# Patient Record
Sex: FEMALE | HISPANIC OR LATINO | Employment: OTHER | URBAN - METROPOLITAN AREA
[De-identification: names, ages, dates, MRNs, and addresses within clinical notes are randomized per-mention and may not be internally consistent; named-entity substitution may affect disease eponyms.]

---

## 2017-01-02 ENCOUNTER — OFFICE VISIT (OUTPATIENT)
Dept: FAMILY MEDICINE | Facility: CLINIC | Age: 32
End: 2017-01-02
Payer: COMMERCIAL

## 2017-01-02 ENCOUNTER — TELEPHONE (OUTPATIENT)
Dept: FAMILY MEDICINE | Facility: CLINIC | Age: 32
End: 2017-01-02

## 2017-01-02 VITALS
BODY MASS INDEX: 26.47 KG/M2 | HEART RATE: 103 BPM | WEIGHT: 140.2 LBS | HEIGHT: 61 IN | DIASTOLIC BLOOD PRESSURE: 54 MMHG | SYSTOLIC BLOOD PRESSURE: 98 MMHG

## 2017-01-02 DIAGNOSIS — M34.9 SCLERODERMA (H): ICD-10-CM

## 2017-01-02 DIAGNOSIS — J45.40 MODERATE PERSISTENT ASTHMA WITHOUT COMPLICATION: ICD-10-CM

## 2017-01-02 DIAGNOSIS — M34.1 CREST (CALCINOSIS, RAYNAUD'S PHENOMENON, ESOPHAGEAL DYSFUNCTION, SCLERODACTYLY, TELANGIECTASIA) (H): Primary | ICD-10-CM

## 2017-01-02 DIAGNOSIS — M54.42 ACUTE LEFT-SIDED LOW BACK PAIN WITH LEFT-SIDED SCIATICA: ICD-10-CM

## 2017-01-02 DIAGNOSIS — I26.99 OTHER PULMONARY EMBOLISM WITHOUT ACUTE COR PULMONALE, UNSPECIFIED CHRONICITY (H): ICD-10-CM

## 2017-01-02 DIAGNOSIS — G44.59 OTHER COMPLICATED HEADACHE SYNDROME: ICD-10-CM

## 2017-01-02 DIAGNOSIS — F41.0 SEVERE ANXIETY WITH PANIC: ICD-10-CM

## 2017-01-02 PROCEDURE — 99215 OFFICE O/P EST HI 40 MIN: CPT | Performed by: INTERNAL MEDICINE

## 2017-01-02 RX ORDER — CITALOPRAM HYDROBROMIDE 20 MG/1
20 TABLET ORAL DAILY
Qty: 90 TABLET | Refills: 3 | Status: SHIPPED | OUTPATIENT
Start: 2017-01-02 | End: 2017-01-25

## 2017-01-02 RX ORDER — BUTALBITAL, ASPIRIN AND CAFFEINE 50; 325; 40 MG/1; MG/1; MG/1
1 TABLET ORAL EVERY 4 HOURS PRN
Qty: 42 TABLET | Refills: 3 | Status: SHIPPED | OUTPATIENT
Start: 2017-01-02 | End: 2017-03-30

## 2017-01-02 RX ORDER — WARFARIN SODIUM 4 MG/1
4 TABLET ORAL DAILY
Qty: 60 TABLET | Refills: 1 | Status: SHIPPED | OUTPATIENT
Start: 2017-01-02 | End: 2017-01-25

## 2017-01-02 RX ORDER — ALBUTEROL SULFATE 90 UG/1
2 AEROSOL, METERED RESPIRATORY (INHALATION) EVERY 4 HOURS PRN
Qty: 1 INHALER | Refills: 11 | Status: SHIPPED | OUTPATIENT
Start: 2017-01-02 | End: 2017-06-22

## 2017-01-02 RX ORDER — GABAPENTIN 100 MG/1
200 CAPSULE ORAL 3 TIMES DAILY
Qty: 540 CAPSULE | Refills: 3 | Status: SHIPPED | OUTPATIENT
Start: 2017-01-02 | End: 2017-01-25

## 2017-01-02 RX ORDER — FERROUS GLUCONATE 324(38)MG
324 TABLET ORAL
Qty: 100 TABLET | Refills: 3 | Status: SHIPPED | OUTPATIENT
Start: 2017-01-02 | End: 2017-11-27

## 2017-01-02 RX ORDER — PROMETHAZINE HYDROCHLORIDE 25 MG/1
25 TABLET ORAL EVERY 6 HOURS PRN
Qty: 20 TABLET | Refills: 3 | Status: SHIPPED | OUTPATIENT
Start: 2017-01-02 | End: 2017-06-08

## 2017-01-02 ASSESSMENT — ANXIETY QUESTIONNAIRES
6. BECOMING EASILY ANNOYED OR IRRITABLE: NEARLY EVERY DAY
3. WORRYING TOO MUCH ABOUT DIFFERENT THINGS: MORE THAN HALF THE DAYS
1. FEELING NERVOUS, ANXIOUS, OR ON EDGE: MORE THAN HALF THE DAYS
2. NOT BEING ABLE TO STOP OR CONTROL WORRYING: MORE THAN HALF THE DAYS
IF YOU CHECKED OFF ANY PROBLEMS ON THIS QUESTIONNAIRE, HOW DIFFICULT HAVE THESE PROBLEMS MADE IT FOR YOU TO DO YOUR WORK, TAKE CARE OF THINGS AT HOME, OR GET ALONG WITH OTHER PEOPLE: SOMEWHAT DIFFICULT
GAD7 TOTAL SCORE: 16
7. FEELING AFRAID AS IF SOMETHING AWFUL MIGHT HAPPEN: MORE THAN HALF THE DAYS
5. BEING SO RESTLESS THAT IT IS HARD TO SIT STILL: MORE THAN HALF THE DAYS

## 2017-01-02 ASSESSMENT — PATIENT HEALTH QUESTIONNAIRE - PHQ9: 5. POOR APPETITE OR OVEREATING: NEARLY EVERY DAY

## 2017-01-02 NOTE — NURSING NOTE
"Chief Complaint   Patient presents with     RECHECK     refill medication/discuss side effects from methotrexate. Has been throwing up more frequently and noticed a lot of bruising.     Anxiety     stronger attacks more recently and is unable to calm down. -discuss ativan dose     Numbness     numbness along the backside of left leg started this morning.      Toe Pain     right foot toe pain, hard to walk. Has had skin ulcers in the past but this seems different        Initial BP 98/54 mmHg  Pulse 103  Ht 5' 1\" (1.549 m)  Wt 140 lb 3.2 oz (63.594 kg)  BMI 26.50 kg/m2 Estimated body mass index is 26.5 kg/(m^2) as calculated from the following:    Height as of this encounter: 5' 1\" (1.549 m).    Weight as of this encounter: 140 lb 3.2 oz (63.594 kg).  BP completed using cuff size: gita Jenkins cMA    "

## 2017-01-02 NOTE — MR AVS SNAPSHOT
"              After Visit Summary   1/2/2017    Molly Teague    MRN: [id number]           Patient Information     Date Of Birth          1985        Visit Information        Provider Department      1/2/2017 9:20 AM Grecia Barba, DO [facility]        Today's Diagnoses     Severe anxiety with panic    -  1     Other complicated headache syndrome         CREST (calcinosis, Raynaud's phenomenon, esophageal dysfunction, sclerodactyly, telangiectasia) (H)         Scleroderma (H)         Moderate persistent asthma without complication         Other pulmonary embolism without acute cor pulmonale, unspecified chronicity (H)           Care Instructions    1. For the increasing anxiety, increase citalopram to 20 mg once daily.  Keep appointment with Gabe Jacob next week  2. See DR. Barba in 1 month.  If anxiety is still severe, we can increase citalopram to 40 mg.   3. Continue to use heat for headache.  Could also try essential oils - Peppermint - can help with headache.   4. Lavender, ginger can help with nausea if mild.  5. For heartburn, continue with Omeprazole 40  Mg once daily.  Add ranitidine 150 mg once daily, then once daily as needed.  6. The cause of the numbness in the leg is likely sciatica.  Do some stretching, do not sit for long periods of time as this can make it worse.  7. The cause of the toe skin change is called pernio.  It is typically due to cold exposure.  Keep affected area warm.  8. We can try Phenergan in place of zofran for nausea, since zofran doesn't work well.        * Sciatica    Sciatica (\"Lumbar Radiculopathy\") causes a pain that spreads from the lower back down into the buttock, hip and leg. Sometimes leg pain can occur without any back pain. Sciatica is due to irritation or pressure on a spinal nerve as it comes out of the spinal canal. This is most often due to a bulge or rupture of a nearby spinal disk (the cartilage cushion between each spinal bone), " which presses on a nearby nerve. Other causes include spinal stenosis (narrowing of the spinal canal) and spasm of the piriformis muscle (a muscle in the buttocks that the sciatic nerve passes through).  Sciatica may begin after a sudden twisting/bending force (such as in a car accident), or sometimes after a simple awkward movement. In either case, muscle spasm is commonly present and contributes to the pain.  The diagnosis of sciatica is made from the symptoms and physical exam. Unless you had a physical injury (such as a car accident or fall), X-rays are usually not ordered for the initial evaluation of sciatica because the nerves and disks cannot be seen on an X-ray. Most sciatica (80-90%) gets better with time.  What can I do about my low back pain?  There are three main things you can do to ease low back pain and help it go away.    Use heat or cold packs.    Take medicine as directed.    Use positions, movements and exercises. Stay active! Too much rest can make your symptoms worse.  Using heat or cold packs  Try cold packs or gentle heat to ease your pain. Use whichever gives the most relief. Apply the cold pack or heat for 15 minutes at a time, as often as needed.  Taking medicine  If taking over-the-counter medicine:    Take ibuprofen (Advil, Motrin) 600 mg. three times a day as needed for pain.  OR    Take Aleve (naproxen sodium) 220 to 440 mg. two times a day as needed for pain  If your doctor prescribed a muscle relaxant (cyclobenzapine 10 mg.):    Take one half ( ) to 1 tablet at bedtime    Do not drive when taking this medicine. This drug may make you sleepy.  Using positions, movements and exercises  Research tells us that moving your joints and muscles can help you recover from back pain. Such activity should be simple and gentle.  Use the positions below as well as walking to help relieve your discomfort. Try taking a short walk every 3 to 4 hours during the day. Walk for a few minutes inside your  home or take longer walks outside, on a treadmill or at a mall. Slowly increase the amount of time you walk. Expect discomfort when you begin, but it should lessen as your back starts to recover.  Finding a position that is comfortable  When your back pain is new, you may find that certain positions will ease your pain. Gently try each of the following positions until you find one that eases your pain. Once you find a position of comfort, use it as often as you like while you recover. Return to your daily routine as soon as possible.     Lie on your back with your legs bent. You can do this by placing a pillow under your knees or lie on the floor and rest your lower legs on the seat of a chair.    Lie on your side with your knees bent and place a pillow between your knees.    Lie on your stomach over pillows.  When should I call my doctor?  Your back pain should improve over the first couple of weeks. As it improves, you should be able to return to your normal activities. But call your doctor if:    You have a sudden change in your ability to control? your bladder or bowels.    You begin to feel tingling in your groin or legs.    The pain spreads down your leg and into your foot.    Your toes, feet or leg muscles begin to feel weak.    You feel generally unwell or sick.    Your pain gets worse.    5123-5764 [employer]. [address] [place] [address]. All rights reserved. This information is not intended as a substitute for professional medical care. Always follow your healthcare professional's instructions.              Follow-ups after your visit        Your next 10 appointments already scheduled     Jan 04, 2017  9:40 AM   Return Visit with Neida Aguirre PA-C   [facility] ([facility])    [address] [address]   [phone]            Jan 04, 2017 11:00 AM   Treatment with Marichuy Lees OT   [facility] ([facility])    [address]  [address]  [facility] [place] [facility] [place] [place] [address]   [phone]            Jan 04, 2017 11:30 AM   Treatment with Taniya Junior PT   [facility] ([facility])    [address]  [address]  [facility] [place] [facility] [place] [place] [address]   [phone]            Jan 05, 2017 11:00 AM   New Visit with Fer Jacob [facility] ([facility])    [address]  [place] [address]   [phone]            Jan 06, 2017  9:30 AM   [facility] with [facility] ([facility])    [address] [place] [address]   [phone]            Jan 12, 2017 10:00 AM   Return Visit with Fer Jacob [facility] ([facility])    [address]  [place] [address]   [phone]            Jan 13, 2017 10:30 AM   PHYSICAL with Kale Marquez MD   [facility] ([facility])    [address] [place] [address]   [phone]            Jan 16, 2017 10:00 AM   New Visit with Stacey Nichols MD   [facility] ([facility])    [facility]  [address]  [facility] [place] [facility] [place] [place] [address]   [phone]            Jan 30, 2017  9:00 AM   New Visit with Janae Moseley MD   [facility] ([facility])    [address] [place] [id number] [phone]            Feb 01, 2017  9:30 AM   Return Visit with Antwan Davis MD   [facility] ([facility])    [address] [address]   [phone]              Who to contact     If you have questions or need follow up information about today's clinic visit or your schedule please contact [facility] directly at [phone].  Normal or  "non-critical lab and imaging results will be communicated to you by MyChart, letter or phone within 4 business days after the clinic has received the results. If you do not hear from us within 7 days, please contact the clinic through [employer] or phone. If you have a critical or abnormal lab result, we will notify you by phone as soon as possible.  Submit refill requests through [employer] or call your pharmacy and they will forward the refill request to us. Please allow 3 business days for your refill to be completed.          Additional Information About Your Visit        [employer] Information     [employer] lets you send messages to your doctor, view your test results, renew your prescriptions, schedule appointments and more. To sign up, go to [email or web address] . Click on \"Log in\" on the left side of the screen, which will take you to the Welcome page. Then click on \"Sign up Now\" on the right side of the page.     You will be asked to enter the access code listed below, as well as some personal information. Please follow the directions to create your username and password.     Your access code is: [email or web address]  Expires: 2017 10:02 AM     Your access code will  in 90 days. If you need help or a new code, please call your [place] clinic or [phone].        Your Vitals Were     Pulse Height BMI (Body Mass Index)             103 5' 1\" (1.549 m) 26.50 kg/m2          Blood Pressure from Last 3 Encounters:   17 98/54   16 120/74   16 98/63    Weight from Last 3 Encounters:   17 140 lb 3.2 oz (63.594 kg)   16 140 lb (63.504 kg)   16 141 lb (63.957 kg)              Today, you had the following     No orders found for display         Today's Medication Changes          These changes are accurate as of: 17 10:02 AM.  If you have any questions, ask your nurse or doctor.               Start taking these medicines.        Dose/Directions    promethazine 25 MG tablet "   Commonly known as:  PHENERGAN   Used for:  CREST (calcinosis, Raynaud's phenomenon, esophageal dysfunction, sclerodactyly, telangiectasia) (H)   Started by:  Grecia Barba DO        Dose:  25 mg   Take 1 tablet (25 mg) by mouth every 6 hours as needed for nausea   Quantity:  20 tablet   Refills:  3       ranitidine 150 MG tablet   Commonly known as:  ZANTAC   Used for:  CREST (calcinosis, Raynaud's phenomenon, esophageal dysfunction, sclerodactyly, telangiectasia) (H)   Started by:  Grecia Barba DO        Dose:  150 mg   Take 1 tablet (150 mg) by mouth 2 times daily as needed for heartburn   Quantity:  60 tablet   Refills:  11         These medicines have changed or have updated prescriptions.        Dose/Directions    citalopram 20 MG tablet   Commonly known as:  celeXA   This may have changed:    - medication strength  - how much to take   Used for:  Severe anxiety with panic   Changed by:  Grecia Barba DO        Dose:  20 mg   Take 1 tablet (20 mg) by mouth daily   Quantity:  90 tablet   Refills:  3       gabapentin 100 MG capsule   Commonly known as:  NEURONTIN   This may have changed:  how much to take   Used for:  Scleroderma (H)   Changed by:  Grecia Barba DO        Dose:  200 mg   Take 2 capsules (200 mg) by mouth 3 times daily   Quantity:  540 capsule   Refills:  3         Stop taking these medicines if you haven't already. Please contact your care team if you have questions.     ondansetron 4 MG tablet   Commonly known as:  ZOFRAN   Stopped by:  Grecia Barba DO                Where to get your medicines      These medications were sent to [facility] [place], [place] - [address]  [address] [place] [address]     Phone:  [phone]    - albuterol 108 (90 BASE) MCG/ACT Inhaler  - citalopram 20 MG tablet  - ferrous gluconate 324 (38 FE) MG tablet  - gabapentin 100 MG capsule  - promethazine 25 MG tablet  - ranitidine 150 MG tablet  -  warfarin 4 MG tablet      Some of these will need a paper prescription and others can be bought over the counter.  Ask your nurse if you have questions.     Bring a paper prescription for each of these medications    - butalbital-aspirin-caffeine -40 MG Tabs per tablet             Primary Care Provider Office Phone # Fax #    Grecia Barba [phone]       [facility] [address] [address]        Thank you!     Thank you for choosing [facility]  for your care. Our goal is always to provide you with excellent care. Hearing back from our patients is one way we can continue to improve our services. Please take a few minutes to complete the written survey that you may receive in the mail after your visit with us. Thank you!             Your Updated Medication List - Protect others around you: Learn how to safely use, store and throw away your medicines at www.disposemymeds.org.          This list is accurate as of: 1/2/17 10:02 AM.  Always use your most recent med list.                   Brand Name Dispense Instructions for use    albuterol 108 (90 BASE) MCG/ACT Inhaler    VENTOLIN HFA    1 Inhaler    Inhale 2 puffs into the lungs every 4 hours as needed for shortness of breath / dyspnea or wheezing       B-12 1000 MCG Tbcr     100 tablet    Take 1,000 mcg by mouth daily       BENADRYL 25 MG tablet   Generic drug:  diphenhydrAMINE     56 tablet    Take 1 tablet (25 mg) by mouth every 6 hours as needed for itching or allergies       blood glucose monitoring test strip    no brand specified    100 each    Use to test blood sugars 3 times daily or as directed Please give what is approved by insurance.       butalbital-aspirin-caffeine -40 MG Tabs per tablet    BUTALBITAL COMPOUND/ASA    42 tablet    Take 1 tablet by mouth every 4 hours as needed for headaches       citalopram 20 MG tablet    celeXA    90 tablet    Take 1 tablet (20 mg) by mouth  daily       COREG 12.5 MG tablet   Generic drug:  carvedilol     180 tablet    Take a 1/2 tablet by mouth daily       ferrous gluconate 324 (38 FE) MG tablet    FERGON    100 tablet    Take 1 tablet (324 mg) by mouth daily (with breakfast)       folic acid 1 MG tablet    FOLVITE    100 tablet    Take 1 tablet (1 mg) by mouth daily       gabapentin 100 MG capsule    NEURONTIN    540 capsule    Take 2 capsules (200 mg) by mouth 3 times daily       lisinopril 10 MG tablet    PRINIVIL/ZESTRIL    90 tablet    Take 1 tablet (10 mg) by mouth daily       LORazepam 0.5 MG tablet    ATIVAN    60 tablet    Take 1 tablet (0.5 mg) by mouth 2 times daily as needed for anxiety Order correction called to pharm       methotrexate 2.5 MG tablet CHEMO     30 tablet    Take 6 tablets (15 mg) by mouth once a week       omeprazole 20 MG CR capsule    priLOSEC    90 capsule    Take 40 mg by mouth daily       oxyCODONE 15 MG IR tablet    ROXICODONE    120 tablet    Take 1 tablet (15 mg) by mouth every 4 hours as needed for moderate to severe pain       polyethylene glycol powder    MIRALAX    510 g    Take 17 g (1 capful) by mouth daily       promethazine 25 MG tablet    PHENERGAN    20 tablet    Take 1 tablet (25 mg) by mouth every 6 hours as needed for nausea       ranitidine 150 MG tablet    ZANTAC    60 tablet    Take 1 tablet (150 mg) by mouth 2 times daily as needed for heartburn       sucralfate 1 GM tablet    CARAFATE    120 tablet    Take 1 tablet (1 g) by mouth 4 times daily       SYMBICORT 160-4.5 MCG/ACT Inhaler   Generic drug:  budesonide-formoterol     3 Inhaler    Inhale 2 puffs into the lungs 2 times daily       warfarin 4 MG tablet    COUMADIN    60 tablet    Take 1 tablet (4 mg) by mouth daily Or as directed by anticoagulation clinic       zolpidem 10 MG tablet    AMBIEN    30 tablet    Take 1 tablet (10 mg) by mouth nightly as needed for sleep

## 2017-01-02 NOTE — TELEPHONE ENCOUNTER
PRIOR AUTHORIZATION REQUIRED PER INSURANCE    INSURANCE COMPANY [email or web address] PRIOR AUTH PHONE # [phone]  PATIENT ID # [id number]    IF YOU HAVE ANY QUESTIONS OR NEED ANY ASSISTANCE IN ANY WAY PLEASE LET US KNOW    THANK YOU            Aline Sommers [facility]  Certified Pharmacy Technician  Phone:[phone]  Fax:[phone]

## 2017-01-02 NOTE — PATIENT INSTRUCTIONS
"1. For the increasing anxiety, increase citalopram to 20 mg once daily.  Keep appointment with Gabe Jacob next week  2. See DR. Barba in 1 month.  If anxiety is still severe, we can increase citalopram to 40 mg.   3. Continue to use heat for headache.  Could also try essential oils - Peppermint - can help with headache.   4. Lavender, ginger can help with nausea if mild.  5. For heartburn, continue with Omeprazole 40  Mg once daily.  Add ranitidine 150 mg once daily, then once daily as needed.  6. The cause of the numbness in the leg is likely sciatica.  Do some stretching, do not sit for long periods of time as this can make it worse.  7. The cause of the toe skin change is called pernio.  It is typically due to cold exposure.  Keep affected area warm.  8. We can try Phenergan in place of zofran for nausea, since zofran doesn't work well.        * Sciatica    Sciatica (\"Lumbar Radiculopathy\") causes a pain that spreads from the lower back down into the buttock, hip and leg. Sometimes leg pain can occur without any back pain. Sciatica is due to irritation or pressure on a spinal nerve as it comes out of the spinal canal. This is most often due to a bulge or rupture of a nearby spinal disk (the cartilage cushion between each spinal bone), which presses on a nearby nerve. Other causes include spinal stenosis (narrowing of the spinal canal) and spasm of the piriformis muscle (a muscle in the buttocks that the sciatic nerve passes through).  Sciatica may begin after a sudden twisting/bending force (such as in a car accident), or sometimes after a simple awkward movement. In either case, muscle spasm is commonly present and contributes to the pain.  The diagnosis of sciatica is made from the symptoms and physical exam. Unless you had a physical injury (such as a car accident or fall), X-rays are usually not ordered for the initial evaluation of sciatica because the nerves and disks cannot be seen on an X-ray. Most " sciatica (80-90%) gets better with time.  What can I do about my low back pain?  There are three main things you can do to ease low back pain and help it go away.    Use heat or cold packs.    Take medicine as directed.    Use positions, movements and exercises. Stay active! Too much rest can make your symptoms worse.  Using heat or cold packs  Try cold packs or gentle heat to ease your pain. Use whichever gives the most relief. Apply the cold pack or heat for 15 minutes at a time, as often as needed.  Taking medicine  If taking over-the-counter medicine:    Take ibuprofen (Advil, Motrin) 600 mg. three times a day as needed for pain.  OR    Take Aleve (naproxen sodium) 220 to 440 mg. two times a day as needed for pain  If your doctor prescribed a muscle relaxant (cyclobenzapine 10 mg.):    Take one half ( ) to 1 tablet at bedtime    Do not drive when taking this medicine. This drug may make you sleepy.  Using positions, movements and exercises  Research tells us that moving your joints and muscles can help you recover from back pain. Such activity should be simple and gentle.  Use the positions below as well as walking to help relieve your discomfort. Try taking a short walk every 3 to 4 hours during the day. Walk for a few minutes inside your home or take longer walks outside, on a treadmill or at a mall. Slowly increase the amount of time you walk. Expect discomfort when you begin, but it should lessen as your back starts to recover.  Finding a position that is comfortable  When your back pain is new, you may find that certain positions will ease your pain. Gently try each of the following positions until you find one that eases your pain. Once you find a position of comfort, use it as often as you like while you recover. Return to your daily routine as soon as possible.     Lie on your back with your legs bent. You can do this by placing a pillow under your knees or lie on the floor and rest your lower legs on  the seat of a chair.    Lie on your side with your knees bent and place a pillow between your knees.    Lie on your stomach over pillows.  When should I call my doctor?  Your back pain should improve over the first couple of weeks. As it improves, you should be able to return to your normal activities. But call your doctor if:    You have a sudden change in your ability to control? your bladder or bowels.    You begin to feel tingling in your groin or legs.    The pain spreads down your leg and into your foot.    Your toes, feet or leg muscles begin to feel weak.    You feel generally unwell or sick.    Your pain gets worse.    6523-6195 [employer]. [address] [place] [address], [place] [address]. All rights reserved. This information is not intended as a substitute for professional medical care. Always follow your healthcare professional's instructions.

## 2017-01-02 NOTE — PROGRESS NOTES
SUBJECTIVE:                                                    Molly Teague is a 31 year old female who presents to clinic today for the following health issues:    Chief Complaint   Patient presents with     RECHECK     refill medication and discuss side effects from methotrexate per reumatoloy      Anxiety     stronger attacks more recently and is unable to calm down.      Numbness     numbness along the backside of left leg started this morning.      Toe Pain     right foot toe pain, hard to walk. Has had skin ulcers in the past but this seems different    nerve pain:  Is increasing gabapentin, 200 mg TID.  It does help, but if she misses a dose, she has increased pain.    Anxiety:  Feels it is worse.  She often wakes up at night with panic attacks if her son is sleeping too close to her.  She is using ativan BID consistently.  She doesn't think it is helping.  She feels her mood is worse as well - irritability.      Migraine:  She reports she has severe headache/migraine every day.  She is using butalbital frequently, several times/week.  She is not using APAP or NSAIDs.  She rarely uses Excedrin migraine.  She often uses heating pad, which helps some with the headache.     Nausea/Vomiting:  She reports she vomits daily, has nausea multiple times/day.  She had 1 week or so where she couldn't keep any food down, but this has since resolved.  This was 2 weeks ago.  She feels her GERD is a factor in her nausea.  She will use Tums, and lay down which helps with the nausea.  The zofran doesn't work for nausea.  She has hard time using carafate but it interferes with her meds, so she is only using BID.  She is taking Omeprazole 40 mg once daily.  She did take ranitidine in the past.      MSK:  She reports she has numbness in left buttock area, radiates down the leg.  It started upon awaking this AM.  Has never had this before.    Right toe pain:  Plantar surface of 2nd toe.  Is getting better.        Current  Outpatient Prescriptions   Medication Sig Dispense Refill     oxyCODONE (ROXICODONE) 15 MG IR tablet Take 1 tablet (15 mg) by mouth every 4 hours as needed for moderate to severe pain 120 tablet 0     LORazepam (ATIVAN) 0.5 MG tablet Take 1 tablet (0.5 mg) by mouth 2 times daily as needed for anxiety Order correction called to pharm 60 tablet 1     zolpidem (AMBIEN) 10 MG tablet Take 1 tablet (10 mg) by mouth nightly as needed for sleep 30 tablet 1     polyethylene glycol (MIRALAX) powder Take 17 g (1 capful) by mouth daily 510 g 1     methotrexate 2.5 MG tablet CHEMO Take 6 tablets (15 mg) by mouth once a week 30 tablet 1     folic acid (FOLVITE) 1 MG tablet Take 1 tablet (1 mg) by mouth daily 100 tablet 3     ondansetron (ZOFRAN) 4 MG tablet Take 1 tablet (4 mg) by mouth every 8 hours as needed for nausea 18 tablet 1     sucralfate (CARAFATE) 1 GM tablet Take 1 tablet (1 g) by mouth 4 times daily 120 tablet 1     butalbital-aspirin-caffeine (BUTALBITAL COMPOUND/ASA) -40 MG TABS Take 1 tablet by mouth every 4 hours as needed for headaches 42 tablet 0     gabapentin (NEURONTIN) 100 MG capsule Take 1 capsule (100 mg) by mouth 3 times daily (Patient taking differently: Take 100 mg by mouth Takes one capsule in the am and two capsules at night) 90 capsule 0     warfarin (COUMADIN) 4 MG tablet Take 1 tablet (4 mg) by mouth daily Or as directed by anticoagulation clinic 60 tablet 0     ferrous gluconate (FERGON) 324 (38 FE) MG tablet Take 1 tablet (324 mg) by mouth daily (with breakfast) 100 tablet      lisinopril (PRINIVIL,ZESTRIL) 10 MG tablet Take 1 tablet (10 mg) by mouth daily 90 tablet 3     omeprazole (PRILOSEC) 20 MG capsule Take 1 capsule (20 mg) by mouth daily 90 capsule 1     carvedilol (COREG) 12.5 MG tablet Take a 1/2 tablet by mouth daily 180 tablet 3     citalopram (CELEXA) 10 MG tablet Take 1 tablet (10 mg) by mouth daily 90 tablet 1     Cyanocobalamin (B-12) 1000 MCG TBCR Take 1,000 mcg by mouth  "daily 100 tablet 1     albuterol (VENTOLIN HFA) 108 (90 BASE) MCG/ACT inhaler Inhale 2 puffs into the lungs every 4 hours as needed for shortness of breath / dyspnea or wheezing 1 Inhaler 1     budesonide-formoterol (SYMBICORT) 160-4.5 MCG/ACT inhaler Inhale 2 puffs into the lungs 2 times daily 3 Inhaler 3     blood glucose monitoring (NO BRAND SPECIFIED) test strip Use to test blood sugars 3 times daily or as directed  Please give what is approved by insurance. 100 each 0     diphenhydrAMINE (BENADRYL) 25 MG tablet Take 1 tablet (25 mg) by mouth every 6 hours as needed for itching or allergies 56 tablet      Problem list, Medication list, Allergies, and Medical/Social/Surgical histories reviewed in [facility] and updated as appropriate.    ROS:  Constitutional, HEENT, cardiovascular, pulmonary, gi and gu systems are negative, except as otherwise noted.    OBJECTIVE:                                                    BP 98/54 mmHg  Pulse 103  Ht 5' 1\" (1.549 m)  Wt 140 lb 3.2 oz (63.594 kg)  BMI 26.50 kg/m2  Body mass index is 26.5 kg/(m^2).  GENERAL APPEARANCE: alert and no distress  RESP: lungs clear to auscultation - no rales, rhonchi or wheezes  CV: regular rates and rhythm, normal S1 S2, no S3 or S4 and no murmur, click or rub  SKIN: sclerodactly, skin keloids  NEURO: Normal strength and tone, mentation intact, speech normal, gait normal including heel/toe/tandem walking and cranial nerves 2-12 intact  Comprehensive back pain exam:  Tenderness of left buttock area, Range of motion not limited by pain, Lower extremity strength functional and equal on both sides, Lower extremity reflexes within normal limits bilaterally, Lower extremity sensation normal and equal on both sides and Straight leg positive on  left, indicating possible ipsilateral radiculopathy       ASSESSMENT/PLAN:                                                      1. CREST (calcinosis, Raynaud's phenomenon, esophageal dysfunction, sclerodactyly, " telangiectasia) (H) - increase in GERD symptoms.  Instead of increases PPI to BID, add H2 blocker once daily, and once daily PRN.  Add phenergan in place of zofran for N/V.  Some of her N/V is stress related (had severe episode on day of [facility] event patient was anxious on attending)  - ranitidine (ZANTAC) 150 MG tablet; Take 1 tablet (150 mg) by mouth 2 times daily as needed for heartburn  Dispense: 60 tablet; Refill: 11  - ferrous gluconate (FERGON) 324 (38 FE) MG tablet; Take 1 tablet (324 mg) by mouth daily (with breakfast)  Dispense: 100 tablet; Refill: 3  - promethazine (PHENERGAN) 25 MG tablet; Take 1 tablet (25 mg) by mouth every 6 hours as needed for nausea  Dispense: 20 tablet; Refill: 3    2. Scleroderma (H) -refill given.  Has appointment at [facility] next week, requested patient to have records sent here  - gabapentin (NEURONTIN) 100 MG capsule; Take 2 capsules (200 mg) by mouth 3 times daily  Dispense: 540 capsule; Refill: 3    3. Other pulmonary embolism without acute cor pulmonale, unspecified chronicity (H) - refill given.  Has appointment with Heme  - warfarin (COUMADIN) 4 MG tablet; Take 1 tablet (4 mg) by mouth daily Or as directed by anticoagulation clinic  Dispense: 60 tablet; Refill: 1    4. Severe anxiety with panic - increase citalopram.  Recheck in 1 month, increase to 40 if needed.  She will need long term counseling, likely EMDR or other PTSD therapy.  She would benefit from couples counseling as well, as her spouse has a hard time coping with her chronic disease and daily symptoms.  - citalopram (CELEXA) 20 MG tablet; Take 1 tablet (20 mg) by mouth daily  Dispense: 90 tablet; Refill: 3    5. Other complicated headache syndrome - advised use of essential oils for mild headache, continue to avoid OTC meds to prevent rebound, sparingly use of Butalbital.  - butalbital-aspirin-caffeine (BUTALBITAL COMPOUND/ASA) -40 MG TABS per tablet; Take 1 tablet by mouth every 4 hours as  needed for headaches  Dispense: 42 tablet; Refill: 3    6. Moderate persistent asthma without complication - stable, refill given  - albuterol (VENTOLIN HFA) 108 (90 BASE) MCG/ACT Inhaler; Inhale 2 puffs into the lungs every 4 hours as needed for shortness of breath / dyspnea or wheezing  Dispense: 1 Inhaler; Refill: 11    7. Acute left-sided low back pain with left-sided sciatica  - muscular, advised stretching, hand-out given.    1. For the increasing anxiety, increase citalopram to 20 mg once daily.  Keep appointment with Gabe Jacob next week  2. See DR. Barba in 1 month.  If anxiety is still severe, we can increase citalopram to 40 mg.   3. Continue to use heat for headache.  Could also try essential oils - Peppermint - can help with headache.   4. Lavender, ginger can help with nausea if mild.  5. For heartburn, continue with Omeprazole 40  Mg once daily.  Add ranitidine 150 mg once daily, then once daily as needed.  6. The cause of the numbness in the leg is likely sciatica.  Do some stretching, do not sit for long periods of time as this can make it worse.  7. The cause of the toe skin change is called pernio.  It is typically due to cold exposure.  Keep affected area warm.  8. We can try Phenergan in place of zofran for nausea, since zofran doesn't work well.      Greater than 40 minutes was spent face-to-face with the patient by the provider.  Greater than 50% was spent in counseling or coordinating care for this patient.      Grecia Barba,   [facility]

## 2017-01-03 ASSESSMENT — ANXIETY QUESTIONNAIRES: GAD7 TOTAL SCORE: 16

## 2017-01-03 ASSESSMENT — PATIENT HEALTH QUESTIONNAIRE - PHQ9: SUM OF ALL RESPONSES TO PHQ QUESTIONS 1-9: 19

## 2017-01-04 ENCOUNTER — HOSPITAL ENCOUNTER (OUTPATIENT)
Dept: PHYSICAL THERAPY | Facility: CLINIC | Age: 32
Setting detail: THERAPIES SERIES
End: 2017-01-04
Attending: INTERNAL MEDICINE
Payer: COMMERCIAL

## 2017-01-04 ENCOUNTER — OFFICE VISIT (OUTPATIENT)
Dept: DERMATOLOGY | Facility: CLINIC | Age: 32
End: 2017-01-04
Payer: COMMERCIAL

## 2017-01-04 ENCOUNTER — HOSPITAL ENCOUNTER (OUTPATIENT)
Dept: OCCUPATIONAL THERAPY | Facility: CLINIC | Age: 32
Setting detail: THERAPIES SERIES
End: 2017-01-04
Attending: INTERNAL MEDICINE
Payer: COMMERCIAL

## 2017-01-04 VITALS — SYSTOLIC BLOOD PRESSURE: 101 MMHG | HEART RATE: 94 BPM | DIASTOLIC BLOOD PRESSURE: 62 MMHG | OXYGEN SATURATION: 99 %

## 2017-01-04 DIAGNOSIS — L91.0 KELOID: Primary | ICD-10-CM

## 2017-01-04 PROCEDURE — 97110 THERAPEUTIC EXERCISES: CPT | Mod: GO | Performed by: OCCUPATIONAL THERAPIST

## 2017-01-04 PROCEDURE — 40000839 ZZH STATISTIC HAND THERAPY VISIT: Performed by: OCCUPATIONAL THERAPIST

## 2017-01-04 PROCEDURE — 11900 INJECT SKIN LESIONS </W 7: CPT | Performed by: PHYSICIAN ASSISTANT

## 2017-01-04 PROCEDURE — 40000718 ZZHC STATISTIC PT DEPARTMENT ORTHO VISIT: Performed by: PHYSICAL THERAPIST

## 2017-01-04 PROCEDURE — 97110 THERAPEUTIC EXERCISES: CPT | Mod: GP | Performed by: PHYSICAL THERAPIST

## 2017-01-04 NOTE — NURSING NOTE
"Initial /62 mmHg  Pulse 94  SpO2 99% Estimated body mass index is 26.50 kg/(m^2) as calculated from the following:    Height as of 1/2/17: 1.549 m (5' 1\").    Weight as of 1/2/17: 63.594 kg (140 lb 3.2 oz). .      "

## 2017-01-04 NOTE — PROGRESS NOTES
Molly Teague is a 31 year old year old female patient here today for recheck keloids on left shoulder and left clavicle .  Patient has noticed some softening on her shoulder. Are not as painful as before.  Patient has no other skin complaints today.  Remainder of the HPI, Meds, PMH, Allergies, FH, and SH was reviewed in chart.    Pertinent Hx:  History of Scleroderma   Past Medical History   Diagnosis Date     PE (pulmonary embolism) 9/7/2016     Long-term (current) use of anticoagulants [Z79.01] 9/9/2016     Scleroderma (H) 9/22/2016     Uncomplicated asthma      Arthritis      Blood clotting disorder (H)      P E     History of blood transfusion      Hypertension      Rheumatism        History reviewed. No pertinent past surgical history.     Family History   Problem Relation Age of Onset     Hyperlipidemia Father      DIABETES Maternal Aunt      Melanoma No family hx of      Skin Cancer No family hx of      CEREBROVASCULAR DISEASE Maternal Grandmother      Hyperlipidemia Paternal Grandfather        Social History     Social History     Marital Status: Single     Spouse Name: N/A     Number of Children: N/A     Years of Education: N/A     Occupational History     Not on file.     Social History Main Topics     Smoking status: Never Smoker      Smokeless tobacco: Not on file     Alcohol Use: No     Drug Use: No     Sexual Activity:     Partners: Male     Other Topics Concern     Parent/Sibling W/ Cabg, Mi Or Angioplasty Before 65f 55m? No     Social History Narrative       Outpatient Encounter Prescriptions as of 1/4/2017   Medication Sig Dispense Refill     citalopram (CELEXA) 20 MG tablet Take 1 tablet (20 mg) by mouth daily 90 tablet 3     butalbital-aspirin-caffeine (BUTALBITAL COMPOUND/ASA) -40 MG TABS per tablet Take 1 tablet by mouth every 4 hours as needed for headaches 42 tablet 3     ranitidine (ZANTAC) 150 MG tablet Take 1 tablet (150 mg) by mouth 2 times daily as needed for heartburn 60 tablet  11     ferrous gluconate (FERGON) 324 (38 FE) MG tablet Take 1 tablet (324 mg) by mouth daily (with breakfast) 100 tablet 3     gabapentin (NEURONTIN) 100 MG capsule Take 2 capsules (200 mg) by mouth 3 times daily 540 capsule 3     albuterol (VENTOLIN HFA) 108 (90 BASE) MCG/ACT Inhaler Inhale 2 puffs into the lungs every 4 hours as needed for shortness of breath / dyspnea or wheezing 1 Inhaler 11     warfarin (COUMADIN) 4 MG tablet Take 1 tablet (4 mg) by mouth daily Or as directed by anticoagulation clinic 60 tablet 1     promethazine (PHENERGAN) 25 MG tablet Take 1 tablet (25 mg) by mouth every 6 hours as needed for nausea 20 tablet 3     oxyCODONE (ROXICODONE) 15 MG IR tablet Take 1 tablet (15 mg) by mouth every 4 hours as needed for moderate to severe pain 120 tablet 0     LORazepam (ATIVAN) 0.5 MG tablet Take 1 tablet (0.5 mg) by mouth 2 times daily as needed for anxiety Order correction called to pharm 60 tablet 1     zolpidem (AMBIEN) 10 MG tablet Take 1 tablet (10 mg) by mouth nightly as needed for sleep 30 tablet 1     polyethylene glycol (MIRALAX) powder Take 17 g (1 capful) by mouth daily 510 g 1     methotrexate 2.5 MG tablet CHEMO Take 6 tablets (15 mg) by mouth once a week 30 tablet 1     folic acid (FOLVITE) 1 MG tablet Take 1 tablet (1 mg) by mouth daily 100 tablet 3     sucralfate (CARAFATE) 1 GM tablet Take 1 tablet (1 g) by mouth 4 times daily 120 tablet 1     blood glucose monitoring (NO BRAND SPECIFIED) test strip Use to test blood sugars 3 times daily or as directed  Please give what is approved by insurance. 100 each 0     lisinopril (PRINIVIL,ZESTRIL) 10 MG tablet Take 1 tablet (10 mg) by mouth daily 90 tablet 3     omeprazole (PRILOSEC) 20 MG capsule Take 40 mg by mouth daily  90 capsule 1     carvedilol (COREG) 12.5 MG tablet Take a 1/2 tablet by mouth daily 180 tablet 3     Cyanocobalamin (B-12) 1000 MCG TBCR Take 1,000 mcg by mouth daily 100 tablet 1     diphenhydrAMINE (BENADRYL) 25 MG  tablet Take 1 tablet (25 mg) by mouth every 6 hours as needed for itching or allergies 56 tablet      budesonide-formoterol (SYMBICORT) 160-4.5 MCG/ACT inhaler Inhale 2 puffs into the lungs 2 times daily 3 Inhaler 3     No facility-administered encounter medications on file as of 1/4/2017.             Review Of Systems  Skin: As above  Eyes: negative  Ears/Nose/Throat: negative  Respiratory: No shortness of breath, dyspnea on exertion, cough, or hemoptysis  Cardiovascular: negative  Gastrointestinal: negative  Genitourinary: negative  Musculoskeletal: negative  Neurologic: negative  Psychiatric: negative  Hematologic/Lymphatic/Immunologic: negative  Endocrine: negative      O:   NAD, WDWN, Alert & Oriented, Mood & Affect wnl, Vitals stable   Here today alone   /62 mmHg  Pulse 94  SpO2 99%   General appearance normal   Vitals stable   Alert, oriented and in no acute distress      Pink firm raised papules and plaques on left shoulder and left clavicle       Eyes: Conjunctivae/lids:Normal     MSK:Normal    Pulm: Breathing Normal    Neuro/Psych: Orientation:Normal; Mood/Affect:Normal  A/P:  1. Keloid on left shoulder x 5- softening of keloid noted   IL TAC: PGACAC discussed.  Risks including but not limited to injection site reaction, bruising, no resolution.  All questions answered and entertained to patient s satisfaction.  Informed consent obtained.  IL TAC in concentration of 40mg/ml was injected ID to keloids.  Total injected was  0.5  ml.  Patient tolerated without complications and given wound care instructions, including not to move product around.  Return in 4 weeks for follow-up and possible additional IL TAC.
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
## 2017-01-05 ENCOUNTER — OFFICE VISIT (OUTPATIENT)
Dept: PSYCHOLOGY | Facility: CLINIC | Age: 32
End: 2017-01-05
Attending: INTERNAL MEDICINE
Payer: COMMERCIAL

## 2017-01-05 DIAGNOSIS — F33.2 SEVERE RECURRENT MAJOR DEPRESSION WITHOUT PSYCHOTIC FEATURES (H): Primary | ICD-10-CM

## 2017-01-05 DIAGNOSIS — F41.1 GENERALIZED ANXIETY DISORDER: ICD-10-CM

## 2017-01-05 PROCEDURE — 90834 PSYTX W PT 45 MINUTES: CPT | Performed by: SOCIAL WORKER

## 2017-01-05 ASSESSMENT — ANXIETY QUESTIONNAIRES
2. NOT BEING ABLE TO STOP OR CONTROL WORRYING: MORE THAN HALF THE DAYS
1. FEELING NERVOUS, ANXIOUS, OR ON EDGE: NEARLY EVERY DAY
7. FEELING AFRAID AS IF SOMETHING AWFUL MIGHT HAPPEN: MORE THAN HALF THE DAYS
5. BEING SO RESTLESS THAT IT IS HARD TO SIT STILL: SEVERAL DAYS
GAD7 TOTAL SCORE: 15
6. BECOMING EASILY ANNOYED OR IRRITABLE: NEARLY EVERY DAY
3. WORRYING TOO MUCH ABOUT DIFFERENT THINGS: MORE THAN HALF THE DAYS

## 2017-01-05 ASSESSMENT — PATIENT HEALTH QUESTIONNAIRE - PHQ9: 5. POOR APPETITE OR OVEREATING: MORE THAN HALF THE DAYS

## 2017-01-05 NOTE — PROGRESS NOTES
Progress Note - Initial Session    Client Name:  Molly Teague Date: 1/5/17         Service Type: Individual      Session Start Time: 11  Session End Time: 12 pm      Session Length: 53 - 60      Session #: 1     Attendees: Client attended alone         Diagnostic Assessment in progress.  Unable to complete documentation at the conclusion of the first session due to time constraints.      Mental Status Assessment:  Appearance:   Appropriate   Eye Contact:   Good   Psychomotor Behavior: Normal   Attitude:   Cooperative   Orientation:   All  Speech   Rate / Production: Normal    Volume:  Normal   Mood:    Depressed  Normal  Affect:    Appropriate   Thought Content:  Clear   Thought Form:  Coherent  Logical   Insight:    Good       Safety Issues and Plan for Safety and Risk Management:  Client reports the following current fears or concerns for personal safety: ideation without plan or intent.  Client reports the following current or recent suicidal ideation or behaviors: see above.  Client denies current or recent homicidal ideation or behaviors.  Client denies current or recent self injurious behavior or ideation.  Client denies other safety concerns.  A safety and risk management plan has not been developed at this time, however client was given the after-hours number / 911 should there be a change in any of these risk factors.  Client reports there are firearms in the house. The firearms are secured in a locked space.      Diagnostic Criteria:  A. Excessive anxiety and worry about a number of events or activities (such as work or school performance).   B. The person finds it difficult to control the worry.  C. Select 3 or more symptoms (required for diagnosis). Only one item is required in children.   - Restlessness or feeling keyed up or on edge.    - Being easily fatigued.    - Difficulty concentrating or mind going blank.    - Irritability.    - Sleep disturbance (difficulty falling or staying  asleep, or restless unsatisfying sleep).   D. The focus of the anxiety and worry is not confined to features of an Axis I disorder.  E. The anxiety, worry, or physical symptoms cause clinically significant distress or impairment in social, occupational, or other important areas of functioning.   F. The disturbance is not due to the direct physiological effects of a substance (e.g., a drug of abuse, a medication) or a general medical condition (e.g., hyperthyroidism) and does not occur exclusively during a Mood Disorder, a Psychotic Disorder, or a Pervasive Developmental Disorder.    - The aformentioned symptoms began several month(s) ago and occurs 6 days per week and is experienced as moderate.   Major Depressive Disorder: CRITERIA (A-C) REPRESENT A MAJOR DEPRESSIVE EPISODE - SELECT THESE CRITERIA  A) Recurrent episode(s) - symptoms have been present during the same 2-week period and represent a change from previous functioning 5 or more symptoms (required for diagnosis)   - Depressed mood. Note: In children and adolescents, can be irritable mood.     - Diminished interest or pleasure in all, or almost all, activities.    - Significant weight gainincrease in appetite.    - Decreased sleep.    - Psychomotor activity agitation.    - Fatigue or loss of energy.    - Feelings of worthlessness or excessive guilt.    - Diminished ability to think or concentrate, or indecisiveness.    - Recurrent thoughts of death (not just fear of dying), recurrent suicidal ideation without a specific plan, or a suicide attempt or a specific plan for committing suicide.   B) The symptoms cause clinically significant distress or impairment in social, occupational, or other important areas of functioning  C) The episode is not attributable to the physiological effects of a substance or to another medical condition  D) The occurence of major depressive episode is not better explained by other thought / psychotic disorders  E) There has never  been a manic episode or hypomanic episode       DSM5 Diagnoses: (Sustained by DSM5 Criteria Listed Above)  Diagnoses: 296.33 Major Depressive Disorder, Recurrent Episode, Severe _ and With anxious distress  300.02 (F41.1) Generalized Anxiety Disorder  309.81 (F43.10) Posttraumatic Stress Disorder (includes Posttraumatic Stress Disorder for Children 6 Years and Younger)  Without dissociative symptoms RULE/OUT  Psychosocial & Contextual Factors: medical condition.  WHODAS 2.0 (12 item)            This questionnaire asks about difficulties due to health conditions. Health conditions  include  disease or illnesses, other health problems that may be short or long lasting,  injuries, mental health or emotional problems, and problems with alcohol or drugs.                     Think back over the past 30 days and answer these questions, thinking about how much  difficulty you had doing the following activities. For each question, please Cachil DeHe only  one response.    S1 Standing for long periods such as 30 minutes? Extreme / or cannot do = 5   S2 Taking care of household responsibilities? Moderate =   3   S3 Learning a new task, for example, learning how to get to a new place? None =         1   S4 How much of a problem do you have joining community activities (for example, festivals, [employer] or other activities) in the same way as anyone else can? Moderate =   3   S5 How much have you been emotionally affected by your health problems? Severe =       4     In the past 30 days, how much difficulty did you have in:   S6 Concentrating on doing something for ten minutes? Mild =           2   S7 Walking a long distance such as a kilometer (or equivalent)? Extreme / or cannot do = 5   S8 Washing your whole body? Severe =       4   S9 Getting dressed? Mild =           2   S10 Dealing with people you do not know? Mild =           2   S11 Maintaining a friendship? Moderate =   3   S12 Your day to day work? Moderate =   3     H1  "Overall, in the past 30 days, how many days were these difficulties present? Record number of days \"several\", \"most days\"   H2 In the past 30 days, for how many days were you totally unable to carry out your usual activities or work because of any health condition? Record number of days  \"  \"   H3 In the past 30 days, not counting the days that you were totally unable, for how many days did you cut back or reduce your usual activities or work because of any health condition? Record number of days \"my mother in law helps\"       Collateral Reports Completed:  Routed note to PCP      PLAN: (Homework, other):  Client stated that she may follow up for ongoing services with [facility].  Scheduled to return next week.      JOSETTE Raphael      "

## 2017-01-05 NOTE — Clinical Note
Gave her a lot of credit for her resilience! Spent the whole time letting her tell me about her experience. Will do safety plan next week. [place] she was not at risk but will do one just to have one done in case she is not feeling safe down the road. Would she maybe benefit from pain clinic?

## 2017-01-06 ASSESSMENT — PATIENT HEALTH QUESTIONNAIRE - PHQ9: SUM OF ALL RESPONSES TO PHQ QUESTIONS 1-9: 20

## 2017-01-06 ASSESSMENT — ANXIETY QUESTIONNAIRES: GAD7 TOTAL SCORE: 15

## 2017-01-10 ENCOUNTER — TRANSFERRED RECORDS (OUTPATIENT)
Dept: HEALTH INFORMATION MANAGEMENT | Facility: CLINIC | Age: 32
End: 2017-01-10

## 2017-01-10 NOTE — TELEPHONE ENCOUNTER
Tried to call patient to advise her to be seen as per Dr Ruiz note below, and got voicemail.   Left message on answering machine for patient to call back.  Alejandra Harper RNC

## 2017-01-11 ENCOUNTER — TRANSFERRED RECORDS (OUTPATIENT)
Dept: HEALTH INFORMATION MANAGEMENT | Facility: CLINIC | Age: 32
End: 2017-01-11

## 2017-01-11 NOTE — TELEPHONE ENCOUNTER
Patient returns our call and she has an appt on 1/18/17 with Dr. Barba to discuss this. Reyna BAEZA RN

## 2017-01-13 ENCOUNTER — TELEPHONE (OUTPATIENT)
Dept: ANTICOAGULATION | Facility: CLINIC | Age: 32
End: 2017-01-13

## 2017-01-13 NOTE — TELEPHONE ENCOUNTER
Patient reports she was at the [facility] and saw Rheumatology - the rheumatologist recommended she get off the warfarin as that is causing her stomach to bleed - he recommended hematology.  She states she has an appt with hematology within [facility] next week, however, looking at her future appts, do not see hematology.  Patient reports she is currently in [place] - grandmother passed away yesterday.    She will be returning to [place] on Tuesday.   She will try to find time in the next couple days to contact  who has her current calendar to clarify when her hematology appt is.  Patient is scheduled or an OV with Dr. Barba next week 1-18-17.    Routing to provider.  Dmeetria JHA RN

## 2017-01-13 NOTE — TELEPHONE ENCOUNTER
Jameson Care Team RN, can we call patient and find out what the barrier is for her getting INR check?  I just saw her a few weeks ago and wasn't aware this was an issue, or I would have discussed.  Thanks

## 2017-01-13 NOTE — TELEPHONE ENCOUNTER
Juliet Barba-    This patient has been noncompliant with having her INRs checked. She is relatively new to us (since September of 2016) and has not been consistent with following up on appointments or taking the medication.  We have sent several reminder letters with no response.     It looks like a 4 month refill was recently sent for her warfarin. Would you like to reach out to her or for us to change her amount of refill at pharmacy?    ROSS DoyleN, RN  Pool [id number]

## 2017-01-17 ENCOUNTER — PRE VISIT (OUTPATIENT)
Dept: NEPHROLOGY | Facility: CLINIC | Age: 32
End: 2017-01-17

## 2017-01-17 NOTE — TELEPHONE ENCOUNTER
PRE VISIT PATIENT INFORMATION    1.  Reason for the visit:  Scleroderma    2.  Referring provider and clinic name:  Dr. Barba,     3.  Provider managing care now?  PCP    4. Imaging? Yes    Renal Imaging? No, MRI of Abdomen  Vascular Imaging? no    5.  Lab work? Yes    6. Records from outside facilities? No    Requested from outside? No  In Chart? yes    Hard Copy in Folder? no    Has patient ever seen a Urologist? Yes    If yes, where?        REVIEW                                                      Medication reconciliation complete: Yes      Current Outpatient Prescriptions   Medication Sig Dispense Refill     citalopram (CELEXA) 20 MG tablet Take 1 tablet (20 mg) by mouth daily 90 tablet 3     butalbital-aspirin-caffeine (BUTALBITAL COMPOUND/ASA) -40 MG TABS per tablet Take 1 tablet by mouth every 4 hours as needed for headaches 42 tablet 3     ranitidine (ZANTAC) 150 MG tablet Take 1 tablet (150 mg) by mouth 2 times daily as needed for heartburn 60 tablet 11     ferrous gluconate (FERGON) 324 (38 FE) MG tablet Take 1 tablet (324 mg) by mouth daily (with breakfast) 100 tablet 3     gabapentin (NEURONTIN) 100 MG capsule Take 2 capsules (200 mg) by mouth 3 times daily 540 capsule 3     albuterol (VENTOLIN HFA) 108 (90 BASE) MCG/ACT Inhaler Inhale 2 puffs into the lungs every 4 hours as needed for shortness of breath / dyspnea or wheezing 1 Inhaler 11     warfarin (COUMADIN) 4 MG tablet Take 1 tablet (4 mg) by mouth daily Or as directed by anticoagulation clinic 60 tablet 1     promethazine (PHENERGAN) 25 MG tablet Take 1 tablet (25 mg) by mouth every 6 hours as needed for nausea 20 tablet 3     oxyCODONE (ROXICODONE) 15 MG IR tablet Take 1 tablet (15 mg) by mouth every 4 hours as needed for moderate to severe pain 120 tablet 0     LORazepam (ATIVAN) 0.5 MG tablet Take 1 tablet (0.5 mg) by mouth 2 times daily as needed for anxiety Order correction called to pharm 60 tablet 1     zolpidem (AMBIEN) 10 MG  tablet Take 1 tablet (10 mg) by mouth nightly as needed for sleep 30 tablet 1     polyethylene glycol (MIRALAX) powder Take 17 g (1 capful) by mouth daily 510 g 1     methotrexate 2.5 MG tablet CHEMO Take 6 tablets (15 mg) by mouth once a week 30 tablet 1     folic acid (FOLVITE) 1 MG tablet Take 1 tablet (1 mg) by mouth daily 100 tablet 3     sucralfate (CARAFATE) 1 GM tablet Take 1 tablet (1 g) by mouth 4 times daily 120 tablet 1     blood glucose monitoring (NO BRAND SPECIFIED) test strip Use to test blood sugars 3 times daily or as directed  Please give what is approved by insurance. 100 each 0     lisinopril (PRINIVIL,ZESTRIL) 10 MG tablet Take 1 tablet (10 mg) by mouth daily 90 tablet 3     omeprazole (PRILOSEC) 20 MG capsule Take 40 mg by mouth daily  90 capsule 1     carvedilol (COREG) 12.5 MG tablet Take a 1/2 tablet by mouth daily 180 tablet 3     Cyanocobalamin (B-12) 1000 MCG TBCR Take 1,000 mcg by mouth daily 100 tablet 1     diphenhydrAMINE (BENADRYL) 25 MG tablet Take 1 tablet (25 mg) by mouth every 6 hours as needed for itching or allergies 56 tablet      budesonide-formoterol (SYMBICORT) 160-4.5 MCG/ACT inhaler Inhale 2 puffs into the lungs 2 times daily 3 Inhaler 3       Allergies: Shellfish-derived products      PLAN/FOLLOW-UP NEEDED                                                      Previsit review complete.  Patient will see provider at future scheduled appointment.     Patient Reminders Given:  Please, make sure you bring an updated list of your medications.   If you are having a procedure, please, present 15 minutes early.  If you need to cancel or reschedule,please call [phone].      Nohemy Scott CMA ([facility])

## 2017-01-23 ENCOUNTER — TRANSFERRED RECORDS (OUTPATIENT)
Dept: HEALTH INFORMATION MANAGEMENT | Facility: CLINIC | Age: 32
End: 2017-01-23

## 2017-01-24 ENCOUNTER — TRANSFERRED RECORDS (OUTPATIENT)
Dept: HEALTH INFORMATION MANAGEMENT | Facility: CLINIC | Age: 32
End: 2017-01-24

## 2017-01-25 ENCOUNTER — OFFICE VISIT (OUTPATIENT)
Dept: FAMILY MEDICINE | Facility: CLINIC | Age: 32
End: 2017-01-25
Payer: COMMERCIAL

## 2017-01-25 ENCOUNTER — ANTICOAGULATION THERAPY VISIT (OUTPATIENT)
Dept: ANTICOAGULATION | Facility: CLINIC | Age: 32
End: 2017-01-25

## 2017-01-25 VITALS
HEIGHT: 61 IN | DIASTOLIC BLOOD PRESSURE: 78 MMHG | SYSTOLIC BLOOD PRESSURE: 118 MMHG | HEART RATE: 97 BPM | WEIGHT: 143 LBS | BODY MASS INDEX: 27 KG/M2 | RESPIRATION RATE: 20 BRPM | TEMPERATURE: 98.6 F

## 2017-01-25 DIAGNOSIS — G47.09 OTHER INSOMNIA: ICD-10-CM

## 2017-01-25 DIAGNOSIS — M34.9 LIMITED SYSTEMIC SCLEROSIS (H): ICD-10-CM

## 2017-01-25 DIAGNOSIS — F41.0 SEVERE ANXIETY WITH PANIC: ICD-10-CM

## 2017-01-25 DIAGNOSIS — T39.95XA ANALGESIC REBOUND HEADACHE: ICD-10-CM

## 2017-01-25 DIAGNOSIS — J45.40 MODERATE PERSISTENT ASTHMA WITHOUT COMPLICATION: ICD-10-CM

## 2017-01-25 DIAGNOSIS — R51.9 CHRONIC DAILY HEADACHE: ICD-10-CM

## 2017-01-25 DIAGNOSIS — N08 SCLERODERMA WITH RENAL INVOLVEMENT (H): Primary | ICD-10-CM

## 2017-01-25 DIAGNOSIS — G44.40 ANALGESIC REBOUND HEADACHE: ICD-10-CM

## 2017-01-25 DIAGNOSIS — M34.89 SCLERODERMA WITH RENAL INVOLVEMENT (H): Primary | ICD-10-CM

## 2017-01-25 DIAGNOSIS — F41.1 GAD (GENERALIZED ANXIETY DISORDER): ICD-10-CM

## 2017-01-25 DIAGNOSIS — G63 POLYNEUROPATHY ASSOCIATED WITH UNDERLYING DISEASE (H): ICD-10-CM

## 2017-01-25 PROCEDURE — 99215 OFFICE O/P EST HI 40 MIN: CPT | Performed by: INTERNAL MEDICINE

## 2017-01-25 RX ORDER — OXYCODONE HYDROCHLORIDE 15 MG/1
15 TABLET ORAL EVERY 4 HOURS PRN
Qty: 120 TABLET | Refills: 0 | Status: CANCELLED | OUTPATIENT
Start: 2017-01-25

## 2017-01-25 RX ORDER — SUCRALFATE 1 G/1
1 TABLET ORAL 4 TIMES DAILY
Qty: 120 TABLET | Refills: 11 | Status: SHIPPED | OUTPATIENT
Start: 2017-01-25 | End: 2019-11-29

## 2017-01-25 RX ORDER — LISINOPRIL 10 MG/1
10 TABLET ORAL DAILY
Qty: 90 TABLET | Refills: 3 | Status: CANCELLED | OUTPATIENT
Start: 2017-01-25

## 2017-01-25 RX ORDER — BUDESONIDE AND FORMOTEROL FUMARATE DIHYDRATE 160; 4.5 UG/1; UG/1
2 AEROSOL RESPIRATORY (INHALATION) 2 TIMES DAILY
Qty: 3 INHALER | Refills: 3 | Status: SHIPPED | OUTPATIENT
Start: 2017-01-25 | End: 2018-02-22

## 2017-01-25 RX ORDER — ZOLPIDEM TARTRATE 10 MG/1
10 TABLET ORAL
Qty: 30 TABLET | Refills: 1 | Status: SHIPPED | OUTPATIENT
Start: 2017-01-25 | End: 2017-02-21

## 2017-01-25 RX ORDER — OXYCODONE HYDROCHLORIDE 15 MG/1
15 TABLET ORAL EVERY 4 HOURS PRN
Qty: 120 TABLET | Refills: 0 | Status: SHIPPED | OUTPATIENT
Start: 2017-01-25 | End: 2017-03-30

## 2017-01-25 RX ORDER — FOLIC ACID 1 MG/1
1 TABLET ORAL DAILY
Qty: 100 TABLET | Refills: 3 | Status: SHIPPED | OUTPATIENT
Start: 2017-01-25 | End: 2017-06-08

## 2017-01-25 RX ORDER — GABAPENTIN 300 MG/1
300 CAPSULE ORAL 3 TIMES DAILY
Qty: 270 CAPSULE | Refills: 3 | Status: SHIPPED | OUTPATIENT
Start: 2017-01-25 | End: 2017-06-22

## 2017-01-25 RX ORDER — LORAZEPAM 0.5 MG/1
0.5 TABLET ORAL 2 TIMES DAILY PRN
Qty: 60 TABLET | Refills: 1 | Status: SHIPPED | OUTPATIENT
Start: 2017-01-25 | End: 2017-03-30

## 2017-01-25 RX ORDER — CITALOPRAM HYDROBROMIDE 40 MG/1
40 TABLET ORAL DAILY
Qty: 90 TABLET | Refills: 3 | Status: SHIPPED | OUTPATIENT
Start: 2017-01-25 | End: 2017-06-22

## 2017-01-25 ASSESSMENT — ANXIETY QUESTIONNAIRES
GAD7 TOTAL SCORE: 17
2. NOT BEING ABLE TO STOP OR CONTROL WORRYING: NEARLY EVERY DAY
6. BECOMING EASILY ANNOYED OR IRRITABLE: MORE THAN HALF THE DAYS
1. FEELING NERVOUS, ANXIOUS, OR ON EDGE: NEARLY EVERY DAY
IF YOU CHECKED OFF ANY PROBLEMS ON THIS QUESTIONNAIRE, HOW DIFFICULT HAVE THESE PROBLEMS MADE IT FOR YOU TO DO YOUR WORK, TAKE CARE OF THINGS AT HOME, OR GET ALONG WITH OTHER PEOPLE: SOMEWHAT DIFFICULT
3. WORRYING TOO MUCH ABOUT DIFFERENT THINGS: NEARLY EVERY DAY
5. BEING SO RESTLESS THAT IT IS HARD TO SIT STILL: SEVERAL DAYS
7. FEELING AFRAID AS IF SOMETHING AWFUL MIGHT HAPPEN: MORE THAN HALF THE DAYS

## 2017-01-25 ASSESSMENT — PATIENT HEALTH QUESTIONNAIRE - PHQ9: 5. POOR APPETITE OR OVEREATING: NEARLY EVERY DAY

## 2017-01-25 NOTE — PROGRESS NOTES
SUBJECTIVE:                                                    Molly Teague is a 31 year old female who presents to clinic today for the following health issues:      Chronic Pain Follow-Up       Type / Location of Pain: all over chronic pain  Analgesia/pain control:       Recent changes:  Worse due to back worse      Overall control: Inadequate pain control  Activity level/function:      Daily activities:  Unable to perform most daily activities - chores, hobbies, social activities, driving    Work:  not applicable  Adverse effects:  No  Adherance    Taking medication as directed?  Yes    Participating in other treatments: yes  Risk Factors:    Sleep:  Poor    Mood/anxiety:  worsened    Recent family or social stressors:  death in family:  grandmother    Other aggravating factors: prolonged standing  PHQ-9 SCORE 1/2/2017 1/5/2017   Total Score 19 20     REX-7 SCORE 1/2/2017 1/5/2017   Total Score 16 15     Encounter-Level CSA:     There are no encounter-level csa.             Amount of exercise or physical activity: 6-7 days/week for an average of 30-45 minutes    Problems taking medications regularly: No    Medication side effects: none    Diet: regular (no restrictions)    Chief Complaint   Patient presents with     Pain     chronic pain     Refill Request   history of pulmonary embolism:  [facility] recommend to stop the warfarin.    Chronic pain;  [facility] wants to increase her gabapentin.  She is using oxycodone, 3-4 pills/day - for back pain, feet, knees, hands.  Pain in hands/feet is tingling, cramping, stiffness type pain  Back pain - with position changes  Knees - numb    Headache:  Rebound.  fioricet isn't covered.  ASA/APAP/butalbitol is covered.  Neuro doesn't want to use triptans.    Scleroderma:  She reports raynauds is getting worse.  She says CXR at [facility] showed bibasilar fibrosis.  She reports [facility] does not want her to ever be on steroids due to her renal failure.      GI:  She has severe GERD, globus  sensation.  She reports she had testing that showed she has been aspirating.    Mood/anxiety:  Is on citalopram.  Hasn't noted any side effects, but no benefit yet.  Did see psych, note not available.  Discussion about CBT.  She is using lorazepam BID.  Is using ambien but not every night. She reports 5 mg is not helpful, so is on 10 mg.      Current Outpatient Prescriptions   Medication Sig Dispense Refill     citalopram (CELEXA) 20 MG tablet Take 1 tablet (20 mg) by mouth daily 90 tablet 3     butalbital-aspirin-caffeine (BUTALBITAL COMPOUND/ASA) -40 MG TABS per tablet Take 1 tablet by mouth every 4 hours as needed for headaches 42 tablet 3     ranitidine (ZANTAC) 150 MG tablet Take 1 tablet (150 mg) by mouth 2 times daily as needed for heartburn 60 tablet 11     ferrous gluconate (FERGON) 324 (38 FE) MG tablet Take 1 tablet (324 mg) by mouth daily (with breakfast) 100 tablet 3     gabapentin (NEURONTIN) 100 MG capsule Take 2 capsules (200 mg) by mouth 3 times daily 540 capsule 3     albuterol (VENTOLIN HFA) 108 (90 BASE) MCG/ACT Inhaler Inhale 2 puffs into the lungs every 4 hours as needed for shortness of breath / dyspnea or wheezing 1 Inhaler 11     promethazine (PHENERGAN) 25 MG tablet Take 1 tablet (25 mg) by mouth every 6 hours as needed for nausea 20 tablet 3     oxyCODONE (ROXICODONE) 15 MG IR tablet Take 1 tablet (15 mg) by mouth every 4 hours as needed for moderate to severe pain 120 tablet 0     LORazepam (ATIVAN) 0.5 MG tablet Take 1 tablet (0.5 mg) by mouth 2 times daily as needed for anxiety Order correction called to pharm 60 tablet 1     zolpidem (AMBIEN) 10 MG tablet Take 1 tablet (10 mg) by mouth nightly as needed for sleep 30 tablet 1     polyethylene glycol (MIRALAX) powder Take 17 g (1 capful) by mouth daily 510 g 1     methotrexate 2.5 MG tablet CHEMO Take 6 tablets (15 mg) by mouth once a week 30 tablet 1     folic acid (FOLVITE) 1 MG tablet Take 1 tablet (1 mg) by mouth daily 100  "tablet 3     sucralfate (CARAFATE) 1 GM tablet Take 1 tablet (1 g) by mouth 4 times daily 120 tablet 1     lisinopril (PRINIVIL,ZESTRIL) 10 MG tablet Take 1 tablet (10 mg) by mouth daily 90 tablet 3     omeprazole (PRILOSEC) 20 MG capsule Take 40 mg by mouth daily  90 capsule 1     carvedilol (COREG) 12.5 MG tablet Take a 1/2 tablet by mouth daily 180 tablet 3     Cyanocobalamin (B-12) 1000 MCG TBCR Take 1,000 mcg by mouth daily 100 tablet 1     diphenhydrAMINE (BENADRYL) 25 MG tablet Take 1 tablet (25 mg) by mouth every 6 hours as needed for itching or allergies 56 tablet      budesonide-formoterol (SYMBICORT) 160-4.5 MCG/ACT inhaler Inhale 2 puffs into the lungs 2 times daily 3 Inhaler 3     warfarin (COUMADIN) 4 MG tablet Take 1 tablet (4 mg) by mouth daily Or as directed by anticoagulation clinic 60 tablet 1     blood glucose monitoring (NO BRAND SPECIFIED) test strip Use to test blood sugars 3 times daily or as directed  Please give what is approved by insurance. 100 each 0     Problem list, Medication list, Allergies, and Medical/Social/Surgical histories reviewed in [facility] and updated as appropriate.    ROS:  Constitutional, HEENT, cardiovascular, pulmonary, gi and gu systems are negative, except as otherwise noted.    OBJECTIVE:                                                    /78 mmHg  Pulse 97  Temp(Src) 98.6  F (37  C) (Tympanic)  Resp 20  Ht 5' 1\" (1.549 m)  Wt 143 lb (64.864 kg)  BMI 27.03 kg/m2  Body mass index is 27.03 kg/(m^2).  GENERAL APPEARANCE: alert and no distress  RESP: lungs clear to auscultation - no rales, rhonchi or wheezes  CV: regular rates and rhythm, normal S1 S2, no S3 or S4 and no murmur, click or rub  SKIN: significant skin tightening, reduced keloids on neck  PSYCH: mentation appears normal, affect normal/bright and worried       ASSESSMENT/PLAN:                                                      1. Scleroderma with renal involvement (H) stop ACEi per [facility] " recommendation.  Has appointment with Renal next week.  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      2. Limited systemic sclerosis (H) - normal echo.  Has very mild subpleural fibrosis on CT, but no pulmonary hypertension.  There was some concern for aspiration due to reflux.  She has appointment with GI in a few weeks at [facility].  They have recommend plaquenil in place of MTX, but patient wants to give MTX one more month.  Advised that eventual goal is to minimize use of opiates both due to lack of efficacy in her type of pain (neuropathy, skin tightening, ?inflammatory arthritis) and serious long-term side effects with mood, sleep, etc.  Will eventually need to have patient sign controlled substance agreement.  Next fill, patient should be seen.  We will slowly cut back on # pills/month, next month 110, then decrease by 10 per month. Hopefully if she is on plaqunil and higher dose of gabapentin, this will help her pain   - sucralfate (CARAFATE) 1 GM tablet; Take 1 tablet (1 g) by mouth 4 times daily  Dispense: 120 tablet; Refill: 11  - folic acid (FOLVITE) 1 MG tablet; Take 1 tablet (1 mg) by mouth daily  Dispense: 100 tablet; Refill: 3  - gabapentin (NEURONTIN) 300 MG capsule; Take 1 capsule (300 mg) by mouth 3 times daily  Dispense: 270 capsule; Refill: 3  - oxyCODONE (ROXICODONE) 15 MG IR tablet; Take 1 tablet (15 mg) by mouth every 4 hours as needed for moderate to severe pain  Dispense: 120 tablet; Refill: 0  - methotrexate 2.5 MG tablet CHEMO; Take 6 tablets (15 mg) by mouth once a week  Dispense: 30 tablet; Refill: 1    3. Polyneuropathy associated with underlying disease (H) - Due to scleroderma and neurology thought possible due to ICU (critical illness neuropathy).  Recommend to max out dose of gabapentin to 1890-7739 mg/day, split BID or TID.  --increase dose of gabapentin as above.    4. Other insomnia - discussed that other meds are more effective long term, and eventual  goal is to be off ambien.  Patient in agreement.  Will await psych note, that was no available today.  - zolpidem (AMBIEN) 10 MG tablet; Take 1 tablet (10 mg) by mouth nightly as needed for sleep  Dispense: 30 tablet; Refill: 1    5. REX (generalized anxiety disorder) - refill given.    - LORazepam (ATIVAN) 0.5 MG tablet; Take 1 tablet (0.5 mg) by mouth 2 times daily as needed for anxiety  Dispense: 60 tablet; Refill: 1    6. Moderate persistent asthma without complication - stable, reifll given  - budesonide-formoterol (SYMBICORT) 160-4.5 MCG/ACT Inhaler; Inhale 2 puffs into the lungs 2 times daily  Dispense: 3 Inhaler; Refill: 3    7. Severe anxiety with panic - uncontrolled, increase to 40 mg.  May switch to Cymbalta?  - citalopram (CELEXA) 40 MG tablet; Take 1 tablet (40 mg) by mouth daily  Dispense: 90 tablet; Refill: 3    8. Chronic daily headache - discussed weaning off Fioricet, using Excedrin migrain no more than 1-2 x week for severe headache.  - aspirin-acetaminophen-caffeine (EXCEDRIN MIGRAINE) 250-250-65 MG per tablet; Take 1 tablet by mouth every 6 hours as needed for headaches  Dispense: 30 tablet; Refill: 0    9. Analgesic rebound headache      1. Increase gabapentin to 300 mg 3x day.  At next visit with [facility], it can be increased if needed.    2. Refill on oxycodone.  However, the overall goal should be to wean down on this.  At the next fill, we should work to cut down # pills/day.  3. Ok to stop the lisinopril, carvedilol and warfarin  4. Increase citalopram to 40 mg once daily.  Agree with CBT therapy  5. Wean off the Fioricet - every other day for 1 week, then every few days x 1 week.  6. For very severe headache, would use Excedrin migraine or Tylenol, but no more than 1-2 x week.  7. I agree with recommend to eventually get off the ambien and using something else.  8. See Dr. Barba prior to next fill of oxycodone.      Greater than 40 minutes was spent face-to-face with the patient by the  provider.  Greater than 50% was spent in counseling or coordinating care for this patient.        Route to INR clinic.      Grecia Barba DO  [facility]

## 2017-01-25 NOTE — PATIENT INSTRUCTIONS
Thank you for choosing [facility].  You may be receiving a survey in the mail from Priyank Glover regarding your visit today.  Please take a few minutes to complete and return the survey to let us know how we are doing.      If you have questions or concerns, please contact us via [facility] or you can contact your care team at [phone].    Our Clinic hours are:  Monday 6:40 am  to 7:00 pm  Tuesday -Friday 6:40 am to 5:00 pm    The [place] outpatient lab hours are:  Monday - Friday 6:10 am to 4:45 pm  Saturdays 7:00 am to 11:00 am  Appointments are required, call [phone]    If you have clinical questions after hours or would like to schedule an appointment,  call the clinic at [phone].      [facility] [place], [place] - [address]      1. Increase gabapentin to 300 mg 3x day.  At next visit with [facility], it can be increased if needed.    2. Refill on oxycodone.  However, the overall goal should be to wean down on this.  At the next fill, we should work to cut down # pills/day.  3. Ok to stop the lisinopril, carvedilol and warfarin  4. Increase citalopram to 40 mg once daily.  Agree with CBT therapy  5. Wean off the Fioricet - every other day for 1 week, then every few days x 1 week.  6. For very severe headache, would use Excedrin migraine or Tylenol, but no more than 1-2 x week.  7. I agree with recommend to eventually get off the ambien and using something else.  8. See Dr. Barba prior to next fill of oxycodone.

## 2017-01-25 NOTE — MR AVS SNAPSHOT
After Visit Summary   1/25/2017    Molly Teague    MRN: [id number]           Patient Information     Date Of Birth          1985        Visit Information        Provider Department      1/25/2017 3:00 PM Grecia Barba, DO [facility]        Today's Diagnoses     Scleroderma (H)    -  1     Scleroderma with renal involvement (H)         Limited systemic sclerosis (H)         Other insomnia         REX (generalized anxiety disorder)         Moderate persistent asthma without complication         Severe anxiety with panic         Analgesic rebound headache         Chronic daily headache           Care Instructions          Thank you for choosing [facility].  You may be receiving a survey in the mail from [employer] regarding your visit today.  Please take a few minutes to complete and return the survey to let us know how we are doing.      If you have questions or concerns, please contact us via [employer] or you can contact your care team at [phone].    Our Clinic hours are:  Monday 6:40 am  to 7:00 pm  Tuesday -Friday 6:40 am to 5:00 pm    The [place] outpatient lab hours are:  Monday - Friday 6:10 am to 4:45 pm  Saturdays 7:00 am to 11:00 am  Appointments are required, call [phone]    If you have clinical questions after hours or would like to schedule an appointment,  call the clinic at [phone].      [facility] [place], [place] - [facility]      1. Increase gabapentin to 300 mg 3x day.  At next visit with [place], it can be increased if needed.    2. Refill on oxycodone.  However, the overall goal should be to wean down on this.  At the next fill, we should work to cut down # pills/day.  3. Ok to stop the lisinopril, carvedilol and warfarin  4. Increase citalopram to 40 mg once daily.  Agree with CBT therapy  5. Wean off the Fioricet - every other day for 1 week, then every few days x 1 week.  6. For very severe headache, would use  Excedrin migraine or Tylenol, but no more than 1-2 x week.  7. I agree with recommend to eventually get off the ambien and using something else.  8. See Dr. Barba prior to next fill of oxycodone.        Follow-ups after your visit        Your next 10 appointments already scheduled     Jan 26, 2017 10:00 AM   Return Visit with Neida Aguirre PA-C   [facility]    [address] [address]   [phone]            Jan 26, 2017 11:30 AM   Anticoagulation Visit with [place] [facility]   [facility]    [address] [address]   [phone]            Jan 30, 2017  9:00 AM   [facility]    [address] [place] [id number] [phone]            Feb 01, 2017  9:30 AM   Return Visit with Antwan Davis MD   [facility]    [address] [address]   [phone]            Feb 03, 2017 11:00 AM   Return Visit with Fer Jacob [facility]    [address]  [facility] [id number] [phone]              Who to contact     If you have questions or need follow up information about today's clinic visit or your schedule please contact [facility] directly at [phone].  Normal or non-critical lab and imaging results will be communicated to you by MyChart, letter or phone within 4 business days after the clinic has received the results. If you do not hear from us within 7 days, please contact the clinic through MyChart or phone. If you have a critical or abnormal lab result, we will notify you by phone as soon as possible.  Submit refill requests through [employer] or call your pharmacy and they will forward the refill request to us.  "Please allow 3 business days for your refill to be completed.          Additional Information About Your Visit        MyChart Information     [employer] lets you send messages to your doctor, view your test results, renew your prescriptions, schedule appointments and more. To sign up, go to [email or web address] . Click on \"Log in\" on the left side of the screen, which will take you to the Welcome page. Then click on \"Sign up Now\" on the right side of the page.     You will be asked to enter the access code listed below, as well as some personal information. Please follow the directions to create your username and password.     Your access code is: [email or web address]  Expires: 2017 10:02 AM     Your access code will  in 90 days. If you need help or a new code, please call your [place] clinic or [phone].        Care EveryWhere ID     This is your [facility] EveryWhere ID. This could be used by other organizations to access your [place] medical records  [id number]        Your Vitals Were     Pulse Temperature Respirations Height BMI (Body Mass Index)       97 98.6  F (37  C) (Tympanic) 20 5' 1\" (1.549 m) 27.03 kg/m2        Blood Pressure from Last 3 Encounters:   17 118/78   17 101/62   17 98/54    Weight from Last 3 Encounters:   17 143 lb (64.864 kg)   17 140 lb 3.2 oz (63.594 kg)   16 140 lb (63.504 kg)              Today, you had the following     No orders found for display         Today's Medication Changes          These changes are accurate as of: 17  3:51 PM.  If you have any questions, ask your nurse or doctor.               Start taking these medicines.        Dose/Directions    aspirin-acetaminophen-caffeine 250-250-65 MG per tablet   Commonly known as:  EXCEDRIN MIGRAINE   Used for:  Chronic daily headache   Started by:  Grecia Barba,         Dose:  1 tablet   Take 1 tablet by mouth every 6 hours as needed for headaches   Quantity:  30 tablet "   Refills:  0         These medicines have changed or have updated prescriptions.        Dose/Directions    citalopram 40 MG tablet   Commonly known as:  celeXA   This may have changed:    - medication strength  - how much to take   Used for:  Severe anxiety with panic   Changed by:  Grecia Barba DO        Dose:  40 mg   Take 1 tablet (40 mg) by mouth daily   Quantity:  90 tablet   Refills:  3       gabapentin 300 MG capsule   Commonly known as:  NEURONTIN   This may have changed:    - medication strength  - how much to take   Used for:  Scleroderma (H)   Changed by:  Grecia Barba DO        Dose:  300 mg   Take 1 capsule (300 mg) by mouth 3 times daily   Quantity:  270 capsule   Refills:  3       LORazepam 0.5 MG tablet   Commonly known as:  ATIVAN   This may have changed:  additional instructions   Used for:  REX (generalized anxiety disorder)   Changed by:  Grecia Barba DO        Dose:  0.5 mg   Take 1 tablet (0.5 mg) by mouth 2 times daily as needed for anxiety   Quantity:  60 tablet   Refills:  1            Where to get your medicines      These medications were sent to [place] [facility] [id number]     Phone:  [phone]    - aspirin-acetaminophen-caffeine 250-250-65 MG per tablet  - budesonide-formoterol 160-4.5 MCG/ACT Inhaler  - citalopram 40 MG tablet  - folic acid 1 MG tablet  - gabapentin 300 MG capsule  - methotrexate 2.5 MG tablet CHEMO  - sucralfate 1 GM tablet      Some of these will need a paper prescription and others can be bought over the counter.  Ask your nurse if you have questions.     Bring a paper prescription for each of these medications    - LORazepam 0.5 MG tablet  - oxyCODONE 15 MG IR tablet  - zolpidem 10 MG tablet             Primary Care Provider Office Phone # Fax #    Grecia Barba [phone]       [address] [address]         Thank you!     Thank you for choosing [facility]  for your care. Our goal is always to provide you with excellent care. Hearing back from our patients is one way we can continue to improve our services. Please take a few minutes to complete the written survey that you may receive in the mail after your visit with us. Thank you!             Your Updated Medication List - Protect others around you: Learn how to safely use, store and throw away your medicines at www.disposemymeds.org.          This list is accurate as of: 1/25/17  3:51 PM.  Always use your most recent med list.                   Brand Name Dispense Instructions for use    albuterol 108 (90 BASE) MCG/ACT Inhaler    VENTOLIN HFA    1 Inhaler    Inhale 2 puffs into the lungs every 4 hours as needed for shortness of breath / dyspnea or wheezing       aspirin-acetaminophen-caffeine 250-250-65 MG per tablet    EXCEDRIN MIGRAINE    30 tablet    Take 1 tablet by mouth every 6 hours as needed for headaches       B-12 1000 MCG Tbcr     100 tablet    Take 1,000 mcg by mouth daily       BENADRYL 25 MG tablet   Generic drug:  diphenhydrAMINE     56 tablet    Take 1 tablet (25 mg) by mouth every 6 hours as needed for itching or allergies       blood glucose monitoring test strip    no brand specified    100 each    Use to test blood sugars 3 times daily or as directed Please give what is approved by insurance.       budesonide-formoterol 160-4.5 MCG/ACT Inhaler    SYMBICORT    3 Inhaler    Inhale 2 puffs into the lungs 2 times daily       butalbital-aspirin-caffeine -40 MG Tabs per tablet    BUTALBITAL COMPOUND/ASA    42 tablet    Take 1 tablet by mouth every 4 hours as needed for headaches       citalopram 40 MG tablet    celeXA    90 tablet    Take 1 tablet (40 mg) by mouth daily       ferrous gluconate 324 (38 FE) MG tablet    FERGON    100 tablet    Take 1 tablet (324 mg) by mouth daily (with breakfast)       folic acid 1 MG tablet     FOLVITE    100 tablet    Take 1 tablet (1 mg) by mouth daily       gabapentin 300 MG capsule    NEURONTIN    270 capsule    Take 1 capsule (300 mg) by mouth 3 times daily       LORazepam 0.5 MG tablet    ATIVAN    60 tablet    Take 1 tablet (0.5 mg) by mouth 2 times daily as needed for anxiety       methotrexate 2.5 MG tablet CHEMO     30 tablet    Take 6 tablets (15 mg) by mouth once a week       omeprazole 20 MG CR capsule    priLOSEC    90 capsule    Take 40 mg by mouth daily       oxyCODONE 15 MG IR tablet    ROXICODONE    120 tablet    Take 1 tablet (15 mg) by mouth every 4 hours as needed for moderate to severe pain       polyethylene glycol powder    MIRALAX    510 g    Take 17 g (1 capful) by mouth daily       promethazine 25 MG tablet    PHENERGAN    20 tablet    Take 1 tablet (25 mg) by mouth every 6 hours as needed for nausea       ranitidine 150 MG tablet    ZANTAC    60 tablet    Take 1 tablet (150 mg) by mouth 2 times daily as needed for heartburn       sucralfate 1 GM tablet    CARAFATE    120 tablet    Take 1 tablet (1 g) by mouth 4 times daily       zolpidem 10 MG tablet    AMBIEN    30 tablet    Take 1 tablet (10 mg) by mouth nightly as needed for sleep

## 2017-01-25 NOTE — NURSING NOTE
"Chief Complaint   Patient presents with     Pain     chronic pain     Refill Request       Initial /78 mmHg  Pulse 97  Temp(Src) 98.6  F (37  C) (Tympanic)  Resp 20  Ht 5' 1\" (1.549 m)  Wt 143 lb (64.864 kg)  BMI 27.03 kg/m2 Estimated body mass index is 27.03 kg/(m^2) as calculated from the following:    Height as of this encounter: 5' 1\" (1.549 m).    Weight as of this encounter: 143 lb (64.864 kg).  BP completed using cuff size: regular  "

## 2017-01-26 ASSESSMENT — ANXIETY QUESTIONNAIRES: GAD7 TOTAL SCORE: 17

## 2017-01-26 ASSESSMENT — PATIENT HEALTH QUESTIONNAIRE - PHQ9: SUM OF ALL RESPONSES TO PHQ QUESTIONS 1-9: 20

## 2017-02-06 ENCOUNTER — TELEPHONE (OUTPATIENT)
Dept: PSYCHOLOGY | Facility: CLINIC | Age: 32
End: 2017-02-06

## 2017-02-06 NOTE — PROGRESS NOTES
"                                                                                                                                                                        Adult Intake Structured Interview  Standard Diagnostic Assessment      CLIENT'S NAME: Molly Teague  MRN:   [id number]  :   1985  ACCT. NUMBER: [id number]  DATE OF SERVICE: 17      Identifying Information:  Client is a 31 year old, , partnered / significant other female. Client was referred for counseling by Dr. Grecia Barba at [facility]. Client is currently disabled. Client attended the session alone.        Client's Statement of Presenting Concern:  Client reports the reason for seeking therapy at this time as (she did not complete this question and we spent most of first session completing safety plan..  Client stated that her symptoms have resulted in the following functional impairments: childcare / parenting, health maintenance and management of the household and or completion of tasks.      History of Presenting Concern:  Client reports that these problem(s) began after she developed skin condition after moving to [place]. Client has attempted to resolve these concerns in the past through \"I'm n medication for depression, high anxiety\". Client reports that other professional(s) are involved in providing support / services. Her PCP is prescribing medication and she is working with doctor for her skin condition.      Social History:  Client reported she grew up in [place] [facility] [place]. They were the second born of 3 children. This is an intact family and parents remain . Client reported that her childhood was \"It was good till I started middle school. I gained a lot of weight during that time and felt terrible about myself\". Client described her current relationships with family of origin as \"Good. Get along great w/ my boyfriend's parents. Joshua my boyfriend works a lot and he worries a significant " "amount of my condition\".    Client reported a history of 1 committed relationship. Client has been partnered / significant other for 5 plus years. Client reported having 1 child. Client identified few stable and meaningful social connections. Client reported that she has not been involved with the legal system. Client's highest education level was high school graduate. Client did not identify any learning problems. There are no ethnic, cultural or Hinduism factors that may be relevant for therapy. Client identified her preferred language to be English. Client reported she does not need the assistance of an  or other support involved in therapy. Modifications will not be used to assist communication in therapy. Client did not serve in the .      Client reports family history includes CEREBROVASCULAR DISEASE in her maternal grandmother; DIABETES in her maternal aunt; Hyperlipidemia in her father and paternal grandfather. There is no history of Melanoma or Skin Cancer.    Mental Health History:  Client reported no family history of mental health issues.  Client previously received the following mental health diagnosis: Anxiety and Depression.  Client has received the following mental health services in the past: inpatient mental health services and medication(s) from physician / PCP.  Hospitalizations: [facility] .  Client is currently receiving the following services: medication(s) from physician / PCP.       Chemical Health History:  Client reported no family history of chemical health issues. Client has not received chemical dependency treatment in the past. Client is not currently receiving any chemical dependency treatment. Client reports no problems as a result of their drinking / drug use.       Client Reports:  Client denies using alcohol.  Client denies using tobacco.  Client denies using marijuana.  Client reports using caffeine 1 times per day and drinks 1 at a time. Client " "started using caffeine at age \"teens\".  Client denies using street drugs.  Client denies the non-medical use of prescription or over the counter drugs.    CAGE: None of the patient's responses to the CAGE screening were positive / Negative CAGE score   Based on the negative Cage-Aid score and clinical interview there  are not indications of drug or alcohol abuse.    Discussed the general effects of drugs and alcohol on health and well-being. Therapist gave client printed information about the effects of chemical use on her health and well being.      Significant Losses / Trauma / Abuse / Neglect Issues:  There are indications or report of significant loss, trauma, abuse or neglect issues related to: \"I was diagnosed with scleroderma and found out I was pregnant the next month. Had a stressful high risk pregnancy and then when my son turned 1 hr old I had multiple organ failure and was in a coma\".    Issues of possible neglect are not present.      Medical Issues:  Client has had a physical exam to rule out medical causes for current symptoms. Date of last physical exam was within the past year. Client was encouraged to follow up with PCP if symptoms were to develop. The client has a [facility] Provider, who is named Grecia Barba.. The client reports not having a psychiatrist. Client reports the following current medical concerns: \"emergency c section, angiogram, kidney failure, blood clot in lungs, lung failure, heart failure, completely lost all mobility and had to learn how to start all over again with rheumatoid arthritis, dermatitis and many many scars\".. The client reports the presence of chronic or episodic pain in the form of \"whole body, feet and hands\". The pain level is severe and has a frequency of daily.. There are significant nutritional concerns. She wrote \"I either eat a lot in a short period of time or don't eat in days\".    Client reports current meds as:   Current Outpatient " Prescriptions   Medication Sig     budesonide-formoterol (SYMBICORT) 160-4.5 MCG/ACT Inhaler Inhale 2 puffs into the lungs 2 times daily     sucralfate (CARAFATE) 1 GM tablet Take 1 tablet (1 g) by mouth 4 times daily     folic acid (FOLVITE) 1 MG tablet Take 1 tablet (1 mg) by mouth daily     zolpidem (AMBIEN) 10 MG tablet Take 1 tablet (10 mg) by mouth nightly as needed for sleep     LORazepam (ATIVAN) 0.5 MG tablet Take 1 tablet (0.5 mg) by mouth 2 times daily as needed for anxiety     gabapentin (NEURONTIN) 300 MG capsule Take 1 capsule (300 mg) by mouth 3 times daily     oxyCODONE (ROXICODONE) 15 MG IR tablet Take 1 tablet (15 mg) by mouth every 4 hours as needed for moderate to severe pain     citalopram (CELEXA) 40 MG tablet Take 1 tablet (40 mg) by mouth daily     aspirin-acetaminophen-caffeine (EXCEDRIN MIGRAINE) 250-250-65 MG per tablet Take 1 tablet by mouth every 6 hours as needed for headaches     methotrexate 2.5 MG tablet CHEMO Take 6 tablets (15 mg) by mouth once a week     butalbital-aspirin-caffeine (BUTALBITAL COMPOUND/ASA) -40 MG TABS per tablet Take 1 tablet by mouth every 4 hours as needed for headaches     ranitidine (ZANTAC) 150 MG tablet Take 1 tablet (150 mg) by mouth 2 times daily as needed for heartburn     ferrous gluconate (FERGON) 324 (38 FE) MG tablet Take 1 tablet (324 mg) by mouth daily (with breakfast)     albuterol (VENTOLIN HFA) 108 (90 BASE) MCG/ACT Inhaler Inhale 2 puffs into the lungs every 4 hours as needed for shortness of breath / dyspnea or wheezing     promethazine (PHENERGAN) 25 MG tablet Take 1 tablet (25 mg) by mouth every 6 hours as needed for nausea     polyethylene glycol (MIRALAX) powder Take 17 g (1 capful) by mouth daily     blood glucose monitoring (NO BRAND SPECIFIED) test strip Use to test blood sugars 3 times daily or as directed  Please give what is approved by insurance.     omeprazole (PRILOSEC) 20 MG capsule Take 40 mg by mouth daily       Cyanocobalamin (B-12) 1000 MCG TBCR Take 1,000 mcg by mouth daily     diphenhydrAMINE (BENADRYL) 25 MG tablet Take 1 tablet (25 mg) by mouth every 6 hours as needed for itching or allergies     No current facility-administered medications for this visit.       Client Allergies:  Allergies   Allergen Reactions     Shellfish-Derived Products      no known allergies to medications    Medical History:  Past Medical History   Diagnosis Date     PE (pulmonary embolism) 9/7/2016     Long-term (current) use of anticoagulants [Z79.01] 9/9/2016     Scleroderma (H) 9/22/2016     Uncomplicated asthma      Arthritis      Blood clotting disorder (H)      P E     History of blood transfusion      Hypertension      Rheumatism          Medication Adherence:  Client reports taking prescribed medications as prescribed.    Client was provided recommendation to follow-up with prescribing physician.    Mental Status Assessment:  Appearance:   Appropriate   Eye Contact:   Good   Psychomotor Behavior: Normal   Attitude:   Cooperative   Orientation:   All  Speech   Rate / Production: Normal    Volume:  Normal   Mood:    Depressed  Normal  Affect:    Appropriate   Thought Content:  Clear   Thought Form:  Coherent  Logical   Insight:    Good       Review of Symptoms:  Depression: Sleep Interest Guilt Energy Concentration Appetite Psychomotor slowing or agitation Suicide Ruminations Irritability  Kaylen:  not sure about racing thoughts or impulsiveness as she had not completed that portion of the intake packet and we did not meet again as she cancelle twice  Psychosis: No symptoms  Anxiety: Worries Nervousness  Panic:  not sure  Post Traumatic Stress Disorder: Trauma  Obsessive Compulsive Disorder: No symptoms  Eating Disorder: No symptoms  Oppositional Defiant Disorder: No symptoms  ADD / ADHD: No symptoms  Conduct Disorder: No symptoms        Safety Issues and Plan for Safety and Risk Management:  Client has had a history of suicidal  "ideation: she wrote \"I lost hope at one point and found myself hopeless\"(prior to son being born was my understanding). when asked about question 9 on the phq she stated \"\"I f not for my son I wouldn't be here. God must have a reason for keeping me here. I almost  twice. I am blessed to still be here\".  Client denies current fears or concerns for personal safety.  Client reports the following current or recent suicidal ideation or behaviors: thoughts pop up briefly at times. no plan or intent..  Client denies current or recent homicidal ideation or behaviors.  Client denies current or recent self injurious behavior or ideation.  Client denies other safety concerns.  Client reports there are firearms in the house. The firearms are secured in a locked space.  A safety and risk management plan has been developed including: Client consented to co-developed safety plan, which includes what she agrees to do to remain safe.    Client's Strengths and Limitations:  Client identified the following strengths or resources that will help her succeed in counseling: commitment to health and well being and family support. Client identified the following supports: family and \"family mainly my mother who was there with me thru the entire hospitalization\". Things that may interfere with the clients success in counseling include:\"I am under a lot of stress with my condition and my son's newly diagnosed condition- he has a speech delay\".        Diagnostic Criteria:  A. Excessive anxiety and worry about a number of events or activities (such as work or school performance).   B. The person finds it difficult to control the worry.  C. Select 3 or more symptoms (required for diagnosis). Only one item is required in children.   - Restlessness or feeling keyed up or on edge.    - Being easily fatigued.    - Difficulty concentrating or mind going blank.    - Irritability.    - Sleep disturbance (difficulty falling or staying asleep, or " restless unsatisfying sleep).   D. The focus of the anxiety and worry is not confined to features of an Axis I disorder.  E. The anxiety, worry, or physical symptoms cause clinically significant distress or impairment in social, occupational, or other important areas of functioning.   F. The disturbance is not due to the direct physiological effects of a substance (e.g., a drug of abuse, a medication) or a general medical condition (e.g., hyperthyroidism) and does not occur exclusively during a Mood Disorder, a Psychotic Disorder, or a Pervasive Developmental Disorder.    - The aformentioned symptoms began several month(s) ago and occurs 6 days per week and is experienced as moderate.  CRITERIA (A-C) REPRESENT A MAJOR DEPRESSIVE EPISODE - SELECT THESE CRITERIA  A) Recurrent episode(s) - symptoms have been present during the same 2-week period and represent a change from previous functioning 5 or more symptoms (required for diagnosis)   - Depressed mood. Note: In children and adolescents, can be irritable mood.     - Diminished interest or pleasure in all, or almost all, activities.    - Significant weight fluctuates.    - Decreased sleep.    - Psychomotor activity agitation.    - Fatigue or loss of energy.    - Feelings of worthlessness or excessive guilt.    - Diminished ability to think or concentrate, or indecisiveness.    - Recurrent thoughts of death (not just fear of dying), recurrent suicidal ideation without a specific plan, or a suicide attempt or a specific plan for committing suicide.   B) The symptoms cause clinically significant distress or impairment in social, occupational, or other important areas of functioning  C) The episode is not attributable to the physiological effects of a substance or to another medical condition  D) The occurence of major depressive episode is not better explained by other thought / psychotic disorders  E) There has never been a manic episode or hypomanic episode  A. The  person has been exposed to a traumatic event in which both of the following were present:  B. The traumatic event is persistently reexperienced in one (or more) of the following ways:  C. Persistent avoidance of stimuli associated with the trauma and numbing of general responsiveness (not present before the trauma), as indicated by three (or more) of the following:  D. Persistent symptoms of increased arousal (not present before the trauma), as indicated by two (or more) of the following:  E. Duration of the disturbance is more than 1 month.  F. The disturbance causes clinically significant distress or impairment in social, occupational, or other important areas of functioning.      Functional Status:  Client's symptoms are causing reduced functional status in the following areas: Activities of Daily Living - chores and self care      DSM5 Diagnoses: (Sustained by DSM5 Criteria Listed Above)  Diagnoses: 296.33 Major Depressive Disorder, Recurrent Episode, Severe _ and With anxious distress  300.02 (F41.1) Generalized Anxiety Disorder  309.81 (F43.10) Posttraumatic Stress Disorder (includes Posttraumatic Stress Disorder for Children 6 Years and Younger)  Without dissociative symptoms Rule Out.  Psychosocial & Contextual Factors: disabled due to medical condition.  WHODAS 2.0 (12 item)- already completed.       Initial Treatment will focus on: Depressed Mood - major depression  Anxiety - generalized anxiety. rule out PTSD  Risk Management / Safety Concerns related to: Suicidal ideation  Grief / Loss - disability due to medical condition.    As a preliminary treatment goal, client will develop more effective coping skills to manage depressive symptoms, will develop more effective coping skills to manage anxiety symptoms, will follow safety plan (in EMR) for more effective management of risk issues and will engage in effective approach to address and resolve grief/loss issues.    The focus of initial interventions will  be to alleviate anxiety, alleviate depressed mood, increase coping skills, increase self esteem, process losses, process traumas and teach distress tolerance skills.    The client is receiving treatment / structured support from the following professional(s) / service and treatment. Collaboration will be initiated with: primary care physician.    Referral to another professional/service is not indicated at this time..    A Release of Information is not needed at this time.    Report to child / adult protection services was NA.    Client will have access to their [facility]' medical record.    Fer Jacob, JOSETTE  February 6, 2017

## 2017-02-10 ENCOUNTER — TRANSFERRED RECORDS (OUTPATIENT)
Dept: HEALTH INFORMATION MANAGEMENT | Facility: CLINIC | Age: 32
End: 2017-02-10

## 2017-02-21 DIAGNOSIS — R51.9 CHRONIC DAILY HEADACHE: ICD-10-CM

## 2017-02-21 DIAGNOSIS — G47.09 OTHER INSOMNIA: ICD-10-CM

## 2017-02-22 RX ORDER — ZOLPIDEM TARTRATE 10 MG/1
10 TABLET ORAL
Qty: 30 TABLET | Refills: 1 | Status: SHIPPED | OUTPATIENT
Start: 2017-02-22 | End: 2017-03-30

## 2017-02-22 NOTE — TELEPHONE ENCOUNTER
Last Filled 01/25/17  Last Qty 24  Last Office Visit 01/25/17    Thanks,  Nohemy Cox  Certified Pharmacy Technician  [facility]  [phone]

## 2017-02-23 ENCOUNTER — TRANSFERRED RECORDS (OUTPATIENT)
Dept: HEALTH INFORMATION MANAGEMENT | Facility: CLINIC | Age: 32
End: 2017-02-23

## 2017-02-27 ENCOUNTER — TRANSFERRED RECORDS (OUTPATIENT)
Dept: HEALTH INFORMATION MANAGEMENT | Facility: CLINIC | Age: 32
End: 2017-02-27

## 2017-03-16 ENCOUNTER — TRANSFERRED RECORDS (OUTPATIENT)
Dept: HEALTH INFORMATION MANAGEMENT | Facility: CLINIC | Age: 32
End: 2017-03-16

## 2017-03-20 ENCOUNTER — TRANSFERRED RECORDS (OUTPATIENT)
Dept: HEALTH INFORMATION MANAGEMENT | Facility: CLINIC | Age: 32
End: 2017-03-20

## 2017-03-20 ENCOUNTER — TELEPHONE (OUTPATIENT)
Dept: FAMILY MEDICINE | Facility: CLINIC | Age: 32
End: 2017-03-20

## 2017-03-20 NOTE — TELEPHONE ENCOUNTER
Patient is due for ACT.  ACT has not been completed.  Moderate persistent asthma.  Letter with ACT mailed to patient.

## 2017-03-20 NOTE — LETTER
[facility]  [address] [address]  Phone: [phone]    March 20, 2017    Molly Teague  [address] [place] [address]              Dear Ms. Teague,    Your [facility] Team works hard to make sure that you and your family receive exceptional care. Enclosed you will find a copy of the Asthma Control Test (ACT) that our clinic uses to monitor and manage your asthma. This test is an assessment tool that we use to determine how well your asthma is controlled.  Please keep a copy of the ACT for your reference when we call you to complete this assessment.     We will call within the next 2 weeks to review the questionnaire with you.      Sincerely,      Your [facility] Team

## 2017-03-20 NOTE — PROGRESS NOTES
01/04/17   Note: This is a copy of patient's last daily visit and will also serve as their Discharge Summary as they have not been in for further treatment 30 days past this date. Final assessment of goals and physical and functional status , therefore unavailable.   Providers   Providers JESSI Daniels/L, CHT   Referring Physician Grecia Barba DO   Reporting Period   Reporting period from 11/18/16   Reporting period to 01/04/17   General Information   Rxs Authorized 40   Rxs Used 2   Medical Diagnosis Scleroderma   Orders Evaluate And Treat As Indicated   Start Of Care Date 11/18/16   Onset date of current episode/exacerbation 01/18/13   Subjective Measures   Subjective Patient was not feeling well to make it into any appointment. Was started on Methotrexate and did not respond well to it.   Initial Pain level 5/10  (best- 8 worst)   Current Pain level 8/10   Objective Measures   Objective Measures Objective Measure 1  (see eval)   Objective Measure 1   Objective Measure Moderate to severe flexion contractures in fingers today.   Therapeutic Exercise   Therapeutic Exercise Special Techniques;Other Exercises/Activities   Skilled Interventions To Increase Tissue Extensibility;To Increase Tissue Excursion;To Enhance Joint Rom   Minutes of Treatment 25   PROM   PROM PROM Fingers   PROM Fingers Extension;Flexion   Sets/Reps 1 set;5 reps;reviewed;HEP   Other Exer/Activities/Educ   Other Exer/Activities/Educ - All exercises are part of HEP unless otherwise noted Exercise 1   Exercise 1 See Hand HEP   Special Techniques   Tendon Gliding Hook fist;Table top;Full fist;Straight fist   Sets/Reps 2 sets;10 reps;reviewed;HEP   Blocking PIP;DIP   Sets/Reps 1 set;10 reps;reviewed;HEP   Hand Goals   Hand Goals Sports/Recreation;Dressing;Household Chores   Dressing   Current Functional Task Tying;Dressing UB   Previous Performance Level Independent   Current Performance Level Severe difficulty   Goal Target Task Tie  shoes  (and put on gloves)   Goal Target Performance Level Mild difficulty   Due Date 02/02/17   Household Chores   Current Functional Task Gripping   Previous Performance Level Independent   Current Performance Level Unable   Goal Target Task Open a tight or new jar   Goal Target Performance Level Mild difficulty   Due Date 02/02/17   Sports/Recreation   Current Functional Task Weight bearing   Previous Performance Level Independent   Curent Performance Level Severe difficulty   Goal Target Task Weight bear through hand   Goal Target Performance Level Moderate difficulty  (for yoga)   Due Date 01/02/17   Assessment   Clinical Impression(s) Comments Goals extended a month because patient has not been in for 6 weeks since eval do to sickness   Equipment   Equipment red sponge   Education   Learner Patient   Readiness Eager   Method Booklet/handout;Explanation;Demonstration   Response Verbalizes understanding;Demonstrates understanding   Education Notes see home program   Plan   Updates to plan of care tendon gliding, heat, use as tolerated   Plan to see patient back utilizing modalities and ex as appropriate to reach functional goals.   Total Session Time   Total Session Time (In Minutes) 25   Marichuy Lees OTR/L CHT  Occupational Therapist, Certified Hand Therapist

## 2017-03-23 ENCOUNTER — TRANSFERRED RECORDS (OUTPATIENT)
Dept: HEALTH INFORMATION MANAGEMENT | Facility: CLINIC | Age: 32
End: 2017-03-23

## 2017-03-30 ENCOUNTER — INFUSION THERAPY VISIT (OUTPATIENT)
Dept: INFUSION THERAPY | Facility: CLINIC | Age: 32
End: 2017-03-30
Attending: INTERNAL MEDICINE
Payer: COMMERCIAL

## 2017-03-30 ENCOUNTER — OFFICE VISIT (OUTPATIENT)
Dept: FAMILY MEDICINE | Facility: CLINIC | Age: 32
End: 2017-03-30
Payer: COMMERCIAL

## 2017-03-30 VITALS
DIASTOLIC BLOOD PRESSURE: 83 MMHG | HEART RATE: 86 BPM | HEIGHT: 61 IN | BODY MASS INDEX: 26.24 KG/M2 | WEIGHT: 139 LBS | TEMPERATURE: 98.9 F | SYSTOLIC BLOOD PRESSURE: 114 MMHG

## 2017-03-30 DIAGNOSIS — M34.9 LIMITED SYSTEMIC SCLEROSIS (H): ICD-10-CM

## 2017-03-30 DIAGNOSIS — M34.89 SCLERODERMA WITH RENAL INVOLVEMENT (H): ICD-10-CM

## 2017-03-30 DIAGNOSIS — F41.1 GAD (GENERALIZED ANXIETY DISORDER): ICD-10-CM

## 2017-03-30 DIAGNOSIS — M34.89 SCLERODERMA WITH RENAL INVOLVEMENT (H): Primary | ICD-10-CM

## 2017-03-30 DIAGNOSIS — N08 SCLERODERMA WITH RENAL INVOLVEMENT (H): Primary | ICD-10-CM

## 2017-03-30 DIAGNOSIS — N08 SCLERODERMA WITH RENAL INVOLVEMENT (H): ICD-10-CM

## 2017-03-30 DIAGNOSIS — R11.2 NAUSEA AND VOMITING, INTRACTABILITY OF VOMITING NOT SPECIFIED, UNSPECIFIED VOMITING TYPE: Primary | ICD-10-CM

## 2017-03-30 DIAGNOSIS — G47.09 OTHER INSOMNIA: ICD-10-CM

## 2017-03-30 DIAGNOSIS — R11.2 NAUSEA AND VOMITING, INTRACTABILITY OF VOMITING NOT SPECIFIED, UNSPECIFIED VOMITING TYPE: ICD-10-CM

## 2017-03-30 DIAGNOSIS — K62.82 AIN GRADE I: ICD-10-CM

## 2017-03-30 DIAGNOSIS — K21.00 GASTROESOPHAGEAL REFLUX DISEASE WITH ESOPHAGITIS: ICD-10-CM

## 2017-03-30 LAB
ALBUMIN SERPL-MCNC: 4.1 G/DL (ref 3.4–5)
ALP SERPL-CCNC: 84 U/L (ref 40–150)
ALT SERPL W P-5'-P-CCNC: 16 U/L (ref 0–50)
ANION GAP SERPL CALCULATED.3IONS-SCNC: 7 MMOL/L (ref 3–14)
AST SERPL W P-5'-P-CCNC: 16 U/L (ref 0–45)
BASOPHILS # BLD AUTO: 0.1 10E9/L (ref 0–0.2)
BASOPHILS NFR BLD AUTO: 1.5 %
BILIRUB SERPL-MCNC: 0.5 MG/DL (ref 0.2–1.3)
BUN SERPL-MCNC: 21 MG/DL (ref 7–30)
CALCIUM SERPL-MCNC: 9.1 MG/DL (ref 8.5–10.1)
CHLORIDE SERPL-SCNC: 99 MMOL/L (ref 94–109)
CO2 SERPL-SCNC: 29 MMOL/L (ref 20–32)
CREAT SERPL-MCNC: 1.19 MG/DL (ref 0.52–1.04)
DIFFERENTIAL METHOD BLD: ABNORMAL
EOSINOPHIL # BLD AUTO: 0.5 10E9/L (ref 0–0.7)
EOSINOPHIL NFR BLD AUTO: 5.4 %
ERYTHROCYTE [DISTWIDTH] IN BLOOD BY AUTOMATED COUNT: 17.3 % (ref 10–15)
GFR SERPL CREATININE-BSD FRML MDRD: 53 ML/MIN/1.7M2
GLUCOSE SERPL-MCNC: 72 MG/DL (ref 70–99)
HCT VFR BLD AUTO: 35.6 % (ref 35–47)
HGB BLD-MCNC: 11.6 G/DL (ref 11.7–15.7)
LACTATE SERPL-SCNC: 0.5 MMOL/L (ref 0.4–2)
LYMPHOCYTES # BLD AUTO: 1.8 10E9/L (ref 0.8–5.3)
LYMPHOCYTES NFR BLD AUTO: 21.6 %
MCH RBC QN AUTO: 28.5 PG (ref 26.5–33)
MCHC RBC AUTO-ENTMCNC: 32.6 G/DL (ref 31.5–36.5)
MCV RBC AUTO: 88 FL (ref 78–100)
MONOCYTES # BLD AUTO: 0.8 10E9/L (ref 0–1.3)
MONOCYTES NFR BLD AUTO: 9.1 %
NEUTROPHILS # BLD AUTO: 5.3 10E9/L (ref 1.6–8.3)
NEUTROPHILS NFR BLD AUTO: 62.4 %
PLATELET # BLD AUTO: 239 10E9/L (ref 150–450)
POTASSIUM SERPL-SCNC: 3.9 MMOL/L (ref 3.4–5.3)
PROT SERPL-MCNC: 7.7 G/DL (ref 6.8–8.8)
RBC # BLD AUTO: 4.07 10E12/L (ref 3.8–5.2)
SODIUM SERPL-SCNC: 135 MMOL/L (ref 133–144)
WBC # BLD AUTO: 8.5 10E9/L (ref 4–11)

## 2017-03-30 PROCEDURE — 96361 HYDRATE IV INFUSION ADD-ON: CPT

## 2017-03-30 PROCEDURE — 83605 ASSAY OF LACTIC ACID: CPT | Performed by: INTERNAL MEDICINE

## 2017-03-30 PROCEDURE — 99215 OFFICE O/P EST HI 40 MIN: CPT | Performed by: INTERNAL MEDICINE

## 2017-03-30 PROCEDURE — 80053 COMPREHEN METABOLIC PANEL: CPT | Performed by: INTERNAL MEDICINE

## 2017-03-30 PROCEDURE — 85025 COMPLETE CBC W/AUTO DIFF WBC: CPT | Performed by: INTERNAL MEDICINE

## 2017-03-30 PROCEDURE — 25000128 H RX IP 250 OP 636: Performed by: INTERNAL MEDICINE

## 2017-03-30 PROCEDURE — 96365 THER/PROPH/DIAG IV INF INIT: CPT

## 2017-03-30 PROCEDURE — 36415 COLL VENOUS BLD VENIPUNCTURE: CPT | Performed by: INTERNAL MEDICINE

## 2017-03-30 RX ORDER — ZOLPIDEM TARTRATE 10 MG/1
10 TABLET ORAL
Qty: 30 TABLET | Refills: 1 | Status: CANCELLED | OUTPATIENT
Start: 2017-03-30

## 2017-03-30 RX ORDER — OXYCODONE HYDROCHLORIDE 15 MG/1
15 TABLET ORAL EVERY 4 HOURS PRN
Qty: 120 TABLET | Refills: 0 | Status: CANCELLED | OUTPATIENT
Start: 2017-03-30

## 2017-03-30 RX ORDER — ONDANSETRON 2 MG/ML
8 INJECTION INTRAMUSCULAR; INTRAVENOUS ONCE
Status: CANCELLED
Start: 2017-03-30 | End: 2017-03-30

## 2017-03-30 RX ORDER — LORAZEPAM 0.5 MG/1
0.5 TABLET ORAL 2 TIMES DAILY PRN
Qty: 60 TABLET | Refills: 3 | Status: SHIPPED | OUTPATIENT
Start: 2017-03-30 | End: 2017-07-25

## 2017-03-30 RX ORDER — OXYCODONE HYDROCHLORIDE 15 MG/1
15 TABLET ORAL EVERY 4 HOURS PRN
Qty: 120 TABLET | Refills: 0 | Status: SHIPPED | OUTPATIENT
Start: 2017-03-30 | End: 2017-04-26

## 2017-03-30 RX ORDER — LORAZEPAM 0.5 MG/1
0.5 TABLET ORAL 2 TIMES DAILY PRN
Qty: 60 TABLET | Refills: 1 | Status: CANCELLED | OUTPATIENT
Start: 2017-03-30

## 2017-03-30 RX ORDER — PROMETHAZINE HYDROCHLORIDE 50 MG/ML
25 INJECTION, SOLUTION INTRAMUSCULAR; INTRAVENOUS EVERY 6 HOURS PRN
Qty: 90 ML | Refills: 11 | Status: SHIPPED | OUTPATIENT
Start: 2017-03-30 | End: 2017-06-08

## 2017-03-30 RX ORDER — OMEPRAZOLE 40 MG/1
40 CAPSULE, DELAYED RELEASE ORAL 2 TIMES DAILY
Status: CANCELLED | COMMUNITY
Start: 2017-03-30

## 2017-03-30 RX ORDER — LISINOPRIL 5 MG/1
5 TABLET ORAL DAILY
Qty: 90 TABLET | Refills: 3 | Status: CANCELLED | COMMUNITY
Start: 2017-03-30

## 2017-03-30 RX ORDER — ZOLPIDEM TARTRATE 10 MG/1
10 TABLET ORAL
Qty: 30 TABLET | Refills: 3 | Status: SHIPPED | OUTPATIENT
Start: 2017-03-30 | End: 2017-05-25

## 2017-03-30 RX ADMIN — PROCHLORPERAZINE EDISYLATE: 5 INJECTION INTRAMUSCULAR; INTRAVENOUS at 12:46

## 2017-03-30 RX ADMIN — SODIUM CHLORIDE 2000 ML: 9 INJECTION, SOLUTION INTRAVENOUS at 12:30

## 2017-03-30 ASSESSMENT — ANXIETY QUESTIONNAIRES
GAD7 TOTAL SCORE: 5
3. WORRYING TOO MUCH ABOUT DIFFERENT THINGS: SEVERAL DAYS
2. NOT BEING ABLE TO STOP OR CONTROL WORRYING: SEVERAL DAYS
5. BEING SO RESTLESS THAT IT IS HARD TO SIT STILL: NOT AT ALL
1. FEELING NERVOUS, ANXIOUS, OR ON EDGE: NOT AT ALL
7. FEELING AFRAID AS IF SOMETHING AWFUL MIGHT HAPPEN: NOT AT ALL
6. BECOMING EASILY ANNOYED OR IRRITABLE: MORE THAN HALF THE DAYS

## 2017-03-30 ASSESSMENT — PATIENT HEALTH QUESTIONNAIRE - PHQ9: 5. POOR APPETITE OR OVEREATING: SEVERAL DAYS

## 2017-03-30 NOTE — TELEPHONE ENCOUNTER
Panel Management Review  Summary:    Patient is due/failing the following:   ACT    Action needed:   Patient needs to do ACT.    Type of outreach:    ACT test done at clinic visit on 3/30/17.  Score of 16.    Questions for provider review:    None                                                                                                                                    Elmira Chou CMA

## 2017-03-30 NOTE — PROGRESS NOTES
Infusion Nursing Note:  Molly Teague presents today for IV fluids and antiemetics.    Patient seen by provider today: Yes, Grecia Barba   present during visit today: Not Applicable.    Note: Pt states that Zofran doesn't work for her nausea, IV Compazine given today.    Intravenous Access:  Peripheral IV placed.    Treatment Conditions:  Not Applicable.      Post Infusion Assessment:  Patient tolerated infusion without incident.    Discharge Plan:   Patient discharged in stable condition accompanied by: self.  Departure Mode: Ambulatory.      Nohemy Simon RN

## 2017-03-30 NOTE — MR AVS SNAPSHOT
"              After Visit Summary   3/30/2017    Molly Teague    MRN: [id number]           Patient Information     Date Of Birth          1985        Visit Information        Provider Department      3/30/2017 11:30 AM ROOM 2 [facility]        Today's Diagnoses     Scleroderma with renal involvement (H)    -  1    Nausea and vomiting, intractability of vomiting not specified, unspecified vomiting type           Follow-ups after your visit        Who to contact     If you have questions or need follow up information about today's clinic visit or your schedule please contact [facility] directly at [phone].  Normal or non-critical lab and imaging results will be communicated to you by [employer], letter or phone within 4 business days after the clinic has received the results. If you do not hear from us within 7 days, please contact the clinic through [employer] or phone. If you have a critical or abnormal lab result, we will notify you by phone as soon as possible.  Submit refill requests through [email or web address] or call your pharmacy and they will forward the refill request to us. Please allow 3 business days for your refill to be completed.          Additional Information About Your Visit        MyChart Information     [email or web address] lets you send messages to your doctor, view your test results, renew your prescriptions, schedule appointments and more. To sign up, go to [email or web address] . Click on \"Log in\" on the left side of the screen, which will take you to the Welcome page. Then click on \"Sign up Now\" on the right side of the page.     You will be asked to enter the access code listed below, as well as some personal information. Please follow the directions to create your username and password.     Your access code is: [email or web address]  Expires: 2017 11:02 AM     Your access code will  in 90 days. If you need help or a new code, please call your [place] clinic or [phone].      "   Care EveryWhere ID     This is your Care EveryWhere ID. This could be used by other organizations to access your [facility] medical records  [id number]         Blood Pressure from Last 3 Encounters:   03/30/17 114/83   01/25/17 118/78   01/04/17 101/62    Weight from Last 3 Encounters:   03/30/17 63 kg (139 lb)   01/25/17 64.9 kg (143 lb)   01/02/17 63.6 kg (140 lb 3.2 oz)              Today, you had the following     No orders found for display         Where to get your medicines      Some of these will need a paper prescription and others can be bought over the counter.  Ask your nurse if you have questions.     Bring a paper prescription for each of these medications     LORazepam 0.5 MG tablet    oxyCODONE 15 MG IR tablet    zolpidem 10 MG tablet          Primary Care Provider Office Phone # Fax #    Grecia BarbaDO [phone]       [address] [address]        Thank you!     Thank you for choosing [facility]  for your care. Our goal is always to provide you with excellent care. Hearing back from our patients is one way we can continue to improve our services. Please take a few minutes to complete the written survey that you may receive in the mail after your visit with us. Thank you!             Your Updated Medication List - Protect others around you: Learn how to safely use, store and throw away your medicines at www.disposemymeds.org.          This list is accurate as of: 3/30/17  2:29 PM.  Always use your most recent med list.                   Brand Name Dispense Instructions for use    albuterol 108 (90 BASE) MCG/ACT Inhaler    VENTOLIN HFA    1 Inhaler    Inhale 2 puffs into the lungs every 4 hours as needed for shortness of breath / dyspnea or wheezing       aspirin-acetaminophen-caffeine 250-250-65 MG per tablet    EXCEDRIN MIGRAINE    24 tablet    Take 1 tablet by mouth every 6 hours as needed for headaches       B-12 1000 MCG  Tbcr     100 tablet    Take 1,000 mcg by mouth daily       BENADRYL 25 MG tablet   Generic drug:  diphenhydrAMINE     56 tablet    Take 1 tablet (25 mg) by mouth every 6 hours as needed for itching or allergies       blood glucose monitoring test strip    no brand specified    100 each    Use to test blood sugars 3 times daily or as directed Please give what is approved by insurance.       budesonide-formoterol 160-4.5 MCG/ACT Inhaler    SYMBICORT    3 Inhaler    Inhale 2 puffs into the lungs 2 times daily       citalopram 40 MG tablet    celeXA    90 tablet    Take 1 tablet (40 mg) by mouth daily       ferrous gluconate 324 (38 FE) MG tablet    FERGON    100 tablet    Take 1 tablet (324 mg) by mouth daily (with breakfast)       folic acid 1 MG tablet    FOLVITE    100 tablet    Take 1 tablet (1 mg) by mouth daily       gabapentin 300 MG capsule    NEURONTIN    270 capsule    Take 1 capsule (300 mg) by mouth 3 times daily       LORazepam 0.5 MG tablet    ATIVAN    60 tablet    Take 1 tablet (0.5 mg) by mouth 2 times daily as needed for anxiety       methotrexate 2.5 MG tablet CHEMO     30 tablet    Take 6 tablets (15 mg) by mouth once a week       omeprazole 20 MG CR capsule    priLOSEC    90 capsule    Take 40 mg by mouth daily       oxyCODONE 15 MG IR tablet    ROXICODONE    120 tablet    Take 1 tablet (15 mg) by mouth every 4 hours as needed for moderate to severe pain       polyethylene glycol powder    MIRALAX    510 g    Take 17 g (1 capful) by mouth daily       promethazine 25 MG tablet    PHENERGAN    20 tablet    Take 1 tablet (25 mg) by mouth every 6 hours as needed for nausea       ranitidine 150 MG tablet    ZANTAC    60 tablet    Take 1 tablet (150 mg) by mouth 2 times daily as needed for heartburn       sucralfate 1 GM tablet    CARAFATE    120 tablet    Take 1 tablet (1 g) by mouth 4 times daily       zolpidem 10 MG tablet    AMBIEN    30 tablet    Take 1 tablet (10 mg) by mouth nightly as needed for  sleep

## 2017-03-30 NOTE — MR AVS SNAPSHOT
After Visit Summary   3/30/2017    Molly Teague    MRN: [id number]           Patient Information     Date Of Birth          1985        Visit Information        Provider Department      3/30/2017 10:40 AM Grecia Barba, DO [facility]        Today's Diagnoses     Nausea and vomiting, intractability of vomiting not specified, unspecified vomiting type    -  1    Other insomnia        REX (generalized anxiety disorder)        Limited systemic sclerosis (H)        Scleroderma with renal involvement (H)        AIN grade I        Gastroesophageal reflux disease with esophagitis          Care Instructions          Thank you for choosing [facility]  You may be receiving a survey in the mail from [employer] regarding your visit today.  Please take a few minutes to complete and return the survey to let us know how we are doing.      If you have questions or concerns, please contact us via [employer] or you can contact your care team at [phone].    Our Clinic hours are:  Monday 6:40 am  to 7:00 pm  Tuesday -Friday 6:40 am to 5:00 pm    The [place] outpatient lab hours are:  Monday - Friday 6:10 am to 4:45 pm  Saturdays 7:00 am to 11:00 am  Appointments are required, call [phone]    If you have clinical questions after hours or would like to schedule an appointment,  call the clinic at [phone].      [facility] [place], [place] - [address] [place] [address] recommend 4 tablets of Carafate dissolved in water and drink throughout the day.  Let Dr. Barba know if the symptoms don't get better  Will order IM injectable Phenergan        Follow-ups after your visit        Who to contact     If you have questions or need follow up information about today's clinic visit or your schedule please contact [facility] directly at [phone].  Normal or non-critical lab and imaging results will be communicated to you by MyChart, letter or phone within  "4 business days after the clinic has received the results. If you do not hear from us within 7 days, please contact the clinic through [employer] or phone. If you have a critical or abnormal lab result, we will notify you by phone as soon as possible.  Submit refill requests through [employer] or call your pharmacy and they will forward the refill request to us. Please allow 3 business days for your refill to be completed.          Additional Information About Your Visit        [employer] Information     [employer] lets you send messages to your doctor, view your test results, renew your prescriptions, schedule appointments and more. To sign up, go to [email or web address] . Click on \"Log in\" on the left side of the screen, which will take you to the Welcome page. Then click on \"Sign up Now\" on the right side of the page.     You will be asked to enter the access code listed below, as well as some personal information. Please follow the directions to create your username and password.     Your access code is: [email or web address]  Expires: 2017 11:02 AM     Your access code will  in 90 days. If you need help or a new code, please call your [place] clinic or [phone].        Care EveryWhere ID     This is your Care EveryWhere ID. This could be used by other organizations to access your [place] medical records  [id number]        Your Vitals Were     Pulse Temperature Height BMI (Body Mass Index)          86 98.9  F (37.2  C) (Tympanic) 5' 1\" (1.549 m) 26.26 kg/m2         Blood Pressure from Last 3 Encounters:   17 114/83   17 118/78   17 101/62    Weight from Last 3 Encounters:   17 139 lb (63 kg)   17 143 lb (64.9 kg)   17 140 lb 3.2 oz (63.6 kg)              We Performed the Following     CBC with platelets and differential     Comprehensive metabolic panel     Lactic acid     UA reflex to Microscopic and Culture          Where to get your medicines      Some of these will need a " paper prescription and others can be bought over the counter.  Ask your nurse if you have questions.     Bring a paper prescription for each of these medications     LORazepam 0.5 MG tablet    oxyCODONE 15 MG IR tablet    zolpidem 10 MG tablet          Primary Care Provider Office Phone # Fax #    Grecia Barba [phone]       [facility] [address] [address]        Thank you!     Thank you for choosing [facility]  for your care. Our goal is always to provide you with excellent care. Hearing back from our patients is one way we can continue to improve our services. Please take a few minutes to complete the written survey that you may receive in the mail after your visit with us. Thank you!             Your Updated Medication List - Protect others around you: Learn how to safely use, store and throw away your medicines at www.disposemymeds.org.          This list is accurate as of: 3/30/17  2:49 PM.  Always use your most recent med list.                   Brand Name Dispense Instructions for use    albuterol 108 (90 BASE) MCG/ACT Inhaler    VENTOLIN HFA    1 Inhaler    Inhale 2 puffs into the lungs every 4 hours as needed for shortness of breath / dyspnea or wheezing       aspirin-acetaminophen-caffeine 250-250-65 MG per tablet    EXCEDRIN MIGRAINE    24 tablet    Take 1 tablet by mouth every 6 hours as needed for headaches       B-12 1000 MCG Tbcr     100 tablet    Take 1,000 mcg by mouth daily       BENADRYL 25 MG tablet   Generic drug:  diphenhydrAMINE     56 tablet    Take 1 tablet (25 mg) by mouth every 6 hours as needed for itching or allergies       blood glucose monitoring test strip    no brand specified    100 each    Use to test blood sugars 3 times daily or as directed Please give what is approved by insurance.       budesonide-formoterol 160-4.5 MCG/ACT Inhaler    SYMBICORT    3 Inhaler    Inhale 2 puffs into the lungs 2 times daily        citalopram 40 MG tablet    celeXA    90 tablet    Take 1 tablet (40 mg) by mouth daily       ferrous gluconate 324 (38 FE) MG tablet    FERGON    100 tablet    Take 1 tablet (324 mg) by mouth daily (with breakfast)       folic acid 1 MG tablet    FOLVITE    100 tablet    Take 1 tablet (1 mg) by mouth daily       gabapentin 300 MG capsule    NEURONTIN    270 capsule    Take 1 capsule (300 mg) by mouth 3 times daily       LORazepam 0.5 MG tablet    ATIVAN    60 tablet    Take 1 tablet (0.5 mg) by mouth 2 times daily as needed for anxiety       methotrexate 2.5 MG tablet CHEMO     30 tablet    Take 6 tablets (15 mg) by mouth once a week       omeprazole 20 MG CR capsule    priLOSEC    90 capsule    Take 40 mg by mouth daily       oxyCODONE 15 MG IR tablet    ROXICODONE    120 tablet    Take 1 tablet (15 mg) by mouth every 4 hours as needed for moderate to severe pain       polyethylene glycol powder    MIRALAX    510 g    Take 17 g (1 capful) by mouth daily       promethazine 25 MG tablet    PHENERGAN    20 tablet    Take 1 tablet (25 mg) by mouth every 6 hours as needed for nausea       ranitidine 150 MG tablet    ZANTAC    60 tablet    Take 1 tablet (150 mg) by mouth 2 times daily as needed for heartburn       sucralfate 1 GM tablet    CARAFATE    120 tablet    Take 1 tablet (1 g) by mouth 4 times daily       zolpidem 10 MG tablet    AMBIEN    30 tablet    Take 1 tablet (10 mg) by mouth nightly as needed for sleep

## 2017-03-30 NOTE — NURSING NOTE
"Chief Complaint   Patient presents with     Refill Request     pending     Eating Disorder     Has been having problems keeping food down for the last 3 years.  Feels worse lately due to gerd.  Something in last several days has tringered something can't keep anything down.  Feeling like getting dyhradated can't even hold water or anything.        Initial /83 (BP Location: Right arm, Patient Position: Chair, Cuff Size: Adult Regular)  Pulse 86  Temp 98.9  F (37.2  C) (Tympanic)  Ht 5' 1\" (1.549 m)  Wt 139 lb (63 kg)  BMI 26.26 kg/m2 Estimated body mass index is 26.26 kg/(m^2) as calculated from the following:    Height as of this encounter: 5' 1\" (1.549 m).    Weight as of this encounter: 139 lb (63 kg).  Medication Reconciliation: complete  "

## 2017-03-30 NOTE — PROGRESS NOTES
SUBJECTIVE:                                                    Molly Teague is a 31 year old female who presents to clinic today for the following health issues:      Chief Complaint   Patient presents with     Refill Request     pending     Eating Disorder     Has been having problems keeping food down for the last 3 years.  Feels worse lately due to gerd.  Something in last several days has tringered something can't keep anything down.  Feeling like getting dyhradated can't even hold water or anything.    Can't even hold medications down so gerd is worse.     She does get flare ups with nausea, then can't take GERD meds, which worsens the cycle.  It has been several months since her last episode, but not as severe. zofran ODT doesn't work well fo rher nausea.    Symptoms started 3 days ago.  Having vomiting after any time she eats or drinks anything.  No blood in the emesis. She is also having bright red rectal bleeding for the last few days.  No fevers or chills, abdominal pain.  Last bowel movement was 2 days ago which is 'normal' for her.   Is only able to take carafate once daily, due to need to take so many pills,  them from this med.  Is on H2 blocker BID, and PPI BID (our list says QD).    She did see GI at [place] March 1st, but records are not available (Dr. SPRINGER.  She had upper and lower scope, which showed 'rectal nodules' that have seen been removed.  She was told they were precancerous.  Has return visit end of May.    She reports having tearing of esophagus causing hematemesis.  She was also having rectal bleeding.  She is not sure what GI determined was the cause of this.      She was placed back on lisinopril 5 mg due to significant hypertension.    Med refills.  Checked [place]  and no aberrant Rx found.    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    Current Outpatient Prescriptions   Medication Sig Dispense Refill     zolpidem (AMBIEN) 10 MG tablet  Take 1 tablet (10 mg) by mouth nightly as needed for sleep 30 tablet 3     LORazepam (ATIVAN) 0.5 MG tablet Take 1 tablet (0.5 mg) by mouth 2 times daily as needed for anxiety 60 tablet 3     oxyCODONE (ROXICODONE) 15 MG IR tablet Take 1 tablet (15 mg) by mouth every 4 hours as needed for moderate to severe pain 120 tablet 0     Promethazine HCl 50 MG/ML SOLN Inject 0.5 mLs (25 mg) into the muscle every 6 hours as needed 90 mL 11     aspirin-acetaminophen-caffeine (EXCEDRIN MIGRAINE) 250-250-65 MG per tablet Take 1 tablet by mouth every 6 hours as needed for headaches 24 tablet 1     budesonide-formoterol (SYMBICORT) 160-4.5 MCG/ACT Inhaler Inhale 2 puffs into the lungs 2 times daily 3 Inhaler 3     sucralfate (CARAFATE) 1 GM tablet Take 1 tablet (1 g) by mouth 4 times daily 120 tablet 11     folic acid (FOLVITE) 1 MG tablet Take 1 tablet (1 mg) by mouth daily 100 tablet 3     gabapentin (NEURONTIN) 300 MG capsule Take 1 capsule (300 mg) by mouth 3 times daily 270 capsule 3     citalopram (CELEXA) 40 MG tablet Take 1 tablet (40 mg) by mouth daily 90 tablet 3     methotrexate 2.5 MG tablet CHEMO Take 6 tablets (15 mg) by mouth once a week 30 tablet 1     ranitidine (ZANTAC) 150 MG tablet Take 1 tablet (150 mg) by mouth 2 times daily as needed for heartburn 60 tablet 11     ferrous gluconate (FERGON) 324 (38 FE) MG tablet Take 1 tablet (324 mg) by mouth daily (with breakfast) 100 tablet 3     albuterol (VENTOLIN HFA) 108 (90 BASE) MCG/ACT Inhaler Inhale 2 puffs into the lungs every 4 hours as needed for shortness of breath / dyspnea or wheezing 1 Inhaler 11     omeprazole (PRILOSEC) 20 MG capsule Take 40 mg by mouth daily  90 capsule 1     Cyanocobalamin (B-12) 1000 MCG TBCR Take 1,000 mcg by mouth daily 100 tablet 1     promethazine (PHENERGAN) 25 MG tablet Take 1 tablet (25 mg) by mouth every 6 hours as needed for nausea 20 tablet 3     polyethylene glycol (MIRALAX) powder Take 17 g (1 capful) by mouth daily 510 g 1  "    blood glucose monitoring (NO BRAND SPECIFIED) test strip Use to test blood sugars 3 times daily or as directed  Please give what is approved by insurance. 100 each 0     diphenhydrAMINE (BENADRYL) 25 MG tablet Take 1 tablet (25 mg) by mouth every 6 hours as needed for itching or allergies 56 tablet        Reviewed and updated as needed this visit by clinical staff  Tobacco  Allergies  Meds  Problems       Reviewed and updated as needed this visit by Provider  Allergies  Meds  Problems         ROS:  Constitutional, HEENT, cardiovascular, pulmonary, gi and gu systems are negative, except as otherwise noted.    OBJECTIVE:                                                    /83 (BP Location: Right arm, Patient Position: Chair, Cuff Size: Adult Regular)  Pulse 86  Temp 98.9  F (37.2  C) (Tympanic)  Ht 5' 1\" (1.549 m)  Wt 139 lb (63 kg)  BMI 26.26 kg/m2  Body mass index is 26.26 kg/(m^2).     Orthostatic Vitals     BP Pulse Position Site Cuff Size Time Date    122/84 76 Supine Left Arm Regular 11:07 AM 3/30/2017    120/78 77 Sitting Left Arm Regular 11:09 AM 3/30/2017    103/80 84 Standing Left Arm Regular 11:10 AM 3/30/2017      115/77 79 Supine Left Arm Regular 02:39 PM 3/30/2017    103/77 80 Sitting Left Arm Regular 02:40 PM 3/30/2017    109/85 82 Standing Left Arm Regular 02:42 PM 3/30/2017        GENERAL APPEARANCE: alert, no distress and fatigued  HENT: MMM  RESP: lungs clear to auscultation - no rales, rhonchi or wheezes  CV: regular rates and rhythm, normal S1 S2, no S3 or S4 and no murmur, click or rub  ABDOMEN: soft, nontender, without hepatosplenomegaly or masses and bowel sounds normal    Diagnostic Test Results:  Results for orders placed or performed in visit on 03/30/17 (from the past 24 hour(s))   Comprehensive metabolic panel   Result Value Ref Range    Sodium 135 133 - 144 mmol/L    Potassium 3.9 3.4 - 5.3 mmol/L    Chloride 99 94 - 109 mmol/L    Carbon Dioxide 29 20 - 32 mmol/L    " Anion Gap 7 3 - 14 mmol/L    Glucose 72 70 - 99 mg/dL    Urea Nitrogen 21 7 - 30 mg/dL    Creatinine 1.19 (H) 0.52 - 1.04 mg/dL    GFR Estimate 53 (L) >60 mL/min/1.7m2    GFR Estimate If Black 64 >60 mL/min/1.7m2    Calcium 9.1 8.5 - 10.1 mg/dL    Bilirubin Total 0.5 0.2 - 1.3 mg/dL    Albumin 4.1 3.4 - 5.0 g/dL    Protein Total 7.7 6.8 - 8.8 g/dL    Alkaline Phosphatase 84 40 - 150 U/L    ALT 16 0 - 50 U/L    AST 16 0 - 45 U/L   CBC with platelets and differential   Result Value Ref Range    WBC 8.5 4.0 - 11.0 10e9/L    RBC Count 4.07 3.8 - 5.2 10e12/L    Hemoglobin 11.6 (L) 11.7 - 15.7 g/dL    Hematocrit 35.6 35.0 - 47.0 %    MCV 88 78 - 100 fl    MCH 28.5 26.5 - 33.0 pg    MCHC 32.6 31.5 - 36.5 g/dL    RDW 17.3 (H) 10.0 - 15.0 %    Platelet Count 239 150 - 450 10e9/L    Diff Method Automated Method     % Neutrophils 62.4 %    % Lymphocytes 21.6 %    % Monocytes 9.1 %    % Eosinophils 5.4 %    % Basophils 1.5 %    Absolute Neutrophil 5.3 1.6 - 8.3 10e9/L    Absolute Lymphocytes 1.8 0.8 - 5.3 10e9/L    Absolute Monocytes 0.8 0.0 - 1.3 10e9/L    Absolute Eosinophils 0.5 0.0 - 0.7 10e9/L    Absolute Basophils 0.1 0.0 - 0.2 10e9/L   Lactic acid   Result Value Ref Range    Lactic Acid 0.5 0.4 - 2.0 mmol/L        ASSESSMENT/PLAN:                                                      1. Nausea and vomiting, intractability of vomiting not specified, unspecified vomiting type -labs at baseline, no significant end-organ damage from dehydration.  Patient was seen in infusion center, given 2 L NS over 2 hours, and IV compazine.  She was seen after infusion with orthostatic improvement.  She subjectively feels much better.  May consider standing orders for similar treatment (no zofran as it doesn't work for patient), but I would want her to be evaluated before next episode, with labs, to ensure outpatient infusion is safe for her.  Try IM phenergan as rescue med at home when severe nausea flares up again.  May consider Kytril  as next anti-emetic to have on hand.  - Comprehensive metabolic panel  - CBC with platelets and differential  - Lactic acid  - UA reflex to Microscopic and Culture  - Promethazine HCl 50 MG/ML SOLN; Inject 0.5 mLs (25 mg) into the muscle every 6 hours as needed  Dispense: 90 mL; Refill: 11    2. Other insomnia - checked [place] .  Refill given  - zolpidem (AMBIEN) 10 MG tablet; Take 1 tablet (10 mg) by mouth nightly as needed for sleep  Dispense: 30 tablet; Refill: 3    3. REX (generalized anxiety disorder) - refill given  - LORazepam (ATIVAN) 0.5 MG tablet; Take 1 tablet (0.5 mg) by mouth 2 times daily as needed for anxiety  Dispense: 60 tablet; Refill: 3    4. Limited systemic sclerosis (H) - refill given  - oxyCODONE (ROXICODONE) 15 MG IR tablet; Take 1 tablet (15 mg) by mouth every 4 hours as needed for moderate to severe pain  Dispense: 120 tablet; Refill: 0    5. Scleroderma with renal involvement (H) - reviewed records from May    6. AIN grade I - reviewd records from [place]. She did message the patient portal about her ongoing bleeding    7. Gastroesophageal reflux disease with esophagitis - discussed GI recommend about carafate - 4 tablets of Carafate dissolved in water and drink throughout the day.      Greater than 40 minutes was spent face-to-face with the patient by the provider.  Greater than 50% was spent in counseling or coordinating care for this patient.      Grecia Barba,   [facility]

## 2017-03-30 NOTE — PATIENT INSTRUCTIONS
Thank you for choosing [facility].  You may be receiving a survey in the mail from Priyank Glover regarding your visit today.  Please take a few minutes to complete and return the survey to let us know how we are doing.      If you have questions or concerns, please contact us via [facility]argre[facility] or you can contact your care team at [phone].    Our Clinic hours are:  Monday 6:40 am  to 7:00 pm  Tuesday -Friday 6:40 am to 5:00 pm    The [place] outpatient lab hours are:  Monday - Friday 6:10 am to 4:45 pm  Saturdays 7:00 am to 11:00 am  Appointments are required, call [phone]    If you have clinical questions after hours or would like to schedule an appointment,  call the clinic at [phone].      [facility] [place], [place] - [address] [place] [address] recommend 4 tablets of Carafate dissolved in water and drink throughout the day.  Let Dr. Barba know if the symptoms don't get better  Will order IM injectable Phenergan

## 2017-03-31 ASSESSMENT — ANXIETY QUESTIONNAIRES: GAD7 TOTAL SCORE: 5

## 2017-03-31 ASSESSMENT — PATIENT HEALTH QUESTIONNAIRE - PHQ9: SUM OF ALL RESPONSES TO PHQ QUESTIONS 1-9: 12

## 2017-03-31 ASSESSMENT — ASTHMA QUESTIONNAIRES: ACT_TOTALSCORE: 16

## 2017-04-03 ENCOUNTER — TELEPHONE (OUTPATIENT)
Dept: FAMILY MEDICINE | Facility: CLINIC | Age: 32
End: 2017-04-03

## 2017-04-03 NOTE — TELEPHONE ENCOUNTER
Incoming records from [facility]. Given to provider for review and scan.    [facility]

## 2017-04-07 NOTE — ADDENDUM NOTE
Encounter addended by: Taniya Junior, PT on: 4/7/2017  8:41 AM<BR>     Actions taken: Flowsheet accepted, Sign clinical note, Episode resolved

## 2017-04-12 PROBLEM — G43.709 CHRONIC MIGRAINE WITHOUT AURA WITHOUT STATUS MIGRAINOSUS, NOT INTRACTABLE: Status: ACTIVE | Noted: 2017-04-12

## 2017-04-19 ENCOUNTER — HOSPITAL ENCOUNTER (EMERGENCY)
Facility: CLINIC | Age: 32
Discharge: HOME | End: 2017-04-19
Attending: PHYSICIAN ASSISTANT | Admitting: PHYSICIAN ASSISTANT
Payer: COMMERCIAL

## 2017-04-19 VITALS
OXYGEN SATURATION: 98 % | RESPIRATION RATE: 16 BRPM | DIASTOLIC BLOOD PRESSURE: 77 MMHG | TEMPERATURE: 99.6 F | SYSTOLIC BLOOD PRESSURE: 109 MMHG

## 2017-04-19 DIAGNOSIS — J02.9 ACUTE PHARYNGITIS, UNSPECIFIED ETIOLOGY: ICD-10-CM

## 2017-04-19 DIAGNOSIS — R21 FACIAL RASH: ICD-10-CM

## 2017-04-19 DIAGNOSIS — Z20.818 STREP THROAT EXPOSURE: ICD-10-CM

## 2017-04-19 LAB
DEPRECATED S PYO AG THROAT QL EIA: NORMAL
MICRO REPORT STATUS: NORMAL
SPECIMEN SOURCE: NORMAL

## 2017-04-19 PROCEDURE — 99283 EMERGENCY DEPT VISIT LOW MDM: CPT

## 2017-04-19 PROCEDURE — 87880 STREP A ASSAY W/OPTIC: CPT | Performed by: EMERGENCY MEDICINE

## 2017-04-19 PROCEDURE — 87081 CULTURE SCREEN ONLY: CPT | Performed by: PHYSICIAN ASSISTANT

## 2017-04-19 PROCEDURE — 99283 EMERGENCY DEPT VISIT LOW MDM: CPT | Performed by: PHYSICIAN ASSISTANT

## 2017-04-19 RX ORDER — CEPHALEXIN 500 MG/1
1000 CAPSULE ORAL 2 TIMES DAILY
Qty: 40 CAPSULE | Refills: 0 | Status: SHIPPED | OUTPATIENT
Start: 2017-04-19 | End: 2017-04-29

## 2017-04-19 NOTE — ED NOTES
Patient c/o painful throat with difficulty swallowing since this morning.  Headache, bilateral ear pain.  Stated son diagnosed with strep last week

## 2017-04-19 NOTE — DISCHARGE INSTRUCTIONS
* PHARYNGITIS (Sore Throat),REPORT PENDING    Pharyngitis (sore throat) is often due to a virus, but can also be caused by the  strep  bacteria. This is called  strep throat . Both viral and strep infection can cause throat pain that is worse when swallowing, aching all over with headache and fever. Both types of infections are contagious. They may be spread by coughing, kissing or touching others after touching your mouth or nose, so wash your hands often.  A test has been done to determine whether or not you have strep throat. If it is positive for strep infection you will usually need to take antibiotics. If the test is negative, you probably have a viral pharyngitis, and antibiotic treatment will not help you recover.  HOME CARE:    If your symptoms are severe, rest at home for the first 2-3 days. If you are told that your test is positive for strep, you should be off work and school for the first two days of antibiotic treatment. After that, you will no longer be as contagious.    Children: Use acetaminophen (Tylenol) for fever, fussiness or discomfort. In infants over six months of age, you may use ibuprofen (Children's Motrin) instead of Tylenol. [NOTE: If your child has chronic liver or kidney disease or ever had a stomach ulcer or GI bleeding, talk with your doctor before using these medicines.]   (Aspirin should never be used in anyone under 18 years of age who is ill with a fever. It may cause severe liver damage.)  Adults: You may use acetaminophen (Tylenol) 650-1000 mg every 6 hours or ibuprofen (Motrin, Advil) 600 mg every 6-8 hours with food to control pain, if you are able to take these medicines. [NOTE: If you have chronic liver or kidney disease or ever had a stomach ulcer or GI bleeding, talk with your doctor before using these medicines.]    Throat lozenges or sprays (Chloraseptic and others), or gargling with warm salt water will reduce throat pain. Dissolve 1/2 teaspoon of salt in 1 glass of  warm water. This is especially useful just before meals.     FOLLOW UP with your doctor as advised by our staff if you are not improving over the next week.  GET PROMPT MEDICAL ATTENTION  if any of the following occur:    Fever over 101 F (38.3 C) for more than three days    New or worsening ear pain, sinus pain or headache    Unable to swallow liquids or open your mouth wide due to throat pain    Trouble breathing    Muffled voice    New rash       [id number] [employer] [address], [place] [address]. All rights reserved. This information is not intended as a substitute for professional medical care. Always follow your healthcare professional's instructions.

## 2017-04-19 NOTE — ED PROVIDER NOTES
History     Chief Complaint   Patient presents with     Pruritis     Pt has schleroderma, feels like face is swollen, itchy and rashy.  Started yesterday.  Aches all over,  Denies any breathing difficulty.     Pharyngitis     Pt has hoars voice, sore throat. Son has strep.     HPI  Molyl Teague is a 31 year old female with past medical history significant for crest syndrome, scleroderma with renal involvement, bilateral retinitis, generalized anxiety disorder, moderate persistent asthma, polyneuropathy, GERD, chronic migraine, prior history of PE with long-term use of anticoagulants who presents to the emergency department with concerns over sore throat for the last 3 days.  She states symptoms felt worse in the last 24 hours accompanied by hoarse voice, chills, myalgias, subjective fevers.  She also notes a rash on her face and states that her skin feels tight.  She states that she does have a history of very sensitive skin and this is not uncommon for her.  She denies any current nasal congestion, cough, dyspnea, wheezing, vomiting, diarrhea or abdominal pain.  She did have a household contact his recently diagnosed with strep throat.       Past Medical History:   Diagnosis Date     Arthritis      Blood clotting disorder (H)     P E     History of blood transfusion      Hypertension      Long-term (current) use of anticoagulants [Z79.01] 9/9/2016     PE (pulmonary embolism) 9/7/2016     Rheumatism      Scleroderma (H) 9/22/2016     Uncomplicated asthma         No current facility-administered medications on file prior to encounter.   Current Outpatient Prescriptions on File Prior to Encounter:  zolpidem (AMBIEN) 10 MG tablet Take 1 tablet (10 mg) by mouth nightly as needed for sleep   LORazepam (ATIVAN) 0.5 MG tablet Take 1 tablet (0.5 mg) by mouth 2 times daily as needed for anxiety   oxyCODONE (ROXICODONE) 15 MG IR tablet Take 1 tablet (15 mg) by mouth every 4 hours as needed for moderate to severe pain    Promethazine HCl 50 MG/ML SOLN Inject 0.5 mLs (25 mg) into the muscle every 6 hours as needed   aspirin-acetaminophen-caffeine (EXCEDRIN MIGRAINE) 250-250-65 MG per tablet Take 1 tablet by mouth every 6 hours as needed for headaches   budesonide-formoterol (SYMBICORT) 160-4.5 MCG/ACT Inhaler Inhale 2 puffs into the lungs 2 times daily   sucralfate (CARAFATE) 1 GM tablet Take 1 tablet (1 g) by mouth 4 times daily   folic acid (FOLVITE) 1 MG tablet Take 1 tablet (1 mg) by mouth daily   gabapentin (NEURONTIN) 300 MG capsule Take 1 capsule (300 mg) by mouth 3 times daily   citalopram (CELEXA) 40 MG tablet Take 1 tablet (40 mg) by mouth daily   methotrexate 2.5 MG tablet CHEMO Take 6 tablets (15 mg) by mouth once a week   ranitidine (ZANTAC) 150 MG tablet Take 1 tablet (150 mg) by mouth 2 times daily as needed for heartburn   ferrous gluconate (FERGON) 324 (38 FE) MG tablet Take 1 tablet (324 mg) by mouth daily (with breakfast)   albuterol (VENTOLIN HFA) 108 (90 BASE) MCG/ACT Inhaler Inhale 2 puffs into the lungs every 4 hours as needed for shortness of breath / dyspnea or wheezing   promethazine (PHENERGAN) 25 MG tablet Take 1 tablet (25 mg) by mouth every 6 hours as needed for nausea   polyethylene glycol (MIRALAX) powder Take 17 g (1 capful) by mouth daily   blood glucose monitoring (NO BRAND SPECIFIED) test strip Use to test blood sugars 3 times daily or as directedPlease give what is approved by insurance.   omeprazole (PRILOSEC) 20 MG capsule Take 40 mg by mouth daily    Cyanocobalamin (B-12) 1000 MCG TBCR Take 1,000 mcg by mouth daily   diphenhydrAMINE (BENADRYL) 25 MG tablet Take 1 tablet (25 mg) by mouth every 6 hours as needed for itching or allergies      I have reviewed the Medications, Allergies, Past Medical and Surgical History, and Social History in the Epic system.    Review of Systems  CONSTITUTIONAL:POSITIVE  for subjective fever, chills, myalgias   INTEGUMENTARY/SKIN: POSITIVE for pruritic rash on  face   EYES: NEGATIVE for new vision changes or irritation  ENT/MOUTH: POSITIVE for sore throat, hoarse voice and NEGATIVE for nasal congestion, ear pain   RESP:NEGATIVE for significant cough or SOB  GI: NEGATIVE for abdominal pain, diarrhea, nausea and vomiting  Physical Exam   /77  Temp 99.6  F (37.6  C) (Oral)  Resp 16  SpO2 98%  Physical Exam  GENERAL APPEARANCE: healthy, alert and no distress  EYES: EOMI,  PERRL, conjunctiva clear  HENT: ear canals and TM's normal.  Nasal mucosa and moist.  Posterior pharynx is mildly erythematous without exudate, no edema or trismus, uvula is midline.    NECK: supple, nontender, no lymphadenopathy  RESP: lungs clear to auscultation - no rales, rhonchi or wheezes  CV: regular rates and rhythm, normal S1 S2, no murmur noted  ABDOMEN:  soft, nontender, no HSM or masses and bowel sounds normal  SKIN: pink papular rash on face  ED Course     ED Course     Procedures        Critical Care time:  none            Labs Ordered and Resulted from Time of ED Arrival Up to the Time of Departure from the ED   RAPID STREP SCREEN     Assessments & Plan (with Medical Decision Making)     I have reviewed the nursing notes.    I have reviewed the findings, diagnosis, plan and need for follow up with the patient.    Discharge Medication List as of 4/19/2017 11:32 AM      START taking these medications    Details   cephALEXin (KEFLEX) 500 MG capsule Take 2 capsules (1,000 mg) by mouth 2 times daily for 10 days, Disp-40 capsule, R-0, E-Prescribe           Final diagnoses:   Acute pharyngitis, unspecified etiology   Strep throat exposure   Facial rash     31-year-old female with significant past medical history presents to emergency room with concerns over sore throat for the last 3 days with development of facial rash last 24 hours.  Patient was noted to be tachycardic upon arrival, remainder vital signs within normal limits.  Physical exam findings as described above were negative for  evidence of peritonsillar cellulitis, abscess.  As part of evaluation patient did have negative rapid strep test culture pending.  I do not suspect mono, angioedema at this time.  I did discuss risks/benefits of empiric treatment with antibiotics based on history, physical exam findings and close exposure history.  Given patient's past medical problems and close ill contacts she did elect to proceed with initiation of antibiotics.  She was instructed that if sore throat is not caused by a bacterial infection, antibiotics will not help and there can be associated side effects and patient states understanding. Facial rash appears consistent with allergic/ureteric response.  Does not appear concerning for cellulitis, impetigo, erysipelas or other infectious process.  I did discuss risks/benefits demented treatment with prednisone and patient declined at this time due to past medical history and side effects.  She was discharged home stable with prescription for keflex given potential interaction with penicillin given her methotrexate use.  She was instructed to follow up with PCP if no improvement in 3-5 days. Worrisome reasons to return to ER/UC sooner discussed.      Disclaimer: This note consists of symbols derived from keyboarding, dictation, and/or voice recognition software. As a result, there may be errors in the script that have gone undetected.  Please consider this when interpreting information found in the chart.     4/19/2017   [facility] EMERGENCY DEPARTMENT     Tricia Turner PA-C  04/25/17 2625

## 2017-04-19 NOTE — ED AVS SNAPSHOT
[facility]    [address] [address]    Phone:  [phone]    Fax:  [phone]                                       Molly Teague   MRN: [id number]    Department:  [facility]   Date of Visit:  4/19/2017           After Visit Summary Signature Page     I have received my discharge instructions, and my questions have been answered. I have discussed any challenges I see with this plan with the nurse or doctor.    ..........................................................................................................................................  Patient/Patient Representative Signature      ..........................................................................................................................................  Patient Representative Print Name and Relationship to Patient    ..................................................               ................................................  Date                                            Time    ..........................................................................................................................................  Reviewed by Signature/Title    ...................................................              ..............................................  Date                                                            Time

## 2017-04-21 LAB
BACTERIA SPEC CULT: NORMAL
MICRO REPORT STATUS: NORMAL
SPECIMEN SOURCE: NORMAL

## 2017-04-26 ENCOUNTER — OFFICE VISIT (OUTPATIENT)
Dept: FAMILY MEDICINE | Facility: CLINIC | Age: 32
End: 2017-04-26
Payer: COMMERCIAL

## 2017-04-26 VITALS
OXYGEN SATURATION: 100 % | DIASTOLIC BLOOD PRESSURE: 79 MMHG | WEIGHT: 136 LBS | TEMPERATURE: 98.9 F | SYSTOLIC BLOOD PRESSURE: 110 MMHG | HEIGHT: 61 IN | HEART RATE: 89 BPM | BODY MASS INDEX: 25.68 KG/M2

## 2017-04-26 DIAGNOSIS — M34.9 LIMITED SYSTEMIC SCLEROSIS (H): ICD-10-CM

## 2017-04-26 DIAGNOSIS — E53.8 VITAMIN B12 DEFICIENCY (NON ANEMIC): Primary | ICD-10-CM

## 2017-04-26 DIAGNOSIS — G89.4 CHRONIC PAIN SYNDROME: Primary | Chronic | ICD-10-CM

## 2017-04-26 PROCEDURE — 99214 OFFICE O/P EST MOD 30 MIN: CPT | Performed by: INTERNAL MEDICINE

## 2017-04-26 PROCEDURE — 80307 DRUG TEST PRSMV CHEM ANLYZR: CPT | Mod: 90 | Performed by: INTERNAL MEDICINE

## 2017-04-26 PROCEDURE — 99000 SPECIMEN HANDLING OFFICE-LAB: CPT | Performed by: INTERNAL MEDICINE

## 2017-04-26 RX ORDER — OXYCODONE HYDROCHLORIDE 15 MG/1
15 TABLET ORAL EVERY 4 HOURS PRN
Qty: 120 TABLET | Refills: 0 | Status: SHIPPED | OUTPATIENT
Start: 2017-05-26 | End: 2017-06-22

## 2017-04-26 RX ORDER — OXYCODONE HYDROCHLORIDE 15 MG/1
15 TABLET ORAL EVERY 4 HOURS PRN
Qty: 120 TABLET | Refills: 0 | Status: SHIPPED | OUTPATIENT
Start: 2017-04-26 | End: 2017-06-08

## 2017-04-26 RX ORDER — OXYCODONE HYDROCHLORIDE 15 MG/1
15 TABLET ORAL EVERY 4 HOURS PRN
Qty: 120 TABLET | Refills: 0 | Status: SHIPPED | OUTPATIENT
Start: 2017-06-25 | End: 2018-01-05

## 2017-04-26 NOTE — TELEPHONE ENCOUNTER
B-12 1000g      Last Written Prescription Date: historical-need new rx  Last Fill Quantity: 100,  # refills: 0   Last Office Visit with G, P or [facility] prescribing provider: 04/26/17    Nohemy Canseco [facility].  [facility] [place]

## 2017-04-26 NOTE — NURSING NOTE
"Chief Complaint   Patient presents with     Hospital F/U     ER follow up for negative strep.  Says feeling better.     Refill Request     pain medication only       Initial /79 (BP Location: Right arm, Patient Position: Chair, Cuff Size: Adult Regular)  Pulse 89  Temp 98.9  F (37.2  C) (Tympanic)  Ht 5' 1\" (1.549 m)  Wt 136 lb (61.7 kg)  SpO2 100%  BMI 25.7 kg/m2 Estimated body mass index is 25.7 kg/(m^2) as calculated from the following:    Height as of this encounter: 5' 1\" (1.549 m).    Weight as of this encounter: 136 lb (61.7 kg).  Medication Reconciliation: complete  "

## 2017-04-26 NOTE — LETTER
[facility]  [address] [address]  Phone: [phone]    May 2, 2017    Molly Teague  [address] [place] [address]          Dear Ms. Tegaue,    The results of your recent lab tests were :Urine drug screen as expected   If you have any further questions or problems, please contact our office.    Sincerely,      Grecia Barba MD / [place]ier

## 2017-04-26 NOTE — PROGRESS NOTES
SUBJECTIVE:                                                    Molly Teague is a 31 year old female who presents to clinic today for the following health issues:      ED/UC Followup:    Facility:  [facility]  Date of visit: 04/19/17  Reason for visit: sore throat  Current Status: better  Headache, fevers, sore throat, myalgias are better.  Feels antibiotic is helping.     Chief Complaint   Patient presents with     Hospital F/U     ER follow up for negative strep.  Says feeling better.     Refill Request       Chronic Pain:  Has been on oxycodone 15 mg #120 pills/month for about 1 year.  It does help.  She is not functional without oxycodone.  It alleviates the pain to help her function.   Good days, she will use 2-3/day, bad days, she will use 4xday.  Will not take at same time with ativan or ambien because it makes her too drowsy.  It does not make her drowsy, unless she takes with other medications that make her drowsy (benadryl, ativan, etc).  It does cause constipation, but she is managing with miralax, has bowel movement every 2-3 days.  Has poor PO intake, poor appetite due to chronic illness.    Has PTSD, saw psychologist in Feb, but hasn't had time to make another appointment due to many appointment with Gary.  Hasn't had time for physical therapy either, although is doing home exercises.  intereferes with sleep due to nightmares.        Current Outpatient Prescriptions   Medication Sig Dispense Refill     cephALEXin (KEFLEX) 500 MG capsule Take 2 capsules (1,000 mg) by mouth 2 times daily for 10 days 40 capsule 0     zolpidem (AMBIEN) 10 MG tablet Take 1 tablet (10 mg) by mouth nightly as needed for sleep 30 tablet 3     LORazepam (ATIVAN) 0.5 MG tablet Take 1 tablet (0.5 mg) by mouth 2 times daily as needed for anxiety 60 tablet 3     oxyCODONE (ROXICODONE) 15 MG IR tablet Take 1 tablet (15 mg) by mouth every 4 hours as needed for moderate to severe pain 120 tablet 0     Promethazine HCl 50  MG/ML SOLN Inject 0.5 mLs (25 mg) into the muscle every 6 hours as needed 90 mL 11     budesonide-formoterol (SYMBICORT) 160-4.5 MCG/ACT Inhaler Inhale 2 puffs into the lungs 2 times daily 3 Inhaler 3     sucralfate (CARAFATE) 1 GM tablet Take 1 tablet (1 g) by mouth 4 times daily 120 tablet 11     folic acid (FOLVITE) 1 MG tablet Take 1 tablet (1 mg) by mouth daily 100 tablet 3     gabapentin (NEURONTIN) 300 MG capsule Take 1 capsule (300 mg) by mouth 3 times daily 270 capsule 3     citalopram (CELEXA) 40 MG tablet Take 1 tablet (40 mg) by mouth daily 90 tablet 3     methotrexate 2.5 MG tablet CHEMO Take 6 tablets (15 mg) by mouth once a week 30 tablet 1     ranitidine (ZANTAC) 150 MG tablet Take 1 tablet (150 mg) by mouth 2 times daily as needed for heartburn 60 tablet 11     ferrous gluconate (FERGON) 324 (38 FE) MG tablet Take 1 tablet (324 mg) by mouth daily (with breakfast) 100 tablet 3     albuterol (VENTOLIN HFA) 108 (90 BASE) MCG/ACT Inhaler Inhale 2 puffs into the lungs every 4 hours as needed for shortness of breath / dyspnea or wheezing 1 Inhaler 11     promethazine (PHENERGAN) 25 MG tablet Take 1 tablet (25 mg) by mouth every 6 hours as needed for nausea 20 tablet 3     polyethylene glycol (MIRALAX) powder Take 17 g (1 capful) by mouth daily 510 g 1     omeprazole (PRILOSEC) 20 MG capsule Take 40 mg by mouth daily  90 capsule 1     Cyanocobalamin (B-12) 1000 MCG TBCR Take 1,000 mcg by mouth daily 100 tablet 1     diphenhydrAMINE (BENADRYL) 25 MG tablet Take 1 tablet (25 mg) by mouth every 6 hours as needed for itching or allergies 56 tablet      aspirin-acetaminophen-caffeine (EXCEDRIN MIGRAINE) 250-250-65 MG per tablet Take 1 tablet by mouth every 6 hours as needed for headaches 24 tablet 1     blood glucose monitoring (NO BRAND SPECIFIED) test strip Use to test blood sugars 3 times daily or as directed  Please give what is approved by insurance. 100 each 0       Reviewed and updated as needed this  "visit by clinical staff  Allergies       Reviewed and updated as needed this visit by Provider         ROS:  Constitutional, HEENT, cardiovascular, pulmonary, GI, , musculoskeletal, neuro, skin, endocrine and psych systems are negative, except as otherwise noted.    OBJECTIVE:                                                    /79 (BP Location: Right arm, Patient Position: Chair, Cuff Size: Adult Regular)  Pulse 89  Temp 98.9  F (37.2  C) (Tympanic)  Ht 5' 1\" (1.549 m)  Wt 136 lb (61.7 kg)  SpO2 100%  BMI 25.7 kg/m2  Body mass index is 25.7 kg/(m^2).  GENERAL APPEARANCE: alert, no distress and   Skin: scarring and skin tightness of face, neck hands.       ASSESSMENT/PLAN:                                                        ICD-10-CM    1. Chronic pain syndrome G89.4 oxyCODONE (ROXICODONE) 15 MG IR tablet     oxyCODONE (ROXICODONE) 15 MG IR tablet     Drug  Screen Comprehensive , Urine with Reported Meds ([employer]) (Pain Care Package)     oxyCODONE (ROXICODONE) 15 MG IR tablet   2. Limited systemic sclerosis (H) M34.9      Discussed pain contract, had patient sign and gave her copy.  Fills given for April, May, June, Return to clinic 3 months for refill.      Grecia Barba,   [facility]  "

## 2017-04-26 NOTE — MR AVS SNAPSHOT
After Visit Summary   4/26/2017    Molly Teague    MRN: [id number]           Patient Information     Date Of Birth          1985        Visit Information        Provider Department      4/26/2017 10:40 AM Grecia Barba, DO [facility]        Today's Diagnoses     Chronic pain syndrome    -  1    Limited systemic sclerosis (H)          Care Instructions    1. Take the miralax every day to prevent and treat constipation  2. If you have gone more than 3 days without bowel movement, try Senna-S 1-2 pills twice daily.  3. Follow-up with Gabe Jacob for mood/anxiety/sleep.  4. Refill given on pain medications April, May Maribeth    Sleep Hygiene    1. Avoid doing anything stressful in the hour before bedtime. Avoid paying bills, watching politics on the news, etc.  2. Avoid screens just before bedtime. This includes cell phones, iPad, computers, TV. The bright lights on the screen is very stimulating to the brain, and makes it difficult to follow asleep and stay asleep  3. Avoid alcohol. Alcohol can sometimes make it easier to fall asleep, but significantly reduces the chance that you will stay asleep. It also impairs REM sleep, which is the good restful sleep  4. Use the bed for sleep and sex only. Avoid eating, reading, watching TV in bed  5. If you feel very restless at bedtime, try doing mundane physical activities such as vacuuming, folding laundry, etc.  6. If you find yourself making lists in your head at night, keep pen and paper at the bedside. Write down these lists. Also visualize yourself checking that item off your list and removing it from your brain.  7. Keep the room cool and dark. Consider investing in late darkening curtains  8. Avoid drinking excessive fluids just before bedtime. Empty your bladder just before bedtime  9. Cognitive behavioral therapy has been proven to be highly effective to treat insomnia. There are several online modules that you can complete for  "a fee.  Http://shuti.me  Or [employer]           Follow-ups after your visit        Who to contact     If you have questions or need follow up information about today's clinic visit or your schedule please contact [facility] directly at [phone].  Normal or non-critical lab and imaging results will be communicated to you by MyChart, letter or phone within 4 business days after the clinic has received the results. If you do not hear from us within 7 days, please contact the clinic through MyChart or phone. If you have a critical or abnormal lab result, we will notify you by phone as soon as possible.  Submit refill requests through [employer] or call your pharmacy and they will forward the refill request to us. Please allow 3 business days for your refill to be completed.          Additional Information About Your Visit        MyChart Information     [employer] lets you send messages to your doctor, view your test results, renew your prescriptions, schedule appointments and more. To sign up, go to [email or web address] . Click on \"Log in\" on the left side of the screen, which will take you to the Welcome page. Then click on \"Sign up Now\" on the right side of the page.     You will be asked to enter the access code listed below, as well as some personal information. Please follow the directions to create your username and password.     Your access code is: [email or web address]  Expires: 2017 11:32 AM     Your access code will  in 90 days. If you need help or a new code, please call your [facility] clinic or [phone].        Care EveryWhere ID     This is your Care EveryWhere ID. This could be used by other organizations to access your [facility] medical records  [id number]        Your Vitals Were     Pulse Temperature Height Pulse Oximetry BMI (Body Mass Index)       89 98.9  F (37.2  C) (Tympanic) 5' 1\" (1.549 m) 100% 25.7 kg/m2        Blood Pressure from Last 3 Encounters:   17 " 110/79   04/19/17 109/77   03/30/17 114/83    Weight from Last 3 Encounters:   04/26/17 136 lb (61.7 kg)   03/30/17 139 lb (63 kg)   01/25/17 143 lb (64.9 kg)              We Performed the Following     Drug  Screen Comprehensive , Urine with Reported Meds (MedTox) (Pain Care Package)          Today's Medication Changes          These changes are accurate as of: 4/26/17 11:11 AM.  If you have any questions, ask your nurse or doctor.               These medicines have changed or have updated prescriptions.        Dose/Directions    * oxyCODONE 15 MG IR tablet   Commonly known as:  ROXICODONE   This may have changed:  Another medication with the same name was added. Make sure you understand how and when to take each.   Used for:  Chronic pain syndrome   Changed by:  Grecia Barba DO        Dose:  15 mg   Take 1 tablet (15 mg) by mouth every 4 hours as needed for moderate to severe pain   Quantity:  120 tablet   Refills:  0       * oxyCODONE 15 MG IR tablet   Commonly known as:  ROXICODONE   This may have changed:  You were already taking a medication with the same name, and this prescription was added. Make sure you understand how and when to take each.   Used for:  Chronic pain syndrome   Changed by:  Grecia Barba DO        Dose:  15 mg   Start taking on:  5/26/2017   Take 1 tablet (15 mg) by mouth every 4 hours as needed for pain   Quantity:  120 tablet   Refills:  0       * oxyCODONE 15 MG IR tablet   Commonly known as:  ROXICODONE   This may have changed:  You were already taking a medication with the same name, and this prescription was added. Make sure you understand how and when to take each.   Used for:  Chronic pain syndrome   Changed by:  Grecia Barba DO        Dose:  15 mg   Start taking on:  6/25/2017   Take 1 tablet (15 mg) by mouth every 4 hours as needed for pain   Quantity:  120 tablet   Refills:  0       * Notice:  This list has 3 medication(s) that are the same as other  medications prescribed for you. Read the directions carefully, and ask your doctor or other care provider to review them with you.         Where to get your medicines      Some of these will need a paper prescription and others can be bought over the counter.  Ask your nurse if you have questions.     Bring a paper prescription for each of these medications     oxyCODONE 15 MG IR tablet    oxyCODONE 15 MG IR tablet    oxyCODONE 15 MG IR tablet                Primary Care Provider Office Phone # Fax #    Grecia Barba [phone]       [facility] [address] [address]        Thank you!     Thank you for choosing [facility]  for your care. Our goal is always to provide you with excellent care. Hearing back from our patients is one way we can continue to improve our services. Please take a few minutes to complete the written survey that you may receive in the mail after your visit with us. Thank you!             Your Updated Medication List - Protect others around you: Learn how to safely use, store and throw away your medicines at www.disposemymeds.org.          This list is accurate as of: 4/26/17 11:11 AM.  Always use your most recent med list.                   Brand Name Dispense Instructions for use    albuterol 108 (90 BASE) MCG/ACT Inhaler    VENTOLIN HFA    1 Inhaler    Inhale 2 puffs into the lungs every 4 hours as needed for shortness of breath / dyspnea or wheezing       aspirin-acetaminophen-caffeine 250-250-65 MG per tablet    EXCEDRIN MIGRAINE    24 tablet    Take 1 tablet by mouth every 6 hours as needed for headaches       B-12 1000 MCG Tbcr     100 tablet    Take 1,000 mcg by mouth daily       BENADRYL 25 MG tablet   Generic drug:  diphenhydrAMINE     56 tablet    Take 1 tablet (25 mg) by mouth every 6 hours as needed for itching or allergies       blood glucose monitoring test strip    no brand specified    100 each    Use to test  blood sugars 3 times daily or as directed Please give what is approved by insurance.       budesonide-formoterol 160-4.5 MCG/ACT Inhaler    SYMBICORT    3 Inhaler    Inhale 2 puffs into the lungs 2 times daily       cephALEXin 500 MG capsule    KEFLEX    40 capsule    Take 2 capsules (1,000 mg) by mouth 2 times daily for 10 days       citalopram 40 MG tablet    celeXA    90 tablet    Take 1 tablet (40 mg) by mouth daily       ferrous gluconate 324 (38 FE) MG tablet    FERGON    100 tablet    Take 1 tablet (324 mg) by mouth daily (with breakfast)       folic acid 1 MG tablet    FOLVITE    100 tablet    Take 1 tablet (1 mg) by mouth daily       gabapentin 300 MG capsule    NEURONTIN    270 capsule    Take 1 capsule (300 mg) by mouth 3 times daily       LORazepam 0.5 MG tablet    ATIVAN    60 tablet    Take 1 tablet (0.5 mg) by mouth 2 times daily as needed for anxiety       methotrexate 2.5 MG tablet CHEMO     30 tablet    Take 6 tablets (15 mg) by mouth once a week       omeprazole 20 MG CR capsule    priLOSEC    90 capsule    Take 40 mg by mouth daily       * oxyCODONE 15 MG IR tablet    ROXICODONE    120 tablet    Take 1 tablet (15 mg) by mouth every 4 hours as needed for moderate to severe pain       * oxyCODONE 15 MG IR tablet   Start taking on:  5/26/2017    ROXICODONE    120 tablet    Take 1 tablet (15 mg) by mouth every 4 hours as needed for pain       * oxyCODONE 15 MG IR tablet   Start taking on:  6/25/2017    ROXICODONE    120 tablet    Take 1 tablet (15 mg) by mouth every 4 hours as needed for pain       polyethylene glycol powder    MIRALAX    510 g    Take 17 g (1 capful) by mouth daily       * promethazine 25 MG tablet    PHENERGAN    20 tablet    Take 1 tablet (25 mg) by mouth every 6 hours as needed for nausea       * promethazine 50 MG/ML Soln IV injection    PHENERGAN    90 mL    Inject 0.5 mLs (25 mg) into the muscle every 6 hours as needed       ranitidine 150 MG tablet    ZANTAC    60 tablet     Take 1 tablet (150 mg) by mouth 2 times daily as needed for heartburn       sucralfate 1 GM tablet    CARAFATE    120 tablet    Take 1 tablet (1 g) by mouth 4 times daily       zolpidem 10 MG tablet    AMBIEN    30 tablet    Take 1 tablet (10 mg) by mouth nightly as needed for sleep       * Notice:  This list has 5 medication(s) that are the same as other medications prescribed for you. Read the directions carefully, and ask your doctor or other care provider to review them with you.

## 2017-04-26 NOTE — PATIENT INSTRUCTIONS
1. Take the miralax every day to prevent and treat constipation  2. If you have gone more than 3 days without bowel movement, try Senna-S 1-2 pills twice daily.  3. Follow-up with Gabe Jacob for mood/anxiety/sleep.  4. Refill given on pain medications April, May Maribeth    Sleep Hygiene    1. Avoid doing anything stressful in the hour before bedtime. Avoid paying bills, watching politics on the news, etc.  2. Avoid screens just before bedtime. This includes cell phones, iPad, computers, TV. The bright lights on the screen is very stimulating to the brain, and makes it difficult to follow asleep and stay asleep  3. Avoid alcohol. Alcohol can sometimes make it easier to fall asleep, but significantly reduces the chance that you will stay asleep. It also impairs REM sleep, which is the good restful sleep  4. Use the bed for sleep and sex only. Avoid eating, reading, watching TV in bed  5. If you feel very restless at bedtime, try doing mundane physical activities such as vacuuming, folding laundry, etc.  6. If you find yourself making lists in your head at night, keep pen and paper at the bedside. Write down these lists. Also visualize yourself checking that item off your list and removing it from your brain.  7. Keep the room cool and dark. Consider investing in late darkening curtains  8. Avoid drinking excessive fluids just before bedtime. Empty your bladder just before bedtime  9. Cognitive behavioral therapy has been proven to be highly effective to treat insomnia. There are several online modules that you can complete for a fee.  Http://shuti.me  Or CBTforInsomnia.com

## 2017-04-26 NOTE — TELEPHONE ENCOUNTER
Methotrexate      Last Written Prescription Date: 01/25/17  Last Fill Quantity: 30,  # refills: 1   Last Office Visit with G, [facility] or [facility] prescribing provider: 04/26/17

## 2017-04-26 NOTE — LETTER
[facility]    04/26/17    Patient: Molly Teague  YOB: 1985  Medical Record Number: [id number]                                                                  Controlled Substance Agreement  I understand that my care provider has prescribed controlled substances (narcotics, tranquilizers, and/or stimulants) to help manage my condition(s).  I am taking this medicine to help me function or work.  I know that this is strong medicine.  It could have serious side effects and even cause a dependency on the drug.  If I stop these medicines suddenly, I could have severe withdrawal symptoms.    The risks, benefits, and side effects of these medication(s) were explained to me.  I agree that:  1. I will take part in other treatments as advised by my provider.  This may be psychiatry or counseling, physical therapy, behavioral therapy, group treatment, or a referral to a pain clinic.  I will reduce or stop my medicine when my provider tells me to do so.   2. I will take my medicines as prescribed.  I will not change the dose or schedule unless my provider tells me to.  There will be no refills if I  run out early.   I may be contacted at any time without warning and asked to complete a drug test or pill count.   3. I will keep all my appointments at the clinic.  If I miss appointments or fail to follow instructions, my provider may stop my medicine.  4. I will not ask other providers to prescribe controlled substances. And I will not accept controlled substances from other people. If I need another prescribed controlled substance for a new reason, I will notify my provider within one business day.  5. If I enroll in the [place] Medical Marijuana program, I will tell my provider.  I will also sign an agreement to share my medical records with my provider.  6. I will use one pharmacy to fill all of my controlled substance prescriptions.  If my prescription is mailed to my pharmacy, it  may take 5 to 7 days for my medicine to be ready.  7. I understand that my provider, clinic care team, and pharmacy can track controlled substance prescriptions from other providers through a central database (prescription monitoring program).  8. I will bring in my list of medications (or my medicine bottles) each time I come to the clinic.  REV- 04/2016                                                                                                                                            Page 1 of 2      [facility]    04/26/17    Patient: Molly Teague  YOB: 1985  Medical Record Number: [id number]    9. Refills of controlled substances will be made only during office hours.  It is up to me to make sure that I do not run out of my medicines on weekends or holidays.    10. I am responsible for my prescriptions.  If the medicine is lost or stolen, it will not be replaced.   I also agree not to share these medicines with anyone.  11. I agree to not use ANY illegal or recreational drugs.  This includes marijuana, cocaine, bath salts or other drugs.  I agree not to use alcohol unless my provider says I may.  I agree to give urine samples whenever asked.  If I fail to give a urine sample, the provider may stop my medicine.     12. I will tell my nurse or provider right away if I become pregnant or have a new medical problem treated outside of [facility].  13. I understand that this medicine can affect my thinking and judgment.  It may be unsafe for me to drive, use machinery and do dangerous tasks.  I will not do any of these things until I know how the medicine affects me.  If my dose changes, I will wait to see how it affects me.  I will contact my provider if I have concerns about medicine side effects.  I understand that if I do not follow any of the conditions above, my prescriptions or treatment may be stopped.    I agree that my provider, clinic care team, and  pharmacy may work with any city, state or federal law enforcement agency that investigates the misuse, sale, or other diversion of my controlled medicine. I will allow my provider to discuss my care with or share a copy of this agreement with any other treating provider, pharmacy or emergency room where I receive care.  I agree to give up (waive) any right of privacy or confidentiality with respect to these authorizations.   I have read this agreement and have asked questions about anything I did not understand.   ___________________________________    ___________________________  Patient Signature                                                           Date and Time  ___________________________________     ____________________________  Witness                                                                            Date and Time  ___________________________________  Grecia Barba, DO  REV-  04/2016                                                                                                                                                                 Page 2 of 2  Opioid Pain Medicines (also known as Narcotics)  What You Need to Know      What are opioids?   Opioids are pain medicines that must be prescribed by a doctor. Examples are:     morphine (MS Contin, Catrachita)    oxycodone (Oxycontin)    oxycodone and acetaminophen (Percocet)    hydrocodone and acetaminophen (Vicodin, Norco)     fentanyl patch (Duragesic)     hydromorphone (Dilaudid)     methadone     What do opioids do well?   Opioids are best for short-term pain after a surgery or injury. They also work well for cancer pain. Unlike other pain medicines, they do not cause liver or kidney failure or ulcers. They may help some people with long-lasting (chronic) pain.     What do opioids NOT do well?   Opioids never get rid of pain entirely, and they do not work well for most patients with chronic pain. Opioids do not reduce swelling, one of the  causes of pain. They also don t work well for nerve pain.     Side effects  Talk to your doctor before you start or decide to keep taking one of these medicines. Side effects include:    Lowers your breathing rate enough that it could cause death    Death due to taking more than the prescribed dose    Serious lifelong opioid use      Dependence is not the same as addiction. Addiction is when people keep using a substance that harms their body, their mind or their relations with others. If you have a history of drug or alcohol abuse, taking opioids can cause a relapse.  Over time, opioids don t work as well. Most people will need higher and higher doses. The higher the dose, the more serious the side effects. We don t know the long-term effects of opioids.   People who have used opioids for a long time have a lower quality of life, worse depression, higher levels of pain and more visits to doctors.  Overdose from prescription drugs is the second leading cause of death in the [place] The risk of overdose rises when opioids are taken with other drugs such as:    Medicines used for anxiety and panic attacks (such as lorazepam, alprazolam, clonazepam    Other sedatives    Alcohol    Illegal drugs such as heroin  Never share your opioids with others. Be sure to store opioids in a secure place, locked if possible.Young children can easily swallow them and overdose.     Are there other ways to manage pain?  Ways to help reduce pain:    Exercise every day.    Treat health problems that may be causing pain.    Treat mental health problems like depression and anxiety.     Worse depression symptoms; Less pleasure in things you usually enjoy    Feeling tired or sluggish    Slower thoughts or cloudy thinking    Being more sensitive to pain over time; Pain is harder to control.    Trouble sleeping or restless sleep    Changes in hormone levels (for example, less testosterone).     Changes in sex drive or ability to have sex    Long  lasting nausea and constipation    Trouble breathing while asleep; This is worse with lung problems like COPD or sleep apnea.    Unsafe driving    Getting sick more often    Itching    Feeling dizzy    Dry mouth    Sweating    Trouble emptying the bladder (peeing). This is worse if you have an enlarged prostate or get urinary tract infections (UTIs).    What else should I know about opioids?  When someone takes opioids for too long or too often, they become dependent. This means that if you stop or reduce the medicine too quickly, you will have withdrawal symptoms.          Practice good sleep habits.  Try to go to bed and get up at the same time every day.    Stop smoking.  Tobacco use can make pain worse.    Do things that you enjoy.    Find a way to work through pain without drugs.  Try deep breathing, meditation, visual imagery and aromatherapy.    Ask your doctor to help you create a plan to manage your pain.

## 2017-05-01 LAB — PAIN DRUG SCR UR W RPTD MEDS: NORMAL

## 2017-05-02 NOTE — TELEPHONE ENCOUNTER
Routing refill request to provider for review/approval because:  Drug not on the [facility] refill protocol     Thank you  Marisa CARVER RN

## 2017-05-02 NOTE — TELEPHONE ENCOUNTER
Routing refill request to provider for review/approval because:  Drug not active on patient's medication list    Thank you  Marisa CARVER RN

## 2017-05-09 ENCOUNTER — APPOINTMENT (OUTPATIENT)
Dept: GENERAL RADIOLOGY | Facility: CLINIC | Age: 32
End: 2017-05-09
Attending: FAMILY MEDICINE
Payer: COMMERCIAL

## 2017-05-09 ENCOUNTER — HOSPITAL ENCOUNTER (EMERGENCY)
Facility: CLINIC | Age: 32
Discharge: HOME | End: 2017-05-09
Attending: FAMILY MEDICINE | Admitting: FAMILY MEDICINE
Payer: COMMERCIAL

## 2017-05-09 VITALS
BODY MASS INDEX: 26.26 KG/M2 | WEIGHT: 139 LBS | OXYGEN SATURATION: 99 % | RESPIRATION RATE: 18 BRPM | SYSTOLIC BLOOD PRESSURE: 108 MMHG | DIASTOLIC BLOOD PRESSURE: 67 MMHG | TEMPERATURE: 101.9 F | HEART RATE: 131 BPM

## 2017-05-09 DIAGNOSIS — B34.9 VIRAL SYNDROME: ICD-10-CM

## 2017-05-09 LAB
ALBUMIN SERPL-MCNC: 3.6 G/DL (ref 3.4–5)
ALBUMIN UR-MCNC: NEGATIVE MG/DL
ALP SERPL-CCNC: 82 U/L (ref 40–150)
ALT SERPL W P-5'-P-CCNC: 18 U/L (ref 0–50)
ANION GAP SERPL CALCULATED.3IONS-SCNC: 10 MMOL/L (ref 3–14)
APPEARANCE UR: CLEAR
AST SERPL W P-5'-P-CCNC: 22 U/L (ref 0–45)
BACTERIA #/AREA URNS HPF: ABNORMAL /HPF
BASOPHILS # BLD AUTO: 0 10E9/L (ref 0–0.2)
BASOPHILS NFR BLD AUTO: 0.3 %
BILIRUB SERPL-MCNC: 0.2 MG/DL (ref 0.2–1.3)
BILIRUB UR QL STRIP: NEGATIVE
BUN SERPL-MCNC: 23 MG/DL (ref 7–30)
CALCIUM SERPL-MCNC: 8.9 MG/DL (ref 8.5–10.1)
CHLORIDE SERPL-SCNC: 104 MMOL/L (ref 94–109)
CO2 SERPL-SCNC: 22 MMOL/L (ref 20–32)
COLOR UR AUTO: ABNORMAL
CREAT SERPL-MCNC: 1.3 MG/DL (ref 0.52–1.04)
DEPRECATED S PYO AG THROAT QL EIA: NORMAL
DIFFERENTIAL METHOD BLD: ABNORMAL
EOSINOPHIL # BLD AUTO: 0.1 10E9/L (ref 0–0.7)
EOSINOPHIL NFR BLD AUTO: 0.9 %
ERYTHROCYTE [DISTWIDTH] IN BLOOD BY AUTOMATED COUNT: 15.5 % (ref 10–15)
GFR SERPL CREATININE-BSD FRML MDRD: 48 ML/MIN/1.7M2
GLUCOSE SERPL-MCNC: 84 MG/DL (ref 70–99)
GLUCOSE UR STRIP-MCNC: NEGATIVE MG/DL
HCT VFR BLD AUTO: 25.3 % (ref 35–47)
HGB BLD-MCNC: 7.7 G/DL (ref 11.7–15.7)
HGB UR QL STRIP: NEGATIVE
IMM GRANULOCYTES # BLD: 0 10E9/L (ref 0–0.4)
IMM GRANULOCYTES NFR BLD: 0.1 %
KETONES UR STRIP-MCNC: NEGATIVE MG/DL
LACTATE BLD-SCNC: 1.6 MMOL/L (ref 0.7–2.1)
LEUKOCYTE ESTERASE UR QL STRIP: NEGATIVE
LYMPHOCYTES # BLD AUTO: 0.9 10E9/L (ref 0.8–5.3)
LYMPHOCYTES NFR BLD AUTO: 6.4 %
MCH RBC QN AUTO: 25.5 PG (ref 26.5–33)
MCHC RBC AUTO-ENTMCNC: 30.4 G/DL (ref 31.5–36.5)
MCV RBC AUTO: 84 FL (ref 78–100)
MICRO REPORT STATUS: NORMAL
MONOCYTES # BLD AUTO: 1 10E9/L (ref 0–1.3)
MONOCYTES NFR BLD AUTO: 7.2 %
NEUTROPHILS # BLD AUTO: 11.8 10E9/L (ref 1.6–8.3)
NEUTROPHILS NFR BLD AUTO: 85.1 %
NITRATE UR QL: NEGATIVE
PH UR STRIP: 5 PH (ref 5–7)
PLATELET # BLD AUTO: 276 10E9/L (ref 150–450)
POTASSIUM SERPL-SCNC: 4.4 MMOL/L (ref 3.4–5.3)
PROT SERPL-MCNC: 7.8 G/DL (ref 6.8–8.8)
RBC # BLD AUTO: 3.02 10E12/L (ref 3.8–5.2)
RBC #/AREA URNS AUTO: 1 /HPF (ref 0–2)
SODIUM SERPL-SCNC: 136 MMOL/L (ref 133–144)
SP GR UR STRIP: 1 (ref 1–1.03)
SPECIMEN SOURCE: NORMAL
SQUAMOUS #/AREA URNS AUTO: <1 /HPF (ref 0–1)
URN SPEC COLLECT METH UR: ABNORMAL
UROBILINOGEN UR STRIP-MCNC: NORMAL MG/DL (ref 0–2)
WBC # BLD AUTO: 13.8 10E9/L (ref 4–11)
WBC #/AREA URNS AUTO: 1 /HPF (ref 0–2)

## 2017-05-09 PROCEDURE — 25000132 ZZH RX MED GY IP 250 OP 250 PS 637: Performed by: FAMILY MEDICINE

## 2017-05-09 PROCEDURE — 87086 URINE CULTURE/COLONY COUNT: CPT | Performed by: FAMILY MEDICINE

## 2017-05-09 PROCEDURE — 71020 XR CHEST 2 VW: CPT

## 2017-05-09 PROCEDURE — 99285 EMERGENCY DEPT VISIT HI MDM: CPT | Performed by: FAMILY MEDICINE

## 2017-05-09 PROCEDURE — 80053 COMPREHEN METABOLIC PANEL: CPT | Performed by: FAMILY MEDICINE

## 2017-05-09 PROCEDURE — 87040 BLOOD CULTURE FOR BACTERIA: CPT | Mod: 91 | Performed by: FAMILY MEDICINE

## 2017-05-09 PROCEDURE — 99285 EMERGENCY DEPT VISIT HI MDM: CPT | Mod: 25

## 2017-05-09 PROCEDURE — 87081 CULTURE SCREEN ONLY: CPT | Performed by: FAMILY MEDICINE

## 2017-05-09 PROCEDURE — 96361 HYDRATE IV INFUSION ADD-ON: CPT

## 2017-05-09 PROCEDURE — 96374 THER/PROPH/DIAG INJ IV PUSH: CPT

## 2017-05-09 PROCEDURE — 85025 COMPLETE CBC W/AUTO DIFF WBC: CPT | Performed by: FAMILY MEDICINE

## 2017-05-09 PROCEDURE — 87880 STREP A ASSAY W/OPTIC: CPT | Performed by: FAMILY MEDICINE

## 2017-05-09 PROCEDURE — 83605 ASSAY OF LACTIC ACID: CPT | Performed by: FAMILY MEDICINE

## 2017-05-09 PROCEDURE — 25000128 H RX IP 250 OP 636: Performed by: FAMILY MEDICINE

## 2017-05-09 PROCEDURE — 96375 TX/PRO/DX INJ NEW DRUG ADDON: CPT

## 2017-05-09 PROCEDURE — 25800025 ZZH RX 258: Performed by: FAMILY MEDICINE

## 2017-05-09 PROCEDURE — 81001 URINALYSIS AUTO W/SCOPE: CPT | Performed by: FAMILY MEDICINE

## 2017-05-09 PROCEDURE — 96376 TX/PRO/DX INJ SAME DRUG ADON: CPT

## 2017-05-09 RX ORDER — PROMETHAZINE HYDROCHLORIDE 25 MG/ML
12.5 INJECTION, SOLUTION INTRAMUSCULAR; INTRAVENOUS ONCE
Status: COMPLETED | OUTPATIENT
Start: 2017-05-09 | End: 2017-05-09

## 2017-05-09 RX ORDER — HYDROMORPHONE HYDROCHLORIDE 1 MG/ML
0.5 INJECTION, SOLUTION INTRAMUSCULAR; INTRAVENOUS; SUBCUTANEOUS
Status: DISCONTINUED | OUTPATIENT
Start: 2017-05-09 | End: 2017-05-09 | Stop reason: HOSPADM

## 2017-05-09 RX ORDER — ONDANSETRON 2 MG/ML
4 INJECTION INTRAMUSCULAR; INTRAVENOUS
Status: DISCONTINUED | OUTPATIENT
Start: 2017-05-09 | End: 2017-05-09 | Stop reason: ALTCHOICE

## 2017-05-09 RX ORDER — SODIUM CHLORIDE, SODIUM LACTATE, POTASSIUM CHLORIDE, CALCIUM CHLORIDE 600; 310; 30; 20 MG/100ML; MG/100ML; MG/100ML; MG/100ML
INJECTION, SOLUTION INTRAVENOUS CONTINUOUS
Status: DISCONTINUED | OUTPATIENT
Start: 2017-05-09 | End: 2017-05-09 | Stop reason: HOSPADM

## 2017-05-09 RX ORDER — ACETAMINOPHEN 500 MG
1000 TABLET ORAL ONCE
Status: COMPLETED | OUTPATIENT
Start: 2017-05-09 | End: 2017-05-09

## 2017-05-09 RX ADMIN — HYDROMORPHONE HYDROCHLORIDE 0.5 MG: 1 INJECTION, SOLUTION INTRAMUSCULAR; INTRAVENOUS; SUBCUTANEOUS at 18:35

## 2017-05-09 RX ADMIN — SODIUM CHLORIDE, POTASSIUM CHLORIDE, SODIUM LACTATE AND CALCIUM CHLORIDE 150 ML: 600; 310; 30; 20 INJECTION, SOLUTION INTRAVENOUS at 20:12

## 2017-05-09 RX ADMIN — PROMETHAZINE HYDROCHLORIDE 12.5 MG: 25 INJECTION INTRAMUSCULAR; INTRAVENOUS at 19:42

## 2017-05-09 RX ADMIN — PROMETHAZINE HYDROCHLORIDE 12.5 MG: 25 INJECTION INTRAMUSCULAR; INTRAVENOUS at 18:33

## 2017-05-09 RX ADMIN — SODIUM CHLORIDE, POTASSIUM CHLORIDE, SODIUM LACTATE AND CALCIUM CHLORIDE 1893 ML: 600; 310; 30; 20 INJECTION, SOLUTION INTRAVENOUS at 18:19

## 2017-05-09 RX ADMIN — ACETAMINOPHEN 1000 MG: 500 TABLET ORAL at 18:35

## 2017-05-09 NOTE — ED PROVIDER NOTES
HPI  Patient is a 31-year-old female presenting with fever, myalgia, shortness of breath.  She has a known hx of CREST syndrome.  She has had multiple complications related to this process.  She has experienced severe respiratory failure requiring intubation and subsequent tracheostomy with complicating ARDS during her hospitalization in November, 2060.  This was secondary to influenza.  She has required hemodialysis because of her scleroderma and renal crisis.  She has had a pulmonary embolism.  She has been on methotrexate but no longer takes this.  She denies other immunosuppressive medicines.  She does use oxycodone daily for chronic pain syndrome.     The patient reports feeling ill starting about three days ago.  She has had a headache, myalgia, chills, cough, nasal congestion, shortness of breath during this time.  Her myalgia are diffuse but especially involve her back and neck.  Her headache comes and goes.  She has had some chest tightness felt in the midline.  It hurts to cough.  Her cough is occasionally productive but she does not look at its color.  Occasional nausea described no vomiting.  She has a history of constipation and denies any changes.  No abdominal pain.  No dysuria, urgency, or frequency.  No vaginal discharge or irritation described.  No skin rash described.    She was around her son about two or three weeks ago and was treated for likely strep pharyngitis.  She denies other sick contacts.  No recent travel.    ROS: All other review of systems are negative other than that noted above.   PMH: Reviewed.  SH: Reviewed.  FH: Reviewed.      PHYSICAL  /68  Pulse 131  Temp 101.9  F (38.8  C) (Oral)  Resp 18  Wt 63 kg (139 lb)  SpO2 99%  BMI 26.26 kg/m2  General: Patient is in moderate distress.  Neurological: Moving upper and lower extremities, equally.  She does have slow movement because of discomfort and limited range of motion related to her scleroderma.  Head / Neck:  Atraumatic.  Ears: Tympanic membranes are unremarkable.  Eyes: Pupils are equal, round, and reactive.  Normal conjunctiva.  Nose: Midline.  No epistaxis.  No tenderness with percussion around the mid face.  Mouth / Throat: No ulcerations or lesions.  Upper pharynx is not erythematous.  Otherwise normal palate.  No evidence of intraoral infection.  No evidence for dental decay.  Respiratory: No respiratory distress.  No coughing in the room.  Mild crackles or rhonchi present with distant breath sounds.   Cardiovascular: No murmur appreciated.  Tachycardia with fever.  Peripheral extremities are warm.  No edema.  Abdomen / Pelvis: Soft.  Nontender.  Genitalia: Not done.  Musculoskeletal: Diffuse tenderness to palpation of muscles.  Skin: No evidence of rash or trauma.        PHYSICIAN  1715 Concern for infection:  Yes.   QSOFA Score: 0  (Respiratory rate >/= 22 per min, Altered Mental Status, SBP </= 100)    1729.  Patient presents with cough, myalgia, fever.  Multiple medical problems with severe systemic side effects.  Patient will be treated like sepsis until proven otherwise.    Labs Ordered and Resulted from Time of ED Arrival Up to the Time of Departure from the ED   CBC WITH PLATELETS DIFFERENTIAL - Abnormal; Notable for the following:        Result Value    WBC 13.8 (*)     RBC Count 3.02 (*)     Hemoglobin 7.7 (*)     Hematocrit 25.3 (*)     MCH 25.5 (*)     MCHC 30.4 (*)     RDW 15.5 (*)     Absolute Neutrophil 11.8 (*)     All other components within normal limits   COMPREHENSIVE METABOLIC PANEL - Abnormal; Notable for the following:     Creatinine 1.30 (*)     GFR Estimate 48 (*)     GFR Estimate If Black 58 (*)     All other components within normal limits   ROUTINE UA WITH MICROSCOPIC - Abnormal; Notable for the following:     Bacteria Urine Few (*)     All other components within normal limits   LACTIC ACID WHOLE BLOOD   PULSE OXIMETRY NURSING   URINE CULTURE AEROBIC BACTERIAL   BLOOD CULTURE   BLOOD  CULTURE   RAPID STREP SCREEN   BETA STREP GROUP A CULTURE       IMAGING  CXR.  Images reviewed by me.  Radiology report was also reviewed.      1903.  Labs are unremarkable other than the leukocytosis.  Pulse is 98.  Blood pressure within normal limits.    2000.  Patient has tachycardia again but she also has a fever documented again as well.  Ibuprofen will be given.  Chest x-ray unremarkable.  Strep negative.  Blood work notable for the microcytic anemia and her moderately elevated white blood cell count.    2056.  Low concern for true sepsis.  She does have a fast heart rate with her elevated fever but her blood pressure has been stable and there is no systemic markers that are concerning.  She would like to go home and rest.  She is able to tolerate oral hydration.  There is no respiratory distress and her chest x-ray was unremarkable.  Urinalysis was unremarkable.  No abdominal tenderness or pain.  No obvious need for hospitalization or surgery.  She has a follow up appointment on Thursday, two days from now.      IMPRESSION    ICD-10-CM    1. Viral syndrome B34.9            Critical Care time:  none                 Richard Robles MD  05/09/17 2057

## 2017-05-09 NOTE — ED AVS SNAPSHOT
[facility]    [address] [address]    Phone:  [phone]    Fax:  [phone]                                       Molly Teague   MRN: [id number]    Department:  [facility]   Date of Visit:  5/9/2017           After Visit Summary Signature Page     I have received my discharge instructions, and my questions have been answered. I have discussed any challenges I see with this plan with the nurse or doctor.    ..........................................................................................................................................  Patient/Patient Representative Signature      ..........................................................................................................................................  Patient Representative Print Name and Relationship to Patient    ..................................................               ................................................  Date                                            Time    ..........................................................................................................................................  Reviewed by Signature/Title    ...................................................              ..............................................  Date                                                            Time

## 2017-05-09 NOTE — ED AVS SNAPSHOT
[facility]    [address] [address]    Phone:  [phone]    Fax:  [phone]                                       Molly Teague   MRN: [id number]    Department:  [facility]   Date of Visit:  5/9/2017           Patient Information     Date Of Birth          1985        Your diagnoses for this visit were:     Viral syndrome        You were seen by Richard Robles MD.        Discharge Instructions       Return to the Emergency Room if the following occurs:     Worsened breathing, dehydration, or for any concern at anytime.    Or, follow-up with the following provider as we discussed:     Return to your primary doctor this week for reevaluation, as scheduled.    Medications discussed:    Ibuprofen / tylenol alternating every three hours for comfort, as needed.    If you received pain-relieving or sedating medication during your time in the ER, avoid alcohol, driving automobiles, or working with machinery.  Also, a responsible adult must stay with you.      If you had X-rays or labs done we will attempt to contact you if there is a change needed in your care.      Call the Nurse Advice Line at [phone] or [phone] for any concern at anytime.      Future Appointments        Provider Department Dept Phone Center    5/11/2017 7:20 AM Grecia Barba,  [facility] [phone] [facility]      24 Hour Appointment Hotline       To make an appointment at any [facility]3], call [phone] ([phone]). If you don't have a family doctor or clinic, we will help you find one. [facility] are conveniently located to serve the needs of you and your family.             Review of your medicines      Our records show that you are taking the medicines listed below. If these are incorrect, please call your family doctor or clinic.        Dose / Directions Last dose taken    albuterol 108 (90 BASE) MCG/ACT Inhaler    Commonly known as:  VENTOLIN HFA   Dose:  2 puff   Quantity:  1 Inhaler        Inhale 2 puffs into the lungs every 4 hours as needed for shortness of breath / dyspnea or wheezing   Refills:  11        aspirin-acetaminophen-caffeine 250-250-65 MG per tablet   Commonly known as:  EXCEDRIN MIGRAINE   Dose:  1 tablet   Quantity:  24 tablet        Take 1 tablet by mouth every 6 hours as needed for headaches   Refills:  1        B-12 1000 MCG Tbcr   Dose:  1000 mcg   Quantity:  100 tablet        Take 1,000 mcg by mouth daily   Refills:  1        BENADRYL 25 MG tablet   Dose:  25 mg   Quantity:  56 tablet   Generic drug:  diphenhydrAMINE        Take 1 tablet (25 mg) by mouth every 6 hours as needed for itching or allergies   Refills:  0        blood glucose monitoring test strip   Commonly known as:  no brand specified   Quantity:  100 each        Use to test blood sugars 3 times daily or as directed Please give what is approved by insurance.   Refills:  0        budesonide-formoterol 160-4.5 MCG/ACT Inhaler   Commonly known as:  SYMBICORT   Dose:  2 puff   Quantity:  3 Inhaler        Inhale 2 puffs into the lungs 2 times daily   Refills:  3        citalopram 40 MG tablet   Commonly known as:  celeXA   Dose:  40 mg   Quantity:  90 tablet        Take 1 tablet (40 mg) by mouth daily   Refills:  3        ferrous gluconate 324 (38 FE) MG tablet   Commonly known as:  FERGON   Dose:  324 mg   Quantity:  100 tablet        Take 1 tablet (324 mg) by mouth daily (with breakfast)   Refills:  3        folic acid 1 MG tablet   Commonly known as:  FOLVITE   Dose:  1 mg   Quantity:  100 tablet        Take 1 tablet (1 mg) by mouth daily   Refills:  3        gabapentin 300 MG capsule   Commonly known as:  NEURONTIN   Dose:  300 mg   Quantity:  270 capsule        Take 1 capsule (300 mg) by mouth 3 times daily   Refills:  3        LORazepam 0.5 MG tablet   Commonly known as:  ATIVAN   Dose:  0.5 mg   Quantity:  60 tablet        Take 1  tablet (0.5 mg) by mouth 2 times daily as needed for anxiety   Refills:  3        methotrexate 2.5 MG tablet CHEMO   Dose:  15 mg   Quantity:  30 tablet        Take 6 tablets (15 mg) by mouth once a week   Refills:  1        omeprazole 20 MG CR capsule   Commonly known as:  priLOSEC   Dose:  40 mg   Quantity:  90 capsule        Take 40 mg by mouth daily   Refills:  1        * oxyCODONE 15 MG IR tablet   Commonly known as:  ROXICODONE   Dose:  15 mg   Quantity:  120 tablet        Take 1 tablet (15 mg) by mouth every 4 hours as needed for moderate to severe pain   Refills:  0        * oxyCODONE 15 MG IR tablet   Commonly known as:  ROXICODONE   Dose:  15 mg   Quantity:  120 tablet   Start taking on:  5/26/2017        Take 1 tablet (15 mg) by mouth every 4 hours as needed for pain   Refills:  0        * oxyCODONE 15 MG IR tablet   Commonly known as:  ROXICODONE   Dose:  15 mg   Quantity:  120 tablet   Start taking on:  6/25/2017        Take 1 tablet (15 mg) by mouth every 4 hours as needed for pain   Refills:  0        polyethylene glycol powder   Commonly known as:  MIRALAX   Dose:  1 capful   Quantity:  510 g        Take 17 g (1 capful) by mouth daily   Refills:  1        * promethazine 25 MG tablet   Commonly known as:  PHENERGAN   Dose:  25 mg   Quantity:  20 tablet        Take 1 tablet (25 mg) by mouth every 6 hours as needed for nausea   Refills:  3        * promethazine 50 MG/ML Soln IV injection   Commonly known as:  PHENERGAN   Dose:  25 mg   Quantity:  90 mL        Inject 0.5 mLs (25 mg) into the muscle every 6 hours as needed   Refills:  11        ranitidine 150 MG tablet   Commonly known as:  ZANTAC   Dose:  150 mg   Quantity:  60 tablet        Take 1 tablet (150 mg) by mouth 2 times daily as needed for heartburn   Refills:  11        sucralfate 1 GM tablet   Commonly known as:  CARAFATE   Dose:  1 g   Quantity:  120 tablet        Take 1 tablet (1 g) by mouth 4 times daily   Refills:  11        zolpidem 10  MG tablet   Commonly known as:  AMBIEN   Dose:  10 mg   Quantity:  30 tablet        Take 1 tablet (10 mg) by mouth nightly as needed for sleep   Refills:  3        * Notice:  This list has 5 medication(s) that are the same as other medications prescribed for you. Read the directions carefully, and ask your doctor or other care provider to review them with you.            Procedures and tests performed during your visit     Procedure/Test Number of Times Performed    Beta strep group A culture 1    Blood culture 2    CBC with platelets differential 1    Comprehensive metabolic panel 1    Lactic acid whole blood 1    Pulse oximetry nursing 1    Rapid strep screen 1    UA with Microscopic 1    Urine Culture Aerobic Bacterial 1    XR Chest 2 Views 1      Orders Needing Specimen Collection     None      Pending Results     Date and Time Order Name Status Description    5/9/2017 1815 Beta strep group A culture In process     5/9/2017 1728 Blood culture In process     5/9/2017 1728 Blood culture In process     5/9/2017 1728 Urine Culture Aerobic Bacterial In process             Pending Culture Results     Date and Time Order Name Status Description    5/9/2017 1815 Beta strep group A culture In process     5/9/2017 1728 Blood culture In process     5/9/2017 1728 Blood culture In process     5/9/2017 1728 Urine Culture Aerobic Bacterial In process             Pending Results Instructions     If you had any lab results that were not finalized at the time of your Discharge, you can call the ED Lab Result RN at [phone]. You will be contacted by this team for any positive Lab results or changes in treatment. The nurses are available 7 days a week from 10A to 6:30P.  You can leave a message 24 hours per day and they will return your call.        Test Results From Your Hospital Stay        5/9/2017  6:40 PM      Component Results     Component Value Ref Range & Units Status    WBC 13.8 (H) 4.0 - 11.0 10e9/L Final    RBC  Count 3.02 (L) 3.8 - 5.2 10e12/L Final    Hemoglobin 7.7 (L) 11.7 - 15.7 g/dL Final    Hematocrit 25.3 (L) 35.0 - 47.0 % Final    MCV 84 78 - 100 fl Final    MCH 25.5 (L) 26.5 - 33.0 pg Final    MCHC 30.4 (L) 31.5 - 36.5 g/dL Final    RDW 15.5 (H) 10.0 - 15.0 % Final    Platelet Count 276 150 - 450 10e9/L Final    Diff Method Automated Method  Final    % Neutrophils 85.1 % Final    % Lymphocytes 6.4 % Final    % Monocytes 7.2 % Final    % Eosinophils 0.9 % Final    % Basophils 0.3 % Final    % Immature Granulocytes 0.1 % Final    Absolute Neutrophil 11.8 (H) 1.6 - 8.3 10e9/L Final    Absolute Lymphocytes 0.9 0.8 - 5.3 10e9/L Final    Absolute Monocytes 1.0 0.0 - 1.3 10e9/L Final    Absolute Eosinophils 0.1 0.0 - 0.7 10e9/L Final    Absolute Basophils 0.0 0.0 - 0.2 10e9/L Final    Abs Immature Granulocytes 0.0 0 - 0.4 10e9/L Final         5/9/2017  6:52 PM      Component Results     Component Value Ref Range & Units Status    Sodium 136 133 - 144 mmol/L Final    Potassium 4.4 3.4 - 5.3 mmol/L Final    Chloride 104 94 - 109 mmol/L Final    Carbon Dioxide 22 20 - 32 mmol/L Final    Anion Gap 10 3 - 14 mmol/L Final    Glucose 84 70 - 99 mg/dL Final    Urea Nitrogen 23 7 - 30 mg/dL Final    Creatinine 1.30 (H) 0.52 - 1.04 mg/dL Final    GFR Estimate 48 (L) >60 mL/min/1.7m2 Final    Non  GFR Calc    GFR Estimate If Black 58 (L) >60 mL/min/1.7m2 Final    African American GFR Calc    Calcium 8.9 8.5 - 10.1 mg/dL Final    Bilirubin Total 0.2 0.2 - 1.3 mg/dL Final    Albumin 3.6 3.4 - 5.0 g/dL Final    Protein Total 7.8 6.8 - 8.8 g/dL Final    Alkaline Phosphatase 82 40 - 150 U/L Final    ALT 18 0 - 50 U/L Final    AST 22 0 - 45 U/L Final         5/9/2017  6:39 PM      Component Results     Component Value Ref Range & Units Status    Lactic Acid 1.6 0.7 - 2.1 mmol/L Final         5/9/2017  8:10 PM      Component Results     Component Value Ref Range & Units Status    Color Urine Straw  Final    Appearance  "Urine Clear  Final    Glucose Urine Negative NEG mg/dL Final    Bilirubin Urine Negative NEG Final    Ketones Urine Negative NEG mg/dL Final    Specific Gravity Urine 1.005 1.003 - 1.035 Final    Blood Urine Negative NEG Final    pH Urine 5.0 5.0 - 7.0 pH Final    Protein Albumin Urine Negative NEG mg/dL Final    Urobilinogen mg/dL Normal 0.0 - 2.0 mg/dL Final    Nitrite Urine Negative NEG Final    Leukocyte Esterase Urine Negative NEG Final    Source Midstream Urine  Final    WBC Urine 1 0 - 2 /HPF Final    RBC Urine 1 0 - 2 /HPF Final    Bacteria Urine Few (A) NEG /HPF Final    Squamous Epithelial /HPF Urine <1 0 - 1 /HPF Final         5/9/2017  8:33 PM         5/9/2017  6:32 PM         5/9/2017  8:26 PM         5/9/2017  6:47 PM      Component Results     Component    Specimen Description    Throat    Rapid Strep A Screen    NEGATIVE: No Group A streptococcal antigen detected by immunoassay, await   culture report.      Micro Report Status    FINAL 05/09/2017 5/9/2017  7:02 PM      Narrative     XR CHEST 2 VW   5/9/2017 7:00 PM     HISTORY: Fever    COMPARISON: None.    FINDINGS: Heart size is normal. Lungs are clear.        Impression     IMPRESSION: Negative.    PER FERREIRA MD         5/9/2017  8:32 PM                Thank you for choosing [facility]       Thank you for choosing [facility] for your care. Our goal is always to provide you with excellent care. Hearing back from our patients is one way we can continue to improve our services. Please take a few minutes to complete the written survey that you may receive in the mail after you visit with us. Thank you!        [employer] Information     [employer] lets you send messages to your doctor, view your test results, renew your prescriptions, schedule appointments and more. To sign up, go to [email or web address] . Click on \"Log in\" on the left side of the screen, which will take you to the Welcome page. Then click on \"Sign up Now\" on the right side of " the page.     You will be asked to enter the access code listed below, as well as some personal information. Please follow the directions to create your username and password.     Your access code is: [email or web address]  Expires: 2017 11:32 AM     Your access code will  in 90 days. If you need help or a new code, please call your [place] [facility] clinic or [phone].        Care EveryWhere ID     This is your Care EveryWhere ID. This could be used by other organizations to access your [facility] medical records  [id number]        After Visit Summary       This is your record. Keep this with you and show to your community pharmacist(s) and doctor(s) at your next visit.

## 2017-05-10 NOTE — DISCHARGE INSTRUCTIONS
Return to the Emergency Room if the following occurs:     Worsened breathing, dehydration, or for any concern at anytime.    Or, follow-up with the following provider as we discussed:     Return to your primary doctor this week for reevaluation, as scheduled.    Medications discussed:    Ibuprofen / tylenol alternating every three hours for comfort, as needed.    If you received pain-relieving or sedating medication during your time in the ER, avoid alcohol, driving automobiles, or working with machinery.  Also, a responsible adult must stay with you.      If you had X-rays or labs done we will attempt to contact you if there is a change needed in your care.      Call the Nurse Advice Line at [phone] or [phone] for any concern at anytime.

## 2017-05-11 ENCOUNTER — OFFICE VISIT (OUTPATIENT)
Dept: FAMILY MEDICINE | Facility: CLINIC | Age: 32
End: 2017-05-11
Payer: COMMERCIAL

## 2017-05-11 VITALS
BODY MASS INDEX: 26.85 KG/M2 | WEIGHT: 142.2 LBS | SYSTOLIC BLOOD PRESSURE: 114 MMHG | HEART RATE: 96 BPM | DIASTOLIC BLOOD PRESSURE: 75 MMHG | TEMPERATURE: 99.2 F | HEIGHT: 61 IN

## 2017-05-11 DIAGNOSIS — M34.1 CREST (CALCINOSIS, RAYNAUD'S PHENOMENON, ESOPHAGEAL DYSFUNCTION, SCLERODACTYLY, TELANGIECTASIA) (H): ICD-10-CM

## 2017-05-11 DIAGNOSIS — D62 ANEMIA DUE TO BLOOD LOSS, ACUTE: ICD-10-CM

## 2017-05-11 DIAGNOSIS — B34.9 VIRAL ILLNESS: Primary | ICD-10-CM

## 2017-05-11 DIAGNOSIS — K31.819 GAVE (GASTRIC ANTRAL VASCULAR ECTASIA): ICD-10-CM

## 2017-05-11 DIAGNOSIS — F43.10 PTSD (POST-TRAUMATIC STRESS DISORDER): ICD-10-CM

## 2017-05-11 DIAGNOSIS — K21.00 GASTROESOPHAGEAL REFLUX DISEASE WITH ESOPHAGITIS: ICD-10-CM

## 2017-05-11 DIAGNOSIS — M34.9 SCLERODERMA (H): Chronic | ICD-10-CM

## 2017-05-11 DIAGNOSIS — Z23 NEED FOR PROPHYLACTIC VACCINATION WITH COMBINED DIPHTHERIA-TETANUS-PERTUSSIS (DTP) VACCINE: ICD-10-CM

## 2017-05-11 DIAGNOSIS — F41.9 ANXIETY: Primary | ICD-10-CM

## 2017-05-11 DIAGNOSIS — F41.1 GAD (GENERALIZED ANXIETY DISORDER): ICD-10-CM

## 2017-05-11 LAB
ALBUMIN SERPL-MCNC: 3.1 G/DL (ref 3.4–5)
ALP SERPL-CCNC: 71 U/L (ref 40–150)
ALT SERPL W P-5'-P-CCNC: 21 U/L (ref 0–50)
ANION GAP SERPL CALCULATED.3IONS-SCNC: 11 MMOL/L (ref 3–14)
APTT PPP: 45 SEC (ref 22–37)
AST SERPL W P-5'-P-CCNC: 22 U/L (ref 0–45)
BACTERIA SPEC CULT: ABNORMAL
BACTERIA SPEC CULT: NORMAL
BILIRUB SERPL-MCNC: 0.1 MG/DL (ref 0.2–1.3)
BUN SERPL-MCNC: 14 MG/DL (ref 7–30)
CALCIUM SERPL-MCNC: 8.7 MG/DL (ref 8.5–10.1)
CHLORIDE SERPL-SCNC: 108 MMOL/L (ref 94–109)
CK SERPL-CCNC: 290 U/L (ref 30–225)
CO2 SERPL-SCNC: 23 MMOL/L (ref 20–32)
CREAT SERPL-MCNC: 1.31 MG/DL (ref 0.52–1.04)
CRP SERPL-MCNC: 102 MG/L (ref 0–8)
ERYTHROCYTE [DISTWIDTH] IN BLOOD BY AUTOMATED COUNT: 15.6 % (ref 10–15)
ERYTHROCYTE [SEDIMENTATION RATE] IN BLOOD BY WESTERGREN METHOD: 107 MM/H (ref 0–20)
FERRITIN SERPL-MCNC: 26 NG/ML (ref 12–150)
GFR SERPL CREATININE-BSD FRML MDRD: 47 ML/MIN/1.7M2
GLUCOSE SERPL-MCNC: 80 MG/DL (ref 70–99)
HCT VFR BLD AUTO: 24.8 % (ref 35–47)
HGB BLD-MCNC: 7.9 G/DL (ref 11.7–15.7)
INR PPP: 0.94 (ref 0.86–1.14)
IRON SATN MFR SERPL: ABNORMAL % (ref 15–46)
IRON SERPL-MCNC: <5 UG/DL (ref 35–180)
MCH RBC QN AUTO: 27 PG (ref 26.5–33)
MCHC RBC AUTO-ENTMCNC: 31.9 G/DL (ref 31.5–36.5)
MCV RBC AUTO: 85 FL (ref 78–100)
MICRO REPORT STATUS: ABNORMAL
MICRO REPORT STATUS: NORMAL
PLATELET # BLD AUTO: 305 10E9/L (ref 150–450)
POTASSIUM SERPL-SCNC: 4.2 MMOL/L (ref 3.4–5.3)
PROT SERPL-MCNC: 7.3 G/DL (ref 6.8–8.8)
RBC # BLD AUTO: 2.93 10E12/L (ref 3.8–5.2)
SODIUM SERPL-SCNC: 142 MMOL/L (ref 133–144)
SPECIMEN SOURCE: ABNORMAL
SPECIMEN SOURCE: NORMAL
TIBC SERPL-MCNC: 303 UG/DL (ref 240–430)
WBC # BLD AUTO: 7.1 10E9/L (ref 4–11)

## 2017-05-11 PROCEDURE — 90471 IMMUNIZATION ADMIN: CPT | Performed by: INTERNAL MEDICINE

## 2017-05-11 PROCEDURE — 83550 IRON BINDING TEST: CPT | Performed by: INTERNAL MEDICINE

## 2017-05-11 PROCEDURE — 99207 ZZC NO CHARGE BEHAVIORAL WARM HANDOFF: CPT | Performed by: SOCIAL WORKER

## 2017-05-11 PROCEDURE — 85652 RBC SED RATE AUTOMATED: CPT | Performed by: INTERNAL MEDICINE

## 2017-05-11 PROCEDURE — 82728 ASSAY OF FERRITIN: CPT | Performed by: INTERNAL MEDICINE

## 2017-05-11 PROCEDURE — 85027 COMPLETE CBC AUTOMATED: CPT | Performed by: INTERNAL MEDICINE

## 2017-05-11 PROCEDURE — 99215 OFFICE O/P EST HI 40 MIN: CPT | Mod: 25 | Performed by: INTERNAL MEDICINE

## 2017-05-11 PROCEDURE — 85730 THROMBOPLASTIN TIME PARTIAL: CPT | Performed by: INTERNAL MEDICINE

## 2017-05-11 PROCEDURE — 86140 C-REACTIVE PROTEIN: CPT | Performed by: INTERNAL MEDICINE

## 2017-05-11 PROCEDURE — 82550 ASSAY OF CK (CPK): CPT | Performed by: INTERNAL MEDICINE

## 2017-05-11 PROCEDURE — 83540 ASSAY OF IRON: CPT | Performed by: INTERNAL MEDICINE

## 2017-05-11 PROCEDURE — 36415 COLL VENOUS BLD VENIPUNCTURE: CPT | Performed by: INTERNAL MEDICINE

## 2017-05-11 PROCEDURE — 90715 TDAP VACCINE 7 YRS/> IM: CPT | Performed by: INTERNAL MEDICINE

## 2017-05-11 PROCEDURE — 80053 COMPREHEN METABOLIC PANEL: CPT | Performed by: INTERNAL MEDICINE

## 2017-05-11 PROCEDURE — 85610 PROTHROMBIN TIME: CPT | Performed by: INTERNAL MEDICINE

## 2017-05-11 ASSESSMENT — PATIENT HEALTH QUESTIONNAIRE - PHQ9: 5. POOR APPETITE OR OVEREATING: NEARLY EVERY DAY

## 2017-05-11 ASSESSMENT — ANXIETY QUESTIONNAIRES
7. FEELING AFRAID AS IF SOMETHING AWFUL MIGHT HAPPEN: SEVERAL DAYS
5. BEING SO RESTLESS THAT IT IS HARD TO SIT STILL: NEARLY EVERY DAY
6. BECOMING EASILY ANNOYED OR IRRITABLE: MORE THAN HALF THE DAYS
IF YOU CHECKED OFF ANY PROBLEMS ON THIS QUESTIONNAIRE, HOW DIFFICULT HAVE THESE PROBLEMS MADE IT FOR YOU TO DO YOUR WORK, TAKE CARE OF THINGS AT HOME, OR GET ALONG WITH OTHER PEOPLE: SOMEWHAT DIFFICULT
1. FEELING NERVOUS, ANXIOUS, OR ON EDGE: MORE THAN HALF THE DAYS
2. NOT BEING ABLE TO STOP OR CONTROL WORRYING: NOT AT ALL
3. WORRYING TOO MUCH ABOUT DIFFERENT THINGS: SEVERAL DAYS
GAD7 TOTAL SCORE: 12

## 2017-05-11 NOTE — MR AVS SNAPSHOT
"              After Visit Summary   5/11/2017    Molly Teague    MRN: [id number]           Patient Information     Date Of Birth          1985        Visit Information        Provider Department      5/11/2017 3:13 PM Brigida SheylaJOSETTE [facility]        Today's Diagnoses     Anxiety    -  1       Follow-ups after your visit        Your next 10 appointments already scheduled     May 12, 2017  8:30 AM CDT   New Visit with Sheyla NathalyelkinJOSETTE   [facility] ([facility]    [address] [place] [address] [phone]              Who to contact     If you have questions or need follow up information about today's clinic visit or your schedule please contact [facility] directly at [phone].  Normal or non-critical lab and imaging results will be communicated to you by MyChart, letter or phone within 4 business days after the clinic has received the results. If you do not hear from us within 7 days, please contact the clinic through MyChart or phone. If you have a critical or abnormal lab result, we will notify you by phone as soon as possible.  Submit refill requests through Tekmi or call your pharmacy and they will forward the refill request to us. Please allow 3 business days for your refill to be completed.          Additional Information About Your Visit        MyChart Information     Tekmi lets you send messages to your doctor, view your test results, renew your prescriptions, schedule appointments and more. To sign up, go to [email or web address] . Click on \"Log in\" on the left side of the screen, which will take you to the Welcome page. Then click on \"Sign up Now\" on the right side of the page.     You will be asked to enter the access code listed below, as well as some personal information. Please follow the directions to create your username and password.     Your access code is: [email or web address]  Expires: " 2017 11:32 AM     Your access code will  in 90 days. If you need help or a new code, please call your [place] clinic or [phone].        Care EveryWhere ID     This is your Care EveryWhere ID. This could be used by other organizations to access your [place] medical records  [id number]         Blood Pressure from Last 3 Encounters:   17 114/75   17 108/67   17 110/79    Weight from Last 3 Encounters:   17 142 lb 3.2 oz (64.5 kg)   17 139 lb (63 kg)   17 136 lb (61.7 kg)              Today, you had the following     No orders found for display       Primary Care Provider Office Phone # Fax #    Grecia ShaikhDO dena [phone]       [facility] [address] [address]        Thank you!     Thank you for choosing [facility]  for your care. Our goal is always to provide you with excellent care. Hearing back from our patients is one way we can continue to improve our services. Please take a few minutes to complete the written survey that you may receive in the mail after your visit with us. Thank you!             Your Updated Medication List - Protect others around you: Learn how to safely use, store and throw away your medicines at www.disposemymeds.org.          This list is accurate as of: 17  3:15 PM.  Always use your most recent med list.                   Brand Name Dispense Instructions for use    albuterol 108 (90 BASE) MCG/ACT Inhaler    VENTOLIN HFA    1 Inhaler    Inhale 2 puffs into the lungs every 4 hours as needed for shortness of breath / dyspnea or wheezing       aspirin-acetaminophen-caffeine 250-250-65 MG per tablet    EXCEDRIN MIGRAINE    24 tablet    Take 1 tablet by mouth every 6 hours as needed for headaches       B-12 1000 MCG Tbcr     100 tablet    Take 1,000 mcg by mouth daily       BENADRYL 25 MG tablet   Generic drug:  diphenhydrAMINE     56 tablet    Take 1 tablet (25 mg) by  mouth every 6 hours as needed for itching or allergies       blood glucose monitoring test strip    no brand specified    100 each    Use to test blood sugars 3 times daily or as directed Please give what is approved by insurance.       budesonide-formoterol 160-4.5 MCG/ACT Inhaler    SYMBICORT    3 Inhaler    Inhale 2 puffs into the lungs 2 times daily       citalopram 40 MG tablet    celeXA    90 tablet    Take 1 tablet (40 mg) by mouth daily       ferrous gluconate 324 (38 FE) MG tablet    FERGON    100 tablet    Take 1 tablet (324 mg) by mouth daily (with breakfast)       folic acid 1 MG tablet    FOLVITE    100 tablet    Take 1 tablet (1 mg) by mouth daily       gabapentin 300 MG capsule    NEURONTIN    270 capsule    Take 1 capsule (300 mg) by mouth 3 times daily       LORazepam 0.5 MG tablet    ATIVAN    60 tablet    Take 1 tablet (0.5 mg) by mouth 2 times daily as needed for anxiety       methotrexate 2.5 MG tablet CHEMO     30 tablet    Take 6 tablets (15 mg) by mouth once a week       omeprazole 20 MG CR capsule    priLOSEC    90 capsule    Take 40 mg by mouth daily       * oxyCODONE 15 MG IR tablet    ROXICODONE    120 tablet    Take 1 tablet (15 mg) by mouth every 4 hours as needed for moderate to severe pain       * oxyCODONE 15 MG IR tablet   Start taking on:  5/26/2017    ROXICODONE    120 tablet    Take 1 tablet (15 mg) by mouth every 4 hours as needed for pain       * oxyCODONE 15 MG IR tablet   Start taking on:  6/25/2017    ROXICODONE    120 tablet    Take 1 tablet (15 mg) by mouth every 4 hours as needed for pain       polyethylene glycol powder    MIRALAX    510 g    Take 17 g (1 capful) by mouth daily       * promethazine 25 MG tablet    PHENERGAN    20 tablet    Take 1 tablet (25 mg) by mouth every 6 hours as needed for nausea       * promethazine 50 MG/ML Soln IV injection    PHENERGAN    90 mL    Inject 0.5 mLs (25 mg) into the muscle every 6 hours as needed       ranitidine 150 MG tablet     ZANTAC    60 tablet    Take 1 tablet (150 mg) by mouth 2 times daily as needed for heartburn       sucralfate 1 GM tablet    CARAFATE    120 tablet    Take 1 tablet (1 g) by mouth 4 times daily       zolpidem 10 MG tablet    AMBIEN    30 tablet    Take 1 tablet (10 mg) by mouth nightly as needed for sleep       * Notice:  This list has 5 medication(s) that are the same as other medications prescribed for you. Read the directions carefully, and ask your doctor or other care provider to review them with you.

## 2017-05-11 NOTE — PROGRESS NOTES
BEHAVIORAL HEALTH CLINICIAN introduced and explained integrated health model, brief therapy interventions and referral and support services for ongoing therapy interventions.

## 2017-05-11 NOTE — PATIENT INSTRUCTIONS
1. Unknown cause of all your symptoms - could be viral illness or flare up of auto-immune condition.  2. Blood work today  3. See DR. Barba next week if you are not getting better.  4. If the anemia continues to be low, we may consider Iron infusions  5. Dr. Barba will communicate with your [place] Rheumatologist.

## 2017-05-11 NOTE — MR AVS SNAPSHOT
After Visit Summary   5/11/2017    Molly Teague    MRN: [id number]           Patient Information     Date Of Birth          1985        Visit Information        Provider Department      5/11/2017 7:20 AM Grecia Barba, DO [facility]        Today's Diagnoses     Anemia due to blood loss, acute    -  1    Scleroderma (H)        CREST (calcinosis, Raynaud's phenomenon, esophageal dysfunction, sclerodactyly, telangiectasia) (H)        Gastroesophageal reflux disease with esophagitis        GAVE (gastric antral vascular ectasia)        Need for prophylactic vaccination with combined diphtheria-tetanus-pertussis (DTP) vaccine          Care Instructions    1. Unknown cause of all your symptoms - could be viral illness or flare up of auto-immune condition.  2. Blood work today  3. See DR. Barba next week if you are not getting better.  4. If the anemia continues to be low, we may consider Iron infusions  5. Dr. Barba will communicate with your [place] Rheumatologist.        Follow-ups after your visit        Who to contact     If you have questions or need follow up information about today's clinic visit or your schedule please contact [facility] directly at [phone].  Normal or non-critical lab and imaging results will be communicated to you by Echo ithart, letter or phone within 4 business days after the clinic has received the results. If you do not hear from us within 7 days, please contact the clinic through Echo ithart or phone. If you have a critical or abnormal lab result, we will notify you by phone as soon as possible.  Submit refill requests through In2Games or call your pharmacy and they will forward the refill request to us. Please allow 3 business days for your refill to be completed.          Additional Information About Your Visit        Echo ithart Information     In2Games lets you send messages to your doctor, view your test results, renew your prescriptions,  "schedule appointments and more. To sign up, go to [email or web address] . Click on \"Log in\" on the left side of the screen, which will take you to the Welcome page. Then click on \"Sign up Now\" on the right side of the page.     You will be asked to enter the access code listed below, as well as some personal information. Please follow the directions to create your username and password.     Your access code is: [email or web address]  Expires: 2017 11:32 AM     Your access code will  in 90 days. If you need help or a new code, please call your [place] clinic or [phone].        Care EveryWhere ID     This is your Care EveryWhere ID. This could be used by other organizations to access your [place] medical records  [id number]        Your Vitals Were     Pulse Temperature Height BMI (Body Mass Index)          96 99.2  F (37.3  C) (Tympanic) 5' 1\" (1.549 m) 26.87 kg/m2         Blood Pressure from Last 3 Encounters:   17 114/75   17 108/67   17 110/79    Weight from Last 3 Encounters:   17 142 lb 3.2 oz (64.5 kg)   17 139 lb (63 kg)   17 136 lb (61.7 kg)              We Performed the Following     CBC with platelets     CK total     Comprehensive metabolic panel     CRP, inflammation     ESR: Erythrocyte sedimentation rate     Ferritin     INR     Iron and iron binding capacity     Partial thromboplastin time     TDAP (ADACEL)     VACCINE ADMINISTRATION, INITIAL        Primary Care Provider Office Phone # Fax #    Grecia Kraus DO [place] [phone]       [facility] [address] [address]        Thank you!     Thank you for choosing [facility]  for your care. Our goal is always to provide you with excellent care. Hearing back from our patients is one way we can continue to improve our services. Please take a few minutes to complete the written survey that you may receive in the mail after your visit with us. Thank " you!             Your Updated Medication List - Protect others around you: Learn how to safely use, store and throw away your medicines at www.disposemymeds.org.          This list is accurate as of: 5/11/17  8:18 AM.  Always use your most recent med list.                   Brand Name Dispense Instructions for use    albuterol 108 (90 BASE) MCG/ACT Inhaler    VENTOLIN HFA    1 Inhaler    Inhale 2 puffs into the lungs every 4 hours as needed for shortness of breath / dyspnea or wheezing       aspirin-acetaminophen-caffeine 250-250-65 MG per tablet    EXCEDRIN MIGRAINE    24 tablet    Take 1 tablet by mouth every 6 hours as needed for headaches       B-12 1000 MCG Tbcr     100 tablet    Take 1,000 mcg by mouth daily       BENADRYL 25 MG tablet   Generic drug:  diphenhydrAMINE     56 tablet    Take 1 tablet (25 mg) by mouth every 6 hours as needed for itching or allergies       blood glucose monitoring test strip    no brand specified    100 each    Use to test blood sugars 3 times daily or as directed Please give what is approved by insurance.       budesonide-formoterol 160-4.5 MCG/ACT Inhaler    SYMBICORT    3 Inhaler    Inhale 2 puffs into the lungs 2 times daily       citalopram 40 MG tablet    celeXA    90 tablet    Take 1 tablet (40 mg) by mouth daily       ferrous gluconate 324 (38 FE) MG tablet    FERGON    100 tablet    Take 1 tablet (324 mg) by mouth daily (with breakfast)       folic acid 1 MG tablet    FOLVITE    100 tablet    Take 1 tablet (1 mg) by mouth daily       gabapentin 300 MG capsule    NEURONTIN    270 capsule    Take 1 capsule (300 mg) by mouth 3 times daily       LORazepam 0.5 MG tablet    ATIVAN    60 tablet    Take 1 tablet (0.5 mg) by mouth 2 times daily as needed for anxiety       methotrexate 2.5 MG tablet CHEMO     30 tablet    Take 6 tablets (15 mg) by mouth once a week       omeprazole 20 MG CR capsule    priLOSEC    90 capsule    Take 40 mg by mouth daily       * oxyCODONE 15 MG IR  tablet    ROXICODONE    120 tablet    Take 1 tablet (15 mg) by mouth every 4 hours as needed for moderate to severe pain       * oxyCODONE 15 MG IR tablet   Start taking on:  5/26/2017    ROXICODONE    120 tablet    Take 1 tablet (15 mg) by mouth every 4 hours as needed for pain       * oxyCODONE 15 MG IR tablet   Start taking on:  6/25/2017    ROXICODONE    120 tablet    Take 1 tablet (15 mg) by mouth every 4 hours as needed for pain       polyethylene glycol powder    MIRALAX    510 g    Take 17 g (1 capful) by mouth daily       * promethazine 25 MG tablet    PHENERGAN    20 tablet    Take 1 tablet (25 mg) by mouth every 6 hours as needed for nausea       * promethazine 50 MG/ML Soln IV injection    PHENERGAN    90 mL    Inject 0.5 mLs (25 mg) into the muscle every 6 hours as needed       ranitidine 150 MG tablet    ZANTAC    60 tablet    Take 1 tablet (150 mg) by mouth 2 times daily as needed for heartburn       sucralfate 1 GM tablet    CARAFATE    120 tablet    Take 1 tablet (1 g) by mouth 4 times daily       zolpidem 10 MG tablet    AMBIEN    30 tablet    Take 1 tablet (10 mg) by mouth nightly as needed for sleep       * Notice:  This list has 5 medication(s) that are the same as other medications prescribed for you. Read the directions carefully, and ask your doctor or other care provider to review them with you.

## 2017-05-11 NOTE — PROGRESS NOTES
SUBJECTIVE:                                                    Molly Teague is a 31 year old female who presents to clinic today for the following health issues:      ED/UC Followup:    Facility:  [facility]  Date of visit: 5/9/17  Reason for visit: Dehydration, difficulty breathing, losing balance, hallucinations and feeling weak   Current Status: Still feeling weak, has had to use her walker.      In ER 5/9 for multiple symptoms of difficulty moving, breathing along with chest pain, back pain, difficulty swallowing, severe diffuse myalgias, worsening of Raynaud's, fever 101, dehydrated despite drinking lots of water, poor memory, two falls, hallucinations, increase in bruising (not visible).  Pain and weakness was so severe that she used walker and wheelchair.    ER diagnosed her with viral syndrome.    Hallucinations - reports seeing people or objects that others were not.  They were not frightening to her.  In the ER, she also reports 'talking gibberish' - RN and BF could not understand her speech.  She reports she had not been taking many of her oxycodone or other psychoactive meds    Since ER visit -   She still feels weak, needing walker.  (this causes significant distress/embarrassment). Anxiety is significantly worse  The chest tightness resolved.  Swallowing is slightly improved  Still feels 'lightheaded'  Myalgias have improved, but feels all over numbness/tingling.  No fevers at home  Raynaud's is still severe  Hallucinations resolved.  Not using ambien x 4 days  Having daily migraines - excedrin helps  Having black stools x 4 days.  She doesn't always pay attention.  Constipation is chronic due to opiates, is stable, not worse.   Has daily nausea - using promethazine IM 1-2 x week which helps.  Had 1 episode of vomiting, on 5/8.  She is unsure what the emesis looked like.    Has appointment at [facility] 5/25.        Current Outpatient Prescriptions   Medication Sig Dispense Refill     Cyanocobalamin  (B-12) 1000 MCG TBCR Take 1,000 mcg by mouth daily 100 tablet 1     oxyCODONE (ROXICODONE) 15 MG IR tablet Take 1 tablet (15 mg) by mouth every 4 hours as needed for moderate to severe pain 120 tablet 0     [START ON 5/26/2017] oxyCODONE (ROXICODONE) 15 MG IR tablet Take 1 tablet (15 mg) by mouth every 4 hours as needed for pain 120 tablet 0     [START ON 6/25/2017] oxyCODONE (ROXICODONE) 15 MG IR tablet Take 1 tablet (15 mg) by mouth every 4 hours as needed for pain 120 tablet 0     zolpidem (AMBIEN) 10 MG tablet Take 1 tablet (10 mg) by mouth nightly as needed for sleep 30 tablet 3     LORazepam (ATIVAN) 0.5 MG tablet Take 1 tablet (0.5 mg) by mouth 2 times daily as needed for anxiety 60 tablet 3     Promethazine HCl 50 MG/ML SOLN Inject 0.5 mLs (25 mg) into the muscle every 6 hours as needed 90 mL 11     aspirin-acetaminophen-caffeine (EXCEDRIN MIGRAINE) 250-250-65 MG per tablet Take 1 tablet by mouth every 6 hours as needed for headaches 24 tablet 1     budesonide-formoterol (SYMBICORT) 160-4.5 MCG/ACT Inhaler Inhale 2 puffs into the lungs 2 times daily 3 Inhaler 3     sucralfate (CARAFATE) 1 GM tablet Take 1 tablet (1 g) by mouth 4 times daily 120 tablet 11     folic acid (FOLVITE) 1 MG tablet Take 1 tablet (1 mg) by mouth daily 100 tablet 3     gabapentin (NEURONTIN) 300 MG capsule Take 1 capsule (300 mg) by mouth 3 times daily 270 capsule 3     citalopram (CELEXA) 40 MG tablet Take 1 tablet (40 mg) by mouth daily 90 tablet 3     ranitidine (ZANTAC) 150 MG tablet Take 1 tablet (150 mg) by mouth 2 times daily as needed for heartburn 60 tablet 11     ferrous gluconate (FERGON) 324 (38 FE) MG tablet Take 1 tablet (324 mg) by mouth daily (with breakfast) 100 tablet 3     albuterol (VENTOLIN HFA) 108 (90 BASE) MCG/ACT Inhaler Inhale 2 puffs into the lungs every 4 hours as needed for shortness of breath / dyspnea or wheezing 1 Inhaler 11     promethazine (PHENERGAN) 25 MG tablet Take 1 tablet (25 mg) by mouth every  "6 hours as needed for nausea 20 tablet 3     polyethylene glycol (MIRALAX) powder Take 17 g (1 capful) by mouth daily 510 g 1     blood glucose monitoring (NO BRAND SPECIFIED) test strip Use to test blood sugars 3 times daily or as directed  Please give what is approved by insurance. 100 each 0     omeprazole (PRILOSEC) 20 MG capsule Take 40 mg by mouth daily  90 capsule 1     diphenhydrAMINE (BENADRYL) 25 MG tablet Take 1 tablet (25 mg) by mouth every 6 hours as needed for itching or allergies 56 tablet      methotrexate 2.5 MG tablet CHEMO Take 6 tablets (15 mg) by mouth once a week 30 tablet 1       Reviewed and updated as needed this visit by clinical staff  Tobacco  Allergies  Med Hx  Surg Hx  Fam Hx  Soc Hx      Reviewed and updated as needed this visit by Provider  Allergies  Meds  Problems         ROS:  Constitutional, HEENT, cardiovascular, pulmonary, GI, , musculoskeletal, neuro, skin, endocrine and psych systems are negative, except as otherwise noted.    OBJECTIVE:                                                    /75  Pulse 96  Temp 99.2  F (37.3  C) (Tympanic)  Ht 5' 1\" (1.549 m)  Wt 142 lb 3.2 oz (64.5 kg)  BMI 26.87 kg/m2  Body mass index is 26.87 kg/(m^2).  GENERAL APPEARANCE: alert, no distress and fatigued  HENT: MMM, small mouth opening  NECK: no adenopathy and scarring from trach and scleroderma  RESP: lungs clear to auscultation - no rales, rhonchi or wheezes  CV: regular rates and rhythm, normal S1 S2, no S3 or S4 and no murmur, click or rub.  Borderline tachycardia  LYMPHATICS: no leg edema  ABDOMEN: soft, moderate epigastric pain, no organomegaly  MS: contractures of hands consistent with scleroderma.  4/5 strength bilateral UE/LE.  Mild pain with palpation in bilateral upper arms and bilateral upper/lower legs  SKIN: mild livedo.  Tight shiny skin of face and extremities.  Dry skin.  No ecchymosis seen.  PSYCH: anxious, tearful, poor eye contact    Diagnostic Test " Results:  No results found for this or any previous visit (from the past 24 hour(s)).     ASSESSMENT/PLAN:                                                      1. Viral Ilness: multiple symptoms above without clear source.  Exam and lab work are reassuring. However she is medically complicated, and there could be many other autoimmune or infectious issues causing her symptoms. We will contact her rheumatologist with update. She has an appointment with them in 2 weeks.  It is reassuring that most of her symptoms are improving    2. Anemia due to blood loss, acute - worsening swallowing difficulties along with melena and acute on chronic anemia - worried about worsening esophagitis or occult bleeding from GAVE.  Will contact her GI team.  May need iron infusions?  - CBC with platelets  - Iron and iron binding capacity  - Ferritin    3. Scleroderma (H)  - Comprehensive metabolic panel  - INR  - Partial thromboplastin time    4. CREST (calcinosis, Raynaud's phenomenon, esophageal dysfunction, sclerodactyly, telangiectasia) (H)  - CK total  - ESR: Erythrocyte sedimentation rate  - CRP, inflammation    5. Gastroesophageal reflux disease with esophagitis - esophagitis may be worsening causing her anemia and difficulty swallowing. See above    6. GAVE (gastric antral vascular ectasia) -= see above    7. REX (generalized anxiety disorder) - worsening, see below    8. PTSD (post-traumatic stress disorder) - worsening of her PTSD due to worsening medical condition. This is her main issue that she wanted to discuss today. She is very tearful. We'll have her see Patricia Allred, behavioral health clinician tomorrow for help in coping with her acute illness    9.  Need for prophylactic vaccination with combined diphtheria-tetanus-pertussis (DTP) vaccine  - TDAP (ADACEL)  - VACCINE ADMINISTRATION, INITIAL    Greater than 40 minutes was spent face-to-face with the patient by the provider.  Greater than 50% was spent in counseling  or coordinating care for this patient.      Grecia Barba,   [facility]

## 2017-05-11 NOTE — PROGRESS NOTES
Her kidney function is stable. Her liver tests are stable.  Her inflammatory markers and muscle enzymes are elevated. I am awaiting a call back from her rheumatologist at [place]. One coagulation markers slightly elevated. Her anemia is stable from several days ago, but much lower than one month ago. Likely due to blood loss

## 2017-05-11 NOTE — NURSING NOTE
"Chief Complaint   Patient presents with     Hospital F/U     E/R follow up 5/9       Initial /75  Pulse 96  Temp 99.2  F (37.3  C) (Tympanic)  Ht 5' 1\" (1.549 m)  Wt 142 lb 3.2 oz (64.5 kg)  BMI 26.87 kg/m2 Estimated body mass index is 26.87 kg/(m^2) as calculated from the following:    Height as of this encounter: 5' 1\" (1.549 m).    Weight as of this encounter: 142 lb 3.2 oz (64.5 kg).  Medication Reconciliation: complete  "

## 2017-05-12 ENCOUNTER — DOCUMENTATION ONLY (OUTPATIENT)
Dept: FAMILY MEDICINE | Facility: CLINIC | Age: 32
End: 2017-05-12

## 2017-05-12 ASSESSMENT — ASTHMA QUESTIONNAIRES: ACT_TOTALSCORE: 8

## 2017-05-12 ASSESSMENT — ANXIETY QUESTIONNAIRES: GAD7 TOTAL SCORE: 12

## 2017-05-12 ASSESSMENT — PATIENT HEALTH QUESTIONNAIRE - PHQ9: SUM OF ALL RESPONSES TO PHQ QUESTIONS 1-9: 6

## 2017-05-12 NOTE — PROGRESS NOTES
I have discussed with her [place] rheumatologist.  He will make arrangements for the patient to be seen for an endoscopy in the next several weeks. She wants her to keep her upcoming appointment in 2 weeks. She should come back and see me if she is not feeling better, certainly if she is feeling worse.  There are still no clear cause for her elevated inflammatory markers, muscle enzymes, other symptoms.

## 2017-05-12 NOTE — PROGRESS NOTES
Pt called and left message for this writer - she missed today's appt due to having an allergic reaction to her medication

## 2017-05-15 LAB
BACTERIA SPEC CULT: NORMAL
BACTERIA SPEC CULT: NORMAL
MICRO REPORT STATUS: NORMAL
MICRO REPORT STATUS: NORMAL
SPECIMEN SOURCE: NORMAL
SPECIMEN SOURCE: NORMAL

## 2017-05-17 ENCOUNTER — FCC EXTENDED DOCUMENTATION (OUTPATIENT)
Dept: PSYCHOLOGY | Facility: CLINIC | Age: 32
End: 2017-05-17

## 2017-05-17 NOTE — PROGRESS NOTES
"                      Discharge Summary  Single Session    Client Name: Molly Teague MRN#: [id number] YOB: 1985      Intake / Discharge Date: 5/17/17      DSM5 Diagnoses: (Sustained by DSM5 Criteria Listed Above)  Diagnoses: 296.33 Major Depressive Disorder, Recurrent Episode, Severe _ and With anxious distress  300.02 (F41.1) Generalized Anxiety Disorder  Psychosocial & Contextual Factors: trauma. Medical condition.  WHODAS 2.0 (12 item) Score: na          Presenting Concern:  Stress of dealing with her skin condition.      Reason for Discharge:  Client did not return      Disposition at Time of Last Encounter:   Comments:   Left message by phone at least twice asking her to call me to see how she is doing and whether she wishes to return. No response.     Risk Management:   Client has had a history of suicidal ideation: \"I lost hope at one point and found myself hopeless\"  A safety and risk management plan has been developed including: Client consented to co-developed safety plan, which includes what she agreed to do to remain safe.      Referred To:  May return for new episode of care.        Fer Jacob, [facility]   5/17/2017  "

## 2017-05-25 DIAGNOSIS — G47.09 OTHER INSOMNIA: ICD-10-CM

## 2017-05-25 RX ORDER — ZOLPIDEM TARTRATE 10 MG/1
10 TABLET ORAL
Qty: 30 TABLET | Refills: 3 | Status: SHIPPED | OUTPATIENT
Start: 2017-05-25 | End: 2017-06-22

## 2017-05-25 NOTE — TELEPHONE ENCOUNTER
Zolpidem      Last Written Prescription Date:  03/30/2017  Last Fill Quantity: 30,   # refills: 3  Last Office Visit with [facility] prescribing provider: 05/11/2017  Future Office visit:       Routing refill request to provider for review/approval because:  Drug not on the [facility] refill protocol or controlled substance    Marcela Krause [facility]  Float Technician  [place]

## 2017-05-25 NOTE — TELEPHONE ENCOUNTER
Fax for Ambien was faxed to [facility].  [phone]      Jovita DEL RIO LPN  [facility]  Rheumatology-[address]  [address]   [address] [place] [address]

## 2017-05-28 ENCOUNTER — HOSPITAL ENCOUNTER (EMERGENCY)
Facility: CLINIC | Age: 32
Discharge: HOME | End: 2017-05-28
Attending: FAMILY MEDICINE | Admitting: FAMILY MEDICINE
Payer: COMMERCIAL

## 2017-05-28 VITALS
SYSTOLIC BLOOD PRESSURE: 111 MMHG | TEMPERATURE: 98 F | OXYGEN SATURATION: 97 % | HEART RATE: 133 BPM | DIASTOLIC BLOOD PRESSURE: 79 MMHG | BODY MASS INDEX: 25.76 KG/M2 | RESPIRATION RATE: 16 BRPM | WEIGHT: 140 LBS | HEIGHT: 62 IN

## 2017-05-28 DIAGNOSIS — D50.0 IRON DEFICIENCY ANEMIA DUE TO CHRONIC BLOOD LOSS: ICD-10-CM

## 2017-05-28 DIAGNOSIS — M34.9 SCLERODERMA (H): Chronic | ICD-10-CM

## 2017-05-28 LAB
ANION GAP SERPL CALCULATED.3IONS-SCNC: 9 MMOL/L (ref 3–14)
BASOPHILS # BLD AUTO: 0.1 10E9/L (ref 0–0.2)
BASOPHILS NFR BLD AUTO: 1.3 %
BUN SERPL-MCNC: 18 MG/DL (ref 7–30)
CALCIUM SERPL-MCNC: 8.7 MG/DL (ref 8.5–10.1)
CHLORIDE SERPL-SCNC: 105 MMOL/L (ref 94–109)
CO2 SERPL-SCNC: 23 MMOL/L (ref 20–32)
CREAT SERPL-MCNC: 1.39 MG/DL (ref 0.52–1.04)
DIFFERENTIAL METHOD BLD: ABNORMAL
EOSINOPHIL # BLD AUTO: 0.5 10E9/L (ref 0–0.7)
EOSINOPHIL NFR BLD AUTO: 5.6 %
ERYTHROCYTE [DISTWIDTH] IN BLOOD BY AUTOMATED COUNT: 15.5 % (ref 10–15)
GFR SERPL CREATININE-BSD FRML MDRD: 44 ML/MIN/1.7M2
GLUCOSE BLDC GLUCOMTR-MCNC: 73 MG/DL (ref 70–99)
GLUCOSE SERPL-MCNC: 73 MG/DL (ref 70–99)
GLUCOSE SERPL-MCNC: 82 MG/DL (ref 70–99)
HCT VFR BLD AUTO: 26.9 % (ref 35–47)
HGB BLD-MCNC: 8.1 G/DL (ref 11.7–15.7)
IMM GRANULOCYTES # BLD: 0 10E9/L (ref 0–0.4)
IMM GRANULOCYTES NFR BLD: 0.1 %
LYMPHOCYTES # BLD AUTO: 2.6 10E9/L (ref 0.8–5.3)
LYMPHOCYTES NFR BLD AUTO: 28.1 %
MCH RBC QN AUTO: 24 PG (ref 26.5–33)
MCHC RBC AUTO-ENTMCNC: 30.1 G/DL (ref 31.5–36.5)
MCV RBC AUTO: 80 FL (ref 78–100)
MONOCYTES # BLD AUTO: 0.8 10E9/L (ref 0–1.3)
MONOCYTES NFR BLD AUTO: 9 %
NEUTROPHILS # BLD AUTO: 5.1 10E9/L (ref 1.6–8.3)
NEUTROPHILS NFR BLD AUTO: 55.9 %
PLATELET # BLD AUTO: 319 10E9/L (ref 150–450)
POTASSIUM SERPL-SCNC: 3.8 MMOL/L (ref 3.4–5.3)
RBC # BLD AUTO: 3.37 10E12/L (ref 3.8–5.2)
SODIUM SERPL-SCNC: 137 MMOL/L (ref 133–144)
WBC # BLD AUTO: 9.1 10E9/L (ref 4–11)

## 2017-05-28 PROCEDURE — 00000146 ZZHCL STATISTIC GLUCOSE BY METER IP

## 2017-05-28 PROCEDURE — 99284 EMERGENCY DEPT VISIT MOD MDM: CPT | Performed by: FAMILY MEDICINE

## 2017-05-28 PROCEDURE — 96361 HYDRATE IV INFUSION ADD-ON: CPT

## 2017-05-28 PROCEDURE — 85025 COMPLETE CBC W/AUTO DIFF WBC: CPT | Performed by: FAMILY MEDICINE

## 2017-05-28 PROCEDURE — 82947 ASSAY GLUCOSE BLOOD QUANT: CPT | Performed by: FAMILY MEDICINE

## 2017-05-28 PROCEDURE — 25000128 H RX IP 250 OP 636: Performed by: FAMILY MEDICINE

## 2017-05-28 PROCEDURE — 96360 HYDRATION IV INFUSION INIT: CPT

## 2017-05-28 PROCEDURE — 99284 EMERGENCY DEPT VISIT MOD MDM: CPT | Mod: 25

## 2017-05-28 PROCEDURE — 80048 BASIC METABOLIC PNL TOTAL CA: CPT | Performed by: FAMILY MEDICINE

## 2017-05-28 RX ORDER — SODIUM CHLORIDE 9 MG/ML
1000 INJECTION, SOLUTION INTRAVENOUS CONTINUOUS
Status: DISCONTINUED | OUTPATIENT
Start: 2017-05-28 | End: 2017-05-28 | Stop reason: HOSPADM

## 2017-05-28 RX ADMIN — SODIUM CHLORIDE 1000 ML: 9 INJECTION, SOLUTION INTRAVENOUS at 17:55

## 2017-05-28 ASSESSMENT — ENCOUNTER SYMPTOMS
SHORTNESS OF BREATH: 0
VOMITING: 0
SPEECH DIFFICULTY: 0
ABDOMINAL PAIN: 0
EYES NEGATIVE: 1
MYALGIAS: 1
FEVER: 0
WEAKNESS: 1
HEADACHES: 0
DIFFICULTY URINATING: 0

## 2017-05-28 NOTE — ED AVS SNAPSHOT
[facility] Emergency Department    [address] [address]    Phone:  [phone]    Fax:  [phone]                                       Molly Teague   MRN: [id number]    Department:  [facility] Emergency Department   Date of Visit:  5/28/2017           Patient Information     Date Of Birth          1985        Your diagnoses for this visit were:     Iron deficiency anemia due to chronic blood loss     Scleroderma (H)        You were seen by Get Espinoza MD.        Discharge Instructions         OK to continue your present medications.    Best to take iron pills with food.    I suggest contact Dr Barba's Nurse and arrange a repeat Hemoglobin in 3-4 weeks.    Return to E R for worsening weakness or other concerning symptoms.        24 Hour Appointment Hotline       To make an appointment at any [facility] clinic, call [phone] ([phone]). If you don't have a family doctor or clinic, we will help you find one. [facility] clinics are conveniently located to serve the needs of you and your family.             Review of your medicines      Our records show that you are taking the medicines listed below. If these are incorrect, please call your family doctor or clinic.        Dose / Directions Last dose taken    albuterol 108 (90 BASE) MCG/ACT Inhaler   Commonly known as:  VENTOLIN HFA   Dose:  2 puff   Quantity:  1 Inhaler        Inhale 2 puffs into the lungs every 4 hours as needed for shortness of breath / dyspnea or wheezing   Refills:  11        aspirin-acetaminophen-caffeine 250-250-65 MG per tablet   Commonly known as:  EXCEDRIN MIGRAINE   Dose:  1 tablet   Quantity:  24 tablet        Take 1 tablet by mouth every 6 hours as needed for headaches   Refills:  1        B-12 1000 MCG Tbcr   Dose:  1000 mcg   Quantity:  100 tablet        Take 1,000 mcg by mouth daily   Refills:  1        BENADRYL 25 MG tablet   Dose:  25 mg   Quantity:  56 tablet   Generic drug:   diphenhydrAMINE        Take 1 tablet (25 mg) by mouth every 6 hours as needed for itching or allergies   Refills:  0        blood glucose monitoring test strip   Commonly known as:  no brand specified   Quantity:  100 each        Use to test blood sugars 3 times daily or as directed Please give what is approved by insurance.   Refills:  0        budesonide-formoterol 160-4.5 MCG/ACT Inhaler   Commonly known as:  SYMBICORT   Dose:  2 puff   Quantity:  3 Inhaler        Inhale 2 puffs into the lungs 2 times daily   Refills:  3        citalopram 40 MG tablet   Commonly known as:  celeXA   Dose:  40 mg   Quantity:  90 tablet        Take 1 tablet (40 mg) by mouth daily   Refills:  3        ferrous gluconate 324 (38 FE) MG tablet   Commonly known as:  FERGON   Dose:  324 mg   Quantity:  100 tablet        Take 1 tablet (324 mg) by mouth daily (with breakfast)   Refills:  3        folic acid 1 MG tablet   Commonly known as:  FOLVITE   Dose:  1 mg   Quantity:  100 tablet        Take 1 tablet (1 mg) by mouth daily   Refills:  3        gabapentin 300 MG capsule   Commonly known as:  NEURONTIN   Dose:  300 mg   Quantity:  270 capsule        Take 1 capsule (300 mg) by mouth 3 times daily   Refills:  3        LORazepam 0.5 MG tablet   Commonly known as:  ATIVAN   Dose:  0.5 mg   Quantity:  60 tablet        Take 1 tablet (0.5 mg) by mouth 2 times daily as needed for anxiety   Refills:  3        methotrexate 2.5 MG tablet CHEMO   Dose:  15 mg   Quantity:  30 tablet        Take 6 tablets (15 mg) by mouth once a week   Refills:  1        omeprazole 20 MG CR capsule   Commonly known as:  priLOSEC   Dose:  40 mg   Quantity:  90 capsule        Take 40 mg by mouth daily   Refills:  1        * oxyCODONE 15 MG IR tablet   Commonly known as:  ROXICODONE   Dose:  15 mg   Quantity:  120 tablet        Take 1 tablet (15 mg) by mouth every 4 hours as needed for moderate to severe pain   Refills:  0        * oxyCODONE 15 MG IR tablet   Commonly  known as:  ROXICODONE   Dose:  15 mg   Quantity:  120 tablet        Take 1 tablet (15 mg) by mouth every 4 hours as needed for pain   Refills:  0        * oxyCODONE 15 MG IR tablet   Commonly known as:  ROXICODONE   Dose:  15 mg   Quantity:  120 tablet   Start taking on:  6/25/2017        Take 1 tablet (15 mg) by mouth every 4 hours as needed for pain   Refills:  0        polyethylene glycol powder   Commonly known as:  MIRALAX   Dose:  1 capful   Quantity:  510 g        Take 17 g (1 capful) by mouth daily   Refills:  1        * promethazine 25 MG tablet   Commonly known as:  PHENERGAN   Dose:  25 mg   Quantity:  20 tablet        Take 1 tablet (25 mg) by mouth every 6 hours as needed for nausea   Refills:  3        * promethazine 50 MG/ML Soln IV injection   Commonly known as:  PHENERGAN   Dose:  25 mg   Quantity:  90 mL        Inject 0.5 mLs (25 mg) into the muscle every 6 hours as needed   Refills:  11        ranitidine 150 MG tablet   Commonly known as:  ZANTAC   Dose:  150 mg   Quantity:  60 tablet        Take 1 tablet (150 mg) by mouth 2 times daily as needed for heartburn   Refills:  11        sucralfate 1 GM tablet   Commonly known as:  CARAFATE   Dose:  1 g   Quantity:  120 tablet        Take 1 tablet (1 g) by mouth 4 times daily   Refills:  11        zolpidem 10 MG tablet   Commonly known as:  AMBIEN   Dose:  10 mg   Quantity:  30 tablet        Take 1 tablet (10 mg) by mouth nightly as needed for sleep   Refills:  3        * Notice:  This list has 5 medication(s) that are the same as other medications prescribed for you. Read the directions carefully, and ask your doctor or other care provider to review them with you.            Procedures and tests performed during your visit     Basic metabolic panel    CBC with platelets differential    Glucose Whole Blood POCT    Glucose by meter      Orders Needing Specimen Collection     None      Pending Results     No orders found from 5/26/2017 to 5/29/2017.             Pending Culture Results     No orders found from 5/26/2017 to 5/29/2017.            Pending Results Instructions     If you had any lab results that were not finalized at the time of your Discharge, you can call the ED Lab Result RN at [phone]. You will be contacted by this team for any positive Lab results or changes in treatment. The nurses are available 7 days a week from 10A to 6:30P.  You can leave a message 24 hours per day and they will return your call.        Test Results From Your Hospital Stay        5/28/2017  5:36 PM      Component Results     Component Value Ref Range & Units Status    Glucose 73 70 - 99 mg/dL Final         5/28/2017  6:01 PM      Component Results     Component Value Ref Range & Units Status    WBC 9.1 4.0 - 11.0 10e9/L Final    RBC Count 3.37 (L) 3.8 - 5.2 10e12/L Final    Hemoglobin 8.1 (L) 11.7 - 15.7 g/dL Final    Hematocrit 26.9 (L) 35.0 - 47.0 % Final    MCV 80 78 - 100 fl Final    MCH 24.0 (L) 26.5 - 33.0 pg Final    MCHC 30.1 (L) 31.5 - 36.5 g/dL Final    RDW 15.5 (H) 10.0 - 15.0 % Final    Platelet Count 319 150 - 450 10e9/L Final    Diff Method Automated Method  Final    % Neutrophils 55.9 % Final    % Lymphocytes 28.1 % Final    % Monocytes 9.0 % Final    % Eosinophils 5.6 % Final    % Basophils 1.3 % Final    % Immature Granulocytes 0.1 % Final    Absolute Neutrophil 5.1 1.6 - 8.3 10e9/L Final    Absolute Lymphocytes 2.6 0.8 - 5.3 10e9/L Final    Absolute Monocytes 0.8 0.0 - 1.3 10e9/L Final    Absolute Eosinophils 0.5 0.0 - 0.7 10e9/L Final    Absolute Basophils 0.1 0.0 - 0.2 10e9/L Final    Abs Immature Granulocytes 0.0 0 - 0.4 10e9/L Final         5/28/2017  6:12 PM      Component Results     Component Value Ref Range & Units Status    Sodium 137 133 - 144 mmol/L Final    Potassium 3.8 3.4 - 5.3 mmol/L Final    Chloride 105 94 - 109 mmol/L Final    Carbon Dioxide 23 20 - 32 mmol/L Final    Anion Gap 9 3 - 14 mmol/L Final    Glucose 82 70 - 99 mg/dL Final     "Urea Nitrogen 18 7 - 30 mg/dL Final    Creatinine 1.39 (H) 0.52 - 1.04 mg/dL Final    GFR Estimate 44 (L) >60 mL/min/1.7m2 Final    Non  GFR Calc    GFR Estimate If Black 53 (L) >60 mL/min/1.7m2 Final    African American GFR Calc    Calcium 8.7 8.5 - 10.1 mg/dL Final         2017  5:31 PM      Component Results     Component Value Ref Range & Units Status    Glucose 73 70 - 99 mg/dL Final                Thank you for choosing [facility]       Thank you for choosing [facility] for your care. Our goal is always to provide you with excellent care. Hearing back from our patients is one way we can continue to improve our services. Please take a few minutes to complete the written survey that you may receive in the mail after you visit with us. Thank you!        [facility] Information     [facility] lets you send messages to your doctor, view your test results, renew your prescriptions, schedule appointments and more. To sign up, go to [email or web address] . Click on \"Log in\" on the left side of the screen, which will take you to the Welcome page. Then click on \"Sign up Now\" on the right side of the page.     You will be asked to enter the access code listed below, as well as some personal information. Please follow the directions to create your username and password.     Your access code is: [email or web address]  Expires: 2017 11:32 AM     Your access code will  in 90 days. If you need help or a new code, please call your [facility] clinic or [phone].        Care EveryWhere ID     This is your Care EveryWhere ID. This could be used by other organizations to access your [facility] medical records  [id number]        After Visit Summary       This is your record. Keep this with you and show to your community pharmacist(s) and doctor(s) at your next visit.                  "

## 2017-05-28 NOTE — ED NOTES
"HGB 7.9 on 5-11-17 brought in by , weakness, nausea no emesis, HA, some blurred vision,  Pt states \" I was blacking out while I was eating \"  HX of hypoglycemia, will check blood sugar, blood sugar now 73  "

## 2017-05-28 NOTE — ED AVS SNAPSHOT
[facility] Emergency Department    [address] [address]    Phone:  [phone]    Fax:  [phone]                                       Molly Teague   MRN: [id number]    Department:  [facility] Emergency Department   Date of Visit:  5/28/2017           After Visit Summary Signature Page     I have received my discharge instructions, and my questions have been answered. I have discussed any challenges I see with this plan with the nurse or doctor.    ..........................................................................................................................................  Patient/Patient Representative Signature      ..........................................................................................................................................  Patient Representative Print Name and Relationship to Patient    ..................................................               ................................................  Date                                            Time    ..........................................................................................................................................  Reviewed by Signature/Title    ...................................................              ..............................................  Date                                                            Time

## 2017-05-28 NOTE — ED PROVIDER NOTES
History     Chief Complaint   Patient presents with     Generalized Weakness     Was seen at [facility] on Wed and cauterized bleed - today with continued black stools and increased weakness.     HPI  Molly Teague is a 31 year old female who presents with weakness and dark stools.      Molly has a h/o Schleroderma, chronic pain and has recent problems with gastric ulcerations. She had UGI endoscopy at [facility] 4 days ago with some cautery of ulcerations. She says she has still noticed dark BM's like coffee grounds. I should note that she also recently was asked to take ferrous sulfate 325 mg 3 times daily. She seemed to be weaker by her  tonight when he came home and there was concern about GI bleeding so she presented to the E R.    She was once on anticoagulation but hasn't been on Warfarin for several months.    Patient Active Problem List   Diagnosis     Long-term (current) use of anticoagulants [Z79.01]     Nausea with vomiting     Scleroderma (H)     Anxiety     Scleroderma with renal involvement (H)     History of ARDS     Raynaud's disease without gangrene     History of hemodialysis     Bilateral retinitis     IUD (intrauterine device) in place     Other pulmonary embolism without acute cor pulmonale, unspecified chronicity (H)     REX (generalized anxiety disorder)     CREST (calcinosis, Raynaud's phenomenon, esophageal dysfunction, sclerodactyly, telangiectasia) (H)     History of Clostridium difficile     History of Helicobacter pylori infection     Moderate persistent asthma without complication     Limited systemic sclerosis (H)     Polyneuropathy associated with underlying disease (H)     AIN grade I     Gastroesophageal reflux disease with esophagitis     Chronic migraine without aura without status migrainosus, not intractable     Chronic pain syndrome     Current Outpatient Prescriptions   Medication Sig Dispense Refill     zolpidem (AMBIEN) 10 MG tablet Take 1 tablet (10 mg) by mouth nightly as  needed for sleep 30 tablet 3     Cyanocobalamin (B-12) 1000 MCG TBCR Take 1,000 mcg by mouth daily 100 tablet 1     oxyCODONE (ROXICODONE) 15 MG IR tablet Take 1 tablet (15 mg) by mouth every 4 hours as needed for moderate to severe pain 120 tablet 0     oxyCODONE (ROXICODONE) 15 MG IR tablet Take 1 tablet (15 mg) by mouth every 4 hours as needed for pain 120 tablet 0     [START ON 6/25/2017] oxyCODONE (ROXICODONE) 15 MG IR tablet Take 1 tablet (15 mg) by mouth every 4 hours as needed for pain 120 tablet 0     LORazepam (ATIVAN) 0.5 MG tablet Take 1 tablet (0.5 mg) by mouth 2 times daily as needed for anxiety 60 tablet 3     Promethazine HCl 50 MG/ML SOLN Inject 0.5 mLs (25 mg) into the muscle every 6 hours as needed 90 mL 11     aspirin-acetaminophen-caffeine (EXCEDRIN MIGRAINE) 250-250-65 MG per tablet Take 1 tablet by mouth every 6 hours as needed for headaches 24 tablet 1     budesonide-formoterol (SYMBICORT) 160-4.5 MCG/ACT Inhaler Inhale 2 puffs into the lungs 2 times daily 3 Inhaler 3     sucralfate (CARAFATE) 1 GM tablet Take 1 tablet (1 g) by mouth 4 times daily 120 tablet 11     folic acid (FOLVITE) 1 MG tablet Take 1 tablet (1 mg) by mouth daily 100 tablet 3     gabapentin (NEURONTIN) 300 MG capsule Take 1 capsule (300 mg) by mouth 3 times daily 270 capsule 3     citalopram (CELEXA) 40 MG tablet Take 1 tablet (40 mg) by mouth daily 90 tablet 3     methotrexate 2.5 MG tablet CHEMO Take 6 tablets (15 mg) by mouth once a week 30 tablet 1     ranitidine (ZANTAC) 150 MG tablet Take 1 tablet (150 mg) by mouth 2 times daily as needed for heartburn 60 tablet 11     ferrous gluconate (FERGON) 324 (38 FE) MG tablet Take 1 tablet (324 mg) by mouth daily (with breakfast) 100 tablet 3     albuterol (VENTOLIN HFA) 108 (90 BASE) MCG/ACT Inhaler Inhale 2 puffs into the lungs every 4 hours as needed for shortness of breath / dyspnea or wheezing 1 Inhaler 11     promethazine (PHENERGAN) 25 MG tablet Take 1 tablet (25 mg)  "by mouth every 6 hours as needed for nausea 20 tablet 3     polyethylene glycol (MIRALAX) powder Take 17 g (1 capful) by mouth daily 510 g 1     blood glucose monitoring (NO BRAND SPECIFIED) test strip Use to test blood sugars 3 times daily or as directed  Please give what is approved by insurance. 100 each 0     omeprazole (PRILOSEC) 20 MG capsule Take 40 mg by mouth daily  90 capsule 1     diphenhydrAMINE (BENADRYL) 25 MG tablet Take 1 tablet (25 mg) by mouth every 6 hours as needed for itching or allergies 56 tablet          I have reviewed the Medications, Allergies, Past Medical and Surgical History, and Social History in the Epic system.    Review of Systems   Constitutional: Negative for fever.   HENT: Negative for congestion.    Eyes: Negative.    Respiratory: Negative for shortness of breath.    Cardiovascular: Negative for chest pain.   Gastrointestinal: Negative for abdominal pain and vomiting.   Genitourinary: Negative for difficulty urinating.   Musculoskeletal: Positive for myalgias.   Skin: Negative for rash.   Neurological: Positive for weakness. Negative for speech difficulty and headaches.       Physical Exam   BP: 137/83  Pulse: 133  Temp: 98  F (36.7  C)  Resp: 16  Height: 157.5 cm (5' 2\")  Weight: 63.5 kg (140 lb)  SpO2: 97 %  Physical Exam   Constitutional:   Chronically ill appearing woman.   HENT:   Head: Normocephalic.   Mouth/Throat: Oropharynx is clear and moist.   Eyes: Pupils are equal, round, and reactive to light. No scleral icterus.   Neck: Neck supple. No thyromegaly present.   Cardiovascular: Regular rhythm.    No murmur heard.  Pulmonary/Chest: Breath sounds normal. No respiratory distress.   Abdominal: She exhibits no distension. There is no tenderness.   Musculoskeletal: She exhibits deformity. She exhibits no edema.   Finger deformities, thin and contracted.   Lymphadenopathy:     She has no cervical adenopathy.   Neurological: No cranial nerve deficit. Coordination normal. "   Skin: No rash noted.   Psychiatric:   Seems a bit withdrawn.       ED Course     ED Course     Procedures           Results for orders placed or performed during the hospital encounter of 05/28/17   CBC with platelets differential   Result Value Ref Range    WBC 9.1 4.0 - 11.0 10e9/L    RBC Count 3.37 (L) 3.8 - 5.2 10e12/L    Hemoglobin 8.1 (L) 11.7 - 15.7 g/dL    Hematocrit 26.9 (L) 35.0 - 47.0 %    MCV 80 78 - 100 fl    MCH 24.0 (L) 26.5 - 33.0 pg    MCHC 30.1 (L) 31.5 - 36.5 g/dL    RDW 15.5 (H) 10.0 - 15.0 %    Platelet Count 319 150 - 450 10e9/L    Diff Method Automated Method     % Neutrophils 55.9 %    % Lymphocytes 28.1 %    % Monocytes 9.0 %    % Eosinophils 5.6 %    % Basophils 1.3 %    % Immature Granulocytes 0.1 %    Absolute Neutrophil 5.1 1.6 - 8.3 10e9/L    Absolute Lymphocytes 2.6 0.8 - 5.3 10e9/L    Absolute Monocytes 0.8 0.0 - 1.3 10e9/L    Absolute Eosinophils 0.5 0.0 - 0.7 10e9/L    Absolute Basophils 0.1 0.0 - 0.2 10e9/L    Abs Immature Granulocytes 0.0 0 - 0.4 10e9/L   Basic metabolic panel   Result Value Ref Range    Sodium 137 133 - 144 mmol/L    Potassium 3.8 3.4 - 5.3 mmol/L    Chloride 105 94 - 109 mmol/L    Carbon Dioxide 23 20 - 32 mmol/L    Anion Gap 9 3 - 14 mmol/L    Glucose 82 70 - 99 mg/dL    Urea Nitrogen 18 7 - 30 mg/dL    Creatinine 1.39 (H) 0.52 - 1.04 mg/dL    GFR Estimate 44 (L) >60 mL/min/1.7m2    GFR Estimate If Black 53 (L) >60 mL/min/1.7m2    Calcium 8.7 8.5 - 10.1 mg/dL   Glucose by meter   Result Value Ref Range    Glucose 73 70 - 99 mg/dL   Glucose Whole Blood POCT   Result Value Ref Range    Glucose 73 70 - 99 mg/dL         Critical Care time:  none               Labs Ordered and Resulted from Time of ED Arrival Up to the Time of Departure from the ED   CBC WITH PLATELETS DIFFERENTIAL - Abnormal; Notable for the following:        Result Value    RBC Count 3.37 (*)     Hemoglobin 8.1 (*)     Hematocrit 26.9 (*)     MCH 24.0 (*)     MCHC 30.1 (*)     RDW 15.5 (*)      All other components within normal limits   BASIC METABOLIC PANEL - Abnormal; Notable for the following:     Creatinine 1.39 (*)     GFR Estimate 44 (*)     GFR Estimate If Black 53 (*)     All other components within normal limits   GLUCOSE WHOLE BLOOD POCT - Normal   GLUCOSE BY METER       Assessments & Plan (with Medical Decision Making)     Molly presented with dark stools and some weakness. Context is Scleroderma, chronic pain, h/o GI bleeding, recent UGI procedure at [facility]. He vitals were stable. She was alert. Hgb is stable as is renal function. I believe she may have noticed dark stools secondary to the iron intake. The patient was reassured as to her relative stability. I did suggest a F/U Hgb in 3-4 weeks. Findings were discussed with patient and her . They voice understanding.        I have reviewed the nursing notes.    I have reviewed the findings, diagnosis, plan and need for follow up with the patient.    Discharge Medication List as of 5/28/2017  7:35 PM          Final diagnoses:   Iron deficiency anemia due to chronic blood loss   Scleroderma (H)       5/28/2017   [facility] EMERGENCY DEPARTMENT     Get Espinoza MD  05/28/17 1943

## 2017-05-29 NOTE — DISCHARGE INSTRUCTIONS
OK to continue your present medications.    Best to take iron pills with food.    I suggest contact Dr Barba's Nurse and arrange a repeat Hemoglobin in 3-4 weeks.    Return to E R for worsening weakness or other concerning symptoms.

## 2017-06-01 ENCOUNTER — TELEPHONE (OUTPATIENT)
Dept: FAMILY MEDICINE | Facility: CLINIC | Age: 32
End: 2017-06-01

## 2017-06-08 ENCOUNTER — HOSPITAL ENCOUNTER (INPATIENT)
Facility: CLINIC | Age: 32
LOS: 3 days | Discharge: HOME | DRG: 872 | End: 2017-06-11
Attending: EMERGENCY MEDICINE | Admitting: FAMILY MEDICINE
Payer: COMMERCIAL

## 2017-06-08 DIAGNOSIS — N10 ACUTE PYELONEPHRITIS: ICD-10-CM

## 2017-06-08 DIAGNOSIS — K12.0 APHTHOUS ULCER: Primary | ICD-10-CM

## 2017-06-08 PROBLEM — A41.9 SEPSIS (H): Status: ACTIVE | Noted: 2017-06-08

## 2017-06-08 LAB
ALBUMIN SERPL-MCNC: 3.6 G/DL (ref 3.4–5)
ALBUMIN UR-MCNC: 30 MG/DL
ALP SERPL-CCNC: 89 U/L (ref 40–150)
ALT SERPL W P-5'-P-CCNC: 25 U/L (ref 0–50)
ANION GAP SERPL CALCULATED.3IONS-SCNC: 9 MMOL/L (ref 3–14)
APPEARANCE UR: ABNORMAL
AST SERPL W P-5'-P-CCNC: 39 U/L (ref 0–45)
BASOPHILS # BLD AUTO: 0 10E9/L (ref 0–0.2)
BASOPHILS NFR BLD AUTO: 0.2 %
BILIRUB SERPL-MCNC: 0.7 MG/DL (ref 0.2–1.3)
BILIRUB UR QL STRIP: NEGATIVE
BUN SERPL-MCNC: 20 MG/DL (ref 7–30)
CALCIUM SERPL-MCNC: 8.5 MG/DL (ref 8.5–10.1)
CHLORIDE SERPL-SCNC: 106 MMOL/L (ref 94–109)
CO2 SERPL-SCNC: 23 MMOL/L (ref 20–32)
COLOR UR AUTO: ABNORMAL
CREAT SERPL-MCNC: 1.41 MG/DL (ref 0.52–1.04)
CRP SERPL-MCNC: 128 MG/L (ref 0–8)
DIFFERENTIAL METHOD BLD: ABNORMAL
EOSINOPHIL # BLD AUTO: 0.4 10E9/L (ref 0–0.7)
EOSINOPHIL NFR BLD AUTO: 2.1 %
ERYTHROCYTE [DISTWIDTH] IN BLOOD BY AUTOMATED COUNT: 18.9 % (ref 10–15)
GFR SERPL CREATININE-BSD FRML MDRD: 43 ML/MIN/1.7M2
GLUCOSE SERPL-MCNC: 84 MG/DL (ref 70–99)
GLUCOSE UR STRIP-MCNC: NEGATIVE MG/DL
HCT VFR BLD AUTO: 29.2 % (ref 35–47)
HGB BLD-MCNC: 8.7 G/DL (ref 11.7–15.7)
HGB UR QL STRIP: ABNORMAL
IMM GRANULOCYTES # BLD: 0.1 10E9/L (ref 0–0.4)
IMM GRANULOCYTES NFR BLD: 0.5 %
INR PPP: 1.07 (ref 0.86–1.14)
KETONES UR STRIP-MCNC: NEGATIVE MG/DL
LACTATE BLD-SCNC: 1.5 MMOL/L (ref 0.7–2.1)
LEUKOCYTE ESTERASE UR QL STRIP: ABNORMAL
LIPASE SERPL-CCNC: 75 U/L (ref 73–393)
LYMPHOCYTES # BLD AUTO: 1.3 10E9/L (ref 0.8–5.3)
LYMPHOCYTES NFR BLD AUTO: 7.6 %
MCH RBC QN AUTO: 24.2 PG (ref 26.5–33)
MCHC RBC AUTO-ENTMCNC: 29.8 G/DL (ref 31.5–36.5)
MCV RBC AUTO: 81 FL (ref 78–100)
MONOCYTES # BLD AUTO: 0.3 10E9/L (ref 0–1.3)
MONOCYTES NFR BLD AUTO: 2.1 %
NEUTROPHILS # BLD AUTO: 14.5 10E9/L (ref 1.6–8.3)
NEUTROPHILS NFR BLD AUTO: 87.5 %
NITRATE UR QL: NEGATIVE
PH UR STRIP: 7.5 PH (ref 5–7)
PLATELET # BLD AUTO: 169 10E9/L (ref 150–450)
POTASSIUM SERPL-SCNC: 4.4 MMOL/L (ref 3.4–5.3)
PROT SERPL-MCNC: 7.6 G/DL (ref 6.8–8.8)
RBC # BLD AUTO: 3.59 10E12/L (ref 3.8–5.2)
RBC #/AREA URNS AUTO: 18 /HPF (ref 0–2)
SODIUM SERPL-SCNC: 138 MMOL/L (ref 133–144)
SP GR UR STRIP: 1 (ref 1–1.03)
SQUAMOUS #/AREA URNS AUTO: 1 /HPF (ref 0–1)
URN SPEC COLLECT METH UR: ABNORMAL
UROBILINOGEN UR STRIP-MCNC: NORMAL MG/DL (ref 0–2)
WBC # BLD AUTO: 16.5 10E9/L (ref 4–11)
WBC #/AREA URNS AUTO: 42 /HPF (ref 0–2)

## 2017-06-08 PROCEDURE — 87086 URINE CULTURE/COLONY COUNT: CPT | Performed by: EMERGENCY MEDICINE

## 2017-06-08 PROCEDURE — 85025 COMPLETE CBC W/AUTO DIFF WBC: CPT | Performed by: EMERGENCY MEDICINE

## 2017-06-08 PROCEDURE — 12000000 ZZH R&B MED SURG/OB

## 2017-06-08 PROCEDURE — 87088 URINE BACTERIA CULTURE: CPT | Performed by: EMERGENCY MEDICINE

## 2017-06-08 PROCEDURE — 85610 PROTHROMBIN TIME: CPT | Performed by: EMERGENCY MEDICINE

## 2017-06-08 PROCEDURE — 99207 ZZC CDG-CHARGE REQUIRED MANUAL ENTRY: CPT | Performed by: FAMILY MEDICINE

## 2017-06-08 PROCEDURE — 25000128 H RX IP 250 OP 636: Performed by: EMERGENCY MEDICINE

## 2017-06-08 PROCEDURE — 81001 URINALYSIS AUTO W/SCOPE: CPT | Performed by: EMERGENCY MEDICINE

## 2017-06-08 PROCEDURE — 80053 COMPREHEN METABOLIC PANEL: CPT | Performed by: EMERGENCY MEDICINE

## 2017-06-08 PROCEDURE — 36415 COLL VENOUS BLD VENIPUNCTURE: CPT | Performed by: EMERGENCY MEDICINE

## 2017-06-08 PROCEDURE — 87800 DETECT AGNT MULT DNA DIREC: CPT | Performed by: EMERGENCY MEDICINE

## 2017-06-08 PROCEDURE — 87077 CULTURE AEROBIC IDENTIFY: CPT | Performed by: EMERGENCY MEDICINE

## 2017-06-08 PROCEDURE — 25000132 ZZH RX MED GY IP 250 OP 250 PS 637: Performed by: EMERGENCY MEDICINE

## 2017-06-08 PROCEDURE — 87040 BLOOD CULTURE FOR BACTERIA: CPT | Performed by: EMERGENCY MEDICINE

## 2017-06-08 PROCEDURE — 87186 SC STD MICRODIL/AGAR DIL: CPT | Performed by: EMERGENCY MEDICINE

## 2017-06-08 PROCEDURE — 83690 ASSAY OF LIPASE: CPT | Performed by: EMERGENCY MEDICINE

## 2017-06-08 PROCEDURE — 99223 1ST HOSP IP/OBS HIGH 75: CPT | Mod: AI | Performed by: FAMILY MEDICINE

## 2017-06-08 PROCEDURE — 86140 C-REACTIVE PROTEIN: CPT | Performed by: EMERGENCY MEDICINE

## 2017-06-08 PROCEDURE — 83605 ASSAY OF LACTIC ACID: CPT | Performed by: EMERGENCY MEDICINE

## 2017-06-08 RX ORDER — DIPHENHYDRAMINE HCL 25 MG
25 CAPSULE ORAL EVERY 6 HOURS PRN
Status: DISCONTINUED | OUTPATIENT
Start: 2017-06-08 | End: 2017-06-11 | Stop reason: HOSPADM

## 2017-06-08 RX ORDER — ONDANSETRON 4 MG/1
4 TABLET, ORALLY DISINTEGRATING ORAL EVERY 6 HOURS PRN
Status: DISCONTINUED | OUTPATIENT
Start: 2017-06-08 | End: 2017-06-11 | Stop reason: HOSPADM

## 2017-06-08 RX ORDER — PROMETHAZINE HYDROCHLORIDE 25 MG/ML
25 INJECTION, SOLUTION INTRAMUSCULAR; INTRAVENOUS ONCE
Status: COMPLETED | OUTPATIENT
Start: 2017-06-08 | End: 2017-06-08

## 2017-06-08 RX ORDER — OXYCODONE HYDROCHLORIDE 5 MG/1
15 TABLET ORAL EVERY 4 HOURS PRN
Status: DISCONTINUED | OUTPATIENT
Start: 2017-06-08 | End: 2017-06-11 | Stop reason: HOSPADM

## 2017-06-08 RX ORDER — ACETAMINOPHEN 500 MG
1000 TABLET ORAL ONCE
Status: COMPLETED | OUTPATIENT
Start: 2017-06-08 | End: 2017-06-08

## 2017-06-08 RX ORDER — ONDANSETRON 2 MG/ML
4 INJECTION INTRAMUSCULAR; INTRAVENOUS EVERY 30 MIN PRN
Status: DISCONTINUED | OUTPATIENT
Start: 2017-06-08 | End: 2017-06-08 | Stop reason: CLARIF

## 2017-06-08 RX ORDER — LISINOPRIL 5 MG/1
5 TABLET ORAL DAILY
Status: DISCONTINUED | OUTPATIENT
Start: 2017-06-09 | End: 2017-06-11 | Stop reason: HOSPADM

## 2017-06-08 RX ORDER — POLYETHYLENE GLYCOL 3350 17 G/17G
17 POWDER, FOR SOLUTION ORAL DAILY PRN
Status: DISCONTINUED | OUTPATIENT
Start: 2017-06-08 | End: 2017-06-08

## 2017-06-08 RX ORDER — LORAZEPAM 0.5 MG/1
0.5 TABLET ORAL 2 TIMES DAILY PRN
Status: DISCONTINUED | OUTPATIENT
Start: 2017-06-08 | End: 2017-06-11 | Stop reason: HOSPADM

## 2017-06-08 RX ORDER — ALBUTEROL SULFATE 90 UG/1
2 AEROSOL, METERED RESPIRATORY (INHALATION) EVERY 4 HOURS PRN
Status: DISCONTINUED | OUTPATIENT
Start: 2017-06-08 | End: 2017-06-11 | Stop reason: HOSPADM

## 2017-06-08 RX ORDER — PROMETHAZINE HYDROCHLORIDE 25 MG/ML
25 INJECTION, SOLUTION INTRAMUSCULAR; INTRAVENOUS EVERY 6 HOURS PRN
COMMUNITY
End: 2018-04-11

## 2017-06-08 RX ORDER — SODIUM CHLORIDE 9 MG/ML
1000 INJECTION, SOLUTION INTRAVENOUS CONTINUOUS
Status: DISCONTINUED | OUTPATIENT
Start: 2017-06-08 | End: 2017-06-10

## 2017-06-08 RX ORDER — CEFTRIAXONE 1 G/1
1 INJECTION, POWDER, FOR SOLUTION INTRAMUSCULAR; INTRAVENOUS ONCE
Status: COMPLETED | OUTPATIENT
Start: 2017-06-08 | End: 2017-06-08

## 2017-06-08 RX ORDER — CITALOPRAM HYDROBROMIDE 40 MG/1
40 TABLET ORAL DAILY
Status: DISCONTINUED | OUTPATIENT
Start: 2017-06-09 | End: 2017-06-11 | Stop reason: HOSPADM

## 2017-06-08 RX ORDER — GABAPENTIN 300 MG/1
300 CAPSULE ORAL 3 TIMES DAILY
Status: DISCONTINUED | OUTPATIENT
Start: 2017-06-09 | End: 2017-06-11 | Stop reason: HOSPADM

## 2017-06-08 RX ORDER — FERROUS GLUCONATE 324(38)MG
324 TABLET ORAL
Status: DISCONTINUED | OUTPATIENT
Start: 2017-06-09 | End: 2017-06-11 | Stop reason: HOSPADM

## 2017-06-08 RX ORDER — ONDANSETRON 2 MG/ML
4 INJECTION INTRAMUSCULAR; INTRAVENOUS EVERY 6 HOURS PRN
Status: DISCONTINUED | OUTPATIENT
Start: 2017-06-08 | End: 2017-06-11 | Stop reason: HOSPADM

## 2017-06-08 RX ORDER — POLYETHYLENE GLYCOL 3350 17 G/17G
17 POWDER, FOR SOLUTION ORAL DAILY
Status: DISCONTINUED | OUTPATIENT
Start: 2017-06-09 | End: 2017-06-11 | Stop reason: HOSPADM

## 2017-06-08 RX ORDER — CEFTRIAXONE 1 G/1
1 INJECTION, POWDER, FOR SOLUTION INTRAMUSCULAR; INTRAVENOUS EVERY 24 HOURS
Status: DISCONTINUED | OUTPATIENT
Start: 2017-06-09 | End: 2017-06-11 | Stop reason: HOSPADM

## 2017-06-08 RX ORDER — POLYETHYLENE GLYCOL 3350 17 G/17G
17 POWDER, FOR SOLUTION ORAL DAILY PRN
Status: DISCONTINUED | OUTPATIENT
Start: 2017-06-08 | End: 2017-06-11 | Stop reason: HOSPADM

## 2017-06-08 RX ORDER — ACETAMINOPHEN 325 MG/1
650 TABLET ORAL EVERY 4 HOURS PRN
Status: DISCONTINUED | OUTPATIENT
Start: 2017-06-08 | End: 2017-06-11 | Stop reason: HOSPADM

## 2017-06-08 RX ORDER — ZOLPIDEM TARTRATE 5 MG/1
10 TABLET ORAL
Status: DISCONTINUED | OUTPATIENT
Start: 2017-06-08 | End: 2017-06-11 | Stop reason: HOSPADM

## 2017-06-08 RX ORDER — HYDROMORPHONE HCL/0.9% NACL/PF 0.2MG/0.2
0.2 SYRINGE (ML) INTRAVENOUS
Status: DISCONTINUED | OUTPATIENT
Start: 2017-06-08 | End: 2017-06-11 | Stop reason: HOSPADM

## 2017-06-08 RX ORDER — PROCHLORPERAZINE 25 MG
25 SUPPOSITORY, RECTAL RECTAL EVERY 12 HOURS PRN
Status: DISCONTINUED | OUTPATIENT
Start: 2017-06-08 | End: 2017-06-11 | Stop reason: HOSPADM

## 2017-06-08 RX ORDER — HYDROMORPHONE HYDROCHLORIDE 1 MG/ML
0.5 INJECTION, SOLUTION INTRAMUSCULAR; INTRAVENOUS; SUBCUTANEOUS
Status: DISCONTINUED | OUTPATIENT
Start: 2017-06-08 | End: 2017-06-11 | Stop reason: HOSPADM

## 2017-06-08 RX ORDER — METOCLOPRAMIDE 10 MG/1
10 TABLET ORAL EVERY 6 HOURS PRN
Status: DISCONTINUED | OUTPATIENT
Start: 2017-06-08 | End: 2017-06-11 | Stop reason: HOSPADM

## 2017-06-08 RX ORDER — SUCRALFATE 1 G/1
1 TABLET ORAL 4 TIMES DAILY
Status: DISCONTINUED | OUTPATIENT
Start: 2017-06-09 | End: 2017-06-11 | Stop reason: HOSPADM

## 2017-06-08 RX ORDER — METOCLOPRAMIDE HYDROCHLORIDE 5 MG/ML
10 INJECTION INTRAMUSCULAR; INTRAVENOUS EVERY 6 HOURS PRN
Status: DISCONTINUED | OUTPATIENT
Start: 2017-06-08 | End: 2017-06-11 | Stop reason: HOSPADM

## 2017-06-08 RX ORDER — NALOXONE HYDROCHLORIDE 0.4 MG/ML
.1-.4 INJECTION, SOLUTION INTRAMUSCULAR; INTRAVENOUS; SUBCUTANEOUS
Status: DISCONTINUED | OUTPATIENT
Start: 2017-06-08 | End: 2017-06-11 | Stop reason: HOSPADM

## 2017-06-08 RX ORDER — PROCHLORPERAZINE MALEATE 5 MG
5-10 TABLET ORAL EVERY 6 HOURS PRN
Status: DISCONTINUED | OUTPATIENT
Start: 2017-06-08 | End: 2017-06-11 | Stop reason: HOSPADM

## 2017-06-08 RX ADMIN — PROMETHAZINE HYDROCHLORIDE 25 MG: 25 INJECTION INTRAMUSCULAR; INTRAVENOUS at 18:56

## 2017-06-08 RX ADMIN — HYDROMORPHONE HYDROCHLORIDE 0.5 MG: 1 INJECTION, SOLUTION INTRAMUSCULAR; INTRAVENOUS; SUBCUTANEOUS at 23:13

## 2017-06-08 RX ADMIN — CEFTRIAXONE SODIUM 1 G: 1 INJECTION, POWDER, FOR SOLUTION INTRAMUSCULAR; INTRAVENOUS at 22:16

## 2017-06-08 RX ADMIN — ACETAMINOPHEN 1000 MG: 500 TABLET ORAL at 22:33

## 2017-06-08 RX ADMIN — SODIUM CHLORIDE 1000 ML: 9 INJECTION, SOLUTION INTRAVENOUS at 19:58

## 2017-06-08 RX ADMIN — SODIUM CHLORIDE 1000 ML: 9 INJECTION, SOLUTION INTRAVENOUS at 18:31

## 2017-06-08 ASSESSMENT — ACTIVITIES OF DAILY LIVING (ADL)
AMBULATION: 0-->INDEPENDENT
EATING: 0-->INDEPENDENT
SWALLOWING: 0-->SWALLOWS FOODS/LIQUIDS WITHOUT DIFFICULTY
RETIRED_EATING: 0-->INDEPENDENT
TRANSFERRING: 0-->INDEPENDENT
RETIRED_COMMUNICATION: 0-->UNDERSTANDS/COMMUNICATES WITHOUT DIFFICULTY
COMMUNICATION: 0-->UNDERSTANDS/COMMUNICATES WITHOUT DIFFICULTY
SWALLOWING: 0-->SWALLOWS FOODS/LIQUIDS WITHOUT DIFFICULTY
BATHING: 0-->INDEPENDENT
DRESS: 0-->INDEPENDENT
AMBULATION: 0-->INDEPENDENT
DRESS: 0-->INDEPENDENT
BATHING: 0-->INDEPENDENT
TRANSFERRING: 0-->INDEPENDENT
COGNITION: 0 - NO COGNITION ISSUES REPORTED
FALL_HISTORY_WITHIN_LAST_SIX_MONTHS: NO
TOILETING: 0-->INDEPENDENT
TOILETING: 0-->INDEPENDENT

## 2017-06-08 ASSESSMENT — ENCOUNTER SYMPTOMS
VOMITING: 1
FEVER: 1
CHILLS: 1
FLANK PAIN: 1
FREQUENCY: 0
DIARRHEA: 0
ABDOMINAL PAIN: 1

## 2017-06-08 NOTE — IP AVS SNAPSHOT
MRN:[id number]                      After Visit Summary   6/8/2017    Molly Teague    MRN: [id number]           Thank you!     Thank you for choosing [facility] for your care. Our goal is always to provide you with excellent care. Hearing back from our patients is one way we can continue to improve our services. Please take a few minutes to complete the written survey that you may receive in the mail after you visit with us. Thank you!        Patient Information     Date Of Birth          1985        Designated Caregiver       Most Recent Value    Caregiver    Will someone help with your care after discharge? no      About your hospital stay     You were admitted on:  June 8, 2017 You last received care in the:  [facility]    You were discharged on:  June 11, 2017        Reason for your hospital stay       Pyelonephritis with sepsis (kidney infection causing bloodstream infection)                  Who to Call     For medical emergencies, please call 911.  For non-urgent questions about your medical care, please call your primary care provider or clinic, [phone]          Attending Provider     Provider Specialty    Denys Garcia MD Emergency Medicine    [facility], Gene REED MD Family Practice    Grecia Barba DO Internal Medicine       Primary Care Provider Office Phone # Fax #    Grecia Barba [phone]      After Care Instructions     Activity       Your activity upon discharge: activity as tolerated            Diet       Follow this diet upon discharge: Orders Placed This Encounter      Regular Diet Adult Other - please comment                  Follow-up Appointments     Follow-up and recommended labs and tests        Follow up with primary care provider, Grecia Barba, within 7 days for hospital follow- up.  The following labs/tests are recommended: CBC.                  Your next 10 appointments already scheduled     Jun 14,  "2017 11:00 AM CDT   [facility] G[facility]ba DO   [facility]    [address] [address]   [phone]            Aug 08, 2017  9:00 AM CDT   PHYSICAL with Kale Marquez MD   [facility]    [address] [address]   [phone]              Further instructions from your care team       1. No changes to regular prescriptions  2. Start antibiotic Cipro twice daily for 4 days.  First dose Sunday PM  3. The Cipro interacts with Iron.  Only take iron at lunch while on the antibiotic, then can resume taking it 3 x daily.  4. See Dr. Barba in 1-2 weeks to see how you are doing.    Pending Results     No orders found from 6/6/2017 to 6/9/2017.            Statement of Approval     Ordered          06/11/17 0735  I have reviewed and agree with all the recommendations and orders detailed in this document.  EFFECTIVE NOW     Approved and electronically signed by:  Grecia Barba DO             Admission Information     Date & Time Provider Department Dept. Phone    6/8/2017 Grecia Barba DO [facility] [phone]      Your Vitals Were     Blood Pressure Pulse Temperature Respirations Height Weight    117/73 (BP Location: Right arm) 89 98  F (36.7  C) (Oral) 16 1.575 m (5' 2\") 68.5 kg (151 lb 0.2 oz)    Pulse Oximetry BMI (Body Mass Index)                99% 27.62 kg/m2          [employer] Information     [employer] lets you send messages to your doctor, view your test results, renew your prescriptions, schedule appointments and more. To sign up, go to [email or web address] . Click on \"Log in\" on the left side of the screen, which will take you to the Welcome page. Then click on \"Sign up Now\" on the right side of the page.     You will be asked to enter the access code listed below, as well as some personal information. Please follow the directions " to create your username and password.     Your access code is: [email or web address]  Expires: 2017 11:32 AM     Your access code will  in 90 days. If you need help or a new code, please call your [facility] clinic or [phone].        Care EveryWhere ID     This is your Care EveryWhere ID. This could be used by other organizations to access your [facility] medical records  [id number]           Review of your medicines      START taking        Dose / Directions    Benzocaine 20 % Aero spray   Commonly known as:  HURRICAINE/TOPEX   Used for:  Aphthous ulcer        Use in mouth as needed   Quantity:  1 each   Refills:  1       ciprofloxacin 500 MG tablet   Commonly known as:  CIPRO        Dose:  500 mg   Take 1 tablet (500 mg) by mouth 2 times daily for 4 days   Quantity:  8 tablet   Refills:  0         CONTINUE these medicines which may have CHANGED, or have new prescriptions. If we are uncertain of the size of tablets/capsules you have at home, strength may be listed as something that might have changed.        Dose / Directions    ferrous gluconate 324 (38 FE) MG tablet   Commonly known as:  FERGON   This may have changed:  when to take this   Used for:  CREST (calcinosis, Raynaud's phenomenon, esophageal dysfunction, sclerodactyly, telangiectasia) (H)        Dose:  324 mg   Take 1 tablet (324 mg) by mouth daily (with breakfast)   Quantity:  100 tablet   Refills:  3         CONTINUE these medicines which have NOT CHANGED        Dose / Directions    albuterol 108 (90 BASE) MCG/ACT Inhaler   Commonly known as:  VENTOLIN HFA   Used for:  Moderate persistent asthma without complication        Dose:  2 puff   Inhale 2 puffs into the lungs every 4 hours as needed for shortness of breath / dyspnea or wheezing   Quantity:  1 Inhaler   Refills:  11       aspirin-acetaminophen-caffeine 250-250-65 MG per tablet   Commonly known as:  EXCEDRIN MIGRAINE   Used for:  Chronic daily headache        Dose:  1 tablet   Take 1 tablet  by mouth every 6 hours as needed for headaches   Quantity:  24 tablet   Refills:  1       B-12 1000 MCG Tbcr   Used for:  Vitamin B12 deficiency (non anemic)        Dose:  1000 mcg   Take 1,000 mcg by mouth daily   Quantity:  100 tablet   Refills:  1       BENADRYL 25 MG tablet   Generic drug:  diphenhydrAMINE        Dose:  25 mg   Take 1 tablet (25 mg) by mouth every 6 hours as needed for itching or allergies   Quantity:  56 tablet   Refills:  0       blood glucose monitoring test strip   Commonly known as:  no brand specified   Used for:  Hypoglycemia        Use to test blood sugars 3 times daily or as directed Please give what is approved by insurance.   Quantity:  100 each   Refills:  0       budesonide-formoterol 160-4.5 MCG/ACT Inhaler   Commonly known as:  SYMBICORT   Used for:  Moderate persistent asthma without complication        Dose:  2 puff   Inhale 2 puffs into the lungs 2 times daily   Quantity:  3 Inhaler   Refills:  3       citalopram 40 MG tablet   Commonly known as:  celeXA   Used for:  Severe anxiety with panic        Dose:  40 mg   Take 1 tablet (40 mg) by mouth daily   Quantity:  90 tablet   Refills:  3       gabapentin 300 MG capsule   Commonly known as:  NEURONTIN   Used for:  Limited systemic sclerosis (H)        Dose:  300 mg   Take 1 capsule (300 mg) by mouth 3 times daily   Quantity:  270 capsule   Refills:  3       LISINOPRIL PO        Dose:  5 mg   Take 5 mg by mouth daily   Refills:  0       LORazepam 0.5 MG tablet   Commonly known as:  ATIVAN   Used for:  REX (generalized anxiety disorder)        Dose:  0.5 mg   Take 1 tablet (0.5 mg) by mouth 2 times daily as needed for anxiety   Quantity:  60 tablet   Refills:  3       omeprazole 20 MG CR capsule   Commonly known as:  priLOSEC        Dose:  40 mg   Take 40 mg by mouth 2 times daily   Quantity:  90 capsule   Refills:  1       * oxyCODONE 15 MG IR tablet   Commonly known as:  ROXICODONE   Used for:  Chronic pain syndrome        Dose:   15 mg   Take 1 tablet (15 mg) by mouth every 4 hours as needed for pain   Quantity:  120 tablet   Refills:  0       * oxyCODONE 15 MG IR tablet   Commonly known as:  ROXICODONE   Used for:  Chronic pain syndrome        Dose:  15 mg   Start taking on:  6/25/2017   Take 1 tablet (15 mg) by mouth every 4 hours as needed for pain   Quantity:  120 tablet   Refills:  0       polyethylene glycol powder   Commonly known as:  MIRALAX   Used for:  Drug-induced constipation        Dose:  1 capful   Take 17 g (1 capful) by mouth daily   Quantity:  510 g   Refills:  1       promethazine 25 MG/ML IV injection   Commonly known as:  PHENERGAN        Dose:  25 mg   Inject 25 mg into the vein every 6 hours as needed for nausea or vomiting   Refills:  0       ranitidine 150 MG tablet   Commonly known as:  ZANTAC   Used for:  CREST (calcinosis, Raynaud's phenomenon, esophageal dysfunction, sclerodactyly, telangiectasia) (H)        Dose:  150 mg   Take 1 tablet (150 mg) by mouth 2 times daily as needed for heartburn   Quantity:  60 tablet   Refills:  11       sucralfate 1 GM tablet   Commonly known as:  CARAFATE   Used for:  Limited systemic sclerosis (H)        Dose:  1 g   Take 1 tablet (1 g) by mouth 4 times daily   Quantity:  120 tablet   Refills:  11       zolpidem 10 MG tablet   Commonly known as:  AMBIEN   Used for:  Other insomnia        Dose:  10 mg   Take 1 tablet (10 mg) by mouth nightly as needed for sleep   Quantity:  30 tablet   Refills:  3       * Notice:  This list has 2 medication(s) that are the same as other medications prescribed for you. Read the directions carefully, and ask your doctor or other care provider to review them with you.         Where to get your medicines      These medications were sent to [facility] [place], [place] - [address]  [address] [address]     Phone:  [phone]     Benzocaine 20 % Aero spray    ciprofloxacin 500 MG tablet                Protect  others around you: Learn how to safely use, store and throw away your medicines at www.disposemymeds.org.             Medication List: This is a list of all your medications and when to take them. Check marks below indicate your daily home schedule. Keep this list as a reference.      Medications           Morning Afternoon Evening Bedtime As Needed    albuterol 108 (90 BASE) MCG/ACT Inhaler   Commonly known as:  VENTOLIN HFA   Inhale 2 puffs into the lungs every 4 hours as needed for shortness of breath / dyspnea or wheezing                                aspirin-acetaminophen-caffeine 250-250-65 MG per tablet   Commonly known as:  EXCEDRIN MIGRAINE   Take 1 tablet by mouth every 6 hours as needed for headaches   Last time this was given:  1 tablet on 6/9/2017  9:47 PM                                B-12 1000 MCG Tbcr   Take 1,000 mcg by mouth daily                                   BENADRYL 25 MG tablet   Take 1 tablet (25 mg) by mouth every 6 hours as needed for itching or allergies   Generic drug:  diphenhydrAMINE                                Benzocaine 20 % Aero spray   Commonly known as:  HURRICAINE/TOPEX   Use in mouth as needed   Last time this was given:  6/11/2017  7:48 AM                                blood glucose monitoring test strip   Commonly known as:  no brand specified   Use to test blood sugars 3 times daily or as directed Please give what is approved by insurance.                                budesonide-formoterol 160-4.5 MCG/ACT Inhaler   Commonly known as:  SYMBICORT   Inhale 2 puffs into the lungs 2 times daily                                      ciprofloxacin 500 MG tablet   Commonly known as:  CIPRO   Take 1 tablet (500 mg) by mouth 2 times daily for 4 days                       Start this evening               citalopram 40 MG tablet   Commonly known as:  celeXA   Take 1 tablet (40 mg) by mouth daily   Last time this was given:  40 mg on 6/11/2017  7:45 AM                                    ferrous gluconate 324 (38 FE) MG tablet   Commonly known as:  FERGON   Take 1 tablet (324 mg) by mouth daily (with breakfast)   Last time this was given:  324 mg on 6/11/2017  7:45 AM                                   gabapentin 300 MG capsule   Commonly known as:  NEURONTIN   Take 1 capsule (300 mg) by mouth 3 times daily   Last time this was given:  300 mg on 6/11/2017  7:45 AM                                         LISINOPRIL PO   Take 5 mg by mouth daily   Last time this was given:  5 mg on 6/11/2017  7:45 AM                                   LORazepam 0.5 MG tablet   Commonly known as:  ATIVAN   Take 1 tablet (0.5 mg) by mouth 2 times daily as needed for anxiety   Last time this was given:  0.5 mg on 6/10/2017 10:17 PM                                omeprazole 20 MG CR capsule   Commonly known as:  priLOSEC   Take 40 mg by mouth 2 times daily   Last time this was given:  40 mg on 6/11/2017  7:45 AM                                      * oxyCODONE 15 MG IR tablet   Commonly known as:  ROXICODONE   Take 1 tablet (15 mg) by mouth every 4 hours as needed for pain   Last time this was given:  15 mg on 6/11/2017  7:45 AM                                * oxyCODONE 15 MG IR tablet   Commonly known as:  ROXICODONE   Take 1 tablet (15 mg) by mouth every 4 hours as needed for pain   Start taking on:  6/25/2017   Last time this was given:  15 mg on 6/11/2017  7:45 AM                                polyethylene glycol powder   Commonly known as:  MIRALAX   Take 17 g (1 capful) by mouth daily   Last time this was given:  17 g on 6/9/2017  8:01 AM                                   promethazine 25 MG/ML IV injection   Commonly known as:  PHENERGAN   Inject 25 mg into the vein every 6 hours as needed for nausea or vomiting   Last time this was given:  25 mg on 6/8/2017  6:56 PM                                ranitidine 150 MG tablet   Commonly known as:  ZANTAC   Take 1 tablet (150 mg) by mouth 2 times daily as needed  for heartburn                                sucralfate 1 GM tablet   Commonly known as:  CARAFATE   Take 1 tablet (1 g) by mouth 4 times daily   Last time this was given:  1 g on 6/11/2017  7:45 AM                                            zolpidem 10 MG tablet   Commonly known as:  AMBIEN   Take 1 tablet (10 mg) by mouth nightly as needed for sleep   Last time this was given:  10 mg on 6/11/2017 12:42 AM                                * Notice:  This list has 2 medication(s) that are the same as other medications prescribed for you. Read the directions carefully, and ask your doctor or other care provider to review them with you.

## 2017-06-08 NOTE — ED PROVIDER NOTES
History     Chief Complaint   Patient presents with     Flank Pain     Fever     101-102 for 3 days     Vomiting     HPI     Molly Teague is a 31 year old female with a history of scleroderma with renal involvement, hemodialysis, bilateral retinitis, CREST, polyneuropathy, Helicobacter pylori infection, and Clostridium difficile who presents to the ED today for evaluation of a fever, vomiting and flank pain. The patient states that for the past 3 days she has been experiencing a fever (101-102) , flank pain and vomiting. She denies diarrhea but reports that her oxycodone interferes with her bowel movements. Her last normal bowel movement was at 0200 this am. She admits it is normal to have black and bloody stools due to her significant past medical history. Upon evaluation the patient reports severe LLQ abdominal pain that will radiate into her lower back. She states she has also been experiencing some wheezing. Denies urinary frequency and burning with urination. In May the patient had a UGI endoscopy at the [facility] with some cautery of ulcerations. The patient states a year and a half ago she experienced renal failure and was in a coma for several days.        Past Medical History:   Diagnosis Date     Arthritis      Blood clotting disorder (H)     P E     History of blood transfusion      Hypertension      Long-term (current) use of anticoagulants [Z79.01] 9/9/2016     PE (pulmonary embolism) 9/7/2016     Rheumatism      Scleroderma (H) 9/22/2016     Uncomplicated asthma      Patient Active Problem List   Diagnosis     Long-term (current) use of anticoagulants [Z79.01]     Nausea with vomiting     Scleroderma (H)     Anxiety     Scleroderma with renal involvement (H)     History of ARDS     Raynaud's disease without gangrene     History of hemodialysis     Bilateral retinitis     IUD (intrauterine device) in place     Other pulmonary embolism without acute cor pulmonale, unspecified chronicity (H)     REX  (generalized anxiety disorder)     CREST (calcinosis, Raynaud's phenomenon, esophageal dysfunction, sclerodactyly, telangiectasia) (H)     History of Clostridium difficile     History of Helicobacter pylori infection     Moderate persistent asthma without complication     Limited systemic sclerosis (H)     Polyneuropathy associated with underlying disease (H)     AIN grade I     Gastroesophageal reflux disease with esophagitis     Chronic migraine without aura without status migrainosus, not intractable     Chronic pain syndrome     Sepsis (H)     Current Facility-Administered Medications   Medication     0.9% sodium chloride infusion     albuterol (PROAIR HFA/PROVENTIL HFA/VENTOLIN HFA) Inhaler 2 puff     aspirin-acetaminophen-caffeine (EXCEDRIN MIGRAINE) per tablet 1 tablet     citalopram (celeXA) tablet 40 mg     diphenhydrAMINE (BENADRYL) capsule 25 mg     ferrous gluconate (FERGON) tablet 324 mg     gabapentin (NEURONTIN) capsule 300 mg     lisinopril (PRINIVIL/ZESTRIL) tablet 5 mg     LORazepam (ATIVAN) tablet 0.5 mg     omeprazole (priLOSEC) CR capsule 40 mg     oxyCODONE (ROXICODONE) IR tablet 15 mg     polyethylene glycol (MIRALAX/GLYCOLAX) powder 17 g     ranitidine (ZANTAC) tablet 150 mg     sucralfate (CARAFATE) tablet 1 g     zolpidem (AMBIEN) tablet 10 mg     fluticasone-vilanterol (BREO ELLIPTA) 200-25 MCG/INH oral inhaler 1 puff     cefTRIAXone (ROCEPHIN) 1 g vial to attach to  mL bag for ADULTS or NS 50 mL bag for PEDS     naloxone (NARCAN) injection 0.1-0.4 mg     enoxaparin (LOVENOX) injection 40 mg     acetaminophen (TYLENOL) tablet 650 mg     HYDROmorphone (DILAUDID) injection 0.2 mg     metoclopramide (REGLAN) tablet 10 mg    Or     metoclopramide (REGLAN) injection 10 mg     prochlorperazine (COMPAZINE) injection 5-10 mg    Or     prochlorperazine (COMPAZINE) tablet 5-10 mg    Or     prochlorperazine (COMPAZINE) Suppository 25 mg     ondansetron (ZOFRAN-ODT) ODT tab 4 mg    Or      ondansetron (ZOFRAN) injection 4 mg     HYDROmorphone (PF) (DILAUDID) injection 0.5 mg     polyethylene glycol (MIRALAX/GLYCOLAX) Packet 17 g        Allergies   Allergen Reactions     No Known Allergies      Shellfish-Derived Products Nausea     Rash on face     Social History     Social History     Marital status: Single     Spouse name: N/A     Number of children: N/A     Years of education: N/A     Occupational History     Not on file.     Social History Main Topics     Smoking status: Never Smoker     Smokeless tobacco: Not on file     Alcohol use No     Drug use: No     Sexual activity: Yes     Partners: Male     Other Topics Concern     Parent/Sibling W/ Cabg, Mi Or Angioplasty Before 65f 55m? No     Social History Narrative     Family History   Problem Relation Age of Onset     Hyperlipidemia Father      CEREBROVASCULAR DISEASE Maternal Grandmother      Hyperlipidemia Paternal Grandfather      DIABETES Maternal Aunt      Melanoma No family hx of      Skin Cancer No family hx of        I have reviewed the Medications, Allergies, Past Medical and Surgical History, and Social History in the Epic system.    Review of Systems   Constitutional: Positive for chills and fever.   Gastrointestinal: Positive for abdominal pain and vomiting. Negative for diarrhea.   Genitourinary: Positive for flank pain. Negative for frequency and urgency.       Physical Exam   BP: 110/74  Pulse: 85  Temp: 99.2  F (37.3  C)  Resp: 20  Physical Exam general alert ill-appearing female in moderate distress.  HEENT does not show scleral icterus.  Oral mucosa is moist.  She able speak in complete sentences.  Neck is supple.  Lungs were clear without adventitious sounds.  Cardiac regular rate without murmur.  Back she has left CVA tenderness to percussion.  Abdominal exam does not have localizing tenderness, masses, or organomegaly.  No skin rash over the flank or abdomen.  Extremities she has muscle wasting and deformity of her digits due to  her scleroderma.    ED Course     ED Course     Procedures             Critical Care time:  none               Labs Ordered and Resulted from Time of ED Arrival Up to the Time of Departure from the ED   CBC WITH PLATELETS DIFFERENTIAL - Abnormal; Notable for the following:        Result Value    WBC 16.5 (*)     RBC Count 3.59 (*)     Hemoglobin 8.7 (*)     Hematocrit 29.2 (*)     MCH 24.2 (*)     MCHC 29.8 (*)     RDW 18.9 (*)     Absolute Neutrophil 14.5 (*)     All other components within normal limits   COMPREHENSIVE METABOLIC PANEL - Abnormal; Notable for the following:     Creatinine 1.41 (*)     GFR Estimate 43 (*)     GFR Estimate If Black 52 (*)     All other components within normal limits   CRP INFLAMMATION - Abnormal; Notable for the following:     CRP Inflammation 128.0 (*)     All other components within normal limits   URINE MACROSCOPIC WITH REFLEX TO MICRO - Abnormal; Notable for the following:     Blood Urine Trace (*)     pH Urine 7.5 (*)     Protein Albumin Urine 30 (*)     Leukocyte Esterase Urine Large (*)     RBC Urine 18 (*)     WBC Urine 42 (*)     All other components within normal limits   INR   LACTIC ACID WHOLE BLOOD   LIPASE   BLOOD CULTURE   BLOOD CULTURE   URINE CULTURE AEROBIC BACTERIAL     Results for orders placed or performed during the hospital encounter of 06/08/17 (from the past 24 hour(s))   CBC with platelets differential   Result Value Ref Range    WBC 16.5 (H) 4.0 - 11.0 10e9/L    RBC Count 3.59 (L) 3.8 - 5.2 10e12/L    Hemoglobin 8.7 (L) 11.7 - 15.7 g/dL    Hematocrit 29.2 (L) 35.0 - 47.0 %    MCV 81 78 - 100 fl    MCH 24.2 (L) 26.5 - 33.0 pg    MCHC 29.8 (L) 31.5 - 36.5 g/dL    RDW 18.9 (H) 10.0 - 15.0 %    Platelet Count 169 150 - 450 10e9/L    Diff Method Automated Method     % Neutrophils 87.5 %    % Lymphocytes 7.6 %    % Monocytes 2.1 %    % Eosinophils 2.1 %    % Basophils 0.2 %    % Immature Granulocytes 0.5 %    Absolute Neutrophil 14.5 (H) 1.6 - 8.3 10e9/L     Absolute Lymphocytes 1.3 0.8 - 5.3 10e9/L    Absolute Monocytes 0.3 0.0 - 1.3 10e9/L    Absolute Eosinophils 0.4 0.0 - 0.7 10e9/L    Absolute Basophils 0.0 0.0 - 0.2 10e9/L    Abs Immature Granulocytes 0.1 0 - 0.4 10e9/L   INR   Result Value Ref Range    INR 1.07 0.86 - 1.14   Comprehensive metabolic panel   Result Value Ref Range    Sodium 138 133 - 144 mmol/L    Potassium 4.4 3.4 - 5.3 mmol/L    Chloride 106 94 - 109 mmol/L    Carbon Dioxide 23 20 - 32 mmol/L    Anion Gap 9 3 - 14 mmol/L    Glucose 84 70 - 99 mg/dL    Urea Nitrogen 20 7 - 30 mg/dL    Creatinine 1.41 (H) 0.52 - 1.04 mg/dL    GFR Estimate 43 (L) >60 mL/min/1.7m2    GFR Estimate If Black 52 (L) >60 mL/min/1.7m2    Calcium 8.5 8.5 - 10.1 mg/dL    Bilirubin Total 0.7 0.2 - 1.3 mg/dL    Albumin 3.6 3.4 - 5.0 g/dL    Protein Total 7.6 6.8 - 8.8 g/dL    Alkaline Phosphatase 89 40 - 150 U/L    ALT 25 0 - 50 U/L    AST 39 0 - 45 U/L   Lactic acid whole blood   Result Value Ref Range    Lactic Acid 1.5 0.7 - 2.1 mmol/L   Lipase   Result Value Ref Range    Lipase 75 73 - 393 U/L   CRP inflammation   Result Value Ref Range    CRP Inflammation 128.0 (H) 0.0 - 8.0 mg/L   UA reflex to Microscopic   Result Value Ref Range    Color Urine Light Yellow     Appearance Urine Slightly Cloudy     Glucose Urine Negative NEG mg/dL    Bilirubin Urine Negative NEG    Ketones Urine Negative NEG mg/dL    Specific Gravity Urine 1.005 1.003 - 1.035    Blood Urine Trace (A) NEG    pH Urine 7.5 (H) 5.0 - 7.0 pH    Protein Albumin Urine 30 (A) NEG mg/dL    Urobilinogen mg/dL Normal 0.0 - 2.0 mg/dL    Nitrite Urine Negative NEG    Leukocyte Esterase Urine Large (A) NEG    Source Midstream Urine     RBC Urine 18 (H) 0 - 2 /HPF    WBC Urine 42 (H) 0 - 2 /HPF    Squamous Epithelial /HPF Urine 1 0 - 1 /HPF       Medications   0.9% sodium chloride BOLUS (0 mLs Intravenous Stopped 6/8/17 1958)     Followed by   0.9% sodium chloride infusion (1,000 mLs Intravenous Rate/Dose Verify 6/8/17  8629)   albuterol (PROAIR HFA/PROVENTIL HFA/VENTOLIN HFA) Inhaler 2 puff (not administered)   aspirin-acetaminophen-caffeine (EXCEDRIN MIGRAINE) per tablet 1 tablet (1 tablet Oral Given 6/9/17 0018)   citalopram (celeXA) tablet 40 mg (not administered)   diphenhydrAMINE (BENADRYL) capsule 25 mg (not administered)   ferrous gluconate (FERGON) tablet 324 mg (not administered)   gabapentin (NEURONTIN) capsule 300 mg (not administered)   lisinopril (PRINIVIL/ZESTRIL) tablet 5 mg (not administered)   LORazepam (ATIVAN) tablet 0.5 mg (not administered)   omeprazole (priLOSEC) CR capsule 40 mg (40 mg Oral Given 6/9/17 0014)   oxyCODONE (ROXICODONE) IR tablet 15 mg (not administered)   polyethylene glycol (MIRALAX/GLYCOLAX) powder 17 g (not administered)   ranitidine (ZANTAC) tablet 150 mg (not administered)   sucralfate (CARAFATE) tablet 1 g (1 g Oral Given 6/9/17 0014)   zolpidem (AMBIEN) tablet 10 mg (10 mg Oral Given 6/9/17 0014)   fluticasone-vilanterol (BREO ELLIPTA) 200-25 MCG/INH oral inhaler 1 puff (not administered)   cefTRIAXone (ROCEPHIN) 1 g vial to attach to  mL bag for ADULTS or NS 50 mL bag for PEDS (not administered)   naloxone (NARCAN) injection 0.1-0.4 mg (not administered)   enoxaparin (LOVENOX) injection 40 mg (40 mg Subcutaneous Given 6/9/17 0014)   acetaminophen (TYLENOL) tablet 650 mg (not administered)   HYDROmorphone (DILAUDID) injection 0.2 mg (not administered)   metoclopramide (REGLAN) tablet 10 mg (not administered)     Or   metoclopramide (REGLAN) injection 10 mg (not administered)   prochlorperazine (COMPAZINE) injection 5-10 mg (not administered)     Or   prochlorperazine (COMPAZINE) tablet 5-10 mg (not administered)     Or   prochlorperazine (COMPAZINE) Suppository 25 mg (not administered)   ondansetron (ZOFRAN-ODT) ODT tab 4 mg (not administered)     Or   ondansetron (ZOFRAN) injection 4 mg (not administered)   HYDROmorphone (PF) (DILAUDID) injection 0.5 mg (0.5 mg Intravenous Given  6/8/17 2313)   polyethylene glycol (MIRALAX/GLYCOLAX) Packet 17 g (not administered)   promethazine (PHENERGAN) IV injection 25 mg (25 mg Intravenous Given 6/8/17 1856)   cefTRIAXone (ROCEPHIN) 1 g vial to attach to  mL bag for ADULTS or NS 50 mL bag for PEDS (0 g Intravenous Stopped 6/8/17 2309)   acetaminophen (TYLENOL) tablet 1,000 mg (1,000 mg Oral Given 6/8/17 2233)       6:32 PM Patient assessed.   IV was established and blood work was obtained.  Blood cultures ordered.  Urinalysis ordered.  Patient is given fluids and Phenergan.  At 7:35 PM on recheck patient was resting and denies significant complaints.  Her nausea had improved.  1st liter of fluids was instilling.  Discussed results were elevated white count and CRP.  She is unable to provide a urine at this point.  At 9:30 PM discussed results with urinalysis being positive.  Urine culture is pending.  IV Rocephin is ordered.  At 10:20 PM nursing staff informs me that she did spike a temperature to 1027.  Additional IV fluids ordered and she is given Tylenol.  Assessments & Plan (with Medical Decision Making)   Patient is a 31-year-old female with a complex medical history including crest syndrome.  She's also had recent treatment for ulcerations of her stomach with cautery done at the [facility].  She has had previous acute renal failure.  Her scleroderma has caused renal insufficiency.  She presents today with 2 days of flank pain, vomiting, and fever to 102.  No diarrhea.  She does have dark stools but this is not unusual for her she takes iron routinely.  She has anemia with hemoglobin 8.7 but review of records show her last hemoglobin on 528 was 8.1.  Exam today the patient had a low-grade temperature.  She was tachycardic but not hypotensive.  She had left CVA tenderness to percussion.  Blood work showed elevation of her white count at 16.5 with a CRP of 128.  Lactic acid was normal.  Creatinine was slightly elevated at 1.41 with a normal  BUN.  Patient's urine was positive and a culture is pending.  She was given IV Rocephin.  Blood cultures are pending at this time.  Discussed the patient with Dr. Veloz from the hospitalist service and he will assume care and admission.  I have reviewed the nursing notes.    I have reviewed the findings, diagnosis, plan and need for follow up with the patient.    Current Discharge Medication List          Final diagnoses:   Acute pyelonephritis     This document serves as a record of the services and decisions personally performed and made by Denys Garcia MD. It was created on HIS/HER behalf by Desirae Howell, a trained medical scribe. The creation of this document is based the provider's statements to the medical scribe.  Desirae Howell 6:32 PM 6/8/2017    Provider:   The information in this document, created by the medical scribe for me, accurately reflects the services I personally performed and the decisions made by me. I have reviewed and approved this document for accuracy prior to leaving the patient care area.  Denys Garcia MD 6:32 PM 6/8/2017 6/8/2017   [facility] EMERGENCY DEPARTMENT     Denys Garcia MD  06/09/17 0206

## 2017-06-08 NOTE — IP AVS SNAPSHOT
[facility]    [address] [address]    Phone:  [phone]    Fax:  [phone]                                       After Visit Summary   6/8/2017    Molly Teague    MRN: [id number]           After Visit Summary Signature Page     I have received my discharge instructions, and my questions have been answered. I have discussed any challenges I see with this plan with the nurse or doctor.    ..........................................................................................................................................  Patient/Patient Representative Signature      ..........................................................................................................................................  Patient Representative Print Name and Relationship to Patient    ..................................................               ................................................  Date                                            Time    ..........................................................................................................................................  Reviewed by Signature/Title    ...................................................              ..............................................  Date                                                            Time

## 2017-06-09 PROBLEM — N10 ACUTE PYELONEPHRITIS: Status: ACTIVE | Noted: 2017-06-09

## 2017-06-09 LAB
ANION GAP SERPL CALCULATED.3IONS-SCNC: 8 MMOL/L (ref 3–14)
BUN SERPL-MCNC: 16 MG/DL (ref 7–30)
CALCIUM SERPL-MCNC: 7.6 MG/DL (ref 8.5–10.1)
CHLORIDE SERPL-SCNC: 111 MMOL/L (ref 94–109)
CO2 SERPL-SCNC: 21 MMOL/L (ref 20–32)
CREAT SERPL-MCNC: 1.35 MG/DL (ref 0.52–1.04)
ERYTHROCYTE [DISTWIDTH] IN BLOOD BY AUTOMATED COUNT: 18.7 % (ref 10–15)
GFR SERPL CREATININE-BSD FRML MDRD: 45 ML/MIN/1.7M2
GLUCOSE SERPL-MCNC: 93 MG/DL (ref 70–99)
HCT VFR BLD AUTO: 24.4 % (ref 35–47)
HGB BLD-MCNC: 7.1 G/DL (ref 11.7–15.7)
LACTATE BLD-SCNC: 0.9 MMOL/L (ref 0.7–2.1)
MCH RBC QN AUTO: 23.9 PG (ref 26.5–33)
MCHC RBC AUTO-ENTMCNC: 29.1 G/DL (ref 31.5–36.5)
MCV RBC AUTO: 82 FL (ref 78–100)
PLATELET # BLD AUTO: 187 10E9/L (ref 150–450)
POTASSIUM SERPL-SCNC: 4.1 MMOL/L (ref 3.4–5.3)
RBC # BLD AUTO: 2.97 10E12/L (ref 3.8–5.2)
SODIUM SERPL-SCNC: 140 MMOL/L (ref 133–144)
WBC # BLD AUTO: 19 10E9/L (ref 4–11)

## 2017-06-09 PROCEDURE — 25000128 H RX IP 250 OP 636: Performed by: FAMILY MEDICINE

## 2017-06-09 PROCEDURE — 99207 ZZC CDG-UP CODE -MED NECESSITY: CPT | Performed by: INTERNAL MEDICINE

## 2017-06-09 PROCEDURE — 25000128 H RX IP 250 OP 636: Performed by: EMERGENCY MEDICINE

## 2017-06-09 PROCEDURE — 83605 ASSAY OF LACTIC ACID: CPT | Performed by: FAMILY MEDICINE

## 2017-06-09 PROCEDURE — 25000132 ZZH RX MED GY IP 250 OP 250 PS 637: Performed by: FAMILY MEDICINE

## 2017-06-09 PROCEDURE — 36415 COLL VENOUS BLD VENIPUNCTURE: CPT | Performed by: FAMILY MEDICINE

## 2017-06-09 PROCEDURE — 85027 COMPLETE CBC AUTOMATED: CPT | Performed by: FAMILY MEDICINE

## 2017-06-09 PROCEDURE — 99233 SBSQ HOSP IP/OBS HIGH 50: CPT | Performed by: INTERNAL MEDICINE

## 2017-06-09 PROCEDURE — 80048 BASIC METABOLIC PNL TOTAL CA: CPT | Performed by: FAMILY MEDICINE

## 2017-06-09 PROCEDURE — 12000000 ZZH R&B MED SURG/OB

## 2017-06-09 RX ADMIN — OXYCODONE HYDROCHLORIDE 15 MG: 5 TABLET ORAL at 08:01

## 2017-06-09 RX ADMIN — OMEPRAZOLE 40 MG: 20 CAPSULE, DELAYED RELEASE ORAL at 00:14

## 2017-06-09 RX ADMIN — ACETAMINOPHEN, ASPIRIN AND CAFFEINE 1 TABLET: 250; 250; 65 TABLET, FILM COATED ORAL at 21:47

## 2017-06-09 RX ADMIN — SUCRALFATE 1 G: 1 TABLET ORAL at 21:22

## 2017-06-09 RX ADMIN — LORAZEPAM 0.5 MG: 0.5 TABLET ORAL at 20:07

## 2017-06-09 RX ADMIN — GABAPENTIN 300 MG: 300 CAPSULE ORAL at 19:40

## 2017-06-09 RX ADMIN — SUCRALFATE 1 G: 1 TABLET ORAL at 07:54

## 2017-06-09 RX ADMIN — SUCRALFATE 1 G: 1 TABLET ORAL at 00:14

## 2017-06-09 RX ADMIN — ZOLPIDEM TARTRATE 10 MG: 5 TABLET, FILM COATED ORAL at 00:14

## 2017-06-09 RX ADMIN — GABAPENTIN 300 MG: 300 CAPSULE ORAL at 07:54

## 2017-06-09 RX ADMIN — OXYCODONE HYDROCHLORIDE 15 MG: 5 TABLET ORAL at 13:32

## 2017-06-09 RX ADMIN — SODIUM CHLORIDE 1000 ML: 9 INJECTION, SOLUTION INTRAVENOUS at 23:23

## 2017-06-09 RX ADMIN — GABAPENTIN 300 MG: 300 CAPSULE ORAL at 13:32

## 2017-06-09 RX ADMIN — FERROUS GLUCONATE 324 MG: 324 TABLET ORAL at 18:02

## 2017-06-09 RX ADMIN — OMEPRAZOLE 40 MG: 20 CAPSULE, DELAYED RELEASE ORAL at 07:54

## 2017-06-09 RX ADMIN — SUCRALFATE 1 G: 1 TABLET ORAL at 18:02

## 2017-06-09 RX ADMIN — ZOLPIDEM TARTRATE 10 MG: 5 TABLET, FILM COATED ORAL at 21:22

## 2017-06-09 RX ADMIN — FERROUS GLUCONATE 324 MG: 324 TABLET ORAL at 07:54

## 2017-06-09 RX ADMIN — FLUTICASONE FUROATE AND VILANTEROL TRIFENATATE 1 PUFF: 200; 25 POWDER RESPIRATORY (INHALATION) at 07:55

## 2017-06-09 RX ADMIN — CEFTRIAXONE 1 G: 1 INJECTION, POWDER, FOR SOLUTION INTRAMUSCULAR; INTRAVENOUS at 21:47

## 2017-06-09 RX ADMIN — OXYCODONE HYDROCHLORIDE 15 MG: 5 TABLET ORAL at 18:06

## 2017-06-09 RX ADMIN — ACETAMINOPHEN, ASPIRIN AND CAFFEINE 1 TABLET: 250; 250; 65 TABLET, FILM COATED ORAL at 00:18

## 2017-06-09 RX ADMIN — LISINOPRIL 5 MG: 5 TABLET ORAL at 07:54

## 2017-06-09 RX ADMIN — SODIUM CHLORIDE 1000 ML: 9 INJECTION, SOLUTION INTRAVENOUS at 14:41

## 2017-06-09 RX ADMIN — ENOXAPARIN SODIUM 40 MG: 40 INJECTION SUBCUTANEOUS at 00:14

## 2017-06-09 RX ADMIN — POLYETHYLENE GLYCOL 3350 17 G: 17 POWDER, FOR SOLUTION ORAL at 08:01

## 2017-06-09 RX ADMIN — CITALOPRAM HYDROBROMIDE 40 MG: 40 TABLET ORAL at 07:54

## 2017-06-09 RX ADMIN — ACETAMINOPHEN, ASPIRIN AND CAFFEINE 1 TABLET: 250; 250; 65 TABLET, FILM COATED ORAL at 14:46

## 2017-06-09 RX ADMIN — SUCRALFATE 1 G: 1 TABLET ORAL at 12:17

## 2017-06-09 RX ADMIN — SODIUM CHLORIDE 1000 ML: 9 INJECTION, SOLUTION INTRAVENOUS at 06:34

## 2017-06-09 RX ADMIN — FERROUS GLUCONATE 324 MG: 324 TABLET ORAL at 12:17

## 2017-06-09 RX ADMIN — OMEPRAZOLE 40 MG: 20 CAPSULE, DELAYED RELEASE ORAL at 19:40

## 2017-06-09 NOTE — PLAN OF CARE
Problem: Goal Outcome Summary  Goal: Goal Outcome Summary  Outcome: No Change  Critical value called by lab. Patient with positive blood cultures times 2. Gram negative rods. Dr. Grecia Barba notified. Will notify patient. Currently ordered for Rocephin. Dr. Barba will change or add antibiotics if needed.

## 2017-06-09 NOTE — H&P
CHIEF COMPLAINT:  Fever, vomiting, left flank pain.      HISTORY OF PRESENT ILLNESS:  Molly eTague had some symptoms of cloudy urine starting three days ago.  She has some urinary frequency, but notes she was drinking a lot to try to flush it through, as she was concerned about a possible UTI.  She subsequently then developed fever, nausea and vomiting, and then today finally some left flank pain.  The pain was severe.  She has had some rigors with these fevers.      PAST MEDICAL HISTORY:   1.  Chronic pain syndrome.  She takes 15 mg of oxycodone 4-6 times per day.   2.  CREST syndrome (scleroderma with renal involvement, Raynaud's, sclerodactyly).   3.  History of renal failure requiring hemodialysis.   4.  History of ARDS, 04/2015, during her scleroderma renal crisis.   5.  Generalized anxiety disorder.   6.  History of Clostridium difficile.  This was at least two or three years ago.   7.  Moderate persistent asthma.   8.  History of H. pylori infection.   9.  Polyneuropathy due to scleroderma.   10.  Chronic migraines.  She is using Excedrin Migraine at this time.  She was on Fioricet in the past.   11.  Pulmonary emboli.  She was on warfarin up until three months ago.      MEDICATIONS:   1.  Albuterol inhaler 2 puffs q. 4 hours p.r.n.   2.  Excedrin Migraine q. 6 hours p.r.n.   3.  Symbicort 2 puffs b.i.d.   4.  Celexa 40 mg daily.   5.  Vitamin B12 at 1000 mcg daily.   6.  Benadryl 25 mg q. 6 hours p.r.n.   7.  Ferrous gluconate 324 mg t.i.d.   8.  Gabapentin 300 mg t.i.d.   9.  Lisinopril 5 mg daily.   10.  Lorazepam 0.5 mg b.i.d. p.r.n. anxiety.   11.  Prilosec 40 mg b.i.d.   12.  Oxycodone 15 mg q. 4 hours p.r.n.   13.  MiraLax 17 grams daily.   14.  Ranitidine 150 b.i.d.   15.  Carafate 1 gram four times daily.   16.  Ambien 10 mg at bedtime p.r.n. sleep.      ALLERGIES:  None known.      SOCIAL HISTORY:  She lives with her son and 3-year-old daughter.  She is a nonsmoker.  No alcohol.      FAMILY  HISTORY:  Father had hyperlipidemia.  Maternal grandmother had cerebrovascular disease.  Maternal aunt with diabetes.      REVIEW OF SYSTEMS:  Ten-point review of systems reveals some lightheadedness when she stands up, some ongoing GERD symptoms, generalized weakness (sometimes has to use a walker, but most times she can get around without it), chronic pain in all joints, sclerodactyly with contracted flexed fingers and elbows, and chronic constipation.  No frequency or urgency other than noted above.  No significant respiratory symptoms or cardiovascular symptoms except for Raynaud's which has been terrible over the last three days.      OBJECTIVE:   GENERAL:  She is awake, alert, appears ill.   HEENT:  Eyes show pupils equal and reactive, EOMs intact.  TMs normal.  Nasal mucosa is normal.  Throat is normal.   NECK:  Supple, without masses, nodes or thyromegaly.   CHEST:  Clear to A&P.   CARDIOVASCULAR:  Regular rate and rhythm without murmur, click or rub.  Pulses are brisk and equal.  No JVD or HJR.  No edema.   ABDOMEN:  Soft.  Some tenderness in the left lower and mid quadrants to deep palpation only.  No rebound or guarding.  Bowel sounds are active.   GENITOURINARY AND RECTAL:  Deferred at this time.   EXTREMITIES:  Significantly contractured atrophied narrow fingers.  Poor range of motion in the fingers.   SKIN:  Vitiligo changes about the neck and upper chest.   NEUROLOGIC:  Good strength and sensation.  She is oriented x3.  Moves all extremities equally.      LABS:  White count is 16.5, hemoglobin 8.7 (which is close to her baseline), and platelets are normal.  Creatinine 1.41, close to her baseline which is 1.0.  UA shows 42 WBC/high-power field.  .  Lactic acid 1.5.      IMAGING:  None done.      ASSESSMENT:   1.  Sepsis secondary to acute left pyelonephritis:  We will treat with 30 cc/kg fluid bolus, and then keep the IV fluids going at 125 per hour.  Rocephin started.  Urine culture in  progress.  I do not believe imaging is necessary since I do not think there is a stone or complication at this point, but may be considered if she is not improving.   2.  CREST syndrome:  Stable at this point.   3.  Renal failure secondary to above:  This is stable.  We will hydrate well.   4.  Chronic pain:  She will continue on her oral 15 mg q. 4-6 hours, plus Dilaudid as needed for this new flank pain for now.   5.  Asthma:  Has been quiescent and certainly is not there now.  Continue her maintenance inhalers.   6.  History of Clostridium difficile:  We will need to watch for this.  She is high risk.   7.  History of pulmonary emboli:  She is on prophylaxis.   8.  CODE STATUS:  FULL.   9.  Prophylaxis:  Lovenox.   10.  Disposition:  She needs inpatient care, expect 2-night stay.         GARDENIA MUELLER MD             D: 2017 23:31   T: 2017 01:51   MT: #[id number]      Name:     ANA KLEIN   MRN:      [id number]        Account:      [id number]   :      1985           Admitted:     [id number]      Document: [id number]       cc: Grecia Barba DO

## 2017-06-09 NOTE — PLAN OF CARE
Problem: Goal Outcome Summary  Goal: Goal Outcome Summary  Outcome: Improving  Pt's temperature has decreased over the night since admitted to floor -- was 100.4 when admitted, then 98.3 2 hours later, now is 97.8. Pt denies chills, does have general body aches but declines any interventions at time. PRN Excedrin Migraine 1 tablet given for c/o headache -- headache now resolved. HR was tachy 120 when admitted, now in the 80's. Pt denies any dizziness or SOB. She has tolerated eat 2 cups of jello tonight, no c/o nausea. Pt has been independent in her room, able to make her needs be known. IV fluids running at 125 ml/hr, has voided without difficulty.

## 2017-06-09 NOTE — PROGRESS NOTES
[facility] [place]         Assessment and Plan:   Molly Teague is a 31 year old female with past medical history of CREST (scleroderma with renal involvement, Raynaud's, sclerodactyly), chronic pain, history of ARDS, history of dialysis, history of pulmonary embolism, asthma who presented on 6/8/2017 with sepsis due to pyelonephritis.    Assessment & Plan:    Sepsis due to Pyelonephritis: presented with fever 102.5, tachycardia 137, leukocytosis 16.5 along with symptoms of cloudy malodorous urine, nausea, vomiting, malaise, severe left flank pain.  UA grossly positive.  Blood cultures growing gram negative rods. She received fluid resuscitation in ER 30 cc/kg bolus.  --continue maintenance /hr  --Ceftriaxone 1 gm IV Q 24 hours  --await urine culture - pseudomonas not ruled out.  However, history of C diff, and patient is improving with Ceftriaxone alone, so will not add flouroquinolone at this point.  --Dilaudid IV PRN for flank pain.    CREST: manifestations include significant sclerodactyly, Raynauds, scleroderma with renal involvement, significant GERD with esophagitis and GAVE.  Follows with multiple sub-specialists at [place].  --Continue iron TID  --Continue lisinopril 5 mg daily  --continue carafate QID  --Compazine, zofran, reglan PRN for nausea    Chronic pain: from CREST as above.  Causes significant neuropathy type pain  --Continue gabapentin  --continue PTA  oxycodone 15 mg Q 4 hours PRN    Acute on chronic iron deficiency anemia: chronic melena due to GAVE, esophagitis, gastric ulcers.  On max treatment with BID PPI and H2 blocker and carafate.  Hgb trended from 8.7 to 7.1  --daily CBC.  --transfuse if hgb < 7    Asthma: continue prior to admission inhalers.    Dep/Anxiety/PTSD: continue prior to admission celexa ativan. Hold ambien as patient is getting extra narcotics.    F: /hr  E: monitoring daily  N: regular  diet    Prophylactic: on prior to admission PPI, lovenox - high risk for dvt.  Code: Full  Dispo: home 2-3 days, pending improvement in sepsis.             Interval History:     Patient reports feeling slightly improved with regards to flank pain, N/V, malaise compared to yesterday.  Reports right ear and right throat pain that started in last 24 hours.  Pain medications are helping a bit with flank pain         Review of Systems:   A comprehensive review of systems was performed and found to be negative except as described in this note          Medications:     Current Facility-Administered Medications   Medication     0.9% sodium chloride infusion     albuterol (PROAIR HFA/PROVENTIL HFA/VENTOLIN HFA) Inhaler 2 puff     aspirin-acetaminophen-caffeine (EXCEDRIN MIGRAINE) per tablet 1 tablet     citalopram (celeXA) tablet 40 mg     diphenhydrAMINE (BENADRYL) capsule 25 mg     ferrous gluconate (FERGON) tablet 324 mg     gabapentin (NEURONTIN) capsule 300 mg     lisinopril (PRINIVIL/ZESTRIL) tablet 5 mg     LORazepam (ATIVAN) tablet 0.5 mg     omeprazole (priLOSEC) CR capsule 40 mg     oxyCODONE (ROXICODONE) IR tablet 15 mg     polyethylene glycol (MIRALAX/GLYCOLAX) powder 17 g     ranitidine (ZANTAC) tablet 150 mg     sucralfate (CARAFATE) tablet 1 g     zolpidem (AMBIEN) tablet 10 mg     fluticasone-vilanterol (BREO ELLIPTA) 200-25 MCG/INH oral inhaler 1 puff     cefTRIAXone (ROCEPHIN) 1 g vial to attach to  mL bag for ADULTS or NS 50 mL bag for PEDS     naloxone (NARCAN) injection 0.1-0.4 mg     enoxaparin (LOVENOX) injection 40 mg     acetaminophen (TYLENOL) tablet 650 mg     HYDROmorphone (DILAUDID) injection 0.2 mg     metoclopramide (REGLAN) tablet 10 mg    Or     metoclopramide (REGLAN) injection 10 mg     prochlorperazine (COMPAZINE) injection 5-10 mg    Or     prochlorperazine (COMPAZINE) tablet 5-10 mg    Or     prochlorperazine (COMPAZINE) Suppository 25 mg     ondansetron (ZOFRAN-ODT) ODT tab 4 mg  "   Or     ondansetron (ZOFRAN) injection 4 mg     HYDROmorphone (PF) (DILAUDID) injection 0.5 mg     polyethylene glycol (MIRALAX/GLYCOLAX) Packet 17 g             Physical Exam:   Vitals were reviewed  Patient Vitals for the past 24 hrs:   BP Temp Temp src Pulse Heart Rate Resp SpO2 Height Weight   06/09/17 0717 106/90 98.2  F (36.8  C) Oral 76 - 18 100 % - -   06/09/17 0700 - - - - - - - - 68.3 kg (150 lb 9.2 oz)   06/09/17 0422 116/74 97.8  F (36.6  C) Oral - 88 18 98 % - -   06/09/17 0223 - 98.2  F (36.8  C) Oral - 90 18 99 % - -   06/08/17 2350 104/53 100.4  F (38  C) Oral - 120 20 98 % 1.575 m (5' 2\") 68.1 kg (150 lb 2.1 oz)   06/08/17 2328 - - - - 123 - 97 % - -   06/08/17 2232 92/55 102.5  F (39.2  C) Oral - 137 20 95 % - -   06/08/17 2218 126/70 - - - 135 18 99 % - -   06/08/17 2100 131/74 - - - 135 20 - - -   06/08/17 2030 - - - - 135 21 - - -   06/08/17 1945 - - - - 121 20 - - -   06/08/17 1845 128/69 - - - 113 24 - - -   06/08/17 1810 110/74 99.2  F (37.3  C) Oral 85 - 20 - - -     Gen: alert, moderately ill appearing  HEENT: right canal and TM normal.  Slightly dry mucus membranes, no posterior pharyngeal erythema or exudates.  Small mouth opening.  Neck: No thyromegaly, masses or adenopathy.  Chronic scarring  CV: RRR, S1 and S2 normal, no murmurs. Radial and pedal pulses symmetric and normal  Respiratory: Lungs clear bilaterally  Abd: Soft, moderate suprapubic pain.  Normal bowel sounds.  No hepatosplenomegaly   MSK:  Skin tightening and contractures of bilateral hands/fingers.  Skin: vitiligo.  Skin tightening of face/neck/hands.  Lymph: No peripheral edema              Data:     Results for orders placed or performed during the hospital encounter of 06/08/17 (from the past 24 hour(s))   CBC with platelets differential   Result Value Ref Range    WBC 16.5 (H) 4.0 - 11.0 10e9/L    RBC Count 3.59 (L) 3.8 - 5.2 10e12/L    Hemoglobin 8.7 (L) 11.7 - 15.7 g/dL    Hematocrit 29.2 (L) 35.0 - 47.0 %    MCV 81 " 78 - 100 fl    MCH 24.2 (L) 26.5 - 33.0 pg    MCHC 29.8 (L) 31.5 - 36.5 g/dL    RDW 18.9 (H) 10.0 - 15.0 %    Platelet Count 169 150 - 450 10e9/L    Diff Method Automated Method     % Neutrophils 87.5 %    % Lymphocytes 7.6 %    % Monocytes 2.1 %    % Eosinophils 2.1 %    % Basophils 0.2 %    % Immature Granulocytes 0.5 %    Absolute Neutrophil 14.5 (H) 1.6 - 8.3 10e9/L    Absolute Lymphocytes 1.3 0.8 - 5.3 10e9/L    Absolute Monocytes 0.3 0.0 - 1.3 10e9/L    Absolute Eosinophils 0.4 0.0 - 0.7 10e9/L    Absolute Basophils 0.0 0.0 - 0.2 10e9/L    Abs Immature Granulocytes 0.1 0 - 0.4 10e9/L   INR   Result Value Ref Range    INR 1.07 0.86 - 1.14   Comprehensive metabolic panel   Result Value Ref Range    Sodium 138 133 - 144 mmol/L    Potassium 4.4 3.4 - 5.3 mmol/L    Chloride 106 94 - 109 mmol/L    Carbon Dioxide 23 20 - 32 mmol/L    Anion Gap 9 3 - 14 mmol/L    Glucose 84 70 - 99 mg/dL    Urea Nitrogen 20 7 - 30 mg/dL    Creatinine 1.41 (H) 0.52 - 1.04 mg/dL    GFR Estimate 43 (L) >60 mL/min/1.7m2    GFR Estimate If Black 52 (L) >60 mL/min/1.7m2    Calcium 8.5 8.5 - 10.1 mg/dL    Bilirubin Total 0.7 0.2 - 1.3 mg/dL    Albumin 3.6 3.4 - 5.0 g/dL    Protein Total 7.6 6.8 - 8.8 g/dL    Alkaline Phosphatase 89 40 - 150 U/L    ALT 25 0 - 50 U/L    AST 39 0 - 45 U/L   Lactic acid whole blood   Result Value Ref Range    Lactic Acid 1.5 0.7 - 2.1 mmol/L   Lipase   Result Value Ref Range    Lipase 75 73 - 393 U/L   CRP inflammation   Result Value Ref Range    CRP Inflammation 128.0 (H) 0.0 - 8.0 mg/L   UA reflex to Microscopic   Result Value Ref Range    Color Urine Light Yellow     Appearance Urine Slightly Cloudy     Glucose Urine Negative NEG mg/dL    Bilirubin Urine Negative NEG    Ketones Urine Negative NEG mg/dL    Specific Gravity Urine 1.005 1.003 - 1.035    Blood Urine Trace (A) NEG    pH Urine 7.5 (H) 5.0 - 7.0 pH    Protein Albumin Urine 30 (A) NEG mg/dL    Urobilinogen mg/dL Normal 0.0 - 2.0 mg/dL    Nitrite  Urine Negative NEG    Leukocyte Esterase Urine Large (A) NEG    Source Midstream Urine     RBC Urine 18 (H) 0 - 2 /HPF    WBC Urine 42 (H) 0 - 2 /HPF    Squamous Epithelial /HPF Urine 1 0 - 1 /HPF       I reviewed all new labs and imaging results over the last 24 hours. I personally reviewed no images or EKG's today.    Attestation:    Amount of time performed on this progress note: 25 minutes.    Dr. Grecia Barba, [facility] Internal Medicine

## 2017-06-09 NOTE — PLAN OF CARE
Problem: Goal Outcome Summary  Goal: Goal Outcome Summary  Outcome: No Change  Dr. Veloz notified that blood culture positive for E-Coli.

## 2017-06-09 NOTE — PLAN OF CARE
Problem: Goal Outcome Summary  Goal: Goal Outcome Summary  Outcome: No Change  Patient aware of positive blood cultures. WBC increased to 19.0 from 16.5. Lactate decreased to 0.9 from 1.5. Patient given written information on pyelonephritis which she is currently reading. Afebrile. Received Oxycodone for generalized pain with good results. Patient take this pain medication at home. Will continue to monitor.

## 2017-06-09 NOTE — PROGRESS NOTES
"[facility] [place]    Patient admitted to room 2301 at approximately 2350 via cart from emergency room. Patient was accompanied by transport tech.     Verbal SBAR report received from SOM Clark prior to patient arrival.     Patient ambulated to bed independently. Patient alert and oriented X 3. Pain is controlled with current analgesics.  Medication(s) being used: narcotic analgesics including hydromorphone (Dilaudid). 0-10 Pain Scale: 6. Admission vital signs: Blood pressure 104/53, pulse 85, temperature 100.4  F (38  C), temperature source Oral, resp. rate 20, height 1.575 m (5' 2\"), weight 68.1 kg (150 lb 2.1 oz), SpO2 98 %, not currently breastfeeding. Patient was oriented to plan of care, call light, bed controls, tv, telephone, bathroom and visiting hours.     The following safety risks were identified during admission: none. Yellow risk band applied: NO.     Dionna Joy    "

## 2017-06-10 LAB
ABO + RH BLD: NORMAL
ABO + RH BLD: NORMAL
ANION GAP SERPL CALCULATED.3IONS-SCNC: 7 MMOL/L (ref 3–14)
BUN SERPL-MCNC: 14 MG/DL (ref 7–30)
CALCIUM SERPL-MCNC: 8.9 MG/DL (ref 8.5–10.1)
CHLORIDE SERPL-SCNC: 109 MMOL/L (ref 94–109)
CO2 SERPL-SCNC: 22 MMOL/L (ref 20–32)
CREAT SERPL-MCNC: 1.37 MG/DL (ref 0.52–1.04)
ERYTHROCYTE [DISTWIDTH] IN BLOOD BY AUTOMATED COUNT: 18.1 % (ref 10–15)
GFR SERPL CREATININE-BSD FRML MDRD: 45 ML/MIN/1.7M2
GLUCOSE SERPL-MCNC: 79 MG/DL (ref 70–99)
HCT VFR BLD AUTO: 25.5 % (ref 35–47)
HGB BLD-MCNC: 7.4 G/DL (ref 11.7–15.7)
MCH RBC QN AUTO: 23.4 PG (ref 26.5–33)
MCHC RBC AUTO-ENTMCNC: 29 G/DL (ref 31.5–36.5)
MCV RBC AUTO: 81 FL (ref 78–100)
PLATELET # BLD AUTO: 188 10E9/L (ref 150–450)
POTASSIUM SERPL-SCNC: 4.5 MMOL/L (ref 3.4–5.3)
RBC # BLD AUTO: 3.16 10E12/L (ref 3.8–5.2)
SODIUM SERPL-SCNC: 138 MMOL/L (ref 133–144)
SPECIMEN EXP DATE BLD: NORMAL
WBC # BLD AUTO: 11.8 10E9/L (ref 4–11)

## 2017-06-10 PROCEDURE — 86900 BLOOD TYPING SEROLOGIC ABO: CPT | Performed by: INTERNAL MEDICINE

## 2017-06-10 PROCEDURE — 80048 BASIC METABOLIC PNL TOTAL CA: CPT | Performed by: INTERNAL MEDICINE

## 2017-06-10 PROCEDURE — 25000132 ZZH RX MED GY IP 250 OP 250 PS 637: Performed by: INTERNAL MEDICINE

## 2017-06-10 PROCEDURE — 25000128 H RX IP 250 OP 636: Performed by: EMERGENCY MEDICINE

## 2017-06-10 PROCEDURE — 12000000 ZZH R&B MED SURG/OB

## 2017-06-10 PROCEDURE — 25000132 ZZH RX MED GY IP 250 OP 250 PS 637: Performed by: FAMILY MEDICINE

## 2017-06-10 PROCEDURE — 85027 COMPLETE CBC AUTOMATED: CPT | Performed by: INTERNAL MEDICINE

## 2017-06-10 PROCEDURE — 86901 BLOOD TYPING SEROLOGIC RH(D): CPT | Performed by: INTERNAL MEDICINE

## 2017-06-10 PROCEDURE — 25000128 H RX IP 250 OP 636: Performed by: FAMILY MEDICINE

## 2017-06-10 PROCEDURE — 36415 COLL VENOUS BLD VENIPUNCTURE: CPT | Performed by: INTERNAL MEDICINE

## 2017-06-10 PROCEDURE — 99232 SBSQ HOSP IP/OBS MODERATE 35: CPT | Performed by: INTERNAL MEDICINE

## 2017-06-10 RX ADMIN — OXYCODONE HYDROCHLORIDE 15 MG: 5 TABLET ORAL at 13:59

## 2017-06-10 RX ADMIN — GABAPENTIN 300 MG: 300 CAPSULE ORAL at 20:08

## 2017-06-10 RX ADMIN — GABAPENTIN 300 MG: 300 CAPSULE ORAL at 13:45

## 2017-06-10 RX ADMIN — LORAZEPAM 0.5 MG: 0.5 TABLET ORAL at 10:21

## 2017-06-10 RX ADMIN — CEFTRIAXONE 1 G: 1 INJECTION, POWDER, FOR SOLUTION INTRAMUSCULAR; INTRAVENOUS at 21:42

## 2017-06-10 RX ADMIN — CITALOPRAM HYDROBROMIDE 40 MG: 40 TABLET ORAL at 08:27

## 2017-06-10 RX ADMIN — OMEPRAZOLE 40 MG: 20 CAPSULE, DELAYED RELEASE ORAL at 20:08

## 2017-06-10 RX ADMIN — OXYCODONE HYDROCHLORIDE 15 MG: 5 TABLET ORAL at 20:09

## 2017-06-10 RX ADMIN — SODIUM CHLORIDE 1000 ML: 9 INJECTION, SOLUTION INTRAVENOUS at 07:31

## 2017-06-10 RX ADMIN — SUCRALFATE 1 G: 1 TABLET ORAL at 08:27

## 2017-06-10 RX ADMIN — FLUTICASONE FUROATE AND VILANTEROL TRIFENATATE 1 PUFF: 200; 25 POWDER RESPIRATORY (INHALATION) at 08:27

## 2017-06-10 RX ADMIN — FERROUS GLUCONATE 324 MG: 324 TABLET ORAL at 17:12

## 2017-06-10 RX ADMIN — SUCRALFATE 1 G: 1 TABLET ORAL at 21:42

## 2017-06-10 RX ADMIN — SUCRALFATE 1 G: 1 TABLET ORAL at 17:12

## 2017-06-10 RX ADMIN — SUCRALFATE 1 G: 1 TABLET ORAL at 11:52

## 2017-06-10 RX ADMIN — BENZOCAINE: 220 SPRAY, METERED PERIODONTAL at 17:12

## 2017-06-10 RX ADMIN — GABAPENTIN 300 MG: 300 CAPSULE ORAL at 08:27

## 2017-06-10 RX ADMIN — LORAZEPAM 0.5 MG: 0.5 TABLET ORAL at 22:17

## 2017-06-10 RX ADMIN — BENZOCAINE: 220 SPRAY, METERED PERIODONTAL at 14:24

## 2017-06-10 RX ADMIN — FERROUS GLUCONATE 324 MG: 324 TABLET ORAL at 08:27

## 2017-06-10 RX ADMIN — OMEPRAZOLE 40 MG: 20 CAPSULE, DELAYED RELEASE ORAL at 08:26

## 2017-06-10 RX ADMIN — OXYCODONE HYDROCHLORIDE 15 MG: 5 TABLET ORAL at 08:31

## 2017-06-10 RX ADMIN — BENZOCAINE: 220 SPRAY, METERED PERIODONTAL at 22:50

## 2017-06-10 RX ADMIN — ENOXAPARIN SODIUM 40 MG: 40 INJECTION SUBCUTANEOUS at 00:23

## 2017-06-10 RX ADMIN — LISINOPRIL 5 MG: 5 TABLET ORAL at 08:27

## 2017-06-10 RX ADMIN — OXYCODONE HYDROCHLORIDE 15 MG: 5 TABLET ORAL at 00:22

## 2017-06-10 RX ADMIN — FERROUS GLUCONATE 324 MG: 324 TABLET ORAL at 11:52

## 2017-06-10 NOTE — PLAN OF CARE
Problem: Goal Outcome Summary  Goal: Goal Outcome Summary  Outcome: Improving  WBC trending down, afebrile this shift. Taking oxycodone for back and leg pain (baseline per patient) ambulating in room independently. Very pleasant patient. Hurricane spray ordered for mouth sores. Patient able to make needs known. MD aware of lower HEMOGLOBIN today, no intervention advised. Continue to monitor

## 2017-06-10 NOTE — PROGRESS NOTES
[facility] [place]         Assessment and Plan:   Molly Teague is a 31 year old female with past medical history of CREST (scleroderma with renal involvement, Raynaud's, sclerodactyly), chronic pain, history of ARDS, history of dialysis, history of pulmonary embolism, asthma who presented on 6/8/2017 with sepsis due to pyelonephritis.    Assessment & Plan:    Sepsis due to Pyelonephritis: presented with fever 102.5, tachycardia 137, leukocytosis 16.5 along with symptoms of cloudy, malodorous urine, nausea, vomiting, malaise, severe left flank pain.  UA grossly positive.  Blood and urine cultures growing E. Coli, sensitivities pending. She received fluid resuscitation in ER 30 cc/kg bolus.  She is improving daily.  --d/c maintenance IVF  - hydrate orally.  --Ceftriaxone 1 gm IV Q 24 hours - narrow to oral agent at time of discharge once sensitivities are known.  --Dilaudid IV PRN for flank pain.     CREST: manifestations include significant sclerodactyly, Raynauds, scleroderma with renal involvement, significant GERD with esophagitis and GAVE.  Follows with multiple sub-specialists at [place].  --Continue iron TID  --Continue lisinopril 5 mg daily  --continue carafate QID  --Compazine, zofran, reglan PRN for nausea     Chronic pain: from CREST as above.  Causes significant neuropathy type pain  --Continue gabapentin  --continue PTA  oxycodone 15 mg Q 4 hours PRN     Acute on chronic iron deficiency anemia: chronic melena due to GAVE, esophagitis, gastric ulcers.  On max treatment with BID PPI, H2 blocker and carafate.  Hgb trended from 8.7 to 7.1  --daily CBC.  --transfuse if hgb < 7     Asthma: continue prior to admission inhalers.     Dep/Anxiety/PTSD: continue prior to admission celexa, ativan. Hold ambien as patient is getting extra narcotics.    Aphthous Ulcers: intermittent history of per patient.  She reports she typically gets when ill or  hospitalized.  Start benzocaine PRN for comfort.     F: oral fluids  E: monitoring daily  N: regular diet     Prophylactic: on prior to admission PPI.  Start lovenox - high risk for dvt.  Code: Full  Dispo: home 6/11         Interval History:   Patient feels much better today.  Still having flank and suprapubic pain but improving.  Is eating and drinking a bit more.  No vomiting, chronic nausea.  Has mouth ulceration that she reports she typically gets when ill.         Review of Systems:   A comprehensive review of systems was performed and found to be negative except as described in this note          Medications:     Current Facility-Administered Medications   Medication     0.9% sodium chloride infusion     albuterol (PROAIR HFA/PROVENTIL HFA/VENTOLIN HFA) Inhaler 2 puff     aspirin-acetaminophen-caffeine (EXCEDRIN MIGRAINE) per tablet 1 tablet     citalopram (celeXA) tablet 40 mg     diphenhydrAMINE (BENADRYL) capsule 25 mg     ferrous gluconate (FERGON) tablet 324 mg     gabapentin (NEURONTIN) capsule 300 mg     lisinopril (PRINIVIL/ZESTRIL) tablet 5 mg     LORazepam (ATIVAN) tablet 0.5 mg     omeprazole (priLOSEC) CR capsule 40 mg     oxyCODONE (ROXICODONE) IR tablet 15 mg     polyethylene glycol (MIRALAX/GLYCOLAX) powder 17 g     ranitidine (ZANTAC) tablet 150 mg     sucralfate (CARAFATE) tablet 1 g     zolpidem (AMBIEN) tablet 10 mg     fluticasone-vilanterol (BREO ELLIPTA) 200-25 MCG/INH oral inhaler 1 puff     cefTRIAXone (ROCEPHIN) 1 g vial to attach to  mL bag for ADULTS or NS 50 mL bag for PEDS     naloxone (NARCAN) injection 0.1-0.4 mg     enoxaparin (LOVENOX) injection 40 mg     acetaminophen (TYLENOL) tablet 650 mg     HYDROmorphone (DILAUDID) injection 0.2 mg     metoclopramide (REGLAN) tablet 10 mg    Or     metoclopramide (REGLAN) injection 10 mg     prochlorperazine (COMPAZINE) injection 5-10 mg    Or     prochlorperazine (COMPAZINE) tablet 5-10 mg    Or     prochlorperazine (COMPAZINE)  Suppository 25 mg     ondansetron (ZOFRAN-ODT) ODT tab 4 mg    Or     ondansetron (ZOFRAN) injection 4 mg     HYDROmorphone (PF) (DILAUDID) injection 0.5 mg     polyethylene glycol (MIRALAX/GLYCOLAX) Packet 17 g             Physical Exam:   Vitals were reviewed  Patient Vitals for the past 24 hrs:   BP Temp Temp src Pulse Heart Rate Resp SpO2 Weight   06/10/17 0545 - - - - - - - 70.2 kg (154 lb 12.2 oz)   06/10/17 0400 121/72 99  F (37.2  C) Oral 91 - 16 98 % -   06/10/17 0043 138/80 99.1  F (37.3  C) Oral 82 - 16 100 % -   06/09/17 1900 115/72 98.6  F (37  C) Oral 83 - 16 99 % -   06/09/17 1459 115/72 98.7  F (37.1  C) Oral 61 - 18 100 % -   06/09/17 1053 99/60 97.9  F (36.6  C) Oral - 86 18 100 % -   06/09/17 0717 106/90 98.2  F (36.8  C) Oral 76 - 18 100 % -     Gen: alert, mildly ill appearing, fatigued.  Ambulating around the room.  HEENT: moist mucus membranes, no posterior pharyngeal erythema or exudates.  Small mouth opening.  Small aphthous ulcers   Neck: No thyromegaly, masses or adenopathy.  Chronic scarring from previous trach site  CV: RRR, S1 and S2 normal, no murmurs. Radial and pedal pulses symmetric and normal  Respiratory: Lungs clear bilaterally  Abd: Soft, mild suprapubic and left flank pain.  Normal bowel sounds.  No hepatosplenomegaly   MSK:  Skin tightening and contractures of bilateral hands/fingers.  Skin: vitiligo.  Skin tightening of face/neck/hands.  Lymph: No peripheral edema           Data:     Results for orders placed or performed during the hospital encounter of 06/08/17 (from the past 24 hour(s))   Basic metabolic panel   Result Value Ref Range    Sodium 140 133 - 144 mmol/L    Potassium 4.1 3.4 - 5.3 mmol/L    Chloride 111 (H) 94 - 109 mmol/L    Carbon Dioxide 21 20 - 32 mmol/L    Anion Gap 8 3 - 14 mmol/L    Glucose 93 70 - 99 mg/dL    Urea Nitrogen 16 7 - 30 mg/dL    Creatinine 1.35 (H) 0.52 - 1.04 mg/dL    GFR Estimate 45 (L) >60 mL/min/1.7m2    GFR Estimate If Black 55 (L) >60  mL/min/1.7m2    Calcium 7.6 (L) 8.5 - 10.1 mg/dL   CBC with platelets   Result Value Ref Range    WBC 19.0 (H) 4.0 - 11.0 10e9/L    RBC Count 2.97 (L) 3.8 - 5.2 10e12/L    Hemoglobin 7.1 (L) 11.7 - 15.7 g/dL    Hematocrit 24.4 (L) 35.0 - 47.0 %    MCV 82 78 - 100 fl    MCH 23.9 (L) 26.5 - 33.0 pg    MCHC 29.1 (L) 31.5 - 36.5 g/dL    RDW 18.7 (H) 10.0 - 15.0 %    Platelet Count 187 150 - 450 10e9/L   Lactic acid whole blood   Result Value Ref Range    Lactic Acid 0.9 0.7 - 2.1 mmol/L   ABO and Rh   Result Value Ref Range    ABO Pending     RH(D) Pending     Specimen Expires Pending      Attestation:    Amount of time performed on this progress note: 25 minutes.    Dr. Grecia Barba, [facility] Internal Medicine

## 2017-06-10 NOTE — PLAN OF CARE
Problem: Infection, Risk/Actual (Infant)  Goal: Infection Prevention/Resolution  Patient will demonstrate the desired outcomes by discharge/transition of care.   Outcome: No Change  IV Rocephin given as ordered.  VSS and afebrile.  Complained of left flank pain and oxycodone given.  Slept well overnight.  Independent in room.

## 2017-06-11 VITALS
BODY MASS INDEX: 27.79 KG/M2 | HEART RATE: 89 BPM | WEIGHT: 151.01 LBS | DIASTOLIC BLOOD PRESSURE: 73 MMHG | OXYGEN SATURATION: 99 % | SYSTOLIC BLOOD PRESSURE: 117 MMHG | HEIGHT: 62 IN | TEMPERATURE: 98 F | RESPIRATION RATE: 16 BRPM

## 2017-06-11 LAB
ANION GAP SERPL CALCULATED.3IONS-SCNC: 7 MMOL/L (ref 3–14)
BACTERIA SPEC CULT: ABNORMAL
BUN SERPL-MCNC: 15 MG/DL (ref 7–30)
CALCIUM SERPL-MCNC: 9 MG/DL (ref 8.5–10.1)
CHLORIDE SERPL-SCNC: 106 MMOL/L (ref 94–109)
CO2 SERPL-SCNC: 24 MMOL/L (ref 20–32)
CREAT SERPL-MCNC: 1.44 MG/DL (ref 0.52–1.04)
ERYTHROCYTE [DISTWIDTH] IN BLOOD BY AUTOMATED COUNT: 17.9 % (ref 10–15)
GFR SERPL CREATININE-BSD FRML MDRD: 42 ML/MIN/1.7M2
GLUCOSE SERPL-MCNC: 78 MG/DL (ref 70–99)
HCT VFR BLD AUTO: 24.6 % (ref 35–47)
HGB BLD-MCNC: 7.2 G/DL (ref 11.7–15.7)
Lab: ABNORMAL
Lab: ABNORMAL
MCH RBC QN AUTO: 23.2 PG (ref 26.5–33)
MCHC RBC AUTO-ENTMCNC: 29.3 G/DL (ref 31.5–36.5)
MCV RBC AUTO: 79 FL (ref 78–100)
MICRO REPORT STATUS: ABNORMAL
MICROORGANISM SPEC CULT: ABNORMAL
MICROORGANISM SPEC CULT: ABNORMAL
PLATELET # BLD AUTO: 201 10E9/L (ref 150–450)
POTASSIUM SERPL-SCNC: 4.3 MMOL/L (ref 3.4–5.3)
RBC # BLD AUTO: 3.1 10E12/L (ref 3.8–5.2)
SODIUM SERPL-SCNC: 137 MMOL/L (ref 133–144)
SPECIMEN SOURCE: ABNORMAL
WBC # BLD AUTO: 7.7 10E9/L (ref 4–11)

## 2017-06-11 PROCEDURE — 25000128 H RX IP 250 OP 636: Performed by: FAMILY MEDICINE

## 2017-06-11 PROCEDURE — 25000132 ZZH RX MED GY IP 250 OP 250 PS 637: Performed by: FAMILY MEDICINE

## 2017-06-11 PROCEDURE — 36415 COLL VENOUS BLD VENIPUNCTURE: CPT | Performed by: INTERNAL MEDICINE

## 2017-06-11 PROCEDURE — 85027 COMPLETE CBC AUTOMATED: CPT | Performed by: INTERNAL MEDICINE

## 2017-06-11 PROCEDURE — 99207 ZZC CDG-CODE INCORRECT PER BILLING BASED ON TIME: CPT | Performed by: INTERNAL MEDICINE

## 2017-06-11 PROCEDURE — 99238 HOSP IP/OBS DSCHRG MGMT 30/<: CPT | Performed by: INTERNAL MEDICINE

## 2017-06-11 PROCEDURE — 80048 BASIC METABOLIC PNL TOTAL CA: CPT | Performed by: INTERNAL MEDICINE

## 2017-06-11 RX ORDER — CIPROFLOXACIN 500 MG/1
500 TABLET, FILM COATED ORAL 2 TIMES DAILY
Qty: 8 TABLET | Refills: 0 | Status: SHIPPED | OUTPATIENT
Start: 2017-06-11 | End: 2017-06-15

## 2017-06-11 RX ADMIN — GABAPENTIN 300 MG: 300 CAPSULE ORAL at 07:45

## 2017-06-11 RX ADMIN — ZOLPIDEM TARTRATE 10 MG: 5 TABLET, FILM COATED ORAL at 00:42

## 2017-06-11 RX ADMIN — OXYCODONE HYDROCHLORIDE 15 MG: 5 TABLET ORAL at 07:45

## 2017-06-11 RX ADMIN — LISINOPRIL 5 MG: 5 TABLET ORAL at 07:45

## 2017-06-11 RX ADMIN — FLUTICASONE FUROATE AND VILANTEROL TRIFENATATE 1 PUFF: 200; 25 POWDER RESPIRATORY (INHALATION) at 07:47

## 2017-06-11 RX ADMIN — BENZOCAINE: 220 SPRAY, METERED PERIODONTAL at 07:48

## 2017-06-11 RX ADMIN — FERROUS GLUCONATE 324 MG: 324 TABLET ORAL at 07:45

## 2017-06-11 RX ADMIN — CITALOPRAM HYDROBROMIDE 40 MG: 40 TABLET ORAL at 07:45

## 2017-06-11 RX ADMIN — ENOXAPARIN SODIUM 40 MG: 40 INJECTION SUBCUTANEOUS at 00:33

## 2017-06-11 RX ADMIN — SUCRALFATE 1 G: 1 TABLET ORAL at 07:45

## 2017-06-11 RX ADMIN — OMEPRAZOLE 40 MG: 20 CAPSULE, DELAYED RELEASE ORAL at 07:45

## 2017-06-11 NOTE — DISCHARGE SUMMARY
[facility] Hospitalist Discharge Summary    Molly Teague MRN# [id number]   Age: 31 year old YOB: 1985     Date of Admission:  6/8/2017  Date of Discharge::  6/11/2017  9:55 AM  Admitting Physician:  Gene Veloz MD  Discharge Physician:  Grecia Barba DO  Primary Physician: Grecia Barba       Home clinic: [facility]          Admission Diagnoses:   Acute pyelonephritis [N10]          Discharge Diagnosis:     Active Problems:    Scleroderma (H) (9/22/2016)    History of ARDS (11/9/2016)    History of hemodialysis (11/9/2016)    Other pulmonary embolism without acute cor pulmonale, unspecified chronicity (H) (11/9/2016)    CREST (calcinosis, Raynaud's phenomenon, esophageal dysfunction, sclerodactyly, telangiectasia) (H) (11/9/2016)    Moderate persistent asthma without complication (1/2/2017)    Gastroesophageal reflux disease with esophagitis (3/30/2017)    Chronic pain syndrome (4/26/2017)    Sepsis (H) (6/8/2017)    Acute pyelonephritis (6/9/2017)              Procedures:        Results for orders placed or performed during the hospital encounter of 05/09/17   XR Chest 2 Views    Narrative    XR CHEST 2 VW   5/9/2017 7:00 PM     HISTORY: Fever    COMPARISON: None.    FINDINGS: Heart size is normal. Lungs are clear.      Impression    IMPRESSION: Negative.    PER FERREIRA MD                 Allergies:      Allergies   Allergen Reactions     No Known Allergies      Shellfish-Derived Products Nausea     Rash on face            Discharge Medications:     Discharge Medication List as of 6/11/2017  9:33 AM      START taking these medications    Details   ciprofloxacin (CIPRO) 500 MG tablet Take 1 tablet (500 mg) by mouth 2 times daily for 4 days, Disp-8 tablet, R-0, E-Prescribe      Benzocaine (HURRICAINE/TOPEX) 20 % AERO spray Use in mouth as needed, Disp-1 each, R-1, E-Prescribe         CONTINUE these medications which have NOT CHANGED    Details   promethazine  (PHENERGAN) 25 MG/ML IV injection Inject 25 mg into the vein every 6 hours as needed for nausea or vomiting, Historical      LISINOPRIL PO Take 5 mg by mouth daily, Historical      zolpidem (AMBIEN) 10 MG tablet Take 1 tablet (10 mg) by mouth nightly as needed for sleep, Disp-30 tablet, R-3, Local Print      Cyanocobalamin (B-12) 1000 MCG TBCR Take 1,000 mcg by mouth daily, Disp-100 tablet, R-1, E-Prescribe      !! oxyCODONE (ROXICODONE) 15 MG IR tablet Take 1 tablet (15 mg) by mouth every 4 hours as needed for pain, Disp-120 tablet, R-0, Local Print      !! oxyCODONE (ROXICODONE) 15 MG IR tablet Take 1 tablet (15 mg) by mouth every 4 hours as needed for pain, Disp-120 tablet, R-0, Local Print      LORazepam (ATIVAN) 0.5 MG tablet Take 1 tablet (0.5 mg) by mouth 2 times daily as needed for anxiety, Disp-60 tablet, R-3, Local Print      aspirin-acetaminophen-caffeine (EXCEDRIN MIGRAINE) 250-250-65 MG per tablet Take 1 tablet by mouth every 6 hours as needed for headaches, Disp-24 tablet, R-1, E-Prescribe      budesonide-formoterol (SYMBICORT) 160-4.5 MCG/ACT Inhaler Inhale 2 puffs into the lungs 2 times daily, Disp-3 Inhaler, R-3, E-Prescribe      sucralfate (CARAFATE) 1 GM tablet Take 1 tablet (1 g) by mouth 4 times daily, Disp-120 tablet, R-11, E-Prescribe      gabapentin (NEURONTIN) 300 MG capsule Take 1 capsule (300 mg) by mouth 3 times daily, Disp-270 capsule, R-3, E-Prescribe      citalopram (CELEXA) 40 MG tablet Take 1 tablet (40 mg) by mouth daily, Disp-90 tablet, R-3, E-Prescribe      ranitidine (ZANTAC) 150 MG tablet Take 1 tablet (150 mg) by mouth 2 times daily as needed for heartburn, Disp-60 tablet, R-11, E-Prescribe      ferrous gluconate (FERGON) 324 (38 FE) MG tablet Take 1 tablet (324 mg) by mouth daily (with breakfast), Disp-100 tablet, R-3, E-Prescribe      albuterol (VENTOLIN HFA) 108 (90 BASE) MCG/ACT Inhaler Inhale 2 puffs into the lungs every 4 hours as needed for shortness of breath / dyspnea  or wheezing, Disp-1 Inhaler, R-11, E-PrescribePatient will call to fill      polyethylene glycol (MIRALAX) powder Take 17 g (1 capful) by mouth daily, Disp-510 g, R-1, E-Prescribe      blood glucose monitoring (NO BRAND SPECIFIED) test strip Use to test blood sugars 3 times daily or as directed  Please give what is approved by insurance.Disp-100 each, R-0Fax      omeprazole (PRILOSEC) 20 MG capsule Take 40 mg by mouth 2 times daily , Disp-90 capsule, R-1, Historical      diphenhydrAMINE (BENADRYL) 25 MG tablet Take 1 tablet (25 mg) by mouth every 6 hours as needed for itching or allergies, Disp-56 tablet, Historical       !! - Potential duplicate medications found. Please discuss with provider.                Consultations:   none          Brief History of Presenting Illness:   Molly Teague had some symptoms of cloudy urine starting three days ago.  She has some urinary frequency, but notes she was drinking a lot to try to flush it through, as she was concerned about a possible UTI.  She subsequently then developed fever, nausea and vomiting, and then today finally some left flank pain.  The pain was severe.  She has had some rigors with these fevers.           Hospital Course:     She was found to have sepsis due to E coli pyelonephritis.  Positive urine and blood cultures.  Resistant to ampicillin.  She improved with ceftriaxone - decreasing fatigue, myalgias, flank pain and resolved N/V/fevers.    Sepsis due to Pyelonephritis: presented with fever 102.5, tachycardia 137, leukocytosis 16.5 along with symptoms of cloudy, malodorous urine, nausea, vomiting, malaise, severe left flank pain.  UA grossly positive.  Blood and urine cultures growing E. Coli, resistant to ampcillin.  --d/c on Cipro to complete 7 day course of antibiotic total. Discussed avoid using with iron due to interaction      CREST: manifestations include significant sclerodactyly, Raynauds, scleroderma with renal involvement, significant GERD  "with esophagitis and GAVE.  Follows with multiple sub-specialists at [place].  --Continue iron TID - take only at lunch while on cipro  --Continue lisinopril 5 mg daily  --continue carafate QID  --Compazine, zofran, reglan PRN for nausea      Chronic pain: from CREST as above.  Causes significant neuropathy type pain  --Continue gabapentin  --continue PTA  oxycodone       Acute on chronic iron deficiency anemia: chronic melena due to GAVE, esophagitis, gastric ulcers.  On max treatment with BID PPI, H2 blocker and carafate.  Hgb trended from 8.7 to 7.1  --daily CBC.  --transfuse if hgb < 7  --will need follow-up CBC at clinic visit.      Asthma: continue prior to admission inhalers.      Dep/Anxiety/PTSD: continue prior to admission celexa, ativan.      Aphthous Ulcers: intermittent history of per patient.  She reports she typically gets when ill or hospitalized.  Start benzocaine PRN for comfort.          Discharge Exam:   Blood pressure 119/81, pulse 89, temperature 98.9  F (37.2  C), temperature source Oral, resp. rate 16, height 1.575 m (5' 2\"), weight 68.5 kg (151 lb 0.2 oz), SpO2 97 %, not currently breastfeeding.    Gen: alert, less fatigued, not acutely ill appearing.  Ambulating around the room.  HEENT: moist mucus membranes, no posterior pharyngeal erythema or exudates.  Small mouth opening.  Small aphthous ulcers on lips and tongue  Neck: No thyromegaly, masses or adenopathy.  Chronic scarring from previous trach site  CV: RRR, S1 and S2 normal, no murmurs. Radial and pedal pulses symmetric and normal  Respiratory: Lungs clear bilaterally  Abd: Soft, mild suprapubic but no flank pain.  Normal bowel sounds.  No hepatosplenomegaly   MSK:  Skin tightening and contractures of bilateral hands/fingers.  Skin: vitiligo.  Skin tightening of face/neck/hands.  Lymph: No peripheral edema         Pending/Follow-Up Tests at Discharge:   CBC, BMP         Discharge Instructions and Follow-Up:   Discharge diet: Regular "   Discharge activity: Activity as tolerated   Discharge follow-up: Follow with primary care provider in 7 days           Discharge Disposition:   Discharged to home      Attestation:  Amount of time performed on this discharge : 30 minutes.    Grecia Barba DO

## 2017-06-11 NOTE — PLAN OF CARE
Problem: Infection, Risk/Actual (Infant)  Goal: Infection Prevention/Resolution  Patient will demonstrate the desired outcomes by discharge/transition of care.   Outcome: Improving  Pt denies any flank pain or dysuria. Up in room indpendently. Urine clear, yellow. Voiding plenty, emptying hat in toilet by self, not all output therefore documented. Pt tolerating PO food & fluids well. C/o of pain in mouth d/t ulcers, using mouth spray with minimal relief. Taking prn Oxycodone for chronic pain, getting adequate relief of mouth ulcers from oxycodone also. VSS, afebrile. Uses call light appropriately. Nataliia Valverde RN

## 2017-06-11 NOTE — PLAN OF CARE
Problem: Discharge Planning  Goal: Discharge Planning (Adult, OB, Behavioral, Peds)  Outcome: Completed Date Met:  06/11/17  [facility] DISCHARGE NOTE     Patient discharged to home at 9:52 AM via ambulation. Accompanied by other:mother in law will meet patient at front door and staff. Discharge instructions reviewed with patient, opportunity offered to ask questions. Prescriptions filled and sent with patient upon discharge. All belongings sent with patient.     Emi Ferrera

## 2017-06-11 NOTE — DISCHARGE INSTRUCTIONS
1. No changes to regular prescriptions  2. Start antibiotic Cipro twice daily for 4 days.  First dose Sunday PM  3. The Cipro interacts with Iron.  Only take iron at lunch while on the antibiotic, then can resume taking it 3 x daily.  4. See Dr. Barba in 1-2 weeks to see how you are doing.

## 2017-06-11 NOTE — PLAN OF CARE
Problem: Goal Outcome Summary  Goal: Goal Outcome Summary  Outcome: Completed Date Met:  06/11/17  Patient requested Oxycodone this morning for generalized discomfort. Patient states helped her to be able to get out of bed. Ready for discharge. Awaiting ride at 1000.

## 2017-06-11 NOTE — PLAN OF CARE
"Problem: Infection, Risk/Actual (Infant)  Goal: Infection Prevention/Resolution  Patient will demonstrate the desired outcomes by discharge/transition of care.   Outcome: Improving  T-99.3 and 98.9 overnight. Slept quietly overnight. Reports urinated X1 overnight without any discomfort. Pt voices, \"I haven't had any symptoms until all hell broke loose\".   Pt had removed hat from toilet so not measured overnight or seen, had replaced for assessment and measuring.       "

## 2017-06-12 ENCOUNTER — TELEPHONE (OUTPATIENT)
Dept: FAMILY MEDICINE | Facility: CLINIC | Age: 32
End: 2017-06-12

## 2017-06-12 NOTE — TELEPHONE ENCOUNTER
ED/UC/IP follow up phone call:   06/11/17   Aphthous Ulcer, Acute Pyelonephritis    RN please call to follow up    Number of ED visits in past 12 mths:   5    [facility]

## 2017-06-14 NOTE — TELEPHONE ENCOUNTER
ED / Discharge Outreach Protocol    Patient Contact    Attempt # 1    Was call answered?  No.  Left message on voicemail with information to call me back.    I see that pt was scheduled for an office visit today (6/14). It appears that she did not make that appt.     Karla Cramer RN

## 2017-06-16 NOTE — TELEPHONE ENCOUNTER
ED / Discharge Outreach Protocol    Patient Contact    Attempt # 2    Was call answered?  No.  Left message on voicemail with information to call me back. Reyna BAEZA RN

## 2017-06-16 NOTE — TELEPHONE ENCOUNTER
"Hospital/TCU/ED for chronic condition Discharge Protocol    \"Hi, my name is Jia Oneill, a registered nurse, and I am calling from [facility].  I am calling to follow up and see how things are going for you after your recent emergency visit/hospital/TCU stay.\"    Tell me how you are doing now that you are home?\" Pt updates that she is doing much better since discharge on 6/11/17 for pyelonephritis.  Pt completed her antibiotic yesterday.  No recurrence of symptoms at this time.  Pt missed her scheduled follow up on 6/14/17; did not realize she had this appt.  Rescheduled on 6/22/17.      Discharge Instructions    \"Let's review your discharge instructions.  What is/are the follow-up recommendations?  Pt. Response: see provider 7 days from discharge.    \"Has an appointment with your primary care provider been scheduled?\"   missed her appt.  Rescheduled    \"When you see the provider, I would recommend that you bring your medications with you.\"    Medications    \"Tell me what changed about your medicines when you discharged?\"    Changes to chronic meds?    No    \"What questions do you have about your medications?\"    None     New diagnoses of heart failure, COPD, diabetes, or MI?    No              Medication reconciliation completed? Yes  Was MTM referral placed (*Make sure to put transitions as reason for referral)?   No    Call Summary    \"What questions or concerns do you have about your recent visit and your follow-up care?\"     none  Pt intends to follow up as planned/arranged.  She will be seen sooner for new or recurring symptoms.    \"If you have questions or things don't continue to improve, we encourage you contact us through the main clinic number (give number).  Even if the clinic is not open, triage nurses are available 24/7 to help you.     We would like you to know that our clinic has extended hours (provide information).  We also have urgent care (provide details on closest location and " "hours/contact info)\"      \"Thank you for your time and take care!\"             "

## 2017-06-22 ENCOUNTER — OFFICE VISIT (OUTPATIENT)
Dept: FAMILY MEDICINE | Facility: CLINIC | Age: 32
End: 2017-06-22
Payer: COMMERCIAL

## 2017-06-22 VITALS
WEIGHT: 141 LBS | SYSTOLIC BLOOD PRESSURE: 119 MMHG | HEIGHT: 62 IN | TEMPERATURE: 99.9 F | BODY MASS INDEX: 25.95 KG/M2 | DIASTOLIC BLOOD PRESSURE: 78 MMHG | HEART RATE: 104 BPM

## 2017-06-22 DIAGNOSIS — M65.30 TRIGGER FINGER, ACQUIRED: ICD-10-CM

## 2017-06-22 DIAGNOSIS — M34.9 LIMITED SYSTEMIC SCLEROSIS (H): Primary | ICD-10-CM

## 2017-06-22 DIAGNOSIS — G89.4 CHRONIC PAIN SYNDROME: Chronic | ICD-10-CM

## 2017-06-22 DIAGNOSIS — G47.09 OTHER INSOMNIA: ICD-10-CM

## 2017-06-22 DIAGNOSIS — M34.1 CREST (CALCINOSIS, RAYNAUD'S PHENOMENON, ESOPHAGEAL DYSFUNCTION, SCLERODACTYLY, TELANGIECTASIA) (H): ICD-10-CM

## 2017-06-22 DIAGNOSIS — F41.0 SEVERE ANXIETY WITH PANIC: ICD-10-CM

## 2017-06-22 DIAGNOSIS — K21.00 GASTROESOPHAGEAL REFLUX DISEASE WITH ESOPHAGITIS: ICD-10-CM

## 2017-06-22 DIAGNOSIS — J45.40 MODERATE PERSISTENT ASTHMA WITHOUT COMPLICATION: ICD-10-CM

## 2017-06-22 PROCEDURE — 99214 OFFICE O/P EST MOD 30 MIN: CPT | Performed by: INTERNAL MEDICINE

## 2017-06-22 RX ORDER — LISINOPRIL 5 MG/1
5 TABLET ORAL DAILY
Qty: 90 TABLET | Refills: 3 | Status: SHIPPED | OUTPATIENT
Start: 2017-06-22 | End: 2018-04-11

## 2017-06-22 RX ORDER — OXYCODONE HYDROCHLORIDE 15 MG/1
15 TABLET ORAL EVERY 4 HOURS PRN
Qty: 120 TABLET | Refills: 0 | Status: SHIPPED | OUTPATIENT
Start: 2017-09-22 | End: 2017-09-26

## 2017-06-22 RX ORDER — GABAPENTIN 300 MG/1
300 CAPSULE ORAL 3 TIMES DAILY
Qty: 270 CAPSULE | Refills: 3 | Status: SHIPPED | OUTPATIENT
Start: 2017-06-22 | End: 2017-09-26

## 2017-06-22 RX ORDER — OXYCODONE HYDROCHLORIDE 15 MG/1
15 TABLET ORAL EVERY 4 HOURS PRN
Qty: 120 TABLET | Refills: 0 | Status: SHIPPED | OUTPATIENT
Start: 2017-08-22 | End: 2017-09-26

## 2017-06-22 RX ORDER — ALBUTEROL SULFATE 90 UG/1
2 AEROSOL, METERED RESPIRATORY (INHALATION) EVERY 4 HOURS PRN
Qty: 1 INHALER | Refills: 11 | Status: SHIPPED | OUTPATIENT
Start: 2017-06-22 | End: 2018-06-26

## 2017-06-22 RX ORDER — OMEPRAZOLE 40 MG/1
40 CAPSULE, DELAYED RELEASE ORAL 2 TIMES DAILY
Qty: 180 CAPSULE | Refills: 3 | Status: SHIPPED | OUTPATIENT
Start: 2017-06-22 | End: 2018-04-11

## 2017-06-22 RX ORDER — CITALOPRAM HYDROBROMIDE 40 MG/1
40 TABLET ORAL DAILY
Qty: 90 TABLET | Refills: 3 | Status: SHIPPED | OUTPATIENT
Start: 2017-06-22 | End: 2017-10-30 | Stop reason: ALTCHOICE

## 2017-06-22 RX ORDER — OXYCODONE HYDROCHLORIDE 15 MG/1
15 TABLET ORAL EVERY 4 HOURS PRN
Qty: 120 TABLET | Refills: 0 | Status: SHIPPED | OUTPATIENT
Start: 2017-07-22 | End: 2017-09-26

## 2017-06-22 RX ORDER — ZOLPIDEM TARTRATE 10 MG/1
10 TABLET ORAL
Qty: 30 TABLET | Refills: 3 | Status: SHIPPED | OUTPATIENT
Start: 2017-06-22 | End: 2017-12-28

## 2017-06-22 NOTE — NURSING NOTE
"Initial /78  Pulse 104  Temp 99.9  F (37.7  C) (Tympanic)  Ht 5' 2\" (1.575 m)  Wt 141 lb (64 kg)  Breastfeeding? No  BMI 25.79 kg/m2 Estimated body mass index is 25.79 kg/(m^2) as calculated from the following:    Height as of this encounter: 5' 2\" (1.575 m).    Weight as of this encounter: 141 lb (64 kg). .    Berna Maria CMA ([facility])  "

## 2017-06-22 NOTE — PATIENT INSTRUCTIONS
1. Refills given.  2. Referral to hand therapy and physical therapy  3. [facility]erral to [facility] - [facility] Sur[facility]  4. Refills given for July, Aug, Sept

## 2017-06-22 NOTE — PROGRESS NOTES
SUBJECTIVE:                                                    Molly Teauge is a 31 year old female who presents to clinic today for the following health issues:        Hospital Follow-up Visit:    Hospital/Nursing Home/IP Rehab Facility: [facility]  Date of Admission: 6/8/17  Date of Discharge: 6/11/17  Reason(s) for Admission: Aphthous Ulcer, Acute Pyelonephritis.             Problems taking medications regularly:  None       Medication changes since discharge: None       Problems adhering to non-medication therapy:  None    Summary of hospitalization:  [facility] discharge summary reviewed  Diagnostic Tests/Treatments reviewed.  Follow up needed: [place]  Other Healthcare Providers Involved in Patient s Care:         None  Update since discharge: stable.     Post Discharge Medication Reconciliation: discharge medications reconciled and changed, per note/orders (see AVS).  Plan of care communicated with patient     Coding guidelines for this visit:  Type of Medical   Decision Making Face-to-Face Visit       within 7 Days of discharge Face-to-Face Visit        within 14 days of discharge   Moderate Complexity [id number]   High Complexity [id number]        Sepsis due to pyelonephritis.  Discharge on cipro, course completed.  Overall improved, feels infection has resolved. No fevers, just low grade temp like today.     Nausea:  Becoming more severe.  GERD is stable, actually improved.  She has had epigastric pain.  The pain is worse with eating.  The anti-emetics help.  Not using Phenergan oral or injection, but only a few times/week.     Myalgias:  She is also having raynauds that is worsening, which causes numbness in feet and knees. Occurring a few times/week. She doesn't do anything for this, it will go away on its own. It lasts an hour or so.      MSK:  The middle finger of left hand is locking more.  [place] [facility].  She was doing hand therapy but stopped this.    She is still hopeful to  maybe have another child in 1-2 years.  She knows she would have to get off benzos, gabapentin, opiates, ambien.  That is her goal in 6 months.    She has appointment with [place] later today.      Current Outpatient Prescriptions   Medication Sig Dispense Refill     promethazine (PHENERGAN) 25 MG/ML IV injection Inject 25 mg into the vein every 6 hours as needed for nausea or vomiting       LISINOPRIL PO Take 5 mg by mouth daily       zolpidem (AMBIEN) 10 MG tablet Take 1 tablet (10 mg) by mouth nightly as needed for sleep 30 tablet 3     Cyanocobalamin (B-12) 1000 MCG TBCR Take 1,000 mcg by mouth daily 100 tablet 1     [START ON 6/25/2017] oxyCODONE (ROXICODONE) 15 MG IR tablet Take 1 tablet (15 mg) by mouth every 4 hours as needed for pain 120 tablet 0     LORazepam (ATIVAN) 0.5 MG tablet Take 1 tablet (0.5 mg) by mouth 2 times daily as needed for anxiety 60 tablet 3     aspirin-acetaminophen-caffeine (EXCEDRIN MIGRAINE) 250-250-65 MG per tablet Take 1 tablet by mouth every 6 hours as needed for headaches 24 tablet 1     budesonide-formoterol (SYMBICORT) 160-4.5 MCG/ACT Inhaler Inhale 2 puffs into the lungs 2 times daily 3 Inhaler 3     sucralfate (CARAFATE) 1 GM tablet Take 1 tablet (1 g) by mouth 4 times daily 120 tablet 11     gabapentin (NEURONTIN) 300 MG capsule Take 1 capsule (300 mg) by mouth 3 times daily 270 capsule 3     citalopram (CELEXA) 40 MG tablet Take 1 tablet (40 mg) by mouth daily 90 tablet 3     ranitidine (ZANTAC) 150 MG tablet Take 1 tablet (150 mg) by mouth 2 times daily as needed for heartburn 60 tablet 11     ferrous gluconate (FERGON) 324 (38 FE) MG tablet Take 1 tablet (324 mg) by mouth daily (with breakfast) (Patient taking differently: Take 324 mg by mouth 3 times daily (with meals) ) 100 tablet 3     albuterol (VENTOLIN HFA) 108 (90 BASE) MCG/ACT Inhaler Inhale 2 puffs into the lungs every 4 hours as needed for shortness of breath / dyspnea or wheezing 1 Inhaler 11     polyethylene  "glycol (MIRALAX) powder Take 17 g (1 capful) by mouth daily 510 g 1     blood glucose monitoring (NO BRAND SPECIFIED) test strip Use to test blood sugars 3 times daily or as directed  Please give what is approved by insurance. 100 each 0     omeprazole (PRILOSEC) 20 MG capsule Take 40 mg by mouth 2 times daily  90 capsule 1     diphenhydrAMINE (BENADRYL) 25 MG tablet Take 1 tablet (25 mg) by mouth every 6 hours as needed for itching or allergies 56 tablet      oxyCODONE (ROXICODONE) 15 MG IR tablet Take 1 tablet (15 mg) by mouth every 4 hours as needed for pain 120 tablet 0       Reviewed and updated as needed this visit by clinical staff  Tobacco  Allergies  Meds  Problems  Med Hx  Surg Hx  Fam Hx  Soc Hx        Reviewed and updated as needed this visit by Provider  Allergies  Meds  Problems         ROS:  Constitutional, HEENT, cardiovascular, pulmonary, GI, , musculoskeletal, neuro, skin, endocrine and psych systems are negative, except as otherwise noted.    OBJECTIVE:                                                    /78  Pulse 104  Temp 99.9  F (37.7  C) (Tympanic)  Ht 5' 2\" (1.575 m)  Wt 141 lb (64 kg)  Breastfeeding? No  BMI 25.79 kg/m2  Body mass index is 25.79 kg/(m^2).  GENERAL APPEARANCE: alert and no distress       ASSESSMENT/PLAN:                                                    1. Limited systemic sclerosis (H) - refill given.  Agree with physical therapy/hand therapy.  We have hand surgeon here, but I doubt she would see patient with complex MSK condition like scleroderma.  [facility]erral to [facility]  - gabapentin (NEURONTIN) 300 MG capsule; Take 1 capsule (300 mg) by mouth 3 times daily  Dispense: 270 capsule; Refill: 3  - OCCUPATIONAL THERAPY REFERRAL  - PHYSICAL THERAPY REFERRAL  - ORTHOPEDICS ADULT REFERRAL    2. CREST (calcinosis, Raynaud's phenomenon, esophageal dysfunction, sclerodactyly, telangiectasia) (H) -  - stable, refill provided  - lisinopril (PRINIVIL/ZESTRIL) 5 MG " tablet; Take 1 tablet (5 mg) by mouth daily  Dispense: 90 tablet; Refill: 3  - ranitidine (ZANTAC) 150 MG tablet; Take 1 tablet (150 mg) by mouth 2 times daily as needed for heartburn  Dispense: 180 tablet; Refill: 3  - omeprazole (PRILOSEC) 40 MG capsule; Take 1 capsule (40 mg) by mouth 2 times daily  Dispense: 180 capsule; Refill: 3  - OCCUPATIONAL THERAPY REFERRAL  - ORTHOPEDICS ADULT REFERRAL    3. Other insomnia -  - stable, refill provided.  Discussed non-hypotic alternatives, patient wants to continue for now.  Discussed safe dosing, patient wants to continue at higher dose  - zolpidem (AMBIEN) 10 MG tablet; Take 1 tablet (10 mg) by mouth nightly as needed for sleep  Dispense: 30 tablet; Refill: 3    4. Severe anxiety with panic -  - stable, refill provided  - citalopram (CELEXA) 40 MG tablet; Take 1 tablet (40 mg) by mouth daily  Dispense: 90 tablet; Refill: 3    5. Moderate persistent asthma without complication -  - stable, refill provided  - albuterol (VENTOLIN HFA) 108 (90 BASE) MCG/ACT Inhaler; Inhale 2 puffs into the lungs every 4 hours as needed for shortness of breath / dyspnea or wheezing  Dispense: 1 Inhaler; Refill: 11    6. Gastroesophageal reflux disease with esophagitis -  - stable, refill provided  - ranitidine (ZANTAC) 150 MG tablet; Take 1 tablet (150 mg) by mouth 2 times daily as needed for heartburn  Dispense: 180 tablet; Refill: 3  - omeprazole (PRILOSEC) 40 MG capsule; Take 1 capsule (40 mg) by mouth 2 times daily  Dispense: 180 capsule; Refill: 3    7. Trigger finger, acquired  - ORTHOPEDICS ADULT REFERRAL    8. Chronic pain syndrome - refills given for July, Aug, Sept  - oxyCODONE (ROXICODONE) 15 MG IR tablet; Take 1 tablet (15 mg) by mouth every 4 hours as needed for pain  Dispense: 120 tablet; Refill: 0  - oxyCODONE (ROXICODONE) 15 MG IR tablet; Take 1 tablet (15 mg) by mouth every 4 hours as needed for pain  Dispense: 120 tablet; Refill: 0  - oxyCODONE (ROXICODONE) 15 MG IR tablet;  Take 1 tablet (15 mg) by mouth every 4 hours as needed for pain  Dispense: 120 tablet; Refill: 0    RTC3 months or PRN.    Grecia Barba,   [facility]

## 2017-06-22 NOTE — MR AVS SNAPSHOT
After Visit Summary   6/22/2017    Molly Teague    MRN: [id number]           Patient Information     Date Of Birth          1985        Visit Information        Provider Department      6/22/2017 9:00 AM Grecia Barba, DO [facility]        Today's Diagnoses     Gastroesophageal reflux disease with esophagitis    -  1    Other insomnia        Limited systemic sclerosis (H)        Severe anxiety with panic        CREST (calcinosis, Raynaud's phenomenon, esophageal dysfunction, sclerodactyly, telangiectasia) (H)        Moderate persistent asthma without complication        Trigger finger, acquired        Chronic pain syndrome          Care Instructions    1. Refills given.  2. Referral to hand therapy and physical therapy  3. Referral to Ortho at the  - Hand Surgeon  4. Refills given for July, Aug, Sept          Follow-ups after your visit        Additional Services     OCCUPATIONAL THERAPY REFERRAL       *This therapy referral will be filtered to a centralized scheduling office at [facility] and the patient will receive a call to schedule an appointment at a [place] location most convenient for them. *     [facility] provides Occupational Therapy evaluation and treatment and many specialty services across the [place] system.  If requesting a specialty program, please choose from the list below.    If you have not heard from the scheduling office within 2 business days, please call [phone] for all locations, with the exception of [place], please call [phone].     Treatment: Evaluation & Treatment  Special Instructions/Modalities:   Special Programs: Hand Therapy ([facility] locations only)    Please be aware that coverage of these services is subject to the terms and limitations of your health insurance plan.  Call member services at your health plan with any benefit or coverage questions.      **Note to Provider:   "If you are referring outside of [place] for the therapy appointment, please list the name of the location in the \"special instructions\" above, print the referral and give to the patient to schedule the appointment.            ORTHOPEDICS ADULT REFERRAL       Your provider has referred you to: [facility]: Orthopaedic Clinic [facility] [phone]   [email or web address]    Hand Surgeon.  Patient has severe scleroderma    Please be aware that coverage of these services is subject to the terms and limitations of your health insurance plan.  Call member services at your health plan with any benefit or coverage questions.      Please bring the following to your appointment:    >>   Any x-rays, CTs or MRIs which have been performed.  Contact the facility where they were done to arrange for  prior to your scheduled appointment.    >>   List of current medications   >>   This referral request   >>   Any documents/labs given to you for this referral            PHYSICAL THERAPY REFERRAL       *This therapy referral will be filtered to a centralized scheduling office at [facility] and the patient will receive a call to schedule an appointment at a [place] location most convenient for them. *     [facility] provides Physical Therapy evaluation and treatment and many specialty services across the [place] system.  If requesting a specialty program, please choose from the list below.    If you have not heard from the scheduling office within 2 business days, please call [phone] for all locations, with the exception of [place], please call [phone].  Treatment: Evaluation & Treatment  Special Instructions/Modalities:   Special Programs:     Please be aware that coverage of these services is subject to the terms and limitations of your health insurance plan.  Call member services at your health plan with any benefit or coverage questions.  " "    **Note to Provider:  If you are referring outside of [place] for the therapy appointment, please list the name of the location in the \"special instructions\" above, print the referral and give to the patient to schedule the appointment.                  Your next 10 appointments already scheduled     Aug 08, 2017  9:00 AM CDT   PHYSICAL with Kale Marquez MD   [facility]    [address] [address] [phone]              Who to contact     If you have questions or need follow up information about today's clinic visit or your schedule please contact [facility] directly at [phone].  Normal or non-critical lab and imaging results will be communicated to you by MyChart, letter or phone within 4 business days after the clinic has received the results. If you do not hear from us within 7 days, please contact the clinic through MyChart or phone. If you have a critical or abnormal lab result, we will notify you by phone as soon as possible.  Submit refill requests through [email or web address] or call your pharmacy and they will forward the refill request to us. Please allow 3 business days for your refill to be completed.          Additional Information About Your Visit        V3 SystemsharBookTour Information     [email or web address] lets you send messages to your doctor, view your test results, renew your prescriptions, schedule appointments and more. To sign up, go to [email or web address] . Click on \"Log in\" on the left side of the screen, which will take you to the Welcome page. Then click on \"Sign up Now\" on the right side of the page.     You will be asked to enter the access code listed below, as well as some personal information. Please follow the directions to create your username and password.     Your access code is: [email or web address]  Expires: 2017 11:32 AM     Your access code will  in 90 days. If you need help or a new code, please call " "your [place] clinic or [phone].        Care EveryWhere ID     This is your Care EveryWhere ID. This could be used by other organizations to access your [place] medical records  [id number]        Your Vitals Were     Pulse Temperature Height Breastfeeding? BMI (Body Mass Index)       104 99.9  F (37.7  C) (Tympanic) 5' 2\" (1.575 m) No 25.79 kg/m2        Blood Pressure from Last 3 Encounters:   06/22/17 119/78   06/11/17 117/73   05/28/17 111/79    Weight from Last 3 Encounters:   06/22/17 141 lb (64 kg)   06/11/17 151 lb 0.2 oz (68.5 kg)   05/28/17 140 lb (63.5 kg)              We Performed the Following     OCCUPATIONAL THERAPY REFERRAL     ORTHOPEDICS ADULT REFERRAL     PHYSICAL THERAPY REFERRAL          Today's Medication Changes          These changes are accurate as of: 6/22/17  9:32 AM.  If you have any questions, ask your nurse or doctor.               These medicines have changed or have updated prescriptions.        Dose/Directions    ferrous gluconate 324 (38 FE) MG tablet   Commonly known as:  FERGON   This may have changed:  when to take this   Used for:  CREST (calcinosis, Raynaud's phenomenon, esophageal dysfunction, sclerodactyly, telangiectasia) (H)        Dose:  324 mg   Take 1 tablet (324 mg) by mouth daily (with breakfast)   Quantity:  100 tablet   Refills:  3       lisinopril 5 MG tablet   Commonly known as:  PRINIVIL/ZESTRIL   This may have changed:  medication strength   Used for:  CREST (calcinosis, Raynaud's phenomenon, esophageal dysfunction, sclerodactyly, telangiectasia) (H)   Changed by:  Grecia Barba DO        Dose:  5 mg   Take 1 tablet (5 mg) by mouth daily   Quantity:  90 tablet   Refills:  3       omeprazole 40 MG capsule   Commonly known as:  priLOSEC   This may have changed:  medication strength   Used for:  CREST (calcinosis, Raynaud's phenomenon, esophageal dysfunction, sclerodactyly, telangiectasia) (H), Gastroesophageal reflux disease with esophagitis   Changed " by:  Grecia Barba DO        Dose:  40 mg   Take 1 capsule (40 mg) by mouth 2 times daily   Quantity:  180 capsule   Refills:  3       * oxyCODONE 15 MG IR tablet   Commonly known as:  ROXICODONE   This may have changed:  Another medication with the same name was added. Make sure you understand how and when to take each.   Used for:  Chronic pain syndrome   Changed by:  Grecia Barba DO        Dose:  15 mg   Start taking on:  6/25/2017   Take 1 tablet (15 mg) by mouth every 4 hours as needed for pain   Quantity:  120 tablet   Refills:  0       * oxyCODONE 15 MG IR tablet   Commonly known as:  ROXICODONE   This may have changed:  Another medication with the same name was added. Make sure you understand how and when to take each.   Used for:  Chronic pain syndrome   Changed by:  Grecia Barba DO        Dose:  15 mg   Start taking on:  7/22/2017   Take 1 tablet (15 mg) by mouth every 4 hours as needed for pain   Quantity:  120 tablet   Refills:  0       * oxyCODONE 15 MG IR tablet   Commonly known as:  ROXICODONE   This may have changed:  You were already taking a medication with the same name, and this prescription was added. Make sure you understand how and when to take each.   Used for:  Chronic pain syndrome   Changed by:  Grecia Barba DO        Dose:  15 mg   Start taking on:  8/22/2017   Take 1 tablet (15 mg) by mouth every 4 hours as needed for pain   Quantity:  120 tablet   Refills:  0       * oxyCODONE 15 MG IR tablet   Commonly known as:  ROXICODONE   This may have changed:  You were already taking a medication with the same name, and this prescription was added. Make sure you understand how and when to take each.   Used for:  Chronic pain syndrome   Changed by:  Grecia Barba DO        Dose:  15 mg   Start taking on:  9/22/2017   Take 1 tablet (15 mg) by mouth every 4 hours as needed for pain   Quantity:  120 tablet   Refills:  0       * Notice:  This list has 4  medication(s) that are the same as other medications prescribed for you. Read the directions carefully, and ask your doctor or other care provider to review them with you.         Where to get your medicines      These medications were sent to [facility] [address] [place] [address]  [address] [facility] [address]     Phone:  [phone]     albuterol 108 (90 BASE) MCG/ACT Inhaler    citalopram 40 MG tablet    gabapentin 300 MG capsule    lisinopril 5 MG tablet    omeprazole 40 MG capsule    ranitidine 150 MG tablet         Some of these will need a paper prescription and others can be bought over the counter.  Ask your nurse if you have questions.     Bring a paper prescription for each of these medications     oxyCODONE 15 MG IR tablet    oxyCODONE 15 MG IR tablet    oxyCODONE 15 MG IR tablet    zolpidem 10 MG tablet                Primary Care Provider Office Phone # Fax #    Grecia Barba, [phone]       [facility] [address] [address]        Equal Access to Services     PARISH COOK : Hadii aad ku hadasho Soomaali, waaxda luqadaha, qaybta kaalmada adeegyada, waxay idiin hayvivian [place] . So [facility] [phone].    ATENCIÓN: Si habla español, tiene a bradley disposición servicios gratuitos de asistencia lingüística. Llame al [phone].    We comply with applicable federal civil rights laws and [place] laws. We do not discriminate on the basis of race, color, national origin, age, disability sex, sexual orientation or gender identity.            Thank you!     Thank you for choosing [facility]  for your care. Our goal is always to provide you with excellent care. Hearing back from our patients is one way we can continue to improve our services. Please take a few minutes to complete the written survey that you may receive in the mail after your visit with us. Thank you!             Your Updated Medication  List - Protect others around you: Learn how to safely use, store and throw away your medicines at www.disposemymeds.org.          This list is accurate as of: 6/22/17  9:32 AM.  Always use your most recent med list.                   Brand Name Dispense Instructions for use Diagnosis    albuterol 108 (90 BASE) MCG/ACT Inhaler    VENTOLIN HFA    1 Inhaler    Inhale 2 puffs into the lungs every 4 hours as needed for shortness of breath / dyspnea or wheezing    Moderate persistent asthma without complication       aspirin-acetaminophen-caffeine 250-250-65 MG per tablet    EXCEDRIN MIGRAINE    24 tablet    Take 1 tablet by mouth every 6 hours as needed for headaches    Chronic daily headache       B-12 1000 MCG Tbcr     100 tablet    Take 1,000 mcg by mouth daily    Vitamin B12 deficiency (non anemic)       BENADRYL 25 MG tablet   Generic drug:  diphenhydrAMINE     56 tablet    Take 1 tablet (25 mg) by mouth every 6 hours as needed for itching or allergies        blood glucose monitoring test strip    no brand specified    100 each    Use to test blood sugars 3 times daily or as directed Please give what is approved by insurance.    Hypoglycemia       budesonide-formoterol 160-4.5 MCG/ACT Inhaler    SYMBICORT    3 Inhaler    Inhale 2 puffs into the lungs 2 times daily    Moderate persistent asthma without complication       citalopram 40 MG tablet    celeXA    90 tablet    Take 1 tablet (40 mg) by mouth daily    Severe anxiety with panic       ferrous gluconate 324 (38 FE) MG tablet    FERGON    100 tablet    Take 1 tablet (324 mg) by mouth daily (with breakfast)    CREST (calcinosis, Raynaud's phenomenon, esophageal dysfunction, sclerodactyly, telangiectasia) (H)       gabapentin 300 MG capsule    NEURONTIN    270 capsule    Take 1 capsule (300 mg) by mouth 3 times daily    Limited systemic sclerosis (H)       lisinopril 5 MG tablet    PRINIVIL/ZESTRIL    90 tablet    Take 1 tablet (5 mg) by mouth daily    CREST  (calcinosis, Raynaud's phenomenon, esophageal dysfunction, sclerodactyly, telangiectasia) (H)       LORazepam 0.5 MG tablet    ATIVAN    60 tablet    Take 1 tablet (0.5 mg) by mouth 2 times daily as needed for anxiety    REX (generalized anxiety disorder)       omeprazole 40 MG capsule    priLOSEC    180 capsule    Take 1 capsule (40 mg) by mouth 2 times daily    CREST (calcinosis, Raynaud's phenomenon, esophageal dysfunction, sclerodactyly, telangiectasia) (H), Gastroesophageal reflux disease with esophagitis       * oxyCODONE 15 MG IR tablet   Start taking on:  6/25/2017    ROXICODONE    120 tablet    Take 1 tablet (15 mg) by mouth every 4 hours as needed for pain    Chronic pain syndrome       * oxyCODONE 15 MG IR tablet   Start taking on:  7/22/2017    ROXICODONE    120 tablet    Take 1 tablet (15 mg) by mouth every 4 hours as needed for pain    Chronic pain syndrome       * oxyCODONE 15 MG IR tablet   Start taking on:  8/22/2017    ROXICODONE    120 tablet    Take 1 tablet (15 mg) by mouth every 4 hours as needed for pain    Chronic pain syndrome       * oxyCODONE 15 MG IR tablet   Start taking on:  9/22/2017    ROXICODONE    120 tablet    Take 1 tablet (15 mg) by mouth every 4 hours as needed for pain    Chronic pain syndrome       polyethylene glycol powder    MIRALAX    510 g    Take 17 g (1 capful) by mouth daily    Drug-induced constipation       promethazine 25 MG/ML IV injection    PHENERGAN     Inject 25 mg into the vein every 6 hours as needed for nausea or vomiting        ranitidine 150 MG tablet    ZANTAC    180 tablet    Take 1 tablet (150 mg) by mouth 2 times daily as needed for heartburn    CREST (calcinosis, Raynaud's phenomenon, esophageal dysfunction, sclerodactyly, telangiectasia) (H), Gastroesophageal reflux disease with esophagitis       sucralfate 1 GM tablet    CARAFATE    120 tablet    Take 1 tablet (1 g) by mouth 4 times daily    Limited systemic sclerosis (H)       zolpidem 10 MG tablet     AMBIEN    30 tablet    Take 1 tablet (10 mg) by mouth nightly as needed for sleep    Other insomnia       * Notice:  This list has 4 medication(s) that are the same as other medications prescribed for you. Read the directions carefully, and ask your doctor or other care provider to review them with you.

## 2017-06-23 ENCOUNTER — TELEPHONE (OUTPATIENT)
Dept: FAMILY MEDICINE | Facility: CLINIC | Age: 32
End: 2017-06-23

## 2017-06-23 NOTE — TELEPHONE ENCOUNTER
Covering for PCP- could we please initiate prior auth for BID dosing of omeprazole?  Per my chart review, it looks like she has been taking this dose since last year to control symptoms.  Thanks.      Reza Bowman MD

## 2017-06-23 NOTE — TELEPHONE ENCOUNTER
Patients insurance will not cover BID dosing, need prior auth for more than 1 per day.  Please let pharmacy know if approved / denied  [facility] [phone]    [facility]  Pharm[facility]  On Behalf of [facility]

## 2017-06-26 ENCOUNTER — TELEPHONE (OUTPATIENT)
Dept: FAMILY MEDICINE | Facility: CLINIC | Age: 32
End: 2017-06-26

## 2017-06-26 NOTE — LETTER
[facility]  [address]  [facility] [address]  Phone: [phone]    June 26, 2017    Molly Teague  [address] [address]              Dear Ms. Teague,    Your [place] Care Team works hard to make sure that you and your family receive exceptional care. Enclosed you will find a copy of the Asthma Control Test (ACT) that our clinic uses to monitor and manage your asthma. This test is an assessment tool that we use to determine how well your asthma is controlled.  Please keep a copy of the ACT for your reference when we call you to complete this assessment.   We will call within the next 2 weeks to review the questionnaire.    Thank you for trusting us with your health care.      Sincerely,      Your [facility] Team/cait

## 2017-06-26 NOTE — TELEPHONE ENCOUNTER
Patient is due for ACT.  Last done 5/11/17 with a score of 8.  Moderate persistent asthma.    Letter with ACT mailed to patient.

## 2017-07-06 NOTE — TELEPHONE ENCOUNTER
Panel Management Review      Patient has the following on her problem list:     Asthma review     ACT Total Scores 7/6/2017   ACT TOTAL SCORE (Goal Greater than or Equal to 20) 12   In the past 12 months, how many times did you visit the emergency room for your asthma without being admitted to the hospital? 0   In the past 12 months, how many times were you hospitalized overnight because of your asthma? 0      1. Is Asthma diagnosis on the Problem List? Yes    2. Is Asthma listed on Health Maintenance? Yes    3. Patient is due for:  ACT      Composite cancer screening  Chart review shows that this patient is due/due soon for the following Pap Smear  Summary:    Patient is due/failing the following:   ACT    Action needed:   Patient needs to do ACT.    Type of outreach:      Patient called and ACT completed with a score of 12.  She states she has been having trouble with aspirating and GI issues and this seems to be bothering her asthma symptoms.  Also issues when outside.          Questions for provider review:    Routed to provider for review.                                                                                                                                    GERMANIA Zurita CMA       Chart routed to Provider .

## 2017-07-07 ASSESSMENT — ASTHMA QUESTIONNAIRES: ACT_TOTALSCORE: 12

## 2017-07-25 DIAGNOSIS — R51.9 CHRONIC DAILY HEADACHE: ICD-10-CM

## 2017-07-25 DIAGNOSIS — F41.1 GAD (GENERALIZED ANXIETY DISORDER): ICD-10-CM

## 2017-07-25 NOTE — TELEPHONE ENCOUNTER
Pt is out of this medication and wondering about getting it refilled today please.    LORazepam (ATIVAN) 0.5 MG tablet        Last Written Prescription Date:  03/30/17  Last Fill Quantity: 60,   # refills: 3  Last Office Visit with [facility], [facility] or [facility] prescribing provider: 06/22/17  Future Office visit:    Next 5 appointments (look out 90 days)     Aug 08, 2017  9:00 AM CDT   PHYSICAL with Kale Marquez MD   [facility]    [address] [address]   [phone]                   Routing refill request to provider for review/approval because:  Drug not on the [facility], [facility] or [facility] refill protocol or controlled substance      [facility]

## 2017-07-26 RX ORDER — LORAZEPAM 0.5 MG/1
0.5 TABLET ORAL 2 TIMES DAILY PRN
Qty: 60 TABLET | Refills: 3 | Status: SHIPPED | OUTPATIENT
Start: 2017-07-26 | End: 2017-09-26

## 2017-07-26 RX ORDER — ACETAMINOPHEN, ASPIRIN, CAFFEINE 250; 250; 65 MG/1; MG/1; MG/1
TABLET, FILM COATED ORAL
Qty: 24 TABLET | Refills: 1 | Status: SHIPPED | OUTPATIENT
Start: 2017-07-26 | End: 2018-01-05

## 2017-07-26 NOTE — TELEPHONE ENCOUNTER
Goodsense Migraine      Last Written Prescription Date: 02/22/17  Last Fill Quantity: 24,  # refills: 1   Last Office Visit with [facility], [facility] or [facility] prescribing provider: 06/22/17                                         Next 5 appointments (look out 90 days)     Aug 08, 2017  9:00 AM CDT   PHYSICAL with Kale Marquez MD   [facility]    [address] [address] [phone]

## 2017-07-31 ENCOUNTER — PRE VISIT (OUTPATIENT)
Dept: ORTHOPEDICS | Facility: CLINIC | Age: 32
End: 2017-07-31

## 2017-07-31 NOTE — TELEPHONE ENCOUNTER
1.  Date/reason for appt: 8/8/17 3PM - Left Hand Trigger Finger  2.  Referring provider: Dr. Grecia Barba  3.  Call to patient (Yes / No - short description): Yes -- LVM for pt to return call. Need VIDA for [facility]  4.  Previous care at / records requested from:    - [facility]: Records and referral in [facility]: Need VIDA form for records

## 2017-08-04 NOTE — TELEPHONE ENCOUNTER
LVM for pt again -- need VIDA for [facility].    Will fax request to [facility] in attempt for records

## 2017-08-04 NOTE — TELEPHONE ENCOUNTER
Records received from [facility].   Included  Office notes: 5/25/17, 5/12/17, 2/10/17, 1/24/17, 1/10/17  Radiology reports: bilateral foot ankle 1/10/17  Bilateral hand 1/10/17  Other: labs

## 2017-08-07 DIAGNOSIS — M65.30 TRIGGER FINGER OF LEFT HAND: Primary | ICD-10-CM

## 2017-09-26 ENCOUNTER — TELEPHONE (OUTPATIENT)
Dept: PALLIATIVE MEDICINE | Facility: CLINIC | Age: 32
End: 2017-09-26

## 2017-09-26 ENCOUNTER — OFFICE VISIT (OUTPATIENT)
Dept: FAMILY MEDICINE | Facility: CLINIC | Age: 32
End: 2017-09-26
Payer: MEDICARE

## 2017-09-26 VITALS
DIASTOLIC BLOOD PRESSURE: 88 MMHG | WEIGHT: 149.6 LBS | HEIGHT: 62 IN | SYSTOLIC BLOOD PRESSURE: 112 MMHG | BODY MASS INDEX: 27.53 KG/M2 | HEART RATE: 101 BPM

## 2017-09-26 DIAGNOSIS — M34.9 LIMITED SYSTEMIC SCLEROSIS (H): ICD-10-CM

## 2017-09-26 DIAGNOSIS — G89.4 CHRONIC PAIN SYNDROME: Primary | Chronic | ICD-10-CM

## 2017-09-26 DIAGNOSIS — F41.1 GAD (GENERALIZED ANXIETY DISORDER): ICD-10-CM

## 2017-09-26 DIAGNOSIS — Z23 NEED FOR PROPHYLACTIC VACCINATION AND INOCULATION AGAINST INFLUENZA: ICD-10-CM

## 2017-09-26 PROCEDURE — 99215 OFFICE O/P EST HI 40 MIN: CPT | Mod: 25 | Performed by: INTERNAL MEDICINE

## 2017-09-26 PROCEDURE — 90686 IIV4 VACC NO PRSV 0.5 ML IM: CPT | Performed by: INTERNAL MEDICINE

## 2017-09-26 PROCEDURE — G0008 ADMIN INFLUENZA VIRUS VAC: HCPCS | Performed by: INTERNAL MEDICINE

## 2017-09-26 RX ORDER — OXYCODONE HYDROCHLORIDE 15 MG/1
15 TABLET ORAL EVERY 4 HOURS PRN
Qty: 120 TABLET | Refills: 0 | Status: SHIPPED | OUTPATIENT
Start: 2017-11-24 | End: 2017-12-24

## 2017-09-26 RX ORDER — LORAZEPAM 0.5 MG/1
0.5 TABLET ORAL 2 TIMES DAILY PRN
Qty: 60 TABLET | Refills: 3 | Status: SHIPPED | OUTPATIENT
Start: 2017-09-26 | End: 2018-01-05

## 2017-09-26 RX ORDER — OXYCODONE HYDROCHLORIDE 15 MG/1
15 TABLET ORAL EVERY 4 HOURS PRN
Qty: 120 TABLET | Refills: 0 | Status: SHIPPED | OUTPATIENT
Start: 2017-10-26 | End: 2017-11-25

## 2017-09-26 RX ORDER — OXYCODONE HYDROCHLORIDE 15 MG/1
15 TABLET ORAL EVERY 4 HOURS PRN
Qty: 120 TABLET | Refills: 0 | Status: SHIPPED | OUTPATIENT
Start: 2017-12-23 | End: 2018-01-05

## 2017-09-26 RX ORDER — GABAPENTIN 300 MG/1
300 CAPSULE ORAL 3 TIMES DAILY
Qty: 270 CAPSULE | Refills: 3 | Status: SHIPPED | OUTPATIENT
Start: 2017-09-26 | End: 2017-09-26

## 2017-09-26 RX ORDER — BUSPIRONE HYDROCHLORIDE 5 MG/1
10 TABLET ORAL 2 TIMES DAILY
Qty: 60 TABLET | Refills: 3 | Status: SHIPPED | OUTPATIENT
Start: 2017-09-26 | End: 2018-01-05

## 2017-09-26 RX ORDER — GABAPENTIN 300 MG/1
600 CAPSULE ORAL 3 TIMES DAILY
Qty: 360 CAPSULE | Refills: 3 | Status: SHIPPED | OUTPATIENT
Start: 2017-09-26 | End: 2018-04-11

## 2017-09-26 ASSESSMENT — PATIENT HEALTH QUESTIONNAIRE - PHQ9
SUM OF ALL RESPONSES TO PHQ QUESTIONS 1-9: 15
5. POOR APPETITE OR OVEREATING: MORE THAN HALF THE DAYS

## 2017-09-26 ASSESSMENT — ANXIETY QUESTIONNAIRES
5. BEING SO RESTLESS THAT IT IS HARD TO SIT STILL: MORE THAN HALF THE DAYS
GAD7 TOTAL SCORE: 11
2. NOT BEING ABLE TO STOP OR CONTROL WORRYING: SEVERAL DAYS
IF YOU CHECKED OFF ANY PROBLEMS ON THIS QUESTIONNAIRE, HOW DIFFICULT HAVE THESE PROBLEMS MADE IT FOR YOU TO DO YOUR WORK, TAKE CARE OF THINGS AT HOME, OR GET ALONG WITH OTHER PEOPLE: SOMEWHAT DIFFICULT
6. BECOMING EASILY ANNOYED OR IRRITABLE: MORE THAN HALF THE DAYS
7. FEELING AFRAID AS IF SOMETHING AWFUL MIGHT HAPPEN: NOT AT ALL
3. WORRYING TOO MUCH ABOUT DIFFERENT THINGS: SEVERAL DAYS
1. FEELING NERVOUS, ANXIOUS, OR ON EDGE: NEARLY EVERY DAY

## 2017-09-26 NOTE — LETTER
September 26, 2017    Molly Teague  [address] [address]    Dear Molly                                                                 Welcome to the [facility].  We are located in the Orthopedics Center of the [facility], located at [address] [place] [address] Your appointment at the [facility] has been scheduled on Thursday November 16, 2017 at 2:00 PM with Kel Moraes MD.    At your first visit, you will meet your team of caregivers who will help you to develop pain management strategies that will last a lifetime. You will meet with our support staff to review your insurance information, and collect your co-payment if required by your insurance company. You will also meet with a medical pain specialist and care coordinator who will assess your pain and develop a plan of care for your successful pain rehabilitation. You should expect to spend 1-2 hours at your first visit with us. Usually, patients work with us for a period of 6-12 months, and eventually return to their primary doctor once their pain management has stabilized.      To help us make your visit go as smoothly as possible, please bring the following items with you on your visit:     Completed Pain Questionnaire enclosed in this packet.  If you do not bring the completed questionnaire, we may have to reschedule your appointment.  List of any medicines that you are currently taking or have been prescribed  Important NON-[place] medical information such as medical records or tests results (X-rays, or laboratory tests)  Your health insurance card  Financial resources to cover your co-payment or balance due at the time of service (cash, personal check, Visa, and MasterCard are acceptable methods of payment)     Due to the demand for new patient evaluations, you must notify the scheduling department 48 hours in advance if you are not able to keep this appointment. Failure to  do so could affect your ability to reschedule with our clinic. Please be aware that we will not prescribe any medications at your first visit.     Please call [phone] with any questions regarding your appointment. We look forward to meeting you and working to address your health care needs.     Sincerely,      [facility]

## 2017-09-26 NOTE — TELEPHONE ENCOUNTER
[facility] Referral      1. Confirmed address with patient? Yes  2. Confirmed phone number with patient? Yes  3. Confirmed referring provider? Yes  4. Is the PCP the same as the referring provider? Yes  5. Has the patient been to any previous pain clinics? No  (If yes, send VIAD with welcome letter)  6. Which insurance are we to bill for this appointment?  Medicare/Blue Plus    7. Informed pt of cancellation (48 hour) policy? Yes    REGARDING OPIOID MEDICATIONS: We will always address appropriateness of opioid pain medications, but we generally will not automatically take on a prescribing role. When we do take on prescribing of opioids for chronic pain, it is in collaboration with the referring physician for an intermediate period of time (months), with an expectation that the primary physician or provider will assume the prescribing role if medications are effective at stable doses with demonstrated compliance. Therefore, please do not assume that your prescribing responsibilities end on the day of pain clinic consultation.  7. Informed pt of prescribing policy? Yes      8. Referring Provider: Grecia Barba     9. Criteria for Triage Eval:   -Missed/Failed 1st DUAL appointment? N/A   -Medication Focused? N/A   -Mental Health Concerns? (e.g. Recent psych hospitalization/snap shot)? N/A   -Active substance abuse? N/A   -Patient behaviors (e.g. Offensive language/raised voice)? N/A

## 2017-09-26 NOTE — PROGRESS NOTES
SUBJECTIVE:   Molly Teague is a 32 year old female who presents to clinic today for the following health issues:      Depression and Anxiety Follow-Up    Status since last visit: Worsened anxiety    Other associated symptoms:more anxiety attacks, Ativan not helping as much    Complicating factors:     Significant life event: No     Current substance abuse: None    PHQ-9 SCORE 1/25/2017 3/30/2017 5/11/2017   Total Score 20 12 6     REX-7 SCORE 1/25/2017 3/30/2017 5/11/2017   Total Score 17 5 12       PHQ-9  English  PHQ-9   Any Language  GAD7      Amount of exercise or physical activity: None    Problems taking medications regularly: No    Medication side effects: none    Diet: regular (no restrictions)    She reports anxiety is much worse - waking up at night with panic attacks.    She had psych eval at [facility], but she never follow-up with this     She did see Gabe Jacob in May, he did contact patient twice after visit, but she never returned contact.    She has never been on buspar for anxiety.    When she has missed ambien, she is up all night. She has severe nightmares even when she does sleep.  She is hopeful to be off ambien someday, but feels she needs it to sleep well w/out nightmares.    The ativan she takes PRN, but ends up taking BID regularly.    She doesn't take the ativan and ambien within a few hours of each other.    Chronic pain:  She reports worsening of neuropathy in hands/feet, Raynauds.  Having more open wounds on contracted fingers.  Opiates are helping her 'function'.  She has noted weight gain and increased appetite.    Next follow-up with [place] TBD, no appointment made, planning end of Oct.  She only has one car right now.      Current Outpatient Prescriptions   Medication Sig Dispense Refill     LORazepam (ATIVAN) 0.5 MG tablet Take 1 tablet (0.5 mg) by mouth 2 times daily as needed for anxiety 60 tablet 3     GOODSENSE MIGRAINE FORMULA 250-250-65 MG per tablet TAKE ONE TABLET BY MOUTH  EVERY 6 HOURS AS NEEDED FOR HEADACHES 24 tablet 1     zolpidem (AMBIEN) 10 MG tablet Take 1 tablet (10 mg) by mouth nightly as needed for sleep 30 tablet 3     gabapentin (NEURONTIN) 300 MG capsule Take 1 capsule (300 mg) by mouth 3 times daily 270 capsule 3     citalopram (CELEXA) 40 MG tablet Take 1 tablet (40 mg) by mouth daily 90 tablet 3     ranitidine (ZANTAC) 150 MG tablet Take 1 tablet (150 mg) by mouth 2 times daily as needed for heartburn 180 tablet 3     albuterol (VENTOLIN HFA) 108 (90 BASE) MCG/ACT Inhaler Inhale 2 puffs into the lungs every 4 hours as needed for shortness of breath / dyspnea or wheezing 1 Inhaler 11     omeprazole (PRILOSEC) 40 MG capsule Take 1 capsule (40 mg) by mouth 2 times daily 180 capsule 3     promethazine (PHENERGAN) 25 MG/ML IV injection Inject 25 mg into the vein every 6 hours as needed for nausea or vomiting       Cyanocobalamin (B-12) 1000 MCG TBCR Take 1,000 mcg by mouth daily 100 tablet 1     oxyCODONE (ROXICODONE) 15 MG IR tablet Take 1 tablet (15 mg) by mouth every 4 hours as needed for pain 120 tablet 0     budesonide-formoterol (SYMBICORT) 160-4.5 MCG/ACT Inhaler Inhale 2 puffs into the lungs 2 times daily 3 Inhaler 3     ferrous gluconate (FERGON) 324 (38 FE) MG tablet Take 1 tablet (324 mg) by mouth daily (with breakfast) (Patient taking differently: Take 324 mg by mouth 3 times daily (with meals) ) 100 tablet 3     polyethylene glycol (MIRALAX) powder Take 17 g (1 capful) by mouth daily 510 g 1     blood glucose monitoring (NO BRAND SPECIFIED) test strip Use to test blood sugars 3 times daily or as directed  Please give what is approved by insurance. 100 each 0     lisinopril (PRINIVIL/ZESTRIL) 5 MG tablet Take 1 tablet (5 mg) by mouth daily 90 tablet 3     oxyCODONE (ROXICODONE) 15 MG IR tablet Take 1 tablet (15 mg) by mouth every 4 hours as needed for pain 120 tablet 0     oxyCODONE (ROXICODONE) 15 MG IR tablet Take 1 tablet (15 mg) by mouth every 4 hours as  "needed for pain 120 tablet 0     oxyCODONE (ROXICODONE) 15 MG IR tablet Take 1 tablet (15 mg) by mouth every 4 hours as needed for pain 120 tablet 0     sucralfate (CARAFATE) 1 GM tablet Take 1 tablet (1 g) by mouth 4 times daily 120 tablet 11     diphenhydrAMINE (BENADRYL) 25 MG tablet Take 1 tablet (25 mg) by mouth every 6 hours as needed for itching or allergies 56 tablet        Reviewed and updated as needed this visit by clinical staffAllergies  Meds  Problems       Reviewed and updated as needed this visit by Provider  Allergies  Meds  Problems         ROS:  Constitutional, HEENT, cardiovascular, pulmonary, GI, , musculoskeletal, neuro, skin, endocrine and psych systems are negative, except as otherwise noted.      OBJECTIVE:   /88 (BP Location: Left arm, Patient Position: Chair, Cuff Size: Adult Regular)  Pulse 101  Ht 5' 2\" (1.575 m)  Wt 149 lb 9.6 oz (67.9 kg)  BMI 27.36 kg/m2  Body mass index is 27.36 kg/(m^2).  GENERAL APPEARANCE: alert and no distress  PSYCH: mentation appears normal, affect normal/bright, anxious and worried      ASSESSMENT/PLAN:       1. Chronic pain syndrome - was given Rx for fill 9/22, but pharmacy did not fill, [place]  confirms this.  Wrote for next 3 months, to fill 10/26, 11/24, 12/23, next fill 1/21.  We discussed her seeing multi-disciplinary pain clinic, her partner is encouraging this.  She is agreeable, is worried \"they will change all her meds\".   Transportation resources given  - MENTAL HEALTH REFERRAL  - oxyCODONE (ROXICODONE) 15 MG IR tablet; Take 1 tablet (15 mg) by mouth every 4 hours as needed for pain  Dispense: 120 tablet; Refill: 0  - oxyCODONE (ROXICODONE) 15 MG IR tablet; Take 1 tablet (15 mg) by mouth every 4 hours as needed for pain  Dispense: 120 tablet; Refill: 0  - oxyCODONE (ROXICODONE) 15 MG IR tablet; Take 1 tablet (15 mg) by mouth every 4 hours as needed for pain  Dispense: 120 tablet; Refill: 0  - PAIN MANAGEMENT REFERRAL    2. REX " (generalized anxiety disorder) - worsened.  PTSD, not being fully addressed. Discussed reasons for not increasing ativan - tolerance, interactions with opiates, etc.  Hopeful she can get off ativan, patient is as well.  Wonder about atypical anti-psychotic at night or prazosin at night for sleep, over the ambien.  Will get psych input.  Patient agreeable to long-term counseling.  - MENTAL HEALTH REFERRAL  - MENTAL HEALTH REFERRAL  - busPIRone (BUSPAR) 5 MG tablet; Take 2 tablets (10 mg) by mouth 2 times daily  Dispense: 60 tablet; Refill: 3  - LORazepam (ATIVAN) 0.5 MG tablet; Take 1 tablet (0.5 mg) by mouth 2 times daily as needed for anxiety  Dispense: 60 tablet; Refill: 3    3. Need for prophylactic vaccination and inoculation against influenza  - FLU VAC, SPLIT VIRUS IM > 3 YO (QUADRIVALENT) [12941]  - Vaccine Administration, Initial [05814]    4. Limited systemic sclerosis (H) - increase slowly over 2 weeks to goal of 600 TID.  Discussed weight gain.  - gabapentin (NEURONTIN) 300 MG capsule; Take 2 capsules (600 mg) by mouth 3 times daily  Dispense: 360 capsule; Refill: 3      1. See Milagro Moody, Psych NP  2. Return to see Gabe Jacob for counseling.  3. Refill given of ativan, not an increased dose.  A higher dose is not the solution to the worsened anxiety.  4. Start Buspar 5 mg twice daily. Hopefully this will help reduce the anxiety, so when you see Milagro, we can make a better plan for anxiety going forward.  5. Work with Pharmacy about Syncing up the medications to be filled at the same time  6. Refills of Oxycodone given for end Oct, Nov, Dec, next fill end of Jan  7. Refill lorazepam.  8. Referral to [facility]  9. Increase gabapentin with goal of 600 mg three times day  AM   PM   Bed  300-300-600 x 1 week  300-600-600 x 1 week  600-600-600            Greater than 40 minutes was spent face-to-face with the patient by the provider.  Greater than 50% was spent in counseling or coordinating  care for this patient.      Grecia Barba, DO  [facility]      Injectable Influenza Immunization Documentation    1.  Is the person to be vaccinated sick today?   No    2. Does the person to be vaccinated have an allergy to a component   of the vaccine?   No    3. Has the person to be vaccinated ever had a serious reaction   to influenza vaccine in the past?   No    4. Has the person to be vaccinated ever had Guillain-Barré syndrome?   No    Form completed by Pippa Adams

## 2017-09-26 NOTE — PATIENT INSTRUCTIONS
1. See Milagro Moody, Psych NP  2. Return to see Gabe Jacob for counseling.  3. Refill given of ativan, not an increased dose.  A higher dose is not the solution to the worsened anxiety.  4. Start Buspar 5 mg twice daily. Hopefully this will help reduce the anxiety, so when you see Milagro, we can make a better plan for anxiety going forward.  5. Work with Pharmacy about Syncing up the medications to be filled at the same time  6. Refills of Oxycodone given for end Oct, Nov, Dec, next fill end of Jan  7. Refill lorazepam.  8. Referral to [facility]  9. Increase gabapentin with goal of 600 mg three times day  AM   PM   Bed  300-300-600 x 1 week  300-600-600 x 1 week  600-600-600        [facility] [employer] [facility]     [phone]  [employer]     [phone]  [facility]     [phone]  [employer]    [phone]  [facility]    [phone]  [facility]     [phone]  [facility]     [phone]

## 2017-09-26 NOTE — MR AVS SNAPSHOT
After Visit Summary   9/26/2017    Molly Teague    MRN: [id number]           Patient Information     Date Of Birth          1985        Visit Information        Provider Department      9/26/2017 9:20 AM Grecia Barba, DO [facility]        Today's Diagnoses     Chronic pain syndrome    -  1    REX (generalized anxiety disorder)        Need for prophylactic vaccination and inoculation against influenza        Limited systemic sclerosis (H)          Care Instructions    1. See Milagro Moody, Psych NP  2. Return to see Gabe Jacob for counseling.  3. Refill given of ativan, not an increased dose.  A higher dose is not the solution to the worsened anxiety.  4. Start Buspar 5 mg twice daily. Hopefully this will help reduce the anxiety, so when you see Milagro, we can make a better plan for anxiety going forward.  5. Work with Pharmacy about Syncing up the medications to be filled at the same time  6. Refills of Oxycodone given for end Oct, Nov, Dec, next fill end of Jan  7. Refill lorazepam.  8. Referral to [facility]  9. Increase gabapentin with goal of 600 mg three times day  AM   PM   Bed  300-300-600 x 1 week  300-600-600 x 1 week  600-600-600        [place] [facility]     [phone]  [employer]     [phone]  [employer]     [phone]  [employer]    [phone]  [employer]    [phone]  [employer]     [phone]  [employer]     [phone]                Follow-ups after your visit        Additional Services     MENTAL HEALTH REFERRAL       Your provider has referred you to: [facility]: [facility] - [facility]  [phone]   [email or web address]   *Referral from [facility] Primary Care Provider required - Consultation Model - medication management &  future refills will be returned to [facility] PCP upon completion of evaluation  *Please call to schedule an appointment.    All scheduling is subject to the client's specific insurance plan & benefits, provider/location availability, and provider clinical specialities.  Please arrive 15 minutes early for your first appointment and bring your completed paperwork.    Please be aware that coverage of these services is subject to the terms and limitations of your health insurance plan.  Call member services at your health plan with any benefit or coverage questions.            MENTAL HEALTH REFERRAL       Your provider has referred you to: [facility]: [facility] - Counseling (Individual/Couples/Family) - [facility] [phone]   [email or web address]   *Patient will be contacted by [facility] scheduling partner, [facility]), to schedule an appointment.  Patients may also call [facility] to schedule. - Gabe Jacob    All scheduling is subject to the client's specific insurance plan & benefits, provider/location availability, and provider clinical specialities.  Please arrive 15 minutes early for your first appointment and bring your completed paperwork.    Please be aware that coverage of these services is subject to the terms and limitations of your health insurance plan.  Call member services at your health plan with any benefit or coverage questions.            PAIN MANAGEMENT REFERRAL       Your provider has referred you to: [facility]: [facility] -    Reason for Referral: Comprehensive Evaluation and Management    Please complete the following questions:    What is your diagnosis for the patient's pain? CREST    Do you have any specific questions for the pain specialist? Yes: medication management    Are there any red flags that may impact the assessment or management of the patient? Mental Illness / Communication  Difficulties (explain): PTSD    For any questions, contact the [facility] at [phone].     **ANY DIAGNOSTIC TESTS THAT ARE NOT IN EPIC SHOULD BE SENT TO THE PAIN CENTER**    REGARDING OPIOID MEDICATIONS:  We will always address appropriateness of opioid pain medications, but we generally will not automatically take on a prescribing role. When we do take on prescribing of opioids for chronic pain, it is in collaboration with the referring physician for an intermediate period of time (months), with an expectation that the primary physician or provider will assume the prescribing role if medications are effective at stable doses with demonstrated compliance.  Therefore, please do not assume that your prescribing responsibilities end on the day of pain clinic consultation.  Is this agreeable to you? YES    Please be aware that coverage of these services is subject to the terms and limitations of your health insurance plan.  Call member services at your health plan with any benefit or coverage questions.      Please bring the following with you to your appointment:    (1) Any X-Rays, CTs or MRIs which have been performed.  Contact the facility where they were done to arrange for  prior to your scheduled appointment.    (2) List of current medications   (3) This referral request   (4) Any documents/labs given to you for this referral                  Who to contact     If you have questions or need follow up information about today's clinic visit or your schedule please contact [facility] directly at [phone].  Normal or non-critical lab and imaging results will be communicated to you by MyChart, letter or phone within 4 business days after the clinic has received the results. If you do not hear from us within 7 days, please contact the clinic through MyChart or phone. If you have a critical or abnormal lab result, we will notify you by phone as soon as possible.  Submit  "refill requests through [facility] or call your pharmacy and they will forward the refill request to us. Please allow 3 business days for your refill to be completed.          Additional Information About Your Visit        [employer] Information     [facility] lets you send messages to your doctor, view your test results, renew your prescriptions, schedule appointments and more. To sign up, go to [email or web address] . Click on \"Log in\" on the left side of the screen, which will take you to the Welcome page. Then click on \"Sign up Now\" on the right side of the page.     You will be asked to enter the access code listed below, as well as some personal information. Please follow the directions to create your username and password.     Your access code is: [email or web address]  Expires: 10/23/2017  6:30 AM     Your access code will  in 90 days. If you need help or a new code, please call your [facility] clinic or [phone].        Care EveryWhere ID     This is your Care EveryWhere ID. This could be used by other organizations to access your [facility] medical records  [id number]        Your Vitals Were     Pulse Height BMI (Body Mass Index)             101 5' 2\" (1.575 m) 27.36 kg/m2          Blood Pressure from Last 3 Encounters:   17 112/88   17 119/78   17 117/73    Weight from Last 3 Encounters:   17 149 lb 9.6 oz (67.9 kg)   17 141 lb (64 kg)   17 151 lb 0.2 oz (68.5 kg)              We Performed the Following     FLU VAC, SPLIT VIRUS IM > 3 YO (QUADRIVALENT) [19831]     MENTAL HEALTH REFERRAL     MENTAL HEALTH REFERRAL     PAIN MANAGEMENT REFERRAL     Vaccine Administration, Initial [09771]          Today's Medication Changes          These changes are accurate as of: 17 10:21 AM.  If you have any questions, ask your nurse or doctor.               Start taking these medicines.        Dose/Directions    busPIRone 5 MG tablet   Commonly known as:  BUSPAR   Used for:  REX " (generalized anxiety disorder)        Dose:  10 mg   Take 2 tablets (10 mg) by mouth 2 times daily   Quantity:  60 tablet   Refills:  3       gabapentin 300 MG capsule   Commonly known as:  NEURONTIN   Used for:  Limited systemic sclerosis (H)        Dose:  600 mg   Take 2 capsules (600 mg) by mouth 3 times daily   Quantity:  360 capsule   Refills:  3         These medicines have changed or have updated prescriptions.        Dose/Directions    ferrous gluconate 324 (38 FE) MG tablet   Commonly known as:  FERGON   This may have changed:  when to take this   Used for:  CREST (calcinosis, Raynaud's phenomenon, esophageal dysfunction, sclerodactyly, telangiectasia) (H)        Dose:  324 mg   Take 1 tablet (324 mg) by mouth daily (with breakfast)   Quantity:  100 tablet   Refills:  3            Where to get your medicines      These medications were sent to [facility] [place], [place] - [address]  [address] [address]     Phone:  [phone]     busPIRone 5 MG tablet    gabapentin 300 MG capsule         Some of these will need a paper prescription and others can be bought over the counter.  Ask your nurse if you have questions.     Bring a paper prescription for each of these medications     LORazepam 0.5 MG tablet    oxyCODONE 15 MG IR tablet    oxyCODONE 15 MG IR tablet    oxyCODONE 15 MG IR tablet                Primary Care Provider Office Phone # Fax #    Grecia BarbaDO [phone] [address] [address]        Equal Access to Services     PARISH COOK AH: Hadii rosie light hadasho Sogaryali, waaxda luqadaha, qaybta kaalmada adeegyada, waxay radha woods. So [facility] [phone].    ATENCIÓN: Si habla español, tiene a bradley disposición servicios gratuitos de asistencia lingüística. Llame al [phone].    We comply with applicable federal civil rights laws and [place] laws. We do not discriminate on the basis of race,  color, national origin, age, disability sex, sexual orientation or gender identity.            Thank you!     Thank you for choosing [facility]  for your care. Our goal is always to provide you with excellent care. Hearing back from our patients is one way we can continue to improve our services. Please take a few minutes to complete the written survey that you may receive in the mail after your visit with us. Thank you!             Your Updated Medication List - Protect others around you: Learn how to safely use, store and throw away your medicines at www.disposemymeds.org.          This list is accurate as of: 9/26/17 10:21 AM.  Always use your most recent med list.                   Brand Name Dispense Instructions for use Diagnosis    albuterol 108 (90 BASE) MCG/ACT Inhaler    VENTOLIN HFA    1 Inhaler    Inhale 2 puffs into the lungs every 4 hours as needed for shortness of breath / dyspnea or wheezing    Moderate persistent asthma without complication       B-12 1000 MCG Tbcr     100 tablet    Take 1,000 mcg by mouth daily    Vitamin B12 deficiency (non anemic)       BENADRYL 25 MG tablet   Generic drug:  diphenhydrAMINE     56 tablet    Take 1 tablet (25 mg) by mouth every 6 hours as needed for itching or allergies        blood glucose monitoring test strip    no brand specified    100 each    Use to test blood sugars 3 times daily or as directed Please give what is approved by insurance.    Hypoglycemia       budesonide-formoterol 160-4.5 MCG/ACT Inhaler    SYMBICORT    3 Inhaler    Inhale 2 puffs into the lungs 2 times daily    Moderate persistent asthma without complication       busPIRone 5 MG tablet    BUSPAR    60 tablet    Take 2 tablets (10 mg) by mouth 2 times daily    REX (generalized anxiety disorder)       citalopram 40 MG tablet    celeXA    90 tablet    Take 1 tablet (40 mg) by mouth daily    Severe anxiety with panic       ferrous gluconate 324 (38 FE) MG tablet    FERGON    100  tablet    Take 1 tablet (324 mg) by mouth daily (with breakfast)    CREST (calcinosis, Raynaud's phenomenon, esophageal dysfunction, sclerodactyly, telangiectasia) (H)       gabapentin 300 MG capsule    NEURONTIN    360 capsule    Take 2 capsules (600 mg) by mouth 3 times daily    Limited systemic sclerosis (H)       GOODSENSE MIGRAINE FORMULA 250-250-65 MG per tablet   Generic drug:  aspirin-acetaminophen-caffeine     24 tablet    TAKE ONE TABLET BY MOUTH EVERY 6 HOURS AS NEEDED FOR HEADACHES    Chronic daily headache       lisinopril 5 MG tablet    PRINIVIL/ZESTRIL    90 tablet    Take 1 tablet (5 mg) by mouth daily    CREST (calcinosis, Raynaud's phenomenon, esophageal dysfunction, sclerodactyly, telangiectasia) (H)       LORazepam 0.5 MG tablet    ATIVAN    60 tablet    Take 1 tablet (0.5 mg) by mouth 2 times daily as needed for anxiety    REX (generalized anxiety disorder)       omeprazole 40 MG capsule    priLOSEC    180 capsule    Take 1 capsule (40 mg) by mouth 2 times daily    CREST (calcinosis, Raynaud's phenomenon, esophageal dysfunction, sclerodactyly, telangiectasia) (H), Gastroesophageal reflux disease with esophagitis       * oxyCODONE 15 MG IR tablet    ROXICODONE    120 tablet    Take 1 tablet (15 mg) by mouth every 4 hours as needed for pain    Chronic pain syndrome       * oxyCODONE 15 MG IR tablet   Start taking on:  10/26/2017    ROXICODONE    120 tablet    Take 1 tablet (15 mg) by mouth every 4 hours as needed for pain    Chronic pain syndrome       * oxyCODONE 15 MG IR tablet   Start taking on:  11/24/2017    ROXICODONE    120 tablet    Take 1 tablet (15 mg) by mouth every 4 hours as needed for pain    Chronic pain syndrome       * oxyCODONE 15 MG IR tablet   Start taking on:  12/23/2017    ROXICODONE    120 tablet    Take 1 tablet (15 mg) by mouth every 4 hours as needed for pain    Chronic pain syndrome       polyethylene glycol powder    MIRALAX    510 g    Take 17 g (1 capful) by mouth  daily    Drug-induced constipation       promethazine 25 MG/ML IV injection    PHENERGAN     Inject 25 mg into the vein every 6 hours as needed for nausea or vomiting        ranitidine 150 MG tablet    ZANTAC    180 tablet    Take 1 tablet (150 mg) by mouth 2 times daily as needed for heartburn    CREST (calcinosis, Raynaud's phenomenon, esophageal dysfunction, sclerodactyly, telangiectasia) (H), Gastroesophageal reflux disease with esophagitis       sucralfate 1 GM tablet    CARAFATE    120 tablet    Take 1 tablet (1 g) by mouth 4 times daily    Limited systemic sclerosis (H)       zolpidem 10 MG tablet    AMBIEN    30 tablet    Take 1 tablet (10 mg) by mouth nightly as needed for sleep    Other insomnia       * Notice:  This list has 4 medication(s) that are the same as other medications prescribed for you. Read the directions carefully, and ask your doctor or other care provider to review them with you.

## 2017-09-26 NOTE — NURSING NOTE
"Initial /88 (BP Location: Left arm, Patient Position: Chair, Cuff Size: Adult Regular)  Pulse 101  Ht 5' 2\" (1.575 m)  Wt 149 lb 9.6 oz (67.9 kg)  BMI 27.36 kg/m2 Estimated body mass index is 27.36 kg/(m^2) as calculated from the following:    Height as of this encounter: 5' 2\" (1.575 m).    Weight as of this encounter: 149 lb 9.6 oz (67.9 kg). .    Pippa Adams    "

## 2017-09-27 ASSESSMENT — ANXIETY QUESTIONNAIRES: GAD7 TOTAL SCORE: 11

## 2017-10-10 ENCOUNTER — OFFICE VISIT (OUTPATIENT)
Dept: OBGYN | Facility: CLINIC | Age: 32
End: 2017-10-10
Payer: MEDICARE

## 2017-10-10 ENCOUNTER — TELEPHONE (OUTPATIENT)
Dept: MATERNAL FETAL MEDICINE | Facility: CLINIC | Age: 32
End: 2017-10-10

## 2017-10-10 VITALS
SYSTOLIC BLOOD PRESSURE: 118 MMHG | WEIGHT: 152.8 LBS | DIASTOLIC BLOOD PRESSURE: 90 MMHG | HEIGHT: 62 IN | BODY MASS INDEX: 28.12 KG/M2 | HEART RATE: 119 BPM

## 2017-10-10 DIAGNOSIS — N89.8 VAGINAL DISCHARGE: ICD-10-CM

## 2017-10-10 DIAGNOSIS — N76.0 BV (BACTERIAL VAGINOSIS): ICD-10-CM

## 2017-10-10 DIAGNOSIS — Z12.4 CERVICAL CANCER SCREENING: ICD-10-CM

## 2017-10-10 DIAGNOSIS — R30.0 DYSURIA: ICD-10-CM

## 2017-10-10 DIAGNOSIS — B96.89 BV (BACTERIAL VAGINOSIS): ICD-10-CM

## 2017-10-10 DIAGNOSIS — Z31.69 ENCOUNTER FOR PRECONCEPTION CONSULTATION: Primary | ICD-10-CM

## 2017-10-10 DIAGNOSIS — Z11.3 SCREEN FOR STD (SEXUALLY TRANSMITTED DISEASE): ICD-10-CM

## 2017-10-10 LAB
ALBUMIN UR-MCNC: NEGATIVE MG/DL
APPEARANCE UR: CLEAR
BILIRUB UR QL STRIP: NEGATIVE
COLOR UR AUTO: YELLOW
GLUCOSE UR STRIP-MCNC: NEGATIVE MG/DL
HGB UR QL STRIP: ABNORMAL
KETONES UR STRIP-MCNC: NEGATIVE MG/DL
LEUKOCYTE ESTERASE UR QL STRIP: NEGATIVE
NITRATE UR QL: NEGATIVE
PH UR STRIP: 5.5 PH (ref 5–7)
RBC #/AREA URNS AUTO: NORMAL /HPF
SOURCE: ABNORMAL
SP GR UR STRIP: 1.02 (ref 1–1.03)
SPECIMEN SOURCE: ABNORMAL
UROBILINOGEN UR STRIP-ACNC: 0.2 EU/DL (ref 0.2–1)
WBC #/AREA URNS AUTO: NORMAL /HPF
WET PREP SPEC: ABNORMAL

## 2017-10-10 PROCEDURE — 87591 N.GONORRHOEAE DNA AMP PROB: CPT | Performed by: OBSTETRICS & GYNECOLOGY

## 2017-10-10 PROCEDURE — 81001 URINALYSIS AUTO W/SCOPE: CPT | Performed by: OBSTETRICS & GYNECOLOGY

## 2017-10-10 PROCEDURE — G0145 SCR C/V CYTO,THINLAYER,RESCR: HCPCS | Performed by: OBSTETRICS & GYNECOLOGY

## 2017-10-10 PROCEDURE — 87210 SMEAR WET MOUNT SALINE/INK: CPT | Performed by: OBSTETRICS & GYNECOLOGY

## 2017-10-10 PROCEDURE — 99385 PREV VISIT NEW AGE 18-39: CPT | Performed by: OBSTETRICS & GYNECOLOGY

## 2017-10-10 PROCEDURE — 87624 HPV HI-RISK TYP POOLED RSLT: CPT | Performed by: OBSTETRICS & GYNECOLOGY

## 2017-10-10 PROCEDURE — 87088 URINE BACTERIA CULTURE: CPT | Performed by: OBSTETRICS & GYNECOLOGY

## 2017-10-10 PROCEDURE — 87186 SC STD MICRODIL/AGAR DIL: CPT | Performed by: OBSTETRICS & GYNECOLOGY

## 2017-10-10 PROCEDURE — 87086 URINE CULTURE/COLONY COUNT: CPT | Performed by: OBSTETRICS & GYNECOLOGY

## 2017-10-10 PROCEDURE — G0476 HPV COMBO ASSAY CA SCREEN: HCPCS | Performed by: OBSTETRICS & GYNECOLOGY

## 2017-10-10 PROCEDURE — 87491 CHLMYD TRACH DNA AMP PROBE: CPT | Performed by: OBSTETRICS & GYNECOLOGY

## 2017-10-10 PROCEDURE — G0101 CA SCREEN;PELVIC/BREAST EXAM: HCPCS | Performed by: OBSTETRICS & GYNECOLOGY

## 2017-10-10 NOTE — LETTER
October 18, 2017    Molly Teague  [address] [address]    Dear Molly,  We are happy to inform you that your PAP smear result from 10/10/17 is normal.  We are now able to do a follow up test on PAP smears. The DNA test is for HPV (Human Papilloma Virus). Cervical cancer is closely linked with certain types of HPV. Your result showed no evidence of high risk HPV.  Therefore we recommend you return in 3 years for your next pap smear and HPV test.  You will still need to return to the clinic every year for an annual exam and other preventive tests.  Please contact the clinic at [phone] with any questions.  Sincerely,    Selvin Rojas DO/felix

## 2017-10-10 NOTE — LETTER
October 13, 2017      Molly Ho  [address] [address]        Dear ,    We are writing to inform you of your test results.    Your test results fall within the expected range(s) or remain unchanged from previous results.  Please continue with current treatment plan.    Resulted Orders   Neisseria gonorrhoeae PCR   Result Value Ref Range    Specimen Descrip Cervix     N Gonorrhea PCR Negative NEG^Negative      Comment:      Negative for N. gonorrhoeae rRNA by transcription mediated amplification.  A negative result by transcription mediated amplification does not preclude   the presence of N. gonorrhoeae infection because results are dependent on   proper and adequate collection, absence of inhibitors, and sufficient rRNA to   be detected.     Chlamydia trachomatis PCR   Result Value Ref Range    Specimen Description Cervix     Chlamydia Trachomatis PCR Negative NEG^Negative      Comment:      Negative for C. trachomatis rRNA by transcription mediated amplification.  A negative result by transcription mediated amplification does not preclude   the presence of C. trachomatis infection because results are dependent on   proper and adequate collection, absence of inhibitors, and sufficient rRNA to   be detected.     Urine Culture Aerobic Bacterial   Result Value Ref Range    Specimen Description Midstream Urine     Special Requests Specimen received in preservative     Culture Micro (A)      10,000 to 50,000 colonies/mL  Gram positive cocci  Identification and susceptibility to follow     *UA reflex to Microscopic   Result Value Ref Range    Color Urine Yellow     Appearance Urine Clear     Glucose Urine Negative NEG^Negative mg/dL    Bilirubin Urine Negative NEG^Negative    Ketones Urine Negative NEG^Negative mg/dL    Specific Gravity Urine 1.020 1.003 - 1.035    Blood Urine Small (A) NEG^Negative    pH Urine 5.5 5.0 - 7.0 pH    Protein Albumin Urine Negative NEG^Negative mg/dL     Urobilinogen Urine 0.2 0.2 - 1.0 EU/dL    Nitrite Urine Negative NEG^Negative    Leukocyte Esterase Urine Negative NEG^Negative    Source Midstream Urine    Wet prep   Result Value Ref Range    Specimen Description Vagina     Wet Prep Clue cells seen (A)     Wet Prep No Trichomonas seen     Wet Prep No yeast seen    Urine Microscopic   Result Value Ref Range    WBC Urine O - 2 OTO2^O - 2 /HPF    RBC Urine O - 2 OTO2^O - 2 /HPF       If you have any questions or concerns, please call the clinic at the number listed above.       Sincerely,        Selvin Rojas, DO

## 2017-10-10 NOTE — NURSING NOTE
"Chief Complaint   Patient presents with     Physical       Initial /90 (BP Location: Right arm, Patient Position: Sitting, Cuff Size: Adult Regular)  Pulse 119  Ht 5' 2\" (1.575 m)  Wt 152 lb 12.8 oz (69.3 kg)  Breastfeeding? No  BMI 27.95 kg/m2 Estimated body mass index is 27.95 kg/(m^2) as calculated from the following:    Height as of this encounter: 5' 2\" (1.575 m).    Weight as of this encounter: 152 lb 12.8 oz (69.3 kg).  Medication Reconciliation: complete   Nohemy Diaz CMA      "

## 2017-10-10 NOTE — PROGRESS NOTES
Molly Teague is a 32 year old, , No LMP recorded. Patient is not currently having periods (Reason: IUD). who presents for her annual examination. She utilizes a Mirena IUD and basically is amenorrheic on this. Its worked well for her. She's been pondering conception again, wanted to discuss this due to her history of scleroderma and the associated complications she had including renal failure, coma and multiorgan system failure. Her last pregnancy was managed by maternal fetal medicine in [facility]. She had a  section for the indication of fetal distress in ,  by the sounds of it, and the baby was in the NICU for tracheomalacia. She has had a little bit of increased bleeding/staining of recent which is new but denies abdominopelvic pain.    History of abnormal pap: No  History of STD: NO  History of abnormal mammogram: No  Family history of breast ca: No  Family history of uterine/ovarian ca: No    Past Medical History:   Diagnosis Date     Arthritis      Blood clotting disorder (H)     P E     History of blood transfusion      Hypertension      Long-term (current) use of anticoagulants [Z79.01] 2016     PE (pulmonary embolism) 2016     Rheumatism      Scleroderma (H) 2016     Uncomplicated asthma        History reviewed. No pertinent surgical history.    Family History   Problem Relation Age of Onset     Hyperlipidemia Father      CEREBROVASCULAR DISEASE Maternal Grandmother      Hyperlipidemia Paternal Grandfather      DIABETES Maternal Aunt      Melanoma No family hx of      Skin Cancer No family hx of        Current Outpatient Prescriptions   Medication Sig Dispense Refill     [START ON 2017] oxyCODONE (ROXICODONE) 15 MG IR tablet Take 1 tablet (15 mg) by mouth every 4 hours as needed for pain 120 tablet 0     [START ON 2017] oxyCODONE (ROXICODONE) 15 MG IR tablet Take 1 tablet (15 mg) by mouth every 4 hours as needed for pain 120 tablet 0     [START  ON 10/26/2017] oxyCODONE (ROXICODONE) 15 MG IR tablet Take 1 tablet (15 mg) by mouth every 4 hours as needed for pain 120 tablet 0     busPIRone (BUSPAR) 5 MG tablet Take 2 tablets (10 mg) by mouth 2 times daily 60 tablet 3     LORazepam (ATIVAN) 0.5 MG tablet Take 1 tablet (0.5 mg) by mouth 2 times daily as needed for anxiety 60 tablet 3     gabapentin (NEURONTIN) 300 MG capsule Take 2 capsules (600 mg) by mouth 3 times daily 360 capsule 3     GOODSENSE MIGRAINE FORMULA 250-250-65 MG per tablet TAKE ONE TABLET BY MOUTH EVERY 6 HOURS AS NEEDED FOR HEADACHES 24 tablet 1     lisinopril (PRINIVIL/ZESTRIL) 5 MG tablet Take 1 tablet (5 mg) by mouth daily 90 tablet 3     zolpidem (AMBIEN) 10 MG tablet Take 1 tablet (10 mg) by mouth nightly as needed for sleep 30 tablet 3     citalopram (CELEXA) 40 MG tablet Take 1 tablet (40 mg) by mouth daily 90 tablet 3     ranitidine (ZANTAC) 150 MG tablet Take 1 tablet (150 mg) by mouth 2 times daily as needed for heartburn 180 tablet 3     albuterol (VENTOLIN HFA) 108 (90 BASE) MCG/ACT Inhaler Inhale 2 puffs into the lungs every 4 hours as needed for shortness of breath / dyspnea or wheezing 1 Inhaler 11     omeprazole (PRILOSEC) 40 MG capsule Take 1 capsule (40 mg) by mouth 2 times daily 180 capsule 3     promethazine (PHENERGAN) 25 MG/ML IV injection Inject 25 mg into the vein every 6 hours as needed for nausea or vomiting       Cyanocobalamin (B-12) 1000 MCG TBCR Take 1,000 mcg by mouth daily 100 tablet 1     oxyCODONE (ROXICODONE) 15 MG IR tablet Take 1 tablet (15 mg) by mouth every 4 hours as needed for pain 120 tablet 0     budesonide-formoterol (SYMBICORT) 160-4.5 MCG/ACT Inhaler Inhale 2 puffs into the lungs 2 times daily 3 Inhaler 3     sucralfate (CARAFATE) 1 GM tablet Take 1 tablet (1 g) by mouth 4 times daily 120 tablet 11     ferrous gluconate (FERGON) 324 (38 FE) MG tablet Take 1 tablet (324 mg) by mouth daily (with breakfast) (Patient taking differently: Take 324 mg by  "mouth 3 times daily (with meals) ) 100 tablet 3     polyethylene glycol (MIRALAX) powder Take 17 g (1 capful) by mouth daily 510 g 1     blood glucose monitoring (NO BRAND SPECIFIED) test strip Use to test blood sugars 3 times daily or as directed  Please give what is approved by insurance. 100 each 0     diphenhydrAMINE (BENADRYL) 25 MG tablet Take 1 tablet (25 mg) by mouth every 6 hours as needed for itching or allergies 56 tablet        Allergies   Allergen Reactions     No Known Allergies      Shellfish-Derived Products Nausea     Rash on face       ROS: No TIA's or unusual headaches, no dysphagia.  No prolonged cough. No dyspnea or chest pain on exertion.  No abdominal pain, change in bowel habits, black or bloody stools.  No urinary tract symptoms.  No new or unusual musculoskeletal symptoms.  Normal menses, no abnormal vaginal bleeding, discharge or unexpected pelvic pain. No new breast lumps, breast pain or nipple discharge.    PE: /90 (BP Location: Right arm, Patient Position: Sitting, Cuff Size: Adult Regular)  Pulse 119  Ht 5' 2\" (1.575 m)  Wt 152 lb 12.8 oz (69.3 kg)  Breastfeeding? No  BMI 27.95 kg/m2    Gen: in no apparent distress  Neck: Supple, no adenopathy and thyroid normal  Heart: RRR normal S1S2 without  murmurs, rubs or gallops. PMI normal  Lungs: clear to auscultation  Breast: normal without suspicious masses, skin changes or axillary nodes, symmetric fibrous changes in both upper outer quadrants, self exam is taught and encouraged  Abdomen: Abdomen soft, non-tender.  BS normal.  No masses, organomegaly    PELVIC EXAM:  Vulva: No erythema or lesions, BUS normal. No evidence of architectural distortion, clitoral phimosis, introital stenosis or pigmentation change.  Vagina: Normal rugae, no lesions, physiologic discharge. Vagina is well estrogenized. No evidence of prolapse.  Cervix: Parous, no lesions, no CMT.  Uterus: Small, mobile, NT.  Adnexa: No masses, NT.  Rectal: External " genital area is grossly unremarkable without lesions or irritation.    Assesment/PLAN:  1) Adult female with normal gyn exam.  2) scleroderma history of multiorgan system failure. Patient would be a high risk pregnancy and requires a preconceptual consultation with our maternal fetal medicine specialist in order to discern whether pregnancy is in her best interest. She'll maintain the Mirena until that time.  3) recent, transient staining. We will check GC/chlamydia and wet prep. Pap is obtained. Obtain urinalysis.      An opportunity to ask questions was provided, the patient/legal guardian demonstrated understanding, and is in agreement with plan of care.

## 2017-10-10 NOTE — TELEPHONE ENCOUNTER
Phone call to pt re record request for consult for preconception consult at [facility]. Pt aware we will need records from her last delivery prior to scheduling her for a preconception consult. Will send pt a release of information via e-mail. Once we have the records we need, pt aware she will receive a call for an appointment. No further questions at this time. Mari Trujillo RN

## 2017-10-10 NOTE — MR AVS SNAPSHOT
After Visit Summary   10/10/2017    Molly Teague    MRN: [id number]           Patient Information     Date Of Birth          1985        Visit Information        Provider Department      10/10/2017 1:30 PM Selvin Rojas,  [facility]        Today's Diagnoses     Encounter for preconception consultation    -  1    Screen for STD (sexually transmitted disease)        Cervical cancer screening        Vaginal discharge        Dysuria           Follow-ups after your visit        Additional Services     [facility]ETAL [facility] REFERRAL-PRECONCEPTION       >> Patient may proceed with recommendations for further testing as directed by the Maternal Fetal Medicine Specialist >>    Dear Patient:   Please be aware that coverage of these services is subject to the terms and limitations of your health insurance plan.  Call member services at your health plan with any benefit or coverage questions.      Please bring the following to your appointment:    >>  Any x-rays, CTs or MRIs which have been performed.  Contact the facility where they were done to arrange for  prior to your scheduled appointment.  Any new CT, MRI or other procedures ordered by your specialist must be performed at a [facility] facility or coordinated by your clinic's referral office.  >>  List of current medications   >>  This referral request   >>  Any documents/labs given to you for this referral                    Your next 10 appointments already scheduled     Oct 30, 2017  9:45 AM CDT   New Visit with CHRIST Aiken CNS   [facility]    [address] [address]   [phone]            Nov 06, 2017  2:00 PM CST   New Visit with Selvin Kenney   [facility] ([facility])    [address] [address]   [phone]            Nov 13, 2017  1:00 PM CST   Return Visit with Selvin Kenney  "  [facility] ([facility])    [address] [address]   [phone]            2017  2:00 PM CST   New Visit with Kel Moraes MD   [facility] ([facility])    [address]  [address]  [facility] [facility] [place] [address]   [phone]              Who to contact     If you have questions or need follow up information about today's clinic visit or your schedule please contact [facility] directly at [phone].  Normal or non-critical lab and imaging results will be communicated to you by MyChart, letter or phone within 4 business days after the clinic has received the results. If you do not hear from us within 7 days, please contact the clinic through [employer] or phone. If you have a critical or abnormal lab result, we will notify you by phone as soon as possible.  Submit refill requests through [employer] or call your pharmacy and they will forward the refill request to us. Please allow 3 business days for your refill to be completed.          Additional Information About Your Visit        [employer] Information     [employer] lets you send messages to your doctor, view your test results, renew your prescriptions, schedule appointments and more. To sign up, go to [email or web address] . Click on \"Log in\" on the left side of the screen, which will take you to the Welcome page. Then click on \"Sign up Now\" on the right side of the page.     You will be asked to enter the access code listed below, as well as some personal information. Please follow the directions to create your username and password.     Your access code is: [email or web address]  Expires: 10/23/2017  6:30 AM     Your access code will  in 90 days. If you need help or a new code, please call your [facility] clinic or [phone].        Care EveryWhere ID     This is your Care EveryWhere ID. This could be used by other " "organizations to access your [facility] medical records  [id number]        Your Vitals Were     Pulse Height Breastfeeding? BMI (Body Mass Index)          119 5' 2\" (1.575 m) No 27.95 kg/m2         Blood Pressure from Last 3 Encounters:   10/10/17 118/90   09/26/17 112/88   06/22/17 119/78    Weight from Last 3 Encounters:   10/10/17 152 lb 12.8 oz (69.3 kg)   09/26/17 149 lb 9.6 oz (67.9 kg)   06/22/17 141 lb (64 kg)              We Performed the Following     *UA reflex to Microscopic     Chlamydia trachomatis PCR     HPV High Risk Types DNA Cervical     MAT FETAL MED CTR REFERRAL-PRECONCEPTION     Neisseria gonorrhoeae PCR     Pap imaged thin layer screen with HPV - recommended age 30 - 65 years (select HPV order below)     Urine Culture Aerobic Bacterial     Wet prep          Today's Medication Changes          These changes are accurate as of: 10/10/17  1:53 PM.  If you have any questions, ask your nurse or doctor.               These medicines have changed or have updated prescriptions.        Dose/Directions    ferrous gluconate 324 (38 FE) MG tablet   Commonly known as:  FERGON   This may have changed:  when to take this   Used for:  CREST (calcinosis, Raynaud's phenomenon, esophageal dysfunction, sclerodactyly, telangiectasia) (H)        Dose:  324 mg   Take 1 tablet (324 mg) by mouth daily (with breakfast)   Quantity:  100 tablet   Refills:  3                Primary Care Provider Office Phone # Fax #    Grecia Kraus DO [place] [phone] [address] [id number]        Equal Access to Services     PARISH COOK AH: Hadshama bustoso Sogaryali, waaxda luqadaha, qaybta kaalmada adeegyada, martin woods. So [facility] [phone].    ATENCIÓN: Si habla español, tiene a bradley disposición servicios gratuitos de asistencia lingüística. Llame al [phone].    We comply with applicable federal civil rights laws and [place] laws. We do not discriminate on the " basis of race, color, national origin, age, disability, sex, sexual orientation, or gender identity.            Thank you!     Thank you for choosing [facility]  for your care. Our goal is always to provide you with excellent care. Hearing back from our patients is one way we can continue to improve our services. Please take a few minutes to complete the written survey that you may receive in the mail after your visit with us. Thank you!             Your Updated Medication List - Protect others around you: Learn how to safely use, store and throw away your medicines at www.disposemymeds.org.          This list is accurate as of: 10/10/17  1:53 PM.  Always use your most recent med list.                   Brand Name Dispense Instructions for use Diagnosis    albuterol 108 (90 BASE) MCG/ACT Inhaler    VENTOLIN HFA    1 Inhaler    Inhale 2 puffs into the lungs every 4 hours as needed for shortness of breath / dyspnea or wheezing    Moderate persistent asthma without complication       B-12 1000 MCG Tbcr     100 tablet    Take 1,000 mcg by mouth daily    Vitamin B12 deficiency (non anemic)       BENADRYL 25 MG tablet   Generic drug:  diphenhydrAMINE     56 tablet    Take 1 tablet (25 mg) by mouth every 6 hours as needed for itching or allergies        blood glucose monitoring test strip    no brand specified    100 each    Use to test blood sugars 3 times daily or as directed Please give what is approved by insurance.    Hypoglycemia       budesonide-formoterol 160-4.5 MCG/ACT Inhaler    SYMBICORT    3 Inhaler    Inhale 2 puffs into the lungs 2 times daily    Moderate persistent asthma without complication       busPIRone 5 MG tablet    BUSPAR    60 tablet    Take 2 tablets (10 mg) by mouth 2 times daily    REX (generalized anxiety disorder)       citalopram 40 MG tablet    celeXA    90 tablet    Take 1 tablet (40 mg) by mouth daily    Severe anxiety with panic       ferrous gluconate 324 (38 FE) MG tablet     FERGON    100 tablet    Take 1 tablet (324 mg) by mouth daily (with breakfast)    CREST (calcinosis, Raynaud's phenomenon, esophageal dysfunction, sclerodactyly, telangiectasia) (H)       gabapentin 300 MG capsule    NEURONTIN    360 capsule    Take 2 capsules (600 mg) by mouth 3 times daily    Limited systemic sclerosis (H)       GOODSENSE MIGRAINE FORMULA 250-250-65 MG per tablet   Generic drug:  aspirin-acetaminophen-caffeine     24 tablet    TAKE ONE TABLET BY MOUTH EVERY 6 HOURS AS NEEDED FOR HEADACHES    Chronic daily headache       lisinopril 5 MG tablet    PRINIVIL/ZESTRIL    90 tablet    Take 1 tablet (5 mg) by mouth daily    CREST (calcinosis, Raynaud's phenomenon, esophageal dysfunction, sclerodactyly, telangiectasia) (H)       LORazepam 0.5 MG tablet    ATIVAN    60 tablet    Take 1 tablet (0.5 mg) by mouth 2 times daily as needed for anxiety    REX (generalized anxiety disorder)       omeprazole 40 MG capsule    priLOSEC    180 capsule    Take 1 capsule (40 mg) by mouth 2 times daily    CREST (calcinosis, Raynaud's phenomenon, esophageal dysfunction, sclerodactyly, telangiectasia) (H), Gastroesophageal reflux disease with esophagitis       * oxyCODONE 15 MG IR tablet    ROXICODONE    120 tablet    Take 1 tablet (15 mg) by mouth every 4 hours as needed for pain    Chronic pain syndrome       * oxyCODONE 15 MG IR tablet   Start taking on:  10/26/2017    ROXICODONE    120 tablet    Take 1 tablet (15 mg) by mouth every 4 hours as needed for pain    Chronic pain syndrome       * oxyCODONE 15 MG IR tablet   Start taking on:  11/24/2017    ROXICODONE    120 tablet    Take 1 tablet (15 mg) by mouth every 4 hours as needed for pain    Chronic pain syndrome       * oxyCODONE 15 MG IR tablet   Start taking on:  12/23/2017    ROXICODONE    120 tablet    Take 1 tablet (15 mg) by mouth every 4 hours as needed for pain    Chronic pain syndrome       polyethylene glycol powder    MIRALAX    510 g    Take 17 g (1  capful) by mouth daily    Drug-induced constipation       promethazine 25 MG/ML IV injection    PHENERGAN     Inject 25 mg into the vein every 6 hours as needed for nausea or vomiting        ranitidine 150 MG tablet    ZANTAC    180 tablet    Take 1 tablet (150 mg) by mouth 2 times daily as needed for heartburn    CREST (calcinosis, Raynaud's phenomenon, esophageal dysfunction, sclerodactyly, telangiectasia) (H), Gastroesophageal reflux disease with esophagitis       sucralfate 1 GM tablet    CARAFATE    120 tablet    Take 1 tablet (1 g) by mouth 4 times daily    Limited systemic sclerosis (H)       zolpidem 10 MG tablet    AMBIEN    30 tablet    Take 1 tablet (10 mg) by mouth nightly as needed for sleep    Other insomnia       * Notice:  This list has 4 medication(s) that are the same as other medications prescribed for you. Read the directions carefully, and ask your doctor or other care provider to review them with you.

## 2017-10-11 LAB
C TRACH DNA SPEC QL NAA+PROBE: NEGATIVE
N GONORRHOEA DNA SPEC QL NAA+PROBE: NEGATIVE
SPECIMEN SOURCE: NORMAL
SPECIMEN SOURCE: NORMAL

## 2017-10-11 RX ORDER — METRONIDAZOLE 500 MG/1
500 TABLET ORAL 2 TIMES DAILY
Qty: 14 TABLET | Refills: 0 | Status: SHIPPED | OUTPATIENT
Start: 2017-10-11 | End: 2017-10-30

## 2017-10-11 NOTE — PROGRESS NOTES
Pt notified of below.  Pt reports understanding.  Pt does not have further questions or concerns.    Tessa Laboy   [facility]  RN

## 2017-10-12 LAB
COPATH REPORT: NORMAL
PAP: NORMAL

## 2017-10-14 LAB
BACTERIA SPEC CULT: ABNORMAL
Lab: ABNORMAL
SPECIMEN SOURCE: ABNORMAL

## 2017-10-16 LAB
FINAL DIAGNOSIS: NORMAL
HPV HR 12 DNA CVX QL NAA+PROBE: NEGATIVE
HPV16 DNA SPEC QL NAA+PROBE: NEGATIVE
HPV18 DNA SPEC QL NAA+PROBE: NEGATIVE
SPECIMEN DESCRIPTION: NORMAL

## 2017-10-17 ENCOUNTER — TELEPHONE (OUTPATIENT)
Dept: MATERNAL FETAL MEDICINE | Facility: CLINIC | Age: 32
End: 2017-10-17

## 2017-10-17 RX ORDER — NITROFURANTOIN 25; 75 MG/1; MG/1
100 CAPSULE ORAL 2 TIMES DAILY
Qty: 14 CAPSULE | Refills: 0 | Status: SHIPPED | OUTPATIENT
Start: 2017-10-17 | End: 2017-10-30

## 2017-10-17 NOTE — TELEPHONE ENCOUNTER
Phone call to pt re VIDA that was emailed to pt. Checking to make sure pt had received it via e-mail. Pt states she has received it and will send it back when she can. Aware she will only be called for an appointment when [facility] has the records that are needed. Pt states understanding. Mari Trujillo RN

## 2017-10-19 ENCOUNTER — TELEPHONE (OUTPATIENT)
Dept: FAMILY MEDICINE | Facility: CLINIC | Age: 32
End: 2017-10-19

## 2017-10-19 NOTE — LETTER
November 9, 2017      Molly Teague  [address] [place] [id number]        Dear Molly,     Your [facility] Team works hard to make sure that you and your family receive exceptional care. Enclosed you will find a copy of the Asthma Control Test (ACT) that our clinic uses to monitor and manage your asthma. This test is an assessment tool that we use to determine how well your asthma is controlled.  Please complete the enclosed ACT and return in the provided envelope.    Thank you for trusting us with your health care.    Sincerely,        Your [facility] Team/cait

## 2017-10-19 NOTE — LETTER
October 19, 2017      Molly Teague  [address] [address]        Dear Molly,     Your [facility] Team works hard to make sure that you and your family receive exceptional care. Enclosed you will find a copy of the Asthma Control Test (ACT) that our clinic uses to monitor and manage your asthma. This test is an assessment tool that we use to determine how well your asthma is controlled.  Please keep a copy of the ACT for your reference when we call you to complete this assessment.     We will call within the next 2 weeks to review the questionnaire.    Thank you for trusting us with your health care.      Sincerely,        Your [facility] Team/cait

## 2017-10-19 NOTE — TELEPHONE ENCOUNTER
Patient is due to update ACT.  Last done 7/6/17 with a score of 12.  Moderate persistent asthma.    Letter with ACT mailed to patient.

## 2017-10-25 DIAGNOSIS — F41.1 GAD (GENERALIZED ANXIETY DISORDER): ICD-10-CM

## 2017-10-25 RX ORDER — BUSPIRONE HYDROCHLORIDE 5 MG/1
TABLET ORAL
Qty: 60 TABLET | Refills: 3 | OUTPATIENT
Start: 2017-10-25

## 2017-10-25 NOTE — TELEPHONE ENCOUNTER
Writer contacted the pharmacy she was given #120 tablets. Patient should have enough for another month.    Marisa CARVER RN

## 2017-10-25 NOTE — TELEPHONE ENCOUNTER
[email or web address]       Last Written Prescription Date: 9/26/17  Last Fill Quantity: 60; # refills: 3  Last Office Visit with [facility], [facility] or  Health prescribing provider:  9/26/17   Next 5 appointments (look out 90 days)     Nov 13, 2017  1:00 PM CST   Return Visit with Selvin Kenney   [facility] ([facility])    [address] [address]   [phone]                   Last PHQ-9 score on record=   PHQ-9 SCORE 9/26/2017   Total Score 15       Lab Results   Component Value Date    AST 39 06/08/2017     Lab Results   Component Value Date    ALT 25 06/08/2017

## 2017-10-30 ENCOUNTER — OFFICE VISIT (OUTPATIENT)
Dept: PSYCHIATRY | Facility: CLINIC | Age: 32
End: 2017-10-30
Payer: MEDICARE

## 2017-10-30 VITALS
SYSTOLIC BLOOD PRESSURE: 121 MMHG | WEIGHT: 158 LBS | HEART RATE: 100 BPM | TEMPERATURE: 98.9 F | BODY MASS INDEX: 28.9 KG/M2 | DIASTOLIC BLOOD PRESSURE: 41 MMHG

## 2017-10-30 DIAGNOSIS — F43.10 PTSD (POST-TRAUMATIC STRESS DISORDER): ICD-10-CM

## 2017-10-30 DIAGNOSIS — F33.1 MAJOR DEPRESSIVE DISORDER, RECURRENT EPISODE, MODERATE (H): Primary | ICD-10-CM

## 2017-10-30 DIAGNOSIS — F41.1 GAD (GENERALIZED ANXIETY DISORDER): ICD-10-CM

## 2017-10-30 DIAGNOSIS — F40.01 PANIC DISORDER WITH AGORAPHOBIA: ICD-10-CM

## 2017-10-30 PROCEDURE — 90792 PSYCH DIAG EVAL W/MED SRVCS: CPT | Performed by: CLINICAL NURSE SPECIALIST

## 2017-10-30 RX ORDER — SERTRALINE HYDROCHLORIDE 100 MG/1
TABLET, FILM COATED ORAL
Qty: 30 TABLET | Refills: 1 | Status: SHIPPED | OUTPATIENT
Start: 2017-10-30 | End: 2018-01-27

## 2017-10-30 ASSESSMENT — ANXIETY QUESTIONNAIRES
3. WORRYING TOO MUCH ABOUT DIFFERENT THINGS: MORE THAN HALF THE DAYS
2. NOT BEING ABLE TO STOP OR CONTROL WORRYING: MORE THAN HALF THE DAYS
4. TROUBLE RELAXING: NEARLY EVERY DAY
5. BEING SO RESTLESS THAT IT IS HARD TO SIT STILL: SEVERAL DAYS
1. FEELING NERVOUS, ANXIOUS, OR ON EDGE: MORE THAN HALF THE DAYS
7. FEELING AFRAID AS IF SOMETHING AWFUL MIGHT HAPPEN: NOT AT ALL
IF YOU CHECKED OFF ANY PROBLEMS ON THIS QUESTIONNAIRE, HOW DIFFICULT HAVE THESE PROBLEMS MADE IT FOR YOU TO DO YOUR WORK, TAKE CARE OF THINGS AT HOME, OR GET ALONG WITH OTHER PEOPLE: SOMEWHAT DIFFICULT
6. BECOMING EASILY ANNOYED OR IRRITABLE: MORE THAN HALF THE DAYS
GAD7 TOTAL SCORE: 12

## 2017-10-30 ASSESSMENT — PATIENT HEALTH QUESTIONNAIRE - PHQ9: SUM OF ALL RESPONSES TO PHQ QUESTIONS 1-9: 17

## 2017-10-30 NOTE — MR AVS SNAPSHOT
After Visit Summary   10/30/2017    Molly Teague    MRN: [id number]           Patient Information     Date Of Birth          1985        Visit Information        Provider Department      10/30/2017 9:45 AM Milagro Moody APRN [facility]        Today's Diagnoses     Major depressive disorder, recurrent episode, moderate (H)    -  1    REX (generalized anxiety disorder)        PTSD (post-traumatic stress disorder)          Care Instructions    Treatment Plan:  Continue buspirone (Buspar) 10 mg twice daily and lorazepam (Ativan) 0.5 mg twice daily as needed.     Decrease to citalopram (Celexa) 20 mg daily and add sertraline (Zoloft) 50 mg for four days, then discontinue citalopram (Celexa) and continue. If sertraline (Zoloft) 50 mg is tolerated, after 2 weeks, then increase to 100 mg daily.     Recommend reducing to zolpidem (Ambien) 5 mg at bedtime. Try using melatonin 3 to 5 mg at bedtime.     Follow up in a month or sooner if needed.     - Recommend patient discuss medications with their pharmacist. Risks and benefits of medications discussed, including side effect profile.   - Safety plan was reviewed; to the ER as needed or call after hours crisis line; [phone]  - Education and counseling was done regarding use of medications, psychotherapy options  - Call [phone] for appointment or to speak to a nurse.   -Office hours: Monday through Thursday 8:00 am to 4:30 pm; Friday 8:00 am to Noon.   - Patient was given a copy of this Treatment Plan today.           Follow-ups after your visit        Your next 10 appointments already scheduled     Nov 06, 2017  2:00 PM CST   New Visit with Selvin Kenney   [facility] ([facility])    [address] [address]   [phone]            Nov 13, 2017  1:00 PM CST   Return Visit with Selvin Kenney   [facility] ([facility])    [address]  [address]  [facility] [address]   [phone]            2017  2:00 PM CST   New Visit with Kel Moraes MD   [facility] [place] ([facility])    [address]  [address]  [facility] [address]   [phone]              Who to contact     If you have questions or need follow up information about today's clinic visit or your schedule please contact [facility] directly at [phone].  Normal or non-critical lab and imaging results will be communicated to you by SiSensehart, letter or phone within 4 business days after the clinic has received the results. If you do not hear from us within 7 days, please contact the clinic through SiSensehart or phone. If you have a critical or abnormal lab result, we will notify you by phone as soon as possible.  Submit refill requests through [employer] or call your pharmacy and they will forward the refill request to us. Please allow 3 business days for your refill to be completed.          Additional Information About Your Visit        MyChart Information     [employer] lets you send messages to your doctor, view your test results, renew your prescriptions, schedule appointments and more. To sign up, go to [email or web address] . Click on \"Log in\" on the left side of the screen, which will take you to the Welcome page. Then click on \"Sign up Now\" on the right side of the page.     You will be asked to enter the access code listed below, as well as some personal information. Please follow the directions to create your username and password.     Your access code is: [email or web address]  Expires: 2018 10:50 AM     Your access code will  in 90 days. If you need help or a new code, please call your [facility] or [phone].        Care EveryWhere ID     This is your Care EveryWhere ID. This could be used by other organizations to access your [place] medical records  [id number]        Your " Vitals Were     Pulse Temperature BMI (Body Mass Index)             100 98.9  F (37.2  C) (Tympanic) 28.9 kg/m2          Blood Pressure from Last 3 Encounters:   10/30/17 121/41   10/10/17 118/90   09/26/17 112/88    Weight from Last 3 Encounters:   10/30/17 158 lb (71.7 kg)   10/10/17 152 lb 12.8 oz (69.3 kg)   09/26/17 149 lb 9.6 oz (67.9 kg)              Today, you had the following     No orders found for display         Today's Medication Changes          These changes are accurate as of: 10/30/17 10:50 AM.  If you have any questions, ask your nurse or doctor.               Start taking these medicines.        Dose/Directions    sertraline 100 MG tablet   Commonly known as:  ZOLOFT   Used for:  Major depressive disorder, recurrent episode, moderate (H), REX (generalized anxiety disorder), PTSD (post-traumatic stress disorder)   Started by:  Milagro Moody APRN CNS        Take one half tab for 2 weeks,  If tolerated, increase to one tab daily.   Quantity:  30 tablet   Refills:  1         These medicines have changed or have updated prescriptions.        Dose/Directions    ferrous gluconate 324 (38 FE) MG tablet   Commonly known as:  FERGON   This may have changed:  when to take this   Used for:  CREST (calcinosis, Raynaud's phenomenon, esophageal dysfunction, sclerodactyly, telangiectasia) (H)        Dose:  324 mg   Take 1 tablet (324 mg) by mouth daily (with breakfast)   Quantity:  100 tablet   Refills:  3         Stop taking these medicines if you haven't already. Please contact your care team if you have questions.     citalopram 40 MG tablet   Commonly known as:  celeXA   Stopped by:  Milagro Moody APRN CNS           metroNIDAZOLE 500 MG tablet   Commonly known as:  FLAGYL   Stopped by:  Milagro Moody APRN CNS           nitroFURantoin (macrocrystal-monohydrate) 100 MG capsule   Commonly known as:  MACROBID   Stopped by:  Milagro Moody APRN CNS                 Where to get your medicines      These medications were sent to [facility] [place] - [address]  [address] [address]     Phone:  [phone]     sertraline 100 MG tablet                Primary Care Provider Office Phone # Fax #    Grecia Barba [phone]       [address] [address]        Equal Access to Services     PARISH COOK : Hadii aad ku hadasho Soomaali, waaxda luqadaha, qaybta kaalmada adeegyada, waxay idiin hayaan adeeg kharash lacinthia woods. So [place] [phone].    ATENCIÓN: Si habla español, tiene a bradley disposición servicios gratuitos de asistencia lingüística. Chidi al [phone].    We comply with applicable federal civil rights laws and [place] laws. We do not discriminate on the basis of race, color, national origin, age, disability, sex, sexual orientation, or gender identity.            Thank you!     Thank you for choosing [facility]  for your care. Our goal is always to provide you with excellent care. Hearing back from our patients is one way we can continue to improve our services. Please take a few minutes to complete the written survey that you may receive in the mail after your visit with us. Thank you!             Your Updated Medication List - Protect others around you: Learn how to safely use, store and throw away your medicines at www.disposemymeds.org.          This list is accurate as of: 10/30/17 10:50 AM.  Always use your most recent med list.                   Brand Name Dispense Instructions for use Diagnosis    albuterol 108 (90 BASE) MCG/ACT Inhaler    VENTOLIN HFA    1 Inhaler    Inhale 2 puffs into the lungs every 4 hours as needed for shortness of breath / dyspnea or wheezing    Moderate persistent asthma without complication       B-12 1000 MCG Tbcr     100 tablet    Take 1,000 mcg by mouth daily    Vitamin B12 deficiency (non anemic)       BENADRYL 25 MG tablet   Generic  drug:  diphenhydrAMINE     56 tablet    Take 1 tablet (25 mg) by mouth every 6 hours as needed for itching or allergies        blood glucose monitoring test strip    no brand specified    100 each    Use to test blood sugars 3 times daily or as directed Please give what is approved by insurance.    Hypoglycemia       budesonide-formoterol 160-4.5 MCG/ACT Inhaler    SYMBICORT    3 Inhaler    Inhale 2 puffs into the lungs 2 times daily    Moderate persistent asthma without complication       busPIRone 5 MG tablet    BUSPAR    60 tablet    Take 2 tablets (10 mg) by mouth 2 times daily    REX (generalized anxiety disorder)       ferrous gluconate 324 (38 FE) MG tablet    FERGON    100 tablet    Take 1 tablet (324 mg) by mouth daily (with breakfast)    CREST (calcinosis, Raynaud's phenomenon, esophageal dysfunction, sclerodactyly, telangiectasia) (H)       gabapentin 300 MG capsule    NEURONTIN    360 capsule    Take 2 capsules (600 mg) by mouth 3 times daily    Limited systemic sclerosis (H)       GOODSENSE MIGRAINE FORMULA 250-250-65 MG per tablet   Generic drug:  aspirin-acetaminophen-caffeine     24 tablet    TAKE ONE TABLET BY MOUTH EVERY 6 HOURS AS NEEDED FOR HEADACHES    Chronic daily headache       lisinopril 5 MG tablet    PRINIVIL/ZESTRIL    90 tablet    Take 1 tablet (5 mg) by mouth daily    CREST (calcinosis, Raynaud's phenomenon, esophageal dysfunction, sclerodactyly, telangiectasia) (H)       LORazepam 0.5 MG tablet    ATIVAN    60 tablet    Take 1 tablet (0.5 mg) by mouth 2 times daily as needed for anxiety    REX (generalized anxiety disorder)       omeprazole 40 MG capsule    priLOSEC    180 capsule    Take 1 capsule (40 mg) by mouth 2 times daily    CREST (calcinosis, Raynaud's phenomenon, esophageal dysfunction, sclerodactyly, telangiectasia) (H), Gastroesophageal reflux disease with esophagitis       * oxyCODONE 15 MG IR tablet    ROXICODONE    120 tablet    Take 1 tablet (15 mg) by mouth every 4  hours as needed for pain    Chronic pain syndrome       * oxyCODONE 15 MG IR tablet    ROXICODONE    120 tablet    Take 1 tablet (15 mg) by mouth every 4 hours as needed for pain    Chronic pain syndrome       * oxyCODONE 15 MG IR tablet   Start taking on:  11/24/2017    ROXICODONE    120 tablet    Take 1 tablet (15 mg) by mouth every 4 hours as needed for pain    Chronic pain syndrome       * oxyCODONE 15 MG IR tablet   Start taking on:  12/23/2017    ROXICODONE    120 tablet    Take 1 tablet (15 mg) by mouth every 4 hours as needed for pain    Chronic pain syndrome       polyethylene glycol powder    MIRALAX    510 g    Take 17 g (1 capful) by mouth daily    Drug-induced constipation       promethazine 25 MG/ML IV injection    PHENERGAN     Inject 25 mg into the vein every 6 hours as needed for nausea or vomiting        ranitidine 150 MG tablet    ZANTAC    180 tablet    Take 1 tablet (150 mg) by mouth 2 times daily as needed for heartburn    CREST (calcinosis, Raynaud's phenomenon, esophageal dysfunction, sclerodactyly, telangiectasia) (H), Gastroesophageal reflux disease with esophagitis       sertraline 100 MG tablet    ZOLOFT    30 tablet    Take one half tab for 2 weeks,  If tolerated, increase to one tab daily.    Major depressive disorder, recurrent episode, moderate (H), REX (generalized anxiety disorder), PTSD (post-traumatic stress disorder)       sucralfate 1 GM tablet    CARAFATE    120 tablet    Take 1 tablet (1 g) by mouth 4 times daily    Limited systemic sclerosis (H)       zolpidem 10 MG tablet    AMBIEN    30 tablet    Take 1 tablet (10 mg) by mouth nightly as needed for sleep    Other insomnia       * Notice:  This list has 4 medication(s) that are the same as other medications prescribed for you. Read the directions carefully, and ask your doctor or other care provider to review them with you.

## 2017-10-30 NOTE — PATIENT INSTRUCTIONS
Treatment Plan:  Continue buspirone (Buspar) 10 mg twice daily and lorazepam (Ativan) 0.5 mg twice daily as needed.     Decrease to citalopram (Celexa) 20 mg daily and add sertraline (Zoloft) 50 mg for four days, then discontinue citalopram (Celexa) and continue. If sertraline (Zoloft) 50 mg is tolerated, after 2 weeks, then increase to 100 mg daily.     Recommend reducing to zolpidem (Ambien) 5 mg at bedtime. Try using melatonin 3 to 5 mg at bedtime.     Follow up in a month or sooner if needed.     - Recommend patient discuss medications with their pharmacist. Risks and benefits of medications discussed, including side effect profile.   - Safety plan was reviewed; to the ER as needed or call after hours crisis line; [phone]  - Education and counseling was done regarding use of medications, psychotherapy options  - Call [phone] for appointment or to speak to a nurse.   -Office hours: Monday through Thursday 8:00 am to 4:30 pm; Friday 8:00 am to Noon.   - Patient was given a copy of this Treatment Plan today.

## 2017-10-30 NOTE — PROGRESS NOTES
"                                                         Outpatient Psychiatric Evaluation-Standard    Name: Molly Teague  : 1985  Date: 10/30/2017    Source of Referral:  Primary Care Physician: Grecia Barba  Current Psychotherapist: Selvin Kenney LP - first appointment 2017.     Identifying Data:  Patient is a 32 year old, partnered / significant other female who presents for initial visit with me.  Patient is currently disabled for autoimmune disorder - since . Patient attended the session alone.   60 minutes were spent on evaluation with 40 minutes CC time.    HPI:  Patient reports depression started at age 17 due to low self esteem. Patient was prescribed an AD which she did not take, rather talked to aunt instead. 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 AD were started while patient was in the hospital.     Patient reports difficulty determining if the citalopram (Celexa) is helpful, but then states she is able to experience \"happy moments\", less anger outbursts. Buspirone (Buspar) 10 mg twice daily was added approximately one month ago. Patient takes lorazepam (Ativan) 0.5 mg twice daily, patient requests a higher dose.  Patient is not sleeping well taking zolpidem (Ambien) 10 mg at bedtime and she also wakes to \"shop\".     Psychiatric Review of Symptoms:  Depression: Sleep: sleeps 4 hours per night, sleeps sitting up due to GERD.  Appetite: varies  Depressed Mood Interest: Decrease Energy: Decrease Concentration: Decrease Psychomotor slowing  Worthless: No change and since \"getting sick\"     Last PHQ-9 score = No Value exists for the : HP#PHQ9; 17  Kaylen:  No symptoms  Mood Disorder Questionnaire: Negative    Anxiety: Feeling nervous or on edge  Uncontrolled worrying  Trouble relaxing  Restlessness  Easily annoyed or irritable    GAD7 score: 12  Panic:  Palpitations  Tremors  Shortness of " "Breath  Agoraphobia:  Yes  OCD:  No symptoms  Psychosis: No symptoms  ADD / ADHD: No symptoms  Gambling or shoplifting: No  Eating Disorder:  No symptoms  Suicide attempts:  No  Current SI risk:  No            Patient reports no suicidal feelings today. In addition, he has notable risk factors for self-harm, including chronic illness. However, risk is mitigated by commitment to family \"My three year old\". Therefore, based on all available evidence including the factors cited above, he does not appear to be at imminent risk for self-harm, does not meet criteria for a 72-hr hold, and therefore remains appropriate for ongoing outpatient level of care. Currently is scheduled with a therapist.     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    PTSD:  Increased Arousal Impaired Function Nightmares    Issues of possible neglect are not present.    A safety and risk management plan has not been developed at this time, however client was given the after-hours number / 911 should there be a change in any of these risk factors.       Psychiatric History:   Hospitalizations: None  Past psychotherapy: medication(s) from physician / PCP    Past medication trials: (patient was presented with a list to review all currently available antidepressants, mood stabilizers, tranquilizers, hypnotics and antipsychotics)  New Antidepressants:  Celexa (citalopram) and Cymbalta (duloxetine)  Mood Stabilizers:  Neurontin (gabapentin)  Sedatives/Hypnotics:  Ambien (zolpidem)  Tranquilizers:  Ativan (lorazepam), Buspar (buspirone) and Klonopin (clonazepam)      Chemical Use History:  Patient has not received chemical dependency treatment in the past.  Patient reports no problems as a result of their drinking / drug use.  Current use of drugs or " "alcohol: N/A  CAGE: None of the patient's responses to the CAGE screening were positive / Negative CAGE score   Based on the negative Cage-Aid score and clinical interview there  are not indications of drug or alcohol abuse.  Tobacco use: No  Ready to quit?  No  NRT tried: NA    Past Medical History:  Surgery: History reviewed. No pertinent surgical history.  Allergies:    Allergies   Allergen Reactions     No Known Allergies      Shellfish-Derived Products Nausea     Rash on face     Primary MD: Grecia Barba  Seizures or head injury: Yes head injury, 2015, fell while learning to walk after coma.  Diet: \"Decent\"  Exercise: no regular exercise program  Supplements: B12    Current Medications:  Current Outpatient Prescriptions   Medication Sig     busPIRone (BUSPAR) 5 MG tablet Take 2 tablets (10 mg) by mouth 2 times daily     LORazepam (ATIVAN) 0.5 MG tablet Take 1 tablet (0.5 mg) by mouth 2 times daily as needed for anxiety     gabapentin (NEURONTIN) 300 MG capsule Take 2 capsules (600 mg) by mouth 3 times daily     GOODSENSE MIGRAINE FORMULA 250-250-65 MG per tablet TAKE ONE TABLET BY MOUTH EVERY 6 HOURS AS NEEDED FOR HEADACHES     lisinopril (PRINIVIL/ZESTRIL) 5 MG tablet Take 1 tablet (5 mg) by mouth daily     zolpidem (AMBIEN) 10 MG tablet Take 1 tablet (10 mg) by mouth nightly as needed for sleep     citalopram (CELEXA) 40 MG tablet Take 1 tablet (40 mg) by mouth daily     ranitidine (ZANTAC) 150 MG tablet Take 1 tablet (150 mg) by mouth 2 times daily as needed for heartburn     albuterol (VENTOLIN HFA) 108 (90 BASE) MCG/ACT Inhaler Inhale 2 puffs into the lungs every 4 hours as needed for shortness of breath / dyspnea or wheezing     omeprazole (PRILOSEC) 40 MG capsule Take 1 capsule (40 mg) by mouth 2 times daily     promethazine (PHENERGAN) 25 MG/ML IV injection Inject 25 mg into the vein every 6 hours as needed for nausea or vomiting     Cyanocobalamin (B-12) 1000 MCG TBCR Take 1,000 mcg by mouth " daily     oxyCODONE (ROXICODONE) 15 MG IR tablet Take 1 tablet (15 mg) by mouth every 4 hours as needed for pain     budesonide-formoterol (SYMBICORT) 160-4.5 MCG/ACT Inhaler Inhale 2 puffs into the lungs 2 times daily     sucralfate (CARAFATE) 1 GM tablet Take 1 tablet (1 g) by mouth 4 times daily     ferrous gluconate (FERGON) 324 (38 FE) MG tablet Take 1 tablet (324 mg) by mouth daily (with breakfast) (Patient taking differently: Take 324 mg by mouth 3 times daily (with meals) )     polyethylene glycol (MIRALAX) powder Take 17 g (1 capful) by mouth daily     diphenhydrAMINE (BENADRYL) 25 MG tablet Take 1 tablet (25 mg) by mouth every 6 hours as needed for itching or allergies     [START ON 12/23/2017] oxyCODONE (ROXICODONE) 15 MG IR tablet Take 1 tablet (15 mg) by mouth every 4 hours as needed for pain     [START ON 11/24/2017] oxyCODONE (ROXICODONE) 15 MG IR tablet Take 1 tablet (15 mg) by mouth every 4 hours as needed for pain     oxyCODONE (ROXICODONE) 15 MG IR tablet Take 1 tablet (15 mg) by mouth every 4 hours as needed for pain     blood glucose monitoring (NO BRAND SPECIFIED) test strip Use to test blood sugars 3 times daily or as directed  Please give what is approved by insurance.     No current facility-administered medications for this visit.        Vital Signs:  /41 (BP Location: Right arm, Patient Position: Sitting, Cuff Size: Adult Regular)  Pulse 100  Temp 98.9  F (37.2  C) (Tympanic)  Wt 158 lb (71.7 kg)  BMI 28.9 kg/m2      Review of Systems:  (constitutional, HEENT, Neuro, Cardiac, Pulmonary, GI, , Heme / Lymph, Endocrine, Skin / Breast, MSK reviewed)  10 point ROS was negative except for the following: GERD, full body pain related to scleroderma     Family History:   (with focus on psychiatric and substance abuse)  Chemical use problems None  Mental health history: cousin - depression  Patient reports family history includes CEREBROVASCULAR DISEASE in her maternal grandmother;  "DIABETES in her maternal aunt; Hyperlipidemia in her father and paternal grandfather. There is no history of Melanoma or Skin Cancer.    Social History:   Patient grew up in [place], [place]    Siblings: 1  Intact family growing up?; Yes  Highest education level was high school graduate.   Marital status and living situation: Lives with significant and 3 year old son.   one children.   she has not been involved with the legal system.      Mental Status Assessment:     Appearance:  Well groomed      Behavior/relationship to examiner/demeanor:  Cooperative, engaged and pleasant  Motor activity:  Normal  Gait:  Normal   Speech:  Normal in volume, articulation, coherence   Mood (subjective report):  \"Scared\"  Affect (objective appearance):  Mood congruent  Thought Process (Associations):  Logical, linear and goal directed  Thought content:  No evidence of suicidal or homicidal ideation,          no overt psychosis and                    patient does not appear to be responding to internal stimuli  Oriented to person, place, date/time   Attention Span and concentration: Intact   Memory:  Short-term memory intact and Long-term memory; Intact  Language:  Fluent   Fund of Knowledge/Intelligence:  Average  Use of language: Intact   Abstraction:  Normal  Insight:  Adequate  Judgment:  Adequate for safety    DSM5  Diagnosis:    296.32 (F33.1) Major Depressive Disorder, Recurrent Episode, Moderate _ and With anxious distress  300.01 (F41.0) Panic Disorder  300.02 (F41.1) Generalized Anxiety Disorder  309.81 (F43.10) Posttraumatic Stress Disorder (includes Posttraumatic Stress Disorder for Children 6 Years and Younger)  Without dissociative symptoms  Psychosocial & Contextual Factors: chronic illness    Strengths and Liabilities:   Patient identified the following strengths or resources that will help her  succeed in counseling: anju / spirituality, friends / good social support and family support.  Things that may interfere with " the patient's success include:denies.    WHODAS 2.0 TOTAL SCORES 10/30/2017   Total Score 29         Impression:  Patient has depression, anxiety and PTSD symptoms. She was evaluated at the [facility] with recommendation to try sertraline (Zoloft) which she would like to do today and is reasonable. Buspirone (Buspar) was started recently and will continue. Zolpidem (Ambien) is reduced to 5 mg with trial of melatonin. Plan is to discontinue the zolpidem (Ambien). Patient requests an increase in lorazepam (Ativan) which is not likely to be helpful in the management of anxiety and PTSD. Education was provided.     Medication side effects and alternatives reviewed.     Treatment Plan:  Continue buspirone (Buspar) 10 mg twice daily and lorazepam (Ativan) 0.5 mg twice daily as needed.     Decrease to citalopram (Celexa) 20 mg daily and add sertraline (Zoloft) 50 mg for four days, then discontinue citalopram (Celexa) and continue. If sertraline (Zoloft) 50 mg is tolerated, after 2 weeks, then increase to 100 mg daily.     Recommend reducing to zolpidem (Ambien) 5 mg at bedtime. Try using melatonin 3 to 5 mg at bedtime.     Follow up in a month or sooner if needed.     - Recommend patient discuss medications with their pharmacist. Risks and benefits of medications discussed, including side effect profile.   - Safety plan was reviewed; to the ER as needed or call after hours crisis line; [phone]  - Education and counseling was done regarding use of medications, psychotherapy options  - Call [phone] for appointment or to speak to a nurse.   -Office hours: Monday through Thursday 8:00 am to 4:30 pm; Friday 8:00 am to Noon.   - Patient was given a copy of this Treatment Plan today.     My Practice Policy was reviewed and signed: YES       Patient will continue to be seen for ongoing consultation and stabilization.      Signed: Milagro Moody, RN, MS, APRN                 Psychiatry

## 2017-10-30 NOTE — NURSING NOTE
"No chief complaint on file.      Initial /41 (BP Location: Right arm, Patient Position: Sitting, Cuff Size: Adult Regular)  Pulse 100  Temp 98.9  F (37.2  C) (Tympanic)  Wt 158 lb (71.7 kg)  BMI 28.9 kg/m2 Estimated body mass index is 28.9 kg/(m^2) as calculated from the following:    Height as of 10/10/17: 5' 2\" (1.575 m).    Weight as of this encounter: 158 lb (71.7 kg).  Medication Reconciliation: complete    "

## 2017-10-31 ASSESSMENT — ANXIETY QUESTIONNAIRES: GAD7 TOTAL SCORE: 12

## 2017-11-09 NOTE — TELEPHONE ENCOUNTER
Panel Management Review      Patient has the following on her problem list:     Asthma review     ACT Total Scores 7/6/2017   ACT TOTAL SCORE (Goal Greater than or Equal to 20) 12   In the past 12 months, how many times did you visit the emergency room for your asthma without being admitted to the hospital? 0   In the past 12 months, how many times were you hospitalized overnight because of your asthma? 0      1. Is Asthma diagnosis on the Problem List? Yes    2. Is Asthma listed on Health Maintenance? Yes    3. Patient is due for:  ACT        Composite cancer screening  Chart review shows that this patient is due/due soon for the following None  Summary:    Patient is due/failing the following:   ACT    Action needed:   Patient needs to do ACT.    Type of outreach:    No response to phone calls, letter with ACT mailed for patient to complete and return.    Questions for provider review:    None                                                                                                                                    GERMANIA Zurita, CMA

## 2017-11-27 DIAGNOSIS — M34.1 CREST (CALCINOSIS, RAYNAUD'S PHENOMENON, ESOPHAGEAL DYSFUNCTION, SCLERODACTYLY, TELANGIECTASIA) (H): ICD-10-CM

## 2017-11-28 NOTE — TELEPHONE ENCOUNTER
ferrous gluconate   Last Written Prescription Date:  01/02/2017  Last Fill Quantity: 100,   # refills: 3  Last Office Visit: 09/26/2017  Future Office visit:       Routing refill request to provider for review/approval because:  Drug not on the G, P or [facility] refill protocol or controlled substance

## 2017-11-29 RX ORDER — FERROUS GLUCONATE 324(38)MG
TABLET ORAL
Qty: 100 TABLET | Refills: 3 | Status: SHIPPED | OUTPATIENT
Start: 2017-11-29 | End: 2020-08-04

## 2017-12-11 ENCOUNTER — HOSPITAL ENCOUNTER (EMERGENCY)
Facility: CLINIC | Age: 32
Discharge: HOME | End: 2017-12-11
Attending: PHYSICIAN ASSISTANT | Admitting: PHYSICIAN ASSISTANT
Payer: MEDICARE

## 2017-12-11 VITALS
SYSTOLIC BLOOD PRESSURE: 143 MMHG | OXYGEN SATURATION: 97 % | BODY MASS INDEX: 28.91 KG/M2 | RESPIRATION RATE: 16 BRPM | HEART RATE: 104 BPM | DIASTOLIC BLOOD PRESSURE: 82 MMHG | WEIGHT: 158.07 LBS | TEMPERATURE: 98.2 F

## 2017-12-11 DIAGNOSIS — N30.00 ACUTE CYSTITIS WITHOUT HEMATURIA: ICD-10-CM

## 2017-12-11 LAB
ALBUMIN UR-MCNC: 30 MG/DL
APPEARANCE UR: CLEAR
BILIRUB UR QL STRIP: NEGATIVE
COLOR UR AUTO: YELLOW
GLUCOSE UR STRIP-MCNC: NEGATIVE MG/DL
HCG UR QL: NEGATIVE
HGB UR QL STRIP: ABNORMAL
INTERNAL QC OK POCT: YES
KETONES UR STRIP-MCNC: NEGATIVE MG/DL
LEUKOCYTE ESTERASE UR QL STRIP: NEGATIVE
MUCOUS THREADS #/AREA URNS LPF: PRESENT /LPF
NITRATE UR QL: NEGATIVE
PH UR STRIP: 6 PH (ref 5–7)
RBC #/AREA URNS AUTO: 11 /HPF (ref 0–2)
S PYO AG THROAT QL IA.RAPID: NEGATIVE
SOURCE: ABNORMAL
SP GR UR STRIP: 1.02 (ref 1–1.03)
SQUAMOUS #/AREA URNS AUTO: 1 /HPF (ref 0–1)
UROBILINOGEN UR STRIP-MCNC: 2 MG/DL (ref 0–2)
WBC #/AREA URNS AUTO: 1 /HPF (ref 0–2)

## 2017-12-11 PROCEDURE — 81001 URINALYSIS AUTO W/SCOPE: CPT | Performed by: PHYSICIAN ASSISTANT

## 2017-12-11 PROCEDURE — 99213 OFFICE O/P EST LOW 20 MIN: CPT | Performed by: PHYSICIAN ASSISTANT

## 2017-12-11 PROCEDURE — 87086 URINE CULTURE/COLONY COUNT: CPT | Performed by: PHYSICIAN ASSISTANT

## 2017-12-11 PROCEDURE — 81025 URINE PREGNANCY TEST: CPT | Performed by: PHYSICIAN ASSISTANT

## 2017-12-11 PROCEDURE — 87880 STREP A ASSAY W/OPTIC: CPT | Performed by: PHYSICIAN ASSISTANT

## 2017-12-11 PROCEDURE — 87081 CULTURE SCREEN ONLY: CPT | Mod: XS | Performed by: PHYSICIAN ASSISTANT

## 2017-12-11 PROCEDURE — 99213 OFFICE O/P EST LOW 20 MIN: CPT

## 2017-12-11 RX ORDER — CIPROFLOXACIN 500 MG/1
500 TABLET, FILM COATED ORAL 2 TIMES DAILY
Qty: 6 TABLET | Refills: 0 | Status: SHIPPED | OUTPATIENT
Start: 2017-12-11 | End: 2017-12-14

## 2017-12-11 NOTE — DISCHARGE INSTRUCTIONS
Understanding Urinary Tract Infections (UTIs)  Most UTIs are caused by bacteria, although they may also be caused by viruses or fungi. Bacteria from the bowel are the most common source of infection. The infection may start because of any of the following:    Sexual activity. During sex, bacteria can travel from the penis, vagina, or rectum into the urethra.     Bacteria on the skin outside the rectum may travel into the urethra. This is more common in women since the rectum and urethra are closer to each other than in men. Wiping from front to back after using the toilet and keeping the area clean can help prevent germs from getting to the urethra.    Blockage of urine flow through the urinary tract. If urine sits too long, germs may start to grow out of control.      Parts of the urinary tract  The infection can occur in any part of the urinary tract.    The kidneys collect and store urine.    The ureters carry urine from the kidneys to the bladder.    The bladder holds urine until you are ready to let it out.    The urethra carries urine from the bladder out of the body. It is shorter in women, so bacteria can move through it more easily. The urethra is longer in men, so a UTI is less likely to reach the bladder or kidneys in men.  Date Last Reviewed: 1/1/2017 2000-2017 [employer]. [address] [place] [address], [place] [address]. All rights reserved. This information is not intended as a substitute for professional medical care. Always follow your healthcare professional's instructions.

## 2017-12-11 NOTE — ED PROVIDER NOTES
Chief Complaint:    Chief Complaint   Patient presents with     UTI     poss uti,  had N&V want to be checked for strep       HPI: Molly Teague is an 32 year old female who presents for evaluation and treatment of disrupted urine flow.  Patient states that this started 2 days ago.  She has had this happen in the past and she has had a UTI, so she comes in right away when she has this symptoms. She is also complaining of a sore throat and congestion.  This started yesterday and has not changed.  She does have a significant Hx of asthma.  She denies any abdominal pain, frequency, urgency, hematuria, or vaginal discharge.  No diarrhea.  No shortness of breath or chest pain.      ROS:  All other systems were reviewed and are negative.        Surgical History:  No past surgical history on file.     Problem List:  Patient Active Problem List   Diagnosis     Long-term (current) use of anticoagulants [Z79.01]     Nausea with vomiting     Scleroderma (H)     Anxiety     Scleroderma with renal involvement (H)     History of ARDS     Raynaud's disease without gangrene     History of hemodialysis     Bilateral retinitis     IUD (intrauterine device) in place     Other pulmonary embolism without acute cor pulmonale, unspecified chronicity (H)     REX (generalized anxiety disorder)     CREST (calcinosis, Raynaud's phenomenon, esophageal dysfunction, sclerodactyly, telangiectasia) (H)     History of Clostridium difficile     History of Helicobacter pylori infection     Moderate persistent asthma without complication     Limited systemic sclerosis (H)     Polyneuropathy associated with underlying disease (H)     AIN grade I     Gastroesophageal reflux disease with esophagitis     Chronic migraine without aura without status migrainosus, not intractable     Chronic pain syndrome     Sepsis (H)     Acute pyelonephritis        Allergies:  Allergies   Allergen Reactions     No Known Allergies      Shellfish-Derived Products Nausea      Rash on face        Current Meds:  No current facility-administered medications for this encounter.     Current Outpatient Prescriptions:      ciprofloxacin (CIPRO) 500 MG tablet, Take 1 tablet (500 mg) by mouth 2 times daily for 3 days, Disp: 6 tablet, Rfl: 0     ferrous gluconate (FERGON) 324 (38 FE) MG tablet, TAKE ONE TABLET BY MOUTH EVERY DAY WITH BREAKFAST, Disp: 100 tablet, Rfl: 3     sertraline (ZOLOFT) 100 MG tablet, Take one half tab for 2 weeks,  If tolerated, increase to one tab daily., Disp: 30 tablet, Rfl: 1     [START ON 12/23/2017] oxyCODONE (ROXICODONE) 15 MG IR tablet, Take 1 tablet (15 mg) by mouth every 4 hours as needed for pain, Disp: 120 tablet, Rfl: 0     oxyCODONE (ROXICODONE) 15 MG IR tablet, Take 1 tablet (15 mg) by mouth every 4 hours as needed for pain, Disp: 120 tablet, Rfl: 0     busPIRone (BUSPAR) 5 MG tablet, Take 2 tablets (10 mg) by mouth 2 times daily, Disp: 60 tablet, Rfl: 3     LORazepam (ATIVAN) 0.5 MG tablet, Take 1 tablet (0.5 mg) by mouth 2 times daily as needed for anxiety, Disp: 60 tablet, Rfl: 3     gabapentin (NEURONTIN) 300 MG capsule, Take 2 capsules (600 mg) by mouth 3 times daily, Disp: 360 capsule, Rfl: 3     GOODSENSE MIGRAINE FORMULA 250-250-65 MG per tablet, TAKE ONE TABLET BY MOUTH EVERY 6 HOURS AS NEEDED FOR HEADACHES, Disp: 24 tablet, Rfl: 1     lisinopril (PRINIVIL/ZESTRIL) 5 MG tablet, Take 1 tablet (5 mg) by mouth daily, Disp: 90 tablet, Rfl: 3     zolpidem (AMBIEN) 10 MG tablet, Take 1 tablet (10 mg) by mouth nightly as needed for sleep, Disp: 30 tablet, Rfl: 3     ranitidine (ZANTAC) 150 MG tablet, Take 1 tablet (150 mg) by mouth 2 times daily as needed for heartburn, Disp: 180 tablet, Rfl: 3     albuterol (VENTOLIN HFA) 108 (90 BASE) MCG/ACT Inhaler, Inhale 2 puffs into the lungs every 4 hours as needed for shortness of breath / dyspnea or wheezing, Disp: 1 Inhaler, Rfl: 11     omeprazole (PRILOSEC) 40 MG capsule, Take 1 capsule (40 mg) by mouth 2  times daily, Disp: 180 capsule, Rfl: 3     promethazine (PHENERGAN) 25 MG/ML IV injection, Inject 25 mg into the vein every 6 hours as needed for nausea or vomiting, Disp: , Rfl:      Cyanocobalamin (B-12) 1000 MCG TBCR, Take 1,000 mcg by mouth daily, Disp: 100 tablet, Rfl: 1     oxyCODONE (ROXICODONE) 15 MG IR tablet, Take 1 tablet (15 mg) by mouth every 4 hours as needed for pain, Disp: 120 tablet, Rfl: 0     budesonide-formoterol (SYMBICORT) 160-4.5 MCG/ACT Inhaler, Inhale 2 puffs into the lungs 2 times daily, Disp: 3 Inhaler, Rfl: 3     sucralfate (CARAFATE) 1 GM tablet, Take 1 tablet (1 g) by mouth 4 times daily, Disp: 120 tablet, Rfl: 11     polyethylene glycol (MIRALAX) powder, Take 17 g (1 capful) by mouth daily, Disp: 510 g, Rfl: 1     blood glucose monitoring (NO BRAND SPECIFIED) test strip, Use to test blood sugars 3 times daily or as directed Please give what is approved by insurance., Disp: 100 each, Rfl: 0     diphenhydrAMINE (BENADRYL) 25 MG tablet, Take 1 tablet (25 mg) by mouth every 6 hours as needed for itching or allergies, Disp: 56 tablet, Rfl:      PHYSICAL EXAM:     Vital signs noted and reviewed by Wilberto King  /82  Pulse 104  Temp 98.2  F (36.8  C)  Resp 16  Wt 71.7 kg (158 lb 1.1 oz)  SpO2 97%  BMI 28.91 kg/m2     PEFR:  General appearance: healthy, alert and no distress  Ears: R TM - normal: no effusions, no erythema, and normal landmarks, L TM - normal: no effusions, no erythema, and normal landmarks  Eyes: R normal, L normal  Nose: normal  Oropharynx: mild erythema  Neck: supple and no adenopathy  Lungs: normal and clear to auscultation  Heart: S1, S2 normal, no murmur, click, rub or gallop, regular rate and rhythm  Abdomen: Abdomen soft, non-tender without masses or organomegaly       Labs:     Results for orders placed or performed during the hospital encounter of 12/11/17   UA reflex to Microscopic   Result Value Ref Range    Color Urine Yellow     Appearance Urine  Clear     Glucose Urine Negative NEG^Negative mg/dL    Bilirubin Urine Negative NEG^Negative    Ketones Urine Negative NEG^Negative mg/dL    Specific Gravity Urine 1.018 1.003 - 1.035    Blood Urine Trace (A) NEG^Negative    pH Urine 6.0 5.0 - 7.0 pH    Protein Albumin Urine 30 (A) NEG^Negative mg/dL    Urobilinogen mg/dL 2.0 0.0 - 2.0 mg/dL    Nitrite Urine Negative NEG^Negative    Leukocyte Esterase Urine Negative NEG^Negative    Source Midstream Urine     RBC Urine 11 (H) 0 - 2 /HPF    WBC Urine 1 0 - 2 /HPF    Squamous Epithelial /HPF Urine 1 0 - 1 /HPF    Mucous Urine Present (A) NEG^Negative /LPF   HCG qualitative urine   Result Value Ref Range    HCG Qual Urine Negative NEG^Negative   Rapid strep group A screen POCT   Result Value Ref Range    Rapid Strep A Screen NEGATIVE neg    Internal QC OK Yes           ASSESSMENT:     1. Acute cystitis without hematuria           PLAN:     Patient requesting antibiotics and with her symptoms, I think that this is reasonable.  Patient encouraged to drink plenty of water.  We will call with culture results if resistant.  RST was negative and communicated to patient.  She was instructed to follow up with her PCP if symptoms are not improving in the next 2-3 days.  Return to UC or ED with any worsening symptoms.  Patient verbalized understanding and agreed with this plan.  Patient had to leave before test results were back.  We are attempting to get a hold of her.  I verified with the patient that we have her current phone number.  Patient stated that the number listed was correct.  When we called the number is not in service.     Wilberto King  12/11/2017, 12:20 PM         Wilberto King PA-C  12/11/17 2104

## 2017-12-11 NOTE — ED NOTES
Patient was contacted by phone, given results of UA, told RX was sent to [facility], also told that urine culture was added, and patient would be notified if antibiotics needed to be changed

## 2017-12-12 LAB
BACTERIA SPEC CULT: NORMAL
Lab: NORMAL
SPECIMEN SOURCE: NORMAL

## 2017-12-13 LAB
BACTERIA SPEC CULT: NORMAL
SPECIMEN SOURCE: NORMAL

## 2017-12-26 DIAGNOSIS — G47.09 OTHER INSOMNIA: ICD-10-CM

## 2017-12-26 NOTE — TELEPHONE ENCOUNTER
Ambien      Last Written Prescription Date:  06/22/2017  Last Fill Quantity: 30,   # refills: 3  Last Office Visit: 9/26/20171  Future Office visit:       Routing refill request to provider for review/approval because:  Drug not on the [facility], [facility] or [facility] refill protocol or controlled substance

## 2017-12-28 RX ORDER — ZOLPIDEM TARTRATE 10 MG/1
10 TABLET ORAL
Qty: 30 TABLET | Refills: 3 | Status: CANCELLED | OUTPATIENT
Start: 2017-12-28

## 2017-12-28 RX ORDER — ZOLPIDEM TARTRATE 5 MG/1
5 TABLET ORAL
Qty: 30 TABLET | Refills: 0 | Status: SHIPPED | OUTPATIENT
Start: 2017-12-28 | End: 2018-01-05

## 2017-12-28 NOTE — TELEPHONE ENCOUNTER
Patient saw Psych who recommend to decrease ambien to 5 mg with eventual taper off, which I agree with.  She was supposed to follow-up with Milagro Moody and Gabe Jacob, but has not.  She was supposed to meet with Pain clinic, but I don't see appointment scheduled.  1 month of ambien given, but at 5 mg.  Patient should see me in early Jan, before I go on leave end of Billy

## 2018-01-05 ENCOUNTER — OFFICE VISIT (OUTPATIENT)
Dept: FAMILY MEDICINE | Facility: CLINIC | Age: 33
End: 2018-01-05
Payer: MEDICARE

## 2018-01-05 VITALS
DIASTOLIC BLOOD PRESSURE: 94 MMHG | HEIGHT: 62 IN | WEIGHT: 167.2 LBS | HEART RATE: 108 BPM | SYSTOLIC BLOOD PRESSURE: 129 MMHG | TEMPERATURE: 99.7 F | BODY MASS INDEX: 30.77 KG/M2

## 2018-01-05 DIAGNOSIS — G89.4 CHRONIC PAIN SYNDROME: Chronic | ICD-10-CM

## 2018-01-05 DIAGNOSIS — F41.1 GAD (GENERALIZED ANXIETY DISORDER): Primary | ICD-10-CM

## 2018-01-05 DIAGNOSIS — R51.9 CHRONIC DAILY HEADACHE: ICD-10-CM

## 2018-01-05 DIAGNOSIS — G47.09 OTHER INSOMNIA: ICD-10-CM

## 2018-01-05 PROCEDURE — 99214 OFFICE O/P EST MOD 30 MIN: CPT | Performed by: INTERNAL MEDICINE

## 2018-01-05 RX ORDER — BUSPIRONE HYDROCHLORIDE 15 MG/1
15 TABLET ORAL 2 TIMES DAILY
Qty: 60 TABLET | Refills: 3 | Status: SHIPPED | OUTPATIENT
Start: 2018-01-05 | End: 2018-04-11

## 2018-01-05 RX ORDER — LORAZEPAM 0.5 MG/1
0.5 TABLET ORAL 2 TIMES DAILY PRN
Qty: 60 TABLET | Refills: 2 | Status: SHIPPED | OUTPATIENT
Start: 2018-01-25 | End: 2018-04-11

## 2018-01-05 RX ORDER — ZOLPIDEM TARTRATE 5 MG/1
5 TABLET ORAL
Qty: 30 TABLET | Refills: 0 | Status: SHIPPED | OUTPATIENT
Start: 2018-01-05 | End: 2018-03-29

## 2018-01-05 RX ORDER — OXYCODONE HYDROCHLORIDE 15 MG/1
15 TABLET ORAL EVERY 4 HOURS PRN
Qty: 120 TABLET | Refills: 0 | Status: SHIPPED | OUTPATIENT
Start: 2018-03-20 | End: 2018-04-11

## 2018-01-05 RX ORDER — OXYCODONE HYDROCHLORIDE 15 MG/1
15 TABLET ORAL EVERY 4 HOURS PRN
Qty: 120 TABLET | Refills: 0 | Status: SHIPPED | OUTPATIENT
Start: 2018-01-21 | End: 2018-01-05

## 2018-01-05 RX ORDER — CITALOPRAM HYDROBROMIDE 40 MG/1
40 TABLET ORAL DAILY
Qty: 90 TABLET | Refills: 3 | Status: SHIPPED | OUTPATIENT
Start: 2018-01-05 | End: 2018-07-06

## 2018-01-05 RX ORDER — OXYCODONE HYDROCHLORIDE 15 MG/1
15 TABLET ORAL EVERY 4 HOURS PRN
Qty: 120 TABLET | Refills: 0 | Status: SHIPPED | OUTPATIENT
Start: 2018-02-20 | End: 2018-01-05

## 2018-01-05 ASSESSMENT — ANXIETY QUESTIONNAIRES
3. WORRYING TOO MUCH ABOUT DIFFERENT THINGS: MORE THAN HALF THE DAYS
GAD7 TOTAL SCORE: 10
5. BEING SO RESTLESS THAT IT IS HARD TO SIT STILL: NOT AT ALL
7. FEELING AFRAID AS IF SOMETHING AWFUL MIGHT HAPPEN: MORE THAN HALF THE DAYS
1. FEELING NERVOUS, ANXIOUS, OR ON EDGE: SEVERAL DAYS
6. BECOMING EASILY ANNOYED OR IRRITABLE: MORE THAN HALF THE DAYS
2. NOT BEING ABLE TO STOP OR CONTROL WORRYING: MORE THAN HALF THE DAYS

## 2018-01-05 ASSESSMENT — PATIENT HEALTH QUESTIONNAIRE - PHQ9: 5. POOR APPETITE OR OVEREATING: SEVERAL DAYS

## 2018-01-05 NOTE — PATIENT INSTRUCTIONS
Try adding in 3mg of melatonin to help with the sleep.  The goal is to taper off of the Ambien, so try to decrease your use of this and not use this every day.      Follow-up with Milagro Modoy in about a month

## 2018-01-05 NOTE — NURSING NOTE
"Chief Complaint   Patient presents with     Recheck Medication     Patient reports she is here for medication check/refills. She notes she has had some side effects she would like to discuss today.        Initial BP (!) 129/94 (BP Location: Right arm, Patient Position: Chair, Cuff Size: Adult Regular)  Pulse 108  Temp 99.7  F (37.6  C) (Tympanic)  Ht 5' 2\" (1.575 m)  Wt 167 lb 3.2 oz (75.8 kg)  BMI 30.58 kg/m2 Estimated body mass index is 30.58 kg/(m^2) as calculated from the following:    Height as of this encounter: 5' 2\" (1.575 m).    Weight as of this encounter: 167 lb 3.2 oz (75.8 kg).  Medication Reconciliation: complete     Jazmin Ervin, AMADOU      "

## 2018-01-05 NOTE — MR AVS SNAPSHOT
After Visit Summary   1/5/2018    Molly Teague    MRN: [id number]           Patient Information     Date Of Birth          1985        Visit Information        Provider Department      1/5/2018 9:00 AM Reza Bowman MD [facility]        Today's Diagnoses     REX (generalized anxiety disorder)        Other insomnia        Chronic pain syndrome        Chronic daily headache          Care Instructions    Try adding in 3mg of melatonin to help with the sleep.  The goal is to taper off of the Ambien, so try to decrease your use of this and not use this every day.      Follow-up with Milagro Moody in about a month          Follow-ups after your visit        Your next 10 appointments already scheduled     Jan 31, 2018  1:30 PM CST   New Visit with Kel Moraes MD   [facility] ([place] [facility])    [address]  [address]  [facility] [place] [address] [phone]              Who to contact     If you have questions or need follow up information about today's clinic visit or your schedule please contact [facility] directly at [phone].  Normal or non-critical lab and imaging results will be communicated to you by Teamistohart, letter or phone within 4 business days after the clinic has received the results. If you do not hear from us within 7 days, please contact the clinic through Teamistohart or phone. If you have a critical or abnormal lab result, we will notify you by phone as soon as possible.  Submit refill requests through MobiMagic or call your pharmacy and they will forward the refill request to us. Please allow 3 business days for your refill to be completed.          Additional Information About Your Visit        MyChart Information     MobiMagic lets you send messages to your doctor, view your test results, renew your prescriptions, schedule appointments and more. To sign up, go to  [email or web address] . Click on \"Log in\" on the left side of the screen, which will take you to the Welcome page. Then click on \"Sign up Now\" on the right side of the page.     You will be asked to enter the access code listed below, as well as some personal information. Please follow the directions to create your username and password.     Your access code is: [email or web address]  Expires: 2018  9:50 AM     Your access code will  in 90 days. If you need help or a new code, please call your [facility] clinic or [phone].        Care EveryWhere ID     This is your Care EveryWhere ID. This could be used by other organizations to access your [facility] medical records  [id number]        Your Vitals Were     Pulse Temperature Height BMI (Body Mass Index)          108 99.7  F (37.6  C) (Tympanic) 5' 2\" (1.575 m) 30.58 kg/m2         Blood Pressure from Last 3 Encounters:   18 (!) 129/94   17 143/82   10/30/17 121/41    Weight from Last 3 Encounters:   18 167 lb 3.2 oz (75.8 kg)   17 158 lb 1.1 oz (71.7 kg)   10/30/17 158 lb (71.7 kg)              Today, you had the following     No orders found for display         Today's Medication Changes          These changes are accurate as of: 18  9:53 AM.  If you have any questions, ask your nurse or doctor.               Start taking these medicines.        Dose/Directions    citalopram 40 MG tablet   Commonly known as:  celeXA   Used for:  REX (generalized anxiety disorder)   Started by:  Reza Bowman MD        Dose:  40 mg   Take 1 tablet (40 mg) by mouth daily   Quantity:  90 tablet   Refills:  3         These medicines have changed or have updated prescriptions.        Dose/Directions    aspirin-acetaminophen-caffeine 250-250-65 MG per tablet   Commonly known as:  GOODSENSE MIGRAINE FORMULA   This may have changed:  See the new instructions.   Used for:  Chronic daily headache   Changed by:  Reza Bowman MD        TAKE ONE TABLET BY " MOUTH EVERY 6 HOURS AS NEEDED FOR HEADACHES   Quantity:  24 tablet   Refills:  1       busPIRone 15 MG tablet   Commonly known as:  BUSPAR   This may have changed:    - medication strength  - how much to take   Used for:  REX (generalized anxiety disorder)   Changed by:  Reza Bowman MD        Dose:  15 mg   Take 1 tablet (15 mg) by mouth 2 times daily   Quantity:  60 tablet   Refills:  3            Where to get your medicines      These medications were sent to [facility] [place], [place] - [address]  [address] [address]     Phone:  [phone]     aspirin-acetaminophen-caffeine 250-250-65 MG per tablet    busPIRone 15 MG tablet    citalopram 40 MG tablet         Some of these will need a paper prescription and others can be bought over the counter.  Ask your nurse if you have questions.     Bring a paper prescription for each of these medications     LORazepam 0.5 MG tablet    oxyCODONE IR 15 MG tablet    zolpidem 5 MG tablet                Primary Care Provider Office Phone # Fax #    Grecia Nayana Barba [phone]       [address] [address]        Equal Access to Services     MARIA COOK : Hadii rosie ku hadasho Soomaali, waaxda luqadaha, qaybta kaalmada adeegyada, martin garcia hayvivian [place] . So [facility] [phone].    ATENCIÓN: Si habla español, tiene a bradley disposición servicios gratuitos de asistencia lingüística. Llame al [phone].    We comply with applicable federal civil rights laws and [place] laws. We do not discriminate on the basis of race, color, national origin, age, disability, sex, sexual orientation, or gender identity.            Thank you!     Thank you for choosing [facility]  for your care. Our goal is always to provide you with excellent care. Hearing back from our patients is one way we can continue to improve our services. Please take a few minutes to complete the written survey that  you may receive in the mail after your visit with us. Thank you!             Your Updated Medication List - Protect others around you: Learn how to safely use, store and throw away your medicines at www.disposemymeds.org.          This list is accurate as of: 1/5/18  9:53 AM.  Always use your most recent med list.                   Brand Name Dispense Instructions for use Diagnosis    albuterol 108 (90 BASE) MCG/ACT Inhaler    VENTOLIN HFA    1 Inhaler    Inhale 2 puffs into the lungs every 4 hours as needed for shortness of breath / dyspnea or wheezing    Moderate persistent asthma without complication       aspirin-acetaminophen-caffeine 250-250-65 MG per tablet    GOODSENSE MIGRAINE FORMULA    24 tablet    TAKE ONE TABLET BY MOUTH EVERY 6 HOURS AS NEEDED FOR HEADACHES    Chronic daily headache       B-12 1000 MCG Tbcr     100 tablet    Take 1,000 mcg by mouth daily    Vitamin B12 deficiency (non anemic)       BENADRYL 25 MG tablet   Generic drug:  diphenhydrAMINE     56 tablet    Take 1 tablet (25 mg) by mouth every 6 hours as needed for itching or allergies        blood glucose monitoring test strip    no brand specified    100 each    Use to test blood sugars 3 times daily or as directed Please give what is approved by insurance.    Hypoglycemia       budesonide-formoterol 160-4.5 MCG/ACT Inhaler    SYMBICORT    3 Inhaler    Inhale 2 puffs into the lungs 2 times daily    Moderate persistent asthma without complication       busPIRone 15 MG tablet    BUSPAR    60 tablet    Take 1 tablet (15 mg) by mouth 2 times daily    REX (generalized anxiety disorder)       citalopram 40 MG tablet    celeXA    90 tablet    Take 1 tablet (40 mg) by mouth daily    REX (generalized anxiety disorder)       ferrous gluconate 324 (38 FE) MG tablet    FERGON    100 tablet    TAKE ONE TABLET BY MOUTH EVERY DAY WITH BREAKFAST    CREST (calcinosis, Raynaud's phenomenon, esophageal dysfunction, sclerodactyly, telangiectasia) (H)        gabapentin 300 MG capsule    NEURONTIN    360 capsule    Take 2 capsules (600 mg) by mouth 3 times daily    Limited systemic sclerosis (H)       lisinopril 5 MG tablet    PRINIVIL/ZESTRIL    90 tablet    Take 1 tablet (5 mg) by mouth daily    CREST (calcinosis, Raynaud's phenomenon, esophageal dysfunction, sclerodactyly, telangiectasia) (H)       LORazepam 0.5 MG tablet   Start taking on:  1/25/2018    ATIVAN    60 tablet    Take 1 tablet (0.5 mg) by mouth 2 times daily as needed for anxiety    REX (generalized anxiety disorder)       omeprazole 40 MG capsule    priLOSEC    180 capsule    Take 1 capsule (40 mg) by mouth 2 times daily    CREST (calcinosis, Raynaud's phenomenon, esophageal dysfunction, sclerodactyly, telangiectasia) (H), Gastroesophageal reflux disease with esophagitis       oxyCODONE IR 15 MG tablet   Start taking on:  3/20/2018    ROXICODONE    120 tablet    Take 1 tablet (15 mg) by mouth every 4 hours as needed for pain    Chronic pain syndrome       polyethylene glycol powder    MIRALAX    510 g    Take 17 g (1 capful) by mouth daily    Drug-induced constipation       promethazine 25 MG/ML IV injection    PHENERGAN     Inject 25 mg into the vein every 6 hours as needed for nausea or vomiting        ranitidine 150 MG tablet    ZANTAC    180 tablet    Take 1 tablet (150 mg) by mouth 2 times daily as needed for heartburn    CREST (calcinosis, Raynaud's phenomenon, esophageal dysfunction, sclerodactyly, telangiectasia) (H), Gastroesophageal reflux disease with esophagitis       sertraline 100 MG tablet    ZOLOFT    30 tablet    Take one half tab for 2 weeks,  If tolerated, increase to one tab daily.    Major depressive disorder, recurrent episode, moderate (H), REX (generalized anxiety disorder), PTSD (post-traumatic stress disorder)       sucralfate 1 GM tablet    CARAFATE    120 tablet    Take 1 tablet (1 g) by mouth 4 times daily    Limited systemic sclerosis (H)       zolpidem 5 MG tablet     AMBIEN    30 tablet    Take 1 tablet (5 mg) by mouth nightly as needed for sleep    Other insomnia

## 2018-01-05 NOTE — PROGRESS NOTES
SUBJECTIVE:   Molly Teague is a 32 year old female who presents to clinic today for the following health issues:      Depression and Anxiety Follow-Up    Status since last visit: Anxiety has been worse, depression is stable.    Other associated symptoms: Insomnia- Pt is states she should have been given 10 mg of ambien vs the 5 mg she was last prescribed. Last note from  12/26/2017 states Psych recommended a decrease right now to eventually taper off. Patient states she was unaware of this.     Complicating factors:     Significant life event: No     Current substance abuse: None    PHQ-9 Score and MyChart F/U Questions 5/11/2017 9/26/2017 10/30/2017   Total Score 6 15 17   Q9: Suicide Ideation Not at all Not at all Several days     REX-7 SCORE 9/26/2017 10/30/2017 1/5/2018   Total Score 11 12 10     In the past two weeks have you had thoughts of suicide or self-harm?  No.        Amount of exercise or physical activity: Yoga and meditation    Problems taking medications regularly: No    Medication side effects: lightheadedness, hallucinations, grogginess. She states she has also been waking at night feeling nauseas and will vomit. Patient had these side effects when she was switched to Zoloft from Citalopram. Patient states she stopped the Zoloft on her own and went back to the Citalopram.     Diet: regular (no restrictions)      Molly tried switching from citalopram to sertraline to see if that would be more effective for her symptoms, but she had a lot of side effects with the sertraline, which she took for about 3 weeks.  She therefore switched back to citalopram a few weeks ago.  She is on Buspar, feels this helps with depression but not anxiety, and Ativan which she takes BID regularly.  She has been instructed to wean off Ambien, and this was decreased from 10mg to 5mg.  She was advised to try melatonin, but she has not yet done so.  She has had more trouble sleeping and more nightmares since  decreasing the Ambien.  She states that she has started to drink a glass of wine before bed to help with falling asleep.  I advised her against consuming alcohol with all of the other medications that she is on as this could be a dangerous combo.  She has not followed up with Milagro Moody in psychiatry; she states she thought she was only allowed to see Milagro once.  I explained that although Milagro is only a short term provider, she is able to see her a few times.      She has chronic pain and neuropathy problems.  Her gabapentin was increased from 300mg TID to 600mg TID back in September.  She says this hasn't help with pain but does help with paraesthesias.  She takes oxycodone 15mg four times daily for pain and is due to have this refilled later this month.  She is scheduled to see the pain clinic Jan 31st.      Problem list and histories reviewed & adjusted, as indicated.  Additional history: as documented    Patient Active Problem List   Diagnosis     Long-term (current) use of anticoagulants [Z79.01]     Nausea with vomiting     Scleroderma (H)     Anxiety     Scleroderma with renal involvement (H)     History of ARDS     Raynaud's disease without gangrene     History of hemodialysis     Bilateral retinitis     IUD (intrauterine device) in place     Other pulmonary embolism without acute cor pulmonale, unspecified chronicity (H)     REX (generalized anxiety disorder)     CREST (calcinosis, Raynaud's phenomenon, esophageal dysfunction, sclerodactyly, telangiectasia) (H)     History of Clostridium difficile     History of Helicobacter pylori infection     Moderate persistent asthma without complication     Limited systemic sclerosis (H)     Polyneuropathy associated with underlying disease (H)     AIN grade I     Gastroesophageal reflux disease with esophagitis     Chronic migraine without aura without status migrainosus, not intractable     Chronic pain syndrome     Sepsis (H)     Acute pyelonephritis      History reviewed. No pertinent surgical history.    Social History   Substance Use Topics     Smoking status: Never Smoker     Smokeless tobacco: Never Used     Alcohol use No     Family History   Problem Relation Age of Onset     Hyperlipidemia Father      CEREBROVASCULAR DISEASE Maternal Grandmother      Hyperlipidemia Paternal Grandfather      DIABETES Maternal Aunt      Melanoma No family hx of      Skin Cancer No family hx of          Current Outpatient Prescriptions   Medication Sig Dispense Refill     citalopram (CELEXA) 40 MG tablet Take 1 tablet (40 mg) by mouth daily 90 tablet 3     busPIRone (BUSPAR) 15 MG tablet Take 1 tablet (15 mg) by mouth 2 times daily 60 tablet 3     [START ON 1/25/2018] LORazepam (ATIVAN) 0.5 MG tablet Take 1 tablet (0.5 mg) by mouth 2 times daily as needed for anxiety 60 tablet 2     zolpidem (AMBIEN) 5 MG tablet Take 1 tablet (5 mg) by mouth nightly as needed for sleep 30 tablet 0     aspirin-acetaminophen-caffeine (GOODSENSE MIGRAINE FORMULA) 250-250-65 MG per tablet TAKE ONE TABLET BY MOUTH EVERY 6 HOURS AS NEEDED FOR HEADACHES 24 tablet 1     [START ON 3/20/2018] oxyCODONE IR (ROXICODONE) 15 MG tablet Take 1 tablet (15 mg) by mouth every 4 hours as needed for pain 120 tablet 0     ferrous gluconate (FERGON) 324 (38 FE) MG tablet TAKE ONE TABLET BY MOUTH EVERY DAY WITH BREAKFAST 100 tablet 3     gabapentin (NEURONTIN) 300 MG capsule Take 2 capsules (600 mg) by mouth 3 times daily 360 capsule 3     lisinopril (PRINIVIL/ZESTRIL) 5 MG tablet Take 1 tablet (5 mg) by mouth daily 90 tablet 3     ranitidine (ZANTAC) 150 MG tablet Take 1 tablet (150 mg) by mouth 2 times daily as needed for heartburn 180 tablet 3     albuterol (VENTOLIN HFA) 108 (90 BASE) MCG/ACT Inhaler Inhale 2 puffs into the lungs every 4 hours as needed for shortness of breath / dyspnea or wheezing 1 Inhaler 11     omeprazole (PRILOSEC) 40 MG capsule Take 1 capsule (40 mg) by mouth 2 times daily 180 capsule 3      "promethazine (PHENERGAN) 25 MG/ML IV injection Inject 25 mg into the vein every 6 hours as needed for nausea or vomiting       Cyanocobalamin (B-12) 1000 MCG TBCR Take 1,000 mcg by mouth daily 100 tablet 1     budesonide-formoterol (SYMBICORT) 160-4.5 MCG/ACT Inhaler Inhale 2 puffs into the lungs 2 times daily 3 Inhaler 3     sucralfate (CARAFATE) 1 GM tablet Take 1 tablet (1 g) by mouth 4 times daily 120 tablet 11     sertraline (ZOLOFT) 100 MG tablet Take one half tab for 2 weeks,  If tolerated, increase to one tab daily. (Patient not taking: Reported on 1/5/2018) 30 tablet 1     polyethylene glycol (MIRALAX) powder Take 17 g (1 capful) by mouth daily (Patient not taking: Reported on 1/5/2018) 510 g 1     blood glucose monitoring (NO BRAND SPECIFIED) test strip Use to test blood sugars 3 times daily or as directed  Please give what is approved by insurance. (Patient not taking: Reported on 1/5/2018) 100 each 0     diphenhydrAMINE (BENADRYL) 25 MG tablet Take 1 tablet (25 mg) by mouth every 6 hours as needed for itching or allergies 56 tablet      Allergies   Allergen Reactions     No Known Allergies      Shellfish-Derived Products Nausea     Rash on face         Reviewed and updated as needed this visit by clinical staff     Reviewed and updated as needed this visit by Provider           OBJECTIVE:   BP (!) 129/94 (BP Location: Right arm, Patient Position: Chair, Cuff Size: Adult Regular)  Pulse 108  Temp 99.7  F (37.6  C) (Tympanic)  Ht 5' 2\" (1.575 m)  Wt 167 lb 3.2 oz (75.8 kg)  BMI 30.58 kg/m2  Body mass index is 30.58 kg/(m^2).  GENERAL: healthy, alert and no distress  PSYCH: mentation appears normal and affect flat      ASSESSMENT/PLAN:         1. REX (generalized anxiety disorder)    Not controlled.  She has changed herself back to the citalopram due to side effects from sertraline, will plan to continue this for now.  Will increase Buspar from 10mg BID to 15mg BID.  She continues to use lorazepam " twice daily- she has previously requested to go up on this and had discussions with other providers on how lorazepam should ideally only be used sparingly.  We discussed the dangers of using this together with alcohol today.  She states she did not realize this.  She is supposed to follow-up with psychiatry but has missed some appointments.  Recommended she follow-up with Milagro Moody in 1 months to see how the increase in Buspar is working.      - citalopram (CELEXA) 40 MG tablet; Take 1 tablet (40 mg) by mouth daily  Dispense: 90 tablet; Refill: 3  - busPIRone (BUSPAR) 15 MG tablet; Take 1 tablet (15 mg) by mouth 2 times daily  Dispense: 60 tablet; Refill: 3  - LORazepam (ATIVAN) 0.5 MG tablet; Take 1 tablet (0.5 mg) by mouth 2 times daily as needed for anxiety  Dispense: 60 tablet; Refill: 2    2. Other insomnia    Discussed tapering off this medication.  Advised she try melatonin.  This was previously recommended by psychiatry as well, but Molly reports she was not told this.  However, I do see that it was written in her patient instruction in her October visit.      - zolpidem (AMBIEN) 5 MG tablet; Take 1 tablet (5 mg) by mouth nightly as needed for sleep  Dispense: 30 tablet; Refill: 0    3. Chronic pain syndrome    3 month refills provided, she has an appointment with the pain clinic later this month.  She states that increased gabapentin did not help with pain or anxiety, but did help with paraesthesias.      - oxyCODONE IR (ROXICODONE) 15 MG tablet; Take 1 tablet (15 mg) by mouth every 4 hours as needed for pain  Dispense: 120 tablet; Refill: 0    4. Chronic daily headache    Refill provided.    - aspirin-acetaminophen-caffeine (GOODSENSE MIGRAINE FORMULA) 250-250-65 MG per tablet; TAKE ONE TABLET BY MOUTH EVERY 6 HOURS AS NEEDED FOR HEADACHES  Dispense: 24 tablet; Refill: 1    Follow-up with PCP in 3 months and with psychiatry in the interim.      Reza Bowman MD  [facility]

## 2018-01-06 ASSESSMENT — ANXIETY QUESTIONNAIRES: GAD7 TOTAL SCORE: 10

## 2018-01-27 ENCOUNTER — HOSPITAL ENCOUNTER (EMERGENCY)
Facility: CLINIC | Age: 33
Discharge: HOME | End: 2018-01-27
Attending: FAMILY MEDICINE | Admitting: FAMILY MEDICINE
Payer: MEDICARE

## 2018-01-27 ENCOUNTER — APPOINTMENT (OUTPATIENT)
Dept: GENERAL RADIOLOGY | Facility: CLINIC | Age: 33
End: 2018-01-27
Attending: FAMILY MEDICINE
Payer: MEDICARE

## 2018-01-27 VITALS
HEIGHT: 62 IN | SYSTOLIC BLOOD PRESSURE: 117 MMHG | BODY MASS INDEX: 30.36 KG/M2 | TEMPERATURE: 99.3 F | RESPIRATION RATE: 16 BRPM | DIASTOLIC BLOOD PRESSURE: 75 MMHG | OXYGEN SATURATION: 97 % | WEIGHT: 165 LBS

## 2018-01-27 DIAGNOSIS — J11.1 INFLUENZA-LIKE ILLNESS: ICD-10-CM

## 2018-01-27 LAB
ALBUMIN SERPL-MCNC: 3 G/DL (ref 3.4–5)
ALP SERPL-CCNC: 85 U/L (ref 40–150)
ALT SERPL W P-5'-P-CCNC: 18 U/L (ref 0–50)
ANION GAP SERPL CALCULATED.3IONS-SCNC: 7 MMOL/L (ref 3–14)
AST SERPL W P-5'-P-CCNC: 24 U/L (ref 0–45)
BASOPHILS # BLD AUTO: 0.1 10E9/L (ref 0–0.2)
BASOPHILS NFR BLD AUTO: 0.6 %
BILIRUB SERPL-MCNC: 0.4 MG/DL (ref 0.2–1.3)
BUN SERPL-MCNC: 11 MG/DL (ref 7–30)
CALCIUM SERPL-MCNC: 7.9 MG/DL (ref 8.5–10.1)
CHLORIDE SERPL-SCNC: 99 MMOL/L (ref 94–109)
CO2 SERPL-SCNC: 24 MMOL/L (ref 20–32)
CREAT SERPL-MCNC: 1.12 MG/DL (ref 0.52–1.04)
DIFFERENTIAL METHOD BLD: ABNORMAL
EOSINOPHIL # BLD AUTO: 0.1 10E9/L (ref 0–0.7)
EOSINOPHIL NFR BLD AUTO: 0.9 %
ERYTHROCYTE [DISTWIDTH] IN BLOOD BY AUTOMATED COUNT: 14.5 % (ref 10–15)
GFR SERPL CREATININE-BSD FRML MDRD: 56 ML/MIN/1.7M2
GLUCOSE SERPL-MCNC: 80 MG/DL (ref 70–99)
HCT VFR BLD AUTO: 34.2 % (ref 35–47)
HGB BLD-MCNC: 10.8 G/DL (ref 11.7–15.7)
IMM GRANULOCYTES # BLD: 0.1 10E9/L (ref 0–0.4)
IMM GRANULOCYTES NFR BLD: 0.4 %
LYMPHOCYTES # BLD AUTO: 1.4 10E9/L (ref 0.8–5.3)
LYMPHOCYTES NFR BLD AUTO: 11.4 %
MCH RBC QN AUTO: 27.6 PG (ref 26.5–33)
MCHC RBC AUTO-ENTMCNC: 31.6 G/DL (ref 31.5–36.5)
MCV RBC AUTO: 87 FL (ref 78–100)
MONOCYTES # BLD AUTO: 1 10E9/L (ref 0–1.3)
MONOCYTES NFR BLD AUTO: 8.3 %
NEUTROPHILS # BLD AUTO: 9.7 10E9/L (ref 1.6–8.3)
NEUTROPHILS NFR BLD AUTO: 78.4 %
PLATELET # BLD AUTO: 251 10E9/L (ref 150–450)
PLATELET # BLD EST: ABNORMAL 10*3/UL
POTASSIUM SERPL-SCNC: 3.5 MMOL/L (ref 3.4–5.3)
PROT SERPL-MCNC: 7.5 G/DL (ref 6.8–8.8)
RBC # BLD AUTO: 3.92 10E12/L (ref 3.8–5.2)
RBC MORPH BLD: NORMAL
SODIUM SERPL-SCNC: 130 MMOL/L (ref 133–144)
WBC # BLD AUTO: 12.4 10E9/L (ref 4–11)

## 2018-01-27 PROCEDURE — 85025 COMPLETE CBC W/AUTO DIFF WBC: CPT | Performed by: FAMILY MEDICINE

## 2018-01-27 PROCEDURE — 96361 HYDRATE IV INFUSION ADD-ON: CPT | Performed by: FAMILY MEDICINE

## 2018-01-27 PROCEDURE — 99284 EMERGENCY DEPT VISIT MOD MDM: CPT | Mod: 25 | Performed by: FAMILY MEDICINE

## 2018-01-27 PROCEDURE — 80053 COMPREHEN METABOLIC PANEL: CPT | Performed by: FAMILY MEDICINE

## 2018-01-27 PROCEDURE — 99284 EMERGENCY DEPT VISIT MOD MDM: CPT | Mod: Z6 | Performed by: FAMILY MEDICINE

## 2018-01-27 PROCEDURE — 96360 HYDRATION IV INFUSION INIT: CPT | Performed by: FAMILY MEDICINE

## 2018-01-27 PROCEDURE — 71046 X-RAY EXAM CHEST 2 VIEWS: CPT

## 2018-01-27 PROCEDURE — 25000128 H RX IP 250 OP 636: Performed by: FAMILY MEDICINE

## 2018-01-27 RX ADMIN — SODIUM CHLORIDE 1000 ML: 9 INJECTION, SOLUTION INTRAVENOUS at 15:30

## 2018-01-27 RX ADMIN — SODIUM CHLORIDE 1000 ML: 9 INJECTION, SOLUTION INTRAVENOUS at 14:10

## 2018-01-27 NOTE — ED AVS SNAPSHOT
[facility] Emergency Department    [address] [address]    Phone:  [phone]    Fax:  [phone]                                       Molly Teague   MRN: [id number]    Department:  [facility] Emergency Department   Date of Visit:  1/27/2018           Patient Information     Date Of Birth          1985        Your diagnoses for this visit were:     Influenza-like illness        You were seen by Richard Robles MD.        Discharge Instructions       Return to the Emergency Room if the following occurs:     Worsened breathing, dehyration, or for any concern at anytime.    Or, follow-up with the following provider as we discussed:     Return to your primary doctor as needed.    Medications discussed:    None new.  No changes.    If you received pain-relieving or sedating medication during your time in the ER, avoid alcohol, driving automobiles, or working with machinery.  Also, a responsible adult must stay with you.        Call the Nurse Advice Line at [phone] or [phone] for any concern at anytime.      Future Appointments        Provider Department Dept Phone Center    1/31/2018 1:30 PM Kel Moraes MD [facility] [place] [phone]  PAIN MGMT      24 Hour Appointment Hotline       To make an appointment at any [facility] clinic, call [phone] ([phone]). If you don't have a family doctor or clinic, we will help you find one. [facility] clinics are conveniently located to serve the needs of you and your family.             Review of your medicines      Our records show that you are taking the medicines listed below. If these are incorrect, please call your family doctor or clinic.        Dose / Directions Last dose taken    albuterol 108 (90 BASE) MCG/ACT Inhaler   Commonly known as:  VENTOLIN HFA   Dose:  2 puff   Quantity:  1 Inhaler        Inhale 2 puffs into the lungs every 4 hours as needed for shortness of  breath / dyspnea or wheezing   Refills:  11        aspirin-acetaminophen-caffeine 250-250-65 MG per tablet   Commonly known as:  GOODSENSE MIGRAINE FORMULA   Quantity:  24 tablet        TAKE ONE TABLET BY MOUTH EVERY 6 HOURS AS NEEDED FOR HEADACHES   Refills:  1        B-12 1000 MCG Tbcr   Dose:  1000 mcg   Quantity:  100 tablet        Take 1,000 mcg by mouth daily   Refills:  1        BENADRYL 25 MG tablet   Dose:  25 mg   Quantity:  56 tablet   Generic drug:  diphenhydrAMINE        Take 1 tablet (25 mg) by mouth every 6 hours as needed for itching or allergies   Refills:  0        blood glucose monitoring test strip   Commonly known as:  no brand specified   Quantity:  100 each        Use to test blood sugars 3 times daily or as directed Please give what is approved by insurance.   Refills:  0        budesonide-formoterol 160-4.5 MCG/ACT Inhaler   Commonly known as:  SYMBICORT   Dose:  2 puff   Quantity:  3 Inhaler        Inhale 2 puffs into the lungs 2 times daily   Refills:  3        busPIRone 15 MG tablet   Commonly known as:  BUSPAR   Dose:  15 mg   Quantity:  60 tablet        Take 1 tablet (15 mg) by mouth 2 times daily   Refills:  3        citalopram 40 MG tablet   Commonly known as:  celeXA   Dose:  40 mg   Quantity:  90 tablet        Take 1 tablet (40 mg) by mouth daily   Refills:  3        ferrous gluconate 324 (38 FE) MG tablet   Commonly known as:  FERGON   Quantity:  100 tablet        TAKE ONE TABLET BY MOUTH EVERY DAY WITH BREAKFAST   Refills:  3        gabapentin 300 MG capsule   Commonly known as:  NEURONTIN   Dose:  600 mg   Quantity:  360 capsule        Take 2 capsules (600 mg) by mouth 3 times daily   Refills:  3        lisinopril 5 MG tablet   Commonly known as:  PRINIVIL/ZESTRIL   Dose:  5 mg   Quantity:  90 tablet        Take 1 tablet (5 mg) by mouth daily   Refills:  3        LORazepam 0.5 MG tablet   Commonly known as:  ATIVAN   Dose:  0.5 mg   Quantity:  60 tablet        Take 1 tablet (0.5  mg) by mouth 2 times daily as needed for anxiety   Refills:  2        omeprazole 40 MG capsule   Commonly known as:  priLOSEC   Dose:  40 mg   Quantity:  180 capsule        Take 1 capsule (40 mg) by mouth 2 times daily   Refills:  3        oxyCODONE IR 15 MG tablet   Commonly known as:  ROXICODONE   Dose:  15 mg   Quantity:  120 tablet   Start taking on:  3/20/2018        Take 1 tablet (15 mg) by mouth every 4 hours as needed for pain   Refills:  0        polyethylene glycol powder   Commonly known as:  MIRALAX   Dose:  1 capful   Quantity:  510 g        Take 17 g (1 capful) by mouth daily   Refills:  1        promethazine 25 MG/ML IV injection   Commonly known as:  PHENERGAN   Dose:  25 mg        Inject 25 mg into the vein every 6 hours as needed for nausea or vomiting   Refills:  0        ranitidine 150 MG tablet   Commonly known as:  ZANTAC   Dose:  150 mg   Quantity:  180 tablet        Take 1 tablet (150 mg) by mouth 2 times daily as needed for heartburn   Refills:  3        sucralfate 1 GM tablet   Commonly known as:  CARAFATE   Dose:  1 g   Quantity:  120 tablet        Take 1 tablet (1 g) by mouth 4 times daily   Refills:  11        zolpidem 5 MG tablet   Commonly known as:  AMBIEN   Dose:  5 mg   Quantity:  30 tablet        Take 1 tablet (5 mg) by mouth nightly as needed for sleep   Refills:  0                Procedures and tests performed during your visit     CBC with platelets differential    Comprehensive metabolic panel    XR Chest 2 Views      Orders Needing Specimen Collection     None      Pending Results     No orders found from 1/25/2018 to 1/28/2018.            Pending Culture Results     No orders found from 1/25/2018 to 1/28/2018.            Pending Results Instructions     If you had any lab results that were not finalized at the time of your Discharge, you can call the ED Lab Result RN at [phone]. You will be contacted by this team for any positive Lab results or changes in treatment. The  nurses are available 7 days a week from 10A to 6:30P.  You can leave a message 24 hours per day and they will return your call.        Test Results From Your Hospital Stay        1/27/2018  3:55 PM      Component Results     Component Value Ref Range & Units Status    WBC 12.4 (H) 4.0 - 11.0 10e9/L Final    RBC Count 3.92 3.8 - 5.2 10e12/L Final    Hemoglobin 10.8 (L) 11.7 - 15.7 g/dL Final    Hematocrit 34.2 (L) 35.0 - 47.0 % Final    MCV 87 78 - 100 fl Final    MCH 27.6 26.5 - 33.0 pg Final    MCHC 31.6 31.5 - 36.5 g/dL Final    RDW 14.5 10.0 - 15.0 % Final    Platelet Count 251 150 - 450 10e9/L Final    Diff Method Automated Method  Final    % Neutrophils 78.4 % Final    % Lymphocytes 11.4 % Final    % Monocytes 8.3 % Final    % Eosinophils 0.9 % Final    % Basophils 0.6 % Final    % Immature Granulocytes 0.4 % Final    Absolute Neutrophil 9.7 (H) 1.6 - 8.3 10e9/L Final    Absolute Lymphocytes 1.4 0.8 - 5.3 10e9/L Final    Absolute Monocytes 1.0 0.0 - 1.3 10e9/L Final    Absolute Eosinophils 0.1 0.0 - 0.7 10e9/L Final    Absolute Basophils 0.1 0.0 - 0.2 10e9/L Final    Abs Immature Granulocytes 0.1 0 - 0.4 10e9/L Final    RBC Morphology Normal  Final    Platelet Estimate   Final    Automated count confirmed.  Platelet morphology is normal.         1/27/2018  3:03 PM      Component Results     Component Value Ref Range & Units Status    Sodium 130 (L) 133 - 144 mmol/L Final    Potassium 3.5 3.4 - 5.3 mmol/L Final    Chloride 99 94 - 109 mmol/L Final    Carbon Dioxide 24 20 - 32 mmol/L Final    Anion Gap 7 3 - 14 mmol/L Final    Glucose 80 70 - 99 mg/dL Final    Urea Nitrogen 11 7 - 30 mg/dL Final    Creatinine 1.12 (H) 0.52 - 1.04 mg/dL Final    GFR Estimate 56 (L) >60 mL/min/1.7m2 Final    Non  GFR Calc    GFR Estimate If Black 68 >60 mL/min/1.7m2 Final    African American GFR Calc    Calcium 7.9 (L) 8.5 - 10.1 mg/dL Final    Bilirubin Total 0.4 0.2 - 1.3 mg/dL Final    Albumin 3.0 (L) 3.4 - 5.0  "g/dL Final    Protein Total 7.5 6.8 - 8.8 g/dL Final    Alkaline Phosphatase 85 40 - 150 U/L Final    ALT 18 0 - 50 U/L Final    AST 24 0 - 45 U/L Final         2018  2:58 PM      Narrative     CHEST TWO VIEWS  2018 2:43 PM     HISTORY:  Cough, fever.     COMPARISON: 2017.     FINDINGS: Cardiomediastinal silhouette within normal limits. No  pleural effusion. No pneumothorax identified. No focal airspace  opacities. The bones are unremarkable.         Impression     IMPRESSION: No acute cardiopulmonary abnormalities.    MARQUEZ CLINE MD                Thank you for choosing [facility]       Thank you for choosing [facility] for your care. Our goal is always to provide you with excellent care. Hearing back from our patients is one way we can continue to improve our services. Please take a few minutes to complete the written survey that you may receive in the mail after you visit with us. Thank you!        [employer] Information     [employer] lets you send messages to your doctor, view your test results, renew your prescriptions, schedule appointments and more. To sign up, go to [email or web address] . Click on \"Log in\" on the left side of the screen, which will take you to the Welcome page. Then click on \"Sign up Now\" on the right side of the page.     You will be asked to enter the access code listed below, as well as some personal information. Please follow the directions to create your username and password.     Your access code is: [email or web address]  Expires: 2018  9:50 AM     Your access code will  in 90 days. If you need help or a new code, please call your [facility] clinic or [phone].        Care EveryWhere ID     This is your Care EveryWhere ID. This could be used by other organizations to access your [facility] medical records  [id number]        Equal Access to Services     PARISH COOK AH: Case Aguiar, jeffy watson, anna bazzi, martin harper " ava overton ah. So [facility] [place] [phone].    ATENCIÓN: Si habla español, tiene a bradley disposición servicios gratuitos de asistencia lingüística. Llame al [phone].    We comply with applicable federal civil rights laws and [place] laws. We do not discriminate on the basis of race, color, national origin, age, disability, sex, sexual orientation, or gender identity.            After Visit Summary       This is your record. Keep this with you and show to your community pharmacist(s) and doctor(s) at your next visit.

## 2018-01-27 NOTE — ED PROVIDER NOTES
"HPI  Molly Teague is a 32 year old female who has a known medical history of scleroderma with renal involvement, acute pyelonephritis, pulmonary embolism, and polyneuropathy who presents to the ED for evaluation of influenza-like symptoms and vomiting.     Patient reports 5 days of congestion, chest pressure, fevers, chills, sweats, and weakness. She has additional nausea and vomiting. Patient notes headache and myalgias as well. She has tried promethazine for nausea without relief. She has tried cough medication and excedrin for cough and headahce without alleviation. She has also used an inhaler at home for her cough and chest pressure. She notes back pain.     ROS: All other review of systems are negative other than that noted above.     Past Medical History:   Diagnosis Date     Arthritis      Blood clotting disorder (H)     P E     History of blood transfusion      Hypertension      Long-term (current) use of anticoagulants [Z79.01] 9/9/2016     PE (pulmonary embolism) 9/7/2016     Rheumatism      Scleroderma (H) 9/22/2016     Uncomplicated asthma      No past surgical history on file.  Family History   Problem Relation Age of Onset     Hyperlipidemia Father      CEREBROVASCULAR DISEASE Maternal Grandmother      Hyperlipidemia Paternal Grandfather      DIABETES Maternal Aunt      Melanoma No family hx of      Skin Cancer No family hx of      Social History   Substance Use Topics     Smoking status: Never Smoker     Smokeless tobacco: Never Used     Alcohol use No         PHYSICAL  BP (!) 140/91  Temp 99.3  F (37.4  C)  Resp 18  Ht 1.575 m (5' 2\")  Wt 74.8 kg (165 lb)  SpO2 93%  BMI 30.18 kg/m2  General: Patient is alert and in moderate distress.  Cooperative.  Neurological: Alert.  Moving upper and lower extremities equally, bilaterally.  Head / Neck: Atraumatic.  Ears: Not done.  Eyes: Pupils are equal, round, and reactive.  Normal conjunctiva.  Nose: Midline.  No epistaxis.  Mouth / Throat: No " ulcerations or lesions.  Upper pharynx is not erythematous.  Moist.  Respiratory: No respiratory distress. Coughing, rhonchi.  Cardiovascular: Regular rhythm.  Peripheral extremities are warm.  No edema.  No calf tenderness.  Abdomen / Pelvis: Not tender.  No distention.  Soft throughout.  Genitalia: Not done.  Musculoskeletal: No tenderness over major muscles and joints.  Skin: scarring on her neck and face.      ED COURSE  1421.  She presents with influenza-like illness.  Today is day 5.  Chest x-ray ordered.  She feels dehydrated.  Fluid bolus given.  Lab values pending.  Known past medical history is difficult.  She has chronic pain and takes oxycodone at home.  She has nausea occasionally and takes promethazine IM at home.  She has been using this with some success.  She refuses any other antiemetic here.  She refuses any further analgesia.    Labs Ordered and Resulted from Time of ED Arrival Up to the Time of Departure from the ED   CBC WITH PLATELETS DIFFERENTIAL - Abnormal; Notable for the following:        Result Value    WBC 12.4 (*)     Hemoglobin 10.8 (*)     Hematocrit 34.2 (*)     All other components within normal limits   COMPREHENSIVE METABOLIC PANEL - Abnormal; Notable for the following:     Sodium 130 (*)     Creatinine 1.12 (*)     GFR Estimate 56 (*)     Calcium 7.9 (*)     Albumin 3.0 (*)     All other components within normal limits       IMAGING  Images reviewed by me.  Radiology report also reviewed.  XR Chest 2 Views   Final Result   IMPRESSION: No acute cardiopulmonary abnormalities.      MARQUEZ CLINE MD        4370.  Chest x-ray is unremarkable.  Lab values are not unexpected.  No acute change that requires emergent hospitalization.  Replacement with normal saline ordered.  Follow up discussed.  Return here for worsening as discussed.      IMPRESSION    ICD-10-CM    1. Influenza-like illness R69            Critical Care time:  none               This document serves as a record of the  services and decisions personally performed and made by Richard Robles MD. It was created on HIS/HER behalf by   Ana Villarreal, a trained medical scribe. The creation of this document is based the provider's statements to the medical scribe.  Ana Villarreal 2:06 PM 1/27/2018    Provider:   The information in this document, created by the medical scribe for me, accurately reflects the services I personally performed and the decisions made by me. I have reviewed and approved this document for accuracy prior to leaving the patient care area.  Richard Robles MD 2:06 PM 1/27/2018       Richard Robles MD  01/27/18 1532

## 2018-01-27 NOTE — DISCHARGE INSTRUCTIONS
Return to the Emergency Room if the following occurs:     Worsened breathing, dehyration, or for any concern at anytime.    Or, follow-up with the following provider as we discussed:     Return to your primary doctor as needed.    Medications discussed:    None new.  No changes.    If you received pain-relieving or sedating medication during your time in the ER, avoid alcohol, driving automobiles, or working with machinery.  Also, a responsible adult must stay with you.        Call the Nurse Advice Line at [phone] or [phone] for any concern at anytime.

## 2018-01-27 NOTE — ED NOTES
Resting comfortably. No needs currently. Call light within reach. Pt encouraged to call if anything changes or other needs arise.

## 2018-01-27 NOTE — ED NOTES
Pt presents to ED with complaints of influenza like illness for about 4 days now. Pt is complaining of nasal drainage (with bleeding when she blows), productive cough (yellow, dark, greenish, brownish), weakness, body aches, muscle cramps, nausea, vomiting, fevers, headaches and chills. Pt reports she has been able to get fluids in. Pt notes no BM for several days.

## 2018-01-27 NOTE — ED AVS SNAPSHOT
[facility] Emergency Department    [address] [address]    Phone:  [phone]    Fax:  [phone]                                       Molly Teague   MRN: [id number]    Department:  [facility] Emergency Department   Date of Visit:  1/27/2018           After Visit Summary Signature Page     I have received my discharge instructions, and my questions have been answered. I have discussed any challenges I see with this plan with the nurse or doctor.    ..........................................................................................................................................  Patient/Patient Representative Signature      ..........................................................................................................................................  Patient Representative Print Name and Relationship to Patient    ..................................................               ................................................  Date                                            Time    ..........................................................................................................................................  Reviewed by Signature/Title    ...................................................              ..............................................  Date                                                            Time

## 2018-01-31 ENCOUNTER — OFFICE VISIT (OUTPATIENT)
Dept: PALLIATIVE MEDICINE | Facility: CLINIC | Age: 33
End: 2018-01-31
Payer: MEDICARE

## 2018-01-31 VITALS
HEART RATE: 84 BPM | BODY MASS INDEX: 30.18 KG/M2 | SYSTOLIC BLOOD PRESSURE: 119 MMHG | DIASTOLIC BLOOD PRESSURE: 84 MMHG | WEIGHT: 165 LBS

## 2018-01-31 DIAGNOSIS — M34.9 LIMITED SYSTEMIC SCLEROSIS (H): ICD-10-CM

## 2018-01-31 DIAGNOSIS — G63 POLYNEUROPATHY ASSOCIATED WITH UNDERLYING DISEASE (H): ICD-10-CM

## 2018-01-31 DIAGNOSIS — F41.1 GAD (GENERALIZED ANXIETY DISORDER): ICD-10-CM

## 2018-01-31 DIAGNOSIS — M34.9 SCLERODERMA (H): Chronic | ICD-10-CM

## 2018-01-31 DIAGNOSIS — G89.4 CHRONIC PAIN SYNDROME: Primary | ICD-10-CM

## 2018-01-31 DIAGNOSIS — M34.1 CREST (CALCINOSIS, RAYNAUD'S PHENOMENON, ESOPHAGEAL DYSFUNCTION, SCLERODACTYLY, TELANGIECTASIA) (H): ICD-10-CM

## 2018-01-31 DIAGNOSIS — G43.709 CHRONIC MIGRAINE WITHOUT AURA WITHOUT STATUS MIGRAINOSUS, NOT INTRACTABLE: ICD-10-CM

## 2018-01-31 PROCEDURE — 99205 OFFICE O/P NEW HI 60 MIN: CPT | Performed by: ANESTHESIOLOGY

## 2018-01-31 ASSESSMENT — PAIN SCALES - GENERAL: PAINLEVEL: SEVERE PAIN (7)

## 2018-01-31 NOTE — NURSING NOTE
"Chief Complaint   Patient presents with     Pain       Initial Wt 74.8 kg (165 lb)  BMI 30.18 kg/m2 Estimated body mass index is 30.18 kg/(m^2) as calculated from the following:    Height as of 1/27/18: 1.575 m (5' 2\").    Weight as of this encounter: 74.8 kg (165 lb).  Medication Reconciliation: complete     Annie Shea CMA (AAMA)      "

## 2018-01-31 NOTE — MR AVS SNAPSHOT
After Visit Summary   1/31/2018    Molly Teague    MRN: [id number]           Patient Information     Date Of Birth          1985        Visit Information        Provider Department      1/31/2018 1:30 PM Kel Daley MD [facility] [place]        Today's Diagnoses     Chronic pain syndrome    -  1      Care Instructions    Assessment:  1. Chronic pain syndrome  2. Scleroderma  3. Systemic sclerosis  4. Raynauds  5. Sclerodactyly  6. Peripheral neuropathy      Plan:  Diagnosis reviewed, treatment option addressed, and risk/benefits discussed.  Self-care instructions given.  I am recommending a multidisciplinary treatment plan to help this patient better manage her pain.      1. Physical Therapy: continue self directed exercise. Trial of aquatic therapy may be helpful - will place order today  2. Clinical Health Psychologist to address issues of relaxation, behavioral change, coping style, and other factors important to improvement: scheduled to see a psychologist soon - would be beneficial for coping skills, relaxation techniques, biofeedback, etc  3. Diagnostic Studies: deferred at this time  4. Medication Management:   1. Continue Oxycodone 15 mg - max 4 per day  2. Continue Ativan 0.5 mg BID prn anxiety  3. Continue GoodSense Migraine prn for headaches  4. Avoid anti-inflammatories due to reflux  5. Continue Tylenol 500 mg 1-2 tabs three times a day, (maximum daily dose 3000 mg per day)  6. Continue Celexa as prescribed  7. Continue Buspar as prescribed  8. Continue to wean Ambien as tolerated  9. Continue Gabapentin 600 mg TID  10. Consider trial of Lyrica if Gabapentin loses its effect  11. Consider trial of Medical Cannabis for neuropathy pain  5. Further procedures recommended: none at this time  6. Acupuncture: deferred - may consider in the future  7. Urine toxicology screen today: deferred - not prescribing opioids today   8. Recommendations/follow-up for  PCP:  Continue current management - will take over opioids if you desire but patient is very responsible with her medications   9. Release of information: n/a  10. Follow up: 10-12 weeks      ----------------------------------------------------------------  Nurse Triage line:  [phone]   Call this number with any questions or concerns. You may leave a detailed message anytime. Calls are typically returned Monday through Friday between 8 AM and 4:30 PM. We usually get back to you within 2 business days depending on the issue/request.       Medication refills:    For non-narcotic medications, call your pharmacy directly to request a refill. The pharmacy will contact the [facility] for authorization. Please allow 3-4 days for these refills to be processed.     For narcotic refills, call the nurse triage line or send a [employer] message. Please contact us 7-10 days before your refill is due. The message MUST include the name of the specific medication(s) requested and how you would like to receive the prescription(s). The options are as follows:    Pain Clinic staff can mail the prescription to your pharmacy. Please tell us the name of the pharmacy.    You may pick the prescription up at the Pain Clinic (tell us the location) or during a clinic visit with your pain provider    Pain Clinic staff can deliver the prescription to [facility] in the clinic building. Please tell us the location.      Scheduling number: [phone].  Call this number to schedule or change appointments.    We believe regular attendance is key to your success in our program.    Any time you are unable to keep your appointment we ask that you call us at least 24 hours in advance to let us know. This will allow us to offer the appointment time to another patient.               Follow-ups after your visit        Additional Services     PHYSICAL THERAPY REFERRAL                 Who to contact     If you have questions or  "need follow up information about today's clinic visit or your schedule please contact [facility] directly at [phone].  Normal or non-critical lab and imaging results will be communicated to you by MyChart, letter or phone within 4 business days after the clinic has received the results. If you do not hear from us within 7 days, please contact the clinic through [employer] or phone. If you have a critical or abnormal lab result, we will notify you by phone as soon as possible.  Submit refill requests through [employer] or call your pharmacy and they will forward the refill request to us. Please allow 3 business days for your refill to be completed.          Additional Information About Your Visit        [id number] Learning Information     [employer] lets you send messages to your doctor, view your test results, renew your prescriptions, schedule appointments and more. To sign up, go to [email or web address] . Click on \"Log in\" on the left side of the screen, which will take you to the Welcome page. Then click on \"Sign up Now\" on the right side of the page.     You will be asked to enter the access code listed below, as well as some personal information. Please follow the directions to create your username and password.     Your access code is: [email or web address]  Expires: 2018  2:37 PM     Your access code will  in 90 days. If you need help or a new code, please call your [place] clinic or [phone].        Care EveryWhere ID     This is your Care EveryWhere ID. This could be used by other organizations to access your [place] medical records  [id number]        Your Vitals Were     Pulse BMI (Body Mass Index)                84 30.18 kg/m2           Blood Pressure from Last 3 Encounters:   18 119/84   18 117/75   18 (!) 129/94    Weight from Last 3 Encounters:   18 74.8 kg (165 lb)   18 74.8 kg (165 lb)   18 75.8 kg (167 lb 3.2 oz)              We Performed " the Following     PHYSICAL THERAPY REFERRAL          Today's Medication Changes          These changes are accurate as of 1/31/18  2:38 PM.  If you have any questions, ask your nurse or doctor.               These medicines have changed or have updated prescriptions.        Dose/Directions    sucralfate 1 GM tablet   Commonly known as:  CARAFATE   This may have changed:    - when to take this  - reasons to take this   Used for:  Limited systemic sclerosis (H)        Dose:  1 g   Take 1 tablet (1 g) by mouth 4 times daily   Quantity:  120 tablet   Refills:  11                Primary Care Provider Office Phone # Fax #    Grecia Barba [phone] [address] [facility] [address]        Equal Access to Services     PARISH COOK : Case Aguiar, jeffy watson, anna kaalmaestela bazzi, martin woods. So [facility] [phone].    ATENCIÓN: Si habla español, tiene a bradley disposición servicios gratuitos de asistencia lingüística. Llame al [phone].    We comply with applicable federal civil rights laws and [place] laws. We do not discriminate on the basis of race, color, national origin, age, disability, sex, sexual orientation, or gender identity.            Thank you!     Thank you for choosing [facility]  for your care. Our goal is always to provide you with excellent care. Hearing back from our patients is one way we can continue to improve our services. Please take a few minutes to complete the written survey that you may receive in the mail after your visit with us. Thank you!             Your Updated Medication List - Protect others around you: Learn how to safely use, store and throw away your medicines at www.disposemymeds.org.          This list is accurate as of 1/31/18  2:38 PM.  Always use your most recent med list.                   Brand Name Dispense Instructions for use Diagnosis    albuterol 108  (90 BASE) MCG/ACT Inhaler    VENTOLIN HFA    1 Inhaler    Inhale 2 puffs into the lungs every 4 hours as needed for shortness of breath / dyspnea or wheezing    Moderate persistent asthma without complication       aspirin-acetaminophen-caffeine 250-250-65 MG per tablet    GOODSENSE MIGRAINE FORMULA    24 tablet    TAKE ONE TABLET BY MOUTH EVERY 6 HOURS AS NEEDED FOR HEADACHES    Chronic daily headache       B-12 1000 MCG Tbcr     100 tablet    Take 1,000 mcg by mouth daily    Vitamin B12 deficiency (non anemic)       BENADRYL 25 MG tablet   Generic drug:  diphenhydrAMINE     56 tablet    Take 1 tablet (25 mg) by mouth every 6 hours as needed for itching or allergies        blood glucose monitoring test strip    no brand specified    100 each    Use to test blood sugars 3 times daily or as directed Please give what is approved by insurance.    Hypoglycemia       budesonide-formoterol 160-4.5 MCG/ACT Inhaler    SYMBICORT    3 Inhaler    Inhale 2 puffs into the lungs 2 times daily    Moderate persistent asthma without complication       busPIRone 15 MG tablet    BUSPAR    60 tablet    Take 1 tablet (15 mg) by mouth 2 times daily    REX (generalized anxiety disorder)       citalopram 40 MG tablet    celeXA    90 tablet    Take 1 tablet (40 mg) by mouth daily    REX (generalized anxiety disorder)       ferrous gluconate 324 (38 FE) MG tablet    FERGON    100 tablet    TAKE ONE TABLET BY MOUTH EVERY DAY WITH BREAKFAST    CREST (calcinosis, Raynaud's phenomenon, esophageal dysfunction, sclerodactyly, telangiectasia) (H)       gabapentin 300 MG capsule    NEURONTIN    360 capsule    Take 2 capsules (600 mg) by mouth 3 times daily    Limited systemic sclerosis (H)       lisinopril 5 MG tablet    PRINIVIL/ZESTRIL    90 tablet    Take 1 tablet (5 mg) by mouth daily    CREST (calcinosis, Raynaud's phenomenon, esophageal dysfunction, sclerodactyly, telangiectasia) (H)       LORazepam 0.5 MG tablet    ATIVAN    60 tablet    Take  1 tablet (0.5 mg) by mouth 2 times daily as needed for anxiety    REX (generalized anxiety disorder)       omeprazole 40 MG capsule    priLOSEC    180 capsule    Take 1 capsule (40 mg) by mouth 2 times daily    CREST (calcinosis, Raynaud's phenomenon, esophageal dysfunction, sclerodactyly, telangiectasia) (H), Gastroesophageal reflux disease with esophagitis       oxyCODONE IR 15 MG tablet   Start taking on:  3/20/2018    ROXICODONE    120 tablet    Take 1 tablet (15 mg) by mouth every 4 hours as needed for pain    Chronic pain syndrome       polyethylene glycol powder    MIRALAX    510 g    Take 17 g (1 capful) by mouth daily    Drug-induced constipation       promethazine 25 MG/ML IV injection    PHENERGAN     Inject 25 mg into the vein every 6 hours as needed for nausea or vomiting        ranitidine 150 MG tablet    ZANTAC    180 tablet    Take 1 tablet (150 mg) by mouth 2 times daily as needed for heartburn    CREST (calcinosis, Raynaud's phenomenon, esophageal dysfunction, sclerodactyly, telangiectasia) (H), Gastroesophageal reflux disease with esophagitis       sucralfate 1 GM tablet    CARAFATE    120 tablet    Take 1 tablet (1 g) by mouth 4 times daily    Limited systemic sclerosis (H)       zolpidem 5 MG tablet    AMBIEN    30 tablet    Take 1 tablet (5 mg) by mouth nightly as needed for sleep    Other insomnia

## 2018-01-31 NOTE — PROGRESS NOTES
[facility] Consultation    Date of visit: 1/31/2018    Reason for consultation:    Molly Teague is a 32 year old female who is seen in consultation today at the request of her provider, Grecia Barba DO for evaluation of chronic pain due to systemic sclerosis and peripheral neuropathy.    Primary Care Provider is Grecia Barba.  Pain medications are being prescribed by Reza Bowman MD.    Please see the [facility] health questionnaire which the patient completed and reviewed with me in detail.    Chief Complaint:    Pain    Pain history:  Molly Teague is a 32 year old female who first started having problems with pain in the whole body in about 2013 at the time of hospitalization of CREST syndrome.  She was hospitalized with multisystem failure - her last hospital stay was in 2015.    Since that time she states that she had to attend physical therapy to learn how to walk and eat and everything because she was in a coma.  Her PT [facility] in the [facility].  She moved to this area about 1 1/2 years ago.    She feels that her legs from the knees down are where she hurts the worst.  She has peripheral neuropathy likely related to her severe illness.  She feels that the right leg is worse than the left.  The pain is burning, and stinging in nature and is constant.  She states that the right foot has numbness on the bottom and her knees go numb.    She also has similar pain in the arms from the elbows down into the hands that is equal on both sides.  She describes this pain as burning and tingling as well.  She has Reynauds phenomenon and sclerodactyly on both hands/fingers.    She complains of lower back pain as well that is more on the left without radiation.  This pain comes and goes and is worse the longer she is on her feet.  This pain is achy pain with intermittent sharp pains.     She also complains of pain across the back of her shoulders  that is the same on both sides.   The pain comes and goes.  It feels like a pressure or tightness that does not seem to worsen with activity but does worsen as her pain medications wear off.    She did have hip pain in the past but do not bother her as much any more with physical therapy.    Her pain is described as constant aching, stinging, tingling, tightness, numbness, and fatigue.    Pain rating: intensity ranges from 4/10 to 8/10, and Averages 5/10 on a 0-10 scale.  Aggravating factors include: prolonged standing, walking, walking stairs,   Relieving factors include: pain medications, rest, ice, stretching, meditating  Any bowel or bladder incontinence: constipated with pain medications, abdominal cramping, prone to UTI    Current treatments include:  Oxicodone IR 15 mg Q4H prn (takes 2-4 per day)  Ativan 0.5 mg BID  Gabapentin 600 mg TID  Goodsense Migraine (aspirin-acetaminophen-caffeine)  Ambien 5 mg QHS  Buspar 15 mg BID  Celexa 40 mg daily  Melatonin 5 mg two at bedtime    [place] Board of Pharmacy Data Base Reviewed:    YES; no evidence of opioid abuse/misuse    Previous medication treatments included:  Norco  Dilaudid  Morphine  Oxycodone  Naproxen  Fioricet/fiorinal  Prednisone - states she had a reaction in the past  Clonazepam  Gabapentin  Celexa  Zoloft  Lexapro  Ativan  Ambien  tylenol    Other treatments have included:  Molly Teague has not been seen at a pain clinic in the past.    PT: last PT about 4 months ago - helpful  Chiropractic: denies  Acupuncture: years ago - not helpful  TENs Unit: denies  Injections: right knee injections x 2 in [place] - helped, no reaction to the steroid  - states she had a reaction to oral prednisone in the past - does not remember what happened    Past Medical History:  Past Medical History:   Diagnosis Date     Arthritis      Blood clotting disorder (H)     P E     History of blood transfusion      Hypertension      Long-term (current) use of anticoagulants  [Z79.01] 9/9/2016     PE (pulmonary embolism) 9/7/2016     Rheumatism      Scleroderma (H) 9/22/2016     Uncomplicated asthma      Past Surgical History:  No past surgical history on file.  Medications:  Current Outpatient Prescriptions   Medication Sig Dispense Refill     citalopram (CELEXA) 40 MG tablet Take 1 tablet (40 mg) by mouth daily 90 tablet 3     busPIRone (BUSPAR) 15 MG tablet Take 1 tablet (15 mg) by mouth 2 times daily 60 tablet 3     LORazepam (ATIVAN) 0.5 MG tablet Take 1 tablet (0.5 mg) by mouth 2 times daily as needed for anxiety 60 tablet 2     zolpidem (AMBIEN) 5 MG tablet Take 1 tablet (5 mg) by mouth nightly as needed for sleep 30 tablet 0     aspirin-acetaminophen-caffeine (GOODSENSE MIGRAINE FORMULA) 250-250-65 MG per tablet TAKE ONE TABLET BY MOUTH EVERY 6 HOURS AS NEEDED FOR HEADACHES 24 tablet 1     [START ON 3/20/2018] oxyCODONE IR (ROXICODONE) 15 MG tablet Take 1 tablet (15 mg) by mouth every 4 hours as needed for pain 120 tablet 0     ferrous gluconate (FERGON) 324 (38 FE) MG tablet TAKE ONE TABLET BY MOUTH EVERY DAY WITH BREAKFAST 100 tablet 3     gabapentin (NEURONTIN) 300 MG capsule Take 2 capsules (600 mg) by mouth 3 times daily 360 capsule 3     lisinopril (PRINIVIL/ZESTRIL) 5 MG tablet Take 1 tablet (5 mg) by mouth daily 90 tablet 3     ranitidine (ZANTAC) 150 MG tablet Take 1 tablet (150 mg) by mouth 2 times daily as needed for heartburn 180 tablet 3     albuterol (VENTOLIN HFA) 108 (90 BASE) MCG/ACT Inhaler Inhale 2 puffs into the lungs every 4 hours as needed for shortness of breath / dyspnea or wheezing 1 Inhaler 11     omeprazole (PRILOSEC) 40 MG capsule Take 1 capsule (40 mg) by mouth 2 times daily 180 capsule 3     promethazine (PHENERGAN) 25 MG/ML IV injection Inject 25 mg into the vein every 6 hours as needed for nausea or vomiting       Cyanocobalamin (B-12) 1000 MCG TBCR Take 1,000 mcg by mouth daily 100 tablet 1     budesonide-formoterol (SYMBICORT) 160-4.5 MCG/ACT  Inhaler Inhale 2 puffs into the lungs 2 times daily 3 Inhaler 3     sucralfate (CARAFATE) 1 GM tablet Take 1 tablet (1 g) by mouth 4 times daily (Patient taking differently: Take 1 g by mouth 4 times daily as needed ) 120 tablet 11     polyethylene glycol (MIRALAX) powder Take 17 g (1 capful) by mouth daily (Patient not taking: Reported on 1/5/2018) 510 g 1     blood glucose monitoring (NO BRAND SPECIFIED) test strip Use to test blood sugars 3 times daily or as directed  Please give what is approved by insurance. (Patient not taking: Reported on 1/5/2018) 100 each 0     diphenhydrAMINE (BENADRYL) 25 MG tablet Take 1 tablet (25 mg) by mouth every 6 hours as needed for itching or allergies 56 tablet      Allergies:     Allergies   Allergen Reactions     No Known Allergies      Shellfish-Derived Products Nausea     Rash on face     Social History:  Home situation: single, lives with partner and son  Occupation/Schooling: high school graduate, not currently working  Tobacco use: denies  Alcohol use: occasional wine  Drug use: denies recent use, tried THC as a teenager  History of chemical dependency treatment: denies    Family history:  Family History   Problem Relation Age of Onset     Hyperlipidemia Father      CEREBROVASCULAR DISEASE Maternal Grandmother      Hyperlipidemia Paternal Grandfather      DIABETES Maternal Aunt      Melanoma No family hx of      Skin Cancer No family hx of      Family history of headaches: n/a    Review of Systems:  General:  Fever, chills, feeling unwell, feeling very tired, weight gain  Skin: hives, skin hardening on fingers  Eyes: negative  Ears/Nose/Throat: epistaxis  Respiratory: recent cough, wheezing, shortness of breath, denies dyspnea on exertion, or hemoptysis  Cardiovascular: chest pain, lower extremity edema and high blood pressure  Gastrointestinal: nausea, vomiting, abdominal pain and constipation  Genitourinary: negative  Musculoskeletal: back pain, neck pain, joint pain and  joint stiffness  Neurologic: headaches, local weakness, numbness or tingling of hands and numbness or tingling of feet  Psychiatric: excessive stress, anxiety, depression and mood swings  Hematologic/Lymphatic/Immunologic: allergies  Endocrine: negative    Physical Exam:  /84  Pulse 84  Wt 74.8 kg (165 lb)  BMI 30.18 kg/m2  Exam:  Constitutional: healthy, alert, active, no distress, cooperative and over weight  Head: normocephalic. Atraumatic.   Eyes: no redness or jaundice noted   ENT: oropharnx normal.  MMM.  Neck supple.  Well healed tracheostomy scar  Cardiovascular: no edema or JVD appreciated, palpable pulses, slowed capillary refill on fingers and toes  Respiratory: non-labored, equal expansion, no audible wheezing, speaking in full sentences  Gastrointestinal: soft, non-tender  : deferred  Skin: cyanosis - fingers and toes and thickened, fibrotic skin on the fingers and hands bilaterally  Psychiatric: mentation appears normal and affect normal/bright    Musculoskeletal exam:  Gait/Station/Posture: grossly intact, favors the right lower extremity due to right foot pain, able to walk on toes and heels but decreased dorsiflexion on the right foot  Cervical spine: full range of motion, well healed tracheostomy scar and skin discoloration around the neck and upper anterior chest due to scarring from tape per patient, tender to palpation along the left greater than right cervical paraspinal muscles, facet loading is equivocal to negative bilaterally    Thoracic spine:  Upper paraspinal muscle and trapezius muscle tenderness to palpation    Lumbar spine: tender to palpation left greater than right paraspinal muscles, SI joints non-tender, flexes well without pain, lateral bending negative, extension and lateral rotation causes a pressure sensation in the lower back    Fingers with thickened, fibrotic skin and sclerodactyly and contractures bilaterally, cold, blue, limited range of motion at all joints of  the digits, prolonged capillary refill on all fingers    Toes blue and cold, right greater than left with prolonged capillary refill, palpable TA and DP pulses bilaterally    Myofascial tenderness:  Cervical, thoracic, lumbar paraspinal muscles  Straight leg exam: negative bilaterally  Naga's maneuver: negative bilaterally    Neurologic exam:  CN:  Cranial nerves 2-12 are grossly intact  Motor:  5/5 UE and LE strength, except for mildly decreased right foot dorsiflexion  Reflexes:  Not assessed  Other reflexes:  Plantar response downgoing bilaterally, no clonus   Sensory:  (upper and lower extremities):   Light touch: decreased right greater than left hand/fingers on radial side and right greater than left feet and toes, medially greater than laterally   Vibration: not assessed   Allodynia: absent    Dysethesia: absent    Hyperalgesia: bilateral feet, worse on the medial aspect of the feet    Diagnostic tests:  MR ABDOMEN WITHOUT AND WITH CONTRAST 11/21/2016 9:45 AM   IMPRESSION:  1. No pancreatic abnormalities are appreciated. Pancreatic duct is  normal in caliber and course. No pancreatic ductal anomalies are  appreciated. No pancreatic mass or cystic lesion.  2. Remaining abdominal organs are within normal limits. No gallstones  or biliary dilatation    Other testing (labs, diagnostics):  Component Value Flag Ref Range Units Status Collected Lab   Sodium 130 (L) 133 - 144 mmol/L Final 01/27/2018  2:02 PM 59   Potassium 3.5  3.4 - 5.3 mmol/L Final 01/27/2018  2:02 PM 59   Chloride 99  94 - 109 mmol/L Final 01/27/2018  2:02 PM 59   Carbon Dioxide 24  20 - 32 mmol/L Final 01/27/2018  2:02 PM 59   Anion Gap 7  3 - 14 mmol/L Final 01/27/2018  2:02 PM 59   Glucose 80  70 - 99 mg/dL Final 01/27/2018  2:02 PM 59   Urea Nitrogen 11  7 - 30 mg/dL Final 01/27/2018  2:02 PM 59   Creatinine 1.12 (H) 0.52 - 1.04 mg/dL Final 01/27/2018  2:02 PM 59   GFR Estimate 56 (L) >60 mL/min/1.7m2 Final 01/27/2018  2:02 PM 59   Comment:    Non  GFR Calc   GFR Estimate If Black 68  >60 mL/min/1.7m2 Final 01/27/2018  2:02 PM 59   Comment:   African American GFR Calc   Calcium 7.9 (L) 8.5 - 10.1 mg/dL Final 01/27/2018  2:02 PM 59   Bilirubin Total 0.4  0.2 - 1.3 mg/dL Final 01/27/2018  2:02 PM 59   Albumin 3.0 (L) 3.4 - 5.0 g/dL Final 01/27/2018  2:02 PM 59   Protein Total 7.5  6.8 - 8.8 g/dL Final 01/27/2018  2:02 PM 59   Alkaline Phosphatase 85  40 - 150 U/L Final 01/27/2018  2:02 PM 59   ALT 18  0 - 50 U/L Final 01/27/2018  2:02 PM 59   AST 24  0 - 45 U/L Final 01/27/2018  2:02 PM 59     Component Value Flag Ref Range Units Status Collected Lab   WBC 12.4 (H) 4.0 - 11.0 10e9/L Final 01/27/2018  2:02 PM 59   RBC Count 3.92  3.8 - 5.2 10e12/L Final 01/27/2018  2:02 PM 59   Hemoglobin 10.8 (L) 11.7 - 15.7 g/dL Final 01/27/2018  2:02 PM 59   Hematocrit 34.2 (L) 35.0 - 47.0 % Final 01/27/2018  2:02 PM 59   MCV 87  78 - 100 fl Final 01/27/2018  2:02 PM 59   MCH 27.6  26.5 - 33.0 pg Final 01/27/2018  2:02 PM 59   MCHC 31.6  31.5 - 36.5 g/dL Final 01/27/2018  2:02 PM 59   RDW 14.5  10.0 - 15.0 % Final 01/27/2018  2:02 PM 59   Platelet Count 251  150 - 450 10e9/L Final 01/27/2018  2:02 PM 59     Component Value Flag Ref Range Units Status Collected Lab   CRP Inflammation 128.0 (H) 0.0 - 8.0 mg/L Final 06/08/2017  6:25 PM 59     Screening tools:  DIRE Score for ongoing opioid management is calculated as follows:    Diagnosis = 2    Intractability = 2    Risk: Psych = 1  Chem Hlth = 2  Reliability = 3  Social = 3    Efficacy = 2    Total DIRE Score = 15 (14 or higher predicts good candidate for ongoing opioid management; 13 or lower predicts poor candidate for opioid management)     Assessment:  1. Chronic pain syndrome  2. Scleroderma  3. Systemic sclerosis  4. Raynaud's  5. Sclerodactyly with contractures  6. Peripheral neuropathy  7. Myofascial pain  8. Renal insufficiency      Molly Teague is a 32 year old female who presents with the  complaints of chronic body pain that is worse in the arms, hands, legs, and feet. She has symptoms of peripheral neuropathy likely related to multisystem failure during her hospital stay for Systemic Sclerosis, CREST with septic shock, PE, and renal failure.  She has improved significantly since her discharge from the hospital with plenty of rehab.  She has aches and pains in her joints and muscles as well but feels that her pain is well controlled on her current medication regimen.  She uses her pain medications appropriately and there does not appear to be any aberrant behavior on review of her  report.  Our treatment plan is as outlined below.    Plan:  Diagnosis reviewed, treatment option addressed, and risk/benefits discussed.  Self-care instructions given.  I am recommending a multidisciplinary treatment plan to help this patient better manage her pain.      1. Physical Therapy: continue self directed exercise. Trial of aquatic therapy may be helpful - will place order today  2. Clinical Health Psychologist to address issues of relaxation, behavioral change, coping style, and other factors important to improvement: scheduled to see a psychologist soon - would be beneficial for coping skills, relaxation techniques, biofeedback, etc  3. Diagnostic Studies: deferred at this time  4. Medication Management:   1. Continue Oxycodone 15 mg - max 4 per day  2. Continue Ativan 0.5 mg BID prn anxiety  3. Continue GoodSense Migraine prn for headaches  4. Avoid anti-inflammatories due to reflux  5. Continue Tylenol 500 mg 1-2 tabs three times a day, (maximum daily dose 3000 mg per day)  6. Continue Celexa as prescribed  7. Continue Buspar as prescribed  8. Continue to wean Ambien as tolerated  9. Continue Gabapentin 600 mg TID  10. Consider trial of Lyrica if Gabapentin loses its effect  11. Consider trial of Medical Cannabis for neuropathy pain  5. Further procedures recommended: none at this time  6. Acupuncture:  deferred - may consider in the future  7. Urine toxicology screen today: deferred - not prescribing opioids today   8. Recommendations/follow-up for PCP:  Continue current management - will take over opioids if you desire but patient is very responsible with her medications   9. Release of information: n/a  10. Follow up: 10-12 weeks    Total time spent was 60 minutes, and more than 50% of face to face time was spent in counseling and/or coordination of care regarding diagnosis, principles of multidisciplinary care, medication management, and interventional procedures.    Kel Moraes MD  [facility]

## 2018-01-31 NOTE — PATIENT INSTRUCTIONS
Assessment:  1. Chronic pain syndrome  2. Scleroderma  3. Systemic sclerosis  4. Raynauds  5. Sclerodactyly  6. Peripheral neuropathy      Plan:  Diagnosis reviewed, treatment option addressed, and risk/benefits discussed.  Self-care instructions given.  I am recommending a multidisciplinary treatment plan to help this patient better manage her pain.      1. Physical Therapy: continue self directed exercise. Trial of aquatic therapy may be helpful - will place order today  2. Clinical Health Psychologist to address issues of relaxation, behavioral change, coping style, and other factors important to improvement: scheduled to see a psychologist soon - would be beneficial for coping skills, relaxation techniques, biofeedback, etc  3. Diagnostic Studies: deferred at this time  4. Medication Management:   1. Continue Oxycodone 15 mg - max 4 per day  2. Continue Ativan 0.5 mg BID prn anxiety  3. Continue GoodSense Migraine prn for headaches  4. Avoid anti-inflammatories due to reflux  5. Continue Tylenol 500 mg 1-2 tabs three times a day, (maximum daily dose 3000 mg per day)  6. Continue Celexa as prescribed  7. Continue Buspar as prescribed  8. Continue to wean Ambien as tolerated  9. Continue Gabapentin 600 mg TID  10. Consider trial of Lyrica if Gabapentin loses its effect  11. Consider trial of Medical Cannabis for neuropathy pain  5. Further procedures recommended: none at this time  6. Acupuncture: deferred - may consider in the future  7. Urine toxicology screen today: deferred - not prescribing opioids today   8. Recommendations/follow-up for PCP:  Continue current management - will take over opioids if you desire but patient is very responsible with her medications   9. Release of information: n/a  10. Follow up: 10-12 weeks      ----------------------------------------------------------------  Nurse Triage line:  [phone]   Call this number with any questions or concerns. You may leave a detailed message  anytime. Calls are typically returned Monday through Friday between 8 AM and 4:30 PM. We usually get back to you within 2 business days depending on the issue/request.       Medication refills:    For non-narcotic medications, call your pharmacy directly to request a refill. The pharmacy will contact the [facility] for authorization. Please allow 3-4 days for these refills to be processed.     For narcotic refills, call the nurse triage line or send a [employer] message. Please contact us 7-10 days before your refill is due. The message MUST include the name of the specific medication(s) requested and how you would like to receive the prescription(s). The options are as follows:    Pain Clinic staff can mail the prescription to your pharmacy. Please tell us the name of the pharmacy.    You may pick the prescription up at the Pain Clinic (tell us the location) or during a clinic visit with your pain provider    Pain Clinic staff can deliver the prescription to the [place] pharmacy in the clinic building. Please tell us the location.      Scheduling number: [phone].  Call this number to schedule or change appointments.    We believe regular attendance is key to your success in our program.    Any time you are unable to keep your appointment we ask that you call us at least 24 hours in advance to let us know. This will allow us to offer the appointment time to another patient.

## 2018-02-01 ASSESSMENT — PATIENT HEALTH QUESTIONNAIRE - PHQ9: SUM OF ALL RESPONSES TO PHQ QUESTIONS 1-9: 13

## 2018-02-09 ENCOUNTER — TELEPHONE (OUTPATIENT)
Dept: FAMILY MEDICINE | Facility: CLINIC | Age: 33
End: 2018-02-09

## 2018-02-10 ASSESSMENT — ASTHMA QUESTIONNAIRES: ACT_TOTALSCORE: 14

## 2018-02-22 DIAGNOSIS — R51.9 CHRONIC DAILY HEADACHE: ICD-10-CM

## 2018-02-22 DIAGNOSIS — J45.40 MODERATE PERSISTENT ASTHMA WITHOUT COMPLICATION: ICD-10-CM

## 2018-02-22 DIAGNOSIS — G47.09 OTHER INSOMNIA: ICD-10-CM

## 2018-02-22 NOTE — TELEPHONE ENCOUNTER
"Requested Prescriptions   Pending Prescriptions Disp Refills     SYMBICORT 160-4.5 MCG/ACT Inhaler [Pharmacy Med Name: SYMBICORT 160-4.5MCG/ACT AERO]  Last Written Prescription Date:  01/25/17  Last Fill Quantity: 3,  # refills: 3   Last office visit: 1/5/2018 with prescribing provider:  09/26/17   Future Office Visit:   Next 5 appointments (look out 90 days)     Mar 28, 2018  9:30 AM CDT   Return Visit with Selvin Kenney   [facility] ([facility])    [address] [address]   [phone]               30.6 g 3     Sig: INHALE TWO PUFFS BY MOUTH TWICE A DAY    Inhaled Steroids Protocol Failed    2/22/2018 11:14 AM       Failed - Asthma control test 20 or greater in last 6 months    Please review ACT score.   ACT Total Scores 5/11/2017 7/6/2017 2/9/2018   ACT TOTAL SCORE (Goal Greater than or Equal to 20) 8 12 14   In the past 12 months, how many times did you visit the emergency room for your asthma without being admitted to the hospital? 0 0 0   In the past 12 months, how many times were you hospitalized overnight because of your asthma? 0 0 0            Passed - Patient is age 12 or older       Passed - Recent (6 mo) or future visit with authorizing provider's specialty    Patient had office visit in the last 6 months or has a visit in the next 30 days with authorizing provider.  See \"Patient Info\" tab in inbasket, or \"Choose Columns\" in Meds & Orders section of the refill encounter.              "

## 2018-02-22 NOTE — TELEPHONE ENCOUNTER
"Requested Prescriptions   Pending Prescriptions Disp Refills     GOODSENSE MIGRAINE FORMULA 250-250-65 MG per tablet [Pharmacy Med Name: GOODSENSE MIGRAINE  250-250-65 TABS]  Last Written Prescription Date:  01/05/18  Last Fill Quantity: 24,  # refills: 1   Last office visit: 1/5/2018 with prescribing provider:  01/05/18   Future Office Visit:   Next 5 appointments (look out 90 days)     Mar 28, 2018  9:30 AM CDT   Return Visit with Selvin Kenney   [facility] ([facility])    [address] [address]   [phone]               24 tablet 1     Sig: TAKE ONE TABLET BY MOUTH EVERY 6 HOURS AS NEEDED FOR HEADACHES    Analgesics (Non-Narcotic Tylenol and ASA Only) Passed    2/22/2018 11:14 AM       Passed - Recent or future visit with authorizing provider's specialty    Patient had office visit in the last year or has a visit in the next 30 days with authorizing provider.  See \"Patient Info\" tab in inbasket, or \"Choose Columns\" in Meds & Orders section of the refill encounter.        Passed - Patient is age 20 years or older    If ASA is flagged for ages under 20 years old. Forward to provider for confirmation Ryes Syndrome is not a concern.          "

## 2018-02-23 DIAGNOSIS — G47.09 OTHER INSOMNIA: ICD-10-CM

## 2018-02-23 RX ORDER — ZOLPIDEM TARTRATE 5 MG/1
5 TABLET ORAL
Qty: 30 TABLET | Refills: 0 | OUTPATIENT
Start: 2018-02-23

## 2018-02-23 NOTE — TELEPHONE ENCOUNTER
Refill not appropriate, during last clinic visit Dr. Bowman recommended to taper off medication. Her psychiatrist also recommended to taper off Ambien.     CHRIST Renner CNP

## 2018-02-23 NOTE — TELEPHONE ENCOUNTER
Requested Prescriptions   Pending Prescriptions Disp Refills     zolpidem (AMBIEN) 5 MG tablet  Last Written Prescription Date:  01/05/18  Last Fill Quantity: 30,  # refills: 0   Last office visit: 1/5/2018 with prescribing provider:  01/05/18   Future Office Visit:   Next 5 appointments (look out 90 days)     Mar 28, 2018  9:30 AM CDT   Return Visit with Selvin Kenney   [facility] ([facility])    [address] [address]   [phone]               30 tablet 0     Sig: Take 1 tablet (5 mg) by mouth nightly as needed for sleep    There is no refill protocol information for this order

## 2018-02-26 NOTE — TELEPHONE ENCOUNTER
Oh good- is she going to be seeing the psychiatrist soon?  Perhaps they can work with her on the sleep issues.    Reza Bowman MD

## 2018-02-26 NOTE — TELEPHONE ENCOUNTER
Selvin Kenney is a psychologist, not a psychiatrist- he doesn't prescribed medications.  I would recommend she also follow with a psychiatrist for help with medication management- if she doesn't have one of these yet, she can return to see Milagro Moody.      Reza Bowman MD

## 2018-02-26 NOTE — TELEPHONE ENCOUNTER
Thanks for the update.  It appears from Dr. Barba's note in December that she agreed with the plan to wean off the Ambien as well.  She is welcome to follow-up with [place] about this of course if she wishes to get another opinion.  Thank you for connecting her with the crisis line.    Reza Bowman MD

## 2018-02-26 NOTE — TELEPHONE ENCOUNTER
Paz I wasn't clear.  Selvin Kenney is the long-term psychiatrist and first available appointment is 3/21/18.

## 2018-02-26 NOTE — TELEPHONE ENCOUNTER
Pt is scheduled with a long-term psychiatrist now and has first two appointments cheduled with Selvin Kenney onb 3/21 and 3/28.    Nora PATEL RN

## 2018-02-26 NOTE — TELEPHONE ENCOUNTER
Dr. Bowman:    Spoke with pt and she states that she has been trying the Melatonin and wakes at 3 AM, unable to fall back to sleep.  Decreased dose of Ambian 5 mg has been effective. Would you like to see pt to discuss?    Nora PATEL RN

## 2018-02-26 NOTE — TELEPHONE ENCOUNTER
Spoke with pt and and she states understanding of the need for medication management through a psychiatrist.    Nora PATEL RN

## 2018-02-26 NOTE — TELEPHONE ENCOUNTER
I would first recommend that she follow-up with Milagro Moody- she was supposed to do that a few weeks ago.  Thanks.    Reza Bowman MD

## 2018-02-26 NOTE — TELEPHONE ENCOUNTER
"Pt called back.  1.  She is very angry that she is not getting Ambien.  She explains that Ambien was reduced from 10 mg to 5 mg last month.  She says that she has not slept a full night in weeks on the lower dose and now she does not know how she will sleep without any at all.   2.  Pt says that she is \"in a very dark place.\"  She asks for the crisis line?    1.  I offered pt a clinic visit to discuss further but pt refuses.  She says that only [facility] in [place] and Dr Barba understand her history.  Pt says that she has already called [facility] to follow up with them about the Ambien.    2.  I asked pt more about her statement that she is in a dark place.  Pt quietly said \"I'd rather talk to the crisis line about that.\"  She refused to discuss further and asked again for the crisis line.  I gave pt the crisis number, [phone].    Routed to Dr Colby HERNDON.    Jia Oneill RN        "

## 2018-02-28 RX ORDER — BUDESONIDE AND FORMOTEROL FUMARATE DIHYDRATE 160; 4.5 UG/1; UG/1
AEROSOL RESPIRATORY (INHALATION)
Qty: 30.6 G | Refills: 0 | Status: SHIPPED | OUTPATIENT
Start: 2018-02-28 | End: 2018-06-26

## 2018-02-28 RX ORDER — ACETAMINOPHEN, ASPIRIN, CAFFEINE 250; 250; 65 MG/1; MG/1; MG/1
TABLET, FILM COATED ORAL
Qty: 24 TABLET | Refills: 1 | Status: SHIPPED | OUTPATIENT
Start: 2018-02-28 | End: 2018-04-20

## 2018-03-14 ENCOUNTER — TELEPHONE (OUTPATIENT)
Dept: FAMILY MEDICINE | Facility: CLINIC | Age: 33
End: 2018-03-14

## 2018-03-14 NOTE — TELEPHONE ENCOUNTER
PHQ9 Due by:05/30/2018    Please contact patient to complete follow up PHQ9 before their DUE DATE.     This is important feedback for your care team to monitor how you are doing while taking citalopram (CELEXA) 40 MG tablet.     You completed this same questionnaire   PHQ-9 SCORE 1/31/2018   Total Score 13       MA STAFF: If upon calling patient and PHQ9 score is higher that 10 route to the provider. You may also seek an RN for review.

## 2018-03-21 ENCOUNTER — OFFICE VISIT (OUTPATIENT)
Dept: PSYCHOLOGY | Facility: CLINIC | Age: 33
End: 2018-03-21
Attending: INTERNAL MEDICINE
Payer: MEDICARE

## 2018-03-21 DIAGNOSIS — F43.10 PTSD (POST-TRAUMATIC STRESS DISORDER): Primary | ICD-10-CM

## 2018-03-21 PROCEDURE — 90834 PSYTX W PT 45 MINUTES: CPT | Performed by: PSYCHOLOGIST

## 2018-03-21 NOTE — MR AVS SNAPSHOT
"                  MRN:[id number]                      After Visit Summary   3/21/2018    Molly Teague    MRN: [id number]           Visit Information        Provider Department      3/21/2018 10:30 AM Selvin Kenney [facility]      Your next 10 appointments already scheduled     Mar 28, 2018  9:30 AM CDT   Return Visit with Selvin Kenney   [facility] ([facility])    [address] [address]   [phone]            2018  9:30 AM CDT   Return Visit with Selvin Kenney   [facility] ([facility])    [address] [address]   [phone]              MyChart Information     [employer] lets you send messages to your doctor, view your test results, renew your prescriptions, schedule appointments and more. To sign up, go to [email or web address] . Click on \"Log in\" on the left side of the screen, which will take you to the Welcome page. Then click on \"Sign up Now\" on the right side of the page.     You will be asked to enter the access code listed below, as well as some personal information. Please follow the directions to create your username and password.     Your access code is: [email or web address]  Expires: 2018  3:37 PM     Your access code will  in 90 days. If you need help or a new code, please call your [facility] clinic or [phone].        Care EveryWhere ID     This is your Care EveryWhere ID. This could be used by other organizations to access your [facility] medical records  [id number]        Equal Access to Services     PARISH COOK : Hadii rosie Aguiar, waaxda ludaniloadaha, qaybta kaalmartin mireles. So [facility] [phone].    ATENCIÓN: Si habla español, tiene a bradley disposición servicios gratuitos de asistencia lingüística. Llame al [phone].    We comply with applicable federal civil rights laws and " [place] laws. We do not discriminate on the basis of race, color, national origin, age, disability, sex, sexual orientation, or gender identity.

## 2018-03-23 ASSESSMENT — ANXIETY QUESTIONNAIRES
7. FEELING AFRAID AS IF SOMETHING AWFUL MIGHT HAPPEN: SEVERAL DAYS
5. BEING SO RESTLESS THAT IT IS HARD TO SIT STILL: NOT AT ALL
6. BECOMING EASILY ANNOYED OR IRRITABLE: MORE THAN HALF THE DAYS
4. TROUBLE RELAXING: MORE THAN HALF THE DAYS
2. NOT BEING ABLE TO STOP OR CONTROL WORRYING: SEVERAL DAYS
1. FEELING NERVOUS, ANXIOUS, OR ON EDGE: NEARLY EVERY DAY
3. WORRYING TOO MUCH ABOUT DIFFERENT THINGS: SEVERAL DAYS
GAD7 TOTAL SCORE: 10

## 2018-03-23 NOTE — PROGRESS NOTES
Progress Note - Initial Session  Disclaimer: This note consists of symbols derived from keyboarding, dictation and/or voice recognition software. As a result, there may be errors in the script that have gone undetected. Please consider this when interpreting information found in this chart.    Client Name:  Molly Teague Date: 3/21/2018         Service Type: Individual      Session Start Time: 10:30 AM  Session End Time: 11:15 AM      Session Length: 38 - 52      Session #: 1     Attendees: Client attended alone         Diagnostic Assessment in progress.  Unable to complete documentation at the conclusion of the first session due to needing more information.  Client presents with a 6 year history of PTSD subsequent to multiple medical problems.  Developed scleroderma diagnosed in 2012 followed very shortly by a high risk pregnancy after which her health deteriorated significantly.  Multiple organ failure in 2015 resulted in a 7 week, and 6 month hospital stay.  During this time she lost all motor function.  She has a very supportive family and extended family.  She is in a long-term relationship with her significant other.  Notes difficulty sleeping due to frequent nightmares and awakening which makes it difficult on her relationship.  Occasional panic attacks including chest pressure agitation, feeling jittery shortness of breath, tachycardia, diaphoresis and lightheadedness.  Typically last for minutes.  Also reports son has selective mutism.      Mental Status Assessment:  Appearance:   Appropriate   Eye Contact:   Good   Psychomotor Behavior: Normal   Attitude:   Cooperative   Orientation:   All  Speech   Rate / Production: Normal    Volume:  Normal   Mood:    Normal  Affect:    Appropriate   Thought Content:  Clear   Thought Form:  Coherent  Logical   Insight:    Good       Safety Issues and Plan for Safety and Risk Management:  Client denies current fears or concerns for personal  safety.  Client denies current or recent suicidal ideation or behaviors.  Client denies current or recent homicidal ideation or behaviors.  Client denies current or recent self injurious behavior or ideation.  Client denies other safety concerns.  A safety and risk management plan has not been developed at this time, however client was given the after-hours number / 911 should there be a change in any of these risk factors.  Client reports there are no firearms in the house.      Diagnostic Criteria:  A. The person has been exposed to a traumatic event in which both of the following were present:     (1) the person experienced, witnessed, or was confronted with an event or events that involved actual or threatened death or serious injury, or a threat to the physical integrity of self or others     (2) the person's response involved intense fear, helplessness, or horror. Note: In children, this may be expressed instead by disorganized or agitated behavior  B. The traumatic event is persistently reexperienced in one (or more) of the following ways:     - Recurrent and intrusive distressing recollections of the event, including images, thoughts, or perceptions. Note: In young children, repetitive play may occur in which themes or aspects of the trauma are expressed.      - Recurrent distressing dreams of the event. Note: In children, there may be frightening dreams without recognizable content.      - Acting or feeling as if the traumatic event were recurring (includes a sense of reliving the experience, illusions, hallucinations, and dissociative flashback episodes, including those that occur on awakening or when intoxicated). Note: In young children, trauma-specific reenactment may occur.      - Intense psychological distress at exposure to internal or external cues that symbolize or resemble an aspect of the traumatic event.      - Physiological reactivity on exposure to internal or external cues that symbolize or  resemble an aspect of the traumatic event.   C. Persistent avoidance of stimuli associated with the trauma and numbing of general responsiveness (not present before the trauma), as indicated by three (or more) of the following:     - Efforts to avoid activities, places, or people that arouse recollections of the trauma.      - Markedly diminished interest or participation in significant activities.      - Feeling of detachment or estrangement from others.   D. Persistent symptoms of increased arousal (not present before the trauma), as indicated by two (or more) of the following:     - Difficulty falling or staying asleep.      - Irritability or outbursts of anger.   E. Duration of the disturbance is more than 1 month.  F. The disturbance causes clinically significant distress or impairment in social, occupational, or other important areas of functioning.    - The aformentioned symptoms began 3 year(s) ago and occurs 7 days per week and is experienced as moderate.        DSM5 Diagnoses: (Sustained by DSM5 Criteria Listed Above)  Diagnoses: 309.81 (F43.10) Posttraumatic Stress Disorder (includes Posttraumatic Stress Disorder for Children 6 Years and Younger)  Without dissociative symptoms  Psychosocial & Contextual Factors: Very for child with selective mutism  Collateral Reports Completed:  Not Applicable      PLAN: (Homework, other):  Client stated that she may follow up for ongoing services with [facility].  Follow-up appointments set      Selvin Kenney

## 2018-03-24 ENCOUNTER — TELEPHONE (OUTPATIENT)
Dept: FAMILY MEDICINE | Facility: CLINIC | Age: 33
End: 2018-03-24

## 2018-03-24 DIAGNOSIS — F41.0 SEVERE ANXIETY WITH PANIC: ICD-10-CM

## 2018-03-24 DIAGNOSIS — G47.09 OTHER INSOMNIA: ICD-10-CM

## 2018-03-24 ASSESSMENT — ANXIETY QUESTIONNAIRES: GAD7 TOTAL SCORE: 10

## 2018-03-24 ASSESSMENT — PATIENT HEALTH QUESTIONNAIRE - PHQ9: SUM OF ALL RESPONSES TO PHQ QUESTIONS 1-9: 13

## 2018-03-26 NOTE — TELEPHONE ENCOUNTER
"Requested Prescriptions   Pending Prescriptions Disp Refills     citalopram (CELEXA) 20 MG tablet [Pharmacy Med Name: CITALOPRAM 20MG TAB]  Last Written Prescription Date:  01/05/2018  Last Fill Quantity: 90,  # refills: 3   Last office visit: 1/5/2018 with prescribing provider:  Colby   Future Office Visit:   Next 5 appointments (look out 90 days)     Mar 28, 2018  9:30 AM CDT   Return Visit with [facility]    [address] [place] [address]   [phone]            Apr 11, 2018  9:30 AM CDT   Return Visit with [facility] ([address] [address]   [phone]                  90 tablet 3     Sig: TAKE ONE TABLET BY MOUTH EVERY DAY    SSRIs Protocol Passed    3/24/2018 11:37 AM       Passed - Recent (12 mo) or future (30 days) visit within the authorizing provider's specialty    Patient had office visit in the last 12 months or has a visit in the next 30 days with authorizing provider or within the authorizing provider's specialty.  See \"Patient Info\" tab in inbasket, or \"Choose Columns\" in Meds & Orders section of the refill encounter.           Passed - Patient is age 18 or older       Passed - No active pregnancy on record       Passed - No positive pregnancy test in last 12 months        Paulie BERGER (R)    "

## 2018-03-26 NOTE — TELEPHONE ENCOUNTER
Requested Prescriptions   Pending Prescriptions Disp Refills     zolpidem (AMBIEN) 5 MG tablet  Last Written Prescription Date:  01/05/2018  Last Fill Quantity: 30,  # refills: 0   Last office visit: 1/5/2018 with prescribing provider:  Colby Espinoza Office Visit:   Next 5 appointments (look out 90 days)     Mar 28, 2018  9:30 AM CDT   Return Visit with [address] [address]   [phone]            Apr 11, 2018  9:30 AM CDT   [facility] with [facility]    [facility] [facility] [address]   [phone]                  30 tablet 0     Sig: Take 1 tablet (5 mg) by mouth nightly as needed for sleep    There is no refill protocol information for this order        Paulie LAND)

## 2018-03-27 RX ORDER — ZOLPIDEM TARTRATE 5 MG/1
5 TABLET ORAL
Qty: 30 TABLET | Refills: 0 | OUTPATIENT
Start: 2018-03-27

## 2018-03-27 NOTE — TELEPHONE ENCOUNTER
Routing refill request for zolpidem to provider for review/approval because:  Drug not on the [facility] refill protocol     Emily LEI RN

## 2018-03-27 NOTE — TELEPHONE ENCOUNTER
Plan ws to taper off ambien.  Refill denied, needs appointment if wants to discuss further.  She has been advised to follow-up with Milagro Moody as well, no appointment scheduled dthat I can see

## 2018-03-28 NOTE — TELEPHONE ENCOUNTER
Left message on answering machine for patient to call back to speak with an RN.  (please see note on 2/23/18 refill request also)     Alejandra LARA

## 2018-03-29 ENCOUNTER — OFFICE VISIT (OUTPATIENT)
Dept: FAMILY MEDICINE | Facility: CLINIC | Age: 33
End: 2018-03-29
Payer: MEDICARE

## 2018-03-29 ENCOUNTER — APPOINTMENT (OUTPATIENT)
Dept: MRI IMAGING | Facility: CLINIC | Age: 33
DRG: 871 | End: 2018-03-29
Attending: FAMILY MEDICINE
Payer: MEDICARE

## 2018-03-29 ENCOUNTER — APPOINTMENT (OUTPATIENT)
Dept: GENERAL RADIOLOGY | Facility: CLINIC | Age: 33
DRG: 871 | End: 2018-03-29
Attending: FAMILY MEDICINE
Payer: MEDICARE

## 2018-03-29 ENCOUNTER — HOSPITAL ENCOUNTER (INPATIENT)
Facility: CLINIC | Age: 33
LOS: 5 days | Discharge: HOME | DRG: 871 | End: 2018-04-03
Attending: FAMILY MEDICINE | Admitting: FAMILY MEDICINE
Payer: MEDICARE

## 2018-03-29 ENCOUNTER — APPOINTMENT (OUTPATIENT)
Dept: CT IMAGING | Facility: CLINIC | Age: 33
DRG: 871 | End: 2018-03-29
Attending: FAMILY MEDICINE
Payer: MEDICARE

## 2018-03-29 VITALS
SYSTOLIC BLOOD PRESSURE: 145 MMHG | BODY MASS INDEX: 32.08 KG/M2 | DIASTOLIC BLOOD PRESSURE: 98 MMHG | OXYGEN SATURATION: 96 % | WEIGHT: 175.38 LBS | HEART RATE: 139 BPM | TEMPERATURE: 100.5 F
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
## 2018-03-29 DIAGNOSIS — R55 SYNCOPE AND COLLAPSE: Primary | ICD-10-CM

## 2018-03-29 DIAGNOSIS — J69.0 ASPIRATION PNEUMONIA, UNSPECIFIED ASPIRATION PNEUMONIA TYPE, UNSPECIFIED LATERALITY, UNSPECIFIED PART OF LUNG (H): ICD-10-CM

## 2018-03-29 DIAGNOSIS — R50.9 FEVER, UNSPECIFIED FEVER CAUSE: ICD-10-CM

## 2018-03-29 DIAGNOSIS — J11.1 FLU: ICD-10-CM

## 2018-03-29 DIAGNOSIS — R00.0 SINUS TACHYCARDIA: ICD-10-CM

## 2018-03-29 DIAGNOSIS — J45.40 MODERATE PERSISTENT ASTHMA WITHOUT COMPLICATION: ICD-10-CM

## 2018-03-29 DIAGNOSIS — M34.89 SCLERODERMA WITH RENAL INVOLVEMENT (H): ICD-10-CM

## 2018-03-29 DIAGNOSIS — F51.04 PSYCHOPHYSIOLOGICAL INSOMNIA: ICD-10-CM

## 2018-03-29 DIAGNOSIS — J45.41 MODERATE PERSISTENT ASTHMA WITH EXACERBATION: ICD-10-CM

## 2018-03-29 DIAGNOSIS — M34.89 DIFFUSE CUTANEOUS SYSTEMIC SCLEROSIS (H): ICD-10-CM

## 2018-03-29 DIAGNOSIS — J11.1 INFLUENZA-LIKE ILLNESS: ICD-10-CM

## 2018-03-29 DIAGNOSIS — N08 SCLERODERMA WITH RENAL INVOLVEMENT (H): ICD-10-CM

## 2018-03-29 DIAGNOSIS — R07.0 THROAT PAIN: Primary | ICD-10-CM
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
## 2018-03-29 PROBLEM — R41.82 ALTERED MENTAL STATE: Status: ACTIVE | Noted: 2018-03-29

## 2018-03-29 PROBLEM — T17.928A ASPIRATION OF FOOD: Status: ACTIVE | Noted: 2018-03-29

## 2018-03-29 LAB
ALBUMIN SERPL-MCNC: 3.8 G/DL (ref 3.4–5)
ALBUMIN UR-MCNC: 30 MG/DL
ALP SERPL-CCNC: 97 U/L (ref 40–150)
ALT SERPL W P-5'-P-CCNC: 24 U/L (ref 0–50)
ANION GAP SERPL CALCULATED.3IONS-SCNC: 9 MMOL/L (ref 3–14)
APPEARANCE UR: CLEAR
AST SERPL W P-5'-P-CCNC: 36 U/L (ref 0–45)
BASOPHILS # BLD AUTO: 0 10E9/L (ref 0–0.2)
BASOPHILS NFR BLD AUTO: 0.2 %
BILIRUB SERPL-MCNC: 0.2 MG/DL (ref 0.2–1.3)
BILIRUB UR QL STRIP: NEGATIVE
BUN SERPL-MCNC: 20 MG/DL (ref 7–30)
CALCIUM SERPL-MCNC: 8.2 MG/DL (ref 8.5–10.1)
CHLORIDE SERPL-SCNC: 106 MMOL/L (ref 94–109)
CO2 SERPL-SCNC: 22 MMOL/L (ref 20–32)
COLOR UR AUTO: YELLOW
CREAT SERPL-MCNC: 1.11 MG/DL (ref 0.52–1.04)
D DIMER PPP FEU-MCNC: 0.5 UG/ML FEU (ref 0–0.5)
DEPRECATED S PYO AG THROAT QL EIA: NORMAL
DIFFERENTIAL METHOD BLD: ABNORMAL
EOSINOPHIL # BLD AUTO: 0.2 10E9/L (ref 0–0.7)
EOSINOPHIL NFR BLD AUTO: 1.6 %
ERYTHROCYTE [DISTWIDTH] IN BLOOD BY AUTOMATED COUNT: 16 % (ref 10–15)
FLUAV+FLUBV AG SPEC QL: NEGATIVE
FLUAV+FLUBV AG SPEC QL: NEGATIVE
GFR SERPL CREATININE-BSD FRML MDRD: 57 ML/MIN/1.7M2
GLUCOSE BLDC GLUCOMTR-MCNC: 118 MG/DL (ref 70–99)
GLUCOSE SERPL-MCNC: 113 MG/DL (ref 70–99)
GLUCOSE UR STRIP-MCNC: NEGATIVE MG/DL
HCT VFR BLD AUTO: 38 % (ref 35–47)
HGB BLD-MCNC: 12.1 G/DL (ref 11.7–15.7)
HGB UR QL STRIP: ABNORMAL
IMM GRANULOCYTES # BLD: 0 10E9/L (ref 0–0.4)
IMM GRANULOCYTES NFR BLD: 0.3 %
KETONES UR STRIP-MCNC: NEGATIVE MG/DL
LACTATE BLD-SCNC: 1 MMOL/L (ref 0.7–2)
LACTATE BLD-SCNC: 1.6 MMOL/L (ref 0.7–2)
LACTATE BLD-SCNC: 2.9 MMOL/L (ref 0.7–2)
LEUKOCYTE ESTERASE UR QL STRIP: NEGATIVE
LYMPHOCYTES # BLD AUTO: 1.2 10E9/L (ref 0.8–5.3)
LYMPHOCYTES NFR BLD AUTO: 8.2 %
MCH RBC QN AUTO: 27.6 PG (ref 26.5–33)
MCHC RBC AUTO-ENTMCNC: 31.8 G/DL (ref 31.5–36.5)
MCV RBC AUTO: 87 FL (ref 78–100)
MONOCYTES # BLD AUTO: 0.9 10E9/L (ref 0–1.3)
MONOCYTES NFR BLD AUTO: 6.5 %
MRSA DNA SPEC QL NAA+PROBE: NEGATIVE
MUCOUS THREADS #/AREA URNS LPF: PRESENT /LPF
NEUTROPHILS # BLD AUTO: 11.7 10E9/L (ref 1.6–8.3)
NEUTROPHILS NFR BLD AUTO: 83.2 %
NITRATE UR QL: NEGATIVE
PH UR STRIP: 5 PH (ref 5–7)
PLATELET # BLD AUTO: 205 10E9/L (ref 150–450)
POTASSIUM SERPL-SCNC: 3.9 MMOL/L (ref 3.4–5.3)
PROCALCITONIN SERPL-MCNC: <0.05 NG/ML
PROT SERPL-MCNC: 8 G/DL (ref 6.8–8.8)
RBC # BLD AUTO: 4.38 10E12/L (ref 3.8–5.2)
RBC #/AREA URNS AUTO: 5 /HPF (ref 0–2)
SODIUM SERPL-SCNC: 137 MMOL/L (ref 133–144)
SOURCE: ABNORMAL
SP GR UR STRIP: 1.02 (ref 1–1.03)
SPECIMEN SOURCE: NORMAL
SQUAMOUS #/AREA URNS AUTO: 1 /HPF (ref 0–1)
TSH SERPL DL<=0.005 MIU/L-ACNC: 2.29 MU/L (ref 0.4–4)
UROBILINOGEN UR STRIP-MCNC: 0 MG/DL (ref 0–2)
WBC # BLD AUTO: 14.1 10E9/L (ref 4–11)
WBC #/AREA URNS AUTO: 3 /HPF (ref 0–5)

## 2018-03-29 PROCEDURE — 71046 X-RAY EXAM CHEST 2 VIEWS: CPT

## 2018-03-29 PROCEDURE — 87040 BLOOD CULTURE FOR BACTERIA: CPT | Performed by: FAMILY MEDICINE

## 2018-03-29 PROCEDURE — 40000986 XR CHEST PORT 1 VW

## 2018-03-29 PROCEDURE — 25000132 ZZH RX MED GY IP 250 OP 250 PS 637: Mod: GY | Performed by: PHYSICIAN ASSISTANT

## 2018-03-29 PROCEDURE — 36569 INSJ PICC 5 YR+ W/O IMAGING: CPT

## 2018-03-29 PROCEDURE — 36415 COLL VENOUS BLD VENIPUNCTURE: CPT | Performed by: FAMILY MEDICINE

## 2018-03-29 PROCEDURE — 96374 THER/PROPH/DIAG INJ IV PUSH: CPT

## 2018-03-29 PROCEDURE — 70544 MR ANGIOGRAPHY HEAD W/O DYE: CPT

## 2018-03-29 PROCEDURE — 96361 HYDRATE IV INFUSION ADD-ON: CPT

## 2018-03-29 PROCEDURE — 99207 ZZC OFFICE-HOSPITAL ADMIT: CPT | Performed by: INTERNAL MEDICINE

## 2018-03-29 PROCEDURE — 85025 COMPLETE CBC W/AUTO DIFF WBC: CPT | Performed by: FAMILY MEDICINE

## 2018-03-29 PROCEDURE — 93010 ELECTROCARDIOGRAM REPORT: CPT | Mod: Z6 | Performed by: FAMILY MEDICINE

## 2018-03-29 PROCEDURE — 84145 PROCALCITONIN (PCT): CPT | Performed by: FAMILY MEDICINE

## 2018-03-29 PROCEDURE — 83605 ASSAY OF LACTIC ACID: CPT | Performed by: FAMILY MEDICINE

## 2018-03-29 PROCEDURE — 80053 COMPREHEN METABOLIC PANEL: CPT | Performed by: FAMILY MEDICINE

## 2018-03-29 PROCEDURE — 20000003 ZZH R&B ICU

## 2018-03-29 PROCEDURE — 93005 ELECTROCARDIOGRAM TRACING: CPT

## 2018-03-29 PROCEDURE — A9270 NON-COVERED ITEM OR SERVICE: HCPCS | Mod: GY | Performed by: FAMILY MEDICINE

## 2018-03-29 PROCEDURE — 99285 EMERGENCY DEPT VISIT HI MDM: CPT | Mod: 25 | Performed by: FAMILY MEDICINE

## 2018-03-29 PROCEDURE — 00000146 ZZHCL STATISTIC GLUCOSE BY METER IP

## 2018-03-29 PROCEDURE — 99221 1ST HOSP IP/OBS SF/LOW 40: CPT | Mod: AI | Performed by: FAMILY MEDICINE

## 2018-03-29 PROCEDURE — 87081 CULTURE SCREEN ONLY: CPT | Performed by: INTERNAL MEDICINE

## 2018-03-29 PROCEDURE — 99285 EMERGENCY DEPT VISIT HI MDM: CPT | Mod: 25

## 2018-03-29 PROCEDURE — 25000128 H RX IP 250 OP 636: Performed by: FAMILY MEDICINE

## 2018-03-29 PROCEDURE — A9270 NON-COVERED ITEM OR SERVICE: HCPCS | Mod: GY | Performed by: PHYSICIAN ASSISTANT

## 2018-03-29 PROCEDURE — 70450 CT HEAD/BRAIN W/O DYE: CPT

## 2018-03-29 PROCEDURE — 87640 STAPH A DNA AMP PROBE: CPT | Performed by: FAMILY MEDICINE

## 2018-03-29 PROCEDURE — 87641 MR-STAPH DNA AMP PROBE: CPT | Performed by: FAMILY MEDICINE

## 2018-03-29 PROCEDURE — 87804 INFLUENZA ASSAY W/OPTIC: CPT | Performed by: INTERNAL MEDICINE

## 2018-03-29 PROCEDURE — 96375 TX/PRO/DX INJ NEW DRUG ADDON: CPT

## 2018-03-29 PROCEDURE — 87880 STREP A ASSAY W/OPTIC: CPT | Performed by: INTERNAL MEDICINE

## 2018-03-29 PROCEDURE — 81001 URINALYSIS AUTO W/SCOPE: CPT | Performed by: FAMILY MEDICINE

## 2018-03-29 PROCEDURE — 82565 ASSAY OF CREATININE: CPT | Performed by: FAMILY MEDICINE

## 2018-03-29 PROCEDURE — 70549 MR ANGIOGRAPH NECK W/O&W/DYE: CPT

## 2018-03-29 PROCEDURE — A9585 GADOBUTROL INJECTION: HCPCS | Performed by: FAMILY MEDICINE

## 2018-03-29 PROCEDURE — 27210995 ZZH RX 272: Performed by: FAMILY MEDICINE

## 2018-03-29 PROCEDURE — 87070 CULTURE OTHR SPECIMN AEROBIC: CPT | Performed by: FAMILY MEDICINE

## 2018-03-29 PROCEDURE — 27211068 ZZH TRAY POWER PICC SOLO 5FR DUAL LUMEN

## 2018-03-29 PROCEDURE — 99207 ZZC CDG-HISTORY COMP: MEETS EXP. PROBLEM FOCUSED-DOWN CODED LACK OF ROS: CPT | Performed by: FAMILY MEDICINE

## 2018-03-29 PROCEDURE — 84443 ASSAY THYROID STIM HORMONE: CPT | Performed by: FAMILY MEDICINE

## 2018-03-29 PROCEDURE — 87633 RESP VIRUS 12-25 TARGETS: CPT | Performed by: FAMILY MEDICINE

## 2018-03-29 PROCEDURE — 25000132 ZZH RX MED GY IP 250 OP 250 PS 637: Mod: GY | Performed by: FAMILY MEDICINE

## 2018-03-29 PROCEDURE — 85379 FIBRIN DEGRADATION QUANT: CPT | Performed by: FAMILY MEDICINE

## 2018-03-29 PROCEDURE — 87205 SMEAR GRAM STAIN: CPT | Performed by: FAMILY MEDICINE

## 2018-03-29 PROCEDURE — 71260 CT THORAX DX C+: CPT

## 2018-03-29 PROCEDURE — 70553 MRI BRAIN STEM W/O & W/DYE: CPT

## 2018-03-29 PROCEDURE — 25000125 ZZHC RX 250: Performed by: FAMILY MEDICINE

## 2018-03-29 PROCEDURE — G0378 HOSPITAL OBSERVATION PER HR: HCPCS

## 2018-03-29 PROCEDURE — 96376 TX/PRO/DX INJ SAME DRUG ADON: CPT

## 2018-03-29 RX ORDER — UREA 10 %
1000 LOTION (ML) TOPICAL DAILY
Status: DISCONTINUED | OUTPATIENT
Start: 2018-03-29 | End: 2018-04-03 | Stop reason: HOSPADM

## 2018-03-29 RX ORDER — GABAPENTIN 300 MG/1
600 CAPSULE ORAL 3 TIMES DAILY
Status: DISCONTINUED | OUTPATIENT
Start: 2018-03-29 | End: 2018-04-03 | Stop reason: HOSPADM

## 2018-03-29 RX ORDER — NALOXONE HYDROCHLORIDE 0.4 MG/ML
.1-.4 INJECTION, SOLUTION INTRAMUSCULAR; INTRAVENOUS; SUBCUTANEOUS
Status: DISCONTINUED | OUTPATIENT
Start: 2018-03-29 | End: 2018-03-29

## 2018-03-29 RX ORDER — IOPAMIDOL 755 MG/ML
80 INJECTION, SOLUTION INTRAVASCULAR ONCE
Status: COMPLETED | OUTPATIENT
Start: 2018-03-29 | End: 2018-03-29

## 2018-03-29 RX ORDER — LIDOCAINE 40 MG/G
CREAM TOPICAL
Status: DISCONTINUED | OUTPATIENT
Start: 2018-03-29 | End: 2018-04-03 | Stop reason: HOSPADM

## 2018-03-29 RX ORDER — IBUPROFEN 600 MG/1
600 TABLET, FILM COATED ORAL EVERY 6 HOURS PRN
Status: DISCONTINUED | OUTPATIENT
Start: 2018-03-29 | End: 2018-03-29

## 2018-03-29 RX ORDER — SUCRALFATE 1 G/1
1 TABLET ORAL 4 TIMES DAILY PRN
Status: DISCONTINUED | OUTPATIENT
Start: 2018-03-29 | End: 2018-04-03 | Stop reason: HOSPADM

## 2018-03-29 RX ORDER — HEPARIN SODIUM,PORCINE 10 UNIT/ML
2-5 VIAL (ML) INTRAVENOUS
Status: DISCONTINUED | OUTPATIENT
Start: 2018-03-29 | End: 2018-04-02

## 2018-03-29 RX ORDER — LISINOPRIL 5 MG/1
5 TABLET ORAL DAILY
Status: DISCONTINUED | OUTPATIENT
Start: 2018-03-30 | End: 2018-04-03 | Stop reason: HOSPADM

## 2018-03-29 RX ORDER — HYDROMORPHONE HYDROCHLORIDE 1 MG/ML
0.5 INJECTION, SOLUTION INTRAMUSCULAR; INTRAVENOUS; SUBCUTANEOUS
Status: DISCONTINUED | OUTPATIENT
Start: 2018-03-29 | End: 2018-03-29

## 2018-03-29 RX ORDER — POLYETHYLENE GLYCOL 3350 17 G/17G
17 POWDER, FOR SOLUTION ORAL DAILY
Status: DISCONTINUED | OUTPATIENT
Start: 2018-03-29 | End: 2018-03-31

## 2018-03-29 RX ORDER — GADOBUTROL 604.72 MG/ML
10 INJECTION INTRAVENOUS ONCE
Status: COMPLETED | OUTPATIENT
Start: 2018-03-29 | End: 2018-03-29

## 2018-03-29 RX ORDER — BUDESONIDE AND FORMOTEROL FUMARATE DIHYDRATE 160; 4.5 UG/1; UG/1
2 AEROSOL RESPIRATORY (INHALATION)
Status: DISCONTINUED | OUTPATIENT
Start: 2018-03-29 | End: 2018-04-03 | Stop reason: HOSPADM

## 2018-03-29 RX ORDER — HYDROMORPHONE HYDROCHLORIDE 1 MG/ML
0.3 INJECTION, SOLUTION INTRAMUSCULAR; INTRAVENOUS; SUBCUTANEOUS ONCE
Status: COMPLETED | OUTPATIENT
Start: 2018-03-29 | End: 2018-03-29

## 2018-03-29 RX ORDER — LORAZEPAM 0.5 MG/1
0.5 TABLET ORAL 2 TIMES DAILY PRN
Status: DISCONTINUED | OUTPATIENT
Start: 2018-03-29 | End: 2018-04-03 | Stop reason: HOSPADM

## 2018-03-29 RX ORDER — ACETAMINOPHEN 325 MG/1
650 TABLET ORAL EVERY 4 HOURS PRN
Status: DISCONTINUED | OUTPATIENT
Start: 2018-03-29 | End: 2018-04-03 | Stop reason: HOSPADM

## 2018-03-29 RX ORDER — LIDOCAINE 40 MG/G
CREAM TOPICAL
Status: DISCONTINUED | OUTPATIENT
Start: 2018-03-29 | End: 2018-03-30

## 2018-03-29 RX ORDER — ALBUTEROL SULFATE 90 UG/1
2 AEROSOL, METERED RESPIRATORY (INHALATION) EVERY 4 HOURS PRN
Status: DISCONTINUED | OUTPATIENT
Start: 2018-03-29 | End: 2018-04-03 | Stop reason: HOSPADM

## 2018-03-29 RX ORDER — CITALOPRAM HYDROBROMIDE 40 MG/1
40 TABLET ORAL DAILY
Status: DISCONTINUED | OUTPATIENT
Start: 2018-03-30 | End: 2018-04-03 | Stop reason: HOSPADM

## 2018-03-29 RX ORDER — FERROUS GLUCONATE 324(38)MG
324 TABLET ORAL
Status: DISCONTINUED | OUTPATIENT
Start: 2018-03-30 | End: 2018-04-03 | Stop reason: HOSPADM

## 2018-03-29 RX ORDER — NALOXONE HYDROCHLORIDE 0.4 MG/ML
.1-.4 INJECTION, SOLUTION INTRAMUSCULAR; INTRAVENOUS; SUBCUTANEOUS
Status: DISCONTINUED | OUTPATIENT
Start: 2018-03-29 | End: 2018-04-03 | Stop reason: HOSPADM

## 2018-03-29 RX ORDER — MIRTAZAPINE 7.5 MG/1
7.5 TABLET, FILM COATED ORAL AT BEDTIME
Qty: 30 TABLET | Refills: 1 | Status: CANCELLED | OUTPATIENT
Start: 2018-03-29

## 2018-03-29 RX ORDER — OXYCODONE HYDROCHLORIDE 15 MG/1
15 TABLET ORAL EVERY 4 HOURS PRN
Status: DISCONTINUED | OUTPATIENT
Start: 2018-03-29 | End: 2018-04-03 | Stop reason: HOSPADM

## 2018-03-29 RX ORDER — ACYCLOVIR 200 MG/1
60 CAPSULE ORAL ONCE
Status: COMPLETED | OUTPATIENT
Start: 2018-03-29 | End: 2018-03-29

## 2018-03-29 RX ORDER — PROMETHAZINE HYDROCHLORIDE 25 MG/ML
25 INJECTION, SOLUTION INTRAMUSCULAR; INTRAVENOUS EVERY 6 HOURS PRN
Status: DISCONTINUED | OUTPATIENT
Start: 2018-03-29 | End: 2018-04-03 | Stop reason: HOSPADM

## 2018-03-29 RX ORDER — DIPHENHYDRAMINE HCL 25 MG
25 TABLET ORAL EVERY 6 HOURS PRN
Status: DISCONTINUED | OUTPATIENT
Start: 2018-03-29 | End: 2018-04-03 | Stop reason: HOSPADM

## 2018-03-29 RX ORDER — CEFTRIAXONE SODIUM 2 G/50ML
2 INJECTION, SOLUTION INTRAVENOUS EVERY 12 HOURS
Status: DISCONTINUED | OUTPATIENT
Start: 2018-03-29 | End: 2018-03-31

## 2018-03-29 RX ORDER — BUSPIRONE HYDROCHLORIDE 15 MG/1
15 TABLET ORAL 2 TIMES DAILY
Status: DISCONTINUED | OUTPATIENT
Start: 2018-03-29 | End: 2018-04-03 | Stop reason: HOSPADM

## 2018-03-29 RX ORDER — OXYCODONE HYDROCHLORIDE 5 MG/1
5-10 TABLET ORAL
Status: DISCONTINUED | OUTPATIENT
Start: 2018-03-29 | End: 2018-03-29

## 2018-03-29 RX ORDER — SODIUM CHLORIDE 9 MG/ML
INJECTION, SOLUTION INTRAVENOUS CONTINUOUS
Status: DISCONTINUED | OUTPATIENT
Start: 2018-03-29 | End: 2018-03-31

## 2018-03-29 RX ORDER — PROMETHAZINE HYDROCHLORIDE 25 MG/ML
12.5 INJECTION, SOLUTION INTRAMUSCULAR; INTRAVENOUS ONCE
Status: COMPLETED | OUTPATIENT
Start: 2018-03-29 | End: 2018-03-29

## 2018-03-29 RX ORDER — KETOROLAC TROMETHAMINE 30 MG/ML
15 INJECTION, SOLUTION INTRAMUSCULAR; INTRAVENOUS ONCE
Status: COMPLETED | OUTPATIENT
Start: 2018-03-29 | End: 2018-03-29

## 2018-03-29 RX ADMIN — SODIUM CHLORIDE, POTASSIUM CHLORIDE, SODIUM LACTATE AND CALCIUM CHLORIDE 1000 ML: 600; 310; 30; 20 INJECTION, SOLUTION INTRAVENOUS at 16:22

## 2018-03-29 RX ADMIN — PROMETHAZINE HYDROCHLORIDE 25 MG: 25 INJECTION INTRAMUSCULAR; INTRAVENOUS at 15:32

## 2018-03-29 RX ADMIN — SODIUM CHLORIDE 1000 ML: 9 INJECTION, SOLUTION INTRAVENOUS at 08:57

## 2018-03-29 RX ADMIN — TAZOBACTAM SODIUM AND PIPERACILLIN SODIUM 4.5 G: 500; 4 INJECTION, SOLUTION INTRAVENOUS at 15:02

## 2018-03-29 RX ADMIN — GABAPENTIN 600 MG: 300 CAPSULE ORAL at 13:37

## 2018-03-29 RX ADMIN — GADOBUTROL 10 ML: 604.72 INJECTION INTRAVENOUS at 22:01

## 2018-03-29 RX ADMIN — IOPAMIDOL 80 ML: 755 INJECTION, SOLUTION INTRAVENOUS at 22:11

## 2018-03-29 RX ADMIN — ENOXAPARIN SODIUM 40 MG: 40 INJECTION SUBCUTANEOUS at 15:31

## 2018-03-29 RX ADMIN — SODIUM CHLORIDE 1000 ML: 9 INJECTION, SOLUTION INTRAVENOUS at 11:04

## 2018-03-29 RX ADMIN — BUSPIRONE HYDROCHLORIDE 15 MG: 15 TABLET ORAL at 23:48

## 2018-03-29 RX ADMIN — SODIUM CHLORIDE: 9 INJECTION, SOLUTION INTRAVENOUS at 12:05

## 2018-03-29 RX ADMIN — GABAPENTIN 600 MG: 300 CAPSULE ORAL at 23:48

## 2018-03-29 RX ADMIN — PROMETHAZINE HYDROCHLORIDE 12.5 MG: 25 INJECTION INTRAMUSCULAR; INTRAVENOUS at 09:33

## 2018-03-29 RX ADMIN — SODIUM CHLORIDE 60 ML: 9 INJECTION, SOLUTION INTRAMUSCULAR; INTRAVENOUS; SUBCUTANEOUS at 22:01

## 2018-03-29 RX ADMIN — VANCOMYCIN HYDROCHLORIDE 1750 MG: 100 INJECTION, POWDER, LYOPHILIZED, FOR SOLUTION INTRAVENOUS at 22:30

## 2018-03-29 RX ADMIN — SODIUM CHLORIDE 1000 ML: 9 INJECTION, SOLUTION INTRAVENOUS at 18:32

## 2018-03-29 RX ADMIN — RANITIDINE 150 MG: 150 TABLET ORAL at 15:31

## 2018-03-29 RX ADMIN — OMEPRAZOLE 40 MG: 20 CAPSULE, DELAYED RELEASE ORAL at 13:37

## 2018-03-29 RX ADMIN — KETOROLAC TROMETHAMINE 15 MG: 30 INJECTION, SOLUTION INTRAMUSCULAR at 09:31

## 2018-03-29 RX ADMIN — SODIUM CHLORIDE 100 ML: 9 INJECTION, SOLUTION INTRAVENOUS at 22:11

## 2018-03-29 RX ADMIN — HYDROMORPHONE HYDROCHLORIDE 0.3 MG: 1 INJECTION, SOLUTION INTRAMUSCULAR; INTRAVENOUS; SUBCUTANEOUS at 22:44

## 2018-03-29 RX ADMIN — OMEPRAZOLE 40 MG: 20 CAPSULE, DELAYED RELEASE ORAL at 23:48

## 2018-03-29 RX ADMIN — SODIUM CHLORIDE: 9 INJECTION, SOLUTION INTRAVENOUS at 19:47

## 2018-03-29 RX ADMIN — CEFTRIAXONE SODIUM 2 G: 2 INJECTION, SOLUTION INTRAVENOUS at 18:36

## 2018-03-29 RX ADMIN — ACYCLOVIR SODIUM 800 MG: 50 INJECTION, SOLUTION INTRAVENOUS at 19:48

## 2018-03-29 RX ADMIN — HYDROMORPHONE HYDROCHLORIDE 0.5 MG: 1 INJECTION, SOLUTION INTRAMUSCULAR; INTRAVENOUS; SUBCUTANEOUS at 11:26

## 2018-03-29 ASSESSMENT — ACTIVITIES OF DAILY LIVING (ADL)
TOILETING: 0-->INDEPENDENT
TRANSFERRING: 0-->INDEPENDENT
DRESS: 0-->INDEPENDENT
NUMBER_OF_TIMES_PATIENT_HAS_FALLEN_WITHIN_LAST_SIX_MONTHS: 3
SWALLOWING: 0-->SWALLOWS FOODS/LIQUIDS WITHOUT DIFFICULTY
RETIRED_EATING: 0-->INDEPENDENT
COGNITION: 0 - NO COGNITION ISSUES REPORTED
RETIRED_COMMUNICATION: 0-->UNDERSTANDS/COMMUNICATES WITHOUT DIFFICULTY
BATHING: 0-->INDEPENDENT
FALL_HISTORY_WITHIN_LAST_SIX_MONTHS: YES
AMBULATION: 0-->INDEPENDENT

## 2018-03-29 ASSESSMENT — ENCOUNTER SYMPTOMS
SINUS PRESSURE: 1
BLOOD IN STOOL: 0
FEVER: 1
LIGHT-HEADEDNESS: 1
DYSURIA: 0
MYALGIAS: 1
VOMITING: 1
CHILLS: 1
NAUSEA: 1
HEADACHES: 1
CONSTIPATION: 1
SHORTNESS OF BREATH: 1
COUGH: 1
ABDOMINAL PAIN: 0
PALPITATIONS: 1
DIAPHORESIS: 1
WHEEZING: 1
FREQUENCY: 0
SORE THROAT: 0
DIARRHEA: 0

## 2018-03-29 NOTE — H&P
Admitted:     03/29/2018      CHIEF COMPLAINT:  Cough, fever and shortness of breath for 4 days.      HISTORY OF PRESENT ILLNESS:  Molly Teague is a complex patient with a history of scleroderma, previous ARDS with prolonged ventilation and tracheostomy, previous renal crisis requiring hemodialysis, Raynaud's disease, bilateral retinitis, generalized anxiety, CREST syndrome, GERD, chronic pain and chronic narcotic use.  The patient noted that she had what she called an aspiration episode 4 nights ago where she woke up and apparently had had emesis and thought that she aspirated some.  She tells me that she has had problems like this in the past apparently related to her esophagus, although she says she has not had much regurgitation recently.  Since that time she has had reported fever, chills, cough, shortness of breath.  Yesterday she got up and stood up and apparently passed out.  This morning she came to the clinic.  She had a sustained sinus tachycardia.        She was sent to the ER and evaluated where she had a normal chest x-ray, however, white count was 14,000.  The D-dimer was normal.  Her lactic acid was normal.  Blood culture was taken.  Influenza test was negative.  A rapid strep was negative.  She also had some vague ocular symptoms.  She noted a little bit of horizontal double vision.  She says it is better now.  She had a CT which was normal in the ER.      PAST MEDICAL HISTORY:   1.  Previous PE.   2.  Hypoglycemia.   3.  Anxiety.   4.  Raynaud's disease.   5.  Bilateral retinitis.   6.  IUD in place.   7.  Anxiety disorder.   8.  History of C. diff.   9.  History of Helicobacter pylori infection.   10.  Scleroderma with renal involvement.   11.  Polyneuropathy associated with underlying disease.   12.  Chronic migraine.   13.  Scleroderma.   14.  History of ARDS with prolonged ICU stay, ventilation and tracheostomy due to influenza.  She had a renal crisis requiring hemodialysis.   15.  CREST  syndrome.   16.  Moderate persistent asthma.   17.  GERD.   18.  History of pyelonephritis.   19.  Chronic pain syndrome.      PAST SURGICAL HISTORY:  Includes a previous tracheostomy.      FAMILY MEDICAL HISTORY:  Mother is alive.  Father with hyperlipidemia.  Paternal grandmother cerebrovascular disease.  Paternal grandfather, hyperlipidemia and maternal aunt with diabetes.      HABITS:  She never smoked.  No alcohol use.      SOCIAL HISTORY:  She lives with her significant other and 4-year-old son locally.      ALLERGIES:  NKA.      REVIEW OF SYSTEMS:  She has had subjective fever, chills, cough, shortness of breath.  She had what she thought was an aspiration episode.  No chest pain, no abdominal pain, no change in bowel habits.  She has urinated now that she has gotten some IV fluids, but otherwise she noted decreased urination.  No dysuria, no new skin symptoms.  She has skin changes of scleroderma.  She had some double vision that was horizontal and seems to be better now.      MEDICATIONS:  Symbicort 160/4.5 two puffs twice daily, Goodsense Migraine Formula p.r.n., Celexa 40 mg daily,  BuSpar 15 mg b.i.d., Ativan 0.5 b.i.d. p.r.n., oxycodone 15 mg q. 4 hours p.r.n., ferrous gluconate 324 q.a.m., Neurontin 600 mg t.i.d., lisinopril 5 mg daily, Zantac 150 b.i.d., albuterol 2 puffs q. 4 hours p.r.n., Prilosec 40 mg b.i.d., Phenergan IV q. 6 hours p.r.n. nausea or vomiting, B12 1000 mcg p.o. daily, Carafate 1 gram 4 times daily, she has been taking it p.r.n. MiraLax 17 grams daily, Benadryl 25 q. 6 hours p.r.n.      PHYSICAL EXAMINATION   GENERAL:  She is alert.     SKIN:  She has obvious skin changes, especially in her hands of Raynaud's.    VITAL SIGNS:  Temperature is 99.8, heart rate 119, blood pressure 120/79, respiratory rate 20, saturation 97% on room air.   HEENT:  Ears negative.  Oral:  Mucous membranes somewhat dry, otherwise negative.  Eyes:  Pupils are round, react to light.   NECK:  She has a  tracheostomy scar.   LUNGS:  She has rhonchi in her right base.  No wheeze.  No increased work of breathing.   COR:  S1, S2, without click, rub or murmur.   ABDOMEN:  Soft, benign, nontender without guarding, rebound, rigidity or mass.   GENITALIA:  Grossly normal.   EXTREMITIES:  She has changes of Raynaud's syndrome.   NEUROLOGIC:  Cranial nerves normal.  Extraocular muscles normal.  No double vision currently.  Motor, sensory, reflexes all within normal limits.      LABORATORY DATA:  Influenza negative.  Rapid strep negative.  Blood culture pending.  CBC:  White count 14,100, hemoglobin 12.1, platelet count 205,000.  Comprehensive metabolic panel normal except for glucose of 113, creatinine 1.11, calcium 8.2.  Lactic acid 1.6.  D-dimer 0.5.  TSH 2.29.  UA:  3 white cells per high-power field.        Chest x-ray negative.        EKG:  Sinus tachycardia, rate 126.      ASSESSMENT:   1.  Subjective fever, cough, shortness of breath in a patient with a history of possible aspiration and scleroderma, rule out occult aspiration pneumonia.   2.  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   3.  Chronic kidney disease with previous dialysis.   4.  Previous critical illness with adult respiratory distress syndrome.     5.  Double vision, transient, etiology unclear.   6.  Previous pulmonary embolus, not currently on anticoagulation.   7.  Tachycardia.      PLAN:  As I review her chart, she generally has a heart rate over 100.  Her tachycardia seems a little bit out of proportion to her illness.  Tachycardia is less now and we will continue with IV fluids.  We will culture sputum and blood.        I have concern for an occult aspiration pneumonia given her history and we will start Zosyn.  She has a history of critical illness with ARDS.  I am going to admit her to inpatient since she will be in the hospital at least 2 nights given her comorbidities.  If she has more double vision or neurological symptoms, she  would need a full MRI/MRA stroke workup.         JÚNIOR CALVIN MD             D: 2018   T: 2018   MT: GEORGINA      Name:     ANA KLEIN   MRN:      [id number]        Account:      [id number]   :      1985        Admitted:     2018                   Document: [id number]

## 2018-03-29 NOTE — IP AVS SNAPSHOT
[facility]    [address] [address]    Phone:  [phone]    Fax:  [phone]                                       After Visit Summary   3/29/2018    Molly Teague    MRN: [id number]           After Visit Summary Signature Page     I have received my discharge instructions, and my questions have been answered. I have discussed any challenges I see with this plan with the nurse or doctor.    ..........................................................................................................................................  Patient/Patient Representative Signature      ..........................................................................................................................................  Patient Representative Print Name and Relationship to Patient    ..................................................               ................................................  Date                                            Time    ..........................................................................................................................................  Reviewed by Signature/Title    ...................................................              ..............................................  Date                                                            Time

## 2018-03-29 NOTE — LETTER
March 30, 2018      Molly Teague  [address] [address]            The results of your recent throat culture were negative.  If you have any further questions or concerns please contact the clinic            Sincerely,        Grecia Barba DO/ls

## 2018-03-29 NOTE — ED NOTES
Pt returned from CT, Resting. Pt reports pain is about the same, but nausea is improved, no needs currently. Call light within reach. Pt encouraged to call if anything changes or other needs arise.

## 2018-03-29 NOTE — PHARMACY-VANCOMYCIN DOSING SERVICE
Pharmacy Vancomycin Initial Note  Date of Service 2018  Patient's  1985  32 year old, female    Indication: Meningitis    Current estimated CrCl = Estimated Creatinine Clearance: 71.8 mL/min (based on Cr of 1.11).    Creatinine for last 3 days  3/29/2018:  8:19 AM Creatinine 1.11 mg/dL    Recent Vancomycin Level(s) for last 3 days  No results found for requested labs within last 72 hours.      Vancomycin IV Administrations (past 72 hours)      No vancomycin orders with administrations in past 72 hours.                Nephrotoxins and other renal medications (Future)    Start     Dose/Rate Route Frequency Ordered Stop    18 0800  lisinopril (PRINIVIL/ZESTRIL) tablet 5 mg      5 mg Oral DAILY 18 1432      18 2000  vancomycin (VANCOCIN) 1,750 mg in sodium chloride 0.9 % 500 mL intermittent infusion      1,750 mg  over 2 Hours Intravenous EVERY 12 HOURS 18 1838      18 1930  acyclovir (ZOVIRAX) 800 mg in D5W 250 mL intermittent infusion      10 mg/kg × 81 kg Intravenous EVERY 8 HOURS 18 1755            Contrast Orders - past 72 hours (72h ago through future)    Start     Dose/Rate Route Frequency Ordered Stop    18 1530  iopamidol (ISOVUE-370) solution 80 mL      80 mL Intravenous ONCE 18 1528                  Plan:  1.  Start vancomycin  1750 mg IV q12h.   2.  Goal Trough Level: 15-20 mg/L   3.  Pharmacy will check trough levels as appropriate in As needed based on Scr levels .    4. Serum creatinine levels will be ordered Daily.  Checking Scr 3/30/18 in the morning prior to second Vancomycin dose to ensure it is stable.  Patient is getting concomitant nephrotoxic medication Acyclovir  5. [place] method utilized to dose vancomycin therapy: Method 1 - but adjusted to every 12 hours due to slightly elevated Scr, contrast, IV acyclovir, and elevated Scr in past labs.     Canelo Crandall

## 2018-03-29 NOTE — PROGRESS NOTES
SUBJECTIVE:   Molly Teague is a 32 year old female who presents to clinic today for the following health issues:      Acute Illness   Acute illness concerns: Cold  Onset: 4 days    Fever: no    Chills/Sweats: YES    Headache (location?): YES    Sinus Pressure:no    Conjunctivitis:  YES: both    Ear Pain: no    Rhinorrhea: YES    Congestion: YES    Sore Throat: YES     Cough: YES    Wheeze: YES    Decreased Appetite: YES    Nausea: YES    Vomiting: YES    Diarrhea:  no    Dysuria/Freq.: no    Fatigue/Achiness: YES    Sick/Strep Exposure: no     Therapies Tried and outcome: Tylenol OTC  --thinks asthmas is worse due to illness -wheezing more.  --no urinary symptoms.  --son had fever a few days ago, but lasted just 1 day  --feeling very weak, dizzy.  Did pass out twice yesterday due to dizziness.  Did fall and her left leg hurts, -landed against bed.  Did not hit her head.  --Thinks she is aspirating at night.  --since illness onset, is getting worse  --is feeling 'out of it', 'not all there'      Insomnia  Onset: Follow up    Description:   Time to fall asleep (sleep latency): 5 hrs  Middle of night awakening:  YES  Early morning awakening:  YES    Progression of Symptoms:  worsening and constant    Accompanying Signs & Symptoms:  Daytime sleepiness/napping: no  Excessive snoring/apnea: no  Restless legs: YES  Frequent urination: YES  Chronic pain:  YES    History:  Prior Insomnia: YES    Precipitating factors:   New stressful situation: no  Caffeine intake: YES  OTC decongestants: no  Any new medications: no    Alleviating factors:Self medicating (alcohol, etc.):  No    Therapies Tried and outcome: na    We have discussed weaning down ambien for a  Year or more.  She saw Psych Milagro Moody and Pain Dr.belle Moraes who both agree with this plan.  Patient has been resistant, despite in appointments stating she wants to get off ambien.  She reports being surprised when Psych lowered her dose of ambien, that it  wasn't discussed w/her.          Current Outpatient Prescriptions   Medication Sig Dispense Refill     SYMBICORT 160-4.5 MCG/ACT Inhaler INHALE TWO PUFFS BY MOUTH TWICE A DAY 30.6 g 0     GOODSENSE MIGRAINE FORMULA 250-250-65 MG per tablet TAKE ONE TABLET BY MOUTH EVERY 6 HOURS AS NEEDED FOR HEADACHES 24 tablet 1     citalopram (CELEXA) 40 MG tablet Take 1 tablet (40 mg) by mouth daily 90 tablet 3     LORazepam (ATIVAN) 0.5 MG tablet Take 1 tablet (0.5 mg) by mouth 2 times daily as needed for anxiety 60 tablet 2     oxyCODONE IR (ROXICODONE) 15 MG tablet Take 1 tablet (15 mg) by mouth every 4 hours as needed for pain 120 tablet 0     ferrous gluconate (FERGON) 324 (38 FE) MG tablet TAKE ONE TABLET BY MOUTH EVERY DAY WITH BREAKFAST 100 tablet 3     lisinopril (PRINIVIL/ZESTRIL) 5 MG tablet Take 1 tablet (5 mg) by mouth daily 90 tablet 3     ranitidine (ZANTAC) 150 MG tablet Take 1 tablet (150 mg) by mouth 2 times daily as needed for heartburn 180 tablet 3     albuterol (VENTOLIN HFA) 108 (90 BASE) MCG/ACT Inhaler Inhale 2 puffs into the lungs every 4 hours as needed for shortness of breath / dyspnea or wheezing 1 Inhaler 11     omeprazole (PRILOSEC) 40 MG capsule Take 1 capsule (40 mg) by mouth 2 times daily 180 capsule 3     promethazine (PHENERGAN) 25 MG/ML IV injection Inject 25 mg into the vein every 6 hours as needed for nausea or vomiting       Cyanocobalamin (B-12) 1000 MCG TBCR Take 1,000 mcg by mouth daily 100 tablet 1     sucralfate (CARAFATE) 1 GM tablet Take 1 tablet (1 g) by mouth 4 times daily (Patient taking differently: Take 1 g by mouth 4 times daily as needed ) 120 tablet 11     polyethylene glycol (MIRALAX) powder Take 17 g (1 capful) by mouth daily 510 g 1     blood glucose monitoring (NO BRAND SPECIFIED) test strip Use to test blood sugars 3 times daily or as directed  Please give what is approved by insurance. 100 each 0     busPIRone (BUSPAR) 15 MG tablet Take 1 tablet (15 mg) by mouth 2 times  daily 60 tablet 3     zolpidem (AMBIEN) 5 MG tablet Take 1 tablet (5 mg) by mouth nightly as needed for sleep (Patient not taking: Reported on 3/29/2018) 30 tablet 0     gabapentin (NEURONTIN) 300 MG capsule Take 2 capsules (600 mg) by mouth 3 times daily 360 capsule 3     diphenhydrAMINE (BENADRYL) 25 MG tablet Take 1 tablet (25 mg) by mouth every 6 hours as needed for itching or allergies 56 tablet        Reviewed and updated as needed this visit by clinical staff  Tobacco  Allergies  Meds  Problems  Med Hx  Surg Hx  Fam Hx  Soc Hx        Reviewed and updated as needed this visit by Provider  Allergies  Meds  Problems         ROS:  Constitutional, HEENT, cardiovascular, pulmonary, gi and gu systems are negative, except as otherwise noted.    OBJECTIVE:     BP (!) 145/98 (BP Location: Right arm, Patient Position: Chair, Cuff Size: Adult Regular)  Pulse 139  Temp 100.5  F (38.1  C) (Tympanic)  Wt 175 lb 6 oz (79.5 kg)  SpO2 96%  Breastfeeding? No  BMI 32.08 kg/m2  Body mass index is 32.08 kg/(m^2).  GENERAL APPEARANCE: alert, no distress, fatigued and moderately ill.  Slurred speech, drowsy, eyes rolling in back of head frequently  EYES: Eyes grossly normal to inspection, PERRL and conjunctivae and sclerae normal  HENT: ear canals and TM's normal, nose and mouth without ulcers or lesions and skin tightening of face and mouth  NECK: no adenopathy, no asymmetry, masses, or scars, thyroid normal to palpation and scarring from trach and scleroderma  RESP: lungs clear to auscultation - no rales, rhonchi or wheezes and possible rhonchi in RLL  CV: normal S1 S2, no S3 or S4 and tachycardia  ABDOMEN: soft, obese, diffuse tenderness    Diagnostic Test Results:  Results for orders placed or performed in visit on 03/29/18 (from the past 24 hour(s))   Rapid strep screen   Result Value Ref Range    Specimen Description Throat     Rapid Strep A Screen       NEGATIVE: No Group A streptococcal antigen detected by  immunoassay, await culture report.       ASSESSMENT/PLAN:     1. Throat pain - influenza vs strep vs viral vs due to vomiting.  - Rapid strep screen  - Beta strep group A culture    2. Fever - pyelonephritis vs viral vs influenza vs pneumonia.  Due to her scleroderma, she is high risk for serious illness.  She reports episodes of syncope, inability to keep fluids down, worsening of symptoms.  Sent to ER for IVF, anti-emetics and full work-up  - Influenza A/B antigen    3. Psychophysiological insomnia - return to clinic when well for further discussion.  ambien not filled today          Grecia Barba, DO  [facility]

## 2018-03-29 NOTE — ED PROVIDER NOTES
History     Chief Complaint   Patient presents with     Fever     Shortness of Breath     Pt has history of schleroderma.  Has had 4 day history of cough, fever, shortness of breath, wheeze, syncope at home, tachycardia of 140.  Coming from clinic.     HPI  Molly Teague is a 32 year old female who presents from clinic with 4 days of cough productive,  fever 102, tachycardia to heart rate of 140 in clinic. wheezing associated.  Also with associated upper respiratory symptoms including congestion.      fell down stairs yesterday when going to get water.  No obvious head injury.  Diplopia.    difficult to control her asthma over the last week - despite symbicort, albuterol. -  wheezing, shortness of breath    chronic constipation.  Chronic oxycodone - scleroderma related pain.    Problem List:    Patient Active Problem List    Diagnosis Date Noted     Acute pyelonephritis 06/09/2017     Priority: Medium     Sepsis (H) 06/08/2017     Priority: Medium     Chronic pain syndrome 04/26/2017     Priority: Medium     Patient is followed by Grecia Barba DO for ongoing prescription of pain medication.  All refills should be approved by this provider only at face-to-face appointments - not by phone request.    Medication(s): Oxycodone 15 mg.   Maximum quantity per month: 120  Clinic visit frequency required: Q 3 months     Controlled substance agreement:  Encounter-Level CSA - 04/26/2017:          Controlled Substance Agreement - Scan on 5/4/2017  8:58 AM : CONTROLLED SUBSTANCE AGREEMENT (below)              Pain Clinic evaluation in the past: No    DIRE Total Score(s):  No flowsheet data found.    Last [facility] website verification:  done on 4/26/17 9/26/2017     [email or web address]         Chronic migraine without aura without status migrainosus, not intractable 04/12/2017     Priority: Medium     With medication overuse.  Fioricet and not Imitrex is recommend to treat severe headache.  2/2017 [facility]  recommend wean down from daily Fioricet and use Excedrin Migrain PRN.       AIN grade I 03/30/2017     Priority: Medium     Found at [facility] on colonoscopy 2/2017.  Recommend repeat anoscopy and anal pap in 6/2017.       Gastroesophageal reflux disease with esophagitis 03/30/2017     Priority: Medium     EGD done at [facility] 2/2017 showed esophagitis without GAVE.  Recommend max dose H2 and PPI, along with 4 tablets of Carafate dissolved in water and drink throughout the day.    Had LA Grade D esophagitis        Limited systemic sclerosis (H) 01/25/2017     Priority: Medium     Raynaud's, calcinosis, telangiectasias, peripheral neuropathy, GERD symptoms, history of scleroderma renal crisis.  Saw [facility] 1/2017 and follows with Rheum Dr. Davis locally.       Polyneuropathy associated with underlying disease (H) 01/25/2017     Priority: Medium     Due to scleroderma and neurology thought possible due to ICU (critical illness neuropathy).  Recommend to max out dose of gabapentin to 3946-6734 mg/day, split BID or TID.  EMG 1/2017 positive for neuropathy       Moderate persistent asthma without complication 01/02/2017     Priority: Medium     History of Clostridium difficile 11/29/2016     Priority: Medium     History of Helicobacter pylori infection 11/29/2016     Priority: Medium     Scleroderma with renal involvement (H) 11/09/2016     Priority: Medium     Stopped lisinopril per [facility] 1/2017.  Restarted lisinopril per [facility] 3/2017       History of ARDS 11/09/2016     Priority: Medium     4/2015, prolonged ICU/vent/trach due to influenza, scleroderma renal crisis requiring hemodialysis.       Raynaud's disease without gangrene 11/09/2016     Priority: Medium     History of hemodialysis 11/09/2016     Priority: Medium     4/2015 due to scleroderma renal crisis       Bilateral retinitis 11/09/2016     Priority: Medium     IUD (intrauterine device) in place 11/09/2016     Priority: Medium     Other pulmonary embolism without  acute cor pulmonale, unspecified chronicity (H) 11/09/2016     Priority: Medium     Completed anti-coagulation.       REX (generalized anxiety disorder) 11/09/2016     Priority: Medium     CREST (calcinosis, Raynaud's phenomenon, esophageal dysfunction, sclerodactyly, telangiectasia) (H) 11/09/2016     Priority: Medium     Anxiety 10/03/2016     Priority: Medium     Scleroderma (H) 09/22/2016     Priority: Medium     Nausea with vomiting 09/21/2016     Priority: Medium     Long-term (current) use of anticoagulants [Z79.01] 09/09/2016     Priority: Medium        Past Medical History:    Past Medical History:   Diagnosis Date     Arthritis      Blood clotting disorder (H)      History of blood transfusion      Hypertension      Long-term (current) use of anticoagulants [Z79.01] 9/9/2016     PE (pulmonary embolism) 9/7/2016     Rheumatism      Scleroderma (H) 9/22/2016     Uncomplicated asthma        Past Surgical History:    No past surgical history on file.    Family History:    Family History   Problem Relation Age of Onset     Hyperlipidemia Father      CEREBROVASCULAR DISEASE Maternal Grandmother      Hyperlipidemia Paternal Grandfather      DIABETES Maternal Aunt      Melanoma No family hx of      Skin Cancer No family hx of        Social History:  Marital Status:  Single [1]  Social History   Substance Use Topics     Smoking status: Never Smoker     Smokeless tobacco: Never Used     Alcohol use No        Medications:      SYMBICORT 160-4.5 MCG/ACT Inhaler   GOODSENSE MIGRAINE FORMULA 250-250-65 MG per tablet   citalopram (CELEXA) 40 MG tablet   busPIRone (BUSPAR) 15 MG tablet   LORazepam (ATIVAN) 0.5 MG tablet   zolpidem (AMBIEN) 5 MG tablet   oxyCODONE IR (ROXICODONE) 15 MG tablet   ferrous gluconate (FERGON) 324 (38 FE) MG tablet   gabapentin (NEURONTIN) 300 MG capsule   lisinopril (PRINIVIL/ZESTRIL) 5 MG tablet   ranitidine (ZANTAC) 150 MG tablet   albuterol (VENTOLIN HFA) 108 (90 BASE) MCG/ACT Inhaler    omeprazole (PRILOSEC) 40 MG capsule   promethazine (PHENERGAN) 25 MG/ML IV injection   Cyanocobalamin (B-12) 1000 MCG TBCR   sucralfate (CARAFATE) 1 GM tablet   polyethylene glycol (MIRALAX) powder   blood glucose monitoring (NO BRAND SPECIFIED) test strip   diphenhydrAMINE (BENADRYL) 25 MG tablet         Review of Systems   Constitutional: Positive for chills, diaphoresis and fever.   HENT: Positive for congestion and sinus pressure. Negative for ear pain and sore throat.    Eyes: Negative for visual disturbance.   Respiratory: Positive for cough, shortness of breath and wheezing.    Cardiovascular: Positive for palpitations. Negative for chest pain.   Gastrointestinal: Positive for constipation, nausea and vomiting. Negative for abdominal pain, blood in stool and diarrhea.   Genitourinary: Negative for dysuria, frequency and urgency.   Musculoskeletal: Positive for myalgias.   Skin: Negative for rash.   Neurological: Positive for syncope, light-headedness and headaches.   All other systems reviewed and are negative.        Physical Exam   BP: (!) 128/93  Heart Rate: 130  Temp: 99.2  F (37.3  C)  Resp: 20  SpO2: 97 %      Physical Exam   Constitutional: She is oriented to person, place, and time. She appears listless. She appears distressed.   HENT:   Mouth/Throat: Oropharynx is clear and moist.   Eyes: Conjunctivae are normal.   Neck: Neck supple.   Cardiovascular: Normal rate.  Exam reveals no gallop and no friction rub.    No murmur heard.  Pulmonary/Chest: Effort normal. No respiratory distress. She has no wheezes. She has no rales.   Abdominal: She exhibits no distension. There is no tenderness. There is no rebound.   Musculoskeletal: She exhibits no edema.   Neurological: She is oriented to person, place, and time. She appears listless. No cranial nerve deficit or sensory deficit. She exhibits normal muscle tone. She displays no seizure activity. Coordination normal. GCS eye subscore is 4. GCS verbal  subscore is 5. GCS motor subscore is 6.   Skin: No rash noted. She is not diaphoretic.   Psychiatric: Her speech is delayed. Her speech is not slurred. She is slowed.       ED Course     ED Course     Procedures                 EKG Interpretation:      Interpreted by Joshua Ross MD  EKG done at 750 hours demonstrates a sinus rhythm at 126 bpm with a normal axis and no ST change.  No T-wave changes.  Normal R progression and no Q waves.  Normal intervals.  Normal conduction.  No ectopy.  Impression sinus rhythm 126 bpm and no change from EKG done 9/21/2016.    Critical Care time:  none               Results for orders placed or performed during the hospital encounter of 03/29/18 (from the past 24 hour(s))   CBC with platelets differential   Result Value Ref Range    WBC 14.1 (H) 4.0 - 11.0 10e9/L    RBC Count 4.38 3.8 - 5.2 10e12/L    Hemoglobin 12.1 11.7 - 15.7 g/dL    Hematocrit 38.0 35.0 - 47.0 %    MCV 87 78 - 100 fl    MCH 27.6 26.5 - 33.0 pg    MCHC 31.8 31.5 - 36.5 g/dL    RDW 16.0 (H) 10.0 - 15.0 %    Platelet Count 205 150 - 450 10e9/L    Diff Method Automated Method     % Neutrophils 83.2 %    % Lymphocytes 8.2 %    % Monocytes 6.5 %    % Eosinophils 1.6 %    % Basophils 0.2 %    % Immature Granulocytes 0.3 %    Absolute Neutrophil 11.7 (H) 1.6 - 8.3 10e9/L    Absolute Lymphocytes 1.2 0.8 - 5.3 10e9/L    Absolute Monocytes 0.9 0.0 - 1.3 10e9/L    Absolute Eosinophils 0.2 0.0 - 0.7 10e9/L    Absolute Basophils 0.0 0.0 - 0.2 10e9/L    Abs Immature Granulocytes 0.0 0 - 0.4 10e9/L   Comprehensive metabolic panel   Result Value Ref Range    Sodium 137 133 - 144 mmol/L    Potassium 3.9 3.4 - 5.3 mmol/L    Chloride 106 94 - 109 mmol/L    Carbon Dioxide 22 20 - 32 mmol/L    Anion Gap 9 3 - 14 mmol/L    Glucose 113 (H) 70 - 99 mg/dL    Urea Nitrogen 20 7 - 30 mg/dL    Creatinine 1.11 (H) 0.52 - 1.04 mg/dL    GFR Estimate 57 (L) >60 mL/min/1.7m2    GFR Estimate If Black 69 >60 mL/min/1.7m2    Calcium 8.2 (L) 8.5  - 10.1 mg/dL    Bilirubin Total 0.2 0.2 - 1.3 mg/dL    Albumin 3.8 3.4 - 5.0 g/dL    Protein Total 8.0 6.8 - 8.8 g/dL    Alkaline Phosphatase 97 40 - 150 U/L    ALT 24 0 - 50 U/L    AST 36 0 - 45 U/L   Lactic acid whole blood   Result Value Ref Range    Lactic Acid 1.6 0.7 - 2.0 mmol/L   D dimer quantitative   Result Value Ref Range    D Dimer 0.5 0.0 - 0.50 ug/ml FEU   Head CT w/o contrast    Narrative    CT HEAD W/O CONTRAST  3/29/2018 10:07 AM    HISTORY: Fell. Double vision.    TECHNIQUE: Scans were obtained through the head without IV contrast.   Radiation dose for this scan was reduced using automated exposure  control, adjustment of the mA and/or kV according to patient size, or  iterative reconstruction technique.    COMPARISON: None.    FINDINGS: No hemorrhage, mass lesion, or focal area of acute  infarction identified. Paranasal sinuses are normal. No bony  abnormality.      Impression    IMPRESSION: Negative CT scan of the head.    PER FERREIRA MD   Chest XR,  PA & LAT    Narrative    XR CHEST 2 VW 3/29/2018 10:16 AM    HISTORY: Short of breath.    COMPARISON: 1/27/2018.      Impression    IMPRESSION: 2 views of the chest show low lung volumes. No acute or  active cardiopulmonary disease is demonstrated.     PER WHEATLEY MD   UA with Microscopic   Result Value Ref Range    Color Urine Yellow     Appearance Urine Clear     Glucose Urine Negative NEG^Negative mg/dL    Bilirubin Urine Negative NEG^Negative    Ketones Urine Negative NEG^Negative mg/dL    Specific Gravity Urine 1.019 1.003 - 1.035    Blood Urine Small (A) NEG^Negative    pH Urine 5.0 5.0 - 7.0 pH    Protein Albumin Urine 30 (A) NEG^Negative mg/dL    Urobilinogen mg/dL 0.0 0.0 - 2.0 mg/dL    Nitrite Urine Negative NEG^Negative    Leukocyte Esterase Urine Negative NEG^Negative    Source Midstream Urine     WBC Urine 3 0 - 5 /HPF    RBC Urine 5 (H) 0 - 2 /HPF    Squamous Epithelial /HPF Urine 1 0 - 1 /HPF    Mucous Urine Present (A)  NEG^Negative /LPF       Medications   HYDROmorphone (PF) (DILAUDID) injection 0.5 mg (0.5 mg Intravenous Given 3/29/18 1126)   naloxone (NARCAN) injection 0.1-0.4 mg (not administered)   oxyCODONE IR (ROXICODONE) tablet 5-10 mg (not administered)   acetaminophen (TYLENOL) tablet 650 mg (not administered)   ibuprofen (ADVIL/MOTRIN) tablet 600 mg (not administered)   sodium chloride 0.9% infusion ( Intravenous ED Infusing on Admission/transfer 3/29/18 1217)   busPIRone (BUSPAR) tablet 15 mg (not administered)   citalopram (celeXA) tablet 40 mg (not administered)   gabapentin (NEURONTIN) capsule 600 mg (600 mg Oral Given 3/29/18 1337)   ranitidine (ZANTAC) tablet 150 mg (not administered)   budesonide-formoterol (SYMBICORT) 160-4.5 MCG/ACT Inhaler 2 puff (not administered)   omeprazole (priLOSEC) CR capsule 40 mg (40 mg Oral Given 3/29/18 1337)   0.9% sodium chloride BOLUS (0 mLs Intravenous Stopped 3/29/18 1101)   promethazine (PHENERGAN) IV injection 12.5 mg (12.5 mg Intravenous Given 3/29/18 0933)   ketorolac (TORADOL) injection 15 mg (15 mg Intravenous Given 3/29/18 0931)   0.9% sodium chloride BOLUS (0 mLs Intravenous Stopped 3/29/18 1203)       Assessments & Plan (with Medical Decision Making)     MDM: Molly Teague is a 32 year old female presented from clinic with influenza-like illness for the last 4-5 days with symptoms related to her asthma and history fall downstairs yesterday when she had a near syncopal versus syncopal episode.  She denies significant associated injury but noted some diplopia and headache.. Also with history of chronic pain.  Her responses were slightly delayed here, but a nonfocal neurologic exam and a persistent sinus tachycardia despite 2 L of IV fluid administered.   Thyroid testing has been added to her labs.  D-dimer is normal.  EKG normal other than sinus tachycardia.    She continues to feel weak and would benefit from IV hydration and with her recent fall would be safer to be  observed here.  I discussed with Dr. Macedo's who accepts for observation.         ED to Inpatient Handoff:    Discussed with Aime   Patient accepted for Observation Stay  Pending studies include none  Code Status: Full Code             I have reviewed the nursing notes.    I have reviewed the findings, diagnosis, plan and need for follow up with the patient.       New Prescriptions    No medications on file       Final diagnoses:   Syncope and collapse   Influenza-like illness   Moderate persistent asthma without complication   Scleroderma with renal involvement (H)   Fall down stairs, initial encounter   Sinus tachycardia       3/29/2018   [facility] EMERGENCY DEPARTMENT     Joshua Ross MD  03/29/18 [id number]

## 2018-03-29 NOTE — IP AVS SNAPSHOT
MRN:[id number]                      After Visit Summary   3/29/2018    Molly Teague    MRN: [id number]           Thank you!     Thank you for choosing [facility] for your care. Our goal is always to provide you with excellent care. Hearing back from our patients is one way we can continue to improve our services. Please take a few minutes to complete the written survey that you may receive in the mail after you visit with us. Thank you!        Patient Information     Date Of Birth          1985        Designated Caregiver       Most Recent Value    Caregiver    Will someone help with your care after discharge? no      About your hospital stay     You were admitted on:  March 29, 2018 You last received care in the:  [facility]    You were discharged on:  April 3, 2018       Who to Call     For medical emergencies, please call 911.  For non-urgent questions about your medical care, please call your primary care provider or clinic, [phone]          Attending Provider     Provider Specialty    Joshua Ross MD [facility]    [facility], Selvin Nielson MD Family [facility]    [facility], Efra PATEL MD [facility]       Primary Care Provider Office Phone # Fax #    Grecia Barba [phone]      Your next 10 appointments already scheduled     Apr 06, 2018  2:00 PM CDT   SHORT with Grecia Barba DO   [facility]    [address] [address]   [phone]            Apr 11, 2018  9:30 AM CDT   Return Visit with Selvin Kenney   [facility] ([facility])    [address] [address]   [phone]              Pending Results     Date and Time Order Name Status Description    3/29/2018 2020 Blood culture Preliminary     3/29/2018 2020 Blood culture Preliminary     3/29/2018 1753 CSF Culture Aerobic Bacterial Preliminary     3/29/2018 1037  "Blood culture Preliminary     3/29/2018 1037 Blood culture Preliminary             Statement of Approval     Ordered          18 1140  I have reviewed and agree with all the recommendations and orders detailed in this document.  EFFECTIVE NOW     Approved and electronically signed by:  Efra Farrell MD             Admission Information     Date & Time Provider Department Dept. Phone    3/29/2018 Efra Farerll MD [facility] [phone]      Your Vitals Were     Blood Pressure Pulse Temperature Respirations Height Weight    111/72 (BP Location: Left arm) 87 98.3  F (36.8  C) (Oral) 16 1.6 m (5' 3\") 80.2 kg (176 lb 12.9 oz)    Pulse Oximetry BMI (Body Mass Index)                95% 31.32 kg/m2          MyChart Information     [email or web address] lets you send messages to your doctor, view your test results, renew your prescriptions, schedule appointments and more. To sign up, go to [email or web address] . Click on \"Log in\" on the left side of the screen, which will take you to the Welcome page. Then click on \"Sign up Now\" on the right side of the page.     You will be asked to enter the access code listed below, as well as some personal information. Please follow the directions to create your username and password.     Your access code is: [email or web address]  Expires: 2018  3:37 PM     Your access code will  in 90 days. If you need help or a new code, please call your [facility] clinic or [phone].        Care EveryWhere ID     This is your Care EveryWhere ID. This could be used by other organizations to access your [facility] medical records  [id number]        Equal Access to Services     PARISH COOK : Hadii rosie Aguiar, jeffy watson, martin self. So [facility] [phone].    ATENCIÓN: Si habla español, tiene a bradley disposición servicios gratuitos de asistencia lingüística. Llame al [phone].    We comply with " applicable federal civil rights laws and [place] laws. We do not discriminate on the basis of race, color, national origin, age, disability, sex, sexual orientation, or gender identity.               Review of your medicines      START taking        Dose / Directions    Acidophilus Lactobacillus Caps   Used for:  Aspiration pneumonia, unspecified aspiration pneumonia type, unspecified laterality, unspecified part of lung (H)        Dose:  1 tablet   Take 1 tablet by mouth 3 times daily (before meals) for 6 days   Quantity:  18 capsule   Refills:  0       amoxicillin-clavulanate 875-125 MG per tablet   Commonly known as:  AUGMENTIN   Used for:  Aspiration pneumonia, unspecified aspiration pneumonia type, unspecified laterality, unspecified part of lung (H)   Notes to Patient:  Take dose this evening, you had IV antibiotics this morning        Dose:  1 tablet   Take 1 tablet by mouth 2 times daily for 6 days   Quantity:  12 tablet   Refills:  0         CONTINUE these medicines which may have CHANGED, or have new prescriptions. If we are uncertain of the size of tablets/capsules you have at home, strength may be listed as something that might have changed.        Dose / Directions    sucralfate 1 GM tablet   Commonly known as:  CARAFATE   This may have changed:    - when to take this  - reasons to take this   Used for:  Limited systemic sclerosis (H)        Dose:  1 g   Take 1 tablet (1 g) by mouth 4 times daily   Quantity:  120 tablet   Refills:  11         CONTINUE these medicines which have NOT CHANGED        Dose / Directions    albuterol 108 (90 BASE) MCG/ACT Inhaler   Commonly known as:  VENTOLIN HFA   Used for:  Moderate persistent asthma without complication        Dose:  2 puff   Inhale 2 puffs into the lungs every 4 hours as needed for shortness of breath / dyspnea or wheezing   Quantity:  1 Inhaler   Refills:  11       B-12 1000 MCG Tbcr   Used for:  Vitamin B12 deficiency (non anemic)        Dose:  1000  mcg   Take 1,000 mcg by mouth daily   Quantity:  100 tablet   Refills:  1       BENADRYL 25 MG tablet   Generic drug:  diphenhydrAMINE        Dose:  25 mg   Take 1 tablet (25 mg) by mouth every 6 hours as needed for itching or allergies   Quantity:  56 tablet   Refills:  0       blood glucose monitoring test strip   Commonly known as:  no brand specified   Used for:  Hypoglycemia        Use to test blood sugars 3 times daily or as directed Please give what is approved by insurance.   Quantity:  100 each   Refills:  0       busPIRone 15 MG tablet   Commonly known as:  BUSPAR   Used for:  REX (generalized anxiety disorder)        Dose:  15 mg   Take 1 tablet (15 mg) by mouth 2 times daily   Quantity:  60 tablet   Refills:  3       citalopram 40 MG tablet   Commonly known as:  celeXA   Used for:  REX (generalized anxiety disorder)        Dose:  40 mg   Take 1 tablet (40 mg) by mouth daily   Quantity:  90 tablet   Refills:  3       ferrous gluconate 324 (38 FE) MG tablet   Commonly known as:  FERGON   Used for:  CREST (calcinosis, Raynaud's phenomenon, esophageal dysfunction, sclerodactyly, telangiectasia) (H)        TAKE ONE TABLET BY MOUTH EVERY DAY WITH BREAKFAST   Quantity:  100 tablet   Refills:  3       gabapentin 300 MG capsule   Commonly known as:  NEURONTIN   Used for:  Limited systemic sclerosis (H)        Dose:  600 mg   Take 2 capsules (600 mg) by mouth 3 times daily   Quantity:  360 capsule   Refills:  3       GOODSENSE MIGRAINE FORMULA 250-250-65 MG per tablet   Used for:  Chronic daily headache   Generic drug:  aspirin-acetaminophen-caffeine        TAKE ONE TABLET BY MOUTH EVERY 6 HOURS AS NEEDED FOR HEADACHES   Quantity:  24 tablet   Refills:  1       lisinopril 5 MG tablet   Commonly known as:  PRINIVIL/ZESTRIL   Used for:  CREST (calcinosis, Raynaud's phenomenon, esophageal dysfunction, sclerodactyly, telangiectasia) (H)        Dose:  5 mg   Take 1 tablet (5 mg) by mouth daily   Quantity:  90 tablet    Refills:  3       LORazepam 0.5 MG tablet   Commonly known as:  ATIVAN   Used for:  REX (generalized anxiety disorder)        Dose:  0.5 mg   Take 1 tablet (0.5 mg) by mouth 2 times daily as needed for anxiety   Quantity:  60 tablet   Refills:  2       omeprazole 40 MG capsule   Commonly known as:  priLOSEC   Used for:  CREST (calcinosis, Raynaud's phenomenon, esophageal dysfunction, sclerodactyly, telangiectasia) (H), Gastroesophageal reflux disease with esophagitis        Dose:  40 mg   Take 1 capsule (40 mg) by mouth 2 times daily   Quantity:  180 capsule   Refills:  3       oxyCODONE IR 15 MG tablet   Commonly known as:  ROXICODONE   Used for:  Chronic pain syndrome        Dose:  15 mg   Take 1 tablet (15 mg) by mouth every 4 hours as needed for pain   Quantity:  120 tablet   Refills:  0       polyethylene glycol powder   Commonly known as:  MIRALAX   Used for:  Drug-induced constipation        Dose:  1 capful   Take 17 g (1 capful) by mouth daily   Quantity:  510 g   Refills:  1       promethazine 25 MG/ML IV injection   Commonly known as:  PHENERGAN        Dose:  25 mg   Inject 25 mg into the vein every 6 hours as needed for nausea or vomiting   Refills:  0       ranitidine 150 MG tablet   Commonly known as:  ZANTAC   Used for:  CREST (calcinosis, Raynaud's phenomenon, esophageal dysfunction, sclerodactyly, telangiectasia) (H), Gastroesophageal reflux disease with esophagitis        Dose:  150 mg   Take 1 tablet (150 mg) by mouth 2 times daily as needed for heartburn   Quantity:  180 tablet   Refills:  3       SYMBICORT 160-4.5 MCG/ACT Inhaler   Used for:  Moderate persistent asthma without complication   Generic drug:  budesonide-formoterol        INHALE TWO PUFFS BY MOUTH TWICE A DAY   Quantity:  30.6 g   Refills:  0            Where to get your medicines      These medications were sent to [facility] [place], [place] - [address]  [address] [address]     Phone:   [phone]     Acidophilus Lactobacillus Caps    amoxicillin-clavulanate 875-125 MG per tablet                Protect others around you: Learn how to safely use, store and throw away your medicines at www.disposemymeds.org.        ANTIBIOTIC INSTRUCTION     You've Been Prescribed an Antibiotic - Now What?  Your healthcare team thinks that you or your loved one might have an infection. Some infections can be treated with antibiotics, which are powerful, life-saving drugs. Like all medications, antibiotics have side effects and should only be used when necessary. There are some important things you should know about your antibiotic treatment.      Your healthcare team may run tests before you start taking an antibiotic.    Your team may take samples (e.g., from your blood, urine or other areas) to run tests to look for bacteria. These test can be important to determine if you need an antibiotic at all and, if you do, which antibiotic will work best.      Within a few days, your healthcare team might change or even stop your antibiotic.    Your team may start you on an antibiotic while they are working to find out what is making you sick.    Your team might change your antibiotic because test results show that a different antibiotic would be better to treat your infection.    In some cases, once your team has more information, they learn that you do not need an antibiotic at all. They may find out that you don't have an infection, or that the antibiotic you're taking won't work against your infection. For example, an infection caused by a virus can't be treated with antibiotics. Staying on an antibiotic when you don't need it is more likely to be harmful than helpful.      You may experience side effects from your antibiotic.    Like all medications, antibiotics have side effects. Some of these can be serious.    Let you healthcare team know if you have any known allergies when you are admitted to the  hospital.    One significant side effect of nearly all antibiotics is the risk of severe and sometimes deadly diarrhea caused by Clostridium difficile (C. Difficile). This occurs when a person takes antibiotics because some good germs are destroyed. Antibiotic use allows C. diificile to take over, putting patients at high risk for this serious infection.    As a patient or caregiver, it is important to understand your or your loved one's antibiotic treatment. It is especially important for caregivers to speak up when patients can't speak for themselves. Here are some important questions to ask your healthcare team.    What infection is this antibiotic treating and how do you know I have that infection?    What side effects might occur from this antibiotic?    How long will I need to take this antibiotic?    Is it safe to take this antibiotic with other medications or supplements (e.g., vitamins) that I am taking?     Are there any special directions I need to know about taking this antibiotic? For example, should I take it with food?    How will I be monitored to know whether my infection is responding to the antibiotic?    What tests may help to make sure the right antibiotic is prescribed for me?      Information provided by:  www.cdc.gov/getsmart  U.S. Department of Health and Human Services  Centers for disease Control and Prevention  National Center for Emerging and Zoonotic Infectious Diseases  Division of Healthcare Quality Promotion             Medication List: This is a list of all your medications and when to take them. Check marks below indicate your daily home schedule. Keep this list as a reference.      Medications           Morning Afternoon Evening Bedtime As Needed    Acidophilus Lactobacillus Caps   Take 1 tablet by mouth 3 times daily (before meals) for 6 days                                         albuterol 108 (90 BASE) MCG/ACT Inhaler   Commonly known as:  VENTOLIN HFA   Inhale 2 puffs into  the lungs every 4 hours as needed for shortness of breath / dyspnea or wheezing   Last time this was given:  2 puffs on 4/3/2018  8:55 AM                                   amoxicillin-clavulanate 875-125 MG per tablet   Commonly known as:  AUGMENTIN   Take 1 tablet by mouth 2 times daily for 6 days   Notes to Patient:  Take dose this evening, you had IV antibiotics this morning                                      B-12 1000 MCG Tbcr   Take 1,000 mcg by mouth daily                                   BENADRYL 25 MG tablet   Take 1 tablet (25 mg) by mouth every 6 hours as needed for itching or allergies   Generic drug:  diphenhydrAMINE                                   blood glucose monitoring test strip   Commonly known as:  no brand specified   Use to test blood sugars 3 times daily or as directed Please give what is approved by insurance.                                busPIRone 15 MG tablet   Commonly known as:  BUSPAR   Take 1 tablet (15 mg) by mouth 2 times daily   Last time this was given:  15 mg on 4/3/2018  8:04 AM                                      citalopram 40 MG tablet   Commonly known as:  celeXA   Take 1 tablet (40 mg) by mouth daily   Last time this was given:  40 mg on 4/3/2018  8:06 AM                                   ferrous gluconate 324 (38 FE) MG tablet   Commonly known as:  FERGON   TAKE ONE TABLET BY MOUTH EVERY DAY WITH BREAKFAST   Last time this was given:  324 mg on 4/3/2018  8:04 AM                                   gabapentin 300 MG capsule   Commonly known as:  NEURONTIN   Take 2 capsules (600 mg) by mouth 3 times daily   Last time this was given:  600 mg on 4/3/2018  8:05 AM                                         GOODSENSE MIGRAINE FORMULA 250-250-65 MG per tablet   TAKE ONE TABLET BY MOUTH EVERY 6 HOURS AS NEEDED FOR HEADACHES   Generic drug:  aspirin-acetaminophen-caffeine                                   lisinopril 5 MG tablet   Commonly known as:  PRINIVIL/ZESTRIL   Take 1 tablet  (5 mg) by mouth daily   Last time this was given:  5 mg on 4/3/2018  8:06 AM                                   LORazepam 0.5 MG tablet   Commonly known as:  ATIVAN   Take 1 tablet (0.5 mg) by mouth 2 times daily as needed for anxiety   Last time this was given:  0.5 mg on 4/3/2018 12:41 AM                                   omeprazole 40 MG capsule   Commonly known as:  priLOSEC   Take 1 capsule (40 mg) by mouth 2 times daily   Last time this was given:  40 mg on 4/3/2018  8:05 AM                                      oxyCODONE IR 15 MG tablet   Commonly known as:  ROXICODONE   Take 1 tablet (15 mg) by mouth every 4 hours as needed for pain   Last time this was given:  15 mg on 4/3/2018  6:38 AM                                   polyethylene glycol powder   Commonly known as:  MIRALAX   Take 17 g (1 capful) by mouth daily                                   promethazine 25 MG/ML IV injection   Commonly known as:  PHENERGAN   Inject 25 mg into the vein every 6 hours as needed for nausea or vomiting   Last time this was given:  25 mg on 4/2/2018 10:53 PM                                ranitidine 150 MG tablet   Commonly known as:  ZANTAC   Take 1 tablet (150 mg) by mouth 2 times daily as needed for heartburn   Last time this was given:  150 mg on 3/30/2018  2:30 AM                                   sucralfate 1 GM tablet   Commonly known as:  CARAFATE   Take 1 tablet (1 g) by mouth 4 times daily                                            SYMBICORT 160-4.5 MCG/ACT Inhaler   INHALE TWO PUFFS BY MOUTH TWICE A DAY   Last time this was given:  2 puffs on 4/3/2018  8:08 AM   Generic drug:  budesonide-formoterol

## 2018-03-29 NOTE — PLAN OF CARE
Problem: Patient Care Overview  Goal: Plan of Care/Patient Progress Review  Outcome: Improving  [place] [facility] ADMISSION NOTE    Patient admitted to room 2309 at approximately 1225 via cart from emergency room. Patient was accompanied by transport tech.     Verbal SBAR report received from Angella prior to patient arrival.     Patient ambulated to bed with stand-by assist. Patient alert and oriented X 3. Pain is controlled with IV dilaudid.  . Admission vital signs: Blood pressure 123/78, temperature 99.2  F (37.3  C), temperature source Oral, resp. rate 19, SpO2 96 %, not currently breastfeeding. Patient was oriented to plan of care, call light, bed controls, tv, telephone, bathroom and visiting hours.     Risk Assessment    The following safety risks were identified during admission: fall. Yellow risk band applied: YES.     Skin Initial Assessment    This writer admitted this patient and completed a full skin assessment and David score in the Adult PCS flowsheet. Appropriate interventions initiated as needed.              Thania Lr

## 2018-03-29 NOTE — PROGRESS NOTES
3:29 PM  Pt more tachypneic.  Not hypoxic.  Small emesis  Hr is high  -will check CT PE study to ro PE and occult PN.  -will give LR one liter  -phenergan iv    3:34 PM   Signed out to Dr Veloz  He will check chest CT  If pt looks worse or have advancing oxygen needs--would consider transfer to ICU.    4:26 PM  More confused  Will proceed with mri mra stroke protocol  Discussed with Dr Veloz  If above unrevealing -may need to procede with LP  Pt is oriented to hospital currently and sleepy.  Creat 1.1--I think pt needs above studies urgently-will procede.

## 2018-03-29 NOTE — PLAN OF CARE
Problem: Patient Care Overview  Goal: Plan of Care/Patient Progress Review  Outcome: No Change  Patient nauseated and had 50 ml brown emesis. Temperature 100.6, , RR 36-40. Patient reports phenergan is only med that works for nausea. Patient reports chest pain but feels it is acid reflux, worse than usual. Patient put on 2L via nasal cannula. Updated Dr. Salomon via deysi.

## 2018-03-29 NOTE — MR AVS SNAPSHOT
After Visit Summary   3/29/2018    Molly Teague    MRN: [id number]           Patient Information     Date Of Birth          1985        Visit Information        Provider Department      3/29/2018 6:40 AM Grecia Barba, DO [facility]        Today's Diagnoses     Throat pain    -  1    Fever        Psychophysiological insomnia          Care Instructions    1. Come back next week for refills on Oxycodone, sleep discussion  2. Keep appointment with Dr. Kenney    Long term use of ambien can worsen sleep, where your sleep becomes 'dependent' on it.  It can also cause abnormal behaviors, such as shopping, eating or driving in the middle of the night. It also increases risk of accidental overdose when on multiple other sedating medications such as ativan and oxycodone.      Sleep Hygiene    1. Avoid doing anything stressful in the hour before bedtime. Avoid paying bills, watching politics on the news, etc.  2. Avoid screens just before bedtime. This includes cell phones, iPad, computers, TV. The bright lights on the screen is very stimulating to the brain, and makes it difficult to follow asleep and stay asleep  3. Avoid alcohol. Alcohol can sometimes make it easier to fall asleep, but significantly reduces the chance that you will stay asleep. It also impairs REM sleep, which is the good restful sleep  4. Use the bed for sleep and sex only. Avoid eating, reading, watching TV in bed  5. If you feel very restless at bedtime, try doing mundane physical activities such as vacuuming, folding laundry, etc.  6. If you find yourself making lists in your head at night, keep pen and paper at the bedside. Write down these lists. Also visualize yourself checking that item off your list and removing it from your brain.  7. Keep the room cool and dark. Consider investing in late darkening curtains  8. Avoid drinking excessive fluids just before bedtime. Empty your bladder just before  "bedtime  9. Cognitive behavioral therapy has been proven to be highly effective to treat insomnia. There are several online modules that you can complete for a fee.  Http://shuti.me  Or [email or web address].com             Follow-ups after your visit        Your next 10 appointments already scheduled     2018  9:30 AM CDT   Return Visit with Selvin AMANDA Pablito   [facility] ([facility])    [address] [address] [phone]              Who to contact     If you have questions or need follow up information about today's clinic visit or your schedule please contact [facility] directly at [phone].  Normal or non-critical lab and imaging results will be communicated to you by [employer], letter or phone within 4 business days after the clinic has received the results. If you do not hear from us within 7 days, please contact the clinic through [employer] or phone. If you have a critical or abnormal lab result, we will notify you by phone as soon as possible.  Submit refill requests through [employer] or call your pharmacy and they will forward the refill request to us. Please allow 3 business days for your refill to be completed.          Additional Information About Your Visit        MyChart Information     [employer] lets you send messages to your doctor, view your test results, renew your prescriptions, schedule appointments and more. To sign up, go to [email or web address] . Click on \"Log in\" on the left side of the screen, which will take you to the Welcome page. Then click on \"Sign up Now\" on the right side of the page.     You will be asked to enter the access code listed below, as well as some personal information. Please follow the directions to create your username and password.     Your access code is: [email or web address]  Expires: 2018  3:37 PM     Your access code will  in 90 days. If you need help or a new code, please call your [facility] " or [phone].        Care EveryWhere ID     This is your Care EveryWhere ID. This could be used by other organizations to access your [facility] medical records  [id number]        Your Vitals Were     Pulse Temperature Pulse Oximetry Breastfeeding? BMI (Body Mass Index)       139 100.5  F (38.1  C) (Tympanic) 96% No 32.08 kg/m2        Blood Pressure from Last 3 Encounters:   03/29/18 (!) 145/98   01/31/18 119/84   01/27/18 117/75    Weight from Last 3 Encounters:   03/29/18 175 lb 6 oz (79.5 kg)   01/31/18 165 lb (74.8 kg)   01/27/18 165 lb (74.8 kg)              We Performed the Following     Beta strep group A culture     Influenza A/B antigen     Rapid strep screen          Today's Medication Changes          These changes are accurate as of 3/29/18  7:24 AM.  If you have any questions, ask your nurse or doctor.               These medicines have changed or have updated prescriptions.        Dose/Directions    sucralfate 1 GM tablet   Commonly known as:  CARAFATE   This may have changed:    - when to take this  - reasons to take this   Used for:  Limited systemic sclerosis (H)        Dose:  1 g   Take 1 tablet (1 g) by mouth 4 times daily   Quantity:  120 tablet   Refills:  11                Primary Care Provider Office Phone # Fax #    Grecia BarbaDO [phone] [address] [address]        Equal Access to Services     PARISH COOK AH: Hadii rosie stephens Sobrayden, waaxda luqadaha, qaybta kaalmada rose mary, martin woods. So [facility] [phone].    ATENCIÓN: Si habla español, tiene a bradley disposición servicios gratuitos de asistencia lingüística. Llame al [phone].    We comply with applicable federal civil rights laws and [place] laws. We do not discriminate on the basis of race, color, national origin, age, disability, sex, sexual orientation, or gender identity.            Thank you!     Thank you for choosing [facility]  [facility]  for your [facility]. Our goal is always to provide you with excellent care. Hearing back from our patients is one way we can continue to improve our services. Please take a few minutes to complete the written survey that you may receive in the mail after your visit with us. Thank you!             Your Updated Medication List - Protect others around you: Learn how to safely use, store and throw away your medicines at www.disposemymeds.org.          This list is accurate as of 3/29/18  7:24 AM.  Always use your most recent med list.                   Brand Name Dispense Instructions for use Diagnosis    albuterol 108 (90 BASE) MCG/ACT Inhaler    VENTOLIN HFA    1 Inhaler    Inhale 2 puffs into the lungs every 4 hours as needed for shortness of breath / dyspnea or wheezing    Moderate persistent asthma without complication       B-12 1000 MCG Tbcr     100 tablet    Take 1,000 mcg by mouth daily    Vitamin B12 deficiency (non anemic)       BENADRYL 25 MG tablet   Generic drug:  diphenhydrAMINE     56 tablet    Take 1 tablet (25 mg) by mouth every 6 hours as needed for itching or allergies        blood glucose monitoring test strip    no brand specified    100 each    Use to test blood sugars 3 times daily or as directed Please give what is approved by insurance.    Hypoglycemia       busPIRone 15 MG tablet    BUSPAR    60 tablet    Take 1 tablet (15 mg) by mouth 2 times daily    REX (generalized anxiety disorder)       citalopram 40 MG tablet    celeXA    90 tablet    Take 1 tablet (40 mg) by mouth daily    REX (generalized anxiety disorder)       ferrous gluconate 324 (38 FE) MG tablet    FERGON    100 tablet    TAKE ONE TABLET BY MOUTH EVERY DAY WITH BREAKFAST    CREST (calcinosis, Raynaud's phenomenon, esophageal dysfunction, sclerodactyly, telangiectasia) (H)       gabapentin 300 MG capsule    NEURONTIN    360 capsule    Take 2 capsules (600 mg) by mouth 3 times daily    Limited systemic sclerosis (H)        GOODSENSE MIGRAINE FORMULA 250-250-65 MG per tablet   Generic drug:  aspirin-acetaminophen-caffeine     24 tablet    TAKE ONE TABLET BY MOUTH EVERY 6 HOURS AS NEEDED FOR HEADACHES    Chronic daily headache       lisinopril 5 MG tablet    PRINIVIL/ZESTRIL    90 tablet    Take 1 tablet (5 mg) by mouth daily    CREST (calcinosis, Raynaud's phenomenon, esophageal dysfunction, sclerodactyly, telangiectasia) (H)       LORazepam 0.5 MG tablet    ATIVAN    60 tablet    Take 1 tablet (0.5 mg) by mouth 2 times daily as needed for anxiety    REX (generalized anxiety disorder)       omeprazole 40 MG capsule    priLOSEC    180 capsule    Take 1 capsule (40 mg) by mouth 2 times daily    CREST (calcinosis, Raynaud's phenomenon, esophageal dysfunction, sclerodactyly, telangiectasia) (H), Gastroesophageal reflux disease with esophagitis       oxyCODONE IR 15 MG tablet    ROXICODONE    120 tablet    Take 1 tablet (15 mg) by mouth every 4 hours as needed for pain    Chronic pain syndrome       polyethylene glycol powder    MIRALAX    510 g    Take 17 g (1 capful) by mouth daily    Drug-induced constipation       promethazine 25 MG/ML IV injection    PHENERGAN     Inject 25 mg into the vein every 6 hours as needed for nausea or vomiting        ranitidine 150 MG tablet    ZANTAC    180 tablet    Take 1 tablet (150 mg) by mouth 2 times daily as needed for heartburn    CREST (calcinosis, Raynaud's phenomenon, esophageal dysfunction, sclerodactyly, telangiectasia) (H), Gastroesophageal reflux disease with esophagitis       sucralfate 1 GM tablet    CARAFATE    120 tablet    Take 1 tablet (1 g) by mouth 4 times daily    Limited systemic sclerosis (H)       SYMBICORT 160-4.5 MCG/ACT Inhaler   Generic drug:  budesonide-formoterol     30.6 g    INHALE TWO PUFFS BY MOUTH TWICE A DAY    Moderate persistent asthma without complication       zolpidem 5 MG tablet    AMBIEN    30 tablet    Take 1 tablet (5 mg) by mouth nightly as needed for sleep     Other insomnia

## 2018-03-29 NOTE — PROGRESS NOTES
Increased confusion, lethargy. Dr Salomon notified. Orders to transfer to ICU. LR bolus infusing. Attempting to start 18guage IV for CT use.

## 2018-03-29 NOTE — NURSING NOTE
"Chief Complaint   Patient presents with     Insomnia     follow up     URI     4 dasys.       Initial BP (!) 145/98 (BP Location: Right arm, Patient Position: Chair, Cuff Size: Adult Regular)  Pulse 139  Temp 100.5  F (38.1  C) (Tympanic)  Wt 175 lb 6 oz (79.5 kg)  SpO2 96%  Breastfeeding? No  BMI 32.08 kg/m2 Estimated body mass index is 32.08 kg/(m^2) as calculated from the following:    Height as of 1/27/18: 5' 2\" (1.575 m).    Weight as of this encounter: 175 lb 6 oz (79.5 kg).  Medication Reconciliation: complete   Sunni Guzman CMA (AAMA)   (aka: Jenifer Guzman)      "

## 2018-03-29 NOTE — PROGRESS NOTES
Report given to Alejandra KEARNS and patient transferred to ICU.  accompanied patient to ICU with belongings.

## 2018-03-29 NOTE — PATIENT INSTRUCTIONS
1. Come back next week for refills on Oxycodone, sleep discussion  2. Keep appointment with Dr. Kenney    Long term use of ambien can worsen sleep, where your sleep becomes 'dependent' on it.  It can also cause abnormal behaviors, such as shopping, eating or driving in the middle of the night. It also increases risk of accidental overdose when on multiple other sedating medications such as ativan and oxycodone.      Sleep Hygiene    1. Avoid doing anything stressful in the hour before bedtime. Avoid paying bills, watching politics on the news, etc.  2. Avoid screens just before bedtime. This includes cell phones, iPad, computers, TV. The bright lights on the screen is very stimulating to the brain, and makes it difficult to follow asleep and stay asleep  3. Avoid alcohol. Alcohol can sometimes make it easier to fall asleep, but significantly reduces the chance that you will stay asleep. It also impairs REM sleep, which is the good restful sleep  4. Use the bed for sleep and sex only. Avoid eating, reading, watching TV in bed  5. If you feel very restless at bedtime, try doing mundane physical activities such as vacuuming, folding laundry, etc.  6. If you find yourself making lists in your head at night, keep pen and paper at the bedside. Write down these lists. Also visualize yourself checking that item off your list and removing it from your brain.  7. Keep the room cool and dark. Consider investing in late darkening curtains  8. Avoid drinking excessive fluids just before bedtime. Empty your bladder just before bedtime  9. Cognitive behavioral therapy has been proven to be highly effective to treat insomnia. There are several online modules that you can complete for a fee.  Http://shuti.me  Or CBTforInsomnia.com

## 2018-03-30 ENCOUNTER — ANESTHESIA (OUTPATIENT)
Dept: INTENSIVE CARE | Facility: CLINIC | Age: 33
DRG: 871 | End: 2018-03-30
Payer: MEDICARE

## 2018-03-30 ENCOUNTER — ANESTHESIA EVENT (OUTPATIENT)
Dept: INTENSIVE CARE | Facility: CLINIC | Age: 33
DRG: 871 | End: 2018-03-30
Payer: MEDICARE

## 2018-03-30 ENCOUNTER — APPOINTMENT (OUTPATIENT)
Dept: CARDIOLOGY | Facility: CLINIC | Age: 33
DRG: 871 | End: 2018-03-30
Attending: FAMILY MEDICINE
Payer: MEDICARE

## 2018-03-30 LAB
ANION GAP SERPL CALCULATED.3IONS-SCNC: 8 MMOL/L (ref 3–14)
APPEARANCE CSF: ABNORMAL
APTT PPP: 39 SEC (ref 22–37)
BACTERIA SPEC CULT: NORMAL
BASE DEFICIT BLDV-SCNC: 3.5 MMOL/L
BUN SERPL-MCNC: 23 MG/DL (ref 7–30)
CALCIUM SERPL-MCNC: 7.4 MG/DL (ref 8.5–10.1)
CHLORIDE SERPL-SCNC: 112 MMOL/L (ref 94–109)
CO2 SERPL-SCNC: 22 MMOL/L (ref 20–32)
COLOR CSF: ABNORMAL
CREAT SERPL-MCNC: 1.15 MG/DL (ref 0.52–1.04)
EOSINOPHIL NFR CSF MANUAL: 2 %
ERYTHROCYTE [DISTWIDTH] IN BLOOD BY AUTOMATED COUNT: 16.3 % (ref 10–15)
FLUAV H1 2009 PAND RNA SPEC QL NAA+PROBE: NEGATIVE
FLUAV H1 RNA SPEC QL NAA+PROBE: NEGATIVE
FLUAV H3 RNA SPEC QL NAA+PROBE: NEGATIVE
FLUAV RNA SPEC QL NAA+PROBE: NEGATIVE
FLUBV RNA SPEC QL NAA+PROBE: NEGATIVE
GFR SERPL CREATININE-BSD FRML MDRD: 54 ML/MIN/1.7M2
GLUCOSE BLDC GLUCOMTR-MCNC: 61 MG/DL (ref 70–99)
GLUCOSE BLDC GLUCOMTR-MCNC: 64 MG/DL (ref 70–99)
GLUCOSE BLDC GLUCOMTR-MCNC: 82 MG/DL (ref 70–99)
GLUCOSE BLDC GLUCOMTR-MCNC: 89 MG/DL (ref 70–99)
GLUCOSE CSF-MCNC: 54 MG/DL (ref 40–70)
GLUCOSE SERPL-MCNC: 85 MG/DL (ref 70–99)
GRAM STN SPEC: NORMAL
HADV DNA SPEC QL NAA+PROBE: NEGATIVE
HADV DNA SPEC QL NAA+PROBE: NEGATIVE
HAEM INFLU A AG SPEC QL: NORMAL
HCO3 BLDV-SCNC: 22 MMOL/L (ref 21–28)
HCT VFR BLD AUTO: 30.5 % (ref 35–47)
HGB BLD-MCNC: 9.5 G/DL (ref 11.7–15.7)
HMPV RNA SPEC QL NAA+PROBE: NEGATIVE
HPIV1 RNA SPEC QL NAA+PROBE: NEGATIVE
HPIV2 RNA SPEC QL NAA+PROBE: NEGATIVE
HPIV3 RNA SPEC QL NAA+PROBE: NEGATIVE
HSV1 DNA CSF QL NAA+PROBE: NOT DETECTED
HSV2 DNA CSF QL NAA+PROBE: NOT DETECTED
INR PPP: 1.15 (ref 0.86–1.14)
LACTATE BLD-SCNC: 0.7 MMOL/L (ref 0.7–2)
LYMPH ABN NFR CSF MANUAL: 18 %
Lab: NORMAL
MCH RBC QN AUTO: 27.5 PG (ref 26.5–33)
MCHC RBC AUTO-ENTMCNC: 31.1 G/DL (ref 31.5–36.5)
MCV RBC AUTO: 88 FL (ref 78–100)
MICROBIOLOGIST REVIEW: NORMAL
MICROBIOLOGIST REVIEW: NORMAL
MONOS+MACROS NFR CSF MANUAL: 2 %
NEUTROPHILS NFR CSF MANUAL: 78 %
PCO2 BLDV: 39 MM HG (ref 40–50)
PH BLDV: 7.36 PH (ref 7.32–7.43)
PLATELET # BLD AUTO: 187 10E9/L (ref 150–450)
PO2 BLDV: 41 MM HG (ref 25–47)
POTASSIUM SERPL-SCNC: 4.2 MMOL/L (ref 3.4–5.3)
PROT CSF-MCNC: 306 MG/DL (ref 15–60)
RBC # BLD AUTO: 3.45 10E12/L (ref 3.8–5.2)
RBC # CSF MANUAL: ABNORMAL /UL (ref 0–2)
RHINOVIRUS RNA SPEC QL NAA+PROBE: NEGATIVE
RSV RNA SPEC QL NAA+PROBE: NEGATIVE
RSV RNA SPEC QL NAA+PROBE: NEGATIVE
S PNEUM AG SPEC QL: NORMAL
SODIUM SERPL-SCNC: 142 MMOL/L (ref 133–144)
SPECIMEN SOURCE: NORMAL
SPECIMEN VOL CSF: 1 ML
TUBE # CSF: 4 #
WBC # BLD AUTO: 13.4 10E9/L (ref 4–11)
WBC # CSF MANUAL: 173 /UL (ref 0–5)

## 2018-03-30 PROCEDURE — 87899 AGENT NOS ASSAY W/OPTIC: CPT | Performed by: FAMILY MEDICINE

## 2018-03-30 PROCEDURE — 37000011 ZZH ANESTHESIA WARD SERVICE: Performed by: NURSE ANESTHETIST, CERTIFIED REGISTERED

## 2018-03-30 PROCEDURE — 25000132 ZZH RX MED GY IP 250 OP 250 PS 637: Mod: GY

## 2018-03-30 PROCEDURE — A9270 NON-COVERED ITEM OR SERVICE: HCPCS | Mod: GY | Performed by: FAMILY MEDICINE

## 2018-03-30 PROCEDURE — 86403 PARTICLE AGGLUT ANTBDY SCRN: CPT | Performed by: FAMILY MEDICINE

## 2018-03-30 PROCEDURE — 93306 TTE W/DOPPLER COMPLETE: CPT

## 2018-03-30 PROCEDURE — 84157 ASSAY OF PROTEIN OTHER: CPT | Performed by: FAMILY MEDICINE

## 2018-03-30 PROCEDURE — 20000003 ZZH R&B ICU

## 2018-03-30 PROCEDURE — 25000128 H RX IP 250 OP 636: Performed by: FAMILY MEDICINE

## 2018-03-30 PROCEDURE — 87205 SMEAR GRAM STAIN: CPT | Performed by: FAMILY MEDICINE

## 2018-03-30 PROCEDURE — 85027 COMPLETE CBC AUTOMATED: CPT | Performed by: FAMILY MEDICINE

## 2018-03-30 PROCEDURE — 83605 ASSAY OF LACTIC ACID: CPT | Performed by: FAMILY MEDICINE

## 2018-03-30 PROCEDURE — 25000132 ZZH RX MED GY IP 250 OP 250 PS 637: Mod: GY | Performed by: PHYSICIAN ASSISTANT

## 2018-03-30 PROCEDURE — 25000132 ZZH RX MED GY IP 250 OP 250 PS 637: Mod: GY | Performed by: FAMILY MEDICINE

## 2018-03-30 PROCEDURE — 87529 HSV DNA AMP PROBE: CPT | Performed by: FAMILY MEDICINE

## 2018-03-30 PROCEDURE — 009U3ZX DRAINAGE OF SPINAL CANAL, PERCUTANEOUS APPROACH, DIAGNOSTIC: ICD-10-PCS | Performed by: FAMILY MEDICINE

## 2018-03-30 PROCEDURE — 82803 BLOOD GASES ANY COMBINATION: CPT | Performed by: FAMILY MEDICINE

## 2018-03-30 PROCEDURE — 99291 CRITICAL CARE FIRST HOUR: CPT | Performed by: FAMILY MEDICINE

## 2018-03-30 PROCEDURE — 82945 GLUCOSE OTHER FLUID: CPT | Performed by: FAMILY MEDICINE

## 2018-03-30 PROCEDURE — 87070 CULTURE OTHR SPECIMN AEROBIC: CPT | Performed by: FAMILY MEDICINE

## 2018-03-30 PROCEDURE — 80048 BASIC METABOLIC PNL TOTAL CA: CPT | Performed by: FAMILY MEDICINE

## 2018-03-30 PROCEDURE — 40000671 ZZH STATISTIC ANESTHESIA CASE

## 2018-03-30 PROCEDURE — 87015 SPECIMEN INFECT AGNT CONCNTJ: CPT | Performed by: FAMILY MEDICINE

## 2018-03-30 PROCEDURE — 85610 PROTHROMBIN TIME: CPT | Performed by: FAMILY MEDICINE

## 2018-03-30 PROCEDURE — 25000125 ZZHC RX 250: Performed by: FAMILY MEDICINE

## 2018-03-30 PROCEDURE — A9270 NON-COVERED ITEM OR SERVICE: HCPCS | Mod: GY

## 2018-03-30 PROCEDURE — A9270 NON-COVERED ITEM OR SERVICE: HCPCS | Mod: GY | Performed by: PHYSICIAN ASSISTANT

## 2018-03-30 PROCEDURE — 89051 BODY FLUID CELL COUNT: CPT | Performed by: FAMILY MEDICINE

## 2018-03-30 PROCEDURE — 93306 TTE W/DOPPLER COMPLETE: CPT | Mod: 26 | Performed by: INTERNAL MEDICINE

## 2018-03-30 PROCEDURE — 85730 THROMBOPLASTIN TIME PARTIAL: CPT | Performed by: NURSE ANESTHETIST, CERTIFIED REGISTERED

## 2018-03-30 PROCEDURE — 00000146 ZZHCL STATISTIC GLUCOSE BY METER IP

## 2018-03-30 RX ORDER — NICOTINE POLACRILEX 4 MG
15 LOZENGE BUCCAL
Status: DISCONTINUED | OUTPATIENT
Start: 2018-03-30 | End: 2018-04-03 | Stop reason: HOSPADM

## 2018-03-30 RX ORDER — HYDROMORPHONE HYDROCHLORIDE 1 MG/ML
.3-.5 INJECTION, SOLUTION INTRAMUSCULAR; INTRAVENOUS; SUBCUTANEOUS
Status: DISCONTINUED | OUTPATIENT
Start: 2018-03-30 | End: 2018-03-31

## 2018-03-30 RX ORDER — NICOTINE POLACRILEX 4 MG
LOZENGE BUCCAL
Status: COMPLETED
Start: 2018-03-30 | End: 2018-03-30

## 2018-03-30 RX ORDER — DEXTROSE MONOHYDRATE 25 G/50ML
INJECTION, SOLUTION INTRAVENOUS
Status: DISCONTINUED
Start: 2018-03-30 | End: 2018-03-30 | Stop reason: WASHOUT

## 2018-03-30 RX ORDER — HYDROMORPHONE HYDROCHLORIDE 1 MG/ML
0.3 INJECTION, SOLUTION INTRAMUSCULAR; INTRAVENOUS; SUBCUTANEOUS
Status: DISCONTINUED | OUTPATIENT
Start: 2018-03-30 | End: 2018-03-30

## 2018-03-30 RX ORDER — CITALOPRAM HYDROBROMIDE 20 MG/1
TABLET ORAL
Qty: 90 TABLET | Refills: 1 | Status: SHIPPED | OUTPATIENT
Start: 2018-03-30 | End: 2018-04-03

## 2018-03-30 RX ADMIN — ACYCLOVIR SODIUM 800 MG: 50 INJECTION, SOLUTION INTRAVENOUS at 22:28

## 2018-03-30 RX ADMIN — OXYCODONE HYDROCHLORIDE 15 MG: 15 TABLET ORAL at 02:33

## 2018-03-30 RX ADMIN — HYDROMORPHONE HYDROCHLORIDE 0.5 MG: 1 INJECTION, SOLUTION INTRAMUSCULAR; INTRAVENOUS; SUBCUTANEOUS at 15:58

## 2018-03-30 RX ADMIN — ACETAMINOPHEN 650 MG: 325 TABLET, FILM COATED ORAL at 20:52

## 2018-03-30 RX ADMIN — ACETAMINOPHEN 650 MG: 325 TABLET, FILM COATED ORAL at 16:57

## 2018-03-30 RX ADMIN — HYDROMORPHONE HYDROCHLORIDE 0.3 MG: 1 INJECTION, SOLUTION INTRAMUSCULAR; INTRAVENOUS; SUBCUTANEOUS at 14:05

## 2018-03-30 RX ADMIN — RANITIDINE 150 MG: 150 TABLET ORAL at 02:30

## 2018-03-30 RX ADMIN — BUDESONIDE AND FORMOTEROL FUMARATE DIHYDRATE 2 PUFF: 160; 4.5 AEROSOL RESPIRATORY (INHALATION) at 21:04

## 2018-03-30 RX ADMIN — OXYCODONE HYDROCHLORIDE 15 MG: 15 TABLET ORAL at 16:57

## 2018-03-30 RX ADMIN — PANTOPRAZOLE SODIUM 40 MG: 40 INJECTION, POWDER, FOR SOLUTION INTRAVENOUS at 20:50

## 2018-03-30 RX ADMIN — OXYCODONE HYDROCHLORIDE 15 MG: 15 TABLET ORAL at 06:59

## 2018-03-30 RX ADMIN — OXYCODONE HYDROCHLORIDE 15 MG: 15 TABLET ORAL at 20:51

## 2018-03-30 RX ADMIN — ACYCLOVIR SODIUM 800 MG: 50 INJECTION, SOLUTION INTRAVENOUS at 15:58

## 2018-03-30 RX ADMIN — HYDROMORPHONE HYDROCHLORIDE 0.3 MG: 1 INJECTION, SOLUTION INTRAMUSCULAR; INTRAVENOUS; SUBCUTANEOUS at 10:47

## 2018-03-30 RX ADMIN — ACYCLOVIR SODIUM 800 MG: 50 INJECTION, SOLUTION INTRAVENOUS at 05:49

## 2018-03-30 RX ADMIN — ACETAMINOPHEN 650 MG: 325 TABLET, FILM COATED ORAL at 08:23

## 2018-03-30 RX ADMIN — OXYCODONE HYDROCHLORIDE 15 MG: 15 TABLET ORAL at 11:12

## 2018-03-30 RX ADMIN — DEXTROSE: 15 GEL ORAL at 17:02

## 2018-03-30 RX ADMIN — CEFTRIAXONE SODIUM 2 G: 2 INJECTION, SOLUTION INTRAVENOUS at 05:49

## 2018-03-30 RX ADMIN — BUSPIRONE HYDROCHLORIDE 15 MG: 15 TABLET ORAL at 08:14

## 2018-03-30 RX ADMIN — FERROUS GLUCONATE 324 MG: 324 TABLET ORAL at 08:14

## 2018-03-30 RX ADMIN — LISINOPRIL 5 MG: 5 TABLET ORAL at 08:14

## 2018-03-30 RX ADMIN — HYDROMORPHONE HYDROCHLORIDE 0.5 MG: 1 INJECTION, SOLUTION INTRAMUSCULAR; INTRAVENOUS; SUBCUTANEOUS at 21:36

## 2018-03-30 RX ADMIN — GABAPENTIN 600 MG: 300 CAPSULE ORAL at 14:02

## 2018-03-30 RX ADMIN — HYDROMORPHONE HYDROCHLORIDE 0.5 MG: 1 INJECTION, SOLUTION INTRAMUSCULAR; INTRAVENOUS; SUBCUTANEOUS at 18:15

## 2018-03-30 RX ADMIN — PANTOPRAZOLE SODIUM 40 MG: 40 INJECTION, POWDER, FOR SOLUTION INTRAVENOUS at 08:14

## 2018-03-30 RX ADMIN — LORAZEPAM 0.5 MG: 0.5 TABLET ORAL at 08:23

## 2018-03-30 RX ADMIN — Medication 1000 MCG: at 08:14

## 2018-03-30 RX ADMIN — GABAPENTIN 600 MG: 300 CAPSULE ORAL at 20:51

## 2018-03-30 RX ADMIN — CEFTRIAXONE SODIUM 2 G: 2 INJECTION, SOLUTION INTRAVENOUS at 18:18

## 2018-03-30 RX ADMIN — VANCOMYCIN HYDROCHLORIDE 1750 MG: 100 INJECTION, POWDER, LYOPHILIZED, FOR SOLUTION INTRAVENOUS at 10:17

## 2018-03-30 RX ADMIN — BUSPIRONE HYDROCHLORIDE 15 MG: 15 TABLET ORAL at 20:51

## 2018-03-30 RX ADMIN — ACETAMINOPHEN 650 MG: 325 TABLET, FILM COATED ORAL at 02:33

## 2018-03-30 RX ADMIN — LORAZEPAM 0.5 MG: 0.5 TABLET ORAL at 21:36

## 2018-03-30 RX ADMIN — GABAPENTIN 600 MG: 300 CAPSULE ORAL at 06:59

## 2018-03-30 RX ADMIN — CITALOPRAM HYDROBROMIDE 40 MG: 40 TABLET ORAL at 08:14

## 2018-03-30 NOTE — PLAN OF CARE
Problem: Patient Care Overview  Goal: Plan of Care/Patient Progress Review  Outcome: Improving  A&Ox4/forgetful surrounding admission details. Mentation much improved, still groggy but rouses to voice & readily falls asleep between cares. VSS on RA. Etco2 monitored due to patient's snoring overnight, gases appropriate. Tele-SR. C/o generalized pain to joints-given oxycodone w/some relief. LS dim & coarse to RLL, frequent productive cough-sputum sent. PICC to [facility], receiving IV vanco, rocephin & zovirax. Plan is for Echo & LP today.

## 2018-03-30 NOTE — PLAN OF CARE
Problem: ARDS (Acute Resp Distress Syndrome) (Adult)  Goal: Signs and Symptoms of Listed Potential Problems Will be Absent, Minimized or Managed (ARDS)  Signs and symptoms of listed potential problems will be absent, minimized or managed by discharge/transition of care (reference ARDS (Acute Resp Distress Syndrome) (Adult) CPG).   Patient is currently on NC/2L. This is a wean from 6L oximizer mask. Lung sounds are clear but diminished. She is not breathing as shallow as she was a couple hours ago as temp is down from 103.2 to 100.3. She is more awake and alert. Will continue to provide cares and assess needs.

## 2018-03-30 NOTE — PROGRESS NOTES
MD on call (Dr. Veloz) paged to ask if we can give patient something IV for pain control vs PO since patient is off to MRI & CT & results are pending.  -Orders received for one time dose of 0.3mg IV dilaudid

## 2018-03-30 NOTE — PROGRESS NOTES
[facility]  Procedure Note          Peripherally Inserted Central Line Catheter (PICC):       Molly Teague  MRN# [id number]   March 29, 2018, 7:35 PM Indication: Medication administration           Pause for the cause: Consent for catheter placement procedure signed  Time out completed  Patient ID's verified using two distinct indicators   Type of line to be used: PICC   Full barrier precautions used: Yes   Skin preparation: Chloraprep   Date of insertion: March 29, 2018, 7:35 PM   Device type: Double lumen, valved, 5.0   Catheter brand: [employer]   Lot number: [id number]   Insertion location: Right basilic vein   Method of placement: Venipuncture  MST  Ultrasound   Number of attempts: With ultrasound: 1   Without ultrasound: 0   Difficulty threading: No   Midline IV device: Dressing dry and intact  Transparent semmipermeable dressing applied  Chlorhexidine patch  Catheter securement device   Arm circumference: Adults 15 cm   Midline extremity circumference: 31 cm   Internal length: 37 cm   Midline visible catheter length: 3 cm   Total catheter length: 40 cm   Tip termination: CAJ   Method of verification: Chest x-ray   Midline patency post placement: Positive blood return  Flushes without difficulty  Saline locked   Line flush: Line flush documented on the eMAR yes   Placement verified by: Physician   Catheter placed by: Sandra Enrique   Discontinuation form initiated: No   Patient tolerance: Tolerated well   PICC Insertion Education Complete: Yes      Summary:  This procedure was performed without difficulty and she tolerated the procedure well with no  immediate complications. PICC pulled back 3cm per radiology recommendation.       Recorded by Sandra Enrique    Attestation:  This patient has been seen and evaluated by me, Sandra Enrique RN.  I, Sandra Enrique RN, personally performed the above procedure on this the patient.  I have reviewed today's vital signs, medications, labs and  imaging.  Sandra Enrique RN

## 2018-03-30 NOTE — PROGRESS NOTES
Decreased LOC continues, mostly sleepy but wakens and follows commands then falls back asleep  CT shows atelectasis vs minimal infiltrate. MRI negative   Need to consider menintitis/encephalitis.   Started rocephin/vanco, acyclovir earlier.  No ampicillin due to not on immune suppressants at this time.   Can't get LP until AM due to a dose of enoxaparin given before she started showing the AMS, or the high fever.

## 2018-03-30 NOTE — PROGRESS NOTES
Skin assessment completed on tx to ICU, small scab noted to right ankle & multiple scattered scars to upper torso. Also has scattered red spots to skin from hx of schleroderma. Skin assessment completed w/ Aline KEARNS

## 2018-03-30 NOTE — ANESTHESIA PREPROCEDURE EVALUATION
Anesthesia Evaluation     .             ROS/MED HX    ENT/Pulmonary:     (+)Moderate Persistent asthma , . Other pulmonary disease Hx PE; Hx of ARDS.    Neurologic:     (+)migraines, other neuro Raynaud's    Cardiovascular: Comment: MRA ANGIOGRAM NECK W/O & W CONTRAST 3/29/2018 10:02 PM      HISTORY:  AMS;      TECHNIQUE:  Sequential axial images of the neck were obtained using  2-dimensional time-of-flight before contrast and 3-dimensional  time-of-flight after the uneventful administration of 10ml gadavist iv  contrast.     COMPARISON:  None.     FINDINGS: Stenosis is relative to the distal internal carotid  diameter.     Right Carotid:  No significant stenosis is seen at the bifurcation  relative to the distal internal carotid diameter.     Left Carotid:  No significant stenosis is seen at the bifurcation  relative to the distal internal carotid diameter.     Vertebrals: Antegrade flow is seen in both vertebral arteries.  Feathering artifact is seen over the right vertebral artery on the  post gadolinium images.     No arterial dissection is identified.         IMPRESSION: Negative MR angiogram of the neck vessels.    (+) Dyslipidemia, hypertension----. Taking blood thinners : . . fainting (syncope). :. . Previous cardiac testing Echodate:results:date: results:ECG reviewed date:3/29/18 results:Sinus Tachycardia   WITHIN NORMAL LIMITS   date: results:          METS/Exercise Tolerance:     Hematologic:     (+) History of Transfusion -      Musculoskeletal:   (+) arthritis, , , -       GI/Hepatic:         Renal/Genitourinary: Comment: Hx of hemodialysis  Pyelonephritis    (+) chronic renal disease,       Endo:     (+) Obesity, .      Psychiatric:     (+) psychiatric history anxiety      Infectious Disease: Comment: Hx of C. diff  Hx of H. pylori        Malignancy:         Other: Comment: Bilat retinitis   (+) H/O Chronic Pain,                                  Anesthesia Plan      History & Physical Review  History  and physical reviewed and following examination; no interval change.    ASA Status:  3 .        Plan for Other (LP)          Postoperative Care  Postoperative pain management:  IV analgesics and Multi-modal analgesia.      Consents  Anesthetic plan, risks, benefits and alternatives discussed with:  Patient..                          .

## 2018-03-30 NOTE — PROGRESS NOTES
[facility] TRANSPORT NOTE  Data:   Reason for Transport:  Change in level of care    Molly Teague was transported to [facility] via cart at 1800.  Patient was accompanied by Registered Nurse. Equipment used for transport: Oxygen  Oximizer. Family was aware of reason for transport: yes    Action:  Report: received from Karlene KEARNS    Response:  Patient's condition when transferred off unit was CRITICALLY STABLE.    Lisa Wu

## 2018-03-30 NOTE — PROGRESS NOTES
Pt has been awake, alert, oriented. Reports severe pain in back, arms, legs and all joints. Medicated with Oxycodone and Dilaudid for discomfort with temporary relief. Lungs are coarse with a productive cough, less productive as the day progresses. No resp distress. Eating and drinking very well.    Had one episode of Hypoglycemia which pt reports is not new. Lowest glucose is 61. Oral glucose effective to raise blood sugar to 107.Echocardiogram and LP both performed today.    VSS. Continue with current plan of care..

## 2018-03-30 NOTE — PROGRESS NOTES
Anesthesia here, they will preform LP in the morning after her INR result. She had Enoxaparin this afternoon and they will not do an LP tonight. Please call them in AM for LP.

## 2018-03-30 NOTE — PROGRESS NOTES
Patient continues to be groogy, A&Ox4, will rouse to voice but then drifts back to sleep readily. Patient also snoring & per  this is fairly new for her. O2 sats mid to high 90s on RA, ETCo2 monitor placed & so far gases appear normal.   -Patient c/o headache, per her has hx of migraines, MD contacted & patient given IV dilaudid since swallowing is unsafe at this time due to her grogginess.   -Attempted to do full neuro exam, however patient falling asleep w/commands & unable to stay awake. Pupils are PERRLA, moves all extremities purposefully, at this time denies blurry vision that was present prior.   - Joshua updated on POC & reviewed negative results of MRI & possible pneumonia on CT.  aware patient will likely proceed w/LP tomorrow since lovenox had been given this afternoon. Phone number updated on chart,  went home for night.

## 2018-03-30 NOTE — PROGRESS NOTES
Writer has reviewed transfer Skin assessment and David assessment and agrees with documentation and assessment findings.

## 2018-03-30 NOTE — PROGRESS NOTES
[facility]      Subjective:  Patient feels better  No real difficulty breathing  Greg sputum  Memory improved per nursing  Sleepy  On room air    Review of Systems:  CONSTITUTIONAL:fever  INTEGUMENTARY/SKIN: NEGATIVE for worrisome rashes, moles or lesions  EYES: NEGATIVE for vision changes or irritation  ENT/MOUTH: NEGATIVE for ear, mouth and throat problems  RESP:cough  BREAST: NEGATIVE for masses, tenderness or discharge  CV: NEGATIVE for chest pain, palpitations or peripheral edema  GI: NEGATIVE for nausea, abdominal pain, heartburn, or change in bowel habits  : NEGATIVE for frequency, dysuria, or hematuria  MUSCULOSKELETAL: NEGATIVE for significant arthralgias or myalgia  NEURO: sleepy, no eye symptoms  ENDOCRINE: NEGATIVE for temperature intolerance, skin/hair changes  HEME: NEGATIVE for bleeding problems  PSYCHIATRIC: NEGATIVE for changes in mood or affect    Physical Exam:  Vitals Were Reviewed    Patient Vitals for the past 16 hrs:   BP Temp Temp src Pulse Heart Rate Resp SpO2 Height Weight   03/30/18 0500 - - - - - - - - 83.1 kg (183 lb 3.2 oz)   03/30/18 0300 107/63 98.9  F (37.2  C) Oral - 112 25 95 % - -   03/30/18 0233 - - - - - 22 - - -   03/30/18 0200 112/73 - - - 116 24 96 % - -   03/30/18 0100 123/66 - - - 122 26 97 % - -   03/29/18 2353 - - - - 123 15 97 % - -   03/29/18 2300 125/81 - - - 125 - 94 % - -   03/29/18 2259 - - - - - 18 - - -   03/29/18 2244 - - - - - - - 1.6 m (5' 3\") 82.6 kg (182 lb 1.6 oz)   03/29/18 2221 131/80 98.9  F (37.2  C) Oral - 123 18 95 % - -   03/29/18 2152 (!) 140/95 - - - 116 - - - -   03/29/18 2131 (!) 134/99 - - - 124 18 - - -   03/29/18 2000 132/88 - - - 126 - - - -   03/29/18 1943 - 99.5  F (37.5  C) - - - - - - -   03/29/18 1849 - 100.6  F (38.1  C) Oral - 128 20 - - -   03/29/18 1845 144/83 - - - - - - - -   03/29/18 1800 - - - - 129 18 - - -   03/29/18 1700 (!) 151/97 103.2  F (39.6  C) Oral - 130 (!) 36 - - -   03/29/18 1635 - - - 129 - " (!) 32 92 % - -   03/29/18 1449 139/90 100.6  F (38.1  C) Oral 124 - (!) 36 95 % - -         Intake/Output Summary (Last 24 hours) at 03/30/18 0620  Last data filed at 03/30/18 0553   Gross per 24 hour   Intake          4426.25 ml   Output             4200 ml   Net           226.25 ml       GENERAL APPEARANCE: alert, but sleeps easily , oriented  EYES: conjunctiva clear, eyes grossly normal  HENT: external ears and nose normal   NECK: supple, no masses or adenopathy  RESP: crackles right base  CV: regular rate and rhythm, normal S1 S2, no S3 or S4 and no murmur, click or rub   ABDOMEN: soft, nontender, no HSM or masses and bowel sounds normal  MS: no clubbing, cyanosis; no edema  SKIN: stigmata of scleroderma  NEURO: Normal strength and tone, sensory exam grossly normal, mentation intact and speech normal    Lab:  Recent Labs   Lab Test  03/30/18   0540  03/29/18   0819   NA  142  137   POTASSIUM  4.2  3.9   CHLORIDE  112*  106   CO2  22  22   ANIONGAP  8  9   GLC  85  113*   BUN  23  20   CR  1.15*  1.11*   UMER  7.4*  8.2*     CBC RESULTS:   Recent Labs   Lab Test  03/30/18   0540  03/29/18   0819   WBC  13.4*  14.1*   RBC  3.45*  4.38   HGB  9.5*  12.1   HCT  30.5*  38.0   PLT  187  205       Results for orders placed or performed during the hospital encounter of 03/29/18 (from the past 24 hour(s))   CBC with platelets differential   Result Value Ref Range    WBC 14.1 (H) 4.0 - 11.0 10e9/L    RBC Count 4.38 3.8 - 5.2 10e12/L    Hemoglobin 12.1 11.7 - 15.7 g/dL    Hematocrit 38.0 35.0 - 47.0 %    MCV 87 78 - 100 fl    MCH 27.6 26.5 - 33.0 pg    MCHC 31.8 31.5 - 36.5 g/dL    RDW 16.0 (H) 10.0 - 15.0 %    Platelet Count 205 150 - 450 10e9/L    Diff Method Automated Method     % Neutrophils 83.2 %    % Lymphocytes 8.2 %    % Monocytes 6.5 %    % Eosinophils 1.6 %    % Basophils 0.2 %    % Immature Granulocytes 0.3 %    Absolute Neutrophil 11.7 (H) 1.6 - 8.3 10e9/L    Absolute Lymphocytes 1.2 0.8 - 5.3 10e9/L     Absolute Monocytes 0.9 0.0 - 1.3 10e9/L    Absolute Eosinophils 0.2 0.0 - 0.7 10e9/L    Absolute Basophils 0.0 0.0 - 0.2 10e9/L    Abs Immature Granulocytes 0.0 0 - 0.4 10e9/L   Comprehensive metabolic panel   Result Value Ref Range    Sodium 137 133 - 144 mmol/L    Potassium 3.9 3.4 - 5.3 mmol/L    Chloride 106 94 - 109 mmol/L    Carbon Dioxide 22 20 - 32 mmol/L    Anion Gap 9 3 - 14 mmol/L    Glucose 113 (H) 70 - 99 mg/dL    Urea Nitrogen 20 7 - 30 mg/dL    Creatinine 1.11 (H) 0.52 - 1.04 mg/dL    GFR Estimate 57 (L) >60 mL/min/1.7m2    GFR Estimate If Black 69 >60 mL/min/1.7m2    Calcium 8.2 (L) 8.5 - 10.1 mg/dL    Bilirubin Total 0.2 0.2 - 1.3 mg/dL    Albumin 3.8 3.4 - 5.0 g/dL    Protein Total 8.0 6.8 - 8.8 g/dL    Alkaline Phosphatase 97 40 - 150 U/L    ALT 24 0 - 50 U/L    AST 36 0 - 45 U/L   Lactic acid whole blood   Result Value Ref Range    Lactic Acid 1.6 0.7 - 2.0 mmol/L   Blood culture   Result Value Ref Range    Specimen Description Blood Right Arm     Special Requests Aerobic and anaerobic bottles received     Culture Micro No growth after 5 hours    D dimer quantitative   Result Value Ref Range    D Dimer 0.5 0.0 - 0.50 ug/ml FEU   TSH with free T4 reflex   Result Value Ref Range    TSH 2.29 0.40 - 4.00 mU/L   Blood culture   Result Value Ref Range    Specimen Description Blood Left Hand     Special Requests Aerobic and anaerobic bottles received     Culture Micro No growth after 5 hours    Head CT w/o contrast    Narrative    CT HEAD W/O CONTRAST  3/29/2018 10:07 AM    HISTORY: Fell. Double vision.    TECHNIQUE: Scans were obtained through the head without IV contrast.   Radiation dose for this scan was reduced using automated exposure  control, adjustment of the mA and/or kV according to patient size, or  iterative reconstruction technique.    COMPARISON: None.    FINDINGS: No hemorrhage, mass lesion, or focal area of acute  infarction identified. Paranasal sinuses are normal. No  bony  abnormality.      Impression    IMPRESSION: Negative CT scan of the head.    PER FERREIRA MD   Chest XR,  PA & LAT    Narrative    XR CHEST 2 VW 3/29/2018 10:16 AM    HISTORY: Short of breath.    COMPARISON: 1/27/2018.      Impression    IMPRESSION: 2 views of the chest show low lung volumes. No acute or  active cardiopulmonary disease is demonstrated.     PER WHEATLEY MD   UA with Microscopic   Result Value Ref Range    Color Urine Yellow     Appearance Urine Clear     Glucose Urine Negative NEG^Negative mg/dL    Bilirubin Urine Negative NEG^Negative    Ketones Urine Negative NEG^Negative mg/dL    Specific Gravity Urine 1.019 1.003 - 1.035    Blood Urine Small (A) NEG^Negative    pH Urine 5.0 5.0 - 7.0 pH    Protein Albumin Urine 30 (A) NEG^Negative mg/dL    Urobilinogen mg/dL 0.0 0.0 - 2.0 mg/dL    Nitrite Urine Negative NEG^Negative    Leukocyte Esterase Urine Negative NEG^Negative    Source Midstream Urine     WBC Urine 3 0 - 5 /HPF    RBC Urine 5 (H) 0 - 2 /HPF    Squamous Epithelial /HPF Urine 1 0 - 1 /HPF    Mucous Urine Present (A) NEG^Negative /LPF   Procalcitonin   Result Value Ref Range    Procalcitonin <0.05 ng/ml   Lactic acid whole blood   Result Value Ref Range    Lactic Acid 2.9 (H) 0.7 - 2.0 mmol/L   Methicillin Resist/Sens S. aureus PCR   Result Value Ref Range    Specimen Description Nares     Methicillin Resist/Sens S. aureus PCR Negative NEG^Negative   XR Chest Port 1 View    Narrative    XR CHEST PORT 1 VW  3/29/2018 7:26 PM     HISTORY:  PICC placement;     COMPARISON: None.    FINDINGS:  A right PICC line has been inserted. The tip appears to  extend into the right atrium. It could be pulled back about 3 cm to  the junction of the superior vena cava and right atrium.     KWESI RAMIREZ MD   Lactic acid whole blood   Result Value Ref Range    Lactic Acid 1.0 0.7 - 2.0 mmol/L   Blood culture   Result Value Ref Range    Specimen Description Blood Left Arm     Special Requests Aerobic and  anaerobic bottles received     Culture Micro PENDING    Blood culture   Result Value Ref Range    Specimen Description Blood PICC     Special Requests Aerobic and anaerobic bottles received     Culture Micro PENDING    MRA Head W/O Contrast    Narrative    MR ANGIOGRAM OF THE HEAD WITHOUT CONTRAST   3/29/2018 9:36 PM     HISTORY: Altered mental status    TECHNIQUE:  3D time-of-flight MR angiogram of the head without  contrast.    COMPARISON: None.    FINDINGS:  The visualized portions of the distal internal carotid and  vertebral arteries, the basilar artery, [place] of [place], and the  proximal anterior, middle and posterior cerebral arteries all appear  normal.  There is no evidence of aneurysm or vascular stenosis or  occlusion.      Impression    IMPRESSION:  Normal MR angiogram of the head.      KWESI RAMIREZ MD   MR Brain w/o & w Contrast    Narrative    MR brain without and with contrast.  3/29/2018 10:02 PM     HISTORY:  Altered mental status.    TECHNIQUE: Multiplanar, multisequence MRI of the brain without and  with 10ml gadavist IV contrast material.    COMPARISON: None.    FINDINGS:  The brain parenchyma, ventricles and subarachnoid spaces  appear normal for age.  There is no evidence of hemorrhage, mass,  acute infarct, or anomaly. There are no gadolinium enhancing lesions.   The facial structures appear normal.  The arteries at the base of the  brain and the dural venous sinuses appear patent.      Impression    IMPRESSION: Negative, no structural abnormalities are identified. No  bleed, mass, or infarcts are seen.     KWESI RAMIREZ MD   MR Neck w/o & w Contrast Angiogram    Narrative    MRA ANGIOGRAM NECK W/O & W CONTRAST 3/29/2018 10:02 PM     HISTORY:  AMS;     TECHNIQUE:  Sequential axial images of the neck were obtained using  2-dimensional time-of-flight before contrast and 3-dimensional  time-of-flight after the uneventful administration of 10ml gadavist iv  contrast.    COMPARISON:  None.    FINDINGS:  Stenosis is relative to the distal internal carotid  diameter.    Right Carotid:  No significant stenosis is seen at the bifurcation  relative to the distal internal carotid diameter.    Left Carotid:  No significant stenosis is seen at the bifurcation  relative to the distal internal carotid diameter.    Vertebrals: Antegrade flow is seen in both vertebral arteries.  Feathering artifact is seen over the right vertebral artery on the  post gadolinium images.    No arterial dissection is identified.      Impression    IMPRESSION: Negative MR angiogram of the neck vessels.    KWESI RAMIREZ MD   CT Chest pulmonary embolism w contrast    Narrative    Exam: CT CHEST PULMONARY EMBOLISM W CONTRAST 3/29/2018 10:11 PM    Comparison: Chest x-ray earlier on the same day.     Clinical History: Shortness of breath, fever, cough, possible  aspiration.    Technique: Volumetric helical acquisition of the chest were obtained  following pulmonary embolism protocol. Three-dimensional (3D)  post-processed angiographic images were reconstructed, archived to  PACS and used in interpretation of this study. Radiation dose for this  scan was reduced using automated exposure control, adjustment of the  mA and/or kV according to patient size, or iterative reconstruction  technique.    Contrast: 80 mL Isovue-370    Findings:  Contrast bolus timing is adequate for evaluation for pulmonary emboli.  There is no evidence of pulmonary emboli.  There is no evidence of  right heart strain.     Bibasilar atelectasis, right slightly worse than left. There is very  mild consolidation at the right base. No evidence of pleural effusion  is noted.    The heart size and great vessels are normal in caliber. No evidence of  axillary, mediastinal or hilar lymphadenopathy is seen. Fluid within a  mildly dilated esophagus. Likely small hiatal hernia. Right upper  extremity PICC tip in the mid SVC.     Upper abdomen: Evaluation of the upper abdomen is limited by  contrast  bolus timing and coverage.    Bones: No concerning lytic or sclerotic lesions.      Impression    Impression:   1. No evidence of pulmonary embolus.  2. Right worse than left bibasilar atelectasis and mild consolidation  in the right base. The consolidation may represent aspiration, but  there is no appreciable debris in the bronchi.  3. Fluid within a mildly dilated esophagus and likely small hiatal  hernia.    LORI LUTZ MD   Glucose by meter   Result Value Ref Range    Glucose 118 (H) 70 - 99 mg/dL   Gram stain   Result Value Ref Range    Specimen Description Sputum     Special Requests Screen     Gram Stain <10 Squamous epithelial cells/low power field     Gram Stain >25 PMNs/low power field     Gram Stain       Moderate  Mixed gram positive and gram negative bacteria present.     Basic metabolic panel   Result Value Ref Range    Sodium 142 133 - 144 mmol/L    Potassium 4.2 3.4 - 5.3 mmol/L    Chloride 112 (H) 94 - 109 mmol/L    Carbon Dioxide 22 20 - 32 mmol/L    Anion Gap 8 3 - 14 mmol/L    Glucose 85 70 - 99 mg/dL    Urea Nitrogen 23 7 - 30 mg/dL    Creatinine 1.15 (H) 0.52 - 1.04 mg/dL    GFR Estimate 54 (L) >60 mL/min/1.7m2    GFR Estimate If Black 66 >60 mL/min/1.7m2    Calcium 7.4 (L) 8.5 - 10.1 mg/dL   CBC with platelets   Result Value Ref Range    WBC 13.4 (H) 4.0 - 11.0 10e9/L    RBC Count 3.45 (L) 3.8 - 5.2 10e12/L    Hemoglobin 9.5 (L) 11.7 - 15.7 g/dL    Hematocrit 30.5 (L) 35.0 - 47.0 %    MCV 88 78 - 100 fl    MCH 27.5 26.5 - 33.0 pg    MCHC 31.1 (L) 31.5 - 36.5 g/dL    RDW 16.3 (H) 10.0 - 15.0 %    Platelet Count 187 150 - 450 10e9/L   Lactic acid whole blood   Result Value Ref Range    Lactic Acid 0.7 0.7 - 2.0 mmol/L   INR   Result Value Ref Range    INR 1.15 (H) 0.86 - 1.14   Blood gas venous   Result Value Ref Range    Ph Venous 7.36 7.32 - 7.43 pH    PCO2 Venous 39 (L) 40 - 50 mm Hg    PO2 Venous 41 25 - 47 mm Hg    Bicarbonate Venous 22 21 - 28 mmol/L    Base Deficit Venous 3.5  mmol/L       Assessment and Plan:    Fever, cough, shortness of breath in a patient with a history of possible aspiration and scleroderma, rule out aspiration pneumonia with sepsis   CT suggests bibasilar infiltrates, right greater than left, lactic is down    Acute metabolic and toxic encephalopathy--probably related to infection, doubt meningitis, but on atb's for this  proceed with LP today    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     Chronic kidney disease with previous dialysis.  Stable creat at  1.15    Previous critical illness with adult respiratory distress syndrome, influenza and renal failure in 2015.       Double vision, transient, etiology unclear.   Neg stroke work up by MR    Previous pulmonary embolus, not currently on anticoagulation.     Tachycardia.   Appears that she chronically runs hr often over 100    Prophylaxis-received enox, but holding due to LP    FEN-saline 125, adat    Lines--PICC      PLAN: on acyclovir, rocephin and vanco-if neg LP -might stop these and go to coverage for aspiration again  Change protonix to IV.  40 min of critical care time    1:11 PM  Looks like a grossly bloody tap  Will discuss with ID  Probably need to wait for cultures and pcrs and continue current atb's.    2:49 PM   No response from ID  Will plan on above

## 2018-03-30 NOTE — ANESTHESIA POSTPROCEDURE EVALUATION
Patient: Molly Teague    * No procedures listed *    Diagnosis:* No pre-op diagnosis entered *  Diagnosis Additional Information: No value filed.    Anesthesia Type:  Other    Note:  Anesthesia Post Evaluation    Patient location during evaluation: ICU and Bedside  Patient participation: Able to fully participate in evaluation  Level of consciousness: awake and alert  Pain management: adequate  Airway patency: patent  Cardiovascular status: acceptable  Respiratory status: acceptable  Hydration status: acceptable  PONV: none     Anesthetic complications: None          Last vitals:  Vitals:    03/30/18 0937 03/30/18 0945 03/30/18 1000   BP:   (!) 131/93   Pulse:      Resp:   17   Temp:      SpO2: 96% 95% 95%         Electronically Signed By: CHRIST Vaughan CRNA  March 30, 2018  10:12 AM

## 2018-03-30 NOTE — PROGRESS NOTES
[facility], [place]    Sepsis Evaluation Progress Note    Date of Service: 03/29/2018    I was called to see Molly Teague due to abnormal vital signs triggering the Sepsis SIRS screening alert. She is known to have an infection.     Physical Exam    Vital Signs:  Temp: 99.5  F (37.5  C) Temp src: Oral BP: 132/88 Pulse: 129 Heart Rate: 128 Resp: 20 SpO2: 92 % O2 Device: Nasal cannula Oxygen Delivery: 2 LPM    Lab:  Lactic Acid   Date Value Ref Range Status   03/29/2018 2.9 (H) 0.7 - 2.0 mmol/L Final     Comment:     Significant value called to and read back by  LORETTA RILEY/RN 3/29/18 1828 KW         The patient has signs of altered level of consciousness suspicious for sepsis vs meningitis .w encephalopathy .    The rest of their physical exam is significant for tachypnea, some somnolence but better than 1 hr ago , lungs few rhonchi bases, warm, making urine .    Assessment and Plan    The SIRS and exam findings are likely due to   sepsis.     ID: The patient is currently on the following antibiotics:  Anti-infectives (Future)    Start     Dose/Rate Route Frequency Ordered Stop    03/29/18 2000  acyclovir (ZOVIRAX) 800 mg in D5W 250 mL intermittent infusion      10 mg/kg × 81 kg Intravenous EVERY 8 HOURS 03/29/18 1755      03/29/18 2000  vancomycin (VANCOCIN) 1,750 mg in sodium chloride 0.9 % 500 mL intermittent infusion      1,750 mg  over 2 Hours Intravenous EVERY 12 HOURS 03/29/18 1838      03/29/18 1830  cefTRIAXone IN D5W (ROCEPHIN) intermittent infusion 2 g      2 g Intravenous EVERY 12 HOURS 03/29/18 1754          Current antibiotic coverage requires additional antibiotics for possible meningitis, though lung is still most likely  source.    Fluid: Fluid bolus ordered.    Lab: Repeat lactic acid ordered for 2 hours from now.    Disposition: The patient will be transferred to the ICU. Attending Physician, me, was notified.    Gene Veloz MD

## 2018-03-31 ENCOUNTER — APPOINTMENT (OUTPATIENT)
Dept: GENERAL RADIOLOGY | Facility: CLINIC | Age: 33
DRG: 871 | End: 2018-03-31
Attending: FAMILY MEDICINE
Payer: MEDICARE

## 2018-03-31 LAB
ANION GAP SERPL CALCULATED.3IONS-SCNC: 8 MMOL/L (ref 3–14)
BASE DEFICIT BLDV-SCNC: 2.6 MMOL/L
BUN SERPL-MCNC: 9 MG/DL (ref 7–30)
C COLI+JEJUNI+LARI FUSA STL QL NAA+PROBE: NOT DETECTED
C DIFF TOX B STL QL: NEGATIVE
CALCIUM SERPL-MCNC: 8.1 MG/DL (ref 8.5–10.1)
CHLORIDE SERPL-SCNC: 112 MMOL/L (ref 94–109)
CO2 SERPL-SCNC: 24 MMOL/L (ref 20–32)
CREAT SERPL-MCNC: 1.09 MG/DL (ref 0.52–1.04)
EC STX1 GENE STL QL NAA+PROBE: NOT DETECTED
EC STX2 GENE STL QL NAA+PROBE: NOT DETECTED
ENTERIC PATHOGEN COMMENT: NORMAL
ERYTHROCYTE [DISTWIDTH] IN BLOOD BY AUTOMATED COUNT: 16.4 % (ref 10–15)
GFR SERPL CREATININE-BSD FRML MDRD: 58 ML/MIN/1.7M2
GLUCOSE BLDC GLUCOMTR-MCNC: 124 MG/DL (ref 70–99)
GLUCOSE BLDC GLUCOMTR-MCNC: 126 MG/DL (ref 70–99)
GLUCOSE BLDC GLUCOMTR-MCNC: 82 MG/DL (ref 70–99)
GLUCOSE BLDC GLUCOMTR-MCNC: 86 MG/DL (ref 70–99)
GLUCOSE SERPL-MCNC: 76 MG/DL (ref 70–99)
HCO3 BLDV-SCNC: 23 MMOL/L (ref 21–28)
HCT VFR BLD AUTO: 29 % (ref 35–47)
HEMOCCULT STL QL: POSITIVE
HGB BLD-MCNC: 9 G/DL (ref 11.7–15.7)
LACTATE BLD-SCNC: 1.2 MMOL/L (ref 0.7–2)
MCH RBC QN AUTO: 27.4 PG (ref 26.5–33)
MCHC RBC AUTO-ENTMCNC: 31 G/DL (ref 31.5–36.5)
MCV RBC AUTO: 88 FL (ref 78–100)
NOROV GI+II ORF1-ORF2 JNC STL QL NAA+PR: NOT DETECTED
PCO2 BLDV: 41 MM HG (ref 40–50)
PH BLDV: 7.36 PH (ref 7.32–7.43)
PLATELET # BLD AUTO: 183 10E9/L (ref 150–450)
PO2 BLDV: 39 MM HG (ref 25–47)
POTASSIUM SERPL-SCNC: 3.8 MMOL/L (ref 3.4–5.3)
RBC # BLD AUTO: 3.29 10E12/L (ref 3.8–5.2)
RVA NSP5 STL QL NAA+PROBE: NOT DETECTED
SALMONELLA SP RPOD STL QL NAA+PROBE: NOT DETECTED
SHIGELLA SP+EIEC IPAH STL QL NAA+PROBE: NOT DETECTED
SODIUM SERPL-SCNC: 144 MMOL/L (ref 133–144)
SPECIMEN SOURCE: NORMAL
V CHOL+PARA RFBL+TRKH+TNAA STL QL NAA+PR: NOT DETECTED
VANCOMYCIN SERPL-MCNC: 32.5 MG/L
WBC # BLD AUTO: 7.9 10E9/L (ref 4–11)
Y ENTERO RECN STL QL NAA+PROBE: NOT DETECTED

## 2018-03-31 PROCEDURE — 25000132 ZZH RX MED GY IP 250 OP 250 PS 637: Mod: GY | Performed by: PHYSICIAN ASSISTANT

## 2018-03-31 PROCEDURE — 82272 OCCULT BLD FECES 1-3 TESTS: CPT | Performed by: FAMILY MEDICINE

## 2018-03-31 PROCEDURE — 82803 BLOOD GASES ANY COMBINATION: CPT | Performed by: FAMILY MEDICINE

## 2018-03-31 PROCEDURE — 87506 IADNA-DNA/RNA PROBE TQ 6-11: CPT | Performed by: FAMILY MEDICINE

## 2018-03-31 PROCEDURE — 25000132 ZZH RX MED GY IP 250 OP 250 PS 637: Mod: GY | Performed by: FAMILY MEDICINE

## 2018-03-31 PROCEDURE — 87493 C DIFF AMPLIFIED PROBE: CPT | Performed by: FAMILY MEDICINE

## 2018-03-31 PROCEDURE — 25000128 H RX IP 250 OP 636: Performed by: FAMILY MEDICINE

## 2018-03-31 PROCEDURE — 71045 X-RAY EXAM CHEST 1 VIEW: CPT

## 2018-03-31 PROCEDURE — 99233 SBSQ HOSP IP/OBS HIGH 50: CPT | Performed by: FAMILY MEDICINE

## 2018-03-31 PROCEDURE — A9270 NON-COVERED ITEM OR SERVICE: HCPCS | Mod: GY | Performed by: PHYSICIAN ASSISTANT

## 2018-03-31 PROCEDURE — 83605 ASSAY OF LACTIC ACID: CPT | Performed by: FAMILY MEDICINE

## 2018-03-31 PROCEDURE — 12000000 ZZH R&B MED SURG/OB

## 2018-03-31 PROCEDURE — 85027 COMPLETE CBC AUTOMATED: CPT | Performed by: FAMILY MEDICINE

## 2018-03-31 PROCEDURE — 00000146 ZZHCL STATISTIC GLUCOSE BY METER IP

## 2018-03-31 PROCEDURE — A9270 NON-COVERED ITEM OR SERVICE: HCPCS | Mod: GY | Performed by: FAMILY MEDICINE

## 2018-03-31 PROCEDURE — 80202 ASSAY OF VANCOMYCIN: CPT | Performed by: FAMILY MEDICINE

## 2018-03-31 PROCEDURE — 25000125 ZZHC RX 250: Performed by: FAMILY MEDICINE

## 2018-03-31 PROCEDURE — 80048 BASIC METABOLIC PNL TOTAL CA: CPT | Performed by: FAMILY MEDICINE

## 2018-03-31 PROCEDURE — 25800025 ZZH RX 258: Performed by: FAMILY MEDICINE

## 2018-03-31 RX ORDER — BUTALBITAL, ACETAMINOPHEN AND CAFFEINE 50; 325; 40 MG/1; MG/1; MG/1
1 TABLET ORAL EVERY 4 HOURS PRN
Status: DISCONTINUED | OUTPATIENT
Start: 2018-03-31 | End: 2018-04-03 | Stop reason: HOSPADM

## 2018-03-31 RX ORDER — MENTHOL AND ZINC OXIDE .44; 20.625 G/100G; G/100G
OINTMENT TOPICAL 2 TIMES DAILY PRN
Status: DISCONTINUED | OUTPATIENT
Start: 2018-03-31 | End: 2018-04-03 | Stop reason: HOSPADM

## 2018-03-31 RX ORDER — NICOTINE POLACRILEX 4 MG
15-30 LOZENGE BUCCAL
Status: DISCONTINUED | OUTPATIENT
Start: 2018-03-31 | End: 2018-04-03 | Stop reason: HOSPADM

## 2018-03-31 RX ORDER — DEXTROSE MONOHYDRATE, SODIUM CHLORIDE, AND POTASSIUM CHLORIDE 50; .745; 4.5 G/1000ML; G/1000ML; G/1000ML
INJECTION, SOLUTION INTRAVENOUS CONTINUOUS
Status: DISCONTINUED | OUTPATIENT
Start: 2018-03-31 | End: 2018-03-31

## 2018-03-31 RX ORDER — DEXTROSE MONOHYDRATE 25 G/50ML
25-50 INJECTION, SOLUTION INTRAVENOUS
Status: DISCONTINUED | OUTPATIENT
Start: 2018-03-31 | End: 2018-04-03 | Stop reason: HOSPADM

## 2018-03-31 RX ORDER — AMPICILLIN, SULBACTAM 250; 125 MG/ML; MG/ML
3 INJECTION, POWDER, FOR SOLUTION INTRAMUSCULAR; INTRAVENOUS EVERY 6 HOURS
Status: DISCONTINUED | OUTPATIENT
Start: 2018-03-31 | End: 2018-03-31

## 2018-03-31 RX ORDER — AMPICILLIN AND SULBACTAM 2; 1 G/1; G/1
3 INJECTION, POWDER, FOR SOLUTION INTRAMUSCULAR; INTRAVENOUS EVERY 6 HOURS
Status: DISCONTINUED | OUTPATIENT
Start: 2018-03-31 | End: 2018-04-03 | Stop reason: HOSPADM

## 2018-03-31 RX ORDER — CITALOPRAM HYDROBROMIDE 20 MG/1
20 TABLET ORAL DAILY
Status: DISCONTINUED | OUTPATIENT
Start: 2018-03-31 | End: 2018-03-31

## 2018-03-31 RX ADMIN — PROMETHAZINE HYDROCHLORIDE 25 MG: 25 INJECTION INTRAMUSCULAR; INTRAVENOUS at 17:08

## 2018-03-31 RX ADMIN — LORAZEPAM 0.5 MG: 0.5 TABLET ORAL at 21:27

## 2018-03-31 RX ADMIN — AMPICILLIN SODIUM AND SULBACTAM SODIUM 3 G: 2; 1 INJECTION, POWDER, FOR SOLUTION INTRAMUSCULAR; INTRAVENOUS at 19:34

## 2018-03-31 RX ADMIN — Medication 1000 MCG: at 08:18

## 2018-03-31 RX ADMIN — FERROUS GLUCONATE 324 MG: 324 TABLET ORAL at 08:17

## 2018-03-31 RX ADMIN — BUSPIRONE HYDROCHLORIDE 15 MG: 15 TABLET ORAL at 08:18

## 2018-03-31 RX ADMIN — CITALOPRAM HYDROBROMIDE 40 MG: 40 TABLET ORAL at 08:17

## 2018-03-31 RX ADMIN — GABAPENTIN 600 MG: 300 CAPSULE ORAL at 13:16

## 2018-03-31 RX ADMIN — BUDESONIDE AND FORMOTEROL FUMARATE DIHYDRATE 2 PUFF: 160; 4.5 AEROSOL RESPIRATORY (INHALATION) at 19:35

## 2018-03-31 RX ADMIN — OMEPRAZOLE 40 MG: 20 CAPSULE, DELAYED RELEASE ORAL at 19:34

## 2018-03-31 RX ADMIN — HYDROMORPHONE HYDROCHLORIDE 1 MG: 1 INJECTION, SOLUTION INTRAMUSCULAR; INTRAVENOUS; SUBCUTANEOUS at 13:16

## 2018-03-31 RX ADMIN — BUDESONIDE AND FORMOTEROL FUMARATE DIHYDRATE 2 PUFF: 160; 4.5 AEROSOL RESPIRATORY (INHALATION) at 08:19

## 2018-03-31 RX ADMIN — CITALOPRAM HYDROBROMIDE 20 MG: 20 TABLET ORAL at 10:10

## 2018-03-31 RX ADMIN — OXYCODONE HYDROCHLORIDE 15 MG: 15 TABLET ORAL at 00:46

## 2018-03-31 RX ADMIN — HYDROMORPHONE HYDROCHLORIDE 1 MG: 1 INJECTION, SOLUTION INTRAMUSCULAR; INTRAVENOUS; SUBCUTANEOUS at 19:34

## 2018-03-31 RX ADMIN — BUSPIRONE HYDROCHLORIDE 15 MG: 15 TABLET ORAL at 19:37

## 2018-03-31 RX ADMIN — VANCOMYCIN HYDROCHLORIDE 1750 MG: 100 INJECTION, POWDER, LYOPHILIZED, FOR SOLUTION INTRAVENOUS at 00:22

## 2018-03-31 RX ADMIN — HYDROMORPHONE HYDROCHLORIDE 1 MG: 1 INJECTION, SOLUTION INTRAMUSCULAR; INTRAVENOUS; SUBCUTANEOUS at 17:08

## 2018-03-31 RX ADMIN — CEFTRIAXONE SODIUM 2 G: 2 INJECTION, SOLUTION INTRAVENOUS at 05:53

## 2018-03-31 RX ADMIN — HYDROMORPHONE HYDROCHLORIDE 0.5 MG: 1 INJECTION, SOLUTION INTRAMUSCULAR; INTRAVENOUS; SUBCUTANEOUS at 07:10

## 2018-03-31 RX ADMIN — LISINOPRIL 5 MG: 5 TABLET ORAL at 08:17

## 2018-03-31 RX ADMIN — ACYCLOVIR SODIUM 800 MG: 50 INJECTION, SOLUTION INTRAVENOUS at 05:53

## 2018-03-31 RX ADMIN — AMPICILLIN SODIUM AND SULBACTAM SODIUM 3 G: 2; 1 INJECTION, POWDER, FOR SOLUTION INTRAMUSCULAR; INTRAVENOUS at 13:55

## 2018-03-31 RX ADMIN — HYDROMORPHONE HYDROCHLORIDE 0.5 MG: 1 INJECTION, SOLUTION INTRAMUSCULAR; INTRAVENOUS; SUBCUTANEOUS at 10:09

## 2018-03-31 RX ADMIN — POTASSIUM CHLORIDE: 149 INJECTION, SOLUTION, CONCENTRATE INTRAVENOUS at 19:52

## 2018-03-31 RX ADMIN — LORAZEPAM 0.5 MG: 0.5 TABLET ORAL at 11:02

## 2018-03-31 RX ADMIN — POTASSIUM CHLORIDE: 149 INJECTION, SOLUTION, CONCENTRATE INTRAVENOUS at 08:17

## 2018-03-31 RX ADMIN — GABAPENTIN 600 MG: 300 CAPSULE ORAL at 08:17

## 2018-03-31 RX ADMIN — HYDROMORPHONE HYDROCHLORIDE 1 MG: 1 INJECTION, SOLUTION INTRAMUSCULAR; INTRAVENOUS; SUBCUTANEOUS at 21:27

## 2018-03-31 RX ADMIN — SODIUM CHLORIDE: 9 INJECTION, SOLUTION INTRAVENOUS at 04:37

## 2018-03-31 RX ADMIN — ACETAMINOPHEN 650 MG: 325 TABLET, FILM COATED ORAL at 00:46

## 2018-03-31 RX ADMIN — HYDROMORPHONE HYDROCHLORIDE 1 MG: 1 INJECTION, SOLUTION INTRAMUSCULAR; INTRAVENOUS; SUBCUTANEOUS at 14:42

## 2018-03-31 RX ADMIN — HYDROMORPHONE HYDROCHLORIDE 0.5 MG: 1 INJECTION, SOLUTION INTRAMUSCULAR; INTRAVENOUS; SUBCUTANEOUS at 02:43

## 2018-03-31 RX ADMIN — ACETAMINOPHEN 650 MG: 325 TABLET, FILM COATED ORAL at 11:02

## 2018-03-31 RX ADMIN — PANTOPRAZOLE SODIUM 40 MG: 40 INJECTION, POWDER, FOR SOLUTION INTRAVENOUS at 08:18

## 2018-03-31 RX ADMIN — GABAPENTIN 600 MG: 300 CAPSULE ORAL at 19:34

## 2018-03-31 RX ADMIN — OXYCODONE HYDROCHLORIDE 15 MG: 15 TABLET ORAL at 07:10

## 2018-03-31 RX ADMIN — OXYCODONE HYDROCHLORIDE 15 MG: 15 TABLET ORAL at 11:24

## 2018-03-31 RX ADMIN — BUTALBITAL, ACETAMINOPHEN, AND CAFFEINE 1 TABLET: 50; 325; 40 TABLET ORAL at 10:09

## 2018-03-31 ASSESSMENT — PAIN DESCRIPTION - DESCRIPTORS
DESCRIPTORS: CONSTANT
DESCRIPTORS: ACHING;DULL

## 2018-03-31 NOTE — PLAN OF CARE
Problem: ARDS (Acute Resp Distress Syndrome) (Adult)  Goal: Signs and Symptoms of Listed Potential Problems Will be Absent, Minimized or Managed (ARDS)  Signs and symptoms of listed potential problems will be absent, minimized or managed by discharge/transition of care (reference ARDS (Acute Resp Distress Syndrome) (Adult) CPG).   Outcome: Improving  Uneventful night up to bathroom about 4 times to have voiding's and stooling each time with dark brown stool formed start of shift and now loose liquid dark brown like someone on iron.  Patient is embarrassed about incontinence of stool to depends pads.  Is maintaining energy level but increasing difficulty compliants of pain especially to right hip and radiating down to toes.  This is her chronic pain and she states is worse since LP.  Will continue to medicate for such and monitor if worsening or improving.  MD in to see patient and new orders recieved

## 2018-03-31 NOTE — PLAN OF CARE
Problem: Patient Care Overview  Goal: Plan of Care/Patient Progress Review  Outcome: Improving  Patient remained stable throughout shift. No s/sx of distress. Continues to have chronic and acute pain. MD aware of right leg through groin pain and right labia numbness following LP yesterday, advised to cont watching at this point. 1mg of diluadid seems to help with pain but still requesting pretty frequently. Receiving some form of pain medications every couple of hours. She has long history of pain medication use and is followed by pain management. Moods have been pleasant. Stool occult +, cont to monitor stool. Dark brown in color this shift. C.diff and enteric studies neg, enteric precautions discontinued. Breath sounds dim, clear. Breathing even and unlabored. Up independently in room. Will cont to monitor.

## 2018-03-31 NOTE — PHARMACY-VANCOMYCIN DOSING SERVICE
Pharmacy Vancomycin Note  Date of Service 2018  Patient's  1985   32 year old, female    Indication: Meningitis  Goal Trough Level: 15-20 mg/L  Day of Therapy: 2  Current Vancomycin regimen:  1750 mg IV q12h    Current estimated CrCl = Estimated Creatinine Clearance: 75.1 mL/min (based on Cr of 1.09).    Creatinine for last 3 days  3/29/2018:  8:19 AM Creatinine 1.11 mg/dL  3/30/2018:  5:40 AM Creatinine 1.15 mg/dL  3/31/2018:  6:05 AM Creatinine 1.09 mg/dL    Recent Vancomycin Levels (past 3 days)  3/31/2018: 10:01 AM Vancomycin Level 32.5 mg/L    Vancomycin IV Administrations (past 72 hours)                   vancomycin (VANCOCIN) 1,750 mg in sodium chloride 0.9 % 500 mL intermittent infusion (mg) 1,750 mg New Bag 18 0022     1,750 mg Restarted 18 1326     1,750 mg New Bag  1017     1,750 mg New Bag 18 2230                Nephrotoxins and other renal medications (Future)    Start     Dose/Rate Route Frequency Ordered Stop    18 1230  vancomycin (VANCOCIN) 1,750 mg in sodium chloride 0.9 % 500 mL intermittent infusion      1,750 mg  over 2 Hours Intravenous HOLD 18 1223      18 0800  lisinopril (PRINIVIL/ZESTRIL) tablet 5 mg      5 mg Oral DAILY 18 1432               Contrast Orders - past 72 hours (72h ago through future)    Start     Dose/Rate Route Frequency Ordered Stop    18 2145  gadobutrol (GADAVIST) injection 10 mL      10 mL Intravenous ONCE 18 2136 18 2201    18 1530  iopamidol (ISOVUE-370) solution 80 mL      80 mL Intravenous ONCE 18 1528 18 2211          Interpretation of levels and current regimen:  Trough level is  Supratherapeutic    Has serum creatinine changed > 50% in last 72 hours: No      Plan:  1.  Hold noon dose  2.  Pharmacy will check trough levels as appropriate in 23:30 today, this would be prior to the next dose if appropriate.    3. Serum creatinine levels will be ordered daily for the first  week of therapy and at least twice weekly for subsequent weeks.      Jovita Spain        .

## 2018-03-31 NOTE — PROGRESS NOTES
"Patient's code status currently listed as \"DNR\". Asked patient about this to clarify and she stated that she wasn't sure where that came from and she wanted to be \"saved\" if needed. Asked her if she wanted compressions, life saving medications, and a breathing tube if necessary and her response was yes. Dr. Salomon paged.  "

## 2018-03-31 NOTE — PROGRESS NOTES
[facility]      Subjective:  Breathing better  Cough with clearing sputum  Now with diarrhea-some dark  Has chronic hypoglycemia  Wants to advance diet    Review of Systems:  CONSTITUTIONAL: NEGATIVE for fever, chills, change in weight  INTEGUMENTARY/SKIN: NEGATIVE for worrisome rashes, moles or lesions  EYES: NEGATIVE for vision changes or irritation  ENT/MOUTH: NEGATIVE for ear, mouth and throat problems  RESP:above  BREAST: NEGATIVE for masses, tenderness or discharge  CV: NEGATIVE for chest pain, palpitations or peripheral edema  GI: above  : NEGATIVE for frequency, dysuria, or hematuria  MUSCULOSKELETAL: NEGATIVE for significant arthralgias or myalgia  NEURO: NEGATIVE for weakness, dizziness or paresthesias  ENDOCRINE: NEGATIVE for temperature intolerance, skin/hair changes  HEME: NEGATIVE for bleeding problems  PSYCHIATRIC: NEGATIVE for changes in mood or affect    Physical Exam:  Vitals Were Reviewed    Patient Vitals for the past 16 hrs:   BP Temp Temp src Heart Rate Resp SpO2   03/31/18 0500 - - - 97 12 97 %   03/31/18 0400 114/70 - - 105 17 96 %   03/31/18 0300 121/77 - - 105 17 95 %   03/31/18 0245 - - - 102 9 96 %   03/31/18 0200 107/67 - - 96 12 -   03/31/18 0100 125/81 - - 101 16 -   03/31/18 0000 113/68 98.5  F (36.9  C) Oral 102 16 97 %   03/30/18 2300 103/68 - - 104 10 96 %   03/30/18 2200 127/73 - - 114 18 99 %   03/30/18 2100 129/76 - - 112 22 96 %   03/30/18 2000 105/65 98.6  F (37  C) Oral 108 20 95 %   03/30/18 1830 - - - 118 21 97 %   03/30/18 1801 134/74 - - 121 18 99 %   03/30/18 1800 134/74 - - 124 23 -   03/30/18 1730 - - - 112 20 -   03/30/18 1700 95/85 - - 107 18 -   03/30/18 1613 - - - - - 95 %   03/30/18 1600 (!) 137/91 - - 98 20 -   03/30/18 1540 137/81 98.3  F (36.8  C) Oral 96 15 100 %   03/30/18 1539 - - - 109 - -         Intake/Output Summary (Last 24 hours) at 03/31/18 0629  Last data filed at 03/31/18 0437   Gross per 24 hour   Intake          6257.92  ml   Output             6255 ml   Net             2.92 ml       GENERAL APPEARANCE: alert, no meningeal signs  EYES: conjunctiva clear, eyes grossly normal  RESP: crackles both bases  CV: regular rate and rhythm, normal S1 S2, no S3 or S4 and no murmur, click or rub   ABDOMEN: soft, nontender, no HSM or masses and bowel sounds normal  MS: no clubbing, cyanosis; no edema  SKIN: clear without significant rashes or lesions  NEURO: Normal strength and tone, sensory exam grossly normal, mentation intact and speech normal    Lab:  Recent Labs   Lab Test  03/30/18   0540  03/29/18   0819   NA  142  137   POTASSIUM  4.2  3.9   CHLORIDE  112*  106   CO2  22  22   ANIONGAP  8  9   GLC  85  113*   BUN  23  20   CR  1.15*  1.11*   UMER  7.4*  8.2*     CBC RESULTS:   Recent Labs   Lab Test  03/30/18   0540  03/29/18   0819   WBC  13.4*  14.1*   RBC  3.45*  4.38   HGB  9.5*  12.1   HCT  30.5*  38.0   PLT  187  205       Results for orders placed or performed during the hospital encounter of 03/29/18 (from the past 24 hour(s))   Glucose CSF: Tube 1   Result Value Ref Range    Glucose CSF 54 40 - 70 mg/dL   Protein total CSF: Tube 1   Result Value Ref Range    Protein Total  (H) 15 - 60 mg/dL   Gram stain   Result Value Ref Range    Specimen Description Cerebrospinal fluid     Gram Stain       No organisms seen  Result confirmed by cytospin preparation.      Gram Stain Many  WBC'S seen  Many  PMNs seen       Gram Stain Many  Red blood cells seen      CSF Culture Aerobic Bacterial   Result Value Ref Range    Specimen Description Cerebrospinal fluid     Special Requests tube 1     Culture Micro PENDING    Cell count with differential CSF: Tube 4   Result Value Ref Range    WBC  (H) 0 - 5 /uL    RBC CSF >20807 (H) 0 - 2 /uL    Tube Number 4 #    Volume 1 mL    Color CSF Bloody (A) CLRL^Colorless    Appearance CSF Slightly Cloudy (A) CLER^Clear    % Neutrophils CSF 78 %    % Lymphocytes CSF 18 %    % Mono/Macros CSF 2 %     % Eosinophils CSF 2 %   H Influenzae antigen CSF Tube 2   Result Value Ref Range    Specimen Description Cerebrospinal fluid     BAD H influenzae       No Haemophilus influenzae type B antigen detected by latex agglutination.   HSV Types 1 and 2 Qualitative PCR CSF: Tube 2   Result Value Ref Range    Herpes Simplex Type 1 CSF Not Detected NDET^Not Detected    Herpes Simplex Type 2 CSF Not Detected NDET^Not Detected    HSV CSF Comment       The [facility] Simplexa HSV 1 & 2 Direct assay is a FDA approved, real-time PCR   test for the qualitative detection and differentiation of Herpes Simplex virus Type 1 & 2   DNA in cerebrospinal fluid (CSF).  Testing is performed by the Infectious Diseases   Diagnostic Laboratory at the [facility].     Strep pneumo Agn All Ages CSF Tube 2   Result Value Ref Range    Specimen Description Cerebrospinal fluid     BAD S pneumoniae Canceled, Test credited     BAD S pneumoniae Incorrectly ordered by PCU/Clinic     BAD S pneumoniae Test reordered as correct code    Strep pneumo Agn Ur greater or equal to 13yrs or CSF any age   Result Value Ref Range    Specimen Description Cerebrospinal fluid     S Pneumoniae Antigen       No Streptococcus pneumoniae antigen detected by latex agglutination.   Echocardiogram Complete    Narrative    [id number]  [facility]  [id number]  [id number]^ERVIN^GARDENIA^BRIANNA           [facility]  Echocardiography Laboratory  [address] [place] [address]        Name: ANA KLEIN  MRN: [id number]  : 1985  Study Date: 2018 10:35 AM  Age: 32 yrs  Gender: Female  Patient Location: [facility]  Reason For Study: Dyspnea  Ordering Physician: GARDENIA MUELLER  Referring Physician: Grecia Barba  Performed By: Gudelia Reynoso RDCS     BSA: 1.8 m2  Height: 62 in  Weight: 178 lb  HR: 103  BP: 132/88 mmHg  _____________________________________________________________________________  __         Procedure  Complete Portable Echo Adult.  _____________________________________________________________________________  __        Interpretation Summary     1. The left ventricle is normal in structure, function and size. The visual  ejection fraction is estimated at 60%.  2. The right ventricle is normal in structure, function and size.  3. No valve disease.     No previous echo for comparison.  _____________________________________________________________________________  __        Left Ventricle  The left ventricle is normal in structure, function and size. There is normal  left ventricular wall thickness. The visual ejection fraction is estimated at  60%. Left ventricular diastolic function is normal. Normal left ventricular  wall motion.     Right Ventricle  The right ventricle is normal in structure, function and size.     Atria  Normal left atrial size. Right atrial size is normal. There is no atrial shunt  seen.     Mitral Valve  The mitral valve is normal in structure and function. There is trace mitral  regurgitation.        Tricuspid Valve  There is trace to mild tricuspid regurgitation. The right ventricular systolic  pressure is approximated at 30mmHg plus the right atrial pressure.     Aortic Valve  The aortic valve is normal in structure and function.     Pulmonic Valve  The pulmonic valve is normal in structure and function.     Vessels  Normal ascending, transverse (arch), and descending aorta. The IVC is normal  in size and reactivity with respiration, suggesting normal central venous  pressure.     Pericardium  There is no pericardial effusion.        Rhythm  Sinus rhythm was noted.  _____________________________________________________________________________  __  MMode/2D Measurements & Calculations  IVSd: 1.1 cm     LVIDd: 4.2 cm  LVIDs: 2.3 cm  LVPWd: 1.0 cm  FS: 43.6 %  LV mass(C)d: 142.0 grams  LV mass(C)dI: 78.1 grams/m2  LA dimension: 3.6 cm  asc Aorta Diam: 2.9 cm  LA Volume (BP): 52.0  ml  LA Volume Index (BP): 28.6 ml/m2  RWT: 0.49           Doppler Measurements & Calculations  MV E max yvonne: 93.8 cm/sec  MV A max yvonne: 77.1 cm/sec  MV E/A: 1.2  MV dec time: 0.19 sec  TR max yvonne: 275.1 cm/sec  TR max P.3 mmHg  E/E' av.3  Lateral E/e': 8.1  Medial E/e': 8.5           _____________________________________________________________________________  __           Report approved by: Arielle Lucero 2018 01:02 PM      Glucose by meter   Result Value Ref Range    Glucose 89 70 - 99 mg/dL   Glucose by meter   Result Value Ref Range    Glucose 64 (L) 70 - 99 mg/dL   Glucose by meter   Result Value Ref Range    Glucose 61 (L) 70 - 99 mg/dL   Glucose by meter   Result Value Ref Range    Glucose 82 70 - 99 mg/dL   Glucose by meter   Result Value Ref Range    Glucose 86 70 - 99 mg/dL       Assessment and Plan:    Fever, cough, shortness of breath in a patient with a history of possible aspiration and scleroderma, rule out aspiration pneumonia with sepsis   CT suggests bibasilar infiltrates, right greater than left, pt likely has esophageal dysmotility from scleroderma putting her at risk for aspiration, continue iv protonix for now     Acute metabolic and toxic encephalopathy--probably related to infection, doubt meningitis, but on atb's for this  LP appeared to be a bloody tap, pcr neg for strep pneumo, h flu, herpes  Back to baseline 18, no meningeal signs     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     Acute anemia  Probably dilutional-but will watch for bleeding     Chronic kidney disease with previous dialysis.  Stable creat at  1.09    Mild hypoglycemia-acute on chronic  Add dextrose to IV     Previous critical illness with adult respiratory distress syndrome, influenza and renal failure in 2015.       Double vision, transient, etiology unclear.   Neg stroke work up by MR     Previous pulmonary embolus, not currently on anticoagulation.      Tachycardia.   Appears  that she chronically runs hr often over 100  Normal echo     Prophylaxis-received enox, but holding due to LP     FEN-saline 125, adat     Lines--PICC      PLAN:  Stop acyclovir  If initial csf culture neg today-will likely stop vanco/rocephin and unasyn for aspiration.  Add dextrose to iv  Transfer to med surg status  Stool studies and guiac  adat  Hypoglycemia protocol      9:50 AM   Now reporting shooting pain right leg since LP  Did not report earlier  Will observe  Neuro exam normal    12:37 PM  Continued radicular type pain-if she develops neuro changes I would proceed with mri of lumbar area-but likely a nerve root irritation from the LP  Csf culture NGTD  Stop current atb's-start Unasyn  hemocult pos--on bid ppi--will observe    3:00 PM   Neuro exam legs -wnl

## 2018-04-01 LAB
ANION GAP SERPL CALCULATED.3IONS-SCNC: 7 MMOL/L (ref 3–14)
BACTERIA SPEC CULT: NORMAL
BUN SERPL-MCNC: 10 MG/DL (ref 7–30)
CALCIUM SERPL-MCNC: 8.2 MG/DL (ref 8.5–10.1)
CHLORIDE SERPL-SCNC: 107 MMOL/L (ref 94–109)
CO2 SERPL-SCNC: 26 MMOL/L (ref 20–32)
CREAT SERPL-MCNC: 1.3 MG/DL (ref 0.52–1.04)
ERYTHROCYTE [DISTWIDTH] IN BLOOD BY AUTOMATED COUNT: 15.4 % (ref 10–15)
GFR SERPL CREATININE-BSD FRML MDRD: 47 ML/MIN/1.7M2
GLUCOSE BLDC GLUCOMTR-MCNC: 91 MG/DL (ref 70–99)
GLUCOSE SERPL-MCNC: 82 MG/DL (ref 70–99)
HCT VFR BLD AUTO: 30.4 % (ref 35–47)
HGB BLD-MCNC: 9.6 G/DL (ref 11.7–15.7)
MCH RBC QN AUTO: 27.3 PG (ref 26.5–33)
MCHC RBC AUTO-ENTMCNC: 31.6 G/DL (ref 31.5–36.5)
MCV RBC AUTO: 86 FL (ref 78–100)
PLATELET # BLD AUTO: 205 10E9/L (ref 150–450)
POTASSIUM SERPL-SCNC: 3.7 MMOL/L (ref 3.4–5.3)
RBC # BLD AUTO: 3.52 10E12/L (ref 3.8–5.2)
SODIUM SERPL-SCNC: 140 MMOL/L (ref 133–144)
SPECIMEN SOURCE: NORMAL
WBC # BLD AUTO: 7.9 10E9/L (ref 4–11)

## 2018-04-01 PROCEDURE — A9270 NON-COVERED ITEM OR SERVICE: HCPCS | Mod: GY | Performed by: PHYSICIAN ASSISTANT

## 2018-04-01 PROCEDURE — 25000128 H RX IP 250 OP 636: Performed by: FAMILY MEDICINE

## 2018-04-01 PROCEDURE — 80048 BASIC METABOLIC PNL TOTAL CA: CPT | Performed by: FAMILY MEDICINE

## 2018-04-01 PROCEDURE — 25000132 ZZH RX MED GY IP 250 OP 250 PS 637: Mod: GY | Performed by: PHYSICIAN ASSISTANT

## 2018-04-01 PROCEDURE — A9270 NON-COVERED ITEM OR SERVICE: HCPCS | Mod: GY | Performed by: FAMILY MEDICINE

## 2018-04-01 PROCEDURE — 25000132 ZZH RX MED GY IP 250 OP 250 PS 637: Mod: GY | Performed by: FAMILY MEDICINE

## 2018-04-01 PROCEDURE — 25800025 ZZH RX 258: Performed by: FAMILY MEDICINE

## 2018-04-01 PROCEDURE — 85027 COMPLETE CBC AUTOMATED: CPT | Performed by: FAMILY MEDICINE

## 2018-04-01 PROCEDURE — 12000000 ZZH R&B MED SURG/OB

## 2018-04-01 PROCEDURE — 00000146 ZZHCL STATISTIC GLUCOSE BY METER IP

## 2018-04-01 PROCEDURE — 99233 SBSQ HOSP IP/OBS HIGH 50: CPT | Performed by: FAMILY MEDICINE

## 2018-04-01 RX ADMIN — HYDROMORPHONE HYDROCHLORIDE 1 MG: 1 INJECTION, SOLUTION INTRAMUSCULAR; INTRAVENOUS; SUBCUTANEOUS at 00:46

## 2018-04-01 RX ADMIN — ACETAMINOPHEN 650 MG: 325 TABLET, FILM COATED ORAL at 15:35

## 2018-04-01 RX ADMIN — GABAPENTIN 600 MG: 300 CAPSULE ORAL at 14:19

## 2018-04-01 RX ADMIN — CITALOPRAM HYDROBROMIDE 40 MG: 40 TABLET ORAL at 08:48

## 2018-04-01 RX ADMIN — BUTALBITAL, ACETAMINOPHEN, AND CAFFEINE 1 TABLET: 50; 325; 40 TABLET ORAL at 18:47

## 2018-04-01 RX ADMIN — OMEPRAZOLE 40 MG: 20 CAPSULE, DELAYED RELEASE ORAL at 20:18

## 2018-04-01 RX ADMIN — BUSPIRONE HYDROCHLORIDE 15 MG: 15 TABLET ORAL at 20:18

## 2018-04-01 RX ADMIN — BUDESONIDE AND FORMOTEROL FUMARATE DIHYDRATE 2 PUFF: 160; 4.5 AEROSOL RESPIRATORY (INHALATION) at 07:10

## 2018-04-01 RX ADMIN — HYDROMORPHONE HYDROCHLORIDE 1 MG: 1 INJECTION, SOLUTION INTRAMUSCULAR; INTRAVENOUS; SUBCUTANEOUS at 12:07

## 2018-04-01 RX ADMIN — FERROUS GLUCONATE 324 MG: 324 TABLET ORAL at 08:48

## 2018-04-01 RX ADMIN — GABAPENTIN 600 MG: 300 CAPSULE ORAL at 20:18

## 2018-04-01 RX ADMIN — AMPICILLIN SODIUM AND SULBACTAM SODIUM 3 G: 2; 1 INJECTION, POWDER, FOR SOLUTION INTRAMUSCULAR; INTRAVENOUS at 14:22

## 2018-04-01 RX ADMIN — HYDROMORPHONE HYDROCHLORIDE 1 MG: 1 INJECTION, SOLUTION INTRAMUSCULAR; INTRAVENOUS; SUBCUTANEOUS at 15:35

## 2018-04-01 RX ADMIN — ACETAMINOPHEN 650 MG: 325 TABLET, FILM COATED ORAL at 22:01

## 2018-04-01 RX ADMIN — PROMETHAZINE HYDROCHLORIDE 25 MG: 25 INJECTION INTRAMUSCULAR; INTRAVENOUS at 13:04

## 2018-04-01 RX ADMIN — AMPICILLIN SODIUM AND SULBACTAM SODIUM 3 G: 2; 1 INJECTION, POWDER, FOR SOLUTION INTRAMUSCULAR; INTRAVENOUS at 20:17

## 2018-04-01 RX ADMIN — Medication 1000 MCG: at 08:48

## 2018-04-01 RX ADMIN — BUTALBITAL, ACETAMINOPHEN, AND CAFFEINE 1 TABLET: 50; 325; 40 TABLET ORAL at 10:04

## 2018-04-01 RX ADMIN — OMEPRAZOLE 40 MG: 20 CAPSULE, DELAYED RELEASE ORAL at 08:48

## 2018-04-01 RX ADMIN — HYDROMORPHONE HYDROCHLORIDE 1 MG: 1 INJECTION, SOLUTION INTRAMUSCULAR; INTRAVENOUS; SUBCUTANEOUS at 17:51

## 2018-04-01 RX ADMIN — BUSPIRONE HYDROCHLORIDE 15 MG: 15 TABLET ORAL at 08:48

## 2018-04-01 RX ADMIN — AMPICILLIN SODIUM AND SULBACTAM SODIUM 3 G: 2; 1 INJECTION, POWDER, FOR SOLUTION INTRAMUSCULAR; INTRAVENOUS at 09:06

## 2018-04-01 RX ADMIN — HYDROMORPHONE HYDROCHLORIDE 1 MG: 1 INJECTION, SOLUTION INTRAMUSCULAR; INTRAVENOUS; SUBCUTANEOUS at 02:39

## 2018-04-01 RX ADMIN — LISINOPRIL 5 MG: 5 TABLET ORAL at 08:48

## 2018-04-01 RX ADMIN — HYDROMORPHONE HYDROCHLORIDE 1 MG: 1 INJECTION, SOLUTION INTRAMUSCULAR; INTRAVENOUS; SUBCUTANEOUS at 08:48

## 2018-04-01 RX ADMIN — OXYCODONE HYDROCHLORIDE 15 MG: 15 TABLET ORAL at 22:12

## 2018-04-01 RX ADMIN — PROMETHAZINE HYDROCHLORIDE 25 MG: 25 INJECTION INTRAMUSCULAR; INTRAVENOUS at 18:42

## 2018-04-01 RX ADMIN — GABAPENTIN 600 MG: 300 CAPSULE ORAL at 09:06

## 2018-04-01 RX ADMIN — LORAZEPAM 0.5 MG: 0.5 TABLET ORAL at 13:04

## 2018-04-01 RX ADMIN — HYDROMORPHONE HYDROCHLORIDE 1 MG: 1 INJECTION, SOLUTION INTRAMUSCULAR; INTRAVENOUS; SUBCUTANEOUS at 07:10

## 2018-04-01 RX ADMIN — AMPICILLIN SODIUM AND SULBACTAM SODIUM 3 G: 2; 1 INJECTION, POWDER, FOR SOLUTION INTRAMUSCULAR; INTRAVENOUS at 02:36

## 2018-04-01 RX ADMIN — POTASSIUM CHLORIDE: 149 INJECTION, SOLUTION, CONCENTRATE INTRAVENOUS at 08:15

## 2018-04-01 RX ADMIN — HYDROMORPHONE HYDROCHLORIDE 1 MG: 1 INJECTION, SOLUTION INTRAMUSCULAR; INTRAVENOUS; SUBCUTANEOUS at 20:49

## 2018-04-01 RX ADMIN — BUDESONIDE AND FORMOTEROL FUMARATE DIHYDRATE 2 PUFF: 160; 4.5 AEROSOL RESPIRATORY (INHALATION) at 20:18

## 2018-04-01 NOTE — PROGRESS NOTES
Skin assessment completed with primary RN. Pt has scattered bruises on arms, scattered areas of healed scars on L shoulder/chest area. Fingers on both hands contracted due to pt hx of scleroderma. Skin appears dry and tight over hands.

## 2018-04-01 NOTE — PROGRESS NOTES
[facility] TRANSPORT NOTE  Data:   Reason for Transport:  Stable condition    Molly Teague was transported from ICU5 via wheel chair at 2030.  Patient was accompanied by Nursing Assistant. Equipment used for transport: IV pump. Family was aware of reason for transport: yes, states she will update them    Action:  Report: given to Taylor [facility] RN     Response:  Patient's condition when transferred off unit was stable.    Aline Talavera

## 2018-04-01 NOTE — PLAN OF CARE
Problem: Patient Care Overview  Goal: Plan of Care/Patient Progress Review  Outcome: No Change  Pt continues to complain of pain in L buttock that travels down the leg.   Denies abnormal numbness or tingling. Pt report hx of neuropathy to LEs.   Pt reports ROM and strength per normal status. Is able to get up and walk in the room to/from bathroom.   Pt requesting Dilaudid for pain.   Spoke to pt re: inflammation and antiinflammatory medication and pt voices, can not take as the interfere with one of her medications.  Reluctant to try, but ice applied to the buttock-hip area to see if helps with discomfort.   Pt indep with repositioning self and props her self with pillow until comfortable.

## 2018-04-01 NOTE — PLAN OF CARE
Problem: Patient Care Overview  Goal: Plan of Care/Patient Progress Review  Patient up independently in room. Pain being controlled by IV dilaudid. Patient requesting nausea and anxiety medication after emesis this afternoon. Using call light to notify staff with questions or concerns.

## 2018-04-01 NOTE — ANESTHESIA POSTPROCEDURE EVALUATION
Patient: Molly Teague    * No procedures listed *    Diagnosis:* No pre-op diagnosis entered *  Diagnosis Additional Information: No value filed.    Anesthesia Type:  Other    Note:  Anesthesia Post Evaluation    Patient location during evaluation: Bedside  Patient participation: Able to fully participate in evaluation  Level of consciousness: awake  Pain management: satisfactory to patient  Airway patency: patent  Cardiovascular status: acceptable  Respiratory status: acceptable  Hydration status: acceptable  PONV: none     Anesthetic complications: None    Comments: Called by Dr. Salomon regarding right leg radiculopathy. Patient seen, chart reviewed. Explained to patient radiculopathy r/t LP is usually short lived. She wqill have MRI.  She is satisfied with plan, anesthesia will follow.        Last vitals:  Vitals:    04/01/18 0747 04/01/18 1117 04/01/18 1506   BP: 138/80 118/71 146/79   Pulse: 93 87    Resp: 18 18 18   Temp: 36.9  C (98.5  F) 36.7  C (98  F) 36.4  C (97.6  F)   SpO2: 96% 95% 99%         Electronically Signed By: CHRIST Jackson CRNA  April 1, 2018  4:24 PM

## 2018-04-01 NOTE — PROGRESS NOTES
"[facility]      Subjective:  She has pain and numbness in a right radicular fashion from low back to inner leg, some numbness  Breathing good  Some nausea and emesis today--has chronic recurrent emesis    Review of Systems:  CONSTITUTIONAL: NEGATIVE for fever, chills, change in weight  INTEGUMENTARY/SKIN: NEGATIVE for worrisome rashes, moles or lesions  EYES: NEGATIVE for vision changes or irritation  ENT/MOUTH: NEGATIVE for ear, mouth and throat problems  RESP: NEGATIVE for significant cough or SOB  BREAST: NEGATIVE for masses, tenderness or discharge  CV: NEGATIVE for chest pain, palpitations or peripheral edema  GI: nausea  : NEGATIVE for frequency, dysuria, or hematuria  MUSCULOSKELETAL: NEGATIVE for significant arthralgias or myalgia  NEURO: above  ENDOCRINE: NEGATIVE for temperature intolerance, skin/hair changes  HEME: NEGATIVE for bleeding problems  PSYCHIATRIC: NEGATIVE for changes in mood or affect    Physical Exam:  Vitals Were Reviewed    Patient Vitals for the past 16 hrs:   BP Temp Temp src Pulse Heart Rate Resp SpO2 Height Weight   04/01/18 1117 118/71 98  F (36.7  C) Oral 87 - 18 95 % - -   04/01/18 0747 138/80 98.5  F (36.9  C) Oral 93 - 18 96 % - -   04/01/18 0626 - - - - - - - - 80 kg (176 lb 5.9 oz)   04/01/18 0419 123/70 98.5  F (36.9  C) Oral - 88 18 96 % - -   04/01/18 0036 (!) 156/92 98.7  F (37.1  C) Oral - 88 18 98 % - -   03/31/18 2042 151/83 99.5  F (37.5  C) Oral - 93 18 100 % 1.6 m (5' 3\") -         Intake/Output Summary (Last 24 hours) at 04/01/18 1220  Last data filed at 04/01/18 0857   Gross per 24 hour   Intake          3334.33 ml   Output             2600 ml   Net           734.33 ml       GENERAL APPEARANCE: healthy, alert and no distress  EYES: conjunctiva clear, eyes grossly normal  RESP: lungs clear to auscultation - no rales, rhonchi or wheezes  CV: regular rate and rhythm, normal S1 S2, no S3 or S4 and no murmur, click or rub   ABDOMEN: soft, " nontender, no HSM or masses and bowel sounds normal  SKIN: clear without significant rashes or lesions  NEURO: strength and reflexes of legs normal, ambulates normally    Lab:  Recent Labs   Lab Test  04/01/18   0700  03/31/18   0605   NA  140  144   POTASSIUM  3.7  3.8   CHLORIDE  107  112*   CO2  26  24   ANIONGAP  7  8   GLC  82  76   BUN  10  9   CR  1.30*  1.09*   UMER  8.2*  8.1*     CBC RESULTS:   Recent Labs   Lab Test  04/01/18   0700  03/31/18   0605   WBC  7.9  7.9   RBC  3.52*  3.29*   HGB  9.6*  9.0*   HCT  30.4*  29.0*   PLT  205  183       Results for orders placed or performed during the hospital encounter of 03/29/18 (from the past 24 hour(s))   Glucose by meter   Result Value Ref Range    Glucose 124 (H) 70 - 99 mg/dL   Glucose by meter   Result Value Ref Range    Glucose 91 70 - 99 mg/dL   Basic metabolic panel   Result Value Ref Range    Sodium 140 133 - 144 mmol/L    Potassium 3.7 3.4 - 5.3 mmol/L    Chloride 107 94 - 109 mmol/L    Carbon Dioxide 26 20 - 32 mmol/L    Anion Gap 7 3 - 14 mmol/L    Glucose 82 70 - 99 mg/dL    Urea Nitrogen 10 7 - 30 mg/dL    Creatinine 1.30 (H) 0.52 - 1.04 mg/dL    GFR Estimate 47 (L) >60 mL/min/1.7m2    GFR Estimate If Black 57 (L) >60 mL/min/1.7m2    Calcium 8.2 (L) 8.5 - 10.1 mg/dL   CBC with platelets   Result Value Ref Range    WBC 7.9 4.0 - 11.0 10e9/L    RBC Count 3.52 (L) 3.8 - 5.2 10e12/L    Hemoglobin 9.6 (L) 11.7 - 15.7 g/dL    Hematocrit 30.4 (L) 35.0 - 47.0 %    MCV 86 78 - 100 fl    MCH 27.3 26.5 - 33.0 pg    MCHC 31.6 31.5 - 36.5 g/dL    RDW 15.4 (H) 10.0 - 15.0 %    Platelet Count 205 150 - 450 10e9/L       Assessment and Plan:    Fever, cough, shortness of breath in a patient with a history of possible aspiration and scleroderma, rule out aspiration pneumonia with sepsis   CT suggests bibasilar infiltrates, right greater than left, pt likely has esophageal dysmotility from scleroderma putting her at risk for aspiration, continue iv protonix for  now  Pt became obtunded and hypoxic shortly after admission, now better  Afebrile, normal wbc, off oxygen on 04/01/18.      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      Acute anemia/ guiac positive stool  Acute anemia probably largely dilutional, but stool was guiac pos.  Pt on bid ppi.  Hgg up a bit 04/01/18.      Chronic kidney disease with previous dialysis.  Creat  1.09-1.3. Now off vanco and acyclovir. Follow this. Continue some fluids.     Mild hypoglycemia-acute on chronic  Added dextrose to IV 3/31/18.      Previous critical illness with adult respiratory distress syndrome, influenza and renal failure in 2015.        Double vision, transient, etiology unclear.   Neg stroke work up by MR      Previous pulmonary embolus, not currently on anticoagulation.       Tachycardia.   Appears that she chronically runs hr often over 100  Normal echo  Resolving 04/01/18       Prophylaxis-scd ( no enox due to guiac pos stool)      FEN-fluids with dextrose 75 cc per hour, adat      Lines--PICC      PLAN:  Follow hgb and creat.   Continue Unasyn.  Probably can dc relatively soon  Main problem is right lumbar radicular pain since LP, will get mri and talk to anesthesia.    1:57 PM  Discussed with anesthesia , they will see her. They think generally this type of pain after LP self resolves.

## 2018-04-02 ENCOUNTER — APPOINTMENT (OUTPATIENT)
Dept: MRI IMAGING | Facility: CLINIC | Age: 33
DRG: 871 | End: 2018-04-02
Attending: FAMILY MEDICINE
Payer: MEDICARE

## 2018-04-02 LAB
ANION GAP SERPL CALCULATED.3IONS-SCNC: 6 MMOL/L (ref 3–14)
BUN SERPL-MCNC: 9 MG/DL (ref 7–30)
CALCIUM SERPL-MCNC: 8.5 MG/DL (ref 8.5–10.1)
CHLORIDE SERPL-SCNC: 106 MMOL/L (ref 94–109)
CO2 SERPL-SCNC: 26 MMOL/L (ref 20–32)
CREAT SERPL-MCNC: 1.14 MG/DL (ref 0.52–1.04)
ERYTHROCYTE [DISTWIDTH] IN BLOOD BY AUTOMATED COUNT: 15.1 % (ref 10–15)
GFR SERPL CREATININE-BSD FRML MDRD: 55 ML/MIN/1.7M2
GLUCOSE BLDC GLUCOMTR-MCNC: 90 MG/DL (ref 70–99)
GLUCOSE BLDC GLUCOMTR-MCNC: 94 MG/DL (ref 70–99)
GLUCOSE BLDC GLUCOMTR-MCNC: 95 MG/DL (ref 70–99)
GLUCOSE BLDC GLUCOMTR-MCNC: 96 MG/DL (ref 70–99)
GLUCOSE BLDC GLUCOMTR-MCNC: 99 MG/DL (ref 70–99)
GLUCOSE SERPL-MCNC: 93 MG/DL (ref 70–99)
HCT VFR BLD AUTO: 32.3 % (ref 35–47)
HGB BLD-MCNC: 10.4 G/DL (ref 11.7–15.7)
MCH RBC QN AUTO: 27.4 PG (ref 26.5–33)
MCHC RBC AUTO-ENTMCNC: 32.2 G/DL (ref 31.5–36.5)
MCV RBC AUTO: 85 FL (ref 78–100)
PLATELET # BLD AUTO: 245 10E9/L (ref 150–450)
POTASSIUM SERPL-SCNC: 4.2 MMOL/L (ref 3.4–5.3)
RBC # BLD AUTO: 3.79 10E12/L (ref 3.8–5.2)
SODIUM SERPL-SCNC: 138 MMOL/L (ref 133–144)
WBC # BLD AUTO: 7.1 10E9/L (ref 4–11)

## 2018-04-02 PROCEDURE — 00000146 ZZHCL STATISTIC GLUCOSE BY METER IP

## 2018-04-02 PROCEDURE — 72148 MRI LUMBAR SPINE W/O DYE: CPT

## 2018-04-02 PROCEDURE — 25000132 ZZH RX MED GY IP 250 OP 250 PS 637: Mod: GY | Performed by: FAMILY MEDICINE

## 2018-04-02 PROCEDURE — A9270 NON-COVERED ITEM OR SERVICE: HCPCS | Mod: GY | Performed by: PHYSICIAN ASSISTANT

## 2018-04-02 PROCEDURE — 80048 BASIC METABOLIC PNL TOTAL CA: CPT | Performed by: FAMILY MEDICINE

## 2018-04-02 PROCEDURE — 25000128 H RX IP 250 OP 636: Performed by: FAMILY MEDICINE

## 2018-04-02 PROCEDURE — 85027 COMPLETE CBC AUTOMATED: CPT | Performed by: FAMILY MEDICINE

## 2018-04-02 PROCEDURE — 99232 SBSQ HOSP IP/OBS MODERATE 35: CPT | Performed by: FAMILY MEDICINE

## 2018-04-02 PROCEDURE — A9270 NON-COVERED ITEM OR SERVICE: HCPCS | Mod: GY | Performed by: FAMILY MEDICINE

## 2018-04-02 PROCEDURE — 12000000 ZZH R&B MED SURG/OB

## 2018-04-02 PROCEDURE — 25000132 ZZH RX MED GY IP 250 OP 250 PS 637: Mod: GY | Performed by: PHYSICIAN ASSISTANT

## 2018-04-02 PROCEDURE — 25800025 ZZH RX 258: Performed by: FAMILY MEDICINE

## 2018-04-02 RX ADMIN — OMEPRAZOLE 40 MG: 20 CAPSULE, DELAYED RELEASE ORAL at 12:06

## 2018-04-02 RX ADMIN — OXYCODONE HYDROCHLORIDE 15 MG: 15 TABLET ORAL at 10:33

## 2018-04-02 RX ADMIN — AMPICILLIN SODIUM AND SULBACTAM SODIUM 3 G: 2; 1 INJECTION, POWDER, FOR SOLUTION INTRAMUSCULAR; INTRAVENOUS at 07:45

## 2018-04-02 RX ADMIN — OXYCODONE HYDROCHLORIDE 15 MG: 15 TABLET ORAL at 03:53

## 2018-04-02 RX ADMIN — ACETAMINOPHEN 650 MG: 325 TABLET, FILM COATED ORAL at 07:45

## 2018-04-02 RX ADMIN — BUDESONIDE AND FORMOTEROL FUMARATE DIHYDRATE 2 PUFF: 160; 4.5 AEROSOL RESPIRATORY (INHALATION) at 20:38

## 2018-04-02 RX ADMIN — ACETAMINOPHEN 650 MG: 325 TABLET, FILM COATED ORAL at 12:14

## 2018-04-02 RX ADMIN — PROMETHAZINE HYDROCHLORIDE 25 MG: 25 INJECTION INTRAMUSCULAR; INTRAVENOUS at 09:11

## 2018-04-02 RX ADMIN — PROMETHAZINE HYDROCHLORIDE 25 MG: 25 INJECTION INTRAMUSCULAR; INTRAVENOUS at 22:53

## 2018-04-02 RX ADMIN — OXYCODONE HYDROCHLORIDE 15 MG: 15 TABLET ORAL at 22:58

## 2018-04-02 RX ADMIN — HYDROMORPHONE HYDROCHLORIDE 1 MG: 1 INJECTION, SOLUTION INTRAMUSCULAR; INTRAVENOUS; SUBCUTANEOUS at 06:58

## 2018-04-02 RX ADMIN — LISINOPRIL 5 MG: 5 TABLET ORAL at 12:06

## 2018-04-02 RX ADMIN — HYDROMORPHONE HYDROCHLORIDE 1 MG: 1 INJECTION, SOLUTION INTRAMUSCULAR; INTRAVENOUS; SUBCUTANEOUS at 21:01

## 2018-04-02 RX ADMIN — LORAZEPAM 0.5 MG: 0.5 TABLET ORAL at 11:42

## 2018-04-02 RX ADMIN — GABAPENTIN 600 MG: 300 CAPSULE ORAL at 12:06

## 2018-04-02 RX ADMIN — AMPICILLIN SODIUM AND SULBACTAM SODIUM 3 G: 2; 1 INJECTION, POWDER, FOR SOLUTION INTRAMUSCULAR; INTRAVENOUS at 02:41

## 2018-04-02 RX ADMIN — OXYCODONE HYDROCHLORIDE 15 MG: 15 TABLET ORAL at 14:30

## 2018-04-02 RX ADMIN — POTASSIUM CHLORIDE: 149 INJECTION, SOLUTION, CONCENTRATE INTRAVENOUS at 15:43

## 2018-04-02 RX ADMIN — GABAPENTIN 600 MG: 300 CAPSULE ORAL at 21:01

## 2018-04-02 RX ADMIN — PROMETHAZINE HYDROCHLORIDE 25 MG: 25 INJECTION INTRAMUSCULAR; INTRAVENOUS at 02:36

## 2018-04-02 RX ADMIN — BUSPIRONE HYDROCHLORIDE 15 MG: 15 TABLET ORAL at 20:36

## 2018-04-02 RX ADMIN — AMPICILLIN SODIUM AND SULBACTAM SODIUM 3 G: 2; 1 INJECTION, POWDER, FOR SOLUTION INTRAMUSCULAR; INTRAVENOUS at 21:01

## 2018-04-02 RX ADMIN — LORAZEPAM 0.5 MG: 0.5 TABLET ORAL at 02:45

## 2018-04-02 RX ADMIN — POTASSIUM CHLORIDE: 149 INJECTION, SOLUTION, CONCENTRATE INTRAVENOUS at 00:27

## 2018-04-02 RX ADMIN — ACETAMINOPHEN 650 MG: 325 TABLET, FILM COATED ORAL at 20:36

## 2018-04-02 RX ADMIN — BUTALBITAL, ACETAMINOPHEN, AND CAFFEINE 1 TABLET: 50; 325; 40 TABLET ORAL at 14:30

## 2018-04-02 RX ADMIN — GABAPENTIN 600 MG: 300 CAPSULE ORAL at 17:02

## 2018-04-02 RX ADMIN — OMEPRAZOLE 40 MG: 20 CAPSULE, DELAYED RELEASE ORAL at 20:37

## 2018-04-02 RX ADMIN — OXYCODONE HYDROCHLORIDE 15 MG: 15 TABLET ORAL at 18:37

## 2018-04-02 RX ADMIN — ACETAMINOPHEN 650 MG: 325 TABLET, FILM COATED ORAL at 04:35

## 2018-04-02 RX ADMIN — AMPICILLIN SODIUM AND SULBACTAM SODIUM 3 G: 2; 1 INJECTION, POWDER, FOR SOLUTION INTRAMUSCULAR; INTRAVENOUS at 14:30

## 2018-04-02 NOTE — PLAN OF CARE
"Problem: Gastrointestinal Condition Comorbidity  Goal: Gastrointestinal Condition  Patient comorbidity will be monitored for signs and symptoms of Gastrointestinal condition.  Problems will be absent, minimized or managed by discharge/transition of care.   Outcome: No Change  Pt was able to sleep on and off overnight post phenergan and Ativan. Continues to report, \"queezy\".  Continues to request pain meds for varies reason: Migraine, neuropathy bilat, general muscle and joint pain from scleroderma and recent R buttock-leg pain post LP. Medicated with Tylenol, oxycodone and did take dilaudid this am for on-going headache.       "

## 2018-04-02 NOTE — PLAN OF CARE
Problem: Patient Care Overview  Goal: Plan of Care/Patient Progress Review    Patient complains of continued headache. States it started at 1000 yesterday morning and has been there ever since. Does suffer from migraine headaches, but usually resolves with taking Fioricet. Nausea medication administered. Patient had tea this evening and stated it helped with headache, possible the caffeine in it.

## 2018-04-02 NOTE — PROGRESS NOTES
[facility] Hospitalist Progress Note           Assessment and Plan:     Fever, cough, shortness of breath in a patient with a history of possible aspiration and scleroderma - suspect due to aspiration pneumonia with sepsis   Previous critical illness with adult respiratory distress syndrome, influenza and renal failure in 2015.    3/29/18-4/1/18 -- CT suggests bibasilar infiltrates, right greater than left, pt likely has esophageal dysmotility from scleroderma putting her at risk for aspiration, continue iv protonix for now  Pt became obtunded and hypoxic shortly after admission, now better  Afebrile, normal wbc, off oxygen on 04/01/18.  4/2/2018 -- improving, on room air.  Continue unasyn for now.         Acute metabolic and toxic encephalopathy suspect due to pneumonia as above - Meningitis ruled out  3/29/18-4/1/18 -- LP appeared to be a bloody tap, pcr neg for strep pneumo, h flu, herpes  Back to baseline 03/31/18, no meningeal signs- antibiotics for meningitis stopped and unasyn started for aspiration pneumonia as above.    4/2/2018 -- resolved, mental status back to baseline.       Radicular pain/numbness since LP  3/29/18-4/1/18 -- Discussed with anesthesia , they will see her. They think generally this type of pain after LP self resolves.  Will get mri of lumbar spine, anesthesia following  4/2/2018 -- pain improving.   MRI showed just a small likely hematoma.  Discussed with anesthesia - they will see patient today but plan remains conservative - anticipate will continue to slowly resolve without intervention.        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       Acute anemia/ guiac positive stool  3/29/18-4/1/18 --  Acute anemia probably largely dilutional, but stool was guiac pos.  Pt on bid ppi. Hgb up a bit 04/01/18.  4/2/2018 -- hemoglobin improving, besides guiac positive no other signs of bloody stools - recommend outpatient follow-up with primary care provider.        Chronic  kidney disease with previous dialysis.  3/29/18-18 -- Creat  1.09-1.3. Now off vanco and acyclovir. Follow this. Continue some fluids.  2018 -- improving.        Mild hypoglycemia-acute on chronic  3/29/18-18 -- Added dextrose to IV 3/31/18.  2018 -- no change, oral intake improving very slowly.        Double vision, transient, etiology unclear.   3/29/18-18 -- Neg stroke work up by MR  2018 -- no further issues.        Previous pulmonary embolus, not currently on anticoagulation.       Tachycardia.   Appears that she chronically runs hr often over 100  Normal echo  Resolving 18       Prophylaxis-scd (no enox due to guiac pos stool)      FEN-fluids with dextrose 75 cc per hour, adat      Lines--PICC    Disposition  Improving but slowly, likely still a couple more days inpatient.              Interval History:   Slowly improving.  Pain in right leg better - down to an 8.5 from a 10 yesterday.  headache still there but improving.  No visual changes.  Numbness still there in right upper leg/groin but she thinks better, no new numbness or weakness.     No other pain            Review of Systems:    ROS: 10 point ROS neg other than the symptoms noted above in the HPI.           Medications:   Current active medications and PTA medications reviewed, see medication list for details.            Physical Exam:   Vitals were reviewed  Patient Vitals for the past 24 hrs:   BP Temp Temp src Heart Rate Resp SpO2   18 1159 (!) 146/92 97.9  F (36.6  C) Oral 82 18 98 %   18 0756 129/85 98.2  F (36.8  C) Oral 78 18 96 %   18 0012 111/70 97.9  F (36.6  C) Oral 79 18 96 %       Temperatures:  Current - Temp: 97.9  F (36.6  C); Max - Temp  Av  F (36.7  C)  Min: 97.9  F (36.6  C)  Max: 98.2  F (36.8  C)  Respiration range: Resp  Av  Min: 18  Max: 18  Pulse range: No Data Recorded  Blood pressure range: Systolic (24hrs), Av , Min:111 , Max:146   ; Diastolic (24hrs), Av,  Min:70, Max:92    Pulse oximetry range: SpO2  Av.7 %  Min: 96 %  Max: 98 %  I/O last 3 completed shifts:  In: 3214.58 [P.O.:720; I.V.:2494.58]  Out: 1200 [Urine:800; Emesis/NG output:400]    Intake/Output Summary (Last 24 hours) at 18 1517  Last data filed at 18 1400   Gross per 24 hour   Intake          3214.58 ml   Output             1200 ml   Net          2014.58 ml     EXAM:   GENERAL APPEARANCE ADULT: Alert, no acute distress, oriented x 3  EYES: PERRL, EOM normal, conjunctiva and lids normal, EOM normal  HENT: oral mucous membranes moist, head atraumatic and normocephalic  NECK: No adenopathy,masses or thyromegaly, supple  CV: regular rate and rhythm no murmur  Pulm: clear to auscultation bilaterally  Abdomen: soft, non-tender, no masses, no masses, normal bowel sounds.  MS: extremities normal, no peripheral edema  SKIN: no suspicious lesions or rashes  NEURO: Alert, oriented, speech and mentation normal, Cranial nerves 2-12 are normal., Strength normal and symmetrical in upper and lower extremities.  Sensation to light touch intact throughout upper and lower extremities bilaterally.   Reflexes 2+ and symmetrical at biceps, triceps, knees and ankles. Finger to nose and heel to shin testing is normal.  PSYCH: mentation appears normal, affect and mood normal             Data:     Results for orders placed or performed during the hospital encounter of 18 (from the past 24 hour(s))   Glucose by meter   Result Value Ref Range    Glucose 96 70 - 99 mg/dL   Glucose by meter   Result Value Ref Range    Glucose 95 70 - 99 mg/dL   Basic metabolic panel   Result Value Ref Range    Sodium 138 133 - 144 mmol/L    Potassium 4.2 3.4 - 5.3 mmol/L    Chloride 106 94 - 109 mmol/L    Carbon Dioxide 26 20 - 32 mmol/L    Anion Gap 6 3 - 14 mmol/L    Glucose 93 70 - 99 mg/dL    Urea Nitrogen 9 7 - 30 mg/dL    Creatinine 1.14 (H) 0.52 - 1.04 mg/dL    GFR Estimate 55 (L) >60 mL/min/1.7m2    GFR Estimate If  Black 67 >60 mL/min/1.7m2    Calcium 8.5 8.5 - 10.1 mg/dL   CBC with platelets   Result Value Ref Range    WBC 7.1 4.0 - 11.0 10e9/L    RBC Count 3.79 (L) 3.8 - 5.2 10e12/L    Hemoglobin 10.4 (L) 11.7 - 15.7 g/dL    Hematocrit 32.3 (L) 35.0 - 47.0 %    MCV 85 78 - 100 fl    MCH 27.4 26.5 - 33.0 pg    MCHC 32.2 31.5 - 36.5 g/dL    RDW 15.1 (H) 10.0 - 15.0 %    Platelet Count 245 150 - 450 10e9/L   Glucose by meter   Result Value Ref Range    Glucose 90 70 - 99 mg/dL   MRI Lumbar Spine w/o Contrast    Narrative    MRI LUMBAR SPINE WITHOUT CONTRAST   4/2/2018 1:53 PM     HISTORY: Right low back and leg pain since a lumbar puncture spinal  tap two days ago.    COMPARISON: None.    TECHNIQUE: Multiplanar MR imaging was performed without contrast.    FINDINGS: Numbering of the levels is based on what appear to be five  lumbar type vertebral bodies. Vertebral body alignment is normal. No  fracture is seen. No pars interarticularis defect is demonstrated. No  osseous lesion is seen. There is a moderately prominent Schmorl's node  in the superior endplate of the L4 vertebral body. No other abnormal  marrow signal intensity is seen. The conus medullaris terminates at  the level of the L1 vertebral body. Along the posterior aspect of the  thecal sac just superior to the level of the L1-L2 disc, there is a  small focus of abnormal signal. This is best seen on the transverse  series 7 image 8 and the sagittal series 3 image 8. This area measures  approximately 0.4 cm transverse x 0.3 cm AP x up to 1.3 cm  craniocaudal. This demonstrates signal intensity similar to the  adjacent nerve roots on all pulse sequences. However, this appears to  be separate from the nerve roots, situated just posteriorly along the  inner lining of the thecal sac. No other intrathecal abnormality is  demonstrated. The adjacent soft tissues are unremarkable.    Findings by specific level:    T12-L1: The disc and facet joints are normal. No stenosis is  seen.    L1-L2: The disc and facet joints are normal. No stenosis is seen.    L2-L3: The disc and facet joints are normal. No stenosis is seen.    L3-L4: There is disc dehydration with mild to moderate disc height  loss. There is a mild diffuse disc bulge with no focal herniation  seen. The facet joints are unremarkable. No stenosis is demonstrated.    L4-L5: The disc and facet joints are normal. No stenosis is seen.    L5-S1: There is mild disc dehydration. The disc height is well  preserved. No disc bulge or herniation is seen. There is a small  fissure of the annular fibers in the left neural foramen. No stenosis  is demonstrated. There are mild degenerative changes in the facet  joints.      Impression    IMPRESSION:   1. There is a small focus of abnormal signal along the posterior  aspect of the thecal sac at the level of the L1-L2 disc. This could  represent a very small hematoma. This does not result in any stenosis.  Correlation should be made with the location of the lumbar puncture as  this could be secondary to the procedure.  2. No epidural hematoma is seen with no stenosis demonstrated.  3. Mild degenerative changes as described above with no disc  herniation seen.    JOCY GARCIA MD           Attestation:  I have reviewed today's vital signs, notes, medications, labs and imaging.  Amount of time performed on this daily note: 30 minutes.     Efra Farrell MD, MD

## 2018-04-02 NOTE — PROGRESS NOTES
"Assessment done on this pt for f/u on Right leg radiculopathy following LP on 3/30/18. Pt states her pain level in R leg is down from a 10/10 to 8.5/10.  She describes this radiculopathy as a \"burning\" and \"tightening\" type sensation that starts in her R groin and radiates to the inner aspect of upper thigh and around to the back of the thigh. Also, numbness of her right groin and thigh. She states that her R leg is her \"bad leg\" prior to this procedure. MRI today shows a very small area at L1-2 that could resemble a hematoma.  Upon assessment of pt's back, it appears that attempts that were made on LP could very well have been at this level, however it is hard to feel bony prominences to be certain.  I discussed with pt the plan to assess her daily for improvement and that this type of pain/numbness after LP will likely resolve on its own. Pt is agreeable to this plan.  "

## 2018-04-03 VITALS
SYSTOLIC BLOOD PRESSURE: 111 MMHG | WEIGHT: 176.81 LBS | DIASTOLIC BLOOD PRESSURE: 72 MMHG | HEIGHT: 63 IN | OXYGEN SATURATION: 95 % | TEMPERATURE: 98.3 F | RESPIRATION RATE: 16 BRPM | BODY MASS INDEX: 31.33 KG/M2 | HEART RATE: 87 BPM

## 2018-04-03 LAB
ANION GAP SERPL CALCULATED.3IONS-SCNC: 7 MMOL/L (ref 3–14)
BUN SERPL-MCNC: 13 MG/DL (ref 7–30)
CALCIUM SERPL-MCNC: 8.3 MG/DL (ref 8.5–10.1)
CHLORIDE SERPL-SCNC: 107 MMOL/L (ref 94–109)
CO2 SERPL-SCNC: 25 MMOL/L (ref 20–32)
CREAT SERPL-MCNC: 1.2 MG/DL (ref 0.52–1.04)
ERYTHROCYTE [DISTWIDTH] IN BLOOD BY AUTOMATED COUNT: 15.4 % (ref 10–15)
GFR SERPL CREATININE-BSD FRML MDRD: 52 ML/MIN/1.7M2
GLUCOSE BLDC GLUCOMTR-MCNC: 113 MG/DL (ref 70–99)
GLUCOSE BLDC GLUCOMTR-MCNC: 87 MG/DL (ref 70–99)
GLUCOSE SERPL-MCNC: 89 MG/DL (ref 70–99)
HCT VFR BLD AUTO: 31.7 % (ref 35–47)
HGB BLD-MCNC: 10 G/DL (ref 11.7–15.7)
MCH RBC QN AUTO: 27.2 PG (ref 26.5–33)
MCHC RBC AUTO-ENTMCNC: 31.5 G/DL (ref 31.5–36.5)
MCV RBC AUTO: 86 FL (ref 78–100)
PLATELET # BLD AUTO: 237 10E9/L (ref 150–450)
POTASSIUM SERPL-SCNC: 3.9 MMOL/L (ref 3.4–5.3)
RBC # BLD AUTO: 3.68 10E12/L (ref 3.8–5.2)
SODIUM SERPL-SCNC: 139 MMOL/L (ref 133–144)
WBC # BLD AUTO: 7.1 10E9/L (ref 4–11)

## 2018-04-03 PROCEDURE — 25000132 ZZH RX MED GY IP 250 OP 250 PS 637: Mod: GY | Performed by: FAMILY MEDICINE

## 2018-04-03 PROCEDURE — 25000132 ZZH RX MED GY IP 250 OP 250 PS 637: Mod: GY | Performed by: PHYSICIAN ASSISTANT

## 2018-04-03 PROCEDURE — 25800025 ZZH RX 258: Performed by: FAMILY MEDICINE

## 2018-04-03 PROCEDURE — 00000146 ZZHCL STATISTIC GLUCOSE BY METER IP

## 2018-04-03 PROCEDURE — 25000128 H RX IP 250 OP 636: Performed by: FAMILY MEDICINE

## 2018-04-03 PROCEDURE — A9270 NON-COVERED ITEM OR SERVICE: HCPCS | Mod: GY | Performed by: PHYSICIAN ASSISTANT

## 2018-04-03 PROCEDURE — A9270 NON-COVERED ITEM OR SERVICE: HCPCS | Mod: GY | Performed by: FAMILY MEDICINE

## 2018-04-03 PROCEDURE — 85027 COMPLETE CBC AUTOMATED: CPT | Performed by: FAMILY MEDICINE

## 2018-04-03 PROCEDURE — 99239 HOSP IP/OBS DSCHRG MGMT >30: CPT | Performed by: FAMILY MEDICINE

## 2018-04-03 PROCEDURE — 80048 BASIC METABOLIC PNL TOTAL CA: CPT | Performed by: FAMILY MEDICINE

## 2018-04-03 RX ORDER — LACTOBACILLUS ACIDOPHILUS 500MM CELL
1 CAPSULE ORAL
Qty: 18 CAPSULE | Refills: 0 | Status: SHIPPED | OUTPATIENT
Start: 2018-04-03 | End: 2018-04-09

## 2018-04-03 RX ADMIN — LISINOPRIL 5 MG: 5 TABLET ORAL at 08:06

## 2018-04-03 RX ADMIN — POTASSIUM CHLORIDE: 149 INJECTION, SOLUTION, CONCENTRATE INTRAVENOUS at 08:01

## 2018-04-03 RX ADMIN — Medication 1000 MCG: at 08:03

## 2018-04-03 RX ADMIN — BUDESONIDE AND FORMOTEROL FUMARATE DIHYDRATE 2 PUFF: 160; 4.5 AEROSOL RESPIRATORY (INHALATION) at 08:08

## 2018-04-03 RX ADMIN — OXYCODONE HYDROCHLORIDE 15 MG: 15 TABLET ORAL at 06:38

## 2018-04-03 RX ADMIN — OMEPRAZOLE 40 MG: 20 CAPSULE, DELAYED RELEASE ORAL at 08:05

## 2018-04-03 RX ADMIN — GABAPENTIN 600 MG: 300 CAPSULE ORAL at 08:05

## 2018-04-03 RX ADMIN — ALBUTEROL SULFATE 2 PUFF: 90 INHALANT RESPIRATORY (INHALATION) at 08:55

## 2018-04-03 RX ADMIN — AMPICILLIN SODIUM AND SULBACTAM SODIUM 3 G: 2; 1 INJECTION, POWDER, FOR SOLUTION INTRAMUSCULAR; INTRAVENOUS at 02:34

## 2018-04-03 RX ADMIN — ACETAMINOPHEN 650 MG: 325 TABLET, FILM COATED ORAL at 06:38

## 2018-04-03 RX ADMIN — BUSPIRONE HYDROCHLORIDE 15 MG: 15 TABLET ORAL at 08:04

## 2018-04-03 RX ADMIN — LORAZEPAM 0.5 MG: 0.5 TABLET ORAL at 00:41

## 2018-04-03 RX ADMIN — CITALOPRAM HYDROBROMIDE 40 MG: 40 TABLET ORAL at 08:06

## 2018-04-03 RX ADMIN — FERROUS GLUCONATE 324 MG: 324 TABLET ORAL at 08:04

## 2018-04-03 RX ADMIN — AMPICILLIN SODIUM AND SULBACTAM SODIUM 3 G: 2; 1 INJECTION, POWDER, FOR SOLUTION INTRAMUSCULAR; INTRAVENOUS at 08:01

## 2018-04-03 NOTE — PROGRESS NOTES
[facility] DISCHARGE NOTE    Patient discharged to home at 12:11 PM via wheel chair. Accompanied by spouse and staff. Discharge instructions reviewed with patient, opportunity offered to ask questions. Prescriptions filled and sent with patient upon discharge. All belongings sent with patient.    Molly Barrios

## 2018-04-03 NOTE — PLAN OF CARE
Problem: Patient Care Overview  Goal: Plan of Care/Patient Progress Review  Patient up ambulating in hallway this afternoon. Able to eat 75% of supper. States nausea much improved. Oxycodone given for continued leg pain.

## 2018-04-03 NOTE — PLAN OF CARE
Problem: Patient Care Overview  Goal: Plan of Care/Patient Progress Review  Outcome: Improving  Patient complain of pain/tingling down right thigh and leg.  Overall improved but bothersome.  Medicated with oxycodone and dilaudid.  Nausea slight but persistent, phenergan used to treat.  No emesis noted.  Taking good oral intake.  Up independently in room, voiding in good amounts.

## 2018-04-03 NOTE — TELEPHONE ENCOUNTER
Pt has score 13 on Phq9 - 03/23/2018 -  Staff will postponed for 2 weeks.  Sunni Guzman CMA (ARCELIA)   (aka: Jenifer Guzman)

## 2018-04-03 NOTE — DISCHARGE SUMMARY
[facility] Discharge Summary    Molly Teague MRN# [id number]   Age: 32 year old YOB: 1985     Date of Admission:  3/29/2018  Date of Discharge::  4/3/2018  Admitting Physician:  Selvin Salomon MD  Discharge Physician:  Efra Farrell MD, MD             Admission Diagnoses:   Syncope and collapse [R55]          Principle Discharge Diagnosis:     Aspiration pneumonia with sepsis compounded by underlying scleroderma  Previous critical illness with adult respiratory distress syndrome, influenza and renal failure in 2015.      See hospital course for further active diagnoses addressed during this admission.            Procedures:   No procedures performed during this admission          Medications Prior to Admission:     Prescriptions Prior to Admission   Medication Sig Dispense Refill Last Dose     SYMBICORT 160-4.5 MCG/ACT Inhaler INHALE TWO PUFFS BY MOUTH TWICE A DAY 30.6 g 0 3/28/2018 at Unknown time     GOODSENSE MIGRAINE FORMULA 250-250-65 MG per tablet TAKE ONE TABLET BY MOUTH EVERY 6 HOURS AS NEEDED FOR HEADACHES 24 tablet 1 3/29/2018 at Unknown time     citalopram (CELEXA) 40 MG tablet Take 1 tablet (40 mg) by mouth daily 90 tablet 3 3/29/2018 at Unknown time     busPIRone (BUSPAR) 15 MG tablet Take 1 tablet (15 mg) by mouth 2 times daily 60 tablet 3 3/29/2018 at Unknown time     LORazepam (ATIVAN) 0.5 MG tablet Take 1 tablet (0.5 mg) by mouth 2 times daily as needed for anxiety 60 tablet 2 3/28/2018 at hs     oxyCODONE IR (ROXICODONE) 15 MG tablet Take 1 tablet (15 mg) by mouth every 4 hours as needed for pain 120 tablet 0 3/28/2018 at Unknown time     ferrous gluconate (FERGON) 324 (38 FE) MG tablet TAKE ONE TABLET BY MOUTH EVERY DAY WITH BREAKFAST 100 tablet 3 3/28/2018 at Unknown time     gabapentin (NEURONTIN) 300 MG capsule Take 2 capsules (600 mg) by mouth 3 times daily 360 capsule 3 3/29/2018 at Unknown time     lisinopril (PRINIVIL/ZESTRIL) 5 MG tablet Take 1 tablet (5 mg)  by mouth daily 90 tablet 3 3/29/2018 at Unknown time     ranitidine (ZANTAC) 150 MG tablet Take 1 tablet (150 mg) by mouth 2 times daily as needed for heartburn 180 tablet 3 3/28/2018 at Unknown time     albuterol (VENTOLIN HFA) 108 (90 BASE) MCG/ACT Inhaler Inhale 2 puffs into the lungs every 4 hours as needed for shortness of breath / dyspnea or wheezing 1 Inhaler 11 3/29/2018 at am     omeprazole (PRILOSEC) 40 MG capsule Take 1 capsule (40 mg) by mouth 2 times daily 180 capsule 3 3/28/2018 at Unknown time     Cyanocobalamin (B-12) 1000 MCG TBCR Take 1,000 mcg by mouth daily 100 tablet 1 3/28/2018 at Unknown time     sucralfate (CARAFATE) 1 GM tablet Take 1 tablet (1 g) by mouth 4 times daily (Patient taking differently: Take 1 g by mouth 4 times daily as needed ) 120 tablet 11 3/29/2018 at Unknown time     polyethylene glycol (MIRALAX) powder Take 17 g (1 capful) by mouth daily 510 g 1 Past Month at Unknown time     diphenhydrAMINE (BENADRYL) 25 MG tablet Take 1 tablet (25 mg) by mouth every 6 hours as needed for itching or allergies 56 tablet  Past Week at Unknown time     promethazine (PHENERGAN) 25 MG/ML IV injection Inject 25 mg into the vein every 6 hours as needed for nausea or vomiting   Taking     blood glucose monitoring (NO BRAND SPECIFIED) test strip Use to test blood sugars 3 times daily or as directed  Please give what is approved by insurance. 100 each 0 Taking             Discharge Medications:     Current Discharge Medication List      START taking these medications    Details   amoxicillin-clavulanate (AUGMENTIN) 875-125 MG per tablet Take 1 tablet by mouth 2 times daily for 6 days  Qty: 12 tablet, Refills: 0    Associated Diagnoses: Aspiration pneumonia, unspecified aspiration pneumonia type, unspecified laterality, unspecified part of lung (H)      Acidophilus Lactobacillus CAPS Take 1 tablet by mouth 3 times daily (before meals) for 6 days  Qty: 18 capsule, Refills: 0    Comments: Ok to  substitute alternate probiotic per pharmacist recommendation/preference to take while on augmentin.  Associated Diagnoses: Aspiration pneumonia, unspecified aspiration pneumonia type, unspecified laterality, unspecified part of lung (H)         CONTINUE these medications which have NOT CHANGED    Details   SYMBICORT 160-4.5 MCG/ACT Inhaler INHALE TWO PUFFS BY MOUTH TWICE A DAY  Qty: 30.6 g, Refills: 0    Associated Diagnoses: Moderate persistent asthma without complication      GOODSENSE MIGRAINE FORMULA 250-250-65 MG per tablet TAKE ONE TABLET BY MOUTH EVERY 6 HOURS AS NEEDED FOR HEADACHES  Qty: 24 tablet, Refills: 1    Associated Diagnoses: Chronic daily headache      citalopram (CELEXA) 40 MG tablet Take 1 tablet (40 mg) by mouth daily  Qty: 90 tablet, Refills: 3    Comments: The patient requests that this prescription be held on file for filling in the near future.  Associated Diagnoses: REX (generalized anxiety disorder)      busPIRone (BUSPAR) 15 MG tablet Take 1 tablet (15 mg) by mouth 2 times daily  Qty: 60 tablet, Refills: 3    Associated Diagnoses: REX (generalized anxiety disorder)      LORazepam (ATIVAN) 0.5 MG tablet Take 1 tablet (0.5 mg) by mouth 2 times daily as needed for anxiety  Qty: 60 tablet, Refills: 2    Associated Diagnoses: REX (generalized anxiety disorder)      oxyCODONE IR (ROXICODONE) 15 MG tablet Take 1 tablet (15 mg) by mouth every 4 hours as needed for pain  Qty: 120 tablet, Refills: 0    Associated Diagnoses: Chronic pain syndrome      ferrous gluconate (FERGON) 324 (38 FE) MG tablet TAKE ONE TABLET BY MOUTH EVERY DAY WITH BREAKFAST  Qty: 100 tablet, Refills: 3    Associated Diagnoses: CREST (calcinosis, Raynaud's phenomenon, esophageal dysfunction, sclerodactyly, telangiectasia) (H)      gabapentin (NEURONTIN) 300 MG capsule Take 2 capsules (600 mg) by mouth 3 times daily  Qty: 360 capsule, Refills: 3    Associated Diagnoses: Limited systemic sclerosis (H)      lisinopril  (PRINIVIL/ZESTRIL) 5 MG tablet Take 1 tablet (5 mg) by mouth daily  Qty: 90 tablet, Refills: 3    Associated Diagnoses: CREST (calcinosis, Raynaud's phenomenon, esophageal dysfunction, sclerodactyly, telangiectasia) (H)      ranitidine (ZANTAC) 150 MG tablet Take 1 tablet (150 mg) by mouth 2 times daily as needed for heartburn  Qty: 180 tablet, Refills: 3    Associated Diagnoses: CREST (calcinosis, Raynaud's phenomenon, esophageal dysfunction, sclerodactyly, telangiectasia) (H); Gastroesophageal reflux disease with esophagitis      albuterol (VENTOLIN HFA) 108 (90 BASE) MCG/ACT Inhaler Inhale 2 puffs into the lungs every 4 hours as needed for shortness of breath / dyspnea or wheezing  Qty: 1 Inhaler, Refills: 11    Associated Diagnoses: Moderate persistent asthma without complication      omeprazole (PRILOSEC) 40 MG capsule Take 1 capsule (40 mg) by mouth 2 times daily  Qty: 180 capsule, Refills: 3    Associated Diagnoses: CREST (calcinosis, Raynaud's phenomenon, esophageal dysfunction, sclerodactyly, telangiectasia) (H); Gastroesophageal reflux disease with esophagitis      Cyanocobalamin (B-12) 1000 MCG TBCR Take 1,000 mcg by mouth daily  Qty: 100 tablet, Refills: 1    Associated Diagnoses: Vitamin B12 deficiency (non anemic)      sucralfate (CARAFATE) 1 GM tablet Take 1 tablet (1 g) by mouth 4 times daily  Qty: 120 tablet, Refills: 11    Associated Diagnoses: Limited systemic sclerosis (H)      polyethylene glycol (MIRALAX) powder Take 17 g (1 capful) by mouth daily  Qty: 510 g, Refills: 1    Associated Diagnoses: Drug-induced constipation      diphenhydrAMINE (BENADRYL) 25 MG tablet Take 1 tablet (25 mg) by mouth every 6 hours as needed for itching or allergies  Qty: 56 tablet      promethazine (PHENERGAN) 25 MG/ML IV injection Inject 25 mg into the vein every 6 hours as needed for nausea or vomiting      blood glucose monitoring (NO BRAND SPECIFIED) test strip Use to test blood sugars 3 times daily or as  "directed  Please give what is approved by insurance.  Qty: 100 each, Refills: 0    Associated Diagnoses: Hypoglycemia                   Consultations:   No consultations were requested during this admission          Brief History of Illness:     From Admission H+P:   Molly Teague is a complex patient with a history of scleroderma, previous ARDS with prolonged ventilation and tracheostomy, previous renal crisis requiring hemodialysis, Raynaud's disease, bilateral retinitis, generalized anxiety, CREST syndrome, GERD, chronic pain and chronic narcotic use.  The patient noted that she had what she called an aspiration episode 4 nights ago where she woke up and apparently had had emesis and thought that she aspirated some.  She tells me that she has had problems like this in the past apparently related to her esophagus, although she says she has not had much regurgitation recently.  Since that time she has had reported fever, chills, cough, shortness of breath.  Yesterday she got up and stood up and apparently passed out.  This morning she came to the clinic.  She had a sustained sinus tachycardia.         She was sent to the ER and evaluated where she had a normal chest x-ray, however, white count was 14,000.  The D-dimer was normal.  Her lactic acid was normal.  Blood culture was taken.  Influenza test was negative.  A rapid strep was negative.  She also had some vague ocular symptoms.  She noted a little bit of horizontal double vision.  She says it is better now.  She had a CT which was normal in the ER.                 TODAY:     Subjective:  Doing well, pain much improved - says it's regressed to just down to her posterior thigh now and is \"manageable\" on her home oxycodone which she says is much much better.  Numbness perhaps a bit better but not markedly different.  No worsening symptoms  Has been up and about and walking around with out difficulty.  No trouble with bowel or bladder control.  No fever or " "chills.  Breathing feels essentially back to baseline.    No other pain.     ROS:   ROS: 10 point ROS neg other than the symptoms noted above in the HPI.     /72 (BP Location: Left arm)  Pulse 87  Temp 98.3  F (36.8  C) (Oral)  Resp 16  Ht 1.6 m (5' 3\")  Wt 80.2 kg (176 lb 12.9 oz)  SpO2 95%  BMI 31.32 kg/m2   EXAM:  General: awake and alert, NAD, oriented x 3  Head: normocephalic  Neck: unremarkable, no lymphadenopathy   HEENT: oropharynx pink and moist    Heart: Regular rate and rhythm, no murmurs, rubs, or gallops  Lungs: clear to auscultation bilaterally with good air movement throughout  Abdomen: soft, non-tender, no masses or organomegaly  Extremities: no edema in lower extremities   Sensation and strength in lower extremities at baseline per patient.    Skin unremarkable.            Hospital Course:     aspiration pneumonia with sepsis compounded by underlying scleroderma  Previous critical illness with adult respiratory distress syndrome, influenza and renal failure in 2015.    3/29/18-4/1/18 -- CT suggests bibasilar infiltrates, right greater than left, pt likely has esophageal dysmotility from scleroderma putting her at risk for aspiration, continue iv protonix for now  Pt became obtunded and hypoxic shortly after admission, now better  Afebrile, normal wbc, off oxygen on 04/01/18.  4/2/2018 -- improving, on room air.  Continue unasyn for now.     4/3/2018 -- doing well, ok to discharge to complete 6 more days of augmentin with probiotic while on this.         Acute metabolic and toxic encephalopathy suspect due to pneumonia as above - Meningitis ruled out  3/29/18-4/1/18 -- LP appeared to be a bloody tap, pcr neg for strep pneumo, h flu, herpes  Back to baseline 03/31/18, no meningeal signs- antibiotics for meningitis stopped and unasyn started for aspiration pneumonia as above.    4/2/2018 -- resolved, mental status back to baseline.       Radicular pain/numbness which started after " LP  3/29/18-4/1/18 -- Discussed with anesthesia , they will see her. They think generally this type of pain after LP self resolves.  Will get mri of lumbar spine, anesthesia following  4/2/2018 -- pain improving.   MRI showed just a small likely hematoma.  Discussed with anesthesia - they will see patient today but plan remains conservative - anticipate will continue to slowly resolve without intervention.    4/3/2018 -- clearly improving.  ambulating well, anticipate continued gradual resolution - ok for discharge, follow-up with primary care provider in 1 week.        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       Acute anemia/ guiac positive stool  3/29/18-4/1/18 --  Acute anemia probably largely dilutional, but stool was guiac pos.  Pt on bid ppi. Hgb up a bit 04/01/18.  4/3/2018 -- hemoglobin improving, besides guiac positive no other signs of bloody stools - recommend outpatient follow-up with primary care provider.        Chronic kidney disease with previous dialysis.  4/3/2018 -- remained essentially baseline.        Mild hypoglycemia-acute on chronic  4/3/2018 -- did well after glucose was added to fluids and then with improved intake - intake back to baseline per patient at time of discharge.        Double vision, transient, etiology unclear.   3/29/18-4/1/18 -- Neg stroke work up by MR  4/2/2018 -- no further issues after admission.        Previous pulmonary embolus, not currently on anticoagulation.       Tachycardia.   Appears that she chronically runs hr often over 100  Normal echo  Resolved 04/01/18             Discharge Instructions and Follow-Up:   Discharge diet: Regular   Discharge activity: Activity as tolerated   Discharge follow-up: Follow up with primary care provider in 7 days, follow-up on aspiration pneumonia and on anemia with guiac positive stool as above at that time.              Discharge Disposition:   Discharged to home      Attestation:  I have reviewed today's vital  signs, notes, medications, labs and imaging.  Amount of time performed on this discharge summary: 50 minutes.    Efra Farrell MD, MD

## 2018-04-03 NOTE — PHARMACY - DISCHARGE MEDICATION RECONCILIATION
Discharge medication review for this patient is complete. Pharmacist assisted with medication reconciliation of discharge medications with prior to admission medications.     The following changes were made to the discharge medication list based on pharmacist review:  Added:  none  Discontinued: none  Changed: none      Patient's Discharge Medication List  - medications as listed on After Visit Summary (AVS)     Review of your medicines      START taking       Dose / Directions    Acidophilus Lactobacillus Caps   Used for:  Aspiration pneumonia, unspecified aspiration pneumonia type, unspecified laterality, unspecified part of lung (H)        Dose:  1 tablet   Take 1 tablet by mouth 3 times daily (before meals) for 6 days   Quantity:  18 capsule   Refills:  0       amoxicillin-clavulanate 875-125 MG per tablet   Commonly known as:  AUGMENTIN   Used for:  Aspiration pneumonia, unspecified aspiration pneumonia type, unspecified laterality, unspecified part of lung (H)        Dose:  1 tablet   Take 1 tablet by mouth 2 times daily for 6 days   Quantity:  12 tablet   Refills:  0         CONTINUE these medicines which may have CHANGED, or have new prescriptions. If we are uncertain of the size of tablets/capsules you have at home, strength may be listed as something that might have changed.       Dose / Directions    sucralfate 1 GM tablet   Commonly known as:  CARAFATE   This may have changed:    - when to take this  - reasons to take this   Used for:  Limited systemic sclerosis (H)        Dose:  1 g   Take 1 tablet (1 g) by mouth 4 times daily   Quantity:  120 tablet   Refills:  11         CONTINUE these medicines which have NOT CHANGED       Dose / Directions    albuterol 108 (90 BASE) MCG/ACT Inhaler   Commonly known as:  VENTOLIN HFA   Used for:  Moderate persistent asthma without complication        Dose:  2 puff   Inhale 2 puffs into the lungs every 4 hours as needed for shortness of breath / dyspnea or wheezing    Quantity:  1 Inhaler   Refills:  11       B-12 1000 MCG Tbcr   Used for:  Vitamin B12 deficiency (non anemic)        Dose:  1000 mcg   Take 1,000 mcg by mouth daily   Quantity:  100 tablet   Refills:  1       BENADRYL 25 MG tablet   Generic drug:  diphenhydrAMINE        Dose:  25 mg   Take 1 tablet (25 mg) by mouth every 6 hours as needed for itching or allergies   Quantity:  56 tablet   Refills:  0       blood glucose monitoring test strip   Commonly known as:  no brand specified   Used for:  Hypoglycemia        Use to test blood sugars 3 times daily or as directed Please give what is approved by insurance.   Quantity:  100 each   Refills:  0       busPIRone 15 MG tablet   Commonly known as:  BUSPAR   Used for:  REX (generalized anxiety disorder)        Dose:  15 mg   Take 1 tablet (15 mg) by mouth 2 times daily   Quantity:  60 tablet   Refills:  3       citalopram 40 MG tablet   Commonly known as:  celeXA   Used for:  REX (generalized anxiety disorder)        Dose:  40 mg   Take 1 tablet (40 mg) by mouth daily   Quantity:  90 tablet   Refills:  3       ferrous gluconate 324 (38 FE) MG tablet   Commonly known as:  FERGON   Used for:  CREST (calcinosis, Raynaud's phenomenon, esophageal dysfunction, sclerodactyly, telangiectasia) (H)        TAKE ONE TABLET BY MOUTH EVERY DAY WITH BREAKFAST   Quantity:  100 tablet   Refills:  3       gabapentin 300 MG capsule   Commonly known as:  NEURONTIN   Used for:  Limited systemic sclerosis (H)        Dose:  600 mg   Take 2 capsules (600 mg) by mouth 3 times daily   Quantity:  360 capsule   Refills:  3       GOODSENSE MIGRAINE FORMULA 250-250-65 MG per tablet   Used for:  Chronic daily headache   Generic drug:  aspirin-acetaminophen-caffeine        TAKE ONE TABLET BY MOUTH EVERY 6 HOURS AS NEEDED FOR HEADACHES   Quantity:  24 tablet   Refills:  1       lisinopril 5 MG tablet   Commonly known as:  PRINIVIL/ZESTRIL   Used for:  CREST (calcinosis, Raynaud's phenomenon, esophageal  dysfunction, sclerodactyly, telangiectasia) (H)        Dose:  5 mg   Take 1 tablet (5 mg) by mouth daily   Quantity:  90 tablet   Refills:  3       LORazepam 0.5 MG tablet   Commonly known as:  ATIVAN   Used for:  REX (generalized anxiety disorder)        Dose:  0.5 mg   Take 1 tablet (0.5 mg) by mouth 2 times daily as needed for anxiety   Quantity:  60 tablet   Refills:  2       omeprazole 40 MG capsule   Commonly known as:  priLOSEC   Used for:  CREST (calcinosis, Raynaud's phenomenon, esophageal dysfunction, sclerodactyly, telangiectasia) (H), Gastroesophageal reflux disease with esophagitis        Dose:  40 mg   Take 1 capsule (40 mg) by mouth 2 times daily   Quantity:  180 capsule   Refills:  3       oxyCODONE IR 15 MG tablet   Commonly known as:  ROXICODONE   Used for:  Chronic pain syndrome        Dose:  15 mg   Take 1 tablet (15 mg) by mouth every 4 hours as needed for pain   Quantity:  120 tablet   Refills:  0       polyethylene glycol powder   Commonly known as:  MIRALAX   Used for:  Drug-induced constipation        Dose:  1 capful   Take 17 g (1 capful) by mouth daily   Quantity:  510 g   Refills:  1       promethazine 25 MG/ML IV injection   Commonly known as:  PHENERGAN        Dose:  25 mg   Inject 25 mg into the vein every 6 hours as needed for nausea or vomiting   Refills:  0       ranitidine 150 MG tablet   Commonly known as:  ZANTAC   Used for:  CREST (calcinosis, Raynaud's phenomenon, esophageal dysfunction, sclerodactyly, telangiectasia) (H), Gastroesophageal reflux disease with esophagitis        Dose:  150 mg   Take 1 tablet (150 mg) by mouth 2 times daily as needed for heartburn   Quantity:  180 tablet   Refills:  3       SYMBICORT 160-4.5 MCG/ACT Inhaler   Used for:  Moderate persistent asthma without complication   Generic drug:  budesonide-formoterol        INHALE TWO PUFFS BY MOUTH TWICE A DAY   Quantity:  30.6 g   Refills:  0            Where to get your medicines      These medications  were sent to [facility] [place], [place] - [address]  [address] [address]     Phone:  [phone]      Acidophilus Lactobacillus Caps     amoxicillin-clavulanate 875-125 MG per tablet

## 2018-04-04 ENCOUNTER — TELEPHONE (OUTPATIENT)
Dept: FAMILY MEDICINE | Facility: CLINIC | Age: 33
End: 2018-04-04

## 2018-04-04 LAB
BACTERIA SPEC CULT: NO GROWTH
BACTERIA SPEC CULT: NO GROWTH
Lab: NORMAL
Lab: NORMAL
SPECIMEN SOURCE: NORMAL
SPECIMEN SOURCE: NORMAL

## 2018-04-04 NOTE — TELEPHONE ENCOUNTER
"  ED/Discharge Protocol    \"Hi, my name is Reyna Oviedo, a registered nurse, and I am calling on behalf of Dr. Barba's office at [place].  I am calling to follow up and see how things are going for you after your recent visit.\"    \"I see that you were in the (ER/UC/IP) on 3/29/18.    How are you doing now that you are home?\" well but noticing things since she has been home    Is patient experiencing symptoms that may require a hospital visit?  no    Discharge Instructions    \"Let's review your discharge instructions.  What is/are the follow-up recommendations?  Pt. Response: has an appt on 4/6/18    \"Were you instructed to make a follow-up appointment?\"  Pt. Response: Yes.  Has appointment been made?   Yes      \"When you see the provider, I would recommend that you bring your discharge instructions with you.    Medications    \"How many new medications are you on since your hospitalization/ED visit?\"    0-1  \"How many of your current medicines changed (dose, timing, name, etc.) while you were in the hospital/ED visit?\"   0-1  \"Do you have questions about your medications?\"   No  \"Were you newly diagnosed with heart failure, COPD, diabetes or did you have a heart attack?\"   No  For patients on insulin: \"Did you start on insulin in the hospital or did you have your insulin dose changed?\"   No    Medication reconciliation completed? No, due to     Was MTM referral placed (*Make sure to put transitions as reason for referral)?   No    Call Summary    \"Do you have any questions or concerns about your condition or care plan at the moment?\"    No  Triage nurse advice given:     Patient was in ER 1 in the past year (assess appropriateness of ER visits.)      \"If you have questions or things don't continue to improve, we encourage you contact us through the main clinic number,  [phone].  Even if the clinic is not open, triage nurses are available 24/7 to help you.     We would like you to know that our clinic has " "extended hours (provide information).  We also have urgent care (provide details on closest location and hours/contact info)\"      \"Thank you for your time and take care!\"                     Discharge Instructions and Follow-Up:   Discharge diet: Regular   Discharge activity: Activity as tolerated   Discharge follow-up: Follow up with primary care provider in 7 days, follow-up on aspiration pneumonia and on anemia with guiac positive stool as above at that time.        "

## 2018-04-04 NOTE — TELEPHONE ENCOUNTER
ED/UC/IP follow up phone call:   04/03/18 Syncope and Collapse    RN please call to follow up    Number of ED visits in past 12 mths:   2    [facility]  [facility] [place]

## 2018-04-05 LAB
BACTERIA SPEC CULT: NO GROWTH
Lab: NORMAL
SPECIMEN SOURCE: NORMAL

## 2018-04-06 LAB
GLUCOSE BLDC GLUCOMTR-MCNC: 102 MG/DL (ref 70–99)
GLUCOSE BLDC GLUCOMTR-MCNC: 107 MG/DL (ref 70–99)

## 2018-04-11 ENCOUNTER — TELEPHONE (OUTPATIENT)
Dept: FAMILY MEDICINE | Facility: CLINIC | Age: 33
End: 2018-04-11

## 2018-04-11 ENCOUNTER — OFFICE VISIT (OUTPATIENT)
Dept: FAMILY MEDICINE | Facility: CLINIC | Age: 33
End: 2018-04-11
Payer: MEDICARE

## 2018-04-11 ENCOUNTER — OFFICE VISIT (OUTPATIENT)
Dept: PSYCHOLOGY | Facility: CLINIC | Age: 33
End: 2018-04-11
Payer: MEDICARE

## 2018-04-11 VITALS
OXYGEN SATURATION: 100 % | HEART RATE: 115 BPM | DIASTOLIC BLOOD PRESSURE: 86 MMHG | SYSTOLIC BLOOD PRESSURE: 134 MMHG | WEIGHT: 176 LBS | TEMPERATURE: 99.1 F | BODY MASS INDEX: 31.18 KG/M2

## 2018-04-11 DIAGNOSIS — F41.1 GAD (GENERALIZED ANXIETY DISORDER): ICD-10-CM

## 2018-04-11 DIAGNOSIS — M34.9 LIMITED SYSTEMIC SCLEROSIS (H): ICD-10-CM

## 2018-04-11 DIAGNOSIS — M34.1 CREST (CALCINOSIS, RAYNAUD'S PHENOMENON, ESOPHAGEAL DYSFUNCTION, SCLERODACTYLY, TELANGIECTASIA) (H): ICD-10-CM

## 2018-04-11 DIAGNOSIS — G89.4 CHRONIC PAIN SYNDROME: Chronic | ICD-10-CM

## 2018-04-11 DIAGNOSIS — K21.00 GASTROESOPHAGEAL REFLUX DISEASE WITH ESOPHAGITIS: ICD-10-CM

## 2018-04-11 DIAGNOSIS — M54.16 LUMBAR RADICULOPATHY: Primary | ICD-10-CM

## 2018-04-11 DIAGNOSIS — F43.10 PTSD (POST-TRAUMATIC STRESS DISORDER): Primary | ICD-10-CM

## 2018-04-11 PROCEDURE — 90791 PSYCH DIAGNOSTIC EVALUATION: CPT | Performed by: PSYCHOLOGIST

## 2018-04-11 PROCEDURE — 99495 TRANSJ CARE MGMT MOD F2F 14D: CPT | Performed by: INTERNAL MEDICINE

## 2018-04-11 RX ORDER — GABAPENTIN 300 MG/1
600 CAPSULE ORAL 3 TIMES DAILY
Qty: 540 CAPSULE | Refills: 3 | Status: SHIPPED | OUTPATIENT
Start: 2018-04-11 | End: 2018-06-26

## 2018-04-11 RX ORDER — OMEPRAZOLE 40 MG/1
40 CAPSULE, DELAYED RELEASE ORAL 2 TIMES DAILY
Qty: 180 CAPSULE | Refills: 3 | Status: SHIPPED | OUTPATIENT
Start: 2018-04-11 | End: 2019-02-19

## 2018-04-11 RX ORDER — OXYCODONE HYDROCHLORIDE 15 MG/1
15 TABLET ORAL EVERY 4 HOURS PRN
Qty: 120 TABLET | Refills: 0 | Status: SHIPPED | OUTPATIENT
Start: 2018-06-10 | End: 2018-10-10

## 2018-04-11 RX ORDER — LISINOPRIL 5 MG/1
5 TABLET ORAL DAILY
Qty: 90 TABLET | Refills: 3 | Status: SHIPPED | OUTPATIENT
Start: 2018-04-11 | End: 2018-07-06

## 2018-04-11 RX ORDER — OXYCODONE HYDROCHLORIDE 15 MG/1
15 TABLET ORAL EVERY 4 HOURS PRN
Qty: 120 TABLET | Refills: 0 | Status: SHIPPED | OUTPATIENT
Start: 2018-05-11 | End: 2018-06-10

## 2018-04-11 RX ORDER — BUSPIRONE HYDROCHLORIDE 30 MG/1
30 TABLET ORAL 2 TIMES DAILY
Qty: 60 TABLET | Refills: 3 | Status: SHIPPED | OUTPATIENT
Start: 2018-04-11 | End: 2018-07-06

## 2018-04-11 RX ORDER — LORAZEPAM 1 MG/1
1 TABLET ORAL 2 TIMES DAILY PRN
Qty: 60 TABLET | Refills: 2 | Status: SHIPPED | OUTPATIENT
Start: 2018-04-11 | End: 2018-07-06

## 2018-04-11 RX ORDER — OXYCODONE HYDROCHLORIDE 15 MG/1
15 TABLET ORAL EVERY 4 HOURS PRN
Qty: 120 TABLET | Refills: 0 | Status: SHIPPED | OUTPATIENT
Start: 2018-04-11 | End: 2018-07-06

## 2018-04-11 RX ORDER — PROMETHAZINE HYDROCHLORIDE 25 MG/ML
25 INJECTION, SOLUTION INTRAMUSCULAR; INTRAVENOUS EVERY 6 HOURS PRN
Qty: 180 ML | Refills: 11 | Status: SHIPPED | OUTPATIENT
Start: 2018-04-11 | End: 2019-01-22

## 2018-04-11 NOTE — PATIENT INSTRUCTIONS
1. Make follow-up with Milagro Moody, Psychiatry.  Alternatively, consider seeing psychiatry outside [place], due to uncontrolled anxiety.  2. Increased ativan to 1 mg twice daily.  3. Increased buspar to 30 mg twice daily        [facility] in [place] - [phone].    [facility]  [address] [place] [address]  [phone] [address] [place] [address] [address] [facility] - psychiatrist [place] [address]    [facility] - has psychiatrist  [address]  [address]  [place] [address]  [phone]     [facility] - has psychiatrist   [phone]  [facility] - has psychiatrist   Main: [phone] [address] [place] [address]    [facility] - has psychiatrist  [address]  [address]  [place], [place] [id number] [phone]     [facility] - has psychiatrist   [address] [place] [address]   [phone]    [facility] - has psychiatrist  [address] [place] [address]  Phone [phone]  [facility] - has psychiatrist  [address] [place], [place] [address] [phone]    [facility] - has psychiatrist  [address] [facility] [address], [place] [address]  [address] [place] [address]  [address] [place] [address]  Phone: [phone]    [facility]  [place] Office  [address]  [place], [place] [address]  Phone: [phone]  Fax: [phone]     [place] [facility]  [address] [address]  Phone: [phone]  Fax: [phone]     [place] [facility]  [address] [address] [place], [place] [address]  Phone: [phone]  Fax: [phone]       Or - contact your insurance for a list of available covered  providers in the area.

## 2018-04-11 NOTE — TELEPHONE ENCOUNTER
Pt still need to be outreach.  Routing to the Team.  Sunni Guzman CMA (ARCELIA)   (aka: Jenifer Guzman)

## 2018-04-11 NOTE — TELEPHONE ENCOUNTER
See my office visit note from today about lumbar radiculopathy s/p traumatic spinal tap 3/30.  Spoke with Neurosurgery.  Agree with repeating MRI, which we have already discussed.  NSG feels the small hematoma on MRI does not correlate to the severity of her described symptoms.  Nevertheless, if related to the spinal tap, symptoms should improve over the course of days-weeks.

## 2018-04-11 NOTE — MR AVS SNAPSHOT
After Visit Summary   4/11/2018    Molly Teague    MRN: [id number]           Patient Information     Date Of Birth          1985        Visit Information        Provider Department      4/11/2018 11:20 AM Grecia Barba, DO [facility]        Today's Diagnoses     Lumbar radiculopathy    -  1    REX (generalized anxiety disorder)        Limited systemic sclerosis (H)        CREST (calcinosis, Raynaud's phenomenon, esophageal dysfunction, sclerodactyly, telangiectasia) (H)        Gastroesophageal reflux disease with esophagitis        Chronic pain syndrome          Care Instructions    1. Make follow-up with Milagro Moody, Psychiatry.  Alternatively, consider seeing psychiatry outside [place], due to uncontrolled anxiety.  2. Increased ativan to 1 mg twice daily.  3. Increased buspar to 30 mg twice daily        [facility] - [phone].    [facility]  [address] [place] [address] [phone] [address] [place] [address] [address] [facility] - psychiatrist [place] [address]    [facility] - has psychiatrist  [address]  [address]  [place] [address]  [phone]     [facility] - has psychiatrist   [phone]  [facility] - has psychiatrist   Main: [phone] [address] [place] [address]    [facility] - has psychiatrist  [address]  [address]  [place], [place] [id number] [phone]     [facility] - has psychiatrist   [address] [place] [address]   [phone]    [facility] - has psychiatrist  [address] [place] [address]  Phone [phone]  [facility] - has psychiatrist  [address] [place], [place] [id number] [phone]    [facility] - has  psychiatrist  [address], [place] [address]  [address] [facility], [place] [address]  [address] [place] [address]  Phone: [phone]    [facility]  [place] Office  [address]  [place], [place] [address]  Phone: [phone]  Fax: [phone]     [place] [facility]  [address] [address]  Phone: [phone]  Fax: [phone]     [place] [facility]  [address]  [address] [place], [place] [address]  Phone: [phone]  Fax: [phone]       Or - contact your insurance for a list of available covered providers in the area.              Follow-ups after your visit        Your next 10 appointments already scheduled     Apr 16, 2018 12:00 PM CDT   Return Visit with Selvin PATEL [facility] ([facility]    [address] [address]   [phone]            Apr 23, 2018 12:00 PM CDT   [facility]    [address] [facility] [address]   [phone]              Future tests that were ordered for you today     Open Future Orders        Priority Expected Expires Ordered    MR Lumbar Spine w/o Contrast Routine  4/11/2019 4/11/2018            Who to contact     If you have questions or need follow up information about today's clinic visit or your schedule please contact [facility] directly at [phone].  Normal or non-critical lab and imaging results will be communicated to you by MyChart, letter or phone within 4 business days after the clinic has received the results. If you do not hear from us within 7 days, please contact the clinic through MyChart or phone. If you have a critical or abnormal lab result, we will notify you by phone as soon as possible.  Submit refill requests through [employer] or call your pharmacy and they will forward the  "refill request to us. Please allow 3 business days for your refill to be completed.          Additional Information About Your Visit        [employer] Information     [employer] lets you send messages to your doctor, view your test results, renew your prescriptions, schedule appointments and more. To sign up, go to [email or web address] . Click on \"Log in\" on the left side of the screen, which will take you to the Welcome page. Then click on \"Sign up Now\" on the right side of the page.     You will be asked to enter the access code listed below, as well as some personal information. Please follow the directions to create your username and password.     Your access code is: [email or web address]  Expires: 2018  3:37 PM     Your access code will  in 90 days. If you need help or a new code, please call your [place] clinic or [phone].        Care EveryWhere ID     This is your Care EveryWhere ID. This could be used by other organizations to access your [place] medical records  [id number]        Your Vitals Were     Pulse Temperature Pulse Oximetry Breastfeeding? BMI (Body Mass Index)       115 99.1  F (37.3  C) (Tympanic) 100% No 31.18 kg/m2        Blood Pressure from Last 3 Encounters:   18 134/86   18 111/72   18 (!) 145/98    Weight from Last 3 Encounters:   18 176 lb (79.8 kg)   18 176 lb 12.9 oz (80.2 kg)   18 175 lb 6 oz (79.5 kg)                 Today's Medication Changes          These changes are accurate as of 18 12:11 PM.  If you have any questions, ask your nurse or doctor.               These medicines have changed or have updated prescriptions.        Dose/Directions    BusPIRone HCl 30 MG Tabs   This may have changed:    - medication strength  - how much to take   Used for:  REX (generalized anxiety disorder)   Changed by:  Grecia Barba,         Dose:  30 mg   Take 30 mg by mouth 2 times daily   Quantity:  60 tablet   Refills:  3       LORazepam 1 " MG tablet   Commonly known as:  ATIVAN   This may have changed:    - medication strength  - how much to take   Used for:  REX (generalized anxiety disorder)   Changed by:  Grecia Barba DO        Dose:  1 mg   Take 1 tablet (1 mg) by mouth 2 times daily as needed for anxiety   Quantity:  60 tablet   Refills:  2       * oxyCODONE IR 15 MG tablet   Commonly known as:  ROXICODONE   This may have changed:  Another medication with the same name was added. Make sure you understand how and when to take each.   Used for:  Chronic pain syndrome   Changed by:  Grecia Barba DO        Dose:  15 mg   Take 1 tablet (15 mg) by mouth every 4 hours as needed for pain   Quantity:  120 tablet   Refills:  0       * oxyCODONE IR 15 MG tablet   Commonly known as:  ROXICODONE   This may have changed:  You were already taking a medication with the same name, and this prescription was added. Make sure you understand how and when to take each.   Used for:  Chronic pain syndrome   Changed by:  Grecia Barba DO        Dose:  15 mg   Start taking on:  5/11/2018   Take 1 tablet (15 mg) by mouth every 4 hours as needed for severe pain   Quantity:  120 tablet   Refills:  0       * oxyCODONE IR 15 MG tablet   Commonly known as:  ROXICODONE   This may have changed:  You were already taking a medication with the same name, and this prescription was added. Make sure you understand how and when to take each.   Used for:  Chronic pain syndrome   Changed by:  Grecia Barba DO        Dose:  15 mg   Start taking on:  6/10/2018   Take 1 tablet (15 mg) by mouth every 4 hours as needed for severe pain   Quantity:  120 tablet   Refills:  0       promethazine 25 MG/ML IV injection   Commonly known as:  PHENERGAN   This may have changed:  additional instructions   Used for:  Gastroesophageal reflux disease with esophagitis   Changed by:  Grecia Barba DO        Dose:  25 mg   Inject 1 mL (25 mg) into the vein every 6  hours as needed for nausea or vomiting Inject IM   Quantity:  180 mL   Refills:  11       sucralfate 1 GM tablet   Commonly known as:  CARAFATE   This may have changed:    - when to take this  - reasons to take this   Used for:  Limited systemic sclerosis (H)        Dose:  1 g   Take 1 tablet (1 g) by mouth 4 times daily   Quantity:  120 tablet   Refills:  11       * Notice:  This list has 3 medication(s) that are the same as other medications prescribed for you. Read the directions carefully, and ask your doctor or other care provider to review them with you.         Where to get your medicines      These medications were sent to [facility] [place], [place] - [address] [facility]  [address] [facility] [facility] [address]     Phone:  [phone]     BusPIRone HCl 30 MG Tabs    gabapentin 300 MG capsule    lisinopril 5 MG tablet    omeprazole 40 MG capsule    promethazine 25 MG/ML IV injection         Some of these will need a paper prescription and others can be bought over the counter.  Ask your nurse if you have questions.     Bring a paper prescription for each of these medications     LORazepam 1 MG tablet    oxyCODONE IR 15 MG tablet    oxyCODONE IR 15 MG tablet    oxyCODONE IR 15 MG tablet                Primary Care Provider Office Phone # Fax #    Grecia Kraus DO [place] [phone] [address] [facility] [address]        Equal Access to Services     PARISH COOK : Hadii rosie light hadasho Soomaali, waaxda luqadaha, qaybta kaalmada adeegyada, martin woods. So [facility] [phone].    ATENCIÓN: Si habla español, tiene a bradley disposición servicios gratuitos de asistencia lingüística. Chidi al [phone].    We comply with applicable federal civil rights laws and [place] laws. We do not discriminate on the basis of race, color, national origin, age, disability, sex, sexual orientation, or gender identity.            Thank you!     Thank you for choosing  [facility]  for your care. Our goal is always to provide you with excellent care. Hearing back from our patients is one way we can continue to improve our services. Please take a few minutes to complete the written survey that you may receive in the mail after your visit with us. Thank you!             Your Updated Medication List - Protect others around you: Learn how to safely use, store and throw away your medicines at www.disposemymeds.org.          This list is accurate as of 4/11/18 12:11 PM.  Always use your most recent med list.                   Brand Name Dispense Instructions for use Diagnosis    albuterol 108 (90 Base) MCG/ACT Inhaler    VENTOLIN HFA    1 Inhaler    Inhale 2 puffs into the lungs every 4 hours as needed for shortness of breath / dyspnea or wheezing    Moderate persistent asthma without complication       B-12 1000 MCG Tbcr     100 tablet    Take 1,000 mcg by mouth daily    Vitamin B12 deficiency (non anemic)       BENADRYL 25 MG tablet   Generic drug:  diphenhydrAMINE     56 tablet    Take 1 tablet (25 mg) by mouth every 6 hours as needed for itching or allergies        blood glucose monitoring test strip    no brand specified    100 each    Use to test blood sugars 3 times daily or as directed Please give what is approved by insurance.    Hypoglycemia       BusPIRone HCl 30 MG Tabs     60 tablet    Take 30 mg by mouth 2 times daily    REX (generalized anxiety disorder)       citalopram 40 MG tablet    celeXA    90 tablet    Take 1 tablet (40 mg) by mouth daily    REX (generalized anxiety disorder)       ferrous gluconate 324 (38 Fe) MG tablet    FERGON    100 tablet    TAKE ONE TABLET BY MOUTH EVERY DAY WITH BREAKFAST    CREST (calcinosis, Raynaud's phenomenon, esophageal dysfunction, sclerodactyly, telangiectasia) (H)       gabapentin 300 MG capsule    NEURONTIN    540 capsule    Take 2 capsules (600 mg) by mouth 3 times daily    Limited systemic sclerosis (H)        GOODSENSE MIGRAINE FORMULA 250-250-65 MG per tablet   Generic drug:  aspirin-acetaminophen-caffeine     24 tablet    TAKE ONE TABLET BY MOUTH EVERY 6 HOURS AS NEEDED FOR HEADACHES    Chronic daily headache       lisinopril 5 MG tablet    PRINIVIL/ZESTRIL    90 tablet    Take 1 tablet (5 mg) by mouth daily    CREST (calcinosis, Raynaud's phenomenon, esophageal dysfunction, sclerodactyly, telangiectasia) (H)       LORazepam 1 MG tablet    ATIVAN    60 tablet    Take 1 tablet (1 mg) by mouth 2 times daily as needed for anxiety    REX (generalized anxiety disorder)       omeprazole 40 MG capsule    priLOSEC    180 capsule    Take 1 capsule (40 mg) by mouth 2 times daily    CREST (calcinosis, Raynaud's phenomenon, esophageal dysfunction, sclerodactyly, telangiectasia) (H), Gastroesophageal reflux disease with esophagitis       * oxyCODONE IR 15 MG tablet    ROXICODONE    120 tablet    Take 1 tablet (15 mg) by mouth every 4 hours as needed for pain    Chronic pain syndrome       * oxyCODONE IR 15 MG tablet   Start taking on:  5/11/2018    ROXICODONE    120 tablet    Take 1 tablet (15 mg) by mouth every 4 hours as needed for severe pain    Chronic pain syndrome       * oxyCODONE IR 15 MG tablet   Start taking on:  6/10/2018    ROXICODONE    120 tablet    Take 1 tablet (15 mg) by mouth every 4 hours as needed for severe pain    Chronic pain syndrome       polyethylene glycol powder    MIRALAX    510 g    Take 17 g (1 capful) by mouth daily    Drug-induced constipation       promethazine 25 MG/ML IV injection    PHENERGAN    180 mL    Inject 1 mL (25 mg) into the vein every 6 hours as needed for nausea or vomiting Inject IM    Gastroesophageal reflux disease with esophagitis       ranitidine 150 MG tablet    ZANTAC    180 tablet    Take 1 tablet (150 mg) by mouth 2 times daily as needed for heartburn    CREST (calcinosis, Raynaud's phenomenon, esophageal dysfunction, sclerodactyly, telangiectasia) (H), Gastroesophageal  reflux disease with esophagitis       sucralfate 1 GM tablet    CARAFATE    120 tablet    Take 1 tablet (1 g) by mouth 4 times daily    Limited systemic sclerosis (H)       SYMBICORT 160-4.5 MCG/ACT Inhaler   Generic drug:  budesonide-formoterol     30.6 g    INHALE TWO PUFFS BY MOUTH TWICE A DAY    Moderate persistent asthma without complication       * Notice:  This list has 3 medication(s) that are the same as other medications prescribed for you. Read the directions carefully, and ask your doctor or other care provider to review them with you.

## 2018-04-11 NOTE — TELEPHONE ENCOUNTER
Pt was seen today score 7 on ACT form.  Please mail ACT form with a letter and outreach pt in 30 days.  Sunni Guzman CMA (ARCELIA)   (aka: Jenifer Guzman)

## 2018-04-11 NOTE — PROGRESS NOTES
SUBJECTIVE:   Molly Teague is a 32 year old female who presents to clinic today for the following health issues:      No depression on pt problem list.  Pt score 17 today and positive for suicidal questions. Please review.        Hospital Follow-up Visit:    Hospital/Nursing Home/IP Rehab Facility: [facility]  Date of Admission: 4/29/2018  Date of Discharge: 4/3/2018  Reason(s) for Admission: Syncope, Aspiration of food, Altered mental State            Problems taking medications regularly:  None       Medication changes since discharge: None       Problems adhering to non-medication therapy:  None    Summary of hospitalization:  [facility] discharge summary reviewed  Diagnostic Tests/Treatments reviewed.  Follow up needed: none  Other Healthcare Providers Involved in Patient s Care:         None  Update since discharge: stable.     Post Discharge Medication Reconciliation: discharge medications reconciled and changed, per note/orders (see AVS).  Plan of care communicated with patient     Coding guidelines for this visit:  Type of Medical   Decision Making Face-to-Face Visit       within 7 Days of discharge Face-to-Face Visit        within 14 days of discharge   Moderate Complexity [id number]   High Complexity [id number]          She reports she doesn't recall anything about her hospital stay.    She had traumatic LP, MRI showed hematoma, had radicular symptoms after LP.  She reports she continues to have numbness in right leg/buttock area, along with saddle anesthesia and urinary retention.    She reports the 'shooting pain' down her leg has improved since hospital d/c, but continues with severe loss of sensation in groin and leg.  This is causing severe anxiety - feels like all her 'gains' after critical illness in 2015 have significantly caused set-back.  Hard to sit or sleep due to symptoms.  She is tearful describing these symptoms.    She reports she is having 'panic attacks' while  sleeping.    She has follow-up with Psychology Dr. Kenney  She has run out of ativan, using 1 mg to help control her anxiety.    She does endorse passive suicidal ideation.  She has good support from SO, family.  Her son keeps her from acting on these thoughts. She has crisis contact information.  She feels safe going home today    Current Outpatient Prescriptions   Medication Sig Dispense Refill     SYMBICORT 160-4.5 MCG/ACT Inhaler INHALE TWO PUFFS BY MOUTH TWICE A DAY 30.6 g 0     citalopram (CELEXA) 40 MG tablet Take 1 tablet (40 mg) by mouth daily 90 tablet 3     busPIRone (BUSPAR) 15 MG tablet Take 1 tablet (15 mg) by mouth 2 times daily 60 tablet 3     LORazepam (ATIVAN) 0.5 MG tablet Take 1 tablet (0.5 mg) by mouth 2 times daily as needed for anxiety 60 tablet 2     oxyCODONE IR (ROXICODONE) 15 MG tablet Take 1 tablet (15 mg) by mouth every 4 hours as needed for pain 120 tablet 0     ferrous gluconate (FERGON) 324 (38 FE) MG tablet TAKE ONE TABLET BY MOUTH EVERY DAY WITH BREAKFAST 100 tablet 3     gabapentin (NEURONTIN) 300 MG capsule Take 2 capsules (600 mg) by mouth 3 times daily 360 capsule 3     lisinopril (PRINIVIL/ZESTRIL) 5 MG tablet Take 1 tablet (5 mg) by mouth daily 90 tablet 3     ranitidine (ZANTAC) 150 MG tablet Take 1 tablet (150 mg) by mouth 2 times daily as needed for heartburn 180 tablet 3     albuterol (VENTOLIN HFA) 108 (90 BASE) MCG/ACT Inhaler Inhale 2 puffs into the lungs every 4 hours as needed for shortness of breath / dyspnea or wheezing 1 Inhaler 11     omeprazole (PRILOSEC) 40 MG capsule Take 1 capsule (40 mg) by mouth 2 times daily 180 capsule 3     Cyanocobalamin (B-12) 1000 MCG TBCR Take 1,000 mcg by mouth daily 100 tablet 1     sucralfate (CARAFATE) 1 GM tablet Take 1 tablet (1 g) by mouth 4 times daily (Patient taking differently: Take 1 g by mouth 4 times daily as needed ) 120 tablet 11     blood glucose monitoring (NO BRAND SPECIFIED) test strip Use to test blood sugars 3  times daily or as directed  Please give what is approved by insurance. 100 each 0     GOODSENSE MIGRAINE FORMULA 250-250-65 MG per tablet TAKE ONE TABLET BY MOUTH EVERY 6 HOURS AS NEEDED FOR HEADACHES (Patient not taking: Reported on 4/11/2018) 24 tablet 1     promethazine (PHENERGAN) 25 MG/ML IV injection Inject 25 mg into the vein every 6 hours as needed for nausea or vomiting       polyethylene glycol (MIRALAX) powder Take 17 g (1 capful) by mouth daily (Patient not taking: Reported on 4/11/2018) 510 g 1     diphenhydrAMINE (BENADRYL) 25 MG tablet Take 1 tablet (25 mg) by mouth every 6 hours as needed for itching or allergies 56 tablet        Reviewed and updated as needed this visit by clinical staff  Tobacco  Allergies  Meds  Problems  Med Hx  Surg Hx  Fam Hx  Soc Hx        Reviewed and updated as needed this visit by Provider  Allergies  Meds  Problems         ROS:  Constitutional, HEENT, cardiovascular, pulmonary, GI, , musculoskeletal, neuro, skin, endocrine and psych systems are negative, except as otherwise noted.    OBJECTIVE:     /86  Pulse 115  Temp 99.1  F (37.3  C) (Tympanic)  Wt 176 lb (79.8 kg)  SpO2 100%  Breastfeeding? No  BMI 31.18 kg/m2  Body mass index is 31.18 kg/(m^2).  GENERAL APPEARANCE: alert, no distress, over weight and fatigued  SKIN: changes due to scleroderma  NEURO: mentation intact, speech normal, DTR symmetrically normal in lower extremities, gait abnormal - hesitant, unsteady, sensory deficit - numbness of right upper leg anterior/lateral/posterior in no specific dermatomal distriubution and weakness of bilateral LE, 4/5 left and 4-/5 right  PSYCH: crying, anxious    Diagnostic Test Results:  Results for orders placed or performed during the hospital encounter of 03/29/18   Chest XR,  PA & LAT    Narrative    XR CHEST 2 VW 3/29/2018 10:16 AM    HISTORY: Short of breath.    COMPARISON: 1/27/2018.      Impression    IMPRESSION: 2 views of the chest show low  lung volumes. No acute or  active cardiopulmonary disease is demonstrated.     PER WHEATLEY MD   Head CT w/o contrast    Narrative    CT HEAD W/O CONTRAST  3/29/2018 10:07 AM    HISTORY: Fell. Double vision.    TECHNIQUE: Scans were obtained through the head without IV contrast.   Radiation dose for this scan was reduced using automated exposure  control, adjustment of the mA and/or kV according to patient size, or  iterative reconstruction technique.    COMPARISON: None.    FINDINGS: No hemorrhage, mass lesion, or focal area of acute  infarction identified. Paranasal sinuses are normal. No bony  abnormality.      Impression    IMPRESSION: Negative CT scan of the head.    PER FERREIRA MD   CT Chest pulmonary embolism w contrast    Narrative    Exam: CT CHEST PULMONARY EMBOLISM W CONTRAST 3/29/2018 10:11 PM    Comparison: Chest x-ray earlier on the same day.     Clinical History: Shortness of breath, fever, cough, possible  aspiration.    Technique: Volumetric helical acquisition of the chest were obtained  following pulmonary embolism protocol. Three-dimensional (3D)  post-processed angiographic images were reconstructed, archived to  PACS and used in interpretation of this study. Radiation dose for this  scan was reduced using automated exposure control, adjustment of the  mA and/or kV according to patient size, or iterative reconstruction  technique.    Contrast: 80 mL Isovue-370    Findings:  Contrast bolus timing is adequate for evaluation for pulmonary emboli.  There is no evidence of pulmonary emboli.  There is no evidence of  right heart strain.     Bibasilar atelectasis, right slightly worse than left. There is very  mild consolidation at the right base. No evidence of pleural effusion  is noted.    The heart size and great vessels are normal in caliber. No evidence of  axillary, mediastinal or hilar lymphadenopathy is seen. Fluid within a  mildly dilated esophagus. Likely small hiatal hernia. Right  upper  extremity PICC tip in the mid SVC.     Upper abdomen: Evaluation of the upper abdomen is limited by contrast  bolus timing and coverage.    Bones: No concerning lytic or sclerotic lesions.      Impression    Impression:   1. No evidence of pulmonary embolus.  2. Right worse than left bibasilar atelectasis and mild consolidation  in the right base. The consolidation may represent aspiration, but  there is no appreciable debris in the bronchi.  3. Fluid within a mildly dilated esophagus and likely small hiatal  hernia.    LORI LUTZ MD   MRA Head W/O Contrast    Narrative    MR ANGIOGRAM OF THE HEAD WITHOUT CONTRAST   3/29/2018 9:36 PM     HISTORY: Altered mental status    TECHNIQUE:  3D time-of-flight MR angiogram of the head without  contrast.    COMPARISON: None.    FINDINGS:  The visualized portions of the distal internal carotid and  vertebral arteries, the basilar artery, [place], and the  proximal anterior, middle and posterior cerebral arteries all appear  normal.  There is no evidence of aneurysm or vascular stenosis or  occlusion.      Impression    IMPRESSION:  Normal MR angiogram of the head.      KWESI RAMIREZ MD   MR Neck w/o & w Contrast Angiogram    Narrative    MRA ANGIOGRAM NECK W/O & W CONTRAST 3/29/2018 10:02 PM     HISTORY:  AMS;     TECHNIQUE:  Sequential axial images of the neck were obtained using  2-dimensional time-of-flight before contrast and 3-dimensional  time-of-flight after the uneventful administration of 10ml gadavist iv  contrast.    COMPARISON:  None.    FINDINGS: Stenosis is relative to the distal internal carotid  diameter.    Right Carotid:  No significant stenosis is seen at the bifurcation  relative to the distal internal carotid diameter.    Left Carotid:  No significant stenosis is seen at the bifurcation  relative to the distal internal carotid diameter.    Vertebrals: Antegrade flow is seen in both vertebral arteries.  Feathering artifact is seen over the  right vertebral artery on the  post gadolinium images.    No arterial dissection is identified.      Impression    IMPRESSION: Negative MR angiogram of the neck vessels.    KWESI RAMIREZ MD   MR Brain w/o & w Contrast    Narrative    MR brain without and with contrast.  3/29/2018 10:02 PM     HISTORY:  Altered mental status.    TECHNIQUE: Multiplanar, multisequence MRI of the brain without and  with 10ml gadavist IV contrast material.    COMPARISON: None.    FINDINGS:  The brain parenchyma, ventricles and subarachnoid spaces  appear normal for age.  There is no evidence of hemorrhage, mass,  acute infarct, or anomaly. There are no gadolinium enhancing lesions.   The facial structures appear normal.  The arteries at the base of the  brain and the dural venous sinuses appear patent.      Impression    IMPRESSION: Negative, no structural abnormalities are identified. No  bleed, mass, or infarcts are seen.     KWESI RAMIREZ MD   XR Chest Port 1 View    Narrative    XR CHEST PORT 1 VW  3/29/2018 7:26 PM     HISTORY:  PICC placement;     COMPARISON: None.    FINDINGS:  A right PICC line has been inserted. The tip appears to  extend into the right atrium. It could be pulled back about 3 cm to  the junction of the superior vena cava and right atrium.     KWESI RAMIREZ MD   XR Chest Port 1 View    Narrative    CHEST ONE VIEW PORTABLE   3/31/2018 6:05 AM     HISTORY: Rule out aspiration pneumonia    COMPARISON: 3/29/2018 chest CT.       Impression    IMPRESSION : Minimal infiltrate or atelectasis left lung base. Lungs  otherwise clear on AP projection. Right PICC line projects over mid  SVC. Heart and pulmonary vessels within normal limits.    SANDRO BELCHER MD   MRI Lumbar Spine w/o Contrast    Narrative    MRI LUMBAR SPINE WITHOUT CONTRAST   4/2/2018 1:53 PM     HISTORY: Right low back and leg pain since a lumbar puncture spinal  tap two days ago.    COMPARISON: None.    TECHNIQUE: Multiplanar MR imaging was performed without  contrast.    FINDINGS: Numbering of the levels is based on what appear to be five  lumbar type vertebral bodies. Vertebral body alignment is normal. No  fracture is seen. No pars interarticularis defect is demonstrated. No  osseous lesion is seen. There is a moderately prominent Schmorl's node  in the superior endplate of the L4 vertebral body. No other abnormal  marrow signal intensity is seen. The conus medullaris terminates at  the level of the L1 vertebral body. Along the posterior aspect of the  thecal sac just superior to the level of the L1-L2 disc, there is a  small focus of abnormal signal. This is best seen on the transverse  series 7 image 8 and the sagittal series 3 image 8. This area measures  approximately 0.4 cm transverse x 0.3 cm AP x up to 1.3 cm  craniocaudal. This demonstrates signal intensity similar to the  adjacent nerve roots on all pulse sequences. However, this appears to  be separate from the nerve roots, situated just posteriorly along the  inner lining of the thecal sac. No other intrathecal abnormality is  demonstrated. The adjacent soft tissues are unremarkable.    Findings by specific level:    T12-L1: The disc and facet joints are normal. No stenosis is seen.    L1-L2: The disc and facet joints are normal. No stenosis is seen.    L2-L3: The disc and facet joints are normal. No stenosis is seen.    L3-L4: There is disc dehydration with mild to moderate disc height  loss. There is a mild diffuse disc bulge with no focal herniation  seen. The facet joints are unremarkable. No stenosis is demonstrated.    L4-L5: The disc and facet joints are normal. No stenosis is seen.    L5-S1: There is mild disc dehydration. The disc height is well  preserved. No disc bulge or herniation is seen. There is a small  fissure of the annular fibers in the left neural foramen. No stenosis  is demonstrated. There are mild degenerative changes in the facet  joints.      Impression    IMPRESSION:   1. There is  a small focus of abnormal signal along the posterior  aspect of the thecal sac at the level of the L1-L2 disc. This could  represent a very small hematoma. This does not result in any stenosis.  Correlation should be made with the location of the lumbar puncture as  this could be secondary to the procedure.  2. No epidural hematoma is seen with no stenosis demonstrated.  3. Mild degenerative changes as described above with no disc  herniation seen.    JOCY GARCIA MD   CBC with platelets differential   Result Value Ref Range    WBC 14.1 (H) 4.0 - 11.0 10e9/L    RBC Count 4.38 3.8 - 5.2 10e12/L    Hemoglobin 12.1 11.7 - 15.7 g/dL    Hematocrit 38.0 35.0 - 47.0 %    MCV 87 78 - 100 fl    MCH 27.6 26.5 - 33.0 pg    MCHC 31.8 31.5 - 36.5 g/dL    RDW 16.0 (H) 10.0 - 15.0 %    Platelet Count 205 150 - 450 10e9/L    Diff Method Automated Method     % Neutrophils 83.2 %    % Lymphocytes 8.2 %    % Monocytes 6.5 %    % Eosinophils 1.6 %    % Basophils 0.2 %    % Immature Granulocytes 0.3 %    Absolute Neutrophil 11.7 (H) 1.6 - 8.3 10e9/L    Absolute Lymphocytes 1.2 0.8 - 5.3 10e9/L    Absolute Monocytes 0.9 0.0 - 1.3 10e9/L    Absolute Eosinophils 0.2 0.0 - 0.7 10e9/L    Absolute Basophils 0.0 0.0 - 0.2 10e9/L    Abs Immature Granulocytes 0.0 0 - 0.4 10e9/L   Comprehensive metabolic panel   Result Value Ref Range    Sodium 137 133 - 144 mmol/L    Potassium 3.9 3.4 - 5.3 mmol/L    Chloride 106 94 - 109 mmol/L    Carbon Dioxide 22 20 - 32 mmol/L    Anion Gap 9 3 - 14 mmol/L    Glucose 113 (H) 70 - 99 mg/dL    Urea Nitrogen 20 7 - 30 mg/dL    Creatinine 1.11 (H) 0.52 - 1.04 mg/dL    GFR Estimate 57 (L) >60 mL/min/1.7m2    GFR Estimate If Black 69 >60 mL/min/1.7m2    Calcium 8.2 (L) 8.5 - 10.1 mg/dL    Bilirubin Total 0.2 0.2 - 1.3 mg/dL    Albumin 3.8 3.4 - 5.0 g/dL    Protein Total 8.0 6.8 - 8.8 g/dL    Alkaline Phosphatase 97 40 - 150 U/L    ALT 24 0 - 50 U/L    AST 36 0 - 45 U/L   Lactic acid whole blood   Result  Value Ref Range    Lactic Acid 1.6 0.7 - 2.0 mmol/L   UA with Microscopic   Result Value Ref Range    Color Urine Yellow     Appearance Urine Clear     Glucose Urine Negative NEG^Negative mg/dL    Bilirubin Urine Negative NEG^Negative    Ketones Urine Negative NEG^Negative mg/dL    Specific Gravity Urine 1.019 1.003 - 1.035    Blood Urine Small (A) NEG^Negative    pH Urine 5.0 5.0 - 7.0 pH    Protein Albumin Urine 30 (A) NEG^Negative mg/dL    Urobilinogen mg/dL 0.0 0.0 - 2.0 mg/dL    Nitrite Urine Negative NEG^Negative    Leukocyte Esterase Urine Negative NEG^Negative    Source Midstream Urine     WBC Urine 3 0 - 5 /HPF    RBC Urine 5 (H) 0 - 2 /HPF    Squamous Epithelial /HPF Urine 1 0 - 1 /HPF    Mucous Urine Present (A) NEG^Negative /LPF   D dimer quantitative   Result Value Ref Range    D Dimer 0.5 0.0 - 0.50 ug/ml FEU   TSH with free T4 reflex   Result Value Ref Range    TSH 2.29 0.40 - 4.00 mU/L   Procalcitonin   Result Value Ref Range    Procalcitonin <0.05 ng/ml   Lactic acid whole blood   Result Value Ref Range    Lactic Acid 2.9 (H) 0.7 - 2.0 mmol/L   Lactic acid whole blood   Result Value Ref Range    Lactic Acid 1.0 0.7 - 2.0 mmol/L   Glucose by meter   Result Value Ref Range    Glucose 118 (H) 70 - 99 mg/dL   Basic metabolic panel   Result Value Ref Range    Sodium 142 133 - 144 mmol/L    Potassium 4.2 3.4 - 5.3 mmol/L    Chloride 112 (H) 94 - 109 mmol/L    Carbon Dioxide 22 20 - 32 mmol/L    Anion Gap 8 3 - 14 mmol/L    Glucose 85 70 - 99 mg/dL    Urea Nitrogen 23 7 - 30 mg/dL    Creatinine 1.15 (H) 0.52 - 1.04 mg/dL    GFR Estimate 54 (L) >60 mL/min/1.7m2    GFR Estimate If Black 66 >60 mL/min/1.7m2    Calcium 7.4 (L) 8.5 - 10.1 mg/dL   CBC with platelets   Result Value Ref Range    WBC 13.4 (H) 4.0 - 11.0 10e9/L    RBC Count 3.45 (L) 3.8 - 5.2 10e12/L    Hemoglobin 9.5 (L) 11.7 - 15.7 g/dL    Hematocrit 30.5 (L) 35.0 - 47.0 %    MCV 88 78 - 100 fl    MCH 27.5 26.5 - 33.0 pg    MCHC 31.1 (L) 31.5 -  36.5 g/dL    RDW 16.3 (H) 10.0 - 15.0 %    Platelet Count 187 150 - 450 10e9/L   Lactic acid whole blood   Result Value Ref Range    Lactic Acid 0.7 0.7 - 2.0 mmol/L   INR   Result Value Ref Range    INR 1.15 (H) 0.86 - 1.14   Blood gas venous   Result Value Ref Range    Ph Venous 7.36 7.32 - 7.43 pH    PCO2 Venous 39 (L) 40 - 50 mm Hg    PO2 Venous 41 25 - 47 mm Hg    Bicarbonate Venous 22 21 - 28 mmol/L    Base Deficit Venous 3.5 mmol/L   Partial thromboplastin time   Result Value Ref Range    PTT 39 (H) 22 - 37 sec   Glucose by meter   Result Value Ref Range    Glucose 89 70 - 99 mg/dL   Glucose by meter   Result Value Ref Range    Glucose 64 (L) 70 - 99 mg/dL   Glucose by meter   Result Value Ref Range    Glucose 61 (L) 70 - 99 mg/dL   Glucose by meter   Result Value Ref Range    Glucose 82 70 - 99 mg/dL   Basic metabolic panel   Result Value Ref Range    Sodium 144 133 - 144 mmol/L    Potassium 3.8 3.4 - 5.3 mmol/L    Chloride 112 (H) 94 - 109 mmol/L    Carbon Dioxide 24 20 - 32 mmol/L    Anion Gap 8 3 - 14 mmol/L    Glucose 76 70 - 99 mg/dL    Urea Nitrogen 9 7 - 30 mg/dL    Creatinine 1.09 (H) 0.52 - 1.04 mg/dL    GFR Estimate 58 (L) >60 mL/min/1.7m2    GFR Estimate If Black 70 >60 mL/min/1.7m2    Calcium 8.1 (L) 8.5 - 10.1 mg/dL   CBC with platelets   Result Value Ref Range    WBC 7.9 4.0 - 11.0 10e9/L    RBC Count 3.29 (L) 3.8 - 5.2 10e12/L    Hemoglobin 9.0 (L) 11.7 - 15.7 g/dL    Hematocrit 29.0 (L) 35.0 - 47.0 %    MCV 88 78 - 100 fl    MCH 27.4 26.5 - 33.0 pg    MCHC 31.0 (L) 31.5 - 36.5 g/dL    RDW 16.4 (H) 10.0 - 15.0 %    Platelet Count 183 150 - 450 10e9/L   Blood gas venous   Result Value Ref Range    Ph Venous 7.36 7.32 - 7.43 pH    PCO2 Venous 41 40 - 50 mm Hg    PO2 Venous 39 25 - 47 mm Hg    Bicarbonate Venous 23 21 - 28 mmol/L    Base Deficit Venous 2.6 mmol/L   Lactic acid whole blood   Result Value Ref Range    Lactic Acid 1.2 0.7 - 2.0 mmol/L   Vancomycin level   Result Value Ref Range     Vancomycin Level 32.5 (HH) mg/L   Glucose by meter   Result Value Ref Range    Glucose 86 70 - 99 mg/dL   Glucose by meter   Result Value Ref Range    Glucose 82 70 - 99 mg/dL   Occult blood stool   Result Value Ref Range    Occult Blood Positive (A) NEG^Negative   Glucose by meter   Result Value Ref Range    Glucose 126 (H) 70 - 99 mg/dL   Glucose by meter   Result Value Ref Range    Glucose 124 (H) 70 - 99 mg/dL   Basic metabolic panel   Result Value Ref Range    Sodium 140 133 - 144 mmol/L    Potassium 3.7 3.4 - 5.3 mmol/L    Chloride 107 94 - 109 mmol/L    Carbon Dioxide 26 20 - 32 mmol/L    Anion Gap 7 3 - 14 mmol/L    Glucose 82 70 - 99 mg/dL    Urea Nitrogen 10 7 - 30 mg/dL    Creatinine 1.30 (H) 0.52 - 1.04 mg/dL    GFR Estimate 47 (L) >60 mL/min/1.7m2    GFR Estimate If Black 57 (L) >60 mL/min/1.7m2    Calcium 8.2 (L) 8.5 - 10.1 mg/dL   CBC with platelets   Result Value Ref Range    WBC 7.9 4.0 - 11.0 10e9/L    RBC Count 3.52 (L) 3.8 - 5.2 10e12/L    Hemoglobin 9.6 (L) 11.7 - 15.7 g/dL    Hematocrit 30.4 (L) 35.0 - 47.0 %    MCV 86 78 - 100 fl    MCH 27.3 26.5 - 33.0 pg    MCHC 31.6 31.5 - 36.5 g/dL    RDW 15.4 (H) 10.0 - 15.0 %    Platelet Count 205 150 - 450 10e9/L   Glucose by meter   Result Value Ref Range    Glucose 91 70 - 99 mg/dL   Basic metabolic panel   Result Value Ref Range    Sodium 138 133 - 144 mmol/L    Potassium 4.2 3.4 - 5.3 mmol/L    Chloride 106 94 - 109 mmol/L    Carbon Dioxide 26 20 - 32 mmol/L    Anion Gap 6 3 - 14 mmol/L    Glucose 93 70 - 99 mg/dL    Urea Nitrogen 9 7 - 30 mg/dL    Creatinine 1.14 (H) 0.52 - 1.04 mg/dL    GFR Estimate 55 (L) >60 mL/min/1.7m2    GFR Estimate If Black 67 >60 mL/min/1.7m2    Calcium 8.5 8.5 - 10.1 mg/dL   CBC with platelets   Result Value Ref Range    WBC 7.1 4.0 - 11.0 10e9/L    RBC Count 3.79 (L) 3.8 - 5.2 10e12/L    Hemoglobin 10.4 (L) 11.7 - 15.7 g/dL    Hematocrit 32.3 (L) 35.0 - 47.0 %    MCV 85 78 - 100 fl    MCH 27.4 26.5 - 33.0 pg    MCHC 32.2  31.5 - 36.5 g/dL    RDW 15.1 (H) 10.0 - 15.0 %    Platelet Count 245 150 - 450 10e9/L     *Note: Due to a large number of results and/or encounters for the requested time period, some results have not been displayed. A complete set of results can be found in Results Review.       ASSESSMENT/PLAN:     1. REX (generalized anxiety disorder) - with PTSD component.  Discussed risk of increasing dose of ativan.  Patient should see Psychiatrist regularly due to uncontrolled anxiety despite 3 high doses of meds  Patient agreeable. Continue to see counselor, patient agreeable.  Patient contracted for safety  - busPIRone 30 MG TABS; Take 30 mg by mouth 2 times daily  Dispense: 60 tablet; Refill: 3  - LORazepam (ATIVAN) 1 MG tablet; Take 1 tablet (1 mg) by mouth 2 times daily as needed for anxiety  Dispense: 60 tablet; Refill: 2    2. Lumbar radiculopathy - spoke with Neurosurgery who reviewed MRI.  The hematoma is very very small and her symptoms don't match with the MRI findings.  I don't have a clear explanation of her symptoms, but will recheck MRI to ensure the hematoma is not worsening.    - MR Lumbar Spine w/o Contrast; Future    3. Chronic pain syndrome - refill given. Long discussion about risk/benefits of ongoing use - part of her encephalopathy from aspiration pneumonia was likely unintentional OD from opiates, benzos, other polypharmacy.  Patient may be candidate for narcan, will discuss at future visit.  - oxyCODONE IR (ROXICODONE) 15 MG tablet; Take 1 tablet (15 mg) by mouth every 4 hours as needed for pain  Dispense: 120 tablet; Refill: 0  - oxyCODONE IR (ROXICODONE) 15 MG tablet; Take 1 tablet (15 mg) by mouth every 4 hours as needed for severe pain  Dispense: 120 tablet; Refill: 0  - oxyCODONE IR (ROXICODONE) 15 MG tablet; Take 1 tablet (15 mg) by mouth every 4 hours as needed for severe pain  Dispense: 120 tablet; Refill: 0    4. Limited systemic sclerosis (H)  - gabapentin (NEURONTIN) 300 MG capsule; Take 2  capsules (600 mg) by mouth 3 times daily  Dispense: 540 capsule; Refill: 3    5. CREST (calcinosis, Raynaud's phenomenon, esophageal dysfunction, sclerodactyly, telangiectasia) (H)  - lisinopril (PRINIVIL/ZESTRIL) 5 MG tablet; Take 1 tablet (5 mg) by mouth daily  Dispense: 90 tablet; Refill: 3  - omeprazole (PRILOSEC) 40 MG capsule; Take 1 capsule (40 mg) by mouth 2 times daily  Dispense: 180 capsule; Refill: 3    6. Gastroesophageal reflux disease with esophagitis  - omeprazole (PRILOSEC) 40 MG capsule; Take 1 capsule (40 mg) by mouth 2 times daily  Dispense: 180 capsule; Refill: 3  - promethazine (PHENERGAN) 25 MG/ML IV injection; Inject 1 mL (25 mg) into the vein every 6 hours as needed for nausea or vomiting Inject IM  Dispense: 180 mL; Refill: 11    Greater than 40 minutes was spent face-to-face with the patient by the provider.  Greater than 50% was spent in counseling or coordinating care for this patient.      Grecia Barba, [facility]

## 2018-04-11 NOTE — NURSING NOTE
"Chief Complaint   Patient presents with     [facility] F/U     [facility] - 3/29/2018 to 4/3/2018- Reasons: Syncope, Aspiration of food, Altered mental State     Depression     Follow up.       Initial BP (!) 147/92 (BP Location: Left arm, Patient Position: Chair, Cuff Size: Adult Regular)  Pulse 115  Temp 99.1  F (37.3  C) (Tympanic)  Wt 176 lb (79.8 kg)  SpO2 100%  Breastfeeding? No  BMI 31.18 kg/m2 Estimated body mass index is 31.18 kg/(m^2) as calculated from the following:    Height as of 3/31/18: 5' 3\" (1.6 m).    Weight as of this encounter: 176 lb (79.8 kg).  Medication Reconciliation: complete   Sunni Guzman CMA (AAMA)   (aka: Jenifer Guzman)      "

## 2018-04-11 NOTE — NURSING NOTE
"Chief Complaint   Patient presents with     [facility] F/U     [facility] - 3/29/2018 to 4/3/2018- Reasons: Syncope, Aspiration of food, Altered mental State     Depression     Follow up.       Initial /86  Pulse 115  Temp 99.1  F (37.3  C) (Tympanic)  Wt 176 lb (79.8 kg)  SpO2 100%  Breastfeeding? No  BMI 31.18 kg/m2 Estimated body mass index is 31.18 kg/(m^2) as calculated from the following:    Height as of 3/31/18: 5' 3\" (1.6 m).    Weight as of this encounter: 176 lb (79.8 kg).  Medication Reconciliation: complete   Sunni Guzman CMA (AAMA)   (aka: Jenifer Guzman)      "

## 2018-04-11 NOTE — LETTER
May 14, 2018      Molly Teague  [address] [facility] [address]        Dear Molly,     Your [place] Care Team works hard to make sure that you and your family receive exceptional care. Enclosed you will find a copy of the Asthma Control Test (ACT) that our clinic uses to monitor and manage your asthma. This test is an assessment tool that we use to determine how well your asthma is controlled.  Please keep a copy of the ACT for your reference when we call you to complete this assessment.         Sincerely,        Grecia Barba, DO/cb

## 2018-04-11 NOTE — NURSING NOTE
Patient is contacted and she states she is not suicidal now.  She has had a bad few months with hospitalization.  She is told to go to the ED for eval if unable to wait until her 11:20 appt today.  Patient understands this. Reyna BAEZA RN

## 2018-04-11 NOTE — TELEPHONE ENCOUNTER
Pt score 17 - question 9 was answer - with 2 follow up suicidal questions also positive.  RN PLEASE SEE FOLLOW UP PROTOCOL.  ROUTE TO THE PCP TOO.  Pt schedule appointment with Dr. Barba today.  Sunni Guzman CMA ([facility])   (aka: Jenifer Guzman)

## 2018-04-11 NOTE — MR AVS SNAPSHOT
"                  MRN:[id number]                      After Visit Summary   2018    Molly Teague    MRN: [id number]           Visit Information        Provider Department      2018 9:30 AM Selvin Kenney [facility] Generic      Your next 10 appointments already scheduled     2018 12:00 PM CDT   Return Visit with Selvin Kenney   [facility] ([facility])    [address] [address]   [phone]            2018 12:00 PM CDT   Return Visit with Selvin Kenney   [facility] ([facility])    [address] [address]   [phone]              MyChart Information     [facility] lets you send messages to your doctor, view your test results, renew your prescriptions, schedule appointments and more. To sign up, go to [email or web address] . Click on \"Log in\" on the left side of the screen, which will take you to the Welcome page. Then click on \"Sign up Now\" on the right side of the page.     You will be asked to enter the access code listed below, as well as some personal information. Please follow the directions to create your username and password.     Your access code is: [email or web address]  Expires: 2018  3:37 PM     Your access code will  in 90 days. If you need help or a new code, please call your [place] clinic or [phone].        Care EveryWhere ID     This is your Care EveryWhere ID. This could be used by other organizations to access your [place] medical records  [id number]        Equal Access to Services     PARISH COOK : Hadii rosie Aguiar, waaxda ludaniloadaha, qaybta kaalmartin mireles. So [facility] [phone].    ATENCIÓN: Si habla español, tiene a bradley disposición servicios gratuitos de asistencia lingüística. Llame al [phone].    We comply with applicable federal civil rights laws and " [place] laws. We do not discriminate on the basis of race, color, national origin, age, disability, sex, sexual orientation, or gender identity.

## 2018-04-12 ASSESSMENT — ASTHMA QUESTIONNAIRES: ACT_TOTALSCORE: 7

## 2018-04-12 ASSESSMENT — PATIENT HEALTH QUESTIONNAIRE - PHQ9: SUM OF ALL RESPONSES TO PHQ QUESTIONS 1-9: 17

## 2018-04-13 NOTE — TELEPHONE ENCOUNTER
"Patient notified. Patient verbalized understanding and stated: \"The numbness is getting better, the sensation is returning, but I already scheduled the repeat MRI. I am having it done tomorrow afternoon..\"  Estrellita Lucas RN    "

## 2018-04-15 ASSESSMENT — ANXIETY QUESTIONNAIRES
1. FEELING NERVOUS, ANXIOUS, OR ON EDGE: NEARLY EVERY DAY
2. NOT BEING ABLE TO STOP OR CONTROL WORRYING: NEARLY EVERY DAY
5. BEING SO RESTLESS THAT IT IS HARD TO SIT STILL: MORE THAN HALF THE DAYS
6. BECOMING EASILY ANNOYED OR IRRITABLE: MORE THAN HALF THE DAYS
7. FEELING AFRAID AS IF SOMETHING AWFUL MIGHT HAPPEN: NEARLY EVERY DAY
GAD7 TOTAL SCORE: 19
3. WORRYING TOO MUCH ABOUT DIFFERENT THINGS: NEARLY EVERY DAY
4. TROUBLE RELAXING: NEARLY EVERY DAY

## 2018-04-16 ASSESSMENT — PATIENT HEALTH QUESTIONNAIRE - PHQ9: SUM OF ALL RESPONSES TO PHQ QUESTIONS 1-9: 22

## 2018-04-16 ASSESSMENT — ANXIETY QUESTIONNAIRES: GAD7 TOTAL SCORE: 19

## 2018-04-16 NOTE — PROGRESS NOTES
Adult Intake Structured Interview  Standard Diagnostic Assessment  Disclaimer: This note consists of symbols derived from keyboarding, dictation and/or voice recognition software. As a result, there may be errors in the script that have gone undetected. Please consider this when interpreting information found in this chart.      CLIENT'S NAME: Molly Teague  MRN:   [id number]  :   1985  ACCT. NUMBER: [id number]  DATE OF SERVICE: 18      Identifying Information:  Client is a 32 year old, , partnered / significant other female. Client was referred for counseling by PCP Grecia Taylor. Client is currently disabled. Client attended the session alone.       Client's Statement of Presenting Concern:  Client presents with a 6 year history of PTSD subsequent to multiple medical problems.  Developed scleroderma diagnosed in  followed very shortly by a high risk pregnancy after which her health deteriorated significantly.  Multiple organ failure in  resulted in a 7 week, and 6 month hospital stay.  During this time she lost all motor function.  She has a very supportive family and extended family.  She is in a long-term relationship with her significant other.  Notes difficulty sleeping due to frequent nightmares and awakening which makes it difficult on her relationship.  Occasional panic attacks including chest pressure agitation, feeling jittery shortness of breath, tachycardia, diaphoresis and lightheadedness.  Typically last for minutes.  Also reports son has selective mutism.  Client stated that her symptoms have resulted in the following functional impairments: chronic disease management, management of the household and or completion of tasks and relationship(s)      History of Presenting Concern:  Client reports that these  problem(s) began 6 years ago when she was diagnosed with scleroderma, she also experiences chronic pain and has had numerous other medical problems see chart for details.  She has spent long periods of time in the hospital and has been near death number of times.. Client has attempted to resolve these concerns in the past through Medications. Client reports that other professional(s) are not involved in providing support / services.       Social History:  Client reported she grew up in [place], [place]. They were the second born of 2 children. This is an intact family and parents remain . Client reported that her childhood was close-knit in the  family both parents work full-time grandmother watch them and took them to school feels very jeffrey. Client described her current relationships with family of origin as excellent..    Client reported a history of 1 committed relationships or marriages. Client has been partnered / significant other for  8 years. Client reported having one children. Client identified some stable and meaningful social connections. Client reported that she has not been involved with the legal system.  Client's highest education level was high school graduate. Client did not identify any learning problems. There are no ethnic, cultural or Zoroastrianism factors that may be relevant for therapy. Client identified her preferred language to be English. Client reported she does not need the assistance of an  or other support involved in therapy. Modifications will not be used to assist communication in therapy. Client did not serve in the .     Client reports family history includes CEREBROVASCULAR DISEASE in her maternal grandmother; DIABETES in her maternal aunt; Hyperlipidemia in her father and paternal grandfather. There is no history of Melanoma or Skin Cancer.    Mental Health History:  Client  Reported depression in paternal uncle.  Client has received the  following mental health services in the past: medication(s) from physician / PCP.  Hospitalizations: None for mental health, multiple for life-threatening health conditions, multiple.  In ICU.  Client is currently receiving the following services: medication(s) from physician / PCP.      Chemical Health History:  Client reported no family history of chemical health issues. Client has not received chemical dependency treatment in the past. Client is not currently receiving any chemical dependency treatment. Client reports no problems as a result of their drinking / drug use.      Client Reports:  Client drinks 2-3 glasses of wine twice per week   Client denies using tobacco.  Client denies using marijuana.  Client  drinks a cup of coffee approximately 5 times per week  Client denies using street drugs.  Client denies the non-medical use of prescription or over the counter drugs.    CAGE: None of the patient's responses to the CAGE screening were positive / Negative CAGE score   Based on the negative Cage-Aid score and clinical interview there  are not indications of drug or alcohol abuse.    Discussed the general effects of drugs and alcohol on health and well-being. Therapist gave client printed information about the effects of chemical use on her health and well being.      Significant Losses / Trauma / Abuse / Neglect Issues:  Multiple medical traumas see chart.  Recently blacked out during a medical appointment with her PCP, also had difficulties during a recent lumbar puncture. history of multiple organ failure,,  for 6 weeks no motor function for 6 months    Issues of possible neglect are not present.      Medical Issues:  Client has had a physical exam to rule out medical causes for current symptoms. Date of last physical exam was within the past year. Client was encouraged to follow up with PCP if symptoms were to develop. The client has a [place] Primary Care Provider, who is named Grecia Barba..  The client reports not having a psychiatrist. Client reports the following current medical concerns: Multiple medical concerns see chart. The client reports the presence of chronic or episodic pain in the form of Back pain, skin pain arthritis. The pain level is severe and has a frequency of Daily.. There are not significant nutritional concerns.     Client reports current meds as:   Current Outpatient Prescriptions   Medication Sig     busPIRone 30 MG TABS Take 30 mg by mouth 2 times daily     LORazepam (ATIVAN) 1 MG tablet Take 1 tablet (1 mg) by mouth 2 times daily as needed for anxiety     gabapentin (NEURONTIN) 300 MG capsule Take 2 capsules (600 mg) by mouth 3 times daily     lisinopril (PRINIVIL/ZESTRIL) 5 MG tablet Take 1 tablet (5 mg) by mouth daily     omeprazole (PRILOSEC) 40 MG capsule Take 1 capsule (40 mg) by mouth 2 times daily     promethazine (PHENERGAN) 25 MG/ML IV injection Inject 1 mL (25 mg) into the vein every 6 hours as needed for nausea or vomiting Inject IM     oxyCODONE IR (ROXICODONE) 15 MG tablet Take 1 tablet (15 mg) by mouth every 4 hours as needed for pain     [START ON 5/11/2018] oxyCODONE IR (ROXICODONE) 15 MG tablet Take 1 tablet (15 mg) by mouth every 4 hours as needed for severe pain     [START ON 6/10/2018] oxyCODONE IR (ROXICODONE) 15 MG tablet Take 1 tablet (15 mg) by mouth every 4 hours as needed for severe pain     SYMBICORT 160-4.5 MCG/ACT Inhaler INHALE TWO PUFFS BY MOUTH TWICE A DAY     [facility] 250-250-65 MG per tablet TAKE ONE TABLET BY MOUTH EVERY 6 HOURS AS NEEDED FOR HEADACHES (Patient not taking: Reported on 4/11/2018)     citalopram (CELEXA) 40 MG tablet Take 1 tablet (40 mg) by mouth daily     ferrous gluconate (FERGON) 324 (38 FE) MG tablet TAKE ONE TABLET BY MOUTH EVERY DAY WITH BREAKFAST     ranitidine (ZANTAC) 150 MG tablet Take 1 tablet (150 mg) by mouth 2 times daily as needed for heartburn     albuterol (VENTOLIN HFA) 108 (90 BASE) MCG/ACT  Inhaler Inhale 2 puffs into the lungs every 4 hours as needed for shortness of breath / dyspnea or wheezing     Cyanocobalamin (B-12) 1000 MCG TBCR Take 1,000 mcg by mouth daily     sucralfate (CARAFATE) 1 GM tablet Take 1 tablet (1 g) by mouth 4 times daily (Patient taking differently: Take 1 g by mouth 4 times daily as needed )     polyethylene glycol (MIRALAX) powder Take 17 g (1 capful) by mouth daily (Patient not taking: Reported on 4/11/2018)     blood glucose monitoring (NO BRAND SPECIFIED) test strip Use to test blood sugars 3 times daily or as directed  Please give what is approved by insurance.     diphenhydrAMINE (BENADRYL) 25 MG tablet Take 1 tablet (25 mg) by mouth every 6 hours as needed for itching or allergies     No current facility-administered medications for this visit.        Client Allergies:  Allergies   Allergen Reactions     No Known Allergies      Shellfish-Derived Products Nausea     Rash on face     no known allergies to medications    Medical History:  Past Medical History:   Diagnosis Date     Arthritis      Blood clotting disorder (H)     P E     History of blood transfusion      Hypertension      Long-term (current) use of anticoagulants [Z79.01] 9/9/2016     PE (pulmonary embolism) 9/7/2016     Rheumatism      Scleroderma (H) 9/22/2016     Uncomplicated asthma          Medication Adherence:  Client reports taking prescribed medications as prescribed.    Client was provided recommendation to follow-up with prescribing physician.    Mental Status Assessment:  Appearance:   Appropriate   Eye Contact:   Good   Psychomotor Behavior: Normal  Restless   Attitude:   Cooperative   Orientation:   All  Speech   Rate / Production: Normal    Volume:  Normal   Mood:    Anxious   Affect:    Appropriate   Thought Content:  Clear   Thought Form:  Coherent  Logical   Insight:    Good       Review of Symptoms:  Depression: Sleep Interest Guilt Energy Concentration Appetite Psychomotor slowing or  agitation Hopeless Helpless Worthless  Kaylen:  No symptoms  Psychosis: No symptoms  Anxiety: Worries Nervousness Usual Unusual Describe: Health concerns, pain, concern for son   Panic:  Palpitations Tremors Shortness of Breath Tingling Numbness Sense of Impending Doom  Post Traumatic Stress Disorder: Increased Arousal Impaired Function Trauma  Obsessive Compulsive Disorder: No symptoms  Eating Disorder: No symptoms  Oppositional Defiant Disorder: No symptoms  ADD / ADHD: No symptoms  Conduct Disorder: No symptoms      Safety Assessment:    History of Safety Concerns:   Client denied a history of suicidal ideation.    Client denied a history of suicide attempts.    Client denied a history of homicidal ideation.    Client denied a history of self-injurious ideation and behaviors.    Client denied a history of personal safety concerns.    Client denied a history of assaultive behaviors.        Current Safety Concerns:  Client denies current suicidal ideation.    Client denies current homicidal ideation and behaviors.  Client denies current self-injurious ideation and behaviors.    Client denies current concerns for personal safety.      Client reports there are no firearms in the house.     Plan for Safety and Risk Management:  A safety and risk management plan has not been developed at this time, however client was given the after-hours number / 911 should there be a change in any of these risk factors.    Client's Strengths and Limitations:  Client identified the following strengths or resources that will help her succeed in counseling: anju / spirituality and family support. Client identified the following supports: family and Jew / spirituality. Things that may interfere with the client's success in counseling include: Extreme medical problems.      Diagnostic Criteria:  A. The person has been exposed to a traumatic event in which both of the following were present:     (1) the person experienced, witnessed,  or was confronted with an event or events that involved actual or threatened death or serious injury, or a threat to the physical integrity of self or others     (2) the person's response involved intense fear, helplessness, or horror. Note: In children, this may be expressed instead by disorganized or agitated behavior  B. The traumatic event is persistently reexperienced in one (or more) of the following ways:     - Recurrent and intrusive distressing recollections of the event, including images, thoughts, or perceptions. Note: In young children, repetitive play may occur in which themes or aspects of the trauma are expressed.      - Intense psychological distress at exposure to internal or external cues that symbolize or resemble an aspect of the traumatic event.      - Physiological reactivity on exposure to internal or external cues that symbolize or resemble an aspect of the traumatic event.   C. Persistent avoidance of stimuli associated with the trauma and numbing of general responsiveness (not present before the trauma), as indicated by three (or more) of the following:     - Efforts to avoid thoughts, feelings, or conversations associated with the trauma.      - Markedly diminished interest or participation in significant activities.      - Feeling of detachment or estrangement from others.      - Sense of a foreshortened future (e.g., does not expect to have a career, marriage, children, or a normal life span).   D. Persistent symptoms of increased arousal (not present before the trauma), as indicated by two (or more) of the following:     - Difficulty falling or staying asleep.   E. Duration of the disturbance is more than 1 month.  F. The disturbance causes clinically significant distress or impairment in social, occupational, or other important areas of functioning.      Functional Status:  Client's symptoms are causing reduced functional status in the following areas: Virtually all areas of her life,  particularly unable to work and difficulty caring for her son.  Relationship stress.      DSM5 Diagnoses: (Sustained by DSM5 Criteria Listed Above)  Diagnoses: 309.81 (F43.10) Posttraumatic Stress Disorder (includes Posttraumatic Stress Disorder for Children 6 Years and Younger)  Without dissociative symptoms  Psychosocial & Contextual Factors: Relationship stress, multiple medical problems, caring for her young son with selective mutism  Attendance Agreement:  Client has signed Attendance Agreement:Yes      Collaboration:  Collaboration with other professionals is not indicated at this time.      Preliminary Treatment Plan:  The client reports no currently identified Yarsanism, ethnic or cultural issues relevant to therapy.     services are not indicated.    Modifications to assist communication are not indicated.    The concerns identified by the client will be addressed in therapy.    Initial Treatment will focus on: Adjustment Difficulties related to: unemployment and Multiple medical concerns.    As a preliminary treatment goal, client will develop coping/problem-solving skills to facilitate more adaptive adjustment and be  given a safe place to process her traumas.    The focus of initial interventions will be to alleviate anxiety, alleviate depressed mood and process traumas.    Referral to another professional/service is not indicated at this time..    A Release of Information is not needed at this time.    Report to child / adult protection services was NA.    Client will have access to their [facility]' medical record.    Selvin Kenney  April 15, 2018

## 2018-04-18 ENCOUNTER — OFFICE VISIT (OUTPATIENT)
Dept: PSYCHOLOGY | Facility: CLINIC | Age: 33
End: 2018-04-18
Payer: MEDICARE

## 2018-04-18 DIAGNOSIS — F43.10 PTSD (POST-TRAUMATIC STRESS DISORDER): Primary | ICD-10-CM

## 2018-04-18 DIAGNOSIS — F33.2 SEVERE RECURRENT MAJOR DEPRESSION WITHOUT PSYCHOTIC FEATURES (H): ICD-10-CM

## 2018-04-18 PROCEDURE — 90834 PSYTX W PT 45 MINUTES: CPT | Performed by: PSYCHOLOGIST

## 2018-04-18 NOTE — MR AVS SNAPSHOT
"                  MRN:[id number]                      After Visit Summary   2018    Molly Teague    MRN: [id number]           Visit Information        Provider Department      2018 8:30 AM Selvin Kenney [facility] Generic      Your next 10 appointments already scheduled     2018 12:00 PM CDT   Return Visit with Selvin Kenney   [facility] ([facility])    [address] [address]   [phone]            May 16, 2018  2:00 PM CDT   Return Visit with Selvin Kenney   [facility] ([facility])    [address]  [facility] [address]   [phone]              MyChart Information     [facility] lets you send messages to your doctor, view your test results, renew your prescriptions, schedule appointments and more. To sign up, go to [email or web address] . Click on \"Log in\" on the left side of the screen, which will take you to the Welcome page. Then click on \"Sign up Now\" on the right side of the page.     You will be asked to enter the access code listed below, as well as some personal information. Please follow the directions to create your username and password.     Your access code is: [email or web address]  Expires: 2018  3:37 PM     Your access code will  in 90 days. If you need help or a new code, please call your [place] clinic or [phone].        Care EveryWhere ID     This is your Care EveryWhere ID. This could be used by other organizations to access your [place] medical records  [id number]        Equal Access to Services     PARISH COOK : Hadii rosie Aguiar, waaxda ludaniloadaha, qaybta kaalmartin mireles. So [facility] [phone].    ATENCIÓN: Si habla español, tiene a bradley disposición servicios gratuitos de asistencia lingüística. Llame al [phone].    We comply with applicable federal civil rights laws and " [place] laws. We do not discriminate on the basis of race, color, national origin, age, disability, sex, sexual orientation, or gender identity.

## 2018-04-20 DIAGNOSIS — R51.9 CHRONIC DAILY HEADACHE: ICD-10-CM

## 2018-04-20 RX ORDER — ACETAMINOPHEN, ASPIRIN, CAFFEINE 250; 250; 65 MG/1; MG/1; MG/1
TABLET, FILM COATED ORAL
Qty: 24 TABLET | Refills: 1 | Status: SHIPPED | OUTPATIENT
Start: 2018-04-20

## 2018-04-20 ASSESSMENT — ANXIETY QUESTIONNAIRES
2. NOT BEING ABLE TO STOP OR CONTROL WORRYING: SEVERAL DAYS
7. FEELING AFRAID AS IF SOMETHING AWFUL MIGHT HAPPEN: SEVERAL DAYS
5. BEING SO RESTLESS THAT IT IS HARD TO SIT STILL: NOT AT ALL
3. WORRYING TOO MUCH ABOUT DIFFERENT THINGS: SEVERAL DAYS
4. TROUBLE RELAXING: SEVERAL DAYS
6. BECOMING EASILY ANNOYED OR IRRITABLE: SEVERAL DAYS
GAD7 TOTAL SCORE: 6
1. FEELING NERVOUS, ANXIOUS, OR ON EDGE: SEVERAL DAYS

## 2018-04-20 NOTE — PROGRESS NOTES
Progress Note  Disclaimer: This note consists of symbols derived from keyboarding, dictation and/or voice recognition software. As a result, there may be errors in the script that have gone undetected. Please consider this when interpreting information found in this chart.    Client Name: Molly Teague  Date: 4/18/2018         Service Type: Individual      Session Start Time: 8:30 AM   Session End Time: 9:15 AM      Session Length: 45     Session #: 3     Attendees: Client attended alone    Treatment Plan Last Reviewed: 4/18/2018  PHQ-9 / REX-7 : See flowsheet     DATA   Client reports she has not been getting a lot of rest recently, her son has been down with the flu.  Her  will be off for the next 3 days and she is looking forward to having some assistance.  Spent some time talking about her changes in self-image, particularly her parents.  One bad panic attack on the edge of waking up this week feelings of derealization, depersonalization, losing time.  Woke up from a bad dream.  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   Progress Since Last Session (Related to Symptoms / Goals / Homework):   Symptoms: Stable    Homework: Achieved / completed to satisfaction      Episode of Care Goals: Satisfactory progress - ACTION (Actively working towards change); Intervened by reinforcing change plan / affirming steps taken     Current / Ongoing Stressors and Concerns:   Multiple medical problems, chronic pain raising a young child with special needs     Treatment Objective(s) Addressed in This Session:   identify 2 strategies for managing pain       Intervention:   CBT: Behavioral activation, processing losses        ASSESSMENT: Current Emotional / Mental Status (status of significant symptoms):   Risk status (Self / Other harm or suicidal ideation)   Client denies current fears or concerns for personal  safety.   Client denies current or recent suicidal ideation or behaviors.   Client denies current or recent homicidal ideation or behaviors.   Client denies current or recent self injurious behavior or ideation.   Client denies other safety concerns.   A safety and risk management plan has not been developed at this time, however client was given the after-hours number / 911 should there be a change in any of these risk factors.     Appearance:   Appropriate    Eye Contact:   Good    Psychomotor Behavior: Normal    Attitude:   Cooperative    Orientation:   All   Speech    Rate / Production: Normal     Volume:  Normal    Mood:    Normal   Affect:    Appropriate    Thought Content:  Clear    Thought Form:  Coherent  Logical    Insight:    Good      Medication Review:   No changes to current psychiatric medication(s)     Medication Compliance:   Yes     Changes in Health Issues:   None reported     Chemical Use Review:   Substance Use: Chemical use reviewed, no active concerns identified      Tobacco Use: No current tobacco use.       Collateral Reports Completed:   Not Applicable    PLAN: (Client Tasks / Therapist Tasks / Other)  Clinic to continue with her creative pursuits at least 3 times per week, work with  to find time for rest.        Selvin Kenney                                                         ________________________________________________________________________    Treatment Plan    Client's Name: Molly Teague  YOB: 1985    Date: 4/18/2018    DSM5 Diagnoses: (Sustained by DSM5 Criteria Listed Above)  Diagnoses:            309.81 (F43.10) Posttraumatic Stress Disorder (includes Posttraumatic Stress Disorder for Children 6 Years and Younger)  Without dissociative symptoms  Psychosocial & Contextual Factors: Relationship stress, multiple medical problems, caring for her young son with selective mutism    Referral / Collaboration:  Referral to another professional/service is  not indicated at this time..    Anticipated number of session or this episode of care: 25    Goals  1. Education- the Biopsychosocial model of depression  a. Client will be able to describe how depression is effecting them physically, emotionally and socially  2. Behavioral Activation  a. Client will learn to assess their depression on a day to day basis  b. Client will Identify two forms of exercise/activity and engage in them 3 times per week  c. Client will Identify 3 things that make them laugh, and engage in these 5 times per week.  d. Client will Identify 1-2Creative activities or hobbies  and engage in them 2 times per week  e. Client will identify music, movies, books that make them feel good and use them 3-4 times per week  3. Self-care  a. Client will identify 5 things they can do just for themselves  b. Client will take time for quiet, reflection, meditation 5 times per week  c. Client will Learn to set boundaries when appropriate  d. Client will Identify 2 individuals they can call on for support, distraction  4. Assessment of progress  a. Client will engage in assessment of their progress on a regular basis  Goals  5. Education- the stages of grief  a. Client will be able to describe the stages of grief; denial, bargaining, anger, depression, and acceptance and give examples of how each have felt for her.  6. Behavioral Activation  a. Client will learn to assess their emotional stateon a day to day basis  b. Client will Identify two forms of exercise/activity and engage in them 3 times per week  c. Client will Identify 3 things that make them laugh, and engage in these 5 times per week.  d. Client will Identify 1-2Creative activities or hobbies  and engage in them 2 times per week  e. Client will identify music, movies, books that make them feel good and use them 3-4 times per week  7. Self-care  a. Client will identify 5 things they can do just for themselves  b. Client will take time for quiet,  reflection, meditation 5 times per week  c. Client will Learn to set boundaries when appropriate  d. Client will Identify 2 individuals they can call on for support, distraction  8. Assessment of progress  a. Client will engage in assessment of their progress on a regular basis      Client has reviewed and agreed to the above plan.      Selvin Kenney  April 20, 2018

## 2018-04-20 NOTE — TELEPHONE ENCOUNTER
"Requested Prescriptions   Pending Prescriptions Disp Refills     GOODSENSE MIGRAINE FORMULA 250-250-65 MG per tablet [Pharmacy Med Name: GOODSENSE MIGRAINE  250-250-65 TABS]  Last Written Prescription Date:  02/28/18  Last Fill Quantity: 24,  # refills: 1   Last office visit: 4/11/2018 with prescribing provider:  04/11/18   Future Office Visit:   Next 5 appointments (look out 90 days)     Apr 23, 2018 12:00 PM CDT   Return Visit with [facility]    [address] [address]   [phone]            May 16, 2018  2:00 PM CDT   Return Visit with [facility]    [address] [address]   [phone]               24 tablet 1     Sig: TAKE ONE TABLET BY MOUTH EVERY 6 HOURS AS NEEDED FOR HEADACHES    Analgesics (Non-Narcotic Tylenol and ASA Only) Passed    4/20/2018  1:52 PM       Passed - Recent (12 mo) or future (30 days) visit within the authorizing provider's specialty    Patient had office visit in the last 12 months or has a visit in the next 30 days with authorizing provider or within the authorizing provider's specialty.  See \"Patient Info\" tab in inbasket, or \"Choose Columns\" in Meds & Orders section of the refill encounter.           Passed - Patient is age 20 years or older    If ASA is flagged for ages under 20 years old. Forward to provider for confirmation Ryes Syndrome is not a concern.                "

## 2018-04-21 ASSESSMENT — ANXIETY QUESTIONNAIRES: GAD7 TOTAL SCORE: 6

## 2018-04-21 ASSESSMENT — PATIENT HEALTH QUESTIONNAIRE - PHQ9: SUM OF ALL RESPONSES TO PHQ QUESTIONS 1-9: 11

## 2018-04-27 ENCOUNTER — TELEPHONE (OUTPATIENT)
Dept: FAMILY MEDICINE | Facility: CLINIC | Age: 33
End: 2018-04-27

## 2018-04-27 DIAGNOSIS — E16.2 HYPOGLYCEMIA: ICD-10-CM

## 2018-04-27 NOTE — TELEPHONE ENCOUNTER
Patient has Medicare part B, which has very stript rules for testing supplies. We will need new orders for the test strips and lancet with accurate info on the order. How much patient is testing per day, not PRN. And can not be more than once a day unless patient is on insulin. Diagnosis code on script, refills must be a number and not PRN, and please call us with any questions.   Thanks,   Nohemy Cox  Certified Pharmacy Technician  [facility]  [phone]

## 2018-04-27 NOTE — TELEPHONE ENCOUNTER
Spoke with pt and she states that she uses the glucometer to test BS when she feels symptomatic of hypoglycemic.  Please see refill request.    Nora PATEL RN

## 2018-04-27 NOTE — TELEPHONE ENCOUNTER
Reason for Call:  Medication or medication refill:    Do you use a [facility]?  Name of the pharmacy and phone number for the current request:  [facility] [phone]    Name of the medication requested: one touch lantus and test strips    Other request: pt calling stating her blood testing supplies are  and she needs a new prescription sent in.    Can we leave a detailed message on this number? YES    Phone number patient can be reached at: Home number on file [phone] (home)    Best Time: any    Call taken on 2018 at 11:49 AM by Marti Acevedo

## 2018-04-30 ENCOUNTER — TELEPHONE (OUTPATIENT)
Dept: FAMILY MEDICINE | Facility: CLINIC | Age: 33
End: 2018-04-30

## 2018-04-30 NOTE — TELEPHONE ENCOUNTER
PA submitted to [facility] PA POOL 5/1/18 by Emi Garcia    Prior Authorization Retail Medication Request    Medication/Dose: One Touch Ultra test strips and Delica lancets  ICD code (if different than what is on RX):  E16.2  Previously Tried and Failed: not specified in chart  Rationale:  Hypoglycemia is not covered by patient's Part B plan. [employer] part D requires a prior auth and patient has [employer] as a secondary insurance.    Primary: [employer] P[employer] [phone]  ID # [id number]    Secondary: [facility] [phone]  ID # [id number]    Pharmacy Information (if different than what is on RX)  Name:    Phone:      Thanks,   Nohemy Cox  Certified Pharmacy Technician  [facility]  [phone]

## 2018-05-02 ENCOUNTER — TELEPHONE (OUTPATIENT)
Dept: FAMILY MEDICINE | Facility: CLINIC | Age: 33
End: 2018-05-02

## 2018-05-02 NOTE — TELEPHONE ENCOUNTER
submitted to [facility] BRANDY PAK 5/1/18 by Emi Garcia     Prior Authorization Retail Medication Request     Medication/Dose: One Touch Ultra test strips and Delica lancets  ICD code (if different than what is on RX):  E16.2  Previously Tried and Failed: not specified in chart  Rationale:  Hypoglycemia is not covered by patient's Part B plan. [employer] part D requires a prior auth and patient has BCBS as a secondary insurance.     Primary: [employer] P[employer] [phone]  ID # [id number]     Secondary: [facility] [phone]  ID # [id number]     Pharmacy Information (if different than what is on RX)  Name:                                             Phone:       Thanks,   Nohemy Cox  Certified Pharmacy Technician  [facility]  [phone]

## 2018-05-02 NOTE — TELEPHONE ENCOUNTER
Central Prior Authorization Team   Phone: [phone]    Sent request to both insurance companies to see if one would cover this    PA Initiation    [facility]: [facility]   Insurance Company: [employer] - Phone [phone] Fax [phone]  Pharmacy Filling the Rx: [facility] [place], [place] - [address]  Filling Pharmacy Phone: [phone]  Filling Pharmacy Fax:    Start Date: 5/2/2018

## 2018-05-02 NOTE — TELEPHONE ENCOUNTER
Central Prior Authorization Team   Phone: [phone]  Sent request to both insurance carriers to see if one will approve this.   PA Initiation    Medication: One Touch Ultra test strips and Delica lancets  Insurance Company: [employer] - Phone [phone] Fax [phone]  Pharmacy Filling the Rx: [facility] [place], [place] - [address]  Filling Pharmacy Phone: [phone]  Filling Pharmacy Fax:    Start Date: 5/2/2018

## 2018-05-02 NOTE — TELEPHONE ENCOUNTER
PRIOR AUTHORIZATION DENIED    Medication: One Touch Ultra test strips and Delica lancets    Denial Date: 5/2/2018    Denial Rational: Testing supplies are not covered under part D        Appeal Information:  If provider would like to appeal please provide a letter of medical necessity and route back to PA team.

## 2018-05-02 NOTE — TELEPHONE ENCOUNTER
PRIOR AUTHORIZATION DENIED    Medication: One Touch Ultra test strips and Delica lancets    Denial Date:  05/02/2018    Denial Rational: PA denied thru [employer]        Appeal Information: N/A

## 2018-05-02 NOTE — TELEPHONE ENCOUNTER
PRIOR AUTHORIZATION DENIED    Medication: one touch delica lancets     Denial Date: 5/2/2018    Denial Rational:  Part D does not cover testing supplies      Appeal Information:  If provider would like to appeal please provide a letter of medical necessity and route back to PA team.

## 2018-05-14 NOTE — TELEPHONE ENCOUNTER
Panel Management Review      Patient has the following on her problem list:     Asthma review     ACT Total Scores 4/11/2018   ACT TOTAL SCORE (Goal Greater than or Equal to 20) 7   In the past 12 months, how many times did you visit the emergency room for your asthma without being admitted to the hospital? 0   In the past 12 months, how many times were you hospitalized overnight because of your asthma? 0      1. Is Asthma diagnosis on the Problem List? Yes    2. Is Asthma listed on Health Maintenance? Yes    3. Patient is due for:  ACT      Composite cancer screening  Chart review shows that this patient is due/due soon for the following None  Summary:    Patient is due/failing the following:   ACT    Action needed:   Patient needs to do ACT.    Type of outreach:    Copy of ACT mailed to patient in a letter. Will postpone this for 10 days then call to review.     Questions for provider review:    None                                                                                                                                    John JHA CMA

## 2018-05-15 NOTE — TELEPHONE ENCOUNTER
(1st attempt) Left a voice msg for pt to call back.  When pt calls back transfer to the care team to perform ACT.  Sunni Guzman CMA (ARCELIA)   (aka: Jenifer Guzman)

## 2018-05-19 DIAGNOSIS — J45.40 MODERATE PERSISTENT ASTHMA WITHOUT COMPLICATION: ICD-10-CM

## 2018-05-19 DIAGNOSIS — F41.1 GAD (GENERALIZED ANXIETY DISORDER): ICD-10-CM

## 2018-05-21 RX ORDER — BUSPIRONE HYDROCHLORIDE 15 MG/1
TABLET ORAL
Qty: 60 TABLET | Refills: 3 | OUTPATIENT
Start: 2018-05-21

## 2018-05-22 NOTE — TELEPHONE ENCOUNTER
Spoke with the pt over the phone she score 15 on ACT, declined schedule appointment with us, because she has schedule an appointment with [facility] for a pulmonary function test.  The test will be done in 2 weeks. Staff verbalize that if things get worst for pt follow up with Dr. Barba otherwise the staff will call the pt back in 30 days.  Postponed for 30 days.  Sunni Guzman CMA (ARCELIA)   (aka: Jenifer Guzman)

## 2018-05-23 ASSESSMENT — ASTHMA QUESTIONNAIRES: ACT_TOTALSCORE: 15

## 2018-06-26 DIAGNOSIS — J45.40 MODERATE PERSISTENT ASTHMA WITHOUT COMPLICATION: ICD-10-CM

## 2018-06-26 DIAGNOSIS — M34.9 LIMITED SYSTEMIC SCLEROSIS (H): ICD-10-CM

## 2018-06-26 RX ORDER — BUDESONIDE AND FORMOTEROL FUMARATE DIHYDRATE 160; 4.5 UG/1; UG/1
AEROSOL RESPIRATORY (INHALATION)
Qty: 30.6 G | Refills: 0 | Status: SHIPPED | OUTPATIENT
Start: 2018-06-26 | End: 2018-10-10

## 2018-06-26 NOTE — TELEPHONE ENCOUNTER
"Requested Prescriptions   Pending Prescriptions Disp Refills     budesonide-formoterol (SYMBICORT) 160-4.5 MCG/ACT Inhaler 30.6 g 0    There is no refill protocol information for this order      Refused Prescriptions Disp Refills     busPIRone (BUSPAR) 15 MG tablet [Pharmacy Med Name: BUSPIRONE HCL 15MG TABS] 60 tablet 3     Sig: TAKE ONE TABLET BY MOUTH TWICE A DAY    Atypical Antidepressants Protocol Passed    5/19/2018  4:25 PM       Passed - Recent (12 mo) or future (30 days) visit within the authorizing provider's specialty    Patient had office visit in the last 12 months or has a visit in the next 30 days with authorizing provider or within the authorizing provider's specialty.  See \"Patient Info\" tab in inbasket, or \"Choose Columns\" in Meds & Orders section of the refill encounter.           Passed - Patient is age 18 or older       Passed - No active pregnancy on record       Passed - No positive pregnancy test in past 12 mos        Last Written Prescription Date:  2/28/18  Last Fill Quantity: 30.6g,  # refills: 0   Last office visit: 4/11/2018 with prescribing provider:  Jameson   Future Office Visit:   Next 5 appointments (look out 90 days)     Jun 29, 2018  7:20 AM CDT   SHORT with Grecia [facility]ba, DO   [facility]    [address] [id number] [phone]                   "

## 2018-06-26 NOTE — TELEPHONE ENCOUNTER
Requested Prescriptions   Pending Prescriptions Disp Refills     gabapentin (NEURONTIN) 300 MG capsule 540 capsule 3     Sig: Take 2 capsules (600 mg) by mouth 3 times daily    There is no refill protocol information for this order        albuterol (VENTOLIN HFA) 108 (90 Base) MCG/ACT Inhaler 1 Inhaler 11     Sig: Inhale 2 puffs into the lungs every 4 hours as needed for shortness of breath / dyspnea or wheezing    There is no refill protocol information for this order        Last Written Prescription Date:  4/11/18  Last Fill Quantity: 540,  # refills: 3   Last office visit: 4/11/2018 with prescribing provider:  Jameson Espinoza Office Visit:   Next 5 appointments (look out 90 days)     Jun 29, 2018  7:20 AM CDT   SHORT [facility] Grecia [facility]ba DO   [facility]    [address] [place] [address]   [phone]                 Requested Prescriptions   Pending Prescriptions Disp Refills     gabapentin (NEURONTIN) 300 MG capsule 540 capsule 3     Sig: Take 2 capsules (600 mg) by mouth 3 times daily    There is no refill protocol information for this order        albuterol (VENTOLIN HFA) 108 (90 Base) MCG/ACT Inhaler 1 Inhaler 11     Sig: Inhale 2 puffs into the lungs every 4 hours as needed for shortness of breath / dyspnea or wheezing    There is no refill protocol information for this order        Last Written Prescription Date:  6/22/17  Last Fill Quantity: 1,  # refills: 11   Last office visit: 4/11/2018 with prescribing provider:  Jameson Espinoza Office Visit:   Next 5 appointments (look out 90 days)     Jun 29, 2018  7:20 AM CDT   SHORT [facility]rec[facility] DO   [facility]    [address] [address]   [phone]

## 2018-06-28 RX ORDER — GABAPENTIN 300 MG/1
600 CAPSULE ORAL 3 TIMES DAILY
Qty: 540 CAPSULE | Refills: 3 | Status: SHIPPED | OUTPATIENT
Start: 2018-06-28 | End: 2018-07-06

## 2018-06-28 RX ORDER — ALBUTEROL SULFATE 90 UG/1
2 AEROSOL, METERED RESPIRATORY (INHALATION) EVERY 4 HOURS PRN
Qty: 1 INHALER | Refills: 11 | Status: SHIPPED | OUTPATIENT
Start: 2018-06-28 | End: 2019-07-24

## 2018-06-28 NOTE — TELEPHONE ENCOUNTER
"Requested Prescriptions   Pending Prescriptions Disp Refills     gabapentin (NEURONTIN) 300 MG capsule 540 capsule 3     Sig: Take 2 capsules (600 mg) by mouth 3 times daily    There is no refill protocol information for this order        albuterol (VENTOLIN HFA) 108 (90 Base) MCG/ACT Inhaler 1 Inhaler 11     Sig: Inhale 2 puffs into the lungs every 4 hours as needed for shortness of breath / dyspnea or wheezing    Asthma Maintenance Inhalers - Anticholinergics Failed    6/26/2018  9:14 AM       Failed - Asthma control assessment score within normal limits in last 6 months    Please review ACT score.          Passed - Patient is age 12 years or older       Passed - Recent (6 mo) or future (30 days) visit within the authorizing provider's specialty    Patient had office visit in the last 6 months or has a visit in the next 30 days with authorizing provider or within the authorizing provider's specialty.  See \"Patient Info\" tab in inbasket, or \"Choose Columns\" in Meds & Orders section of the refill encounter.            ACT Total Scores 4/11/2018 5/22/2018 6/25/2018   ACT TOTAL SCORE (Goal Greater than or Equal to 20) 7 15 13   In the past 12 months, how many times did you visit the emergency room for your asthma without being admitted to the hospital? 0 0 0   In the past 12 months, how many times were you hospitalized overnight because of your asthma? 0 0 0       Routing refill request to provider for review/approval because:  ACT not at goal. Medication not on [facility] protocol     Nataly SANTILLAN RN          "

## 2018-07-06 ENCOUNTER — OFFICE VISIT (OUTPATIENT)
Dept: FAMILY MEDICINE | Facility: CLINIC | Age: 33
End: 2018-07-06
Payer: MEDICARE

## 2018-07-06 VITALS
SYSTOLIC BLOOD PRESSURE: 122 MMHG | BODY MASS INDEX: 32.24 KG/M2 | HEART RATE: 97 BPM | OXYGEN SATURATION: 98 % | WEIGHT: 182 LBS | DIASTOLIC BLOOD PRESSURE: 84 MMHG | TEMPERATURE: 98.7 F

## 2018-07-06 DIAGNOSIS — K59.03 DRUG-INDUCED CONSTIPATION: ICD-10-CM

## 2018-07-06 DIAGNOSIS — M34.1 CREST (CALCINOSIS, RAYNAUD'S PHENOMENON, ESOPHAGEAL DYSFUNCTION, SCLERODACTYLY, TELANGIECTASIA) (H): ICD-10-CM

## 2018-07-06 DIAGNOSIS — K21.00 GASTROESOPHAGEAL REFLUX DISEASE WITH ESOPHAGITIS: ICD-10-CM

## 2018-07-06 DIAGNOSIS — F41.1 GAD (GENERALIZED ANXIETY DISORDER): ICD-10-CM

## 2018-07-06 DIAGNOSIS — J45.50 SEVERE PERSISTENT ASTHMA WITHOUT COMPLICATION (H): ICD-10-CM

## 2018-07-06 DIAGNOSIS — F32.1 MODERATE MAJOR DEPRESSION (H): ICD-10-CM

## 2018-07-06 DIAGNOSIS — M34.9 LIMITED SYSTEMIC SCLEROSIS (H): ICD-10-CM

## 2018-07-06 DIAGNOSIS — G89.4 CHRONIC PAIN SYNDROME: Primary | Chronic | ICD-10-CM

## 2018-07-06 PROBLEM — N10 ACUTE PYELONEPHRITIS: Status: RESOLVED | Noted: 2017-06-09 | Resolved: 2018-07-06

## 2018-07-06 PROBLEM — A41.9 SEPSIS (H): Status: RESOLVED | Noted: 2017-06-08 | Resolved: 2018-07-06

## 2018-07-06 PROBLEM — R41.82 ALTERED MENTAL STATE: Status: RESOLVED | Noted: 2018-03-29 | Resolved: 2018-07-06

## 2018-07-06 PROBLEM — R55 SYNCOPE: Status: RESOLVED | Noted: 2018-03-29 | Resolved: 2018-07-06

## 2018-07-06 PROBLEM — J45.40 MODERATE PERSISTENT ASTHMA WITHOUT COMPLICATION: Status: RESOLVED | Noted: 2017-01-02 | Resolved: 2018-07-06

## 2018-07-06 LAB
AMPHETAMINES UR QL: NOT DETECTED NG/ML
BARBITURATES UR QL SCN: NOT DETECTED NG/ML
BENZODIAZ UR QL SCN: ABNORMAL NG/ML
BUPRENORPHINE UR QL: NOT DETECTED NG/ML
CANNABINOIDS UR QL: NOT DETECTED NG/ML
COCAINE UR QL SCN: NOT DETECTED NG/ML
D-METHAMPHET UR QL: NOT DETECTED NG/ML
METHADONE UR QL SCN: NOT DETECTED NG/ML
OPIATES UR QL SCN: NOT DETECTED NG/ML
OXYCODONE UR QL SCN: ABNORMAL NG/ML
PCP UR QL SCN: NOT DETECTED NG/ML
PROPOXYPH UR QL: NOT DETECTED NG/ML
TRICYCLICS UR QL SCN: NOT DETECTED NG/ML

## 2018-07-06 PROCEDURE — 80306 DRUG TEST PRSMV INSTRMNT: CPT | Performed by: INTERNAL MEDICINE

## 2018-07-06 PROCEDURE — 99215 OFFICE O/P EST HI 40 MIN: CPT | Performed by: INTERNAL MEDICINE

## 2018-07-06 RX ORDER — LISINOPRIL 5 MG/1
5 TABLET ORAL DAILY
Qty: 90 TABLET | Refills: 3 | Status: SHIPPED | OUTPATIENT
Start: 2018-07-06 | End: 2019-01-22

## 2018-07-06 RX ORDER — OXYCODONE HYDROCHLORIDE 15 MG/1
15 TABLET ORAL EVERY 4 HOURS PRN
Qty: 120 TABLET | Refills: 0 | Status: SHIPPED | OUTPATIENT
Start: 2018-07-11 | End: 2018-08-10

## 2018-07-06 RX ORDER — LORAZEPAM 1 MG/1
1 TABLET ORAL DAILY PRN
Qty: 15 TABLET | Refills: 5 | Status: SHIPPED | OUTPATIENT
Start: 2018-08-10 | End: 2019-01-17

## 2018-07-06 RX ORDER — OXYCODONE HYDROCHLORIDE 15 MG/1
15 TABLET ORAL EVERY 4 HOURS PRN
Qty: 120 TABLET | Refills: 0 | Status: SHIPPED | OUTPATIENT
Start: 2018-08-10 | End: 2018-09-09

## 2018-07-06 RX ORDER — LORAZEPAM 0.5 MG/1
TABLET ORAL
Qty: 35 TABLET | Refills: 0 | Status: SHIPPED | OUTPATIENT
Start: 2018-07-06 | End: 2018-07-06

## 2018-07-06 RX ORDER — CITALOPRAM HYDROBROMIDE 40 MG/1
40 TABLET ORAL DAILY
Qty: 90 TABLET | Refills: 3 | Status: SHIPPED | OUTPATIENT
Start: 2018-07-06 | End: 2019-09-06

## 2018-07-06 RX ORDER — BUSPIRONE HYDROCHLORIDE 30 MG/1
30 TABLET ORAL 2 TIMES DAILY
Qty: 180 TABLET | Refills: 3 | Status: SHIPPED | OUTPATIENT
Start: 2018-07-06 | End: 2019-05-02

## 2018-07-06 RX ORDER — MONTELUKAST SODIUM 10 MG/1
10 TABLET ORAL AT BEDTIME
Qty: 90 TABLET | Refills: 3 | Status: SHIPPED | OUTPATIENT
Start: 2018-07-06 | End: 2019-07-24

## 2018-07-06 RX ORDER — LORAZEPAM 1 MG/1
TABLET ORAL
Qty: 35 TABLET | Refills: 0 | Status: SHIPPED | OUTPATIENT
Start: 2018-07-06 | End: 2018-10-16

## 2018-07-06 RX ORDER — GABAPENTIN 300 MG/1
600 CAPSULE ORAL 3 TIMES DAILY
Qty: 540 CAPSULE | Refills: 3 | Status: SHIPPED | OUTPATIENT
Start: 2018-07-06 | End: 2019-07-24

## 2018-07-06 RX ORDER — POLYETHYLENE GLYCOL 3350 17 G/17G
1 POWDER, FOR SOLUTION ORAL DAILY
Qty: 510 G | Refills: 11 | Status: SHIPPED | OUTPATIENT
Start: 2018-07-06 | End: 2020-03-25

## 2018-07-06 RX ORDER — OXYCODONE HYDROCHLORIDE 15 MG/1
15 TABLET ORAL EVERY 4 HOURS PRN
Qty: 120 TABLET | Refills: 0 | Status: SHIPPED | OUTPATIENT
Start: 2018-09-09 | End: 2018-10-09

## 2018-07-06 NOTE — LETTER
My Depression Action Plan  Name: Molly Teague   Date of Birth 1985  Date: 7/6/2018    My doctor: Grecia Barba   My clinic: [facility]  [address] [facility] [address] [phone]          GREEN    ZONE   Good Control    What it looks like:     Things are going generally well. You have normal up s and down s. You may even feel depressed from time to time, but bad moods usually last less than a day.   What you need to do:  1. Continue to care for yourself (see self care plan)  2. Check your depression survival kit and update it as needed  3. Follow your physician s recommendations including any medication.  4. Do not stop taking medication unless you consult with your physician first.           YELLOW         ZONE Getting Worse    What it looks like:     Depression is starting to interfere with your life.     It may be hard to get out of bed; you may be starting to isolate yourself from others.    Symptoms of depression are starting to last most all day and this has happened for several days.     You may have suicidal thoughts but they are not constant.   What you need to do:     1. Call your care team, your response to treatment will improve if you keep your care team informed of your progress. Yellow periods are signs an adjustment may need to be made.     2. Continue your self-care, even if you have to fake it!    3. Talk to someone in your support network    4. Open up your depression survival kit           RED    ZONE Medical Alert - Get Help    What it looks like:     Depression is seriously interfering with your life.     You may experience these or other symptoms: You can t get out of bed most days, can t work or engage in other necessary activities, you have trouble taking care of basic hygiene, or basic responsibilities, thoughts of suicide or death that will not go away, self-injurious behavior.     What you need to do:  1. Call your care team and  request a same-day appointment. If they are not available (weekends or after hours) call your local crisis line, emergency room or 911.            Depression Self Care Plan / Survival Kit    Self-Care for Depression  Here s the deal. Your body and mind are really not as separate as most people think.  What you do and think affects how you feel and how you feel influences what you do and think. This means if you do things that people who feel good do, it will help you feel better.  Sometimes this is all it takes.  There is also a place for medication and therapy depending on how severe your depression is, so be sure to consult with your medical provider and/ or Behavioral Health Consultant if your symptoms are worsening or not improving.     In order to better manage my stress, I will:    Exercise  Get some form of exercise, every day. This will help reduce pain and release endorphins, the  feel good  chemicals in your brain. This is almost as good as taking antidepressants!  This is not the same as joining a gym and then never going! (they count on that by the way ) It can be as simple as just going for a walk or doing some gardening, anything that will get you moving.      Hygiene   Maintain good hygiene (Get out of bed in the morning, Make your bed, Brush your teeth, Take a shower, and Get dressed like you were going to work, even if you are unemployed).  If your clothes don't fit try to get ones that do.    Diet  I will strive to eat foods that are good for me, drink plenty of water, and avoid excessive sugar, caffeine, alcohol, and other mood-altering substances.  Some foods that are helpful in depression are: complex carbohydrates, B vitamins, flaxseed, fish or fish oil, fresh fruits and vegetables.    Psychotherapy  I agree to participate in Individual Therapy (if recommended).    Medication  If prescribed medications, I agree to take them.  Missing doses can result in serious side effects.  I understand that  drinking alcohol, or other illicit drug use, may cause potential side effects.  I will not stop my medication abruptly without first discussing it with my provider.    Staying Connected With Others  I will stay in touch with my friends, family members, and my primary care provider/team.    Use your imagination  Be creative.  We all have a creative side; it doesn t matter if it s oil painting, sand castles, or mud pies! This will also kick up the endorphins.    Witness Beauty  (AKA stop and smell the roses) Take a look outside, even in mid-winter. Notice colors, textures. Watch the squirrels and birds.     Service to others  Be of service to others.  There is always someone else in need.  By helping others we can  get out of ourselves  and remember the really important things.  This also provides opportunities for practicing all the other parts of the program.    Humor  Laugh and be silly!  Adjust your TV habits for less news and crime-drama and more comedy.    Control your stress  Try breathing deep, massage therapy, biofeedback, and meditation. Find time to relax each day.     My support system    Clinic Contact:  Phone number:    Contact 1:  Phone number:    Contact 2:  Phone number:    Orthodoxy/:  Phone number:    Therapist:  Phone number:    Local crisis center:    Phone number:    Other community support:  Phone number:

## 2018-07-06 NOTE — MR AVS SNAPSHOT
After Visit Summary   7/6/2018    Molly Teague    MRN: [id number]           Patient Information     Date Of Birth          1985        Visit Information        Provider Department      7/6/2018 7:00 AM Grecia Barba,  [facility]        Today's Diagnoses     REX (generalized anxiety disorder)    -  1    Limited systemic sclerosis (H)        CREST (calcinosis, Raynaud's phenomenon, esophageal dysfunction, sclerodactyly, telangiectasia) (H)        Chronic pain syndrome        Drug-induced constipation        Gastroesophageal reflux disease with esophagitis        Moderate major depression (H)        Severe persistent asthma without complication          Care Instructions    1. Recommend to see Psychiatry and Psychology since your anxiety is not well controlled on 3 meds.  2. Order placed for Narcan in case of accidental OD - for safety.  Your significant other should be taught how to use by pharmacist  3. Start Singulair  4. Taper down on the Ativan.  5. Refill of oxycodone given  6. Urine test today        [facility] in [place] - [phone].    [facility]  [address] [place], [place] [address] [phone] [address] [address] [address] [address] [facility] - psychiatrist [place] [address]    [facility] - has psychiatrist  [address] [facility]  [address]  [place], [place] [address] [phone]     [facility] - has psychiatrist   [phone]  [facility] - has psychiatrist   Main: [phone] [address] [place] [address]    [facility] - has psychiatrist  [address]  [address]  [place], [place] [id number] [phone]     [facility] - has psychiatrist   [address] [place], [place] [address] [phone]    [facility] - has psychiatrist  [address] [address]  Phone [phone]  [facility] - has psychiatrist  [address] [facility] [place] [address]   [phone]    [facility] - has psychiatrist  [address] [facility] [address], [place] [address]  [address] [facility], [place] [address]  [address] [place] [address]  Phone: [phone]    [facility]  [place] Office  [address] [facility] [address], [address]  [place], [place] [address]  Phone: [phone]  Fax: [phone]     [place] Office  [address] [address]  Phone: [phone]  Fax: [phone]     [place] Office  [address]  [address] [place], [place] [address]  Phone: [phone]  Fax: [phone]       Or - contact your insurance for a list of available covered providers in the area.              Follow-ups after your visit        Who to contact     If you have questions or need follow up information about today's clinic visit or your schedule please contact [facility] directly at [phone].  Normal or non-critical lab and imaging results will be communicated to you by MyChart, letter or phone within 4 business days after the clinic has received the results. If you do not hear from us within 7 days, please contact the clinic through MyChart or phone. If you have a critical or abnormal lab result, we will notify you by phone as soon as possible.  Submit refill requests through [employer] or call your pharmacy and they will forward the refill request to us. Please allow 3 business days for your refill to be completed.          Additional Information About Your Visit        [employer] Information     [employer] lets you send messages to your doctor, view your test results, renew your prescriptions, schedule appointments and more. To sign up, go to [email or web address] . Click on \"Log in\" on the left side of the screen, which will take you to the " "Welcome page. Then click on \"Sign up Now\" on the right side of the page.     You will be asked to enter the access code listed below, as well as some personal information. Please follow the directions to create your username and password.     Your access code is: [email or web address]  Expires: 10/4/2018  6:59 AM     Your access code will  in 90 days. If you need help or a new code, please call your [facility] clinic or [phone].        Care EveryWhere ID     This is your Care EveryWhere ID. This could be used by other organizations to access your [facility] medical records  [id number]        Your Vitals Were     Pulse Temperature Pulse Oximetry Breastfeeding? BMI (Body Mass Index)       97 98.7  F (37.1  C) (Tympanic) 98% No 32.24 kg/m2        Blood Pressure from Last 3 Encounters:   18 122/84   18 134/86   18 111/72    Weight from Last 3 Encounters:   18 182 lb (82.6 kg)   18 176 lb (79.8 kg)   18 176 lb 12.9 oz (80.2 kg)              We Performed the Following     Asthma Action Plan (AAP)     DEPRESSION ACTION PLAN (DAP)     Urine Drugs of Abuse Screen Panel 13          Today's Medication Changes          These changes are accurate as of 18  7:52 AM.  If you have any questions, ask your nurse or doctor.               Start taking these medicines.        Dose/Directions    montelukast 10 MG tablet   Commonly known as:  SINGULAIR   Used for:  Severe persistent asthma without complication   Started by:  Grecia Barba DO        Dose:  10 mg   Take 1 tablet (10 mg) by mouth At Bedtime   Quantity:  90 tablet   Refills:  3       naloxone nasal spray   Commonly known as:  NARCAN   Used for:  Chronic pain syndrome   Started by:  Grecia Barba DO        Dose:  4 mg   Spray 1 spray (4 mg) into one nostril alternating nostrils as needed for opioid reversal every 2-3 minutes until assistance arrives   Quantity:  0.2 mL   Refills:  3         These medicines have changed or " have updated prescriptions.        Dose/Directions    * LORazepam 0.5 MG tablet   Commonly known as:  ATIVAN   This may have changed:    - medication strength  - how much to take  - how to take this  - when to take this  - reasons to take this  - additional instructions   Used for:  REX (generalized anxiety disorder)   Changed by:  Grecia Barba DO        0.5 mg alternate with 1 mg twice daily x 2 week, then 1 mg daily x 1 week, then 1 mg daily as needed.   Quantity:  35 tablet   Refills:  0       * LORazepam 1 MG tablet   Commonly known as:  ATIVAN   This may have changed:  You were already taking a medication with the same name, and this prescription was added. Make sure you understand how and when to take each.   Used for:  REX (generalized anxiety disorder)   Changed by:  Grecia Barba DO        Dose:  1 mg   Start taking on:  8/10/2018   Take 1 tablet (1 mg) by mouth daily as needed for anxiety #15 to last 30 days   Quantity:  15 tablet   Refills:  5       * oxyCODONE IR 15 MG tablet   Commonly known as:  ROXICODONE   This may have changed:    - Another medication with the same name was added. Make sure you understand how and when to take each.  - Another medication with the same name was changed. Make sure you understand how and when to take each.   Used for:  Chronic pain syndrome   Changed by:  Grecia Barba DO        Dose:  15 mg   Take 1 tablet (15 mg) by mouth every 4 hours as needed for severe pain   Quantity:  120 tablet   Refills:  0       * oxyCODONE IR 15 MG tablet   Commonly known as:  ROXICODONE   This may have changed:  These instructions start on 7/11/2018. If you are unsure what to do until then, ask your doctor or other care provider.   Used for:  Chronic pain syndrome   Changed by:  Grecia Barba DO        Dose:  15 mg   Start taking on:  7/11/2018   Take 1 tablet (15 mg) by mouth every 4 hours as needed for pain   Quantity:  120 tablet   Refills:  0       *  oxyCODONE IR 15 MG tablet   Commonly known as:  ROXICODONE   This may have changed:  You were already taking a medication with the same name, and this prescription was added. Make sure you understand how and when to take each.   Used for:  Chronic pain syndrome   Changed by:  Grecia Barba DO        Dose:  15 mg   Start taking on:  8/10/2018   Take 1 tablet (15 mg) by mouth every 4 hours as needed for severe pain   Quantity:  120 tablet   Refills:  0       * oxyCODONE IR 15 MG tablet   Commonly known as:  ROXICODONE   This may have changed:  You were already taking a medication with the same name, and this prescription was added. Make sure you understand how and when to take each.   Used for:  Chronic pain syndrome   Changed by:  Grecia Barba DO        Dose:  15 mg   Start taking on:  9/9/2018   Take 1 tablet (15 mg) by mouth every 4 hours as needed for pain   Quantity:  120 tablet   Refills:  0       sucralfate 1 GM tablet   Commonly known as:  CARAFATE   This may have changed:    - when to take this  - reasons to take this   Used for:  Limited systemic sclerosis (H)        Dose:  1 g   Take 1 tablet (1 g) by mouth 4 times daily   Quantity:  120 tablet   Refills:  11       * Notice:  This list has 6 medication(s) that are the same as other medications prescribed for you. Read the directions carefully, and ask your doctor or other care provider to review them with you.         Where to get your medicines      These medications were sent to [facility] [place], [place] - [address] [facility]  [address] [facility] [address]     Phone:  [phone]     BusPIRone HCl 30 MG Tabs    citalopram 40 MG tablet    gabapentin 300 MG capsule    lisinopril 5 MG tablet    montelukast 10 MG tablet    naloxone nasal spray    polyethylene glycol powder    ranitidine 150 MG tablet         Some of these will need a paper prescription and others can be bought over the counter.  Ask your nurse if you have  questions.     Bring a paper prescription for each of these medications     LORazepam 0.5 MG tablet    LORazepam 1 MG tablet    oxyCODONE IR 15 MG tablet    oxyCODONE IR 15 MG tablet    oxyCODONE IR 15 MG tablet               Information about OPIOIDS     PRESCRIPTION OPIOIDS: WHAT YOU NEED TO KNOW   We gave you an opioid (narcotic) pain medicine. It is important to manage your pain, but opioids are not always the best choice. You should first try all the other options your care team gave you. Take this medicine for as short a time (and as few doses) as possible.     These medicines have risks:    DO NOT drive when on new or higher doses of pain medicine. These medicines can affect your alertness and reaction times, and you could be arrested for driving under the influence (DUI). If you need to use opioids long-term, talk to your care team about driving.    DO NOT operate heave machinery    DO NOT do any other dangerous activities while taking these medicines.     DO NOT drink any alcohol while taking these medicines.      If the opioid prescribed includes acetaminophen, DO NOT take with any other medicines that contain acetaminophen. Read all labels carefully. Look for the word  acetaminophen  or  Tylenol.  Ask your pharmacist if you have questions or are unsure.    You can get addicted to pain medicines, especially if you have a history of addiction (chemical, alcohol or substance dependence). Talk to your care team about ways to reduce this risk.    Store your pills in a secure place, locked if possible. We will not replace any lost or stolen medicine. If you don t finish your medicine, please throw away (dispose) as directed by your pharmacist. The [place] Pollution Control Agency has more information about safe disposal: https://www.pca.[facility].[place].us/living-green/managing-unwanted-medications.     All opioids tend to cause constipation. Drink plenty of water and eat foods that have a lot of fiber, such as  fruits, vegetables, prune juice, apple juice and high-fiber cereal. Take a laxative (Miralax, milk of magnesia, Colace, Senna) if you don t move your bowels at least every other day.          Primary Care Provider Office Phone # Fax #    Grecia Barba [phone] [address] [facility] [address]        Equal Access to Services     PARISH COOK : Hadii aad ku hadasho Soomaali, waaxda luqadaha, qaybta kaalmada adeegyada, waxay idiin hayaan adeeg sarangeorgimarcy lacinthia . So [facility] [phone].    ATENCIÓN: Si habla español, tiene a bradley disposición servicios gratuitos de asistencia lingüística. Llame al [phone].    We comply with applicable federal civil rights laws and [place] laws. We do not discriminate on the basis of race, color, national origin, age, disability, sex, sexual orientation, or gender identity.            Thank you!     Thank you for choosing [facility]  for your care. Our goal is always to provide you with excellent care. Hearing back from our patients is one way we can continue to improve our services. Please take a few minutes to complete the written survey that you may receive in the mail after your visit with us. Thank you!             Your Updated Medication List - Protect others around you: Learn how to safely use, store and throw away your medicines at www.disposemymeds.org.          This list is accurate as of 7/6/18  7:52 AM.  Always use your most recent med list.                   Brand Name Dispense Instructions for use Diagnosis    albuterol 108 (90 Base) MCG/ACT Inhaler    VENTOLIN HFA    1 Inhaler    Inhale 2 puffs into the lungs every 4 hours as needed for shortness of breath / dyspnea or wheezing    Moderate persistent asthma without complication       B-12 1000 MCG Tbcr     100 tablet    Take 1,000 mcg by mouth daily    Vitamin B12 deficiency (non anemic)       BENADRYL 25 MG tablet   Generic drug:  diphenhydrAMINE     56 tablet    Take 1  tablet (25 mg) by mouth every 6 hours as needed for itching or allergies        blood glucose lancets standard    no brand specified    100 each    Use to test blood sugar 1 time daily    Hypoglycemia       blood glucose monitoring test strip    no brand specified    100 each    Use to test blood sugar 1 time daily    Hypoglycemia       budesonide-formoterol 160-4.5 MCG/ACT Inhaler    SYMBICORT    30.6 g    INHALE TWO PUFFS BY MOUTH TWICE A DAY    Moderate persistent asthma without complication       BusPIRone HCl 30 MG Tabs     180 tablet    Take 30 mg by mouth 2 times daily    REX (generalized anxiety disorder)       citalopram 40 MG tablet    celeXA    90 tablet    Take 1 tablet (40 mg) by mouth daily    REX (generalized anxiety disorder)       ferrous gluconate 324 (38 Fe) MG tablet    FERGON    100 tablet    TAKE ONE TABLET BY MOUTH EVERY DAY WITH BREAKFAST    CREST (calcinosis, Raynaud's phenomenon, esophageal dysfunction, sclerodactyly, telangiectasia) (H)       gabapentin 300 MG capsule    NEURONTIN    540 capsule    Take 2 capsules (600 mg) by mouth 3 times daily    Limited systemic sclerosis (H)       GOODSENSE MIGRAINE FORMULA 250-250-65 MG per tablet   Generic drug:  aspirin-acetaminophen-caffeine     24 tablet    TAKE ONE TABLET BY MOUTH EVERY 6 HOURS AS NEEDED FOR HEADACHES    Chronic daily headache       lisinopril 5 MG tablet    PRINIVIL/ZESTRIL    90 tablet    Take 1 tablet (5 mg) by mouth daily    CREST (calcinosis, Raynaud's phenomenon, esophageal dysfunction, sclerodactyly, telangiectasia) (H)       * LORazepam 0.5 MG tablet    ATIVAN    35 tablet    0.5 mg alternate with 1 mg twice daily x 2 week, then 1 mg daily x 1 week, then 1 mg daily as needed.    REX (generalized anxiety disorder)       * LORazepam 1 MG tablet   Start taking on:  8/10/2018    ATIVAN    15 tablet    Take 1 tablet (1 mg) by mouth daily as needed for anxiety #15 to last 30 days    REX (generalized anxiety disorder)        montelukast 10 MG tablet    SINGULAIR    90 tablet    Take 1 tablet (10 mg) by mouth At Bedtime    Severe persistent asthma without complication       naloxone nasal spray    NARCAN    0.2 mL    Spray 1 spray (4 mg) into one nostril alternating nostrils as needed for opioid reversal every 2-3 minutes until assistance arrives    Chronic pain syndrome       omeprazole 40 MG capsule    priLOSEC    180 capsule    Take 1 capsule (40 mg) by mouth 2 times daily    CREST (calcinosis, Raynaud's phenomenon, esophageal dysfunction, sclerodactyly, telangiectasia) (H), Gastroesophageal reflux disease with esophagitis       * oxyCODONE IR 15 MG tablet    ROXICODONE    120 tablet    Take 1 tablet (15 mg) by mouth every 4 hours as needed for severe pain    Chronic pain syndrome       * oxyCODONE IR 15 MG tablet   Start taking on:  7/11/2018    ROXICODONE    120 tablet    Take 1 tablet (15 mg) by mouth every 4 hours as needed for pain    Chronic pain syndrome       * oxyCODONE IR 15 MG tablet   Start taking on:  8/10/2018    ROXICODONE    120 tablet    Take 1 tablet (15 mg) by mouth every 4 hours as needed for severe pain    Chronic pain syndrome       * oxyCODONE IR 15 MG tablet   Start taking on:  9/9/2018    ROXICODONE    120 tablet    Take 1 tablet (15 mg) by mouth every 4 hours as needed for pain    Chronic pain syndrome       polyethylene glycol powder    MIRALAX    510 g    Take 17 g (1 capful) by mouth daily    Drug-induced constipation       promethazine 25 MG/ML IV injection    PHENERGAN    180 mL    Inject 1 mL (25 mg) into the vein every 6 hours as needed for nausea or vomiting Inject IM    Gastroesophageal reflux disease with esophagitis       ranitidine 150 MG tablet    ZANTAC    180 tablet    Take 1 tablet (150 mg) by mouth 2 times daily as needed for heartburn    CREST (calcinosis, Raynaud's phenomenon, esophageal dysfunction, sclerodactyly, telangiectasia) (H), Gastroesophageal reflux disease with esophagitis        sucralfate 1 GM tablet    CARAFATE    120 tablet    Take 1 tablet (1 g) by mouth 4 times daily    Limited systemic sclerosis (H)       * Notice:  This list has 6 medication(s) that are the same as other medications prescribed for you. Read the directions carefully, and ask your doctor or other care provider to review them with you.

## 2018-07-06 NOTE — PATIENT INSTRUCTIONS
1. Recommend to see Psychiatry and Psychology since your anxiety is not well controlled on 3 meds.  2. Order placed for Narcan in case of accidental OD - for safety.  Your significant other should be taught how to use by pharmacist  3. Start Singulair  4. Taper down on the Ativan.  5. Refill of oxycodone given  6. Urine test today        [facility] in [place] - [phone].    [facility]  [address] [place], [place] [address] [phone] [address] [place] [address] [address] [facility] - psychiatrist [place] [address]    [facility] - has psychiatrist  [address]  [address]  [place], [place] [id number] [phone]     [facility] - has psychiatrist   [phone]  [facility] - has psychiatrist   Main: [phone] [address] [place] [address]    [facility] - has psychiatrist  [address]  [address]  [place], [place] [id number] [phone]     [facility] - has psychiatrist   [address] [place], [place] [address] [phone]    [facility] - has psychiatrist  [address] [place] [address]  Phone [phone]  [facility] - has psychiatrist  [address] [place], [place] [id number] [phone]    [facility] - has psychiatrist  [address], [place] [address]  [address] [place] [address]  [address] [place] [address]  Phone: [phone]    [facility]  [place] Office  [address]  [place], [place] [address]  Phone: [phone]  Fax: [phone]     [place] Office  [address] [address]  Phone: [phone]  Fax: [phone]     [place] [facility]  [address]  [address] [place], [place]  [address]  Phone: [phone]  Fax: [phone]       Or - contact your insurance for a list of available covered providers in the area.

## 2018-07-06 NOTE — PROGRESS NOTES
SUBJECTIVE:   Molly Teague is a 32 year old female who presents to clinic today for the following health issues:      PT signed a VIDA for the [facility] in [place] to sent the records to us.    Chief Complaint   Patient presents with     Anxiety     Follow up.     Other     Crest follow up     Limited Systemic sclerosis     Follow up.       Anxiety Follow-Up    Status since last visit: Improved with the new dose of medication    Other associated symptoms:None    Complicating factors:   Significant life event: No   Current substance abuse: None  Depression symptoms: Yes-  sometimes  REX-7 SCORE 3/23/2018 4/15/2018 4/20/2018   Total Score 10 19 6       REX-7    Amount of exercise or physical activity: 4-5 days/week for an average of 30-45 minutes    Problems taking medications regularly: No    Medication side effects: Yes - Diarrhea, and nauseas not sure from which med.    Diet: regular (no restrictions)    She reports increase in stress/business of life - son appointment, grandmother illness, spouse working more, etc.    Asthma: feels not as well controlled as it could be - wheezing occasionally, even at rest.  --symbicort 2 puffs BID  --albuterol inhaler 2-3 x day.  --can't see she has been on Singulair.  She does have seasonal allergies.      GERD:  Not using sucralfate regularly.  Has severe symptoms at bedtime.  Has regurgitation of food overnight, stops eating 5 hours prior to bedtime, small meal.  She is working on losing weight.          Current Outpatient Prescriptions   Medication Sig Dispense Refill     albuterol (VENTOLIN HFA) 108 (90 Base) MCG/ACT Inhaler Inhale 2 puffs into the lungs every 4 hours as needed for shortness of breath / dyspnea or wheezing 1 Inhaler 11     blood glucose (NO BRAND SPECIFIED) lancets standard Use to test blood sugar 1 time daily 100 each 3     blood glucose monitoring (NO BRAND SPECIFIED) test strip Use to test blood sugar 1 time daily 100 each 3      budesonide-formoterol (SYMBICORT) 160-4.5 MCG/ACT Inhaler INHALE TWO PUFFS BY MOUTH TWICE A DAY 30.6 g 0     busPIRone 30 MG TABS Take 30 mg by mouth 2 times daily 60 tablet 3     citalopram (CELEXA) 40 MG tablet Take 1 tablet (40 mg) by mouth daily 90 tablet 3     Cyanocobalamin (B-12) 1000 MCG TBCR Take 1,000 mcg by mouth daily 100 tablet 1     diphenhydrAMINE (BENADRYL) 25 MG tablet Take 1 tablet (25 mg) by mouth every 6 hours as needed for itching or allergies 56 tablet      ferrous gluconate (FERGON) 324 (38 FE) MG tablet TAKE ONE TABLET BY MOUTH EVERY DAY WITH BREAKFAST 100 tablet 3     gabapentin (NEURONTIN) 300 MG capsule Take 2 capsules (600 mg) by mouth 3 times daily 540 capsule 3     GOODSENSE MIGRAINE FORMULA 250-250-65 MG per tablet TAKE ONE TABLET BY MOUTH EVERY 6 HOURS AS NEEDED FOR HEADACHES 24 tablet 1     lisinopril (PRINIVIL/ZESTRIL) 5 MG tablet Take 1 tablet (5 mg) by mouth daily 90 tablet 3     LORazepam (ATIVAN) 1 MG tablet Take 1 tablet (1 mg) by mouth 2 times daily as needed for anxiety 60 tablet 2     omeprazole (PRILOSEC) 40 MG capsule Take 1 capsule (40 mg) by mouth 2 times daily 180 capsule 3     oxyCODONE IR (ROXICODONE) 15 MG tablet Take 1 tablet (15 mg) by mouth every 4 hours as needed for pain 120 tablet 0     polyethylene glycol (MIRALAX) powder Take 17 g (1 capful) by mouth daily 510 g 1     ranitidine (ZANTAC) 150 MG tablet Take 1 tablet (150 mg) by mouth 2 times daily as needed for heartburn 180 tablet 3     sucralfate (CARAFATE) 1 GM tablet Take 1 tablet (1 g) by mouth 4 times daily (Patient taking differently: Take 1 g by mouth 4 times daily as needed ) 120 tablet 11     oxyCODONE IR (ROXICODONE) 15 MG tablet Take 1 tablet (15 mg) by mouth every 4 hours as needed for severe pain (Patient not taking: Reported on 7/6/2018) 120 tablet 0     promethazine (PHENERGAN) 25 MG/ML IV injection Inject 1 mL (25 mg) into the vein every 6 hours as needed for nausea or vomiting Inject IM  (Patient not taking: Reported on 7/6/2018) 180 mL 11       Reviewed and updated as needed this visit by clinical staff  Tobacco  Allergies  Meds  Med Hx  Surg Hx  Fam Hx  Soc Hx      Reviewed and updated as needed this visit by Provider         ROS:  Constitutional, HEENT, cardiovascular, pulmonary, GI, , musculoskeletal, neuro, skin, endocrine and psych systems are negative, except as otherwise noted.    OBJECTIVE:     /84 (BP Location: Right arm, Patient Position: Sitting, Cuff Size: Adult Large)  Pulse 97  Temp 98.7  F (37.1  C) (Tympanic)  Wt 182 lb (82.6 kg)  SpO2 98%  Breastfeeding? No  BMI 32.24 kg/m2  Body mass index is 32.24 kg/(m^2).  GENERAL APPEARANCE: alert, no distress and over weight  RESP: lungs clear to auscultation - no rales, rhonchi or wheezes  CV: regular rates and rhythm, normal S1 S2, no S3 or S4 and no murmur, click or rub    Diagnostic Test Results:  Results for orders placed or performed in visit on 07/06/18 (from the past 24 hour(s))   Urine Drugs of Abuse Screen Panel 13   Result Value Ref Range    Cannabinoids (55-mes-6-carboxy-9-THC) Not Detected NDET^Not Detected ng/mL    Phencyclidine (Phencyclidine) Not Detected NDET^Not Detected ng/mL    Cocaine (Benzoylecgonine) Not Detected NDET^Not Detected ng/mL    Methamphetamine (d-Methamphetamine) Not Detected NDET^Not Detected ng/mL    Opiates (Morphine) Not Detected NDET^Not Detected ng/mL    Amphetamine (d-Amphetamine) Not Detected NDET^Not Detected ng/mL    Benzodiazepines (Nordiazepam) Detected, Abnormal Result (A) NDET^Not Detected ng/mL    Tricyclic Antidepressants (Desipramine) Not Detected NDET^Not Detected ng/mL    Methadone (Methadone) Not Detected NDET^Not Detected ng/mL    Barbiturates (Butalbital) Not Detected NDET^Not Detected ng/mL    Oxycodone (Oxycodone) Detected, Abnormal Result (A) NDET^Not Detected ng/mL    Propoxyphene (Norpropoxyphene) Not Detected NDET^Not Detected ng/mL    Buprenorphine  (Buprenorphine) Not Detected NDET^Not Detected ng/mL       ASSESSMENT/PLAN:       1. Chronic pain syndrome stable use.  No concern for misuse or abuse. -Her morphine daily equivalents is on the upper end at 135.  Discussed use of Narcan with patient, and order this.  Advised her to have her  taught how to use this.  Return to clinic for next refill on or before October 9  - oxyCODONE IR (ROXICODONE) 15 MG tablet; Take 1 tablet (15 mg) by mouth every 4 hours as needed for pain  Dispense: 120 tablet; Refill: 0  - naloxone (NARCAN) nasal spray; Spray 1 spray (4 mg) into one nostril alternating nostrils as needed for opioid reversal every 2-3 minutes until assistance arrives  Dispense: 0.2 mL; Refill: 3  - oxyCODONE IR (ROXICODONE) 15 MG tablet; Take 1 tablet (15 mg) by mouth every 4 hours as needed for severe pain  Dispense: 120 tablet; Refill: 0  - oxyCODONE IR (ROXICODONE) 15 MG tablet; Take 1 tablet (15 mg) by mouth every 4 hours as needed for pain  Dispense: 120 tablet; Refill: 0  - Urine Drugs of Abuse Screen Panel 13    2. REX (generalized anxiety disorder) -somewhat better controlled.  Her regular use of moderate doses of Lorazepam, along with her higher dosing of oxycodone is concerning.  Discussed decreasing the use of lorazepam, which she is agreeable to.  Long discussion about need for ongoing psychology and psychiatry.  Her anxiety is borderline to poorly controlled on high doses of 3 medications.  She is agreeable to taper as below is currently using Ativan 1 mg twice daily.  Will taper as below, and have the next prescription of 1 mg to fill in 30 days from now.  - BusPIRone HCl 30 MG TABS; Take 30 mg by mouth 2 times daily  Dispense: 180 tablet; Refill: 3  - citalopram (CELEXA) 40 MG tablet; Take 1 tablet (40 mg) by mouth daily  Dispense: 90 tablet; Refill: 3  - LORazepam (ATIVAN) 1 MG tablet; Take 1 tablet (1 mg) by mouth daily as needed for anxiety #15 to last 30 days  Dispense: 15 tablet;  Refill: 5  - LORazepam (ATIVAN) 1 MG tablet; 0.5 mg alternate with 1 mg twice daily x 2 week, then 1 mg daily x 1 week, then 1 mg daily as needed.  Dispense: 35 tablet; Refill: 0    3. Moderate major depression (H)  - DEPRESSION ACTION PLAN (DAP)    4. Severe persistent asthma without complication -On high dose ICS/LABA + frequent albuterol use.  Next allergy/pulmonary referral if remains uncontrolled  - Asthma Action Plan (AAP)  - montelukast (SINGULAIR) 10 MG tablet; Take 1 tablet (10 mg) by mouth At Bedtime  Dispense: 90 tablet; Refill: 3    5. Drug-induced constipation -stable, refill provided  - polyethylene glycol (MIRALAX) powder; Take 17 g (1 capful) by mouth daily  Dispense: 510 g; Refill: 11    6. Limited systemic sclerosis (H) stable, refill provided-   - gabapentin (NEURONTIN) 300 MG capsule; Take 2 capsules (600 mg) by mouth 3 times daily  Dispense: 540 capsule; Refill: 3    7. CREST (calcinosis, Raynaud's phenomenon, esophageal dysfunction, sclerodactyly, telangiectasia) (H) stable, refill provided -.  Discussed that she is due to [facility] her providers [facility]  - lisinopril (PRINIVIL/ZESTRIL) 5 MG tablet; Take 1 tablet (5 mg) by mouth daily  Dispense: 90 tablet; Refill: 3  - ranitidine (ZANTAC) 150 MG tablet; Take 1 tablet (150 mg) by mouth 2 times daily as needed for heartburn  Dispense: 180 tablet; Refill: 3    8. Gastroesophageal reflux disease with esophagitis -refill provided  - ranitidine (ZANTAC) 150 MG tablet; Take 1 tablet (150 mg) by mouth 2 times daily as needed for heartburn  Dispense: 180 tablet; Refill: 3      Greater than 40 minutes was spent face-to-face with the patient by the provider.  Greater than 50% was spent in counseling or coordinating care for this patient.      1. Recommend to see Psychiatry and Psychology since your anxiety is not well controlled on 3 meds.  2. Order placed for Narcan in case of accidental OD - for safety.  Your significant other should be taught how to use by  pharmacist  3. Start Singulair  4. Taper down on the Ativan.  5. Refill of oxycodone given  6. Urine test today    Grecia Barba, DO  [facility]

## 2018-07-06 NOTE — LETTER
My Asthma Action Plan  Name: Molly Teague   YOB: 1985  Date: 7/6/2018   My doctor: Grecia Barba, DO   My clinic: [facility]        My Control Medicine: Budesonide + formoterol (Symbicort) -  160/4.5 mcg 2 puffs twice daily  My Rescue Medicine: Albuterol (Proair/Ventolin/Proventil) inhaler as needed   My Asthma Severity: severe persistent  Avoid your asthma triggers:   upper respiratory infections  animal dander  aspirin  emotions  cold air  Gastric Reflux            GREEN ZONE   Good Control    I feel good    No cough or wheeze    Can work, sleep and play without asthma symptoms       Take your asthma control medicine every day.     1. If exercise triggers your asthma, take your rescue medication    15 minutes before exercise or sports, and    During exercise if you have asthma symptoms  2. Spacer to use with inhaler: If you have a spacer, make sure to use it with your inhaler             YELLOW ZONE Getting Worse  I have ANY of these:    I do not feel good    Cough or wheeze    Chest feels tight    Wake up at night   1. Keep taking your Green Zone medications  2. Start taking your rescue medicine:    every 20 minutes for up to 1 hour. Then every 4 hours for 24-48 hours.  3. If you stay in the Yellow Zone for more than 12-24 hours, contact your doctor.  4. If you do not return to the Green Zone in 12-24 hours or you get worse, start taking your oral steroid medicine if prescribed by your provider.           RED ZONE Medical Alert - Get Help  I have ANY of these:    I feel awful    Medicine is not helping    Breathing getting harder    Trouble walking or talking    Nose opens wide to breathe       1. Take your rescue medicine NOW  2. If your provider has prescribed an oral steroid medicine, start taking it NOW  3. Call your doctor NOW  4. If you are still in the Red Zone after 20 minutes and you have not reached your doctor:    Take your rescue medicine again and    Call 911 or  go to the emergency room right away    See your regular doctor within 2 weeks of an Emergency Room or Urgent Care visit for follow-up treatment.          Annual Reminders:  Meet with Asthma Educator,  Flu Shot in the Fall, consider Pneumonia Vaccination for patients with asthma (aged 19 and older).    Pharm[place] [facility] [place] [address]                      Asthma Triggers  How To Control Things That Make Your Asthma Worse    Triggers are things that make your asthma worse.  Look at the list below to help you find your triggers and what you can do about them.  You can help prevent asthma flare-ups by staying away from your triggers.      Trigger                                                          What you can do   Cigarette Smoke  Tobacco smoke can make asthma worse. Do not allow smoking in your home, car or around you.  Be sure no one smokes at a child s day care or school.  If you smoke, ask your health care provider for ways to help you quit.  Ask family members to quit too.  Ask your health care provider for a referral to Quit Plan to help you quit smoking, or call 0-013-959-PLAN.     Colds, Flu, Bronchitis  These are common triggers of asthma. Wash your hands often.  Don t touch your eyes, nose or mouth.  Get a flu shot every year.     Dust Mites  These are tiny bugs that live in cloth or carpet. They are too small to see. Wash sheets and blankets in hot water every week.   Encase pillows and mattress in dust mite proof covers.  Avoid having carpet if you can. If you have carpet, vacuum weekly.   Use a dust mask and HEPA vacuum.   Pollen and Outdoor Mold  Some people are allergic to trees, grass, or weed pollen, or molds. Try to keep your windows closed.  Limit time out doors when pollen count is high.   Ask you health care provider about taking medicine during allergy season.     Animal Dander  Some people are allergic to skin flakes, urine or saliva from pets with fur  or feathers. Keep pets with fur or feathers out of your home.    If you can t keep the pet outdoors, then keep the pet out of your bedroom.  Keep the bedroom door closed.  Keep pets off cloth furniture and away from stuffed toys.     Mice, Rats, and Cockroaches  Some people are allergic to the waste from these pests.   Cover food and garbage.  Clean up spills and food crumbs.  Store grease in the refrigerator.   Keep food out of the bedroom.   Indoor Mold  This can be a trigger if your home has high moisture. Fix leaking faucets, pipes, or other sources of water.   Clean moldy surfaces.  Dehumidify basement if it is damp and smelly.   Smoke, Strong Odors, and Sprays  These can reduce air quality. Stay away from strong odors and sprays, such as perfume, powder, hair spray, paints, smoke incense, paint, cleaning products, candles and new carpet.   Exercise or Sports  Some people with asthma have this trigger. Be active!  Ask your doctor about taking medicine before sports or exercise to prevent symptoms.    Warm up for 5-10 minutes before and after sports or exercise.     Other Triggers of Asthma  Cold air:  Cover your nose and mouth with a scarf.  Sometimes laughing or crying can be a trigger.  Some medicines and food can trigger asthma.

## 2018-07-07 ASSESSMENT — ASTHMA QUESTIONNAIRES: ACT_TOTALSCORE: 13

## 2018-07-07 ASSESSMENT — PATIENT HEALTH QUESTIONNAIRE - PHQ9: SUM OF ALL RESPONSES TO PHQ QUESTIONS 1-9: 10

## 2018-07-26 ENCOUNTER — TELEPHONE (OUTPATIENT)
Dept: FAMILY MEDICINE | Facility: CLINIC | Age: 33
End: 2018-07-26

## 2018-07-26 NOTE — TELEPHONE ENCOUNTER
Incoming records from [facility]-given to provider for review and scan.    [facility]

## 2018-08-06 ENCOUNTER — TELEPHONE (OUTPATIENT)
Dept: FAMILY MEDICINE | Facility: CLINIC | Age: 33
End: 2018-08-06

## 2018-08-06 NOTE — TELEPHONE ENCOUNTER
Reason for Call: Early Medication Refill    Detailed comments: patient is calling stating that she is leaving town this Thursday 8/9 and her Oxycodone is due to be filled on 8/10, she is hoping the approval will be given to fill, 1 day early. Please call.    Phone Number Patient can be reached at: Home number on file [phone] (home)    Best Time: any    Can we leave a detailed message on this number? YES   Laya Khanna  Clinic Station  Flex      Call taken on 8/6/2018 at 2:58 PM by Laya Khanna

## 2018-08-07 NOTE — TELEPHONE ENCOUNTER
Patient calling back, advised she is using [facility].   Contacted [place] pharmacy, spoke with tyson, gave verbal ok as written below from provider, Ok to fill Oxycodoneearly on 8/9/18.

## 2018-08-07 NOTE — TELEPHONE ENCOUNTER
Attempted to contact patient, no answer, left voice message to call back.  Need to know what pharmacy patient has taken scripts to.

## 2018-09-21 ENCOUNTER — OFFICE VISIT (OUTPATIENT)
Dept: OBGYN | Facility: CLINIC | Age: 33
End: 2018-09-21
Payer: MEDICARE

## 2018-09-21 VITALS
DIASTOLIC BLOOD PRESSURE: 87 MMHG | HEART RATE: 107 BPM | TEMPERATURE: 98.1 F | BODY MASS INDEX: 32.64 KG/M2 | SYSTOLIC BLOOD PRESSURE: 140 MMHG | HEIGHT: 63 IN | WEIGHT: 184.2 LBS | RESPIRATION RATE: 16 BRPM

## 2018-09-21 DIAGNOSIS — Z97.5 IUD (INTRAUTERINE DEVICE) IN PLACE: ICD-10-CM

## 2018-09-21 DIAGNOSIS — R10.2 PELVIC PAIN IN FEMALE: Primary | ICD-10-CM

## 2018-09-21 PROCEDURE — 99213 OFFICE O/P EST LOW 20 MIN: CPT | Performed by: OBSTETRICS & GYNECOLOGY

## 2018-09-21 NOTE — NURSING NOTE
"Chief Complaint   Patient presents with     IUD     check       Initial /87 (BP Location: Right arm, Patient Position: Chair, Cuff Size: Adult Regular)  Pulse 107  Temp 98.1  F (36.7  C) (Tympanic)  Resp 16  Ht 5' 3\" (1.6 m)  Wt 184 lb 3.2 oz (83.6 kg)  BMI 32.63 kg/m2 Estimated body mass index is 32.63 kg/(m^2) as calculated from the following:    Height as of this encounter: 5' 3\" (1.6 m).    Weight as of this encounter: 184 lb 3.2 oz (83.6 kg).  Medications and allergies reviewed.    Joselo DEL RIO CMA    "

## 2018-09-21 NOTE — MR AVS SNAPSHOT
After Visit Summary   9/21/2018    Molly Teague    MRN: [id number]           Patient Information     Date Of Birth          1985        Visit Information        Provider Department      9/21/2018 11:30 AM Beth Sesay MD [facility]        Today's Diagnoses     Pelvic pain in female    -  1       Follow-ups after your visit        Additional Services     PHYSICAL THERAPY REFERRAL       If you have not heard from the scheduling office within 2 business days, please call [phone] for all locations, with the exception of [place], please call [phone] and [facility], please call [phone].    Please be aware that coverage of these services is subject to the terms and limitations of your health insurance plan.  Call member services at your health plan with any benefit or coverage questions.                  Future tests that were ordered for you today     Open Future Orders        Priority Expected Expires Ordered    PHYSICAL THERAPY REFERRAL Routine  9/21/2019 9/21/2018     Pelvic Complete with Transvaginal Routine  9/21/2019 9/21/2018            Who to contact     If you have questions or need follow up information about today's clinic visit or your schedule please contact [facility] directly at [phone].  Normal or non-critical lab and imaging results will be communicated to you by MyChart, letter or phone within 4 business days after the clinic has received the results. If you do not hear from us within 7 days, please contact the clinic through MyChart or phone. If you have a critical or abnormal lab result, we will notify you by phone as soon as possible.  Submit refill requests through [employer] or call your pharmacy and they will forward the refill request to us. Please allow 3 business days for your refill to be completed.          Additional Information About Your Visit        MyChart Information     [employer] lets you send messages to your  "doctor, view your test results, renew your prescriptions, schedule appointments and more. To sign up, go to [email or web address] . Click on \"Log in\" on the left side of the screen, which will take you to the Welcome page. Then click on \"Sign up Now\" on the right side of the page.     You will be asked to enter the access code listed below, as well as some personal information. Please follow the directions to create your username and password.     Your access code is: [email or web address]  Expires: 2018 11:24 AM     Your access code will  in 90 days. If you need help or a new code, please call your [facility] clinic or [phone].        Care EveryWhere ID     This is your Care EveryWhere ID. This could be used by other organizations to access your [facility] medical records  [id number]        Your Vitals Were     Pulse Temperature Respirations Height BMI (Body Mass Index)       107 98.1  F (36.7  C) (Tympanic) 16 5' 3\" (1.6 m) 32.63 kg/m2        Blood Pressure from Last 3 Encounters:   18 140/87   18 122/84   18 134/86    Weight from Last 3 Encounters:   18 184 lb 3.2 oz (83.6 kg)   18 182 lb (82.6 kg)   18 176 lb (79.8 kg)                 Today's Medication Changes          These changes are accurate as of 18 12:00 PM.  If you have any questions, ask your nurse or doctor.               These medicines have changed or have updated prescriptions.        Dose/Directions    sucralfate 1 GM tablet   Commonly known as:  CARAFATE   This may have changed:    - when to take this  - reasons to take this   Used for:  Limited systemic sclerosis (H)        Dose:  1 g   Take 1 tablet (1 g) by mouth 4 times daily   Quantity:  120 tablet   Refills:  11                Primary Care Provider Office Phone # Fax #    Grecia Nayana Barba [phone] [address] [facility] [address]        Equal Access to Services     PARISH COOK AH: Case Aguiar, " walesda amitaadaha, qaybta kasapphire bazzi, martin jamisonneela ah. So [facility] [phone].    ATENCIÓN: Si marisol jarvis, tiene a bradley disposición servicios gratuitos de asistencia lingüística. Chidi al [phone].    We comply with applicable federal civil rights laws and [place] laws. We do not discriminate on the basis of race, color, national origin, age, disability, sex, sexual orientation, or gender identity.            Thank you!     Thank you for choosing [facility]  for your care. Our goal is always to provide you with excellent care. Hearing back from our patients is one way we can continue to improve our services. Please take a few minutes to complete the written survey that you may receive in the mail after your visit with us. Thank you!             Your Updated Medication List - Protect others around you: Learn how to safely use, store and throw away your medicines at www.disposemymeds.org.          This list is accurate as of 9/21/18 12:00 PM.  Always use your most recent med list.                   Brand Name Dispense Instructions for use Diagnosis    albuterol 108 (90 Base) MCG/ACT inhaler    VENTOLIN HFA    1 Inhaler    Inhale 2 puffs into the lungs every 4 hours as needed for shortness of breath / dyspnea or wheezing    Moderate persistent asthma without complication       B-12 1000 MCG Tbcr     100 tablet    Take 1,000 mcg by mouth daily    Vitamin B12 deficiency (non anemic)       BENADRYL 25 MG tablet   Generic drug:  diphenhydrAMINE     56 tablet    Take 1 tablet (25 mg) by mouth every 6 hours as needed for itching or allergies        blood glucose lancets standard    no brand specified    100 each    Use to test blood sugar 1 time daily    Hypoglycemia       blood glucose monitoring test strip    no brand specified    100 each    Use to test blood sugar 1 time daily    Hypoglycemia       budesonide-formoterol 160-4.5 MCG/ACT Inhaler    SYMBICORT    30.6 g     INHALE TWO PUFFS BY MOUTH TWICE A DAY    Moderate persistent asthma without complication       BusPIRone HCl 30 MG Tabs     180 tablet    Take 30 mg by mouth 2 times daily    REX (generalized anxiety disorder)       citalopram 40 MG tablet    celeXA    90 tablet    Take 1 tablet (40 mg) by mouth daily    REX (generalized anxiety disorder)       ferrous gluconate 324 (38 Fe) MG tablet    FERGON    100 tablet    TAKE ONE TABLET BY MOUTH EVERY DAY WITH BREAKFAST    CREST (calcinosis, Raynaud's phenomenon, esophageal dysfunction, sclerodactyly, telangiectasia) (H)       gabapentin 300 MG capsule    NEURONTIN    540 capsule    Take 2 capsules (600 mg) by mouth 3 times daily    Limited systemic sclerosis (H)       GOODSENSE MIGRAINE FORMULA 250-250-65 MG per tablet   Generic drug:  aspirin-acetaminophen-caffeine     24 tablet    TAKE ONE TABLET BY MOUTH EVERY 6 HOURS AS NEEDED FOR HEADACHES    Chronic daily headache       lisinopril 5 MG tablet    PRINIVIL/ZESTRIL    90 tablet    Take 1 tablet (5 mg) by mouth daily    CREST (calcinosis, Raynaud's phenomenon, esophageal dysfunction, sclerodactyly, telangiectasia) (H)       * LORazepam 1 MG tablet    ATIVAN    35 tablet    0.5 mg alternate with 1 mg twice daily x 2 week, then 1 mg daily x 1 week, then 1 mg daily as needed.    REX (generalized anxiety disorder)       * LORazepam 1 MG tablet    ATIVAN    15 tablet    Take 1 tablet (1 mg) by mouth daily as needed for anxiety #15 to last 30 days    REX (generalized anxiety disorder)       montelukast 10 MG tablet    SINGULAIR    90 tablet    Take 1 tablet (10 mg) by mouth At Bedtime    Severe persistent asthma without complication       naloxone nasal spray    NARCAN    0.2 mL    Spray 1 spray (4 mg) into one nostril alternating nostrils as needed for opioid reversal every 2-3 minutes until assistance arrives    Chronic pain syndrome       omeprazole 40 MG capsule    priLOSEC    180 capsule    Take 1 capsule (40 mg) by mouth 2  times daily    CREST (calcinosis, Raynaud's phenomenon, esophageal dysfunction, sclerodactyly, telangiectasia) (H), Gastroesophageal reflux disease with esophagitis       * oxyCODONE IR 15 MG tablet    ROXICODONE    120 tablet    Take 1 tablet (15 mg) by mouth every 4 hours as needed for severe pain    Chronic pain syndrome       * oxyCODONE IR 15 MG tablet    ROXICODONE    120 tablet    Take 1 tablet (15 mg) by mouth every 4 hours as needed for pain    Chronic pain syndrome       polyethylene glycol powder    MIRALAX    510 g    Take 17 g (1 capful) by mouth daily    Drug-induced constipation       promethazine 25 MG/ML IV injection    PHENERGAN    180 mL    Inject 1 mL (25 mg) into the vein every 6 hours as needed for nausea or vomiting Inject IM    Gastroesophageal reflux disease with esophagitis       ranitidine 150 MG tablet    ZANTAC    180 tablet    Take 1 tablet (150 mg) by mouth 2 times daily as needed for heartburn    CREST (calcinosis, Raynaud's phenomenon, esophageal dysfunction, sclerodactyly, telangiectasia) (H), Gastroesophageal reflux disease with esophagitis       sucralfate 1 GM tablet    CARAFATE    120 tablet    Take 1 tablet (1 g) by mouth 4 times daily    Limited systemic sclerosis (H)       * Notice:  This list has 4 medication(s) that are the same as other medications prescribed for you. Read the directions carefully, and ask your doctor or other care provider to review them with you.

## 2018-09-21 NOTE — PROGRESS NOTES
"  SUBJECTIVE:                                                   CC:  Patient presents with:  IUD: check      HPI:  Molly Teague is a 33 year old  who presents for abdominal pain, PMH c/b CREST syndrome, asthma, h/o PE, HTN, chronic pain syndrome.  History of Mirena IUD placed 4.5 years ago.  She has a 4.5 year old son.  H/o CS x1.    4 days ago she began having lower abdominal pain, deep inside.  Also noted spotting, when she hasn't had spotting before this on the IUD.  Would like to see if it's in the right spot.  Doesn't want to get pregnant yet, since her CREST is not under good control, so would like to keep the IUD in if possible.  Hasn't had intercourse in awhile.     ROS: 10 point ROS negative other than as listed above in HPI.    Gyn History:  No LMP recorded. Patient is not currently having periods (Reason: IUD).     Patient is sexually active.  Using IUD for contraception.   Recent pap smears:     Last 3 Pap and HPV Results:   PAP / HPV Latest Ref Rng & Units 10/10/2017   PAP - NIL   HPV 16 DNA NEG:Negative Negative   HPV 18 DNA NEG:Negative Negative   OTHER HR HPV NEG:Negative Negative         PMH, PSH, Soc Hx, Fam Hx, Meds, and allergies reviewed in Epic.    OBJECTIVE:     /87 (BP Location: Right arm, Patient Position: Chair, Cuff Size: Adult Regular)  Pulse 107  Temp 98.1  F (36.7  C) (Tympanic)  Resp 16  Ht 5' 3\" (1.6 m)  Wt 184 lb 3.2 oz (83.6 kg)  BMI 32.63 kg/m2    Gen: Healthy appearing obese female, no acute distress, comfortable - stigmata of scleroderma with facial scarring and hands with characteristic appearance  HENT: No scleral injection or icterus  CV: Regular rate  Resp: Normal work of breathing, no cough  GI: Abdomen soft, non-tender. No masses, organomegaly  Skin: No suspicious lesions or rashes  Psychiatric: mentation appears normal and affect bright  : Normal external female genitalia.  No external lesions, normal hair distribution.   SSE: Speculum exam " reveals vaginal epithelium well rugated with normal physiologic discharge. Cervix appears smooth, pink, with no visible lesions.  IUD strings visualized.  Bimanual exam reveals normal size uterus, non-tender, and mobile. No adnexal masses or tenderness. No cervical motion tenderness.  + tenderness to palpation of the pelvic floor muscles bilaterally.  Mildly narrowed introitus.    ASSESSMENT/PLAN:                                                      1. Pelvic pain in female, high tone pelvic floor dysfunction  Discussed etiology of pelvic pain and pelvic floor dysfunction as a  for pain.  On exam there is no evidence of ovarian cysts or malpositioned IUD however she would like to ascertain everything is normal with the IUD and that there are no other Gyn causes to her pain.  Recommend no intercourse for 3 months, to break the cycle of the pain.  Discussed the NSAIDS are the best at treating this type of pain (she has chronic pain syndrome which she takes multiple other agents for).  - US Pelvic Complete with Transvaginal; Future  - PHYSICAL THERAPY REFERRAL; Future    2. IUD (intrauterine device) in place  Strings visualized.  Nothing on exam or history to suggest it has moved but will obtained US to confirm.  She desires to return in 6 months when it has  to check in about pain and had IUD removed/replaced.      Beth Sesay MD, MPH  Obstetrics and Gynecology

## 2018-10-01 ENCOUNTER — TELEPHONE (OUTPATIENT)
Dept: FAMILY MEDICINE | Facility: CLINIC | Age: 33
End: 2018-10-01

## 2018-10-01 NOTE — LETTER
October 1, 2018      Molly Teague  [address] [facility] [address]        Dear Molly,     Your [facility] Team works hard to make sure that you and your family receive exceptional care. Enclosed you will find a copy of the Asthma Control Test (ACT) that our clinic uses to monitor and manage your asthma. This test is an assessment tool that we use to determine how well your asthma is controlled.  Please keep a copy of the ACT for your reference when we call you to complete this assessment.     We will call within the next 2 weeks to review the questionnaire.    Thank you for trusting us with your health care.        Sincerely,        Your [facility] Team/cait

## 2018-10-01 NOTE — TELEPHONE ENCOUNTER
Patient is due to update ACT, last done 7/6/18 with a score of 13.  Severe persistent asthma.  Rx for singulair given at 7/6/18 office visit.    Letter with ACT mailed.  Will call to review.

## 2018-10-08 NOTE — TELEPHONE ENCOUNTER
Panel Management Review      Patient has the following on her problem list:     Asthma review     ACT Total Scores 7/6/2018   ACT TOTAL SCORE (Goal Greater than or Equal to 20) 13   In the past 12 months, how many times did you visit the emergency room for your asthma without being admitted to the hospital? 0   In the past 12 months, how many times were you hospitalized overnight because of your asthma? 0      1. Is Asthma diagnosis on the Problem List? Yes    2. Is Asthma listed on Health Maintenance? Yes    3. Patient is due for:  ACT      Composite cancer screening  Chart review shows that this patient is due/due soon for the following None  Summary:    Patient is due/failing the following:   ACT    Action needed:   Patient needs to do ACT.    Type of outreach:    Phone, left message for patient to call back.     Questions for provider review:    None                                                                                                                                    John JHA CMA

## 2018-10-09 NOTE — TELEPHONE ENCOUNTER
Patient returned call and completed the ACT with a score of 14.  Patient states she is using her nebs 1-2 times daily for symptoms and also to prevent symptoms.  She is using the rescue inhaler once per day, more as preventive, before activities etc.  She states she does feel like she is better than she was at the last visit.  She will be making an appointment in the next couple of weeks to follow up.    Routed to provider for review.

## 2018-10-10 ENCOUNTER — TELEPHONE (OUTPATIENT)
Dept: FAMILY MEDICINE | Facility: CLINIC | Age: 33
End: 2018-10-10

## 2018-10-10 DIAGNOSIS — J45.40 MODERATE PERSISTENT ASTHMA WITHOUT COMPLICATION: ICD-10-CM

## 2018-10-10 DIAGNOSIS — G89.4 CHRONIC PAIN SYNDROME: Chronic | ICD-10-CM

## 2018-10-10 RX ORDER — OXYCODONE HYDROCHLORIDE 15 MG/1
15 TABLET ORAL EVERY 4 HOURS PRN
Qty: 21 TABLET | Refills: 0 | Status: SHIPPED | OUTPATIENT
Start: 2018-10-10 | End: 2018-10-16

## 2018-10-10 RX ORDER — BUDESONIDE AND FORMOTEROL FUMARATE DIHYDRATE 160; 4.5 UG/1; UG/1
AEROSOL RESPIRATORY (INHALATION)
Qty: 6 G | Refills: 0 | Status: SHIPPED | OUTPATIENT
Start: 2018-10-10 | End: 2018-10-16

## 2018-10-10 ASSESSMENT — ASTHMA QUESTIONNAIRES: ACT_TOTALSCORE: 14

## 2018-10-10 NOTE — TELEPHONE ENCOUNTER
Dr. Barba,    Patient has an appointment 10/16/2018 for a refill of her oxycodone.    She is currently out and wondering if you can fill it for her until the appointment.    Thanks,  Dinora REYNOSO RN

## 2018-10-10 NOTE — TELEPHONE ENCOUNTER
Reason for Call:  Other prescription    Detailed comments: oxycondone    Phone Number Patient can be reached at: Home number on file [phone] (home)    Best Time: any    Can we leave a detailed message on this number? YES    Call taken on 10/10/2018 at 9:58 AM by Emi Pederson

## 2018-10-10 NOTE — TELEPHONE ENCOUNTER
"Requested Prescriptions   Pending Prescriptions Disp Refills     SYMBICORT 160-4.5 MCG/ACT Inhaler [Pharmacy Med Name: SYMBICORT 160-4.5MCG/ACT AERO]  Last Written Prescription Date:  06/28/18  Last Fill Quantity: 30.6g,  # refills: 0   Last office visit: 7/6/2018 with prescribing provider:  07/06/18   Future Office Visit:   Next 5 appointments (look out 90 days)     Oct 16, 2018  6:40 AM CDT   Office Visit with Grecia Barba DO   [facility]    [address] [facility] [address]   [phone]                  30.6 g 0     Sig: INHALE TWO PUFFS BY MOUTH TWICE A DAY    Inhaled Steroids Protocol Failed    10/10/2018  9:46 AM       Failed - Asthma control assessment score within normal limits in last 6 months    Please review ACT score.   ACT Total Scores 6/25/2018 7/6/2018 10/9/2018   ACT TOTAL SCORE (Goal Greater than or Equal to 20) 13 13 14   In the past 12 months, how many times did you visit the emergency room for your asthma without being admitted to the hospital? 0 0 0   In the past 12 months, how many times were you hospitalized overnight because of your asthma? 0 0 0          Passed - Patient is age 12 or older       Passed - Recent (6 mo) or future (30 days) visit within the authorizing provider's specialty    Patient had office visit in the last 6 months or has a visit in the next 30 days with authorizing provider or within the authorizing provider's specialty.  See \"Patient Info\" tab in inbasket, or \"Choose Columns\" in Meds & Orders section of the refill encounter.            "

## 2018-10-11 NOTE — TELEPHONE ENCOUNTER
Pt calling back, envelope put at  for .    [facility] [place]

## 2018-10-11 NOTE — TELEPHONE ENCOUNTER
This medication was local print.     CSS can you call her when it makes its way into our office?    Thanks,  Dinora REYNOSO RN

## 2018-10-16 ENCOUNTER — OFFICE VISIT (OUTPATIENT)
Dept: FAMILY MEDICINE | Facility: CLINIC | Age: 33
End: 2018-10-16
Payer: MEDICARE

## 2018-10-16 VITALS
DIASTOLIC BLOOD PRESSURE: 80 MMHG | TEMPERATURE: 98.3 F | HEART RATE: 120 BPM | RESPIRATION RATE: 12 BRPM | WEIGHT: 184 LBS | SYSTOLIC BLOOD PRESSURE: 130 MMHG | BODY MASS INDEX: 32.59 KG/M2 | OXYGEN SATURATION: 95 %

## 2018-10-16 DIAGNOSIS — Z23 NEED FOR PROPHYLACTIC VACCINATION AND INOCULATION AGAINST INFLUENZA: ICD-10-CM

## 2018-10-16 DIAGNOSIS — M34.1 CREST (CALCINOSIS, RAYNAUD'S PHENOMENON, ESOPHAGEAL DYSFUNCTION, SCLERODACTYLY, TELANGIECTASIA) (H): ICD-10-CM

## 2018-10-16 DIAGNOSIS — J45.40 MODERATE PERSISTENT ASTHMA WITHOUT COMPLICATION: ICD-10-CM

## 2018-10-16 DIAGNOSIS — F41.1 GAD (GENERALIZED ANXIETY DISORDER): ICD-10-CM

## 2018-10-16 DIAGNOSIS — G89.4 CHRONIC PAIN SYNDROME: Primary | Chronic | ICD-10-CM

## 2018-10-16 PROCEDURE — G0008 ADMIN INFLUENZA VIRUS VAC: HCPCS | Performed by: INTERNAL MEDICINE

## 2018-10-16 PROCEDURE — 90686 IIV4 VACC NO PRSV 0.5 ML IM: CPT | Performed by: INTERNAL MEDICINE

## 2018-10-16 PROCEDURE — 99215 OFFICE O/P EST HI 40 MIN: CPT | Mod: 25 | Performed by: INTERNAL MEDICINE

## 2018-10-16 RX ORDER — OXYCODONE HYDROCHLORIDE 15 MG/1
15 TABLET ORAL EVERY 4 HOURS PRN
Qty: 120 TABLET | Refills: 0 | Status: SHIPPED | OUTPATIENT
Start: 2018-12-15 | End: 2019-06-19

## 2018-10-16 RX ORDER — OXYCODONE HYDROCHLORIDE 15 MG/1
15 TABLET ORAL EVERY 4 HOURS PRN
Qty: 120 TABLET | Refills: 0 | Status: SHIPPED | OUTPATIENT
Start: 2018-11-15 | End: 2018-12-15

## 2018-10-16 RX ORDER — BUDESONIDE AND FORMOTEROL FUMARATE DIHYDRATE 160; 4.5 UG/1; UG/1
2 AEROSOL RESPIRATORY (INHALATION) 2 TIMES DAILY
Qty: 6 G | Refills: 11 | Status: SHIPPED | OUTPATIENT
Start: 2018-10-16 | End: 2019-11-01

## 2018-10-16 RX ORDER — OXYCODONE HYDROCHLORIDE 15 MG/1
15 TABLET ORAL EVERY 4 HOURS PRN
Qty: 120 TABLET | Refills: 0 | Status: SHIPPED | OUTPATIENT
Start: 2018-10-16 | End: 2019-01-17

## 2018-10-16 NOTE — MR AVS SNAPSHOT
"              After Visit Summary   10/16/2018    Molly Teague    MRN: [id number]           Patient Information     Date Of Birth          1985        Visit Information        Provider Department      10/16/2018 6:40 AM Grecia Barba, DO [facility]        Today's Diagnoses     Chronic pain syndrome    -  1    REX (generalized anxiety disorder)        CREST (calcinosis, Raynaud's phenomenon, esophageal dysfunction, sclerodactyly, telangiectasia) (H)        Need for prophylactic vaccination and inoculation against influenza        Moderate persistent asthma without complication          Care Instructions    1. Refills given.  2. Continue to avoid alcohol  3. You are nearly due to follow-up with your scleroderma doctors at the [facility].  4. Return to clinic 3 months, sooner if needed.  5. Recommend to establish with Psychologist - saw Dr. Pablito ortega.  [phone]        Relaxation Techniques    Breathing Exercises:    Inhale through your nose counting to \"4\" as you are breathing in  Exhale through your mouth - counting to \"6:\" or more - pursing your lips to slow down the exhalation.  Try to do this exercise 3 times if you are able to -remember even once is going to make a difference.     Beach Ball Exercise:    Imagine you are holding a deflated beach ball with both hands in front of you.  As you inhale -imagine to be trouble filling with air  -until you have experienced a complete inhalation.  As you exhale through your mouth, slightly pursed lips, managing herself squeezing the air out of the beach well.    The following two exercises are from: DBT Skills Training Handouts and Worksheets, 2nd edition.  2015.  Karla Greco      Half Smile Exercise:    1st: Relax your face from top of her head down to her chin and jaw.  Let go of each facial muscle: Forehead, eyes, brows, cheeks, mouth, tongue - teeth slightly apart  2nd: Left both corners of her lips go slightly up, just so you can feel " "them.  It is not necessary for others to see it.  A half smile is slightly turned up lips with a relaxed face.  3rd: Adopt a serene facial expression.  Think of the Janee Jia and here serene face    Willing Hands Exercise:    Notice the positions of your hands - especially if you are feeling some stress  While sitting place your hands on your lap - with hands unclenched - turn your palms up with fingers relaxed                          Follow-ups after your visit        Who to contact     If you have questions or need follow up information about today's clinic visit or your schedule please contact [facility] directly at [phone].  Normal or non-critical lab and imaging results will be communicated to you by LiftMetrixhart, letter or phone within 4 business days after the clinic has received the results. If you do not hear from us within 7 days, please contact the clinic through LiftMetrixhart or phone. If you have a critical or abnormal lab result, we will notify you by phone as soon as possible.  Submit refill requests through Six3 or call your pharmacy and they will forward the refill request to us. Please allow 3 business days for your refill to be completed.          Additional Information About Your Visit        LiftMetrixharFantasyBook Information     Six3 lets you send messages to your doctor, view your test results, renew your prescriptions, schedule appointments and more. To sign up, go to [email or web address] . Click on \"Log in\" on the left side of the screen, which will take you to the Welcome page. Then click on \"Sign up Now\" on the right side of the page.     You will be asked to enter the access code listed below, as well as some personal information. Please follow the directions to create your username and password.     Your access code is: [email or web address]  Expires: 2018 11:24 AM     Your access code will  in 90 days. If you need help or a new code, please call your [facility] or " [phone].        Care EveryWhere ID     This is your Care EveryWhere ID. This could be used by other organizations to access your [place] medical records  [id number]        Your Vitals Were     Pulse Temperature Respirations Pulse Oximetry Breastfeeding? BMI (Body Mass Index)    120 98.3  F (36.8  C) (Tympanic) 12 95% No 32.59 kg/m2       Blood Pressure from Last 3 Encounters:   10/16/18 130/80   09/21/18 140/87   07/06/18 122/84    Weight from Last 3 Encounters:   10/16/18 184 lb (83.5 kg)   09/21/18 184 lb 3.2 oz (83.6 kg)   07/06/18 182 lb (82.6 kg)              We Performed the Following     FLU VACCINE, SPLIT VIRUS, IM (QUADRIVALENT) [41051]- >3 YRS     Vaccine Administration, Initial [41378]          Today's Medication Changes          These changes are accurate as of 10/16/18  7:24 AM.  If you have any questions, ask your nurse or doctor.               These medicines have changed or have updated prescriptions.        Dose/Directions    budesonide-formoterol 160-4.5 MCG/ACT Inhaler   Commonly known as:  SYMBICORT   This may have changed:  See the new instructions.   Used for:  Moderate persistent asthma without complication   Changed by:  Grecia Barba DO        Dose:  2 puff   Inhale 2 puffs into the lungs 2 times daily   Quantity:  6 g   Refills:  11       * oxyCODONE IR 15 MG tablet   Commonly known as:  ROXICODONE   This may have changed:  Another medication with the same name was added. Make sure you understand how and when to take each.   Used for:  Chronic pain syndrome   Changed by:  Grecia Barba DO        Dose:  15 mg   Take 1 tablet (15 mg) by mouth every 4 hours as needed for severe pain   Quantity:  120 tablet   Refills:  0       * oxyCODONE IR 15 MG tablet   Commonly known as:  ROXICODONE   This may have changed:  You were already taking a medication with the same name, and this prescription was added. Make sure you understand how and when to take each.   Used for:   Chronic pain syndrome   Changed by:  Grecia Barba DO        Dose:  15 mg   Start taking on:  11/15/2018   Take 1 tablet (15 mg) by mouth every 4 hours as needed for severe pain   Quantity:  120 tablet   Refills:  0       * oxyCODONE IR 15 MG tablet   Commonly known as:  ROXICODONE   This may have changed:  You were already taking a medication with the same name, and this prescription was added. Make sure you understand how and when to take each.   Used for:  Chronic pain syndrome   Changed by:  Grecia Barba DO        Dose:  15 mg   Start taking on:  12/15/2018   Take 1 tablet (15 mg) by mouth every 4 hours as needed for severe pain   Quantity:  120 tablet   Refills:  0       sucralfate 1 GM tablet   Commonly known as:  CARAFATE   This may have changed:    - when to take this  - reasons to take this   Used for:  Limited systemic sclerosis (H)        Dose:  1 g   Take 1 tablet (1 g) by mouth 4 times daily   Quantity:  120 tablet   Refills:  11       * Notice:  This list has 3 medication(s) that are the same as other medications prescribed for you. Read the directions carefully, and ask your doctor or other care provider to review them with you.         Where to get your medicines      These medications were sent to [facility] [address]  [address] [address]     Phone:  [phone]     budesonide-formoterol 160-4.5 MCG/ACT Inhaler         Some of these will need a paper prescription and others can be bought over the counter.  Ask your nurse if you have questions.     Bring a paper prescription for each of these medications     oxyCODONE IR 15 MG tablet    oxyCODONE IR 15 MG tablet    oxyCODONE IR 15 MG tablet               Information about OPIOIDS     PRESCRIPTION OPIOIDS: WHAT YOU NEED TO KNOW   We gave you an opioid (narcotic) pain medicine. It is important to manage your pain, but opioids are not always the best choice. You should first try  all the other options your care team gave you. Take this medicine for as short a time (and as few doses) as possible.    Some activities can increase your pain, such as bandage changes or therapy sessions. It may help to take your pain medicine 30 to 60 minutes before these activities. Reduce your stress by getting enough sleep, working on hobbies you enjoy and practicing relaxation or meditation. Talk to your care team about ways to manage your pain beyond prescription opioids.    These medicines have risks:    DO NOT drive when on new or higher doses of pain medicine. These medicines can affect your alertness and reaction times, and you could be arrested for driving under the influence (DUI). If you need to use opioids long-term, talk to your care team about driving.    DO NOT operate heavy machinery    DO NOT do any other dangerous activities while taking these medicines.    DO NOT drink any alcohol while taking these medicines.     If the opioid prescribed includes acetaminophen, DO NOT take with any other medicines that contain acetaminophen. Read all labels carefully. Look for the word  acetaminophen  or  Tylenol.  Ask your pharmacist if you have questions or are unsure.    You can get addicted to pain medicines, especially if you have a history of addiction (chemical, alcohol or substance dependence). Talk to your care team about ways to reduce this risk.    All opioids tend to cause constipation. Drink plenty of water and eat foods that have a lot of fiber, such as fruits, vegetables, prune juice, apple juice and high-fiber cereal. Take a laxative (Miralax, milk of magnesia, Colace, Senna) if you don t move your bowels at least every other day. Other side effects include upset stomach, sleepiness, dizziness, throwing up, tolerance (needing more of the medicine to have the same effect), physical dependence and slowed breathing.    Store your pills in a secure place, locked if possible. We will not replace any  lost or stolen medicine. If you don t finish your medicine, please throw away (dispose) as directed by your pharmacist. The [place] [employer]ution [employer] has more information about safe disposal: [email or web address]         Primary Care Provider Office Phone # Fax #    Grecia Barba [phone] [address] [address]        Equal Access to Services     PARISH COOK : Hadii aad ku hadasho Soomaali, waaxda luqadaha, qaybta kaalmada adeegyada, waxay idiin hayaan adeeg kharamarcy lacinthia woods. So [facility] [phone].    ATENCIÓN: Si habla español, tiene a bradley disposición servicios gratuitos de asistencia lingüística. [facility] [phone].    We comply with applicable federal civil rights laws and [place] laws. We do not discriminate on the basis of race, color, national origin, age, disability, sex, sexual orientation, or gender identity.            Thank you!     Thank you for choosing [facility]  for your care. Our goal is always to provide you with excellent care. Hearing back from our patients is one way we can continue to improve our services. Please take a few minutes to complete the written survey that you may receive in the mail after your visit with us. Thank you!             Your Updated Medication List - Protect others around you: Learn how to safely use, store and throw away your medicines at www.disposemymeds.org.          This list is accurate as of 10/16/18  7:24 AM.  Always use your most recent med list.                   Brand Name Dispense Instructions for use Diagnosis    albuterol 108 (90 Base) MCG/ACT inhaler    VENTOLIN HFA    1 Inhaler    Inhale 2 puffs into the lungs every 4 hours as needed for shortness of breath / dyspnea or wheezing    Moderate persistent asthma without complication       B-12 1000 MCG Tbcr     100 tablet    Take 1,000 mcg by mouth daily    Vitamin B12 deficiency (non  anemic)       BENADRYL 25 MG tablet   Generic drug:  diphenhydrAMINE     56 tablet    Take 1 tablet (25 mg) by mouth every 6 hours as needed for itching or allergies        blood glucose lancets standard    no brand specified    100 each    Use to test blood sugar 1 time daily    Hypoglycemia       blood glucose monitoring test strip    no brand specified    100 each    Use to test blood sugar 1 time daily    Hypoglycemia       budesonide-formoterol 160-4.5 MCG/ACT Inhaler    SYMBICORT    6 g    Inhale 2 puffs into the lungs 2 times daily    Moderate persistent asthma without complication       BusPIRone HCl 30 MG Tabs     180 tablet    Take 30 mg by mouth 2 times daily    REX (generalized anxiety disorder)       citalopram 40 MG tablet    celeXA    90 tablet    Take 1 tablet (40 mg) by mouth daily    REX (generalized anxiety disorder)       ferrous gluconate 324 (38 Fe) MG tablet    FERGON    100 tablet    TAKE ONE TABLET BY MOUTH EVERY DAY WITH BREAKFAST    CREST (calcinosis, Raynaud's phenomenon, esophageal dysfunction, sclerodactyly, telangiectasia) (H)       gabapentin 300 MG capsule    NEURONTIN    540 capsule    Take 2 capsules (600 mg) by mouth 3 times daily    Limited systemic sclerosis (H)       GOODSENSE MIGRAINE FORMULA 250-250-65 MG per tablet   Generic drug:  aspirin-acetaminophen-caffeine     24 tablet    TAKE ONE TABLET BY MOUTH EVERY 6 HOURS AS NEEDED FOR HEADACHES    Chronic daily headache       lisinopril 5 MG tablet    PRINIVIL/ZESTRIL    90 tablet    Take 1 tablet (5 mg) by mouth daily    CREST (calcinosis, Raynaud's phenomenon, esophageal dysfunction, sclerodactyly, telangiectasia) (H)       LORazepam 1 MG tablet    ATIVAN    15 tablet    Take 1 tablet (1 mg) by mouth daily as needed for anxiety #15 to last 30 days    REX (generalized anxiety disorder)       montelukast 10 MG tablet    SINGULAIR    90 tablet    Take 1 tablet (10 mg) by mouth At Bedtime    Severe persistent asthma without  complication       naloxone nasal spray    NARCAN    0.2 mL    Spray 1 spray (4 mg) into one nostril alternating nostrils as needed for opioid reversal every 2-3 minutes until assistance arrives    Chronic pain syndrome       omeprazole 40 MG capsule    priLOSEC    180 capsule    Take 1 capsule (40 mg) by mouth 2 times daily    CREST (calcinosis, Raynaud's phenomenon, esophageal dysfunction, sclerodactyly, telangiectasia) (H), Gastroesophageal reflux disease with esophagitis       * oxyCODONE IR 15 MG tablet    ROXICODONE    120 tablet    Take 1 tablet (15 mg) by mouth every 4 hours as needed for severe pain    Chronic pain syndrome       * oxyCODONE IR 15 MG tablet   Start taking on:  11/15/2018    ROXICODONE    120 tablet    Take 1 tablet (15 mg) by mouth every 4 hours as needed for severe pain    Chronic pain syndrome       * oxyCODONE IR 15 MG tablet   Start taking on:  12/15/2018    ROXICODONE    120 tablet    Take 1 tablet (15 mg) by mouth every 4 hours as needed for severe pain    Chronic pain syndrome       polyethylene glycol powder    MIRALAX    510 g    Take 17 g (1 capful) by mouth daily    Drug-induced constipation       promethazine 25 MG/ML IV injection    PHENERGAN    180 mL    Inject 1 mL (25 mg) into the vein every 6 hours as needed for nausea or vomiting Inject IM    Gastroesophageal reflux disease with esophagitis       ranitidine 150 MG tablet    ZANTAC    180 tablet    Take 1 tablet (150 mg) by mouth 2 times daily as needed for heartburn    CREST (calcinosis, Raynaud's phenomenon, esophageal dysfunction, sclerodactyly, telangiectasia) (H), Gastroesophageal reflux disease with esophagitis       sucralfate 1 GM tablet    CARAFATE    120 tablet    Take 1 tablet (1 g) by mouth 4 times daily    Limited systemic sclerosis (H)       UNISOM PO           * Notice:  This list has 3 medication(s) that are the same as other medications prescribed for you. Read the directions carefully, and ask your doctor  or other care provider to review them with you.

## 2018-10-16 NOTE — PROGRESS NOTES
Injectable Influenza Immunization Documentation    1.  Is the person to be vaccinated sick today?   No    2. Does the person to be vaccinated have an allergy to a component   of the vaccine?   No  Egg Allergy Algorithm Link    3. Has the person to be vaccinated ever had a serious reaction   to influenza vaccine in the past?   No    4. Has the person to be vaccinated ever had Guillain-Barré syndrome?   No    Form completed by AMADOU Moeller         SUBJECTIVE:   Molly Teague is a 33 year old female who presents to clinic today for the following health issues:      Depression and Anxiety Follow-Up    Status since last visit: Worsened     Other associated symptoms:None    Complicating factors:     Significant life event: No     Current substance abuse: yes, alcohol. She was briefly overusing alcohol.  Her  found out and supported her to sobriety.   stopped drinking to support her.    PHQ 4/15/2018 4/20/2018 7/6/2018   PHQ-9 Total Score 22 11 10   Q9: Suicide Ideation Several days Not at all Not at all   F/U: Thoughts of suicide or self-harm - - -   F/U: Safety concerns - - -     REX-7 SCORE 3/23/2018 4/15/2018 4/20/2018   Total Score 10 19 6     In the past two weeks have you had thoughts of suicide or self-harm?  No.    Do you have concerns about your personal safety or the safety of others?   No  PHQ-9  English  PHQ-9   Any Language  REX-7  Suicide Assessment Five-step Evaluation and Treatment (SAFE-T)    Amount of exercise or physical activity: 1 day/week for an average of 30-45 minutes    Problems taking medications regularly: No    Medication side effects: none    Diet: regular (no restrictions)    I have recommend psychiatry and psychology but she has not followed through.  We tapered her benzo to more moderate dosing at last visit in July.  Last fill of ativan 1 mg #15 was 10/10, and 9/14.    She feels her depression and anxiety are worse.    Staying in bed more, feeling more emotional, more  tearful.    Life has been very busy and she hasn't been able to make time to see counselor.    She has her son much of the day and he has special needs    Using Unisom to help with sleep.  Started 1 week ago.  Thinks it is helping.      She has intrusive dreams, and on the Unisom she has not had as much fearful dreams.    She is not paying as much attention to her hygiene - not worn make up in a while, not fixing her hair.  She is bathing regularly.    She had a panic attck this AM.        Chronic Pain Follow-Up       Type / Location of Pain: multiple sites  Analgesia/pain control:       Recent changes:  same      Overall control: Tolerable with discomfort  Activity level/function:      Daily activities:  Able to do light housework, cooking    Work:  Unable to work  Adverse effects:  No  Adherance    Taking medication as directed?  Yes    Participating in other treatments: yes  - physical therapy  Risk Factors:    Sleep:  Poor    Mood/anxiety:  worsened    Recent family or social stressors:  none noted    Other aggravating factors: none  PHQ-9 SCORE 4/15/2018 4/20/2018 7/6/2018   Total Score 22 11 10     REX-7 SCORE 3/23/2018 4/15/2018 4/20/2018   Total Score 10 19 6     Encounter-Level CSA - 04/26/2017:          Controlled Substance Agreement - Scan on 5/4/2017  8:58 AM : CONTROLLED SUBSTANCE AGREEMENT (below)              --having increase in bilateral knee pain.  Has appointment at [place] in 3 weeks.  Hard to sleep due to pain.  The opiates are not helping w/this pain.  --she reports her right toes are having more contractures.    Asthma:  Thinks improved, although her ACT is still not at goal.      Current Outpatient Prescriptions   Medication Sig Dispense Refill     albuterol (VENTOLIN HFA) 108 (90 Base) MCG/ACT Inhaler Inhale 2 puffs into the lungs every 4 hours as needed for shortness of breath / dyspnea or wheezing 1 Inhaler 11     blood glucose (NO BRAND SPECIFIED) lancets standard Use to test blood sugar 1  time daily 100 each 3     blood glucose monitoring (NO BRAND SPECIFIED) test strip Use to test blood sugar 1 time daily 100 each 3     BusPIRone HCl 30 MG TABS Take 30 mg by mouth 2 times daily 180 tablet 3     citalopram (CELEXA) 40 MG tablet Take 1 tablet (40 mg) by mouth daily 90 tablet 3     Cyanocobalamin (B-12) 1000 MCG TBCR Take 1,000 mcg by mouth daily 100 tablet 1     diphenhydrAMINE (BENADRYL) 25 MG tablet Take 1 tablet (25 mg) by mouth every 6 hours as needed for itching or allergies 56 tablet      Doxylamine Succinate, Sleep, (UNISOM PO)        ferrous gluconate (FERGON) 324 (38 FE) MG tablet TAKE ONE TABLET BY MOUTH EVERY DAY WITH BREAKFAST 100 tablet 3     gabapentin (NEURONTIN) 300 MG capsule Take 2 capsules (600 mg) by mouth 3 times daily 540 capsule 3     GOODSENSE MIGRAINE FORMULA 250-250-65 MG per tablet TAKE ONE TABLET BY MOUTH EVERY 6 HOURS AS NEEDED FOR HEADACHES 24 tablet 1     lisinopril (PRINIVIL/ZESTRIL) 5 MG tablet Take 1 tablet (5 mg) by mouth daily 90 tablet 3     LORazepam (ATIVAN) 1 MG tablet Take 1 tablet (1 mg) by mouth daily as needed for anxiety #15 to last 30 days 15 tablet 5     montelukast (SINGULAIR) 10 MG tablet Take 1 tablet (10 mg) by mouth At Bedtime 90 tablet 3     naloxone (NARCAN) nasal spray Spray 1 spray (4 mg) into one nostril alternating nostrils as needed for opioid reversal every 2-3 minutes until assistance arrives 0.2 mL 3     omeprazole (PRILOSEC) 40 MG capsule Take 1 capsule (40 mg) by mouth 2 times daily 180 capsule 3     oxyCODONE IR (ROXICODONE) 15 MG tablet Take 1 tablet (15 mg) by mouth every 4 hours as needed for severe pain 21 tablet 0     polyethylene glycol (MIRALAX) powder Take 17 g (1 capful) by mouth daily 510 g 11     promethazine (PHENERGAN) 25 MG/ML IV injection Inject 1 mL (25 mg) into the vein every 6 hours as needed for nausea or vomiting Inject  mL 11     ranitidine (ZANTAC) 150 MG tablet Take 1 tablet (150 mg) by mouth 2 times daily as  needed for heartburn 180 tablet 3     sucralfate (CARAFATE) 1 GM tablet Take 1 tablet (1 g) by mouth 4 times daily (Patient taking differently: Take 1 g by mouth 4 times daily as needed ) 120 tablet 11     SYMBICORT 160-4.5 MCG/ACT Inhaler INHALE TWO PUFFS BY MOUTH TWICE A DAY 6 g 0       Reviewed and updated as needed this visit by clinical staff  Tobacco  Allergies  Meds  Problems  Med Hx  Surg Hx  Fam Hx  Soc Hx        Reviewed and updated as needed this visit by Provider  Allergies  Meds  Problems         ROS:  Constitutional, HEENT, cardiovascular, pulmonary, GI, , musculoskeletal, neuro, skin, endocrine and psych systems are negative, except as otherwise noted.    OBJECTIVE:     /80  Pulse 120  Temp 98.3  F (36.8  C) (Tympanic)  Resp 12  Wt 184 lb (83.5 kg)  SpO2 95%  Breastfeeding? No  BMI 32.59 kg/m2  Body mass index is 32.59 kg/(m^2).  GENERAL APPEARANCE: alert and no distress  RESP: lungs clear to auscultation - no rales, rhonchi or wheezes  CV: normal S1 S2, no S3 or S4 and tachycardia  MSK:  Contractures of toes of left foot      ASSESSMENT/PLAN:       1. Chronic pain syndrome - stable, refill given, Fill today, 11/15, 12/15.  Next fill due on or before 1/14.  - oxyCODONE IR (ROXICODONE) 15 MG tablet; Take 1 tablet (15 mg) by mouth every 4 hours as needed for severe pain  Dispense: 120 tablet; Refill: 0  - oxyCODONE IR (ROXICODONE) 15 MG tablet; Take 1 tablet (15 mg) by mouth every 4 hours as needed for severe pain  Dispense: 120 tablet; Refill: 0  - oxyCODONE IR (ROXICODONE) 15 MG tablet; Take 1 tablet (15 mg) by mouth every 4 hours as needed for severe pain  Dispense: 120 tablet; Refill: 0    2. REX (generalized anxiety disorder) - uncontrolled. Patient asked for increase in # of lorazepam and I declined, patient understood.  Again stressed counselor, patient agreeable.  Fearful of 'reliving' her trauma.    3. CREST (calcinosis, Raynaud's phenomenon, esophageal dysfunction,  "sclerodactyly, telangiectasia) (H) - contractures in toes.  [facility]    4. Moderate persistent asthma without complication  -  Improved.  Refill given  - budesonide-formoterol (SYMBICORT) 160-4.5 MCG/ACT Inhaler; Inhale 2 puffs into the lungs 2 times daily  Dispense: 6 g; Refill: 11    5. Need for prophylactic vaccination and inoculation against influenza  - FLU VACCINE, SPLIT VIRUS, IM (QUADRIVALENT) [09276]- >3 YRS  - Vaccine Administration, Initial [87579]    Greater than 40 minutes was spent face-to-face with the patient by the provider.  Greater than 50% was spent in counseling or coordinating care for this patient.      Patient Instructions   1. Refills given.  2. Continue to avoid alcohol  3. You are nearly due [facility]  4. Return to clinic 3 months, sooner if needed.  5. Recommend to establish with Psychologist - saw Dr. Pablito ortega.  [phone]        Relaxation Techniques    Breathing Exercises:    Inhale through your nose counting to \"4\" as you are breathing in  Exhale through your mouth - counting to \"6:\" or more - pursing your lips to slow down the exhalation.  Try to do this exercise 3 times if you are able to -remember even once is going to make a difference.     Beach Ball Exercise:    Imagine you are holding a deflated beach ball with both hands in front of you.  As you inhale -imagine to be trouble filling with air  -until you have experienced a complete inhalation.  As you exhale through your mouth, slightly pursed lips, managing herself squeezing the air out of the beach well.    The following two exercises are from: DBT Skills Training Handouts and Worksheets, 2nd edition.  2015.  Karla Greco      Half Smile Exercise:    1st: Relax your face from top of her head down to her chin and jaw.  Let go of each facial muscle: Forehead, eyes, brows, cheeks, mouth, tongue - teeth slightly apart  2nd: Left both corners of her " lips go slightly up, just so you can feel them.  It is not necessary for others to see it.  A half smile is slightly turned up lips with a relaxed face.  3rd: Adopt a serene facial expression.  Think of the Janee Jia and here serene face    Willing Hands Exercise:    Notice the positions of your hands - especially if you are feeling some stress  While sitting place your hands on your lap - with hands unclenched - turn your palms up with fingers relaxed                      Grecia Barba, DO  [facility]

## 2018-10-16 NOTE — TELEPHONE ENCOUNTER
FUTURE VISIT INFORMATION      FUTURE VISIT INFORMATION:    Date: 10/17/18    Time:     [place] [facility] [place] INFORMATION:    Referring provider:  self    Referring providers clinic:      Reason for visit/diagnosis  Left hand trigger finger. No hx of surgery. Records hugh Lemus/ Roger- full body scan completed 2017. Per pt    RECORDS REQUESTED FROM:       Clinic name Comments Records Status Imaging Status   [facility] Everywhere      internal                              RECORDS STATUS

## 2018-10-16 NOTE — PATIENT INSTRUCTIONS
"1. Refills given.  2. Continue to avoid alcohol  3. You are nearly due to follow-up with your scleroderma doctors at the [place].  4. Return to clinic 3 months, sooner if needed.  5. Recommend to establish with Psychologist - saw Dr. Pablito ortega.  [phone]        Relaxation Techniques    Breathing Exercises:    Inhale through your nose counting to \"4\" as you are breathing in  Exhale through your mouth - counting to \"6:\" or more - pursing your lips to slow down the exhalation.  Try to do this exercise 3 times if you are able to -remember even once is going to make a difference.     Beach Ball Exercise:    Imagine you are holding a deflated beach ball with both hands in front of you.  As you inhale -imagine to be trouble filling with air  -until you have experienced a complete inhalation.  As you exhale through your mouth, slightly pursed lips, managing herself squeezing the air out of the beach well.    The following two exercises are from: DBT Skills Training Handouts and Worksheets, 2nd edition.  2015.  Karla Greco      Half Smile Exercise:    1st: Relax your face from top of her head down to her chin and jaw.  Let go of each facial muscle: Forehead, eyes, brows, cheeks, mouth, tongue - teeth slightly apart  2nd: Left both corners of her lips go slightly up, just so you can feel them.  It is not necessary for others to see it.  A half smile is slightly turned up lips with a relaxed face.  3rd: Adopt a serene facial expression.  Think of the Janee Jia and here serene face    Willing Hands Exercise:    Notice the positions of your hands - especially if you are feeling some stress  While sitting place your hands on your lap - with hands unclenched - turn your palms up with fingers relaxed                  "

## 2018-10-17 ENCOUNTER — PRE VISIT (OUTPATIENT)
Dept: ORTHOPEDICS | Facility: CLINIC | Age: 33
End: 2018-10-17

## 2018-10-17 ENCOUNTER — RADIANT APPOINTMENT (OUTPATIENT)
Dept: GENERAL RADIOLOGY | Facility: CLINIC | Age: 33
End: 2018-10-17
Attending: PODIATRIST
Payer: MEDICARE

## 2018-10-17 ENCOUNTER — RADIANT APPOINTMENT (OUTPATIENT)
Dept: GENERAL RADIOLOGY | Facility: CLINIC | Age: 33
End: 2018-10-17
Attending: FAMILY MEDICINE
Payer: MEDICARE

## 2018-10-17 ENCOUNTER — OFFICE VISIT (OUTPATIENT)
Dept: PODIATRY | Facility: CLINIC | Age: 33
End: 2018-10-17
Payer: MEDICARE

## 2018-10-17 ENCOUNTER — OFFICE VISIT (OUTPATIENT)
Dept: ORTHOPEDICS | Facility: CLINIC | Age: 33
End: 2018-10-17
Payer: MEDICARE

## 2018-10-17 VITALS
SYSTOLIC BLOOD PRESSURE: 145 MMHG | TEMPERATURE: 98.4 F | RESPIRATION RATE: 18 BRPM | HEIGHT: 62 IN | OXYGEN SATURATION: 99 % | HEART RATE: 119 BPM | BODY MASS INDEX: 34.52 KG/M2 | DIASTOLIC BLOOD PRESSURE: 102 MMHG | WEIGHT: 187.6 LBS

## 2018-10-17 VITALS — RESPIRATION RATE: 16 BRPM | BODY MASS INDEX: 34.41 KG/M2 | WEIGHT: 187 LBS | HEIGHT: 62 IN

## 2018-10-17 DIAGNOSIS — M79.642 BILATERAL HAND PAIN: ICD-10-CM

## 2018-10-17 DIAGNOSIS — M76.829 PTTD (POSTERIOR TIBIAL TENDON DYSFUNCTION): ICD-10-CM

## 2018-10-17 DIAGNOSIS — M79.641 BILATERAL HAND PAIN: ICD-10-CM

## 2018-10-17 DIAGNOSIS — M34.9 SCLERODERMA (H): Chronic | ICD-10-CM

## 2018-10-17 DIAGNOSIS — M34.1 CREST (CALCINOSIS, RAYNAUD'S PHENOMENON, ESOPHAGEAL DYSFUNCTION, SCLERODACTYLY, TELANGIECTASIA) (H): ICD-10-CM

## 2018-10-17 DIAGNOSIS — M20.11 HALLUX VALGUS OF RIGHT FOOT: ICD-10-CM

## 2018-10-17 DIAGNOSIS — M76.829 PTTD (POSTERIOR TIBIAL TENDON DYSFUNCTION): Primary | ICD-10-CM

## 2018-10-17 PROCEDURE — 73610 X-RAY EXAM OF ANKLE: CPT | Mod: RT | Performed by: RADIOLOGY

## 2018-10-17 PROCEDURE — 73630 X-RAY EXAM OF FOOT: CPT | Mod: RT | Performed by: RADIOLOGY

## 2018-10-17 PROCEDURE — 99203 OFFICE O/P NEW LOW 30 MIN: CPT | Performed by: PODIATRIST

## 2018-10-17 ASSESSMENT — PATIENT HEALTH QUESTIONNAIRE - PHQ9: SUM OF ALL RESPONSES TO PHQ QUESTIONS 1-9: 18

## 2018-10-17 ASSESSMENT — ASTHMA QUESTIONNAIRES: ACT_TOTALSCORE: 20

## 2018-10-17 ASSESSMENT — PAIN SCALES - GENERAL: PAINLEVEL: NO PAIN (0)

## 2018-10-17 NOTE — LETTER
10/17/2018         RE: Molly Teague  [address] [address]        Dear Colleague,    Thank you for referring your patient, Molly Teague, to the [facility]. Please see a copy of my visit note below.    Date of Service: 10/17/2018    Chief Complaint:   Chief Complaint   Patient presents with     Consult     bilateral scleroderma and Neuropathy in both feet. All records internal, per pt        HPI: Molly is a 33 year old female who presents today for further evaluation of pain in the right foot. She has a very complex PMH of scleroderma, CREST syndrome, PE, organ failure. C.diff, and Raynauds among other things. She relates that in 2015 she was admitted to [facility] in [place] with renal and lung failure 2/2 scleroderma. Did have a PE at that time. She relates that after she woke from a coma, she was having quite a lot of pain in the right foot. She relates that she had difficulty walking and underwent quite extensive PT to help her regain her mobility. She was prescribed a brace at that time, however with all of her other medial problems, she did not use it.     Review of Systems: No n/v/d/f/c/ns/sob/cp    PMH:   Past Medical History:   Diagnosis Date     Acute pyelonephritis 6/9/2017     Altered mental state 3/29/2018     Arthritis      Blood clotting disorder (H)     P E     History of blood transfusion      Hypertension      Long-term (current) use of anticoagulants [Z79.01] 9/9/2016     PE (pulmonary embolism) 9/7/2016     Rheumatism      Scleroderma (H) 9/22/2016     Uncomplicated asthma        PSxH: No past surgical history on file.    Allergies: No known allergies; Seasonal allergies; and Shellfish-derived products    SH:   Social History     Social History     Marital status: Single     Spouse name: N/A     Number of children: N/A     Years of education: N/A     Occupational History     Not on file.     Social History Main Topics     Smoking status:  "Never Smoker     Smokeless tobacco: Never Used     Alcohol use No     Drug use: No     Sexual activity: Yes     Partners: Male     Other Topics Concern     Parent/Sibling W/ Cabg, Mi Or Angioplasty Before 65f 55m? No     Social History Narrative       FH:   Family History   Problem Relation Age of Onset     Hyperlipidemia Father      Cerebrovascular Disease Maternal Grandmother      Hyperlipidemia Paternal Grandfather      Diabetes Maternal Aunt      Melanoma No family hx of      Skin Cancer No family hx of        Objective:  98.4 119 18 145/102[Pt states BP  due to not taken Medication[ 5' 2\"[Per patient[ 187 lbs 9.6 oz    PT and DP pulses are 1/4 bilaterally. CRT is 4 seconds. Absent pedal hair.   Gross sensation is diminished bilaterally. Protective sensation is greatly diminished on the right foot as tested with a 5.07G monofilament and normal on the left foot.   Equinus is moderate bilaterally. With ambulation, the TN joint of the right is collapsing at the end of the stance phase, likely causing 1st ray overload proximally with pushoff. Decreased 1st ray ROM and 1st MTPJ. Pain noted along the course of the right PT tendon as it courses around the medial malleolus. Severely hammered digits to the lesser digits BL with R>L. These are not reducible.   Nails normal bilaterally. No open lesions are noted.     right foot and ankke xrays indicated in 6 weightbearing views.    No fractures or other acute processes noted. There is some periarticular osteoporosis around the hallux IPJ and TN joints. Hammered digits. IM angle is 9-10. Fibular sesamoid is cystic and no longer smooth. Tibial sesamoid is either the small christo of bone at the distal medial aspect of the joint of is not present.       Assessment: 32 YO patient with extensive medical history presenting for foot and ankle pain in the right LE.     Plan:  - Pt seen and evaluated.  - Xrays taken and discussed with her.  - I would recommend that we treat this very " conservatively at first. I think that a pair of custom orthotics with a right P-wing might be enough to keep her medial TN joint better supported. This will help to decrease the forefoot pressures that are causing pain in the foot with ambulation. She agrees to this. Molds were sent to the lab. If not covered, can try to get an AFO.  - Will see again in 6 weeks to see if the orthotics are working.               Again, thank you for allowing me to participate in the care of your patient.        Sincerely,        Kenroy Cruz DPM

## 2018-10-17 NOTE — LETTER
10/17/2018      RE: Molly Teague  [address] [id number]        Subjective:   Molly Teague is a 33 year old female with CREST Syndrome and Systemic Sclerosis  Presenting with increasing Left middle finger flexion contracture that is catching on items with ADL's    She was originally diagnosed and treated in [place] at the [facility].  She relates she had a systemic reaction to oral steroids at that time that hospitalizized her.  Previous trial of hand braces made at that time. She feels these are painful and not as helpful.  She is using heat. With her Raynaud's she gets increased pain in the hands with getting her hands wet or cold.   Previous systemic reaction that hospialized her due to oral steroids.    She has become more active as a [age] year old.  Also struggling with PTSD and depression for which she is seeking treatment.    Background:   Date of injury: NA   Duration of symptoms: 1 years  Mechanism of Injury: Insidious Onset; Unknown   Aggravating factors: AROM, ADL's  Relieving Factors: gabapentin, heat  Prior Evaluation: None    PAST MEDICAL, SOCIAL, SURGICAL AND FAMILY HISTORY: She  has a past medical history of Acute pyelonephritis (6/9/2017); Altered mental state (3/29/2018); Arthritis; Blood clotting disorder (H); History of blood transfusion; Hypertension; Long-term (current) use of anticoagulants [Z79.01] (9/9/2016); PE (pulmonary embolism) (9/7/2016); Rheumatism; Scleroderma (H) (9/22/2016); and Uncomplicated asthma.  She  has no past surgical history on file.  Her family history includes Cerebrovascular Disease in her maternal grandmother; Diabetes in her maternal aunt; Hyperlipidemia in her father and paternal grandfather. There is no history of Melanoma or Skin Cancer.  She reports that she has never smoked. She has never used smokeless tobacco. She reports that she does not drink alcohol or use illicit drugs.    ALLERGIES: She is allergic to no known  "allergies; seasonal allergies; and shellfish-derived products.    CURRENT MEDICATIONS: She has a current medication list which includes the following prescription(s): albuterol, blood glucose, blood glucose monitoring, budesonide-formoterol, buspirone hcl, citalopram, b-12, diphenhydramine, doxylamine succinate (sleep), ferrous gluconate, gabapentin, goodsense migraine formula, lisinopril, lorazepam, montelukast, naloxone, omeprazole, oxycodone ir, oxycodone ir, oxycodone ir, polyethylene glycol, promethazine, ranitidine, and sucralfate.     REVIEW OF SYSTEMS: 3 point review of systems is negative except as noted above.     Exam:   Resp 16  Ht 5' 2\" (1.575 m)  Wt 187 lb (84.8 kg)  BMI 34.2 kg/m2       CONSTITUTIONAL: healthy, alert and no distress  HEAD: Normocephalic. No masses, lesions, tenderness or abnormalities  SKIN: noted skin thinning throughout the fingers B  GAIT: normal  PSYCHIATRIC: affect normalpleasant  MUSCULOSKELETAL:   MCP middle finger remains stuck in flexion, some calcification noted at the dorsal knuckle, skin is intact. No paniful MCP nodule noted  Cap refill is sluggish throughout both hands    XR bilateral hands 3 view     Assessment/Plan:   Bilateral hand pain and deformities due to CREST  -- increased fixed flexion in the L middle finger catching on items  -- referral to hand surgery to discuss options for surgical vs. Therapy options to improve function and pain.      Gene Terry MD CAQ        "

## 2018-10-17 NOTE — PROGRESS NOTES
Date of Service: 10/17/2018    Chief Complaint:   Chief Complaint   Patient presents with     Consult     bilateral scleroderma and Neuropathy in both feet. All records internal, per pt        HPI: Molly is a 33 year old female who presents today for further evaluation of pain in the right foot. She has a very complex PMH of scleroderma, CREST syndrome, PE, organ failure. C.diff, and Raynauds among other things. She relates that in 2015 she was admitted to [facility] in [place] with renal and lung failure 2/2 scleroderma. Did have a PE at that time. She relates that after she woke from a coma, she was having quite a lot of pain in the right foot. She relates that she had difficulty walking and underwent quite extensive PT to help her regain her mobility. She was prescribed a brace at that time, however with all of her other medial problems, she did not use it.     Review of Systems: No n/v/d/f/c/ns/sob/cp    PMH:   Past Medical History:   Diagnosis Date     Acute pyelonephritis 6/9/2017     Altered mental state 3/29/2018     Arthritis      Blood clotting disorder (H)     P E     History of blood transfusion      Hypertension      Long-term (current) use of anticoagulants [Z79.01] 9/9/2016     PE (pulmonary embolism) 9/7/2016     Rheumatism      Scleroderma (H) 9/22/2016     Uncomplicated asthma        PSxH: No past surgical history on file.    Allergies: No known allergies; Seasonal allergies; and Shellfish-derived products    SH:   Social History     Social History     Marital status: Single     Spouse name: N/A     Number of children: N/A     Years of education: N/A     Occupational History     Not on file.     Social History Main Topics     Smoking status: Never Smoker     Smokeless tobacco: Never Used     Alcohol use No     Drug use: No     Sexual activity: Yes     Partners: Male     Other Topics Concern     Parent/Sibling W/ Cabg, Mi Or Angioplasty Before 65f 55m? No     Social History Narrative       FH:  "  Family History   Problem Relation Age of Onset     Hyperlipidemia Father      Cerebrovascular Disease Maternal Grandmother      Hyperlipidemia Paternal Grandfather      Diabetes Maternal Aunt      Melanoma No family hx of      Skin Cancer No family hx of        Objective:  98.4 119 18 145/102[Pt states BP  due to not taken Medication[ 5' 2\"[Per patient[ 187 lbs 9.6 oz    PT and DP pulses are 1/4 bilaterally. CRT is 4 seconds. Absent pedal hair.   Gross sensation is diminished bilaterally. Protective sensation is greatly diminished on the right foot as tested with a 5.07G monofilament and normal on the left foot.   Equinus is moderate bilaterally. With ambulation, the TN joint of the right is collapsing at the end of the stance phase, likely causing 1st ray overload proximally with pushoff. Decreased 1st ray ROM and 1st MTPJ. Pain noted along the course of the right PT tendon as it courses around the medial malleolus. Severely hammered digits to the lesser digits BL with R>L. These are not reducible.   Nails normal bilaterally. No open lesions are noted.     right foot and ankke xrays indicated in 6 weightbearing views.    No fractures or other acute processes noted. There is some periarticular osteoporosis around the hallux IPJ and TN joints. Hammered digits. IM angle is 9-10. Fibular sesamoid is cystic and no longer smooth. Tibial sesamoid is either the small christo of bone at the distal medial aspect of the joint of is not present.       Assessment: 34 YO patient with extensive medical history presenting for foot and ankle pain in the right LE.     Plan:  - Pt seen and evaluated.  - Xrays taken and discussed with her.  - I would recommend that we treat this very conservatively at first. I think that a pair of custom orthotics with a right P-wing might be enough to keep her medial TN joint better supported. This will help to decrease the forefoot pressures that are causing pain in the foot with ambulation. She " agrees to this. Molds were sent to the lab. If not covered, can try to get an AFO.  - Will see again in 6 weeks to see if the orthotics are working.

## 2018-10-17 NOTE — MR AVS SNAPSHOT
After Visit Summary   10/17/2018    Molly Teague    MRN: [id number]           Patient Information     Date Of Birth          1985        Visit Information        Provider Department      10/17/2018 8:20 AM Kenroy Cruz DPM [facility]        Today's Diagnoses     PTTD (posterior tibial tendon dysfunction)    -  1    Hallux valgus of right foot          Care Instructions    Thanks for coming today.  [facility]  [address] [place] [id number]    To schedule future appointments in Ortho Clinic, you may call [phone].    To schedule ordered imaging by your provider:   Call Central Imaging [phone]    To schedule an injection ordered by your provider:  Call Central Imaging Injection scheduling line: [phone]  MyChart available online at:  [email or web address]    Please call if any further questions or concerns ([phone]).  Clinic hours 8 am to 5 pm.    Return to clinic (call) if symptoms worsen or fail to improve.            Follow-ups after your visit        Your next 10 appointments already scheduled     Oct 17, 2018 11:30 AM CDT   (Arrive by 11:15 AM)   New Patient Visit with Gene Terry MD   [facility] ([facility])    [address] [address] [phone]            2018 10:00 AM CST   [facility] Visit with Kenroy Cruz DPM   [facility]    [address] [place] [id number] [phone]              Who to contact     If you have questions or need follow up information about today's clinic visit or your schedule please contact [facility] directly at [phone].  Normal or non-critical lab and imaging results will be communicated to you by MyChart, letter or phone within 4 business days after the clinic has received the results.  "If you do not hear from us within 7 days, please contact the clinic through [facility] or phone. If you have a critical or abnormal lab result, we will notify you by phone as soon as possible.  Submit refill requests through LineHop or call your pharmacy and they will forward the refill request to us. Please allow 3 business days for your refill to be completed.          Additional Information About Your Visit        LineHop Information     LineHop is an electronic gateway that provides easy, online access to your medical records. With LineHop, you can request a clinic appointment, read your test results, renew a prescription or communicate with your care team.     To sign up for LineHop visit the website at [email or web address]   You will be asked to enter the access code listed below, as well as some personal information. Please follow the directions to create your username and password.     Your access code is: [email or web address]  Expires: 2018 11:24 AM     Your access code will  in 90 days. If you need help or a new code, please contact your [facility] or call [phone] for assistance.        Care EveryWhere ID     This is your Care EveryWhere ID. This could be used by other organizations to access your [place] medical records  [id number]        Your Vitals Were     Pulse Temperature Respirations Height Pulse Oximetry BMI (Body Mass Index)    119 98.4  F (36.9  C) (Oral) 18 1.575 m (5' 2\") 99% 34.31 kg/m2       Blood Pressure from Last 3 Encounters:   10/17/18 (!) 145/102   10/16/18 130/80   18 140/87    Weight from Last 3 Encounters:   10/17/18 85.1 kg (187 lb 9.6 oz)   10/16/18 83.5 kg (184 lb)   18 83.6 kg (184 lb 3.2 oz)                 Today's Medication Changes          These changes are accurate as of 10/17/18 10:02 AM.  If you have any questions, ask your nurse or doctor.               These medicines have changed or have updated prescriptions.  "       Dose/Directions    sucralfate 1 GM tablet   Commonly known as:  CARAFATE   This may have changed:    - when to take this  - reasons to take this   Used for:  Limited systemic sclerosis (H)        Dose:  1 g   Take 1 tablet (1 g) by mouth 4 times daily   Quantity:  120 tablet   Refills:  11                Primary Care Provider Office Phone # Fax #    Grecia Barba [phone] [address] [facility] [address]        Equal Access to Services     PARISH COOK : Hadii aad ku hadasho Soomaali, waaxda luqadaha, qaybta kaalmada adeegyada, waxay idiin hayaan adeeg ava overton . So [facility] [phone].    ATENCIÓN: Si habla español, tiene a bradley disposición servicios gratuitos de asistencia lingüística. [facility] [phone].    We comply with applicable federal civil rights laws and [place] laws. We do not discriminate on the basis of race, color, national origin, age, disability, sex, sexual orientation, or gender identity.            Thank you!     Thank you for choosing [facility]  for your care. Our goal is always to provide you with excellent care. Hearing back from our patients is one way we can continue to improve our services. Please take a few minutes to complete the written survey that you may receive in the mail after your visit with us. Thank you!             Your Updated Medication List - Protect others around you: Learn how to safely use, store and throw away your medicines at www.disposemymeds.org.          This list is accurate as of 10/17/18 10:02 AM.  Always use your most recent med list.                   Brand Name Dispense Instructions for use Diagnosis    albuterol 108 (90 Base) MCG/ACT inhaler    VENTOLIN HFA    1 Inhaler    Inhale 2 puffs into the lungs every 4 hours as needed for shortness of breath / dyspnea or wheezing    Moderate persistent asthma without complication       B-12 1000 MCG Tbcr     100 tablet    Take 1,000 mcg by mouth  daily    Vitamin B12 deficiency (non anemic)       BENADRYL 25 MG tablet   Generic drug:  diphenhydrAMINE     56 tablet    Take 1 tablet (25 mg) by mouth every 6 hours as needed for itching or allergies        blood glucose lancets standard    no brand specified    100 each    Use to test blood sugar 1 time daily    Hypoglycemia       blood glucose monitoring test strip    no brand specified    100 each    Use to test blood sugar 1 time daily    Hypoglycemia       budesonide-formoterol 160-4.5 MCG/ACT Inhaler    SYMBICORT    6 g    Inhale 2 puffs into the lungs 2 times daily    Moderate persistent asthma without complication       BusPIRone HCl 30 MG Tabs     180 tablet    Take 30 mg by mouth 2 times daily    REX (generalized anxiety disorder)       citalopram 40 MG tablet    celeXA    90 tablet    Take 1 tablet (40 mg) by mouth daily    REX (generalized anxiety disorder)       ferrous gluconate 324 (38 Fe) MG tablet    FERGON    100 tablet    TAKE ONE TABLET BY MOUTH EVERY DAY WITH BREAKFAST    CREST (calcinosis, Raynaud's phenomenon, esophageal dysfunction, sclerodactyly, telangiectasia) (H)       gabapentin 300 MG capsule    NEURONTIN    540 capsule    Take 2 capsules (600 mg) by mouth 3 times daily    Limited systemic sclerosis (H)       GOODSENSE MIGRAINE FORMULA 250-250-65 MG per tablet   Generic drug:  aspirin-acetaminophen-caffeine     24 tablet    TAKE ONE TABLET BY MOUTH EVERY 6 HOURS AS NEEDED FOR HEADACHES    Chronic daily headache       lisinopril 5 MG tablet    PRINIVIL/ZESTRIL    90 tablet    Take 1 tablet (5 mg) by mouth daily    CREST (calcinosis, Raynaud's phenomenon, esophageal dysfunction, sclerodactyly, telangiectasia) (H)       LORazepam 1 MG tablet    ATIVAN    15 tablet    Take 1 tablet (1 mg) by mouth daily as needed for anxiety #15 to last 30 days    REX (generalized anxiety disorder)       montelukast 10 MG tablet    SINGULAIR    90 tablet    Take 1 tablet (10 mg) by mouth At Bedtime     Severe persistent asthma without complication       naloxone nasal spray    NARCAN    0.2 mL    Spray 1 spray (4 mg) into one nostril alternating nostrils as needed for opioid reversal every 2-3 minutes until assistance arrives    Chronic pain syndrome       omeprazole 40 MG capsule    priLOSEC    180 capsule    Take 1 capsule (40 mg) by mouth 2 times daily    CREST (calcinosis, Raynaud's phenomenon, esophageal dysfunction, sclerodactyly, telangiectasia) (H), Gastroesophageal reflux disease with esophagitis       * oxyCODONE IR 15 MG tablet    ROXICODONE    120 tablet    Take 1 tablet (15 mg) by mouth every 4 hours as needed for severe pain    Chronic pain syndrome       * oxyCODONE IR 15 MG tablet   Start taking on:  11/15/2018    ROXICODONE    120 tablet    Take 1 tablet (15 mg) by mouth every 4 hours as needed for severe pain    Chronic pain syndrome       * oxyCODONE IR 15 MG tablet   Start taking on:  12/15/2018    ROXICODONE    120 tablet    Take 1 tablet (15 mg) by mouth every 4 hours as needed for severe pain    Chronic pain syndrome       polyethylene glycol powder    MIRALAX    510 g    Take 17 g (1 capful) by mouth daily    Drug-induced constipation       promethazine 25 MG/ML IV injection    PHENERGAN    180 mL    Inject 1 mL (25 mg) into the vein every 6 hours as needed for nausea or vomiting Inject IM    Gastroesophageal reflux disease with esophagitis       ranitidine 150 MG tablet    ZANTAC    180 tablet    Take 1 tablet (150 mg) by mouth 2 times daily as needed for heartburn    CREST (calcinosis, Raynaud's phenomenon, esophageal dysfunction, sclerodactyly, telangiectasia) (H), Gastroesophageal reflux disease with esophagitis       sucralfate 1 GM tablet    CARAFATE    120 tablet    Take 1 tablet (1 g) by mouth 4 times daily    Limited systemic sclerosis (H)       UNISOM PO           * Notice:  This list has 3 medication(s) that are the same as other medications prescribed for you. Read the  directions carefully, and ask your doctor or other care provider to review them with you.

## 2018-10-17 NOTE — PROGRESS NOTES
Subjective:   Molly Teague is a 33 year old female with CREST Syndrome and Systemic Sclerosis  Presenting with increasing Left middle finger flexion contracture that is catching on items with ADL's    She was originally diagnosed and treated in [place] at the [facility].  She relates she had a systemic reaction to oral steroids at that time that hospitalizized her.  Previous trial of hand braces made at that time. She feels these are painful and not as helpful.  She is using heat. With her Raynaud's she gets increased pain in the hands with getting her hands wet or cold.   Previous systemic reaction that hospialized her due to oral steroids.    She has become more active as a [age] year old.  Also struggling with PTSD and depression for which she is seeking treatment.    Background:   Date of injury: NA   Duration of symptoms: 1 years  Mechanism of Injury: Insidious Onset; Unknown   Aggravating factors: AROM, ADL's  Relieving Factors: gabapentin, heat  Prior Evaluation: None    PAST MEDICAL, SOCIAL, SURGICAL AND FAMILY HISTORY: She  has a past medical history of Acute pyelonephritis (6/9/2017); Altered mental state (3/29/2018); Arthritis; Blood clotting disorder (H); History of blood transfusion; Hypertension; Long-term (current) use of anticoagulants [Z79.01] (9/9/2016); PE (pulmonary embolism) (9/7/2016); Rheumatism; Scleroderma (H) (9/22/2016); and Uncomplicated asthma.  She  has no past surgical history on file.  Her family history includes Cerebrovascular Disease in her maternal grandmother; Diabetes in her maternal aunt; Hyperlipidemia in her father and paternal grandfather. There is no history of Melanoma or Skin Cancer.  She reports that she has never smoked. She has never used smokeless tobacco. She reports that she does not drink alcohol or use illicit drugs.    ALLERGIES: She is allergic to no known allergies; seasonal allergies; and shellfish-derived products.    CURRENT MEDICATIONS: She has a  "current medication list which includes the following prescription(s): albuterol, blood glucose, blood glucose monitoring, budesonide-formoterol, buspirone hcl, citalopram, b-12, diphenhydramine, doxylamine succinate (sleep), ferrous gluconate, gabapentin, goodsense migraine formula, lisinopril, lorazepam, montelukast, naloxone, omeprazole, oxycodone ir, oxycodone ir, oxycodone ir, polyethylene glycol, promethazine, ranitidine, and sucralfate.     REVIEW OF SYSTEMS: 3 point review of systems is negative except as noted above.     Exam:   Resp 16  Ht 5' 2\" (1.575 m)  Wt 187 lb (84.8 kg)  BMI 34.2 kg/m2       CONSTITUTIONAL: healthy, alert and no distress  HEAD: Normocephalic. No masses, lesions, tenderness or abnormalities  SKIN: noted skin thinning throughout the fingers B  GAIT: normal  PSYCHIATRIC: affect normalpleasant  MUSCULOSKELETAL:   MCP middle finger remains stuck in flexion, some calcification noted at the dorsal knuckle, skin is intact. No paniful MCP nodule noted  Cap refill is sluggish throughout both hands    XR bilateral hands 3 view     Assessment/Plan:   Bilateral hand pain and deformities due to CREST  -- increased fixed flexion in the L middle finger catching on items  -- referral to hand surgery to discuss options for surgical vs. Therapy options to improve function and pain.      Gene Terry MD CAQ    "

## 2018-10-17 NOTE — MR AVS SNAPSHOT
After Visit Summary   10/17/2018    Molly Teague    MRN: [id number]           Patient Information     Date Of Birth          1985        Visit Information        Provider Department      10/17/2018 11:30 AM Gene Terry MD [facility] Sports Medicine        Today's Diagnoses     Bilateral hand pain        CREST (calcinosis, Raynaud's phenomenon, esophageal dysfunction, sclerodactyly, telangiectasia) (H)        Scleroderma (H)           Follow-ups after your visit        Additional Services     ORTHOPEDICS ADULT REFERRAL       Your provider has referred you to: [facility]: Orthopaedic Clinic [facility] [phone]   [email or web address]    Please be aware that coverage of these services is subject to the terms and limitations of your health insurance plan.  Call member services at your health plan with any benefit or coverage questions.      Please bring the following to your appointment:    >>   Any x-rays, CTs or MRIs which have been performed.  Contact the facility where they were done to arrange for  prior to your scheduled appointment.    >>   List of current medications   >>   This referral request   >>   Any documents/labs given to you for this referral                  Your next 10 appointments already scheduled     Nov 28, 2018 10:00 AM CST   [facility] Visit [facility] Kenroy Cruz DPM   [facility]    [address] [place] [id number] [phone]            Dec 13, 2018  8:20 AM CST   (Arrive by 8:05 AM)   NEW HAND with Elena Sellers MD   [facility] ([facility])    [address] [place] [id number] [phone]              Future tests that were ordered for you today     Open Future Orders        Priority Expected Expires Ordered    X-ray bl hand 3+ vw Routine  "10/17/2018 10/17/2019 10/17/2018            Who to contact     Please call your clinic at [phone] to:    Ask questions about your health    Make or cancel appointments    Discuss your medicines    Learn about your test results    Speak to your doctor            Additional Information About Your Visit        MyChart Information     [employer] is an electronic gateway that provides easy, online access to your medical records. With [employer], you can request a clinic appointment, read your test results, renew a prescription or communicate with your care team.     To sign up for [employer] visit the website at [email or web address]   You will be asked to enter the access code listed below, as well as some personal information. Please follow the directions to create your username and password.     Your access code is: [email or web address]  Expires: 2018 11:24 AM     Your access code will  in 90 days. If you need help or a new code, please contact your [facility] or call [phone] for assistance.        Care EveryWhere ID     This is your Care EveryWhere ID. This could be used by other organizations to access your [place] medical records  [id number]        Your Vitals Were     Respirations Height BMI (Body Mass Index)             16 5' 2\" (1.575 m) 34.2 kg/m2          Blood Pressure from Last 3 Encounters:   10/17/18 (!) 145/102   10/16/18 130/80   18 140/87    Weight from Last 3 Encounters:   10/17/18 187 lb (84.8 kg)   10/17/18 187 lb 9.6 oz (85.1 kg)   10/16/18 184 lb (83.5 kg)              We Performed the Following     ORTHOPEDICS ADULT REFERRAL          Today's Medication Changes          These changes are accurate as of 10/17/18 11:44 AM.  If you have any questions, ask your nurse or doctor.               These medicines have changed or have updated prescriptions.        Dose/Directions    sucralfate 1 GM tablet   Commonly known as:  CARAFATE   This may have " changed:    - when to take this  - reasons to take this   Used for:  Limited systemic sclerosis (H)        Dose:  1 g   Take 1 tablet (1 g) by mouth 4 times daily   Quantity:  120 tablet   Refills:  11                Primary Care Provider Office Phone # Fax #    Grecia Barba [phone] [address] [facility] [address]        Equal Access to Services     PARISH COOK : Hadii aad ku hadasho Soomaali, waaxda luqadaha, qaybta kaalmada adeegyada, waxay idiin hayaan adeeg khgeorgish ladarcin ah. So [facility] [phone].    ATENCIÓN: Si marisol jarvis, tiene a bradley disposición servicios gratuitos de asistencia lingüística. Llame al [phone].    We comply with applicable federal civil rights laws and [place] laws. We do not discriminate on the basis of race, color, national origin, age, disability, sex, sexual orientation, or gender identity.            Thank you!     Thank you for choosing [facility]  for your care. Our goal is always to provide you with excellent care. Hearing back from our patients is one way we can continue to improve our services. Please take a few minutes to complete the written survey that you may receive in the mail after your visit with us. Thank you!             Your Updated Medication List - Protect others around you: Learn how to safely use, store and throw away your medicines at www.disposemymeds.org.          This list is accurate as of 10/17/18 11:44 AM.  Always use your most recent med list.                   Brand Name Dispense Instructions for use Diagnosis    albuterol 108 (90 Base) MCG/ACT inhaler    VENTOLIN HFA    1 Inhaler    Inhale 2 puffs into the lungs every 4 hours as needed for shortness of breath / dyspnea or wheezing    Moderate persistent asthma without complication       B-12 1000 MCG Tbcr     100 tablet    Take 1,000 mcg by mouth daily    Vitamin B12 deficiency (non anemic)       BENADRYL 25 MG tablet   Generic drug:   diphenhydrAMINE     56 tablet    Take 1 tablet (25 mg) by mouth every 6 hours as needed for itching or allergies        blood glucose lancets standard    no brand specified    100 each    Use to test blood sugar 1 time daily    Hypoglycemia       blood glucose monitoring test strip    no brand specified    100 each    Use to test blood sugar 1 time daily    Hypoglycemia       budesonide-formoterol 160-4.5 MCG/ACT Inhaler    SYMBICORT    6 g    Inhale 2 puffs into the lungs 2 times daily    Moderate persistent asthma without complication       BusPIRone HCl 30 MG Tabs     180 tablet    Take 30 mg by mouth 2 times daily    REX (generalized anxiety disorder)       citalopram 40 MG tablet    celeXA    90 tablet    Take 1 tablet (40 mg) by mouth daily    REX (generalized anxiety disorder)       ferrous gluconate 324 (38 Fe) MG tablet    FERGON    100 tablet    TAKE ONE TABLET BY MOUTH EVERY DAY WITH BREAKFAST    CREST (calcinosis, Raynaud's phenomenon, esophageal dysfunction, sclerodactyly, telangiectasia) (H)       gabapentin 300 MG capsule    NEURONTIN    540 capsule    Take 2 capsules (600 mg) by mouth 3 times daily    Limited systemic sclerosis (H)       GOODSENSE MIGRAINE FORMULA 250-250-65 MG per tablet   Generic drug:  aspirin-acetaminophen-caffeine     24 tablet    TAKE ONE TABLET BY MOUTH EVERY 6 HOURS AS NEEDED FOR HEADACHES    Chronic daily headache       lisinopril 5 MG tablet    PRINIVIL/ZESTRIL    90 tablet    Take 1 tablet (5 mg) by mouth daily    CREST (calcinosis, Raynaud's phenomenon, esophageal dysfunction, sclerodactyly, telangiectasia) (H)       LORazepam 1 MG tablet    ATIVAN    15 tablet    Take 1 tablet (1 mg) by mouth daily as needed for anxiety #15 to last 30 days    REX (generalized anxiety disorder)       montelukast 10 MG tablet    SINGULAIR    90 tablet    Take 1 tablet (10 mg) by mouth At Bedtime    Severe persistent asthma without complication       naloxone nasal spray    NARCAN    0.2 mL     Spray 1 spray (4 mg) into one nostril alternating nostrils as needed for opioid reversal every 2-3 minutes until assistance arrives    Chronic pain syndrome       omeprazole 40 MG capsule    priLOSEC    180 capsule    Take 1 capsule (40 mg) by mouth 2 times daily    CREST (calcinosis, Raynaud's phenomenon, esophageal dysfunction, sclerodactyly, telangiectasia) (H), Gastroesophageal reflux disease with esophagitis       * oxyCODONE IR 15 MG tablet    ROXICODONE    120 tablet    Take 1 tablet (15 mg) by mouth every 4 hours as needed for severe pain    Chronic pain syndrome       * oxyCODONE IR 15 MG tablet   Start taking on:  11/15/2018    ROXICODONE    120 tablet    Take 1 tablet (15 mg) by mouth every 4 hours as needed for severe pain    Chronic pain syndrome       * oxyCODONE IR 15 MG tablet   Start taking on:  12/15/2018    ROXICODONE    120 tablet    Take 1 tablet (15 mg) by mouth every 4 hours as needed for severe pain    Chronic pain syndrome       polyethylene glycol powder    MIRALAX    510 g    Take 17 g (1 capful) by mouth daily    Drug-induced constipation       promethazine 25 MG/ML IV injection    PHENERGAN    180 mL    Inject 1 mL (25 mg) into the vein every 6 hours as needed for nausea or vomiting Inject IM    Gastroesophageal reflux disease with esophagitis       ranitidine 150 MG tablet    ZANTAC    180 tablet    Take 1 tablet (150 mg) by mouth 2 times daily as needed for heartburn    CREST (calcinosis, Raynaud's phenomenon, esophageal dysfunction, sclerodactyly, telangiectasia) (H), Gastroesophageal reflux disease with esophagitis       sucralfate 1 GM tablet    CARAFATE    120 tablet    Take 1 tablet (1 g) by mouth 4 times daily    Limited systemic sclerosis (H)       UNISOM PO           * Notice:  This list has 3 medication(s) that are the same as other medications prescribed for you. Read the directions carefully, and ask your doctor or other care provider to review them with you.

## 2018-10-17 NOTE — NURSING NOTE
"Molly Teague's chief complaint for this visit includes:  Chief Complaint   Patient presents with     Consult     bilateral scleroderma and Neuropathy in both feet. All records internal, per pt     PCP: Grecia Barba    Referring Provider:  Grecia Barba DO  [address] [place] [address]    BP (!) 145/102 (BP Location: Right arm, Patient Position: Sitting, Cuff Size: Adult Large)  Pulse 119  Temp 98.4  F (36.9  C) (Oral)  Resp 18  Ht 1.575 m (5' 2\")  Wt 85.1 kg (187 lb 9.6 oz)  SpO2 99%  BMI 34.31 kg/m2  No Pain (0)     Do you need any medication refills at today's visit? No      "

## 2018-10-17 NOTE — PATIENT INSTRUCTIONS
Thanks for coming today.  Ortho/Sports Medicine Clinic  [address] [place] [address]    To schedule future appointments in Ortho Clinic, you may call [phone].    To schedule ordered imaging by your provider:   Call Central Imaging [phone]    To schedule an injection ordered by your provider:  Call [facility] Injection scheduling line: [phone]  [email or web address] available online at:  [email or web address]    Please call if any further questions or concerns ([phone]).  Clinic hours 8 am to 5 pm.    Return to clinic (call) if symptoms worsen or fail to improve.

## 2018-12-05 ENCOUNTER — OFFICE VISIT (OUTPATIENT)
Dept: PODIATRY | Facility: CLINIC | Age: 33
End: 2018-12-05
Payer: MEDICARE

## 2018-12-05 VITALS — SYSTOLIC BLOOD PRESSURE: 150 MMHG | HEART RATE: 121 BPM | DIASTOLIC BLOOD PRESSURE: 95 MMHG | OXYGEN SATURATION: 98 %

## 2018-12-05 DIAGNOSIS — M76.61 ACHILLES TENDINITIS OF RIGHT LOWER EXTREMITY: Primary | ICD-10-CM

## 2018-12-05 DIAGNOSIS — M76.829 PTTD (POSTERIOR TIBIAL TENDON DYSFUNCTION): ICD-10-CM

## 2018-12-05 PROCEDURE — 99212 OFFICE O/P EST SF 10 MIN: CPT | Performed by: PODIATRIST

## 2018-12-05 ASSESSMENT — PAIN SCALES - GENERAL: PAINLEVEL: SEVERE PAIN (6)

## 2018-12-05 NOTE — NURSING NOTE
Molly Teague's goals for this visit include:   Chief Complaint   Patient presents with     Left Foot - Pain     Right Foot - Pain     RECHECK       She requests these members of her care team be copied on today's visit information: PCP    PCP: Grecia Barba    Referring Provider:  No referring provider defined for this encounter.    BP (!) 150/95 (BP Location: Right arm, Patient Position: Chair, Cuff Size: Adult Large)  Pulse 121  SpO2 98%    Do you need any medication refills at today's visit? None        Shima Edgar CMA

## 2018-12-05 NOTE — LETTER
12/5/2018         RE: Molly Teague  [address] [place] [facility] [address]        Dear Colleague,    Thank you for referring your patient, Molly Teague, to the [facility]. Please see a copy of my visit note below.    Chief Complaint:   Chief Complaint   Patient presents with     Left Foot - Pain     Right Foot - Pain     RECHECK          Allergies   Allergen Reactions     No Known Allergies      Seasonal Allergies      Shellfish-Derived Products Nausea     Rash on face         Subjective: Molly is a 33 year old female who presents to the clinic today for a follow up of right PTTD and Achilles tendonitis. She relates that she has had the orthotics for about 7 days and relates that she is no longer having pain in the right tendons. She does have some pain in her knees, but the more she wears the orthotics, the less this becomes.     Objective  Data Unavailable 121 Data Unavailable 150/95 Data Unavailable 0 lbs 0 oz  Some discomfort noted with palpation of the insertion of the right Achilles tendon and the PT tendon as it courses around the medial malleolus. All other aspects of the PE are unchanged since the last visit.  Orthotics are well balanced.     Assessment: PTTD and Achilles tendonitis on the right - improving symptomatology.     Plan:   - Pt seen and evaluated  - Cont with orthotics.   - Recommend PT for the tendonitis. She agrees to this.   - Pt to return to clinic PRN.       Again, thank you for allowing me to participate in the care of your patient.        Sincerely,        Kenroy Cruz DPM

## 2018-12-05 NOTE — PROGRESS NOTES
Chief Complaint:   Chief Complaint   Patient presents with     Left Foot - Pain     Right Foot - Pain     RECHECK          Allergies   Allergen Reactions     No Known Allergies      Seasonal Allergies      Shellfish-Derived Products Nausea     Rash on face         Subjective: Molly is a 33 year old female who presents to the clinic today for a follow up of right PTTD and Achilles tendonitis. She relates that she has had the orthotics for about 7 days and relates that she is no longer having pain in the right tendons. She does have some pain in her knees, but the more she wears the orthotics, the less this becomes.     Objective  Data Unavailable 121 Data Unavailable 150/95 Data Unavailable 0 lbs 0 oz  Some discomfort noted with palpation of the insertion of the right Achilles tendon and the PT tendon as it courses around the medial malleolus. All other aspects of the PE are unchanged since the last visit.  Orthotics are well balanced.     Assessment: PTTD and Achilles tendonitis on the right - improving symptomatology.     Plan:   - Pt seen and evaluated  - Cont with orthotics.   - Recommend PT for the tendonitis. She agrees to this.   - Pt to return to clinic PRN.

## 2018-12-05 NOTE — PATIENT INSTRUCTIONS
Thanks for coming today.  Ortho/Sports Medicine Clinic  [address] [place] [address]    To schedule future appointments in Ortho Clinic, you may call [phone].    To schedule ordered imaging by your provider:   Call Central [facility] [phone]    To schedule an injection ordered by your provider:  Call [facility] Injection scheduling line: [phone]  [email or web address] available online at:  [email or web address]    Please call if any further questions or concerns ([phone]).  Clinic hours 8 am to 5 pm.    Return to clinic (call) if symptoms worsen or fail to improve.

## 2018-12-05 NOTE — MR AVS SNAPSHOT
After Visit Summary   2018    Molly Teague    MRN: [id number]           Patient Information     Date Of Birth          1985        Visit Information        Provider Department      2018 1:40 PM Kenroy Cruz DPM [facility]        Today's Diagnoses     Achilles tendinitis of right lower extremity    -  1    PTTD (posterior tibial tendon dysfunction)          Care Instructions    Thanks for coming today.  Ortho[facility] Medicine Clinic  [address] [place] [address]    To schedule future appointments in Ortho Clinic, you may call [phone].    To schedule ordered imaging by your provider:   Call Central Imaging [phone]    To schedule an injection ordered by your provider:  Call Central Imaging Injection scheduling line: [phone]  [employer] available online at:  [email or web address]    Please call if any further questions or concerns ([phone]).  Clinic hours 8 am to 5 pm.    Return to clinic (call) if symptoms worsen or fail to improve.            Follow-ups after your visit        Additional Services     PHYSICAL THERAPY REFERRAL (Internal)       Physical Therapy Referral                  Your next 10 appointments already scheduled     Dec 13, 2018  8:20 AM CST   (Arrive by 8:05 AM)   NEW HAND with Elena Sellers MD   [facility] ([facility])    [address] [place] [id number] [phone]              Future tests that were ordered for you today     Open Future Orders        Priority Expected Expires Ordered    PHYSICAL THERAPY REFERRAL (Internal) Routine  2019            Who to contact     If you have questions or need follow up information about today's clinic visit or your schedule please contact [facility] directly at [phone].  Normal or non-critical lab and imaging results will be communicated to  you by MyChart, letter or phone within 4 business days after the clinic has received the results. If you do not hear from us within 7 days, please contact the clinic through [employer] or phone. If you have a critical or abnormal lab result, we will notify you by phone as soon as possible.  Submit refill requests through "[employer]" or call your pharmacy and they will forward the refill request to us. Please allow 3 business days for your refill to be completed.          Additional Information About Your Visit        "[employer]" Information     "[employer]" is an electronic gateway that provides easy, online access to your medical records. With "[employer]", you can request a clinic appointment, read your test results, renew a prescription or communicate with your care team.     To sign up for "[employer]" visit the website at [email or web address]   You will be asked to enter the access code listed below, as well as some personal information. Please follow the directions to create your username and password.     Your access code is: [email or web address]  Expires: 3/5/2019  1:41 PM     Your access code will  in 90 days. If you need help or a new code, please contact your [facility] or call [phone] for assistance.        Care EveryWhere ID     This is your Care EveryWhere ID. This could be used by other organizations to access your [facility] medical records  [id number]        Your Vitals Were     Pulse Pulse Oximetry                121 98%           Blood Pressure from Last 3 Encounters:   18 (!) 150/95   10/17/18 (!) 145/102   10/16/18 130/80    Weight from Last 3 Encounters:   10/17/18 84.8 kg (187 lb)   10/17/18 85.1 kg (187 lb 9.6 oz)   10/16/18 83.5 kg (184 lb)                 Today's Medication Changes          These changes are accurate as of 18  1:58 PM.  If you have any questions, ask your nurse or doctor.               These medicines have changed or have updated prescriptions.         Dose/Directions    sucralfate 1 GM tablet   Commonly known as:  CARAFATE   This may have changed:    - when to take this  - reasons to take this   Used for:  Limited systemic sclerosis (H)        Dose:  1 g   Take 1 tablet (1 g) by mouth 4 times daily   Quantity:  120 tablet   Refills:  11                Primary Care Provider Office Phone # Fax #    Grecia Barba [phone] [facility] [id number]        Equal Access to Services     PARISH COOK : Hadii aad ku hadasho Soomaali, waaxda luqadaha, qaybta kaalmada adeegyada, waxay idiin hayaan adeeg ava overton . So [facility] [phone].    ATENCIÓN: Si habla español, tiene a bradley disposición servicios gratuitos de asistencia lingüística. [facility] [phone].    We comply with applicable federal civil rights laws and [place] laws. We do not discriminate on the basis of race, color, national origin, age, disability, sex, sexual orientation, or gender identity.            Thank you!     Thank you for choosing [facility]  for your care. Our goal is always to provide you with excellent care. Hearing back from our patients is one way we can continue to improve our services. Please take a few minutes to complete the written survey that you may receive in the mail after your visit with us. Thank you!             Your Updated Medication List - Protect others around you: Learn how to safely use, store and throw away your medicines at www.disposemymeds.org.          This list is accurate as of 12/5/18  1:58 PM.  Always use your most recent med list.                   Brand Name Dispense Instructions for use Diagnosis    albuterol 108 (90 Base) MCG/ACT inhaler    VENTOLIN HFA    1 Inhaler    Inhale 2 puffs into the lungs every 4 hours as needed for shortness of breath / dyspnea or wheezing    Moderate persistent asthma without complication       B-12 1000 MCG Tbcr     100 tablet    Take 1,000 mcg by mouth daily     Vitamin B12 deficiency (non anemic)       BENADRYL 25 MG tablet   Generic drug:  diphenhydrAMINE     56 tablet    Take 1 tablet (25 mg) by mouth every 6 hours as needed for itching or allergies        blood glucose lancets standard    NO BRAND SPECIFIED    100 each    Use to test blood sugar 1 time daily    Hypoglycemia       blood glucose monitoring test strip    NO BRAND SPECIFIED    100 each    Use to test blood sugar 1 time daily    Hypoglycemia       budesonide-formoterol 160-4.5 MCG/ACT Inhaler    SYMBICORT    6 g    Inhale 2 puffs into the lungs 2 times daily    Moderate persistent asthma without complication       busPIRone HCl 30 MG tablet    BUSPAR    180 tablet    Take 30 mg by mouth 2 times daily    REX (generalized anxiety disorder)       citalopram 40 MG tablet    celeXA    90 tablet    Take 1 tablet (40 mg) by mouth daily    REX (generalized anxiety disorder)       ferrous gluconate 324 (38 Fe) MG tablet    FERGON    100 tablet    TAKE ONE TABLET BY MOUTH EVERY DAY WITH BREAKFAST    CREST (calcinosis, Raynaud's phenomenon, esophageal dysfunction, sclerodactyly, telangiectasia) (H)       gabapentin 300 MG capsule    NEURONTIN    540 capsule    Take 2 capsules (600 mg) by mouth 3 times daily    Limited systemic sclerosis (H)       GOODSENSE MIGRAINE FORMULA 250-250-65 MG tablet   Generic drug:  aspirin-acetaminophen-caffeine     24 tablet    TAKE ONE TABLET BY MOUTH EVERY 6 HOURS AS NEEDED FOR HEADACHES    Chronic daily headache       lisinopril 5 MG tablet    PRINIVIL/ZESTRIL    90 tablet    Take 1 tablet (5 mg) by mouth daily    CREST (calcinosis, Raynaud's phenomenon, esophageal dysfunction, sclerodactyly, telangiectasia) (H)       LORazepam 1 MG tablet    ATIVAN    15 tablet    Take 1 tablet (1 mg) by mouth daily as needed for anxiety #15 to last 30 days    REX (generalized anxiety disorder)       montelukast 10 MG tablet    SINGULAIR    90 tablet    Take 1 tablet (10 mg) by mouth At Bedtime     Severe persistent asthma without complication       naloxone 4 MG/0.1ML nasal spray    NARCAN    0.2 mL    Spray 1 spray (4 mg) into one nostril alternating nostrils as needed for opioid reversal every 2-3 minutes until assistance arrives    Chronic pain syndrome       omeprazole 40 MG DR capsule    priLOSEC    180 capsule    Take 1 capsule (40 mg) by mouth 2 times daily    CREST (calcinosis, Raynaud's phenomenon, esophageal dysfunction, sclerodactyly, telangiectasia) (H), Gastroesophageal reflux disease with esophagitis       * oxyCODONE IR 15 MG tablet    ROXICODONE    120 tablet    Take 1 tablet (15 mg) by mouth every 4 hours as needed for severe pain    Chronic pain syndrome       * oxyCODONE IR 15 MG tablet    ROXICODONE    120 tablet    Take 1 tablet (15 mg) by mouth every 4 hours as needed for severe pain    Chronic pain syndrome       * oxyCODONE IR 15 MG tablet   Start taking on:  12/15/2018    ROXICODONE    120 tablet    Take 1 tablet (15 mg) by mouth every 4 hours as needed for severe pain    Chronic pain syndrome       polyethylene glycol powder    MIRALAX    510 g    Take 17 g (1 capful) by mouth daily    Drug-induced constipation       promethazine 25 MG/ML IV injection    PHENERGAN    180 mL    Inject 1 mL (25 mg) into the vein every 6 hours as needed for nausea or vomiting Inject IM    Gastroesophageal reflux disease with esophagitis       ranitidine 150 MG tablet    ZANTAC    180 tablet    Take 1 tablet (150 mg) by mouth 2 times daily as needed for heartburn    CREST (calcinosis, Raynaud's phenomenon, esophageal dysfunction, sclerodactyly, telangiectasia) (H), Gastroesophageal reflux disease with esophagitis       sucralfate 1 GM tablet    CARAFATE    120 tablet    Take 1 tablet (1 g) by mouth 4 times daily    Limited systemic sclerosis (H)       UNISOM PO           * Notice:  This list has 3 medication(s) that are the same as other medications prescribed for you. Read the directions carefully, and  ask your doctor or other care provider to review them with you.

## 2018-12-10 ENCOUNTER — HOSPITAL ENCOUNTER (OUTPATIENT)
Dept: PHYSICAL THERAPY | Facility: CLINIC | Age: 33
Setting detail: THERAPIES SERIES
End: 2018-12-10
Attending: PODIATRIST
Payer: MEDICARE

## 2018-12-10 DIAGNOSIS — M76.61 ACHILLES TENDINITIS OF RIGHT LOWER EXTREMITY: ICD-10-CM

## 2018-12-10 DIAGNOSIS — M76.829 PTTD (POSTERIOR TIBIAL TENDON DYSFUNCTION): ICD-10-CM

## 2018-12-10 PROCEDURE — 97110 THERAPEUTIC EXERCISES: CPT | Mod: GP | Performed by: PHYSICAL THERAPIST

## 2018-12-10 PROCEDURE — G8978 MOBILITY CURRENT STATUS: HCPCS | Mod: GP,CJ | Performed by: PHYSICAL THERAPIST

## 2018-12-10 PROCEDURE — 97162 PT EVAL MOD COMPLEX 30 MIN: CPT | Mod: GP | Performed by: PHYSICAL THERAPIST

## 2018-12-10 PROCEDURE — G8979 MOBILITY GOAL STATUS: HCPCS | Mod: GP,CI | Performed by: PHYSICAL THERAPIST

## 2018-12-10 NOTE — PROGRESS NOTES
[facility]          OUTPATIENT PHYSICAL THERAPY ORTHOPEDIC EVALUATION  PLAN OF TREATMENT FOR OUTPATIENT REHABILITATION  (COMPLETE FOR INITIAL CLAIMS ONLY)  Patient's Last Name, First Name, M.I.  YOB: 1985  Molly Teague       Provider s Name:  [facility]   Medical Record No.  [id number]   Start of Care Date:  12/10/18   Onset Date:      Type:     _X__PT   ___OT   ___SLP Medical Diagnosis:        PT Diagnosis:  evette ankle pain   Visits from SOC:  1      _________________________________________________________________________________  Plan of Treatment/Functional Goals:  balance training, joint mobilization, manual therapy, gait training, neuromuscular re-education, ROM, strengthening, stretching  to address impairments above, improve overall function        Goals  Goal Identifier: 1  Goal Description: Pt will be able to stand > 1 min on one foot evette w/o pain  Target Date: 02/18/19    Goal Identifier: 2  Goal Description: Pt will be able to walk > 2 miles with normalized gait pattern without pain  Target Date: 02/18/19    Goal Identifier: 3  Goal Description: Pt will be able to stand > 1 hour without foot or ankle pain  Target Date: 02/18/19    Goal Identifier: 4  Goal Description: Pt will demonstrate independence with updated HEP  Target Date: 02/18/19    Therapy Frequency:  1 time/week  Predicted Duration of Therapy Intervention:  10 weeks    Andrey Gaxiola PT                 I CERTIFY THE NEED FOR THESE SERVICES FURNISHED UNDER        THIS PLAN OF TREATMENT AND WHILE UNDER MY CARE     (Physician co-signature of this document indicates review and certification of the therapy plan).                       Certification Date From:    12/10/2018  Certification Date To:   2/18/2019    Referring Provider:  Kenroy Cruz DPM    Initial Assessment        See Epic Evaluation Start of Care Date: 12/10/18              12/10/18 1400   General Information    Type of Visit Initial OP Ortho PT Evaluation   Start of Care Date 12/10/18   Referring Physician Kenroy Cruz DPM   Patient/Family Goals Statement Improve balance, get rid of pain, get stronger   Orders Evaluate and Treat   Insurance Type Medicare;Other   Insurance Comments/Visits Authorized BCBS secondary AUTH 2 weeks prior to 41st if needed   Medical Diagnosis Achilles Tendonitis R LE, Posterior tibial tendon dysfunction   Surgical/Medical history reviewed Yes   Precautions/Limitations no known precautions/limitations   General Information Comments Scleroderma/neuropathy in both hands and feet, hammer toes, buyon on R foot, HBP, Anemia, Asthma, Rheumatoid arthritis, 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 She reports that her pain increased after spending a long time in the hospital. She has numbness and tingling in the evette LE with a history of neuropathy. She denies any unexpected weight loss, bowel or bladder issues, or dizziness.   Impairments A. Pain;C. Swelling;D. Decreased ROM;E. Decreased flexibility;F. Decreased strength and endurance;G. Impaired balance;H. Impaired gait;K. Numbness;L. Tingling;N. Headaches   Functional Limitations perform activities of daily living;perform desired leisure / sports activities   Symptom Location R foot ankle, L foot ankle   How/Where did it occur With repetition/overuse;From insidious onset   Chronicity Chronic   Pain rating (0-10 point scale) Best (/10);Worst (/10)   Best (/10) 4   Worst (/10) 9   Pain quality C. Aching;E. Shooting;F. Stabbing;G. Cramping   Frequency of pain/symptoms A. Constant   Pain/symptoms are: The same all the time    Pain/symptoms exacerbated by B. Walking;K. Home tasks;L. Work tasks   Pain/symptoms eased by C. Rest;K. Other   Progression of symptoms since onset: Unchanged   Prior Level of Function   Prior Level of Function-Mobility Independent with ADLs/IADLs, takes care of 4 year old child   Current Level of Function   Patient role/employment history G. Disabled;C. Homemaker   Fall Risk Screen   Fall screen completed by PT   Have you fallen 2 or more times in the past year? Yes   Have you fallen and had an injury in the past year? No   Timed Up and Go score (seconds) 10 sec   Is patient a fall risk? Yes   Fall screen comments Pt has history of falls due to neuropathy and is at risk for falls.    System Outcome Measures   Outcome Measures (LEFS 48%)   Ankle/Foot Objective Findings   Side (if bilateral, select both right and left) Right;Left   Observation orthotics in each shoe, more of a medial arch built up on the R    Integumentary bunion on 1st metatarsal   Posture Normal   Gait/Locomotion antalgic on R   Balance/ Proprioception (Single Leg Stance) good on both, slightly off balance on R   Ankle/Foot Special Tests Comments decreased sensation on L>R for L4-L5   Palpation tenderness along achilles tendon and lateral ankle below lateral malleous on R   Accessory Motion/Joint Mobility No tenderness with AP to talus   Right DF (Knee Ext) AROM 4 pain   Right PF AROM 40   Right Calcanceal Inversion AROM 20 pain   Right Calcaneal Eversion AROM 15   Right Great Toe Flexion AROM slight tightness   Additional Right Ankle/Foot ROM Right DF (Knee Ext) PROM;Right PF PROM;Right Calcaneal Inversion PROM;Right Calcaneal Eversion PROM   Right DF/Inversion Strength 4/5 pain   Right DF/Eversion Strength 4+/5 pain   Right PF Strength 4+/5   Right Gastroc (in WB) Flexibility tightness   Right Soleus (in WB) Flexibility tightness   Left DF (Knee Ext) AROM 7   Left PF AROM 45   Left Calcanceal Inversion AROM 30   Left Calcaneal Eversion AROM 20    Left Great Toe Flexion AROM WFL   Additional Left Ankle/Foot ROM Left DF (Knee Ext) PROM;Left PF PROM;Left Calcaneal Inversion PROM;Left Calcaneal Eversion PROM   Left DF/Inversion Strength 5/5   Left DF/Eversion Strength 5/5   Left PF Strength 5/5   Left Gastroc (in WB) Flexibility tightness   Left Soleus (in WB) Flexibility tightness   Right DF (Knee Ext) PROM 8 pain   Right PF PROM 45 pain   Right Calcaneal Inversion PROM 25 pain   Right Calcaneal Eversion PROM 20 pain   Left DF (Knee Ext) PROM 10   Left PF PROM 50   Left Calcaneal Inversion PROM 35   Left Calcaneal Eversion PROM 25   Planned Therapy Interventions   Planned Therapy Interventions balance training;joint mobilization;manual therapy;gait training;neuromuscular re-education;ROM;strengthening;stretching   Planned Therapy Interventions Comment to address impairments above, improve overall function   Clinical Impression   Criteria for Skilled Therapeutic Interventions Met yes, treatment indicated   PT Diagnosis evette ankle pain   Influenced by the following impairments pain, weakness, decreased ROM   Functional limitations due to impairments functional weakness, instability, pain   Clinical Presentation Evolving/Changing   Clinical Presentation Rationale Pt judgement, subjective report, objective data, comorbidities   Clinical Decision Making (Complexity) Moderate complexity   Therapy Frequency 1 time/week   Predicted Duration of Therapy Intervention (days/wks) 10 weeks   Risk & Benefits of therapy have been explained Yes   Patient, Family & other staff in agreement with plan of care Yes   Clinical Impression Comments Pt is a pleasant 34 y/o female presenting to PT with evette ankle pain. She will benefit from skilled PT to address problems list above. Pt is a fair PT candidate.   Education Assessment   Preferred Learning Style Listening;Demonstration;Pictures/video   Barriers to Learning No barriers   ORTHO GOALS   PT Ortho Eval Goals 1;3;2;4   Ortho Goal  1   Goal Identifier 1   Goal Description Pt will be able to stand > 1 min on one foot evette w/o pain   Target Date 02/18/19   Ortho Goal 2   Goal Identifier 2   Goal Description Pt will be able to walk > 2 miles with normalized gait pattern without pain   Target Date 02/18/19   Ortho Goal 3   Goal Identifier 3   Goal Description Pt will be able to stand > 1 hour without foot or ankle pain   Target Date 02/18/19   Ortho Goal 4   Goal Identifier 4   Goal Description Pt will demonstrate independence with updated HEP   Target Date 02/18/19   Total Evaluation Time   PT Eval, Moderate Complexity Minutes (34588) 30       Please Contact me with any questions or concerns. Thank you for for patience and cooperation.     Andrey Gaxiola PT, DPT  Flexible Workforce Physical Therapist   [facility]  [email or web address]

## 2018-12-12 NOTE — PROGRESS NOTES
[facility]  Orthopedics  Elena Sellers MD  2018     Name: Molly Teague  MRN: [id number]  Age: 33 year old  : 1985  Referring provider: Gnee Terry     Chief Complaint: Consult (Left middle finger )     Date of Injury: None    History of Present Illness:   Molly Teague is a 33 year old, ambidextrous handed female who presents today for evaluation of left middle finger flexion contracture referred by Dr. Terry.  The patient is on medical disability for her scleroderma.  The patient's past medical history is significant for CREST syndrome and systemic scleroderma.  The patient's major complaint today is the inability to extend her fingers, most notably her left middle finger.  The patient reports that she is never had surgery on her hands before.  She has had splints made for her hand, most recently back in .  The splints have not been changed since then and she wears some intermittent patient notes that the flexion contracture of her left middle finger has been getting worse over the last year.  She has seen hand therapy near her home in [place], [place] reports that it was not very aggressive with stretching exercises.  She would like to try any treatment, whether is operative or nonoperative to help with getting better extension of the fingers, most notably the left middle finger.  She denies any numbness and tingling the fingers.  Denies any injuries to the fingers.  She does report getting ulcerations on the dorsum of the PIP joint, although at this time she does not have any ulcerations.    Review of Systems:   A 10-point review of systems was obtained and is negative except for as noted in the HPI.     Medications:   Current Outpatient Medications:      albuterol (VENTOLIN HFA) 108 (90 Base) MCG/ACT Inhaler, Inhale 2 puffs into the lungs every 4 hours as needed for shortness of breath / dyspnea or wheezing, Disp: 1 Inhaler, Rfl: 11     blood glucose (NO BRAND  SPECIFIED) lancets standard, Use to test blood sugar 1 time daily, Disp: 100 each, Rfl: 3     blood glucose monitoring (NO BRAND SPECIFIED) test strip, Use to test blood sugar 1 time daily, Disp: 100 each, Rfl: 3     budesonide-formoterol (SYMBICORT) 160-4.5 MCG/ACT Inhaler, Inhale 2 puffs into the lungs 2 times daily, Disp: 6 g, Rfl: 11     BusPIRone HCl 30 MG TABS, Take 30 mg by mouth 2 times daily, Disp: 180 tablet, Rfl: 3     citalopram (CELEXA) 40 MG tablet, Take 1 tablet (40 mg) by mouth daily, Disp: 90 tablet, Rfl: 3     Cyanocobalamin (B-12) 1000 MCG TBCR, Take 1,000 mcg by mouth daily, Disp: 100 tablet, Rfl: 1     diphenhydrAMINE (BENADRYL) 25 MG tablet, Take 1 tablet (25 mg) by mouth every 6 hours as needed for itching or allergies, Disp: 56 tablet, Rfl:      Doxylamine Succinate, Sleep, (UNISOM PO), , Disp: , Rfl:      ferrous gluconate (FERGON) 324 (38 FE) MG tablet, TAKE ONE TABLET BY MOUTH EVERY DAY WITH BREAKFAST, Disp: 100 tablet, Rfl: 3     gabapentin (NEURONTIN) 300 MG capsule, Take 2 capsules (600 mg) by mouth 3 times daily, Disp: 540 capsule, Rfl: 3     GOODSENSE MIGRAINE FORMULA 250-250-65 MG per tablet, TAKE ONE TABLET BY MOUTH EVERY 6 HOURS AS NEEDED FOR HEADACHES, Disp: 24 tablet, Rfl: 1     lisinopril (PRINIVIL/ZESTRIL) 5 MG tablet, Take 1 tablet (5 mg) by mouth daily, Disp: 90 tablet, Rfl: 3     LORazepam (ATIVAN) 1 MG tablet, Take 1 tablet (1 mg) by mouth daily as needed for anxiety #15 to last 30 days, Disp: 15 tablet, Rfl: 5     montelukast (SINGULAIR) 10 MG tablet, Take 1 tablet (10 mg) by mouth At Bedtime, Disp: 90 tablet, Rfl: 3     naloxone (NARCAN) nasal spray, Spray 1 spray (4 mg) into one nostril alternating nostrils as needed for opioid reversal every 2-3 minutes until assistance arrives, Disp: 0.2 mL, Rfl: 3     omeprazole (PRILOSEC) 40 MG capsule, Take 1 capsule (40 mg) by mouth 2 times daily, Disp: 180 capsule, Rfl: 3     oxyCODONE IR (ROXICODONE) 15 MG tablet, Take 1 tablet  "(15 mg) by mouth every 4 hours as needed for severe pain, Disp: 120 tablet, Rfl: 0     oxyCODONE IR (ROXICODONE) 15 MG tablet, Take 1 tablet (15 mg) by mouth every 4 hours as needed for severe pain, Disp: 120 tablet, Rfl: 0     [START ON 12/15/2018] oxyCODONE IR (ROXICODONE) 15 MG tablet, Take 1 tablet (15 mg) by mouth every 4 hours as needed for severe pain, Disp: 120 tablet, Rfl: 0     polyethylene glycol (MIRALAX) powder, Take 17 g (1 capful) by mouth daily, Disp: 510 g, Rfl: 11     promethazine (PHENERGAN) 25 MG/ML IV injection, Inject 1 mL (25 mg) into the vein every 6 hours as needed for nausea or vomiting Inject IM, Disp: 180 mL, Rfl: 11     ranitidine (ZANTAC) 150 MG tablet, Take 1 tablet (150 mg) by mouth 2 times daily as needed for heartburn, Disp: 180 tablet, Rfl: 3     sucralfate (CARAFATE) 1 GM tablet, Take 1 tablet (1 g) by mouth 4 times daily (Patient taking differently: Take 1 g by mouth 4 times daily as needed ), Disp: 120 tablet, Rfl: 11    Allergies:  The patient reports no known allergies.    Past Medical History:  Acute pyelonephritis - 6/9/2017  Pulmonary embolism - 9/7/2016  Scleroderma - 9/22/2018    Past Surgical History:  No past surgical history on file.     Social History:  Patient lives with her  and 4-year-old.  On medical disability for her scleroderma. She denies tobacco use and denies any alcohol use.     Family History:  Patient has a family history of hyperlipidemia (father, grandfather) and cerebrovascular disease (grandmother)    Physical Examination:  Height 1.6 m (5' 2.99\"), weight 86.2 kg (190 lb), not currently breastfeeding.   strength: 55 pounds right hand, 45 pounds left hand  Pinch strength: 13 pounds right hand, 10 pounds left hand  General: Healthy appearing female. Affect appropriate. Normal gait. Alert and oriented to surroundings.   Left Upper Extremity: Patchy contracted tissue in the dorsum of the hand.  Dry skin noted dorsally and palmarly.  Fingers " held in a flexed position, middle finger most notably held in a flexed position greater than 90 degrees at the PIP joint.  Sensation intact in the ulnar and median nerve distribution to 4 mm two-point.  Good cap refill in the fingers.  Normal radial pulse.  Able to independently fire FDP and FDS in all fingers.  Able fire FPL and EPL.  With the finger held in flexion unable to extend the PIP joint of the middle finger.  Flexor tendons glide appropriately.  No significant adherence of the skin, skin mildly mobile.    Imaging: X-ray of the bilateral hands reviewed from 10/17/2018.  No arthrosis noted in the PIP joint of the left middle finger.  No significant arthrosis is noted in any of the other joints.  No dislocations noted.    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    Plan:   We discussed with the patient that this is a complex situation for her fingers.  We would like to try and avoid surgery as best we can, as wound healing issues are common in patients with scleroderma.  We would like to try nonoperative management with hand splints, to be used during the night and daytime.  Will make hand splints for the left and right hand that can be alternated at night.  Will contact her with hand therapy here at the [facility] to work on aggressive range of motion and stretching exercises.  Will trial this nonoperative management for 3 months, and if she does not show improvements in her range of motion, will discuss operative interventions.  All questions were answered.  Return to clinic in 3 months for reevaluation.    Rodger Xavier MD  Orthopaedic Resident    I have personally examined this patient and have reviewed the clinical presentation and progress note with the resident. I agree with the treatment plan as  outlined. The plan was formulated with the resident on the day of the resident's dictation.   Elena Sellers MD   Hand and Upper Extremity Specialist  [facility]      Answers for HPI/ROS submitted by the patient on 12/13/2018   General Symptoms: Yes  Skin Symptoms: Yes  HENT Symptoms: Yes  EYE SYMPTOMS: No  HEART SYMPTOMS: Yes  LUNG SYMPTOMS: Yes  INTESTINAL SYMPTOMS: Yes  URINARY SYMPTOMS: No  GYNECOLOGIC SYMPTOMS: No  BREAST SYMPTOMS: No  SKELETAL SYMPTOMS: Yes  BLOOD SYMPTOMS: Yes  NERVOUS SYSTEM SYMPTOMS: Yes  MENTAL HEALTH SYMPTOMS: Yes  Fever: No  Loss of appetite: Yes  Weight loss: No  Weight gain: Yes  Fatigue: Yes  Night sweats: Yes  Chills: No  Increased stress: No  Excessive hunger: Yes  Excessive thirst: Yes  Feeling hot or cold when others believe the temperature is normal: Yes  Loss of height: No  Post-operative complications: No  Surgical site pain: No  Hallucinations: No  Change in or Loss of Energy: Yes  Hyperactivity: No  Confusion: No  Changes in hair: No  Changes in moles/birth marks: No  Itching: No  Rashes: No  Changes in nails: No  Acne: No  Hair in places you don't want it: No  Change in facial hair: No  Warts: No  Non-healing sores: No  Scarring: No  Flaking of skin: No  Color changes of hands/feet in cold : Yes  Sun sensitivity: No  Skin thickening: No  Ear pain: No  Ear discharge: No  Hearing loss: No  Tinnitus: No  Nosebleeds: Yes  Congestion: No  Sinus pain: Yes  Trouble swallowing: Yes   Voice hoarseness: No  Mouth sores: No  Sore throat: No  Tooth pain: No  Gum tenderness: No  Bleeding gums: No  Change in taste: Yes  Change in sense of smell: No  Dry mouth: Yes  Hearing aid used: No  Neck lump: No  Cough: No  Sputum or phlegm: Yes  Coughing up blood: No  Difficulty breating or shortness of breath: Yes  Snoring: Yes  Wheezing: Yes  Difficulty breathing on exertion: Yes  Nighttime Cough: Yes  Difficulty breathing when lying flat: No  Chest pain or pressure: No  Fast or  irregular heartbeat: Yes  Pain in legs with walking: Yes  Trouble breathing while lying down: No  Fingers or toes appear blue: Yes  High blood pressure: Yes  Low blood pressure: No  Fainting: No  Murmurs: No  Pacemaker: No  Varicose veins: No  Edema or swelling: Yes  Wake up at night with shortness of breath: Yes  Light-headedness: Yes  Exercise intolerance: No  Heart burn or indigestion: Yes  Nausea: Yes  Vomiting: Yes  Abdominal pain: No  Bloating: No  Constipation: Yes  Diarrhea: No  Blood in stool: No  Black stools: No  Rectal or Anal pain: No  Fecal incontinence: No  Yellowing of skin or eyes: No  Vomit with blood: No  Change in stools: Yes  Back pain: Yes  Muscle aches: Yes  Neck pain: No  Swollen joints: Yes  Joint pain: Yes  Bone pain: No  Muscle cramps: No  Muscle weakness: No  Joint stiffness: Yes  Bone fracture: No  Anemia: Yes  Swollen glands: No  Easy bleeding or bruising: No  Trouble with coordination: No  Dizziness or trouble with balance: Yes  Fainting or black-out spells: No  Memory loss: Yes  Headache: Yes  Seizures: No  Speech problems: No  Tingling: Yes  Tremor: No  Weakness: Yes  Difficulty walking: Yes  Paralysis: No  Numbness: Yes  Nervous or Anxious: Yes  Depression: Yes  Trouble sleeping: Yes  Trouble thinking or concentrating: Yes  Mood changes: Yes  Panic attacks: Yes

## 2018-12-13 ENCOUNTER — OFFICE VISIT (OUTPATIENT)
Dept: ORTHOPEDICS | Facility: CLINIC | Age: 33
End: 2018-12-13
Payer: MEDICARE

## 2018-12-13 ENCOUNTER — THERAPY VISIT (OUTPATIENT)
Dept: OCCUPATIONAL THERAPY | Facility: CLINIC | Age: 33
End: 2018-12-13
Payer: MEDICARE

## 2018-12-13 VITALS — WEIGHT: 190 LBS | BODY MASS INDEX: 33.66 KG/M2 | HEIGHT: 63 IN

## 2018-12-13 DIAGNOSIS — M24.541 CONTRACTURE OF HAND JOINT, RIGHT: Primary | ICD-10-CM

## 2018-12-13 DIAGNOSIS — M24.549 CONTRACTURE OF JOINT OF HAND, UNSPECIFIED LATERALITY: Primary | ICD-10-CM

## 2018-12-13 DIAGNOSIS — M24.542 CONTRACTURE OF HAND JOINT, LEFT: ICD-10-CM

## 2018-12-13 PROCEDURE — G8984 CARRY CURRENT STATUS: HCPCS | Mod: GO | Performed by: OCCUPATIONAL THERAPIST

## 2018-12-13 PROCEDURE — 97760 ORTHOTIC MGMT&TRAING 1ST ENC: CPT | Mod: GO | Performed by: OCCUPATIONAL THERAPIST

## 2018-12-13 PROCEDURE — 97166 OT EVAL MOD COMPLEX 45 MIN: CPT | Mod: GO | Performed by: OCCUPATIONAL THERAPIST

## 2018-12-13 PROCEDURE — G8985 CARRY GOAL STATUS: HCPCS | Mod: GO | Performed by: OCCUPATIONAL THERAPIST

## 2018-12-13 ASSESSMENT — ENCOUNTER SYMPTOMS
DEPRESSION: 1
TREMORS: 0
HALLUCINATIONS: 0
POLYPHAGIA: 1
FATIGUE: 1
ALTERED TEMPERATURE REGULATION: 1
WEAKNESS: 1
PANIC: 1
BACK PAIN: 1
DIZZINESS: 1
SNORES LOUDLY: 1
MUSCLE WEAKNESS: 0
NERVOUS/ANXIOUS: 1
TASTE DISTURBANCE: 1
MYALGIAS: 1
POOR WOUND HEALING: 0
HEADACHES: 1
ARTHRALGIAS: 1
NAIL CHANGES: 0
COUGH DISTURBING SLEEP: 1
BOWEL INCONTINENCE: 0
PARALYSIS: 0
POSTURAL DYSPNEA: 0
SKIN CHANGES: 0
NAUSEA: 1
NECK PAIN: 0
HEMOPTYSIS: 0
SORE THROAT: 0
DECREASED CONCENTRATION: 1
ABDOMINAL PAIN: 0
VOMITING: 1
SPUTUM PRODUCTION: 1
NECK MASS: 0
LOSS OF CONSCIOUSNESS: 0
EXERCISE INTOLERANCE: 0
DIARRHEA: 0
FEVER: 0
JOINT SWELLING: 1
SHORTNESS OF BREATH: 1
WHEEZING: 1
ORTHOPNEA: 0
POLYDIPSIA: 1
DECREASED APPETITE: 1
HEARTBURN: 1
RECTAL PAIN: 0
HYPOTENSION: 0
LIGHT-HEADEDNESS: 1
WEIGHT GAIN: 1
BRUISES/BLEEDS EASILY: 0
SMELL DISTURBANCE: 0
MUSCLE CRAMPS: 0
MEMORY LOSS: 1
NIGHT SWEATS: 1
BLOATING: 0
SLEEP DISTURBANCES DUE TO BREATHING: 1
COUGH: 0
BLOOD IN STOOL: 0
STIFFNESS: 1
DISTURBANCES IN COORDINATION: 0
HOARSE VOICE: 0
SINUS PAIN: 1
SWOLLEN GLANDS: 0
SINUS CONGESTION: 0
INSOMNIA: 1
CHILLS: 0
PALPITATIONS: 1
TROUBLE SWALLOWING: 1
JAUNDICE: 0
NUMBNESS: 1
INCREASED ENERGY: 1
HYPERTENSION: 1
CONSTIPATION: 1
LEG PAIN: 1
SEIZURES: 0
SPEECH CHANGE: 0
SYNCOPE: 0
WEIGHT LOSS: 0
TINGLING: 1
DYSPNEA ON EXERTION: 1

## 2018-12-13 ASSESSMENT — MIFFLIN-ST. JEOR: SCORE: 1535.83

## 2018-12-13 NOTE — NURSING NOTE
"Reason For Visit:   Chief Complaint   Patient presents with     Consult     Left middle finger        Primary MD: Grecia Barba  Ref. MD: Dr. Terry    Age: 33 year old    ?  No      Ht 1.6 m (5' 2.99\")   Wt 86.2 kg (190 lb)   BMI 33.67 kg/m        Pain Assessment  Patient Currently in Pain: Denies(has chronic pain and cannot use her hands for very long)    Hand Dominance Evaluation  Hand Dominance: Right  Pinch force  R hand key force: 5.897 kg (13 lb)  L hand key force: 4.536 kg (10 lb)   force  R hand  level 2 force: 24.9 kg (55 lb)  L hand  level  2 force: 18.1 kg (40 lb)    QuickDASH Assessment  QuickDASH Main 12/13/2018   1.Open a tight or new jar. Severe difficulty   2. Do heavy household chores (e.g., wash walls, floors) Moderate difficulty   3. Carry a shopping bag or briefcase. No difficulty   4. Wash your back. Moderate difficulty   5. Use a knife to cut food. Mild difficulty   6. Recreational activities in which you take some force or impact through your arm, shoulder or hand (e.g., golf, hammering, tennis, etc.). Mild difficulty   7. During the past week, to what extent has your arm, shoulder or hand problem interfered with your normal social activities with family, friends, neighbours or groups? Moderately   8. During the past week, were you limited in your work or other regular daily activities as a result of your arm, shoulder or hand problem? Moderately limited   9. Arm, shoulder or hand pain. Moderate   10.Tingling (pins and needles) in your arm,shoulder or hand. Moderate   11. During the past week, how much difficulty have you had sleeping because of the pain in your arm, shoulder or hand? ([place] number) Moderate difficulty   Quickdash Ability Score 43.18          Current Outpatient Medications   Medication Sig Dispense Refill     albuterol (VENTOLIN HFA) 108 (90 Base) MCG/ACT Inhaler Inhale 2 puffs into the lungs every 4 hours as needed for shortness of breath / " dyspnea or wheezing 1 Inhaler 11     blood glucose (NO BRAND SPECIFIED) lancets standard Use to test blood sugar 1 time daily 100 each 3     blood glucose monitoring (NO BRAND SPECIFIED) test strip Use to test blood sugar 1 time daily 100 each 3     budesonide-formoterol (SYMBICORT) 160-4.5 MCG/ACT Inhaler Inhale 2 puffs into the lungs 2 times daily 6 g 11     BusPIRone HCl 30 MG TABS Take 30 mg by mouth 2 times daily 180 tablet 3     citalopram (CELEXA) 40 MG tablet Take 1 tablet (40 mg) by mouth daily 90 tablet 3     Cyanocobalamin (B-12) 1000 MCG TBCR Take 1,000 mcg by mouth daily 100 tablet 1     diphenhydrAMINE (BENADRYL) 25 MG tablet Take 1 tablet (25 mg) by mouth every 6 hours as needed for itching or allergies 56 tablet      Doxylamine Succinate, Sleep, (UNISOM PO)        ferrous gluconate (FERGON) 324 (38 FE) MG tablet TAKE ONE TABLET BY MOUTH EVERY DAY WITH BREAKFAST 100 tablet 3     gabapentin (NEURONTIN) 300 MG capsule Take 2 capsules (600 mg) by mouth 3 times daily 540 capsule 3     GOODSENSE MIGRAINE FORMULA 250-250-65 MG per tablet TAKE ONE TABLET BY MOUTH EVERY 6 HOURS AS NEEDED FOR HEADACHES 24 tablet 1     lisinopril (PRINIVIL/ZESTRIL) 5 MG tablet Take 1 tablet (5 mg) by mouth daily 90 tablet 3     LORazepam (ATIVAN) 1 MG tablet Take 1 tablet (1 mg) by mouth daily as needed for anxiety #15 to last 30 days 15 tablet 5     montelukast (SINGULAIR) 10 MG tablet Take 1 tablet (10 mg) by mouth At Bedtime 90 tablet 3     naloxone (NARCAN) nasal spray Spray 1 spray (4 mg) into one nostril alternating nostrils as needed for opioid reversal every 2-3 minutes until assistance arrives 0.2 mL 3     omeprazole (PRILOSEC) 40 MG capsule Take 1 capsule (40 mg) by mouth 2 times daily 180 capsule 3     oxyCODONE IR (ROXICODONE) 15 MG tablet Take 1 tablet (15 mg) by mouth every 4 hours as needed for severe pain 120 tablet 0     oxyCODONE IR (ROXICODONE) 15 MG tablet Take 1 tablet (15 mg) by mouth every 4 hours as  needed for severe pain 120 tablet 0     [START ON 12/15/2018] oxyCODONE IR (ROXICODONE) 15 MG tablet Take 1 tablet (15 mg) by mouth every 4 hours as needed for severe pain 120 tablet 0     polyethylene glycol (MIRALAX) powder Take 17 g (1 capful) by mouth daily 510 g 11     promethazine (PHENERGAN) 25 MG/ML IV injection Inject 1 mL (25 mg) into the vein every 6 hours as needed for nausea or vomiting Inject  mL 11     ranitidine (ZANTAC) 150 MG tablet Take 1 tablet (150 mg) by mouth 2 times daily as needed for heartburn 180 tablet 3     sucralfate (CARAFATE) 1 GM tablet Take 1 tablet (1 g) by mouth 4 times daily (Patient taking differently: Take 1 g by mouth 4 times daily as needed ) 120 tablet 11       Allergies   Allergen Reactions     No Known Allergies      Seasonal Allergies      Shellfish-Derived Products Nausea     Rash on face       Jayleen Muñoz, ATC

## 2018-12-13 NOTE — LETTER
2018       RE: Molly Teague  [address] [facility] [address]     Dear Colleague,    Thank you for referring your patient, Molly Teague, to [facility]. Please see a copy of my visit note below.    [facility]  Orthopedics  Elena Sellers MD  2018     Name: Molly Teague  MRN: [id number]  Age: 33 year old  : 1985  Referring provider: Gene Terry     Chief Complaint: Consult (Left middle finger )     Date of Injury: None    History of Present Illness:   Molly Teague is a 33 year old, ambidextrous handed female who presents today for evaluation of left middle finger flexion contracture referred by Dr. Terry.  The patient is on medical disability for her scleroderma.  The patient's past medical history is significant for CREST syndrome and systemic scleroderma.  The patient's major complaint today is the inability to extend her fingers, most notably her left middle finger.  The patient reports that she is never had surgery on her hands before.  She has had splints made for her hand, most recently back in 2016.  The splints have not been changed since then and she wears some intermittent patient notes that the flexion contracture of her left middle finger has been getting worse over the last year.  She has seen hand therapy near her home in [place], [place] reports that it was not very aggressive with stretching exercises.  She would like to try any treatment, whether is operative or nonoperative to help with getting better extension of the fingers, most notably the left middle finger.  She denies any numbness and tingling the fingers.  Denies any injuries to the fingers.  She does report getting ulcerations on the dorsum of the PIP joint, although at this time she does not have any ulcerations.    Medications:   Current Outpatient Medications:      albuterol (VENTOLIN HFA) 108 (90 Base)  MCG/ACT Inhaler, Inhale 2 puffs into the lungs every 4 hours as needed for shortness of breath / dyspnea or wheezing, Disp: 1 Inhaler, Rfl: 11     blood glucose (NO BRAND SPECIFIED) lancets standard, Use to test blood sugar 1 time daily, Disp: 100 each, Rfl: 3     blood glucose monitoring (NO BRAND SPECIFIED) test strip, Use to test blood sugar 1 time daily, Disp: 100 each, Rfl: 3     budesonide-formoterol (SYMBICORT) 160-4.5 MCG/ACT Inhaler, Inhale 2 puffs into the lungs 2 times daily, Disp: 6 g, Rfl: 11     BusPIRone HCl 30 MG TABS, Take 30 mg by mouth 2 times daily, Disp: 180 tablet, Rfl: 3     citalopram (CELEXA) 40 MG tablet, Take 1 tablet (40 mg) by mouth daily, Disp: 90 tablet, Rfl: 3     Cyanocobalamin (B-12) 1000 MCG TBCR, Take 1,000 mcg by mouth daily, Disp: 100 tablet, Rfl: 1     diphenhydrAMINE (BENADRYL) 25 MG tablet, Take 1 tablet (25 mg) by mouth every 6 hours as needed for itching or allergies, Disp: 56 tablet, Rfl:      Doxylamine Succinate, Sleep, (UNISOM PO), , Disp: , Rfl:      ferrous gluconate (FERGON) 324 (38 FE) MG tablet, TAKE ONE TABLET BY MOUTH EVERY DAY WITH BREAKFAST, Disp: 100 tablet, Rfl: 3     gabapentin (NEURONTIN) 300 MG capsule, Take 2 capsules (600 mg) by mouth 3 times daily, Disp: 540 capsule, Rfl: 3     GOODSENSE MIGRAINE FORMULA 250-250-65 MG per tablet, TAKE ONE TABLET BY MOUTH EVERY 6 HOURS AS NEEDED FOR HEADACHES, Disp: 24 tablet, Rfl: 1     lisinopril (PRINIVIL/ZESTRIL) 5 MG tablet, Take 1 tablet (5 mg) by mouth daily, Disp: 90 tablet, Rfl: 3     LORazepam (ATIVAN) 1 MG tablet, Take 1 tablet (1 mg) by mouth daily as needed for anxiety #15 to last 30 days, Disp: 15 tablet, Rfl: 5     montelukast (SINGULAIR) 10 MG tablet, Take 1 tablet (10 mg) by mouth At Bedtime, Disp: 90 tablet, Rfl: 3     naloxone (NARCAN) nasal spray, Spray 1 spray (4 mg) into one nostril alternating nostrils as needed for opioid reversal every 2-3 minutes until assistance arrives, Disp: 0.2 mL, Rfl: 3     " omeprazole (PRILOSEC) 40 MG capsule, Take 1 capsule (40 mg) by mouth 2 times daily, Disp: 180 capsule, Rfl: 3     oxyCODONE IR (ROXICODONE) 15 MG tablet, Take 1 tablet (15 mg) by mouth every 4 hours as needed for severe pain, Disp: 120 tablet, Rfl: 0     oxyCODONE IR (ROXICODONE) 15 MG tablet, Take 1 tablet (15 mg) by mouth every 4 hours as needed for severe pain, Disp: 120 tablet, Rfl: 0     [START ON 12/15/2018] oxyCODONE IR (ROXICODONE) 15 MG tablet, Take 1 tablet (15 mg) by mouth every 4 hours as needed for severe pain, Disp: 120 tablet, Rfl: 0     polyethylene glycol (MIRALAX) powder, Take 17 g (1 capful) by mouth daily, Disp: 510 g, Rfl: 11     promethazine (PHENERGAN) 25 MG/ML IV injection, Inject 1 mL (25 mg) into the vein every 6 hours as needed for nausea or vomiting Inject IM, Disp: 180 mL, Rfl: 11     ranitidine (ZANTAC) 150 MG tablet, Take 1 tablet (150 mg) by mouth 2 times daily as needed for heartburn, Disp: 180 tablet, Rfl: 3     sucralfate (CARAFATE) 1 GM tablet, Take 1 tablet (1 g) by mouth 4 times daily (Patient taking differently: Take 1 g by mouth 4 times daily as needed ), Disp: 120 tablet, Rfl: 11    Allergies:  The patient reports no known allergies.    Past Medical History:  Acute pyelonephritis - 6/9/2017  Pulmonary embolism - 9/7/2016  Scleroderma - 9/22/2018    Past Surgical History:  No past surgical history on file.     Social History:  Patient lives with her  and 4-year-old.  On medical disability for her scleroderma. She denies tobacco use and denies any alcohol use.     Family History:  Patient has a family history of hyperlipidemia (father, grandfather) and cerebrovascular disease (grandmother)    Physical Examination:  Height 1.6 m (5' 2.99\"), weight 86.2 kg (190 lb), not currently breastfeeding.   strength: 55 pounds right hand, 45 pounds left hand  Pinch strength: 13 pounds right hand, 10 pounds left hand  General: Healthy appearing female. Affect appropriate. Normal " gait. Alert and oriented to surroundings.   Left Upper Extremity: Patchy contracted tissue in the dorsum of the hand.  Dry skin noted dorsally and palmarly.  Fingers held in a flexed position, middle finger most notably held in a flexed position greater than 90 degrees at the PIP joint.  Sensation intact in the ulnar and median nerve distribution to 4 mm two-point.  Good cap refill in the fingers.  Normal radial pulse.  Able to independently fire FDP and FDS in all fingers.  Able fire FPL and EPL.  With the finger held in flexion unable to extend the PIP joint of the middle finger.  Flexor tendons glide appropriately.  No significant adherence of the skin, skin mildly mobile.    Imaging: X-ray of the bilateral hands reviewed from 10/17/2018.  No arthrosis noted in the PIP joint of the left middle finger.  No significant arthrosis is noted in any of the other joints.  No dislocations noted.    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    Plan:   We discussed with the patient that this is a complex situation for her fingers.  We would like to try and avoid surgery as best we can, as wound healing issues are common in patients with scleroderma.  We would like to try nonoperative management with hand splints, to be used during the night and daytime.  Will make hand splints for the left and right hand that can be alternated at night.  Will contact her with hand therapy here at the [facility] to work on aggressive range of motion and stretching exercises.  Will trial this nonoperative management for 3 months, and if she does not show improvements in her range of motion, will discuss operative interventions.  All questions were answered.  Return to clinic in 3 months for reevaluation.    Rodger Xavier MD  Orthopaedic  Resident    I have personally examined this patient and have reviewed the clinical presentation and progress note with the resident. I agree with the treatment plan as outlined. The plan was formulated with the resident on the day of the resident's dictation.     Elena Sellers MD   Hand and Upper Extremity Specialist  [facility]

## 2018-12-13 NOTE — PROGRESS NOTES
Hand Therapy Initial Evaluation    Current Date:  12/13/2018    Diagnosis:  Bilateral  hand contractures, scleroderma, CREST syndrome  DOI:  MD visit: 12/13/2018  Dx 2/2015    Referring MD: Elena Sellers MD     Next MD visit: 3 months       Initial Subjective:  Molly Teague is a 33 year old  right  hand dominant female.  No skin sores for 6 months    Patient reports symptoms of pain, stiffness/loss of motion, weakness/loss of strength, numbness and tingling  of the bilateral hand which occurred due to onset of Scleroderma 2 years ago. Since onset symptoms are Gradually getting worse.  Special tests:  none today.  Previous treatment: hand therapy about 2 years ago, and Therapy at [facility].    General health as reported by patient is fair.  Pertinent medical history includes:Anemia, Asthma, Concussions/Dizziness, Diabetes, High Blood Pressure, Kidney Disease, Migraines/Headaches, Numbness/Tingling, Overweight, Rheumatoid Arthritis, Sleep Disorder/Apnea, Cold/Hot Extremity, GERD, anxiety, neuropathy PE  Medical allergies:none.  Surgical history: other: G tube, J tube, C section, Rectal.  Medication history: See EMR.  Of Note: pt was in coma 3 years ago, in [facility], required total rehab to regain functional use of all body parts. She was in the hospital x6 months, and [facility] for 6 months.  She had some hand splints at that time, but does not have any currently.      Occupational Profile Information:  Current occupation is disabled  Prior functional level:  independent-shared household chores  Barriers include:none  Mobility: No difficulty  Transportation: drives  Leisure activities/hobbies: caring for 4 year old son, lives with .  Additional Occupational Profile Information (patterns of daily living, interests, values and needs):Pt reports difficulty with bathing, dressing, eating/feeding self, household chores, sleeping, pushing, pulling, lifting and carrying    Functional Outcome  Measure:  See flowsheet      Objective:  Observation:  noting scarring over the PIP dorsal aspects, no calcium deposits or open wounds    PAIN 12/13/2018     Location Bilateral   wrist and hand     Description  Aching, Burning, Numb, Sharp and Tingling     Radiates yes     Worse d/n daytime and nighttime     Frequency constant     Exacerbated by Overuse, and sensitive     Relieves rest     At rest 0-10/10 5/10     On use 0-10/10 5/10     At worst.0-10/10 9/10     Sleep disruption?   yes     Progression Unchanged       Functional Screen:  All BUE  motions are WNL    ROM:    Wrist 12/13/2018 12/13/2018   AROM(PROM) Right Left   Extension WNL WNL b equal   Flexion 55 57   RD     UD     Supination WNL WNL   Pronation         ROM: All Fingers     AROM(PROM) 12/13/2018 12/13/2018   E/F Right Left   Index MP 0/65 0/60   PIP 55/88 50/95   DIP 25/45 23/45   DOCKERY     Long MP 0/77 0/70   PIP 55/100 110/11  (90/x)   DIP 25/58 10/35   DOCKERY     Ring MP 0/74 0/60   PIP 65/100 50/95   DIP 18/45 25/55   DOCKERY     Small MP 0/45 0/50   PIP 55/99 56/50   DIP 25/60 21/48   DOCKERY         Strength:  [x]   Contraindicated  Edema:  none of the  hand    Palpation:  []     Normal        [x]   Tender       [x]  Mild         [x]   Moderate []   Severe   Location: all  Fingers are tingling, burning, as are her feet  Sensation:  Not assessed      Assessment:   Patient presents with symptoms consistent with above diagnosis,  with non-surgical intervention.     Patient's limitations or Problem List includes:  Pain, Decreased ROM/motion, Weakness, Sensory disturbance and Adherence in connective tissue of the bilateral wrist and hand which interferes with the patient's ability to perform Self Care Tasks (dressing, eating, bathing, hygiene/toileting), Sleep Patterns, Recreational Activities, Household Chores and Driving  as compared to previous level of function.    Rehab Potential:  Good - Return to full activity, some limitations    Patient will benefit  from skilled Occupational Therapy to increase ROM, flexibility,  strength, pinch strength, coordination and dexterity and decrease pain, edema and adherence of scarring to return to previous activity level and resume normal daily tasks and to reach their rehab potential.    Barriers to Learning:  No barrier    Communication Issues:  Patient appears to be able to clearly communicate and understand verbal and written communication and follow directions correctly.  Chart Review: Chart Review, Brief history including review of medical and/or therapy records relating to the presenting problem and intensive history review with patient    Identified Performance Deficits: bathing/showering, dressing, hygiene and grooming, care of others, child rearing, driving and community mobility, home establishment and management, meal preparation and cleanup, shopping, sleep, play and leisure activities    Assessment of Occupational Performance:  5 or more Performance Deficits    Clinical Decision Making (Complexity): Moderate complexity    Treatment Explanation:  The following has been discussed with the patient:  RX ordered/plan of care  Anticipated outcomes  Possible risks and side effects    Treatment Plan:   Frequency:  3 X a month, once daily  Duration:  for 3 months     Modalities:  Paraffin  Therapeutic Exercise:  AROM, PROM, Tendon Gliding, Isotonics and Isometrics  Neuromuscular re-education:  Nerve Gliding  Self Care:  Ergonomic Considerations  Orthotic Fabrication: Static progressive  hand based orthosis for left hand / Middle finger, And Static progressive Forearm based resting hand orthosis with finger extension, with elastomer inserts in the palm  Discharge Plan:  Achieve all LTG.  Independent in home treatment program.  Reach maximal therapeutic benefit.    Home Exercise Program:  Wear orthoses at night and report  On fit and skin tolerance  Add elastomer inserts to night resting orthoses    Next Visit:  Latrell L  hand orthosis for MF extension for day wear

## 2018-12-13 NOTE — LETTER
DEPARTMENT OF HEALTH AND HUMAN SERVICES  CENTERS FOR MEDICARE & MEDICAID SERVICES    PLAN/UPDATED PLAN OF PROGRESS FOR OUTPATIENT REHABILITATION    PATIENTS NAME:  Molly Teague   : 1985  PROVIDER NUMBER:    [id number]  [id number]  PROVIDER NAME: [facility]  MEDICAL RECORD NUMBER: [id number]   START OF CARE DATE:  SOC Date: 18   TYPE:  PT  PRIMARY/TREATMENT DIAGNOSIS: (Pertinent Medical Diagnosis)  Contracture of hand joint, right  Contracture of hand joint, left  VISITS FROM START OF CARE:  Rxs Used: 1     Hand Therapy Initial Evaluation  Current Date:  2018  Diagnosis:  Bilateral  hand contractures, scleroderma, CREST syndrome  DOI:  MD visit: 2018  Dx 2015  Referring MD: Elena Sellers MD   Next MD visit: 3 months     Initial Subjective:  Molly Teague is a 33 year old  right  hand dominant female.  No skin sores for 6 months  Patient reports symptoms of pain, stiffness/loss of motion, weakness/loss of strength, numbness and tingling  of the bilateral hand which occurred due to onset of Scleroderma 2 years ago. Since onset symptoms are Gradually getting worse.  Special tests:  none today.  Previous treatment: hand therapy about 2 years ago, and Therapy at [facility].    General health as reported by patient is fair.  Pertinent medical history includes:Anemia, Asthma, Concussions/Dizziness, Diabetes, High Blood Pressure, Kidney Disease, Migraines/Headaches, Numbness/Tingling, Overweight, Rheumatoid Arthritis, Sleep Disorder/Apnea, Cold/Hot Extremity, GERD, anxiety, neuropathy PE  Medical allergies:none.  Surgical history: other: G tube, J tube, C section, Rectal.  Medication history: See EMR.  Of Note: pt was in coma 3 years ago, in [facility], required total rehab to regain functional use of all body parts. She was in the hospital x6 months, and [facility] for 6 months.  She had some hand splints at that time, but does not have any currently.       Occupational Profile Information:  Current occupation is disabled  Prior functional level:  independent-shared household chores  Barriers include:none  Mobility: No difficulty  Transportation: drives  Leisure activities/hobbies: caring for 4 year old son, lives with .  Additional Occupational Profile Information (patterns of daily living, interests, values and needs):Pt reports difficulty with bathing, dressing, eating/feeding self, household chores, sleeping, pushing, pulling, lifting and carrying    Molly Teague     Functional Outcome Measure:  See flowsheet    Objective:  Observation:  noting scarring over the PIP dorsal aspects, no calcium deposits or open wounds    PAIN 12/13/2018     Location Bilateral   wrist and hand     Description  Aching, Burning, Numb, Sharp and Tingling     Radiates yes     Worse d/n daytime and nighttime     Frequency constant     Exacerbated by Overuse, and sensitive     Relieves rest     At rest 0-10/10 5/10     On use 0-10/10 5/10     At worst.0-10/10 9/10     Sleep disruption?   yes     Progression Unchanged       Functional Screen:  All BUE  motions are WNL    ROM:    Wrist 12/13/2018 12/13/2018   AROM(PROM) Right Left   Extension WNL WNL b equal   Flexion 55 57   RD     UD     Supination WNL WNL   Pronation         ROM: All Fingers     AROM(PROM) 12/13/2018 12/13/2018   E/F Right Left   Index MP 0/65 0/60   PIP 55/88 50/95   DIP 25/45 23/45   DOCKERY     Long MP 0/77 0/70   PIP 55/100 110/11  (90/x)   DIP 25/58 10/35   DOCKERY     Ring MP 0/74 0/60   PIP 65/100 50/95   DIP 18/45 25/55   DOCKERY     Small MP 0/45 0/50   PIP 55/99 56/50   DIP 25/60 21/48   DOCKERY         Strength:  [x]   Contraindicated  Edema:  none of the  hand    Palpation:  []     Normal        [x]   Tender       [x]  Mild         [x]   Moderate []   Severe   Location: all  Fingers are tingling, burning, as are her feet  Sensation:  Not assessed      Assessment:   Patient presents with symptoms consistent with  above diagnosis,  with non-surgical intervention.     Patient's limitations or Problem List includes:  Pain, Decreased ROM/motion, Weakness, Sensory disturbance and Adherence in connective tissue of the bilateral wrist and hand which interferes with the patient's ability to perform Self Care Tasks (dressing, eating, bathing, hygiene/toileting), Sleep Patterns, Recreational Activities, Household Chores and Driving  as compared to previous level of function.    Rehab Potential:  Good - Return to full activity, some limitations    Patient will benefit from skilled Occupational Therapy to increase ROM, flexibility,  strength, pinch strength, coordination and dexterity and decrease pain, edema and adherence of scarring to return to previous activity level and resume normal daily tasks and to reach their rehab potential.    Barriers to Learning:  No barrier    Communication Issues:  Patient appears to be able to clearly communicate and understand verbal and written communication and follow directions correctly.  Chart Review: Chart Review, Brief history including review of medical and/or therapy records relating to the presenting problem and intensive history review with patient    Identified Performance Deficits: bathing/showering, dressing, hygiene and grooming, care of others, child rearing, driving and community mobility, home establishment and management, meal preparation and cleanup, shopping, sleep, play and leisure activities    Assessment of Occupational Performance:  5 or more Performance Deficits    Clinical Decision Making (Complexity): Moderate complexity    Molly Teague     Treatment Explanation:  The following has been discussed with the patient:  RX ordered/plan of care  Anticipated outcomes  Possible risks and side effects    Treatment Plan:   Frequency:  3 X a month, once daily  Duration:  for 3 months     Modalities:  Paraffin  Therapeutic Exercise:  AROM, PROM, Tendon Gliding, Isotonics and  "Isometrics  Neuromuscular re-education:  Nerve Gliding  Self Care:  Ergonomic Considerations  Orthotic Fabrication: Static progressive  hand based orthosis for left hand / Middle finger, And Static progressive Forearm based resting hand orthosis with finger extension, with elastomer inserts in the palm  Discharge Plan:  Achieve all LTG.  Independent in home treatment program.  Reach maximal therapeutic benefit.    Home Exercise Program:  Wear orthoses at night and report  On fit and skin tolerance  Add elastomer inserts to night resting orthoses    Next Visit:  Fabricate L hand orthosis for MF extension for day wear      Caregiver Signature/Credentials _____________________________ Date ________        Kezia Max, OTD, OTR/L, CHT     I have reviewed and certified the need for these services and plan of treatment while under my care.        PHYSICIAN'S SIGNATURE:   ______________________________________ Date___________      Elena Sellers MD     Certification period:  Beginning of Cert date period: 12/13/18 to  End of Cert period date: 03/12/19     Functional Level Progress Report: Please see attached \"Goal Flow sheet for Functional level.\"    ____X____ Continue Services or       ________ DC Services                Service dates: From  SOC Date: 12/13/18 date to present                         "

## 2019-01-04 ENCOUNTER — THERAPY VISIT (OUTPATIENT)
Dept: OCCUPATIONAL THERAPY | Facility: CLINIC | Age: 34
End: 2019-01-04
Payer: MEDICARE

## 2019-01-04 DIAGNOSIS — M24.542 CONTRACTURE OF HAND JOINT, LEFT: Primary | ICD-10-CM

## 2019-01-04 DIAGNOSIS — M24.541 CONTRACTURE OF HAND JOINT, RIGHT: ICD-10-CM

## 2019-01-04 PROCEDURE — 97763 ORTHC/PROSTC MGMT SBSQ ENC: CPT | Mod: GO | Performed by: OCCUPATIONAL THERAPIST

## 2019-01-04 PROCEDURE — 97535 SELF CARE MNGMENT TRAINING: CPT | Mod: GO | Performed by: OCCUPATIONAL THERAPIST

## 2019-01-04 NOTE — PROGRESS NOTES
SOAP note objective information for 1/4/2019.  Please refer to the daily flowsheet for treatment today, total treatment time and time spent performing 1:1 timed codes.        Diagnosis:  Bilateral  hand contractures, scleroderma, CREST syndrome  DOI:  MD visit: 12/13/2018  Dx 2/2015    Referring MD: Elena Sellers MD     Next MD visit: 3 months       Initial Subjective:  Molly Teague is a 33 year old  right  hand dominant female.  No skin sores for 6 months    Patient reports symptoms of pain, stiffness/loss of motion, weakness/loss of strength, numbness and tingling  of the bilateral hand which occurred due to onset of Scleroderma 2 years ago. Since onset symptoms are Gradually getting worse.  Special tests:  none today.  Previous treatment: hand therapy about 2 years ago, and Therapy at [facility].    General health as reported by patient is fair.  Pertinent medical history includes:Anemia, Asthma, Concussions/Dizziness, Diabetes, High Blood Pressure, Kidney Disease, Migraines/Headaches, Numbness/Tingling, Overweight, Rheumatoid Arthritis, Sleep Disorder/Apnea, Cold/Hot Extremity, GERD, anxiety, neuropathy PE  Medical allergies:none.  Surgical history: other: G tube, J tube, C section, Rectal.  Medication history: See EMR.  Of Note: pt was in coma 3 years ago, in [facility], required total rehab to regain functional use of all body parts. She was in the hospital x6 months, and [facility] for 6 months.  She had some hand splints at that time, but does not have any currently.      Occupational Profile Information:  Current occupation is disabled  Prior functional level:  independent-shared household chores  Barriers include:none  Mobility: No difficulty  Transportation: drives  Leisure activities/hobbies: caring for 4 year old son, lives with .  Additional Occupational Profile Information (patterns of daily living, interests, values and needs):Pt reports difficulty with bathing, dressing,  eating/feeding self, household chores, sleeping, pushing, pulling, lifting and carrying    Functional Outcome Measure:  See flowsheet      Objective:  Observation:  noting scarring over the PIP dorsal aspects, no calcium deposits or open wounds    PAIN 12/13/2018     Location Bilateral   wrist and hand     Description  Aching, Burning, Numb, Sharp and Tingling     Radiates yes     Worse d/n daytime and nighttime     Frequency constant     Exacerbated by Overuse, and sensitive     Relieves rest     At rest 0-10/10 5/10     On use 0-10/10 5/10     At worst.0-10/10 9/10     Sleep disruption?   yes     Progression Unchanged       Functional Screen:  All BUE  motions are WNL    ROM:    Wrist 12/13/2018 12/13/2018   AROM(PROM) Right Left   Extension WNL WNL b equal   Flexion 55 57   RD     UD     Supination WNL WNL   Pronation         ROM: All Fingers      AROM(PROM) 12/13/2018 12/13/2018 1/4/19   E/F Right Left    Index MP 0/65 0/60    PIP 55/88 50/95    DIP 25/45 23/45    DOCKERY      Long MP 0/77 0/70    PIP 55/100 110/11  (90/x) (-90/x)   DIP 25/58 10/35    DOCKERY      Ring MP 0/74 0/60    PIP 65/100 50/95    DIP 18/45 25/55    DOCKERY      Small MP 0/45 0/50    PIP 55/99 56/50    DIP 25/60 21/48    DOCKERY          Strength:  [x]   Contraindicated  Edema:  none of the  hand    Palpation:  []     Normal        [x]   Tender       [x]  Mild         [x]   Moderate []   Severe   Location: all  Fingers are tingling, burning, as are her feet  Sensation:  Not assessed      Assessment:   Patient presents with symptoms consistent with above diagnosis,  with non-surgical intervention.     Patient's limitations or Problem List includes:  Pain, Decreased ROM/motion, Weakness, Sensory disturbance and Adherence in connective tissue of the bilateral wrist and hand which interferes with the patient's ability to perform Self Care Tasks (dressing, eating, bathing, hygiene/toileting), Sleep Patterns, Recreational Activities, Household Chores and  Driving  as compared to previous level of function.    Rehab Potential:  Good - Return to full activity, some limitations    Patient will benefit from skilled Occupational Therapy to increase ROM, flexibility,  strength, pinch strength, coordination and dexterity and decrease pain, edema and adherence of scarring to return to previous activity level and resume normal daily tasks and to reach their rehab potential.    Barriers to Learning:  No barrier    Communication Issues:  Patient appears to be able to clearly communicate and understand verbal and written communication and follow directions correctly.  Chart Review: Chart Review, Brief history including review of medical and/or therapy records relating to the presenting problem and intensive history review with patient    Identified Performance Deficits: bathing/showering, dressing, hygiene and grooming, care of others, child rearing, driving and community mobility, home establishment and management, meal preparation and cleanup, shopping, sleep, play and leisure activities    Assessment of Occupational Performance:  5 or more Performance Deficits    Clinical Decision Making (Complexity): Moderate complexity    Treatment Explanation:  The following has been discussed with the patient:  RX ordered/plan of care  Anticipated outcomes  Possible risks and side effects    Treatment Plan:   Frequency:  3 X a month, once daily  Duration:  for 3 months     Modalities:  Paraffin  Therapeutic Exercise:  AROM, PROM, Tendon Gliding, Isotonics and Isometrics  Neuromuscular re-education:  Nerve Gliding  Self Care:  Ergonomic Considerations  Orthotic Fabrication: Static progressive  hand based orthosis for left hand / Middle finger, And Static progressive Forearm based resting hand orthosis with finger extension, with elastomer inserts in the palm  Discharge Plan:  Achieve all LTG.  Independent in home treatment program.  Reach maximal therapeutic benefit.    Home Exercise  Program:  Wear orthoses at night and report  On fit and skin tolerance  Add elastomer inserts to night resting orthoses    Next Visit:  Fabricate L hand orthosis for MF extension for day wear

## 2019-01-15 ENCOUNTER — TELEPHONE (OUTPATIENT)
Dept: FAMILY MEDICINE | Facility: CLINIC | Age: 34
End: 2019-01-15

## 2019-01-15 NOTE — TELEPHONE ENCOUNTER
Panel Management Review      Patient has the following on her problem list:     Depression / Dysthymia review    Measure:  Needs PHQ-9 score of 4 or less during index window.  Administer PHQ-9 and if score is 5 or more, send encounter to provider for next steps.        PHQ-9 SCORE 4/20/2018 7/6/2018 10/16/2018   PHQ-9 Total Score 11 10 18       If PHQ-9 recheck is 5 or more, route to provider for next steps.    Patient is due for:  PHQ9      Composite cancer screening  Chart review shows that this patient is due/due soon for the following None  Summary:    Patient is due/failing the following:   PHQ9    Type of outreach:    Phone, left message for patient to call back.     John JHA CMA

## 2019-01-15 NOTE — TELEPHONE ENCOUNTER
PHQ9 Due between: 11/6/18-3/6/19    Please contact patient to complete follow up PHQ9 before their DUE DATE.     Index date 7/6/18, phq9 score 10.    Per 10/16/18 office visit:  Return to clinic in 3 months.    This is important feedback for your care team to assess your symptoms and treatment plan.    You completed this same questionnaire   PHQ-9 SCORE 10/16/2018   PHQ-9 Total Score 18       MA STAFF: If upon calling patient and PHQ9 score is higher that 5 route to the provider. You may also seek an RN for review.

## 2019-01-16 ASSESSMENT — PATIENT HEALTH QUESTIONNAIRE - PHQ9: SUM OF ALL RESPONSES TO PHQ QUESTIONS 1-9: 20

## 2019-01-16 NOTE — TELEPHONE ENCOUNTER
Panel Management Review      Patient has the following on her problem list:     Depression / Dysthymia review    Measure:  Needs PHQ-9 score of 4 or less during index window.  Administer PHQ-9 and if score is 5 or more, send encounter to provider for next steps.    5 - 7 month window range: 11/6/18-3/6/19    PHQ-9 SCORE 7/6/2018 10/16/2018 1/16/2019   PHQ-9 Total Score 10 18 20       If PHQ-9 recheck is 5 or more, route to provider for next steps.    Patient is due for:  PHQ9      Composite cancer screening  Chart review shows that this patient is due/due soon for the following None  Summary:    Patient is due/failing the following:   PHQ9    Action needed:   Routed to provider for review.    Type of outreach:    None, routed to provider for review. and routed to RN due to positive answer to question #9.    Questions for provider review:    Patient had positive answer for question #9 on the PHQ9. Patient is taking medication a prescribed. She usually has a hard time with pain due to the cold weather. They are just thoughts and no plans to harm self.  PHQ-9 ([employer]) 1/16/2019   1.  Little interest or pleasure in doing things 2   2.  Feeling down, depressed, or hopeless 2   3.  Trouble falling or staying asleep, or sleeping too much 3   4.  Feeling tired or having little energy 3   5.  Poor appetite or overeating 3   6.  Feeling bad about yourself 2   7.  Trouble concentrating 2   8.  Moving slowly or restless 2   9.  Suicidal or self-harm thoughts 1   PHQ-9 Total Score 20   Difficulty at work, home, or with people Somewhat difficult   In the past two weeks have you had thoughts of suicide or self harm? Yes   Do you have concerns about your personal safety or the safety of others? No   In the past 2 weeks have you thought about a plan or had intention to harm yourself? No   In the past 2 weeks have you acted on these thoughts in any way? No                                                                                                                                      Jada Schneider CMA..........1/16/2019 9:13 AM         Chart routed to provider and RN .

## 2019-01-16 NOTE — TELEPHONE ENCOUNTER
Left message for patient to call back at [phone].    According to LOV 10/16/18:   Patient Instructions   1. Refills given.  2. Continue to avoid alcohol  3. You are nearly due to follow-up with your scleroderma doctors at the [facility].  4. Return to clinic 3 months, sooner if needed.  5. Recommend to establish with Psychologist - saw Dr. Pablito ortega.  [phone]    Dinora REYNOSO RN

## 2019-01-17 ENCOUNTER — TELEPHONE (OUTPATIENT)
Dept: FAMILY MEDICINE | Facility: CLINIC | Age: 34
End: 2019-01-17

## 2019-01-17 DIAGNOSIS — G89.4 CHRONIC PAIN SYNDROME: Chronic | ICD-10-CM

## 2019-01-17 DIAGNOSIS — F41.1 GAD (GENERALIZED ANXIETY DISORDER): ICD-10-CM

## 2019-01-17 RX ORDER — LORAZEPAM 1 MG/1
1 TABLET ORAL DAILY PRN
Qty: 4 TABLET | Refills: 0 | Status: SHIPPED | OUTPATIENT
Start: 2019-01-17 | End: 2019-01-22

## 2019-01-17 RX ORDER — OXYCODONE HYDROCHLORIDE 15 MG/1
15 TABLET ORAL EVERY 4 HOURS PRN
Qty: 20 TABLET | Refills: 0 | Status: SHIPPED | OUTPATIENT
Start: 2019-01-17 | End: 2019-01-22

## 2019-01-17 NOTE — TELEPHONE ENCOUNTER
Routing refill request to provider for review/approval because:  Drug not on the [facility] refill protocol     Patient has appt 1-22-19.  She states she is out of ativan and has enough oxycodone until 1-18-19.    Demetria JHA RN

## 2019-01-17 NOTE — TELEPHONE ENCOUNTER
Requested Prescriptions   Pending Prescriptions Disp Refills     LORazepam (ATIVAN) 1 MG tablet 15 tablet 5     Sig: Take 1 tablet (1 mg) by mouth daily as needed for anxiety #15 to last 30 days    There is no refill protocol information for this order   Last Written Prescription Date:  8/10/18  Last Fill Quantity: 15,  # refills: 5   Last office visit: 10/16/2018 with prescribing provider:  Jameson Espinoza Office Visit:   Next 5 appointments (look out 90 days)    Jan 22, 2019  6:40 AM CST  SHORT with Grecia Barba,   [facility] [address] [address]  [phone]              oxyCODONE IR (ROXICODONE) 15 MG tablet 120 tablet 0     Sig: Take 1 tablet (15 mg) by mouth every 4 hours as needed for severe pain    There is no refill protocol information for this order      Last Written Prescription Date:  10/16/18  Last Fill Quantity: 120,  # refills: 0   Last office visit: 10/16/2018 with prescribing provider:  Jameson Espinoza Office Visit:   Next 5 appointments (look out 90 days)    Jan 22, 2019  6:40 AM CST  SHORT with Grecia Barba DO  [facility] ([facility] [address] [address]  [phone]

## 2019-01-18 NOTE — TELEPHONE ENCOUNTER
There is also a panel management telephone encounter open on this patient, left message for the patient to call back. Will need to let the patient know a short refill is in place for the Ativan till appointment on 1-22-19.    SOM Lew

## 2019-01-21 NOTE — TELEPHONE ENCOUNTER
Left message for the patient to call the clinic. Looks like patient has an appt tomorrow 1/22/19 with Dr. Barba. Keep this open to make sure she comes in for appt. Reyna BAEZA RN

## 2019-01-21 NOTE — TELEPHONE ENCOUNTER
Pt calling back and plans on coming to her OV for tomorrow 1/22  Emi Pederson on 1/21/2019 at 11:24 AM

## 2019-01-22 ENCOUNTER — OFFICE VISIT (OUTPATIENT)
Dept: FAMILY MEDICINE | Facility: CLINIC | Age: 34
End: 2019-01-22
Payer: MEDICARE

## 2019-01-22 VITALS
WEIGHT: 188.3 LBS | HEIGHT: 61 IN | HEART RATE: 101 BPM | TEMPERATURE: 99 F | DIASTOLIC BLOOD PRESSURE: 90 MMHG | SYSTOLIC BLOOD PRESSURE: 134 MMHG | BODY MASS INDEX: 35.55 KG/M2 | OXYGEN SATURATION: 99 %

## 2019-01-22 DIAGNOSIS — G89.4 CHRONIC PAIN SYNDROME: Primary | Chronic | ICD-10-CM

## 2019-01-22 DIAGNOSIS — R11.2 NAUSEA AND VOMITING, INTRACTABILITY OF VOMITING NOT SPECIFIED, UNSPECIFIED VOMITING TYPE: ICD-10-CM

## 2019-01-22 DIAGNOSIS — E16.2 HYPOGLYCEMIA: ICD-10-CM

## 2019-01-22 DIAGNOSIS — E66.01 MORBID OBESITY (H): ICD-10-CM

## 2019-01-22 DIAGNOSIS — F41.1 GAD (GENERALIZED ANXIETY DISORDER): ICD-10-CM

## 2019-01-22 DIAGNOSIS — M34.1 CREST (CALCINOSIS, RAYNAUD'S PHENOMENON, ESOPHAGEAL DYSFUNCTION, SCLERODACTYLY, TELANGIECTASIA) (H): ICD-10-CM

## 2019-01-22 PROCEDURE — 99215 OFFICE O/P EST HI 40 MIN: CPT | Performed by: INTERNAL MEDICINE

## 2019-01-22 RX ORDER — OXYCODONE HYDROCHLORIDE 15 MG/1
15 TABLET ORAL EVERY 4 HOURS PRN
Qty: 108 TABLET | Refills: 0 | Status: SHIPPED | OUTPATIENT
Start: 2019-03-23 | End: 2019-04-26

## 2019-01-22 RX ORDER — LORAZEPAM 1 MG/1
1 TABLET ORAL DAILY PRN
Qty: 15 TABLET | Refills: 2 | Status: SHIPPED | OUTPATIENT
Start: 2019-01-22 | End: 2019-04-26

## 2019-01-22 RX ORDER — LISINOPRIL 10 MG/1
10 TABLET ORAL DAILY
Qty: 90 TABLET | Refills: 3 | Status: ON HOLD | OUTPATIENT
Start: 2019-01-22 | End: 2019-11-25

## 2019-01-22 RX ORDER — PROMETHAZINE HYDROCHLORIDE 50 MG/ML
INJECTION, SOLUTION INTRAMUSCULAR; INTRAVENOUS
Qty: 90 ML | Refills: 11 | Status: SHIPPED | OUTPATIENT
Start: 2019-01-22 | End: 2019-05-14

## 2019-01-22 RX ORDER — LISINOPRIL 10 MG/1
5 TABLET ORAL DAILY
Qty: 90 TABLET | Refills: 3 | Status: SHIPPED | OUTPATIENT
Start: 2019-01-22 | End: 2019-01-22

## 2019-01-22 RX ORDER — OXYCODONE HYDROCHLORIDE 15 MG/1
15 TABLET ORAL EVERY 4 HOURS PRN
Qty: 120 TABLET | Refills: 0 | Status: SHIPPED | OUTPATIENT
Start: 2019-01-22 | End: 2019-04-26

## 2019-01-22 RX ORDER — OXYCODONE HYDROCHLORIDE 15 MG/1
15 TABLET ORAL EVERY 4 HOURS PRN
Qty: 108 TABLET | Refills: 0 | Status: SHIPPED | OUTPATIENT
Start: 2019-02-21 | End: 2019-04-26

## 2019-01-22 ASSESSMENT — ANXIETY QUESTIONNAIRES
6. BECOMING EASILY ANNOYED OR IRRITABLE: MORE THAN HALF THE DAYS
7. FEELING AFRAID AS IF SOMETHING AWFUL MIGHT HAPPEN: SEVERAL DAYS
IF YOU CHECKED OFF ANY PROBLEMS ON THIS QUESTIONNAIRE, HOW DIFFICULT HAVE THESE PROBLEMS MADE IT FOR YOU TO DO YOUR WORK, TAKE CARE OF THINGS AT HOME, OR GET ALONG WITH OTHER PEOPLE: SOMEWHAT DIFFICULT
5. BEING SO RESTLESS THAT IT IS HARD TO SIT STILL: NOT AT ALL
3. WORRYING TOO MUCH ABOUT DIFFERENT THINGS: SEVERAL DAYS
1. FEELING NERVOUS, ANXIOUS, OR ON EDGE: NOT AT ALL
GAD7 TOTAL SCORE: 5
2. NOT BEING ABLE TO STOP OR CONTROL WORRYING: NOT AT ALL

## 2019-01-22 ASSESSMENT — MIFFLIN-ST. JEOR: SCORE: 1500.46

## 2019-01-22 ASSESSMENT — PATIENT HEALTH QUESTIONNAIRE - PHQ9: 5. POOR APPETITE OR OVEREATING: SEVERAL DAYS

## 2019-01-22 NOTE — PATIENT INSTRUCTIONS
1. Increase lisinopril to 10 mg once daily.  2. Return to clinic 3 months.  Reduce from 120 to 108 per month.  10% reduction.  Let me know if this decrease is not working well  3. Agree with plan to see counselor and psychiatry.

## 2019-01-22 NOTE — LETTER
[facility]  01/22/19    Patient: Molly Teague  YOB: 1985  Medical Record Number: [id number]                                                                  Opioid / Opioid Plus Controlled Substance Agreement    I understand that my care provider has prescribed an opioid (narcotic) controlled substance to help manage my condition(s). I am taking this medicine to help me function or work. I know this is strong medicine, and that it can cause serious side effects. Opioid medicine can be sedating, addicting and may cause a dependency on the drug. They can affect my ability to drive or think, and cause depression. They need to be taken exactly as prescribed. Combining opioids with certain medicines or chemicals (such as cocaine, sedatives and tranquilizers, sleeping pills, meth) can be dangerous or even fatal. Also, if I stop opioids suddenly, I may have severe withdrawal symptoms. Last, I understand that opioids do not work for all types of pain nor for all patients. If not helpful, I may be asked to stop them.        The risks, benefits, and side effects of these medicine(s) were explained to me. I agree that:    1. I will take part in other treatments as advised by my care team. This may be psychiatry or counseling, physical therapy, behavioral therapy, group treatment or a referral to a pain clinic. I will reduce or stop my medicine when my care team tells me to do so.  2. I will take my medicines as prescribed. I will not change the dose or schedule unless my care team tells me to. There will be no refills if I  run out early.   I may be contactedwithout warning and asked to complete a urine drug test or pill count at any time.   3. I will keep all my appointments, and understand this is part of the monitoring of opioids. My care team may require an office visit for EVERY opioid/controlled substance refill. If I miss appointments or don t follow instructions, my care team  may stop my medicine.  4. I will not ask other providers to prescribe controlled substances, and I will not accept controlled substances from other people. If I need another prescribed controlled substance for a new reason, I will tell my care team within 1 business day.  5. I will use one pharmacy to fill all of my controlled substance prescriptions, and it is up to me to make sure that I do not run out of my medicines on weekends or holidays. If my care team is willing to refill my opioid prescription without a visit, I must request refills only during office hours, refills may take up to 3 days to process, and it may take up to 5 to 7 days for my medicine to be mailed and ready at my pharmacy. Prescriptions will not be mailed anywhere except my pharmacy.        [id number]  Rev 12/18         Registration to scan to EHR                             Page 1 of 2               Controlled Substance Agreement Opioid        [facility]  01/22/19  Patient: Molly Teague  YOB: 1985  Medical Record Number: [id number]                                                                  6. I am responsible for my prescriptions. If the medicine/prescription is lost or stolen, it will not be replaced. I also agree not to share controlled substance medicines with anyone.  7. I agree to not use ANY illegal or recreational drugs. This includes marijuana, cocaine, bath salts or other drugs. I agree not to use alcohol unless my care team says I may.          I agree to give urine samples whenever asked. If I don t give a urine sample, the care team may stop my medicine.    8. If I enroll in the [place] Medical Marijuana program, I will tell my care team. I will also sign an agreement to share my medical records with my care team.   9. I will bring in my list of medicines (or my medicine bottles) each time I come to the clinic.   10. I will tell my care team right away if I become pregnant or have a  new medical problem treated outside of my regular clinic.  11. I understand that this medicine can affect my thinking and judgment. It may be unsafe for me to drive, use machinery and do dangerous tasks. I will not do any of these things until I know how the medicine affects me. If my dose changes, I will wait to see how it affects me. I will contact my care team if I have concerns about medicine side effects.    I understand that if I do not follow any of the conditions above, my prescriptions or treatment may be stopped.      I agree that my provider, clinic care team, and pharmacy may work with any city, state or federal law enforcement agency that investigates the misuse, sale, or other diversion of my controlled medicine. I will allow my provider to discuss my care with or share a copy of this agreement with any other treating provider, pharmacy or emergency room where I receive care. I agree to give up (waive) any right of privacy or confidentiality with respect to these consents.     I have read this agreement and have asked questions about anything I did not understand.      ________________________________________________________________________  Patient signature - Date/Time -  Molly Teague                                      ________________________________________________________________________  Witness signature                                                            ________________________________________________________________________  Provider signature - Grecia Barba DO      [id number]  Rev 12/18         Registration to scan to EHR                         Page 2 of 2                   Controlled Substance Agreement Opioid           Page 1 of 2  Opioid Pain Medicines (also known as Narcotics)  What You Need to Know    What are opioids?   Opioids are pain medicines that must be prescribed by a doctor.  They are also known as narcotics.    Examples are:     morphine (MS Contin,  Catrachita)    oxycodone (Oxycontin)    oxycodone and acetaminophen (Percocet)    hydrocodone and acetaminophen (Vicodin, Norco)     fentanyl patch (Duragesic)     hydromorphone (Dilaudid)     methadone     What do opioids do well?   Opioids are best for short-term pain after a surgery or injury. They also work well for cancer pain. Unlike other pain medicines, they do not cause liver or kidney failure or ulcers. They may help some people with long-lasting (chronic) pain.     What do opioids NOT do well?   Opioids never get rid of pain entirely, and they do not work well for most patients with chronic pain. Opioids do not reduce swelling, one of the causes of pain. They also don t work well for nerve pain.                           For informational purposes only.  Not to replace the advice of your care provider.  Copyright [facility]. All right reserved. [facility] [id number] 02/18.      Page 2 of 2    Risks and side effects   Talk to your doctor before you start or decide to keep taking one of these medicines. Side effects include:    Lowering your breathing rate enough to cause death    Overdose, including death, especially if taking higher than prescribed doses    Long-term opioid use    Worse depression symptoms; less pleasure in things you usually enjoy    Feeling tired or sluggish    Slower thoughts or cloudy thinking    Being more sensitive to pain over time; pain is harder to control    Trouble sleeping or restless sleep    Changes in hormone levels (for example, less testosterone)    Changes in sex drive or ability to have sex    Constipation    Unsafe driving    Itching and sweating    Feeling dizzy    Nausea, vomiting and dry mouth    What else should I know about opioids?  When someone takes opioids for too long or too often, they become dependent. This means that if you stop or reduce the medicine too quickly, you will have withdrawal symptoms.    Dependence is not the same as addiction.  Addiction is when people keep using a substance that harms their body, their mind or their relations with others. If you have a history of drug or alcohol abuse, taking opioids can cause a relapse.    Over time, opioids don t work as well. Most people will need higher and higher doses. The higher the dose, the more serious the side effects. We don t know the long-term effects of opioids.      Prescribed opioids aren't the best way to manage chronic pain    Other ways to manage pain include:      Ibuprofen or acetaminophen.  You should always try this first.      Treat health problems that may be causing pain.      acupuncture or massage, deep breathing, meditation, visual imagery, aromatherapy.      Use heat or ice at the pain site      Physical therapy and exercise      Stop smoking      See a counselor or therapist                                                  People who have used opioids for a long time may have a lower quality of life, worse depression, higher levels of pain and more visits to doctors.    Never share your opioids with others. Be sure to store opioids in a secure place, locked if possible.Young children can easily swallow them and overdose.     You can overdose on opioids.  Signs of overdose include decrease or loss of consciousness, slowed breathing, trouble waking and blue lips.  If someone is worried about overdose, they should call 911.    If you are at risk for overdose, you may get naloxone (Narcan, a medicine that reverses the effects of opioids.  If you overdose, a friend or family member can give you Narcan while waiting for the ambulance.  They need to know the signs of overdose and how to give Narcan.    While you're taking opioids:    Don't use alcohol or street drugs. Taking them together can cause death.    Don't take any of these medicines unless your doctor says its okay.  Taking these with opioids can cause death.    Benzodiazepines (such as lorazepam         or  diazepam)    Muscle relaxers (such as cyclobenzaprine)    sleeping pills    other opioids    Safe disposal of opioids  Find your area drug take-back program, your pharmacy mail-back program, buy a special disposal bag (such as Deterra) from your pharmacy or flush them down the toilet.  Use the guidelines at:  www.fda.gov/drugs/resourcesforyou

## 2019-01-22 NOTE — PROGRESS NOTES
SUBJECTIVE:   Molly Teague is a 33 year old female who presents to clinic today for the following health issues:  Chief Complaint   Patient presents with     Refill Request     Oxycodone, lorezapam, Celexa, omeprazole,  test strips, lancets        Chronic Pain Follow-Up       Type / Location of Pain: multiple sites, feet, hands, legs, hips   Analgesia/pain control:       Recent changes:  worse      Overall control: Tolerable with discomfort  Activity level/function:      Daily activities:  Able to do light housework, cooking    Work:  Unable to work  Adverse effects:  No  Adherance    Taking medication as directed?  Yes    Participating in other treatments: yes- physical therapy   Risk Factors:    Sleep:  Poor     Mood/anxiety:  improved    Recent family or social stressors:  none noted    Other aggravating factors: none  PHQ-9 SCORE 7/6/2018 10/16/2018 1/16/2019   PHQ-9 Total Score 10 18 20     REX-7 SCORE 3/23/2018 4/15/2018 4/20/2018   Total Score 10 19 6     Encounter-Level CSA - 04/26/2017:    Controlled Substance Agreement - Scan on 5/4/2017  8:58 AM: CONTROLLED SUBSTANCE AGREEMENT (below)       Patient-Level CSA:    There are no patient-level csa.         Amount of exercise or physical activity: 2-3 days/week for an average of 45-60 minutes    Problems taking medications regularly: No    Medication side effects: none    Diet: regular (no restrictions)    She joined a gym and is starting to exercise.  Had a really good day yesterday - very active around her house and felt energy was good    Got orthotics for feet and hand splints.        Anxiety Follow-Up    Status since last visit: Improved     Other associated symptoms:None    Complicating factors:   Significant life event: No   Current substance abuse: None  Depression symptoms: Yes-  Due to pain has more pain right now due to the cold weather   REX-7 SCORE 3/23/2018 4/15/2018 4/20/2018   Total Score 10 19 6     --she has not yet made appointment for  counselor.  She wants to but is hesitant      Hypertension:  On lisinopril 5 mg    BP Readings from Last 6 Encounters:   01/22/19 142/90   12/05/18 (!) 150/95   10/17/18 (!) 145/102   10/16/18 130/80   09/21/18 140/87   07/06/18 122/84         Current Outpatient Medications   Medication Sig Dispense Refill     albuterol (VENTOLIN HFA) 108 (90 Base) MCG/ACT Inhaler Inhale 2 puffs into the lungs every 4 hours as needed for shortness of breath / dyspnea or wheezing 1 Inhaler 11     blood glucose (NO BRAND SPECIFIED) lancets standard Use to test blood sugar 1 time daily 100 each 3     blood glucose monitoring (NO BRAND SPECIFIED) test strip Use to test blood sugar 1 time daily 100 each 3     budesonide-formoterol (SYMBICORT) 160-4.5 MCG/ACT Inhaler Inhale 2 puffs into the lungs 2 times daily 6 g 11     BusPIRone HCl 30 MG TABS Take 30 mg by mouth 2 times daily 180 tablet 3     citalopram (CELEXA) 40 MG tablet Take 1 tablet (40 mg) by mouth daily 90 tablet 3     Cyanocobalamin (B-12) 1000 MCG TBCR Take 1,000 mcg by mouth daily 100 tablet 1     Doxylamine Succinate, Sleep, (UNISOM PO)        ferrous gluconate (FERGON) 324 (38 FE) MG tablet TAKE ONE TABLET BY MOUTH EVERY DAY WITH BREAKFAST 100 tablet 3     gabapentin (NEURONTIN) 300 MG capsule Take 2 capsules (600 mg) by mouth 3 times daily 540 capsule 3     lisinopril (PRINIVIL/ZESTRIL) 5 MG tablet Take 1 tablet (5 mg) by mouth daily 90 tablet 3     LORazepam (ATIVAN) 1 MG tablet Take 1 tablet (1 mg) by mouth daily as needed for anxiety #15 to last 30 days 4 tablet 0     montelukast (SINGULAIR) 10 MG tablet Take 1 tablet (10 mg) by mouth At Bedtime 90 tablet 3     omeprazole (PRILOSEC) 40 MG capsule Take 1 capsule (40 mg) by mouth 2 times daily 180 capsule 3     oxyCODONE IR (ROXICODONE) 15 MG tablet Take 1 tablet (15 mg) by mouth every 4 hours as needed for severe pain 20 tablet 0     polyethylene glycol (MIRALAX) powder Take 17 g (1 capful) by mouth daily 510 g 11      "promethazine (PHENERGAN) 25 MG/ML IV injection Inject 1 mL (25 mg) into the vein every 6 hours as needed for nausea or vomiting Inject  mL 11     ranitidine (ZANTAC) 150 MG tablet Take 1 tablet (150 mg) by mouth 2 times daily as needed for heartburn 180 tablet 3     diphenhydrAMINE (BENADRYL) 25 MG tablet Take 1 tablet (25 mg) by mouth every 6 hours as needed for itching or allergies 56 tablet      GOODSENSE MIGRAINE FORMULA 250-250-65 MG per tablet TAKE ONE TABLET BY MOUTH EVERY 6 HOURS AS NEEDED FOR HEADACHES 24 tablet 1     naloxone (NARCAN) nasal spray Spray 1 spray (4 mg) into one nostril alternating nostrils as needed for opioid reversal every 2-3 minutes until assistance arrives (Patient not taking: Reported on 1/22/2019) 0.2 mL 3     sucralfate (CARAFATE) 1 GM tablet Take 1 tablet (1 g) by mouth 4 times daily (Patient taking differently: Take 1 g by mouth 4 times daily as needed ) 120 tablet 11       Reviewed and updated as needed this visit by clinical staff  Allergies  Meds       Reviewed and updated as needed this visit by Provider         ROS:  Constitutional, HEENT, cardiovascular, pulmonary, GI, , musculoskeletal, neuro, skin, endocrine and psych systems are negative, except as otherwise noted.    OBJECTIVE:     /90 (BP Location: Right arm, Patient Position: Sitting, Cuff Size: Adult Large)   Pulse 101   Temp 99  F (37.2  C) (Tympanic)   Ht 1.556 m (5' 1.25\")   Wt 85.4 kg (188 lb 4.8 oz)   SpO2 99%   BMI 35.29 kg/m    Body mass index is 35.29 kg/m .  GENERAL APPEARANCE: alert, no distress and over weight  RESP: lungs clear to auscultation - no rales, rhonchi or wheezes  CV: regular rates and rhythm, normal S1 S2, no S3 or S4 and no murmur, click or rub  SKIN: contractures on bilateral hands.  No ulcerations  PSYCH: mentation appears normal and affect normal/bright      ASSESSMENT/PLAN:     1. Chronic pain syndrome - she is doing well.  I do not anticipate that she will be able to " get off opiates entirely in the very near future, but on multiple occasions we have discussed trying to cut back.  Today, she is agreeable but not until the February fill.  She reports the severe cold weather has worsened her chronic pain.  She was agreeable to a 10% reduction for feels of February and March.  Return to clinic before April 22 for next fill.  - oxyCODONE IR (ROXICODONE) 15 MG tablet; Take 1 tablet (15 mg) by mouth every 4 hours as needed for severe pain  Dispense: 120 tablet; Refill: 0  - oxyCODONE IR (ROXICODONE) 15 MG tablet; Take 1 tablet (15 mg) by mouth every 4 hours as needed for pain  Dispense: 108 tablet; Refill: 0  - oxyCODONE IR (ROXICODONE) 15 MG tablet; Take 1 tablet (15 mg) by mouth every 4 hours as needed for pain  Dispense: 108 tablet; Refill: 0    2. REX (generalized anxiety disorder) - Over the last year, we have cut back her use of lorazepam.  Today she actually reports improved anxiety.  We have had long discussions about the risks of combining benzos and opiates.  I have written for Narcan which she has on hand.  I am still hopeful to wean her off benzos entirely.  She is slowly moving towards seeing a psychiatrist and a therapist for her PTSD and depression  - LORazepam (ATIVAN) 1 MG tablet; Take 1 tablet (1 mg) by mouth daily as needed for anxiety #15 to last 30 days  Dispense: 15 tablet; Refill: 2    3. Hypoglycemia   - blood glucose (NO BRAND SPECIFIED) test strip; Use to test blood sugar 1 time daily  Dispense: 100 each; Refill: 3  - blood glucose (NO BRAND SPECIFIED) lancets standard; Use to test blood sugar 1 time daily  Dispense: 100 each; Refill: 3    4. CREST (calcinosis, Raynaud's phenomenon, esophageal dysfunction, sclerodactyly, telangiectasia) (H) -hypertension not well controlled.  Increase to 10 mg once a day  - lisinopril (PRINIVIL/ZESTRIL) 10 MG tablet; Take 10 mg by mouth daily  Dispense: 90 tablet; Refill: 3    5. Morbid obesity (H) -patient has started  exercising and hopes to lose weight    6. Nausea and vomiting, intractability of vomiting not specified, unspecified vomiting type  -she reports the last fill of the Phenergan was very expensive.  Will change the formulation to hopefully be more affordable  - promethazine (PHENERGAN) 50 MG/ML SOLN IV injection; Inject 0.5 mL (25 mg) into the muscle every 6 hours as needed  Dispense: 90 mL; Refill: 11    Patient Instructions   1. Increase lisinopril to 10 mg once daily.  2. Return to clinic 3 months.  Reduce from 120 to 108 per month.  10% reduction.  Let me know if this decrease is not working well  3. Agree with plan to see counselor and psychiatry.        Greater than 40 minutes was spent face-to-face with the patient by the provider.  Greater than 50% was spent in counseling or coordinating care for this patient.      Grecia Barba,   [facility]

## 2019-01-23 ASSESSMENT — ANXIETY QUESTIONNAIRES: GAD7 TOTAL SCORE: 5

## 2019-01-25 ENCOUNTER — THERAPY VISIT (OUTPATIENT)
Dept: OCCUPATIONAL THERAPY | Facility: CLINIC | Age: 34
End: 2019-01-25
Payer: MEDICARE

## 2019-01-25 DIAGNOSIS — M24.542 CONTRACTURE OF HAND JOINT, LEFT: ICD-10-CM

## 2019-01-25 DIAGNOSIS — M24.541 CONTRACTURE OF HAND JOINT, RIGHT: Primary | ICD-10-CM

## 2019-01-25 PROCEDURE — 97535 SELF CARE MNGMENT TRAINING: CPT | Mod: GO | Performed by: OCCUPATIONAL THERAPIST

## 2019-01-25 PROCEDURE — 97763 ORTHC/PROSTC MGMT SBSQ ENC: CPT | Mod: GO | Performed by: OCCUPATIONAL THERAPIST

## 2019-01-25 NOTE — PROGRESS NOTES
SOAP note objective information for 1/25/2019.  Please refer to the daily flowsheet for treatment today, total treatment time and time spent performing 1:1 timed codes.        Diagnosis:  Bilateral  hand contractures, scleroderma, CREST syndrome  DOI:  MD visit: 12/13/2018  Dx 2/2015    Referring MD: Elena Sellers MD     Next MD visit: 3 months       S: see flowsheet    Functional Outcome Measure:  See flowsheet      Objective:  Observation:  noting scarring over the PIP dorsal aspects, no calcium deposits or open wounds    PAIN 12/13/2018     Location Bilateral   wrist and hand     Description  Aching, Burning, Numb, Sharp and Tingling     Radiates yes     Worse d/n daytime and nighttime     Frequency constant     Exacerbated by Overuse, and sensitive     Relieves rest     At rest 0-10/10 5/10     On use 0-10/10 5/10     At worst.0-10/10 9/10     Sleep disruption?   yes     Progression Unchanged       Functional Screen:  All BUE  motions are WNL    ROM:    Wrist 12/13/2018 12/13/2018   AROM(PROM) Right Left   Extension WNL WNL b equal   Flexion 55 57   RD     UD     Supination WNL WNL   Pronation         ROM: All Fingers       AROM(PROM) 12/13/2018 12/13/2018 1/4/19 1/25   E/F Right Left     Index MP 0/65 0/60     PIP 55/88 50/95     DIP 25/45 23/45     DOCKERY       Long MP 0/77 0/70     PIP 55/100 110/11  (90/x) (-90/x) (-80/x)   DIP 25/58 10/35     DOCKERY       Ring MP 0/74 0/60     PIP 65/100 50/95     DIP 18/45 25/55     DOCKERY       Small MP 0/45 0/50     PIP 55/99 56/50     DIP 25/60 21/48     DOCKERY           Strength:  [x]   Contraindicated  Edema:  none of the  hand    Palpation:  []     Normal        [x]   Tender       [x]  Mild         [x]   Moderate []   Severe   Location: all  Fingers are tingling, burning, as are her feet  Sensation:  Not assessed    A: see flowsheets        Treatment Plan:   Frequency:  3 X a month, once daily  Duration:  for 3 months     Modalities:  Paraffin  Therapeutic Exercise:  AROM,  PROM, Tendon Gliding, Isotonics and Isometrics  Neuromuscular re-education:  Nerve Gliding  Self Care:  Ergonomic Considerations  Orthotic Fabrication: Static progressive  hand based orthosis for left hand / Middle finger, And Static progressive Forearm based resting hand orthosis with finger extension, with elastomer inserts in the palm  Discharge Plan:  Achieve all LTG.  Independent in home treatment program.  Reach maximal therapeutic benefit.    Home Exercise Program:  Wear orthoses at night and report  On fit and skin tolerance  Add elastomer inserts to night resting orthoses  1/25/2019  Find adapt equip via resources    Next Visit:  Check on L hand orthosis for MF extension for day wear and increase  Refit night orthoses if needed with elastomer inserts

## 2019-02-05 ENCOUNTER — TELEPHONE (OUTPATIENT)
Dept: FAMILY MEDICINE | Facility: CLINIC | Age: 34
End: 2019-02-05

## 2019-02-06 NOTE — TELEPHONE ENCOUNTER
Prior Authorization Retail Medication Request    Medication/Dose: promethazine  ICD code (if different than what is on RX):    Previously Tried and Failed:  Benadryl;   Rationale:  temporary fill letter received; patient has used since 2017    Insurance Name:  [employer] [phone]  Insurance ID:  Not provided      Pharmacy Information (if different than what is on RX)  Name:    Phone:

## 2019-02-11 NOTE — TELEPHONE ENCOUNTER
Prior Authorization Not Needed per Insurance    Medication: promethazine  Insurance Company: [employer] - Phone [phone] Fax [phone]  Expected CoPay:      Pharmacy Filling the Rx: [facility] [place], [place] - [address] [place] [address]  Pharmacy Notified: Yes  Patient Notified: Yes

## 2019-02-13 ENCOUNTER — TELEPHONE (OUTPATIENT)
Dept: ORTHOPEDICS | Facility: CLINIC | Age: 34
End: 2019-02-13

## 2019-02-13 DIAGNOSIS — M76.829 PTTD (POSTERIOR TIBIAL TENDON DYSFUNCTION): ICD-10-CM

## 2019-02-13 DIAGNOSIS — M76.61 ACHILLES TENDINITIS OF RIGHT LOWER EXTREMITY: Primary | ICD-10-CM

## 2019-02-13 NOTE — TELEPHONE ENCOUNTER
Called and left   JOHN Guadalupe PT back to clarify that they cannot use current order that was ordered on 12/5/18. If they are able to use it, they can see it in Epic. If new order is to be placed, need to OK through Dr. Cruz.    They are not able to use old order, new order needs to be placed. Will discuss with Dr. Cruz.     Per huddle with Dr. Cruz, ok to place new order with same instructions. Order placed under verbal with readback, provider will cosign.    Geovanny Reid RN

## 2019-02-13 NOTE — TELEPHONE ENCOUNTER
[facility] Call Center    Phone Message    May a detailed message be left on voicemail: yes    Reason for Call: Order(s): Other: PT   Reason for requested: to much time in between and PT needs a new order sent to [facility]  Date needed: ASAP by Monday 2/18/2019   Provider name: Kenroy Cruz MD      Action Taken: Message routed to:  [facility] ([facility] Orthopedics [facility]

## 2019-02-15 ENCOUNTER — THERAPY VISIT (OUTPATIENT)
Dept: OCCUPATIONAL THERAPY | Facility: CLINIC | Age: 34
End: 2019-02-15
Payer: MEDICARE

## 2019-02-15 DIAGNOSIS — M24.542 CONTRACTURE OF HAND JOINT, LEFT: ICD-10-CM

## 2019-02-15 DIAGNOSIS — M24.541 CONTRACTURE OF HAND JOINT, RIGHT: ICD-10-CM

## 2019-02-15 PROCEDURE — 97763 ORTHC/PROSTC MGMT SBSQ ENC: CPT | Mod: GO | Performed by: OCCUPATIONAL THERAPIST

## 2019-02-15 NOTE — PROGRESS NOTES
SOAP note objective information for 2/15/2019.  Please refer to the daily flowsheet for treatment today, total treatment time and time spent performing 1:1 timed codes.        Diagnosis:  Bilateral  hand contractures, scleroderma, CREST syndrome  DOI:  MD visit: 12/13/2018  Dx 2/2015    Referring MD: Elena Sellers MD     Next MD visit: 3 months       S: see flowsheet    Functional Outcome Measure:  See flowsheet      Objective:  Observation:  noting scarring over the PIP dorsal aspects, no calcium deposits or open wounds    PAIN 12/13/2018     Location Bilateral   wrist and hand     Description  Aching, Burning, Numb, Sharp and Tingling     Radiates yes     Worse d/n daytime and nighttime     Frequency constant     Exacerbated by Overuse, and sensitive     Relieves rest     At rest 0-10/10 5/10     On use 0-10/10 5/10     At worst.0-10/10 9/10     Sleep disruption?   yes     Progression Unchanged       Functional Screen:  All BUE  motions are WNL    ROM:    Wrist 12/13/2018 12/13/2018   AROM(PROM) Right Left   Extension WNL WNL b equal   Flexion 55 57   RD     UD     Supination WNL WNL   Pronation         ROM: All Fingers        AROM(PROM) 12/13/2018 12/13/2018 1/4/19 1/25 2/15   E/F Right Left      Index MP 0/65 0/60      PIP 55/88 50/95      DIP 25/45 23/45      DOCKERY        Long MP 0/77 0/70      PIP 55/100 110/11  (90/x) (-90/x) (-80/x) (-80/x)   DIP 25/58 10/35      DOCKERY        Ring MP 0/74 0/60      PIP 65/100 50/95      DIP 18/45 25/55      DOCKERY        Small MP 0/45 0/50      PIP 55/99 56/50      DIP 25/60 21/48      DOCKERY            Strength:  [x]   Contraindicated  Edema:  none of the  hand    Palpation:  []     Normal        [x]   Tender       [x]  Mild         [x]   Moderate []   Severe   Location: all  Fingers are tingling, burning, as are her feet  Sensation:  Not assessed    A: see flowsheets        Treatment Plan:   Frequency:  3 X a month, once daily  Duration:  for 3 months     Modalities:   Paraffin  Therapeutic Exercise:  AROM, PROM, Tendon Gliding, Isotonics and Isometrics  Neuromuscular re-education:  Nerve Gliding  Self Care:  Ergonomic Considerations  Orthotic Fabrication: Static progressive  hand based orthosis for left hand / Middle finger, And Static progressive Forearm based resting hand orthosis with finger extension, with elastomer inserts in the palm  Discharge Plan:  Achieve all LTG.  Independent in home treatment program.  Reach maximal therapeutic benefit.    Home Exercise Program:  Wear orthoses at night and report  On fit and skin tolerance  Add elastomer inserts to night resting orthoses  1/25/2019  Ffound adapt equip via resources        Next Visit:  Check on all splints and review [employer] equip needs

## 2019-02-19 DIAGNOSIS — K21.00 GASTROESOPHAGEAL REFLUX DISEASE WITH ESOPHAGITIS: ICD-10-CM

## 2019-02-19 DIAGNOSIS — M34.1 CREST (CALCINOSIS, RAYNAUD'S PHENOMENON, ESOPHAGEAL DYSFUNCTION, SCLERODACTYLY, TELANGIECTASIA) (H): ICD-10-CM

## 2019-02-19 NOTE — TELEPHONE ENCOUNTER
"Requested Prescriptions   Pending Prescriptions Disp Refills     omeprazole (PRILOSEC) 40 MG DR capsule [Pharmacy Med Name: OMEPRAZOLE 40MG CPDR] 180 capsule 3    Last Written Prescription Date:  4/11/18  Last Fill Quantity: 180 cap,  # refills: 3   Last office visit: 1/22/2019 with prescribing provider:  Grecia Barba     Future Office Visit:   Next 5 appointments (look out 90 days)    Feb 20, 2019  1:40 PM CST  Return Visit with Kenroy Cruz DPM  [facility] ([facility]) [address] [place] [id number] [phone]          Sig: TAKE ONE CAPSULE BY MOUTH TWICE A DAY    PPI Protocol Passed - 2/19/2019  4:09 PM       Passed - Not on Clopidogrel (unless Pantoprazole ordered)       Passed - No diagnosis of osteoporosis on record       Passed - Recent (12 mo) or future (30 days) visit within the authorizing provider's specialty    Patient had office visit in the last 12 months or has a visit in the next 30 days with authorizing provider or within the authorizing provider's specialty.  See \"Patient Info\" tab in inbasket, or \"Choose Columns\" in Meds & Orders section of the refill encounter.             Passed - Medication is active on med list       Passed - Patient is age 18 or older       Passed - No active pregnacy on record       Passed - No positive pregnancy test in past 12 months          "

## 2019-02-20 RX ORDER — OMEPRAZOLE 40 MG/1
CAPSULE, DELAYED RELEASE ORAL
Qty: 180 CAPSULE | Refills: 1 | Status: SHIPPED | OUTPATIENT
Start: 2019-02-20 | End: 2019-08-31

## 2019-02-20 NOTE — TELEPHONE ENCOUNTER
Prescription approved per [facility] refill protocol. Dinora REYNOSO RN

## 2019-04-23 ENCOUNTER — HOSPITAL ENCOUNTER (EMERGENCY)
Facility: CLINIC | Age: 34
Discharge: HOME | End: 2019-04-23
Attending: NURSE PRACTITIONER | Admitting: NURSE PRACTITIONER
Payer: MEDICARE

## 2019-04-23 VITALS
BODY MASS INDEX: 34.96 KG/M2 | DIASTOLIC BLOOD PRESSURE: 89 MMHG | HEIGHT: 62 IN | SYSTOLIC BLOOD PRESSURE: 127 MMHG | RESPIRATION RATE: 16 BRPM | TEMPERATURE: 97.9 F | WEIGHT: 190 LBS

## 2019-04-23 DIAGNOSIS — J04.0 LARYNGITIS: ICD-10-CM

## 2019-04-23 DIAGNOSIS — R05.9 COUGH: ICD-10-CM

## 2019-04-23 DIAGNOSIS — N30.01 ACUTE CYSTITIS WITH HEMATURIA: ICD-10-CM

## 2019-04-23 DIAGNOSIS — J45.50 SEVERE PERSISTENT ASTHMA IN ADULT WITHOUT COMPLICATION (H): ICD-10-CM

## 2019-04-23 LAB
ALBUMIN UR-MCNC: 30 MG/DL
AMORPH CRY #/AREA URNS HPF: ABNORMAL /HPF
APPEARANCE UR: ABNORMAL
BILIRUB UR QL STRIP: NEGATIVE
COLOR UR AUTO: YELLOW
GLUCOSE UR STRIP-MCNC: NEGATIVE MG/DL
HGB UR QL STRIP: ABNORMAL
INTERNAL QC OK POCT: YES
KETONES UR STRIP-MCNC: NEGATIVE MG/DL
LEUKOCYTE ESTERASE UR QL STRIP: ABNORMAL
NITRATE UR QL: NEGATIVE
PH UR STRIP: 9 PH (ref 5–7)
RBC #/AREA URNS AUTO: 176 /HPF (ref 0–2)
S PYO AG THROAT QL IA.RAPID: NEGATIVE
SOURCE: ABNORMAL
SP GR UR STRIP: 1.02 (ref 1–1.03)
SQUAMOUS #/AREA URNS AUTO: 3 /HPF (ref 0–1)
UROBILINOGEN UR STRIP-MCNC: 0 MG/DL (ref 0–2)
WBC #/AREA URNS AUTO: 203 /HPF (ref 0–5)

## 2019-04-23 PROCEDURE — 87880 STREP A ASSAY W/OPTIC: CPT | Performed by: NURSE PRACTITIONER

## 2019-04-23 PROCEDURE — 87088 URINE BACTERIA CULTURE: CPT | Performed by: NURSE PRACTITIONER

## 2019-04-23 PROCEDURE — 87186 SC STD MICRODIL/AGAR DIL: CPT | Performed by: NURSE PRACTITIONER

## 2019-04-23 PROCEDURE — 81001 URINALYSIS AUTO W/SCOPE: CPT | Performed by: NURSE PRACTITIONER

## 2019-04-23 PROCEDURE — 99214 OFFICE O/P EST MOD 30 MIN: CPT | Mod: Z6 | Performed by: PHYSICIAN ASSISTANT

## 2019-04-23 PROCEDURE — G0463 HOSPITAL OUTPT CLINIC VISIT: HCPCS | Performed by: PHYSICIAN ASSISTANT

## 2019-04-23 PROCEDURE — 87086 URINE CULTURE/COLONY COUNT: CPT | Performed by: NURSE PRACTITIONER

## 2019-04-23 RX ORDER — CEFDINIR 300 MG/1
300 CAPSULE ORAL 2 TIMES DAILY
Qty: 14 CAPSULE | Refills: 0 | Status: SHIPPED | OUTPATIENT
Start: 2019-04-23 | End: 2019-05-07

## 2019-04-23 ASSESSMENT — ENCOUNTER SYMPTOMS
HEADACHES: 1
RHINORRHEA: 1
DIARRHEA: 0
SINUS PRESSURE: 1
WHEEZING: 1
PALPITATIONS: 0
DYSURIA: 0
SHORTNESS OF BREATH: 1
FREQUENCY: 0
VOMITING: 0
CONSTIPATION: 0
ABDOMINAL PAIN: 0
BACK PAIN: 0
FEVER: 0
SORE THROAT: 1
COUGH: 1
FLANK PAIN: 0
VOICE CHANGE: 1
NAUSEA: 0

## 2019-04-23 ASSESSMENT — MIFFLIN-ST. JEOR: SCORE: 1520.08

## 2019-04-23 NOTE — ED PROVIDER NOTES
Rapid strep obtained in office today and was negative.  Throat culture currently pending.  History     Chief Complaint   Patient presents with     Pharyngitis     and uti sx     HPI  Molly Teague is a 33 year old female who presents the urgent care with sore throat, hoarse voice, runny nose congestion, headache, sinus pressure, and cough with slightly worsening asthma over the past week.  Patient states she also has had UTI symptoms with urgency and voiding small amounts over the past few days.  Patient here rule out strep throat and UTI.  Patient denies any drooling, dysphasia, trismus, fevers, back pain, vaginal discharge or irritation, hematuria, abdominal pain, nausea or vomiting, chest pain, shortness of breath or wheezing at this time.  Patient states she has been using her rescue inhaler more frequently with minimal improvement.  Patient also takes Symbicort inhaler twice daily.  Patient states she feels like she is doing okay with her asthma symptoms and does not think she needs steroids at this time.     Red flag for suicide screen on triage screen.  After asking patient today that she had any thoughts of suicide or homicidal ideation patient stated no.  Patient states that 3 months ago she had an episode of severe depression with thoughts of suicide at that time, but since then has been doing very well and denies any thoughts of suicide or depressive symptoms at this time.     Allergies:  Allergies   Allergen Reactions     No Known Allergies      Seasonal Allergies      Shellfish-Derived Products Nausea     Rash on face       Problem List:    Patient Active Problem List    Diagnosis Date Noted     Obesity (BMI 35.0-39.9) with comorbidity (H) 01/22/2019     Priority: Medium     Contracture of hand joint, right 12/13/2018     Priority: Medium     Contracture of hand joint, left 12/13/2018     Priority: Medium     Moderate major depression (H) 07/06/2018     Priority: Medium     Severe persistent asthma  without complication 07/06/2018     Priority: Medium     On high dose ICS/LABA + frequent albuterol use.  Next allergy/pulmonary referral       Aspiration of food 03/29/2018     Priority: Medium     Chronic pain syndrome 04/26/2017     Priority: Medium     Patient is followed by Grecia Barba DO for ongoing prescription of pain medication.  All refills should be approved by this provider only at face-to-face appointments - not by phone request.    Medication(s): Oxycodone 15 mg.   Maximum quantity per month: 120  Clinic visit frequency required: Q 3 months     Controlled substance agreement:  Encounter-Level CSA - 04/26/2017:          Controlled Substance Agreement - Scan on 5/4/2017  8:58 AM : CONTROLLED SUBSTANCE AGREEMENT (below)              Pain Clinic evaluation in the past: No    DIRE Total Score(s):  No flowsheet data found.    Last [facility] website verification:  done on 4/26/17   9/26/2017  7/6/2018  10/16/2018  1/22/2019         [email or web address]         Chronic migraine without aura without status migrainosus, not intractable 04/12/2017     Priority: Medium     With medication overuse.  Fioricet and not Imitrex is recommend to treat severe headache.  2/2017 [facility] recommend wean down from daily Fioricet and use Excedrin Migrain PRN.       AIN grade I 03/30/2017     Priority: Medium     Found at [facility] on colonoscopy 2/2017.  Recommend repeat anoscopy and anal pap in 6/2017.       Gastroesophageal reflux disease with esophagitis 03/30/2017     Priority: Medium     EGD done at [facility] 2/2017 showed esophagitis without GAVE.  Recommend max dose H2 and PPI, along with 4 tablets of Carafate dissolved in water and drink throughout the day.    Had LA Grade D esophagitis        Limited systemic sclerosis (H) 01/25/2017     Priority: Medium     Raynaud's, calcinosis, telangiectasias, peripheral neuropathy, GERD symptoms, history of scleroderma renal crisis.  Saw [facility] 1/2017 and follows with Rheum   [facility] locally.       Polyneuropathy associated with underlying disease (H) 01/25/2017     Priority: Medium     Due to scleroderma and neurology thought possible due to ICU (critical illness neuropathy).  Recommend to max out dose of gabapentin to 1864-4129 mg/day, split BID or TID.  EMG 1/2017 positive for neuropathy       History of Clostridium difficile 11/29/2016     Priority: Medium     History of Helicobacter pylori infection 11/29/2016     Priority: Medium     Scleroderma with renal involvement (H) 11/09/2016     Priority: Medium     Stopped lisinopril per [place] [facility] 1/2017.  Restarted lisinopril per [place] 3/2017       History of ARDS 11/09/2016     Priority: Medium     4/2015, prolonged ICU/vent/trach due to influenza, scleroderma renal crisis requiring hemodialysis.       Raynaud's disease without gangrene 11/09/2016     Priority: Medium     History of hemodialysis 11/09/2016     Priority: Medium     4/2015 due to scleroderma renal crisis       Bilateral retinitis 11/09/2016     Priority: Medium     IUD (intrauterine device) in place 11/09/2016     Priority: Medium     Other pulmonary embolism without acute cor pulmonale, unspecified chronicity (H) 11/09/2016     Priority: Medium     Completed anti-coagulation.       REX (generalized anxiety disorder) 11/09/2016     Priority: Medium     CREST (calcinosis, Raynaud's phenomenon, esophageal dysfunction, sclerodactyly, telangiectasia) (H) 11/09/2016     Priority: Medium     Scleroderma (H) 09/22/2016     Priority: Medium     Nausea with vomiting 09/21/2016     Priority: Medium        Past Medical History:    Past Medical History:   Diagnosis Date     Acute pyelonephritis 6/9/2017     Altered mental state 3/29/2018     Arthritis      Blood clotting disorder (H)      History of blood transfusion      Hypertension      Long-term (current) use of anticoagulants [Z79.01] 9/9/2016     PE (pulmonary embolism) 9/7/2016     Rheumatism      Scleroderma (H) 9/22/2016      Uncomplicated asthma        Past Surgical History:    No past surgical history on file.    Family History:    Family History   Problem Relation Age of Onset     Hyperlipidemia Father      Cerebrovascular Disease Maternal Grandmother      Hyperlipidemia Paternal Grandfather      Diabetes Maternal Aunt      Melanoma No family hx of      Skin Cancer No family hx of        Social History:  Marital Status:  Single [1]  Social History     Tobacco Use     Smoking status: Never Smoker     Smokeless tobacco: Never Used   Substance Use Topics     Alcohol use: No     Drug use: No        Medications:      cefdinir (OMNICEF) 300 MG capsule   albuterol (VENTOLIN HFA) 108 (90 Base) MCG/ACT Inhaler   blood glucose (NO BRAND SPECIFIED) lancets standard   blood glucose (NO BRAND SPECIFIED) test strip   budesonide-formoterol (SYMBICORT) 160-4.5 MCG/ACT Inhaler   BusPIRone HCl 30 MG TABS   citalopram (CELEXA) 40 MG tablet   Cyanocobalamin (B-12) 1000 MCG TBCR   diphenhydrAMINE (BENADRYL) 25 MG tablet   Doxylamine Succinate, Sleep, (UNISOM PO)   ferrous gluconate (FERGON) 324 (38 FE) MG tablet   gabapentin (NEURONTIN) 300 MG capsule   GOODSENSE MIGRAINE FORMULA 250-250-65 MG per tablet   lisinopril (PRINIVIL/ZESTRIL) 10 MG tablet   LORazepam (ATIVAN) 1 MG tablet   montelukast (SINGULAIR) 10 MG tablet   naloxone (NARCAN) nasal spray   omeprazole (PRILOSEC) 40 MG DR capsule   polyethylene glycol (MIRALAX) powder   promethazine (PHENERGAN) 50 MG/ML SOLN IV injection   ranitidine (ZANTAC) 150 MG tablet   sucralfate (CARAFATE) 1 GM tablet         Review of Systems   Constitutional: Negative for fever.   HENT: Positive for congestion, rhinorrhea, sinus pressure, sore throat and voice change (hoarse voice. ).    Respiratory: Positive for cough, shortness of breath and wheezing.    Cardiovascular: Negative for chest pain and palpitations.   Gastrointestinal: Negative for abdominal pain, constipation, diarrhea, nausea and vomiting.  "  Genitourinary: Positive for decreased urine volume and urgency. Negative for dysuria, flank pain, frequency, pelvic pain, vaginal bleeding, vaginal discharge and vaginal pain.   Musculoskeletal: Negative for back pain.   Skin: Negative for rash.   Neurological: Positive for headaches.   All other systems reviewed and are negative.      Physical Exam   BP: 127/89  Heart Rate: 75  Temp: 97.9  F (36.6  C)  Resp: 16  Height: 157.5 cm (5' 2\")  Weight: 86.2 kg (190 lb)      Physical Exam   Constitutional: She is oriented to person, place, and time. Vital signs are normal. She appears well-developed and well-nourished. She is active and cooperative. No distress.   HENT:   Head: Normocephalic and atraumatic.   Right Ear: Tympanic membrane and ear canal normal.   Left Ear: Tympanic membrane and ear canal normal.   Nose: Mucosal edema and rhinorrhea present. Right sinus exhibits no maxillary sinus tenderness and no frontal sinus tenderness. Left sinus exhibits no maxillary sinus tenderness and no frontal sinus tenderness.   Mouth/Throat: Uvula is midline and mucous membranes are normal. No trismus in the jaw. No uvula swelling. Posterior oropharyngeal erythema present. No oropharyngeal exudate or posterior oropharyngeal edema. No tonsillar exudate.   Positive hoarse voice.    Eyes: Pupils are equal, round, and reactive to light. Conjunctivae and EOM are normal. Right eye exhibits no discharge. Left eye exhibits no discharge.   Neck: Normal range of motion. Neck supple.   Cardiovascular: Normal rate, regular rhythm and normal heart sounds.   No murmur heard.  Pulmonary/Chest: Effort normal and breath sounds normal. No stridor. No respiratory distress. She has no wheezes. She has no rales. She exhibits no tenderness.   Abdominal: Soft. Bowel sounds are normal. She exhibits no distension. There is no tenderness. There is no guarding.   No CVA tenderness bilaterally.    Lymphadenopathy:     She has cervical adenopathy. "   Neurological: She is alert and oriented to person, place, and time. GCS eye subscore is 4. GCS verbal subscore is 5. GCS motor subscore is 6.   Skin: Skin is warm. No rash noted. She is not diaphoretic. No erythema.   Psychiatric: She has a normal mood and affect. Her behavior is normal. Thought content normal.   Nursing note and vitals reviewed.      ED Course        Procedures              Critical Care time:  none               Results for orders placed or performed during the hospital encounter of 04/23/19 (from the past 24 hour(s))   UA with Microscopic   Result Value Ref Range    Color Urine Yellow     Appearance Urine Slightly Cloudy     Glucose Urine Negative NEG^Negative mg/dL    Bilirubin Urine Negative NEG^Negative    Ketones Urine Negative NEG^Negative mg/dL    Specific Gravity Urine 1.016 1.003 - 1.035    Blood Urine Moderate (A) NEG^Negative    pH Urine 9.0 (H) 5.0 - 7.0 pH    Protein Albumin Urine 30 (A) NEG^Negative mg/dL    Urobilinogen mg/dL 0.0 0.0 - 2.0 mg/dL    Nitrite Urine Negative NEG^Negative    Leukocyte Esterase Urine Moderate (A) NEG^Negative    Source Midstream Urine     WBC Urine 203 (H) 0 - 5 /HPF    RBC Urine 176 (H) 0 - 2 /HPF    Squamous Epithelial /HPF Urine 3 (H) 0 - 1 /HPF    Amorphous Crystals Moderate (A) NEG^Negative /HPF   Urine Culture   Result Value Ref Range    Specimen Description Midstream Urine     Special Requests Specimen received in preservative     Culture Micro PENDING    Rapid strep group A screen POCT   Result Value Ref Range    Rapid Strep A Screen Negative neg    Internal QC OK Yes        Medications - No data to display    Assessments & Plan (with Medical Decision Making)     I have reviewed the nursing notes.    I have reviewed the findings, diagnosis, plan and need for follow up with the patient.   33-year-old female presents the urgent care with URI type symptoms, sore throat, hoarse voice, and urinary symptoms.  Patient states that the upper respiratory  symptoms started about a week ago and the urinary symptoms started over the past few days.  Patient states she does have a history of asthma and has been using her rescue inhaler and daily Symbicort as directed.  Patient states she cannot take prednisone because she has a reaction to it.  Rapid strep obtained in office today and was negative.  Throat culture was not sent because patient was placed on Ceftin ear twice daily for 7 days for treatment of acute cystitis with hematuria.  Urine culture currently pending.  Discussed with patient that chest x-ray not indicated at this time. Patient declined starting oral steroids and states she has appointment with her primary care provider in 3 days that she can follow up with if symptoms worsen.  Patient to return to the emergency department if shortness of breath, painful breathing, chest pain, heart racing or skipping beats, fevers occur, abdominal pain, nausea or vomiting occur, dysphonia, dysphasia, trismus, or drooling occur.  Patient discharged in stable condition and given discharge paperwork.  No concerns for peritonsillar abscess or Denis's angina.  No acute respiratory distress.  No concerns for pyelonephritis at this time.       Medication List      Started    cefdinir 300 MG capsule  Commonly known as:  OMNICEF  300 mg, Oral, 2 TIMES DAILY        ASK your doctor about these medications    oxyCODONE IR 15 MG tablet  Commonly known as:  ROXICODONE  15 mg, Oral, EVERY 4 HOURS PRN  Ask about: Should I take this medication?            Final diagnoses:   Acute cystitis with hematuria   Laryngitis   Cough   Severe persistent asthma in adult without complication       4/23/2019   [facility] EMERGENCY DEPARTMENT     Patt Manning PA-C  04/23/19 1643

## 2019-04-23 NOTE — DISCHARGE INSTRUCTIONS
Use Medication as directed    Patient advised to call for any lab results (if obtained during visit) within 2-3 days. Urine culture and throat culture pending.   Drink plenty of fluids.     Prevention and treatment of UTI's discussed.  Signs and symptoms of pyelonephritis mentioned.  Follow up with primary care provider for recheck in 3 days of cough and asthma.   Patient to return to clinic sooner if not improving as expected.   Go to ER if any worsening symptoms or signs/symptoms of phylonephritis occur.

## 2019-04-23 NOTE — ED AVS SNAPSHOT
[facility] Emergency Department  [address] [address]  Phone:  [phone]  Fax:  [phone]                                    Molly Teague   MRN: [id number]    Department:  [facility] Emergency Department   Date of Visit:  4/23/2019           After Visit Summary Signature Page    I have received my discharge instructions, and my questions have been answered. I have discussed any challenges I see with this plan with the nurse or doctor.    ..........................................................................................................................................  Patient/Patient Representative Signature      ..........................................................................................................................................  Patient Representative Print Name and Relationship to Patient    ..................................................               ................................................  Date                                   Time    ..........................................................................................................................................  Reviewed by Signature/Title    ...................................................              ..............................................  Date                                               Time          22EPIC Rev 08/18

## 2019-04-24 DIAGNOSIS — F41.1 GAD (GENERALIZED ANXIETY DISORDER): ICD-10-CM

## 2019-04-24 LAB
BACTERIA SPEC CULT: ABNORMAL
Lab: ABNORMAL
SPECIMEN SOURCE: ABNORMAL

## 2019-04-24 RX ORDER — LORAZEPAM 1 MG/1
1 TABLET ORAL DAILY PRN
Qty: 15 TABLET | Refills: 2 | Status: CANCELLED | OUTPATIENT
Start: 2019-04-24

## 2019-04-24 NOTE — TELEPHONE ENCOUNTER
Requested Prescriptions   Pending Prescriptions Disp Refills     LORazepam (ATIVAN) 1 MG tablet 15 tablet 2     Sig: Take 1 tablet (1 mg) by mouth daily as needed for anxiety #15 to last 30 days       There is no refill protocol information for this order        Last Written Prescription Date:  1/22/19  Last Fill Quantity: 15,  # refills: 2   Last office visit: No previous visit found with prescribing provider:  Jameson   Future Office Visit:   Next 5 appointments (look out 90 days)    Apr 26, 2019  8:00 AM CDT  SHORT with Grecia Barba,   [facility] ([facility]) [address] [address]  [phone]

## 2019-04-24 NOTE — TELEPHONE ENCOUNTER
"I called her to advise she needs an appt for this refill to be considered, per [facility] protocol.     She stated, \"I have an appt Friday, I don't have any left but ok, that is fine. \"    Dr Barba, FY only, she was asking for lorazepam refill and has an upcoming appt on Friday at  which she says she will ask you to address this then,   Alejandra Harper RNC    "

## 2019-04-24 NOTE — RESULT ENCOUNTER NOTE
Emergency Dept/Urgent Care discharge antibiotic (if prescribed): Cefdinir (Omnicef) 300 mg capsule, 1 capsule (300 mg) by mouth 2 times daily for 7 days  Date of Rx (if applicable):  4/23/19  No changes in treatment per Urine culture protocol.

## 2019-04-25 NOTE — RESULT ENCOUNTER NOTE
Final Urine Culture Report on 4/24/19  Emergency Dept/Urgent Care discharge antibiotic prescribed: Cefdinir (Omnicef) 300 mg capsule, 1 capsule (300 mg) by mouth 2 times daily for 7 days  #1. Bacteria, 10,000 to 50,000 colonies/ml Escherichia coli, is SUSCEPTIBLE to Antibiotic.    As per [facility] ED Lab Result protocol, no change in antibiotic therapy.

## 2019-04-26 ENCOUNTER — OFFICE VISIT (OUTPATIENT)
Dept: FAMILY MEDICINE | Facility: CLINIC | Age: 34
End: 2019-04-26
Payer: MEDICARE

## 2019-04-26 VITALS
BODY MASS INDEX: 35.15 KG/M2 | HEIGHT: 62 IN | RESPIRATION RATE: 24 BRPM | SYSTOLIC BLOOD PRESSURE: 120 MMHG | DIASTOLIC BLOOD PRESSURE: 80 MMHG | HEART RATE: 91 BPM | OXYGEN SATURATION: 98 % | WEIGHT: 191 LBS | TEMPERATURE: 99.2 F

## 2019-04-26 DIAGNOSIS — F43.10 PTSD (POST-TRAUMATIC STRESS DISORDER): ICD-10-CM

## 2019-04-26 DIAGNOSIS — F41.1 GAD (GENERALIZED ANXIETY DISORDER): ICD-10-CM

## 2019-04-26 DIAGNOSIS — J06.9 VIRAL URI: ICD-10-CM

## 2019-04-26 DIAGNOSIS — G89.4 CHRONIC PAIN SYNDROME: Primary | Chronic | ICD-10-CM

## 2019-04-26 DIAGNOSIS — N30.01 ACUTE CYSTITIS WITH HEMATURIA: ICD-10-CM

## 2019-04-26 PROCEDURE — 99215 OFFICE O/P EST HI 40 MIN: CPT | Performed by: INTERNAL MEDICINE

## 2019-04-26 RX ORDER — LORAZEPAM 1 MG/1
1 TABLET ORAL DAILY PRN
Qty: 30 TABLET | Refills: 2 | Status: SHIPPED | OUTPATIENT
Start: 2019-04-26 | End: 2019-07-25

## 2019-04-26 RX ORDER — OXYCODONE HYDROCHLORIDE 15 MG/1
15 TABLET ORAL EVERY 4 HOURS PRN
Qty: 108 TABLET | Refills: 0 | Status: SHIPPED | OUTPATIENT
Start: 2019-04-26 | End: 2019-06-19

## 2019-04-26 RX ORDER — OXYCODONE HYDROCHLORIDE 15 MG/1
15 TABLET ORAL EVERY 4 HOURS PRN
Qty: 108 TABLET | Refills: 0 | Status: SHIPPED | OUTPATIENT
Start: 2019-06-25 | End: 2019-07-25

## 2019-04-26 RX ORDER — OXYCODONE HYDROCHLORIDE 15 MG/1
15 TABLET ORAL EVERY 4 HOURS PRN
Qty: 108 TABLET | Refills: 0 | Status: SHIPPED | OUTPATIENT
Start: 2019-05-26 | End: 2019-06-19

## 2019-04-26 ASSESSMENT — MIFFLIN-ST. JEOR: SCORE: 1524.62

## 2019-04-26 NOTE — PROGRESS NOTES
SUBJECTIVE:   Molly Teague is a 33 year old female who presents to clinic today for the following   health issues:        ED/UC Followup:    Facility:  [facility]  Date of visit: 4/23/19  Reason for visit: ER NOTE IS COPIED BELOW:  Rapid strep obtained in office today and was negative.  Throat culture currently pending.  History           Chief Complaint   Patient presents with     Pharyngitis       and uti sx      HPI  Molly Teague is a 33 year old female who presents the urgent care with sore throat, hoarse voice, runny nose congestion, headache, sinus pressure, and cough with slightly worsening asthma over the past week.  Patient states she also has had UTI symptoms with urgency and voiding small amounts over the past few days.  Patient here rule out strep throat and UTI.  Patient denies any drooling, dysphasia, trismus, fevers, back pain, vaginal discharge or irritation, hematuria, abdominal pain, nausea or vomiting, chest pain, shortness of breath or wheezing at this time.  Patient states she has been using her rescue inhaler more frequently with minimal improvement.  Patient also takes Symbicort inhaler twice daily.  Patient states she feels like she is doing okay with her asthma symptoms and does not think she needs steroids at this time.      Red flag for suicide screen on triage screen.  After asking patient today that she had any thoughts of suicide or homicidal ideation patient stated no.  Patient states that 3 months ago she had an episode of severe depression with thoughts of suicide at that time, but since then has been doing very well and denies any thoughts of suicide or depressive symptoms at this time.        Current Status: She states she is feeling better.  Her voice is coming back.  Her glands are not as swollen.  UTI symptoms are getting better as well.       Medication Followup of Anxiety and Depression    Taking Medication as prescribed: yes    Side Effects:  None    Medication Helping  Symptoms:  States her depression and anxiety has been worse the past few months.  Anxiety levels are higher recently.  More panic attacks.  States her pain is controlled.    Feeling nauseated after eating.     Mother visited 2 weeks ago and 'had an intervention'.  She was concerned about patient weight gain, low mood, lack of self care. Since then, patient is working on improving her self care - exercising, eating better.    Knows she needs to see therapy.  Is very scared to start.    Wants increase in lorazepam    She reports her son keeps her form acting on suicidal thoughts.  Has good support with , mother, in-laws.    PHQ-9 ([employer]) 1/22/2019   1.  Little interest or pleasure in doing things 2   2.  Feeling down, depressed, or hopeless 1   3.  Trouble falling or staying asleep, or sleeping too much 2   4.  Feeling tired or having little energy 2   5.  Poor appetite or overeating 1   6.  Feeling bad about yourself 1   7.  Trouble concentrating 1   8.  Moving slowly or restless 1   9.  Suicidal or self-harm thoughts    PHQ-9 Total Score    Difficulty at work, home, or with people Somewhat difficult     REX-7   [employer], 2002; Used with Permission) 1/22/2019   1. Feeling nervous, anxious, or on edge 0   2. Not being able to stop or control worrying 0   3. Worrying too much about different things 1   4. Trouble relaxing 1   5. Being so restless that it is hard to sit still 0   6. Becoming easily annoyed or irritable 2   7. Feeling afraid, as if something awful might happen 1   REX-7 Total Score 5   If you checked any problems, how difficult have they made it for you to do your work, take care of things at home, or get along with other people? Somewhat difficult        Medication Followup of Pain    Taking Medication as prescribed: yes    Side Effects:  None    Medication Helping Symptoms:  Yes, she states her pain is managed with the decrease of her medication.    Wants to mainatin at current # of opiates,  then next fill consider decreasing.     Scleroderma: working with hand therapy.  Has orthotics for feet.   Has hand splints.  Needs to follow-up with podiatry for toe problem.  Having more problems with the 2nd toe - decreased ROM.  Is stepping on toe when she walks.  Worried about wounds.    Bderm: is noting green/blue/black discoloration to skin, no ijury, bruising, trauma.  On bilateral neck, upper arms.      Reviewed  and updated as needed this visit by clinical staff         Reviewed and updated as needed this visit by Provider         Current Outpatient Medications   Medication Sig Dispense Refill     albuterol (VENTOLIN HFA) 108 (90 Base) MCG/ACT Inhaler Inhale 2 puffs into the lungs every 4 hours as needed for shortness of breath / dyspnea or wheezing 1 Inhaler 11     blood glucose (NO BRAND SPECIFIED) lancets standard Use to test blood sugar 1 time daily 100 each 3     blood glucose (NO BRAND SPECIFIED) test strip Use to test blood sugar 1 time daily 100 each 3     budesonide-formoterol (SYMBICORT) 160-4.5 MCG/ACT Inhaler Inhale 2 puffs into the lungs 2 times daily 6 g 11     BusPIRone HCl 30 MG TABS Take 30 mg by mouth 2 times daily 180 tablet 3     cefdinir (OMNICEF) 300 MG capsule Take 1 capsule (300 mg) by mouth 2 times daily for 7 days 14 capsule 0     citalopram (CELEXA) 40 MG tablet Take 1 tablet (40 mg) by mouth daily 90 tablet 3     Cyanocobalamin (B-12) 1000 MCG TBCR Take 1,000 mcg by mouth daily 100 tablet 1     diphenhydrAMINE (BENADRYL) 25 MG tablet Take 1 tablet (25 mg) by mouth every 6 hours as needed for itching or allergies 56 tablet      Doxylamine Succinate, Sleep, (UNISOM PO)        ferrous gluconate (FERGON) 324 (38 FE) MG tablet TAKE ONE TABLET BY MOUTH EVERY DAY WITH BREAKFAST 100 tablet 3     gabapentin (NEURONTIN) 300 MG capsule Take 2 capsules (600 mg) by mouth 3 times daily 540 capsule 3     GOODSENSE MIGRAINE FORMULA 250-250-65 MG per tablet TAKE ONE TABLET BY MOUTH EVERY 6 HOURS  "AS NEEDED FOR HEADACHES 24 tablet 1     lisinopril (PRINIVIL/ZESTRIL) 10 MG tablet Take 1 tablet (10 mg) by mouth daily 90 tablet 3     LORazepam (ATIVAN) 1 MG tablet Take 1 tablet (1 mg) by mouth daily as needed for anxiety #15 to last 30 days 15 tablet 2     montelukast (SINGULAIR) 10 MG tablet Take 1 tablet (10 mg) by mouth At Bedtime 90 tablet 3     naloxone (NARCAN) nasal spray Spray 1 spray (4 mg) into one nostril alternating nostrils as needed for opioid reversal every 2-3 minutes until assistance arrives 0.2 mL 3     omeprazole (PRILOSEC) 40 MG DR capsule TAKE ONE CAPSULE BY MOUTH TWICE A  capsule 1     polyethylene glycol (MIRALAX) powder Take 17 g (1 capful) by mouth daily 510 g 11     promethazine (PHENERGAN) 50 MG/ML SOLN IV injection Inject 0.5 mL (25 mg) into the muscle every 6 hours as needed 90 mL 11     ranitidine (ZANTAC) 150 MG tablet Take 1 tablet (150 mg) by mouth 2 times daily as needed for heartburn 180 tablet 3     sucralfate (CARAFATE) 1 GM tablet Take 1 tablet (1 g) by mouth 4 times daily (Patient taking differently: Take 1 g by mouth 4 times daily as needed ) 120 tablet 11       ROS:  Constitutional, HEENT, cardiovascular, pulmonary, GI, , musculoskeletal, neuro, skin, endocrine and psych systems are negative, except as otherwise noted.    OBJECTIVE:     /80   Pulse 91   Temp 99.2  F (37.3  C) (Tympanic)   Resp 24   Ht 1.575 m (5' 2\")   Wt 86.6 kg (191 lb)   SpO2 98%   BMI 34.93 kg/m    Body mass index is 34.93 kg/m .  GENERAL APPEARANCE: alert, no distress and over weight  HENT: MMM, no posterior pharyngeal erythema or exudates  NECK: no adenopathy  RESP: lungs clear to auscultation - no rales, rhonchi or wheezes  CV: regular rates and rhythm, normal S1 S2, no S3 or S4, no murmur, click or rub  SKIN: dark blue/black subcutaneous discoloration across bilateral trap area, bilateral bicep/tricep area      ASSESSMENT/PLAN:       1. Chronic pain syndrome  -stable, refill " provided.  Next fill 7/25.  Next fill will consider decreasing by 10% again, patient agreeable  - oxyCODONE IR (ROXICODONE) 15 MG tablet; Take 1 tablet (15 mg) by mouth every 4 hours as needed for severe pain  Dispense: 108 tablet; Refill: 0  - oxyCODONE IR (ROXICODONE) 15 MG tablet; Take 1 tablet (15 mg) by mouth every 4 hours as needed for pain  Dispense: 108 tablet; Refill: 0  - oxyCODONE IR (ROXICODONE) 15 MG tablet; Take 1 tablet (15 mg) by mouth every 4 hours as needed for pain  Dispense: 108 tablet; Refill: 0    2. REX (generalized anxiety disorder) -increased from 15 to 30 pills/month.  Again had a long discussion on utility of psychology and psychiatry.  She is maximized medical therapy for her anxiety.  - LORazepam (ATIVAN) 1 MG tablet; Take 1 tablet (1 mg) by mouth daily as needed for anxiety #30 to last 30 days  Dispense: 30 tablet; Refill: 2  - MENTAL HEALTH REFERRAL  - Adult; Outpatient Treatment, Psychiatry and Medication Management; Individual/Couples/Family/Group Therapy/Health Psychology; [facility] [phone]; We will contact you to schedule the appointmen...    3. PTSD (post-traumatic stress disorder)  - MENTAL HEALTH REFERRAL  - Adult; Outpatient Treatment, Psychiatry and Medication Management; Individual/Couples/Family/Group Therapy/Health Psychology; [facility] [phone]; We will contact you to schedule the appointmen...    4. Viral URI-resolving no further treatment needed    5. Acute cystitis with hematuria -resolving with antibiotics    Greater than 40 minutes was spent face-to-face with the patient by the provider.  Greater than 50% was spent in counseling or coordinating care for this patient.      Dr. Grecia Barba,   [facility] Internal Medicine

## 2019-04-26 NOTE — PATIENT INSTRUCTIONS
1. Refills of pain medications and lorazepam  2. See Gabe Jacob, psychiatry  3. Return to clinic 3 months, refill pain meds
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
## 2019-04-29 ENCOUNTER — TELEPHONE (OUTPATIENT)
Dept: INTERNAL MEDICINE | Facility: CLINIC | Age: 34
End: 2019-04-29

## 2019-04-29 NOTE — TELEPHONE ENCOUNTER
Insurance requires a prior auth for promethazine 25 mg/ml injectable solution    [employer] part D  ID# [id number]  [phone]    Thanks!    Jia Munoz, [facility] [place]  [phone]

## 2019-04-30 NOTE — TELEPHONE ENCOUNTER
Prior Authorization Retail Medication Request    Medication/Dose: promethazine 25 mg/ml  ICD code (if different than what is on RX):    Previously Tried and Failed:  Benadryl;   Rationale:  Patient has used since 2017 with success    Insurance Name:  [employer]vis [phone]  Insurance ID:  [id number]      Pharmacy Information (if different than what is on RX)  Name:    Phone:

## 2019-05-01 ENCOUNTER — OFFICE VISIT (OUTPATIENT)
Dept: PODIATRY | Facility: CLINIC | Age: 34
End: 2019-05-01
Payer: MEDICARE

## 2019-05-01 VITALS — HEART RATE: 100 BPM | DIASTOLIC BLOOD PRESSURE: 82 MMHG | SYSTOLIC BLOOD PRESSURE: 116 MMHG

## 2019-05-01 DIAGNOSIS — M34.1 CREST (CALCINOSIS, RAYNAUD'S PHENOMENON, ESOPHAGEAL DYSFUNCTION, SCLERODACTYLY, TELANGIECTASIA) (H): ICD-10-CM

## 2019-05-01 DIAGNOSIS — G63 POLYNEUROPATHY ASSOCIATED WITH UNDERLYING DISEASE (H): Primary | ICD-10-CM

## 2019-05-01 DIAGNOSIS — M20.41 HAMMER TOE OF RIGHT FOOT: ICD-10-CM

## 2019-05-01 PROCEDURE — 99213 OFFICE O/P EST LOW 20 MIN: CPT | Performed by: PODIATRIST

## 2019-05-01 NOTE — NURSING NOTE
Molly Teague's chief complaint for this visit includes:  Chief Complaint   Patient presents with     Right Foot - Pain, Follow Up     PCP: Grecia Barba    Referring Provider:  Referred Self, MD  No address on file    /82 (BP Location: Right arm, Patient Position: Sitting, Cuff Size: Adult Large)   Pulse 100   Data Unavailable     Do you need any medication refills at today's visit? No    Jayleen Toure LPN

## 2019-05-01 NOTE — LETTER
5/1/2019         RE: Molly Teague  [address] [facility] [place] [address]        Dear Colleague,    Thank you for referring your patient, Molly Teague, to the [facility]. Please see a copy of my visit note below.    Chief Complaint:   Chief Complaint   Patient presents with     Right Foot - Pain, Follow Up          Allergies   Allergen Reactions     No Known Allergies      Seasonal Allergies      Shellfish-Derived Products Nausea     Rash on face         Subjective: Molly is a 33 year old female who presents to the clinic today for a follow up of right 2nd toe pain. Relates that the 2nd toe is hammered and she is having pain when she walks on it. Relates that the toe has been hammered for a few years. The nail gets thick on the toe and a callus forms on the end of the toe that can be painful. She does have a file that she uses on the area.     Objective  Data Unavailable 100 Data Unavailable 116/82 Data Unavailable 0 lbs 0 oz  Right 2nd digit is hammered and plantar flexed at the DIPJ. Nail is discolored from repetitive trauma. No Payne's sign to the digit. No hyperkeratotic lesion noted to the distal digit today.     Assessment: Right 2nd digit mallet toe causing pain.     Plan:   - Pt seen and evaluated  - The digit is somewhat rigid. I would recommend that she try some conservative options for the digit. Recommend a silicone toe sleeve without fabric. She should watch the digit for blood flow, as she does have Raynaud's. If no relief, I would recommend that she try a Budin splint. She will get these OTC. I did discuss surgical correction of the toe, however this would be a last resort 2/2 CREST.   - Pt to return to clinic in 1 month.       Again, thank you for allowing me to participate in the care of your patient.        Sincerely,        Kenroy Cruz DPM

## 2019-05-01 NOTE — PATIENT INSTRUCTIONS
Thanks for coming today.  Or[facility] Medicine Clinic  [address] [place] [address]    To schedule future appointments in Ortho Clinic, you may call [phone].    To schedule ordered imaging by your provider:   Call Central Imaging [phone]    To schedule an injection ordered by your provider:  Call Central Imaging Injection scheduling line: [phone]  [email or web address] available online at:  [email or web address]    Please call if any further questions or concerns ([phone]).  Clinic hours 8 am to 5 pm.    Return to clinic (call) if symptoms worsen or fail to improve.

## 2019-05-02 ENCOUNTER — APPOINTMENT (OUTPATIENT)
Dept: CT IMAGING | Facility: CLINIC | Age: 34
End: 2019-05-02
Attending: EMERGENCY MEDICINE
Payer: MEDICARE

## 2019-05-02 ENCOUNTER — HOSPITAL ENCOUNTER (EMERGENCY)
Facility: CLINIC | Age: 34
Discharge: HOME | End: 2019-05-02
Attending: EMERGENCY MEDICINE | Admitting: EMERGENCY MEDICINE
Payer: MEDICARE

## 2019-05-02 VITALS
SYSTOLIC BLOOD PRESSURE: 110 MMHG | BODY MASS INDEX: 34.93 KG/M2 | TEMPERATURE: 97.9 F | HEART RATE: 76 BPM | RESPIRATION RATE: 16 BRPM | HEIGHT: 62 IN | DIASTOLIC BLOOD PRESSURE: 80 MMHG | OXYGEN SATURATION: 100 %

## 2019-05-02 DIAGNOSIS — N28.9 RENAL INSUFFICIENCY: ICD-10-CM

## 2019-05-02 DIAGNOSIS — R11.2 NAUSEA AND VOMITING, INTRACTABILITY OF VOMITING NOT SPECIFIED, UNSPECIFIED VOMITING TYPE: ICD-10-CM

## 2019-05-02 DIAGNOSIS — M34.9 SCLERODERMA (H): ICD-10-CM

## 2019-05-02 LAB
ALBUMIN SERPL-MCNC: 4 G/DL (ref 3.4–5)
ALBUMIN UR-MCNC: NEGATIVE MG/DL
ALP SERPL-CCNC: 94 U/L (ref 40–150)
ALT SERPL W P-5'-P-CCNC: 33 U/L (ref 0–50)
ANION GAP SERPL CALCULATED.3IONS-SCNC: 5 MMOL/L (ref 3–14)
APPEARANCE UR: CLEAR
AST SERPL W P-5'-P-CCNC: 47 U/L (ref 0–45)
BASOPHILS # BLD AUTO: 0.1 10E9/L (ref 0–0.2)
BASOPHILS NFR BLD AUTO: 1 %
BILIRUB DIRECT SERPL-MCNC: <0.1 MG/DL (ref 0–0.2)
BILIRUB SERPL-MCNC: 0.5 MG/DL (ref 0.2–1.3)
BILIRUB UR QL STRIP: NEGATIVE
BUN SERPL-MCNC: 20 MG/DL (ref 7–30)
CALCIUM SERPL-MCNC: 8.4 MG/DL (ref 8.5–10.1)
CHLORIDE SERPL-SCNC: 104 MMOL/L (ref 94–109)
CO2 SERPL-SCNC: 26 MMOL/L (ref 20–32)
COLOR UR AUTO: NORMAL
CREAT SERPL-MCNC: 1.46 MG/DL (ref 0.52–1.04)
DIFFERENTIAL METHOD BLD: ABNORMAL
EOSINOPHIL # BLD AUTO: 0.3 10E9/L (ref 0–0.7)
EOSINOPHIL NFR BLD AUTO: 3.5 %
ERYTHROCYTE [DISTWIDTH] IN BLOOD BY AUTOMATED COUNT: 19 % (ref 10–15)
GFR SERPL CREATININE-BSD FRML MDRD: 47 ML/MIN/{1.73_M2}
GLUCOSE SERPL-MCNC: 78 MG/DL (ref 70–99)
GLUCOSE UR STRIP-MCNC: NEGATIVE MG/DL
HCG SERPL QL: NEGATIVE
HCT VFR BLD AUTO: 36 % (ref 35–47)
HGB BLD-MCNC: 10.1 G/DL (ref 11.7–15.7)
HGB UR QL STRIP: NEGATIVE
IMM GRANULOCYTES # BLD: 0.1 10E9/L (ref 0–0.4)
IMM GRANULOCYTES NFR BLD: 0.5 %
KETONES UR STRIP-MCNC: NEGATIVE MG/DL
LACTATE BLD-SCNC: 1 MMOL/L (ref 0.7–2)
LEUKOCYTE ESTERASE UR QL STRIP: NEGATIVE
LIPASE SERPL-CCNC: 109 U/L (ref 73–393)
LYMPHOCYTES # BLD AUTO: 2.3 10E9/L (ref 0.8–5.3)
LYMPHOCYTES NFR BLD AUTO: 23.9 %
MCH RBC QN AUTO: 21.7 PG (ref 26.5–33)
MCHC RBC AUTO-ENTMCNC: 28.1 G/DL (ref 31.5–36.5)
MCV RBC AUTO: 77 FL (ref 78–100)
MONOCYTES # BLD AUTO: 0.8 10E9/L (ref 0–1.3)
MONOCYTES NFR BLD AUTO: 8.2 %
NEUTROPHILS # BLD AUTO: 6.1 10E9/L (ref 1.6–8.3)
NEUTROPHILS NFR BLD AUTO: 62.9 %
NITRATE UR QL: NEGATIVE
NRBC # BLD AUTO: 0 10*3/UL
NRBC BLD AUTO-RTO: 0 /100
PH UR STRIP: 7 PH (ref 5–7)
PLATELET # BLD AUTO: 289 10E9/L (ref 150–450)
POTASSIUM SERPL-SCNC: 4.2 MMOL/L (ref 3.4–5.3)
PROT SERPL-MCNC: 7.7 G/DL (ref 6.8–8.8)
RBC # BLD AUTO: 4.66 10E12/L (ref 3.8–5.2)
SODIUM SERPL-SCNC: 135 MMOL/L (ref 133–144)
SOURCE: NORMAL
SP GR UR STRIP: 1 (ref 1–1.03)
UROBILINOGEN UR STRIP-MCNC: 0 MG/DL (ref 0–2)
WBC # BLD AUTO: 9.7 10E9/L (ref 4–11)

## 2019-05-02 PROCEDURE — 25000128 H RX IP 250 OP 636: Performed by: EMERGENCY MEDICINE

## 2019-05-02 PROCEDURE — 83690 ASSAY OF LIPASE: CPT | Performed by: EMERGENCY MEDICINE

## 2019-05-02 PROCEDURE — 85025 COMPLETE CBC W/AUTO DIFF WBC: CPT | Performed by: EMERGENCY MEDICINE

## 2019-05-02 PROCEDURE — 96361 HYDRATE IV INFUSION ADD-ON: CPT | Performed by: EMERGENCY MEDICINE

## 2019-05-02 PROCEDURE — 74176 CT ABD & PELVIS W/O CONTRAST: CPT

## 2019-05-02 PROCEDURE — 80076 HEPATIC FUNCTION PANEL: CPT | Performed by: EMERGENCY MEDICINE

## 2019-05-02 PROCEDURE — 80048 BASIC METABOLIC PNL TOTAL CA: CPT | Performed by: EMERGENCY MEDICINE

## 2019-05-02 PROCEDURE — 99285 EMERGENCY DEPT VISIT HI MDM: CPT | Mod: 25 | Performed by: EMERGENCY MEDICINE

## 2019-05-02 PROCEDURE — 96375 TX/PRO/DX INJ NEW DRUG ADDON: CPT | Performed by: EMERGENCY MEDICINE

## 2019-05-02 PROCEDURE — 84703 CHORIONIC GONADOTROPIN ASSAY: CPT | Performed by: EMERGENCY MEDICINE

## 2019-05-02 PROCEDURE — 96374 THER/PROPH/DIAG INJ IV PUSH: CPT | Performed by: EMERGENCY MEDICINE

## 2019-05-02 PROCEDURE — 99285 EMERGENCY DEPT VISIT HI MDM: CPT | Mod: Z6 | Performed by: EMERGENCY MEDICINE

## 2019-05-02 PROCEDURE — 81003 URINALYSIS AUTO W/O SCOPE: CPT | Performed by: EMERGENCY MEDICINE

## 2019-05-02 PROCEDURE — 83605 ASSAY OF LACTIC ACID: CPT | Performed by: EMERGENCY MEDICINE

## 2019-05-02 RX ORDER — AMLODIPINE BESYLATE 5 MG/1
5 TABLET ORAL DAILY
COMMUNITY
End: 2020-08-04

## 2019-05-02 RX ORDER — HYDROMORPHONE HYDROCHLORIDE 1 MG/ML
0.5 INJECTION, SOLUTION INTRAMUSCULAR; INTRAVENOUS; SUBCUTANEOUS ONCE
Status: COMPLETED | OUTPATIENT
Start: 2019-05-02 | End: 2019-05-02

## 2019-05-02 RX ORDER — SODIUM CHLORIDE 9 MG/ML
1000 INJECTION, SOLUTION INTRAVENOUS CONTINUOUS
Status: DISCONTINUED | OUTPATIENT
Start: 2019-05-02 | End: 2019-05-02 | Stop reason: HOSPADM

## 2019-05-02 RX ORDER — ONDANSETRON 2 MG/ML
4 INJECTION INTRAMUSCULAR; INTRAVENOUS ONCE
Status: COMPLETED | OUTPATIENT
Start: 2019-05-02 | End: 2019-05-02

## 2019-05-02 RX ORDER — BUSPIRONE HYDROCHLORIDE 7.5 MG/1
30 TABLET ORAL 2 TIMES DAILY
COMMUNITY
End: 2019-07-24

## 2019-05-02 RX ORDER — IOPAMIDOL 755 MG/ML
93 INJECTION, SOLUTION INTRAVASCULAR ONCE
Status: DISCONTINUED | OUTPATIENT
Start: 2019-05-02 | End: 2019-05-02 | Stop reason: CLARIF

## 2019-05-02 RX ADMIN — SODIUM CHLORIDE 1000 ML: 9 INJECTION, SOLUTION INTRAVENOUS at 15:47

## 2019-05-02 RX ADMIN — HYDROMORPHONE HYDROCHLORIDE 0.5 MG: 1 INJECTION, SOLUTION INTRAMUSCULAR; INTRAVENOUS; SUBCUTANEOUS at 15:47

## 2019-05-02 RX ADMIN — ONDANSETRON 4 MG: 2 INJECTION INTRAMUSCULAR; INTRAVENOUS at 15:48

## 2019-05-02 NOTE — ED AVS SNAPSHOT
[facility]  [address] [facility] [address]  Phone:  [phone]  Fax:  [phone]                                    Molly Teague   MRN: [id number]    Department:  [facility]   Date of Visit:  5/2/2019           After Visit Summary Signature Page    I have received my discharge instructions, and my questions have been answered. I have discussed any challenges I see with this plan with the nurse or doctor.    ..........................................................................................................................................  Patient/Patient Representative Signature      ..........................................................................................................................................  Patient Representative Print Name and Relationship to Patient    ..................................................               ................................................  Date                                   Time    ..........................................................................................................................................  Reviewed by Signature/Title    ...................................................              ..............................................  Date                                               Time          22EPIC Rev 08/18

## 2019-05-02 NOTE — PROGRESS NOTES
Chief Complaint:   Chief Complaint   Patient presents with     Right Foot - Pain, Follow Up          Allergies   Allergen Reactions     No Known Allergies      Seasonal Allergies      Shellfish-Derived Products Nausea     Rash on face         Subjective: Molly is a 33 year old female who presents to the clinic today for a follow up of right 2nd toe pain. Relates that the 2nd toe is hammered and she is having pain when she walks on it. Relates that the toe has been hammered for a few years. The nail gets thick on the toe and a callus forms on the end of the toe that can be painful. She does have a file that she uses on the area.     Objective  Data Unavailable 100 Data Unavailable 116/82 Data Unavailable 0 lbs 0 oz  Right 2nd digit is hammered and plantar flexed at the DIPJ. Nail is discolored from repetitive trauma. No Payne's sign to the digit. No hyperkeratotic lesion noted to the distal digit today.     Assessment: Right 2nd digit mallet toe causing pain.     Plan:   - Pt seen and evaluated  - The digit is somewhat rigid. I would recommend that she try some conservative options for the digit. Recommend a silicone toe sleeve without fabric. She should watch the digit for blood flow, as she does have Raynaud's. If no relief, I would recommend that she try a Budin splint. She will get these OTC. I did discuss surgical correction of the toe, however this would be a last resort 2/2 CREST.   - Pt to return to clinic in 1 month.

## 2019-05-02 NOTE — DISCHARGE INSTRUCTIONS
Return if symptoms worsen or new symptoms develop.  Follow-up with primary care early next week.  Push fluids.  Have your creatinine rechecked early next week.  If any return of vomiting chest pain abdominal pain shortness of breath blood in stool or other symptoms present please to return to the emergency department this weekend for recheck.

## 2019-05-02 NOTE — TELEPHONE ENCOUNTER
Central Prior Authorization Team   Phone: [phone]    PA Initiation    Medication: promethazine (PHENERGAN) 50 MG/ML SOLN IV injection  Insurance Company: [employer] - Phone [phone] Fax [phone]  Pharmacy Filling the Rx: [facility] [place], [place] - [address]  Filling Pharmacy Phone: [phone]  Filling Pharmacy Fax:    Start Date: 5/2/2019

## 2019-05-03 DIAGNOSIS — R11.2 NAUSEA AND VOMITING, INTRACTABILITY OF VOMITING NOT SPECIFIED, UNSPECIFIED VOMITING TYPE: ICD-10-CM

## 2019-05-03 RX ORDER — PROMETHAZINE HYDROCHLORIDE 50 MG/ML
INJECTION, SOLUTION INTRAMUSCULAR; INTRAVENOUS
Qty: 90 ML | Refills: 11 | OUTPATIENT
Start: 2019-05-03

## 2019-05-03 NOTE — TELEPHONE ENCOUNTER
Refused by: Shavonne Ruiz MD     Refusal reason: Appt required, please call patient     CSS: please notify patient she needs appointment for further refills.     Left message for patient to return call to clinic.     ROSS GaytanN, RN

## 2019-05-03 NOTE — TELEPHONE ENCOUNTER
"Requested Prescriptions   Pending Prescriptions Disp Refills     promethazine (PHENERGAN) 50 MG/ML SOLN IV injection 90 mL 11     Sig: Inject 0.5 mL (25 mg) into the muscle every 6 hours as needed        Antivertigo/Antiemetic Agents Passed - 5/3/2019 10:29 AM        Passed - Recent (12 mo) or future (30 days) visit within the authorizing provider's specialty     Patient had office visit in the last 12 months or has a visit in the next 30 days with authorizing provider or within the authorizing provider's specialty.  See \"Patient Info\" tab in inbasket, or \"Choose Columns\" in Meds & Orders section of the refill encounter.              Passed - Medication is active on med list        Passed - Patient is 18 years of age or older        Last Written Prescription Date:  1/22/19  Last Fill Quantity: 90,  # refills: 11   Last office visit: No previous visit found with prescribing provider:  Jameson   Future Office Visit:   Next 5 appointments (look out 90 days)    May 07, 2019  1:00 PM CDT  Office Visit with Kale Marquez MD  [facility] [address] [place] [id number]  [phone]   Jun 05, 2019  9:40 AM CDT  Return Visit with Kenroy Cruz DPM  [facility] ([facility]) [address] [place] [id number]  [phone]   Jul 23, 2019  4:00 PM CDT  Return Visit with Erna Palacios LP  [facility] ([address] [id number] [phone]           "

## 2019-05-03 NOTE — TELEPHONE ENCOUNTER
Routing refill request to provider for review/approval because:  Patient needs to be seen because:  For f/u after ER.    Thank you    Marisa CARVER RN

## 2019-05-04 NOTE — ED PROVIDER NOTES
History     Chief Complaint   Patient presents with     Nausea & Vomiting     diabetic     HPI  Molly Teague is a 33 year old female with a history of scleroderma who presents the emergency department complaining of nausea and vomiting.  Patient states symptoms have been present since yesterday and she has not been able to keep much of anything down.  She has had some hot flashes and chills and is complaining of mild headache body aches and just not feeling well.  She states she feels kind of out of it.  She states she had some blurry vision when she stood up earlier today.  Had a mild headache which is improved.  And is feeling very nauseous at this point.  She has had crampy abdominal pain which she rates 4 out of 10.  It is intermittent in nature.  She has not had a fever denies any sore throat.  She has not had any chest pain or shortness of breath.  She denies any diarrhea.  She denies any focal numbness weakness any extremity.  She has not had a rash.    Allergies:  Allergies   Allergen Reactions     No Known Allergies      Seasonal Allergies      Shellfish-Derived Products Nausea     Rash on face       Problem List:    Patient Active Problem List    Diagnosis Date Noted     Obesity (BMI 35.0-39.9) with comorbidity (H) 01/22/2019     Priority: Medium     Contracture of hand joint, right 12/13/2018     Priority: Medium     Contracture of hand joint, left 12/13/2018     Priority: Medium     Moderate major depression (H) 07/06/2018     Priority: Medium     Severe persistent asthma without complication 07/06/2018     Priority: Medium     On high dose ICS/LABA + frequent albuterol use.  Next allergy/pulmonary referral       Aspiration of food 03/29/2018     Priority: Medium     Chronic pain syndrome 04/26/2017     Priority: Medium     Patient is followed by Grecia Barba DO for ongoing prescription of pain medication.  All refills should be approved by this provider only at face-to-face appointments -  not by phone request.    Medication(s): Oxycodone 15 mg.   Maximum quantity per month: 120  Clinic visit frequency required: Q 3 months     Controlled substance agreement:  Encounter-Level CSA - 04/26/2017:          Controlled Substance Agreement - Scan on 5/4/2017  8:58 AM : CONTROLLED SUBSTANCE AGREEMENT (below)              Pain Clinic evaluation in the past: No    DIRE Total Score(s):  No flowsheet data found.    Last [facility] website verification:  done on 4/26/17   9/26/2017  7/6/2018  10/16/2018  1/22/2019         [email or web address]         Chronic migraine without aura without status migrainosus, not intractable 04/12/2017     Priority: Medium     With medication overuse.  Fioricet and not Imitrex is recommend to treat severe headache.  2/2017 [facility] recommend wean down from daily Fioricet and use Excedrin Migrain PRN.       AIN grade I 03/30/2017     Priority: Medium     Found at [facility] on colonoscopy 2/2017.  Recommend repeat anoscopy and anal pap in 6/2017.       Gastroesophageal reflux disease with esophagitis 03/30/2017     Priority: Medium     EGD done at [facility] 2/2017 showed esophagitis without GAVE.  Recommend max dose H2 and PPI, along with 4 tablets of Carafate dissolved in water and drink throughout the day.    Had LA Grade D esophagitis        Limited systemic sclerosis (H) 01/25/2017     Priority: Medium     Raynaud's, calcinosis, telangiectasias, peripheral neuropathy, GERD symptoms, history of scleroderma renal crisis.  Saw [facility] 1/2017 and follows with Rheum Dr. Davis locally.       Polyneuropathy associated with underlying disease (H) 01/25/2017     Priority: Medium     Due to scleroderma and neurology thought possible due to ICU (critical illness neuropathy).  Recommend to max out dose of gabapentin to 2991-8082 mg/day, split BID or TID.  EMG 1/2017 positive for neuropathy       History of Clostridium difficile 11/29/2016     Priority: Medium     History of Helicobacter pylori infection  11/29/2016     Priority: Medium     Scleroderma with renal involvement (H) 11/09/2016     Priority: Medium     Stopped lisinopril per [facility] 1/2017.  Restarted lisinopril per [facility] 3/2017       History of ARDS 11/09/2016     Priority: Medium     4/2015, prolonged ICU/vent/trach due to influenza, scleroderma renal crisis requiring hemodialysis.       Raynaud's disease without gangrene 11/09/2016     Priority: Medium     History of hemodialysis 11/09/2016     Priority: Medium     4/2015 due to scleroderma renal crisis       Bilateral retinitis 11/09/2016     Priority: Medium     IUD (intrauterine device) in place 11/09/2016     Priority: Medium     Other pulmonary embolism without acute cor pulmonale, unspecified chronicity (H) 11/09/2016     Priority: Medium     Completed anti-coagulation.       REX (generalized anxiety disorder) 11/09/2016     Priority: Medium     CREST (calcinosis, Raynaud's phenomenon, esophageal dysfunction, sclerodactyly, telangiectasia) (H) 11/09/2016     Priority: Medium     Scleroderma (H) 09/22/2016     Priority: Medium     Nausea with vomiting 09/21/2016     Priority: Medium        Past Medical History:    Past Medical History:   Diagnosis Date     Acute pyelonephritis 6/9/2017     Altered mental state 3/29/2018     Arthritis      Blood clotting disorder (H)      History of blood transfusion      Hypertension      Long-term (current) use of anticoagulants [Z79.01] 9/9/2016     PE (pulmonary embolism) 9/7/2016     Rheumatism      Scleroderma (H) 9/22/2016     Uncomplicated asthma        Past Surgical History:    History reviewed. No pertinent surgical history.    Family History:    Family History   Problem Relation Age of Onset     Hyperlipidemia Father      Cerebrovascular Disease Maternal Grandmother      Hyperlipidemia Paternal Grandfather      Diabetes Maternal Aunt      Melanoma No family hx of      Skin Cancer No family hx of        Social History:  Marital Status:  Single  "[1]  Social History     Tobacco Use     Smoking status: Never Smoker     Smokeless tobacco: Never Used   Substance Use Topics     Alcohol use: No     Drug use: No        Medications:      amLODIPine (NORVASC) 5 MG tablet   busPIRone (BUSPAR) 7.5 MG tablet   albuterol (VENTOLIN HFA) 108 (90 Base) MCG/ACT Inhaler   blood glucose (NO BRAND SPECIFIED) lancets standard   blood glucose (NO BRAND SPECIFIED) test strip   budesonide-formoterol (SYMBICORT) 160-4.5 MCG/ACT Inhaler   citalopram (CELEXA) 40 MG tablet   Cyanocobalamin (B-12) 1000 MCG TBCR   diphenhydrAMINE (BENADRYL) 25 MG tablet   Doxylamine Succinate, Sleep, (UNISOM PO)   ferrous gluconate (FERGON) 324 (38 FE) MG tablet   gabapentin (NEURONTIN) 300 MG capsule   GOODSENSE MIGRAINE FORMULA 250-250-65 MG per tablet   lisinopril (PRINIVIL/ZESTRIL) 10 MG tablet   LORazepam (ATIVAN) 1 MG tablet   montelukast (SINGULAIR) 10 MG tablet   naloxone (NARCAN) nasal spray   omeprazole (PRILOSEC) 40 MG DR capsule   [START ON 6/25/2019] oxyCODONE IR (ROXICODONE) 15 MG tablet   [START ON 5/26/2019] oxyCODONE IR (ROXICODONE) 15 MG tablet   oxyCODONE IR (ROXICODONE) 15 MG tablet   polyethylene glycol (MIRALAX) powder   promethazine (PHENERGAN) 50 MG/ML SOLN IV injection   ranitidine (ZANTAC) 150 MG tablet   sucralfate (CARAFATE) 1 GM tablet         Review of Systems  All systems reviewed and other than pertinent positives and negatives in HPI all other systems are negative.  Physical Exam   BP: 110/71  Pulse: 88  Heart Rate: 81  Temp: 97.9  F (36.6  C)  Resp: 16  Height: 157.5 cm (5' 2\")  SpO2: 100 %  Lying Orthostatic BP: 136/93  Lying Orthostatic Pulse: 77 bpm  Standing Orthostatic BP: 130/89  Standing Orthostatic Pulse: 82 bpm      Physical Exam   Constitutional: She is oriented to person, place, and time. She appears well-developed and well-nourished. No distress.   HENT:   Head: Normocephalic.   Mouth/Throat: Oropharynx is clear and moist.   Posterior pharynx without " erythema edema.   Eyes: Conjunctivae are normal.   Neck: Normal range of motion. Neck supple.   Cardiovascular: Normal rate, regular rhythm, normal heart sounds and intact distal pulses.   No murmur heard.  Pulmonary/Chest: Effort normal and breath sounds normal. No stridor. She has no wheezes.   Abdominal: Soft. Bowel sounds are normal. She exhibits no distension.   Mild tenderness to palpation around umbilicus.  No guarding no rebound bowel sounds are positive.   Musculoskeletal:   Contractures noted in both hands.  Moving all extremities well pulses sensation symmetrical.   Neurological: She is alert and oriented to person, place, and time. She exhibits normal muscle tone.   Skin: Skin is warm and dry. No rash noted.   Psychiatric: She has a normal mood and affect.   Nursing note and vitals reviewed.      ED Course        Procedures               Critical Care time:  none             Labs Ordered and Resulted from Time of ED Arrival Up to the Time of Departure from the ED   CBC WITH PLATELETS DIFFERENTIAL - Abnormal; Notable for the following components:       Result Value    Hemoglobin 10.1 (*)     MCV 77 (*)     MCH 21.7 (*)     MCHC 28.1 (*)     RDW 19.0 (*)     All other components within normal limits   BASIC METABOLIC PANEL - Abnormal; Notable for the following components:    Creatinine 1.46 (*)     GFR Estimate 47 (*)     GFR Estimate If Black 54 (*)     Calcium 8.4 (*)     All other components within normal limits   HEPATIC PANEL - Abnormal; Notable for the following components:    AST 47 (*)     All other components within normal limits   LIPASE   LACTIC ACID WHOLE BLOOD   URINE MACROSCOPIC WITH REFLEX TO MICRO   HCG QUALITATIVE   ORTHOSTATIC BLOOD PRESSURE AND PULSE     Results for orders placed or performed during the hospital encounter of 05/02/19   Abd/pelvis CT - no contrast - [facility] Protocol    Narrative    CT ABDOMEN AND PELVIS WITHOUT CONTRAST   5/2/2019 4:47 PM     HISTORY: Abdominal pain,  unspecified    TECHNIQUE: No IV contrast material. Radiation dose for this scan was  reduced using automated exposure control, adjustment of the mA and/or  kV according to patient size, or iterative reconstruction technique.    COMPARISON: None.    FINDINGS: The esophagus is mildly distended. There is diffuse  low-density of the liver. Within the limits of an unenhanced study, no  focal lesions demonstrated in the liver, gallbladder, spleen,  pancreas, adrenal glands, or kidneys. The appendix is normal. Moderate  volume of retained colonic stool. No evidence of bowel obstruction,  free air, or ascites. No enlarged abdominal, retroperitoneal, or  pelvic lymph nodes. An IUD is noted in the uterus. Visualized bones  are unremarkable.      Impression    IMPRESSION: Mild distention of a fluid-filled esophagus, not fully  evaluated. Fatty infiltration of the liver. No acute abnormality in  the abdomen or pelvis.    JOSE J CAPUTO MD         Medications   0.9% sodium chloride BOLUS (0 mLs Intravenous Stopped 5/2/19 1807)   ondansetron (ZOFRAN) injection 4 mg (4 mg Intravenous Given 5/2/19 1548)   HYDROmorphone (PF) (DILAUDID) injection 0.5 mg (0.5 mg Intravenous Given 5/2/19 1547)       Assessments & Plan (with Medical Decision Making records were reviewed.  Patient was given IV fluids Zofran and hydromorphone.  Patient's white count was 9.7 hemoglobin 10.1 which is slightly decreased.  Creatinine was elevated at 1.46 which this was slightly above baseline for patient.  Hepatic panel without significant abnormality.  Pregnancy test negative urine analysis unremarkable.  Lactic acid was within normal limits.  Due to patient's abdominal pain it was decided to do a CT scan without contrast.  This revealed no obvious acute intra-abdominal abnormality but mild distention of fluid-filled esophagus.  Findings were discussed in detail with patient she is feeling better after fluids and medication.  I did offer admission for  further observation with the patient but she did not feel this was necessary.  She feels comfortable going home at this time.  She should follow-up with her primary care in the next few days to have her creatinine rechecked.  The fluid-filled esophagus was discussed with patient and she states she has had difficulty with persistent esophagus in the past and this is unchanged.  She will follow-up with her primary on this.  I have advised her that if symptoms do not improve over the weekend she should return she is in agreement this plan.     I have reviewed the nursing notes.    I have reviewed the findings, diagnosis, plan and need for follow up with the patient.          Medication List      There are no discharge medications for this visit.         Final diagnoses:   Nausea and vomiting, intractability of vomiting not specified, unspecified vomiting type   Scleroderma (H)   Renal insufficiency       5/2/2019   [facility] EMERGENCY DEPARTMENT     Shane Ocampo MD  05/04/19 0915

## 2019-05-06 NOTE — TELEPHONE ENCOUNTER
Rec'd additional questions via fax from insurance. Then rec'd a denial due to no answer to the additional questions - all over the weekend. I am submitting completed form of questions to insurance via fax and hopefully they will accept.

## 2019-05-06 NOTE — TELEPHONE ENCOUNTER
I can provide refills of this.  However, it looks like it was refilled back in January for a large supply with many refills.  Can we confirm that there is still refills available with the pharmacy?  If so, she can cancel upcoming appointment with me

## 2019-05-06 NOTE — TELEPHONE ENCOUNTER
Pharmacy states she has refills. Patient notified. She states she needs to keep appointment as she was just in ED and her creatinine was out of range and was recommended by ED provider to follow up with PCP. She also states she is waiting on Prior Authorization on promethazine medication. I advised the PA team had faxed in information this morning so it appears that team is still working on that approval. Patient states understanding. She wishes to keep appointment as scheduled.      ROSS JusticeN, RN

## 2019-05-07 ENCOUNTER — HOSPITAL ENCOUNTER (EMERGENCY)
Facility: CLINIC | Age: 34
Discharge: HOME | End: 2019-05-07
Attending: NURSE PRACTITIONER | Admitting: NURSE PRACTITIONER
Payer: MEDICARE

## 2019-05-07 ENCOUNTER — OFFICE VISIT (OUTPATIENT)
Dept: OBGYN | Facility: CLINIC | Age: 34
End: 2019-05-07
Payer: MEDICARE

## 2019-05-07 VITALS
TEMPERATURE: 98.4 F | DIASTOLIC BLOOD PRESSURE: 94 MMHG | BODY MASS INDEX: 34.96 KG/M2 | RESPIRATION RATE: 18 BRPM | WEIGHT: 190 LBS | HEIGHT: 62 IN | OXYGEN SATURATION: 99 % | SYSTOLIC BLOOD PRESSURE: 153 MMHG

## 2019-05-07 VITALS
SYSTOLIC BLOOD PRESSURE: 140 MMHG | TEMPERATURE: 99 F | HEART RATE: 119 BPM | DIASTOLIC BLOOD PRESSURE: 92 MMHG | BODY MASS INDEX: 35.12 KG/M2 | WEIGHT: 192 LBS

## 2019-05-07 DIAGNOSIS — H91.92 HEARING LOSS OF LEFT EAR, UNSPECIFIED HEARING LOSS TYPE: ICD-10-CM

## 2019-05-07 DIAGNOSIS — Z30.432 ENCOUNTER FOR REMOVAL OF INTRAUTERINE CONTRACEPTIVE DEVICE: Primary | ICD-10-CM

## 2019-05-07 PROCEDURE — G0463 HOSPITAL OUTPT CLINIC VISIT: HCPCS | Performed by: NURSE PRACTITIONER

## 2019-05-07 PROCEDURE — 99213 OFFICE O/P EST LOW 20 MIN: CPT | Mod: Z6 | Performed by: NURSE PRACTITIONER

## 2019-05-07 PROCEDURE — 58301 REMOVE INTRAUTERINE DEVICE: CPT | Performed by: OBSTETRICS & GYNECOLOGY

## 2019-05-07 RX ORDER — FLUTICASONE PROPIONATE 50 MCG
2 SPRAY, SUSPENSION (ML) NASAL DAILY
Qty: 15.8 ML | Refills: 0 | Status: SHIPPED | OUTPATIENT
Start: 2019-05-07 | End: 2019-06-19

## 2019-05-07 RX ORDER — CETIRIZINE HYDROCHLORIDE 10 MG/1
10 TABLET ORAL DAILY
Qty: 14 TABLET | Refills: 0 | Status: SHIPPED | OUTPATIENT
Start: 2019-05-07 | End: 2019-05-21

## 2019-05-07 ASSESSMENT — ENCOUNTER SYMPTOMS
RHINORRHEA: 0
NUMBNESS: 0
SORE THROAT: 0
SINUS PAIN: 0
COUGH: 0
SINUS PRESSURE: 0
VOMITING: 0
DIARRHEA: 0
NAUSEA: 0
FEVER: 0
SHORTNESS OF BREATH: 0
TROUBLE SWALLOWING: 0
HEADACHES: 0

## 2019-05-07 ASSESSMENT — MIFFLIN-ST. JEOR: SCORE: 1520.08

## 2019-05-07 NOTE — NURSING NOTE
"Initial BP (!) 140/92 (BP Location: Right arm, Patient Position: Chair, Cuff Size: Adult Large)   Pulse 119   Temp 99  F (37.2  C) (Tympanic)   Wt 87.1 kg (192 lb)   Breastfeeding? No   BMI 35.12 kg/m   Estimated body mass index is 35.12 kg/m  as calculated from the following:    Height as of 5/2/19: 1.575 m (5' 2\").    Weight as of this encounter: 87.1 kg (192 lb). .    Jennifer Gresham    "

## 2019-05-07 NOTE — ED PROVIDER NOTES
History     Chief Complaint   Patient presents with     Hearing Problem     no hearing in left ear for 2 days. has had some asthma but no URI Sx     HPI  Molly Teague is a 33 year old female with extensive past medical history who presents with decreased hearing in the left ear.  Patient has been having symptoms for 2 days, denies upper respiratory symptoms.  Denies fevers.  Patient denies any pain or pressure in the ear, denies muffled sound.  Patient able to pop both ears with no improvement.  Patient has not taken any medications for this issue.  Patient denies cough, chest pain, nausea, vomiting, or diarrhea.    Allergies:  Allergies   Allergen Reactions     No Known Allergies      Seasonal Allergies      Shellfish-Derived Products Nausea     Rash on face       Problem List:    Patient Active Problem List    Diagnosis Date Noted     Obesity (BMI 35.0-39.9) with comorbidity (H) 01/22/2019     Priority: Medium     Contracture of hand joint, right 12/13/2018     Priority: Medium     Contracture of hand joint, left 12/13/2018     Priority: Medium     Moderate major depression (H) 07/06/2018     Priority: Medium     Severe persistent asthma without complication 07/06/2018     Priority: Medium     On high dose ICS/LABA + frequent albuterol use.  Next allergy/pulmonary referral       Aspiration of food 03/29/2018     Priority: Medium     Chronic pain syndrome 04/26/2017     Priority: Medium     Patient is followed by Grecia Barba DO for ongoing prescription of pain medication.  All refills should be approved by this provider only at face-to-face appointments - not by phone request.    Medication(s): Oxycodone 15 mg.   Maximum quantity per month: 120  Clinic visit frequency required: Q 3 months     Controlled substance agreement:  Encounter-Level CSA - 04/26/2017:          Controlled Substance Agreement - Scan on 5/4/2017  8:58 AM : CONTROLLED SUBSTANCE AGREEMENT (below)              Pain Clinic evaluation  in the past: No    DIRE Total Score(s):  No flowsheet data found.    Last [facility] website verification:  done on 4/26/17   9/26/2017  7/6/2018  10/16/2018  1/22/2019         [email or web address]         Chronic migraine without aura without status migrainosus, not intractable 04/12/2017     Priority: Medium     With medication overuse.  Fioricet and not Imitrex is recommend to treat severe headache.  2/2017 [facility] recommend wean down from daily Fioricet and use Excedrin Migrain PRN.       AIN grade I 03/30/2017     Priority: Medium     Found at [facility] on colonoscopy 2/2017.  Recommend repeat anoscopy and anal pap in 6/2017.       Gastroesophageal reflux disease with esophagitis 03/30/2017     Priority: Medium     EGD done at [facility] 2/2017 showed esophagitis without GAVE.  Recommend max dose H2 and PPI, along with 4 tablets of Carafate dissolved in water and drink throughout the day.    Had LA Grade D esophagitis        Limited systemic sclerosis (H) 01/25/2017     Priority: Medium     Raynaud's, calcinosis, telangiectasias, peripheral neuropathy, GERD symptoms, history of scleroderma renal crisis.  Saw [facility] 1/2017 and follows with Rheum Dr. Davis locally.       Polyneuropathy associated with underlying disease (H) 01/25/2017     Priority: Medium     Due to scleroderma and neurology thought possible due to ICU (critical illness neuropathy).  Recommend to max out dose of gabapentin to 8911-3739 mg/day, split BID or TID.  EMG 1/2017 positive for neuropathy       History of Clostridium difficile 11/29/2016     Priority: Medium     History of Helicobacter pylori infection 11/29/2016     Priority: Medium     Scleroderma with renal involvement (H) 11/09/2016     Priority: Medium     Stopped lisinopril per [facility] 1/2017.  Restarted lisinopril per [facility] 3/2017       History of ARDS 11/09/2016     Priority: Medium     4/2015, prolonged ICU/vent/trach due to influenza, scleroderma renal crisis requiring hemodialysis.        Raynaud's disease without gangrene 11/09/2016     Priority: Medium     History of hemodialysis 11/09/2016     Priority: Medium     4/2015 due to scleroderma renal crisis       Bilateral retinitis 11/09/2016     Priority: Medium     IUD (intrauterine device) in place 11/09/2016     Priority: Medium     Other pulmonary embolism without acute cor pulmonale, unspecified chronicity (H) 11/09/2016     Priority: Medium     Completed anti-coagulation.       REX (generalized anxiety disorder) 11/09/2016     Priority: Medium     CREST (calcinosis, Raynaud's phenomenon, esophageal dysfunction, sclerodactyly, telangiectasia) (H) 11/09/2016     Priority: Medium     Scleroderma (H) 09/22/2016     Priority: Medium     Nausea with vomiting 09/21/2016     Priority: Medium        Past Medical History:    Past Medical History:   Diagnosis Date     Acute pyelonephritis 6/9/2017     Altered mental state 3/29/2018     Arthritis      Blood clotting disorder (H)      History of blood transfusion      Hypertension      Long-term (current) use of anticoagulants [Z79.01] 9/9/2016     PE (pulmonary embolism) 9/7/2016     Rheumatism      Scleroderma (H) 9/22/2016     Uncomplicated asthma        Past Surgical History:    History reviewed. No pertinent surgical history.    Family History:    Family History   Problem Relation Age of Onset     Hyperlipidemia Father      Cerebrovascular Disease Maternal Grandmother      Hyperlipidemia Paternal Grandfather      Diabetes Maternal Aunt      Melanoma No family hx of      Skin Cancer No family hx of        Social History:  Marital Status:  Single [1]  Social History     Tobacco Use     Smoking status: Never Smoker     Smokeless tobacco: Never Used   Substance Use Topics     Alcohol use: No     Drug use: No        Medications:      albuterol (VENTOLIN HFA) 108 (90 Base) MCG/ACT Inhaler   amLODIPine (NORVASC) 5 MG tablet   blood glucose (NO BRAND SPECIFIED) lancets standard   blood glucose (NO BRAND  "SPECIFIED) test strip   budesonide-formoterol (SYMBICORT) 160-4.5 MCG/ACT Inhaler   busPIRone (BUSPAR) 7.5 MG tablet   citalopram (CELEXA) 40 MG tablet   Cyanocobalamin (B-12) 1000 MCG TBCR   diphenhydrAMINE (BENADRYL) 25 MG tablet   Doxylamine Succinate, Sleep, (UNISOM PO)   ferrous gluconate (FERGON) 324 (38 FE) MG tablet   gabapentin (NEURONTIN) 300 MG capsule   GOODSENSE MIGRAINE FORMULA 250-250-65 MG per tablet   lisinopril (PRINIVIL/ZESTRIL) 10 MG tablet   LORazepam (ATIVAN) 1 MG tablet   montelukast (SINGULAIR) 10 MG tablet   naloxone (NARCAN) nasal spray   omeprazole (PRILOSEC) 40 MG DR capsule   [START ON 6/25/2019] oxyCODONE IR (ROXICODONE) 15 MG tablet   [START ON 5/26/2019] oxyCODONE IR (ROXICODONE) 15 MG tablet   oxyCODONE IR (ROXICODONE) 15 MG tablet   polyethylene glycol (MIRALAX) powder   promethazine (PHENERGAN) 50 MG/ML SOLN IV injection   ranitidine (ZANTAC) 150 MG tablet   sucralfate (CARAFATE) 1 GM tablet         Review of Systems   Constitutional: Negative for fever.   HENT: Positive for hearing loss. Negative for congestion, ear discharge, ear pain, rhinorrhea, sinus pressure, sinus pain, sore throat, tinnitus and trouble swallowing.    Respiratory: Negative for cough and shortness of breath.    Cardiovascular: Negative for chest pain.   Gastrointestinal: Negative for diarrhea, nausea and vomiting.   Neurological: Negative for numbness and headaches.       Physical Exam   BP: (!) 153/94  Heart Rate: 119  Temp: 98.4  F (36.9  C)  Resp: 18  Height: 157.5 cm (5' 2\")  Weight: 86.2 kg (190 lb)  SpO2: 99 %      Physical Exam   Constitutional: She appears well-developed. No distress.   HENT:   Right Ear: Hearing and tympanic membrane normal.   Left Ear: Tympanic membrane, external ear and ear canal normal. No drainage, swelling or tenderness. No foreign bodies. No mastoid tenderness.  No middle ear effusion. Decreased hearing is noted.   Nose: Nose normal.   Mouth/Throat: Uvula is midline, oropharynx " is clear and moist and mucous membranes are normal.   Neck: Normal range of motion. Neck supple.   Cardiovascular: Normal rate and regular rhythm.   Pulmonary/Chest: Effort normal and breath sounds normal.   Lymphadenopathy:     She has no cervical adenopathy.       ED Course        Procedures               No results found for this or any previous visit (from the past 24 hour(s)).    Medications - No data to display    Assessments & Plan (with Medical Decision Making)   Patient is a 33-year-old female with a complex medical history who complains with decreased hearing in the left ear.  Plan to begin Flonase and Zyrtec for 2 weeks to see if there is an improvement in the hearing.  Follow-up with ENT.  Return to the ER with new or worsening symptoms.  Return precautions discussed with patient.  All questions answered.    I have reviewed the nursing notes.    I have reviewed the findings, diagnosis, plan and need for follow up with the patient.         Medication List      There are no discharge medications for this visit.         Final diagnoses:   None       5/7/2019   [facility] EMERGENCY DEPARTMENT     Shima Combs, CHRIST CNP  05/07/19 1824

## 2019-05-07 NOTE — TELEPHONE ENCOUNTER
PRIOR AUTHORIZATION DENIED    Medication: promethazine (PHENERGAN) 50 MG/ML SOLN IV injection - P/A DENIED    Denial Date: 5/4/2019    Denial Rational:         Appeal Information:

## 2019-05-07 NOTE — ED AVS SNAPSHOT
[facility]  [address] [address]  Phone:  [phone]  Fax:  [phone]                                    Molly Teague   MRN: [id number]    Department:  [facility]   Date of Visit:  5/7/2019           After Visit Summary Signature Page    I have received my discharge instructions, and my questions have been answered. I have discussed any challenges I see with this plan with the nurse or doctor.    ..........................................................................................................................................  Patient/Patient Representative Signature      ..........................................................................................................................................  Patient Representative Print Name and Relationship to Patient    ..................................................               ................................................  Date                                   Time    ..........................................................................................................................................  Reviewed by Signature/Title    ...................................................              ..............................................  Date                                               Time          22EPIC Rev 08/18

## 2019-05-07 NOTE — PROGRESS NOTES
CC:  IUD removal  HPI:  Molly Teague is a 33 year old female P1 () is here for an IUD removal.   Desires to conceive. She has had it in for 5 years.       Reviewed with patient in office    Allergies: No known allergies; Seasonal allergies; and Shellfish-derived products    Current Outpatient Medications   Medication Sig Dispense Refill     albuterol (VENTOLIN HFA) 108 (90 Base) MCG/ACT Inhaler Inhale 2 puffs into the lungs every 4 hours as needed for shortness of breath / dyspnea or wheezing 1 Inhaler 11     amLODIPine (NORVASC) 5 MG tablet Take 5 mg by mouth daily       blood glucose (NO BRAND SPECIFIED) lancets standard Use to test blood sugar 1 time daily 100 each 3     blood glucose (NO BRAND SPECIFIED) test strip Use to test blood sugar 1 time daily 100 each 3     budesonide-formoterol (SYMBICORT) 160-4.5 MCG/ACT Inhaler Inhale 2 puffs into the lungs 2 times daily 6 g 11     busPIRone (BUSPAR) 7.5 MG tablet Take 30 mg by mouth 2 times daily       citalopram (CELEXA) 40 MG tablet Take 1 tablet (40 mg) by mouth daily 90 tablet 3     Cyanocobalamin (B-12) 1000 MCG TBCR Take 1,000 mcg by mouth daily 100 tablet 1     diphenhydrAMINE (BENADRYL) 25 MG tablet Take 1 tablet (25 mg) by mouth every 6 hours as needed for itching or allergies 56 tablet      Doxylamine Succinate, Sleep, (UNISOM PO)        ferrous gluconate (FERGON) 324 (38 FE) MG tablet TAKE ONE TABLET BY MOUTH EVERY DAY WITH BREAKFAST 100 tablet 3     gabapentin (NEURONTIN) 300 MG capsule Take 2 capsules (600 mg) by mouth 3 times daily 540 capsule 3     GOODSENSE MIGRAINE FORMULA 250-250-65 MG per tablet TAKE ONE TABLET BY MOUTH EVERY 6 HOURS AS NEEDED FOR HEADACHES 24 tablet 1     lisinopril (PRINIVIL/ZESTRIL) 10 MG tablet Take 1 tablet (10 mg) by mouth daily 90 tablet 3     LORazepam (ATIVAN) 1 MG tablet Take 1 tablet (1 mg) by mouth daily as needed for anxiety #30 to last 30 days 30 tablet 2     montelukast (SINGULAIR) 10 MG tablet Take 1  tablet (10 mg) by mouth At Bedtime 90 tablet 3     naloxone (NARCAN) nasal spray Spray 1 spray (4 mg) into one nostril alternating nostrils as needed for opioid reversal every 2-3 minutes until assistance arrives 0.2 mL 3     omeprazole (PRILOSEC) 40 MG DR capsule TAKE ONE CAPSULE BY MOUTH TWICE A  capsule 1     [START ON 6/25/2019] oxyCODONE IR (ROXICODONE) 15 MG tablet Take 1 tablet (15 mg) by mouth every 4 hours as needed for severe pain 108 tablet 0     [START ON 5/26/2019] oxyCODONE IR (ROXICODONE) 15 MG tablet Take 1 tablet (15 mg) by mouth every 4 hours as needed for pain 108 tablet 0     oxyCODONE IR (ROXICODONE) 15 MG tablet Take 1 tablet (15 mg) by mouth every 4 hours as needed for pain 108 tablet 0     polyethylene glycol (MIRALAX) powder Take 17 g (1 capful) by mouth daily 510 g 11     promethazine (PHENERGAN) 50 MG/ML SOLN IV injection Inject 0.5 mL (25 mg) into the muscle every 6 hours as needed 90 mL 11     ranitidine (ZANTAC) 150 MG tablet Take 1 tablet (150 mg) by mouth 2 times daily as needed for heartburn 180 tablet 3     sucralfate (CARAFATE) 1 GM tablet Take 1 tablet (1 g) by mouth 4 times daily (Patient taking differently: Take 1 g by mouth 4 times daily as needed ) 120 tablet 11         ROS:  C: NEGATIVE for fever, chills, change in weight  R: NEGATIVE for significant cough or SOB  CV: NEGATIVE for chest pain, palpitations or peripheral edema  GI: NEGATIVE for nausea, abdominal pain, heartburn, or change in bowel habits  : NEGATIVE for frequency, dysuria, hematuria, vaginal discharge, or irregular bleeding      EXAM:  BP (!) 140/92 (BP Location: Right arm, Patient Position: Chair, Cuff Size: Adult Large)   Pulse 119   Temp 99  F (37.2  C) (Tympanic)   Wt 87.1 kg (192 lb)   Breastfeeding? No   BMI 35.12 kg/m    General - pleasant female in no acute distress.  Pelvic - EG: normal adult female, BUS: within normal limits, Vagina: well rugated, no discharge, Cervix: no lesions or CMT,  Uterus: firm, normal sized and nontender, Adnexae: no masses or tenderness.    PROCEDURE: IUD removal  After written consent was obtained from the patient, IUD strings were grasped with ring forcep and IUD easily removed intact with minimal patient discomfort noted.  No bleeding noted.        ASSESSMENT/PLAN:  Encounter for IUD removal  -- post-IUD removal bleeding precautions reviewed      Kale Marquez MD  [facility]

## 2019-05-08 NOTE — TELEPHONE ENCOUNTER
I do not understand, promethazine is primarily used for nausea and vomiting which is the prescribed indication for this patient.  Can get further clarification?

## 2019-05-09 NOTE — TELEPHONE ENCOUNTER
Called insurance, called several numbers, transferred as many as 7 times and the best I could come up with is, it was denied for being an off-label use. Even after explaining that this is not off-label use and this was denied incorrectly, I was told that is what appeals are for. I then spoke to several people in appeals and was told appeal is in process, and can take up to 7-10 days from 05/06/19 when it was rec'd. When I asked to have it expedited, I was told to fax a statement of why I want it expedited to fax# [phone], and add ref# [id number]. I have done this.

## 2019-05-10 ENCOUNTER — OFFICE VISIT (OUTPATIENT)
Dept: OBGYN | Facility: CLINIC | Age: 34
End: 2019-05-10
Payer: MEDICARE

## 2019-05-10 VITALS
DIASTOLIC BLOOD PRESSURE: 82 MMHG | BODY MASS INDEX: 35.7 KG/M2 | SYSTOLIC BLOOD PRESSURE: 117 MMHG | HEART RATE: 103 BPM | WEIGHT: 194 LBS | HEIGHT: 62 IN

## 2019-05-10 DIAGNOSIS — I10 HYPERTENSION, UNSPECIFIED TYPE: ICD-10-CM

## 2019-05-10 DIAGNOSIS — M34.9 SCLERODERMA (H): Primary | ICD-10-CM

## 2019-05-10 DIAGNOSIS — G89.4 CHRONIC PAIN SYNDROME: ICD-10-CM

## 2019-05-10 DIAGNOSIS — Z31.69 ENCOUNTER FOR PRECONCEPTION CONSULTATION: ICD-10-CM

## 2019-05-10 DIAGNOSIS — J45.909 ASTHMA, UNSPECIFIED ASTHMA SEVERITY, UNSPECIFIED WHETHER COMPLICATED, UNSPECIFIED WHETHER PERSISTENT: ICD-10-CM

## 2019-05-10 PROCEDURE — 96372 THER/PROPH/DIAG INJ SC/IM: CPT | Mod: ZF

## 2019-05-10 PROCEDURE — G0463 HOSPITAL OUTPT CLINIC VISIT: HCPCS | Mod: ZF

## 2019-05-10 PROCEDURE — 25000128 H RX IP 250 OP 636: Mod: ZF | Performed by: OBSTETRICS & GYNECOLOGY

## 2019-05-10 RX ORDER — MEDROXYPROGESTERONE ACETATE 150 MG/ML
150 INJECTION, SUSPENSION INTRAMUSCULAR ONCE
Status: COMPLETED | OUTPATIENT
Start: 2019-05-10 | End: 2019-05-10

## 2019-05-10 RX ADMIN — MEDROXYPROGESTERONE ACETATE 150 MG: 150 INJECTION, SUSPENSION, EXTENDED RELEASE INTRAMUSCULAR at 15:28

## 2019-05-10 ASSESSMENT — ANXIETY QUESTIONNAIRES
1. FEELING NERVOUS, ANXIOUS, OR ON EDGE: NEARLY EVERY DAY
6. BECOMING EASILY ANNOYED OR IRRITABLE: SEVERAL DAYS
5. BEING SO RESTLESS THAT IT IS HARD TO SIT STILL: NOT AT ALL
2. NOT BEING ABLE TO STOP OR CONTROL WORRYING: MORE THAN HALF THE DAYS
7. FEELING AFRAID AS IF SOMETHING AWFUL MIGHT HAPPEN: NOT AT ALL
3. WORRYING TOO MUCH ABOUT DIFFERENT THINGS: SEVERAL DAYS
GAD7 TOTAL SCORE: 9

## 2019-05-10 ASSESSMENT — MIFFLIN-ST. JEOR: SCORE: 1538.23

## 2019-05-10 ASSESSMENT — PATIENT HEALTH QUESTIONNAIRE - PHQ9: 5. POOR APPETITE OR OVEREATING: MORE THAN HALF THE DAYS

## 2019-05-10 NOTE — NURSING NOTE
Prior to injection, verified patient identity using patient's name and date of birth.  Due to injection administration, patient instructed to remain in clinic for 15 minutes  afterwards, and to report any adverse reaction to me immediately.    BP: 117/82    LAST PAP/EXAM:   Lab Results   Component Value Date    PAP NIL 10/10/2017     URINE HCG:not indicated    NEXT INJECTION DUE: 7/26/19 - 8/9/19       Drug Amount Wasted:  None.  Vial/Syringe: Single dose vial  Expiration Date:  11/2020

## 2019-05-10 NOTE — PROGRESS NOTES
[facility]  Gynecology Visit    Reason for Consult: Scleroderma, CREST Syndrome  Consulting Provider: Kale Marquez MD    HPI:    Molly Tegaue is a 33 year old  female, here today for pre-conception counseling. Her past medical history is notable for: scleroderma/CREST syndrome, hypertension, asthma, hypoglycemia, obesity, anxiety, chronic pain syndrome resulting in opiate dependence, and history of pulmonary embolism.    She has had one prior pregnancy in , which was complicated by new diagnosis of scleroderma in pregnancy. Per her report, she required prednisone throughout pregnancy.  Her baby was delivered via emergent  section due to non-reassuring fetal heart tracing.  Her postpartum course was complicated by a renal crisis and an overall decline in scleroderma for the first year of her child's life.  She has continued have chronic kidney disease, experienced aspiration pneumonia, and has had a pulmonary embolism for which she is s/p treatment.    She had been using a Mirena IUD for the past five years for birth control. She had at removed on 19 as the five year bhargav had been reached. She and her  are here today to discuss the possibility of future pregnancy.    GYN History  - LMP: No LMP recorded. (Menstrual status: IUD removed 19).  - Menses: Irregular cycles prior to Mirena IUD  - Pap Smears: No history of abnormal pap smears, procedure cervix     Lab Results   Component Value Date    PAP NIL 10/10/2017     - Contraception: None currently  - Sexual Activity/Concerns: Yes,   - Hx STIs/UTIs: UTIs 3x in past 7 years     OBHx  OB History    Para Term  AB Living   1 1 1 0 0 1   SAB TAB Ectopic Multiple Live Births   0 0 0 0 0      # Outcome Date GA Lbr Oneal/2nd Weight Sex Delivery Anes PTL Lv   1 Term               Obstetric Comments   CS x1       PMHx:   Past Medical History:   Diagnosis Date     Acute pyelonephritis 2017     Altered mental  state 3/29/2018     Arthritis      Blood clotting disorder (H)     P E     History of blood transfusion      Hypertension      Long-term (current) use of anticoagulants [Z79.01] 2016     PE (pulmonary embolism) 2016     Rheumatism      Scleroderma (H) 2016     Uncomplicated asthma        PSHx:   Past Surgical History:   Procedure Laterality Date     ANGIOGRAM        SECTION       THROAT SURGERY         Meds:   Current Outpatient Medications   Medication     albuterol (VENTOLIN HFA) 108 (90 Base) MCG/ACT Inhaler     amLODIPine (NORVASC) 5 MG tablet     blood glucose (NO BRAND SPECIFIED) lancets standard     blood glucose (NO BRAND SPECIFIED) test strip     budesonide-formoterol (SYMBICORT) 160-4.5 MCG/ACT Inhaler     busPIRone (BUSPAR) 7.5 MG tablet     cetirizine (ZYRTEC) 10 MG tablet     citalopram (CELEXA) 40 MG tablet     Cyanocobalamin (B-12) 1000 MCG TBCR     ferrous gluconate (FERGON) 324 (38 FE) MG tablet     fluticasone (FLONASE) 50 MCG/ACT nasal spray     gabapentin (NEURONTIN) 300 MG capsule     lisinopril (PRINIVIL/ZESTRIL) 10 MG tablet     LORazepam (ATIVAN) 1 MG tablet     montelukast (SINGULAIR) 10 MG tablet     naloxone (NARCAN) 4 MG/0.1ML nasal spray     omeprazole (PRILOSEC) 40 MG DR capsule     ranitidine (ZANTAC) 150 MG tablet     diphenhydrAMINE (BENADRYL) 25 MG tablet     Doxylamine Succinate, Sleep, (UNISOM PO)     GOODSENSE MIGRAINE FORMULA 250-250-65 MG per tablet     naloxone (NARCAN) nasal spray     [START ON 2019] oxyCODONE IR (ROXICODONE) 15 MG tablet     [START ON 2019] oxyCODONE IR (ROXICODONE) 15 MG tablet     oxyCODONE IR (ROXICODONE) 15 MG tablet     polyethylene glycol (MIRALAX) powder     promethazine (PHENERGAN) 25 MG tablet     promethazine (PHENERGAN) 50 MG/ML SOLN IV injection     sucralfate (CARAFATE) 1 GM tablet     No current facility-administered medications for this visit.        Allergies:    Allergies   Allergen Reactions     No  Known Allergies      Seasonal Allergies      Shellfish-Derived Products Nausea     Rash on face       SocHx: No smoking, or other illicit drug use. Rare alcohol use  Social History     Socioeconomic History     Marital status: Single     Spouse name: Not on file     Number of children: Not on file     Years of education: Not on file     Highest education level: Not on file   Occupational History     Not on file   Social Needs     Financial resource strain: Not on file     Food insecurity:     Worry: Not on file     Inability: Not on file     Transportation needs:     Medical: Not on file     Non-medical: Not on file   Tobacco Use     Smoking status: Never Smoker     Smokeless tobacco: Never Used   Substance and Sexual Activity     Alcohol use: Yes     Comment: occ     Drug use: No     Sexual activity: Yes     Partners: Male     Birth control/protection: None   Lifestyle     Physical activity:     Days per week: Not on file     Minutes per session: Not on file     Stress: Not on file   Relationships     Social connections:     Talks on phone: Not on file     Gets together: Not on file     Attends [employer] service: Not on file     Active member of club or organization: Not on file     Attends meetings of clubs or organizations: Not on file     Relationship status: Not on file     Intimate partner violence:     Fear of current or ex partner: Not on file     Emotionally abused: Not on file     Physically abused: Not on file     Forced sexual activity: Not on file   Other Topics Concern     Parent/sibling w/ CABG, MI or angioplasty before 65F 55M? No   Social History Narrative     Not on file       FamHx:  No family history of scleroderma, bleeding/clotting disorders, anesthesia  Family History   Problem Relation Age of Onset     Hyperlipidemia Father      Cerebrovascular Disease Maternal Grandmother      Hyperlipidemia Paternal Grandfather      Diabetes Maternal Aunt      Melanoma No family hx of      Skin Cancer No  "family hx of        ROS: 10-Point ROS negative except as noted in HPI    Physical Exam  /82 (BP Location: Left arm, Patient Position: Chair)   Pulse 103   Ht 1.575 m (5' 2\")   Wt 88 kg (194 lb)   BMI 35.48 kg/m    Gen: Well-appearing, NAD      Assessment/Plan:  Molly Teague is a 33 year old  female, here today for pre-conception counseling. Her past medical history is notable for: scleroderma/CREST syndrome, hypertension, asthma, hypoglycemia, obesity, anxiety, chronic pain syndrome resulting in opiate dependence, and history of pulmonary embolism.    # Scleroderma/CREST Syndrome (Calcinosis, Raynaud's phenomenon, Esophageal dysfunction, Sclerodactyly, Telangiectasia)   - Limited data available for review regarding scleroderma/CREST in pregnancy   - Per report, the most indicative of poor maternal and fetal outcomes is renal function.  If there is active renal disease prior to pregnancy there is a high morbidity and mortality associated with pregnancy.  Discussed that her recent creatinine of 1.45 is high and that she is at risk for further decline in kidney function and need for dialysis during a pregnancy.  Renal crisis in pregnancy can also be difficult to distinguish from pre-eclampsia.   - Additional risks associated with disease, include pre-eclampsia,  delivery, FGR, and low birth weight.  Skin changes and dyspepsia can also be exacerbated in pregnancy.   - Reviewed recent lab work and Hgb, Plt, LFTs are within normal limits. Recent ECHO in 2018 stable. SSA ordered per discussion with Dr. Zuluaga prior to encounter.    # Hypertension   - Current medications: Amlodipine/Lisinopril   - Would be recommended to transition off of Lisinopril prior to pregnancy   - Risks to pregnancy specific to hypertension include development of hypertensive disorders of pregnancy (preeclampsia, GHTN, etc.), FGR,  delivery, fetal death, placental abruption, stroke, renal abnormalities, etc.    # " Asthma   - Medications: Albuterol, Symbicort, Singulair, Flonase   - Asthma in pregnancy is variable with 1/3 of patients improving, 1/3 remaining stable, and 1/3 worsening.    # Chronic pain syndrome   - Medications: Roxicodone, Gabapentin   - Reviewed risks for opiate use in pregnancy and risks of withdrawal. Risks include: fetal growth restriction, premature labor and delivery, premature rupture of membranes, placental insufficiency or abruption, micsarriage, postpartum hemorrhage, infection, and even fetal death.  is also at risk for  abstinence syndrome.    # Anxiety   - Medications: Celexa, Buspar, Lorazepam   - Reviewed risk of spontaneous ,  birth, low birth weight,  toxicity and withdrawal.  Also reviewed risk for postpartum depression.    # Baseline Nausea/GERD   - Medications: Omeprazole, Ranitidine, Phenergan. Medications safe in pregnancy   - Discussed worsening of both nausea and GERD in pregnancy d/t increased progesterone state.  She has had prior complications of aspiration pneumonia d/t sphincter laxity and my suspicion is that this is a risk that would also increase in pregnancy.    # History of pulmonary embolism   - Medications: none   - Further records need to be obtained for further evaluation, but she would likely require prophylactic anti-coagulation throughout pregnancy and postpartum period.      - Reviewed that she is a better candidate for counseling by our Maternal Fetal Medicine specialists due to the complex nature of scleroderma and her other co-morbidities. Referral placed for further discussion and preconception counseling.   - Also recommended that she wait to proceed with pregnancy until discussion with [facility] could be completed. She is in agreeance and wished to proceed with Depo-provera for birth control at this time. This was administered in our office today.      Staffed with Dr. Chapin  Return to [facility] clinic for pre-conception  counseling    Crystal Melagr MD  Ob/Gyn, PGY-4  5/10/2019, 1:56 PM    Patient was seen by the resident in Continuity of Care Clinic.  I reviewed the history & exam.  The patient's assessment and plan were made jointly.    Luisa Chapin MD MPH

## 2019-05-10 NOTE — TELEPHONE ENCOUNTER
MEDICATION APPEAL DENIED    Medication: promethazine (PHENERGAN) 50 MG/ML SOLN IV injection - P/A DENIED/ APPEAL DENIED    Denial Date: 5/10/2019    Denial Rational:         Second Level Appeal Information:  Second level appeals will be managed by the clinic staff and provider. Please contact the [employer] Prior Authorization Team if additional information about the denial is needed.

## 2019-05-11 ASSESSMENT — ANXIETY QUESTIONNAIRES: GAD7 TOTAL SCORE: 9

## 2019-05-14 ENCOUNTER — OFFICE VISIT (OUTPATIENT)
Dept: FAMILY MEDICINE | Facility: CLINIC | Age: 34
End: 2019-05-14
Payer: MEDICARE

## 2019-05-14 VITALS
SYSTOLIC BLOOD PRESSURE: 126 MMHG | OXYGEN SATURATION: 94 % | HEART RATE: 75 BPM | DIASTOLIC BLOOD PRESSURE: 78 MMHG | RESPIRATION RATE: 12 BRPM | TEMPERATURE: 99.1 F | WEIGHT: 191 LBS | BODY MASS INDEX: 34.93 KG/M2

## 2019-05-14 DIAGNOSIS — J30.2 SEASONAL ALLERGIC RHINITIS, UNSPECIFIED TRIGGER: ICD-10-CM

## 2019-05-14 DIAGNOSIS — F41.1 GAD (GENERALIZED ANXIETY DISORDER): ICD-10-CM

## 2019-05-14 DIAGNOSIS — M34.9 SCLERODERMA (H): Chronic | ICD-10-CM

## 2019-05-14 DIAGNOSIS — T75.3XXS SEVERE MOTION SICKNESS, SEQUELA: ICD-10-CM

## 2019-05-14 DIAGNOSIS — G89.4 CHRONIC PAIN SYNDROME: Chronic | ICD-10-CM

## 2019-05-14 DIAGNOSIS — R11.2 NAUSEA AND VOMITING, INTRACTABILITY OF VOMITING NOT SPECIFIED, UNSPECIFIED VOMITING TYPE: Primary | ICD-10-CM

## 2019-05-14 DIAGNOSIS — N18.30 CKD (CHRONIC KIDNEY DISEASE) STAGE 3, GFR 30-59 ML/MIN (H): ICD-10-CM

## 2019-05-14 DIAGNOSIS — H91.93 DECREASED HEARING OF BOTH EARS: ICD-10-CM

## 2019-05-14 LAB
ANION GAP SERPL CALCULATED.3IONS-SCNC: 6 MMOL/L (ref 3–14)
BUN SERPL-MCNC: 21 MG/DL (ref 7–30)
CALCIUM SERPL-MCNC: 9.8 MG/DL (ref 8.5–10.1)
CHLORIDE SERPL-SCNC: 107 MMOL/L (ref 94–109)
CO2 SERPL-SCNC: 25 MMOL/L (ref 20–32)
CREAT SERPL-MCNC: 1.39 MG/DL (ref 0.52–1.04)
ENA SS-A IGG SER IA-ACNC: 0.2 AI (ref 0–0.9)
GFR SERPL CREATININE-BSD FRML MDRD: 49 ML/MIN/{1.73_M2}
GLUCOSE SERPL-MCNC: 85 MG/DL (ref 70–99)
POTASSIUM SERPL-SCNC: 4.2 MMOL/L (ref 3.4–5.3)
SODIUM SERPL-SCNC: 138 MMOL/L (ref 133–144)

## 2019-05-14 PROCEDURE — 36415 COLL VENOUS BLD VENIPUNCTURE: CPT | Performed by: INTERNAL MEDICINE

## 2019-05-14 PROCEDURE — 86235 NUCLEAR ANTIGEN ANTIBODY: CPT | Performed by: OBSTETRICS & GYNECOLOGY

## 2019-05-14 PROCEDURE — 80048 BASIC METABOLIC PNL TOTAL CA: CPT | Performed by: INTERNAL MEDICINE

## 2019-05-14 PROCEDURE — 99215 OFFICE O/P EST HI 40 MIN: CPT | Performed by: INTERNAL MEDICINE

## 2019-05-14 RX ORDER — PROMETHAZINE HYDROCHLORIDE 50 MG/ML
INJECTION, SOLUTION INTRAMUSCULAR; INTRAVENOUS
Qty: 90 ML | Refills: 11 | Status: SHIPPED | OUTPATIENT
Start: 2019-05-14 | End: 2020-03-25

## 2019-05-14 RX ORDER — PROMETHAZINE HYDROCHLORIDE 25 MG/1
25 TABLET ORAL 2 TIMES DAILY PRN
Qty: 30 TABLET | Refills: 11 | Status: SHIPPED | OUTPATIENT
Start: 2019-05-14 | End: 2020-12-29

## 2019-05-14 NOTE — LETTER
May 14, 2019      Molly Ho  [address] [facility] [address]        Dear ,    We are writing to inform you of your test results.    Kidney function slightly improved from 5/2, but remains low.  Continue with plan of care discussed during office visit    Resulted Orders   Basic metabolic panel   Result Value Ref Range    Sodium 138 133 - 144 mmol/L    Potassium 4.2 3.4 - 5.3 mmol/L    Chloride 107 94 - 109 mmol/L    Carbon Dioxide 25 20 - 32 mmol/L    Anion Gap 6 3 - 14 mmol/L    Glucose 85 70 - 99 mg/dL    Urea Nitrogen 21 7 - 30 mg/dL    Creatinine 1.39 (H) 0.52 - 1.04 mg/dL    GFR Estimate 49 (L) >60 mL/min/[1.73_m2]      Comment:      Non  GFR Calc  Starting 12/18/2018, serum creatinine based estimated GFR (eGFR) will be   calculated using the Chronic Kidney Disease Epidemiology Collaboration   (CKD-EPI) equation.      GFR Estimate If Black 57 (L) >60 mL/min/[1.73_m2]      Comment:       GFR Calc  Starting 12/18/2018, serum creatinine based estimated GFR (eGFR) will be   calculated using the Chronic Kidney Disease Epidemiology Collaboration   (CKD-EPI) equation.      Calcium 9.8 8.5 - 10.1 mg/dL   If you have any questions or concerns, please call the clinic at the number listed above.     Sincerely,  Grecia Barba DO

## 2019-05-14 NOTE — PROGRESS NOTES
SUBJECTIVE:   Molly Teague is a 33 year old female who presents to clinic today for the following   health issues:    Wants to do some labs : [place] orders too    ED/UC Followup:    Facility:  [facility]   Date of visit: 5/7/19  Reason for visit: Hearing Loss - left ear  Current Status: Feeling better       Concern - Hearing Loss  Onset: Follow up E.D.     Description:   Feeling better    Intensity: mild    Progression of Symptoms:  constant    Accompanying Signs & Symptoms:  na    Previous history of similar problem:   na    Precipitating factors:   Worsened by: na    Alleviating factors:Improved by: na    Therapies Tried and outcome: na    Hearing is improving.  Has follow-up ENT and Audiology on 5/21     Scleroderma: has follow-up with [facility] (6/11)    Dep/Anx: has ap with psychiatry on 6/19 and psychology on 7/16. She is feeling motivated to make healthy lifestyle changes to improve both physical and mental health.  Is waking up at normal time, engaging her child more often, exercising regularly.  We had increased ativan from #15 to 30/month and she feels this is very helpful for her anxiety.  She is looking forward to decreasing the opiates.  She does not use EtOH.  Still has narcan on hand.    OB/GYN: had consult with [facility] with desire to conceive.  She had a 'reality check' and feels relieved to know all this information. Recognizes that she is not healthy enough to conceive.    PA for Phenergan:  She uses it for N/V due to severe GERD, esophagitis due to scleroderma, as well as motion sickness.  She has lifelong motion sickness, which has worsened w/her chronic diseases.  Zofran does not help.      Reviewed  and updated as needed this visit by clinical staff  Tobacco  Allergies  Meds  Med Hx  Surg Hx  Fam Hx  Soc Hx        Reviewed and updated as needed this visit by Provider         Current Outpatient Medications   Medication Sig Dispense Refill     albuterol (VENTOLIN  HFA) 108 (90 Base) MCG/ACT Inhaler Inhale 2 puffs into the lungs every 4 hours as needed for shortness of breath / dyspnea or wheezing 1 Inhaler 11     amLODIPine (NORVASC) 5 MG tablet Take 5 mg by mouth daily       blood glucose (NO BRAND SPECIFIED) lancets standard Use to test blood sugar 1 time daily 100 each 3     blood glucose (NO BRAND SPECIFIED) test strip Use to test blood sugar 1 time daily 100 each 3     budesonide-formoterol (SYMBICORT) 160-4.5 MCG/ACT Inhaler Inhale 2 puffs into the lungs 2 times daily 6 g 11     busPIRone (BUSPAR) 7.5 MG tablet Take 30 mg by mouth 2 times daily       cetirizine (ZYRTEC) 10 MG tablet Take 1 tablet (10 mg) by mouth daily for 14 days 14 tablet 0     citalopram (CELEXA) 40 MG tablet Take 1 tablet (40 mg) by mouth daily 90 tablet 3     Cyanocobalamin (B-12) 1000 MCG TBCR Take 1,000 mcg by mouth daily 100 tablet 1     diphenhydrAMINE (BENADRYL) 25 MG tablet Take 1 tablet (25 mg) by mouth every 6 hours as needed for itching or allergies 56 tablet      Doxylamine Succinate, Sleep, (UNISOM PO)        ferrous gluconate (FERGON) 324 (38 FE) MG tablet TAKE ONE TABLET BY MOUTH EVERY DAY WITH BREAKFAST 100 tablet 3     fluticasone (FLONASE) 50 MCG/ACT nasal spray Spray 2 sprays into both nostrils daily for 14 days 15.8 mL 0     gabapentin (NEURONTIN) 300 MG capsule Take 2 capsules (600 mg) by mouth 3 times daily 540 capsule 3     GOODSENSE MIGRAINE FORMULA 250-250-65 MG per tablet TAKE ONE TABLET BY MOUTH EVERY 6 HOURS AS NEEDED FOR HEADACHES 24 tablet 1     lisinopril (PRINIVIL/ZESTRIL) 10 MG tablet Take 1 tablet (10 mg) by mouth daily 90 tablet 3     LORazepam (ATIVAN) 1 MG tablet Take 1 tablet (1 mg) by mouth daily as needed for anxiety #30 to last 30 days 30 tablet 2     montelukast (SINGULAIR) 10 MG tablet Take 1 tablet (10 mg) by mouth At Bedtime 90 tablet 3     naloxone (NARCAN) 4 MG/0.1ML nasal spray Spray 1 spray (4 mg) into one nostril alternating nostrils once as needed  for opioid reversal every 2-3 minutes until assistance arrives 0.2 mL 3     omeprazole (PRILOSEC) 40 MG DR capsule TAKE ONE CAPSULE BY MOUTH TWICE A  capsule 1     [START ON 6/25/2019] oxyCODONE IR (ROXICODONE) 15 MG tablet Take 1 tablet (15 mg) by mouth every 4 hours as needed for severe pain 108 tablet 0     [START ON 5/26/2019] oxyCODONE IR (ROXICODONE) 15 MG tablet Take 1 tablet (15 mg) by mouth every 4 hours as needed for pain 108 tablet 0     oxyCODONE IR (ROXICODONE) 15 MG tablet Take 1 tablet (15 mg) by mouth every 4 hours as needed for pain 108 tablet 0     polyethylene glycol (MIRALAX) powder Take 17 g (1 capful) by mouth daily 510 g 11     promethazine (PHENERGAN) 25 MG tablet Take 1 tablet (25 mg) by mouth 2 times daily as needed for nausea 30 tablet 11     promethazine (PHENERGAN) 50 MG/ML SOLN IV injection Inject 0.5 mL (25 mg) into the muscle every 6 hours as needed 90 mL 11     ranitidine (ZANTAC) 150 MG tablet Take 1 tablet (150 mg) by mouth 2 times daily as needed for heartburn 180 tablet 3     sucralfate (CARAFATE) 1 GM tablet Take 1 tablet (1 g) by mouth 4 times daily 120 tablet 11     naloxone (NARCAN) nasal spray Spray 1 spray (4 mg) into one nostril alternating nostrils as needed for opioid reversal every 2-3 minutes until assistance arrives (Patient not taking: Reported on 5/14/2019) 0.2 mL 3       ROS:  Constitutional, HEENT, cardiovascular, pulmonary, GI, , musculoskeletal, neuro, skin, endocrine and psych systems are negative, except as otherwise noted.    OBJECTIVE:     /78   Pulse 75   Temp 99.1  F (37.3  C) (Tympanic)   Resp 12   Wt 86.6 kg (191 lb)   SpO2 94%   Breastfeeding? No   BMI 34.93 kg/m    Body mass index is 34.93 kg/m .  GENERAL APPEARANCE: alert, no distress and over weight  HENT: ear canals and TM's normal and nose and mouth without ulcers or lesions  NECK: no adenopathy  RESP: lungs clear to auscultation - no rales, rhonchi or wheezes  CV: regular  rates and rhythm, normal S1 S2, no S3 or S4 and no murmur, click or rub    Diagnostic Test Results:  Results for orders placed or performed during the hospital encounter of 05/02/19   Abd/pelvis CT - no contrast - [facility] Protocol    Narrative    CT ABDOMEN AND PELVIS WITHOUT CONTRAST   5/2/2019 4:47 PM     HISTORY: Abdominal pain, unspecified    TECHNIQUE: No IV contrast material. Radiation dose for this scan was  reduced using automated exposure control, adjustment of the mA and/or  kV according to patient size, or iterative reconstruction technique.    COMPARISON: None.    FINDINGS: The esophagus is mildly distended. There is diffuse  low-density of the liver. Within the limits of an unenhanced study, no  focal lesions demonstrated in the liver, gallbladder, spleen,  pancreas, adrenal glands, or kidneys. The appendix is normal. Moderate  volume of retained colonic stool. No evidence of bowel obstruction,  free air, or ascites. No enlarged abdominal, retroperitoneal, or  pelvic lymph nodes. An IUD is noted in the uterus. Visualized bones  are unremarkable.      Impression    IMPRESSION: Mild distention of a fluid-filled esophagus, not fully  evaluated. Fatty infiltration of the liver. No acute abnormality in  the abdomen or pelvis.    JOSE J CAPUTO MD   CBC with platelets differential   Result Value Ref Range    WBC 9.7 4.0 - 11.0 10e9/L    RBC Count 4.66 3.8 - 5.2 10e12/L    Hemoglobin 10.1 (L) 11.7 - 15.7 g/dL    Hematocrit 36.0 35.0 - 47.0 %    MCV 77 (L) 78 - 100 fl    MCH 21.7 (L) 26.5 - 33.0 pg    MCHC 28.1 (L) 31.5 - 36.5 g/dL    RDW 19.0 (H) 10.0 - 15.0 %    Platelet Count 289 150 - 450 10e9/L    Diff Method Automated Method     % Neutrophils 62.9 %    % Lymphocytes 23.9 %    % Monocytes 8.2 %    % Eosinophils 3.5 %    % Basophils 1.0 %    % Immature Granulocytes 0.5 %    Nucleated RBCs 0 0 /100    Absolute Neutrophil 6.1 1.6 - 8.3 10e9/L    Absolute Lymphocytes 2.3 0.8 - 5.3 10e9/L    Absolute Monocytes  0.8 0.0 - 1.3 10e9/L    Absolute Eosinophils 0.3 0.0 - 0.7 10e9/L    Absolute Basophils 0.1 0.0 - 0.2 10e9/L    Abs Immature Granulocytes 0.1 0 - 0.4 10e9/L    Absolute Nucleated RBC 0.0    Basic metabolic panel   Result Value Ref Range    Sodium 135 133 - 144 mmol/L    Potassium 4.2 3.4 - 5.3 mmol/L    Chloride 104 94 - 109 mmol/L    Carbon Dioxide 26 20 - 32 mmol/L    Anion Gap 5 3 - 14 mmol/L    Glucose 78 70 - 99 mg/dL    Urea Nitrogen 20 7 - 30 mg/dL    Creatinine 1.46 (H) 0.52 - 1.04 mg/dL    GFR Estimate 47 (L) >60 mL/min/[1.73_m2]    GFR Estimate If Black 54 (L) >60 mL/min/[1.73_m2]    Calcium 8.4 (L) 8.5 - 10.1 mg/dL   Lipase   Result Value Ref Range    Lipase 109 73 - 393 U/L   Hepatic panel   Result Value Ref Range    Bilirubin Direct <0.1 0.0 - 0.2 mg/dL    Bilirubin Total 0.5 0.2 - 1.3 mg/dL    Albumin 4.0 3.4 - 5.0 g/dL    Protein Total 7.7 6.8 - 8.8 g/dL    Alkaline Phosphatase 94 40 - 150 U/L    ALT 33 0 - 50 U/L    AST 47 (H) 0 - 45 U/L   Lactic acid whole blood   Result Value Ref Range    Lactic Acid 1.0 0.7 - 2.0 mmol/L   UA reflex to Microscopic   Result Value Ref Range    Color Urine Straw     Appearance Urine Clear     Glucose Urine Negative NEG^Negative mg/dL    Bilirubin Urine Negative NEG^Negative    Ketones Urine Negative NEG^Negative mg/dL    Specific Gravity Urine 1.004 1.003 - 1.035    Blood Urine Negative NEG^Negative    pH Urine 7.0 5.0 - 7.0 pH    Protein Albumin Urine Negative NEG^Negative mg/dL    Urobilinogen mg/dL 0.0 0.0 - 2.0 mg/dL    Nitrite Urine Negative NEG^Negative    Leukocyte Esterase Urine Negative NEG^Negative    Source Midstream Urine    HCG qualitative pregnancy (blood)   Result Value Ref Range    HCG Qualitative Serum Negative NEG^Negative       ASSESSMENT/PLAN:       1. Nausea and vomiting, intractability of vomiting not specified, unspecified vomiting type - discussed using oral primarily.  Discussed risk of tissue injury with the injectable form.  Patient aware.   She uses the Phenergan for both severe nausea and vomiting related to her scleroderma with associated esophagitis, as well as severe motion sickness and seasonal allergies.  - promethazine (PHENERGAN) 50 MG/ML SOLN IV injection; Inject 0.5 mL (25 mg) into the muscle every 6 hours as needed  Dispense: 90 mL; Refill: 11  - promethazine (PHENERGAN) 25 MG tablet; Take 1 tablet (25 mg) by mouth 2 times daily as needed for nausea  Dispense: 30 tablet; Refill: 11    2. Severe motion sickness, sequela  - promethazine (PHENERGAN) 50 MG/ML SOLN IV injection; Inject 0.5 mL (25 mg) into the muscle every 6 hours as needed  Dispense: 90 mL; Refill: 11  - promethazine (PHENERGAN) 25 MG tablet; Take 1 tablet (25 mg) by mouth 2 times daily as needed for nausea  Dispense: 30 tablet; Refill: 11    3. Seasonal allergic rhinitis, unspecified trigger  - promethazine (PHENERGAN) 25 MG tablet; Take 1 tablet (25 mg) by mouth 2 times daily as needed for nausea  Dispense: 30 tablet; Refill: 11    4. Scleroderma (H) -has follow-up with her rheumatologist in the coming weeks    5. CKD (chronic kidney disease) stage 3, GFR 30-59 ml/min (H) -recheck kidney function  - Basic metabolic panel; Future    6. Chronic pain syndrome -discussed tapering down on opiates by 10% at our next follow-up.  Patient aware and agrees.    7. REX (generalized anxiety disorder) -she reports her anxiety is significantly improved with daily use of the lorazepam.  She is well aware of the safety concerns with benzos and opiates.  She has finally made follow-up with psychiatrist and psychologist.    8. Decreased hearing of both ears -has ENT and audiology appointment in the coming days      Patient Instructions   1. We will try again for Phenergan, oral placed.  Try to minimize use of injectable due to risk of tissue injury.  2. At our next fill of oxycodone, we will work to reduce dose further.  3. Keep up the good work on diet, exercise, improving your mental and  physical health.        Greater than 40 minutes was spent face-to-face with the patient by the provider.  Greater than 50% was spent in counseling or coordinating care for this patient.    Dr. Grecia Barba,   [facility]

## 2019-05-14 NOTE — PATIENT INSTRUCTIONS
1. We will try again for Phenergan, oral placed.  Try to minimize use of injectable due to risk of tissue injury.  2. At our next fill of oxycodone, we will work to reduce dose further.  3. Keep up the good work on diet, exercise, improving your mental and physical health.

## 2019-05-14 NOTE — LETTER
5/16/2019         Molly Teague   [address] [facility] [address]        Dear Ms. Teague:    The results of your recent SSA antibody testing was normal. Your creatinine remains elevated at 1.39. Please follow up with maternal fetal medicine as discussed with Dr. Melgar during your clinic visit for further counseling regarding future pregnancy.    Results for orders placed or performed in visit on 05/14/19   Basic metabolic panel   Result Value Ref Range    Sodium 138 133 - 144 mmol/L    Potassium 4.2 3.4 - 5.3 mmol/L    Chloride 107 94 - 109 mmol/L    Carbon Dioxide 25 20 - 32 mmol/L    Anion Gap 6 3 - 14 mmol/L    Glucose 85 70 - 99 mg/dL    Urea Nitrogen 21 7 - 30 mg/dL    Creatinine 1.39 (H) 0.52 - 1.04 mg/dL    GFR Estimate 49 (L) >60 mL/min/[1.73_m2]    GFR Estimate If Black 57 (L) >60 mL/min/[1.73_m2]    Calcium 9.8 8.5 - 10.1 mg/dL   SSA Ro BOOKER Antibody IgG   Result Value Ref Range    SSA (Ro) (BOOKER) Antibody, IgG 0.2 0.0 - 0.9 AI         Please note that test explanations are brief and do not reflect all diagnostic uses.  If you have any questions or concerns, please call the clinic at [phone].      Sincerely,    Yakelin Self PGY4  5/16/2019 7:29 AM

## 2019-06-05 ENCOUNTER — OFFICE VISIT (OUTPATIENT)
Dept: PODIATRY | Facility: CLINIC | Age: 34
End: 2019-06-05
Payer: MEDICARE

## 2019-06-05 DIAGNOSIS — M20.41 HAMMER TOE OF RIGHT FOOT: ICD-10-CM

## 2019-06-05 DIAGNOSIS — M25.571 ACUTE RIGHT ANKLE PAIN: ICD-10-CM

## 2019-06-05 DIAGNOSIS — M34.89 SCLERODERMA WITH RENAL INVOLVEMENT (H): ICD-10-CM

## 2019-06-05 DIAGNOSIS — G63 POLYNEUROPATHY ASSOCIATED WITH UNDERLYING DISEASE (H): Primary | ICD-10-CM

## 2019-06-05 DIAGNOSIS — M34.1 CREST (CALCINOSIS, RAYNAUD'S PHENOMENON, ESOPHAGEAL DYSFUNCTION, SCLERODACTYLY, TELANGIECTASIA) (H): ICD-10-CM

## 2019-06-05 DIAGNOSIS — N08 SCLERODERMA WITH RENAL INVOLVEMENT (H): ICD-10-CM

## 2019-06-05 PROCEDURE — 99212 OFFICE O/P EST SF 10 MIN: CPT | Mod: 25 | Performed by: PODIATRIST

## 2019-06-05 PROCEDURE — 29540 STRAPPING ANKLE &/FOOT: CPT | Performed by: PODIATRIST

## 2019-06-05 NOTE — PATIENT INSTRUCTIONS
Thanks for coming today.  [facility]  [address] [place] [address]    To schedule future appointments in Ortho Clinic, you may call [phone].    To schedule ordered imaging by your provider:   Call [facility] [phone]    To schedule an injection ordered by your provider:  Call [facility] Injection scheduling line: [phone]  [email or web address] available online at:  [email or web address]    Please call if any further questions or concerns ([phone]).  Clinic hours 8 am to 5 pm.    Return to clinic (call) if symptoms worsen or fail to improve.

## 2019-06-05 NOTE — NURSING NOTE
Molly Teague's chief complaint for this visit includes:  Chief Complaint   Patient presents with     Right Foot - Pain     PCP: Grecia Barba    Referring Provider:  Referred Self, MD  No address on file    There were no vitals taken for this visit.  Data Unavailable     Do you need any medication refills at today's visit? No    Jayleen Toure LPN

## 2019-06-05 NOTE — LETTER
6/5/2019         RE: Molly Teague  [address] [facility] [address]        Dear Colleague,    Thank you for referring your patient, Molly Teague, to the [facility]. Please see a copy of my visit note below.    Chief Complaint:   Chief Complaint   Patient presents with     Right Foot - Pain          Allergies   Allergen Reactions     No Known Allergies      Seasonal Allergies      Shellfish-Derived Products Nausea     Rash on face         Subjective: Molly is a 33 year old female who presents to the clinic today for a follow up of right 2nd digit hammertoe. She relates that she does use the silicone sleeves and these have stopped her pain. She relates that she went on a hike last weekend and had pain in her right ankle that resolved.    Objective  Right 2nd digit is hammered and plantar flexed at the DIPJ. Nail is discolored from repetitive trauma. No Payne's sign to the digit. No hyperkeratotic lesion noted to the distal digit today.  No pain noted today in the right ankle with palpation or with active/passive ROM to the joint. No crepitus.      Assessment: Right 2nd digit mallet toe causing pain.   Resolved pain in the right ankle     Plan:   - Pt seen and evaluated  -  Cont with the silicone sleeves for the right 2nd toe.   - I taught her how to KT tape the right ankle is she has pain.  - Pt to return to clinic PRN.         Again, thank you for allowing me to participate in the care of your patient.        Sincerely,        Kenroy Cruz DPM

## 2019-06-05 NOTE — PROGRESS NOTES
Chief Complaint:   Chief Complaint   Patient presents with     Right Foot - Pain          Allergies   Allergen Reactions     No Known Allergies      Seasonal Allergies      Shellfish-Derived Products Nausea     Rash on face         Subjective: Molly is a 33 year old female who presents to the clinic today for a follow up of right 2nd digit hammertoe. She relates that she does use the silicone sleeves and these have stopped her pain. She relates that she went on a hike last weekend and had pain in her right ankle that resolved.    Objective  Right 2nd digit is hammered and plantar flexed at the DIPJ. Nail is discolored from repetitive trauma. No Payne's sign to the digit. No hyperkeratotic lesion noted to the distal digit today. No pain noted today in the right ankle with palpation or with active/passive ROM to the joint. No crepitus.      Assessment: Right 2nd digit mallet toe causing pain.   Resolved pain in the right ankle     Plan:   - Pt seen and evaluated  - Cont with the silicone sleeves for the right 2nd toe.   - I taught her how to KT tape the right ankle is she has pain.  - Pt to return to clinic PRN.

## 2019-06-19 ENCOUNTER — OFFICE VISIT (OUTPATIENT)
Dept: PSYCHIATRY | Facility: CLINIC | Age: 34
End: 2019-06-19
Attending: INTERNAL MEDICINE
Payer: MEDICARE

## 2019-06-19 VITALS
DIASTOLIC BLOOD PRESSURE: 76 MMHG | OXYGEN SATURATION: 98 % | BODY MASS INDEX: 34.2 KG/M2 | RESPIRATION RATE: 12 BRPM | WEIGHT: 187 LBS | HEART RATE: 113 BPM | SYSTOLIC BLOOD PRESSURE: 120 MMHG

## 2019-06-19 DIAGNOSIS — F32.1 MODERATE MAJOR DEPRESSION (H): Primary | ICD-10-CM

## 2019-06-19 DIAGNOSIS — F43.10 PTSD (POST-TRAUMATIC STRESS DISORDER): ICD-10-CM

## 2019-06-19 PROCEDURE — 90792 PSYCH DIAG EVAL W/MED SRVCS: CPT | Performed by: NURSE PRACTITIONER

## 2019-06-19 RX ORDER — CITALOPRAM HYDROBROMIDE 20 MG/1
TABLET ORAL
Qty: 30 TABLET | Refills: 0 | Status: SHIPPED | OUTPATIENT
Start: 2019-06-19 | End: 2019-09-06

## 2019-06-19 RX ORDER — DULOXETIN HYDROCHLORIDE 30 MG/1
30 CAPSULE, DELAYED RELEASE ORAL 2 TIMES DAILY
Qty: 60 CAPSULE | Refills: 1 | Status: SHIPPED | OUTPATIENT
Start: 2019-06-19 | End: 2019-09-18

## 2019-06-19 RX ORDER — CITALOPRAM HYDROBROMIDE 10 MG/1
TABLET ORAL
Qty: 30 TABLET | Refills: 0 | Status: SHIPPED | OUTPATIENT
Start: 2019-06-19 | End: 2019-09-06

## 2019-06-19 NOTE — PROGRESS NOTES
"                                                         Outpatient Psychiatric Evaluation - Standard Adult    Name:  Molly Teague  : 1985    Source of Referral:  Primary Care Provider: Grecia Barba DO   Last visit: 19  Current Psychotherapist: Pending with [facility]   Last visit:    * Patient was seen once by former [facility] provider, Adalid Moody CNP, in 10/2017. Her notes is available for view.    Identifying Data:  Patient is a 33 year old, partnered / significant other  White  /  female  who presents for initial visit with me.  Patient is currently disabled. Patient attended the session alone. Consent to communicate signed for Joshua Watkins patient's Spouse/Partner. Consent for treatment signed and included in electronic medical record. Discussed limits of confidentiality today. My Practice Policy was reviewed and signed.     Patient prefers to be called: \"Molly\"    Chief Complaint:    Patient reports: \"I was diagnosed with REX, depression and PTSD and have been told to get help.\"      HPI:    Patient has a history of depression dating to age 17, and what sounds to be some pre-existing trauma as well, which she declined to detail. She had some family therapy at the time, which she describes as helpful. 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 Had to have emergency  and son was born with tracheal malaise necessitating NICU hospitalization. In  her CREST rapidly progressed and she was in a 6-week coma with multi-organ failure. She eventually came out of coma, but remained hospitalized for 10 months. Lost 50 lbs, her skin blackened and peeled, and she could only minimally communicate with a trachea. States she was told by family she was given last rites on two occassions. States at times she was more fully aware of her condition and pain, but could only observe and communicate, \"like a bad " "nightmare\". Her mother kept daily watch. After discharge, she had intensive physical, occupational and speech therapy.    She moved to [place] 1.5 years ago to be closer to [facility] for specalized care, and as  (whom she  in 2014) had family in area as well as job. States she has good care at [facility]; had rheumatologist, pulmonologist and gastroenterologist. She had a consult with psychiatry once a [facility] and they recommended therapy and ongoing psychiatry. She saw Milagro Moody CNP in 10/2017. She never pursued therapy. States she has tried to manage all these years by avoiding how her condition is affecting her MH. States mother had \"an intervention\" on her last month and told her she needed to get help. She is seeing me today, and will be seeing a [facility] psychologist, Erna Palacios PhD, next month.    Endorsing ongoing PTSD, anxiety and depression symptoms: dissociation, easy irritability, guilt, nightmares, anxiety in closed-in spaces, avoidance, isolation, low mood, low self-esteem. Has routine panic, often in middle of night. Lately past couple of months she wasn't bathing or leaving home. Has recurrent SI: \"I know I'm alive and should be grateful, but sometimes I feel that others should go on without me. Maybe I shouldn't have survived.\" Cites her son a strong protective factor.     She reports taking Lexapro years ago, perhaps as teenager, can't recall. She has been taking Celexa since 2016. Briefly trialed Zoloft in 2017 with SHEKHAR Moody CNP; had side effects she cannot recall, and went back to Celexa. Has been taking Celexa 40 mg daily up to present; states adherence is good. She thinks Celexa helps to some degree; no obvious side effects she knows about, but takes many medications and her CREST syndrome has host of symptoms. She is also taking Buspar 30 mg BID; not sure what if any effect it has.    She has been taking Ativan daily for some time; originally 1 mg BID; PCP concerned about CNS depression with " "daily Oxycodone, so Ativan reduced to 1 mg daily. The patient states she logs all her medication doses, and states she responsibly spaces out any dosing of opioid and benzo. Has Narcan at home; hasn't had need to use. She finds both Ativan and Oxycodone rather necessary to managing her anxiety and pain. Ambien was prescribed nightly for a time, but PCP had her discontinue this for similar reasons.    Patient states she has not been in therapy since coming out of coma. Has been recommended a number of times by [facility]. CREST support groups also encouraged by [facility]. States she's been avoiding both, as she does not want to think about her condition. Also quite fearful to see others living with CREST, their possible deformities, and \"how bad it can get.\" Realizes this path of avoidance is no longer tenable, and has relented to her mother's insistence on seeking  treatment. Is scheduled for intake at [facility] on 7/16/19 and states intent, however wary, to attend. States travel from [facility] is not an issue for her.    States she is employing measures of self-care. She learned to meditate, and has been journaling. Her  and his family are very supportive. 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 Her son has turned 4 y/o. He is physically healthy, but during course of her coma he stopped talking. He is currently in speech therapy.    She is endorsing all-over pain, nausea and fatigue. Is eating one meal a day with persistent nausea. She was found to be quite iron deficient recently and got a iron infusion. She mentions that her rheumatologist at [facility] is moving out of state in 6 months; is quite connected to him.          Psychiatric Review of Symptoms:     PHQ-9 scores:   PHQ-9 SCORE 10/16/2018 1/16/2019 6/20/2019   PHQ-9 Total Score 18 20 22        REX-7 scores:    REX-7 SCORE 1/22/2019 5/10/2019 6/20/2019   Total Score 5 9 14         Psychiatric History:   No psychiatric " hospitalizations  Denies suicide attempts      Substance Use History:  Reports occassional ETOH; denies problematic use; denies use with Ativan or Oxycodone  Denies other substance use.      Past Medical History:  Past Medical History:   Diagnosis Date     Acute pyelonephritis 2017     Altered mental state 3/29/2018     Arthritis      Blood clotting disorder (H)     P E     History of blood transfusion      Hypertension      Long-term (current) use of anticoagulants [Z79.01] 2016     PE (pulmonary embolism) 2016     Rheumatism      Scleroderma (H) 2016     Uncomplicated asthma       Surgery:   Past Surgical History:   Procedure Laterality Date     ANGIOGRAM        SECTION  2014     THROAT SURGERY       Allergies:     Allergies   Allergen Reactions     No Known Allergies      Seasonal Allergies      Shellfish-Derived Products Nausea     Rash on face     Primary Care Provider: Grecia Barba,   Seizures or Head Injury: No      Current Medications:    Current Outpatient Medications:      albuterol (VENTOLIN HFA) 108 (90 Base) MCG/ACT Inhaler, Inhale 2 puffs into the lungs every 4 hours as needed for shortness of breath / dyspnea or wheezing, Disp: 1 Inhaler, Rfl: 11     amLODIPine (NORVASC) 5 MG tablet, Take 5 mg by mouth daily, Disp: , Rfl:      blood glucose (NO BRAND SPECIFIED) lancets standard, Use to test blood sugar 1 time daily, Disp: 100 each, Rfl: 3     blood glucose (NO BRAND SPECIFIED) test strip, Use to test blood sugar 1 time daily, Disp: 100 each, Rfl: 3     budesonide-formoterol (SYMBICORT) 160-4.5 MCG/ACT Inhaler, Inhale 2 puffs into the lungs 2 times daily, Disp: 6 g, Rfl: 11     busPIRone (BUSPAR) 7.5 MG tablet, Take 30 mg by mouth 2 times daily, Disp: , Rfl:      citalopram (CELEXA) 40 MG tablet, Take 1 tablet (40 mg) by mouth daily, Disp: 90 tablet, Rfl: 3     Cyanocobalamin (B-12) 1000 MCG TBCR, Take 1,000 mcg by mouth daily, Disp: 100 tablet, Rfl: 1      diphenhydrAMINE (BENADRYL) 25 MG tablet, Take 1 tablet (25 mg) by mouth every 6 hours as needed for itching or allergies, Disp: 56 tablet, Rfl:      Doxylamine Succinate, Sleep, (UNISOM PO), , Disp: , Rfl:      ferrous gluconate (FERGON) 324 (38 FE) MG tablet, TAKE ONE TABLET BY MOUTH EVERY DAY WITH BREAKFAST, Disp: 100 tablet, Rfl: 3     gabapentin (NEURONTIN) 300 MG capsule, Take 2 capsules (600 mg) by mouth 3 times daily, Disp: 540 capsule, Rfl: 3     GOODSENSE MIGRAINE FORMULA 250-250-65 MG per tablet, TAKE ONE TABLET BY MOUTH EVERY 6 HOURS AS NEEDED FOR HEADACHES, Disp: 24 tablet, Rfl: 1     lisinopril (PRINIVIL/ZESTRIL) 10 MG tablet, Take 1 tablet (10 mg) by mouth daily, Disp: 90 tablet, Rfl: 3     LORazepam (ATIVAN) 1 MG tablet, Take 1 tablet (1 mg) by mouth daily as needed for anxiety #30 to last 30 days, Disp: 30 tablet, Rfl: 2     montelukast (SINGULAIR) 10 MG tablet, Take 1 tablet (10 mg) by mouth At Bedtime, Disp: 90 tablet, Rfl: 3     omeprazole (PRILOSEC) 40 MG DR capsule, TAKE ONE CAPSULE BY MOUTH TWICE A DAY, Disp: 180 capsule, Rfl: 1     [START ON 6/25/2019] oxyCODONE IR (ROXICODONE) 15 MG tablet, Take 1 tablet (15 mg) by mouth every 4 hours as needed for severe pain, Disp: 108 tablet, Rfl: 0     polyethylene glycol (MIRALAX) powder, Take 17 g (1 capful) by mouth daily, Disp: 510 g, Rfl: 11     promethazine (PHENERGAN) 25 MG tablet, Take 1 tablet (25 mg) by mouth 2 times daily as needed for nausea, Disp: 30 tablet, Rfl: 11     promethazine (PHENERGAN) 50 MG/ML SOLN IV injection, Inject 0.5 mL (25 mg) into the muscle every 6 hours as needed, Disp: 90 mL, Rfl: 11     ranitidine (ZANTAC) 150 MG tablet, Take 1 tablet (150 mg) by mouth 2 times daily as needed for heartburn, Disp: 180 tablet, Rfl: 3     sucralfate (CARAFATE) 1 GM tablet, Take 1 tablet (1 g) by mouth 4 times daily, Disp: 120 tablet, Rfl: 11     naloxone (NARCAN) 4 MG/0.1ML nasal spray, Spray 1 spray (4 mg) into one nostril alternating  nostrils once as needed for opioid reversal every 2-3 minutes until assistance arrives (Patient not taking: Reported on 6/19/2019), Disp: 0.2 mL, Rfl: 3     naloxone (NARCAN) nasal spray, Spray 1 spray (4 mg) into one nostril alternating nostrils as needed for opioid reversal every 2-3 minutes until assistance arrives (Patient not taking: Reported on 5/14/2019), Disp: 0.2 mL, Rfl: 3    The [place] Prescription Monitoring Program has been reviewed and there are no concerns about diversionary activity for controlled substances at this time.      05/26/2019 1 04/26/2019 Lorazepam 1 Mg Tablet   30 30 Re Allen [id number] [place] ([id number]) 1 1.00 LME [place]  05/26/2019 1 04/26/2019 Oxycodone Hcl 15 Mg Tablet   108 18 Re Allen [id number] [place] ([id number]) 0 135.00 [place]  04/26/2019 1 04/26/2019 Lorazepam 1 Mg Tablet   30 30 Re Allen [id number] [place] ([id number]) 0 1.00 [place]  04/26/2019 1 04/26/2019 Oxycodone Hcl 15 Mg Tablet   108 18 Re Allen [id number] [place] ([id number]) 0 135.00 [place]  04/23/2019 1 07/06/2018 Gabapentin 300 Mg Capsule   540 90 Re Allen [id number] [place] ([id number]) 3  [place]  03/27/2019 1 01/22/2019 Oxycodone Hcl 15 Mg Tablet   108 18 Re Allen [id number] [place] ([id number]) 0 135.00 [place]  03/27/2019 1 01/22/2019 Lorazepam 1 Mg Tablet   15 30 Re Allen [id number] [place] ([id number]) 2 0.50 [place]  02/21/2019 1 01/22/2019 Oxycodone Hcl 15 Mg Tablet   108 18 Re Allen [id number] [place] ([id number]) 0 135.[place]  02/19/2019 1 01/22/2019 Lorazepam 1 Mg Tablet   15 30 Re Allen [id number] [place] ([id number]) 1 0.50 [place]  01/27/2019 1 07/06/2018 Gabapentin 300 Mg Capsule   540 90 Re Allen [id number] [place] ([id number]) 2  [place]  01/24/2019 1 01/22/2019 Lorazepam 1 Mg Tablet   15 30 Re Allen [id number] [place] ([id number]) 0 0.50 [place]  01/24/2019 1 01/22/2019 Oxycodone Hcl 15 Mg Tablet   120 20 Re Allen [id number] [place] ([id number]) 0 135.00 [place]  01/18/2019 1 01/17/2019 Lorazepam 1 Mg Tablet   4 4 Re Allen [id number] [place] ([id number]) 0 1.00  [place]  01/18/2019 1 01/17/2019 Oxycodone Hcl 15 Mg Tablet   20 4 Re Allen [id number] [place] ([id number]) 0 112.50 [place]      Vital Signs:  Vitals: /76 (BP Location: Right arm, Patient Position: Chair, Cuff Size: Adult Regular)   Pulse 113   Resp 12   Wt 84.8 kg (187 lb)   LMP  (LMP Unknown)   SpO2 98%   Breastfeeding? No   BMI 34.20 kg/m      Labs:  Most recent laboratory results reviewed and the pertinent results include:     Last Comprehensive Metabolic Panel:  Sodium   Date Value Ref Range Status   05/14/2019 138 133 - 144 mmol/L Final     Potassium   Date Value Ref Range Status   05/14/2019 4.2 3.4 - 5.3 mmol/L Final     Chloride   Date Value Ref Range Status   05/14/2019 107 94 - 109 mmol/L Final     Carbon Dioxide   Date Value Ref Range Status   05/14/2019 25 20 - 32 mmol/L Final     Anion Gap   Date Value Ref Range Status   05/14/2019 6 3 - 14 mmol/L Final     Glucose   Date Value Ref Range Status   05/14/2019 85 70 - 99 mg/dL Final     Urea Nitrogen   Date Value Ref Range Status   05/14/2019 21 7 - 30 mg/dL Final     Creatinine   Date Value Ref Range Status   05/14/2019 1.39 (H) 0.52 - 1.04 mg/dL Final     GFR Estimate   Date Value Ref Range Status   05/14/2019 49 (L) >60 mL/min/[1.73_m2] Final     Comment:     Non  GFR Calc  Starting 12/18/2018, serum creatinine based estimated GFR (eGFR) will be   calculated using the Chronic Kidney Disease Epidemiology Collaboration   (CKD-EPI) equation.       Calcium   Date Value Ref Range Status   05/14/2019 9.8 8.5 - 10.1 mg/dL Final     Lab Results   Component Value Date    AST 47 05/02/2019     Lab Results   Component Value Date    ALT 33 05/02/2019     No results found for: BILICONJ   Lab Results   Component Value Date    BILITOTAL 0.5 05/02/2019     Lab Results   Component Value Date    ALBUMIN 4.0 05/02/2019     Lab Results   Component Value Date    PROTTOTAL 7.7 05/02/2019      Lab Results   Component Value Date    ALKPHOS 94  05/02/2019         Review of Systems:  10 systems (general, cardiovascular, respiratory, eyes, ENT, endocrine, GI, , M/S, neurological) were reviewed. Most pertinent finding(s) is/are: scleroderma, pain, nausea, fatigue. The remaining systems are all unremarkable.    Family History:   Patient reported family history includes:   Family History   Problem Relation Age of Onset     Hyperlipidemia Father      Cerebrovascular Disease Maternal Grandmother      Hyperlipidemia Paternal Grandfather      Diabetes Maternal Aunt      Melanoma No family hx of      Skin Cancer No family hx of      Mother = depression  Sister = depression  Uncle = depression    Social History:   Raised in [place]  One sister  Graduated high school  Living with  and 6 y/o son  On disability      Mental Status Examination:     Appearance:  awake, alert, with skin bruising/discoloration and scalp hair loss and casually dressed  Attitude:  cooperative   Eye Contact:  good  Gait and Station: Normal  Psychomotor Behavior:  intact station, gait and muscle tone  Oriented to:  time, person, and place  Attention Span and Concentration:  Normal  Speech:  clear, coherent  Mood:  anxious and depressed  Affect:  tearful at times  Associations:  no loose associations  Thought Process:  logical, linear and goal oriented  Thought Content:  Appropriate to Interview  Recent and Remote Memory:  intact Not formally assessed. No amnesia.  Fund of Knowledge: appropriate  Insight:  good  Judgment:  intact  Impulse Control:  intact    Suicide Risk Assessment:  Today Molly Teague reports occasional passive suicidal ideation. In addition, there are notable risk factors for self-harm, including anxiety, comorbid medical condition of scleroderma and suicidal ideation. However, risk is mitigated by commitment to family, absence of past attempts, ability to volunteer a safety plan, future oriented, identifies reasons to live including son and denies suicidal intent  or plan. Therefore, based on all available evidence including the factors cited above, Molly Teague does not appear to be at imminent risk for self-harm, does not meet criteria for a 72-hr hold, and therefore remains appropriate for ongoing outpatient level of care.  A thorough assessment of risk factors related to suicide and self-harm have been reviewed and are noted above. The patient convincingly denies acute suicidality on several occasions. Local community safety resources reviewed and printed for patient to use if needed. There was no deceit detected, and the patient presented in a manner that was believable.     DSM5  Diagnosis:  296.32 (F33.1) Major Depressive Disorder, Recurrent Episode, Moderate _  309.81 (F43.10) Posttraumatic Stress Disorder (includes Posttraumatic Stress Disorder for Children 6 Years and Younger)  With dissociative symptoms    Medical Comorbidities Include:   Patient Active Problem List    Diagnosis Date Noted     Obesity (BMI 35.0-39.9) with comorbidity (H) 01/22/2019     Priority: Medium     Contracture of hand joint, right 12/13/2018     Priority: Medium     Contracture of hand joint, left 12/13/2018     Priority: Medium     Moderate major depression (H) 07/06/2018     Priority: Medium     Severe persistent asthma without complication 07/06/2018     Priority: Medium     On high dose ICS/LABA + frequent albuterol use.  Next allergy/pulmonary referral       Aspiration of food 03/29/2018     Priority: Medium     Chronic pain syndrome 04/26/2017     Priority: Medium     Patient is followed by Grecia Barba DO for ongoing prescription of pain medication.  All refills should be approved by this provider only at face-to-face appointments - not by phone request.    Medication(s): Oxycodone 15 mg.   Maximum quantity per month: 120  Clinic visit frequency required: Q 3 months     Controlled substance agreement:  Encounter-Level CSA - 04/26/2017:          Controlled Substance  Agreement - Scan on 5/4/2017  8:58 AM : CONTROLLED SUBSTANCE AGREEMENT (below)              Pain Clinic evaluation in the past: No    DIRE Total Score(s):  No flowsheet data found.    Last [facility] website verification:  done on 4/26/17   9/26/2017  7/6/2018  10/16/2018  1/22/2019         [email or web address]         Chronic migraine without aura without status migrainosus, not intractable 04/12/2017     Priority: Medium     With medication overuse.  Fioricet and not Imitrex is recommend to treat severe headache.  2/2017 [facility] recommend wean down from daily Fioricet and use Excedrin Migrain PRN.       AIN grade I 03/30/2017     Priority: Medium     Found at [facility] on colonoscopy 2/2017.  Recommend repeat anoscopy and anal pap in 6/2017.       Gastroesophageal reflux disease with esophagitis 03/30/2017     Priority: Medium     EGD done at [facility] 2/2017 showed esophagitis without GAVE.  Recommend max dose H2 and PPI, along with 4 tablets of Carafate dissolved in water and drink throughout the day.    Had LA Grade D esophagitis        Limited systemic sclerosis (H) 01/25/2017     Priority: Medium     Raynaud's, calcinosis, telangiectasias, peripheral neuropathy, GERD symptoms, history of scleroderma renal crisis.  Saw [facility] 1/2017 and follows with Rheum Dr. Davis locally.       Polyneuropathy associated with underlying disease (H) 01/25/2017     Priority: Medium     Due to scleroderma and neurology thought possible due to ICU (critical illness neuropathy).  Recommend to max out dose of gabapentin to 4160-4980 mg/day, split BID or TID.  EMG 1/2017 positive for neuropathy       History of Clostridium difficile 11/29/2016     Priority: Medium     History of Helicobacter pylori infection 11/29/2016     Priority: Medium     Scleroderma with renal involvement (H) 11/09/2016     Priority: Medium     Stopped lisinopril per [facility] 1/2017.  Restarted lisinopril per guevara 3/2017       History of ARDS 11/09/2016     Priority:  Medium     4/2015, prolonged ICU/vent/trach due to influenza, scleroderma renal crisis requiring hemodialysis.       Raynaud's disease without gangrene 11/09/2016     Priority: Medium     History of hemodialysis 11/09/2016     Priority: Medium     4/2015 due to scleroderma renal crisis       Bilateral retinitis 11/09/2016     Priority: Medium     IUD (intrauterine device) in place 11/09/2016     Priority: Medium     Other pulmonary embolism without acute cor pulmonale, unspecified chronicity (H) 11/09/2016     Priority: Medium     Completed anti-coagulation.       REX (generalized anxiety disorder) 11/09/2016     Priority: Medium     CREST (calcinosis, Raynaud's phenomenon, esophageal dysfunction, sclerodactyly, telangiectasia) (H) 11/09/2016     Priority: Medium     Scleroderma (H) 09/22/2016     Priority: Medium     Nausea with vomiting 09/21/2016     Priority: Medium         A 12-item WHODAS 2.0 assessment was completed by the patient today and recorded in EPIC.    WHODAS 2.0 Total Score 10/30/2017   Total Score 29       The Patient Activation Measure (ARTHUR) score was completed and recorded in [employer]Turion. This assesses patient knowledge, skill, and confidence for self-management. No flowsheet data found.               Impression:  Molly Teague is a 33 year old female with a history of depression and PTSD which is inextricably linked with her history of scleroderma CREST syndrome. It should be noted she has demonstrated remarkable resilience in managing her medical condition, and has good insight as well as social supports. She has, since the onset of her connective disease, been treated with Celexa, with a brief interject trial of Zoloft. She is also attempting to treat her persistent elevated anxiety and frequent panic with Ativan. I agree with her PCP that her co-use of Ativan and Oxycodone is a matter of clinical concern, but she has professed to use these two agents safely spaced apart. I see from her PCP's notes  they are endeavoring to gradually reduce her regime of prescribed opioids to further mitigate the risk. I don't see how she could not use Ativan at this time to manage her panic -- a prescription for #30 tabs of Ativan 1 mg per month seems reasonable at this time.    We discussed the possible merits of changing out her antidepressant, given she is taking a maximum dosage of Celexa and not having an adequate therapeutic benefit. I proposed a trial of Cymbalta, as this may stand a chance to simultaneously treat, even if in some small added portion, her neuropathic pain. I did discuss the potential pocket of vulnerability she may experience to her mood and anxiety stability during a cross-taper, to which she stated her understanding and consent. I have provided her with a cross-taper schedule. She'll continue Buspar 30 mg BID for now.    We discussed the challenges and potential benefits of engaging in psychotherapy at this time. She is encouragingly still motivated to pursue this at this time. She has this set up for begin at [facility] next month.    Medication side effects and alternatives reviewed. Health promotion activities recommended and reviewed today. All questions addressed. Education and counseling completed regarding risks and benefits of medications and psychotherapy options. Collaborative Care Psychiatry Service model reviewed today. Recommend therapy for additional support.       Treatment Plan:    Cross-taper from Celexa to Cymbalta:    Week 1: Decrease Celexa to 30 mg daily    Week 2: Decrease Celexa to 20 mg daily    Week 3: Decrease Celexa to 10 mg daily. Simultaneously start Cymbalta 30 mg daily.    Week 4: Stop Celexa. Increase Cymbalta to 60 mg daily.    May use Ativan 1 mg daily as needed for panic    Continue Buspar 30 mg BID for now    Psychotherapy intake with Dr. Palacios of [facility] on 7/16/19    Continue all other medical directions per primary care provider.     Continue all other medications as reviewed  per electronic medical record today.     Safety plan reviewed. To the Emergency Department as needed or call after hours crisis line at [phone] or [phone]. [place] Crisis Text Line: Text [place] to 726397  or  Suicide LifeLine Chat: suicideBlowtorch.org/chat/    Schedule an appointment with me in 6 weeks or sooner as needed.  Call [facility] at [phone] to schedule.    Follow up with primary care provider as planned or for acute medical concerns.    Call the psychiatric nurse line with medication questions or concerns at [phone].    [employer] may be used to communicate with your provider, but this is not intended to be used for emergencies.      Crisis Resources:    National Suicide Prevention Lifeline: 257.248.1682 (TTY: 571.259.7648). Call anytime for help.  (www.suicidepreventionlifeline.org)  National [place] on Mental Illness (www.fatmata.org): 544.733.6668 or 511-425-4302.   Mental Health Association (www.mentalhealth.org): 498.332.8167 or 290-418-1867.  [place] Crisis Text Line: Text [place] to 510552  Suicide LifeLine Chat: suicideBlowtorch.org/chat    Administrative Billing:   Time spent with patient was 60 minutes and greater than 50% of time or 40 minutes was spent in counseling and coordination of care regarding above diagnoses and treatment plan.    Patient Status:  Patient will continue to be seen for ongoing consultation and stabilization.    Signed:   Agustin Mckenzie, CNP  Psychiatry

## 2019-06-19 NOTE — PATIENT INSTRUCTIONS
-- Cross-taper from Celexa to Cymbalta:  Week 1: Decrease Celexa to 30 mg daily  Week 2: Decrease Celexa to 20 mg daily  Week 3: Decrease Celexa to 10 mg daily. Simultaneously start Cymbalta 30 mg daily.  Week 4: Stop Celexa. Increase Cymbalta to 60 mg daily.    -- May continue Ativan 1 mg daily as needed for anxiety    -- Start therapy with Dr. Palacios on July 16th.

## 2019-06-19 NOTE — Clinical Note
Hi, Dr. Barba. I wanted to let you know I met with Molly and we had a good long, and hopefully productive, talk. We are going to switch to Cymbalta, and leave Ativan alone for now, though I did reinforce the safety measures you discussed with her. She is still game, however reluctantly, for therapy. She is deeply insightful and I trust will ultimately benefit from this. [facility]...Agustin

## 2019-06-20 ASSESSMENT — ANXIETY QUESTIONNAIRES
6. BECOMING EASILY ANNOYED OR IRRITABLE: MORE THAN HALF THE DAYS
3. WORRYING TOO MUCH ABOUT DIFFERENT THINGS: NEARLY EVERY DAY
IF YOU CHECKED OFF ANY PROBLEMS ON THIS QUESTIONNAIRE, HOW DIFFICULT HAVE THESE PROBLEMS MADE IT FOR YOU TO DO YOUR WORK, TAKE CARE OF THINGS AT HOME, OR GET ALONG WITH OTHER PEOPLE: VERY DIFFICULT
7. FEELING AFRAID AS IF SOMETHING AWFUL MIGHT HAPPEN: SEVERAL DAYS
1. FEELING NERVOUS, ANXIOUS, OR ON EDGE: MORE THAN HALF THE DAYS
5. BEING SO RESTLESS THAT IT IS HARD TO SIT STILL: SEVERAL DAYS
GAD7 TOTAL SCORE: 14
2. NOT BEING ABLE TO STOP OR CONTROL WORRYING: MORE THAN HALF THE DAYS

## 2019-06-20 ASSESSMENT — PATIENT HEALTH QUESTIONNAIRE - PHQ9
5. POOR APPETITE OR OVEREATING: NEARLY EVERY DAY
SUM OF ALL RESPONSES TO PHQ QUESTIONS 1-9: 22

## 2019-06-21 ASSESSMENT — ANXIETY QUESTIONNAIRES: GAD7 TOTAL SCORE: 14

## 2019-07-16 ENCOUNTER — FCC EXTENDED DOCUMENTATION (OUTPATIENT)
Dept: PSYCHOLOGY | Facility: CLINIC | Age: 34
End: 2019-07-16

## 2019-07-16 ENCOUNTER — OFFICE VISIT (OUTPATIENT)
Dept: PSYCHOLOGY | Facility: CLINIC | Age: 34
End: 2019-07-16
Payer: MEDICARE

## 2019-07-16 DIAGNOSIS — F33.2 SEVERE RECURRENT MAJOR DEPRESSION WITHOUT PSYCHOTIC FEATURES (H): ICD-10-CM

## 2019-07-16 DIAGNOSIS — F43.10 PTSD (POST-TRAUMATIC STRESS DISORDER): Primary | ICD-10-CM

## 2019-07-16 PROCEDURE — 90785 PSYTX COMPLEX INTERACTIVE: CPT | Performed by: PSYCHOLOGIST

## 2019-07-16 PROCEDURE — 90837 PSYTX W PT 60 MINUTES: CPT | Performed by: PSYCHOLOGIST

## 2019-07-16 ASSESSMENT — COLUMBIA-SUICIDE SEVERITY RATING SCALE - C-SSRS
1. IN THE PAST MONTH, HAVE YOU WISHED YOU WERE DEAD OR WISHED YOU COULD GO TO SLEEP AND NOT WAKE UP?: YES
6. HAVE YOU EVER DONE ANYTHING, STARTED TO DO ANYTHING, OR PREPARED TO DO ANYTHING TO END YOUR LIFE?: NO
3. HAVE YOU BEEN THINKING ABOUT HOW YOU MIGHT KILL YOURSELF?: NO
ATTEMPT LIFETIME: NO
REASONS FOR IDEATION LIFETIME: COMPLETELY TO END OR STOP THE PAIN (YOU COULDN'T GO ON LIVING WITH THE PAIN OR HOW YOU WERE FEELING)
2. HAVE YOU ACTUALLY HAD ANY THOUGHTS OF KILLING YOURSELF LIFETIME?: NO
TOTAL  NUMBER OF ABORTED OR SELF INTERRUPTED ATTEMPTS PAST LIFETIME: NO
TOTAL  NUMBER OF ABORTED OR SELF INTERRUPTED ATTEMPTS PAST 3 MONTHS: NO
5. HAVE YOU STARTED TO WORK OUT OR WORKED OUT THE DETAILS OF HOW TO KILL YOURSELF? DO YOU INTEND TO CARRY OUT THIS PLAN?: NO
TOTAL  NUMBER OF INTERRUPTED ATTEMPTS PAST 3 MONTHS: NO
1. IN THE PAST MONTH, HAVE YOU WISHED YOU WERE DEAD OR WISHED YOU COULD GO TO SLEEP AND NOT WAKE UP?: NO
ATTEMPT PAST THREE MONTHS: NO
6. HAVE YOU EVER DONE ANYTHING, STARTED TO DO ANYTHING, OR PREPARED TO DO ANYTHING TO END YOUR LIFE?: NO
4. HAVE YOU HAD THESE THOUGHTS AND HAD SOME INTENTION OF ACTING ON THEM?: NO
2. HAVE YOU ACTUALLY HAD ANY THOUGHTS OF KILLING YOURSELF?: NO
TOTAL  NUMBER OF INTERRUPTED ATTEMPTS LIFETIME: NO

## 2019-07-16 ASSESSMENT — PATIENT HEALTH QUESTIONNAIRE - PHQ9
SUM OF ALL RESPONSES TO PHQ QUESTIONS 1-9: 6
5. POOR APPETITE OR OVEREATING: MORE THAN HALF THE DAYS

## 2019-07-16 ASSESSMENT — ANXIETY QUESTIONNAIRES
7. FEELING AFRAID AS IF SOMETHING AWFUL MIGHT HAPPEN: NOT AT ALL
GAD7 TOTAL SCORE: 8
1. FEELING NERVOUS, ANXIOUS, OR ON EDGE: MORE THAN HALF THE DAYS
5. BEING SO RESTLESS THAT IT IS HARD TO SIT STILL: SEVERAL DAYS
IF YOU CHECKED OFF ANY PROBLEMS ON THIS QUESTIONNAIRE, HOW DIFFICULT HAVE THESE PROBLEMS MADE IT FOR YOU TO DO YOUR WORK, TAKE CARE OF THINGS AT HOME, OR GET ALONG WITH OTHER PEOPLE: SOMEWHAT DIFFICULT
2. NOT BEING ABLE TO STOP OR CONTROL WORRYING: SEVERAL DAYS
3. WORRYING TOO MUCH ABOUT DIFFERENT THINGS: SEVERAL DAYS
6. BECOMING EASILY ANNOYED OR IRRITABLE: SEVERAL DAYS

## 2019-07-16 NOTE — PROGRESS NOTES
Progress Note - Initial Session    Client Name:  Molly Teague Date: July 16, 2019         Service Type: Individual  Video Visit: No     Session Start Time: 11:07am  Session End Time: 12:37pm     Session Length: 90min    Session #: 1    Attendees: Client attended alone     DATA:  Diagnostic Assessment in progress.  Unable to complete documentation at the conclusion of the first session due to time constraints.  Interactive Complexity: Yes, visit entailed Interactive Complexity evidenced by:  -The need to manage maladaptive communication (related to, e.g., high anxiety, high reactivity, repeated questions, or disagreement) among participants that complicates delivery of care: Attending to high distress in client, and complicated traumatic history related to medical issues developing a few months before pregnancy 6 years ago/client recounting this, her waking up from a coma lasting 2 months, and attending to relaxation and grounding with initial panic attack sxs/beginning of intake  Crisis: No    Symptoms:   Client endorsed the following depressive symptoms on the PHQ-9: feeling down/depressed, little energy, poor appetite, and difficulty falling asleep. Her score of 6 on the PHQ-9 suggests depressive symptoms in the mild range for the past 2 weeks (compared to a score of 22 on 06/20/19, per EMR meeting full criteria then for a Severe Major Depressive Episode)   Client endorsed the following anxiety symptoms on the REX-7: feeling nervous anxious or on edge, not being able to stop or control worrying, worrying too much about different things, trouble relaxing, restlessness, and becoming easily annoyed or irritable. Her score of 8 on the REX-7 suggests anxiety symptoms in the mild range for the past 2 weeks (compared to a score of 14 on 06/20/19, per EMR)       Intervention:  Assessed for presenting symptoms, stressors, trauma, and safety   Facilitated emotional processing around identified stressors  and history of trauma, as well as distress experienced in dealing with this/in context of re-initiating psychotherapy and telling her story. Provided support & validation   MBCT & Solution Focused informed: coached client through deep breathing and grounding exercise in utilizing mindfulness skills (with Observe/Describe) to manage sxs of panic attack during session and anxiety experienced. Explored recent factors that contributed to decreased depression and anxiety sxs scores, compared to those taken in June during psychiatric visit: client identified that recent volunteering experience through [employer] last week was a big mood and self-esteem booster. 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 Encouraged client to pursue this and discussed next steps.  Introduced EMDR as a possible recommendation/referral after setting grounds for this in psychotherapy.    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  Client denies current or recent suicidal ideation or behaviors.  Client denies current or recent homicidal ideation or behaviors.  Client  denies current or recent self injurious behavior or ideation.  Client denies other safety concerns.    Client reports there are no firearms in the house.      Diagnostic Criteria:  MDD (endorsed full criteria on 06/20/19 per PHQ-9 in EMR, currently experiencing decreased sxs)  CRITERIA (A-C) REPRESENT A MAJOR DEPRESSIVE EPISODE - SELECT THESE CRITERIA  A) Recurrent episode(s) - symptoms have been present during the same 2-week period and represent a change from previous functioning 5 or more symptoms (required for diagnosis)   - Depressed mood. Note: In children and adolescents, can be irritable mood.     - Significant weight loss when not dieting decrease in appetite.    - Decreased sleep.    - Fatigue or loss of energy.   B) The symptoms cause clinically significant distress or impairment in social, occupational, or other important areas of functioning  C) The episode is not attributable to the physiological effects of a substance or to another medical condition  D) The occurence of major depressive episode is not better explained by other thought / psychotic disorders  E) There has never been a manic episode or hypomanic episode    PTSD  A. The person has been exposed to a traumatic event in which both of the following were present:     (1) the person experienced, witnessed, or was confronted with an event or events that involved actual or threatened death or serious injury, or a threat to the physical integrity of self or others     (2) the person's response involved intense fear, helplessness, or horror. Note: In children, this may be expressed instead by disorganized or agitated behavior  B. The traumatic event is persistently reexperienced in one (or more) of the following ways:     - Recurrent and intrusive distressing recollections of the event, including images, thoughts, or perceptions. Note: In young children, repetitive play may occur in which themes or aspects of the trauma are expressed.      -  Recurrent distressing dreams of the event. Note: In children, there may be frightening dreams without recognizable content.      - Intense psychological distress at exposure to internal or external cues that symbolize or resemble an aspect of the traumatic event.      - Physiological reactivity on exposure to internal or external cues that symbolize or resemble an aspect of the traumatic event.   C. Persistent avoidance of stimuli associated with the trauma and numbing of general responsiveness (not present before the trauma), as indicated by three (or more) of the following:     - Efforts to avoid thoughts, feelings, or conversations associated with the trauma.      - Markedly diminished interest or participation in significant activities.      - Feeling of detachment or estrangement from others.   D. Persistent symptoms of increased arousal (not present before the trauma), as indicated by two (or more) of the following:     - Difficulty falling or staying asleep.      - Irritability or outbursts of anger.   E. Duration of the disturbance is more than 1 month.  F. The disturbance causes clinically significant distress or impairment in social, occupational, or other important areas of functioning.      DSM5 Diagnoses: (Sustained by DSM5 Criteria Listed Above)  Diagnoses: 309.81 (F43.10) Posttraumatic Stress Disorder (includes Posttraumatic Stress Disorder for Children 6 Years and Younger)  With dissociative symptoms; 296.33 (F33.2) Major Depressive Disorder, Recurrent Episode, Severe _ and With anxious distress (provisional); Rule Out/ 296.35 MDD, in partial remission  Psychosocial & Contextual Factors: Health related stress/Multiple Medical problems (including CREST syndrome & Scleroderma); Relationship with SO  WHODAS 2.0 (12 item)            This questionnaire asks about difficulties due to health conditions. Health conditions  include  disease or illnesses, other health problems that may be short or long  lasting,  injuries, mental health or emotional problems, and problems with alcohol or drugs.                     Think back over the past 30 days and answer these questions, thinking about how much  difficulty you had doing the following activities. For each question, please [place] only  one response.    S1 Standing for long periods such as 30 minutes? Severe =       4   S2 Taking care of household responsibilities? Moderate =   3   S3 Learning a new task, for example, learning how to get to a new place? Mild =           2   S4 How much of a problem do you have joining community activities (for example, festivals, [employer] or other activities) in the same way as anyone else can? Mild =           2   S5 How much have you been emotionally affected by your health problems? Severe =       4     In the past 30 days, how much difficulty did you have in:   S6 Concentrating on doing something for ten minutes? Mild =           2   S7 Walking a long distance such as a kilometer (or equivalent)? Severe =       4   S8 Washing your whole body? None =         1   S9 Getting dressed? None =         1   S10 Dealing with people you do not know? None =         1   S11 Maintaining a friendship? Mild =           2   S12 Your day to day work? Moderate =   3     H1 Overall, in the past 30 days, how many days were these difficulties present? Record number of days 15   H2 In the past 30 days, for how many days were you totally unable to carry out your usual activities or work because of any health condition? Record number of days  5   H3 In the past 30 days, not counting the days that you were totally unable, for how many days did you cut back or reduce your usual activities or work because of any health condition? Record number of days 10       Collateral Reports Completed:  Routed note to PCP      PLAN: (Homework, other):  Client stated that she may follow up for ongoing services with [facility]. Client has a scheduled  rosario on 07/23/19 to complete this DA.   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  Client to utilize deep breathing and grounding strategies coached on and reinforced today, in managing first signs of panic attack sxs or dissociation.      Erna Palacios PsyD, LP

## 2019-07-16 NOTE — Clinical Note
Greetings-this is to inform you that pt initiated outpatient MH counseling. DA in progress. Thank you. Erna Palacios/Psychologist

## 2019-07-17 ASSESSMENT — ANXIETY QUESTIONNAIRES: GAD7 TOTAL SCORE: 8

## 2019-07-19 NOTE — PROGRESS NOTES
"                                                                                                                                                                                           Adult Intake Structured Interview  Standard Diagnostic Assessment      CLIENT'S NAME: Molly Teague  MRN:   [id number]  :   1985  ACCT. NUMBER: [id number]  DATE OF SERVICE: 19 & 19  VIDEO VISIT: No    Identifying Information:  Client is a 34 year old, , partnered / significant other female. Client was referred for counseling by her PCP at [facility], following a discussion client reportedly initiated with her medical provider on the subject with encouragement from family. Client is currently disabled. Client attended the session alone.       Client's Statement of Presenting Concern:  Client reports the reason for seeking therapy at this time as \"many recommendations from doctors to seek help and finally realizing that I need to seek help.\" She identified starting to struggle with medical issues around \" or ,\" when she was diagnosed with CREST during the first trimester of her pregnancy, and this got worse over time which impacted her mental health. Reported that at \"34 weeks\" of gestation, she had to have an emergency , and her son had a NICU hospitalization for over a month. During her postpartum year, CREST sxs rapidly progressed, and she was in a \"6 week\" long coma with multi-organ failure when her son was about 1 year old, reportedly. She recalled coming out of coma and described \"trauma from waking up near death and intubated\" \"felt defenseless, it was so intense waking up.\" Described remaining hospitalized for about \"8 months, I lost 65 lbs,\" her skin \"blackened and peeled,\" and \"I didn't feel human.\" Explained that she had to be lifted and moved around by nursing and health care staff \"like a thing\" to \"re-learn how to go to the bathroom\". Identified that " "she could not communicate her needs initially until she was later able to speak with a trachea, which \"helped\". Stated that she was told by family that she was given last rites a few times. Indicated that her mother did not leave her bedside and was very supportive during this process. Stated at times she was more fully aware of her condition and pain, but could only observe \"like a bad nightmare\" \"I felt I was stripped of my identity.\" Post hospital discharge, she reported participating in a long rehabilitative process including intensive speech, physical, and occupational therapy. Identified having moved about a year and a half ago to [place] due to her 's job, his family living here, and this being close to [facility]. Client endorsed the following depressive symptoms on the PHQ-9: feeling down/depressed, little energy, poor appetite, and difficulty falling asleep. Her score of 6 on the PHQ-9 suggests depressive symptoms in the mild range for the past 2 weeks (compared to a score of 22 on 06/20/19, per EMR flowsheet review, meeting full criteria then for a Severe Major Depressive Episode). Client endorsed the following anxiety symptoms on the REX-7: feeling nervous anxious or on edge, not being able to stop or control worrying, worrying too much about different things, trouble relaxing, restlessness, and becoming easily annoyed or irritable. Her score of 8 on the REX-7 suggests anxiety symptoms in the mild range for the past 2 weeks (compared to a score of 14 on 06/20/19, per EMR flowsheet review). Client stated that her symptoms have resulted in the following functional impairments: home life with , management of the household and or completion of tasks, relationship(s), self-care and social interactions.      History of Presenting Concern:  Client reports that these problem(s) began with \"depression at age 17\" but was able to feel positive and happy by the age of 19 when working and " "starting to be successful. Depression reportedly resumed upon \"waking from coma,\" and experienced trauma upon waking up being intubated, not being able to speak, and experienced recurring panic attacks during hospitalization and recovery. Identified that sometimes when alone in a room she tends to dissociate. Client has attempted to resolve these concerns in the past through some counseling/had a few sessions in 2018- and psychotropic medication. Client reports that other professional(s) are involved in providing support / services. (PCP, recently initiated psychiatric care, Rheumatology, Ob/Gyn)      Social History:   Client reported she grew up in \"[place], with parents, siblings, and close fam.\" They were the second born of 2 children. This is an intact family and parents remain \" happily and very supportive.\" Client reported that her childhood was \"Closeup. Parents worked full time. My grandmother who lived close by help in sending/taking us to school and meals till parents arrive home from work. Weekends were fam based and close friendships.\" Client described her current relationships with family of origin as \"parents are real supportive, real good they worry a lot about me, even my sister.\"    Client reported a history of \"3\" committed relationships. Client has been partnered / significant other for \"7 and a half years.\" Client reported having a 6 yo son \"Td\" who struggled with \"speech\"/selective mutism when she was reportedly hospitalized at the time when \"He was 1 year old. He stopped talking\" then, and was impacted by her prolonged hospitalization. Described that she currently \"spend most of my time with my son. He's going into kindergarden this fall. Sig other works a lot and tries to be home.\" Client identified \"a few\" stable and meaningful social connections. Client reported that she has not been involved with the legal system.  Client's highest education level was \"high school graduate.\" " "Client did not identify any learning problems. There are no ethnic, cultural or Protestant factors that may be relevant for therapy. Client identified her preferred language to be English. Client reported she does not need the assistance of an  or other support involved in therapy. Modifications will not be used to assist communication in therapy. Client did not serve in the .     Client reports family history includes Cerebrovascular Disease in her maternal grandmother; Depression in her sister; Diabetes in her maternal aunt; Hyperlipidemia in her father and paternal grandfather; Other - See Comments in her sister.    Mental Health History:  Client reported the following biological family members or relatives with mental health issues: Mother experienced Depression, Sister experienced Depression, and Uncle experienced Depression.  Client previously received the following mental health diagnosis: \"REX, Depression and PTSD.\" Client has received the following mental health services in the past: counseling and medication(s) from physician / PCP (was started on psychotropic med during rehabilitation).  Hospitalizations: None for mental health, but several for physical health issues.  Client is currently receiving the following services: medication(s) from physician / PCP , and recently established psychiatric care.      Chemical Health History:  Client reported no family history of chemical health issues. Client has not received chemical dependency treatment in the past. Client is not currently receiving any chemical dependency treatment. Client reports no problems as a result of their drinking / drug use.      Client Reports:  Client reports using alcohol \" 2 times\" per \"month\" and has \"2 drinks max\" \"only in social setting\". Client first started drinking at age \"18.\" Client reported date of last use was \"2 weeks ago, one glass of wine\" when visiting family in [place].  Client reports heaviest use was when " "\"dating and going out\" \"socially in 2012,\" but never till intoxicated.  Client denies using tobacco.  Client denies using marijuana.  Client reports using caffeine \" 3 times\" per \"week\" and drinks \"1 to 2 cups\"at a time. Patient started using caffeine at age \"18\".  Client denies using street drugs.  Client denies the non-medical use of prescription or over the counter drugs.    CAGE: None of the patient's responses to the CAGE screening were positive / Negative CAGE score   Based on the negative Cage-Aid score and clinical interview there  are not indications of drug or alcohol abuse.    Discussed the general effects of drugs and alcohol on health and well-being. Therapist gave client printed information about the effects of chemical use on her health and well being.      Significant Losses / Trauma / Abuse / Neglect Issues:  There are indications or report of significant loss, trauma, abuse or neglect issues related to: major medical problems \"I was diagnosed with scleroderma at age 27. My body has changed to such a debilitating way. Just being or trying to be normal on daily events takes energy and strength I just can't or don't seem to have.\"    Issues of possible neglect are not present.      Medical Issues:  Client has had a physical exam to rule out medical causes for current symptoms. Date of last physical exam was within the past year. Client was encouraged to follow up with PCP if symptoms were to develop. The client has a [facility] Provider, who is named Grecia Barba.. The client has a psychiatrist whose name and location are: Agustin Mckenzie in [facility]. Client reports the following current medical concerns: \"Scleroderma/CREST, Asthma, GERD related to scleroderma, Migraines, Insomnia, Neuropathy, High Blood Pressure, Stage 3 kidney, Arthritis.\" The client reports the presence of chronic or episodic pain in the form of \"dull and stabbing pain, in arms, legs, lower back.\" " "The pain level is \"severe\" and has a frequency of \"constant, ongoing daily pain\" \"depends on how much I am on my feet.\" There are significant nutritional concerns: \"taking a toll on my body\"/ \"intense weight fluctuation\" due to severe nausea/lack of appetite at times.    Client reports current meds as:   Current Outpatient Medications   Medication Sig     amLODIPine (NORVASC) 5 MG tablet Take 5 mg by mouth daily     blood glucose (NO BRAND SPECIFIED) lancets standard Use to test blood sugar 1 time daily     blood glucose (NO BRAND SPECIFIED) test strip Use to test blood sugar 1 time daily     budesonide-formoterol (SYMBICORT) 160-4.5 MCG/ACT Inhaler Inhale 2 puffs into the lungs 2 times daily     busPIRone HCl (BUSPAR) 30 MG tablet Take 1 tablet (30 mg) by mouth 2 times daily     citalopram (CELEXA) 10 MG tablet 1 tab daily. Take with 20 mg daily.     citalopram (CELEXA) 20 MG tablet 1 tab daily. Take with 10 mg daily.     citalopram (CELEXA) 40 MG tablet Take 1 tablet (40 mg) by mouth daily     Cyanocobalamin (B-12) 1000 MCG TBCR Take 1,000 mcg by mouth daily     diphenhydrAMINE (BENADRYL) 25 MG tablet Take 1 tablet (25 mg) by mouth every 6 hours as needed for itching or allergies     Doxylamine Succinate, Sleep, (UNISOM PO)      DULoxetine (CYMBALTA) 30 MG capsule Take 1 capsule (30 mg) by mouth 2 times daily     ferrous gluconate (FERGON) 324 (38 FE) MG tablet TAKE ONE TABLET BY MOUTH EVERY DAY WITH BREAKFAST     gabapentin (NEURONTIN) 300 MG capsule TAKE TWO CAPSULES BY MOUTH THREE TIMES A DAY     GOODSENSE MIGRAINE FORMULA 250-250-65 MG per tablet TAKE ONE TABLET BY MOUTH EVERY 6 HOURS AS NEEDED FOR HEADACHES     lisinopril (PRINIVIL/ZESTRIL) 10 MG tablet Take 1 tablet (10 mg) by mouth daily     LORazepam (ATIVAN) 1 MG tablet Take 1 tablet (1 mg) by mouth daily as needed for anxiety #30 to last 30 days     montelukast (SINGULAIR) 10 MG tablet TAKE ONE TABLET BY MOUTH AT BEDTIME     naloxone (NARCAN) 4 MG/0.1ML " nasal spray Spray 1 spray (4 mg) into one nostril alternating nostrils once as needed for opioid reversal every 2-3 minutes until assistance arrives     naloxone (NARCAN) nasal spray Spray 1 spray (4 mg) into one nostril alternating nostrils as needed for opioid reversal every 2-3 minutes until assistance arrives     omeprazole (PRILOSEC) 40 MG DR capsule TAKE ONE CAPSULE BY MOUTH TWICE A DAY     ondansetron (ZOFRAN-ODT) 8 MG ODT tab Take 1 tablet (8 mg) by mouth every 8 hours as needed for nausea     oxyCODONE IR (ROXICODONE) 15 MG tablet Take 1 tablet (15 mg) by mouth every 4 hours as needed for severe pain     polyethylene glycol (MIRALAX) powder Take 17 g (1 capful) by mouth daily     promethazine (PHENERGAN) 25 MG tablet Take 1 tablet (25 mg) by mouth 2 times daily as needed for nausea     promethazine (PHENERGAN) 50 MG/ML SOLN IV injection Inject 0.5 mL (25 mg) into the muscle every 6 hours as needed     ranitidine (ZANTAC) 150 MG tablet TAKE ONE TABLET BY MOUTH TWICE A DAY AS NEEDED FOR HEARTBURN     sucralfate (CARAFATE) 1 GM tablet Take 1 tablet (1 g) by mouth 4 times daily     VENTOLIN  (90 Base) MCG/ACT inhaler INHALE 2 PUFFS INTO THE LUNGS ONCE EVERY 4 HOURS AS NEEDED FOR SHORTNESS OF BREATH / DYSPNEA / WHEEZING     No current facility-administered medications for this visit.        Client Allergies:  Allergies   Allergen Reactions     No Known Allergies      Seasonal Allergies      Shellfish-Derived Products Nausea     Rash on face     the following allergies to medications: see EMR    Medical History:  Past Medical History:   Diagnosis Date     Acute pyelonephritis 6/9/2017     Altered mental state 3/29/2018     Arthritis      Blood clotting disorder (H)     P E     History of blood transfusion      Hypertension      Long-term (current) use of anticoagulants [Z79.01] 9/9/2016     PE (pulmonary embolism) 9/7/2016     Rheumatism      Scleroderma (H) 9/22/2016     Uncomplicated asthma   "        Medication Adherence:  Client reports taking prescribed medications as prescribed  (except when really sick/nauseated \"I can't take it\")    Client was provided recommendation to follow-up with prescribing physician.    Mental Status Assessment:  Appearance:   Appropriate /Casual (skin discoloration in some areas of her face/neck)  Eye Contact:   Fair /Intermittent  Psychomotor Behavior: Restless /Fidgety moving around in chair  Attitude:   Cooperative   Orientation:   All  Speech   Rate / Production: Hyperverbal    Volume:  Normal   Mood:    Anxious    Affect:    Appropriate   Thought Content:  Clear   Thought Form:  Circumstantial  Insight:    Good       Review of Symptoms:  Depression: Sleep Energy Appetite  Kaylen:  No symptoms  Psychosis: No symptoms  Anxiety: Worries Nervousness/On edge Describe:  Irritability, Trouble relaxing, Muscle Tension, Insomnia, Fatigue Triggers: physical health, \"weight\", \"looks,\" son/parenting, people's perception of her, \"what happened, what's going to happen\", relationships, \"everything\"  Panic:  Palpitations Tremors Shortness of Breath Tingling Numbness Sense of Impending  Doom Triggers: closed places, waking up sweating from a nightmare  Post Traumatic Stress Disorder: Re-experiencing of Trauma (nightmares/flashbacks) Avoid Traumatic Stimuli Increased Arousal Dissociation Impaired Function Trauma: prolonged hospitalization due medical condition-physical health issues/waking up from coma a few years ago  Obsessive Compulsive Disorder: No symptoms (likes to organize but not clinically sig sxs)  Eating Disorder: No symptoms currently (Hx of food restriction and overeating at times, but since got physically ill the \"nausea and GERD\" in the past few years have been the main cause of appetite loss and not eating at times reportedly)  Oppositional Defiant Disorder: No symptoms  ADD / ADHD: No symptoms  Conduct Disorder: No symptoms      Safety Assessment:    History of Safety " "Concerns:   Client reported a history of suicidal ideation. Passive SI \"thought fam. would be better off\"  Onset: around age 15 \"thought didn't have a purpose\", then \"didn't have these thoughts after (age) 19 up till 2015.\" Last time: \"9 months ago\" and Frequency: \"twice when it was intense in 2015 and 2018.\"  Client identified the following triggers to suicidal ideation: hopelessness, feeling inadequate, worsening depression, and \"I get really scared of getting worse physically\"  Client denied a history of suicide attempts.    Client denied a history of homicidal ideation.    Client denied a history of self-injurious ideation and behaviors.   Client denied a history of personal safety concerns.    Client denied a history of assaultive behaviors.        Current Safety Concerns:  Client denies current suicidal ideation.    Client denies current homicidal ideation and behaviors.  Client denies current self-injurious ideation and behaviors.    Client denies current concerns for personal safety.    Client reports the following protective factors: positive relationships/positive family connections, forward/future oriented thinking, restricted access to lethal means , safe and stable environment, purpose as a mother, help seeking behaviors when distressed (\"I call Joshua\"/, go to doctor), daily obligations, effective problem-solving skills, healthy fear of risky behaviors or pain, financial stability and access to a variety of clinical interventions.    Client reports there are no firearms in the house.     Plan for Safety and Risk Management:  Recommended that patient call 911 or go to the local ED should there be a change in any of these risk factors, as well as outreach to the crisis numbers provided:Suicide Prevention National Hotline, Text [place] 290791, and her [facility] Crisis Number.    Client's Strengths and Limitations:  Client identified the following strengths or resources that will help her succeed in " "counseling: \"I am committed to talk about my problems and have family support.\" Client identified the following supports: \"family and anju.\" Things that may interfere with the client's success in counseling include: \"I've lost contact with most of my friends.\"      Diagnostic Criteria:  REX  A. Excessive anxiety and worry about a number of events or activities (such as work or school performance).   B. The person finds it difficult to control the worry.  C. Select 3 or more symptoms (required for diagnosis). Only one item is required in children.   - Restlessness or feeling keyed up or on edge.    - Being easily fatigued.    - Difficulty concentrating or mind going blank.    - Irritability.    - Muscle tension.    - Sleep disturbance (difficulty falling or staying asleep, or restless unsatisfying sleep).   D. The focus of the anxiety and worry is not confined to features of an Axis I disorder.  E. The anxiety, worry, or physical symptoms cause clinically significant distress or impairment in social, occupational, or other important areas of functioning.   F. The disturbance is not due to the direct physiological effects of a substance (e.g., a drug of abuse, a medication) or a general medical condition (e.g., hyperthyroidism) and does not occur exclusively during a Mood Disorder, a Psychotic Disorder, or a Pervasive Developmental Disorder.     MDD (endorsed full criteria on 06/20/19 per PHQ-9 in EMR, currently experiencing decreased sxs)  CRITERIA (A-C) REPRESENT A MAJOR DEPRESSIVE EPISODE - SELECT THESE CRITERIA  A) Recurrent episode(s) - symptoms have been present during the same 2-week period and represent a change from previous functioning 5 or more symptoms (required for diagnosis)   - Depressed mood. Note: In children and adolescents, can be irritable mood.     - Significant weight loss when not dieting decrease in appetite.    - Decreased sleep.    - Fatigue or loss of energy.   B) The symptoms cause " clinically significant distress or impairment in social, occupational, or other important areas of functioning  C) The episode is not attributable to the physiological effects of a substance or to another medical condition  D) The occurrence of major depressive episode is not better explained by other thought / psychotic disorders  E) There has never been a manic episode or hypomanic episode     PTSD  A. The person has been exposed to a traumatic event in which both of the following were present:     (1) the person experienced, witnessed, or was confronted with an event or events that involved actual or threatened death or serious injury, or a threat to the physical integrity of self or others     (2) the person's response involved intense fear, helplessness, or horror. Note: In children, this may be expressed instead by disorganized or agitated behavior  B. The traumatic event is persistently reexperienced in one (or more) of the following ways:     - Recurrent and intrusive distressing recollections of the event, including images, thoughts, or perceptions. Note: In young children, repetitive play may occur in which themes or aspects of the trauma are expressed.      - Recurrent distressing dreams of the event. Note: In children, there may be frightening dreams without recognizable content.      - Intense psychological distress at exposure to internal or external cues that symbolize or resemble an aspect of the traumatic event.      - Physiological reactivity on exposure to internal or external cues that symbolize or resemble an aspect of the traumatic event.   C. Persistent avoidance of stimuli associated with the trauma and numbing of general responsiveness (not present before the trauma), as indicated by three (or more) of the following:     - Efforts to avoid thoughts, feelings, or conversations associated with the trauma.      - Markedly diminished interest or participation in significant activities.      -  "Feeling of detachment or estrangement from others.   D. Persistent symptoms of increased arousal (not present before the trauma), as indicated by two (or more) of the following:     - Difficulty falling or staying asleep.      - Irritability or outbursts of anger  E. Duration of the disturbance is more than 1 month.    Functional Status:  Client's symptoms have caused and are causing reduced functional status in the following areas: Activities of Daily Living - impacted interests, house chores, and self-care (not eating or showering when sxs increase)  Follow through with Medical recommendations - sometime losing interest and not following through with PT, hand therapy, counseling  Social / Relational - \"shut off my friends...everybody even Joshua sometimes\", withdrawn, lost friendships, didn't let most friends know that she was sick, not participating in social events like she used to      DSM5 Diagnoses: (Sustained by DSM5 Criteria Listed Above)      Diagnoses: 300.02 (F41.1) Generalized Anxiety Disorder; 309.81 (F43.10) Posttraumatic Stress Disorder (includes Posttraumatic Stress Disorder for Children 6 Years and Younger)  With dissociative symptoms; 296.33 (F33.2) Major Depressive Disorder, Recurrent Episode, Severe _ and With anxious distress (provisional) ; Rule Out/ 296.35 MDD, in partial remission  Psychosocial & Contextual Factors: Health related stress/Multiple Medical problems (including CREST syndrome/Scleroderma); Relationship with SO; Son with \"speech\" issues    WHODAS 2.0 (12 item)            This questionnaire asks about difficulties due to health conditions. Health conditions  include  disease or illnesses, other health problems that may be short or long lasting,  injuries, mental health or emotional problems, and problems with alcohol or drugs.                     Think back over the past 30 days and answer these questions, thinking about how much  difficulty you had doing the following activities. " For each question, please [place] only  one response.    S1 Standing for long periods such as 30 minutes? Severe =       4   S2 Taking care of household responsibilities? Moderate =   3   S3 Learning a new task, for example, learning how to get to a new place? Mild =           2   S4 How much of a problem do you have joining community activities (for example, festivals, Buddhist or other activities) in the same way as anyone else can? Mild =           2   S5 How much have you been emotionally affected by your health problems? Severe =       4     In the past 30 days, how much difficulty did you have in:   S6 Concentrating on doing something for ten minutes? Mild =           2   S7 Walking a long distance such as a kilometer (or equivalent)? Severe =       4   S8 Washing your whole body? None =         1   S9 Getting dressed? None =         1   S10 Dealing with people you do not know? None =         1   S11 Maintaining a friendship? Mild =           2   S12 Your day to day work? Moderate =   3     H1 Overall, in the past 30 days, how many days were these difficulties present? Record number of days 15   H2 In the past 30 days, for how many days were you totally unable to carry out your usual activities or work because of any health condition? Record number of days  5   H3 In the past 30 days, not counting the days that you were totally unable, for how many days did you cut back or reduce your usual activities or work because of any health condition? Record number of days 10       Attendance Agreement:  Client has signed Attendance Agreement:Yes      Collaboration:  The client is receiving treatment / structured support from the following professional(s) / service and treatment. Collaboration will be initiated with: primary care physician and psychiatry.      Preliminary Treatment Plan:  The client reports no currently identified Buddhist, ethnic or cultural issues relevant to therapy.     services are not  indicated.    Modifications to assist communication are not indicated.    The concerns identified by the client will be addressed in therapy.    Initial Treatment will focus on: Depressed Mood - all endorsed sxs on PHQ-9  Anxiety - all endorsed sxs on REX-7  Risk Management / Safety Concerns related to: Hx of passive Suicidal ideation; monitor/develop safety plan    As a preliminary treatment goal, client will experience a reduction in depressed mood, will develop more effective coping skills to manage depressive symptoms, will develop healthy cognitive patterns and beliefs, will increase ability to function adaptively and will continue to take medications as prescribed / participate in supportive activities and services , will experience a reduction in anxiety, will develop more effective coping skills to manage anxiety symptoms and will develop healthy cognitive patterns and beliefs, will address relationship difficulties in a more adaptive manner and will develop strategies for more effective management of risk issues.    The focus of initial interventions will be to alleviate anxiety, alleviate depressed mood, facilitate appropriate expression of feelings, increase ability to function adaptively, increase coping skills, increase self esteem, increase trust, process traumas, provide homework to reinforce skill development, provide psychoeducation regarding depression, anxiety and grief / loss, reduce panic attacks, teach CBT skills, teach DBT skills, teach distress tolerance skills, teach emotional regulation, teach mindfulness skills, teach relaxation strategies, teach sleep hygiene and teach stress management techniques.    Referral to another professional/service is not indicated at this time..    A Release of Information is not needed at this time.    Report to child / adult protection services was NA.    Client will have access to their [facility]' medical record.    Erna Palacios PsyD,  BRIDGER  July 16 & August 28, 2019

## 2019-07-24 ENCOUNTER — HOSPITAL ENCOUNTER (EMERGENCY)
Facility: CLINIC | Age: 34
Discharge: HOME | End: 2019-07-25
Attending: EMERGENCY MEDICINE | Admitting: EMERGENCY MEDICINE
Payer: MEDICARE

## 2019-07-24 ENCOUNTER — TELEPHONE (OUTPATIENT)
Dept: FAMILY MEDICINE | Facility: CLINIC | Age: 34
End: 2019-07-24

## 2019-07-24 ENCOUNTER — APPOINTMENT (OUTPATIENT)
Dept: CT IMAGING | Facility: CLINIC | Age: 34
End: 2019-07-24
Attending: EMERGENCY MEDICINE
Payer: MEDICARE

## 2019-07-24 DIAGNOSIS — R11.2 NON-INTRACTABLE VOMITING WITH NAUSEA, UNSPECIFIED VOMITING TYPE: ICD-10-CM

## 2019-07-24 DIAGNOSIS — R19.7 DIARRHEA, UNSPECIFIED TYPE: ICD-10-CM

## 2019-07-24 DIAGNOSIS — G89.4 CHRONIC PAIN SYNDROME: Chronic | ICD-10-CM

## 2019-07-24 DIAGNOSIS — M34.1 CREST (CALCINOSIS, RAYNAUD'S PHENOMENON, ESOPHAGEAL DYSFUNCTION, SCLERODACTYLY, TELANGIECTASIA) (H): ICD-10-CM

## 2019-07-24 DIAGNOSIS — R10.84 GENERALIZED ABDOMINAL PAIN: ICD-10-CM

## 2019-07-24 DIAGNOSIS — M34.9 LIMITED SYSTEMIC SCLEROSIS (H): ICD-10-CM

## 2019-07-24 DIAGNOSIS — K21.00 GASTROESOPHAGEAL REFLUX DISEASE WITH ESOPHAGITIS: ICD-10-CM

## 2019-07-24 DIAGNOSIS — J45.40 MODERATE PERSISTENT ASTHMA WITHOUT COMPLICATION: ICD-10-CM

## 2019-07-24 DIAGNOSIS — F32.1 MODERATE MAJOR DEPRESSION (H): Primary | ICD-10-CM

## 2019-07-24 DIAGNOSIS — J45.50 SEVERE PERSISTENT ASTHMA WITHOUT COMPLICATION (H): ICD-10-CM

## 2019-07-24 DIAGNOSIS — K52.9 COLITIS: ICD-10-CM

## 2019-07-24 PROBLEM — N18.6 END-STAGE RENAL DISEASE (H): Status: ACTIVE | Noted: 2019-07-24

## 2019-07-24 PROBLEM — I10 MALIGNANT ESSENTIAL HYPERTENSION: Status: ACTIVE | Noted: 2019-07-24

## 2019-07-24 LAB
ALBUMIN SERPL-MCNC: 4.5 G/DL (ref 3.4–5)
ALP SERPL-CCNC: 99 U/L (ref 40–150)
ALT SERPL W P-5'-P-CCNC: 30 U/L (ref 0–50)
ANION GAP SERPL CALCULATED.3IONS-SCNC: 11 MMOL/L (ref 3–14)
AST SERPL W P-5'-P-CCNC: 45 U/L (ref 0–45)
BASOPHILS # BLD AUTO: 0.1 10E9/L (ref 0–0.2)
BASOPHILS NFR BLD AUTO: 0.7 %
BILIRUB SERPL-MCNC: 0.4 MG/DL (ref 0.2–1.3)
BUN SERPL-MCNC: 19 MG/DL (ref 7–30)
CALCIUM SERPL-MCNC: 9.4 MG/DL (ref 8.5–10.1)
CHLORIDE SERPL-SCNC: 105 MMOL/L (ref 94–109)
CO2 SERPL-SCNC: 22 MMOL/L (ref 20–32)
CREAT SERPL-MCNC: 1.35 MG/DL (ref 0.52–1.04)
DIFFERENTIAL METHOD BLD: ABNORMAL
EOSINOPHIL # BLD AUTO: 0.1 10E9/L (ref 0–0.7)
EOSINOPHIL NFR BLD AUTO: 0.6 %
ERYTHROCYTE [DISTWIDTH] IN BLOOD BY AUTOMATED COUNT: 18.4 % (ref 10–15)
GFR SERPL CREATININE-BSD FRML MDRD: 51 ML/MIN/{1.73_M2}
GLUCOSE SERPL-MCNC: 62 MG/DL (ref 70–99)
HCG SERPL QL: NEGATIVE
HCT VFR BLD AUTO: 38.3 % (ref 35–47)
HGB BLD-MCNC: 11 G/DL (ref 11.7–15.7)
IMM GRANULOCYTES # BLD: 0.1 10E9/L (ref 0–0.4)
IMM GRANULOCYTES NFR BLD: 0.4 %
LIPASE SERPL-CCNC: 123 U/L (ref 73–393)
LYMPHOCYTES # BLD AUTO: 2 10E9/L (ref 0.8–5.3)
LYMPHOCYTES NFR BLD AUTO: 15.2 %
MCH RBC QN AUTO: 22.1 PG (ref 26.5–33)
MCHC RBC AUTO-ENTMCNC: 28.7 G/DL (ref 31.5–36.5)
MCV RBC AUTO: 77 FL (ref 78–100)
MONOCYTES # BLD AUTO: 0.9 10E9/L (ref 0–1.3)
MONOCYTES NFR BLD AUTO: 7 %
NEUTROPHILS # BLD AUTO: 10.1 10E9/L (ref 1.6–8.3)
NEUTROPHILS NFR BLD AUTO: 76.1 %
NRBC # BLD AUTO: 0 10*3/UL
NRBC BLD AUTO-RTO: 0 /100
PLATELET # BLD AUTO: 332 10E9/L (ref 150–450)
POTASSIUM SERPL-SCNC: 3.7 MMOL/L (ref 3.4–5.3)
PROT SERPL-MCNC: 9.4 G/DL (ref 6.8–8.8)
RBC # BLD AUTO: 4.97 10E12/L (ref 3.8–5.2)
SODIUM SERPL-SCNC: 138 MMOL/L (ref 133–144)
WBC # BLD AUTO: 13.3 10E9/L (ref 4–11)

## 2019-07-24 PROCEDURE — 84703 CHORIONIC GONADOTROPIN ASSAY: CPT | Performed by: EMERGENCY MEDICINE

## 2019-07-24 PROCEDURE — 25000132 ZZH RX MED GY IP 250 OP 250 PS 637: Mod: GY | Performed by: EMERGENCY MEDICINE

## 2019-07-24 PROCEDURE — 80053 COMPREHEN METABOLIC PANEL: CPT | Performed by: EMERGENCY MEDICINE

## 2019-07-24 PROCEDURE — 85025 COMPLETE CBC W/AUTO DIFF WBC: CPT | Performed by: EMERGENCY MEDICINE

## 2019-07-24 PROCEDURE — 96361 HYDRATE IV INFUSION ADD-ON: CPT | Performed by: EMERGENCY MEDICINE

## 2019-07-24 PROCEDURE — 96375 TX/PRO/DX INJ NEW DRUG ADDON: CPT | Performed by: EMERGENCY MEDICINE

## 2019-07-24 PROCEDURE — 25000128 H RX IP 250 OP 636: Performed by: EMERGENCY MEDICINE

## 2019-07-24 PROCEDURE — 99285 EMERGENCY DEPT VISIT HI MDM: CPT | Mod: Z6 | Performed by: EMERGENCY MEDICINE

## 2019-07-24 PROCEDURE — 96374 THER/PROPH/DIAG INJ IV PUSH: CPT | Mod: 59 | Performed by: EMERGENCY MEDICINE

## 2019-07-24 PROCEDURE — 83690 ASSAY OF LIPASE: CPT | Performed by: EMERGENCY MEDICINE

## 2019-07-24 PROCEDURE — 99285 EMERGENCY DEPT VISIT HI MDM: CPT | Mod: 25 | Performed by: EMERGENCY MEDICINE

## 2019-07-24 PROCEDURE — 74177 CT ABD & PELVIS W/CONTRAST: CPT

## 2019-07-24 RX ORDER — GABAPENTIN 300 MG/1
600 CAPSULE ORAL ONCE
Status: COMPLETED | OUTPATIENT
Start: 2019-07-24 | End: 2019-07-24

## 2019-07-24 RX ORDER — IOPAMIDOL 755 MG/ML
93 INJECTION, SOLUTION INTRAVASCULAR ONCE
Status: COMPLETED | OUTPATIENT
Start: 2019-07-24 | End: 2019-07-24

## 2019-07-24 RX ORDER — KETOROLAC TROMETHAMINE 15 MG/ML
15 INJECTION, SOLUTION INTRAMUSCULAR; INTRAVENOUS ONCE
Status: COMPLETED | OUTPATIENT
Start: 2019-07-24 | End: 2019-07-24

## 2019-07-24 RX ORDER — HYDROMORPHONE HYDROCHLORIDE 1 MG/ML
0.5 INJECTION, SOLUTION INTRAMUSCULAR; INTRAVENOUS; SUBCUTANEOUS
Status: DISCONTINUED | OUTPATIENT
Start: 2019-07-24 | End: 2019-07-25 | Stop reason: HOSPADM

## 2019-07-24 RX ORDER — ONDANSETRON 2 MG/ML
4 INJECTION INTRAMUSCULAR; INTRAVENOUS EVERY 30 MIN PRN
Status: DISCONTINUED | OUTPATIENT
Start: 2019-07-24 | End: 2019-07-25 | Stop reason: HOSPADM

## 2019-07-24 RX ORDER — SODIUM CHLORIDE 9 MG/ML
1000 INJECTION, SOLUTION INTRAVENOUS CONTINUOUS
Status: DISCONTINUED | OUTPATIENT
Start: 2019-07-24 | End: 2019-07-25 | Stop reason: HOSPADM

## 2019-07-24 RX ADMIN — ONDANSETRON 4 MG: 2 INJECTION INTRAMUSCULAR; INTRAVENOUS at 23:06

## 2019-07-24 RX ADMIN — IOPAMIDOL 93 ML: 755 INJECTION, SOLUTION INTRAVENOUS at 23:59

## 2019-07-24 RX ADMIN — HYDROMORPHONE HYDROCHLORIDE 1 MG: 1 INJECTION, SOLUTION INTRAMUSCULAR; INTRAVENOUS; SUBCUTANEOUS at 23:05

## 2019-07-24 RX ADMIN — GABAPENTIN 600 MG: 300 CAPSULE ORAL at 22:28

## 2019-07-24 RX ADMIN — SODIUM CHLORIDE 1000 ML: 9 INJECTION, SOLUTION INTRAVENOUS at 22:55

## 2019-07-24 RX ADMIN — KETOROLAC TROMETHAMINE 15 MG: 15 INJECTION, SOLUTION INTRAMUSCULAR; INTRAVENOUS at 23:06

## 2019-07-24 ASSESSMENT — ENCOUNTER SYMPTOMS
ABDOMINAL PAIN: 1
SHORTNESS OF BREATH: 0
NAUSEA: 1
FEVER: 0
VOMITING: 1
DIARRHEA: 0

## 2019-07-24 ASSESSMENT — MIFFLIN-ST. JEOR: SCORE: 1520.08

## 2019-07-24 NOTE — ED AVS SNAPSHOT
[facility] Emergency Department  [address] [facility] [address] [address]  Phone:  [phone]  Fax:  [phone]                                    Molly Teague   MRN: [id number]    Department:  [facility] Emergency Department   Date of Visit:  7/24/2019           After Visit Summary Signature Page    I have received my discharge instructions, and my questions have been answered. I have discussed any challenges I see with this plan with the nurse or doctor.    ..........................................................................................................................................  Patient/Patient Representative Signature      ..........................................................................................................................................  Patient Representative Print Name and Relationship to Patient    ..................................................               ................................................  Date                                   Time    ..........................................................................................................................................  Reviewed by Signature/Title    ...................................................              ..............................................  Date                                               Time          22EPIC Rev 08/18

## 2019-07-25 ENCOUNTER — TELEPHONE (OUTPATIENT)
Dept: INTERNAL MEDICINE | Facility: CLINIC | Age: 34
End: 2019-07-25

## 2019-07-25 VITALS
TEMPERATURE: 97 F | RESPIRATION RATE: 18 BRPM | SYSTOLIC BLOOD PRESSURE: 144 MMHG | BODY MASS INDEX: 34.96 KG/M2 | WEIGHT: 190 LBS | DIASTOLIC BLOOD PRESSURE: 90 MMHG | OXYGEN SATURATION: 97 % | HEIGHT: 62 IN | HEART RATE: 89 BPM

## 2019-07-25 DIAGNOSIS — F41.1 GAD (GENERALIZED ANXIETY DISORDER): ICD-10-CM

## 2019-07-25 DIAGNOSIS — G89.4 CHRONIC PAIN SYNDROME: Chronic | ICD-10-CM

## 2019-07-25 PROCEDURE — 25000132 ZZH RX MED GY IP 250 OP 250 PS 637: Mod: GY | Performed by: EMERGENCY MEDICINE

## 2019-07-25 PROCEDURE — 25000125 ZZHC RX 250: Performed by: EMERGENCY MEDICINE

## 2019-07-25 RX ORDER — OXYCODONE HYDROCHLORIDE 15 MG/1
15 TABLET ORAL EVERY 4 HOURS PRN
Qty: 108 TABLET | Refills: 0 | Status: CANCELLED | OUTPATIENT
Start: 2019-07-25

## 2019-07-25 RX ORDER — ACETAMINOPHEN 500 MG
1000 TABLET ORAL ONCE
Status: COMPLETED | OUTPATIENT
Start: 2019-07-25 | End: 2019-07-25

## 2019-07-25 RX ORDER — LORAZEPAM 1 MG/1
1 TABLET ORAL DAILY PRN
Qty: 30 TABLET | Refills: 2 | Status: SHIPPED | OUTPATIENT
Start: 2019-07-25 | End: 2019-11-01

## 2019-07-25 RX ORDER — BUSPIRONE HYDROCHLORIDE 30 MG/1
30 TABLET ORAL 2 TIMES DAILY
Qty: 60 TABLET | Refills: 1 | Status: SHIPPED | OUTPATIENT
Start: 2019-07-25 | End: 2019-09-25

## 2019-07-25 RX ORDER — MONTELUKAST SODIUM 10 MG/1
TABLET ORAL
Qty: 90 TABLET | Refills: 3 | Status: SHIPPED | OUTPATIENT
Start: 2019-07-25 | End: 2020-08-04

## 2019-07-25 RX ORDER — ALBUTEROL SULFATE 90 UG/1
AEROSOL, METERED RESPIRATORY (INHALATION)
Qty: 18 G | Refills: 11 | Status: SHIPPED | OUTPATIENT
Start: 2019-07-25 | End: 2020-08-04

## 2019-07-25 RX ORDER — GABAPENTIN 300 MG/1
CAPSULE ORAL
Qty: 540 CAPSULE | Refills: 3 | Status: SHIPPED | OUTPATIENT
Start: 2019-07-25 | End: 2020-04-16

## 2019-07-25 RX ORDER — OXYCODONE HYDROCHLORIDE 15 MG/1
15 TABLET ORAL EVERY 4 HOURS PRN
Qty: 108 TABLET | Refills: 0 | Status: SHIPPED | OUTPATIENT
Start: 2019-07-25 | End: 2019-08-02

## 2019-07-25 RX ADMIN — ACETAMINOPHEN 1000 MG: 500 TABLET, FILM COATED ORAL at 00:27

## 2019-07-25 RX ADMIN — SODIUM CHLORIDE 63 ML: 9 INJECTION, SOLUTION INTRAVENOUS at 00:00

## 2019-07-25 NOTE — TELEPHONE ENCOUNTER
"Requested Prescriptions   Pending Prescriptions Disp Refills     VENTOLIN  (90 Base) MCG/ACT inhaler [Pharmacy Med Name: VENTOLIN HFA 108MCG/ACT AERS] 18 g 11     Sig: INHALE 2 PUFFS INTO THE LUNGS ONCE EVERY 4 HOURS AS NEEDED FOR SHORTNESS OF BREATH / DYSPNEA / WHEEZING       Asthma Maintenance Inhalers - Anticholinergics Failed - 7/24/2019  6:54 PM        Failed - Asthma control assessment score within normal limits in last 6 months     Please review ACT score.           Passed - Patient is age 12 years or older        Passed - Medication is active on med list        Passed - Recent (6 mo) or future (30 days) visit within the authorizing provider's specialty     Patient had office visit in the last 6 months or has a visit in the next 30 days with authorizing provider or within the authorizing provider's specialty.  See \"Patient Info\" tab in inbasket, or \"Choose Columns\" in Meds & Orders section of the refill encounter.       Last Written Prescription Date:  6/28/18  Last Fill Quantity: 1,  # refills: 11   Last office visit: 5/14/2019 with prescribing provider:  Jameson   Future Office Visit:   Next 5 appointments (look out 90 days)    Jul 30, 2019 10:15 AM CDT  (Arrive by 10:10 AM)  Return Visit with Agustin Mckenzie CNP  [facility] ([facility]) [address] [place] [address]  [phone]   Aug 23, 2019  2:00 PM CDT  Return Visit with Erna Palacios LP  [place] [address] [place], [place] [id number]  [facility] [address]  [phone]   Aug 28, 2019 10:00 AM CDT  Return Visit with Erna Palacios LP  [facility]  [address]  [facility] [address]  [phone]   Sep 04, 2019 10:00 AM CDT  Return Visit with Erna Palacios LP  [facility]" "[facility]  [address] [place] [address]  [phone]   Sep 19, 2019 10:00 AM CDT  Return Visit with Erna Palacios LP  [facility] ([facility]) [facility]  [address] [place] [address]  [phone]              ranitidine (ZANTAC) 150 MG tablet [Pharmacy Med Name: RANITIDINE HCL 150MG TABS] 180 tablet 3     Sig: TAKE ONE TABLET BY MOUTH TWICE A DAY AS NEEDED FOR HEARTBURN       H2 Blockers Protocol Passed - 7/24/2019  6:54 PM        Passed - Patient is age 12 or older        Passed - Recent (12 mo) or future (30 days) visit within the authorizing provider's specialty     Patient had office visit in the last 12 months or has a visit in the next 30 days with authorizing provider or within the authorizing provider's specialty.  See \"Patient Info\" tab in inbasket, or \"Choose Columns\" in Meds & Orders section of the refill encounter.              Passed - Medication is active on med list   Last Written Prescription Date:  7/6/18  Last Fill Quantity: 180,  # refills: 3   Last office visit: 5/14/2019 with prescribing provider:  Jameson   Future Office Visit:   Next 5 appointments (look out 90 days)    Jul 30, 2019 10:15 AM CDT  (Arrive by 10:10 AM)  Return Visit with Agustin Mckenzie CNP  [facility] ([facility]) [address] [place] [address]  [phone]   Aug 23, 2019  2:00 PM CDT  Return Visit with Erna Palacios LP  [facility] ([facility]) [facility]  [address] [place] [address]  [phone]   Aug 28, 2019 10:00 AM CDT  Return Visit with Erna Palacios LP  [facility] ([facility]) [facility]  [address] [place] [address]  [phone]   Sep 04, 2019 10:00 AM CDT  Return Visit with Erna Palacios LP  [facility] ([facility]" [facility]  [address] [facility]  [facility] [address]  [phone]   Sep 19, 2019 10:00 AM CDT  Return Visit with Erna Palacios LP  [facility] ([facility]) [facility]  [address] [facility]  [facility] [address]  [phone]              gabapentin (NEURONTIN) 300 MG capsule [Pharmacy Med Name: GABAPENTIN 300MG CAPS] 540 capsule 3     Sig: TAKE TWO CAPSULES BY MOUTH THREE TIMES A DAY       There is no refill protocol information for this order   Last Written Prescription Date:  7/24/19  Last Fill Quantity: 600,  # refills: 0   Last office visit: 5/14/2019 with prescribing provider:  Jameson Espinoza Office Visit:   Next 5 appointments (look out 90 days)    Jul 30, 2019 10:15 AM CDT  (Arrive by 10:10 AM)  Return Visit with Agustin Mckenzie [facility] ([facility]) [address] [place] [address]  [phone]   Aug 23, 2019  2:00 PM CDT  Return Visit with Erna Palacios LP  [facility] ([facility]) [facility]  [address] [place] [address]  [phone]   Aug 28, 2019 10:00 AM CDT  Return Visit with Erna Palacios LP  [facility] ([facility]) [facility]  [address] [place] [address]  [phone]   Sep 04, 2019 10:00 AM CDT  Return Visit with Erna Palacios LP  [facility] ([facility]) [facility]  [address] [place] [address]  [phone]   Sep 19, 2019 10:00 AM CDT  Return Visit with Erna Palacios LP  [facility] ([facility]) [facility]  [address] [place] [address]  [phone]              busPIRone (BUSPAR) 7.5 MG tablet [Pharmacy Med Name: BUSPIRONE HCL 7.5MG TABS] 240 tablet 9     Sig: TAKE FOUR TABLETS BY MOUTH TWICE  "A DAY       Atypical Antidepressants Protocol Passed - 7/24/2019  6:54 PM        Passed - Recent (12 mo) or future (30 days) visit within the authorizing provider's specialty     Patient had office visit in the last 12 months or has a visit in the next 30 days with authorizing provider or within the authorizing provider's specialty.  See \"Patient Info\" tab in inbasket, or \"Choose Columns\" in Meds & Orders section of the refill encounter.              Passed - Medication active on med list        Passed - Patient is age 18 or older        Passed - No active pregnancy on record        Passed - No positive pregnancy test in past 12 mos   Last Written Prescription Date:  Historical  Last Fill Quantity: 240,  # refills: 9   Last office visit: 5/14/2019 with prescribing provider:  Jameson   Future Office Visit:   Next 5 appointments (look out 90 days)    Jul 30, 2019 10:15 AM CDT  (Arrive by 10:10 AM)  Return Visit with Agustin Mckenzie CNP  [facility] ([facility]) [address] [place] [address]  [phone]   Aug 23, 2019  2:00 PM CDT  Return Visit with Erna Palacios LP  [facility] ([facility]  [address] [facility]  [facility] [address]  [phone]   Aug 28, 2019 10:00 AM CDT  Return Visit with Erna Palacios LP  [facility] ([facility]  [address]  [facility] [address]  [phone]   Sep 04, 2019 10:00 AM CDT  Return Visit with Erna Palacios LP  [facility] ([facility]) [facility]  [address]  [facility] [address]  [phone]   Sep 19, 2019 10:00 AM CDT  Return Visit with Erna Palacios LP  [facility] ([facility]) [facility]  [address]  [facility] [address]  [phone]              montelukast " "(SINGULAIR) 10 MG tablet [Pharmacy Med Name: MONTELUKAST SODIUM 10MG TABS] 90 tablet 3     Sig: TAKE ONE TABLET BY MOUTH AT BEDTIME       Leukotriene Inhibitors Protocol Failed - 7/24/2019  6:54 PM        Failed - Asthma control assessment score within normal limits in last 6 months     Please review ACT score.           Passed - Patient is age 12 or older     If patient is under 16, ok to refill using age based dosing.           Passed - Medication is active on med list        Passed - Recent (6 mo) or future (30 days) visit within the authorizing provider's specialty     Patient had office visit in the last 6 months or has a visit in the next 30 days with authorizing provider or within the authorizing provider's specialty.  See \"Patient Info\" tab in inbasket, or \"Choose Columns\" in Meds & Orders section of the refill encounter.            Last Written Prescription Date:  7/6/18  Last Fill Quantity: 90,  # refills: 3   Last office visit: 5/14/2019 with prescribing provider:  Jameson   Future Office Visit:   Next 5 appointments (look out 90 days)    Jul 30, 2019 10:15 AM CDT  (Arrive by 10:10 AM)  Return Visit with Agustin Mckenzie [facility] ([facility]) [address] [place] [address]  [phone]   Aug 23, 2019  2:00 PM CDT  Return Visit with Erna Palacios LP  [facility] ([facility]  [address]  [facility] [address]  [phone]   Aug 28, 2019 10:00 AM CDT  Return Visit with Erna Palacios LP  [facility] ([facility]  [address]  [facility] [address]  [phone]   Sep 04, 2019 10:00 AM CDT  Return Visit with Erna Palacios LP  [facility] ([facility]) [facility]  [address]  [facility] [address]  [phone]   Sep 19, 2019 10:00 AM " CDT  Return Visit with Erna Palacios LP  [facility] ([facility]) [place] [address] [address] [phone]

## 2019-07-25 NOTE — TELEPHONE ENCOUNTER
Routing VENTOLIN and SINGULAIR refill request to provider for review/approval because:  Labs out of range:  ACT = 20  She has appt with you 7-31-19 for pain med refill and asthma recheck.      Routing BUSPAR refill request to provider for review/approval because:  Medication is reported/historical - believe this is because at some point 30mg tablets were on back order so (4) 7.5mg tablets had to be substitued.  RX is set for 30mg BID.      Routing GABAPENTIN and OXYCODONE refill request to provider for review/approval because:  Drug not on the [facility] refill protocol   She states she was not aware of the new law.  Has appt 7-31-19    ZANTAC prescription approved per [facility] Refill Protocol.  Demetria JHA RN

## 2019-07-25 NOTE — TELEPHONE ENCOUNTER
I left a message for the patient to return call to [facility].  CSS please help pt with scheduling OV with Dr. Barba.  Last OV with Dr. Barba was 5/14/19: Return in about 2 months (around 7/14/2019) for Pain Medication Refill. CSA is up to date.  Pt was seen in ED yesterday for:  Final diagnoses:   Generalized abdominal pain   Non-intractable vomiting with nausea, unspecified vomiting type   Diarrhea, unspecified type   Colitis     Emily LEI RN

## 2019-07-25 NOTE — ED PROVIDER NOTES
History     Chief Complaint   Patient presents with     Abdominal Pain     abdominal pain with vomiting started yesterday     HPI  Molly Teague is a 33 year old female who has past medical history significant for multiple medical issues, including history of hypertension, chronic kidney disease, obesity, depression, anxiety, crest syndrome, history of previous , presenting to the emergency department with concerns regarding abdominal pain, with nausea, and vomiting.  Patient states that yesterday morning, immediately after taking her morning medications she began experiencing vomiting.  Patient states she had 2 episodes or perhaps 3 episodes of nonbloody, nonbilious emesis yesterday.  Patient had 2 episodes of vomiting today.  Patient also did have multiple episodes of loose stool yesterday, some of which had slight amounts of bright red blood, with small droplets of blood.  No significant diarrhea during the day today.  Patient reports chills in the morning hours.  No fever has been noted.  Has not taken her temperature.  Denies any chest pain, cough, or shortness of breath.  No sore throat.  No ill contacts.  Denies any recent surgical procedures.  Does have some decrease of her antidepressant medications as they are attempting to change that medication.  No recent antibiotic use.  No recent surgeries.  Patient is currently experiencing 7 out of 10 severity pain, in the mid abdomen, with no significant urinary symptoms.    Allergies:  Allergies   Allergen Reactions     No Known Allergies      Seasonal Allergies      Shellfish-Derived Products Nausea     Rash on face       Problem List:    Patient Active Problem List    Diagnosis Date Noted     End-stage renal disease (H) 2019     Priority: Medium     Malignant essential hypertension 2019     Priority: Medium     PTSD (post-traumatic stress disorder) 2019     Priority: Medium     Obesity (BMI 35.0-39.9) with comorbidity (H)  01/22/2019     Priority: Medium     Contracture of hand joint, right 12/13/2018     Priority: Medium     Contracture of hand joint, left 12/13/2018     Priority: Medium     Moderate major depression (H) 07/06/2018     Priority: Medium     Severe persistent asthma without complication 07/06/2018     Priority: Medium     On high dose ICS/LABA + frequent albuterol use.  Next allergy/pulmonary referral       Aspiration of food 03/29/2018     Priority: Medium     Chronic pain syndrome 04/26/2017     Priority: Medium     Patient is followed by Grecia Barba DO for ongoing prescription of pain medication.  All refills should be approved by this provider only at face-to-face appointments - not by phone request.    Medication(s): Oxycodone 15 mg.   Maximum quantity per month: 120  Clinic visit frequency required: Q 3 months     Controlled substance agreement:  Encounter-Level CSA - 04/26/2017:          Controlled Substance Agreement - Scan on 5/4/2017  8:58 AM : CONTROLLED SUBSTANCE AGREEMENT (below)              Pain Clinic evaluation in the past: No    DIRE Total Score(s):  No flowsheet data found.    Last [facility] website verification:  done on 4/26/17   9/26/2017  7/6/2018  10/16/2018  1/22/2019         [email or web address]         Chronic migraine without aura without status migrainosus, not intractable 04/12/2017     Priority: Medium     With medication overuse.  Fioricet and not Imitrex is recommend to treat severe headache.  2/2017 [place] recommend wean down from daily Fioricet and use Excedrin Migrain PRN.       AIN grade I 03/30/2017     Priority: Medium     Found at [place] on colonoscopy 2/2017.  Recommend repeat anoscopy and anal pap in 6/2017.       Gastroesophageal reflux disease with esophagitis 03/30/2017     Priority: Medium     EGD done at [place] 2/2017 showed esophagitis without GAVE.  Recommend max dose H2 and PPI, along with 4 tablets of Carafate dissolved in water and drink throughout the  day.    Had LA Grade D esophagitis        Limited systemic sclerosis (H) 01/25/2017     Priority: Medium     Raynaud's, calcinosis, telangiectasias, peripheral neuropathy, GERD symptoms, history of scleroderma renal crisis.  Saw [place] 1/2017 and follows with Rheum Dr. Davis locally.       Polyneuropathy associated with underlying disease (H) 01/25/2017     Priority: Medium     Due to scleroderma and neurology thought possible due to ICU (critical illness neuropathy).  Recommend to max out dose of gabapentin to 9138-3669 mg/day, split BID or TID.  EMG 1/2017 positive for neuropathy       History of Clostridium difficile 11/29/2016     Priority: Medium     History of Helicobacter pylori infection 11/29/2016     Priority: Medium     Scleroderma with renal involvement (H) 11/09/2016     Priority: Medium     Stopped lisinopril per [facility] 1/2017.  Restarted lisinopril per [place] 3/2017       History of ARDS 11/09/2016     Priority: Medium     4/2015, prolonged ICU/vent/trach due to influenza, scleroderma renal crisis requiring hemodialysis.       Raynaud's disease without gangrene 11/09/2016     Priority: Medium     History of hemodialysis 11/09/2016     Priority: Medium     4/2015 due to scleroderma renal crisis       Bilateral retinitis 11/09/2016     Priority: Medium     IUD (intrauterine device) in place 11/09/2016     Priority: Medium     Other pulmonary embolism without acute cor pulmonale, unspecified chronicity (H) 11/09/2016     Priority: Medium     Completed anti-coagulation.       REX (generalized anxiety disorder) 11/09/2016     Priority: Medium     CREST (calcinosis, Raynaud's phenomenon, esophageal dysfunction, sclerodactyly, telangiectasia) (H) 11/09/2016     Priority: Medium     Scleroderma (H) 09/22/2016     Priority: Medium     Nausea with vomiting 09/21/2016     Priority: Medium        Past Medical History:    Past Medical History:   Diagnosis Date     Acute pyelonephritis 6/9/2017     Altered mental  state 3/29/2018     Arthritis      Blood clotting disorder (H)      History of blood transfusion      Hypertension      Long-term (current) use of anticoagulants [Z79.01] 2016     PE (pulmonary embolism) 2016     Rheumatism      Scleroderma (H) 2016     Uncomplicated asthma        Past Surgical History:    Past Surgical History:   Procedure Laterality Date     ANGIOGRAM        SECTION       THROAT SURGERY         Family History:    Family History   Problem Relation Age of Onset     Hyperlipidemia Father      Cerebrovascular Disease Maternal Grandmother      Hyperlipidemia Paternal Grandfather      Depression Sister      Other - See Comments Sister         Nerve pain     Diabetes Maternal Aunt      Melanoma No family hx of      Skin Cancer No family hx of        Social History:  Marital Status:  Single [1]  Social History     Tobacco Use     Smoking status: Never Smoker     Smokeless tobacco: Never Used   Substance Use Topics     Alcohol use: Yes     Comment: Socially once on a weekend if any     Drug use: No     Comment: None        Medications:      albuterol (VENTOLIN HFA) 108 (90 Base) MCG/ACT Inhaler   amLODIPine (NORVASC) 5 MG tablet   blood glucose (NO BRAND SPECIFIED) lancets standard   blood glucose (NO BRAND SPECIFIED) test strip   budesonide-formoterol (SYMBICORT) 160-4.5 MCG/ACT Inhaler   busPIRone (BUSPAR) 7.5 MG tablet   citalopram (CELEXA) 10 MG tablet   citalopram (CELEXA) 20 MG tablet   citalopram (CELEXA) 40 MG tablet   Cyanocobalamin (B-12) 1000 MCG TBCR   diphenhydrAMINE (BENADRYL) 25 MG tablet   Doxylamine Succinate, Sleep, (UNISOM PO)   DULoxetine (CYMBALTA) 30 MG capsule   ferrous gluconate (FERGON) 324 (38 FE) MG tablet   gabapentin (NEURONTIN) 300 MG capsule   GOODSENSE MIGRAINE FORMULA 250-250-65 MG per tablet   lisinopril (PRINIVIL/ZESTRIL) 10 MG tablet   LORazepam (ATIVAN) 1 MG tablet   montelukast (SINGULAIR) 10 MG tablet   naloxone (NARCAN) 4 MG/0.1ML  "nasal spray   naloxone (NARCAN) nasal spray   omeprazole (PRILOSEC) 40 MG DR capsule   oxyCODONE IR (ROXICODONE) 15 MG tablet   polyethylene glycol (MIRALAX) powder   promethazine (PHENERGAN) 25 MG tablet   promethazine (PHENERGAN) 50 MG/ML SOLN IV injection   ranitidine (ZANTAC) 150 MG tablet   sucralfate (CARAFATE) 1 GM tablet         Review of Systems   Constitutional: Negative for fever.   Respiratory: Negative for shortness of breath.    Cardiovascular: Negative for chest pain.   Gastrointestinal: Positive for abdominal pain, nausea and vomiting. Negative for diarrhea.   All other systems reviewed and are negative.      Physical Exam   BP: (!) 151/104  Pulse: 89  Temp: 97  F (36.1  C)  Resp: 18  Height: 157.5 cm (5' 2\")  Weight: 86.2 kg (190 lb)  SpO2: 100 %      Physical Exam  /90   Pulse 89   Temp 97  F (36.1  C) (Temporal)   Resp 18   Ht 1.575 m (5' 2\")   Wt 86.2 kg (190 lb)   SpO2 97%   BMI 34.75 kg/m    General: alert, interactive, in no apparent distress  Head: atraumatic  Nose: no rhinorrhea or epistaxis  Ears: no external auditory canal discharge or bleeding.    Eyes: Sclera nonicteric. Conjunctiva noninjected.    Mouth: no tonsillar erythema, edema, or exudate  Neck: supple, no palp LAD  Lungs: CTAB  CV: RRR, S1/S2; peripheral pulses palpable and symmetric  Abdomen: soft, mild diffuse ttp.   nd, no guarding or rebound. Positive bowel sounds  Extremities: no cyanosis or edema  Skin: no rash or diaphoresis  Neuro:  strength 5/5 in UE and LEs bilaterally, sensation intact to light touch in UE and LEs bilaterally;       ED Course        Procedures               Critical Care time:  none               Results for orders placed or performed during the hospital encounter of 07/24/19 (from the past 24 hour(s))   CBC with platelets differential   Result Value Ref Range    WBC 13.3 (H) 4.0 - 11.0 10e9/L    RBC Count 4.97 3.8 - 5.2 10e12/L    Hemoglobin 11.0 (L) 11.7 - 15.7 g/dL    Hematocrit 38.3 " 35.0 - 47.0 %    MCV 77 (L) 78 - 100 fl    MCH 22.1 (L) 26.5 - 33.0 pg    MCHC 28.7 (L) 31.5 - 36.5 g/dL    RDW 18.4 (H) 10.0 - 15.0 %    Platelet Count 332 150 - 450 10e9/L    Diff Method Automated Method     % Neutrophils 76.1 %    % Lymphocytes 15.2 %    % Monocytes 7.0 %    % Eosinophils 0.6 %    % Basophils 0.7 %    % Immature Granulocytes 0.4 %    Nucleated RBCs 0 0 /100    Absolute Neutrophil 10.1 (H) 1.6 - 8.3 10e9/L    Absolute Lymphocytes 2.0 0.8 - 5.3 10e9/L    Absolute Monocytes 0.9 0.0 - 1.3 10e9/L    Absolute Eosinophils 0.1 0.0 - 0.7 10e9/L    Absolute Basophils 0.1 0.0 - 0.2 10e9/L    Abs Immature Granulocytes 0.1 0 - 0.4 10e9/L    Absolute Nucleated RBC 0.0    Comprehensive metabolic panel   Result Value Ref Range    Sodium 138 133 - 144 mmol/L    Potassium 3.7 3.4 - 5.3 mmol/L    Chloride 105 94 - 109 mmol/L    Carbon Dioxide 22 20 - 32 mmol/L    Anion Gap 11 3 - 14 mmol/L    Glucose 62 (L) 70 - 99 mg/dL    Urea Nitrogen 19 7 - 30 mg/dL    Creatinine 1.35 (H) 0.52 - 1.04 mg/dL    GFR Estimate 51 (L) >60 mL/min/[1.73_m2]    GFR Estimate If Black 59 (L) >60 mL/min/[1.73_m2]    Calcium 9.4 8.5 - 10.1 mg/dL    Bilirubin Total 0.4 0.2 - 1.3 mg/dL    Albumin 4.5 3.4 - 5.0 g/dL    Protein Total 9.4 (H) 6.8 - 8.8 g/dL    Alkaline Phosphatase 99 40 - 150 U/L    ALT 30 0 - 50 U/L    AST 45 0 - 45 U/L   Lipase   Result Value Ref Range    Lipase 123 73 - 393 U/L   HCG qualitative pregnancy (blood)   Result Value Ref Range    HCG Qualitative Serum Negative NEG^Negative   CT Abdomen Pelvis w Contrast    Narrative    CT ABDOMEN PELVIS W CONTRAST  7/25/2019 12:14 AM     HISTORY: Abdominal pain, gastroenteritis or colitis suspected. Diffuse  abdominal pain, midabdomen. Vomiting X2    TECHNIQUE: Volumetric acquisition through abdomen and pelvis with IV  contrast. 93 mL Isovue-370. Radiation dose for this scan was reduced  using automated exposure control, adjustment of the mA and/or kV  according to patient size, or  iterative reconstruction technique.    COMPARISON: 5/2/2019.    FINDINGS: Fatty infiltration of the liver. Gallbladder, spleen,  pancreas, adrenal glands and kidneys demonstrate no worrisome  findings. Tiny probable cysts in the right kidney, too small to  accurately characterize. No hydronephrosis.    Visualized lung bases are clear.    Uterus is present. Bilateral ovarian cysts/follicles measuring 3.7 cm  on the right and 2.8 cm on the left, likely physiologic. Wall  thickening of the left colon involving the hepatic flexure and  descending colon compatible with colitis. No bowel obstruction, free  air or ascites.      Impression    IMPRESSION:  1. Nonspecific left-sided colitis.  2. Fatty infiltration of the liver.  3. Bilateral ovarian cysts, likely physiologic.       Medications   0.9% sodium chloride BOLUS (0 mLs Intravenous Stopped 7/25/19 0043)     Followed by   sodium chloride 0.9% infusion (has no administration in time range)   ondansetron (ZOFRAN) injection 4 mg (4 mg Intravenous Given 7/24/19 2306)   HYDROmorphone (PF) (DILAUDID) injection 0.5 mg (has no administration in time range)   HYDROmorphone (DILAUDID) injection 1 mg (1 mg Intravenous Given 7/24/19 2305)   ketorolac (TORADOL) injection 15 mg (15 mg Intravenous Given 7/24/19 2306)   gabapentin (NEURONTIN) capsule 600 mg (600 mg Oral Given 7/24/19 2228)   iopamidol (ISOVUE-370) solution 93 mL (93 mLs Intravenous Given 7/24/19 2359)   sodium chloride 0.9 % bag 500mL for CT scan flush use (63 mLs As instructed Given 7/25/19 0000)   acetaminophen (TYLENOL) tablet 1,000 mg (1,000 mg Oral Given 7/25/19 0027)       Assessments & Plan (with Medical Decision Making)  33 year old female, with past medical history as reviewed above, presenting to the emergency department with abdominal pain, in the context of recent nausea, vomiting, diarrhea.  Patient with no recent surgical procedures.  Does report that she has had chills.  Differential broad, however  includes UTI, pyelonephritis, diverticulitis, cholecystitis, biliary colic, gastroenteritis.    IV established, and labs drawn.  Patient given Toradol, Dilaudid, in addition to  requested gabapentin.  Patient states she has been unable to keep medications down over the past 2 days secondary to 2 episodes of vomiting each day.    Laboratory work-up showing CBC with slightly elevated white blood cell count, nonspecific in nature.  Hemoglobin stable at 11.0.  CMP showing chronic kidney disease, with creatinine approximately at baseline.  Remainder of CMP unremarkable.  Patient unable to provide urine specimen, however serum UPT is negative.  Patient denies any significant bladder infection type symptoms.  CT scan of the abdomen/pelvis showing colitis.    Patient will be discharged home, and follow-up with primary care provider to ensure resolution of symptoms.  Return if severe worsening of pain.     I have reviewed the nursing notes.    I have reviewed the findings, diagnosis, plan and need for follow up with the patient.          Medication List      There are no discharge medications for this visit.         Final diagnoses:   Generalized abdominal pain   Non-intractable vomiting with nausea, unspecified vomiting type   Diarrhea, unspecified type   Colitis       7/24/2019   [facility] EMERGENCY DEPARTMENT     Shane Bustos MD  07/25/19 0044

## 2019-07-25 NOTE — TELEPHONE ENCOUNTER
Requested Prescriptions   Pending Prescriptions Disp Refills     LORazepam (ATIVAN) 1 MG tablet 30 tablet 2     Sig: Take 1 tablet (1 mg) by mouth daily as needed for anxiety #30 to last 30 days       There is no refill protocol information for this order   Last Written Prescription Date:  4/26/19  Last Fill Quantity: 30,  # refills: 2   Last office visit: No previous visit found with prescribing provider:  Jameson Espinoza Office Visit:   Next 5 appointments (look out 90 days)    Jul 30, 2019 10:15 AM CDT  (Arrive by 10:10 AM)  Return Visit with Agustin Mckenzie [facility] ([facility]) [address] [place] [address]  [phone]   Aug 23, 2019  2:00 PM CDT  Return Visit with Erna Palacios [facility] ([place] [address] [place] [address]  [phone]   Aug 28, 2019 10:00 AM CDT  Return Visit with Erna Palacios [facility] ([facility]) [facility]  [address] [place] [address]  [phone]   Sep 04, 2019 10:00 AM CDT  Return Visit with Erna Palacios [facility] ([facility]) [facility]  [address] [place] [address]  [phone]   Sep 19, 2019 10:00 AM CDT  Return Visit with Erna Palacios [facility] ([facility]) [place] [address] [place] [address]  [phone]              oxyCODONE IR (ROXICODONE) 15 MG tablet 108 tablet 0     Sig: Take 1 tablet (15 mg) by mouth every 4 hours as needed for severe pain       There is no refill protocol information for this order        Last Written Prescription Date:  6/25/19  Last Fill Quantity: 108,  # refills: 0  Last office visit: No previous visit found with prescribing provider:  Jameson Espinoza Office Visit:   Next 5  appointments (look out 90 days)    Jul 30, 2019 10:15 AM CDT  (Arrive by 10:10 AM)  Return Visit with Agustin Mckenzie CNP  [facility] [address] [place] [address] A  [place] [address]  [phone]   Aug 23, 2019  2:00 PM CDT  Return Visit with Erna Palacios LP  [facility] ([facility]) [facility]  [address]  [facility] [address]  [phone]   Aug 28, 2019 10:00 AM CDT  Return Visit with Erna Palacios LP  [facility] ([facility]) [facility]  [address]  [facility] [address]  [phone]   Sep 04, 2019 10:00 AM CDT  Return Visit with Erna Palacios LP  [facility] ([facility]) [facility]  [address]  [facility] [address]  [phone]   Sep 19, 2019 10:00 AM CDT  Return Visit with rEna Palacios LP  [facility] ([facility]) [facility]  [address]  [facility] [address]  [phone]

## 2019-07-25 NOTE — TELEPHONE ENCOUNTER
Routing refill request to provider for review/approval because:  Drug not on the [facility] refill protocol   Dr. Barba, please see other refill requests sent to you today as well.      CSS - there is another phone note for this.  Patient has already scheduled OV 7-31-19.    Demetria JHA RN

## 2019-08-01 NOTE — PROGRESS NOTES
Subjective     Molly Teague is a 33 year old female who presents to clinic today for the following health issues:    HPI   Asthma Follow-Up    Was ACT completed today?    Yes    ACT Total Scores 10/16/2018   ACT TOTAL SCORE (Goal Greater than or Equal to 20) 20   In the past 12 months, how many times did you visit the emergency room for your asthma without being admitted to the hospital? 0   In the past 12 months, how many times were you hospitalized overnight because of your asthma? 0       How many days per week do you miss taking your asthma controller medication?  2    Please describe any recent triggers for your asthma: smoke, dust mites, pollens, animal dander, mold, humidity, aspirin, exercise or sports, emotions and Gastric Reflux    Have you had any Emergency Room Visits, Urgent Care Visits, or Hospital Admissions since your last office visit?  No      Chronic Pain Follow-Up       Type / Location of Pain: Chronic pain syndrome  Analgesia/pain control:       Recent changes:  same      Overall control: Tolerable with discomfort  Activity level/function:      Daily activities:  Unable to perform most daily activities - chores, hobbies, social activities, driving    Work:  not applicable  Adverse effects:  No  Adherance    Taking medication as directed?  Yes    Participating in other treatments: yes Physical therapy  Risk Factors:    Sleep:  Poor    Mood/anxiety:  worsened    Recent family or social stressors:  none noted    Other aggravating factors: none  PHQ-9 SCORE 1/16/2019 6/20/2019 7/16/2019   PHQ-9 Total Score 20 22 6     REX-7 SCORE 5/10/2019 6/20/2019 7/16/2019   Total Score 9 14 8     Encounter-Level CSA - 04/26/2017:    Controlled Substance Agreement - Scan on 5/4/2017  8:58 AM: CONTROLLED SUBSTANCE AGREEMENT (below)       Patient-Level CSA:    Controlled Substance Agreement - Opioid - Scan on 2/6/2019  6:23 PM (below)           Amount of exercise or physical activity: None    Problems taking  medications regularly: No    Medication side effects: none    Diet: low salt    Last filled lorazepam 1 mg #30 on 7/25          ED/UC Followup:    Facility:  [facility]    Date of visit: 7/24/19 Generalized abdominal pain,Non-intractable vomiting with nausea, unspecified vomiting type ,Diarrhea, unspecified type ,colitis  Current Status: Feeling better       ABDOMINAL   PAIN     Onset: Follow up    Description:   Character: Cramping  Location: right lower quadrant left lower quadrant  Radiation: None    Intensity: 6/10    Progression of Symptoms:  improving and intermittent    Accompanying Signs & Symptoms:  Fever/Chills?: no   Gas/Bloating: YES- both  Nausea: YES  Vomitting: YES  Diarrhea?: no   Constipation:YES  Dysuria or Hematuria: no    History:   Trauma: no   Previous similar pain: YES   Previous tests done: CT    Precipitating factors:   Does the pain change with:     Food: no      BM: no     Urination: no     Alleviating factors:  na    Therapies Tried and outcome: na    LMP:  not applicable       Current Outpatient Medications   Medication Sig Dispense Refill     amLODIPine (NORVASC) 5 MG tablet Take 5 mg by mouth daily       blood glucose (NO BRAND SPECIFIED) lancets standard Use to test blood sugar 1 time daily 100 each 3     blood glucose (NO BRAND SPECIFIED) test strip Use to test blood sugar 1 time daily 100 each 3     budesonide-formoterol (SYMBICORT) 160-4.5 MCG/ACT Inhaler Inhale 2 puffs into the lungs 2 times daily 6 g 11     busPIRone HCl (BUSPAR) 30 MG tablet Take 1 tablet (30 mg) by mouth 2 times daily 60 tablet 1     citalopram (CELEXA) 10 MG tablet 1 tab daily. Take with 20 mg daily. 30 tablet 0     citalopram (CELEXA) 20 MG tablet 1 tab daily. Take with 10 mg daily. 30 tablet 0     citalopram (CELEXA) 40 MG tablet Take 1 tablet (40 mg) by mouth daily 90 tablet 3     Cyanocobalamin (B-12) 1000 MCG TBCR Take 1,000 mcg by mouth daily 100 tablet 1     diphenhydrAMINE (BENADRYL) 25 MG tablet  Take 1 tablet (25 mg) by mouth every 6 hours as needed for itching or allergies 56 tablet      Doxylamine Succinate, Sleep, (UNISOM PO)        DULoxetine (CYMBALTA) 30 MG capsule Take 1 capsule (30 mg) by mouth 2 times daily 60 capsule 1     ferrous gluconate (FERGON) 324 (38 FE) MG tablet TAKE ONE TABLET BY MOUTH EVERY DAY WITH BREAKFAST 100 tablet 3     gabapentin (NEURONTIN) 300 MG capsule TAKE TWO CAPSULES BY MOUTH THREE TIMES A  capsule 3     GOODSENSE MIGRAINE FORMULA 250-250-65 MG per tablet TAKE ONE TABLET BY MOUTH EVERY 6 HOURS AS NEEDED FOR HEADACHES 24 tablet 1     lisinopril (PRINIVIL/ZESTRIL) 10 MG tablet Take 1 tablet (10 mg) by mouth daily 90 tablet 3     LORazepam (ATIVAN) 1 MG tablet Take 1 tablet (1 mg) by mouth daily as needed for anxiety #30 to last 30 days 30 tablet 2     montelukast (SINGULAIR) 10 MG tablet TAKE ONE TABLET BY MOUTH AT BEDTIME 90 tablet 3     naloxone (NARCAN) 4 MG/0.1ML nasal spray Spray 1 spray (4 mg) into one nostril alternating nostrils once as needed for opioid reversal every 2-3 minutes until assistance arrives 0.2 mL 3     naloxone (NARCAN) nasal spray Spray 1 spray (4 mg) into one nostril alternating nostrils as needed for opioid reversal every 2-3 minutes until assistance arrives 0.2 mL 3     omeprazole (PRILOSEC) 40 MG DR capsule TAKE ONE CAPSULE BY MOUTH TWICE A  capsule 1     oxyCODONE IR (ROXICODONE) 15 MG tablet Take 1 tablet (15 mg) by mouth every 4 hours as needed for severe pain 108 tablet 0     polyethylene glycol (MIRALAX) powder Take 17 g (1 capful) by mouth daily 510 g 11     promethazine (PHENERGAN) 25 MG tablet Take 1 tablet (25 mg) by mouth 2 times daily as needed for nausea 30 tablet 11     promethazine (PHENERGAN) 50 MG/ML SOLN IV injection Inject 0.5 mL (25 mg) into the muscle every 6 hours as needed 90 mL 11     ranitidine (ZANTAC) 150 MG tablet TAKE ONE TABLET BY MOUTH TWICE A DAY AS NEEDED FOR HEARTBURN 180 tablet 0     sucralfate  (CARAFATE) 1 GM tablet Take 1 tablet (1 g) by mouth 4 times daily 120 tablet 11     VENTOLIN  (90 Base) MCG/ACT inhaler INHALE 2 PUFFS INTO THE LUNGS ONCE EVERY 4 HOURS AS NEEDED FOR SHORTNESS OF BREATH / DYSPNEA / WHEEZING 18 g 11         Reviewed and updated as needed this visit by Provider         Review of Systems   ROS COMP: Constitutional, HEENT, cardiovascular, pulmonary, GI, , musculoskeletal, neuro, skin, endocrine and psych systems are negative, except as otherwise noted.      Objective    /66   Pulse 101   Temp 99.2  F (37.3  C) (Tympanic)   Resp 12   Wt 82.1 kg (181 lb)   SpO2 97%   Breastfeeding? No   BMI 33.11 kg/m    Body mass index is 33.11 kg/m .  Physical Exam   GENERAL APPEARANCE: alert and no distress  PSYCH: mentation appears normal and affect normal/bright          Assessment & Plan     1. Chronic pain syndrome - Last filled oxycodone 5 mg #108 on 7/25.  Next fill 8/24.  On the next fill she can call in for this, and I would prescribe 100 pills instead of 108 pills according to our very slow plan taper.  Patient is agreeable to this.  She should then return to clinic in October for further follow-up.  Educated patient about new law in [place] regarding opiates.  Due for urine drug screen.  - oxyCODONE IR (ROXICODONE) 15 MG tablet; Take 1 tablet (15 mg) by mouth every 4 hours as needed for severe pain  Dispense: 108 tablet; Refill: 0  - ondansetron (ZOFRAN-ODT) 8 MG ODT tab; Take 1 tablet (8 mg) by mouth every 8 hours as needed for nausea  Dispense: 30 tablet; Refill: 11  - Drug  Screen Comprehensive , Urine with Reported Meds ([facility]) (Pain Care Package)    2. Chronic, continuous use of opioids  - Drug  Screen Comprehensive , Urine with Reported Meds ([facility]) (Pain Care Package)    3. Nausea and vomiting, intractability of vomiting not specified, unspecified vomiting type -acute on chronic nausea.  Acute nausea due to resolving colitis.  Patient wants to try Zofran  again.  - ondansetron (ZOFRAN-ODT) 8 MG ODT tab; Take 1 tablet (8 mg) by mouth every 8 hours as needed for nausea  Dispense: 30 tablet; Refill: 11         Patient Instructions   1. Oxycodone written for 8/24.  Call in for next refill 9/24. Will be for 100 pills.  Be seen next in Oct for refill.  2. Refill of zofran      The [place] legislature changed the law about how doctors and pharmacies prescribe and fill opiate pain medications. We were notified about this law change the end of June. Starting July 1, no prescription for an opiate or narcotic pain reliever may be initially dispensed more than 30 days after the date on which the prescription was issued.  This means if I see you on June 1, and I write for a prescription to be filled Aug 1, this will not be filled by pharmacy.    Here is an updated [facility] policy  1. At the current visit, we will write for a 28 day supply of the medicine.  2. We will write for a 2nd prescription for 30 day supply.  You HAVE to fill this 2nd prescription within 30 days of when it was written, or it is void  3. Return to clinic prior to next fill - you will need clinic visit every 2 months, instead of every 3 months.      The [place] State Legislature is the one who passed the law.  This law did not come from [facility], [facility], [facility] or any other medical organization.  We are required to follow the law.  I recommend that your write your representative if you feel this is not a good law          Return in about 2 months (around 10/2/2019) for Pain Medication Refill.     Greater than 40 minutes was spent face-to-face with the patient by the provider.  Greater than 50% was spent in counseling or coordinating care for this patient.      Dr. Grecia Barba,   [facility]

## 2019-08-02 ENCOUNTER — OFFICE VISIT (OUTPATIENT)
Dept: FAMILY MEDICINE | Facility: CLINIC | Age: 34
End: 2019-08-02
Payer: MEDICARE

## 2019-08-02 VITALS
HEART RATE: 101 BPM | SYSTOLIC BLOOD PRESSURE: 110 MMHG | OXYGEN SATURATION: 97 % | DIASTOLIC BLOOD PRESSURE: 66 MMHG | WEIGHT: 181 LBS | TEMPERATURE: 99.2 F | BODY MASS INDEX: 33.11 KG/M2 | RESPIRATION RATE: 12 BRPM

## 2019-08-02 DIAGNOSIS — R11.2 NAUSEA AND VOMITING, INTRACTABILITY OF VOMITING NOT SPECIFIED, UNSPECIFIED VOMITING TYPE: ICD-10-CM

## 2019-08-02 DIAGNOSIS — G89.4 CHRONIC PAIN SYNDROME: Primary | Chronic | ICD-10-CM

## 2019-08-02 DIAGNOSIS — F11.90 CHRONIC, CONTINUOUS USE OF OPIOIDS: ICD-10-CM

## 2019-08-02 PROCEDURE — 99215 OFFICE O/P EST HI 40 MIN: CPT | Performed by: INTERNAL MEDICINE

## 2019-08-02 RX ORDER — ONDANSETRON 8 MG/1
8 TABLET, ORALLY DISINTEGRATING ORAL EVERY 8 HOURS PRN
Qty: 30 TABLET | Refills: 11 | Status: SHIPPED | OUTPATIENT
Start: 2019-08-02 | End: 2020-03-25

## 2019-08-02 RX ORDER — OXYCODONE HYDROCHLORIDE 15 MG/1
15 TABLET ORAL EVERY 4 HOURS PRN
Qty: 108 TABLET | Refills: 0 | Status: SHIPPED | OUTPATIENT
Start: 2019-08-24 | End: 2019-09-27

## 2019-08-02 NOTE — PATIENT INSTRUCTIONS
1. Oxycodone written for 8/24.  Call in for next refill 9/24. Will be for 100 pills.  Be seen next in Oct for refill.  2. Refill of zofran      The [place] legislature changed the law about how doctors and pharmacies prescribe and fill opiate pain medications. We were notified about this law change the end of June. Starting July 1, no prescription for an opiate or narcotic pain reliever may be initially dispensed more than 30 days after the date on which the prescription was issued.  This means if I see you on June 1, and I write for a prescription to be filled Aug 1, this will not be filled by pharmacy.    Here is an updated [facility] policy  1. At the current visit, we will write for a 28 day supply of the medicine.  2. We will write for a 2nd prescription for 30 day supply.  You HAVE to fill this 2nd prescription within 30 days of when it was written, or it is void  3. Return to clinic prior to next fill - you will need clinic visit every 2 months, instead of every 3 months.      The [place] State Legislature is the one who passed the law.  This law did not come from [facility], [facility], [employer] or any other medical organization.  We are required to follow the law.  I recommend that your write your representative if you feel this is not a good law

## 2019-08-03 ASSESSMENT — ASTHMA QUESTIONNAIRES: ACT_TOTALSCORE: 20

## 2019-08-07 LAB — PAIN DRUG SCR UR W RPTD MEDS: NORMAL

## 2019-08-28 ENCOUNTER — OFFICE VISIT (OUTPATIENT)
Dept: PSYCHOLOGY | Facility: CLINIC | Age: 34
End: 2019-08-28
Payer: MEDICARE

## 2019-08-28 DIAGNOSIS — F41.1 GAD (GENERALIZED ANXIETY DISORDER): ICD-10-CM

## 2019-08-28 DIAGNOSIS — F43.10 PTSD (POST-TRAUMATIC STRESS DISORDER): Primary | ICD-10-CM

## 2019-08-28 DIAGNOSIS — F33.2 SEVERE RECURRENT MAJOR DEPRESSION WITHOUT PSYCHOTIC FEATURES (H): ICD-10-CM

## 2019-08-28 PROCEDURE — 90791 PSYCH DIAGNOSTIC EVALUATION: CPT | Performed by: PSYCHOLOGIST

## 2019-08-28 NOTE — PROGRESS NOTES
"                                                                                                                                                                                                                                                         Adult Intake Structured Interview  Standard Diagnostic Assessment      CLIENT'S NAME: Molly Teague  MRN:   [id number]  :   1985  ACCT. NUMBER: [id number]  DATE OF SERVICE: 19 & 19  VIDEO VISIT: No    Identifying Information:  Client is a 34 year old, , partnered / significant other female. Client was referred for counseling by her PCP at [facility], following a discussion client reportedly initiated with her medical provider on the subject with encouragement from family. Client is currently disabled. Client attended the session alone.       Client's Statement of Presenting Concern:  Client reports the reason for seeking therapy at this time as \"many recommendations from doctors to seek help and finally realizing that I need to seek help.\" She identified starting to struggle with medical issues around \" or ,\" when she was diagnosed with CREST during the first trimester of her pregnancy, and this got worse over time which impacted her mental health. Reported that at \"34 weeks\" of gestation, she had to have an emergency , and her son had a NICU hospitalization for over a month. During her postpartum year, CREST sxs rapidly progressed, and she was in a \"6 week\" long coma with multi-organ failure when her son was about 1 year old, reportedly. She recalled coming out of coma and described \"trauma from waking up near death and intubated\" \"felt defenseless, it was so intense waking up.\" Described remaining hospitalized for about \"8 months, I lost 65 lbs,\" her skin \"blackened and peeled,\" and \"I didn't feel human.\" Explained that she had to be lifted and moved around by nursing and health care staff \"like a " "thing\" to \"re-learn how to go to the bathroom\". Identified that she could not communicate her needs initially until she was later able to speak with a trachea, which \"helped\". Stated that she was told by family that she was given last rites a few times. Indicated that her mother did not leave her bedside and was very supportive during this process. Stated at times she was more fully aware of her condition and pain, but could only observe \"like a bad nightmare\" \"I felt I was stripped of my identity.\" Post hospital discharge, she reported participating in a long rehabilitative process including intensive speech, physical, and occupational therapy. Identified having moved about a year and a half ago to [place] due to her 's job, his family living here, and this being close to [facility]. Client endorsed the following depressive symptoms on the PHQ-9: feeling down/depressed, little energy, poor appetite, and difficulty falling asleep. Her score of 6 on the PHQ-9 suggests depressive symptoms in the mild range for the past 2 weeks (compared to a score of 22 on 06/20/19, per EMR flowsheet review, meeting full criteria then for a Severe Major Depressive Episode). Client endorsed the following anxiety symptoms on the REX-7: feeling nervous anxious or on edge, not being able to stop or control worrying, worrying too much about different things, trouble relaxing, restlessness, and becoming easily annoyed or irritable. Her score of 8 on the REX-7 suggests anxiety symptoms in the mild range for the past 2 weeks (compared to a score of 14 on 06/20/19, per EMR flowsheet review). Client stated that her symptoms have resulted in the following functional impairments: home life with , management of the household and or completion of tasks, relationship(s), self-care and social interactions.      History of Presenting Concern:  Client reports that these problem(s) began with \"depression at age 17\" but was able " "to feel positive and happy by the age of 19 when working and starting to be successful. Depression reportedly resumed upon \"waking from coma,\" and experienced trauma upon \"waking up intubated,\" \"not being able to speak,\" and started experienced recurring panic attacks during hospitalization and recovery. Identified that sometimes when alone in a room she tends to dissociate. Client has attempted to resolve these concerns in the past through some counseling/had a few sessions in 2018- and psychotropic medication. Client reports that other professional(s) are involved in providing support / services. (PCP, recently initiated psychiatric care, Rheumatology, Ob/Gyn)      Social History:   Client reported she grew up in \"[place], with parents, siblings, and close fam.\" They were the second born of 2 children. This is an intact family and parents remain \" happily and very supportive.\" Client reported that her childhood was \"Closeup. Parents worked full time. My grandmother who lived close by help in sending/taking us to school and meals till parents arrive home from work. Weekends were fam based and close friendships.\" Client described her current relationships with family of origin as \"parents are real supportive, real good they worry a lot about me, even my sister.\"    Client reported a history of \"3\" committed relationships. Client has been partnered / significant other for \"7 and a half years.\" Client reported having a 4 yo son \"Td\" who struggled with \"speech\"/selective mutism when she was reportedly hospitalized at the time when \"He was 1 year old. He stopped talking\" then, and was impacted by her prolonged hospitalization. Described that she currently \"spend most of my time with my son. He's going into kindergarden this fall. Sig other works a lot and tries to be home.\" Client identified \"a few\" stable and meaningful social connections. Client reported that she has not been involved with the legal system. " " Client's highest education level was \"high school graduate.\" Client did not identify any learning problems. There are no ethnic, cultural or Hindu factors that may be relevant for therapy. Client identified her preferred language to be English. Client reported she does not need the assistance of an  or other support involved in therapy. Modifications will not be used to assist communication in therapy. Client did not serve in the .     Client reports family history includes Cerebrovascular Disease in her maternal grandmother; Depression in her sister; Diabetes in her maternal aunt; Hyperlipidemia in her father and paternal grandfather; Other - See Comments in her sister.    Mental Health History:  Client reported the following biological family members or relatives with mental health issues: Mother experienced Depression, Sister experienced Depression, and Uncle experienced Depression.  Client previously received the following mental health diagnosis: \"REX, Depression and PTSD.\" Client has received the following mental health services in the past: counseling and medication(s) from physician / PCP (was started on psychotropic med during rehabilitation).  Hospitalizations: None for mental health, but several for physical health issues.  Client is currently receiving the following services: medication(s) from physician / PCP , and recently established psychiatric care.      Chemical Health History:  Client reported no family history of chemical health issues. Client has not received chemical dependency treatment in the past. Client is not currently receiving any chemical dependency treatment. Client reports no problems as a result of their drinking / drug use.      Client Reports:  Client reports using alcohol \" 2 times\" per \"month\" and has \"2 drinks max\" \"only in social setting\". Client first started drinking at age \"18.\" Client reported date of last use was \"2 weeks ago, one glass of wine\" " "when visiting family in [place].  Client reports heaviest use was when \"dating and going out\" \"socially in 2012,\" but never till intoxicated.  Client denies using tobacco.  Client denies using marijuana.  Client reports using caffeine \" 3 times\" per \"week\" and drinks \"1 to 2 cups\"at a time. Patient started using caffeine at age \"18\".  Client denies using street drugs.  Client denies the non-medical use of prescription or over the counter drugs.    CAGE: None of the patient's responses to the CAGE screening were positive / Negative CAGE score   Based on the negative Cage-Aid score and clinical interview there  are not indications of drug or alcohol abuse.    Discussed the general effects of drugs and alcohol on health and well-being. Therapist gave client printed information about the effects of chemical use on her health and well being.      Significant Losses / Trauma / Abuse / Neglect Issues:  There are indications or report of significant loss, trauma, abuse or neglect issues related to: major medical problems \"I was diagnosed with scleroderma at age 27. My body has changed to such a debilitating way. Just being or trying to be normal on daily events takes energy and strength I just can't or don't seem to have.\"    Issues of possible neglect are not present.      Medical Issues:  Client has had a physical exam to rule out medical causes for current symptoms. Date of last physical exam was within the past year. Client was encouraged to follow up with PCP if symptoms were to develop. The client has a [place] Primary Care Provider, who is named Grecia Barba.. The client has a psychiatrist whose name and location are: Agustin Mckenzie in [facility]. Client reports the following current medical concerns: \"Scleroderma/CREST, Asthma, GERD related to scleroderma, Migraines, Insomnia, Neuropathy, High Blood Pressure, Stage 3 kidney, Arthritis.\" The client reports the presence of chronic or episodic pain in " "the form of \"dull and stabbing pain, in arms, legs, lower back.\" The pain level is \"severe\" and has a frequency of \"constant, ongoing daily pain\" \"depends on how much I am on my feet.\" There are significant nutritional concerns: \"taking a toll on my body\"/ \"intense weight fluctuation\" due to severe nausea/lack of appetite at times.    Client reports current meds as:   Current Outpatient Medications   Medication Sig     amLODIPine (NORVASC) 5 MG tablet Take 5 mg by mouth daily     blood glucose (NO BRAND SPECIFIED) lancets standard Use to test blood sugar 1 time daily     blood glucose (NO BRAND SPECIFIED) test strip Use to test blood sugar 1 time daily     budesonide-formoterol (SYMBICORT) 160-4.5 MCG/ACT Inhaler Inhale 2 puffs into the lungs 2 times daily     busPIRone HCl (BUSPAR) 30 MG tablet Take 1 tablet (30 mg) by mouth 2 times daily     citalopram (CELEXA) 10 MG tablet 1 tab daily. Take with 20 mg daily.     citalopram (CELEXA) 20 MG tablet 1 tab daily. Take with 10 mg daily.     citalopram (CELEXA) 40 MG tablet Take 1 tablet (40 mg) by mouth daily     Cyanocobalamin (B-12) 1000 MCG TBCR Take 1,000 mcg by mouth daily     diphenhydrAMINE (BENADRYL) 25 MG tablet Take 1 tablet (25 mg) by mouth every 6 hours as needed for itching or allergies     Doxylamine Succinate, Sleep, (UNISOM PO)      DULoxetine (CYMBALTA) 30 MG capsule Take 1 capsule (30 mg) by mouth 2 times daily     ferrous gluconate (FERGON) 324 (38 FE) MG tablet TAKE ONE TABLET BY MOUTH EVERY DAY WITH BREAKFAST     gabapentin (NEURONTIN) 300 MG capsule TAKE TWO CAPSULES BY MOUTH THREE TIMES A DAY     GOODSENSE MIGRAINE FORMULA 250-250-65 MG per tablet TAKE ONE TABLET BY MOUTH EVERY 6 HOURS AS NEEDED FOR HEADACHES     lisinopril (PRINIVIL/ZESTRIL) 10 MG tablet Take 1 tablet (10 mg) by mouth daily     LORazepam (ATIVAN) 1 MG tablet Take 1 tablet (1 mg) by mouth daily as needed for anxiety #30 to last 30 days     montelukast (SINGULAIR) 10 MG tablet TAKE " ONE TABLET BY MOUTH AT BEDTIME     naloxone (NARCAN) 4 MG/0.1ML nasal spray Spray 1 spray (4 mg) into one nostril alternating nostrils once as needed for opioid reversal every 2-3 minutes until assistance arrives     naloxone (NARCAN) nasal spray Spray 1 spray (4 mg) into one nostril alternating nostrils as needed for opioid reversal every 2-3 minutes until assistance arrives     omeprazole (PRILOSEC) 40 MG DR capsule TAKE ONE CAPSULE BY MOUTH TWICE A DAY     ondansetron (ZOFRAN-ODT) 8 MG ODT tab Take 1 tablet (8 mg) by mouth every 8 hours as needed for nausea     oxyCODONE IR (ROXICODONE) 15 MG tablet Take 1 tablet (15 mg) by mouth every 4 hours as needed for severe pain     polyethylene glycol (MIRALAX) powder Take 17 g (1 capful) by mouth daily     promethazine (PHENERGAN) 25 MG tablet Take 1 tablet (25 mg) by mouth 2 times daily as needed for nausea     promethazine (PHENERGAN) 50 MG/ML SOLN IV injection Inject 0.5 mL (25 mg) into the muscle every 6 hours as needed     ranitidine (ZANTAC) 150 MG tablet TAKE ONE TABLET BY MOUTH TWICE A DAY AS NEEDED FOR HEARTBURN     sucralfate (CARAFATE) 1 GM tablet Take 1 tablet (1 g) by mouth 4 times daily     VENTOLIN  (90 Base) MCG/ACT inhaler INHALE 2 PUFFS INTO THE LUNGS ONCE EVERY 4 HOURS AS NEEDED FOR SHORTNESS OF BREATH / DYSPNEA / WHEEZING     No current facility-administered medications for this visit.        Client Allergies:  Allergies   Allergen Reactions     No Known Allergies      Seasonal Allergies      Shellfish-Derived Products Nausea     Rash on face     the following allergies to medications: see EMR    Medical History:  Past Medical History:   Diagnosis Date     Acute pyelonephritis 6/9/2017     Altered mental state 3/29/2018     Arthritis      Blood clotting disorder (H)     P E     History of blood transfusion      Hypertension      Long-term (current) use of anticoagulants [Z79.01] 9/9/2016     PE (pulmonary embolism) 9/7/2016     Rheumatism       "Scleroderma (H) 9/22/2016     Uncomplicated asthma          Medication Adherence:  Client reports taking prescribed medications as prescribed  (except when really sick/nauseated \"I can't take it\")    Client was provided recommendation to follow-up with prescribing physician.    Mental Status Assessment:  Appearance:   Appropriate /Casual (skin discoloration in some areas of her face/neck)  Eye Contact:   Fair /Intermittent  Psychomotor Behavior: Restless /Fidgety moving around in chair  Attitude:   Cooperative   Orientation:   All  Speech   Rate / Production: Hyperverbal    Volume:  Normal   Mood:    Anxious    Affect:    Appropriate   Thought Content:  Clear   Thought Form:  Circumstantial  Insight:    Good       Review of Symptoms:  Depression: Sleep Energy Appetite  Kaylen:  No symptoms  Psychosis: No symptoms  Anxiety: Worries Nervousness/On edge Describe:  Irritability, Trouble relaxing, Muscle Tension, Insomnia, Fatigue Triggers: physical health, \"weight\", \"looks,\" son/parenting, people's perception of her, \"what happened, what's going to happen\", relationships, \"everything\"  Panic:  Palpitations Tremors Shortness of Breath Tingling Numbness Sense of Impending  Doom Triggers: closed places, waking up sweating from a nightmare  Post Traumatic Stress Disorder: Re-experiencing of Trauma (nightmares/flashbacks) Avoid Traumatic Stimuli Increased Arousal Dissociation Impaired Function Trauma: prolonged hospitalization due medical condition-physical health issues/waking up from coma a few years ago  Obsessive Compulsive Disorder: No symptoms (likes to organize but not clinically sig sxs)  Eating Disorder: No symptoms currently (Hx of food restriction and overeating at times, but since got physically ill the \"nausea and GERD\" in the past few years have been the main cause of appetite loss and not eating at times reportedly)  Oppositional Defiant Disorder: No symptoms  ADD / ADHD: No symptoms  Conduct Disorder: No " "symptoms      Safety Assessment:    History of Safety Concerns:   Client reported a history of suicidal ideation. Passive SI \"thought fam. would be better off\"  Onset: around age 15 \"thought didn't have a purpose\", then \"didn't have these thoughts after (age) 19 up till 2015.\" Last time: \"9 months ago\" and Frequency: \"twice when it was intense in 2015 and 2018.\"  Client identified the following triggers to suicidal ideation: hopelessness, feeling inadequate, worsening depression, and \"I get really scared of getting worse physically\"  Client denied a history of suicide attempts.    Client denied a history of homicidal ideation.    Client denied a history of self-injurious ideation and behaviors.   Client denied a history of personal safety concerns.    Client denied a history of assaultive behaviors.        Current Safety Concerns:  Client denies current suicidal ideation.    Client denies current homicidal ideation and behaviors.  Client denies current self-injurious ideation and behaviors.    Client denies current concerns for personal safety.    Client reports the following protective factors: positive relationships/positive family connections, forward/future oriented thinking, restricted access to lethal means , safe and stable environment, purpose as a mother, help seeking behaviors when distressed (\"I call Joshua\"/, go to doctor), daily obligations, effective problem-solving skills, healthy fear of risky behaviors or pain, financial stability and access to a variety of clinical interventions.    Client reports there are no firearms in the house.     Plan for Safety and Risk Management:  Recommended that patient call 911 or go to the local ED should there be a change in any of these risk factors, as well as outreach to the crisis numbers provided:Suicide Prevention National Hotline, Text [place] 247319, and her [facility] Crisis Number.    Client's Strengths and Limitations:  Client identified the following " "strengths or resources that will help her succeed in counseling: \"I am committed to talk about my problems and have family support.\" Client identified the following supports: \"family and anju.\" Things that may interfere with the client's success in counseling include: \"I've lost contact with most of my friends.\"      Diagnostic Criteria:  REX  A. Excessive anxiety and worry about a number of events or activities (such as work or school performance).   B. The person finds it difficult to control the worry.  C. Select 3 or more symptoms (required for diagnosis). Only one item is required in children.   - Restlessness or feeling keyed up or on edge.    - Being easily fatigued.    - Difficulty concentrating or mind going blank.    - Irritability.    - Muscle tension.    - Sleep disturbance (difficulty falling or staying asleep, or restless unsatisfying sleep).   D. The focus of the anxiety and worry is not confined to features of an Axis I disorder.  E. The anxiety, worry, or physical symptoms cause clinically significant distress or impairment in social, occupational, or other important areas of functioning.   F. The disturbance is not due to the direct physiological effects of a substance (e.g., a drug of abuse, a medication) or a general medical condition (e.g., hyperthyroidism) and does not occur exclusively during a Mood Disorder, a Psychotic Disorder, or a Pervasive Developmental Disorder.     MDD (endorsed full criteria on 06/20/19 per PHQ-9 in EMR, currently experiencing decreased sxs)  CRITERIA (A-C) REPRESENT A MAJOR DEPRESSIVE EPISODE - SELECT THESE CRITERIA  A) Recurrent episode(s) - symptoms have been present during the same 2-week period and represent a change from previous functioning 5 or more symptoms (required for diagnosis)   - Depressed mood. Note: In children and adolescents, can be irritable mood.     - Significant weight loss when not dieting decrease in appetite.    - Decreased sleep.    - " Fatigue or loss of energy.   B) The symptoms cause clinically significant distress or impairment in social, occupational, or other important areas of functioning  C) The episode is not attributable to the physiological effects of a substance or to another medical condition  D) The occurrence of major depressive episode is not better explained by other thought / psychotic disorders  E) There has never been a manic episode or hypomanic episode     PTSD  A. The person has been exposed to a traumatic event in which both of the following were present:     (1) the person experienced, witnessed, or was confronted with an event or events that involved actual or threatened death or serious injury, or a threat to the physical integrity of self or others     (2) the person's response involved intense fear, helplessness, or horror. Note: In children, this may be expressed instead by disorganized or agitated behavior  B. The traumatic event is persistently reexperienced in one (or more) of the following ways:     - Recurrent and intrusive distressing recollections of the event, including images, thoughts, or perceptions. Note: In young children, repetitive play may occur in which themes or aspects of the trauma are expressed.      - Recurrent distressing dreams of the event. Note: In children, there may be frightening dreams without recognizable content.      - Intense psychological distress at exposure to internal or external cues that symbolize or resemble an aspect of the traumatic event.      - Physiological reactivity on exposure to internal or external cues that symbolize or resemble an aspect of the traumatic event.   C. Persistent avoidance of stimuli associated with the trauma and numbing of general responsiveness (not present before the trauma), as indicated by three (or more) of the following:     - Efforts to avoid thoughts, feelings, or conversations associated with the trauma.      - Markedly diminished interest or  "participation in significant activities.      - Feeling of detachment or estrangement from others.   D. Persistent symptoms of increased arousal (not present before the trauma), as indicated by two (or more) of the following:     - Difficulty falling or staying asleep.      - Irritability or outbursts of anger  E. Duration of the disturbance is more than 1 month.    Functional Status:  Client's symptoms have caused and are causing reduced functional status in the following areas: Activities of Daily Living - impacted interests, house chores, and self-care (not eating or showering when sxs increase)  Follow through with Medical recommendations - sometime losing interest and not following through with PT, hand therapy, counseling  Social / Relational - \"shut off my friends...everybody even Joshua sometimes\", withdrawn, lost friendships, didn't let most friends know that she was sick, not participating in social events like she used to      DSM5 Diagnoses: (Sustained by DSM5 Criteria Listed Above)      Diagnoses: 300.02 (F41.1) Generalized Anxiety Disorder; 309.81 (F43.10) Posttraumatic Stress Disorder (includes Posttraumatic Stress Disorder for Children 6 Years and Younger)  With dissociative symptoms; 296.33 (F33.2) Major Depressive Disorder, Recurrent Episode, Severe _ and With anxious distress (provisional) ; Rule Out/ 296.35 MDD, in partial remission  Psychosocial & Contextual Factors: Health related stress/Multiple Medical problems (including CREST syndrome/Scleroderma); Relationship with SO; Son with \"speech\" issues    WHODAS 2.0 (12 item)            This questionnaire asks about difficulties due to health conditions. Health conditions  include  disease or illnesses, other health problems that may be short or long lasting,  injuries, mental health or emotional problems, and problems with alcohol or drugs.                     Think back over the past 30 days and answer these questions, thinking about how much "  difficulty you had doing the following activities. For each question, please [place] only  one response.    S1 Standing for long periods such as 30 minutes? Severe =       4   S2 Taking care of household responsibilities? Moderate =   3   S3 Learning a new task, for example, learning how to get to a new place? Mild =           2   S4 How much of a problem do you have joining community activities (for example, festivals, Jain or other activities) in the same way as anyone else can? Mild =           2   S5 How much have you been emotionally affected by your health problems? Severe =       4     In the past 30 days, how much difficulty did you have in:   S6 Concentrating on doing something for ten minutes? Mild =           2   S7 Walking a long distance such as a kilometer (or equivalent)? Severe =       4   S8 Washing your whole body? None =         1   S9 Getting dressed? None =         1   S10 Dealing with people you do not know? None =         1   S11 Maintaining a friendship? Mild =           2   S12 Your day to day work? Moderate =   3     H1 Overall, in the past 30 days, how many days were these difficulties present? Record number of days 15   H2 In the past 30 days, for how many days were you totally unable to carry out your usual activities or work because of any health condition? Record number of days  5   H3 In the past 30 days, not counting the days that you were totally unable, for how many days did you cut back or reduce your usual activities or work because of any health condition? Record number of days 10       Attendance Agreement:  Client has signed Attendance Agreement:Yes      Collaboration:  The client is receiving treatment / structured support from the following professional(s) / service and treatment. Collaboration will be initiated with: primary care physician and psychiatry.      Preliminary Treatment Plan:  The client reports no currently identified Jain, ethnic or cultural issues  relevant to therapy.     services are not indicated.    Modifications to assist communication are not indicated.    The concerns identified by the client will be addressed in therapy.    Initial Treatment will focus on: Depressed Mood - all endorsed sxs on PHQ-9  Anxiety - all endorsed sxs on REX-7  Risk Management / Safety Concerns related to: Hx of passive Suicidal ideation; monitor/develop safety plan    As a preliminary treatment goal, client will experience a reduction in depressed mood, will develop more effective coping skills to manage depressive symptoms, will develop healthy cognitive patterns and beliefs, will increase ability to function adaptively and will continue to take medications as prescribed / participate in supportive activities and services , will experience a reduction in anxiety, will develop more effective coping skills to manage anxiety symptoms and will develop healthy cognitive patterns and beliefs, will address relationship difficulties in a more adaptive manner and will develop strategies for more effective management of risk issues.    The focus of initial interventions will be to alleviate anxiety, alleviate depressed mood, facilitate appropriate expression of feelings, increase ability to function adaptively, increase coping skills, increase self esteem, increase trust, process traumas, provide homework to reinforce skill development, provide psychoeducation regarding depression, anxiety and grief / loss, reduce panic attacks, teach CBT skills, teach DBT skills, teach distress tolerance skills, teach emotional regulation, teach mindfulness skills, teach relaxation strategies, teach sleep hygiene and teach stress management techniques.    Referral to another professional/service is not indicated at this time..    A Release of Information is not needed at this time.    Report to child / adult protection services was NA.    Client will have access to their [facility]' medical record.    Erna Palacios PsyD, LP  July 16 & August 28, 2019

## 2019-08-31 DIAGNOSIS — M34.1 CREST (CALCINOSIS, RAYNAUD'S PHENOMENON, ESOPHAGEAL DYSFUNCTION, SCLERODACTYLY, TELANGIECTASIA) (H): ICD-10-CM

## 2019-08-31 DIAGNOSIS — K21.00 GASTROESOPHAGEAL REFLUX DISEASE WITH ESOPHAGITIS: ICD-10-CM

## 2019-09-03 RX ORDER — OMEPRAZOLE 40 MG/1
CAPSULE, DELAYED RELEASE ORAL
Qty: 180 CAPSULE | Refills: 0 | Status: SHIPPED | OUTPATIENT
Start: 2019-09-03 | End: 2020-01-24

## 2019-09-03 NOTE — TELEPHONE ENCOUNTER
Prescription approved per [facility] Refill Protocol.    Marisa CARVER RN

## 2019-09-03 NOTE — TELEPHONE ENCOUNTER
"Requested Prescriptions   Pending Prescriptions Disp Refills     omeprazole (PRILOSEC) 40 MG DR capsule [Pharmacy Med Name: OMEPRAZOLE 40MG CPDR] 180 capsule 1     Sig: TAKE ONE CAPSULE BY MOUTH TWICE A DAY   Last Written Prescription Date:  2/20/19  Last Fill Quantity: 180 cap,  # refills: 1   Last office visit: 8/2/2019 with prescribing provider:  Grecia Barba     Future Office Visit:   Next 5 appointments (look out 90 days)    Sep 03, 2019  2:30 PM CDT  Office Visit with William Whitt MD  [facility] ([facility] [address] [facility] [address]  [phone]   Sep 04, 2019 10:00 AM CDT  Return Visit with Erna Palacios LP  [address] [address]  [phone]   Sep 11, 2019  4:15 PM CDT  Return Visit with Agustin Mckenzie CNP  [facility] ([facility]) [address] [place] [address]  [phone]   Sep 19, 2019 10:00 AM CDT  Return Visit with Erna Palacios LP  [facility] ([place] [address] [place] [address]  [phone]   Oct 02, 2019 11:00 AM CDT  Return Visit with Erna Palacios LP  [facility] ([place] [address] [address]  [phone]             PPI Protocol Passed - 8/31/2019 11:28 AM        Passed - Not on Clopidogrel (unless Pantoprazole ordered)        Passed - No diagnosis of osteoporosis on record        Passed - Recent (12 mo) or future (30 days) visit within the authorizing provider's specialty     Patient had office visit in the last 12 months or has a visit in the next 30 days with authorizing provider or within the authorizing provider's specialty.  See \"Patient Info\" tab in inbasket, or \"Choose Columns\" " in Meds & Orders section of the refill encounter.              Passed - Medication is active on med list        Passed - Patient is age 18 or older        Passed - No active pregnacy on record        Passed - No positive pregnancy test in past 12 months

## 2019-09-04 ENCOUNTER — OFFICE VISIT (OUTPATIENT)
Dept: PSYCHOLOGY | Facility: CLINIC | Age: 34
End: 2019-09-04
Payer: MEDICARE

## 2019-09-04 DIAGNOSIS — F43.10 PTSD (POST-TRAUMATIC STRESS DISORDER): ICD-10-CM

## 2019-09-04 DIAGNOSIS — F33.2 SEVERE RECURRENT MAJOR DEPRESSION WITHOUT PSYCHOTIC FEATURES (H): ICD-10-CM

## 2019-09-04 DIAGNOSIS — F41.1 GAD (GENERALIZED ANXIETY DISORDER): Primary | ICD-10-CM

## 2019-09-04 PROCEDURE — 90834 PSYTX W PT 45 MINUTES: CPT | Performed by: PSYCHOLOGIST

## 2019-09-04 ASSESSMENT — ANXIETY QUESTIONNAIRES
5. BEING SO RESTLESS THAT IT IS HARD TO SIT STILL: SEVERAL DAYS
IF YOU CHECKED OFF ANY PROBLEMS ON THIS QUESTIONNAIRE, HOW DIFFICULT HAVE THESE PROBLEMS MADE IT FOR YOU TO DO YOUR WORK, TAKE CARE OF THINGS AT HOME, OR GET ALONG WITH OTHER PEOPLE: VERY DIFFICULT
6. BECOMING EASILY ANNOYED OR IRRITABLE: NEARLY EVERY DAY
2. NOT BEING ABLE TO STOP OR CONTROL WORRYING: MORE THAN HALF THE DAYS
1. FEELING NERVOUS, ANXIOUS, OR ON EDGE: MORE THAN HALF THE DAYS
3. WORRYING TOO MUCH ABOUT DIFFERENT THINGS: MORE THAN HALF THE DAYS
GAD7 TOTAL SCORE: 14
7. FEELING AFRAID AS IF SOMETHING AWFUL MIGHT HAPPEN: SEVERAL DAYS

## 2019-09-04 ASSESSMENT — PATIENT HEALTH QUESTIONNAIRE - PHQ9
5. POOR APPETITE OR OVEREATING: NEARLY EVERY DAY
SUM OF ALL RESPONSES TO PHQ QUESTIONS 1-9: 8

## 2019-09-04 NOTE — PROGRESS NOTES
"                                             Progress Note    Patient Name: Molly Teague  Date: September 4, 2019           Service Type: Individual  Video Visit: No     Session Start Time: 10:10am  Session End Time: 11:02am     Session Length: 52min    Session #: 3    Attendees: Client attended alone     Treatment Plan Last Reviewed: September 4, 2019  PHQ-9 / REX-7 : September 4, 2019      DATA  Interactive Complexity: No  Crisis: No       Progress Since Last Session (Related to Symptoms / Goals / Homework):   Symptoms: Worsening , depressive sxs score remains in the mild range, and anxiety sxs score increased from mild to high moderate range (see PHQ-9 & REX-7)    Homework: Partially completed (reported on the use of deep breathing in coping)      Episode of Care Goals: No improvement - CONTEMPLATION (Considering change and yet undecided); Intervened by assessing the negative and positive thinking (ambivalence) about behavior change     Current / Ongoing Stressors and Concerns:   Health related stress/Multiple Medical problems (including CREST syndrome/Scleroderma); Relationship with SO; Caring for Son who struggles with speech issues     Client reflected on recent \"migraines\" experienced, and episodes where she felt \"lightheaded\" and \"passed out.\" Processed feeling \"anxious\" today in anticipating PTA meeting tonight, and having to show up & socialize. Shared frustrations in context of her dynamics with her mother in law who wants to help, and client needing her space, as well as with her partner being absent a lot due to work and not delegating as much as client feels he should (wants to do things himself reportedly to guarantee desired outcome).     Treatment Objective(s) Addressed in This Session:     will identify negative self-talk and behaviors: challenge core beliefs, myths, and actions   will learn and utilize at least 3 coping skills to manage anxiety and worry thoughts " effectively     Intervention:   Solution Focused: asked present & future focused questions in identifying and co-developing goals for this episode of care. Co-developed treatment plan    Facilitated emotional processing around reported recent health issues, and anxiety experienced in anticipation of tonight's PTA meeting.  Provided support & validation  CBT, MBCT, DBT informed: surfaced worry thoughts related to socializing and self-consciousness around her physical appearance-skin condition due to Scleroderma/engaged in cognitive restructuring and perspective taking. Encouraged use of self-validation and de-fusing from unhelpful thoughts.  Provided psychoeducation and review of grounding strategy (MF with Ob/Jose exercise) introduced at intake. Discussed use of O2EA in managing avoidance urges and reminding herself of what's important to her (her son). Provided brief psychoeducation on the use of TIP skill/with ice and deep breathing in managing intense emotions and coping with distress today                   ASSESSMENT: Current Emotional / Mental Status (status of significant symptoms):   Risk status (Self / Other harm or suicidal ideation)   Patient denies current fears or concerns for personal safety.   Patient denies current or recent suicidal ideation or behaviors.   Patientdenies current or recent homicidal ideation or behaviors.   Patient denies current or recent self injurious behavior or ideation.   Patient denies other safety concerns.   Patient Patient reports there has been no change in risk factors since their last session.     PatientPatient reports there has been no change in protective factors since their last session.     Recommended that patient call 911 or go to the local ED should there be a change in any of these risk factors.     Appearance:   Appropriate    Eye Contact:   Good    Psychomotor Behavior: Normal    Attitude:   Cooperative    Orientation:   All   Speech    Rate /  "Production: Normal     Volume:  Normal    Mood:    \"anxious\"     Affect:    Constricted    Thought Content:  Clear    Thought Form:  Coherent  Logical    Insight:    Good      Medication Review:  No changes to current psychiatric medication(s)      Medication Compliance:   Yes, except indicated having started taking Cymbalta \"2 weeks\" now, although was prescribed this a month or two ago as she was not able to start it earlier. Explained that she \"couldn't keep it down\" because of illness and nausea.      Changes in Health Issues:   Yes: client reported experiencing in the past two weeks migraines, and a couple episodes when felt lightheaded and passed out. Encouraged client to f/u with PCP     Chemical Use Review:   Substance Use: Chemical use reviewed, no active concerns identified      Tobacco Use: No current tobacco use.      Diagnosis:  1. REX (generalized anxiety disorder)    2. PTSD (post-traumatic stress disorder)    3. Severe recurrent major depression without psychotic features (H)        Collateral Reports Completed:   Not Applicable    PLAN: (Patient Tasks / Therapist Tasks / Other)  RTC in 2 weeks.  Client to:  - F/U with PCP and health care specialists on her team as indicated for reported physical health concerns  -Utilize introduced coping skills as needed: TIP/with ice and deep breathing, as well as grounding exercise with mindfulness in Ob/Jose 3 objects in her environment as coached & reviewed today      Erna Palacios PsyD,                                                          ______________________________________________________________________    Treatment Plan    Patient's Name: Molly Teague  YOB: 1985    Date: September 4, 2019    DSM5 Diagnoses: 300.02 (F41.1) Generalized Anxiety Disorder; 309.81 (F43.10) Posttraumatic Stress Disorder (includes Posttraumatic Stress Disorder for Children 6 Years and Younger)  With dissociative symptoms; 296.35 Major Depressive Disorder, " "Recurrent Episode, in partial remission  Psychosocial & Contextual Factors: Health related stress/Multiple Medical problems (including CREST syndrome/Scleroderma); Relationship with SO; Caring for Son who struggles with speech issues  WHODAS: Simple score is 29    Referral / Collaboration:  Referral to another professional/service is not indicated at this time..   Refer for EMDR when client is ready for this and reports decreased dissociation    Anticipated number of session or this episode of care: unknown      MeasurableTreatment Goal(s) related to diagnosis / functional impairment(s)  Goal 1: Patient will experience a decrease in depressive symptoms per PHQ-9 score decrease from 8 on 09/04/19 to a score of 4 or below     I will know I've met my goal when I feel better about  myself and about my body image. When I have outlets that make me happy. When I start functioning in the world again. Having job and independence back      Objective #A (Patient Action)    Patient will Identify negative self-talk and behaviors: challenge core beliefs, myths, and actions.  Status: New - Date: 09/04/19     Intervention(s)  Therapist will guide client in identifying cognitive distortions, engage in cognitive restructuring, and de-fuse from unhelpful thoughts (CBT & MBCT informed)      Objective #B  Patient will identify 2 strategies/activities to build positive experiences that support positive mood and improve confidence in her abilities and identity  Status: New - Date: 09/04/19     Intervention(s)  Therapist will  teach strategies and guide client in identifying positive experiences she can focus on to support positive mood (DBT informed & Solution Focused)     Objective #C  Patient will practice identifying/writing about 3 \"good things\" from her day (gratitude practice)  Status: New - Date: 09/04/19     Intervention(s)  Therapist will provide psychoeducation on the benefits and practice of writing about 3 good things from her " day, positive emotion elicited, and her role in it before bedtime; will problem solve on obstacles to goal completion (Positive psychology/Solution Focused Therapy)    Objective #D  Patient will develop and utilize safety plan when experiencing SI at times of increased crisis or change in risk factors  Status: New - Date: 09/04/19    Intervention(s)  Therapist will guide client in developing FCC safety plan, reinforce safety plan use, and monitor changes in risk factors (CBT, DBT & Solution Focused)      Goal 2: Patient will experience a decrease in anxiety sxs score as evidenced by a REX-7 score decrease from 14 on  09/04/19 to a score of 4 or below.    I will know I've met my goal when.. I am not sure about this one yet. I have to think about it. I do have a lot of panic attacks during the day and nightmares at night. Maybe taking care of this.      Objective #A (Patient Action)  Patient will learn and utilize at least 3 coping skills to manage anxiety, panic attacks, and worry thoughts effectively   Status: New - Date: 09/04/19     Intervention(s)  Therapist will teach a minimum of 4 coping strategies and skills that client can utilize in managing anxiety sxs and increased distress/prevent escalation (draw from CBT-MBCT strategies and DBT skills)    Objective #B  Patient will learn & utilize at least 2 interpersonal effectiveness skills in communicating with others about her needs and observing her limits  Status: New - Date: 09/04/19     Intervention(s)  Therapist will teach client at least 2 interpersonal effectiveness skills and role play as needed assertive communication (Interpersonal Therapy, Exposure, & DBT informed skills)    Objective #C  Patient will identify & integrate a mindfulness or relaxation practice into her daily routine  Status: New - Date: 09/04/19     Intervention(s)  Therapist will teach or help identify at least 2 mindfulness/relaxation exercises that client can utilize, provide  psychoeducation on the benefits of these practices, and help problem solve on obstacle to goal completion (MBCT informed & Solution Focused)    Patient has reviewed and agreed to the above plan.      Erna Palacios, BRIDGER  September 4, 2019

## 2019-09-05 ASSESSMENT — ANXIETY QUESTIONNAIRES: GAD7 TOTAL SCORE: 14

## 2019-09-06 ENCOUNTER — OFFICE VISIT (OUTPATIENT)
Dept: OBGYN | Facility: CLINIC | Age: 34
End: 2019-09-06
Payer: MEDICARE

## 2019-09-06 VITALS
SYSTOLIC BLOOD PRESSURE: 143 MMHG | BODY MASS INDEX: 34.34 KG/M2 | DIASTOLIC BLOOD PRESSURE: 91 MMHG | WEIGHT: 186.6 LBS | RESPIRATION RATE: 18 BRPM | HEART RATE: 127 BPM | HEIGHT: 62 IN | TEMPERATURE: 100.2 F

## 2019-09-06 DIAGNOSIS — Z30.430 ENCOUNTER FOR INSERTION OF INTRAUTERINE CONTRACEPTIVE DEVICE: ICD-10-CM

## 2019-09-06 DIAGNOSIS — Z30.430 ENCOUNTER FOR INSERTION OF MIRENA IUD: ICD-10-CM

## 2019-09-06 DIAGNOSIS — Z30.430 ENCOUNTER FOR INTRAUTERINE DEVICE PLACEMENT: Primary | ICD-10-CM

## 2019-09-06 LAB — HCG UR QL: NEGATIVE

## 2019-09-06 PROCEDURE — 81025 URINE PREGNANCY TEST: CPT | Performed by: OBSTETRICS & GYNECOLOGY

## 2019-09-06 PROCEDURE — 58300 INSERT INTRAUTERINE DEVICE: CPT | Performed by: OBSTETRICS & GYNECOLOGY

## 2019-09-06 ASSESSMENT — MIFFLIN-ST. JEOR: SCORE: 1499.66

## 2019-09-06 NOTE — NURSING NOTE
"Initial BP (!) 143/91 (BP Location: Right arm, Patient Position: Chair, Cuff Size: Adult Regular)   Pulse 127   Temp 100.2  F (37.9  C) (Tympanic)   Resp 18   Ht 1.575 m (5' 2\")   Wt 84.6 kg (186 lb 9.6 oz)   BMI 34.13 kg/m   Estimated body mass index is 34.13 kg/m  as calculated from the following:    Height as of this encounter: 1.575 m (5' 2\").    Weight as of this encounter: 84.6 kg (186 lb 9.6 oz). .      "

## 2019-09-06 NOTE — PROGRESS NOTES
IUD Placement Procedure Note    Molly Teague  1985  [id number]    The patient was counseled on the risks (including including risk of infection, uterine perforation, cramping), benefits (high efficacy, low maintenance birth control, reduced risk of uterine cancer and hyperplasia, improvement in menstrual bleeding), and alternatives of the procedure. Verbal and written consent were obtained.  A UPT was negative prior to the procedure.    Technique: The patient was placed in the dorsal lithotomy position.  A speculum was placed in the vagina and the cervix was cleaned with betadine swabsx3. The anterior cervical lip was grasped with a tenaculum. The Mirena IUD was loaded, advanced to the fundus, pulled back 1cm, deployed and advanced to the fundus. Using the insertor as a sound, the uterus sounded to 8.5 cm. IUD strings were cut 3cm from the external cervical os. The patient tolerated the procedure well and there were no complications. EBL: 0cc.     Brittny Vo MD  OB/GYN

## 2019-09-09 DIAGNOSIS — F43.10 PTSD (POST-TRAUMATIC STRESS DISORDER): ICD-10-CM

## 2019-09-09 DIAGNOSIS — F32.1 MODERATE MAJOR DEPRESSION (H): ICD-10-CM

## 2019-09-09 RX ORDER — DULOXETIN HYDROCHLORIDE 30 MG/1
30 CAPSULE, DELAYED RELEASE ORAL 2 TIMES DAILY
Qty: 60 CAPSULE | Refills: 1 | Status: CANCELLED | OUTPATIENT
Start: 2019-09-09

## 2019-09-09 NOTE — TELEPHONE ENCOUNTER
Requested Prescriptions   Pending Prescriptions Disp Refills     DULoxetine (CYMBALTA) 30 MG capsule 60 capsule 1     Sig: Take 1 capsule (30 mg) by mouth 2 times daily       There is no refill protocol information for this order        Last Written Prescription Date:  6/1/2019  Last Fill Quantity: 60,  # refills: 1   Last office visit: 6/19/2019 with prescribing provider:  Jonah   Future Office Visit:   Next 5 appointments (look out 90 days)    Sep 11, 2019  4:15 PM CDT  Return Visit with Agustin Mckenzie [facility] ([facility]) [address] [place] [address]  [phone]   Sep 19, 2019 10:00 AM CDT  Return Visit with Erna Palacios LP  [facility] ([place] [address] [place] [address]  [phone]   Oct 02, 2019 11:00 AM CDT  Return Visit with Erna Palacios LP  [facility] ([place] [address] [place] [address]  [phone]   Oct 17, 2019  9:00 AM CDT  Return Visit with Erna Palacios LP  [facility] ([place] [address] [place] [address]  [phone]         Kimber Silveira, [facility]

## 2019-09-10 NOTE — TELEPHONE ENCOUNTER
Refill for: DULoxetine (CYMBALTA) 30 MG capsule    Last Appointment: 6/19/19    Next Appointment: 9/11/19    No Shows/Cancellations since last appointment: cancelled: 7/30    Last Refill in Epic (date and amount/how many days):    Disp Refills Start End DONNA   DULoxetine (CYMBALTA) 30 MG capsule 60 capsule 1 6/19/2019  --   Sig - Route: Take 1 capsule (30 mg) by mouth 2 times daily - Oral     Last office visit note reviewed and summarized below:  Treatment Plan:    Cross-taper from Celexa to Cymbalta:    Week 1: Decrease Celexa to 30 mg daily    Week 2: Decrease Celexa to 20 mg daily    Week 3: Decrease Celexa to 10 mg daily. Simultaneously start Cymbalta 30 mg daily.    Week 4: Stop Celexa. Increase Cymbalta to 60 mg daily.    May use Ativan 1 mg daily as needed for panic    Continue Buspar 30 mg BID for now    Psychotherapy intake with Dr. Palacios of [facility] on 7/16/19    Continue all other medical directions per primary care provider.     Continue all other medications as reviewed per electronic medical record today    Schedule an appointment with me in 6 weeks or sooner as needed  Patient Status:  Patient will continue to be seen for ongoing consultation and stabilization    Patient has follow up with Agustin tomorrow and can discuss refills at that time.   It appears that patient has not been taking it as prescribed, as she should have run out mid-August.       Yana Whitfield RN  09/10/19  8:19 AM

## 2019-09-17 DIAGNOSIS — F43.10 PTSD (POST-TRAUMATIC STRESS DISORDER): ICD-10-CM

## 2019-09-17 DIAGNOSIS — F32.1 MODERATE MAJOR DEPRESSION (H): ICD-10-CM

## 2019-09-17 NOTE — TELEPHONE ENCOUNTER
Last Written Prescription Date:  6/19/19  Last Fill Quantity: 60 capsule,  # refills: 1   Last office visit: 6/19/2019 with prescribing provider:  Jonah   Future Office Visit:   Next 5 appointments (look out 90 days)    Sep 19, 2019 10:00 AM CDT  Return Visit with Erna Palacios LP  [facility]  [address] [place], [place] [address]  [facility] [address]  [phone]   Oct 02, 2019 11:00 AM CDT  Return Visit with Erna Palacios LP  [facility]  [address] [place], [place] [address]  [facility] [address]  [phone]   Oct 17, 2019  9:00 AM CDT  Return Visit with Erna Palacios LP  [facility]  [address] [place], [place] [address]  [facility] [address]  [phone]         [facility] Follow-up to PHQ 6/20/2019 7/16/2019 9/4/2019   PHQ-9 9. Suicide Ideation past 2 weeks Not at all Not at all Not at all   Thoughts of suicide or self harm in past 2 weeks - - -   PHQ-9 Self harm plan? - - -   PHQ-9 Self harm action? - - -   PHQ-9 Safety concerns? - - -     REX-7 SCORE 6/20/2019 7/16/2019 9/4/2019   Total Score 14 8 14       Requested Prescriptions   Pending Prescriptions Disp Refills     DULoxetine (CYMBALTA) 30 MG capsule 60 capsule 1     Sig: Take 1 capsule (30 mg) by mouth 2 times daily       Serotonin-Norepinephrine Reuptake Inhibitors  Failed - 9/17/2019  1:46 PM        Failed - Blood pressure under 140/90 in past 12 months     BP Readings from Last 3 Encounters:   09/06/19 (!) 143/91   08/02/19 110/66   07/25/19 144/90                 Failed - PHQ-9 score of less than 5 in past 6 months     Please review last PHQ-9 score.           Passed - Medication is active on med list        Passed - Patient is age 18 or older        Passed - No active pregnancy on record        Passed - No positive pregnancy test in past 12 months        Passed - Recent (6 mo) or  "future (30 days) visit within the authorizing provider's specialty     Patient had office visit in the last 6 months or has a visit in the next 30 days with authorizing provider or within the authorizing provider's specialty.  See \"Patient Info\" tab in inbasket, or \"Choose Columns\" in Meds & Orders section of the refill encounter.              "

## 2019-09-18 RX ORDER — DULOXETIN HYDROCHLORIDE 30 MG/1
30 CAPSULE, DELAYED RELEASE ORAL 2 TIMES DAILY
Qty: 60 CAPSULE | Refills: 1 | Status: SHIPPED | OUTPATIENT
Start: 2019-09-18 | End: 2019-11-01

## 2019-09-18 NOTE — TELEPHONE ENCOUNTER
Routing refill request to provider for review/approval because:  Labs out of range:  Blood pressures not at goal and PHQ9 greater than 4, RN unable to fill.    BP Readings from Last 3 Encounters:   09/06/19 (!) 143/91   08/02/19 110/66   07/25/19 144/90       PHQ-9 SCORE 6/20/2019 7/16/2019 9/4/2019   PHQ-9 Total Score 22 6 8     Provider, writer is not a part of your [facility] - only helping to cover  site refills. If you need to communicate to care team regarding refill, please route this encounter to your care team pool. Thank you.    Aline Camacho, RN, BSN

## 2019-09-25 DIAGNOSIS — F32.1 MODERATE MAJOR DEPRESSION (H): ICD-10-CM

## 2019-09-25 DIAGNOSIS — M34.1 CREST (CALCINOSIS, RAYNAUD'S PHENOMENON, ESOPHAGEAL DYSFUNCTION, SCLERODACTYLY, TELANGIECTASIA) (H): ICD-10-CM

## 2019-09-25 DIAGNOSIS — K21.00 GASTROESOPHAGEAL REFLUX DISEASE WITH ESOPHAGITIS: ICD-10-CM

## 2019-09-25 NOTE — TELEPHONE ENCOUNTER
"Requested Prescriptions   Pending Prescriptions Disp Refills     busPIRone HCl (BUSPAR) 30 MG tablet [Pharmacy Med Name: BUSPIRONE HCL 30MG TABS] 60 tablet 1     Sig: TAKE ONE TABLET BY MOUTH TWICE A DAY       Atypical Antidepressants Protocol Failed - 9/25/2019 10:45 AM        Failed - Patient has PHQ-9 score less than 5 in past 6 months.     Please review last PHQ-9 score.           Passed - Medication active on med list        Passed - Patient is age 18 or older        Passed - No active pregnancy on record        Passed - No positive pregnancy test in past 12 mos        Passed - Recent (6 mo) or future (30 days) visit within the authorizing provider's specialty     Patient had office visit in the last 6 months or has a visit in the next 30 days with authorizing provider or within the authorizing provider's specialty.  See \"Patient Info\" tab in inbasket, or \"Choose Columns\" in Meds & Orders section of the refill encounter.       Last Written Prescription Date:  7/25/19  Last Fill Quantity: 60,  # refills: 1   Last office visit: 8/2/2019 with prescribing provider:  Jameson   Future Office Visit:   Next 5 appointments (look out 90 days)    Oct 02, 2019 11:00 AM CDT  Return Visit with Erna Palacios [address] [address]  [phone]   Oct 17, 2019  9:00 AM CDT  Return Visit with Erna Palacios LP  [place] [address] [place] [address]  [phone]              ranitidine (ZANTAC) 150 MG tablet [Pharmacy Med Name: RANITIDINE HCL 150MG TABS] 180 tablet 0     Sig: TAKE ONE TABLET BY MOUTH TWICE A DAY AS NEEDED FOR HEARTBURN       H2 Blockers Protocol Passed - 9/25/2019 10:45 AM        Passed - Patient is age 12 or older        Passed - Recent (12 mo) or future (30 days) visit within the authorizing provider's specialty " "    Patient had office visit in the last 12 months or has a visit in the next 30 days with authorizing provider or within the authorizing provider's specialty.  See \"Patient Info\" tab in inbasket, or \"Choose Columns\" in Meds & Orders section of the refill encounter.              Passed - Medication is active on med list        Last Written Prescription Date:  7/25/19  Last Fill Quantity: 180,  # refills: 0   Last office visit: 8/2/2019 with prescribing provider:  Jameson Espinoza Office Visit:   Next 5 appointments (look out 90 days)    Oct 02, 2019 11:00 AM CDT  Return Visit with Erna Palacios LP  [facility] ([facility]  [address] [place], [place] [id number]  [facility] [address]  [phone]   Oct 17, 2019  9:00 AM CDT  Return Visit with Erna Palacios LP  [facility] ([facility]  [address]  [facility] [address]  [phone]           "

## 2019-09-26 NOTE — TELEPHONE ENCOUNTER
**This refill requires provider completion and is not appropriate for RN review per RN refill guidelines.**  PH-Q9 needs to be less than 5 to approve medication on RN Refill protocol pt's score is 8.  Shivani Hernandez RN

## 2019-09-27 ENCOUNTER — TELEPHONE (OUTPATIENT)
Dept: INTERNAL MEDICINE | Facility: CLINIC | Age: 34
End: 2019-09-27

## 2019-09-27 DIAGNOSIS — G89.4 CHRONIC PAIN SYNDROME: Chronic | ICD-10-CM

## 2019-09-27 RX ORDER — BUSPIRONE HYDROCHLORIDE 30 MG/1
TABLET ORAL
Qty: 180 TABLET | Refills: 3 | Status: SHIPPED | OUTPATIENT
Start: 2019-09-27 | End: 2020-08-04

## 2019-09-27 RX ORDER — OXYCODONE HYDROCHLORIDE 15 MG/1
15 TABLET ORAL EVERY 4 HOURS PRN
Qty: 100 TABLET | Refills: 0 | Status: SHIPPED | OUTPATIENT
Start: 2019-09-27 | End: 2019-11-01

## 2019-09-27 RX ORDER — OXYCODONE HYDROCHLORIDE 15 MG/1
15 TABLET ORAL EVERY 4 HOURS PRN
Qty: 108 TABLET | Refills: 0 | Status: CANCELLED | OUTPATIENT
Start: 2019-09-27

## 2019-09-27 NOTE — TELEPHONE ENCOUNTER
Reason for Call:  Medication or medication refill:    Do you use a [facility]?  Name of the pharmacy and phone number for the current request:  [facility] [phone]    Name of the medication requested: Oxycodone  Can we leave a detailed message on this number? YES    Phone number patient can be reached at: Home number on file [phone] (home)    Best Time: Any    Call taken on 9/27/2019 at 9:38 AM by Laya Burger    Last Written Prescription Date:  8/2/19  Last Fill Quantity: 108,  # refills: 0   Last office visit: No previous visit found with prescribing provider:  5/14/19  Future Office Visit:   Next 5 appointments (look out 90 days)    Oct 02, 2019 11:00 AM CDT  Return Visit with Erna Palacios LP  [facility] ([address] [address]  [phone]   Oct 17, 2019  9:00 AM CDT  Return Visit with Erna Palacios LP  [facility] ([address] [address]  [phone]

## 2019-09-27 NOTE — TELEPHONE ENCOUNTER
Patient notified of prescription sent to pharmacy  Patient verbalize understanding and will follow up in clinic in October.    Marianne JHA Rn

## 2019-10-17 ENCOUNTER — OFFICE VISIT (OUTPATIENT)
Dept: PSYCHOLOGY | Facility: CLINIC | Age: 34
End: 2019-10-17
Payer: MEDICARE

## 2019-10-17 DIAGNOSIS — F33.2 SEVERE RECURRENT MAJOR DEPRESSION WITHOUT PSYCHOTIC FEATURES (H): Primary | ICD-10-CM

## 2019-10-17 DIAGNOSIS — F43.10 PTSD (POST-TRAUMATIC STRESS DISORDER): ICD-10-CM

## 2019-10-17 DIAGNOSIS — F41.1 GAD (GENERALIZED ANXIETY DISORDER): ICD-10-CM

## 2019-10-17 PROCEDURE — 90834 PSYTX W PT 45 MINUTES: CPT | Performed by: PSYCHOLOGIST

## 2019-10-17 ASSESSMENT — ANXIETY QUESTIONNAIRES
3. WORRYING TOO MUCH ABOUT DIFFERENT THINGS: MORE THAN HALF THE DAYS
IF YOU CHECKED OFF ANY PROBLEMS ON THIS QUESTIONNAIRE, HOW DIFFICULT HAVE THESE PROBLEMS MADE IT FOR YOU TO DO YOUR WORK, TAKE CARE OF THINGS AT HOME, OR GET ALONG WITH OTHER PEOPLE: SOMEWHAT DIFFICULT
7. FEELING AFRAID AS IF SOMETHING AWFUL MIGHT HAPPEN: SEVERAL DAYS
6. BECOMING EASILY ANNOYED OR IRRITABLE: SEVERAL DAYS
GAD7 TOTAL SCORE: 8
1. FEELING NERVOUS, ANXIOUS, OR ON EDGE: SEVERAL DAYS
5. BEING SO RESTLESS THAT IT IS HARD TO SIT STILL: NOT AT ALL
2. NOT BEING ABLE TO STOP OR CONTROL WORRYING: SEVERAL DAYS

## 2019-10-17 ASSESSMENT — PATIENT HEALTH QUESTIONNAIRE - PHQ9
SUM OF ALL RESPONSES TO PHQ QUESTIONS 1-9: 14
5. POOR APPETITE OR OVEREATING: MORE THAN HALF THE DAYS

## 2019-10-17 NOTE — PROGRESS NOTES
"                                             Progress Note    Patient Name: Molly Teague  Date: October 17, 2019           Service Type: Individual  Video Visit: No     Session Start Time: 9:00am  Session End Time: 9:52am     Session Length: 52min    Session #: 4    Attendees: Client attended alone     Treatment Plan Last Reviewed: September 4, 2019  PHQ-9 / REX-7 : October 17, 2019      DATA  Interactive Complexity: No  Crisis: No       Progress Since Last Session (Related to Symptoms / Goals / Homework):   Symptoms: Worsening depression-improved anxiety, depressive sxs score increased from mild to moderate range, and anxiety sxs score decreased from high moderate to mild range (see PHQ-9 & REX-7). Continues to endorse experiencing \"nightmares\" and waking \"drenched in sweat\" at times    Homework: Partially completed (reported on the use of deep breathing in coping as well as TIPP/with ice, in addition to grounding strategy with Ob/Jose which has been helpful)      Episode of Care Goals: Minimal progress - CONTEMPLATION (Considering change and yet undecided); Intervened by assessing the negative and positive thinking (ambivalence) about behavior change     Current / Ongoing Stressors and Concerns:   Health related stress/Multiple Medical problems (including CREST syndrome/Scleroderma); Relationship with SO; Caring for Son who struggles with speech issues     Client reported having \"had a bad couple of months while transitioning meds,\" in addition to her son and most family members being ill. Stated having a \"Raynaud's attack\" that lasted \"3 hours,\" wherein her feet and hands were \"white and cold\" despite heating pads and blankets. Reflected on struggles with neuropathy. Indicated that \"since last week I feel much better.\" Processed her withdrawing socially during these physical health struggles/episodes, and communicating recently with in laws and family about why she was not seeing them during this period. " Reported that her  has been trying to take more days off of work to be home with her and her son, as she feels their son needs his attention and care.     Treatment Objective(s) Addressed in This Session:     will identify negative self-talk and behaviors: challenge core beliefs, myths, and actions   will learn and utilize at least 3 coping skills to manage anxiety and worry thoughts effectively   will identify 2 strategies/activities to build positive experiences that support positive mood and improve confidence in her abilities and identity   will learn & utilize at least 2 interpersonal effectiveness skills in communicating with others about her needs and observing her limits     Intervention:   Reinforced healthy bx choices and coping skills used (chente grounding strategy)   Facilitated emotional processing around stress and mood being impacted by health issues flare up, and coping with this.  Provided support & validation  CBT, MBCT, DBT informed: surfaced worry thoughts & negative self-talk/engaged in cognitive restructuring and perspective taking. Encouraged use of self-validation and de-fusing from unhelpful thoughts. Reinforced and encouraged ongoing use of MF strategy, and TIPP/skill as needed in managing distress. Discussed the use of O2EA skill in working toward bx activation with smaller-realistic goals  Interpersonal Therapy:facilitated processing of pertinent interpersonal dynamics and communication reported on in context of her reported interactions with her , in laws, and son/roles/encouraged use of IE skills in the communication and in reaching out to social network  Solution Focused Therapy: asked present and future focused questions in problem solving on strategies-steps to build positive experiences, in claiming back interests and facets of identity she identified with (creativity/small steps to 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   Patient denies current or recent suicidal ideation or behaviors.   Patientdenies current or recent homicidal ideation or behaviors.   Patient denies current or recent self injurious behavior or ideation.   Patient denies other safety concerns.   Patient Patient reports there has been no change in risk factors since their last session.     PatientPatient reports there has been no change in protective factors since their last session.     Recommended that patient call 911 or go to the local ED should there be a change in any of these risk factors.     Appearance:   Appropriate    Eye Contact:   Good    Psychomotor Behavior: Normal    Attitude:   Cooperative    Orientation:   All   Speech    Rate / Production: Normal     Volume:  Normal    Mood:    Down/stressed lately \"better today\"    Affect:    Appropriate    Thought Content:  Clear    Thought Form:  Coherent  Logical    Insight:    Good      Medication Review:  No changes to current psychiatric medication(s)      Medication Compliance:   Yes     Changes in Health Issues:   None reported/Reported experiencing \"Raynaud's attack\" last month. Encouraged client to f/u with PCP as indicated with physical health issues     Chemical Use Review:   Substance Use: Chemical use reviewed, no active concerns identified      Tobacco Use: No current tobacco use.      Diagnosis:  1. Severe recurrent major depression without psychotic features (H)    2. REX (generalized anxiety disorder)    3. PTSD (post-traumatic stress disorder)        Collateral Reports Completed:   Not Applicable    PLAN: (Patient Tasks / Therapist Tasks / Other)  RTC in 2 weeks.  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, while observing her limits as discussed and taking frequent breaks     Erna Palacios, PsRebecca, LP                                                         ______________________________________________________________________    Treatment Plan    Patient's Name: Molly Teague  YOB: 1985    Date: September 4, 2019    DSM5 Diagnoses: 300.02 (F41.1) Generalized Anxiety Disorder; 309.81 (F43.10) Posttraumatic Stress Disorder (includes Posttraumatic Stress Disorder for Children 6 Years and Younger)  With dissociative symptoms; 296.35 Major Depressive Disorder, Recurrent Episode, in partial remission  Psychosocial & Contextual Factors: Health related stress/Multiple Medical problems (including CREST syndrome/Scleroderma); Relationship with SO; Caring for Son who struggles with speech issues  WHODAS: Simple score is 29    Referral / Collaboration:  Referral to another professional/service is not indicated at this time..   Refer for EMDR when client is ready for this and reports decreased dissociation    Anticipated number of session or this episode of care: unknown      MeasurableTreatment Goal(s) related to diagnosis / functional impairment(s)  Goal 1: Patient will experience a decrease in depressive symptoms per PHQ-9 score decrease from 8 on 09/04/19 to a score of 4 or below     I will know I've met my goal when I feel better about  myself and about my body image. When I have outlets that make me happy. When I start functioning in the world again. Having job and independence back      Objective #A (Patient Action)    Patient will Identify negative self-talk and behaviors: challenge core beliefs, myths, and actions.  Status: New - Date: 09/04/19     Intervention(s)  Therapist will guide  "client in identifying cognitive distortions, engage in cognitive restructuring, and de-fuse from unhelpful thoughts (CBT & MBCT informed)      Objective #B  Patient will identify 2 strategies/activities to build positive experiences that support positive mood and improve confidence in her abilities and identity  Status: New - Date: 09/04/19     Intervention(s)  Therapist will  teach strategies and guide client in identifying positive experiences she can focus on to support positive mood (DBT informed & Solution Focused)     Objective #C  Patient will practice identifying/writing about 3 \"good things\" from her day (gratitude practice)  Status: New - Date: 09/04/19     Intervention(s)  Therapist will provide psychoeducation on the benefits and practice of writing about 3 good things from her day, positive emotion elicited, and her role in it before bedtime; will problem solve on obstacles to goal completion (Positive psychology/Solution Focused Therapy)    Objective #D  Patient will develop and utilize safety plan when experiencing SI at times of increased crisis or change in risk factors  Status: New - Date: 09/04/19    Intervention(s)  Therapist will guide client in developing FCC safety plan, reinforce safety plan use, and monitor changes in risk factors (CBT, DBT & Solution Focused)      Goal 2: Patient will experience a decrease in anxiety sxs score as evidenced by a REX-7 score decrease from 14 on  09/04/19 to a score of 4 or below.    I will know I've met my goal when.. I am not sure about this one yet. I have to think about it. I do have a lot of panic attacks during the day and nightmares at night. Maybe taking care of this.      Objective #A (Patient Action)  Patient will learn and utilize at least 3 coping skills to manage anxiety, panic attacks, and worry thoughts effectively   Status: New - Date: 09/04/19     Intervention(s)  Therapist will teach a minimum of 4 coping strategies and skills that client can " utilize in managing anxiety sxs and increased distress/prevent escalation (draw from CBT-MBCT strategies and DBT skills)    Objective #B  Patient will learn & utilize at least 2 interpersonal effectiveness skills in communicating with others about her needs and observing her limits  Status: New - Date: 09/04/19     Intervention(s)  Therapist will teach client at least 2 interpersonal effectiveness skills and role play as needed assertive communication (Interpersonal Therapy, Exposure, & DBT informed skills)    Objective #C  Patient will identify & integrate a mindfulness or relaxation practice into her daily routine  Status: New - Date: 09/04/19     Intervention(s)  Therapist will teach or help identify at least 2 mindfulness/relaxation exercises that client can utilize, provide psychoeducation on the benefits of these practices, and help problem solve on obstacle to goal completion (MBCT informed & Solution Focused)    Patient has reviewed and agreed to the above plan.      Erna Palacios, BRIDGER  September 4, 2019

## 2019-10-18 ASSESSMENT — ANXIETY QUESTIONNAIRES: GAD7 TOTAL SCORE: 8

## 2019-10-30 DIAGNOSIS — F41.1 GAD (GENERALIZED ANXIETY DISORDER): ICD-10-CM

## 2019-10-31 RX ORDER — LORAZEPAM 1 MG/1
TABLET ORAL
Qty: 30 TABLET | Refills: 2 | OUTPATIENT
Start: 2019-10-31

## 2019-10-31 NOTE — TELEPHONE ENCOUNTER
Requested Prescriptions   Pending Prescriptions Disp Refills     LORazepam (ATIVAN) 1 MG tablet [Pharmacy Med Name: LORAZEPAM 1MG TABS] 30 tablet 2     Sig: TAKE ONE TABLET BY MOUTH ONCE DAILY AS NEEDED FOR ANXIETY TO LAST 30 DAYS       There is no refill protocol information for this order        Last Written Prescription Date:  7/25/19  Last Fill Quantity: 30,  # refills: 2   Last office visit: 8/2/2019 with prescribing provider:  Jameson   Future Office Visit:   Next 5 appointments (look out 90 days)    Nov 01, 2019  7:20 AM CDT  SHORT with Grecia Barba,   [facility] ([facility])  Arrive at: [facility] [address] [facility] [address]  [phone]   Nov 14, 2019 11:00 AM CST  Return Visit with Erna Palacios LP  [facility] ([place] [address] [place] [address]  [phone]   Dec 12, 2019 10:00 AM CST  Return Visit with Erna Palacios LP  [facility] ([place] [address] [place] [address]  [phone]   Dec 27, 2019  9:00 AM CST  Return Visit with Erna Palacios LP  [facility] ([facility]  [address] [place] [address]  [phone]

## 2019-10-31 NOTE — PROGRESS NOTES
Subjective     Molly Teague is a 34 year old female who presents to clinic today for the following health issues:    HPI   Depression and Anxiety Follow-Up    How are you doing with your depression since your last visit? Improved seeing a therapist    How are you doing with your anxiety since your last visit?  Worsened - haven't sleep for 2 days    Are you having other symptoms that might be associated with depression or anxiety? Yes:  chronic pain    Have you had a significant life event? No     Do you have any concerns with your use of alcohol or other drugs? No     Is seeing a psychologist and this is helping quite a bit    Not sleeping well.  Reports it has been 2 days since she slept    Still taking ativan once daily regularly.  Some days wishes she could take BID.    Has a very dry mouth    Social History     Tobacco Use     Smoking status: Never Smoker     Smokeless tobacco: Never Used   Substance Use Topics     Alcohol use: Yes     Comment: Socially once on a weekend if any     Drug use: No     Comment: None     PHQ 7/16/2019 9/4/2019 10/17/2019   PHQ-9 Total Score 6 8 14   Q9: Thoughts of better off dead/self-harm past 2 weeks Not at all Not at all Not at all   F/U: Thoughts of suicide or self-harm - - -   F/U: Self harm-plan - - -   F/U: Self-harm action - - -   F/U: Safety concerns - - -     REX-7 SCORE 7/16/2019 9/4/2019 10/17/2019   Total Score 8 14 8     Last PHQ-9 10/17/2019   1.  Little interest or pleasure in doing things 2   2.  Feeling down, depressed, or hopeless 2   3.  Trouble falling or staying asleep, or sleeping too much 3   4.  Feeling tired or having little energy 1   5.  Poor appetite or overeating 1   6.  Feeling bad about yourself 2   7.  Trouble concentrating 2   8.  Moving slowly or restless 1   Q9: Thoughts of better off dead/self-harm past 2 weeks 0   PHQ-9 Total Score 14   Difficulty at work, home, or with people Somewhat difficult   In the past two weeks have you had thoughts  of suicide or self harm? -   Do you have concerns about your personal safety or the safety of others? -   In the past 2 weeks have you thought about a plan or had intention to harm yourself? -   In the past 2 weeks have you acted on these thoughts in any way? -     REX-7  10/17/2019   1. Feeling nervous, anxious, or on edge 1   2. Not being able to stop or control worrying 1   3. Worrying too much about different things 2   4. Trouble relaxing 2   5. Being so restless that it is hard to sit still 0   6. Becoming easily annoyed or irritable 1   7. Feeling afraid, as if something awful might happen 1   REX-7 Total Score 8   If you checked any problems, how difficult have they made it for you to do your work, take care of things at home, or get along with other people? Somewhat difficult         Suicide Assessment Five-step Evaluation and Treatment (SAFE-T)    Chronic Pain Follow-Up       Type / Location of Pain: Chronic pain syndrome  Analgesia/pain control:       Recent changes:  same      Overall control: Tolerable with discomfort  Activity level/function:      Daily activities:  Able to do light housework, cooking    Work:  not applicable  Adverse effects:  No  Adherance    Taking medication as directed?  Yes    Participating in other treatments: no -   Risk Factors:    Sleep:  Poor    Mood/anxiety:  worsened    Recent family or social stressors:  none noted    Other aggravating factors: none     --having increase in Raynauds symptoms.  --not eating much because not hungry.  Staying hydrated.  --overdue to see [place]  --taking oxycodone 3-4 day.  --taking for joint pain in bilateral arms/legs and low back.  --is doing more around the house.  --tried cannabis from [place].  One formulation worked and another one did not.  --PEG3 score is 7-6-7 = 6.6            PHQ-9 SCORE 7/16/2019 9/4/2019 10/17/2019   PHQ-9 Total Score 6 8 14     REX-7 SCORE 7/16/2019 9/4/2019 10/17/2019   Total Score 8 14 8     Encounter-Level CSA  - 04/26/2017:    Controlled Substance Agreement - Scan on 5/4/2017  8:58 AM: CONTROLLED SUBSTANCE AGREEMENT     Patient-Level CSA:    Controlled Substance Agreement - Opioid - Scan on 2/6/2019  6:23 PM             Current Outpatient Medications   Medication Sig Dispense Refill     amLODIPine (NORVASC) 5 MG tablet Take 5 mg by mouth daily       blood glucose (NO BRAND SPECIFIED) lancets standard Use to test blood sugar 1 time daily 100 each 3     blood glucose (NO BRAND SPECIFIED) test strip Use to test blood sugar 1 time daily 100 each 3     budesonide-formoterol (SYMBICORT) 160-4.5 MCG/ACT Inhaler Inhale 2 puffs into the lungs 2 times daily 6 g 11     busPIRone HCl (BUSPAR) 30 MG tablet TAKE ONE TABLET BY MOUTH TWICE A  tablet 3     Cyanocobalamin (B-12) 1000 MCG TBCR Take 1,000 mcg by mouth daily 100 tablet 1     diphenhydrAMINE (BENADRYL) 25 MG tablet Take 1 tablet (25 mg) by mouth every 6 hours as needed for itching or allergies 56 tablet      Doxylamine Succinate, Sleep, (UNISOM PO)        DULoxetine (CYMBALTA) 30 MG capsule Take 1 capsule (30 mg) by mouth 2 times daily 60 capsule 1     ferrous gluconate (FERGON) 324 (38 FE) MG tablet TAKE ONE TABLET BY MOUTH EVERY DAY WITH BREAKFAST 100 tablet 3     gabapentin (NEURONTIN) 300 MG capsule TAKE TWO CAPSULES BY MOUTH THREE TIMES A  capsule 3     GOODSENSE MIGRAINE FORMULA 250-250-65 MG per tablet TAKE ONE TABLET BY MOUTH EVERY 6 HOURS AS NEEDED FOR HEADACHES 24 tablet 1     lisinopril (PRINIVIL/ZESTRIL) 10 MG tablet Take 1 tablet (10 mg) by mouth daily 90 tablet 3     LORazepam (ATIVAN) 1 MG tablet Take 1 tablet (1 mg) by mouth daily as needed for anxiety #30 to last 30 days 30 tablet 2     montelukast (SINGULAIR) 10 MG tablet TAKE ONE TABLET BY MOUTH AT BEDTIME 90 tablet 3     naloxone (NARCAN) 4 MG/0.1ML nasal spray Spray 1 spray (4 mg) into one nostril alternating nostrils once as needed for opioid reversal every 2-3 minutes until assistance  arrives 0.2 mL 3     naloxone (NARCAN) nasal spray Spray 1 spray (4 mg) into one nostril alternating nostrils as needed for opioid reversal every 2-3 minutes until assistance arrives 0.2 mL 3     omeprazole (PRILOSEC) 40 MG DR capsule TAKE ONE CAPSULE BY MOUTH TWICE A  capsule 0     ondansetron (ZOFRAN-ODT) 8 MG ODT tab Take 1 tablet (8 mg) by mouth every 8 hours as needed for nausea 30 tablet 11     oxyCODONE IR (ROXICODONE) 15 MG tablet Take 1 tablet (15 mg) by mouth every 4 hours as needed for severe pain 100 tablet 0     polyethylene glycol (MIRALAX) powder Take 17 g (1 capful) by mouth daily 510 g 11     promethazine (PHENERGAN) 25 MG tablet Take 1 tablet (25 mg) by mouth 2 times daily as needed for nausea 30 tablet 11     promethazine (PHENERGAN) 50 MG/ML SOLN IV injection Inject 0.5 mL (25 mg) into the muscle every 6 hours as needed 90 mL 11     ranitidine (ZANTAC) 150 MG tablet TAKE ONE TABLET BY MOUTH TWICE A DAY AS NEEDED FOR HEARTBURN 180 tablet 3     sucralfate (CARAFATE) 1 GM tablet Take 1 tablet (1 g) by mouth 4 times daily 120 tablet 11     VENTOLIN  (90 Base) MCG/ACT inhaler INHALE 2 PUFFS INTO THE LUNGS ONCE EVERY 4 HOURS AS NEEDED FOR SHORTNESS OF BREATH / DYSPNEA / WHEEZING 18 g 11         Reviewed and updated as needed this visit by Provider         Review of Systems   ROS COMP: Constitutional, HEENT, cardiovascular, pulmonary, GI, , musculoskeletal, neuro, skin, endocrine and psych systems are negative, except as otherwise noted.      Objective    /78   Pulse 102   Temp 98.5  F (36.9  C) (Tympanic)   Resp 14   Wt 82.1 kg (181 lb)   Breastfeeding? No   BMI 33.11 kg/m    Body mass index is 33.11 kg/m .  Physical Exam   GENERAL APPEARANCE: alert and no distress  PSYCH: mentation appears normal, affect normal/bright and less anxious and less depressed than previous visits          Assessment & Plan     1. Chronic pain syndrome -Long discussion about further weaning.  She  feels her pain is well controlled at the current dose.  Discussed next step would be to decrease oxycodone from 15 to 10 mg.  She is not buying into that thus far.  We will continue to fill at 100 tabs per month.  Call in for next refill 12/1, 12/31, due for clinic visit around 1/30  - oxyCODONE IR (ROXICODONE) 15 MG tablet; Take 1 tablet (15 mg) by mouth every 4 hours as needed for severe pain  Dispense: 100 tablet; Refill: 0    2. REX (generalized anxiety disorder) -improved control with seeing the psychologist.  Her current psychiatrist is no longer with [place].  Refer to local psychiatrist.  She is struggling with sleep.  She is previously on Ambien.  This was stopped due to concurrent benzo and opiate use.  Try cyclic's are not ideal given her chronic severe dry mouth.  May consider a low-dose of an antipsychotic?  Also discussed seeing the sleep clinic which she will consider.  We have weaned back on the Ativan to what appears to be the lowest possible tolerable dose  - MENTAL HEALTH REFERRAL  - Adult; Psychiatry and Medication Management; [place]iat[place]; [facility] [place]: [facility] [phone].  Medication management & future refills will be returned to [facility] [place]CP upon completion of evaluation; We doyle...  - LORazepam (ATIVAN) 1 MG tablet; Take 1 tablet (1 mg) by mouth daily as needed for anxiety #30 to last 30 days  Dispense: 30 tablet; Refill: 5    3. Moderate major depression (H) -improved with switching the Celexa to Cymbalta.  - MENTAL HEALTH REFERRAL  - Adult; Psychiatry and Medication Management; Psychiatry; [facility] [place]: [facility] [phone].  Medication management & future refills will be returned to [facility] PCP upon completion of evaluation; We doyle...  - DULoxetine (CYMBALTA) 30 MG capsule; Take 1 capsule (30 mg) by mouth 2 times daily  Dispense: 60 capsule; Refill: 3    4. PTSD (post-traumatic stress disorder) -   - DULoxetine (CYMBALTA) 30 MG capsule; Take 1 capsule  "(30 mg) by mouth 2 times daily  Dispense: 60 capsule; Refill: 3    5. Moderate persistent asthma without complication -stable, refill provided  - budesonide-formoterol (SYMBICORT) 160-4.5 MCG/ACT Inhaler; Inhale 2 puffs into the lungs 2 times daily  Dispense: 6 g; Refill: 11    6. Need for prophylactic vaccination and inoculation against influenza  - INFLUENZA VACCINE IM > 6 MONTHS VALENT IIV4 [88320]  - ADMIN INFLUENZA (For MEDICARE Patients ONLY) []           Patient Instructions     Dr. Barba's policy for regular refills of Opiates:    1. Clinic visits every 3 months.  2. Call in for refills in between visits.  Please allow 2-3 business days to process refills.    3. Dr. Barba can \"E-prescribe\".  This means the prescription goes electronically to the pharmacy just like any other normal prescription.  You will no longer receive a paper prescription that you have to take to the pharmacy      1. You are due for [place] follow-up  2. Refills given - no changes  3. Referral to Psychiatry at [place]  4. Consider medical cannabis       Return in about 3 months (around 2/1/2020).     Greater than 40 minutes was spent face-to-face with the patient by the provider.  Greater than 50% was spent in counseling or coordinating care for this patient.      Grecia Barba, DO  [facility]      "

## 2019-11-01 ENCOUNTER — OFFICE VISIT (OUTPATIENT)
Dept: FAMILY MEDICINE | Facility: CLINIC | Age: 34
End: 2019-11-01
Payer: MEDICARE

## 2019-11-01 VITALS
DIASTOLIC BLOOD PRESSURE: 78 MMHG | WEIGHT: 181 LBS | BODY MASS INDEX: 33.11 KG/M2 | HEART RATE: 102 BPM | SYSTOLIC BLOOD PRESSURE: 122 MMHG | RESPIRATION RATE: 14 BRPM | TEMPERATURE: 98.5 F

## 2019-11-01 DIAGNOSIS — J45.40 MODERATE PERSISTENT ASTHMA WITHOUT COMPLICATION: ICD-10-CM

## 2019-11-01 DIAGNOSIS — F43.10 PTSD (POST-TRAUMATIC STRESS DISORDER): ICD-10-CM

## 2019-11-01 DIAGNOSIS — F32.1 MODERATE MAJOR DEPRESSION (H): ICD-10-CM

## 2019-11-01 DIAGNOSIS — Z23 NEED FOR PROPHYLACTIC VACCINATION AND INOCULATION AGAINST INFLUENZA: ICD-10-CM

## 2019-11-01 DIAGNOSIS — F41.1 GAD (GENERALIZED ANXIETY DISORDER): ICD-10-CM

## 2019-11-01 DIAGNOSIS — G89.4 CHRONIC PAIN SYNDROME: Primary | Chronic | ICD-10-CM

## 2019-11-01 PROCEDURE — G0008 ADMIN INFLUENZA VIRUS VAC: HCPCS | Performed by: INTERNAL MEDICINE

## 2019-11-01 PROCEDURE — 90686 IIV4 VACC NO PRSV 0.5 ML IM: CPT | Performed by: INTERNAL MEDICINE

## 2019-11-01 PROCEDURE — 99215 OFFICE O/P EST HI 40 MIN: CPT | Mod: 25 | Performed by: INTERNAL MEDICINE

## 2019-11-01 RX ORDER — DULOXETIN HYDROCHLORIDE 30 MG/1
30 CAPSULE, DELAYED RELEASE ORAL 2 TIMES DAILY
Qty: 60 CAPSULE | Refills: 3 | Status: SHIPPED | OUTPATIENT
Start: 2019-11-01 | End: 2020-03-15

## 2019-11-01 RX ORDER — OXYCODONE HYDROCHLORIDE 15 MG/1
15 TABLET ORAL EVERY 4 HOURS PRN
Qty: 100 TABLET | Refills: 0 | Status: SHIPPED | OUTPATIENT
Start: 2019-11-01 | End: 2019-11-26

## 2019-11-01 RX ORDER — LORAZEPAM 1 MG/1
1 TABLET ORAL DAILY PRN
Qty: 30 TABLET | Refills: 5 | Status: SHIPPED | OUTPATIENT
Start: 2019-11-01 | End: 2020-01-30

## 2019-11-01 RX ORDER — BUDESONIDE AND FORMOTEROL FUMARATE DIHYDRATE 160; 4.5 UG/1; UG/1
2 AEROSOL RESPIRATORY (INHALATION) 2 TIMES DAILY
Qty: 6 G | Refills: 11 | Status: SHIPPED | OUTPATIENT
Start: 2019-11-01 | End: 2020-11-19

## 2019-11-01 ASSESSMENT — ANXIETY QUESTIONNAIRES
6. BECOMING EASILY ANNOYED OR IRRITABLE: NEARLY EVERY DAY
1. FEELING NERVOUS, ANXIOUS, OR ON EDGE: NEARLY EVERY DAY
5. BEING SO RESTLESS THAT IT IS HARD TO SIT STILL: NOT AT ALL
3. WORRYING TOO MUCH ABOUT DIFFERENT THINGS: SEVERAL DAYS
2. NOT BEING ABLE TO STOP OR CONTROL WORRYING: MORE THAN HALF THE DAYS

## 2019-11-01 ASSESSMENT — PATIENT HEALTH QUESTIONNAIRE - PHQ9
SUM OF ALL RESPONSES TO PHQ QUESTIONS 1-9: 13
5. POOR APPETITE OR OVEREATING: NEARLY EVERY DAY

## 2019-11-01 NOTE — PATIENT INSTRUCTIONS
"  Dr. Barba's policy for regular refills of Opiates:    1. Clinic visits every 3 months.  2. Call in for refills in between visits.  Please allow 2-3 business days to process refills.    3. Dr. Barba can \"E-prescribe\".  This means the prescription goes electronically to the pharmacy just like any other normal prescription.  You will no longer receive a paper prescription that you have to take to the pharmacy      1. You are due for [place] follow-up  2. Refills given - no changes  3. Referral to Psychiatry at [place]  4. Consider medical cannabis   "

## 2019-11-18 PROBLEM — M24.542 CONTRACTURE OF HAND JOINT, LEFT: Status: RESOLVED | Noted: 2018-12-13 | Resolved: 2019-11-18

## 2019-11-18 PROBLEM — M24.541 CONTRACTURE OF HAND JOINT, RIGHT: Status: RESOLVED | Noted: 2018-12-13 | Resolved: 2019-11-18

## 2019-11-24 ENCOUNTER — HOSPITAL ENCOUNTER (OUTPATIENT)
Facility: CLINIC | Age: 34
Setting detail: OBSERVATION
Discharge: HOME | End: 2019-11-25
Attending: EMERGENCY MEDICINE | Admitting: FAMILY MEDICINE
Payer: MEDICARE

## 2019-11-24 ENCOUNTER — APPOINTMENT (OUTPATIENT)
Dept: CT IMAGING | Facility: CLINIC | Age: 34
End: 2019-11-24
Attending: EMERGENCY MEDICINE
Payer: MEDICARE

## 2019-11-24 ENCOUNTER — HOSPITAL ENCOUNTER (EMERGENCY)
Facility: CLINIC | Age: 34
Discharge: LEFT BEFORE DISCHARGE | End: 2019-11-24
Payer: MEDICARE

## 2019-11-24 VITALS
SYSTOLIC BLOOD PRESSURE: 115 MMHG | RESPIRATION RATE: 16 BRPM | BODY MASS INDEX: 32.19 KG/M2 | OXYGEN SATURATION: 95 % | DIASTOLIC BLOOD PRESSURE: 71 MMHG | WEIGHT: 176 LBS | TEMPERATURE: 98.1 F | HEART RATE: 121 BPM

## 2019-11-24 DIAGNOSIS — Z87.39 HISTORY OF SCLERODERMA: ICD-10-CM

## 2019-11-24 DIAGNOSIS — N17.9 ACUTE RENAL FAILURE, UNSPECIFIED ACUTE RENAL FAILURE TYPE (H): ICD-10-CM

## 2019-11-24 DIAGNOSIS — D64.9 ANEMIA, UNSPECIFIED TYPE: ICD-10-CM

## 2019-11-24 LAB
ABO + RH BLD: NORMAL
ABO + RH BLD: NORMAL
ALBUMIN SERPL-MCNC: 3.5 G/DL (ref 3.4–5)
ALP SERPL-CCNC: 75 U/L (ref 40–150)
ALT SERPL W P-5'-P-CCNC: 37 U/L (ref 0–50)
ANION GAP SERPL CALCULATED.3IONS-SCNC: 8 MMOL/L (ref 3–14)
AST SERPL W P-5'-P-CCNC: 47 U/L (ref 0–45)
BASOPHILS # BLD AUTO: 0.1 10E9/L (ref 0–0.2)
BASOPHILS NFR BLD AUTO: 0.5 %
BILIRUB SERPL-MCNC: 0.4 MG/DL (ref 0.2–1.3)
BLD GP AB SCN SERPL QL: NORMAL
BLD PROD TYP BPU: NORMAL
BLD PROD TYP BPU: NORMAL
BLD UNIT ID BPU: 0
BLOOD BANK CMNT PATIENT-IMP: NORMAL
BLOOD PRODUCT CODE: NORMAL
BPU ID: NORMAL
BUN SERPL-MCNC: 22 MG/DL (ref 7–30)
CALCIUM SERPL-MCNC: 8.9 MG/DL (ref 8.5–10.1)
CHLORIDE SERPL-SCNC: 109 MMOL/L (ref 94–109)
CO2 SERPL-SCNC: 21 MMOL/L (ref 20–32)
CREAT SERPL-MCNC: 2.06 MG/DL (ref 0.52–1.04)
DIFFERENTIAL METHOD BLD: ABNORMAL
EOSINOPHIL # BLD AUTO: 0.5 10E9/L (ref 0–0.7)
EOSINOPHIL NFR BLD AUTO: 3.3 %
ERYTHROCYTE [DISTWIDTH] IN BLOOD BY AUTOMATED COUNT: 17.1 % (ref 10–15)
GFR SERPL CREATININE-BSD FRML MDRD: 31 ML/MIN/{1.73_M2}
GLUCOSE SERPL-MCNC: 90 MG/DL (ref 70–99)
HCG SERPL QL: NEGATIVE
HCT VFR BLD AUTO: 24.1 % (ref 35–47)
HGB BLD-MCNC: 6.9 G/DL (ref 11.7–15.7)
IMM GRANULOCYTES # BLD: 0.1 10E9/L (ref 0–0.4)
IMM GRANULOCYTES NFR BLD: 0.6 %
LYMPHOCYTES # BLD AUTO: 2.6 10E9/L (ref 0.8–5.3)
LYMPHOCYTES NFR BLD AUTO: 18.4 %
MCH RBC QN AUTO: 21 PG (ref 26.5–33)
MCHC RBC AUTO-ENTMCNC: 28.6 G/DL (ref 31.5–36.5)
MCV RBC AUTO: 73 FL (ref 78–100)
MONOCYTES # BLD AUTO: 1 10E9/L (ref 0–1.3)
MONOCYTES NFR BLD AUTO: 7.1 %
NEUTROPHILS # BLD AUTO: 9.7 10E9/L (ref 1.6–8.3)
NEUTROPHILS NFR BLD AUTO: 70.1 %
NRBC # BLD AUTO: 0 10*3/UL
NRBC BLD AUTO-RTO: 0 /100
NUM BPU REQUESTED: 2
PLATELET # BLD AUTO: 340 10E9/L (ref 150–450)
POTASSIUM SERPL-SCNC: 4.9 MMOL/L (ref 3.4–5.3)
PROT SERPL-MCNC: 7.4 G/DL (ref 6.8–8.8)
RBC # BLD AUTO: 3.29 10E12/L (ref 3.8–5.2)
SODIUM SERPL-SCNC: 138 MMOL/L (ref 133–144)
SPECIMEN EXP DATE BLD: NORMAL
TRANSFUSION STATUS PATIENT QL: NORMAL
TRANSFUSION STATUS PATIENT QL: NORMAL
WBC # BLD AUTO: 13.9 10E9/L (ref 4–11)

## 2019-11-24 PROCEDURE — 83550 IRON BINDING TEST: CPT | Performed by: EMERGENCY MEDICINE

## 2019-11-24 PROCEDURE — 84703 CHORIONIC GONADOTROPIN ASSAY: CPT | Performed by: EMERGENCY MEDICINE

## 2019-11-24 PROCEDURE — 80053 COMPREHEN METABOLIC PANEL: CPT | Performed by: EMERGENCY MEDICINE

## 2019-11-24 PROCEDURE — P9016 RBC LEUKOCYTES REDUCED: HCPCS | Performed by: EMERGENCY MEDICINE

## 2019-11-24 PROCEDURE — 25800030 ZZH RX IP 258 OP 636: Performed by: EMERGENCY MEDICINE

## 2019-11-24 PROCEDURE — 25000132 ZZH RX MED GY IP 250 OP 250 PS 637: Mod: GY | Performed by: EMERGENCY MEDICINE

## 2019-11-24 PROCEDURE — 99285 EMERGENCY DEPT VISIT HI MDM: CPT | Mod: Z6 | Performed by: EMERGENCY MEDICINE

## 2019-11-24 PROCEDURE — 99220 ZZC INITIAL OBSERVATION CARE,LEVL III: CPT | Performed by: FAMILY MEDICINE

## 2019-11-24 PROCEDURE — 74176 CT ABD & PELVIS W/O CONTRAST: CPT

## 2019-11-24 PROCEDURE — 86923 COMPATIBILITY TEST ELECTRIC: CPT | Performed by: EMERGENCY MEDICINE

## 2019-11-24 PROCEDURE — 85025 COMPLETE CBC W/AUTO DIFF WBC: CPT | Performed by: EMERGENCY MEDICINE

## 2019-11-24 PROCEDURE — 99285 EMERGENCY DEPT VISIT HI MDM: CPT | Mod: 25 | Performed by: EMERGENCY MEDICINE

## 2019-11-24 PROCEDURE — 86850 RBC ANTIBODY SCREEN: CPT | Performed by: EMERGENCY MEDICINE

## 2019-11-24 PROCEDURE — 86901 BLOOD TYPING SEROLOGIC RH(D): CPT | Performed by: EMERGENCY MEDICINE

## 2019-11-24 PROCEDURE — 86900 BLOOD TYPING SEROLOGIC ABO: CPT | Performed by: EMERGENCY MEDICINE

## 2019-11-24 PROCEDURE — 96360 HYDRATION IV INFUSION INIT: CPT | Mod: 59 | Performed by: EMERGENCY MEDICINE

## 2019-11-24 PROCEDURE — 83540 ASSAY OF IRON: CPT | Performed by: EMERGENCY MEDICINE

## 2019-11-24 PROCEDURE — 36430 TRANSFUSION BLD/BLD COMPNT: CPT | Performed by: EMERGENCY MEDICINE

## 2019-11-24 RX ORDER — SODIUM CHLORIDE 9 MG/ML
INJECTION, SOLUTION INTRAVENOUS CONTINUOUS
Status: DISCONTINUED | OUTPATIENT
Start: 2019-11-24 | End: 2019-11-25

## 2019-11-24 RX ORDER — DIPHENHYDRAMINE HCL 25 MG
25 TABLET ORAL EVERY 6 HOURS PRN
Status: CANCELLED | OUTPATIENT
Start: 2019-11-24

## 2019-11-24 RX ORDER — SUCRALFATE 1 G/1
1 TABLET ORAL 4 TIMES DAILY
Status: CANCELLED | OUTPATIENT
Start: 2019-11-24

## 2019-11-24 RX ORDER — OXYCODONE HYDROCHLORIDE 5 MG/1
15 TABLET ORAL
Status: COMPLETED | OUTPATIENT
Start: 2019-11-24 | End: 2019-11-24

## 2019-11-24 RX ADMIN — SODIUM CHLORIDE 1000 ML: 9 INJECTION, SOLUTION INTRAVENOUS at 20:15

## 2019-11-24 RX ADMIN — OXYCODONE HYDROCHLORIDE 15 MG: 5 TABLET ORAL at 23:15

## 2019-11-24 ASSESSMENT — ENCOUNTER SYMPTOMS
LIGHT-HEADEDNESS: 1
DIZZINESS: 1
VOMITING: 1
HEMATOLOGIC/LYMPHATIC NEGATIVE: 1
ENDOCRINE NEGATIVE: 1
EYES NEGATIVE: 1
RESPIRATORY NEGATIVE: 1
PSYCHIATRIC NEGATIVE: 1
CONSTITUTIONAL NEGATIVE: 1
CARDIOVASCULAR NEGATIVE: 1
ALLERGIC/IMMUNOLOGIC NEGATIVE: 1
MUSCULOSKELETAL NEGATIVE: 1
NUMBNESS: 1

## 2019-11-25 VITALS
TEMPERATURE: 98 F | WEIGHT: 185.19 LBS | RESPIRATION RATE: 16 BRPM | BODY MASS INDEX: 34.08 KG/M2 | SYSTOLIC BLOOD PRESSURE: 106 MMHG | HEART RATE: 102 BPM | HEIGHT: 62 IN | OXYGEN SATURATION: 94 % | DIASTOLIC BLOOD PRESSURE: 54 MMHG

## 2019-11-25 PROBLEM — D64.9 ANEMIA: Chronic | Status: ACTIVE | Noted: 2019-11-25

## 2019-11-25 PROBLEM — N17.9 AKI (ACUTE KIDNEY INJURY) (H): Status: ACTIVE | Noted: 2019-11-25

## 2019-11-25 PROBLEM — G62.9 PERIPHERAL NEUROPATHY: Chronic | Status: ACTIVE | Noted: 2019-11-25

## 2019-11-25 PROBLEM — N17.9 ACUTE KIDNEY FAILURE (H): Status: ACTIVE | Noted: 2019-11-25

## 2019-11-25 LAB
ALBUMIN UR-MCNC: NEGATIVE MG/DL
ANION GAP SERPL CALCULATED.3IONS-SCNC: 6 MMOL/L (ref 3–14)
APPEARANCE UR: CLEAR
BILIRUB UR QL STRIP: NEGATIVE
BLD PROD TYP BPU: NORMAL
BLD UNIT ID BPU: 0
BLOOD PRODUCT CODE: NORMAL
BPU ID: NORMAL
BUN SERPL-MCNC: 19 MG/DL (ref 7–30)
CALCIUM SERPL-MCNC: 8.4 MG/DL (ref 8.5–10.1)
CHLORIDE SERPL-SCNC: 110 MMOL/L (ref 94–109)
CO2 SERPL-SCNC: 22 MMOL/L (ref 20–32)
COLOR UR AUTO: COLORLESS
CREAT SERPL-MCNC: 1.73 MG/DL (ref 0.52–1.04)
ERYTHROCYTE [DISTWIDTH] IN BLOOD BY AUTOMATED COUNT: 18 % (ref 10–15)
GFR SERPL CREATININE-BSD FRML MDRD: 38 ML/MIN/{1.73_M2}
GLUCOSE SERPL-MCNC: 103 MG/DL (ref 70–99)
GLUCOSE UR STRIP-MCNC: NEGATIVE MG/DL
HCT VFR BLD AUTO: 29.3 % (ref 35–47)
HGB BLD-MCNC: 8.7 G/DL (ref 11.7–15.7)
HGB UR QL STRIP: NEGATIVE
IRON SATN MFR SERPL: 6 % (ref 15–46)
IRON SERPL-MCNC: 25 UG/DL (ref 35–180)
KETONES UR STRIP-MCNC: NEGATIVE MG/DL
LACTATE BLD-SCNC: 1.5 MMOL/L (ref 0.7–2)
LEUKOCYTE ESTERASE UR QL STRIP: NEGATIVE
MCH RBC QN AUTO: 23 PG (ref 26.5–33)
MCHC RBC AUTO-ENTMCNC: 29.7 G/DL (ref 31.5–36.5)
MCV RBC AUTO: 77 FL (ref 78–100)
NITRATE UR QL: NEGATIVE
PH UR STRIP: 7 PH (ref 5–7)
PLATELET # BLD AUTO: 278 10E9/L (ref 150–450)
POTASSIUM SERPL-SCNC: 4.2 MMOL/L (ref 3.4–5.3)
RBC # BLD AUTO: 3.79 10E12/L (ref 3.8–5.2)
RBC #/AREA URNS AUTO: 0 /HPF (ref 0–2)
SODIUM SERPL-SCNC: 138 MMOL/L (ref 133–144)
SOURCE: NORMAL
SP GR UR STRIP: 1 (ref 1–1.03)
SQUAMOUS #/AREA URNS AUTO: 1 /HPF (ref 0–1)
TIBC SERPL-MCNC: 385 UG/DL (ref 240–430)
TRANSFUSION STATUS PATIENT QL: NORMAL
TRANSFUSION STATUS PATIENT QL: NORMAL
UROBILINOGEN UR STRIP-MCNC: 0 MG/DL (ref 0–2)
WBC # BLD AUTO: 9.8 10E9/L (ref 4–11)
WBC #/AREA URNS AUTO: 1 /HPF (ref 0–5)

## 2019-11-25 PROCEDURE — 25800030 ZZH RX IP 258 OP 636: Performed by: FAMILY MEDICINE

## 2019-11-25 PROCEDURE — 25000132 ZZH RX MED GY IP 250 OP 250 PS 637: Mod: GY | Performed by: FAMILY MEDICINE

## 2019-11-25 PROCEDURE — 99207 ZZC CDG-CODE CATEGORY CHANGED: CPT | Performed by: PHYSICIAN ASSISTANT

## 2019-11-25 PROCEDURE — P9016 RBC LEUKOCYTES REDUCED: HCPCS | Performed by: EMERGENCY MEDICINE

## 2019-11-25 PROCEDURE — 36430 TRANSFUSION BLD/BLD COMPNT: CPT

## 2019-11-25 PROCEDURE — 81001 URINALYSIS AUTO W/SCOPE: CPT | Performed by: PHYSICIAN ASSISTANT

## 2019-11-25 PROCEDURE — 85027 COMPLETE CBC AUTOMATED: CPT | Performed by: FAMILY MEDICINE

## 2019-11-25 PROCEDURE — G0378 HOSPITAL OBSERVATION PER HR: HCPCS

## 2019-11-25 PROCEDURE — 99217 ZZC OBSERVATION CARE DISCHARGE: CPT | Performed by: PHYSICIAN ASSISTANT

## 2019-11-25 PROCEDURE — 36415 COLL VENOUS BLD VENIPUNCTURE: CPT | Performed by: FAMILY MEDICINE

## 2019-11-25 PROCEDURE — 80048 BASIC METABOLIC PNL TOTAL CA: CPT | Performed by: FAMILY MEDICINE

## 2019-11-25 PROCEDURE — 25000132 ZZH RX MED GY IP 250 OP 250 PS 637: Mod: GY | Performed by: PHYSICIAN ASSISTANT

## 2019-11-25 PROCEDURE — 96361 HYDRATE IV INFUSION ADD-ON: CPT

## 2019-11-25 PROCEDURE — 83605 ASSAY OF LACTIC ACID: CPT | Performed by: FAMILY MEDICINE

## 2019-11-25 RX ORDER — ACETAMINOPHEN 325 MG/1
650 TABLET ORAL EVERY 4 HOURS PRN
Status: DISCONTINUED | OUTPATIENT
Start: 2019-11-25 | End: 2019-11-25 | Stop reason: HOSPADM

## 2019-11-25 RX ORDER — FERROUS GLUCONATE 324(38)MG
324 TABLET ORAL
Status: DISCONTINUED | OUTPATIENT
Start: 2019-11-25 | End: 2019-11-25 | Stop reason: HOSPADM

## 2019-11-25 RX ORDER — NALOXONE HYDROCHLORIDE 0.4 MG/ML
.1-.4 INJECTION, SOLUTION INTRAMUSCULAR; INTRAVENOUS; SUBCUTANEOUS
Status: DISCONTINUED | OUTPATIENT
Start: 2019-11-25 | End: 2019-11-25 | Stop reason: HOSPADM

## 2019-11-25 RX ORDER — MORPHINE SULFATE 2 MG/ML
1-2 INJECTION, SOLUTION INTRAMUSCULAR; INTRAVENOUS
Status: DISCONTINUED | OUTPATIENT
Start: 2019-11-25 | End: 2019-11-25 | Stop reason: HOSPADM

## 2019-11-25 RX ORDER — PROCHLORPERAZINE MALEATE 5 MG
10 TABLET ORAL EVERY 6 HOURS PRN
Status: DISCONTINUED | OUTPATIENT
Start: 2019-11-25 | End: 2019-11-25 | Stop reason: HOSPADM

## 2019-11-25 RX ORDER — SODIUM CHLORIDE 9 MG/ML
INJECTION, SOLUTION INTRAVENOUS CONTINUOUS
Status: DISCONTINUED | OUTPATIENT
Start: 2019-11-25 | End: 2019-11-25

## 2019-11-25 RX ORDER — ONDANSETRON 2 MG/ML
4 INJECTION INTRAMUSCULAR; INTRAVENOUS EVERY 6 HOURS PRN
Status: DISCONTINUED | OUTPATIENT
Start: 2019-11-25 | End: 2019-11-25 | Stop reason: HOSPADM

## 2019-11-25 RX ORDER — BISACODYL 5 MG
15 TABLET, DELAYED RELEASE (ENTERIC COATED) ORAL DAILY PRN
Status: DISCONTINUED | OUTPATIENT
Start: 2019-11-25 | End: 2019-11-25 | Stop reason: HOSPADM

## 2019-11-25 RX ORDER — CLONIDINE HYDROCHLORIDE 0.1 MG/1
0.1 TABLET ORAL 2 TIMES DAILY PRN
Status: DISCONTINUED | OUTPATIENT
Start: 2019-11-25 | End: 2019-11-25 | Stop reason: HOSPADM

## 2019-11-25 RX ORDER — BISACODYL 10 MG
10 SUPPOSITORY, RECTAL RECTAL DAILY PRN
Status: DISCONTINUED | OUTPATIENT
Start: 2019-11-25 | End: 2019-11-25 | Stop reason: HOSPADM

## 2019-11-25 RX ORDER — ACETAMINOPHEN 325 MG/1
650 TABLET ORAL EVERY 4 HOURS PRN
Status: DISCONTINUED | OUTPATIENT
Start: 2019-11-25 | End: 2019-11-25

## 2019-11-25 RX ORDER — FOLIC ACID 1 MG/1
1 TABLET ORAL DAILY
COMMUNITY
End: 2021-09-01

## 2019-11-25 RX ORDER — ALBUTEROL SULFATE 90 UG/1
2 AEROSOL, METERED RESPIRATORY (INHALATION) EVERY 4 HOURS PRN
Status: DISCONTINUED | OUTPATIENT
Start: 2019-11-25 | End: 2019-11-25 | Stop reason: HOSPADM

## 2019-11-25 RX ORDER — PROCHLORPERAZINE 25 MG
25 SUPPOSITORY, RECTAL RECTAL EVERY 12 HOURS PRN
Status: DISCONTINUED | OUTPATIENT
Start: 2019-11-25 | End: 2019-11-25 | Stop reason: HOSPADM

## 2019-11-25 RX ORDER — POLYETHYLENE GLYCOL 3350 17 G/17G
17 POWDER, FOR SOLUTION ORAL DAILY
Status: DISCONTINUED | OUTPATIENT
Start: 2019-11-25 | End: 2019-11-25 | Stop reason: HOSPADM

## 2019-11-25 RX ORDER — AMLODIPINE BESYLATE 5 MG/1
5 TABLET ORAL DAILY
Status: DISCONTINUED | OUTPATIENT
Start: 2019-11-25 | End: 2019-11-25 | Stop reason: HOSPADM

## 2019-11-25 RX ORDER — GABAPENTIN 300 MG/1
900 CAPSULE ORAL 3 TIMES DAILY
Status: DISCONTINUED | OUTPATIENT
Start: 2019-11-25 | End: 2019-11-25 | Stop reason: HOSPADM

## 2019-11-25 RX ORDER — BUSPIRONE HYDROCHLORIDE 10 MG/1
30 TABLET ORAL 2 TIMES DAILY
Status: DISCONTINUED | OUTPATIENT
Start: 2019-11-25 | End: 2019-11-25 | Stop reason: HOSPADM

## 2019-11-25 RX ORDER — ONDANSETRON 4 MG/1
4 TABLET, ORALLY DISINTEGRATING ORAL EVERY 6 HOURS PRN
Status: DISCONTINUED | OUTPATIENT
Start: 2019-11-25 | End: 2019-11-25 | Stop reason: HOSPADM

## 2019-11-25 RX ORDER — MONTELUKAST SODIUM 10 MG/1
10 TABLET ORAL AT BEDTIME
Status: DISCONTINUED | OUTPATIENT
Start: 2019-11-25 | End: 2019-11-25 | Stop reason: HOSPADM

## 2019-11-25 RX ORDER — OXYCODONE HCL 10 MG/1
10 TABLET, FILM COATED, EXTENDED RELEASE ORAL EVERY 12 HOURS
Status: DISCONTINUED | OUTPATIENT
Start: 2019-11-25 | End: 2019-11-25 | Stop reason: HOSPADM

## 2019-11-25 RX ORDER — LISINOPRIL 10 MG/1
10 TABLET ORAL DAILY
Status: CANCELLED | OUTPATIENT
Start: 2019-11-25

## 2019-11-25 RX ORDER — FAMOTIDINE 20 MG/1
20 TABLET, FILM COATED ORAL 2 TIMES DAILY
Status: DISCONTINUED | OUTPATIENT
Start: 2019-11-25 | End: 2019-11-25 | Stop reason: HOSPADM

## 2019-11-25 RX ORDER — BISACODYL 5 MG
5 TABLET, DELAYED RELEASE (ENTERIC COATED) ORAL DAILY PRN
Status: DISCONTINUED | OUTPATIENT
Start: 2019-11-25 | End: 2019-11-25 | Stop reason: HOSPADM

## 2019-11-25 RX ORDER — OXYCODONE HYDROCHLORIDE 5 MG/1
15 TABLET ORAL EVERY 4 HOURS PRN
Status: DISCONTINUED | OUTPATIENT
Start: 2019-11-25 | End: 2019-11-25 | Stop reason: HOSPADM

## 2019-11-25 RX ORDER — LORAZEPAM 1 MG/1
1 TABLET ORAL DAILY PRN
Status: DISCONTINUED | OUTPATIENT
Start: 2019-11-25 | End: 2019-11-25 | Stop reason: HOSPADM

## 2019-11-25 RX ORDER — PROMETHAZINE HYDROCHLORIDE 25 MG/1
25 TABLET ORAL 2 TIMES DAILY PRN
Status: DISCONTINUED | OUTPATIENT
Start: 2019-11-25 | End: 2019-11-25 | Stop reason: HOSPADM

## 2019-11-25 RX ORDER — UREA 10 %
1000 LOTION (ML) TOPICAL DAILY
Status: DISCONTINUED | OUTPATIENT
Start: 2019-11-25 | End: 2019-11-25 | Stop reason: HOSPADM

## 2019-11-25 RX ORDER — BUDESONIDE AND FORMOTEROL FUMARATE DIHYDRATE 160; 4.5 UG/1; UG/1
2 AEROSOL RESPIRATORY (INHALATION) 2 TIMES DAILY
Status: DISCONTINUED | OUTPATIENT
Start: 2019-11-25 | End: 2019-11-25 | Stop reason: HOSPADM

## 2019-11-25 RX ORDER — FOLIC ACID 1 MG/1
1000 TABLET ORAL DAILY
Status: DISCONTINUED | OUTPATIENT
Start: 2019-11-25 | End: 2019-11-25 | Stop reason: HOSPADM

## 2019-11-25 RX ORDER — BISACODYL 5 MG
10 TABLET, DELAYED RELEASE (ENTERIC COATED) ORAL DAILY PRN
Status: DISCONTINUED | OUTPATIENT
Start: 2019-11-25 | End: 2019-11-25 | Stop reason: HOSPADM

## 2019-11-25 RX ORDER — DULOXETIN HYDROCHLORIDE 30 MG/1
30 CAPSULE, DELAYED RELEASE ORAL 2 TIMES DAILY
Status: DISCONTINUED | OUTPATIENT
Start: 2019-11-25 | End: 2019-11-25 | Stop reason: HOSPADM

## 2019-11-25 RX ORDER — ONDANSETRON 4 MG/1
4 TABLET, ORALLY DISINTEGRATING ORAL EVERY 6 HOURS PRN
Status: DISCONTINUED | OUTPATIENT
Start: 2019-11-25 | End: 2019-11-25

## 2019-11-25 RX ORDER — ONDANSETRON 2 MG/ML
4 INJECTION INTRAMUSCULAR; INTRAVENOUS EVERY 6 HOURS PRN
Status: DISCONTINUED | OUTPATIENT
Start: 2019-11-25 | End: 2019-11-25

## 2019-11-25 RX ORDER — PROMETHAZINE HYDROCHLORIDE 50 MG/ML
25 INJECTION, SOLUTION INTRAMUSCULAR; INTRAVENOUS EVERY 6 HOURS PRN
Status: DISCONTINUED | OUTPATIENT
Start: 2019-11-25 | End: 2019-11-25

## 2019-11-25 RX ORDER — ACETAMINOPHEN 650 MG/1
650 SUPPOSITORY RECTAL EVERY 4 HOURS PRN
Status: DISCONTINUED | OUTPATIENT
Start: 2019-11-25 | End: 2019-11-25 | Stop reason: HOSPADM

## 2019-11-25 RX ADMIN — OXYCODONE HYDROCHLORIDE 10 MG: 10 TABLET, FILM COATED, EXTENDED RELEASE ORAL at 13:23

## 2019-11-25 RX ADMIN — FOLIC ACID 1000 MCG: 1 TABLET ORAL at 13:22

## 2019-11-25 RX ADMIN — BUSPIRONE HYDROCHLORIDE 30 MG: 10 TABLET ORAL at 08:31

## 2019-11-25 RX ADMIN — OXYCODONE HYDROCHLORIDE 15 MG: 5 TABLET ORAL at 08:33

## 2019-11-25 RX ADMIN — GABAPENTIN 900 MG: 300 CAPSULE ORAL at 13:22

## 2019-11-25 RX ADMIN — ACETAMINOPHEN 650 MG: 325 TABLET, FILM COATED ORAL at 08:31

## 2019-11-25 RX ADMIN — OMEPRAZOLE 40 MG: 20 CAPSULE, DELAYED RELEASE ORAL at 08:32

## 2019-11-25 RX ADMIN — ALBUTEROL SULFATE 2 PUFF: 90 INHALANT RESPIRATORY (INHALATION) at 10:41

## 2019-11-25 RX ADMIN — FERROUS GLUCONATE 324 MG: 324 TABLET ORAL at 08:32

## 2019-11-25 RX ADMIN — FAMOTIDINE 20 MG: 20 TABLET ORAL at 08:32

## 2019-11-25 RX ADMIN — DULOXETINE HYDROCHLORIDE 30 MG: 30 CAPSULE, DELAYED RELEASE ORAL at 08:33

## 2019-11-25 RX ADMIN — SODIUM CHLORIDE, POTASSIUM CHLORIDE, SODIUM LACTATE AND CALCIUM CHLORIDE 1000 ML: 600; 310; 30; 20 INJECTION, SOLUTION INTRAVENOUS at 05:22

## 2019-11-25 RX ADMIN — Medication 1000 MCG: at 08:32

## 2019-11-25 RX ADMIN — AMLODIPINE BESYLATE 5 MG: 5 TABLET ORAL at 08:32

## 2019-11-25 RX ADMIN — GABAPENTIN 900 MG: 300 CAPSULE ORAL at 08:32

## 2019-11-25 ASSESSMENT — MIFFLIN-ST. JEOR: SCORE: 1493.25

## 2019-11-25 NOTE — H&P
[facility]   History and Physical     Molly Teague MRN# [id number]   YOB: 1985 Age: 34 year old      Date of Admission:  11/24/2019         Primary Care Physician:   Grecia Barba [phone]      Identification and Chief Complaint:   Molly Teague is a 34 year old female who presents with weakness, nausea, and vomiting.           Past Medical History:     Patient Active Problem List    Diagnosis Date Noted     Mirena IUD- Remove by 09/2024 09/06/2019     Priority: Medium     Lot: [id number]  Exp: 01/2022    Dinora Israel LPN         End-stage renal disease (H) 07/24/2019     Priority: Medium     Malignant essential hypertension 07/24/2019     Priority: Medium     PTSD (post-traumatic stress disorder) 06/19/2019     Priority: Medium     Obesity (BMI 35.0-39.9) with comorbidity (H) 01/22/2019     Priority: Medium     Moderate major depression (H) 07/06/2018     Priority: Medium     Severe persistent asthma without complication 07/06/2018     Priority: Medium     On high dose ICS/LABA + frequent albuterol use.  Next allergy/pulmonary referral       Aspiration of food 03/29/2018     Priority: Medium     Chronic pain syndrome 04/26/2017     Priority: Medium     Patient is followed by Grecia Barba DO for ongoing prescription of pain medication.  All refills should only be approved by this provider, or covering partner.    Medication(s): Oxycodone 15 mg.   Maximum quantity per month: 100  Clinic visit frequency required: Q 2 months     Controlled substance agreement:  Encounter-Level CSA - 04/26/2017:    Controlled Substance Agreement - Scan on 5/4/2017  8:58 AM: CONTROLLED SUBSTANCE AGREEMENT (below)       Patient-Level CSA:    Controlled Substance Agreement - Opioid - Scan on 2/6/2019  6:23 PM (below)         Pain Clinic evaluation in the past: No    DIRE Total Score(s):  No flowsheet data found.    Last [facility] website verification:  done on 4/26/17    9/26/2017  7/6/2018  10/16/2018  1/22/2019  8/2/2019           [email or web address]         Chronic migraine without aura without status migrainosus, not intractable 04/12/2017     Priority: Medium     With medication overuse.  Fioricet and not Imitrex is recommend to treat severe headache.  2/2017 [facility] recommend wean down from daily Fioricet and use Excedrin Migrain PRN.       AIN grade I 03/30/2017     Priority: Medium     Found at [facility] on colonoscopy 2/2017.  Recommend repeat anoscopy and anal pap in 6/2017.       Gastroesophageal reflux disease with esophagitis 03/30/2017     Priority: Medium     EGD done at [facility] 2/2017 showed esophagitis without GAVE.  Recommend max dose H2 and PPI, along with 4 tablets of Carafate dissolved in water and drink throughout the day.    Had LA Grade D esophagitis        Limited systemic sclerosis (H) 01/25/2017     Priority: Medium     Raynaud's, calcinosis, telangiectasias, peripheral neuropathy, GERD symptoms, history of scleroderma renal crisis.  Saw [facility] 1/2017 and follows with Rheum Dr. Davis locally.       Polyneuropathy associated with underlying disease (H) 01/25/2017     Priority: Medium     Due to scleroderma and neurology thought possible due to ICU (critical illness neuropathy).  Recommend to max out dose of gabapentin to 4152-6213 mg/day, split BID or TID.  EMG 1/2017 positive for neuropathy       History of Clostridium difficile 11/29/2016     Priority: Medium     History of Helicobacter pylori infection 11/29/2016     Priority: Medium     Scleroderma with renal involvement (H) 11/09/2016     Priority: Medium     Stopped lisinopril per [facility] 1/2017.  Restarted lisinopril per [facility] 3/2017       History of ARDS 11/09/2016     Priority: Medium     4/2015, prolonged ICU/vent/trach due to influenza, scleroderma renal crisis requiring hemodialysis.       Raynaud's disease without gangrene 11/09/2016     Priority: Medium     History of hemodialysis 11/09/2016  "    Priority: Medium     2015 due to scleroderma renal crisis       Bilateral retinitis 2016     Priority: Medium     Other pulmonary embolism without acute cor pulmonale, unspecified chronicity (H) 2016     Priority: Medium     Completed anti-coagulation.       REX (generalized anxiety disorder) 2016     Priority: Medium     CREST (calcinosis, Raynaud's phenomenon, esophageal dysfunction, sclerodactyly, telangiectasia) (H) 2016     Priority: Medium     Scleroderma (H) 2016     Priority: Medium     Nausea with vomiting 2016     Priority: Medium             Past Surgical History:     Past Surgical History:   Procedure Laterality Date     ANGIOGRAM  2015      SECTION  2014     THROAT SURGERY  2015            History of Present Illness:   Molly Teague is a 34 year old female with PMHx significant for CREST scleroderma, GERD, depression, anxiety, asthma, and essential HTN presented to the ED with 4 days of nausea, vomiting, and weakness with one episode of hematemesis.  Molly has chronic nausea and vomiting in the setting of her CREST scleroderma and GERD.  She is usually able to manage her symptoms at home with the use of omeprazole 40 mg BID, zantac 150 mg BID, and promethazine PRN.  Molly reports she has not been able to get relief with the use of these over the last 5 days.  In addition, she has been having increased weakness and was shaking yesterday while attempting to ambulate.  Molly had an episode of numbness from her stomach to her knees bilaterally yesterday; this resolved spontaneously, and she denies any similar symptoms currently.  Of note, she had one episode of \"coffee ground\" emesis yesterday. Molly states this was not concerning to her as she has vomiting \"old blood\" previously.  She denies any episodes of vomiting bright red blood, abdominal pain, or diarrhea.    Molly has a history of HTN, for which she takes amlodipine 5 mg once daily and lisinopril " "10 mg once daily.  She states her normal systolic BP readings are 140-145.  Molly has had several episodes in the last 4 days where she \"felt her blood pressure was off\"; she checked it and reports systolics in the 175-180 range.  She has not been hypertensive since her presentation to the ED and denies any headache or vision changes.    Molly has a history of CREST scleroderma with renal involvement as well as GERD with esophagitis.  She was diagnosed recently after the birth of her son 5 years ago.  She reports that nausea and vomiting \"are just a part of life\" for her now.  She is typically able to manage her symptoms at home with use of PRN medications.      Molly has a history of anxiety and depression for which she takes Buspar and Cymbalta.  She reports no symptoms while on these medications and feels she has a relatively good outlook on life.           Allergies:     Allergies   Allergen Reactions     No Known Allergies      Pollen Extract      Other reaction(s): Other (see comments)     Seasonal Allergies      Shellfish-Derived Products Nausea     Rash on face             Home Medications:     Prior to Admission medications    Medication Sig Last Dose Taking? Auth Provider   amLODIPine (NORVASC) 5 MG tablet Take 5 mg by mouth daily   Reported, Patient   blood glucose (NO BRAND SPECIFIED) lancets standard Use to test blood sugar 1 time daily   Grecia Barba DO   blood glucose (NO BRAND SPECIFIED) test strip Use to test blood sugar 1 time daily   Grecia Barba DO   budesonide-formoterol (SYMBICORT) 160-4.5 MCG/ACT Inhaler Inhale 2 puffs into the lungs 2 times daily   Grecia Barba DO   busPIRone HCl (BUSPAR) 30 MG tablet TAKE ONE TABLET BY MOUTH TWICE A DAY   Grecia Barba DO   Cyanocobalamin (B-12) 1000 MCG TBCR Take 1,000 mcg by mouth daily   Grecia Barba DO   diphenhydrAMINE (BENADRYL) 25 MG tablet Take 1 tablet (25 mg) by mouth every 6 hours as needed for itching " or allergies   Talia Balderas MD   Doxylamine Succinate, Sleep, (UNISOM PO)    Reported, Patient   DULoxetine (CYMBALTA) 30 MG capsule Take 1 capsule (30 mg) by mouth 2 times daily   Grecia Barba DO   ferrous gluconate (FERGON) 324 (38 FE) MG tablet TAKE ONE TABLET BY MOUTH EVERY DAY WITH BREAKFAST   Grecia Barba DO   gabapentin (NEURONTIN) 300 MG capsule TAKE TWO CAPSULES BY MOUTH THREE TIMES A DAY   Grecia Barba DO   GOODSENSE MIGRAINE FORMULA 250-250-65 MG per tablet TAKE ONE TABLET BY MOUTH EVERY 6 HOURS AS NEEDED FOR HEADACHES   Reza Bowman MD   lisinopril (PRINIVIL/ZESTRIL) 10 MG tablet Take 1 tablet (10 mg) by mouth daily   Grecia Barba DO   LORazepam (ATIVAN) 1 MG tablet Take 1 tablet (1 mg) by mouth daily as needed for anxiety #30 to last 30 days   Grecia Barba DO   montelukast (SINGULAIR) 10 MG tablet TAKE ONE TABLET BY MOUTH AT BEDTIME   Grecia Barba DO   naloxone (NARCAN) 4 MG/0.1ML nasal spray Spray 1 spray (4 mg) into one nostril alternating nostrils once as needed for opioid reversal every 2-3 minutes until assistance arrives   Luisa Chapin MD   naloxone (NARCAN) nasal spray Spray 1 spray (4 mg) into one nostril alternating nostrils as needed for opioid reversal every 2-3 minutes until assistance arrives   Grecia Barba DO   omeprazole (PRILOSEC) 40 MG DR capsule TAKE ONE CAPSULE BY MOUTH TWICE A DAY   Grecia Barba DO   ondansetron (ZOFRAN-ODT) 8 MG ODT tab Take 1 tablet (8 mg) by mouth every 8 hours as needed for nausea   Grecia Barba DO   oxyCODONE IR (ROXICODONE) 15 MG tablet Take 1 tablet (15 mg) by mouth every 4 hours as needed for severe pain   Grecia Barba DO   polyethylene glycol (MIRALAX) powder Take 17 g (1 capful) by mouth PRN   Grecia Barba DO   promethazine (PHENERGAN) 25 MG tablet Take 1 tablet (25 mg) by mouth 2 times daily as needed for nausea   Grecia Barba  DO Nayana   promethazine (PHENERGAN) 50 MG/ML SOLN IV injection Inject 0.5 mL (25 mg) into the muscle every 6 hours as needed   Grecia Barba DO   ranitidine (ZANTAC) 150 MG tablet TAKE ONE TABLET BY MOUTH TWICE A DAY AS NEEDED FOR HEARTBURN   Grecia Barba DO   VENTOLIN  (90 Base) MCG/ACT inhaler INHALE 2 PUFFS INTO THE LUNGS ONCE EVERY 4 HOURS AS NEEDED FOR SHORTNESS OF BREATH / DYSPNEA / WHEEZING   Grecia Barba DO            Family History:     Family History   Problem Relation Age of Onset     Hyperlipidemia Father      Cerebrovascular Disease Maternal Grandmother      Hyperlipidemia Paternal Grandfather      Depression Sister      Neuropathy Sister      Diabetes Maternal Aunt      Melanoma No family hx of      Skin Cancer No family hx of              Social History:     Social History     Tobacco Use     Smoking status: Never Smoker     Smokeless tobacco: Never Used   Substance Use Topics     Alcohol use: Yes     Comment: Socially once on a weekend if any            Review of Systems:   Constitutional: Positive for chills and increased Raynaud's attacks.  Negative for fever, headache, or fatigue.  Skin: Negative for bruises, rashes, or new skin lesions.  Eyes: Negative for blurred vision, double vision, pain, or discharge.  Ears/Nose/Throat: Negative for changes in hearing, recent epistaxis, difficulty or pain with swallowing.  Respiratory: Negative for shortness of breath, dyspnea on exertion, cough, or hemoptysis  Cardiovascular: Positive for chest pressure from reflux.  Negative for chest pain, tightness, or palpitations.  Gastrointestinal: See HPI.  Genitourinary: Negative for dysuria, increased frequency, hesitancy, hematuria, or change in color of urine.  Musculoskeletal: Negative for muscle pain, weakness, or parasthesias.  Neurologic: Positive for headache and dizziness.  Negative for syncope or seizures.  Psychiatric: Negative for feelings of depression or  anxiety.  Hematologic/Lymphatic/Immunologic: Positive for weight loss of 6 pounds over the last 3 weeks.  Negative for cold or heat intolerance, or swollen nodes.  Endocrine: Negative for cold or heat intolerance, goiter, or recent hair loss.         Physical Exam:   Blood pressure 108/76, pulse 122, temperature 98  F (36.7  C), temperature source Oral, resp. rate 18, weight 79.8 kg (176 lb), SpO2 97 %, not currently breastfeeding.  Body mass index is 32.19 kg/m .     General: Alert and in no apparent distress.  Cooperative. Appears nontoxic.   HEENT:  Head: Normocephalic.  Atraumatic.  Eyes:  PERRL.  EOMI.  Clear conjunctiva.  No discharge or redness.  Ears:  Hearing intact.  No abnormalities or discharge.  Nose: No trauma, epistaxis, or discharge.  Throat: Oropharynx is clear with moist mucous membranes.  No sores or lesions noted.  Neck: Supple.  Trachea midline.  No goiter.  Lymphatics: No epitrochlear, axillary, anterior or posterior cervical, or supraclavicular lymphadenopathy is appreciated.   Cardiovascular: Regular rate and rhythm.  Audible S1 and S2.  No murmurs, gallops, or rubs. JVP is normal. 2+ peripheral pulses in wrists and DPs bilaterally.   Lungs: Clear to auscultation bilaterally.  No rales, rhonchi, or wheezing.  No use of accessory muscles.  Abdomen: Tenderness to palpation in LLQ.  No rebound or guarding.  No distention. Bowel sounds present.  No hepatosplenomegaly appreciated.  Musculoskeletal: Normal muscle bulk and tone.  Unable to fully extend fingers bilaterally with scleroderma; pearly white calcifications visualized over DIP and PIP joints bilaterally.   Skin: Warm and dry. No rashes. No lower extremity edema.  Vitiligo present over neck and upper chest.  Neurologic:  Alert and oriented x3.  CN II-XII grossly intact.  is symmetric. Normal finger-to-nose test.         Data:   All new lab and imaging data was reviewed.  Results for orders placed or performed during the hospital  encounter of 11/24/19 (from the past 24 hour(s))   CBC with platelets, differential   Result Value Ref Range    WBC 13.9 (H) 4.0 - 11.0 10e9/L    RBC Count 3.29 (L) 3.8 - 5.2 10e12/L    Hemoglobin 6.9 (LL) 11.7 - 15.7 g/dL    Hematocrit 24.1 (L) 35.0 - 47.0 %    MCV 73 (L) 78 - 100 fl    MCH 21.0 (L) 26.5 - 33.0 pg    MCHC 28.6 (L) 31.5 - 36.5 g/dL    RDW 17.1 (H) 10.0 - 15.0 %    Platelet Count 340 150 - 450 10e9/L    Diff Method Automated Method     % Neutrophils 70.1 %    % Lymphocytes 18.4 %    % Monocytes 7.1 %    % Eosinophils 3.3 %    % Basophils 0.5 %    % Immature Granulocytes 0.6 %    Nucleated RBCs 0 0 /100    Absolute Neutrophil 9.7 (H) 1.6 - 8.3 10e9/L    Absolute Lymphocytes 2.6 0.8 - 5.3 10e9/L    Absolute Monocytes 1.0 0.0 - 1.3 10e9/L    Absolute Eosinophils 0.5 0.0 - 0.7 10e9/L    Absolute Basophils 0.1 0.0 - 0.2 10e9/L    Abs Immature Granulocytes 0.1 0 - 0.4 10e9/L    Absolute Nucleated RBC 0.0    Comprehensive metabolic panel   Result Value Ref Range    Sodium 138 133 - 144 mmol/L    Potassium 4.9 3.4 - 5.3 mmol/L    Chloride 109 94 - 109 mmol/L    Carbon Dioxide 21 20 - 32 mmol/L    Anion Gap 8 3 - 14 mmol/L    Glucose 90 70 - 99 mg/dL    Urea Nitrogen 22 7 - 30 mg/dL    Creatinine 2.06 (H) 0.52 - 1.04 mg/dL    GFR Estimate 31 (L) >60 mL/min/[1.73_m2]    GFR Estimate If Black 35 (L) >60 mL/min/[1.73_m2]    Calcium 8.9 8.5 - 10.1 mg/dL    Bilirubin Total 0.4 0.2 - 1.3 mg/dL    Albumin 3.5 3.4 - 5.0 g/dL    Protein Total 7.4 6.8 - 8.8 g/dL    Alkaline Phosphatase 75 40 - 150 U/L    ALT 37 0 - 50 U/L    AST 47 (H) 0 - 45 U/L   ABO/Rh type and screen   Result Value Ref Range    Units Ordered 2     ABO O     RH(D) Pos     Antibody Screen Neg     Test Valid Only At [facility]        Specimen Expires 11/27/2019     Crossmatch Red Blood Cells    HCG qualitative pregnancy (blood)   Result Value Ref Range    HCG Qualitative Serum Negative NEG^Negative   Blood component   Result Value Ref  Range    Unit Number [id number]     Blood Component Type Red Blood Cells Leukocyte Reduced     Division Number 00     Status of Unit Ready for patient 11/24/2019 2223     Blood Product Code [id number]     Unit Status DENZEL    Blood component   Result Value Ref Range    Unit Number [id number]     Blood Component Type Red Blood Cells Leukocyte Reduced     Division Number 00     Status of Unit Released to care unit 11/24/2019 2233     Blood Product Code [id number]     Unit Status ISS    CT Abdomen Pelvis w/o Contrast    Narrative    CT ABDOMEN AND PELVIS WITHOUT CONTRAST   11/24/2019 10:23 PM     HISTORY: History of CREST syndrome with scleroderma. Acute anemia of  unclear cause. Lower abdominal pain.    COMPARISON: 7/24/2019.    TECHNIQUE: Without intravenous contrast, helical sections were  acquired from the top of the diaphragm through the pubic symphysis.  Coronal reconstructions were generated. Radiation dose for this scan  was reduced using automated exposure control, adjustment of the mA  and/or kV according to the patient's size, or iterative reconstruction  technique.    FINDINGS:   Abdomen: Mild diffuse fatty infiltration of the liver. The liver,  spleen, pancreas, adrenal glands and kidneys are unremarkable to the  limits of a noncontrast CT scan. The gallbladder is present. The  distal esophagus is patulous, likely related to the given history of  scleroderma. No enlarged lymph nodes or free fluid in the upper  abdomen.    Scan through the lower chest is unremarkable.    Pelvis: The small and large bowel are normal in caliber. The appendix  is unremarkable. A moderate amount of stool is present throughout the  colon. No bowel wall thickening, pneumatosis or free intraperitoneal  gas. An intrauterine device is present in the central uterus. No  enlarged lymph nodes or free fluid in the pelvis.      Impression    IMPRESSION:   1. No acute abnormality is identified in the abdomen or pelvis.  Normal  appendix.  2. A moderate amount of stool is present throughout the colon.            Assessment and Plan:   Molly Teague is a 34 year old female with PMHx significant for CREST scleroderma, GERD, depression, anxiety, asthma, and essential HTN presented to the ED with 4 days of nausea and vomiting with one episode of hematemesis. Work-up completed in the ED revealed an elevated WBC count, microcytic anemia, and EMERALD.  Remainder of work-up including CMP, B-hCG, and CT abdomen and pelvis were unremarkable.    EMERALD  Scleroderma with renal involvement  Hx end-stage renal disease  Hx hemodialysis  Creatinine 2.06 in the ED; elevated from baseline ~1.4.  Suspect EMERALD is most likely secondary to combination of hypovolemia from vomiting in combination with lisinopril.  Less likely ATN, but cannot rule out at this time given BUN:Cr < 20.  -Hold lisinopril  -LR bolus of 1 L after the transfusions in  -BMP in AM    Anemia  Hgb 6.9; down from baseline ~10.  Most likely VEE given CBC consistent with microcytic anemia and malabsorption from her CREST scleroderma.  Unlikely thalassemia with her personal and family history.  -Blood transfusion  -Stool hemoccult x3  -PTA ferrous gluconate 324 mg once daily, B12 1000 mcg once daily  - may need to set up iron infusions as OP if hemocult negative ( would indicate not absorbing iron)     Nausea with vomiting  Hx C. Difficile   Has had longstanding but worse the last 4 days.  Suspect nausea and vomiting most likely secondary to combination of GERD, anxiety, and scleroderma.  Infection is low on the differential given she has been afebrile and has not had diarrhea or abdominal pain; she does have an elevated WBC count, so cannot rule out at this time.  Will continue to monitor symptoms and get stool sample if she begins to have diarrhea.  -CBC in AM    Chronic pain syndrome  Molly has a history of chronic pain syndrome in the setting of her CREST scleroderma for which she takes  oxycodone IR 15 mg PRN.  -PTA oxycodone IR    Peripheral neuropathy  Molly has a history of peripheral neuropathy in the setting of CREST scleroderma for which she takes gabapentin 900 mg TID.  -PTA gabapentin    REX  Molly has a history of REX for which she takes Buspar 30 mg BID and Ativan 1 mg PRN.  She has not been anxious while in the ED.  -PTA Buspar and Ativan    Moderate major depression  Molly has a history of depression for which she takes cymblata 20 mg BID.  She feels her symptoms have been well controlled on this regimen.  -PTA cymbalta    Severe persistent asthma without complication  Molly has a history of asthma for which she uses Symbicort 2 puffs BID, Singulair 10 mg once daily, and ventolin PRN.  She has had good oxygen saturation while in the ED and has not had any signs of respiratory distress.  -PTA Symbicort, ventolin, and Singulair    Malignant essential HTN  Molly has a history of HTN for which she takes amlodipine 5 mg once daily and lisinopril 10 mg once daily.  She reports her normal systolic blood pressures are 140-145.  She has not been hypertensive while in the ED.  -Clonidine 0.1 mg PRN BID  -Hold PTA lisinopril in the setting EMERALD  -Continue PTA amlodipine    GERD with esophagitis  Molyl has a history of GERD for which she takes omeprazole 40 mg BID, zantac 150 mg BID, and promethazine PRN.  -PTA omeprazole, zantac, and promethazine.     Diet: General adult  IV fluids: LR @ 125 mL/hr    Prophylaxis:  VTE: Padua <4, ambulation  GI: Miralax PRN, dulcolax PRN  Bowel: prochlorperazine PRN, promethazine PRN, zofran PRN    Code status: Full code    Disposition: Anticipate one night inpatient stay for further management of EMERALD.  Possible discharge tomorrow if creatinine returns to baseline.    Nadine Herrmann, MS3  [employer] [facility] [employer]

## 2019-11-25 NOTE — DISCHARGE SUMMARY
[facility]  Hospitalist Discharge Summary       Date of Admission:  11/24/2019  Date of Discharge:  11/25/2019  Discharging Provider: Rodger Naik PA-C      Discharge Diagnoses      Principal Problem:    Anemia  Active Problems:    Nausea with vomiting    Scleroderma with renal involvement (H)    History of hemodialysis    REX (generalized anxiety disorder)    CREST (calcinosis, Raynaud's phenomenon, esophageal dysfunction, sclerodactyly, telangiectasia) (H)    Gastroesophageal reflux disease with esophagitis    Chronic pain syndrome    Moderate major depression (H)    Severe persistent asthma without complication    End-stage renal disease (H)    Malignant essential hypertension    EMERALD    Peripheral neuropathy    EMERALD (acute kidney injury) (H)       Follow-ups Needed After Discharge     1. FeNa & UA pending at time of discharge  2. BMP outpatient, to be drawn day after discharge  3. Consider outpatient EGD  4. Lisinopril was stopped due to resolving EMERALD, may be able to restart on follow-up    These results will be followed up by PCP    Discharge Disposition   Discharged to home  Condition at discharge: Stable    Hospital Course   Molly Teague is a 34 year old female with PMHx significant for CREST scleroderma, GERD, depression, anxiety, asthma, and essential HTN presented to the ED with 4 days of nausea and vomiting with one episode of hematemesis. Work-up completed in the ED revealed an elevated WBC count, microcytic anemia, and EMERALD.  Remainder of work-up including CMP, B-hCG, and CT abdomen and pelvis were unremarkable.     EMERALD  Scleroderma with renal involvement  Hx end-stage renal disease  Hx hemodialysis  Creatinine 2.06 in the ED; elevated from baseline ~1.4.  Suspect EMERALD is most likely secondary to combination of hypovolemia from vomiting in combination with lisinopril.  Less likely ATN, but cannot rule out at this time given BUN:Cr < 20.  After 2L IVF, creatinine is now  1.73, with BUN of 19. BUN:Cr remains < 20. Improvement with IVF suggests prerenal, however will order UA & FeNa to look for ATN/AIN.  - Stop lisinopril  - UA & FeNa pending  - BMP tomorrow, outpatient    Anemia  Hgb 8.7 (after receiving PRBCs x2); was 6.9 on admission,. Down from baseline ~10.  Most likely VEE given CBC consistent with microcytic anemia and malabsorption from her CREST scleroderma.  Unlikely thalassemia with her personal and family history.  May be a component of blood loss, as EGD from 2/23/2017 shows grade D esophagitis (one or more mucosal breaks involving at least 75% of esophageal circumference) in the distal 1/3 of the esophagus. Denies black/bloody stool.  - Stool hemoccult x3 - still not collected, no BM since admission  - May need to set up iron infusions as OP if hemocult negative (would indicate not absorbing iron)   - Consider outpatient EGD if hgb falls again     Nausea with vomiting  Hx C. Difficile   Has had longstanding but worse the last 4 days.  Suspect nausea and vomiting most likely secondary to combination of GERD, anxiety, and scleroderma.  Infection is low on the differential given she has been afebrile and has not had diarrhea or abdominal pain; she does have an elevated WBC count, so cannot rule out at this time.  Will continue to monitor symptoms and get stool sample if she begins to have diarrhea.   Leukocytosis has now resolved, further reducing concern for infectious etiology.     Chronic pain syndrome  Molly has a history of chronic pain syndrome in the setting of her CREST scleroderma for which she takes oxycodone IR 15 mg PRN.     Peripheral neuropathy  Molly has a history of peripheral neuropathy in the setting of CREST scleroderma for which she takes gabapentin 900 mg TID.     REX  Molly has a history of REX for which she takes Buspar 30 mg BID and Ativan 1 mg PRN.  She has not been anxious while in the ED.     Moderate major depression  Molly has a history of  depression for which she takes cymblata 20 mg BID.  She feels her symptoms have been well controlled on this regimen.     Severe persistent asthma without complication  Molly has a history of asthma for which she uses Symbicort 2 puffs BID, Singulair 10 mg once daily, and ventolin PRN.  She has had good oxygen saturation while in the ED and has not had any signs of respiratory distress.     Malignant essential HTN  Molly has a history of HTN for which she takes amlodipine 5 mg once daily and lisinopril 10 mg once daily.  She reports her normal systolic blood pressures are 140-145.  She has not been hypertensive while in the ED. PRN clonidine was ordered to compensate for stopping lisinopril, however it was not needed during this admission.  - Stop PTA lisinopril in the setting EMERALD    GERD with esophagitis  Molly has a history of GERD for which she takes omeprazole 40 mg BID, zantac 150 mg BID, and promethazine PRN.  - PTA omeprazole, zantac, and promethazine.            Consultations This Hospital Stay   None    Code Status   Full Code    Time Spent on this Encounter   I, Rodger Naik PA-C, personally saw the patient today and spent greater than 30 minutes discharging this patient.       Rodger Naik PA-C  [facility]  ______________________________________________________________________    Physical Exam   Vital Signs: Temp: 98  F (36.7  C) Temp src: Oral BP: 106/54 Pulse: 102   Resp: 16 SpO2: 94 % O2 Device: None (Room air)    Weight: 185 lbs 2.98 oz    General: Appears calm, comfortable, nontoxic. Answers questions quickly and appropriately with clear speech. No apparent distress.  Skin: Pink, warm, dry.  Vitiligo on neck and chest.  Eyes: Sclera are white.  HENT:  Normocephalic, atraumatic. Oropharynx is moist, without lesions or exudate.  CV: RRR, clear S1/S2 without murmur, rub, or gallop. No lower extremity edema.  Respiratory: CTA and equal bilaterally. Normal  respiratory effort.  GI: Soft, with mild tenderness in LLQ.  No guarding or rebound.  Normal bowel sounds.  Musculoskeletal: Moving all extremities well. Normal muscle bulk and tone.  Fingers in both hands are contracted, unable to fully extend.  Neuro: Memory and cognition appear normal. Extremity movement is symmetrical.  Psych: Normal mood and affect.        Primary Care Physician   Grecia Barba    Discharge Orders   No discharge procedures on file.    Significant Results and Procedures   Most Recent 3 CBC's:  Recent Labs   Lab Test 11/25/19 0629 11/24/19 2008 07/24/19  2258   WBC 9.8 13.9* 13.3*   HGB 8.7* 6.9* 11.0*   MCV 77* 73* 77*    340 332     Most Recent 3 BMP's:  Recent Labs   Lab Test 11/25/19 0629 11/24/19 2008 07/24/19  2258    138 138   POTASSIUM 4.2 4.9 3.7   CHLORIDE 110* 109 105   CO2 22 21 22   BUN 19 22 19   CR 1.73* 2.06* 1.35*   ANIONGAP 6 8 11   UMRE 8.4* 8.9 9.4   * 90 62*       Discharge Medications   Current Discharge Medication List      CONTINUE these medications which have NOT CHANGED    Details   amLODIPine (NORVASC) 5 MG tablet Take 5 mg by mouth daily      blood glucose (NO BRAND SPECIFIED) lancets standard Use to test blood sugar 1 time daily  Qty: 100 each, Refills: 3    Associated Diagnoses: Hypoglycemia      blood glucose (NO BRAND SPECIFIED) test strip Use to test blood sugar 1 time daily  Qty: 100 each, Refills: 3    Associated Diagnoses: Hypoglycemia      budesonide-formoterol (SYMBICORT) 160-4.5 MCG/ACT Inhaler Inhale 2 puffs into the lungs 2 times daily  Qty: 6 g, Refills: 11    Associated Diagnoses: Moderate persistent asthma without complication      busPIRone HCl (BUSPAR) 30 MG tablet TAKE ONE TABLET BY MOUTH TWICE A DAY  Qty: 180 tablet, Refills: 3    Associated Diagnoses: Moderate major depression (H)      Cyanocobalamin (B-12) 1000 MCG TBCR Take 1,000 mcg by mouth daily  Qty: 100 tablet, Refills: 1    Associated Diagnoses: Vitamin B12  deficiency (non anemic)      diphenhydrAMINE (BENADRYL) 25 MG tablet Take 1 tablet (25 mg) by mouth every 6 hours as needed for itching or allergies  Qty: 56 tablet      Doxylamine Succinate, Sleep, (UNISOM PO) Take 1 tablet by mouth nightly as needed       DULoxetine (CYMBALTA) 30 MG capsule Take 1 capsule (30 mg) by mouth 2 times daily  Qty: 60 capsule, Refills: 3    Associated Diagnoses: Moderate major depression (H); PTSD (post-traumatic stress disorder)      ferrous gluconate (FERGON) 324 (38 FE) MG tablet TAKE ONE TABLET BY MOUTH EVERY DAY WITH BREAKFAST  Qty: 100 tablet, Refills: 3    Associated Diagnoses: CREST (calcinosis, Raynaud's phenomenon, esophageal dysfunction, sclerodactyly, telangiectasia) (H)      folic acid (FOLVITE) 1 MG tablet Take 1 tablet by mouth daily      gabapentin (NEURONTIN) 300 MG capsule TAKE TWO CAPSULES BY MOUTH THREE TIMES A DAY  Qty: 540 capsule, Refills: 3    Associated Diagnoses: Limited systemic sclerosis (H)      GOODSENSE MIGRAINE FORMULA 250-250-65 MG per tablet TAKE ONE TABLET BY MOUTH EVERY 6 HOURS AS NEEDED FOR HEADACHES  Qty: 24 tablet, Refills: 1    Associated Diagnoses: Chronic daily headache      lisinopril (PRINIVIL/ZESTRIL) 10 MG tablet Take 1 tablet (10 mg) by mouth daily  Qty: 90 tablet, Refills: 3    Comments: Patient will call to fill  Associated Diagnoses: CREST (calcinosis, Raynaud's phenomenon, esophageal dysfunction, sclerodactyly, telangiectasia) (H)      LORazepam (ATIVAN) 1 MG tablet Take 1 tablet (1 mg) by mouth daily as needed for anxiety #30 to last 30 days  Qty: 30 tablet, Refills: 5    Associated Diagnoses: REX (generalized anxiety disorder)      montelukast (SINGULAIR) 10 MG tablet TAKE ONE TABLET BY MOUTH AT BEDTIME  Qty: 90 tablet, Refills: 3    Associated Diagnoses: Severe persistent asthma without complication      omeprazole (PRILOSEC) 40 MG DR capsule TAKE ONE CAPSULE BY MOUTH TWICE A DAY  Qty: 180 capsule, Refills: 0    Associated Diagnoses:  CREST (calcinosis, Raynaud's phenomenon, esophageal dysfunction, sclerodactyly, telangiectasia) (H); Gastroesophageal reflux disease with esophagitis      ondansetron (ZOFRAN-ODT) 8 MG ODT tab Take 1 tablet (8 mg) by mouth every 8 hours as needed for nausea  Qty: 30 tablet, Refills: 11    Associated Diagnoses: Chronic pain syndrome; Nausea and vomiting, intractability of vomiting not specified, unspecified vomiting type      oxyCODONE IR (ROXICODONE) 15 MG tablet Take 1 tablet (15 mg) by mouth every 4 hours as needed for severe pain  Qty: 100 tablet, Refills: 0    Associated Diagnoses: Chronic pain syndrome      polyethylene glycol (MIRALAX) powder Take 17 g (1 capful) by mouth daily  Qty: 510 g, Refills: 11    Comments: Patient will call to fill  Associated Diagnoses: Drug-induced constipation      promethazine (PHENERGAN) 25 MG tablet Take 1 tablet (25 mg) by mouth 2 times daily as needed for nausea  Qty: 30 tablet, Refills: 11    Associated Diagnoses: Nausea and vomiting, intractability of vomiting not specified, unspecified vomiting type; Severe motion sickness, sequela; Seasonal allergic rhinitis, unspecified trigger      promethazine (PHENERGAN) 50 MG/ML SOLN IV injection Inject 0.5 mL (25 mg) into the muscle every 6 hours as needed  Qty: 90 mL, Refills: 11    Associated Diagnoses: Nausea and vomiting, intractability of vomiting not specified, unspecified vomiting type; Severe motion sickness, sequela      ranitidine (ZANTAC) 150 MG tablet TAKE ONE TABLET BY MOUTH TWICE A DAY AS NEEDED FOR HEARTBURN  Qty: 180 tablet, Refills: 3    Associated Diagnoses: CREST (calcinosis, Raynaud's phenomenon, esophageal dysfunction, sclerodactyly, telangiectasia) (H); Gastroesophageal reflux disease with esophagitis      VENTOLIN  (90 Base) MCG/ACT inhaler INHALE 2 PUFFS INTO THE LUNGS ONCE EVERY 4 HOURS AS NEEDED FOR SHORTNESS OF BREATH / DYSPNEA / WHEEZING  Qty: 18 g, Refills: 11    Associated Diagnoses: Moderate  persistent asthma without complication      naloxone (NARCAN) 4 MG/0.1ML nasal spray Spray 1 spray (4 mg) into one nostril alternating nostrils once as needed for opioid reversal every 2-3 minutes until assistance arrives  Qty: 0.2 mL, Refills: 3    Associated Diagnoses: Scleroderma (H)      sucralfate (CARAFATE) 1 GM tablet Take 1 tablet (1 g) by mouth 4 times daily  Qty: 120 tablet, Refills: 11    Associated Diagnoses: Limited systemic sclerosis (H)           Allergies   Allergies   Allergen Reactions     No Known Allergies      Pollen Extract      Seasonal Allergies      Shellfish-Derived Products Nausea and Rash     Rash on face

## 2019-11-25 NOTE — ED TRIAGE NOTES
C/o generalized weakness, HA, dizziness and lightheadedness, nausea and vomiting. Ongoing for last 4 days. +chills.

## 2019-11-25 NOTE — ED NOTES
"Patient has  [place] to Observation  order. Patient has been given the Observation brochure -  What does Observation mean to me.\"  Patient has been given the opportunity to ask questions about observation status and their plan of care.      BLAISE RICE RN    "

## 2019-11-25 NOTE — ED PROVIDER NOTES
"  History     Chief Complaint   Patient presents with     Generalized Weakness     Nausea & Vomiting     HPI  Molly Teague is a 34 year old female with a history of crest syndrome, raynaud's disease, GERD, and pulmonary embolism who presents to the ED with nausea, vomiting, and numbness. The patient reports she developed nausea 4 days ago and hematemesis 2 days ago. She recalls her systolic blood pressure yesterday was 180 and then dropped to 110 a couple of hours later, noting her \"danger zone\" is 160 due to a history of renal failure. She experienced lightheadedness, chills, weakness, and diaphoresis. Today the patient stepped out of the shower and suddenly became numb from her abdomen to her knees. She notes her legs began to develop feeling again 1.5 hours later subsequently followed by the rest of the numb area with her abdomen gaining feeling last. The patient reports her Raynaud's disease usually manifests in her hands, feet, and mouth. The patient states she is currently anemic. She denies diarrhea.     Social History: The patient lives in [place], [place]. She presents to the ED alone via private car.    Allergies:  Allergies   Allergen Reactions     No Known Allergies      Pollen Extract      Other reaction(s): Other (see comments)     Seasonal Allergies      Shellfish-Derived Products Nausea     Rash on face       Problem List:    Patient Active Problem List    Diagnosis Date Noted     Mirena IUD- Remove by 09/2024 09/06/2019     Priority: Medium     Lot: [id number]  Exp: 01/2022    Dinora Israel LPN         End-stage renal disease (H) 07/24/2019     Priority: Medium     Malignant essential hypertension 07/24/2019     Priority: Medium     PTSD (post-traumatic stress disorder) 06/19/2019     Priority: Medium     Obesity (BMI 35.0-39.9) with comorbidity (H) 01/22/2019     Priority: Medium     Moderate major depression (H) 07/06/2018     Priority: Medium     Severe persistent asthma without complication " 07/06/2018     Priority: Medium     On high dose ICS/LABA + frequent albuterol use.  Next allergy/pulmonary referral       Aspiration of food 03/29/2018     Priority: Medium     Chronic pain syndrome 04/26/2017     Priority: Medium     Patient is followed by Grecia Barba DO for ongoing prescription of pain medication.  All refills should only be approved by this provider, or covering partner.    Medication(s): Oxycodone 15 mg.   Maximum quantity per month: 100  Clinic visit frequency required: Q 2 months     Controlled substance agreement:  Encounter-Level CSA - 04/26/2017:    Controlled Substance Agreement - Scan on 5/4/2017  8:58 AM: CONTROLLED SUBSTANCE AGREEMENT (below)       Patient-Level CSA:    Controlled Substance Agreement - Opioid - Scan on 2/6/2019  6:23 PM (below)         Pain Clinic evaluation in the past: No    DIRE Total Score(s):  No flowsheet data found.    Last [facility] website verification:  done on 4/26/17   9/26/2017  7/6/2018  10/16/2018  1/22/2019  8/2/2019           [email or web address]         Chronic migraine without aura without status migrainosus, not intractable 04/12/2017     Priority: Medium     With medication overuse.  Fioricet and not Imitrex is recommend to treat severe headache.  2/2017 [facility] recommend wean down from daily Fioricet and use Excedrin Migrain PRN.       AIN grade I 03/30/2017     Priority: Medium     Found at [facility] on colonoscopy 2/2017.  Recommend repeat anoscopy and anal pap in 6/2017.       Gastroesophageal reflux disease with esophagitis 03/30/2017     Priority: Medium     EGD done at [facility] 2/2017 showed esophagitis without GAVE.  Recommend max dose H2 and PPI, along with 4 tablets of Carafate dissolved in water and drink throughout the day.    Had LA Grade D esophagitis        Limited systemic sclerosis (H) 01/25/2017     Priority: Medium     Raynaud's, calcinosis, telangiectasias, peripheral neuropathy, GERD symptoms, history of scleroderma renal  crisis.  Saw [facility] 1/2017 and follows with Rheum Dr. Davis locally.       Polyneuropathy associated with underlying disease (H) 01/25/2017     Priority: Medium     Due to scleroderma and neurology thought possible due to ICU (critical illness neuropathy).  Recommend to max out dose of gabapentin to 6853-0541 mg/day, split BID or TID.  EMG 1/2017 positive for neuropathy       History of Clostridium difficile 11/29/2016     Priority: Medium     History of Helicobacter pylori infection 11/29/2016     Priority: Medium     Scleroderma with renal involvement (H) 11/09/2016     Priority: Medium     Stopped lisinopril per [facility] 1/2017.  Restarted lisinopril per [facility] 3/2017       History of ARDS 11/09/2016     Priority: Medium     4/2015, prolonged ICU/vent/trach due to influenza, scleroderma renal crisis requiring hemodialysis.       Raynaud's disease without gangrene 11/09/2016     Priority: Medium     History of hemodialysis 11/09/2016     Priority: Medium     4/2015 due to scleroderma renal crisis       Bilateral retinitis 11/09/2016     Priority: Medium     Other pulmonary embolism without acute cor pulmonale, unspecified chronicity (H) 11/09/2016     Priority: Medium     Completed anti-coagulation.       REX (generalized anxiety disorder) 11/09/2016     Priority: Medium     CREST (calcinosis, Raynaud's phenomenon, esophageal dysfunction, sclerodactyly, telangiectasia) (H) 11/09/2016     Priority: Medium     Scleroderma (H) 09/22/2016     Priority: Medium     Nausea with vomiting 09/21/2016     Priority: Medium        Past Medical History:    Past Medical History:   Diagnosis Date     Acute pyelonephritis 6/9/2017     Altered mental state 3/29/2018     Arthritis      Blood clotting disorder (H)      History of blood transfusion      Hypertension      Long-term (current) use of anticoagulants [Z79.01] 9/9/2016     PE (pulmonary embolism) 9/7/2016     Rheumatism      Scleroderma (H) 9/22/2016     Uncomplicated asthma         Past Surgical History:    Past Surgical History:   Procedure Laterality Date     ANGIOGRAM  2015      SECTION  2014     THROAT SURGERY  2015       Family History:    Family History   Problem Relation Age of Onset     Hyperlipidemia Father      Cerebrovascular Disease Maternal Grandmother      Hyperlipidemia Paternal Grandfather      Depression Sister      Neuropathy Sister      Diabetes Maternal Aunt      Melanoma No family hx of      Skin Cancer No family hx of        Social History:  Marital Status:  Single [1]  Social History     Tobacco Use     Smoking status: Never Smoker     Smokeless tobacco: Never Used   Substance Use Topics     Alcohol use: Yes     Comment: Socially once on a weekend if any     Drug use: No     Comment: None        Medications:    amLODIPine (NORVASC) 5 MG tablet  blood glucose (NO BRAND SPECIFIED) lancets standard  blood glucose (NO BRAND SPECIFIED) test strip  budesonide-formoterol (SYMBICORT) 160-4.5 MCG/ACT Inhaler  busPIRone HCl (BUSPAR) 30 MG tablet  Cyanocobalamin (B-12) 1000 MCG TBCR  diphenhydrAMINE (BENADRYL) 25 MG tablet  Doxylamine Succinate, Sleep, (UNISOM PO)  DULoxetine (CYMBALTA) 30 MG capsule  ferrous gluconate (FERGON) 324 (38 FE) MG tablet  gabapentin (NEURONTIN) 300 MG capsule  GOODSENSE MIGRAINE FORMULA 250-250-65 MG per tablet  lisinopril (PRINIVIL/ZESTRIL) 10 MG tablet  LORazepam (ATIVAN) 1 MG tablet  montelukast (SINGULAIR) 10 MG tablet  naloxone (NARCAN) 4 MG/0.1ML nasal spray  naloxone (NARCAN) nasal spray  omeprazole (PRILOSEC) 40 MG DR capsule  ondansetron (ZOFRAN-ODT) 8 MG ODT tab  oxyCODONE IR (ROXICODONE) 15 MG tablet  polyethylene glycol (MIRALAX) powder  promethazine (PHENERGAN) 25 MG tablet  promethazine (PHENERGAN) 50 MG/ML SOLN IV injection  ranitidine (ZANTAC) 150 MG tablet  sucralfate (CARAFATE) 1 GM tablet  VENTOLIN  (90 Base) MCG/ACT inhaler          Review of Systems   Constitutional: Negative.    HENT: Negative.    Eyes:  Negative.    Respiratory: Negative.    Cardiovascular: Negative.    Gastrointestinal: Positive for vomiting.   Endocrine: Negative.    Genitourinary: Negative.    Musculoskeletal: Negative.    Skin: Negative.    Allergic/Immunologic: Negative.    Neurological: Positive for dizziness, light-headedness and numbness. Seizures: resolved on arrival in the ED.   Hematological: Negative.    Psychiatric/Behavioral: Negative.    All other systems reviewed and are negative.      Physical Exam   BP: 121/85  Pulse: 126  Temp: 97.9  F (36.6  C)  Resp: 18  Weight: 79.8 kg (176 lb)  SpO2: 100 %      Physical Exam  Constitutional:       General: She is not in acute distress.     Appearance: She is not ill-appearing, toxic-appearing or diaphoretic.   HENT:      Head: Normocephalic and atraumatic.      Left Ear: Tympanic membrane normal.      Nose: Nose normal. No congestion or rhinorrhea.      Mouth/Throat:      Mouth: Mucous membranes are moist.      Pharynx: No oropharyngeal exudate or posterior oropharyngeal erythema.   Eyes:      General: No scleral icterus.        Right eye: No discharge.         Left eye: No discharge.      Extraocular Movements: Extraocular movements intact.      Conjunctiva/sclera: Conjunctivae normal.      Pupils: Pupils are equal, round, and reactive to light.   Neck:      Musculoskeletal: No neck rigidity or muscular tenderness.   Cardiovascular:      Heart sounds: No gallop.    Pulmonary:      Effort: No respiratory distress.      Breath sounds: Normal breath sounds. No stridor. No wheezing, rhonchi or rales.   Chest:      Chest wall: No tenderness.   Abdominal:      General: Abdomen is flat. Bowel sounds are normal. There is no distension.      Palpations: There is no mass.      Tenderness: There is no abdominal tenderness. There is no right CVA tenderness, left CVA tenderness, guarding or rebound.      Hernia: No hernia is present.   Genitourinary:     Rectum: Guaiac result negative.   Musculoskeletal:          General: No swelling, tenderness, deformity or signs of injury.      Right lower leg: No edema.      Left lower leg: No edema.   Lymphadenopathy:      Cervical: No cervical adenopathy.   Skin:     Capillary Refill: Capillary refill takes less than 2 seconds.      Coloration: Skin is pale. Skin is not jaundiced.   Neurological:      General: No focal deficit present.      Mental Status: She is alert.      Cranial Nerves: No cranial nerve deficit.      Sensory: No sensory deficit.      Motor: No weakness.      Coordination: Coordination normal.      Gait: Gait normal.      Deep Tendon Reflexes: Reflexes normal.   Psychiatric:         Mood and Affect: Mood normal.         Behavior: Behavior normal.         Thought Content: Thought content normal.         Judgment: Judgment normal.         ED Course        Procedures               Critical Care time:  none               ED medications:  Medications   sodium chloride 0.9% infusion (has no administration in time range)   0.9% sodium chloride BOLUS (0 mLs Intravenous Stopped 11/24/19 2115)   oxyCODONE (ROXICODONE) tablet 15 mg (15 mg Oral Given 11/24/19 2315)           ED Vitals:  Vitals:    11/24/19 2200 11/24/19 2247 11/24/19 2300 11/24/19 2315   BP: 109/62 116/71 108/76    Pulse: 124 124 122    Resp:  18     Temp:  97.9  F (36.6  C) 98  F (36.7  C)    TempSrc:  Oral Oral    SpO2: 99% 99% 99% 97%   Weight:           ED labs and imaging:  Results for orders placed or performed during the hospital encounter of 11/24/19 (from the past 24 hour(s))   CBC with platelets, differential   Result Value Ref Range    WBC 13.9 (H) 4.0 - 11.0 10e9/L    RBC Count 3.29 (L) 3.8 - 5.2 10e12/L    Hemoglobin 6.9 (LL) 11.7 - 15.7 g/dL    Hematocrit 24.1 (L) 35.0 - 47.0 %    MCV 73 (L) 78 - 100 fl    MCH 21.0 (L) 26.5 - 33.0 pg    MCHC 28.6 (L) 31.5 - 36.5 g/dL    RDW 17.1 (H) 10.0 - 15.0 %    Platelet Count 340 150 - 450 10e9/L    Diff Method Automated Method     % Neutrophils 70.1 %     % Lymphocytes 18.4 %    % Monocytes 7.1 %    % Eosinophils 3.3 %    % Basophils 0.5 %    % Immature Granulocytes 0.6 %    Nucleated RBCs 0 0 /100    Absolute Neutrophil 9.7 (H) 1.6 - 8.3 10e9/L    Absolute Lymphocytes 2.6 0.8 - 5.3 10e9/L    Absolute Monocytes 1.0 0.0 - 1.3 10e9/L    Absolute Eosinophils 0.5 0.0 - 0.7 10e9/L    Absolute Basophils 0.1 0.0 - 0.2 10e9/L    Abs Immature Granulocytes 0.1 0 - 0.4 10e9/L    Absolute Nucleated RBC 0.0    Comprehensive metabolic panel   Result Value Ref Range    Sodium 138 133 - 144 mmol/L    Potassium 4.9 3.4 - 5.3 mmol/L    Chloride 109 94 - 109 mmol/L    Carbon Dioxide 21 20 - 32 mmol/L    Anion Gap 8 3 - 14 mmol/L    Glucose 90 70 - 99 mg/dL    Urea Nitrogen 22 7 - 30 mg/dL    Creatinine 2.06 (H) 0.52 - 1.04 mg/dL    GFR Estimate 31 (L) >60 mL/min/[1.73_m2]    GFR Estimate If Black 35 (L) >60 mL/min/[1.73_m2]    Calcium 8.9 8.5 - 10.1 mg/dL    Bilirubin Total 0.4 0.2 - 1.3 mg/dL    Albumin 3.5 3.4 - 5.0 g/dL    Protein Total 7.4 6.8 - 8.8 g/dL    Alkaline Phosphatase 75 40 - 150 U/L    ALT 37 0 - 50 U/L    AST 47 (H) 0 - 45 U/L   ABO/Rh type and screen   Result Value Ref Range    Units Ordered 2     ABO O     RH(D) Pos     Antibody Screen Neg     Test Valid Only At [facility]        Specimen Expires 11/27/2019     Crossmatch Red Blood Cells    HCG qualitative pregnancy (blood)   Result Value Ref Range    HCG Qualitative Serum Negative NEG^Negative   Blood component   Result Value Ref Range    Unit Number [id number]     Blood Component Type Red Blood Cells Leukocyte Reduced     Division Number 00     Status of Unit Ready for patient 11/24/2019 2223     Blood Product Code [id number]     Unit Status DENZEL    Blood component   Result Value Ref Range    Unit Number [id number]     Blood Component Type Red Blood Cells Leukocyte Reduced     Division Number 00     Status of Unit Released to care unit 11/24/2019 2233     Blood Product Code [id number]     Unit  Status ISS    CT Abdomen Pelvis w/o Contrast    Narrative    CT ABDOMEN AND PELVIS WITHOUT CONTRAST   11/24/2019 10:23 PM     HISTORY: History of CREST syndrome with scleroderma. Acute anemia of  unclear cause. Lower abdominal pain.    COMPARISON: 7/24/2019.    TECHNIQUE: Without intravenous contrast, helical sections were  acquired from the top of the diaphragm through the pubic symphysis.  Coronal reconstructions were generated. Radiation dose for this scan  was reduced using automated exposure control, adjustment of the mA  and/or kV according to the patient's size, or iterative reconstruction  technique.    FINDINGS:   Abdomen: Mild diffuse fatty infiltration of the liver. The liver,  spleen, pancreas, adrenal glands and kidneys are unremarkable to the  limits of a noncontrast CT scan. The gallbladder is present. The  distal esophagus is patulous, likely related to the given history of  scleroderma. No enlarged lymph nodes or free fluid in the upper  abdomen.    Scan through the lower chest is unremarkable.    Pelvis: The small and large bowel are normal in caliber. The appendix  is unremarkable. A moderate amount of stool is present throughout the  colon. No bowel wall thickening, pneumatosis or free intraperitoneal  gas. An intrauterine device is present in the central uterus. No  enlarged lymph nodes or free fluid in the pelvis.      Impression    IMPRESSION:   1. No acute abnormality is identified in the abdomen or pelvis. Normal  appendix.  2. A moderate amount of stool is present throughout the colon.            Assessments & Plan (with Medical Decision Making)   Clinical impression: 34-year-old female with multiple medical diagnoses including a history of scleroderma, chronic pain syndrome, CREST, GERD depression, PTSD who presented with 4-day history of generalized abdominal discomfort with nausea, vomiting chills, headache and an episode of numbness that extended from the middle of her abdomen to just  above her knee after taking a shower earlier today typically her nausea is localized to her hands and feet.  She reports she is been vomiting although she reported she has been trying to keep up with fluids.  She is also reports she is recently struggled with insomnia.  She has been compliant with her medications.  She is currently on gabapentin and oxycodone for chronic pain, she takes lisinopril for hypertension amlodipine for her Raynauds.  Patient has been taking BuSpar and lorazepam.  She was concerned because she has been in renal failure before  with vomiting and episode of numbness feeling unwell she wanted to come in to be examined.  Patient is noted to have acute anemia which is likely due to malabsorption with underlying comorbidities of scleroderma and crest and prior episodes of anemia with iron supplementation.  Her presentation was not concerning for acute blood loss anemia or hemorrhage.  She also likely has acute kidney injury in the context of report of vomiting.  She is admitted for blood transfusion and recheck of blood work within 24 hours and for further care.   Patient had a similar presentation in the department in July 2019.     On my exam she has stigmata of scleroderma/ CREST syndrome.  Patient was concerned that she had some elevated blood pressure readings and that her blood pressure suddently dropped to the 80's.  Her blood pressure was normal on arrival in the department and remained normotensive during her ED course.She was tachycardic in triage, and   remained tachycardic during her ED course of care likely related to her anemia. Patient was afebrile.  Her abdomen was soft non-distended with normal bowel sounds throughout.  Chaperoned rectal exam with Ella Amezcua RN-revealed a normal rectal tone, no external hemorrhoids and guaiac negative. No vaginal bleeding.      ED Course and Plan:  I reviewed her medical records including visit with Dr. Barba-on November 1, 2019.  I also  reviewed her eevaluationin the department in July 2019.  She was offered IV fluids.  Her vital signs were monitored. On arrival her hemoglobin is noted to be 6.9 her last hemoglobin was 11.0 in July 2019 when she was last evaluated for similar presentation patient is known to have anemia and reports she is on iron supplementation, and is required iron infusions in the past by report .  Patient reports no diarrhea no bloody stools.  She does not know her blood type.  She reports she has required blood transfusion in the past.  After informed written consent she agreed to be transfused 2 units of packed red cells.  Patient's creatinine is 2.06 GFR was 31.  Creatinine was 1.35 in July 2019, this line creatinine is 1.2-1.4.    With acute anemia in the context of vomiting, reporting lightheadedness and underlying comorbidities-an MCV suspect her anemia is likely related to malabsorption and less likely due to blood loss.    Imaging obtained to ensure she does not have bowel ischemia with her report of having numbness and an episode of dizziness and lightheadedness with hypotension at home.  CT imaging of the abdomen and pelvis was limited without contrast due to her acute kidney injury.  CT did not show any acute intra-abdominal catastrophe specifically no evidence to suggest hemorrhage or ischemia moderate amount of stool was noted on imaging.  See detailed report by the interpreting radiologist above.  Patient and I discussed next steps for care.  I am unable to reliably schedule an outpatient follow-up appointment in 48 hours as patient reports she is waiting to [place] for [facility] to recheck her hemogram and her embolic profile.  We agreed that was prudent to admit her to complete her transfusion to check her blood work.   I spoke with Dr Veloz-admitting hospitalist at 11:25 PM.  He agreed to assume care upon admission after reviewed rationale for admission, patient's history, presentation, comorbidities in  the department, imaging studies and interventions in the department.  Patient is in agreement with plan of care.  Prior to transfer the medical floor patient remained normotensive but tachycardia.  Patient was signed  out to Dr. Shane Bustos at shift change awaiting transfer to medical floor for further care.    ADDENDUM: Iron studies pending, last iron studies- (Ferritin, Iron and Iron binding) on 5/11/2017      Disclaimer: This note consists of symbols derived from keyboarding, dictation and/or voice recognition software. As a result, there may be errors in the script that have gone undetected. Please consider this when interpreting information found in this chart.  I have reviewed the nursing notes.    I have reviewed the findings, diagnosis, plan and need for follow up with the patient.       New Prescriptions    No medications on file       Final diagnoses:   Anemia, unspecified type - History of anemia, on iron supplementation now with acute worsening   History of scleroderma - CREST syndrome   Acute renal failure, unspecified acute renal failure type (H) - acute kidney injury. Creatinine- 1.35 in July 2019, Creatinine is 2.05 today     This document serves as a record of the services and decisions personally performed and made by Trey Garcia. The HPI was created on his behalf by Rikki James, a trained medical scribe. The creation of the HPI is based on the provider's statements to the medical scribe.     Rikki James 8:28 PM November 24, 2019    Provider:   The information in the HPI created by the medical scribe for me, accurately reflects the services I personally performed and the decisions made by me. The documentation of the review of systems, physical exam, and medical decision making is written by Trey Garcia. I have reviewed and approved this document for accuracy prior to leaving the patient care area.   Trey Garcia MD 8:28 PM November 24, 2019 11/24/2019    [facility] EMERGENCY DEPARTMENT     Trey Garcia MD  11/24/19 [id number]       Trey Garcia MD  11/24/19 2490

## 2019-11-25 NOTE — PROGRESS NOTES
" [facility] [facility] [place] [facility] AD[facility]    Patient admitted to room 2403 at approximately 0100 via wheel chair from emergency room. Patient was accompanied by transport tech.     Verbal SBAR report received from Molly KEARNS prior to patient arrival.     Patient ambulated to bed independently. Patient alert and oriented X 3. The patient is not having any pain. 0-10 Pain Scale: 7. Admission vital signs: Blood pressure 97/52, pulse 127, temperature 97.8  F (36.6  C), temperature source Oral, resp. rate 18, height 1.575 m (5' 2\"), weight 84 kg (185 lb 3 oz), SpO2 95 %, not currently breastfeeding. Patient was oriented to plan of care, call light, bed controls, tv, telephone, bathroom and visiting hours.     Risk Assessment    The following safety risks were identified during admission: none. Yellow risk band applied: NO.     Skin Initial Assessment    This writer admitted this patient and completed a full skin assessment and David score in the Adult PCS flowsheet. Appropriate interventions initiated as needed.     Secondary skin check completed by Anna KEARNS. Pt declined buttock/abel area assessment denies any skin issues.         Education    Patient has a [place] to Observation order: Yes  Observation education completed and documented: Yes      Lisha Handy RN    "

## 2019-11-25 NOTE — PROGRESS NOTES
[facility]    Medicine Progress Note - Hospitalist Service       Date of Admission:  11/24/2019  Assessment & Plan     Molly Teague is a 34 year old female with PMHx significant for CREST scleroderma, GERD, depression, anxiety, asthma, and essential HTN presented to the ED with 4 days of nausea and vomiting with one episode of hematemesis. Work-up completed in the ED revealed an elevated WBC count, microcytic anemia, and EMERALD.  Remainder of work-up including CMP, B-hCG, and CT abdomen and pelvis were unremarkable.     EMERALD  Scleroderma with renal involvement  Hx end-stage renal disease  Hx hemodialysis  Creatinine 2.06 in the ED; elevated from baseline ~1.4.  Suspect EMERALD is most likely secondary to combination of hypovolemia from vomiting in combination with lisinopril.  Less likely ATN, but cannot rule out at this time given BUN:Cr < 20.  After 2L IVF, creatinine is now 1.73, with BUN of 19. BUN:Cr remains < 20.  - Hold lisinopril  - UA & FeNa pending  - BMP tomorrow, outpatient    Anemia  Hgb 8.7 (after PRBCs x2); was 6.9 on admission,. Down from baseline ~10.  Most likely VEE given CBC consistent with microcytic anemia and malabsorption from her CREST scleroderma.  Unlikely thalassemia with her personal and family history.  May be a component of blood loss, as EGD from 2/23/2017 shows grade D esophagitis (one or more mucosal breaks involving at least 75% of esophageal circumference) in the distal 1/3 of the esophagus. Denies black/bloody stool.  - Blood transfusion  - Stool hemoccult x3 - still not collected  - PTA ferrous gluconate 324 mg once daily, B12 1000 mcg once daily  - May need to set up iron infusions as OP if hemocult negative (would indicate not absorbing iron)   - Consider outpatient EGD if hgb falls again     Nausea with vomiting  Hx C. Difficile   Has had longstanding but worse the last 4 days.  Suspect nausea and vomiting most likely secondary to combination of GERD,  anxiety, and scleroderma.  Infection is low on the differential given she has been afebrile and has not had diarrhea or abdominal pain; she does have an elevated WBC count, so cannot rule out at this time.  Will continue to monitor symptoms and get stool sample if she begins to have diarrhea.   Leukocytosis has now resolved, decreasing suspicion for infectious etiology.  - CBC in AM     Chronic pain syndrome  Molly has a history of chronic pain syndrome in the setting of her CREST scleroderma for which she takes oxycodone IR 15 mg PRN.  - PTA oxycodone IR     Peripheral neuropathy  Molly has a history of peripheral neuropathy in the setting of CREST scleroderma for which she takes gabapentin 900 mg TID.  - PTA gabapentin     REX  Molly has a history of REX for which she takes Buspar 30 mg BID and Ativan 1 mg PRN.  She has not been anxious while in the ED.  - PTA Buspar and Ativan     Moderate major depression  Molly has a history of depression for which she takes cymblata 20 mg BID.  She feels her symptoms have been well controlled on this regimen.  - PTA cymbalta     Severe persistent asthma without complication  Molly has a history of asthma for which she uses Symbicort 2 puffs BID, Singulair 10 mg once daily, and ventolin PRN.  She has had good oxygen saturation while in the ED and has not had any signs of respiratory distress.  - PTA Symbicort, Ventolin, and Singulair     Malignant essential HTN  Molly has a history of HTN for which she takes amlodipine 5 mg once daily and lisinopril 10 mg once daily.  She reports her normal systolic blood pressures are 140-145.  She has not been hypertensive while in the ED.  - Clonidine 0.1 mg PRN BID  - Hold PTA lisinopril in the setting EMERALD  - Continue PTA amlodipine     GERD with esophagitis  Molly has a history of GERD for which she takes omeprazole 40 mg BID, zantac 150 mg BID, and promethazine PRN.  - PTA omeprazole, zantac, and promethazine.           Diet: Advance Diet  as Tolerated: Clear Liquid Diet    DVT Prophylaxis: Low Risk/Ambulatory with no VTE prophylaxis indicated  Melissa Catheter: not present  Code Status: Full Code      Disposition Plan   Expected discharge: Today, recommended to prior living arrangement once hemoglobin stable and renal function improved.  Entered: Rodger Naik PA-C 11/25/2019, 9:49 AM       The patient's care was discussed with the Attending Physician, Dr. Selvin Moore and Patient.    Rodger Naik PA-C  Hospitalist Service  [facility]    ______________________________________________________________________    Interval History   Feeling much stronger today.  She has been up in her room and notes she is no longer shaky.  No further nausea or vomiting.  No abdominal pain.    Data reviewed today: I reviewed all medications, new labs and imaging results over the last 24 hours. I personally reviewed no images or EKG's today.    Physical Exam   Vital Signs: Temp: 98.2  F (36.8  C) Temp src: Oral BP: 116/73 Pulse: 113   Resp: 16 SpO2: 94 % O2 Device: None (Room air)    Weight: 185 lbs 2.98 oz    General: Appears calm, comfortable, nontoxic. Answers questions quickly and appropriately with clear speech. No apparent distress.  Skin: Pink, warm, dry.  Vitiligo on neck and chest.  Eyes: Sclera are white.  HENT:  Normocephalic, atraumatic. Oropharynx is moist, without lesions or exudate.  CV: RRR, clear S1/S2 without murmur, rub, or gallop. No lower extremity edema.  Respiratory: CTA and equal bilaterally. Normal respiratory effort.  GI: Soft, with mild tenderness in LLQ.  No guarding or rebound.  Normal bowel sounds.  Musculoskeletal: Moving all extremities well. Normal muscle bulk and tone.  Fingers in both hands are contracted, unable to fully extend.  Neuro: Memory and cognition appear normal. Extremity movement is symmetrical.  Psych: Normal mood and affect.     Data   Recent Labs   Lab 11/25/19  0629 11/24/19 2008   WBC  9.8 13.9*   HGB 8.7* 6.9*   MCV 77* 73*    340    138   POTASSIUM 4.2 4.9   CHLORIDE 110* 109   CO2 22 21   BUN 19 22   CR 1.73* 2.06*   ANIONGAP 6 8   UMER 8.4* 8.9   * 90   ALBUMIN  --  3.5   PROTTOTAL  --  7.4   BILITOTAL  --  0.4   ALKPHOS  --  75   ALT  --  37   AST  --  47*     Recent Results (from the past 24 hour(s))   CT Abdomen Pelvis w/o Contrast    Narrative    CT ABDOMEN AND PELVIS WITHOUT CONTRAST   11/24/2019 10:23 PM     HISTORY: History of CREST syndrome with scleroderma. Acute anemia of  unclear cause. Lower abdominal pain.    COMPARISON: 7/24/2019.    TECHNIQUE: Without intravenous contrast, helical sections were  acquired from the top of the diaphragm through the pubic symphysis.  Coronal reconstructions were generated. Radiation dose for this scan  was reduced using automated exposure control, adjustment of the mA  and/or kV according to the patient's size, or iterative reconstruction  technique.    FINDINGS:   Abdomen: Mild diffuse fatty infiltration of the liver. The liver,  spleen, pancreas, adrenal glands and kidneys are unremarkable to the  limits of a noncontrast CT scan. The gallbladder is present. The  distal esophagus is patulous, likely related to the given history of  scleroderma. No enlarged lymph nodes or free fluid in the upper  abdomen.    Scan through the lower chest is unremarkable.    Pelvis: The small and large bowel are normal in caliber. The appendix  is unremarkable. A moderate amount of stool is present throughout the  colon. No bowel wall thickening, pneumatosis or free intraperitoneal  gas. An intrauterine device is present in the central uterus. No  enlarged lymph nodes or free fluid in the pelvis.      Impression    IMPRESSION:   1. No acute abnormality is identified in the abdomen or pelvis. Normal  appendix.  2. A moderate amount of stool is present throughout the colon.     ERICK RAE MD

## 2019-11-25 NOTE — PLAN OF CARE
Pt denies pain or nausea. Up with SBA to the bathroom. No stool this shift. Voiding without difficulty. 2nd unit PRBC transfused without problem. VSS. Currently has LR 1 liter bolus infusing at 250cc/hr.

## 2019-11-25 NOTE — ED NOTES
DATE:  11/24/2019   TIME OF RECEIPT FROM LAB:  2107  LAB TEST:  Hemoglobin  LAB VALUE:  6.9  RESULTS GIVEN WITH READ-BACK TO (PROVIDER):  Trey Garcia, *  TIME LAB VALUE REPORTED TO PROVIDER:   2107

## 2019-11-25 NOTE — PLAN OF CARE
[facility] [facility] DISCHARGE NOTE    Patient discharged to home at 4:29 PM via wheel chair. Accompanied by spouse and staff. Discharge instructions reviewed with patient, opportunity offered to ask questions. Prescriptions sent to patients preferred pharmacy. All belongings sent with patient.    Nohemy Nash RN

## 2019-11-26 ENCOUNTER — TELEPHONE (OUTPATIENT)
Dept: INTERNAL MEDICINE | Facility: CLINIC | Age: 34
End: 2019-11-26

## 2019-11-26 DIAGNOSIS — G89.4 CHRONIC PAIN SYNDROME: Chronic | ICD-10-CM

## 2019-11-26 DIAGNOSIS — N17.9 ACUTE RENAL FAILURE, UNSPECIFIED ACUTE RENAL FAILURE TYPE (H): ICD-10-CM

## 2019-11-26 LAB
ANION GAP SERPL CALCULATED.3IONS-SCNC: 6 MMOL/L (ref 3–14)
BUN SERPL-MCNC: 11 MG/DL (ref 7–30)
CALCIUM SERPL-MCNC: 9 MG/DL (ref 8.5–10.1)
CHLORIDE SERPL-SCNC: 106 MMOL/L (ref 94–109)
CO2 SERPL-SCNC: 23 MMOL/L (ref 20–32)
CREAT SERPL-MCNC: 1.31 MG/DL (ref 0.52–1.04)
GFR SERPL CREATININE-BSD FRML MDRD: 53 ML/MIN/{1.73_M2}
GLUCOSE SERPL-MCNC: 68 MG/DL (ref 70–99)
POTASSIUM SERPL-SCNC: 4 MMOL/L (ref 3.4–5.3)
SODIUM SERPL-SCNC: 135 MMOL/L (ref 133–144)

## 2019-11-26 PROCEDURE — 80048 BASIC METABOLIC PNL TOTAL CA: CPT | Performed by: PHYSICIAN ASSISTANT

## 2019-11-26 PROCEDURE — 36415 COLL VENOUS BLD VENIPUNCTURE: CPT | Performed by: PHYSICIAN ASSISTANT

## 2019-11-26 RX ORDER — OXYCODONE HYDROCHLORIDE 15 MG/1
15 TABLET ORAL EVERY 4 HOURS PRN
Qty: 100 TABLET | Refills: 0 | Status: SHIPPED | OUTPATIENT
Start: 2019-11-29 | End: 2019-12-26

## 2019-11-26 RX ORDER — OXYCODONE HYDROCHLORIDE 15 MG/1
TABLET ORAL
Qty: 100 TABLET | Refills: 0 | OUTPATIENT
Start: 2019-11-26

## 2019-11-26 NOTE — TELEPHONE ENCOUNTER
oxycodone      Last Written Prescription Date:  11/1/19  Last Fill Quantity: 100,   # refills: 0  Last Office Visit: 11/01/19  Future Office visit:    Next 5 appointments (look out 90 days)    Dec 10, 2019  8:00 AM CST  SHORT with Grecia Barba DO  [facility] ([facility])  Arrive at: [facility] [address] [facility] [address]  [phone]   Dec 12, 2019 10:00 AM CST  Return Visit with Erna Palacios LP  [facility] ([place] [id number] [place], [place] [id number]  [facility] [id number]  [phone]   Dec 27, 2019  9:00 AM CST  Return Visit with Erna Palacios LP  [facility] ([place] [id number] [place], [place] [id number]  [facility] [id number]  [phone]           Routing refill request to provider for review/approval because:

## 2019-11-26 NOTE — TELEPHONE ENCOUNTER
"  ED for acute condition Discharge Protocol    \"Hi, my name is Reyna Oviedo RN, a registered nurse, and I am calling from [facility].  I am calling to follow up and see how things are going for you after your recent emergency visit.\"    Tell me how you are doing now that you are home?\" doing ok had blood work done today.      Discharge Instructions    \"Let's review your discharge instructions.  What is/are the follow-up recommendations?  Pt. Response: to f/u with PCP    \"Has an appointment with your primary care provider been scheduled?\"  Yes. (confirm and remind to bring meds)    Medications    \"Tell me what changed about your medicines when you discharged?\"    Stopped her lisinopril    \"What questions do you have about your medications?\"   None        Call Summary    \"What questions or concerns do you have about your recent visit and your follow-up care?\"     none    \"If you have questions or things don't continue to improve, we encourage you contact us through the main clinic number (give number).  Even if the clinic is not open, triage nurses are available 24/7 to help you.     We would like you to know that our clinic has extended hours (provide information).  We also have urgent care (provide details on closest location and hours/contact info)\"    \"Thank you for your time and take care!\"                "

## 2019-11-29 ENCOUNTER — NURSE TRIAGE (OUTPATIENT)
Dept: INTERNAL MEDICINE | Facility: CLINIC | Age: 34
End: 2019-11-29

## 2019-11-29 ENCOUNTER — HOSPITAL ENCOUNTER (EMERGENCY)
Facility: CLINIC | Age: 34
Discharge: HOME | End: 2019-11-29
Attending: EMERGENCY MEDICINE | Admitting: EMERGENCY MEDICINE
Payer: MEDICARE

## 2019-11-29 VITALS
HEIGHT: 62 IN | BODY MASS INDEX: 33.49 KG/M2 | OXYGEN SATURATION: 99 % | WEIGHT: 182 LBS | TEMPERATURE: 98 F | RESPIRATION RATE: 16 BRPM | DIASTOLIC BLOOD PRESSURE: 96 MMHG | SYSTOLIC BLOOD PRESSURE: 131 MMHG | HEART RATE: 117 BPM

## 2019-11-29 DIAGNOSIS — F41.0 ANXIETY ATTACK: ICD-10-CM

## 2019-11-29 DIAGNOSIS — R07.9 ACUTE CHEST PAIN: ICD-10-CM

## 2019-11-29 LAB
ALBUMIN SERPL-MCNC: 4.1 G/DL (ref 3.4–5)
ALP SERPL-CCNC: 94 U/L (ref 40–150)
ALT SERPL W P-5'-P-CCNC: 58 U/L (ref 0–50)
ANION GAP SERPL CALCULATED.3IONS-SCNC: 11 MMOL/L (ref 3–14)
AST SERPL W P-5'-P-CCNC: 50 U/L (ref 0–45)
BASOPHILS # BLD AUTO: 0.1 10E9/L (ref 0–0.2)
BASOPHILS NFR BLD AUTO: 0.8 %
BILIRUB SERPL-MCNC: 0.4 MG/DL (ref 0.2–1.3)
BUN SERPL-MCNC: 23 MG/DL (ref 7–30)
CALCIUM SERPL-MCNC: 9.9 MG/DL (ref 8.5–10.1)
CHLORIDE SERPL-SCNC: 106 MMOL/L (ref 94–109)
CO2 SERPL-SCNC: 20 MMOL/L (ref 20–32)
CREAT SERPL-MCNC: 1.35 MG/DL (ref 0.52–1.04)
DIFFERENTIAL METHOD BLD: ABNORMAL
EOSINOPHIL # BLD AUTO: 0.4 10E9/L (ref 0–0.7)
EOSINOPHIL NFR BLD AUTO: 3.3 %
ERYTHROCYTE [DISTWIDTH] IN BLOOD BY AUTOMATED COUNT: 19.4 % (ref 10–15)
GFR SERPL CREATININE-BSD FRML MDRD: 51 ML/MIN/{1.73_M2}
GLUCOSE SERPL-MCNC: 121 MG/DL (ref 70–99)
HCT VFR BLD AUTO: 35.8 % (ref 35–47)
HGB BLD-MCNC: 10.6 G/DL (ref 11.7–15.7)
IMM GRANULOCYTES # BLD: 0.1 10E9/L (ref 0–0.4)
IMM GRANULOCYTES NFR BLD: 0.6 %
LYMPHOCYTES # BLD AUTO: 1.8 10E9/L (ref 0.8–5.3)
LYMPHOCYTES NFR BLD AUTO: 13.8 %
MCH RBC QN AUTO: 23 PG (ref 26.5–33)
MCHC RBC AUTO-ENTMCNC: 29.6 G/DL (ref 31.5–36.5)
MCV RBC AUTO: 78 FL (ref 78–100)
MONOCYTES # BLD AUTO: 1.2 10E9/L (ref 0–1.3)
MONOCYTES NFR BLD AUTO: 9 %
NEUTROPHILS # BLD AUTO: 9.5 10E9/L (ref 1.6–8.3)
NEUTROPHILS NFR BLD AUTO: 72.5 %
NRBC # BLD AUTO: 0 10*3/UL
NRBC BLD AUTO-RTO: 0 /100
PLATELET # BLD AUTO: 337 10E9/L (ref 150–450)
POTASSIUM SERPL-SCNC: 3.7 MMOL/L (ref 3.4–5.3)
PROT SERPL-MCNC: 8.8 G/DL (ref 6.8–8.8)
RBC # BLD AUTO: 4.61 10E12/L (ref 3.8–5.2)
SODIUM SERPL-SCNC: 137 MMOL/L (ref 133–144)
TROPONIN I SERPL-MCNC: <0.015 UG/L (ref 0–0.04)
WBC # BLD AUTO: 13 10E9/L (ref 4–11)

## 2019-11-29 PROCEDURE — 99285 EMERGENCY DEPT VISIT HI MDM: CPT | Mod: 25

## 2019-11-29 PROCEDURE — 25000128 H RX IP 250 OP 636: Performed by: EMERGENCY MEDICINE

## 2019-11-29 PROCEDURE — 36415 COLL VENOUS BLD VENIPUNCTURE: CPT | Performed by: EMERGENCY MEDICINE

## 2019-11-29 PROCEDURE — 84484 ASSAY OF TROPONIN QUANT: CPT | Performed by: EMERGENCY MEDICINE

## 2019-11-29 PROCEDURE — 25000132 ZZH RX MED GY IP 250 OP 250 PS 637: Mod: GY | Performed by: EMERGENCY MEDICINE

## 2019-11-29 PROCEDURE — 80053 COMPREHEN METABOLIC PANEL: CPT | Performed by: EMERGENCY MEDICINE

## 2019-11-29 PROCEDURE — 93010 ELECTROCARDIOGRAM REPORT: CPT | Mod: Z6 | Performed by: EMERGENCY MEDICINE

## 2019-11-29 PROCEDURE — 99284 EMERGENCY DEPT VISIT MOD MDM: CPT | Mod: 25 | Performed by: EMERGENCY MEDICINE

## 2019-11-29 PROCEDURE — 93005 ELECTROCARDIOGRAM TRACING: CPT

## 2019-11-29 PROCEDURE — 96372 THER/PROPH/DIAG INJ SC/IM: CPT

## 2019-11-29 PROCEDURE — 85025 COMPLETE CBC W/AUTO DIFF WBC: CPT | Performed by: EMERGENCY MEDICINE

## 2019-11-29 RX ORDER — LORAZEPAM 2 MG/ML
1 CONCENTRATE ORAL ONCE
Status: COMPLETED | OUTPATIENT
Start: 2019-11-29 | End: 2019-11-29

## 2019-11-29 RX ORDER — HYDROXYZINE HYDROCHLORIDE 50 MG/ML
50 INJECTION, SOLUTION INTRAMUSCULAR ONCE
Status: COMPLETED | OUTPATIENT
Start: 2019-11-29 | End: 2019-11-29

## 2019-11-29 RX ORDER — LORAZEPAM 2 MG/ML
1 INJECTION INTRAMUSCULAR ONCE
Status: DISCONTINUED | OUTPATIENT
Start: 2019-11-29 | End: 2019-11-29

## 2019-11-29 RX ADMIN — LORAZEPAM 1 MG: 2 CONCENTRATE ORAL at 11:56

## 2019-11-29 RX ADMIN — HYDROXYZINE HYDROCHLORIDE 50 MG: 50 INJECTION, SOLUTION INTRAMUSCULAR at 10:00

## 2019-11-29 ASSESSMENT — MIFFLIN-ST. JEOR: SCORE: 1478.8

## 2019-11-29 NOTE — TELEPHONE ENCOUNTER
Reason for call:  Patient reporting a symptom    Symptom or request: Patient got discharged from hospital on Monday and was taken off lisinopril due to high blood pressure. Has not taken this since Monday. Patient today is calling 9:10am 163/106  has an exhilarated heart rate. Patient is shaking, SOB, also has anxiety stated that she has not had a panic attack like this.    Duration (how long have symptoms been present): started about 20 min ago.    Have you been treated for this before? Yes      Phone Number patient can be reached at:  Home number on file [phone] (home)    Best Time:  any    Can we leave a detailed message on this number:  YES    Call taken on 11/29/2019 at 9:14 AM by Vashti English

## 2019-11-29 NOTE — ED PROVIDER NOTES
"  History     Chief Complaint   Patient presents with     Chest Pain     taken off of blood pressure meds Sunday. Hyperventilating     HPI  Molly Teague is a 34 year old female history of anxiety and hypertension presents emergency department for elevated blood pressure, shortness of breath, chest pain and anxiety.  Approximately 20 minutes prior to coming to the ED she developed anxiety, \"shaking\"/tremulousness, \"gasping\" for breath, chest pressure and elevated BP of 165/119.  She tried aheralbuterol inhaler, which she uses for asthma, with no relief.  She presents to the emergency department appearing very anxious and hyperventilating.  No recent URI symptoms, cough, hemoptysis or leg pain or leg swelling.  No other acute complaints or concerns.    Allergies:  Allergies   Allergen Reactions     No Known Allergies      Pollen Extract      Seasonal Allergies      Shellfish-Derived Products Nausea and Rash     Rash on face       Problem List:    Patient Active Problem List    Diagnosis Date Noted     Anemia 11/25/2019     Priority: Medium     EMERALD 11/25/2019     Priority: Medium     Peripheral neuropathy 11/25/2019     Priority: Medium     EMERALD (acute kidney injury) (H) 11/25/2019     Priority: Medium     Mirena IUD- Remove by 09/2024 09/06/2019     Priority: Medium     Lot: [id number]  Exp: 01/2022    Dinora Israel LPN         End-stage renal disease (H) 07/24/2019     Priority: Medium     Malignant essential hypertension 07/24/2019     Priority: Medium     PTSD (post-traumatic stress disorder) 06/19/2019     Priority: Medium     Obesity (BMI 35.0-39.9) with comorbidity (H) 01/22/2019     Priority: Medium     Moderate major depression (H) 07/06/2018     Priority: Medium     Severe persistent asthma without complication 07/06/2018     Priority: Medium     On high dose ICS/LABA + frequent albuterol use.  Next allergy/pulmonary referral       Aspiration of food 03/29/2018     Priority: Medium     Chronic pain " syndrome 04/26/2017     Priority: Medium     Patient is followed by Grecia Barba DO for ongoing prescription of pain medication.  All refills should only be approved by this provider, or covering partner.    Medication(s): Oxycodone 15 mg.   Maximum quantity per month: 100  Clinic visit frequency required: Q 2 months     Controlled substance agreement:  Encounter-Level CSA - 04/26/2017:    Controlled Substance Agreement - Scan on 5/4/2017  8:58 AM: CONTROLLED SUBSTANCE AGREEMENT (below)       Patient-Level CSA:    Controlled Substance Agreement - Opioid - Scan on 2/6/2019  6:23 PM (below)         Pain Clinic evaluation in the past: No    DIRE Total Score(s):  No flowsheet data found.    Last [facility] website verification:  done on 4/26/17   9/26/2017  7/6/2018  10/16/2018  1/22/2019  8/2/2019           [email or web address]         Chronic migraine without aura without status migrainosus, not intractable 04/12/2017     Priority: Medium     With medication overuse.  Fioricet and not Imitrex is recommend to treat severe headache.  2/2017 [facility] recommend wean down from daily Fioricet and use Excedrin Migrain PRN.       AIN grade I 03/30/2017     Priority: Medium     Found at [facility] on colonoscopy 2/2017.  Recommend repeat anoscopy and anal pap in 6/2017.       Gastroesophageal reflux disease with esophagitis 03/30/2017     Priority: Medium     EGD done at [facility] 2/2017 showed esophagitis without GAVE.  Recommend max dose H2 and PPI, along with 4 tablets of Carafate dissolved in water and drink throughout the day.    Had LA Grade D esophagitis        Limited systemic sclerosis (H) 01/25/2017     Priority: Medium     Raynaud's, calcinosis, telangiectasias, peripheral neuropathy, GERD symptoms, history of scleroderma renal crisis.  Saw [facility] 1/2017 and follows with Rheum Dr. Davis locally.       Polyneuropathy associated with underlying disease (H) 01/25/2017     Priority: Medium     Due to scleroderma and  neurology thought possible due to ICU (critical illness neuropathy).  Recommend to max out dose of gabapentin to 8089-8947 mg/day, split BID or TID.  EMG 2017 positive for neuropathy       History of Clostridium difficile 2016     Priority: Medium     History of Helicobacter pylori infection 2016     Priority: Medium     Scleroderma with renal involvement (H) 2016     Priority: Medium     Stopped lisinopril per [facility] 2017.  Restarted lisinopril per [facility] 3/2017       History of ARDS 2016     Priority: Medium     2015, prolonged ICU/vent/trach due to influenza, scleroderma renal crisis requiring hemodialysis.       Raynaud's disease without gangrene 2016     Priority: Medium     History of hemodialysis 2016     Priority: Medium     2015 due to scleroderma renal crisis       Bilateral retinitis 2016     Priority: Medium     Other pulmonary embolism without acute cor pulmonale, unspecified chronicity (H) 2016     Priority: Medium     Completed anti-coagulation.       REX (generalized anxiety disorder) 2016     Priority: Medium     CREST (calcinosis, Raynaud's phenomenon, esophageal dysfunction, sclerodactyly, telangiectasia) (H) 2016     Priority: Medium     Scleroderma (H) 2016     Priority: Medium     Nausea with vomiting 2016     Priority: Medium        Past Medical History:    Past Medical History:   Diagnosis Date     Acute pyelonephritis 2017     Altered mental state 3/29/2018     Arthritis      Blood clotting disorder (H)      History of blood transfusion      Hypertension      Long-term (current) use of anticoagulants [Z79.01] 2016     PE (pulmonary embolism) 2016     Rheumatism      Scleroderma (H) 2016     Uncomplicated asthma        Past Surgical History:    Past Surgical History:   Procedure Laterality Date     ANGIOGRAM  2015      SECTION  2014     THROAT SURGERY  2015       Family History:   "  Family History   Problem Relation Age of Onset     Hyperlipidemia Father      Cerebrovascular Disease Maternal Grandmother          of a stroke     Hyperlipidemia Paternal Grandfather      Depression Sister      Fibromyalgia Sister      Diabetes Maternal Aunt      Melanoma No family hx of      Skin Cancer No family hx of        Social History:  Marital Status:  Single [1]  Social History     Tobacco Use     Smoking status: Never Smoker     Smokeless tobacco: Never Used   Substance Use Topics     Alcohol use: Yes     Comment: Socially once on a weekend if any     Drug use: No     Comment: None        Medications:    amLODIPine (NORVASC) 5 MG tablet  budesonide-formoterol (SYMBICORT) 160-4.5 MCG/ACT Inhaler  busPIRone HCl (BUSPAR) 30 MG tablet  Cyanocobalamin (B-12) 1000 MCG TBCR  diphenhydrAMINE (BENADRYL) 25 MG tablet  Doxylamine Succinate, Sleep, (UNISOM PO)  DULoxetine (CYMBALTA) 30 MG capsule  ferrous gluconate (FERGON) 324 (38 FE) MG tablet  folic acid (FOLVITE) 1 MG tablet  gabapentin (NEURONTIN) 300 MG capsule  GOODSENSE MIGRAINE FORMULA 250-250-65 MG per tablet  LORazepam (ATIVAN) 1 MG tablet  montelukast (SINGULAIR) 10 MG tablet  omeprazole (PRILOSEC) 40 MG DR capsule  oxyCODONE IR (ROXICODONE) 15 MG tablet  polyethylene glycol (MIRALAX) powder  promethazine (PHENERGAN) 50 MG/ML SOLN IV injection  ranitidine (ZANTAC) 150 MG tablet  blood glucose (NO BRAND SPECIFIED) lancets standard  blood glucose (NO BRAND SPECIFIED) test strip  naloxone (NARCAN) 4 MG/0.1ML nasal spray  ondansetron (ZOFRAN-ODT) 8 MG ODT tab  promethazine (PHENERGAN) 25 MG tablet  VENTOLIN  (90 Base) MCG/ACT inhaler          Review of Systems  As mentioned above in the history present illness.  All other systems were reviewed and are negative for any acute systemic problems.    Physical Exam   BP: (!) 178/104  Pulse: 138  Heart Rate: 66  Temp: 98  F (36.7  C)  Resp: 16  Height: 157.5 cm (5' 2\")  Weight: 82.6 kg (182 lb)  SpO2: " 92 %      Physical Exam  Vitals signs and nursing note reviewed.   Constitutional:       General: She is in acute distress ( Anxious, hyperventilating).      Appearance: Normal appearance. She is well-developed. She is not ill-appearing or diaphoretic.   HENT:      Head: Normocephalic and atraumatic.      Right Ear: External ear normal.      Left Ear: External ear normal.      Nose: Nose normal.      Mouth/Throat:      Mouth: Mucous membranes are moist.   Eyes:      General: No scleral icterus.     Extraocular Movements: Extraocular movements intact.      Conjunctiva/sclera: Conjunctivae normal.   Neck:      Musculoskeletal: Normal range of motion and neck supple.      Trachea: No tracheal deviation.   Cardiovascular:      Rate and Rhythm: Regular rhythm. Tachycardia present.      Heart sounds: Normal heart sounds. No murmur. No friction rub. No gallop.    Pulmonary:      Effort: Pulmonary effort is normal. Tachypnea present. No respiratory distress.      Breath sounds: Normal breath sounds. No decreased breath sounds, wheezing, rhonchi or rales.   Abdominal:      General: There is no distension.      Palpations: Abdomen is soft.      Tenderness: There is no abdominal tenderness.   Musculoskeletal: Normal range of motion.         General: No tenderness.      Right lower leg: She exhibits no tenderness. No edema.      Left lower leg: She exhibits no tenderness. No edema.   Skin:     General: Skin is warm and dry.      Coloration: Skin is not pale.      Findings: No erythema or rash.   Neurological:      General: No focal deficit present.      Mental Status: She is alert and oriented to person, place, and time.      Coordination: Coordination normal.   Psychiatric:         Behavior: Behavior normal.      Comments: Very anxious, hyperventilating.         ED Course        Procedures               EKG Interpretation:      Interpreted by Michael Nix MD, MD  Time reviewed: Upon completion  Symptoms at time of EKG:  Chest pain and anxiety  Rhythm: Sinus tachycardia  Rate: normal  Axis: normal  Ectopy: none  Conduction: Short ME interval (94 msec) otherwise normal  ST Segments/ T Waves: No ST-T wave changes  Q Waves: none  Comparison to prior: 3/29/2018  Clinical Impression: Sinus tachycardia, short ME interval, no ischemic changes and otherwise normal EKG         Results for orders placed or performed during the hospital encounter of 11/29/19 (from the past 24 hour(s))   Comprehensive metabolic panel   Result Value Ref Range    Sodium 137 133 - 144 mmol/L    Potassium 3.7 3.4 - 5.3 mmol/L    Chloride 106 94 - 109 mmol/L    Carbon Dioxide 20 20 - 32 mmol/L    Anion Gap 11 3 - 14 mmol/L    Glucose 121 (H) 70 - 99 mg/dL    Urea Nitrogen 23 7 - 30 mg/dL    Creatinine 1.35 (H) 0.52 - 1.04 mg/dL    GFR Estimate 51 (L) >60 mL/min/[1.73_m2]    GFR Estimate If Black 59 (L) >60 mL/min/[1.73_m2]    Calcium 9.9 8.5 - 10.1 mg/dL    Bilirubin Total 0.4 0.2 - 1.3 mg/dL    Albumin 4.1 3.4 - 5.0 g/dL    Protein Total 8.8 6.8 - 8.8 g/dL    Alkaline Phosphatase 94 40 - 150 U/L    ALT 58 (H) 0 - 50 U/L    AST 50 (H) 0 - 45 U/L   Troponin I   Result Value Ref Range    Troponin I ES <0.015 0.000 - 0.045 ug/L   CBC with platelets differential   Result Value Ref Range    WBC 13.0 (H) 4.0 - 11.0 10e9/L    RBC Count 4.61 3.8 - 5.2 10e12/L    Hemoglobin 10.6 (L) 11.7 - 15.7 g/dL    Hematocrit 35.8 35.0 - 47.0 %    MCV 78 78 - 100 fl    MCH 23.0 (L) 26.5 - 33.0 pg    MCHC 29.6 (L) 31.5 - 36.5 g/dL    RDW 19.4 (H) 10.0 - 15.0 %    Platelet Count 337 150 - 450 10e9/L    Diff Method Automated Method     % Neutrophils 72.5 %    % Lymphocytes 13.8 %    % Monocytes 9.0 %    % Eosinophils 3.3 %    % Basophils 0.8 %    % Immature Granulocytes 0.6 %    Nucleated RBCs 0 0 /100    Absolute Neutrophil 9.5 (H) 1.6 - 8.3 10e9/L    Absolute Lymphocytes 1.8 0.8 - 5.3 10e9/L    Absolute Monocytes 1.2 0.0 - 1.3 10e9/L    Absolute Eosinophils 0.4 0.0 - 0.7 10e9/L     Absolute Basophils 0.1 0.0 - 0.2 10e9/L    Abs Immature Granulocytes 0.1 0 - 0.4 10e9/L    Absolute Nucleated RBC 0.0       Hemoglobin   Date Value Ref Range Status   11/29/2019 10.6 (L) 11.7 - 15.7 g/dL Final   11/25/2019 8.7 (L) 11.7 - 15.7 g/dL Final   11/24/2019 6.9 (LL) 11.7 - 15.7 g/dL Final     Comment:     This result has been called to BLAISE RICE RN ED by Selvin Cui on 11 24 2019   at 2106, and has been read back.      07/24/2019 11.0 (L) 11.7 - 15.7 g/dL Final   05/02/2019 10.1 (L) 11.7 - 15.7 g/dL Final         Medications   hydrOXYzine (VISTARIL) injection 50 mg (50 mg Intramuscular Given 11/29/19 1000)   LORazepam (ATIVAN) 2 MG/ML (HIGH CONC) solution 1 mg (1 mg Oral Given 11/29/19 1156)     Feeling much improved after Vistaril IM.  Awaiting lab results.  She reports her heart rate is always in the 120s, 130s and is currently at baseline. Heart rate in the 120s.    At time of discharge she feels at baseline, has no chest discomfort and is comfortable discharge home.    Assessments & Plan (with Medical Decision Making)   34-year-old female with history of anxiety who presents with chest pain, shortness of breath, hyperventilation and a very anxious appearance which I believe is secondary to an anxiety or panic attack.  EKG showed sinus tachycardia with no ischemic changes, troponin was normal.  Symptoms resolved after hydroxyzine IM, and then Ativan po.  She was provided instructions for supportive care and will return as needed for worsened condition or worsening symptoms, or new problems or concerns.      I have reviewed the nursing notes.    I have reviewed the findings, diagnosis, plan and need for follow up with the patient.    New Prescriptions    No medications on file       Final diagnoses:   Acute chest pain   Anxiety attack       11/29/2019   [facility] EMERGENCY DEPARTMENT     Michael Nix MD  11/29/19 0890

## 2019-11-29 NOTE — ED AVS SNAPSHOT
[facility] Emergency Department  [address] [address]  Phone:  [phone]  Fax:  [phone]                                    Molly Teague   MRN: [id number]    Department:  [facility] Emergency Department   Date of Visit:  11/29/2019           After Visit Summary Signature Page    I have received my discharge instructions, and my questions have been answered. I have discussed any challenges I see with this plan with the nurse or doctor.    ..........................................................................................................................................  Patient/Patient Representative Signature      ..........................................................................................................................................  Patient Representative Print Name and Relationship to Patient    ..................................................               ................................................  Date                                   Time    ..........................................................................................................................................  Reviewed by Signature/Title    ...................................................              ..............................................  Date                                               Time          22EPIC Rev 08/18

## 2019-11-29 NOTE — ED NOTES
"Pt comes in with chest pressure and very anxious and tearful. This started about a 1/2 hour prior to arrival. She has had a medication change two months ago and stopped taking her antianxiety meds about a week ago because she was feeling a lot better from the previous med change. She is hyperventilating and has a -160. She states \" I have a fast HR that is usually in the 130's. I was able to get her to control her breathing. She is a/o x 4. Has a long medical history which has been challenging as she moved to [place] about a year ago to improve her quality of life with seeing clinicians through the [facility]. Her  is on his way that offers support     "

## 2019-11-29 NOTE — ED NOTES
"Pt is feeling better, denies chest pressure/pain, or SOB, her HR is in the 130's which she states \" is my normal\" she is requesting to go home.  at bedside. She seems less anxious   "

## 2019-11-29 NOTE — TELEPHONE ENCOUNTER
"  Additional Information    Systolic BP >= 160 OR Diastolic >= 100, and any cardiac or neurologic symptoms (e.g., chest pain, difficulty breathing, unsteady gait, blurred vision)    Answer Assessment - Initial Assessment Questions  1. BLOOD PRESSURE: \"What is the blood pressure?\" \"Did you take at least two measurements 5 minutes apart?\"       9:10am 163/106  has an exhilarated heart rate, taken a couple of times with varied BP. Patient is shaking, chest pressure with c/o Shortness of breath.   2. ONSET: \"When did you take your blood pressure?\"      wqithin the last 20 minutes.   3. HOW: \"How did you obtain the blood pressure?\" (e.g., visiting nurse, automatic home BP monitor)      Home monitor.   4. HISTORY: \"Do you have a history of high blood pressure?\"      Yes and CKD.   5. MEDICATIONS: \"Are you taking any medications for blood pressure?\" \"Have you missed any doses recently?\"      Yes, was taken off of Lisinopril Monday when in the Emergency Department.   6. OTHER SYMPTOMS: \"Do you have any symptoms?\" (e.g., headache, chest pain, blurred vision, difficulty breathing, weakness)      Chest pressure, Shortness of breath, shaking, Increased HR.   7. PREGNANCY: \"Is there any chance you are pregnant?\" \"When was your last menstrual period?\"      Unknown.    Protocols used: HIGH BLOOD PRESSURE-A-OH    "

## 2019-12-02 ENCOUNTER — PATIENT OUTREACH (OUTPATIENT)
Dept: CARE COORDINATION | Facility: CLINIC | Age: 34
End: 2019-12-02

## 2019-12-02 ENCOUNTER — OFFICE VISIT (OUTPATIENT)
Dept: FAMILY MEDICINE | Facility: CLINIC | Age: 34
End: 2019-12-02
Payer: MEDICARE

## 2019-12-02 ENCOUNTER — TELEPHONE (OUTPATIENT)
Dept: INTERNAL MEDICINE | Facility: CLINIC | Age: 34
End: 2019-12-02

## 2019-12-02 VITALS
WEIGHT: 181.4 LBS | DIASTOLIC BLOOD PRESSURE: 82 MMHG | BODY MASS INDEX: 33.18 KG/M2 | OXYGEN SATURATION: 96 % | SYSTOLIC BLOOD PRESSURE: 118 MMHG | RESPIRATION RATE: 13 BRPM | TEMPERATURE: 98.2 F | HEART RATE: 100 BPM

## 2019-12-02 DIAGNOSIS — Z71.89 OTHER SPECIFIED COUNSELING: ICD-10-CM

## 2019-12-02 DIAGNOSIS — Z09 HOSPITAL DISCHARGE FOLLOW-UP: ICD-10-CM

## 2019-12-02 DIAGNOSIS — M34.89 SCLERODERMA WITH RENAL INVOLVEMENT (H): ICD-10-CM

## 2019-12-02 DIAGNOSIS — N18.30 CKD (CHRONIC KIDNEY DISEASE) STAGE 3, GFR 30-59 ML/MIN (H): Primary | ICD-10-CM

## 2019-12-02 DIAGNOSIS — G47.00 INSOMNIA, UNSPECIFIED TYPE: ICD-10-CM

## 2019-12-02 DIAGNOSIS — F41.9 ANXIETY: ICD-10-CM

## 2019-12-02 DIAGNOSIS — N08 SCLERODERMA WITH RENAL INVOLVEMENT (H): ICD-10-CM

## 2019-12-02 DIAGNOSIS — K21.00 GASTROESOPHAGEAL REFLUX DISEASE WITH ESOPHAGITIS: ICD-10-CM

## 2019-12-02 DIAGNOSIS — R11.2 NAUSEA AND VOMITING, INTRACTABILITY OF VOMITING NOT SPECIFIED, UNSPECIFIED VOMITING TYPE: ICD-10-CM

## 2019-12-02 DIAGNOSIS — D50.9 IRON DEFICIENCY ANEMIA, UNSPECIFIED IRON DEFICIENCY ANEMIA TYPE: ICD-10-CM

## 2019-12-02 DIAGNOSIS — M34.1 CREST (CALCINOSIS, RAYNAUD'S PHENOMENON, ESOPHAGEAL DYSFUNCTION, SCLERODACTYLY, TELANGIECTASIA) (H): Primary | ICD-10-CM

## 2019-12-02 DIAGNOSIS — M34.1 CREST (CALCINOSIS, RAYNAUD'S PHENOMENON, ESOPHAGEAL DYSFUNCTION, SCLERODACTYLY, TELANGIECTASIA) (H): ICD-10-CM

## 2019-12-02 LAB
ALBUMIN SERPL-MCNC: 4.2 G/DL (ref 3.4–5)
ALP SERPL-CCNC: 92 U/L (ref 40–150)
ALT SERPL W P-5'-P-CCNC: 50 U/L (ref 0–50)
ANION GAP SERPL CALCULATED.3IONS-SCNC: 8 MMOL/L (ref 3–14)
AST SERPL W P-5'-P-CCNC: 47 U/L (ref 0–45)
BILIRUB SERPL-MCNC: 0.4 MG/DL (ref 0.2–1.3)
BUN SERPL-MCNC: 15 MG/DL (ref 7–30)
CALCIUM SERPL-MCNC: 8.9 MG/DL (ref 8.5–10.1)
CHLORIDE SERPL-SCNC: 105 MMOL/L (ref 94–109)
CO2 SERPL-SCNC: 20 MMOL/L (ref 20–32)
CREAT SERPL-MCNC: 1.22 MG/DL (ref 0.52–1.04)
ERYTHROCYTE [DISTWIDTH] IN BLOOD BY AUTOMATED COUNT: 20.1 % (ref 10–15)
GFR SERPL CREATININE-BSD FRML MDRD: 58 ML/MIN/{1.73_M2}
GLUCOSE SERPL-MCNC: 78 MG/DL (ref 70–99)
HCT VFR BLD AUTO: 35.1 % (ref 35–47)
HGB BLD-MCNC: 10.7 G/DL (ref 11.7–15.7)
MCH RBC QN AUTO: 23.4 PG (ref 26.5–33)
MCHC RBC AUTO-ENTMCNC: 30.5 G/DL (ref 31.5–36.5)
MCV RBC AUTO: 77 FL (ref 78–100)
PLATELET # BLD AUTO: 373 10E9/L (ref 150–450)
POTASSIUM SERPL-SCNC: 4 MMOL/L (ref 3.4–5.3)
PROT SERPL-MCNC: 8.6 G/DL (ref 6.8–8.8)
RBC # BLD AUTO: 4.58 10E12/L (ref 3.8–5.2)
SODIUM SERPL-SCNC: 133 MMOL/L (ref 133–144)
WBC # BLD AUTO: 9.7 10E9/L (ref 4–11)

## 2019-12-02 PROCEDURE — 36415 COLL VENOUS BLD VENIPUNCTURE: CPT | Performed by: NURSE PRACTITIONER

## 2019-12-02 PROCEDURE — 80053 COMPREHEN METABOLIC PANEL: CPT | Performed by: NURSE PRACTITIONER

## 2019-12-02 PROCEDURE — 99495 TRANSJ CARE MGMT MOD F2F 14D: CPT | Performed by: NURSE PRACTITIONER

## 2019-12-02 PROCEDURE — 85027 COMPLETE CBC AUTOMATED: CPT | Performed by: NURSE PRACTITIONER

## 2019-12-02 RX ORDER — FAMOTIDINE 20 MG/1
20 TABLET, FILM COATED ORAL 2 TIMES DAILY
Qty: 180 TABLET | Refills: 3 | Status: SHIPPED | OUTPATIENT
Start: 2019-12-02 | End: 2020-12-17

## 2019-12-02 RX ORDER — HYDROXYZINE HYDROCHLORIDE 25 MG/1
25 TABLET, FILM COATED ORAL 3 TIMES DAILY PRN
Qty: 30 TABLET | Refills: 1 | Status: SHIPPED | OUTPATIENT
Start: 2019-12-02 | End: 2020-01-24

## 2019-12-02 ASSESSMENT — ANXIETY QUESTIONNAIRES
GAD7 TOTAL SCORE: 17
7. FEELING AFRAID AS IF SOMETHING AWFUL MIGHT HAPPEN: MORE THAN HALF THE DAYS
1. FEELING NERVOUS, ANXIOUS, OR ON EDGE: NEARLY EVERY DAY
IF YOU CHECKED OFF ANY PROBLEMS ON THIS QUESTIONNAIRE, HOW DIFFICULT HAVE THESE PROBLEMS MADE IT FOR YOU TO DO YOUR WORK, TAKE CARE OF THINGS AT HOME, OR GET ALONG WITH OTHER PEOPLE: NOT DIFFICULT AT ALL
5. BEING SO RESTLESS THAT IT IS HARD TO SIT STILL: SEVERAL DAYS
2. NOT BEING ABLE TO STOP OR CONTROL WORRYING: NEARLY EVERY DAY
6. BECOMING EASILY ANNOYED OR IRRITABLE: MORE THAN HALF THE DAYS
3. WORRYING TOO MUCH ABOUT DIFFERENT THINGS: NEARLY EVERY DAY

## 2019-12-02 ASSESSMENT — PATIENT HEALTH QUESTIONNAIRE - PHQ9
5. POOR APPETITE OR OVEREATING: NEARLY EVERY DAY
SUM OF ALL RESPONSES TO PHQ QUESTIONS 1-9: 14

## 2019-12-02 NOTE — TELEPHONE ENCOUNTER
Attempted to contact patient, no answer, left voice message to call back.      [facility] okay to deliver message.

## 2019-12-02 NOTE — PROGRESS NOTES
Subjective     Molly Teague is a 34 year old female who presents to clinic today for the following health issues:    HPI       Hospital Follow-up Visit:    Hospital/Nursing Home/IP Rehab Facility: [facility]  Date of Admission: 11/24/19  Date of Discharge: 11/25/19  Reason(s) for Admission: Anemia, N&V, scleroderma, REX, CREST             Problems taking medications regularly:  None       Medication changes since discharge: None       Problems adhering to non-medication therapy:  None    Summary of hospitalization:  [facility] discharge summary reviewed  Diagnostic Tests/Treatments reviewed.  Follow up needed: EGD/ patient is followed by [facility]   Other Healthcare Providers Involved in Patient s Care:         Specialist appointment - GI  Update since discharge: improved.     Post Discharge Medication Reconciliation: discharge medications reconciled, continue medications without change.  Plan of care communicated with patient     Coding guidelines for this visit:  Type of Medical   Decision Making Face-to-Face Visit       within 7 Days of discharge Face-to-Face Visit        within 14 days of discharge   Moderate Complexity [id number]   High Complexity [id number]        Anxiety: panic attacks at night.   Insomnia: not sleeping well the past couple of months  And worse since hospitalization. Patient was previously on Ambien - stopped due to Benzo and opiote use. Wants to get something else for insomnia.     Lisinopril was stopped- blood pressure well controlled.     -------------------------------------    Patient Active Problem List   Diagnosis     Nausea with vomiting     Scleroderma (H)     Scleroderma with renal involvement (H)     History of ARDS     Raynaud's disease without gangrene     History of hemodialysis     Bilateral retinitis     Other pulmonary embolism without acute cor pulmonale, unspecified chronicity (H)     REX (generalized anxiety disorder)     CREST (calcinosis, Raynaud's  phenomenon, esophageal dysfunction, sclerodactyly, telangiectasia) (H)     History of Clostridium difficile     History of Helicobacter pylori infection     Limited systemic sclerosis (H)     Polyneuropathy associated with underlying disease (H)     AIN grade I     Gastroesophageal reflux disease with esophagitis     Chronic migraine without aura without status migrainosus, not intractable     Chronic pain syndrome     Aspiration of food     Moderate major depression (H)     Severe persistent asthma without complication     Obesity (BMI 35.0-39.9) with comorbidity (H)     PTSD (post-traumatic stress disorder)     End-stage renal disease (H)     Malignant essential hypertension     Mirena IUD- Remove by 2024     Anemia     EMERALD     Peripheral neuropathy     EMERALD (acute kidney injury) (H)     Past Surgical History:   Procedure Laterality Date     ANGIOGRAM        SECTION  2014     THROAT SURGERY         Social History     Tobacco Use     Smoking status: Never Smoker     Smokeless tobacco: Never Used   Substance Use Topics     Alcohol use: Yes     Comment: Socially once on a weekend if any     Family History   Problem Relation Age of Onset     Hyperlipidemia Father      Cerebrovascular Disease Maternal Grandmother          of a stroke     Hyperlipidemia Paternal Grandfather      Depression Sister      Fibromyalgia Sister      Diabetes Maternal Aunt      Melanoma No family hx of      Skin Cancer No family hx of            -------------------------------------  Reviewed and updated as needed this visit by Provider         Review of Systems   ROS COMP: Constitutional, HEENT, cardiovascular, pulmonary, GI, , musculoskeletal, neuro, skin, endocrine and psych systems are negative, except as otherwise noted.      Objective    /82   Pulse 100   Temp 98.2  F (36.8  C) (Tympanic)   Resp 13   Wt 82.3 kg (181 lb 6.4 oz)   SpO2 96%   BMI 33.18 kg/m    Body mass index is 33.18 kg/m .  Physical Exam  "  GENERAL: healthy, alert and no distress  RESP: lungs clear to auscultation - no rales, rhonchi or wheezes  CV: regular rate and rhythm, normal S1 S2, no S3 or S4, no murmur, click or rub, no peripheral edema and peripheral pulses strong  ABDOMEN: soft, nontender, no hepatosplenomegaly, no masses and bowel sounds normal  MS: no gross musculoskeletal defects noted, no edema    Diagnostic Test Results:  Labs reviewed in Epic        Assessment & Plan     1. Hospital discharge follow-up      2. Iron deficiency anemia, unspecified iron deficiency anemia type  - recommend patient follow up with EGD  - CBC with platelets  - Comprehensive metabolic panel    3. CKD (chronic kidney disease) stage 3, GFR 30-59 ml/min (H)  Continue to hold Lisinopril- blood pressure controlled  - CBC with platelets  - Comprehensive metabolic panel    4. Insomnia, unspecified type  Benzo and opiates held by PCP therefore will give hydroxyzine   - hydrOXYzine (ATARAX) 25 MG tablet; Take 1 tablet (25 mg) by mouth 3 times daily as needed for anxiety or other (Insomnia)  Dispense: 30 tablet; Refill: 1    5. Anxiety    - hydrOXYzine (ATARAX) 25 MG tablet; Take 1 tablet (25 mg) by mouth 3 times daily as needed for anxiety or other (Insomnia)  Dispense: 30 tablet; Refill: 1  - MENTAL HEALTH REFERRAL  - Adult; Psychiatry and Medication Management; Psychiatry; [facility]: [facility] [phone].  Medication management & future refills will be returned to [facility] PCP upon completion of evaluation; We doyle...    6. Nausea and vomiting, intractability of vomiting not specified, unspecified vomiting type  Stable with Zofran    7. Scleroderma with renal involvement (H)  stable    8. CREST (calcinosis, Raynaud's phenomenon, esophageal dysfunction, sclerodactyly, telangiectasia) (H)  stable       BMI:   Estimated body mass index is 33.18 kg/m  as calculated from the following:    Height as of 11/29/19: 1.575 m (5' 2\").    Weight as of this " encounter: 82.3 kg (181 lb 6.4 oz).   Weight management plan: Patient was referred to their PCP to discuss a diet and exercise plan.        No follow-ups on file.    CHRIST [facility]

## 2019-12-02 NOTE — PROGRESS NOTES
Clinic Care Coordination Contact    Situation: Patient chart reviewed by care coordinator.    Background: Patient presented to ED on 11/29 to evaluation of high blood pressure and diagnosed with anxiety attack. She was discharged home with no medication changes and advised to follow up with PCP.    Assessment: Patient has already completed clinic follow up visit this morning, 12/2. She has future follow up with her PCP and psychology.     Plan/Recommendations: Care Coordinator will made outreach attempt tomorrow to assess if patient has any questions or concerns regarding follow up recommendations.    ALLEN Quarles, RN   [facility]  - Clinic Care Coordinator  Phone: [phone]

## 2019-12-02 NOTE — TELEPHONE ENCOUNTER
Pharmacy requesting:  Raniditine recall/need medication change (famotidine?)       Pharmacy pended.

## 2019-12-02 NOTE — PATIENT INSTRUCTIONS
Thank you for choosing [facility].  You may be receiving an email and/or telephone survey request from [facility] Customer Experience regarding your visit today.  Please take a few minutes to respond to the survey to let us know how we are doing.      If you have questions or concerns, please contact us via [employer] or you can contact your care team at [phone].    Our Clinic hours are:  Monday 6:40 am  to 7:00 pm  Tuesday -Friday 6:40 am to 5:00 pm    The [place] outpatient lab hours are:  Monday - Friday 6:10 am to 4:45 pm  Saturdays 7:00 am to 11:00 am  Appointments are required, call [phone]    If you have clinical questions after hours or would like to schedule an appointment,  call the clinic at [phone].

## 2019-12-02 NOTE — LETTER
[place] CARE COORDINATION  [facility]  [address] [place] [address]    December 3, 2019    Molly Teague  [address]  [facility] [place] [address]      Dear Molly,    I am a clinic care coordinator who works with Grecia Barba DO  at [facility].  I wanted to introduce myself and provide you with my contact information so that you can call me with questions or concerns about your health care. Below is a description of clinic care coordination and how I can further assist you.     The clinic care coordinator is a registered nurse and/or  who understand the health care system. The goal of clinic care coordination is to help you manage your health and improve access to the [facility] system in the most efficient manner. The registered nurse can assist you in meeting your health care goals by providing education, coordinating services, and strengthening the communication among your providers. The  can assist you with financial, behavioral, psychosocial, chemical dependency, counseling, and/or psychiatric resources.    Please feel free to contact me at [phone], with any questions or concerns. We at [facility] are focused on providing you with the highest-quality healthcare experience possible and that all starts with you.     Sincerely,     ALLEN Quarles, RN   [facility]  - Clinic Care Coordinator

## 2019-12-03 ASSESSMENT — ANXIETY QUESTIONNAIRES: GAD7 TOTAL SCORE: 17

## 2019-12-03 NOTE — PROGRESS NOTES
Clinic Care Coordination Contact  [facility]/Voicemail    Referral Source: ED Follow-Up  Clinical Data: Care Coordinator Outreach  Outreach attempted x 1.  Left message on patient's voicemail with call back information and requested return call.  Plan: Care Coordinator will send care coordination introduction letter with care coordinator contact information and explanation of care coordination services via mail. Care Coordinator will try to reach patient again in 1-2 business days.    ALLEN Quarles, RN   [facility]  - Clinic Care Coordinator  Phone: [phone]

## 2019-12-04 NOTE — PROGRESS NOTES
Clinic Care Coordination Contact  [facility]/Voicemail    Referral Source: ED Follow-Up  Clinical Data: Care Coordinator Outreach  Outreach attempted x 2.  Left message on patient's voicemail with call back information and requested return call.  Plan: Care Coordinator sent care coordination introduction letter on 12/3 via mail. Care Coordinator will do no further outreaches at this time.    ROSS QuarlesN, RN   [facility]  - Clinic Care Coordinator  Phone: [phone]

## 2019-12-12 ENCOUNTER — HOSPITAL ENCOUNTER (EMERGENCY)
Facility: CLINIC | Age: 34
Discharge: HOME | End: 2019-12-12
Attending: NURSE PRACTITIONER | Admitting: NURSE PRACTITIONER
Payer: MEDICARE

## 2019-12-12 ENCOUNTER — OFFICE VISIT (OUTPATIENT)
Dept: PSYCHOLOGY | Facility: CLINIC | Age: 34
End: 2019-12-12
Payer: MEDICARE

## 2019-12-12 VITALS
SYSTOLIC BLOOD PRESSURE: 159 MMHG | RESPIRATION RATE: 16 BRPM | BODY MASS INDEX: 33.84 KG/M2 | DIASTOLIC BLOOD PRESSURE: 65 MMHG | TEMPERATURE: 98 F | WEIGHT: 185 LBS | HEART RATE: 61 BPM

## 2019-12-12 DIAGNOSIS — J06.9 VIRAL URI: ICD-10-CM

## 2019-12-12 DIAGNOSIS — F33.2 SEVERE RECURRENT MAJOR DEPRESSION WITHOUT PSYCHOTIC FEATURES (H): ICD-10-CM

## 2019-12-12 DIAGNOSIS — F43.10 PTSD (POST-TRAUMATIC STRESS DISORDER): ICD-10-CM

## 2019-12-12 DIAGNOSIS — F41.1 GAD (GENERALIZED ANXIETY DISORDER): Primary | ICD-10-CM

## 2019-12-12 LAB
INTERNAL QC OK POCT: YES
S PYO AG THROAT QL IA.RAPID: NEGATIVE

## 2019-12-12 PROCEDURE — 87880 STREP A ASSAY W/OPTIC: CPT | Performed by: NURSE PRACTITIONER

## 2019-12-12 PROCEDURE — 87077 CULTURE AEROBIC IDENTIFY: CPT | Performed by: NURSE PRACTITIONER

## 2019-12-12 PROCEDURE — 87081 CULTURE SCREEN ONLY: CPT | Performed by: NURSE PRACTITIONER

## 2019-12-12 PROCEDURE — 99213 OFFICE O/P EST LOW 20 MIN: CPT | Mod: Z6 | Performed by: NURSE PRACTITIONER

## 2019-12-12 PROCEDURE — 90834 PSYTX W PT 45 MINUTES: CPT | Performed by: PSYCHOLOGIST

## 2019-12-12 PROCEDURE — G0463 HOSPITAL OUTPT CLINIC VISIT: HCPCS

## 2019-12-12 ASSESSMENT — ENCOUNTER SYMPTOMS
TROUBLE SWALLOWING: 0
JOINT SWELLING: 0
EYE REDNESS: 0
LIGHT-HEADEDNESS: 0
ABDOMINAL PAIN: 0
NUMBNESS: 0
SINUS PRESSURE: 0
SORE THROAT: 1
COUGH: 0
NAUSEA: 0
RHINORRHEA: 1
ARTHRALGIAS: 0
HEADACHES: 0
WEAKNESS: 0
FEVER: 1
FATIGUE: 0
SINUS PAIN: 0
DIZZINESS: 0
CHILLS: 0
SHORTNESS OF BREATH: 0
MYALGIAS: 0
EYE DISCHARGE: 0
VOMITING: 0

## 2019-12-12 ASSESSMENT — ANXIETY QUESTIONNAIRES
IF YOU CHECKED OFF ANY PROBLEMS ON THIS QUESTIONNAIRE, HOW DIFFICULT HAVE THESE PROBLEMS MADE IT FOR YOU TO DO YOUR WORK, TAKE CARE OF THINGS AT HOME, OR GET ALONG WITH OTHER PEOPLE: VERY DIFFICULT
7. FEELING AFRAID AS IF SOMETHING AWFUL MIGHT HAPPEN: SEVERAL DAYS
1. FEELING NERVOUS, ANXIOUS, OR ON EDGE: MORE THAN HALF THE DAYS
6. BECOMING EASILY ANNOYED OR IRRITABLE: NEARLY EVERY DAY
2. NOT BEING ABLE TO STOP OR CONTROL WORRYING: SEVERAL DAYS
GAD7 TOTAL SCORE: 13
4. TROUBLE RELAXING: NEARLY EVERY DAY
3. WORRYING TOO MUCH ABOUT DIFFERENT THINGS: MORE THAN HALF THE DAYS
5. BEING SO RESTLESS THAT IT IS HARD TO SIT STILL: SEVERAL DAYS

## 2019-12-12 ASSESSMENT — PATIENT HEALTH QUESTIONNAIRE - PHQ9: SUM OF ALL RESPONSES TO PHQ QUESTIONS 1-9: 13

## 2019-12-12 NOTE — ED AVS SNAPSHOT
[facility]  [address] [address]  Phone:  [phone]  Fax:  [phone]                                    Molly Teague   MRN: [id number]    Department:  [facility]   Date of Visit:  12/12/2019           After Visit Summary Signature Page    I have received my discharge instructions, and my questions have been answered. I have discussed any challenges I see with this plan with the nurse or doctor.    ..........................................................................................................................................  Patient/Patient Representative Signature      ..........................................................................................................................................  Patient Representative Print Name and Relationship to Patient    ..................................................               ................................................  Date                                   Time    ..........................................................................................................................................  Reviewed by Signature/Title    ...................................................              ..............................................  Date                                               Time          22EPIC Rev 08/18

## 2019-12-12 NOTE — PROGRESS NOTES
"                                             Progress Note    Patient Name: Molly Teague  Date: December 12, 2019           Service Type: Individual  Video Visit: No     Session Start Time: 10:00am  Session End Time: 10:52am     Session Length: 52min    Session #: 5    Attendees: Client attended alone     Treatment Plan Last Reviewed/CGI: December 12, 2019  PHQ-9 / REX-7 : December 12, 2019        DATA  Interactive Complexity: No  Crisis: No       Progress Since Last Session (Related to Symptoms / Goals / Homework):  Symptoms: Worsening , depressive sxs score remains in the moderate range, and anxiety sxs score increased from mild to moderate range (see PHQ-9 & REX-7).     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 \"jewelry pieces are being watched on [employer]\"-pleased about this)      Episode of Care Goals: Minimal progress - PREPARATION (Decided to change - considering how); Intervened by negotiating a change plan and determining options / strategies for behavior change, identifying triggers, exploring social supports, and working towards setting a date to begin behavior change     Current / Ongoing Stressors and Concerns:   Health related stress/Multiple Medical problems (including CREST syndrome/Scleroderma); Relationship with SO; Caring for Son who struggles with speech issues     Patient reported recognizing in retrospect the triggers to her last panic attack about 2 week ago that lead her to go to the ER. Indicated that her panic attack sxs were so intense that she did not recognize them as such, and believed that maybe something was wrong physically. Reported trying her asthma inhaler first, thinking it was " asthma but this didn't work, and she was experiencing chest pain, shakiness, and was unable to breath which made her think she needed to be seen at the ER. Processed stress in her relationship with her partner that escalated her anxiety and preceded this panic attack episode. Reflected on ongoing relationship issues that she has been frustrated with, chente. her partner not sharing his feelings or struggles with her - treating her as fragile, even physically which she does not appreciate. Described the toll that her physical health issues took on their relationship since the year she got pregnant, followed by coma during postpartum, and road to recovery in the past few years.      Treatment Objective(s) Addressed in This Session:     will identify negative self-talk and behaviors: challenge core beliefs, myths, and actions   will learn and utilize at least 3 coping skills to manage anxiety and worry thoughts effectively   will identify 2 strategies/activities to build positive experiences that support positive mood and improve confidence in her abilities and identity   will learn & utilize at least 2 interpersonal effectiveness skills in communicating with others about her needs and observing her limits     Intervention:   Reinforced healthy bx choices and coping skills used    Facilitated emotional processing around anxiety and panic triggers, and factors of emotional vulnerability (including physical health issues and relationship with partner).  Provided support & validation  CBT, MBCT, DBT informed: surfaced worry thoughts & negative self-talk/engaged in cognitive restructuring and perspective taking. Encouraged ongoing use of self-validation and de-fusing from unhelpful thoughts. Reviewed the use of Ob/Jose skills with mindfulness, grounding strategies, in addition to TIPP skill with ice and deep breathing at first signs of anxiety or tension in her body. Reinforced reported efforts with use of O2EA skill in  "working toward bx activation goals and in building positive experiences  Interpersonal Therapy:facilitated processing of pertinent interpersonal dynamics and communication reported on in context of her described interactions with her partner/roles/encouraged use of IE skills in her communication and in considering initiating couples counseling which she indicated that her partner would be open to (discussed using insurance carrier to identify providers in network and [facility])  Solution Focused Therapy: asked present and future focused questions in problem solving on strategies-steps to continue to work on building positive experiences      ASSESSMENT: Current Emotional / Mental Status (status of significant symptoms):   Risk status (Self / Other harm or suicidal ideation)   Patient denies current fears or concerns for personal safety.   Patient denies current or recent suicidal ideation or behaviors.   Patientdenies current or recent homicidal ideation or behaviors.   Patient denies current or recent self injurious behavior or ideation.   Patient denies other safety concerns.   Patient Patient reports there has been no change in risk factors since their last session.     PatientPatient reports there has been no change in protective factors since their last session.     Recommended that patient call 911 or go to the local ED should there be a change in any of these risk factors.     Appearance:   Appropriate    Eye Contact:   Good    Psychomotor Behavior: Normal    Attitude:   Cooperative    Orientation:   All   Speech    Rate / Production: Normal     Volume:  Normal    Mood:    Anxious \"very anxious\"   Affect:    Appropriate    Thought Content:  Clear    Thought Form:  Coherent  Logical    Insight:    Good      Medication Review:  No changes to current psychiatric medication(s)      Medication Compliance:   Yes     Changes in Health Issues:   Yes: Hospitalization on 11/24/19 due to \"blood " "loss\" \"severe anemia\" \"bleeding internally; doctors can't tell from where\", per pt. Reported experiencing also a \"really bad Raynaud's attack\" last month. Encouraged client to f/u with PCP as indicated with physical health issues     Chemical Use Review:   Substance Use: Chemical use reviewed, no active concerns identified      Tobacco Use: No current tobacco use.      Diagnosis:  1. REX (generalized anxiety disorder)    2. Severe recurrent major depression without psychotic features (H)    3. PTSD (post-traumatic stress disorder)        Collateral Reports Completed:   Not Applicable    PLAN: (Patient Tasks / Therapist Tasks / Other)  RTC in 2 weeks.  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 engaging creativity, while observing her limits as discussed and taking frequent breaks   -Consider initiating couples counseling, and utilize assertive communication with partner about her needs    Erna Palacios PsyD,                                                          ______________________________________________________________________    Treatment Plan    Patient's Name: Molly Teague  YOB: 1985    Date: September 4, 2019    DSM5 Diagnoses: 300.02 (F41.1) Generalized Anxiety Disorder; 309.81 (F43.10) Posttraumatic Stress Disorder (includes Posttraumatic Stress Disorder for Children 6 Years and Younger)  With dissociative symptoms; 296.35 Major Depressive Disorder, Recurrent Episode, in partial remission  Psychosocial & Contextual Factors: Health related stress/Multiple Medical problems (including CREST syndrome/Scleroderma); Relationship with SO; Caring for Son who struggles with speech issues  WHODAS: Simple score is " "29    Referral / Collaboration:  Referral to another professional/service is not indicated at this time..   Refer for EMDR when client is ready for this and reports decreased dissociation    Anticipated number of session or this episode of care: unknown      MeasurableTreatment Goal(s) related to diagnosis / functional impairment(s)  Goal 1: Patient will experience a decrease in depressive symptoms per PHQ-9 score decrease from 8 on 09/04/19 to a score of 4 or below     I will know I've met my goal when I feel better about  myself and about my body image. When I have outlets that make me happy. When I start functioning in the world again. Having job and independence back      Objective #A (Patient Action)    Patient will Identify negative self-talk and behaviors: challenge core beliefs, myths, and actions.  Status: Continued - Date(s): December 12, 2019     Intervention(s)  Therapist will guide client in identifying cognitive distortions, engage in cognitive restructuring, and de-fuse from unhelpful thoughts (CBT & MBCT informed)      Objective #B  Patient will identify 2 strategies/activities to build positive experiences that support positive mood and improve confidence in her abilities and identity  Status: Continued - Date(s): December 12, 2019       Intervention(s)  Therapist will  teach strategies and guide client in identifying positive experiences she can focus on to support positive mood (DBT informed & Solution Focused)     Objective #C  Patient will practice identifying/writing about 3 \"good things\" from her day (gratitude practice)  Status: Continued - Date(s): December 12, 2019     Intervention(s)  Therapist will provide psychoeducation on the benefits and practice of writing about 3 good things from her day, positive emotion elicited, and her role in it before bedtime; will problem solve on obstacles to goal completion (Positive psychology/Solution Focused Therapy)    Objective #D  Patient will develop " and utilize safety plan when experiencing SI at times of increased crisis or change in risk factors  Status: Continued - Date: December 12, 2019    Intervention(s)  Therapist will guide client in developing FCC safety plan, reinforce safety plan use, and monitor changes in risk factors (CBT, DBT & Solution Focused)      Goal 2: Patient will experience a decrease in anxiety sxs score as evidenced by a REX-7 score decrease from 14 on  09/04/19 to a score of 4 or below.    I will know I've met my goal when.. I am not sure about this one yet. I have to think about it. I do have a lot of panic attacks during the day and nightmares at night. Maybe taking care of this.      Objective #A (Patient Action)  Patient will learn and utilize at least 3 coping skills to manage anxiety, panic attacks, and worry thoughts effectively   Status: Continued - Date(s): December 12, 2019     Intervention(s)  Therapist will teach a minimum of 4 coping strategies and skills that client can utilize in managing anxiety sxs and increased distress/prevent escalation (draw from CBT-MBCT strategies and DBT skills)    Objective #B  Patient will learn & utilize at least 2 interpersonal effectiveness skills in communicating with others about her needs and observing her limits  Status: Continued - Date(s): December 12, 2019     Intervention(s)  Therapist will teach client at least 2 interpersonal effectiveness skills and role play as needed assertive communication (Interpersonal Therapy, Exposure, & DBT informed skills)    Objective #C  Patient will identify & integrate a mindfulness or relaxation practice into her daily routine  Status: Continued - Date(s): December 12, 2019     Intervention(s)  Therapist will teach or help identify at least 2 mindfulness/relaxation exercises that client can utilize, provide psychoeducation on the benefits of these practices, and help problem solve on obstacle to goal completion (MBCT informed & Solution  Focused)    Patient has reviewed and agreed to the above plan.      Erna Palacios, LP  September 4, 2019

## 2019-12-12 NOTE — ED PROVIDER NOTES
History     Chief Complaint   Patient presents with     Otalgia     ST, ear pain     Pharyngitis     HPI  Molly Teague is a 34 year old female who presents to the urgent care for evaluation of ear pain, sore throat, and congestion for two days. T-max at home 102.3 yesterday. Patient has been using Robitussin cold & cough with some relief. Denies headache, dizziness, sinus pain, cough, shortness or breath and chest pain.     diarrhea  Allergies:  Allergies   Allergen Reactions     No Known Allergies      Pollen Extract      Seasonal Allergies      Shellfish-Derived Products Nausea and Rash     Rash on face       Problem List:    Patient Active Problem List    Diagnosis Date Noted     Anemia 11/25/2019     Priority: Medium     EMERALD 11/25/2019     Priority: Medium     Peripheral neuropathy 11/25/2019     Priority: Medium     EMERALD (acute kidney injury) (H) 11/25/2019     Priority: Medium     Mirena IUD- Remove by 09/2024 09/06/2019     Priority: Medium     Lot: [id number]  Exp: 01/2022    Dinora Israel LPN         End-stage renal disease (H) 07/24/2019     Priority: Medium     Malignant essential hypertension 07/24/2019     Priority: Medium     PTSD (post-traumatic stress disorder) 06/19/2019     Priority: Medium     Obesity (BMI 35.0-39.9) with comorbidity (H) 01/22/2019     Priority: Medium     Moderate major depression (H) 07/06/2018     Priority: Medium     Severe persistent asthma without complication 07/06/2018     Priority: Medium     On high dose ICS/LABA + frequent albuterol use.  Next allergy/pulmonary referral       Aspiration of food 03/29/2018     Priority: Medium     Chronic pain syndrome 04/26/2017     Priority: Medium     Patient is followed by Grecia Barba DO for ongoing prescription of pain medication.  All refills should only be approved by this provider, or covering partner.    Medication(s): Oxycodone 15 mg.   Maximum quantity per month: 100  Clinic visit frequency required: Q 2 months      Controlled substance agreement:  Encounter-Level CSA - 04/26/2017:    Controlled Substance Agreement - Scan on 5/4/2017  8:58 AM: CONTROLLED SUBSTANCE AGREEMENT (below)       Patient-Level CSA:    Controlled Substance Agreement - Opioid - Scan on 2/6/2019  6:23 PM (below)         Pain Clinic evaluation in the past: No    DIRE Total Score(s):  No flowsheet data found.    Last [facility] website verification:  done on 4/26/17   9/26/2017  7/6/2018  10/16/2018  1/22/2019  8/2/2019           [email or web address]         Chronic migraine without aura without status migrainosus, not intractable 04/12/2017     Priority: Medium     With medication overuse.  Fioricet and not Imitrex is recommend to treat severe headache.  2/2017 [facility] recommend wean down from daily Fioricet and use Excedrin Migrain PRN.       AIN grade I 03/30/2017     Priority: Medium     Found at [facility] on colonoscopy 2/2017.  Recommend repeat anoscopy and anal pap in 6/2017.       Gastroesophageal reflux disease with esophagitis 03/30/2017     Priority: Medium     EGD done at [facility] 2/2017 showed esophagitis without GAVE.  Recommend max dose H2 and PPI, along with 4 tablets of Carafate dissolved in water and drink throughout the day.    Had LA Grade D esophagitis        Limited systemic sclerosis (H) 01/25/2017     Priority: Medium     Raynaud's, calcinosis, telangiectasias, peripheral neuropathy, GERD symptoms, history of scleroderma renal crisis.  Saw [facility] 1/2017 and follows with Rheum Dr. Davis locally.       Polyneuropathy associated with underlying disease (H) 01/25/2017     Priority: Medium     Due to scleroderma and neurology thought possible due to ICU (critical illness neuropathy).  Recommend to max out dose of gabapentin to 7805-1078 mg/day, split BID or TID.  [facility] 1/2017 positive for neuropathy       History of Clostridium difficile 11/29/2016     Priority: Medium     History of Helicobacter pylori infection 11/29/2016     Priority: Medium      Scleroderma with renal involvement (H) 2016     Priority: Medium     Stopped lisinopril per [facility] 2017.  Restarted lisinopril per [facility] 3/2017       History of ARDS 2016     Priority: Medium     2015, prolonged ICU/vent/trach due to influenza, scleroderma renal crisis requiring hemodialysis.       Raynaud's disease without gangrene 2016     Priority: Medium     History of hemodialysis 2016     Priority: Medium     2015 due to scleroderma renal crisis       Bilateral retinitis 2016     Priority: Medium     Other pulmonary embolism without acute cor pulmonale, unspecified chronicity (H) 2016     Priority: Medium     Completed anti-coagulation.       REX (generalized anxiety disorder) 2016     Priority: Medium     CREST (calcinosis, Raynaud's phenomenon, esophageal dysfunction, sclerodactyly, telangiectasia) (H) 2016     Priority: Medium     Scleroderma (H) 2016     Priority: Medium     Nausea with vomiting 2016     Priority: Medium        Past Medical History:    Past Medical History:   Diagnosis Date     Acute pyelonephritis 2017     Altered mental state 3/29/2018     Arthritis      Blood clotting disorder (H)      History of blood transfusion      Hypertension      Long-term (current) use of anticoagulants [Z79.01] 2016     PE (pulmonary embolism) 2016     Rheumatism      Scleroderma (H) 2016     Uncomplicated asthma        Past Surgical History:    Past Surgical History:   Procedure Laterality Date     ANGIOGRAM        SECTION  2014     THROAT SURGERY         Family History:    Family History   Problem Relation Age of Onset     Hyperlipidemia Father      Cerebrovascular Disease Maternal Grandmother          of a stroke     Hyperlipidemia Paternal Grandfather      Depression Sister      Fibromyalgia Sister      Diabetes Maternal Aunt      Melanoma No family hx of      Skin Cancer No family hx of         Social History:  Marital Status:  Single [1]  Social History     Tobacco Use     Smoking status: Never Smoker     Smokeless tobacco: Never Used   Substance Use Topics     Alcohol use: Yes     Comment: Socially once on a weekend if any     Drug use: No     Comment: None        Medications:    amLODIPine (NORVASC) 5 MG tablet  blood glucose (NO BRAND SPECIFIED) lancets standard  blood glucose (NO BRAND SPECIFIED) test strip  budesonide-formoterol (SYMBICORT) 160-4.5 MCG/ACT Inhaler  busPIRone HCl (BUSPAR) 30 MG tablet  Cyanocobalamin (B-12) 1000 MCG TBCR  diphenhydrAMINE (BENADRYL) 25 MG tablet  Doxylamine Succinate, Sleep, (UNISOM PO)  DULoxetine (CYMBALTA) 30 MG capsule  famotidine (PEPCID) 20 MG tablet  ferrous gluconate (FERGON) 324 (38 FE) MG tablet  folic acid (FOLVITE) 1 MG tablet  gabapentin (NEURONTIN) 300 MG capsule  GOODSENSE MIGRAINE FORMULA 250-250-65 MG per tablet  hydrOXYzine (ATARAX) 25 MG tablet  LORazepam (ATIVAN) 1 MG tablet  montelukast (SINGULAIR) 10 MG tablet  naloxone (NARCAN) 4 MG/0.1ML nasal spray  omeprazole (PRILOSEC) 40 MG DR capsule  ondansetron (ZOFRAN-ODT) 8 MG ODT tab  oxyCODONE IR (ROXICODONE) 15 MG tablet  polyethylene glycol (MIRALAX) powder  promethazine (PHENERGAN) 25 MG tablet  promethazine (PHENERGAN) 50 MG/ML SOLN IV injection  VENTOLIN  (90 Base) MCG/ACT inhaler          Review of Systems   Constitutional: Positive for fever. Negative for chills and fatigue.   HENT: Positive for congestion, ear pain, rhinorrhea and sore throat. Negative for ear discharge, sinus pressure, sinus pain and trouble swallowing.    Eyes: Negative for discharge and redness.   Respiratory: Negative for cough and shortness of breath.    Cardiovascular: Negative for chest pain.   Gastrointestinal: Negative for abdominal pain, nausea and vomiting.   Musculoskeletal: Negative for arthralgias, joint swelling and myalgias.   Skin: Negative for rash.   Neurological: Negative for dizziness,  weakness, light-headedness, numbness and headaches.       Physical Exam   BP: (!) 159/65  Pulse: 61  Temp: 98  F (36.7  C)  Resp: 16  Weight: 83.9 kg (185 lb)  SpO2: (unable)      Physical Exam  Constitutional:       General: She is not in acute distress.     Appearance: She is well-developed. She is not diaphoretic.   HENT:      Right Ear: Tympanic membrane and ear canal normal.      Left Ear: Tympanic membrane and ear canal normal.      Nose: Congestion present.      Mouth/Throat:      Mouth: Mucous membranes are moist.      Pharynx: Oropharynx is clear. No oropharyngeal exudate, posterior oropharyngeal erythema or uvula swelling.      Tonsils: No tonsillar exudate or tonsillar abscesses. Swellin+ on the right. 1+ on the left.   Eyes:      Conjunctiva/sclera: Conjunctivae normal.      Pupils: Pupils are equal, round, and reactive to light.   Neck:      Musculoskeletal: Normal range of motion and neck supple.   Cardiovascular:      Rate and Rhythm: Normal rate and regular rhythm.   Pulmonary:      Effort: Pulmonary effort is normal. No respiratory distress.      Breath sounds: Normal breath sounds. No stridor. No wheezing.   Abdominal:      General: There is no distension.      Palpations: Abdomen is soft.      Tenderness: There is no abdominal tenderness.   Musculoskeletal: Normal range of motion.   Skin:     General: Skin is warm.      Capillary Refill: Capillary refill takes less than 2 seconds.   Neurological:      Mental Status: She is alert and oriented to person, place, and time.         ED Course        Procedures       Results for orders placed or performed during the hospital encounter of 19 (from the past 24 hour(s))   Rapid strep group A screen POCT   Result Value Ref Range    Rapid Strep A Screen Negative neg    Internal QC OK Yes      Medications - No data to display    Assessments & Plan (with Medical Decision Making)   Patient is a 34 year old female who presents to the urgent care for  evaluation of ear pain, sore throat and congestion. See physical exam above. Rapid strep negative, culture pending. Discussed likely viral etiology and average course of viral illness. May continue to use over the counter medications as needed. Increase rest and hydration. Return precautions reviewed, all questions answered. Patient is agreeable to plan of care and discharged in stable condition.   I have reviewed the nursing notes.    I have reviewed the findings, diagnosis, plan and need for follow up with the patient.    Discharge Medication List as of 12/12/2019  1:42 PM          Final diagnoses:   Viral URI       12/12/2019   [facility] EMERGENCY DEPARTMENT     Shima Combs, APRN CNP  12/12/19 [id number]

## 2019-12-13 ASSESSMENT — ANXIETY QUESTIONNAIRES: GAD7 TOTAL SCORE: 13

## 2019-12-14 ENCOUNTER — TELEPHONE (OUTPATIENT)
Dept: EMERGENCY MEDICINE | Facility: CLINIC | Age: 34
End: 2019-12-14

## 2019-12-14 DIAGNOSIS — J02.0 STREPTOCOCCAL SORE THROAT: ICD-10-CM

## 2019-12-14 LAB
BACTERIA SPEC CULT: ABNORMAL
Lab: ABNORMAL
SPECIMEN SOURCE: ABNORMAL

## 2019-12-14 RX ORDER — PENICILLIN V POTASSIUM 500 MG/1
500 TABLET, FILM COATED ORAL 2 TIMES DAILY
Qty: 20 TABLET | Refills: 0 | Status: ON HOLD | OUTPATIENT
Start: 2019-12-14 | End: 2020-01-25

## 2019-12-14 NOTE — TELEPHONE ENCOUNTER
[facility] Emergency Department Lab result notification [Adult-Female]    [facility] ED lab result protocol used  Beta hemolytic strep protocol    Reason for call  Notify of lab results, assess symptoms,  review ED providers recommendations/discharge instructions (if necessary) and advise per ED lab result f/u protocol    Lab Result (including Rx patient on, if applicable)  Final Beta Hemolytic Strep culture report on 12/14/19 shows the presence of bacteria(s):  Moderate growth Streptococcus pyogenes sero group A   Antibiotic prescribed upon discharge from the [facility] ED: None  As per  ED lab result protocol, advise per Strep protocol.  Information table from ED Provider visit on 12/12/19  Symptoms reported at ED visit (Chief complaint, HPI) Otalgia        ST, ear pain     Pharyngitis      HPI  Molly Teague is a 34 year old female who presents to the urgent care for evaluation of ear pain, sore throat, and congestion for two days. T-max at home 102.3 yesterday. Patient has been using Robitussin cold & cough with some relief. Denies headache, dizziness, sinus pain, cough, shortness or breath and chest pain.      Significant Medical hx, if applicable (i.e. CKD, diabetes) ESRD   Allergies Allergies   Allergen Reactions     No Known Allergies      Pollen Extract      Seasonal Allergies      Shellfish-Derived Products Nausea and Rash     Rash on face      Weight, if applicable Wt Readings from Last 2 Encounters:   12/12/19 83.9 kg (185 lb)   12/02/19 82.3 kg (181 lb 6.4 oz)      Coumadin/Warfarin [Yes /No] no   Creatinine Level (mg/dl) Creatinine   Date Value Ref Range Status   12/02/2019 1.22 (H) 0.52 - 1.04 mg/dL Final      Creatinine clearance (ml/min), if applicable Serum creatinine: 1.22 mg/dL (H) 12/02/19 1051  Estimated creatinine clearance: 65.2 mL/min (A)   Pregnant (Yes/No/NA) No   Breastfeeding (Yes/No/NA) no   ED providers Impression and Plan (applicable information) Patient is a 34 year old  female who presents to the urgent care for evaluation of ear pain, sore throat and congestion. See physical exam above. Rapid strep negative, culture pending. Discussed likely viral etiology and average course of viral illness. May continue to use over the counter medications as needed. Increase rest and hydration. Return precautions reviewed, all questions answered. Patient is agreeable to plan of care and discharged in stable condition.    ED diagnosis Viral URI   ED provider Shima Combs, APRN CNP      RN Assessment (Patient s current Symptoms), include time called.  [Insert Left message here if message left]  2:14PM: Spoke with patient. She states her ear pain is improving. Continues to have a sore throat.     RN Recommendations/Instructions per [facility] ED lab result protocol  Patient notified of lab result and treatment recommendations.  Rx for penicillin V (VEETID) 500 MG tablet, Take 1 tablet (500 mg) by mouth 2 times daily for 10 days sent to [Pharmacy - [facility]].  RN reviewed information about strep throat.  Advised to follow up with PCP as directed by the ED provider.  Patient is comfortable with the information given and has no further questions.      Please Contact your PCP clinic or return to the Emergency department if your:    Symptoms worsen or other concerning symptom's.    PCP follow-up Questions asked: YES       [RN Name]  Elena Larsen RN  [facility] RN  Lung Nodule and ED Lab Result RN  Epic pool (ED late result f/u RN): [id number]  [facility] INCIDENTAL RADIOLOGY F/U NURSES: P [id number]  Ph# [phone]    Copy of Lab result   Beta strep group A culture [[id number]] (Order [id number])   Order Requisition     Beta strep group A culture (Order #[id number]) on 12/12/19   Exam Information     Exam Date Exam Time Accession # Results    12/12/19  1:43 PM [id number]    Specimen Information: Throat        Component Collected Lab   Specimen Description 12/12/2019  1:43    Throat     Special Requests 12/12/2019  1:43    Specimen collected in eSwab transport (white cap)    Culture Micro Abnormal  12/12/2019  1:43    Moderate growth   Streptococcus pyogenes sero group A

## 2019-12-26 DIAGNOSIS — G89.4 CHRONIC PAIN SYNDROME: Chronic | ICD-10-CM

## 2019-12-26 RX ORDER — OXYCODONE HYDROCHLORIDE 15 MG/1
TABLET ORAL
Qty: 100 TABLET | Refills: 0 | Status: SHIPPED | OUTPATIENT
Start: 2019-12-30 | End: 2020-01-21

## 2019-12-26 NOTE — TELEPHONE ENCOUNTER
Routing refill request to provider for review/approval because:  Drug not on the [facility] refill protocol   Per 11/01/19 notes with PCP:  1. Chronic pain syndrome -Long discussion about further weaning.  She feels her pain is well controlled at the current dose.  Discussed next step would be to decrease oxycodone from 15 to 10 mg.  She is not buying into that thus far.  We will continue to fill at 100 tabs per month.  Call in for next refill 12/1, 12/31, due for clinic visit around 1/30  - oxyCODONE IR (ROXICODONE) 15 MG tablet; Take 1 tablet (15 mg) by mouth every 4 hours as needed for severe pain  Dispense: 100 tablet; Refill: 0    Last fill:        Disp Start End     oxyCODONE IR (ROXICODONE) 15 MG tablet 100 tablet 11/29/2019 12/29/2019      ROSS JusticeN, RN

## 2019-12-26 NOTE — TELEPHONE ENCOUNTER
Oxycodone 15 mg  Last Written Prescription Date:  11/26/2019  Last Fill Quantity: 100,  # refills: 0   Last office visit: No previous visit found with prescribing provider:   Future Office Visit:   Next 5 appointments (look out 90 days)    Dec 27, 2019  9:00 AM CST  Return Visit with Erna Palacios LP  [facility]  [address] [place], [place] [address]  [facility] [address]  [phone]   Jan 17, 2020  2:00 PM CST  Return Visit with Erna Palacios LP  [facility] ([facility]  [address]  [facility] [address]  [phone]   Jan 31, 2020 10:00 AM CST  Return Visit with Erna Palacios LP  [facility] ([facility]  [address]  [facility] [address]  [phone]

## 2020-01-21 DIAGNOSIS — F41.9 ANXIETY: ICD-10-CM

## 2020-01-21 DIAGNOSIS — G47.00 INSOMNIA, UNSPECIFIED TYPE: ICD-10-CM

## 2020-01-21 DIAGNOSIS — M34.1 CREST (CALCINOSIS, RAYNAUD'S PHENOMENON, ESOPHAGEAL DYSFUNCTION, SCLERODACTYLY, TELANGIECTASIA) (H): ICD-10-CM

## 2020-01-21 DIAGNOSIS — G89.4 CHRONIC PAIN SYNDROME: Chronic | ICD-10-CM

## 2020-01-21 DIAGNOSIS — K21.00 GASTROESOPHAGEAL REFLUX DISEASE WITH ESOPHAGITIS: ICD-10-CM

## 2020-01-21 NOTE — TELEPHONE ENCOUNTER
"Requested Prescriptions   Pending Prescriptions Disp Refills     hydrOXYzine (ATARAX) 25 MG tablet [Pharmacy Med Name: hydrOXYzine HCL 25MG TAB] 30 tablet 1     Sig: TAKE ONE TABLET BY MOUTH THREE TIMES A DAY AS NEEDED FOR ANXIETY OR INSOMNIA       Antihistamines Protocol Passed - 1/21/2020  2:13 PM        Passed - Recent (12 mo) or future (30 days) visit within the authorizing provider's specialty     Patient has had an office visit with the authorizing provider or a provider within the authorizing providers department within the previous 12 mos or has a future within next 30 days. See \"Patient Info\" tab in inbasket, or \"Choose Columns\" in Meds & Orders section of the refill encounter.              Passed - Patient is age 3 or older     Apply age and/or weight-based dosing for peds patients age 3 and older.    Forward request to provider for patients under the age of 3.          Passed - Medication is active on med list        Last Written Prescription Date:  12/2/2019  Last Fill Quantity: 30,  # refills: 1   Last office visit: 12/2/2019 with prescribing provider:  Kwame    Future Office Visit:   Next 5 appointments (look out 90 days)    Jan 23, 2020 10:40 AM CST  SHORT with Grecia Barba DO  [facility]  Arrive at: Clinic A [address] [facility] [address]  [phone]   Jan 31, 2020 10:00 AM CST  Return Visit with Erna Palacios LP  [facility] ([address] [id number] [phone]           "

## 2020-01-21 NOTE — TELEPHONE ENCOUNTER
Routing refill request to provider for review/approval because:  Drug not on the [facility] refill protocol   Last ordered by Zahida Merchant APRN CNP  Pt has OV 1/23/2020    Requested Prescriptions   Pending Prescriptions Disp Refills     oxyCODONE IR (ROXICODONE) 15 MG tablet 100 tablet 0       There is no refill protocol information for this order        Last Written Prescription Date:  12/30/19  Last Fill Quantity: 100,  # refills: 0   Last office visit: 12/2/19 with HERNANDEZ POLO  Future Office Visit:   Next 5 appointments (look out 90 days)    Jan 23, 2020 10:40 AM CST  SHORT with Grecia Barba DO  [facility]  Arrive at: Clinic A [address] [facility] [address] [phone]   Jan 31, 2020 10:00 AM CST  Return Visit with Erna Palacios LP  [facility] ([address] [id number] [phone]           Emily LEI RN

## 2020-01-21 NOTE — TELEPHONE ENCOUNTER
"Requested Prescriptions   Pending Prescriptions Disp Refills     omeprazole (PRILOSEC) 40 MG DR capsule [Pharmacy Med Name: OMEPRAZOLE 40MG CPDR] 180 capsule 0     Sig: TAKE ONE CAPSULE BY MOUTH TWICE A DAY       PPI Protocol Passed - 1/21/2020  2:13 PM        Passed - Not on Clopidogrel (unless Pantoprazole ordered)        Passed - No diagnosis of osteoporosis on record        Passed - Recent (12 mo) or future (30 days) visit within the authorizing provider's specialty     Patient has had an office visit with the authorizing provider or a provider within the authorizing providers department within the previous 12 mos or has a future within next 30 days. See \"Patient Info\" tab in inbasket, or \"Choose Columns\" in Meds & Orders section of the refill encounter.              Passed - Medication is active on med list        Passed - Patient is age 18 or older        Passed - No active pregnacy on record        Passed - No positive pregnancy test in past 12 months        Last Written Prescription Date:  9/3/2019  Last Fill Quantity: 180,  # refills: 0   Last office visit: 12/2/2019 with provider: Kwame   Future Office Visit:   Next 5 appointments (look out 90 days)    Jan 23, 2020 10:40 AM CST  SHORT with Grecia Barba DO  [facility] ([facility])  Arrive at: Clinic A [address] [facility] [address] [phone]   Jan 31, 2020 10:00 AM CST  Return Visit with Erna Palacios LP  [facility] ([address] [phone]           "

## 2020-01-22 RX ORDER — OXYCODONE HYDROCHLORIDE 15 MG/1
TABLET ORAL
Qty: 100 TABLET | Refills: 0 | Status: SHIPPED | OUTPATIENT
Start: 2020-01-22 | End: 2020-02-06

## 2020-01-24 ENCOUNTER — APPOINTMENT (OUTPATIENT)
Dept: GENERAL RADIOLOGY | Facility: CLINIC | Age: 35
End: 2020-01-24
Attending: EMERGENCY MEDICINE
Payer: MEDICARE

## 2020-01-24 ENCOUNTER — HOSPITAL ENCOUNTER (EMERGENCY)
Facility: CLINIC | Age: 35
Discharge: HOME | End: 2020-01-24
Attending: EMERGENCY MEDICINE | Admitting: EMERGENCY MEDICINE
Payer: MEDICARE

## 2020-01-24 VITALS
RESPIRATION RATE: 20 BRPM | BODY MASS INDEX: 32.2 KG/M2 | DIASTOLIC BLOOD PRESSURE: 89 MMHG | WEIGHT: 175 LBS | SYSTOLIC BLOOD PRESSURE: 133 MMHG | HEART RATE: 119 BPM | OXYGEN SATURATION: 97 % | TEMPERATURE: 97.8 F | HEIGHT: 62 IN

## 2020-01-24 DIAGNOSIS — S82.891A ANKLE FRACTURE, RIGHT, CLOSED, INITIAL ENCOUNTER: ICD-10-CM
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
## 2020-01-24 PROCEDURE — 73610 X-RAY EXAM OF ANKLE: CPT | Mod: RT

## 2020-01-24 PROCEDURE — 25000132 ZZH RX MED GY IP 250 OP 250 PS 637: Mod: GY | Performed by: EMERGENCY MEDICINE

## 2020-01-24 PROCEDURE — 27808 TREATMENT OF ANKLE FRACTURE: CPT | Mod: 54 | Performed by: EMERGENCY MEDICINE

## 2020-01-24 PROCEDURE — 27808 TREATMENT OF ANKLE FRACTURE: CPT

## 2020-01-24 PROCEDURE — 73590 X-RAY EXAM OF LOWER LEG: CPT | Mod: RT

## 2020-01-24 PROCEDURE — 25000125 ZZHC RX 250: Performed by: EMERGENCY MEDICINE

## 2020-01-24 PROCEDURE — 99284 EMERGENCY DEPT VISIT MOD MDM: CPT | Mod: 25 | Performed by: EMERGENCY MEDICINE

## 2020-01-24 PROCEDURE — 99285 EMERGENCY DEPT VISIT HI MDM: CPT | Mod: 25

## 2020-01-24 RX ORDER — HYDROXYZINE HYDROCHLORIDE 25 MG/1
TABLET, FILM COATED ORAL
Qty: 30 TABLET | Refills: 1 | Status: SHIPPED | OUTPATIENT
Start: 2020-01-24 | End: 2020-01-31

## 2020-01-24 RX ORDER — OMEPRAZOLE 40 MG/1
CAPSULE, DELAYED RELEASE ORAL
Qty: 180 CAPSULE | Refills: 1 | Status: SHIPPED | OUTPATIENT
Start: 2020-01-24 | End: 2020-07-15

## 2020-01-24 RX ADMIN — LIDOCAINE HYDROCHLORIDE 30 ML: 20 SOLUTION ORAL; TOPICAL at 13:19

## 2020-01-24 ASSESSMENT — ENCOUNTER SYMPTOMS
RESPIRATORY NEGATIVE: 1
NEUROLOGICAL NEGATIVE: 1
PSYCHIATRIC NEGATIVE: 1
EYES NEGATIVE: 1
ENDOCRINE NEGATIVE: 1
CARDIOVASCULAR NEGATIVE: 1
CONSTITUTIONAL NEGATIVE: 1
MUSCULOSKELETAL NEGATIVE: 1
HEMATOLOGIC/LYMPHATIC NEGATIVE: 1
GASTROINTESTINAL NEGATIVE: 1
ALLERGIC/IMMUNOLOGIC NEGATIVE: 1

## 2020-01-24 ASSESSMENT — MIFFLIN-ST. JEOR: SCORE: 1447.04

## 2020-01-24 NOTE — ED PROVIDER NOTES
History     Chief Complaint   Patient presents with     Fall     R ankle fx + deformity, +CMS reported by EMS. Ketamine 20mg given IV PTA.      Ankle Pain     HPI  Molly Teague is a 34 year old female who arrives by EMS after a fall.  Patient has a notable deformed ankle and received IV ketamine by EMS prior to arrival.  Patient has multiple medical problems including history of chronic pain syndrome, scleroderma, peripheral neuropathy, generalized anxiety disorder, crest syndrome, polyneuropathy, AIN grade 1, PTSD, and acute kidney injury.  She reports she suffered a fall from standing height while moving in her home.  It is unclear if she twisted awkwardly and landed on her right side. History was difficult to obtain as patient received IV ketamine for pain control by EMS prior to arrival.      Allergies:  Allergies   Allergen Reactions     No Known Allergies      Pollen Extract      Seasonal Allergies      Shellfish-Derived Products Nausea and Rash     Rash on face       Problem List:    Patient Active Problem List    Diagnosis Date Noted     Anemia 11/25/2019     Priority: Medium     EMERALD 11/25/2019     Priority: Medium     Peripheral neuropathy 11/25/2019     Priority: Medium     EMERALD (acute kidney injury) (H) 11/25/2019     Priority: Medium     Mirena IUD- Remove by 09/2024 09/06/2019     Priority: Medium     Lot: [id number]  Exp: 01/2022    Dinora Israel LPN         End-stage renal disease (H) 07/24/2019     Priority: Medium     Malignant essential hypertension 07/24/2019     Priority: Medium     PTSD (post-traumatic stress disorder) 06/19/2019     Priority: Medium     Obesity (BMI 35.0-39.9) with comorbidity (H) 01/22/2019     Priority: Medium     Moderate major depression (H) 07/06/2018     Priority: Medium     Severe persistent asthma without complication 07/06/2018     Priority: Medium     On high dose ICS/LABA + frequent albuterol use.  Next allergy/pulmonary referral       Aspiration of food  03/29/2018     Priority: Medium     Chronic pain syndrome 04/26/2017     Priority: Medium     Patient is followed by Grecia Barba DO for ongoing prescription of pain medication.  All refills should only be approved by this provider, or covering partner.    Medication(s): Oxycodone 15 mg.   Maximum quantity per month: 100  Clinic visit frequency required: Q 2 months     Controlled substance agreement:  Encounter-Level CSA - 04/26/2017:    Controlled Substance Agreement - Scan on 5/4/2017  8:58 AM: CONTROLLED SUBSTANCE AGREEMENT (below)       Patient-Level CSA:    Controlled Substance Agreement - Opioid - Scan on 2/6/2019  6:23 PM (below)         Pain Clinic evaluation in the past: No    DIRE Total Score(s):  No flowsheet data found.    Last [facility] website verification:  done on 4/26/17   9/26/2017  7/6/2018  10/16/2018  1/22/2019  8/2/2019           [email or web address]         Chronic migraine without aura without status migrainosus, not intractable 04/12/2017     Priority: Medium     With medication overuse.  Fioricet and not Imitrex is recommend to treat severe headache.  2/2017 [facility] recommend wean down from daily Fioricet and use Excedrin Migrain PRN.       AIN grade I 03/30/2017     Priority: Medium     Found at [facility] on colonoscopy 2/2017.  Recommend repeat anoscopy and anal pap in 6/2017.       Gastroesophageal reflux disease with esophagitis 03/30/2017     Priority: Medium     EGD done at [facility] 2/2017 showed esophagitis without GAVE.  Recommend max dose H2 and PPI, along with 4 tablets of Carafate dissolved in water and drink throughout the day.    Had LA Grade D esophagitis        Limited systemic sclerosis (H) 01/25/2017     Priority: Medium     Raynaud's, calcinosis, telangiectasias, peripheral neuropathy, GERD symptoms, history of scleroderma renal crisis.  Saw [facility] 1/2017 and follows with Rheum Dr. Davis locally.       Polyneuropathy associated with underlying disease (H) 01/25/2017      Priority: Medium     Due to scleroderma and neurology thought possible due to ICU (critical illness neuropathy).  Recommend to max out dose of gabapentin to 8530-2238 mg/day, split BID or TID.  EMG 2017 positive for neuropathy       History of Clostridium difficile 2016     Priority: Medium     History of Helicobacter pylori infection 2016     Priority: Medium     Scleroderma with renal involvement (H) 2016     Priority: Medium     Stopped lisinopril per [facility] 2017.  Restarted lisinopril per [facility] 3/2017       History of ARDS 2016     Priority: Medium     2015, prolonged ICU/vent/trach due to influenza, scleroderma renal crisis requiring hemodialysis.       Raynaud's disease without gangrene 2016     Priority: Medium     History of hemodialysis 2016     Priority: Medium     2015 due to scleroderma renal crisis       Bilateral retinitis 2016     Priority: Medium     Other pulmonary embolism without acute cor pulmonale, unspecified chronicity (H) 2016     Priority: Medium     Completed anti-coagulation.       REX (generalized anxiety disorder) 2016     Priority: Medium     CREST (calcinosis, Raynaud's phenomenon, esophageal dysfunction, sclerodactyly, telangiectasia) (H) 2016     Priority: Medium     Scleroderma (H) 2016     Priority: Medium     Nausea with vomiting 2016     Priority: Medium        Past Medical History:    Past Medical History:   Diagnosis Date     Acute pyelonephritis 2017     Altered mental state 3/29/2018     Arthritis      Blood clotting disorder (H)      History of blood transfusion      Hypertension      Long-term (current) use of anticoagulants [Z79.01] 2016     PE (pulmonary embolism) 2016     Rheumatism      Scleroderma (H) 2016     Uncomplicated asthma        Past Surgical History:    Past Surgical History:   Procedure Laterality Date     ANGIOGRAM  2015      SECTION  2014     THROAT  SURGERY  2015       Family History:    Family History   Problem Relation Age of Onset     Hyperlipidemia Father      Cerebrovascular Disease Maternal Grandmother          of a stroke     Hyperlipidemia Paternal Grandfather      Depression Sister      Fibromyalgia Sister      Diabetes Maternal Aunt      Melanoma No family hx of      Skin Cancer No family hx of        Social History:  Marital Status:  Single [1]  Social History     Tobacco Use     Smoking status: Never Smoker     Smokeless tobacco: Never Used   Substance Use Topics     Alcohol use: Yes     Comment: Socially once on a weekend if any     Drug use: No     Comment: None        Medications:    amLODIPine (NORVASC) 5 MG tablet  blood glucose (NO BRAND SPECIFIED) lancets standard  blood glucose (NO BRAND SPECIFIED) test strip  budesonide-formoterol (SYMBICORT) 160-4.5 MCG/ACT Inhaler  busPIRone HCl (BUSPAR) 30 MG tablet  Cyanocobalamin (B-12) 1000 MCG TBCR  diphenhydrAMINE (BENADRYL) 25 MG tablet  Doxylamine Succinate, Sleep, (UNISOM PO)  DULoxetine (CYMBALTA) 30 MG capsule  famotidine (PEPCID) 20 MG tablet  ferrous gluconate (FERGON) 324 (38 FE) MG tablet  folic acid (FOLVITE) 1 MG tablet  gabapentin (NEURONTIN) 300 MG capsule  GOODSENSE MIGRAINE FORMULA 250-250-65 MG per tablet  hydrOXYzine (ATARAX) 25 MG tablet  LORazepam (ATIVAN) 1 MG tablet  montelukast (SINGULAIR) 10 MG tablet  naloxone (NARCAN) 4 MG/0.1ML nasal spray  omeprazole (PRILOSEC) 40 MG DR capsule  ondansetron (ZOFRAN-ODT) 8 MG ODT tab  oxyCODONE IR (ROXICODONE) 15 MG tablet  polyethylene glycol (MIRALAX) powder  promethazine (PHENERGAN) 25 MG tablet  promethazine (PHENERGAN) 50 MG/ML SOLN IV injection  VENTOLIN  (90 Base) MCG/ACT inhaler          Review of Systems   Constitutional: Negative.         Fall at home from standing height   HENT: Negative.    Eyes: Negative.    Respiratory: Negative.    Cardiovascular: Negative.    Gastrointestinal: Negative.    Endocrine: Negative.   "  Genitourinary: Negative.    Musculoskeletal: Negative.         Right ankle pain and deformity after a fall   Skin: Negative.    Allergic/Immunologic: Negative.    Neurological: Negative.    Hematological: Negative.    Psychiatric/Behavioral: Negative.    All other systems reviewed and are negative.      Physical Exam   BP: 110/68  Pulse: 80  Temp: 98.5  F (36.9  C)  Height: 157.5 cm (5' 2\")  Weight: 79.4 kg (175 lb)  SpO2: 99 %      Physical Exam  Constitutional:       General: She is not in acute distress.     Appearance: She is not ill-appearing, toxic-appearing or diaphoretic.   HENT:      Head: Normocephalic and atraumatic.      Left Ear: Tympanic membrane normal.      Nose: Nose normal. No congestion or rhinorrhea.      Mouth/Throat:      Mouth: Mucous membranes are moist.      Pharynx: No oropharyngeal exudate or posterior oropharyngeal erythema.   Eyes:      General: No scleral icterus.        Right eye: No discharge.         Left eye: No discharge.      Extraocular Movements: Extraocular movements intact.      Conjunctiva/sclera: Conjunctivae normal.      Pupils: Pupils are equal, round, and reactive to light.   Neck:      Musculoskeletal: Normal range of motion and neck supple. No neck rigidity or muscular tenderness.      Vascular: No carotid bruit.   Cardiovascular:      Rate and Rhythm: Normal rate and regular rhythm.      Heart sounds: No murmur. No friction rub. No gallop.    Pulmonary:      Effort: Pulmonary effort is normal. No respiratory distress.      Breath sounds: Normal breath sounds. No stridor. No wheezing, rhonchi or rales.   Chest:      Chest wall: No tenderness.   Musculoskeletal:         General: Swelling, tenderness, deformity and signs of injury present.      Right ankle: She exhibits decreased range of motion and deformity. Tenderness. Lateral malleolus tenderness found. No medial malleolus tenderness found.        Feet:    Lymphadenopathy:      Cervical: No cervical adenopathy. "   Skin:     Capillary Refill: Capillary refill takes less than 2 seconds.   Neurological:      General: No focal deficit present.      Mental Status: She is alert.      Cranial Nerves: No cranial nerve deficit.      Sensory: No sensory deficit.      Motor: No weakness.      Coordination: Coordination normal.      Gait: Gait normal.      Deep Tendon Reflexes: Reflexes normal.   Psychiatric:         Mood and Affect: Mood normal.         Behavior: Behavior normal.         Thought Content: Thought content normal.         Judgment: Judgment normal.         ED Course        Procedures               Critical Care time:  none               ED medications: none      ED Vitals:  Vitals:    01/24/20 1300 01/24/20 1308 01/24/20 1315 01/24/20 1320   BP: 124/88  123/88    Pulse: 118  118    Temp:       TempSrc:       SpO2:  100%  97%   Weight:       Height:         ED labs and imaging:  Results for orders placed or performed during the hospital encounter of 01/24/20 (from the past 24 hour(s))   Ankle XR, G/E 3 views, right    Narrative    RIGHT ANKLE THREE OR MORE VIEWS, TIBIA AND FIBULA RIGHT TWO VIEWS  1/24/2020 12:38 PM     HISTORY: Unwitnessed fall from standing height, left ankle pain and  discomfort. Prehospital deformity prior to splint. Evaluate for acute  bony process.      Impression    IMPRESSION:  1. Unstable ankle fracture with distal fibular fracture extending  obliquely from the corner of the ankle mortise and transverse fracture  at the medial malleolar base. The talus along with the malleolar  fragment is moderately displaced approximately 0.3 cm.  2. The proximal tibia and fibula appear intact.   XR Tibia & Fibula Right 2 Views    Narrative    RIGHT ANKLE THREE OR MORE VIEWS, TIBIA AND FIBULA RIGHT TWO VIEWS  1/24/2020 12:38 PM     HISTORY: Unwitnessed fall from standing height, left ankle pain and  discomfort. Prehospital deformity prior to splint. Evaluate for acute  bony process.      Impression     IMPRESSION:  1. Unstable ankle fracture with distal fibular fracture extending  obliquely from the corner of the ankle mortise and transverse fracture  at the medial malleolar base. The talus along with the malleolar  fragment is moderately displaced approximately 0.3 cm.  2. The proximal tibia and fibula appear intact.       Assessments & Plan (with Medical Decision Making)   Clinical impression:-34 year-old female with multiple medical diagnoses arrived by EMS for evaluation for right ankle pain and discomfort with gross deformity post fall.  Patient has a posttraumatic biamalleolar fracture involving the fibula and the medial malleolus mild displacement (3mm).  She is splinted with plan for outpatient follow-up with orthopedic surgery for definitive fracture management.   EMS splinted the patient with interval realignment and reduction of deformity prior to arrival.  She has underlying history of CREST syndrome.  She reports she was at home when she was opening a dresser and fell after she turned landing awkwardly.  She injured her right ankle.  She complains of pain about the right ankle and right leg.  No knee pain no hip pain.  She did not hit her head.  She has no back pain on arrival history was somewhat limited initially on arrival due to pain medication sedation with IV ketamine given by EMS.    ED course and Plan:  I reviewed patient's medical records.  Patient had modest control of her pain after IV ketamine upon arrival in the department.  She had preserved sensation grossly on exam and her foot was well-perfused.  She remained in the splint for x-ray imaging.  X-ray of her right ankle and right leg was obtained.  X-ray showed an unstable fracture involving the distal fibula through the ankle mortise and transverse fracture at the medial malleolus.  There was also mildly displaced fracture fragment to the talus along the malleoli fracture.  See detail in interpreting radiologist report above. X-ray  imaging was reviewed independently, and reviewed with patient and her spouse.  With instability in the fracture although modest alignment I reviewed her presentation with orthopedic surgery. I spoke with Joel Shannon-surgeon on duty for [facility] at 1:15 PM.  He agreed that it was reasonable to splint the patient with plan for outpatient follow-up I  placed an orthopedic  referral to help with outpatient follow-up and definitive fracture care.  Patient was splinted and remained intact pre-and post splinting.  She requested crutches for home. she was also given a handwritten prescription if she decides to purchase a scooter to help with mobility pending follow-up care in orthopedic surgery clinic  Patient reports she has oxycodone for chronic pain and just refilled her prescription and has enough at home to manage her pain.  A prescription was provided to help with the scooter she decides to purchase a scooter because I am concerned that she may not able to manage with crutches at home.    At shift  end patient is signed out to Dr. Fantasma Waddell awaiting trial of walking with her crutches around the department to ensure patient issafe for her to go home.    Disclaimer: This note consists of symbols derived from keyboarding, dictation and/or voice recognition software. As a result, there may be errors in the script that have gone undetected. Please consider this when interpreting information found in this chart.  I have reviewed the nursing notes.    I have reviewed the findings, diagnosis, plan and need for follow up with the patient.       New Prescriptions    No medications on file       Final diagnoses:   Fall, initial encounter - from standing height   Ankle fracture, right, closed, initial encounter - unstable, (Bimalleolar), involves right fibular and medial malleolar       1/24/2020   [facility] EMERGENCY DEPARTMENT     Trey Garcia MD  01/24/20 4546

## 2020-01-24 NOTE — ED AVS SNAPSHOT
[facility] Emergency Department  [address] [address]  Phone:  [phone]  Fax:  [phone]                                    Molly Teague   MRN: [id number]    Department:  [facility] Emergency Department   Date of Visit:  1/24/2020           After Visit Summary Signature Page    I have received my discharge instructions, and my questions have been answered. I have discussed any challenges I see with this plan with the nurse or doctor.    ..........................................................................................................................................  Patient/Patient Representative Signature      ..........................................................................................................................................  Patient Representative Print Name and Relationship to Patient    ..................................................               ................................................  Date                                   Time    ..........................................................................................................................................  Reviewed by Signature/Title    ...................................................              ..............................................  Date                                               Time          22EPIC Rev 08/18

## 2020-01-24 NOTE — ED NOTES
"Pt discharged home with , pt able to use crutches, already has knee scooter and is a able to use that. CMS intact R foot, when pt was asked about pain level she stated,\" I don't have a high pain tolerance.\" Pt says she has pain medications at home.  "

## 2020-01-24 NOTE — ED NOTES
Pt arrived via EMS after fall from standing position according to pt.  Medics reported obvious deformity to rt ankle and splinted ankle prior to arrival.  In ER CMS intact.  Pt denies pain after receiving 20 mg ketamine from EMS.  Pt denies head, neck, back, or abd/chest  Pain.  Pt slow in responses due to ketamine but answers appropriately.  No bruising or sign of trauma noted any where else on pt.

## 2020-01-24 NOTE — DISCHARGE INSTRUCTIONS
1) Your fall resulted in a fracture in your right ankle.  We discussed that the fracture is unstable and that follow-up care with orthopedics is necessary.  You have been splinted.  X-ray imaging and your history was reviewed with the orthopedic surgeon on duty.    2) We have reviewed splint care for home.  Use crutches without any weightbearing on your right ankle and to follow-up in orthopedic surgery clinic.    3) Take your pain medications for comfort. Be sure to ice your ankle, keep it elevated, and  keep splint dry and clean.  He should be called for follow-up appointments within the next 3 to 5 days.  An orthopedic  referral was placed. If you do not receive a phone call by January 28, 2020 please call the number provided.    4) An order for a scooter is also provided if you decide to purchase a scooter. Go to the [employer] store or call [phone]

## 2020-01-25 ENCOUNTER — HOSPITAL ENCOUNTER (OUTPATIENT)
Facility: CLINIC | Age: 35
Setting detail: OBSERVATION
Discharge: HOME | End: 2020-01-25
Attending: EMERGENCY MEDICINE | Admitting: INTERNAL MEDICINE
Payer: MEDICARE

## 2020-01-25 ENCOUNTER — APPOINTMENT (OUTPATIENT)
Dept: CT IMAGING | Facility: CLINIC | Age: 35
End: 2020-01-25
Attending: EMERGENCY MEDICINE
Payer: MEDICARE

## 2020-01-25 VITALS
HEIGHT: 62 IN | DIASTOLIC BLOOD PRESSURE: 79 MMHG | OXYGEN SATURATION: 96 % | BODY MASS INDEX: 33.13 KG/M2 | HEART RATE: 119 BPM | WEIGHT: 180 LBS | SYSTOLIC BLOOD PRESSURE: 123 MMHG | RESPIRATION RATE: 16 BRPM | TEMPERATURE: 98.6 F

## 2020-01-25 DIAGNOSIS — R40.0 SOMNOLENCE: ICD-10-CM

## 2020-01-25 DIAGNOSIS — H53.2 DIPLOPIA: ICD-10-CM

## 2020-01-25 DIAGNOSIS — S82.891D CLOSED FRACTURE OF RIGHT ANKLE WITH ROUTINE HEALING, SUBSEQUENT ENCOUNTER: Primary | ICD-10-CM

## 2020-01-25 DIAGNOSIS — R00.0 SINUS TACHYCARDIA: ICD-10-CM

## 2020-01-25 PROBLEM — N17.9 AKI (ACUTE KIDNEY INJURY) (H): Status: RESOLVED | Noted: 2019-11-25 | Resolved: 2020-01-25

## 2020-01-25 PROBLEM — N17.9 ACUTE KIDNEY FAILURE (H): Status: RESOLVED | Noted: 2019-11-25 | Resolved: 2020-01-25

## 2020-01-25 LAB
ALBUMIN SERPL-MCNC: 3.7 G/DL (ref 3.4–5)
ALP SERPL-CCNC: 73 U/L (ref 40–150)
ALT SERPL W P-5'-P-CCNC: 27 U/L (ref 0–50)
AMPHETAMINES UR QL SCN: NEGATIVE
ANION GAP SERPL CALCULATED.3IONS-SCNC: 8 MMOL/L (ref 3–14)
APAP SERPL-MCNC: <2 MG/L (ref 10–20)
AST SERPL W P-5'-P-CCNC: 46 U/L (ref 0–45)
BARBITURATES UR QL: NEGATIVE
BASOPHILS # BLD AUTO: 0.1 10E9/L (ref 0–0.2)
BASOPHILS NFR BLD AUTO: 0.6 %
BENZODIAZ UR QL: NEGATIVE
BILIRUB SERPL-MCNC: 0.3 MG/DL (ref 0.2–1.3)
BUN SERPL-MCNC: 23 MG/DL (ref 7–30)
CALCIUM SERPL-MCNC: 9.9 MG/DL (ref 8.5–10.1)
CANNABINOIDS UR QL SCN: NEGATIVE
CHLORIDE SERPL-SCNC: 104 MMOL/L (ref 94–109)
CO2 SERPL-SCNC: 22 MMOL/L (ref 20–32)
COCAINE UR QL: NEGATIVE
CREAT SERPL-MCNC: 1.5 MG/DL (ref 0.52–1.04)
DIFFERENTIAL METHOD BLD: ABNORMAL
EOSINOPHIL # BLD AUTO: 0.2 10E9/L (ref 0–0.7)
EOSINOPHIL NFR BLD AUTO: 1.3 %
ERYTHROCYTE [DISTWIDTH] IN BLOOD BY AUTOMATED COUNT: 19.9 % (ref 10–15)
GFR SERPL CREATININE-BSD FRML MDRD: 45 ML/MIN/{1.73_M2}
GLUCOSE SERPL-MCNC: 94 MG/DL (ref 70–99)
HCT VFR BLD AUTO: 31.6 % (ref 35–47)
HGB BLD-MCNC: 9.3 G/DL (ref 11.7–15.7)
IMM GRANULOCYTES # BLD: 0.1 10E9/L (ref 0–0.4)
IMM GRANULOCYTES NFR BLD: 0.3 %
LACTATE BLD-SCNC: 1.5 MMOL/L (ref 0.7–2)
LYMPHOCYTES # BLD AUTO: 2.2 10E9/L (ref 0.8–5.3)
LYMPHOCYTES NFR BLD AUTO: 14.8 %
MAGNESIUM SERPL-MCNC: 1.6 MG/DL (ref 1.6–2.3)
MCH RBC QN AUTO: 23.3 PG (ref 26.5–33)
MCHC RBC AUTO-ENTMCNC: 29.4 G/DL (ref 31.5–36.5)
MCV RBC AUTO: 79 FL (ref 78–100)
MONOCYTES # BLD AUTO: 1.1 10E9/L (ref 0–1.3)
MONOCYTES NFR BLD AUTO: 7.5 %
NEUTROPHILS # BLD AUTO: 11 10E9/L (ref 1.6–8.3)
NEUTROPHILS NFR BLD AUTO: 75.5 %
NRBC # BLD AUTO: 0 10*3/UL
NRBC BLD AUTO-RTO: 0 /100
OPIATES UR QL SCN: POSITIVE
PCP UR QL SCN: NEGATIVE
PLATELET # BLD AUTO: 311 10E9/L (ref 150–450)
POTASSIUM SERPL-SCNC: 4.1 MMOL/L (ref 3.4–5.3)
PROT SERPL-MCNC: 7.6 G/DL (ref 6.8–8.8)
RBC # BLD AUTO: 3.99 10E12/L (ref 3.8–5.2)
SALICYLATES SERPL-MCNC: <2 MG/DL
SODIUM SERPL-SCNC: 134 MMOL/L (ref 133–144)
T4 FREE SERPL-MCNC: 0.91 NG/DL (ref 0.76–1.46)
TSH SERPL DL<=0.005 MIU/L-ACNC: 5.94 MU/L (ref 0.4–4)
WBC # BLD AUTO: 14.6 10E9/L (ref 4–11)

## 2020-01-25 PROCEDURE — 84443 ASSAY THYROID STIM HORMONE: CPT | Performed by: EMERGENCY MEDICINE

## 2020-01-25 PROCEDURE — 96376 TX/PRO/DX INJ SAME DRUG ADON: CPT

## 2020-01-25 PROCEDURE — 80329 ANALGESICS NON-OPIOID 1 OR 2: CPT | Performed by: EMERGENCY MEDICINE

## 2020-01-25 PROCEDURE — 70450 CT HEAD/BRAIN W/O DYE: CPT

## 2020-01-25 PROCEDURE — 83605 ASSAY OF LACTIC ACID: CPT | Performed by: EMERGENCY MEDICINE

## 2020-01-25 PROCEDURE — 36415 COLL VENOUS BLD VENIPUNCTURE: CPT | Performed by: EMERGENCY MEDICINE

## 2020-01-25 PROCEDURE — 80053 COMPREHEN METABOLIC PANEL: CPT | Performed by: EMERGENCY MEDICINE

## 2020-01-25 PROCEDURE — 96360 HYDRATION IV INFUSION INIT: CPT

## 2020-01-25 PROCEDURE — 96374 THER/PROPH/DIAG INJ IV PUSH: CPT

## 2020-01-25 PROCEDURE — 99235 HOSP IP/OBS SAME DATE MOD 70: CPT | Performed by: INTERNAL MEDICINE

## 2020-01-25 PROCEDURE — 93005 ELECTROCARDIOGRAM TRACING: CPT

## 2020-01-25 PROCEDURE — 25000132 ZZH RX MED GY IP 250 OP 250 PS 637: Mod: GY | Performed by: INTERNAL MEDICINE

## 2020-01-25 PROCEDURE — 96375 TX/PRO/DX INJ NEW DRUG ADDON: CPT

## 2020-01-25 PROCEDURE — 84439 ASSAY OF FREE THYROXINE: CPT | Performed by: EMERGENCY MEDICINE

## 2020-01-25 PROCEDURE — 93010 ELECTROCARDIOGRAM REPORT: CPT | Mod: Z6 | Performed by: EMERGENCY MEDICINE

## 2020-01-25 PROCEDURE — 80307 DRUG TEST PRSMV CHEM ANLYZR: CPT | Performed by: EMERGENCY MEDICINE

## 2020-01-25 PROCEDURE — 25800030 ZZH RX IP 258 OP 636: Performed by: EMERGENCY MEDICINE

## 2020-01-25 PROCEDURE — 25000128 H RX IP 250 OP 636: Performed by: INTERNAL MEDICINE

## 2020-01-25 PROCEDURE — G0378 HOSPITAL OBSERVATION PER HR: HCPCS

## 2020-01-25 PROCEDURE — 85025 COMPLETE CBC W/AUTO DIFF WBC: CPT | Performed by: EMERGENCY MEDICINE

## 2020-01-25 PROCEDURE — 99285 EMERGENCY DEPT VISIT HI MDM: CPT | Mod: 25 | Performed by: EMERGENCY MEDICINE

## 2020-01-25 PROCEDURE — 99285 EMERGENCY DEPT VISIT HI MDM: CPT | Mod: 25

## 2020-01-25 PROCEDURE — 96361 HYDRATE IV INFUSION ADD-ON: CPT

## 2020-01-25 PROCEDURE — 83735 ASSAY OF MAGNESIUM: CPT | Performed by: INTERNAL MEDICINE

## 2020-01-25 RX ORDER — FAMOTIDINE 20 MG/1
20 TABLET, FILM COATED ORAL 2 TIMES DAILY
Status: DISCONTINUED | OUTPATIENT
Start: 2020-01-25 | End: 2020-01-25 | Stop reason: HOSPADM

## 2020-01-25 RX ORDER — HYDROMORPHONE HYDROCHLORIDE 1 MG/ML
.5-1 INJECTION, SOLUTION INTRAMUSCULAR; INTRAVENOUS; SUBCUTANEOUS
Status: DISCONTINUED | OUTPATIENT
Start: 2020-01-25 | End: 2020-01-25 | Stop reason: HOSPADM

## 2020-01-25 RX ORDER — CYCLOBENZAPRINE HCL 5 MG
5 TABLET ORAL 3 TIMES DAILY
Status: DISCONTINUED | OUTPATIENT
Start: 2020-01-25 | End: 2020-01-25 | Stop reason: HOSPADM

## 2020-01-25 RX ORDER — BUSPIRONE HYDROCHLORIDE 15 MG/1
30 TABLET ORAL 2 TIMES DAILY
Status: DISCONTINUED | OUTPATIENT
Start: 2020-01-25 | End: 2020-01-25 | Stop reason: HOSPADM

## 2020-01-25 RX ORDER — DULOXETIN HYDROCHLORIDE 30 MG/1
30 CAPSULE, DELAYED RELEASE ORAL 2 TIMES DAILY
Status: DISCONTINUED | OUTPATIENT
Start: 2020-01-25 | End: 2020-01-25 | Stop reason: HOSPADM

## 2020-01-25 RX ORDER — IBUPROFEN 200 MG
400-600 TABLET ORAL EVERY 6 HOURS PRN
COMMUNITY
Start: 2020-01-25 | End: 2020-01-31

## 2020-01-25 RX ORDER — LORAZEPAM 1 MG/1
1 TABLET ORAL 2 TIMES DAILY PRN
Status: DISCONTINUED | OUTPATIENT
Start: 2020-01-25 | End: 2020-01-25 | Stop reason: HOSPADM

## 2020-01-25 RX ORDER — LISINOPRIL 10 MG/1
10 TABLET ORAL DAILY
Status: DISCONTINUED | OUTPATIENT
Start: 2020-01-25 | End: 2020-01-25 | Stop reason: HOSPADM

## 2020-01-25 RX ORDER — AMLODIPINE BESYLATE 5 MG/1
5 TABLET ORAL DAILY
Status: DISCONTINUED | OUTPATIENT
Start: 2020-01-25 | End: 2020-01-25 | Stop reason: HOSPADM

## 2020-01-25 RX ORDER — POLYETHYLENE GLYCOL 3350 17 G/17G
17 POWDER, FOR SOLUTION ORAL DAILY
Status: DISCONTINUED | OUTPATIENT
Start: 2020-01-25 | End: 2020-01-25 | Stop reason: HOSPADM

## 2020-01-25 RX ORDER — LISINOPRIL 10 MG/1
10 TABLET ORAL DAILY
Status: ON HOLD | COMMUNITY
End: 2020-02-12

## 2020-01-25 RX ORDER — OXYCODONE HYDROCHLORIDE 5 MG/1
15 TABLET ORAL EVERY 4 HOURS PRN
Status: DISCONTINUED | OUTPATIENT
Start: 2020-01-25 | End: 2020-01-25 | Stop reason: HOSPADM

## 2020-01-25 RX ORDER — GABAPENTIN 300 MG/1
600 CAPSULE ORAL 3 TIMES DAILY
Status: DISCONTINUED | OUTPATIENT
Start: 2020-01-25 | End: 2020-01-25 | Stop reason: HOSPADM

## 2020-01-25 RX ORDER — CYCLOBENZAPRINE HCL 5 MG
5 TABLET ORAL 3 TIMES DAILY PRN
Qty: 90 TABLET | Refills: 0 | Status: SHIPPED | OUTPATIENT
Start: 2020-01-25 | End: 2020-02-06

## 2020-01-25 RX ORDER — PROMETHAZINE HYDROCHLORIDE 25 MG/1
25 TABLET ORAL 2 TIMES DAILY PRN
Status: DISCONTINUED | OUTPATIENT
Start: 2020-01-25 | End: 2020-01-25 | Stop reason: HOSPADM

## 2020-01-25 RX ORDER — KETOROLAC TROMETHAMINE 30 MG/ML
30 INJECTION, SOLUTION INTRAMUSCULAR; INTRAVENOUS EVERY 6 HOURS PRN
Status: DISCONTINUED | OUTPATIENT
Start: 2020-01-25 | End: 2020-01-25 | Stop reason: HOSPADM

## 2020-01-25 RX ADMIN — PROMETHAZINE HYDROCHLORIDE 25 MG: 25 TABLET ORAL at 16:09

## 2020-01-25 RX ADMIN — POLYETHYLENE GLYCOL 3350 17 G: 17 POWDER, FOR SOLUTION ORAL at 10:49

## 2020-01-25 RX ADMIN — DULOXETINE HYDROCHLORIDE 30 MG: 30 CAPSULE, DELAYED RELEASE ORAL at 10:50

## 2020-01-25 RX ADMIN — OMEPRAZOLE 40 MG: 20 CAPSULE, DELAYED RELEASE ORAL at 16:26

## 2020-01-25 RX ADMIN — BUSPIRONE HYDROCHLORIDE 30 MG: 15 TABLET ORAL at 10:51

## 2020-01-25 RX ADMIN — FAMOTIDINE 20 MG: 20 TABLET ORAL at 10:50

## 2020-01-25 RX ADMIN — KETOROLAC TROMETHAMINE 30 MG: 30 INJECTION, SOLUTION INTRAMUSCULAR at 12:15

## 2020-01-25 RX ADMIN — SODIUM CHLORIDE, POTASSIUM CHLORIDE, SODIUM LACTATE AND CALCIUM CHLORIDE 1000 ML: 600; 310; 30; 20 INJECTION, SOLUTION INTRAVENOUS at 03:05

## 2020-01-25 RX ADMIN — SODIUM CHLORIDE, POTASSIUM CHLORIDE, SODIUM LACTATE AND CALCIUM CHLORIDE 1000 ML: 600; 310; 30; 20 INJECTION, SOLUTION INTRAVENOUS at 04:45

## 2020-01-25 RX ADMIN — OXYCODONE HYDROCHLORIDE 15 MG: 5 TABLET ORAL at 14:39

## 2020-01-25 RX ADMIN — LISINOPRIL 10 MG: 10 TABLET ORAL at 14:06

## 2020-01-25 RX ADMIN — AMLODIPINE BESYLATE 5 MG: 5 TABLET ORAL at 10:50

## 2020-01-25 RX ADMIN — HYDROMORPHONE HYDROCHLORIDE 1 MG: 1 INJECTION, SOLUTION INTRAMUSCULAR; INTRAVENOUS; SUBCUTANEOUS at 10:29

## 2020-01-25 RX ADMIN — HYDROMORPHONE HYDROCHLORIDE 1 MG: 1 INJECTION, SOLUTION INTRAMUSCULAR; INTRAVENOUS; SUBCUTANEOUS at 11:29

## 2020-01-25 RX ADMIN — SODIUM CHLORIDE, POTASSIUM CHLORIDE, SODIUM LACTATE AND CALCIUM CHLORIDE 1000 ML: 600; 310; 30; 20 INJECTION, SOLUTION INTRAVENOUS at 02:12

## 2020-01-25 RX ADMIN — GABAPENTIN 600 MG: 300 CAPSULE ORAL at 14:06

## 2020-01-25 RX ADMIN — CYCLOBENZAPRINE 5 MG: 5 TABLET, FILM COATED ORAL at 13:29

## 2020-01-25 ASSESSMENT — ENCOUNTER SYMPTOMS
ACTIVITY CHANGE: 1
NECK PAIN: 0
RHINORRHEA: 0
HEADACHES: 0
LIGHT-HEADEDNESS: 0
CONFUSION: 1
FATIGUE: 1
BACK PAIN: 0
SORE THROAT: 0
COUGH: 0
CHEST TIGHTNESS: 0
DYSURIA: 0
SLEEP DISTURBANCE: 1
APPETITE CHANGE: 0
NAUSEA: 0
FLANK PAIN: 0
FEVER: 0
SHORTNESS OF BREATH: 0
VOMITING: 0
NUMBNESS: 0
WEAKNESS: 0
NECK STIFFNESS: 0
CHILLS: 0
ABDOMINAL PAIN: 0

## 2020-01-25 ASSESSMENT — MIFFLIN-ST. JEOR: SCORE: 1469.72

## 2020-01-25 NOTE — PLAN OF CARE
"Patient was in tears this am with her pain. Gave her dilaudid iv and oral oxycodone (that she takes at home) updated MD with this, MD gave her flexeril and per patient \"it helped\" Patient was alert and oriented, she fell asleep this afternoon and this writer placed the oximeter to her great toe, O2 saturation was fine. She would like to go home, this writer will let the MD know this.   "

## 2020-01-25 NOTE — PROGRESS NOTES
"[place] [facility] ADMISSION NOTE    Patient admitted to room 2402 at approximately 0800 via cart from emergency room. Patient was accompanied by other:none.     Verbal SBAR report received from Jia prior to patient arrival.     Patient ambulated to bed with stand-by assist. Patient alert and oriented X 3. Pain is controlled with current analgesics.  Medication(s) being used: prescription NSAID's including dilaudid.  . Admission vital signs: Blood pressure 128/81, pulse 116, temperature 98.2  F (36.8  C), temperature source Oral, resp. rate 16, height 1.575 m (5' 2\"), weight 81.6 kg (180 lb), SpO2 99 %, not currently breastfeeding. Patient was oriented to plan of care, call light, bed controls, tv, telephone, bathroom and visiting hours.     Risk Assessment    The following safety risks were identified during admission: fall. Yellow risk band applied: YES.     Skin Initial Assessment    This writer admitted this patient and completed a full skin assessment and David score in the Adult PCS flowsheet. Appropriate interventions initiated as needed.     Secondary skin check completed by SOM Medrano.    David Risk Assessment  Sensory Perception: 3-->slightly limited  Moisture: 4-->rarely moist  Activity: 3-->walks occasionally  Mobility: 3-->slightly limited  Nutrition: 3-->adequate  Friction and Shear: 3-->no apparent problem  David Score: 19  Bed Support Surface: Atmos Air mattress  Reassessed using Bed Algorithm: No    Education    Patient has a [place] to Observation order: Yes  Observation education completed and documented: N/A      Jami Lamar RN    "

## 2020-01-25 NOTE — ED NOTES
"Patient has  [place] to Observation  order. Patient has been given the Observation brochure -  What does Observation mean to me.\"  Patient has been given the opportunity to ask questions about observation status and their plan of care.      Emily Sheridan RN    "

## 2020-01-25 NOTE — ED NOTES
Patient presents with sig other with concerns for lethargy, (unable to stay awake to speak full sentence) Per  she was in earlier today after falling on ice & breaking foot. Is on oxycodone & ativan for chronic pain but has not had any dose changes. States he thinks she took last dose about 2hrs PTA, unsure when last ativan was taken & patient unable to confirm. Sig other states she has gotten like this once in the past when she had an infection. Denies s/s of infection but states she hasn't slept in 2 days because her child has had influenza.

## 2020-01-25 NOTE — ED PROVIDER NOTES
History   No chief complaint on file.    HPI  Molly Teague is a 34 year old female with history of scleroderma, insomnia, and chronic pain on chronic opiates and gabapentin as well as a history of depression presenting for evaluation of altered mental status.  Patient reportedly fell and injured her right ankle earlier today.  She was seen in the ED and had it splinted.   reports that she came home he did have some food but has become progressively more confused through the evening.  He is unaware of her taking any extra medications.  No reported infectious symptoms recently.  No further reported fall.  Patient reports feeling very tired as she has not been getting much sleep due to their 5-year-old having influenza.  Patient denies any runny nose, congestion, headache, dizziness, chest pain, cough, abdominal pain, nausea, fever, chills, or any other new symptoms.  Patient states she just feels very tired because she has not been sleeping.  Patient denies any aching any extra medications tonight including no extra pain medications or her lorazepam.  Denies any new medications recently.  Denies any change in dosages recently.    Allergies:  Allergies   Allergen Reactions     No Known Allergies      Pollen Extract      Seasonal Allergies      Shellfish-Derived Products Nausea and Rash     Rash on face       Problem List:    Patient Active Problem List    Diagnosis Date Noted     Anemia 11/25/2019     Priority: Medium     EMERALD 11/25/2019     Priority: Medium     Peripheral neuropathy 11/25/2019     Priority: Medium     EMERALD (acute kidney injury) (H) 11/25/2019     Priority: Medium     Mirena IUD- Remove by 09/2024 09/06/2019     Priority: Medium     Lot: [id number]  Exp: 01/2022    Dinora Israel LPN         End-stage renal disease (H) 07/24/2019     Priority: Medium     Malignant essential hypertension 07/24/2019     Priority: Medium     PTSD (post-traumatic stress disorder) 06/19/2019     Priority: Medium      Obesity (BMI 35.0-39.9) with comorbidity (H) 01/22/2019     Priority: Medium     Moderate major depression (H) 07/06/2018     Priority: Medium     Severe persistent asthma without complication 07/06/2018     Priority: Medium     On high dose ICS/LABA + frequent albuterol use.  Next allergy/pulmonary referral       Aspiration of food 03/29/2018     Priority: Medium     Chronic pain syndrome 04/26/2017     Priority: Medium     Patient is followed by Grecia Barba DO for ongoing prescription of pain medication.  All refills should only be approved by this provider, or covering partner.    Medication(s): Oxycodone 15 mg.   Maximum quantity per month: 100  Clinic visit frequency required: Q 2 months     Controlled substance agreement:  Encounter-Level CSA - 04/26/2017:    Controlled Substance Agreement - Scan on 5/4/2017  8:58 AM: CONTROLLED SUBSTANCE AGREEMENT (below)       Patient-Level CSA:    Controlled Substance Agreement - Opioid - Scan on 2/6/2019  6:23 PM (below)         Pain Clinic evaluation in the past: No    DIRE Total Score(s):  No flowsheet data found.    Last [facility] website verification:  done on 4/26/17   9/26/2017  7/6/2018  10/16/2018  1/22/2019  8/2/2019           [email or web address]         Chronic migraine without aura without status migrainosus, not intractable 04/12/2017     Priority: Medium     With medication overuse.  Fioricet and not Imitrex is recommend to treat severe headache.  2/2017 [facility] recommend wean down from daily Fioricet and use Excedrin Migrain PRN.       AIN grade I 03/30/2017     Priority: Medium     Found at [facility] on colonoscopy 2/2017.  Recommend repeat anoscopy and anal pap in 6/2017.       Gastroesophageal reflux disease with esophagitis 03/30/2017     Priority: Medium     EGD done at [facility] 2/2017 showed esophagitis without GAVE.  Recommend max dose H2 and PPI, along with 4 tablets of Carafate dissolved in water and drink throughout the day.    Had LA Grade D  esophagitis        Limited systemic sclerosis (H) 01/25/2017     Priority: Medium     Raynaud's, calcinosis, telangiectasias, peripheral neuropathy, GERD symptoms, history of scleroderma renal crisis.  Saw [place] 1/2017 and follows with Rheum Dr. Davis locally.       Polyneuropathy associated with underlying disease (H) 01/25/2017     Priority: Medium     Due to scleroderma and neurology thought possible due to ICU (critical illness neuropathy).  Recommend to max out dose of gabapentin to 3274-3239 mg/day, split BID or TID.  [facility] 1/2017 positive for neuropathy       History of Clostridium difficile 11/29/2016     Priority: Medium     History of Helicobacter pylori infection 11/29/2016     Priority: Medium     Scleroderma with renal involvement (H) 11/09/2016     Priority: Medium     Stopped lisinopril per [facility] 1/2017.  Restarted lisinopril per [place] 3/2017       History of ARDS 11/09/2016     Priority: Medium     4/2015, prolonged ICU/vent/trach due to influenza, scleroderma renal crisis requiring hemodialysis.       Raynaud's disease without gangrene 11/09/2016     Priority: Medium     History of hemodialysis 11/09/2016     Priority: Medium     4/2015 due to scleroderma renal crisis       Bilateral retinitis 11/09/2016     Priority: Medium     Other pulmonary embolism without acute cor pulmonale, unspecified chronicity (H) 11/09/2016     Priority: Medium     Completed anti-coagulation.       REX (generalized anxiety disorder) 11/09/2016     Priority: Medium     CREST (calcinosis, Raynaud's phenomenon, esophageal dysfunction, sclerodactyly, telangiectasia) (H) 11/09/2016     Priority: Medium     Scleroderma (H) 09/22/2016     Priority: Medium     Nausea with vomiting 09/21/2016     Priority: Medium        Past Medical History:    Past Medical History:   Diagnosis Date     Acute pyelonephritis 6/9/2017     Altered mental state 3/29/2018     Arthritis      Blood clotting disorder (H)      History of blood  transfusion      Hypertension      Long-term (current) use of anticoagulants [Z79.01] 2016     PE (pulmonary embolism) 2016     Rheumatism      Scleroderma (H) 2016     Uncomplicated asthma        Past Surgical History:    Past Surgical History:   Procedure Laterality Date     ANGIOGRAM        SECTION       THROAT SURGERY         Family History:    Family History   Problem Relation Age of Onset     Hyperlipidemia Father      Cerebrovascular Disease Maternal Grandmother          of a stroke     Hyperlipidemia Paternal Grandfather      Depression Sister      Fibromyalgia Sister      Diabetes Maternal Aunt      Melanoma No family hx of      Skin Cancer No family hx of        Social History:  Marital Status:  Single [1]  Social History     Tobacco Use     Smoking status: Never Smoker     Smokeless tobacco: Never Used   Substance Use Topics     Alcohol use: Yes     Comment: Socially once on a weekend if any     Drug use: No     Comment: None        Medications:    amLODIPine (NORVASC) 5 MG tablet  blood glucose (NO BRAND SPECIFIED) lancets standard  blood glucose (NO BRAND SPECIFIED) test strip  budesonide-formoterol (SYMBICORT) 160-4.5 MCG/ACT Inhaler  busPIRone HCl (BUSPAR) 30 MG tablet  Cyanocobalamin (B-12) 1000 MCG TBCR  diphenhydrAMINE (BENADRYL) 25 MG tablet  Doxylamine Succinate, Sleep, (UNISOM PO)  DULoxetine (CYMBALTA) 30 MG capsule  famotidine (PEPCID) 20 MG tablet  ferrous gluconate (FERGON) 324 (38 FE) MG tablet  folic acid (FOLVITE) 1 MG tablet  gabapentin (NEURONTIN) 300 MG capsule  GOODSENSE MIGRAINE FORMULA 250-250-65 MG per tablet  hydrOXYzine (ATARAX) 25 MG tablet  LORazepam (ATIVAN) 1 MG tablet  montelukast (SINGULAIR) 10 MG tablet  naloxone (NARCAN) 4 MG/0.1ML nasal spray  omeprazole (PRILOSEC) 40 MG DR capsule  ondansetron (ZOFRAN-ODT) 8 MG ODT tab  oxyCODONE IR (ROXICODONE) 15 MG tablet  polyethylene glycol (MIRALAX) powder  promethazine (PHENERGAN) 25 MG  "tablet  promethazine (PHENERGAN) 50 MG/ML SOLN IV injection  VENTOLIN  (90 Base) MCG/ACT inhaler          Review of Systems   Constitutional: Positive for activity change (tonight) and fatigue. Negative for appetite change, chills and fever.   HENT: Negative for congestion, rhinorrhea and sore throat.    Respiratory: Negative for cough, chest tightness and shortness of breath.    Cardiovascular: Negative for chest pain.   Gastrointestinal: Negative for abdominal pain, nausea and vomiting.   Genitourinary: Negative for decreased urine volume, dysuria and flank pain.   Musculoskeletal: Negative for back pain, neck pain and neck stiffness.   Skin: Negative for rash.   Neurological: Negative for weakness, light-headedness, numbness and headaches.   Psychiatric/Behavioral: Positive for confusion and sleep disturbance.   All other systems reviewed and are negative.      Physical Exam   BP: 134/87  Pulse: 146  Heart Rate: 141  Temp: 99.1  F (37.3  C)  Resp: 20  Height: 157.5 cm (5' 2\")  Weight: 81.6 kg (180 lb)  SpO2: 99 %      Physical Exam  Vitals signs and nursing note reviewed.   Constitutional:       Appearance: She is not diaphoretic.   HENT:      Head: Normocephalic and atraumatic.      Nose: Nose normal.      Mouth/Throat:      Mouth: Mucous membranes are dry.      Comments: Moderately dry mucous membranes  Eyes:      Conjunctiva/sclera: Conjunctivae normal.   Neck:      Musculoskeletal: Normal range of motion and neck supple.   Cardiovascular:      Rate and Rhythm: Regular rhythm. Tachycardia present.      Pulses: Normal pulses.   Pulmonary:      Effort: Pulmonary effort is normal.      Breath sounds: Normal breath sounds.   Abdominal:      Palpations: Abdomen is soft.      Tenderness: There is no abdominal tenderness.   Skin:     General: Skin is warm and dry.      Capillary Refill: Capillary refill takes less than 2 seconds.   Neurological:      Mental Status: She is alert and oriented to person, place, " and time.   Psychiatric:         Mood and Affect: Mood normal.         ED Course        Procedures               EKG Interpretation:      Interpreted by Jalen Arnold MD  Time reviewed: 0135  Symptoms at time of EKG: tachycardia, drowsiness  Rhythm: sinus tachycardia  Rate: 144  Axis: Normal  Ectopy: none  Conduction: normal  ST Segments/ T Waves: No acute ischemic changes  Q Waves: none  Comparison to prior: Unchanged    Clinical Impression: Sinus tachycardia without acute ischemic abnormality, not significantly changed from previous               Results for orders placed or performed during the hospital encounter of 01/25/20 (from the past 24 hour(s))   CBC with platelets differential   Result Value Ref Range    WBC 14.6 (H) 4.0 - 11.0 10e9/L    RBC Count 3.99 3.8 - 5.2 10e12/L    Hemoglobin 9.3 (L) 11.7 - 15.7 g/dL    Hematocrit 31.6 (L) 35.0 - 47.0 %    MCV 79 78 - 100 fl    MCH 23.3 (L) 26.5 - 33.0 pg    MCHC 29.4 (L) 31.5 - 36.5 g/dL    RDW 19.9 (H) 10.0 - 15.0 %    Platelet Count 311 150 - 450 10e9/L    Diff Method Automated Method     % Neutrophils 75.5 %    % Lymphocytes 14.8 %    % Monocytes 7.5 %    % Eosinophils 1.3 %    % Basophils 0.6 %    % Immature Granulocytes 0.3 %    Nucleated RBCs 0 0 /100    Absolute Neutrophil 11.0 (H) 1.6 - 8.3 10e9/L    Absolute Lymphocytes 2.2 0.8 - 5.3 10e9/L    Absolute Monocytes 1.1 0.0 - 1.3 10e9/L    Absolute Eosinophils 0.2 0.0 - 0.7 10e9/L    Absolute Basophils 0.1 0.0 - 0.2 10e9/L    Abs Immature Granulocytes 0.1 0 - 0.4 10e9/L    Absolute Nucleated RBC 0.0    Comprehensive metabolic panel   Result Value Ref Range    Sodium 134 133 - 144 mmol/L    Potassium 4.1 3.4 - 5.3 mmol/L    Chloride 104 94 - 109 mmol/L    Carbon Dioxide 22 20 - 32 mmol/L    Anion Gap 8 3 - 14 mmol/L    Glucose 94 70 - 99 mg/dL    Urea Nitrogen 23 7 - 30 mg/dL    Creatinine 1.50 (H) 0.52 - 1.04 mg/dL    GFR Estimate 45 (L) >60 mL/min/[1.73_m2]    GFR Estimate If Black 52 (L) >60  "mL/min/[1.73_m2]    Calcium 9.9 8.5 - 10.1 mg/dL    Bilirubin Total 0.3 0.2 - 1.3 mg/dL    Albumin 3.7 3.4 - 5.0 g/dL    Protein Total 7.6 6.8 - 8.8 g/dL    Alkaline Phosphatase 73 40 - 150 U/L    ALT 27 0 - 50 U/L    AST 46 (H) 0 - 45 U/L   TSH with free T4 reflex   Result Value Ref Range    TSH 5.94 (H) 0.40 - 4.00 mU/L   Acetaminophen level   Result Value Ref Range    Acetaminophen Level <2 mg/L   Salicylate level   Result Value Ref Range    Salicylate Level <2 mg/dL   T4 free   Result Value Ref Range    T4 Free 0.91 0.76 - 1.46 ng/dL   Lactic acid whole blood   Result Value Ref Range    Lactic Acid 1.5 0.7 - 2.0 mmol/L       Medications   lactated ringers BOLUS 1,000 mL (0 mLs Intravenous Stopped 1/25/20 0403)   lactated ringers BOLUS 1,000 mL (0 mLs Intravenous Stopped 1/25/20 0305)   lactated ringers BOLUS 1,000 mL (0 mLs Intravenous Stopped 1/25/20 0611)     Patient Vitals for the past 24 hrs:   BP Temp Temp src Pulse Heart Rate Resp SpO2 Height Weight   01/25/20 0515 -- -- -- -- 112 14 98 % -- --   01/25/20 0500 -- -- -- -- 116 14 97 % -- --   01/25/20 0445 -- -- -- 125 123 12 98 % -- --   01/25/20 0430 -- -- -- 121 122 13 95 % -- --   01/25/20 0415 -- -- -- 118 120 13 93 % -- --   01/25/20 0400 115/86 -- -- 117 118 17 -- -- --   01/25/20 0345 120/81 -- -- 117 115 16 93 % -- --   01/25/20 0330 111/74 -- -- 116 115 15 94 % -- --   01/25/20 0315 110/75 -- -- 117 117 13 95 % -- --   01/25/20 0300 102/75 -- -- 120 121 15 93 % -- --   01/25/20 0245 94/70 -- -- 120 120 16 93 % -- --   01/25/20 0230 113/72 -- -- 124 125 15 94 % -- --   01/25/20 0215 109/75 -- -- 131 131 16 96 % -- --   01/25/20 0200 116/82 -- -- -- 137 16 98 % -- --   01/25/20 0145 -- -- -- 135 138 13 94 % -- --   01/25/20 0130 -- -- -- -- 141 14 96 % -- --   01/25/20 0122 134/87 99.1  F (37.3  C) Oral 146 -- 20 99 % 1.575 m (5' 2\") 81.6 kg (180 lb)   01/25/20 0115 134/87 -- -- -- -- -- -- -- --         4:39 AM Patient re-assessed: Heart rate " improving with IV fluids down from 140 to around 120.  When I walked in the room patient had her eyes closed but immediately open them when I spoke.  She continues to speak fairly clearly but does fall asleep very easily.  She appears to be fully oriented and able to answer questions about her history without difficulty.  Patient appears to be more alert and more quickly responsive than previously upon my initial evaluation.  I suspect some of her sedation was medication related as well as perhaps from sleep deprivation.  She is also clinically dehydrated.  2 L in and she reports no urge to urinate.  Still feeling thirsty.  We will give her water and 1/3 L of IV fluids.    6:04 AM Patient re-assessed: Patient awake and urinating on the commode.  Still tachycardic at 125 but reports feeling better.  Still moderately groggy.  Chart review shows multiple office visits where heart rate is running around 100.  Patient reporting diplopia as well now.  Reporting everything shows double [id number].  On repeat ocular exam she does appear to have disconjugate gaze.  Noncontrast CT of the head ordered.  May benefit from MRI.  Given her ongoing symptoms, although improved, recommended observation admission.    6:40 AM: Discussed with Dr Akers. Accepts for obs.      Assessments & Plan (with Medical Decision Making)  34-year-old female with history of scleroderma as well as chronic pain presenting for evaluation of altered mental status and sleepiness.  She fell earlier today and injured her right ankle with a fracture which has been splinted.  Tonight  found her less responsive and mildly confused so brought her in for evaluation.  In the ED, patient is clearly groggy and appears sedated.  She is easily awoken and answers questions appropriately but falls asleep while in mid conversation.  Patient tachycardic upon arrival to 140s but afebrile.  Screening blood work showed a mild leukocytosis and acute kidney injury.   TSH elevated but with a normal T4.  Normal lactic acid.  Patient clinically dry complaining of thirstiness.  Given 3 L of fluid and she was able to urinate.  Heart rate did improve to the 120s and patient became more alert but was still groggy.  Patient began to complain of diplopia as she became more alert and it was noted that she did have a disconjugate gaze.  CT ordered.  Patient admitted to observation for further work-up and management.     I have reviewed the nursing notes.    I have reviewed the findings, diagnosis, plan and need for follow up with the patient.       New Prescriptions    No medications on file       Final diagnoses:   Somnolence   Sinus tachycardia   Diplopia       1/25/2020   [facility] EMERGENCY DEPARTMENT     Arnold, Jalen Patel MD  01/25/20 9534

## 2020-01-25 NOTE — H&P
[facility]    History and Physical/Discharge Summary  Hospital Medicine       Date of Admission:  1/25/2020  Date of Service: 1/25/2020     Assessment & Plan   Molly Teague is a 34 year old female who presents on 1/25/2020 with one evening of progressive confusion following an earlier ED visit for acute right ankle fracture.    Diplopia    Transient horizontal diplopia following a period of altered mental status. Patient had this once before and had MRI brain which showed no evidence of acute stroke. Diplopia has resolved again. Will not pursue MRI at this time.     Acute confusion, suspect toxic encephalopathy     Patient denies taking additional medications. Did appear dehydrated, and she notes that she's been told before that her medications build up when she gets dehydrated. I think this is a good explanation for her change in mentation, along with poor sleep and possibly residual ketamine and pain meds given in the ED. After fluid resuscitation she has returned to baseline even with the addition of cyclobenzaprine (see below).     Recommended patient to stay hydrated, and she is in agreement.     Acute right ankle fracture, unstable    Seen in ED after a fall. Ankle splinted and to have outpatient orthopedic surgery eval. Patient on chronic oxycodone 15 mg TID, pain control with narcotics is complicating pain control acutely. Pain is exacerbated by muscle spasms. Partial relief with hydromorphone IV, but not evnough. Patient then given IV toradol x 1, but an hour or so later still complains of muscle spasms. I then added cyclobenzaprine after discussing with her the main side effect of sedation, and she asked to try it. She slept a while after 5 mg, and she appreciated the sleep. She felt the muscle spasms were improved. She is going home with a script for 5 mg cyclobenzaprine TID prn.     I did suggest prn ibuprofen, though she needs to keep her self hydrated and use only  temporarily. She was warned that ibuprofen could increase her risk of bleeding, and she is to stop ibuprofen if bleeding becomes evident. She expressed understanding.     Sinus tachycardia    Patient has baseline tachycardia and frequently runs higher when ill. Elevated heart rate suspect due to dehydration and baseline tachycardia.     Scleroderma with CREST    Not on any immunosuppression.     H/o scleroderma renal crisis    Good renal recovery. Creatinine around baseline.     Hypertension     On amlodipine. Restarted on lisinopril in December per Rheumatology. Good control.    Disposition:   Discharged to home    Discharge Medication List as of 1/25/2020  6:08 PM      START taking these medications    Details   cyclobenzaprine (FLEXERIL) 5 MG tablet Take 1 tablet (5 mg) by mouth 3 times daily as needed for muscle spasms, Disp-90 tablet, R-0, E-Prescribe      ibuprofen (ADVIL/MOTRIN) 200 MG tablet Take 2-3 tablets (400-600 mg) by mouth every 6 hours as needed for pain, OTC         CONTINUE these medications which have NOT CHANGED    Details   amLODIPine (NORVASC) 5 MG tablet Take 5 mg by mouth daily, Historical      budesonide-formoterol (SYMBICORT) 160-4.5 MCG/ACT Inhaler Inhale 2 puffs into the lungs 2 times daily, Disp-6 g, R-11, E-Prescribe      busPIRone HCl (BUSPAR) 30 MG tablet TAKE ONE TABLET BY MOUTH TWICE A DAY, Disp-180 tablet, R-3, E-Prescribe      Cyanocobalamin (B-12) 1000 MCG TBCR Take 1,000 mcg by mouth daily, Disp-100 tablet, R-1, E-Prescribe      diphenhydrAMINE (BENADRYL) 25 MG tablet Take 1 tablet (25 mg) by mouth every 6 hours as needed for itching or allergies, Disp-56 tablet, Historical      DULoxetine (CYMBALTA) 30 MG capsule Take 1 capsule (30 mg) by mouth 2 times daily, Disp-60 capsule, R-3, E-Prescribe      famotidine (PEPCID) 20 MG tablet Take 1 tablet (20 mg) by mouth 2 times daily, Disp-180 tablet, R-3, E-Prescribe      ferrous gluconate (FERGON) 324 (38 FE) MG tablet TAKE ONE TABLET  BY MOUTH EVERY DAY WITH BREAKFAST, Disp-100 tablet, R-3, E-Prescribe      folic acid (FOLVITE) 1 MG tablet Take 1 tablet by mouth daily, Historical      gabapentin (NEURONTIN) 300 MG capsule TAKE TWO CAPSULES BY MOUTH THREE TIMES A DAY, Disp-540 capsule, R-3, E-Prescribe      GOODSENSE MIGRAINE FORMULA 250-250-65 MG per tablet TAKE ONE TABLET BY MOUTH EVERY 6 HOURS AS NEEDED FOR HEADACHES, Disp-24 tablet, R-1, E-Prescribe      hydrOXYzine (ATARAX) 25 MG tablet TAKE ONE TABLET BY MOUTH THREE TIMES A DAY AS NEEDED FOR ANXIETY OR INSOMNIA, Disp-30 tablet, R-1, E-Prescribe      lisinopril (PRINIVIL/ZESTRIL) 10 MG tablet Take 10 mg by mouth daily, Historical      LORazepam (ATIVAN) 1 MG tablet Take 1 tablet (1 mg) by mouth daily as needed for anxiety #30 to last 30 days, Disp-30 tablet, R-5, E-Prescribe      montelukast (SINGULAIR) 10 MG tablet TAKE ONE TABLET BY MOUTH AT BEDTIME, Disp-90 tablet, R-3, E-Prescribe      omeprazole (PRILOSEC) 40 MG DR capsule TAKE ONE CAPSULE BY MOUTH TWICE A DAY, Disp-180 capsule, R-1, E-Prescribe      oxyCODONE IR (ROXICODONE) 15 MG tablet TAKE ONE TABLET BY MOUTH EVERY 4 HOURS AS NEEDED FOR SEVERE PAIN, Disp-100 tablet, R-0, E-Prescribe      polyethylene glycol (MIRALAX) powder Take 17 g (1 capful) by mouth daily, Disp-510 g, R-11, E-PrescribePatient will call to fill      promethazine (PHENERGAN) 25 MG tablet Take 1 tablet (25 mg) by mouth 2 times daily as needed for nausea, Disp-30 tablet, R-11, E-Prescribe      promethazine (PHENERGAN) 50 MG/ML SOLN IV injection Inject 0.5 mL (25 mg) into the muscle every 6 hours as needed, Disp-90 mL, R-11, E-Prescribe      VENTOLIN  (90 Base) MCG/ACT inhaler INHALE 2 PUFFS INTO THE LUNGS ONCE EVERY 4 HOURS AS NEEDED FOR SHORTNESS OF BREATH / DYSPNEA / WHEEZING, Disp-18 g, R-11, E-Prescribe      blood glucose (NO BRAND SPECIFIED) lancets standard Use to test blood sugar 1 time dailyDisp-100 each, [id number]      blood glucose (NO BRAND  SPECIFIED) test strip Use to test blood sugar 1 time daily, Disp-100 each, R-3, E-Prescribe      Doxylamine Succinate, Sleep, (UNISOM PO) Take 1 tablet by mouth nightly as needed , Historical      naloxone (NARCAN) 4 MG/0.1ML nasal spray Spray 1 spray (4 mg) into one nostril alternating nostrils once as needed for opioid reversal every 2-3 minutes until assistance arrives, Disp-0.2 mL, R-3, E-Prescribe      ondansetron (ZOFRAN-ODT) 8 MG ODT tab Take 1 tablet (8 mg) by mouth every 8 hours as needed for nausea, Disp-30 tablet, R-11, E-Prescribe             Discharge Procedure Orders   Reason for your hospital stay   Order Comments: Altered mental status and pain control     Follow-up and recommended labs and tests    Order Comments: Follow up with primary care provider, Grecia Barba, within 7 days for hospital follow- up.  No follow up labs or test are needed. Follow up with orthopedic surgery as planned.     Activity   Order Comments: Your activity upon discharge: activity as tolerated     Order Specific Question Answer Comments   Is discharge order? Yes      Diet   Order Comments: Follow this diet upon discharge:      Regular Diet Adult     Order Specific Question Answer Comments   Is discharge order? Yes         Procedures:  No procedures performed during this admission    Consultations: No consultations were requested during this admission      Discharge Condition: The patient is discharged in improved condition.    Jude Rausch MD  [facility] Medicine        Primary Care Physician   Grecia Barba [phone]    History is obtained from the patient who is an inconsistent  and review of old records via the EMR.    History of Present Illness     Molly Teague is a 34 year old female with a complex medical history largely due to scleroderma with CREST complicated by chronic pain syndrome, peripheral neuropathy, PTSD/anxiety disorder, recurrent GI bleeding, prior VTE, prior EMERALD requiring temporary  hemodialysis and an acute right ankle fracture requiring EMS and ED visit and was discharged home who now presents again to the ED later that same night with progressive confusion. The patient had required ketamine for EMS transport earlier for pain control due to difficult pain control, and she was initially sedate on arrival then. Patient is on chronic narcotic therapy of 15 mg oxycodone 3 times daily, recently weaned down from 4 times daily. On discharge from the ED, the patient was awake and able to mobilize. She was not given any additional pain medication due to her having oxycodone at home.     After discharge, patient became increasingly lethargic, confused and sleepy, developed slurred speech, had an unsteady gait and was impulsive and not taking direction well from . Patient denies taking any additional medications other than home meds which in the evening includes oxycodone 15 mg and duloxetine 30 mg. She has lorazepam 1 mg prn which she does not recall taking and has doxylamine for sleep but says she does not often use. No alcohol or illicit drug use.  is not aware of any other medication use. In the ED, she was found to be tachycardic with  and WBC elevated at 14.6 but no other concerning signs or findings to suggest infection or sepsis.  Creatinine and BUN were a little up from baseline, and patient complained of being thirsty. She was given IV fluid resuscitation. The patient was sleepy but responsive. Patient says she was very tired due to not getting enough sleep as her 5-year-old has been sick the past week. She was otherwise improving with the IV fluids and her heart rate was down to 100's, when she developed new complaint of diplopia, with side by side images and her gaze was noted to be dysconjugate. No headache or otherr new neurologic changes. Head CT was negative for acute findings. Given the diplopia, she is hospitalized for further observation.     On my interview on the  floor after the patient has had some sleep, she again denies taking any additional medications or substances. She disputes the claim that she was confused or altered, and thinks her  was overreacting. Her diplopia has resolved completely. She did have this once before without anything being found. She denies fever, chest pain currently, shortness of breath, cough, URI symptoms, nausea, vomiting or diarrhea. No recent black or bloody stools. She complains of right lower extremity pain is severe and not controlled, having trembling muscles in leg causing shooting pains from ankle to upper leg despite IV hydromorphone. She does not feel she can go home unless she has better pain control.     Review of Systems   The 10 point Review of Systems is negative other than noted in the HPI or here. She has frequent anxiety, night terrors that have been more frequent lately, panic attacks which she can recognize and break with lorazepam but she has not had panic attacks yesterday or today. She also gets heartburn at times related to the scleroderma though not presently.     Past Medical History    Past Medical History:   Diagnosis Date     Acute pyelonephritis 6/9/2017     Altered mental state 3/29/2018     Arthritis      Blood clotting disorder (H)     P E     History of blood transfusion      Hypertension      Long-term (current) use of anticoagulants [Z79.01] 9/9/2016     PE (pulmonary embolism) 9/7/2016     Rheumatism      Scleroderma (H) 9/22/2016     Uncomplicated asthma        Anemia 11/25/2019     Priority: Medium     Peripheral neuropathy 11/25/2019     Priority: Medium     Mirena IUD- Remove by 09/2024 09/06/2019     Priority: Medium     Lot: [id number]  Exp: 01/2022    Dinora Israel LPN         Malignant essential hypertension 07/24/2019     Priority: Medium     PTSD (post-traumatic stress disorder) 06/19/2019     Priority: Medium     Obesity (BMI 35.0-39.9) with comorbidity (H) 01/22/2019     Priority: Medium      Moderate major depression (H) 07/06/2018     Priority: Medium     Severe persistent asthma without complication 07/06/2018     Priority: Medium     On high dose ICS/LABA + frequent albuterol use.  Next allergy/pulmonary referral       Aspiration of food 03/29/2018     Priority: Medium     Chronic pain syndrome 04/26/2017     Priority: Medium     Patient is followed by Grecia Barba DO for ongoing prescription of pain medication.  All refills should only be approved by this provider, or covering partner.    Medication(s): Oxycodone 15 mg.   Maximum quantity per month: 100  Clinic visit frequency required: Q 2 months     Controlled substance agreement:  Encounter-Level CSA - 04/26/2017:    Controlled Substance Agreement - Scan on 5/4/2017  8:58 AM: CONTROLLED SUBSTANCE AGREEMENT (below)       Patient-Level CSA:    Controlled Substance Agreement - Opioid - Scan on 2/6/2019  6:23 PM (below)         Pain Clinic evaluation in the past: No    DIRE Total Score(s):  No flowsheet data found.    Last [facility] website verification:  done on 4/26/17   9/26/2017  7/6/2018  10/16/2018  1/22/2019  8/2/2019           [email or web address]         Chronic migraine without aura without status migrainosus, not intractable 04/12/2017     Priority: Medium     With medication overuse.  Fioricet and not Imitrex is recommend to treat severe headache.  2/2017 [place] recommend wean down from daily Fioricet and use Excedrin Migrain PRN.       AIN grade I 03/30/2017     Priority: Medium     Found at [place] on colonoscopy 2/2017.  Recommend repeat anoscopy and anal pap in 6/2017.       Gastroesophageal reflux disease with esophagitis 03/30/2017     Priority: Medium     EGD done at [place] 2/2017 showed esophagitis without GAVE.  Recommend max dose H2 and PPI, along with 4 tablets of Carafate dissolved in water and drink throughout the day.    Had LA Grade D esophagitis        Limited systemic sclerosis (H) 01/25/2017     Priority: Medium      Raynaud's, calcinosis, telangiectasias, peripheral neuropathy, GERD symptoms, history of scleroderma renal crisis.  Saw [place] 2017 and follows with Rheum Dr. Davis locally.       Polyneuropathy associated with underlying disease (H) 2017     Priority: Medium     Due to scleroderma and neurology thought possible due to ICU (critical illness neuropathy).  Recommend to max out dose of gabapentin to 5514-4549 mg/day, split BID or TID.  EMG 2017 positive for neuropathy       History of Clostridium difficile 2016     Priority: Medium     History of Helicobacter pylori infection 2016     Priority: Medium     Scleroderma with renal involvement (H) 2016     Priority: Medium     Stopped lisinopril per [facility] 2017.  Restarted lisinopril per [facility] 3/2017       History of ARDS 2016     Priority: Medium     2015, prolonged ICU/vent/trach due to influenza, scleroderma renal crisis requiring hemodialysis.       Raynaud's disease without gangrene 2016     Priority: Medium     History of hemodialysis 2016     Priority: Medium     2015 due to scleroderma renal crisis       Bilateral retinitis 2016     Priority: Medium     Other pulmonary embolism without acute cor pulmonale, unspecified chronicity (H) 2016     Priority: Medium     Completed anti-coagulation.       REX (generalized anxiety disorder) 2016     Priority: Medium     CREST (calcinosis, Raynaud's phenomenon, esophageal dysfunction, sclerodactyly, telangiectasia) (H) 2016     Priority: Medium     Scleroderma (H) 2016     Priority: Medium       Past Surgical History   Past Surgical History:   Procedure Laterality Date     ANGIOGRAM  2015      SECTION  2014     THROAT SURGERY          Prior to Admission Medications   Prior to Admission Medications   Prescriptions Last Dose Informant Patient Reported? Taking?   Cyanocobalamin (B-12) 1000 MCG TBCR 2020 at Unknown time Self No  Yes   Sig: Take 1,000 mcg by mouth daily   DULoxetine (CYMBALTA) 30 MG capsule 1/24/2020 at Unknown time Self No Yes   Sig: Take 1 capsule (30 mg) by mouth 2 times daily   Doxylamine Succinate, Sleep, (UNISOM PO) Unknown at Unknown time Self Yes No   Sig: Take 1 tablet by mouth nightly as needed    GOODSENSE MIGRAINE FORMULA 250-250-65 MG per tablet 1/24/2020 at Unknown time Self No Yes   Sig: TAKE ONE TABLET BY MOUTH EVERY 6 HOURS AS NEEDED FOR HEADACHES   Patient taking differently: Take 1 tablet by mouth every 6 hours as needed    LORazepam (ATIVAN) 1 MG tablet 1/24/2020 at Unknown time Self No Yes   Sig: Take 1 tablet (1 mg) by mouth daily as needed for anxiety #30 to last 30 days   VENTOLIN  (90 Base) MCG/ACT inhaler 1/24/2020 at Unknown time Self No Yes   Sig: INHALE 2 PUFFS INTO THE LUNGS ONCE EVERY 4 HOURS AS NEEDED FOR SHORTNESS OF BREATH / DYSPNEA / WHEEZING   Patient taking differently: Inhale 2 puffs into the lungs every 4 hours as needed for shortness of breath / dyspnea or wheezing    amLODIPine (NORVASC) 5 MG tablet 1/24/2020 at Unknown time Self Yes Yes   Sig: Take 5 mg by mouth daily   blood glucose (NO BRAND SPECIFIED) lancets standard More than a month at Unknown time Self No No   Sig: Use to test blood sugar 1 time daily   blood glucose (NO BRAND SPECIFIED) test strip  Self No No   Sig: Use to test blood sugar 1 time daily   budesonide-formoterol (SYMBICORT) 160-4.5 MCG/ACT Inhaler 1/24/2020 at Unknown time Self No Yes   Sig: Inhale 2 puffs into the lungs 2 times daily   busPIRone HCl (BUSPAR) 30 MG tablet 1/24/2020 at Unknown time Self No Yes   Sig: TAKE ONE TABLET BY MOUTH TWICE A DAY   Patient taking differently: Take 30 mg by mouth 2 times daily    diphenhydrAMINE (BENADRYL) 25 MG tablet Past Month at Unknown time Self Yes Yes   Sig: Take 1 tablet (25 mg) by mouth every 6 hours as needed for itching or allergies   famotidine (PEPCID) 20 MG tablet 1/24/2020 at Unknown time  No Yes    Sig: Take 1 tablet (20 mg) by mouth 2 times daily   ferrous gluconate (FERGON) 324 (38 FE) MG tablet 1/24/2020 at Unknown time Self No Yes   Sig: TAKE ONE TABLET BY MOUTH EVERY DAY WITH BREAKFAST   Patient taking differently: Take 324 mg by mouth daily (with breakfast)    folic acid (FOLVITE) 1 MG tablet 1/24/2020 at Unknown time Self Yes Yes   Sig: Take 1 tablet by mouth daily   gabapentin (NEURONTIN) 300 MG capsule 1/24/2020 at Unknown time Self No Yes   Sig: TAKE TWO CAPSULES BY MOUTH THREE TIMES A DAY   Patient taking differently: Take 600 mg by mouth 3 times daily    hydrOXYzine (ATARAX) 25 MG tablet 1/24/2020 at Unknown time  No Yes   Sig: TAKE ONE TABLET BY MOUTH THREE TIMES A DAY AS NEEDED FOR ANXIETY OR INSOMNIA   lisinopril (PRINIVIL/ZESTRIL) 10 MG tablet   Yes Yes   Sig: Take 10 mg by mouth daily   montelukast (SINGULAIR) 10 MG tablet 1/24/2020 at Unknown time Self No Yes   Sig: TAKE ONE TABLET BY MOUTH AT BEDTIME   Patient taking differently: Take 10 mg by mouth At Bedtime    naloxone (NARCAN) 4 MG/0.1ML nasal spray Unknown at Unknown time Self No No   Sig: Spray 1 spray (4 mg) into one nostril alternating nostrils once as needed for opioid reversal every 2-3 minutes until assistance arrives   omeprazole (PRILOSEC) 40 MG DR capsule 1/24/2020 at Unknown time  No Yes   Sig: TAKE ONE CAPSULE BY MOUTH TWICE A DAY   ondansetron (ZOFRAN-ODT) 8 MG ODT tab Unknown at Unknown time Self No No   Sig: Take 1 tablet (8 mg) by mouth every 8 hours as needed for nausea   oxyCODONE IR (ROXICODONE) 15 MG tablet 1/24/2020 at Unknown time  No Yes   Sig: TAKE ONE TABLET BY MOUTH EVERY 4 HOURS AS NEEDED FOR SEVERE PAIN   polyethylene glycol (MIRALAX) powder Past Month at Unknown time Self No Yes   Sig: Take 17 g (1 capful) by mouth daily   promethazine (PHENERGAN) 25 MG tablet Past Month at Unknown time Self No Yes   Sig: Take 1 tablet (25 mg) by mouth 2 times daily as needed for nausea   promethazine (PHENERGAN) 50 MG/ML  "SOLN IV injection Past Month at Unknown time Self No Yes   Sig: Inject 0.5 mL (25 mg) into the muscle every 6 hours as needed      Facility-Administered Medications: None     Allergies   Allergies   Allergen Reactions     No Known Allergies      Pollen Extract      Seasonal Allergies      Shellfish-Derived Products Nausea and Rash     Rash on face       Family History    Family History   Problem Relation Age of Onset     Hyperlipidemia Father      Cerebrovascular Disease Maternal Grandmother          of a stroke     Hyperlipidemia Paternal Grandfather      Depression Sister      Fibromyalgia Sister      Diabetes Maternal Aunt      Melanoma No family hx of      Skin Cancer No family hx of          Social History   Social History     Socioeconomic History     Marital status:      Spouse name: Not on file     Number of children: 1 son 6yo     Years of education: Not on file     Highest education level: Not on file   Occupational History     Not on file   Tobacco Use     Smoking status: Never Smoker     Smokeless tobacco: Never Used   Substance and Sexual Activity     Alcohol use: Yes     Comment: Socially once on a weekend if any     Drug use: No     Comment: None     Sexual activity: Yes     Partners: Male     Birth control/protection: None   Social History Narrative    2019: Lives at home with significant other and their son, dT (2014).         Physical Exam   /81   Pulse 116   Temp 98.2  F (36.8  C) (Oral)   Resp 16   Ht 1.575 m (5' 2\")   Wt 81.6 kg (180 lb)   SpO2 99%   BMI 32.92 kg/m       Weight: 180 lbs 0 oz Body mass index is 32.92 kg/m .     Constitutional: Alert, oriented, cooperative, a little irritable during interview when asked about events but otherwise no apparent distress, appears nontoxic.   Eyes: Eyes are clear, pupils are reactive.  HEENT: Oropharynx is moist. No evidence of cranial trauma.  Lymph/Hematologic: No epitrochlear, axillary, anterior or posterior cervical, " or supraclavicular lymphadenopathy is appreciated.  Cardiovascular: Regular rate and rhythm, normal S1 and S2, and 1-2/6 systolic flow murmur noted. JVP is difficult to ascertain due to body habitus. Good peripheral pulses in wrists bilaterally.   Respiratory: Clear to auscultation bilaterally.   GI: Soft, non-tender, normal bowel sounds, no hepatomegaly.  Genitourinary: Deferred  Musculoskeletal: Hands are thin, with significant scleroderma skin changes limiting ROM. Right lower leg in splint, toes warm and sensation at baseline (pain with contact due to neuropathy)  Skin: Warm and dry, chronic scleroderma changes, especially the hands and neck.   Neurologic: Neck supple. Cranial nerves are grossly intact. Moves extremities normally.     Data   Data reviewed today:   Recent Labs   Lab 01/25/20  0201   WBC 14.6*   HGB 9.3*   MCV 79         POTASSIUM 4.1   CHLORIDE 104   CO2 22   BUN 23   CR 1.50*   ANIONGAP 8   UMER 9.9   GLC 94   ALBUMIN 3.7   PROTTOTAL 7.6   BILITOTAL 0.3   ALKPHOS 73   ALT 27   AST 46*       Recent Results (from the past 24 hour(s))   Head CT w/o contrast    Narrative    CT SCAN OF THE HEAD WITHOUT CONTRAST   1/25/2020 6:56 AM     HISTORY: Diplopia, headache, sleepiness.    TECHNIQUE:  Axial images of the head and coronal reformations without  IV contrast material.  Radiation dose for this scan was reduced using  automated exposure control, adjustment of the mA and/or kV according  to patient size, or iterative reconstruction technique.    COMPARISON: None.    FINDINGS:  The ventricles are normal in size, shape and configuration.   The brain parenchyma and subarachnoid spaces are normal. There is no  evidence of intracranial hemorrhage, mass, acute infarct or anomaly.     The visualized portions of the sinuses and mastoids appear normal.  There is no evidence of trauma.      Impression    IMPRESSION:   Normal CT scan of the head.     JESSICA VEAG MD       I personally reviewed no  images or EKG's today.    Jude Rausch MD  [facility] Medicine

## 2020-01-26 NOTE — PROGRESS NOTES
[place] [facility] DISCHARGE NOTE    Patient discharged to home at 6:16 PM via wheel chair. Accompanied by father in law and staff. Discharge instructions reviewed with patient, opportunity offered to ask questions. Prescriptions sent to patients preferred pharmacy. All belongings sent with patient.    Jami Lamar RN

## 2020-01-27 ENCOUNTER — PATIENT OUTREACH (OUTPATIENT)
Dept: CARE COORDINATION | Facility: CLINIC | Age: 35
End: 2020-01-27

## 2020-01-27 DIAGNOSIS — Z71.89 OTHER SPECIFIED COUNSELING: ICD-10-CM

## 2020-01-27 SDOH — ECONOMIC STABILITY: FOOD INSECURITY: WITHIN THE PAST 12 MONTHS, YOU WORRIED THAT YOUR FOOD WOULD RUN OUT BEFORE YOU GOT MONEY TO BUY MORE.: NEVER TRUE

## 2020-01-27 SDOH — ECONOMIC STABILITY: FOOD INSECURITY: WITHIN THE PAST 12 MONTHS, THE FOOD YOU BOUGHT JUST DIDN'T LAST AND YOU DIDN'T HAVE MONEY TO GET MORE.: NEVER TRUE

## 2020-01-27 SDOH — ECONOMIC STABILITY: TRANSPORTATION INSECURITY
IN THE PAST 12 MONTHS, HAS THE LACK OF TRANSPORTATION KEPT YOU FROM MEDICAL APPOINTMENTS OR FROM GETTING MEDICATIONS?: NO

## 2020-01-27 SDOH — ECONOMIC STABILITY: INCOME INSECURITY: HOW HARD IS IT FOR YOU TO PAY FOR THE VERY BASICS LIKE FOOD, HOUSING, MEDICAL CARE, AND HEATING?: NOT HARD AT ALL

## 2020-01-27 SDOH — HEALTH STABILITY: PHYSICAL HEALTH: ON AVERAGE, HOW MANY MINUTES DO YOU ENGAGE IN EXERCISE AT THIS LEVEL?: 0 MIN

## 2020-01-27 SDOH — ECONOMIC STABILITY: TRANSPORTATION INSECURITY
IN THE PAST 12 MONTHS, HAS LACK OF TRANSPORTATION KEPT YOU FROM MEETINGS, WORK, OR FROM GETTING THINGS NEEDED FOR DAILY LIVING?: NO

## 2020-01-27 SDOH — HEALTH STABILITY: PHYSICAL HEALTH: ON AVERAGE, HOW MANY DAYS PER WEEK DO YOU ENGAGE IN MODERATE TO STRENUOUS EXERCISE (LIKE A BRISK WALK)?: 0 DAYS

## 2020-01-27 NOTE — LETTER
[facility] Home  Complex Care Plan  About Me:    Patient Name:  Molly Teague    YOB: 1985  Age:         34 year old   [facility] MRN:    [id number] Telephone Information:  Home Phone [phone]   Mobile [phone]       Address:  [address]  [facility] [place] [address] Email address:  [email or web address]      Emergency Contact(s)    Name Relationship Lgl Grd Work Phone Home Phone Mobile Phone   1. BENJAMIN MELENDEZ Significant ot*  none [phone]   2. DECLINED Declined               Primary language:  English     needed? No   [facility] [employer]:  [phone] op. 1  Other communication barriers: None  Preferred Method of Communication:  Mail  Current living arrangement: I live in a private home with family  Mobility Status/ Medical Equipment: Independent w/Device    Health Maintenance  Health Maintenance Reviewed: Due/Overdue   Health Maintenance Due   Topic Date Due     HIV SCREENING  08/04/2000     PNEUMOCOCCAL IMMUNIZATION 19-64 MEDIUM RISK (1 of 1 - PPSV23) 08/04/2004     MEDICARE ANNUAL WELLNESS VISIT  10/10/2018     ASTHMA ACTION PLAN  07/06/2019     ASTHMA CONTROL TEST  02/02/2020       My Access Plan  Medical Emergency 911   Primary Clinic Line [facility] - [phone]   24 Hour Appointment Line [phone] or  [phone]) (toll-free)   24 Hour Nurse Line [phone] (toll-free)   Preferred Urgent Care [facility] [place], [phone]   Preferred Hospital [place], [place]  [phone]   Preferred Pharmacy [facility] [address] [facility]     Behavioral Health Crisis Line The National Suicide Prevention Lifeline at 1-832.701.9815 or 911             My Care Team Members  Patient Care Team       Relationship Specialty Notifications Start End    Grecia Barba DO PCP - General Internal Medicine Admissions 11/9/16     Phone:  [phone] Pager: [phone] Fax: [phone] [address] [facility] [address]    Grecia Barba DO Assigned PCP   1/28/18     Phone: [phone] Pager: [phone] Fax: [phone] [address] [facility] [address]    Maritza Harrison MD MD OB/Gyn  4/29/19     Phone: [phone] Fax: [phone]         [address] [facility] [address]    Billy Magallon, RN Lead Care Coordinator Primary Care - [facility] Admissions 1/27/20     Phone: [phone] Fax: [phone]                My Care Plans  Self Management and Treatment Plan  Goals and (Comments)  Goals        General    #1  Pain Management (pt-stated)     Notes - Note edited  1/27/2020 10:37 AM by Billy Magallon, RN    Goal Statement: I will use ice and elevation to help reduce the pain in the fractured ankle.  The ice can be placed in a plastic bag and double bagged, then wrapped in a towel to prevent the splint from getting wet.  Date Goal set: 1/27/2020  Date to Achieve By: 7/27/2020  Patient expressed understanding of goal: yes  Action steps to achieve this goal:  1. I will elevate my leg as often as possible.  2. I will prepare an ice pack as above and apply to my ankle as often as possible.                 Action Plans on File:   Asthma        Depression          Advance Care Plans/Directives Type:        My Medical and Care Information  Problem List   Patient Active Problem List   Diagnosis     Nausea with vomiting     Scleroderma (H)     Scleroderma with renal involvement (H)     History of ARDS     Raynaud's disease without gangrene     History of hemodialysis     Bilateral retinitis     Other pulmonary embolism without acute cor pulmonale, unspecified chronicity (H)     REX (generalized anxiety disorder)     CREST (calcinosis, Raynaud's phenomenon, esophageal dysfunction, sclerodactyly, telangiectasia) (H)     History of Clostridium difficile     History of Helicobacter pylori infection     Limited systemic  sclerosis (H)     Polyneuropathy associated with underlying disease (H)     AIN grade I     Gastroesophageal reflux disease with esophagitis     Chronic migraine without aura without status migrainosus, not intractable     Chronic pain syndrome     Aspiration of food     Moderate major depression (H)     Severe persistent asthma without complication     Obesity (BMI 35.0-39.9) with comorbidity (H)     PTSD (post-traumatic stress disorder)     Malignant essential hypertension     Mirena IUD- Remove by 09/2024     Anemia     Peripheral neuropathy     Diplopia      Current Medications and Allergies:  See printed Medication Report.    Care Coordination Start Date: 1/27/2020   Frequency of Care Coordination: 2 weeks   Form Last Updated: 01/27/2020

## 2020-01-27 NOTE — LETTER
[place] [facility]ARE [facility]ORDIN[facility]  [address] [facility] [address]  January 27, 2020    Molly Teague  [address] [facility] [address]      Dear Molly,    I am a clinic care coordinator who works with Grecia Barba DO at [facility]. I wanted to thank you for spending the time to talk with me.  I wanted to introduce myself and provide you with my contact information so that you can call me with questions or concerns about your health care. Below is a description of clinic care coordination and how I can further assist you.     The clinic care coordinator is a registered nurse and/or  who understand the health care system. The goal of clinic care coordination is to help you manage your health and improve access to the [facility] system in the most efficient manner. The registered nurse can assist you in meeting your health care goals by providing education, coordinating services, and strengthening the communication among your providers. The  can assist you with financial, behavioral, psychosocial, chemical dependency, counseling, and/or psychiatric resources.    Please feel free to contact me at [phone], with any questions or concerns. We at [facility] are focused on providing you with the highest-quality healthcare experience possible and that all starts with you.     Sincerely,     Billy Rueda MSN, RN, PHN, CCM   Primary Care Clinical RN Care Coordinator  [facility]  1/27/2020   10:38 AM  [email or web address]  Office: [phone]    Enclosed: I have enclosed a copy of the Complex Care Plan. This has helpful information and goals that we have talked about. Please keep this in an easy to access place to use as needed.

## 2020-01-30 ENCOUNTER — HOSPITAL ENCOUNTER (OUTPATIENT)
Facility: CLINIC | Age: 35
End: 2020-01-30
Attending: PODIATRIST | Admitting: PODIATRIST
Payer: MEDICARE

## 2020-01-30 ENCOUNTER — OFFICE VISIT (OUTPATIENT)
Dept: PODIATRY | Facility: CLINIC | Age: 35
End: 2020-01-30
Payer: MEDICARE

## 2020-01-30 VITALS
WEIGHT: 180 LBS | HEART RATE: 142 BPM | SYSTOLIC BLOOD PRESSURE: 107 MMHG | HEIGHT: 62 IN | DIASTOLIC BLOOD PRESSURE: 67 MMHG | BODY MASS INDEX: 33.13 KG/M2

## 2020-01-30 DIAGNOSIS — S82.891A ANKLE FRACTURE, RIGHT, CLOSED, INITIAL ENCOUNTER: ICD-10-CM

## 2020-01-30 PROCEDURE — 99214 OFFICE O/P EST MOD 30 MIN: CPT | Performed by: PODIATRIST

## 2020-01-30 RX ORDER — LORAZEPAM 1 MG/1
1 TABLET ORAL EVERY 6 HOURS PRN
COMMUNITY
End: 2020-01-31 | Stop reason: HOSPADM

## 2020-01-30 ASSESSMENT — PAIN SCALES - GENERAL: PAINLEVEL: EXTREME PAIN (9)

## 2020-01-30 ASSESSMENT — MIFFLIN-ST. JEOR: SCORE: 1469.72

## 2020-01-30 NOTE — NURSING NOTE
"Chief Complaint   Patient presents with     Fracture     XR 01/24/2020, right ankle, \"fall, feeling burning and stinging sensation\"       Initial /67   Pulse 142   Ht 1.575 m (5' 2\")   Wt 81.6 kg (180 lb)   BMI 32.92 kg/m   Estimated body mass index is 32.92 kg/m  as calculated from the following:    Height as of this encounter: 1.575 m (5' 2\").    Weight as of this encounter: 81.6 kg (180 lb).  Medications and allergies reviewed.      Yakelin DEL RIO MA    "

## 2020-01-30 NOTE — PATIENT INSTRUCTIONS
You have elected to proceed with Surgery for open reduction and internal fixation of the bimalleolar ankle fracture right.  Surgeries are performed on Tuesdays at [facility].   To schedule your surgery date please call [phone].  Please leave a message with a good time for our staff to call you back.    - Please have a date in mind for your surgery, you can feel free to leave that date on the message, and we will schedule and call back to confirm.     You can expect receive a call back the same day or on the next business day from Dr. Mei s team to assist in the scheduling.   - We will schedule the date of your surgery.  The time will be determined a few days ahead of time.  You can expect a call from Same Day Surgery 2-3 days ahead of time with specific instructions for what time to arrive at the hospital as well as any other preparations you should take prior to surgery.    - You may need to obtain a pre-operative physical from your primary medical provider. This must be done within 30 days of your surgery date.    - We will also schedule your first post-operative appointment for a bandage and wound check for the Monday following your surgery at the [place] location.    - You may be non-weight bearing for a period of up to 6 weeks.  Options for this include: (Please indicate which you would prefer so we can provide you with an order and instructions)  o Crutches  o Walker  o Roll-a-bout knee walker.    - If you will need paperwork filled out for your employer you may drop those off at the clinic directly or you may have those faxed to us at [phone].  Please indicate on the form the date you would like the LA to begin if it will not be your surgery date.    The forms are typically filled out for up to 12 weeks, however you may be cleared to return prior to that time depending on your individual healing and job requirements.

## 2020-01-30 NOTE — LETTER
2020         RE: Molly Teague  [address] [facility] [address]        Dear Colleague,    Thank you for referring your patient, Molly Teague, to the [facility] [place]. Please see a copy of my visit note below.    PATIENT HISTORY:  Molly Teague is a 34 year old female with history of scleroderma and chronic pain syndrome who presents with a chief complaint of a painful right ankle.  The patient relates injuring the right ankle on 2020 while at home.  The patient states that she fell and twisted her right ankle.  The patient relates pain with weight bearing to the right.  The patient denies any prior injury to the right ankle.  The patient relates being seen and treated with ice, elevation, and nonweightbearing with crutches.  The patient was admitted to the hospital due to altered mental status and released home.  The patient denies any numbness to the toes on the right foot.    REVIEW OF SYSTEMS:  Constitutional, HEENT, cardiovascular, pulmonary, GI, , musculoskeletal, neuro, skin, endocrine and psych systems are negative, except as otherwise noted.     PAST MEDICAL HISTORY:   Past Medical History:   Diagnosis Date     Acute pyelonephritis 2017     Altered mental state 3/29/2018     Arthritis      Blood clotting disorder (H)     P E     History of blood transfusion      Hypertension      Long-term (current) use of anticoagulants [Z79.01] 2016     PE (pulmonary embolism) 2016     Rheumatism      Scleroderma (H) 2016     Uncomplicated asthma         PAST SURGICAL HISTORY:   Past Surgical History:   Procedure Laterality Date     ANGIOGRAM  2015      SECTION       THROAT SURGERY          MEDICATIONS:   Current Outpatient Medications:      amLODIPine (NORVASC) 5 MG tablet, Take 5 mg by mouth daily, Disp: , Rfl:      blood glucose (NO BRAND SPECIFIED) lancets standard, Use to test blood sugar 1 time daily, Disp: 100  each, Rfl: 3     blood glucose (NO BRAND SPECIFIED) test strip, Use to test blood sugar 1 time daily, Disp: 100 each, Rfl: 3     budesonide-formoterol (SYMBICORT) 160-4.5 MCG/ACT Inhaler, Inhale 2 puffs into the lungs 2 times daily, Disp: 6 g, Rfl: 11     busPIRone HCl (BUSPAR) 30 MG tablet, TAKE ONE TABLET BY MOUTH TWICE A DAY (Patient taking differently: Take 30 mg by mouth 2 times daily ), Disp: 180 tablet, Rfl: 3     Cyanocobalamin (B-12) 1000 MCG TBCR, Take 1,000 mcg by mouth daily, Disp: 100 tablet, Rfl: 1     cyclobenzaprine (FLEXERIL) 5 MG tablet, Take 1 tablet (5 mg) by mouth 3 times daily as needed for muscle spasms, Disp: 90 tablet, Rfl: 0     diphenhydrAMINE (BENADRYL) 25 MG tablet, Take 1 tablet (25 mg) by mouth every 6 hours as needed for itching or allergies, Disp: 56 tablet, Rfl:      Doxylamine Succinate, Sleep, (UNISOM PO), Take 1 tablet by mouth nightly as needed , Disp: , Rfl:      DULoxetine (CYMBALTA) 30 MG capsule, Take 1 capsule (30 mg) by mouth 2 times daily, Disp: 60 capsule, Rfl: 3     famotidine (PEPCID) 20 MG tablet, Take 1 tablet (20 mg) by mouth 2 times daily, Disp: 180 tablet, Rfl: 3     ferrous gluconate (FERGON) 324 (38 FE) MG tablet, TAKE ONE TABLET BY MOUTH EVERY DAY WITH BREAKFAST (Patient taking differently: Take 324 mg by mouth daily (with breakfast) ), Disp: 100 tablet, Rfl: 3     folic acid (FOLVITE) 1 MG tablet, Take 1 tablet by mouth daily, Disp: , Rfl:      gabapentin (NEURONTIN) 300 MG capsule, TAKE TWO CAPSULES BY MOUTH THREE TIMES A DAY (Patient taking differently: Take 600 mg by mouth 3 times daily ), Disp: 540 capsule, Rfl: 3     GOODSENSE MIGRAINE FORMULA 250-250-65 MG per tablet, TAKE ONE TABLET BY MOUTH EVERY 6 HOURS AS NEEDED FOR HEADACHES (Patient taking differently: Take 1 tablet by mouth every 6 hours as needed ), Disp: 24 tablet, Rfl: 1     hydrOXYzine (ATARAX) 25 MG tablet, TAKE ONE TABLET BY MOUTH THREE TIMES A DAY AS NEEDED FOR ANXIETY OR INSOMNIA, Disp: 30  tablet, Rfl: 1     ibuprofen (ADVIL/MOTRIN) 200 MG tablet, Take 2-3 tablets (400-600 mg) by mouth every 6 hours as needed for pain, Disp: , Rfl:      lisinopril (PRINIVIL/ZESTRIL) 10 MG tablet, Take 10 mg by mouth daily, Disp: , Rfl:      montelukast (SINGULAIR) 10 MG tablet, TAKE ONE TABLET BY MOUTH AT BEDTIME (Patient taking differently: Take 10 mg by mouth At Bedtime ), Disp: 90 tablet, Rfl: 3     naloxone (NARCAN) 4 MG/0.1ML nasal spray, Spray 1 spray (4 mg) into one nostril alternating nostrils once as needed for opioid reversal every 2-3 minutes until assistance arrives, Disp: 0.2 mL, Rfl: 3     omeprazole (PRILOSEC) 40 MG DR capsule, TAKE ONE CAPSULE BY MOUTH TWICE A DAY, Disp: 180 capsule, Rfl: 1     ondansetron (ZOFRAN-ODT) 8 MG ODT tab, Take 1 tablet (8 mg) by mouth every 8 hours as needed for nausea, Disp: 30 tablet, Rfl: 11     oxyCODONE IR (ROXICODONE) 15 MG tablet, TAKE ONE TABLET BY MOUTH EVERY 4 HOURS AS NEEDED FOR SEVERE PAIN, Disp: 100 tablet, Rfl: 0     promethazine (PHENERGAN) 25 MG tablet, Take 1 tablet (25 mg) by mouth 2 times daily as needed for nausea, Disp: 30 tablet, Rfl: 11     promethazine (PHENERGAN) 50 MG/ML SOLN IV injection, Inject 0.5 mL (25 mg) into the muscle every 6 hours as needed, Disp: 90 mL, Rfl: 11     VENTOLIN  (90 Base) MCG/ACT inhaler, INHALE 2 PUFFS INTO THE LUNGS ONCE EVERY 4 HOURS AS NEEDED FOR SHORTNESS OF BREATH / DYSPNEA / WHEEZING (Patient taking differently: Inhale 2 puffs into the lungs every 4 hours as needed for shortness of breath / dyspnea or wheezing ), Disp: 18 g, Rfl: 11     polyethylene glycol (MIRALAX) powder, Take 17 g (1 capful) by mouth daily (Patient not taking: Reported on 1/30/2020), Disp: 510 g, Rfl: 11     ALLERGIES:    Allergies   Allergen Reactions     No Known Allergies      Pollen Extract      Seasonal Allergies      Shellfish-Derived Products Nausea and Rash     Rash on face        SOCIAL HISTORY:   Social History     Socioeconomic  "History     Marital status: Single     Spouse name: Not on file     Number of children: Not on file     Years of education: Not on file     Highest education level: Not on file   Occupational History     Not on file   Social Needs     Financial resource strain: Not hard at all     Food insecurity:     Worry: Never true     Inability: Never true     Transportation needs:     Medical: No     Non-medical: No   Tobacco Use     Smoking status: Never Smoker     Smokeless tobacco: Never Used   Substance and Sexual Activity     Alcohol use: Yes     Comment: Socially once on a weekend if any     Drug use: No     Comment: None     Sexual activity: Yes     Partners: Male     Birth control/protection: None   Lifestyle     Physical activity:     Days per week: 0 days     Minutes per session: 0 min     Stress: Not on file   Relationships     Social connections:     Talks on phone: Not on file     Gets together: Not on file     Attends Oriental orthodox service: Not on file     Active member of club or organization: Not on file     Attends meetings of clubs or organizations: Not on file     Relationship status: Not on file     Intimate partner violence:     Fear of current or ex partner: Not on file     Emotionally abused: Not on file     Physically abused: Not on file     Forced sexual activity: Not on file   Other Topics Concern     Parent/sibling w/ CABG, MI or angioplasty before 65F 55M? No   Social History Narrative    2019: Lives at home with significant other and their son, Td (2014).         FAMILY HISTORY:   Family History   Problem Relation Age of Onset     Hyperlipidemia Father      Cerebrovascular Disease Maternal Grandmother          of a stroke     Hyperlipidemia Paternal Grandfather      Depression Sister      Fibromyalgia Sister      Diabetes Maternal Aunt      Melanoma No family hx of      Skin Cancer No family hx of         EXAM:Vitals: /67   Pulse 142   Ht 1.575 m (5' 2\")   Wt 81.6 kg (180 lb)   BMI " 32.92 kg/m     BMI= Body mass index is 32.92 kg/m .    Weight management plan: Patient was referred to their PCP to discuss a diet and exercise plan.      General appearance: Patient is alert and fully cooperative with history & exam.  No sign of distress is noted during the visit.     Psychiatric: Affect is pleasant & appropriate.  Patient appears motivated to improve health.     Respiratory: Breathing is regular & unlabored while sitting.     HEENT: Hearing is intact to spoken word.  Speech is clear.  No gross evidence of visual impairment that would impact ambulation.     Dermatologic: Skin is intact to both lower extremities without significant lesions, rash or abrasion.  No paronychia or evidence of soft tissue infection is noted.     Vascular: DP & PT pulses are intact & regular bilaterally.  No significant edema or varicosities noted.  CFT and skin temperature is normal to both lower extremities.     Neurologic: Lower extremity sensation is intact to light touch.  No evidence of weakness or contracture in the lower extremities.  No evidence of neuropathy.     Musculoskeletal: One notes decreased ankle joint ROM due to swelling and pain on the right. Point of maximum tenderness located over the medial and lateral aspect of the right ankle.  One notes pain with palpation over the medial deltoid ligament on the right.  Moderate edema noted.  Positive ecchymosis noted.  One notes a positive distal compression test on the right.  One notes a positive proximal compression test on the right.     Radiograph evaluation including AP, lateral and mortise views of the right ankle reveals a spiral oblique fracture of the lateral malleolus extending at the level of the ankle mortise proximally and posteriorly 4 cm.  One notes a displaced transverse medial malleolar fracture.     ASSESSMENT / PLAN:     ICD-10-CM    1. Ankle fracture, right, closed, initial encounter S82.891A Orthopedic & Spine  Referral     unstable, (Bimalleolar), involves right fibular and medial malleolar       I have explained to Molly  about the conditions.  We discussed the nature of the condition as well as the treatment plan and expected length of recovery.  At this point, I am recommending surgical treatment of the condition involving open reduction internal fixation of the bimalleolar ankle fracture on the right ankle.  I informed the patient in risks and benefits of the procedure including but not limited to infection, wound complications, swelling, pain, diminished range of motion and function, DVT, arthritis and non union.  The procedure will be performed under general with popliteal block anesthesia.  The patient will obtain a preoperative history and physical by the primary care provider.  Consents will be reviewed and signed on the day of surgery.  The patient was placed into a Malik compression dressing and posterior splint.  The patient is to remain non weightbearing.    Disclaimer: This note consists of symbols derived from keyboarding, dictation and/or voice recognition software. As a result, there may be errors in the script that have gone undetected. Please consider this when interpreting information found in this chart.       QUANG Andrade.P.BRIANNA., F.A.C.F.A.S.      Again, thank you for allowing me to participate in the care of your patient.        Sincerely,        Selvin Mei DPM

## 2020-01-30 NOTE — PROGRESS NOTES
PATIENT HISTORY:  Molly Teague is a 34 year old female with history of scleroderma and chronic pain syndrome who presents with a chief complaint of a painful right ankle.  The patient relates injuring the right ankle on 2020 while at home.  The patient states that she fell and twisted her right ankle.  The patient relates pain with weight bearing to the right.  The patient denies any prior injury to the right ankle.  The patient relates being seen and treated with ice, elevation, and nonweightbearing with crutches.  The patient was admitted to the hospital due to altered mental status and released home.  The patient denies any numbness to the toes on the right foot.    REVIEW OF SYSTEMS:  Constitutional, HEENT, cardiovascular, pulmonary, GI, , musculoskeletal, neuro, skin, endocrine and psych systems are negative, except as otherwise noted.     PAST MEDICAL HISTORY:   Past Medical History:   Diagnosis Date     Acute pyelonephritis 2017     Altered mental state 3/29/2018     Arthritis      Blood clotting disorder (H)     P E     History of blood transfusion      Hypertension      Long-term (current) use of anticoagulants [Z79.01] 2016     PE (pulmonary embolism) 2016     Rheumatism      Scleroderma (H) 2016     Uncomplicated asthma         PAST SURGICAL HISTORY:   Past Surgical History:   Procedure Laterality Date     ANGIOGRAM  2015      SECTION       THROAT SURGERY          MEDICATIONS:   Current Outpatient Medications:      amLODIPine (NORVASC) 5 MG tablet, Take 5 mg by mouth daily, Disp: , Rfl:      blood glucose (NO BRAND SPECIFIED) lancets standard, Use to test blood sugar 1 time daily, Disp: 100 each, Rfl: 3     blood glucose (NO BRAND SPECIFIED) test strip, Use to test blood sugar 1 time daily, Disp: 100 each, Rfl: 3     budesonide-formoterol (SYMBICORT) 160-4.5 MCG/ACT Inhaler, Inhale 2 puffs into the lungs 2 times daily, Disp: 6 g, Rfl: 11     busPIRone HCl  (BUSPAR) 30 MG tablet, TAKE ONE TABLET BY MOUTH TWICE A DAY (Patient taking differently: Take 30 mg by mouth 2 times daily ), Disp: 180 tablet, Rfl: 3     Cyanocobalamin (B-12) 1000 MCG TBCR, Take 1,000 mcg by mouth daily, Disp: 100 tablet, Rfl: 1     cyclobenzaprine (FLEXERIL) 5 MG tablet, Take 1 tablet (5 mg) by mouth 3 times daily as needed for muscle spasms, Disp: 90 tablet, Rfl: 0     diphenhydrAMINE (BENADRYL) 25 MG tablet, Take 1 tablet (25 mg) by mouth every 6 hours as needed for itching or allergies, Disp: 56 tablet, Rfl:      Doxylamine Succinate, Sleep, (UNISOM PO), Take 1 tablet by mouth nightly as needed , Disp: , Rfl:      DULoxetine (CYMBALTA) 30 MG capsule, Take 1 capsule (30 mg) by mouth 2 times daily, Disp: 60 capsule, Rfl: 3     famotidine (PEPCID) 20 MG tablet, Take 1 tablet (20 mg) by mouth 2 times daily, Disp: 180 tablet, Rfl: 3     ferrous gluconate (FERGON) 324 (38 FE) MG tablet, TAKE ONE TABLET BY MOUTH EVERY DAY WITH BREAKFAST (Patient taking differently: Take 324 mg by mouth daily (with breakfast) ), Disp: 100 tablet, Rfl: 3     folic acid (FOLVITE) 1 MG tablet, Take 1 tablet by mouth daily, Disp: , Rfl:      gabapentin (NEURONTIN) 300 MG capsule, TAKE TWO CAPSULES BY MOUTH THREE TIMES A DAY (Patient taking differently: Take 600 mg by mouth 3 times daily ), Disp: 540 capsule, Rfl: 3     GOODSENSE MIGRAINE FORMULA 250-250-65 MG per tablet, TAKE ONE TABLET BY MOUTH EVERY 6 HOURS AS NEEDED FOR HEADACHES (Patient taking differently: Take 1 tablet by mouth every 6 hours as needed ), Disp: 24 tablet, Rfl: 1     hydrOXYzine (ATARAX) 25 MG tablet, TAKE ONE TABLET BY MOUTH THREE TIMES A DAY AS NEEDED FOR ANXIETY OR INSOMNIA, Disp: 30 tablet, Rfl: 1     ibuprofen (ADVIL/MOTRIN) 200 MG tablet, Take 2-3 tablets (400-600 mg) by mouth every 6 hours as needed for pain, Disp: , Rfl:      lisinopril (PRINIVIL/ZESTRIL) 10 MG tablet, Take 10 mg by mouth daily, Disp: , Rfl:      montelukast (SINGULAIR) 10 MG  tablet, TAKE ONE TABLET BY MOUTH AT BEDTIME (Patient taking differently: Take 10 mg by mouth At Bedtime ), Disp: 90 tablet, Rfl: 3     naloxone (NARCAN) 4 MG/0.1ML nasal spray, Spray 1 spray (4 mg) into one nostril alternating nostrils once as needed for opioid reversal every 2-3 minutes until assistance arrives, Disp: 0.2 mL, Rfl: 3     omeprazole (PRILOSEC) 40 MG DR capsule, TAKE ONE CAPSULE BY MOUTH TWICE A DAY, Disp: 180 capsule, Rfl: 1     ondansetron (ZOFRAN-ODT) 8 MG ODT tab, Take 1 tablet (8 mg) by mouth every 8 hours as needed for nausea, Disp: 30 tablet, Rfl: 11     oxyCODONE IR (ROXICODONE) 15 MG tablet, TAKE ONE TABLET BY MOUTH EVERY 4 HOURS AS NEEDED FOR SEVERE PAIN, Disp: 100 tablet, Rfl: 0     promethazine (PHENERGAN) 25 MG tablet, Take 1 tablet (25 mg) by mouth 2 times daily as needed for nausea, Disp: 30 tablet, Rfl: 11     promethazine (PHENERGAN) 50 MG/ML SOLN IV injection, Inject 0.5 mL (25 mg) into the muscle every 6 hours as needed, Disp: 90 mL, Rfl: 11     VENTOLIN  (90 Base) MCG/ACT inhaler, INHALE 2 PUFFS INTO THE LUNGS ONCE EVERY 4 HOURS AS NEEDED FOR SHORTNESS OF BREATH / DYSPNEA / WHEEZING (Patient taking differently: Inhale 2 puffs into the lungs every 4 hours as needed for shortness of breath / dyspnea or wheezing ), Disp: 18 g, Rfl: 11     polyethylene glycol (MIRALAX) powder, Take 17 g (1 capful) by mouth daily (Patient not taking: Reported on 1/30/2020), Disp: 510 g, Rfl: 11     ALLERGIES:    Allergies   Allergen Reactions     No Known Allergies      Pollen Extract      Seasonal Allergies      Shellfish-Derived Products Nausea and Rash     Rash on face        SOCIAL HISTORY:   Social History     Socioeconomic History     Marital status: Single     Spouse name: Not on file     Number of children: Not on file     Years of education: Not on file     Highest education level: Not on file   Occupational History     Not on file   Social Needs     Financial resource strain: Not hard  "at all     Food insecurity:     Worry: Never true     Inability: Never true     Transportation needs:     Medical: No     Non-medical: No   Tobacco Use     Smoking status: Never Smoker     Smokeless tobacco: Never Used   Substance and Sexual Activity     Alcohol use: Yes     Comment: Socially once on a weekend if any     Drug use: No     Comment: None     Sexual activity: Yes     Partners: Male     Birth control/protection: None   Lifestyle     Physical activity:     Days per week: 0 days     Minutes per session: 0 min     Stress: Not on file   Relationships     Social connections:     Talks on phone: Not on file     Gets together: Not on file     Attends [employer] service: Not on file     Active member of club or organization: Not on file     Attends meetings of clubs or organizations: Not on file     Relationship status: Not on file     Intimate partner violence:     Fear of current or ex partner: Not on file     Emotionally abused: Not on file     Physically abused: Not on file     Forced sexual activity: Not on file   Other Topics Concern     Parent/sibling w/ CABG, MI or angioplasty before 65F 55M? No   Social History Narrative    2019: Lives at home with significant other and their son, Td (2014).         FAMILY HISTORY:   Family History   Problem Relation Age of Onset     Hyperlipidemia Father      Cerebrovascular Disease Maternal Grandmother          of a stroke     Hyperlipidemia Paternal Grandfather      Depression Sister      Fibromyalgia Sister      Diabetes Maternal Aunt      Melanoma No family hx of      Skin Cancer No family hx of         EXAM:Vitals: /67   Pulse 142   Ht 1.575 m (5' 2\")   Wt 81.6 kg (180 lb)   BMI 32.92 kg/m    BMI= Body mass index is 32.92 kg/m .    Weight management plan: Patient was referred to their PCP to discuss a diet and exercise plan.      General appearance: Patient is alert and fully cooperative with history & exam.  No sign of distress is noted during " the visit.     Psychiatric: Affect is pleasant & appropriate.  Patient appears motivated to improve health.     Respiratory: Breathing is regular & unlabored while sitting.     HEENT: Hearing is intact to spoken word.  Speech is clear.  No gross evidence of visual impairment that would impact ambulation.     Dermatologic: Skin is intact to both lower extremities without significant lesions, rash or abrasion.  No paronychia or evidence of soft tissue infection is noted.     Vascular: DP & PT pulses are intact & regular bilaterally.  No significant edema or varicosities noted.  CFT and skin temperature is normal to both lower extremities.     Neurologic: Lower extremity sensation is intact to light touch.  No evidence of weakness or contracture in the lower extremities.  No evidence of neuropathy.     Musculoskeletal: One notes decreased ankle joint ROM due to swelling and pain on the right. Point of maximum tenderness located over the medial and lateral aspect of the right ankle.  One notes pain with palpation over the medial deltoid ligament on the right.  Moderate edema noted.  Positive ecchymosis noted.  One notes a positive distal compression test on the right.  One notes a positive proximal compression test on the right.     Radiograph evaluation including AP, lateral and mortise views of the right ankle reveals a spiral oblique fracture of the lateral malleolus extending at the level of the ankle mortise proximally and posteriorly 4 cm.  One notes a displaced transverse medial malleolar fracture.     ASSESSMENT / PLAN:     ICD-10-CM    1. Ankle fracture, right, closed, initial encounter S82.891A Orthopedic & Spine  Referral    unstable, (Bimalleolar), involves right fibular and medial malleolar       I have explained to Molly  about the conditions.  We discussed the nature of the condition as well as the treatment plan and expected length of recovery.  At this point, I am recommending surgical  treatment of the condition involving open reduction internal fixation of the bimalleolar ankle fracture on the right ankle.  I informed the patient in risks and benefits of the procedure including but not limited to infection, wound complications, swelling, pain, diminished range of motion and function, DVT, arthritis and non union.  The procedure will be performed under general with popliteal block anesthesia.  The patient will obtain a preoperative history and physical by the primary care provider.  Consents will be reviewed and signed on the day of surgery.  The patient was placed into a Malik compression dressing and posterior splint.  The patient is to remain non weightbearing.    Disclaimer: This note consists of symbols derived from keyboarding, dictation and/or voice recognition software. As a result, there may be errors in the script that have gone undetected. Please consider this when interpreting information found in this chart.       KASH AndradePJALEN., F.A.C.F.A.S.    After speaking with Dr. Grecia Taylor, the patient's internist, there was concern about the patient's narrow airway due to her underlying scleroderma.  We both agreed that it would be safer for the patient to have the surgery performed at the [facility] in case there would be any complications with regard to the patient's airway.  I called the [facility] Department of orthopedics to arrange the patient to be seen by Dr. Nair for surgical consultation on repair of her ankle fracture.

## 2020-01-31 ENCOUNTER — TELEPHONE (OUTPATIENT)
Dept: INTERNAL MEDICINE | Facility: CLINIC | Age: 35
End: 2020-01-31

## 2020-01-31 ENCOUNTER — OFFICE VISIT (OUTPATIENT)
Dept: FAMILY MEDICINE | Facility: CLINIC | Age: 35
End: 2020-01-31
Payer: MEDICARE

## 2020-01-31 VITALS
SYSTOLIC BLOOD PRESSURE: 126 MMHG | DIASTOLIC BLOOD PRESSURE: 76 MMHG | TEMPERATURE: 100.1 F | HEIGHT: 62 IN | HEART RATE: 110 BPM | BODY MASS INDEX: 33.13 KG/M2 | WEIGHT: 180 LBS | RESPIRATION RATE: 12 BRPM | OXYGEN SATURATION: 94 %

## 2020-01-31 DIAGNOSIS — N18.30 CKD (CHRONIC KIDNEY DISEASE) STAGE 3, GFR 30-59 ML/MIN (H): ICD-10-CM

## 2020-01-31 DIAGNOSIS — Z01.818 PREOP GENERAL PHYSICAL EXAM: Primary | ICD-10-CM

## 2020-01-31 DIAGNOSIS — G89.4 CHRONIC PAIN SYNDROME: Chronic | ICD-10-CM

## 2020-01-31 DIAGNOSIS — R00.0 SINUS TACHYCARDIA: ICD-10-CM

## 2020-01-31 DIAGNOSIS — G63 POLYNEUROPATHY ASSOCIATED WITH UNDERLYING DISEASE (H): ICD-10-CM

## 2020-01-31 DIAGNOSIS — N08 SCLERODERMA WITH RENAL INVOLVEMENT (H): ICD-10-CM

## 2020-01-31 DIAGNOSIS — K21.00 GASTROESOPHAGEAL REFLUX DISEASE WITH ESOPHAGITIS: ICD-10-CM

## 2020-01-31 DIAGNOSIS — S82.891A ANKLE FRACTURE, RIGHT, CLOSED, INITIAL ENCOUNTER: ICD-10-CM

## 2020-01-31 DIAGNOSIS — Z86.711 HISTORY OF PULMONARY EMBOLISM: ICD-10-CM

## 2020-01-31 DIAGNOSIS — M34.89 SCLERODERMA WITH RENAL INVOLVEMENT (H): ICD-10-CM

## 2020-01-31 DIAGNOSIS — M34.1 CREST (CALCINOSIS, RAYNAUD'S PHENOMENON, ESOPHAGEAL DYSFUNCTION, SCLERODACTYLY, TELANGIECTASIA) (H): ICD-10-CM

## 2020-01-31 DIAGNOSIS — I73.00 RAYNAUD'S DISEASE WITHOUT GANGRENE: ICD-10-CM

## 2020-01-31 DIAGNOSIS — F43.10 PTSD (POST-TRAUMATIC STRESS DISORDER): ICD-10-CM

## 2020-01-31 DIAGNOSIS — F32.1 MODERATE MAJOR DEPRESSION (H): ICD-10-CM

## 2020-01-31 DIAGNOSIS — G47.00 INSOMNIA, UNSPECIFIED TYPE: ICD-10-CM

## 2020-01-31 DIAGNOSIS — F41.1 GAD (GENERALIZED ANXIETY DISORDER): ICD-10-CM

## 2020-01-31 DIAGNOSIS — J45.50 SEVERE PERSISTENT ASTHMA WITHOUT COMPLICATION (H): ICD-10-CM

## 2020-01-31 DIAGNOSIS — D63.8 ANEMIA, CHRONIC DISEASE: ICD-10-CM

## 2020-01-31 PROBLEM — G62.9 PERIPHERAL NEUROPATHY: Chronic | Status: RESOLVED | Noted: 2019-11-25 | Resolved: 2020-01-31

## 2020-01-31 PROCEDURE — 99215 OFFICE O/P EST HI 40 MIN: CPT | Performed by: INTERNAL MEDICINE

## 2020-01-31 RX ORDER — LORAZEPAM 1 MG/1
1 TABLET ORAL PRN
COMMUNITY
Start: 2020-01-21 | End: 2020-02-06

## 2020-01-31 RX ORDER — HYDROXYZINE HYDROCHLORIDE 25 MG/1
25-50 TABLET, FILM COATED ORAL 3 TIMES DAILY PRN
Qty: 90 TABLET | Refills: 11 | Status: SHIPPED | OUTPATIENT
Start: 2020-01-31 | End: 2021-02-24

## 2020-01-31 ASSESSMENT — MIFFLIN-ST. JEOR: SCORE: 1469.72

## 2020-01-31 NOTE — TELEPHONE ENCOUNTER
RECORDS RECEIVED FROM: bimaleolar fracture of right ankle, referred by podiatrist from [facility] called directly to back line.    DATE RECEIVED: Feb 4, 2020   NOTES STATUS DETAILS   OFFICE NOTE from referring provider Internal  Selvin Mei DPM    OFFICE NOTE from other specialist N/A    DISCHARGE SUMMARY from hospital N/A    DISCHARGE REPORT from the ER Internal  Jude Rausch MD    OPERATIVE REPORT N/A    MEDICATION LIST Internal    IMPLANT RECORD/STICKER N/A    LABS     CBC/DIFF N/A    CULTURES N/A    INJECTIONS DONE IN RADIOLOGY N/A    MRI N/A    CT SCAN N/A    XRAYS (IMAGES & REPORTS) Internal    TUMOR     PATHOLOGY  Slides & report N/A      01/31/20   9:20 AM   Pre-visit complete  Archana Glamm, CMA

## 2020-01-31 NOTE — PATIENT INSTRUCTIONS
Before Your Surgery      Call your surgeon if there is any change in your health. This includes signs of a cold or flu (such as a sore throat, runny nose, cough, rash or fever).    Do not smoke, drink alcohol or take over the counter medicine (unless your surgeon or primary care doctor tells you to) for the 24 hours before and after surgery.    If you take prescribed drugs: Follow your doctor s orders about which medicines to take and which to stop until after surgery.    Eating and drinking prior to surgery: follow the instructions from your surgeon    Take a shower or bath the night before surgery. Use the soap your surgeon gave you to gently clean your skin. If you do not have soap from your surgeon, use your regular soap. Do not shave or scrub the surgery site.  Wear clean pajamas and have clean sheets on your bed.             1. Stop ibuprofen today. Do not take lisinopril within 24 hours of surgery.  2. Refill of the hydroxyzine.  3. Can call Dr. Villalta's clinic to confirm appointment is in clinic and not surgery  4. Will have to reschedule [place] appointments  5. After surgery, Dr. Villalta will prescribe and manage medications (would recommend a different opiate than oxycodone), typically for 1-2 weeks.  Then you would resume your normal Oxycodone that I prescribe.

## 2020-01-31 NOTE — Clinical Note
Very complex surgical patient that sounds like Dr. Mei here at [facility] discussed with you.  May want to discuss with your anesthesia colleagues ahead of time.  Feel free to contact me anytime to discuss her care.  I know her very well.  My Cell is [phone]

## 2020-01-31 NOTE — PROGRESS NOTES
[facility]  [address] [address]  [phone]  Dept: [phone]    PRE-OP EVALUATION:  Today's date: 2020  Molly Teague (: 1985) presents for pre-operative evaluation assessment as requested by  ([facility]).  She requires evaluation and anesthesia risk assessment prior to undergoing surgery/procedure for treatment of right foot .     Proposed Surgery/ Procedure: Right foot  Date of Surgery/ Procedure: 20  Time of Surgery/ Procedure: [facility]  Hospital/Surgical Facility: [facility]  Fax number for surgical facility: Through [facility]  Primary Physician: Grecia Barba  Type of Anesthesia Anticipated: to be determined     Patient has a Health Care Directive or Living Will:  NO     1. NO - Do you have a history of heart attack, stroke, stent, bypass or surgery on an artery in the head, neck, heart or legs?  2. YES - Do you ever have any pain or discomfort in your chest?  3. NO - Do you have a history of  Heart Failure?  4. YES - Are you troubled by shortness of breath when: walking on the level, up a slight hill or at night?  5. NO - Do you currently have a cold, bronchitis or other respiratory infection?  6. NO - Do you have a cough, shortness of breath or wheezing?  7. YES - Do you sometimes get pains in the calves of your legs when you walk?  8. YES - Do you or anyone in your family have previous history of blood clots?  9. NO - Do you or does anyone in your family have a serious bleeding problem such as prolonged bleeding following surgeries or cuts?  10.YES - Have you ever had problems with anemia or been told to take iron pills?  11. YES - Have you had any abnormal blood loss such as black, tarry or bloody stools, or abnormal vaginal bleeding?  12. YES - Have you ever had a blood transfusion?  13. NO - Have you or any of your relatives ever had problems with anesthesia?  14. NO - Do you have sleep apnea, excessive snoring or daytime drowsiness?  15. NO - Do you  have any prosthetic heart valves?  16. NO - Do you have prosthetic joints?  17. NO - Is there any chance that you may be pregnant?    HPI:     HPI related to upcoming procedure: ankle fracture requiring surgery.  Is non-weight bearing.    Scleroderma with renal involvement/CREST: Follows with rheumatology, nephrology at [place].  She has contractures of distal bilateral upper and lower extremities.  She developed significant scarring with trauma.  She is tightening of the skin in her face, leading to difficulty opening her mouth wide.  She aspirates frequently and has severe GERD without esophagitis.  She has a history of occult GI bleed due to the esophagitis.  She has chronic nausea and vomiting due to her esophagitis.  She has hypertensiondue to her renal involvement of her scleroderma.    Raynauds: On amlodipine for blood pressure and for rainouts.  History of digital ulcerations.  Often difficult to get a pulse ox.    History of ARDS: Due to scleroderma renal crisis.  She was ventilated for many weeks, required a trach.  She has severe medical PTSD from her prolonged ICU stay    Depression/PTSD/Insomnia/Anxiety: Because of her prolonged ICU stay she has a history of medical PTSD.  She needs multiple medications to manage her depression and anxiety.  She has followed with psychology.  hydroxyzine is helping her anxiety.  Using 25 mg 1-2 x day.  Needs a refill    Chronic Pain: From her scleroderma.  She is on chronic opiates.  She is a high tolerance to opiates    See problem list for active medical problems.  Problems all longstanding and stable, except as noted/documented.  See ROS for pertinent symptoms related to these conditions.      MEDICAL HISTORY:     Patient Active Problem List    Diagnosis Date Noted     CKD (chronic kidney disease) stage 3, GFR 30-59 ml/min (H) 01/31/2020     Priority: Medium     Sinus tachycardia 01/31/2020     Priority: Medium     Ankle fracture, right, closed, initial encounter  01/30/2020     Priority: Medium     Added automatically from request for surgery [id number]       Diplopia 01/25/2020     Priority: Medium     Anemia, chronic disease 11/25/2019     Priority: Medium     Mirena IUD- Remove by 09/2024 09/06/2019     Priority: Medium     Lot: [id number]  Exp: 01/2022    Dinora Israel, LPN         Malignant essential hypertension 07/24/2019     Priority: Medium     PTSD (post-traumatic stress disorder) 06/19/2019     Priority: Medium     Obesity (BMI 35.0-39.9) with comorbidity (H) 01/22/2019     Priority: Medium     Moderate major depression (H) 07/06/2018     Priority: Medium     Severe persistent asthma without complication 07/06/2018     Priority: Medium     On high dose ICS/LABA + frequent albuterol use.  Next allergy/pulmonary referral       Aspiration of food 03/29/2018     Priority: Medium     Chronic pain syndrome 04/26/2017     Priority: Medium     Patient is followed by Grecia Barba DO for ongoing prescription of pain medication.  All refills should only be approved by this provider, or covering partner.    Medication(s): Oxycodone 15 mg.   Maximum quantity per month: 100  Clinic visit frequency required: Q 2 months     Controlled substance agreement:  Encounter-Level CSA - 04/26/2017:    Controlled Substance Agreement - Scan on 5/4/2017  8:58 AM: CONTROLLED SUBSTANCE AGREEMENT (below)       Patient-Level CSA:    Controlled Substance Agreement - Opioid - Scan on 2/6/2019  6:23 PM (below)         Pain Clinic evaluation in the past: No    DIRE Total Score(s):  No flowsheet data found.    Last [facility] website verification:  done on 4/26/17   9/26/2017  7/6/2018  10/16/2018  1/22/2019  8/2/2019           [email or web address]         Chronic migraine without aura without status migrainosus, not intractable 04/12/2017     Priority: Medium     With medication overuse.  Fioricet and not Imitrex is recommend to treat severe headache.  2/2017 [place] recommend wean down from  daily Fioricet and use Excedrin Migrain PRN.       AIN grade I 03/30/2017     Priority: Medium     Found at [facility] on colonoscopy 2/2017.  Recommend repeat anoscopy and anal pap in 6/2017.       Gastroesophageal reflux disease with esophagitis 03/30/2017     Priority: Medium     EGD done at [facility] 2/2017 showed esophagitis without GAVE.  Recommend max dose H2 and PPI, along with 4 tablets of Carafate dissolved in water and drink throughout the day.    Had LA Grade D esophagitis        Limited systemic sclerosis (H) 01/25/2017     Priority: Medium     Raynaud's, calcinosis, telangiectasias, peripheral neuropathy, GERD symptoms, history of scleroderma renal crisis.  Saw [facility] 1/2017 and follows with Rheum Dr. Davis locally.       Polyneuropathy associated with underlying disease (H) 01/25/2017     Priority: Medium     Due to scleroderma and neurology thought possible due to ICU (critical illness neuropathy).  Recommend to max out dose of gabapentin to 6817-5636 mg/day, split BID or TID.  EMG 1/2017 positive for neuropathy       History of Clostridium difficile 11/29/2016     Priority: Medium     History of Helicobacter pylori infection 11/29/2016     Priority: Medium     Scleroderma with renal involvement (H) 11/09/2016     Priority: Medium     Stopped lisinopril per [facility] 1/2017.  Restarted lisinopril per [facility] 3/2017       History of ARDS 11/09/2016     Priority: Medium     4/2015, prolonged ICU/vent/trach due to influenza, scleroderma renal crisis requiring hemodialysis.       Raynaud's disease without gangrene 11/09/2016     Priority: Medium     History of hemodialysis 11/09/2016     Priority: Medium     4/2015 due to scleroderma renal crisis       Bilateral retinitis 11/09/2016     Priority: Medium     History of pulmonary embolism 11/09/2016     Priority: Medium     Completed anti-coagulation.       REX (generalized anxiety disorder) 11/09/2016     Priority: Medium     CREST (calcinosis, Raynaud's phenomenon,  esophageal dysfunction, sclerodactyly, telangiectasia) (H) 2016     Priority: Medium     Scleroderma (H) 2016     Priority: Medium     Nausea with vomiting 2016     Priority: Medium      Past Medical History:   Diagnosis Date     Acute pyelonephritis 2017     Altered mental state 3/29/2018     Arthritis      Blood clotting disorder (H)     P E     History of blood transfusion      Hypertension      Long-term (current) use of anticoagulants [Z79.01] 2016     PE (pulmonary embolism) 2016     Rheumatism      Scleroderma (H) 2016     Uncomplicated asthma      Past Surgical History:   Procedure Laterality Date     ANGIOGRAM  2015      SECTION       THROAT SURGERY  2015     Current Outpatient Medications   Medication Sig Dispense Refill     amLODIPine (NORVASC) 5 MG tablet Take 5 mg by mouth daily       blood glucose (NO BRAND SPECIFIED) lancets standard Use to test blood sugar 1 time daily 100 each 3     blood glucose (NO BRAND SPECIFIED) test strip Use to test blood sugar 1 time daily 100 each 3     budesonide-formoterol (SYMBICORT) 160-4.5 MCG/ACT Inhaler Inhale 2 puffs into the lungs 2 times daily 6 g 11     busPIRone HCl (BUSPAR) 30 MG tablet TAKE ONE TABLET BY MOUTH TWICE A DAY (Patient taking differently: Take 30 mg by mouth 2 times daily ) 180 tablet 3     Cyanocobalamin (B-12) 1000 MCG TBCR Take 1,000 mcg by mouth daily 100 tablet 1     cyclobenzaprine (FLEXERIL) 5 MG tablet Take 1 tablet (5 mg) by mouth 3 times daily as needed for muscle spasms 90 tablet 0     Doxylamine Succinate, Sleep, (UNISOM PO) Take 1 tablet by mouth nightly as needed        DULoxetine (CYMBALTA) 30 MG capsule Take 1 capsule (30 mg) by mouth 2 times daily 60 capsule 3     famotidine (PEPCID) 20 MG tablet Take 1 tablet (20 mg) by mouth 2 times daily 180 tablet 3     ferrous gluconate (FERGON) 324 (38 FE) MG tablet TAKE ONE TABLET BY MOUTH EVERY DAY WITH BREAKFAST (Patient taking differently:  Take 324 mg by mouth daily (with breakfast) ) 100 tablet 3     folic acid (FOLVITE) 1 MG tablet Take 1 tablet by mouth daily       gabapentin (NEURONTIN) 300 MG capsule TAKE TWO CAPSULES BY MOUTH THREE TIMES A DAY (Patient taking differently: Take 600 mg by mouth 3 times daily ) 540 capsule 3     GOODSENSE MIGRAINE FORMULA 250-250-65 MG per tablet TAKE ONE TABLET BY MOUTH EVERY 6 HOURS AS NEEDED FOR HEADACHES (Patient taking differently: Take 1 tablet by mouth every 6 hours as needed ) 24 tablet 1     hydrOXYzine (ATARAX) 25 MG tablet Take 1-2 tablets (25-50 mg) by mouth 3 times daily as needed for itching 90 tablet 11     lisinopril (PRINIVIL/ZESTRIL) 10 MG tablet Take 10 mg by mouth daily       LORazepam (ATIVAN) 1 MG tablet Take 1 mg by mouth as needed       montelukast (SINGULAIR) 10 MG tablet TAKE ONE TABLET BY MOUTH AT BEDTIME (Patient taking differently: Take 10 mg by mouth At Bedtime ) 90 tablet 3     naloxone (NARCAN) 4 MG/0.1ML nasal spray Spray 1 spray (4 mg) into one nostril alternating nostrils once as needed for opioid reversal every 2-3 minutes until assistance arrives 0.2 mL 3     omeprazole (PRILOSEC) 40 MG DR capsule TAKE ONE CAPSULE BY MOUTH TWICE A  capsule 1     ondansetron (ZOFRAN-ODT) 8 MG ODT tab Take 1 tablet (8 mg) by mouth every 8 hours as needed for nausea 30 tablet 11     oxyCODONE IR (ROXICODONE) 15 MG tablet TAKE ONE TABLET BY MOUTH EVERY 4 HOURS AS NEEDED FOR SEVERE PAIN 100 tablet 0     polyethylene glycol (MIRALAX) powder Take 17 g (1 capful) by mouth daily 510 g 11     promethazine (PHENERGAN) 25 MG tablet Take 1 tablet (25 mg) by mouth 2 times daily as needed for nausea 30 tablet 11     promethazine (PHENERGAN) 50 MG/ML SOLN IV injection Inject 0.5 mL (25 mg) into the muscle every 6 hours as needed 90 mL 11     VENTOLIN  (90 Base) MCG/ACT inhaler INHALE 2 PUFFS INTO THE LUNGS ONCE EVERY 4 HOURS AS NEEDED FOR SHORTNESS OF BREATH / DYSPNEA / WHEEZING (Patient taking  "differently: Inhale 2 puffs into the lungs every 4 hours as needed for shortness of breath / dyspnea or wheezing ) 18 g 11     OTC products: None, except as noted above    Allergies   Allergen Reactions     No Known Allergies      Pollen Extract      Seasonal Allergies      Shellfish-Derived Products Nausea and Rash     Rash on face      Latex Allergy: NO    Social History     Tobacco Use     Smoking status: Never Smoker     Smokeless tobacco: Never Used   Substance Use Topics     Alcohol use: Yes     Comment: Socially once on a weekend if any     History   Drug Use No     Comment: None       REVIEW OF SYSTEMS:   Constitutional, neuro, ENT, endocrine, pulmonary, cardiac, gastrointestinal, genitourinary, musculoskeletal, integument and psychiatric systems are negative, except as otherwise noted.    EXAM:   /76   Pulse 110   Temp 100.1  F (37.8  C) (Tympanic)   Resp 12   Ht 1.575 m (5' 2\")   Wt 81.6 kg (180 lb)   SpO2 94%   Breastfeeding No   BMI 32.92 kg/m      GENERAL APPEARANCE: alert, no distress and chronically ill appearing     EYES: EOMI, PERRL     HENT: skin tightening of face with difficulty opening mouth wide.  Would be able to pass 2 fingers easily, 3 with difficulty     NECK: no adenopathy and significant scarring, tracheostomy scar     RESP: lungs clear to auscultation - no rales, rhonchi or wheezes     CV: tachycardic, regular rhythm, no murmurs     ABDOMEN:  soft, nontender, no HSM or masses and bowel sounds normal     MS: Right ankle in a cast.       SKIN: Skin tightening of fingers resulting in contractures     NEURO: Normal strength and tone, sensory exam grossly normal and mentation intact     PSYCH: anxious and worried     LYMPHATICS: No cervical adenopathy    DIAGNOSTICS:   EKG: appears normal, NSR, sinus tachycardia, unchanged from previous tracings    Recent Labs   Lab Test 01/25/20  0201 12/02/19  1051  03/30/18  0540  06/08/17  1825   HGB 9.3* 10.7*   < > 9.5*   < > 8.7*   PLT " 311 373   < > 187   < > 169   INR  --   --   --  1.15*  --  1.07    133   < > 142   < > 138   POTASSIUM 4.1 4.0   < > 4.2   < > 4.4   CR 1.50* 1.22*   < > 1.15*   < > 1.41*    < > = values in this interval not displayed.        IMPRESSION:   Reason for surgery/procedure: ORIF ankle  Diagnosis/reason for consult: Preop evaluation      ASA Category III      The proposed surgical procedure is considered INTERMEDIATE risk.    REVISED CARDIAC RISK INDEX  The patient has the following serious cardiovascular risks for perioperative complications such as (MI, PE, VFib and 3  AV Block):  No serious cardiac risks  INTERPRETATION: 0 risks: Class I (very low risk - 0.4% complication rate)    The patient has the following additional risks for perioperative complications:  High tolerance to opioid analgesics due to chronic opiate use      ICD-10-CM    1. Preop general physical exam Z01.818    2. Ankle fracture, right, closed, initial encounter S82.891A ORTHOPEDICS ADULT REFERRAL   3. Scleroderma with renal involvement (H) M34.89     N08    4. CREST (calcinosis, Raynaud's phenomenon, esophageal dysfunction, sclerodactyly, telangiectasia) (H) M34.1    5. CKD (chronic kidney disease) stage 3, GFR 30-59 ml/min (H) N18.3    6. Raynaud's disease without gangrene I73.00    7. PTSD (post-traumatic stress disorder) F43.10 hydrOXYzine (ATARAX) 25 MG tablet   8. Severe persistent asthma without complication J45.50    9. Polyneuropathy associated with underlying disease (H) G63    10. Moderate major depression (H) F32.1    11. History of pulmonary embolism Z86.711    12. Gastroesophageal reflux disease with esophagitis K21.0    13. REX (generalized anxiety disorder) F41.1 hydrOXYzine (ATARAX) 25 MG tablet   14. Chronic pain syndrome G89.4    15. Anemia, chronic disease D63.8    16. Insomnia, unspecified type G47.00    17. Sinus tachycardia R00.0        RECOMMENDATIONS:     --Consult hospital rounder / IM to assist post-op medical  management    Cardiovascular Risk  Performs 4 METs exercise without symptoms (Light housework (dusting, washing dishes)) .     Anemia  Anemia and does not require treatment prior to surgery.  Monitor Hemoglobin postoperatively.      --Patient is to take all scheduled medications on the day of surgery EXCEPT for modifications listed below.    She is an extremely high surgical risk candidate.  Advised that the surgery be moved from [facility] to the [facility] where there are physician anesthesiologist and medical subspecialties such as rheumatology, nephrology, ENT available if needed.  She is very high risk for poor wound healing because of her Raynauds and scleroderma.  She has a very complex airway with a history of trach and scleroderma with skin tightening.  She is a high risk of poorly tolerating anesthesia because of her scleroderma renal involvement.  She is high risk of tolerating anesthesia because of her chronic opiate use, chronic benzo use and also her severe medical PTSD.  Advised against a local block as this would likely cause severe anxiety leading to the need for an emergent intubation as opposed to a controlled intubation under general anesthesia.  I reviewed her case with the planned operating physician Dr. Mei, and we mutually agreed that the patient would be better served to have the procedure done at a tertiary care center      1. Stop ibuprofen today. Do not take lisinopril within 24 hours of surgery.  2. Refill of the hydroxyzine.  3. Can call Dr. Villalta's clinic to confirm appointment is in clinic and not surgery  4. Will have to reschedule [facility] appointments  5. After surgery, Dr. Villalta will prescribe and manage medications (would recommend a different opiate than oxycodone), typically for 1-2 weeks.  Then you would resume your normal Oxycodone that I prescribe.    APPROVAL GIVEN to proceed with proposed procedure, without further diagnostic evaluation       Signed  Electronically by: Grecia Barba DO    Copy of this evaluation report is provided to requesting physician.    [facility] Preop Guidelines    Revised Cardiac Risk Index

## 2020-02-04 ENCOUNTER — OFFICE VISIT (OUTPATIENT)
Dept: ORTHOPEDICS | Facility: CLINIC | Age: 35
End: 2020-02-04
Payer: MEDICARE

## 2020-02-04 ENCOUNTER — PRE VISIT (OUTPATIENT)
Dept: ORTHOPEDICS | Facility: CLINIC | Age: 35
End: 2020-02-04

## 2020-02-04 VITALS — HEIGHT: 62 IN | BODY MASS INDEX: 33.13 KG/M2 | WEIGHT: 180 LBS

## 2020-02-04 DIAGNOSIS — M25.571 PAIN IN JOINT, ANKLE AND FOOT, RIGHT: Primary | ICD-10-CM

## 2020-02-04 ASSESSMENT — MIFFLIN-ST. JEOR: SCORE: 1469.72

## 2020-02-04 NOTE — LETTER
2/4/2020       RE: Molly Teague  [address] [facility] [address]     Dear Colleague,    Thank you for referring your patient, Molly Teague, to the [facility] at [facility]. Please see a copy of my visit note below.    CHIEF COMPLAINT:  Status post right ankle fracture sustained on 01/25/2020.      HISTORY OF PRESENT ILLNESS:  Ms. Teague is a 34-year-old female who presents in the company of her spouse for evaluation of the right ankle.  The patient reports to have sustained a fall and the acute onset of pain.  The patient was diagnosed with a right ankle fracture.  The patient was splinted, made nonweightbearing and presents today for discussion of treatment options.  Apparently originally she was going to be evaluated by one of the local podiatrists and eventually she has been referred to us for further evaluation.      The patient presents with a history of scleroderma, which apparently has had some involvement of her lungs as well as heart.  Currently, she is not on dialysis, although presents with some renal failure.      PAST MEDICAL HISTORY:  Reviewed today.      PAST SURGICAL HISTORY:  Reviewed today.      ALLERGIES:  Reviewed.      CURRENT MEDICATIONS:  Reviewed.      PHYSICAL EXAMINATION:  On today's visit, she presents as a pleasant female in no apparent distress with a height of 5 feet 2 inches and a weight of 180 pounds.  Denies to have any constitutional symptoms.      On today's visit, she presents with a slight lateral translation of the foot with respect to the ankle.  There is some bruising of the medial skin.  Skin is a little bit tight but there are some wrinkles both medial and laterally.  Range of motion was not tested secondary to pain.  There are no fracture blisters.  There are no skin lacerations.      RADIOGRAPHIC EVALUATION:  Three views of the ankle were reviewed today which were significant for showing a  bimalleolar ankle fracture or possible trimalleolar ankle fracture.  The patient presented with overall a very acceptable reduction.  There are no other acute findings.      ASSESSMENT:  Right ankle fracture.      PLAN:  I discussed with the patient that at this point my recommendation is to proceed with open reduction, internal fixation given the instability of the fracture.  I discussed with her the most likely postoperative course and complications from undergoing such intervention, which include but are not limited to infection, bleeding, nerve damage, residual pain, nonunion and stiffness.      All questions were answered.  The patient was pleased with the discussion.  The patient will perform the surgery within the next 3 calendar days.  In the meantime, she is to remain nonweightbearing.  The patient declined the need of pain pills.  She was given a handicap parking permit on today's visit.      All questions were answered.      TT 30 minutes, CT 20 minutes.         Again, thank you for allowing me to participate in the care of your patient.      Sincerely,    Natanael Villalta MD

## 2020-02-04 NOTE — NURSING NOTE
"Reason For Visit:   Chief Complaint   Patient presents with     Consult     Right ankle bimalleolar fracture. DOI: 1/24/2020.        Ht 1.575 m (5' 2\")   Wt 81.6 kg (180 lb)   BMI 32.92 kg/m      Pain Assessment  Patient Currently in Pain: Yes  0-10 Pain Scale: 7  Primary Pain Location: Ankle  Alleviating Factors: Pain medication    Berna Oreilly, ATC  "

## 2020-02-04 NOTE — PROGRESS NOTES
CHIEF COMPLAINT:  Status post right ankle fracture sustained on 01/25/2020.      HISTORY OF PRESENT ILLNESS:  Ms. Teague is a 34-year-old female who presents in the company of her spouse for evaluation of the right ankle.  The patient reports to have sustained a fall and the acute onset of pain.  The patient was diagnosed with a right ankle fracture.  The patient was splinted, made nonweightbearing and presents today for discussion of treatment options.  Apparently originally she was going to be evaluated by one of the local podiatrists and eventually she has been referred to us for further evaluation.      The patient presents with a history of scleroderma, which apparently has had some involvement of her lungs as well as heart.  Currently, she is not on dialysis, although presents with some renal failure.      PAST MEDICAL HISTORY:  Reviewed today.      PAST SURGICAL HISTORY:  Reviewed today.      ALLERGIES:  Reviewed.      CURRENT MEDICATIONS:  Reviewed.      PHYSICAL EXAMINATION:  On today's visit, she presents as a pleasant female in no apparent distress with a height of 5 feet 2 inches and a weight of 180 pounds.  Denies to have any constitutional symptoms.      On today's visit, she presents with a slight lateral translation of the foot with respect to the ankle.  There is some bruising of the medial skin.  Skin is a little bit tight but there are some wrinkles both medial and laterally.  Range of motion was not tested secondary to pain.  There are no fracture blisters.  There are no skin lacerations.      RADIOGRAPHIC EVALUATION:  Three views of the ankle were reviewed today which were significant for showing a bimalleolar ankle fracture or possible trimalleolar ankle fracture.  The patient presented with overall a very acceptable reduction.  There are no other acute findings.      ASSESSMENT:  Right ankle fracture.      PLAN:  I discussed with the patient that at this point my recommendation is to  proceed with open reduction, internal fixation given the instability of the fracture.  I discussed with her the most likely postoperative course and complications from undergoing such intervention, which include but are not limited to infection, bleeding, nerve damage, residual pain, nonunion and stiffness.      All questions were answered.  The patient was pleased with the discussion.  The patient will perform the surgery within the next 3 calendar days.  In the meantime, she is to remain nonweightbearing.  The patient declined the need of pain pills.  She was given a handicap parking permit on today's visit.      All questions were answered.      TT 30 minutes, CT 20 minutes.

## 2020-02-05 ENCOUNTER — TELEPHONE (OUTPATIENT)
Dept: INTERNAL MEDICINE | Facility: CLINIC | Age: 35
End: 2020-02-05

## 2020-02-05 ENCOUNTER — PREP FOR PROCEDURE (OUTPATIENT)
Dept: ORTHOPEDICS | Facility: CLINIC | Age: 35
End: 2020-02-05

## 2020-02-05 ENCOUNTER — TELEPHONE (OUTPATIENT)
Dept: ORTHOPEDICS | Facility: CLINIC | Age: 35
End: 2020-02-05

## 2020-02-05 DIAGNOSIS — M25.571 PAIN IN JOINT, ANKLE AND FOOT, RIGHT: Primary | ICD-10-CM

## 2020-02-05 NOTE — TELEPHONE ENCOUNTER
Phoned patient to inform her that due to her complex medical history, her surgery with Dr Villalta will need to be rescheduled from 2/7/20 at [facility] to 2/10/20 at [facility]. Patient understands she will receive another call once all the details have been finalized. Patient also expressed concern with running out of pain medication before Monday and would like to speak with Dr Villalta's RN regarding this.

## 2020-02-05 NOTE — TELEPHONE ENCOUNTER
Was supposed to have ankle surgery Tuesday at [facility] [place] however high risk so [facility].  Tentative scheduled for surgery 2-10-20  Patient states she will run out of oxycodone before then due to delay and she is having increased anxiety however has been told that need to cut back on ativan.  She has an appt tomorrow morning.  RN advised her to keep this to discuss temporary options until her surgery 2-10-20.  Demetria JHA RN

## 2020-02-05 NOTE — NURSING NOTE
Teaching Flowsheet   Relevant Diagnosis: Right ankle fracture  Teaching Topic: preop Rt ankle ORIF planned at [facility] on 2/7/2020    [place], has significant other she plans to stay at his home during her postop time.  Pt also states she has parents to help.  Pt takes 15mg oxycodone, approx 2-3 tabs daily for chronic pain, pt states it's ordered for q4h and she has been weaning down, pain is in knees, elbows, hands from scleroderma.  Pt has hx PE in 2015, no longer on blood thinners.  Pt has stage 3 renal failure.  Dr Villalta believes pt may have surgery at [facility].  We will contact [facility] staff for clearance from their anesthesia dept.  Pt underwent preop H&P, which is in our system.     Person(s) involved in teaching:   Patient and Significant other Joshua     Motivation Level:  Asks Questions: Yes  Eager to Learn: Yes  Cooperative: Yes  Receptive (willing/able to accept information): Yes  Any cultural factors/[employer] beliefs that may influence understanding or compliance? No  Comments:      Patient and Family demonstrates understanding of the following:  Reason for the appointment, diagnosis and treatment plan: Yes  Knowledge of proper use of medications and conditions for which they are ordered (with special attention to potential side effects or drug interactions): Yes  Which situations necessitate calling provider and whom to contact: Yes     Teaching Concerns Addressed:   Comments:      Proper use and care of cast (medical equip, care aids, etc.): Yes  Nutritional needs and diet plan: Yes  Pain management techniques: Yes  Wound Care: Yes  How and/when to access community resources: NA     Instructional Materials Used/Given: preop and [employer] pkt, antiseptic soap     Time spent with patient: 30 minutes.

## 2020-02-05 NOTE — TELEPHONE ENCOUNTER
Reason for Call:  Other prescription    Detailed comments: Patient is calling and stats she has a lot of anxiety due to her surgery getting pushed out more and more. She is in pain the medications are not helping and she said she has had 2 panic attacks today and she just doesn't know what she should do.     Phone Number Patient can be reached at: Home number on file [phone] (home)    Best Time: any    Can we leave a detailed message on this number? YES    Call taken on 2/5/2020 at 4:10 PM by Vashti English

## 2020-02-06 ENCOUNTER — TELEPHONE (OUTPATIENT)
Dept: ORTHOPEDICS | Facility: CLINIC | Age: 35
End: 2020-02-06

## 2020-02-06 ENCOUNTER — OFFICE VISIT (OUTPATIENT)
Dept: FAMILY MEDICINE | Facility: CLINIC | Age: 35
End: 2020-02-06
Payer: MEDICARE

## 2020-02-06 VITALS
HEART RATE: 100 BPM | TEMPERATURE: 99.4 F | RESPIRATION RATE: 14 BRPM | DIASTOLIC BLOOD PRESSURE: 70 MMHG | BODY MASS INDEX: 32.56 KG/M2 | OXYGEN SATURATION: 94 % | SYSTOLIC BLOOD PRESSURE: 114 MMHG | WEIGHT: 178 LBS

## 2020-02-06 DIAGNOSIS — F41.9 ANXIETY: ICD-10-CM

## 2020-02-06 DIAGNOSIS — G89.4 CHRONIC PAIN SYNDROME: Chronic | ICD-10-CM

## 2020-02-06 DIAGNOSIS — S82.891A ANKLE FRACTURE, RIGHT, CLOSED, INITIAL ENCOUNTER: Primary | ICD-10-CM

## 2020-02-06 PROBLEM — M25.571 PAIN IN JOINT, ANKLE AND FOOT, RIGHT: Status: ACTIVE | Noted: 2020-02-06

## 2020-02-06 PROCEDURE — 99214 OFFICE O/P EST MOD 30 MIN: CPT | Performed by: NURSE PRACTITIONER

## 2020-02-06 RX ORDER — METHOCARBAMOL 750 MG/1
750 TABLET, FILM COATED ORAL 4 TIMES DAILY PRN
Qty: 30 TABLET | Refills: 2 | Status: SHIPPED | OUTPATIENT
Start: 2020-02-06 | End: 2020-08-04

## 2020-02-06 RX ORDER — OXYCODONE HYDROCHLORIDE 15 MG/1
TABLET ORAL
Qty: 20 TABLET | Refills: 0 | Status: ON HOLD | OUTPATIENT
Start: 2020-02-06 | End: 2020-02-12

## 2020-02-06 RX ORDER — LORAZEPAM 1 MG/1
1 TABLET ORAL PRN
Qty: 30 TABLET | Refills: 0 | Status: SHIPPED | OUTPATIENT
Start: 2020-02-06 | End: 2020-04-16

## 2020-02-06 ASSESSMENT — ANXIETY QUESTIONNAIRES
3. WORRYING TOO MUCH ABOUT DIFFERENT THINGS: NEARLY EVERY DAY
7. FEELING AFRAID AS IF SOMETHING AWFUL MIGHT HAPPEN: MORE THAN HALF THE DAYS
6. BECOMING EASILY ANNOYED OR IRRITABLE: MORE THAN HALF THE DAYS
GAD7 TOTAL SCORE: 17
1. FEELING NERVOUS, ANXIOUS, OR ON EDGE: NEARLY EVERY DAY
2. NOT BEING ABLE TO STOP OR CONTROL WORRYING: NEARLY EVERY DAY
5. BEING SO RESTLESS THAT IT IS HARD TO SIT STILL: SEVERAL DAYS

## 2020-02-06 ASSESSMENT — PATIENT HEALTH QUESTIONNAIRE - PHQ9: 5. POOR APPETITE OR OVEREATING: NEARLY EVERY DAY

## 2020-02-06 NOTE — PATIENT INSTRUCTIONS
Thank you for choosing [facility].  You may be receiving an email and/or telephone survey request from [facility] Customer Experience regarding your visit today.  Please take a few minutes to respond to the survey to let us know how we are doing.      If you have questions or concerns, please contact us via [employer] or you can contact your care team at [phone].    Our Clinic hours are:  Monday 6:40 am  to 7:00 pm  Tuesday -Friday 6:40 am to 5:00 pm    The [place] outpatient lab hours are:  Monday - Friday 6:10 am to 4:45 pm  Saturdays 7:00 am to 11:00 am  Appointments are required, call [phone]    If you have clinical questions after hours or would like to schedule an appointment,  call the clinic at [phone].

## 2020-02-06 NOTE — PROGRESS NOTES
Subjective     Molly Teague is a 34 year old female who presents to clinic today for the following health issues:    HPI   Depression and Anxiety Follow-Up    How are you doing with your anxiety since your last visit? Worsened , having more anxiety    Are you having other symptoms that might be associated with anxiety? No    Have you had a significant life event? Health Concerns, fracture of ankle     Are you feeling depressed? Yes:  pain concerncs and little sleep    Do you have any concerns with your use of alcohol or other drugs? No    Social History     Tobacco Use     Smoking status: Never Smoker     Smokeless tobacco: Never Used   Substance Use Topics     Alcohol use: Yes     Comment: Socially once on a weekend if any     Drug use: No     Comment: None     REX-7 SCORE 10/17/2019 12/2/2019 12/12/2019   Total Score 8 17 13     PHQ 11/1/2019 12/2/2019 12/12/2019   PHQ-9 Total Score 13 14 13   Q9: Thoughts of better off dead/self-harm past 2 weeks Not at all Not at all Not at all   F/U: Thoughts of suicide or self-harm - - -   F/U: Self harm-plan - - -   F/U: Self-harm action - - -   F/U: Safety concerns - - -     Last PHQ-9 12/12/2019   1.  Little interest or pleasure in doing things 1   2.  Feeling down, depressed, or hopeless 1   3.  Trouble falling or staying asleep, or sleeping too much 3   4.  Feeling tired or having little energy 2   5.  Poor appetite or overeating 3   6.  Feeling bad about yourself 1   7.  Trouble concentrating 2   8.  Moving slowly or restless 0   Q9: Thoughts of better off dead/self-harm past 2 weeks 0   PHQ-9 Total Score 13   Difficulty at work, home, or with people Very difficult   In the past two weeks have you had thoughts of suicide or self harm? -   Do you have concerns about your personal safety or the safety of others? -   In the past 2 weeks have you thought about a plan or had intention to harm yourself? -   In the past 2 weeks have you acted on these thoughts in any way? -      REX-7  12/12/2019   1. Feeling nervous, anxious, or on edge 2   2. Not being able to stop or control worrying 1   3. Worrying too much about different things 2   4. Trouble relaxing 3   5. Being so restless that it is hard to sit still 1   6. Becoming easily annoyed or irritable 3   7. Feeling afraid, as if something awful might happen 1   REX-7 Total Score 13   If you checked any problems, how difficult have they made it for you to do your work, take care of things at home, or get along with other people? Very difficult         How many servings of fruits and vegetables do you eat daily?  2-3    On average, how many sweetened beverages do you drink each day (Examples: soda, juice, sweet tea, etc.  Do NOT count diet or artificially sweetened beverages)?   0    How many days per week do you exercise enough to make your heart beat faster? 3 or less    How many minutes a day do you exercise enough to make your heart beat faster? 9 or less    How many days per week do you miss taking your medication? 0    PROBLEMS TO ADD ON...  Right ankle fracture- surgery postponed X 2- now having surgery in 4 days. Patient feels very anxious and continues to have a lot of pain. She feels like Flexeril causes muscle spasms- she would like to try a different muscle relaxant. She is asking for a refill of Oxycodone- taking 4 tablets /day- (every 5 -6 hrs) and she is asking for refill of her lorazepam due to increased anxiety since her ankle fracture. She is already taking Buspar and Cymbalta for her anxiety.     Patient Active Problem List   Diagnosis     Nausea with vomiting     Scleroderma (H)     Scleroderma with renal involvement (H)     History of ARDS     Raynaud's disease without gangrene     History of hemodialysis     Bilateral retinitis     History of pulmonary embolism     REX (generalized anxiety disorder)     CREST (calcinosis, Raynaud's phenomenon, esophageal dysfunction, sclerodactyly, telangiectasia) (H)     History  of Clostridium difficile     History of Helicobacter pylori infection     Limited systemic sclerosis (H)     Polyneuropathy associated with underlying disease (H)     AIN grade I     Gastroesophageal reflux disease with esophagitis     Chronic migraine without aura without status migrainosus, not intractable     Chronic pain syndrome     Aspiration of food     Moderate major depression (H)     Severe persistent asthma without complication     Obesity (BMI 35.0-39.9) with comorbidity (H)     PTSD (post-traumatic stress disorder)     Malignant essential hypertension     Mirena IUD- Remove by 2024     Anemia, chronic disease     Diplopia     Ankle fracture, right, closed, initial encounter     CKD (chronic kidney disease) stage 3, GFR 30-59 ml/min (H)     Sinus tachycardia     Pain in joint, ankle and foot, right     Past Surgical History:   Procedure Laterality Date     ANGIOGRAM        SECTION       THROAT SURGERY         Social History     Tobacco Use     Smoking status: Never Smoker     Smokeless tobacco: Never Used   Substance Use Topics     Alcohol use: Yes     Comment: Socially once on a weekend if any     Family History   Problem Relation Age of Onset     Hyperlipidemia Father      Cerebrovascular Disease Maternal Grandmother          of a stroke     Hyperlipidemia Paternal Grandfather      Depression Sister      Fibromyalgia Sister      Diabetes Maternal Aunt      Melanoma No family hx of      Skin Cancer No family hx of            -------------------------------------  Reviewed and updated as needed this visit by Provider         Review of Systems   ROS COMP: Constitutional, HEENT, cardiovascular, pulmonary, GI, , musculoskeletal, neuro, skin, endocrine and psych systems are negative, except as otherwise noted.      Objective    There were no vitals taken for this visit.  There is no height or weight on file to calculate BMI.  Physical Exam   GENERAL: healthy, alert and no  distress  RESP: lungs clear to auscultation - no rales, rhonchi or wheezes  CV: regular rate and rhythm, normal S1 S2, no S3 or S4, no murmur, click or rub, no peripheral edema and peripheral pulses strong  MS: right ankle wrapped in Ace bandage   PSYCH: mentation appears normal, tearful and anxious    Diagnostic Test Results:  Labs reviewed in Epic        Assessment & Plan     1. Ankle fracture, right, closed, initial encounter  - patient scheduled for surgery in 4 days- requesting refill of pain medications until her surgery   Refilled oxyCODONE IR (ROXICODONE) 15 MG tablet; TAKE ONE TABLET BY MOUTH EVERY 4 HOURS AS NEEDED FOR SEVERE PAIN  Dispense: 20 tablet; Refill: 0  - stop Flexeril   - start methocarbamol (ROBAXIN) 750 MG tablet; Take 1 tablet (750 mg) by mouth 4 times daily as needed for muscle spasms  Dispense: 30 tablet; Refill: 2    2. Anxiety  Patient having increase in anxiety due to ankle fracture/postponed surgeries   - continue current therapy of Buspar  - LORazepam (ATIVAN) 1 MG tablet; Take 1 tablet (1 mg) by mouth as needed for anxiety  Dispense: 30 tablet; Refill: 0    3. Chronic pain syndrome    - oxyCODONE IR (ROXICODONE) 15 MG tablet; TAKE ONE TABLET BY MOUTH EVERY 4 HOURS AS NEEDED FOR SEVERE PAIN  Dispense: 20 tablet; Refill: 0       No follow-ups on file.    CHRIST Rodriguez [facility]

## 2020-02-06 NOTE — TELEPHONE ENCOUNTER
Patient is scheduled for surgery with Dr. Villalta    Spoke or left message with: Patient    Date of Surgery: 2/10/20    Location: [place]    Post ops: 2 weeks & 6 weeks    Pre-op with surgeon (if applicable): Complete    H&P: Complete    Additional imaging/appointments: N/A    Surgery packet: Received in clinic     Additional comments: N/A

## 2020-02-06 NOTE — TELEPHONE ENCOUNTER
Molly was phoned back by RNCC regarding her pain management prior to surgery.    Before pt's ankle fracture, she was taking 2-3 tablets 15mg oxycodone per day, she was last given #100 on 1/22/2020.  Since the fracture, she has increased taking the oxycodone.  Pt states she has contacted her PCP clinic and they will manage pain until surgery.  Postop, Dr Villalta will prescribe opioids once, then pt's PCP will continue her pain management.  Claribel Houston RN

## 2020-02-07 ENCOUNTER — ANESTHESIA EVENT (OUTPATIENT)
Dept: SURGERY | Facility: CLINIC | Age: 35
DRG: 982 | End: 2020-02-07
Payer: MEDICARE

## 2020-02-07 ENCOUNTER — TELEPHONE (OUTPATIENT)
Dept: INTERNAL MEDICINE | Facility: CLINIC | Age: 35
End: 2020-02-07

## 2020-02-07 ENCOUNTER — HOSPITAL ENCOUNTER (EMERGENCY)
Facility: CLINIC | Age: 35
Discharge: HOME | End: 2020-02-07
Attending: FAMILY MEDICINE | Admitting: FAMILY MEDICINE
Payer: MEDICARE

## 2020-02-07 VITALS
HEART RATE: 128 BPM | BODY MASS INDEX: 32.56 KG/M2 | RESPIRATION RATE: 16 BRPM | OXYGEN SATURATION: 98 % | DIASTOLIC BLOOD PRESSURE: 65 MMHG | SYSTOLIC BLOOD PRESSURE: 103 MMHG | HEIGHT: 62 IN | TEMPERATURE: 97.5 F

## 2020-02-07 DIAGNOSIS — F41.9 ANXIETY: Primary | ICD-10-CM

## 2020-02-07 DIAGNOSIS — Z47.89 CAST DISCOMFORT: ICD-10-CM

## 2020-02-07 PROCEDURE — 99282 EMERGENCY DEPT VISIT SF MDM: CPT | Mod: Z6 | Performed by: STUDENT IN AN ORGANIZED HEALTH CARE EDUCATION/TRAINING PROGRAM

## 2020-02-07 PROCEDURE — 99283 EMERGENCY DEPT VISIT LOW MDM: CPT

## 2020-02-07 RX ORDER — ALPRAZOLAM 1 MG
1 TABLET ORAL 3 TIMES DAILY PRN
Qty: 21 TABLET | Refills: 0 | Status: SHIPPED | OUTPATIENT
Start: 2020-02-07 | End: 2020-04-16

## 2020-02-07 ASSESSMENT — PATIENT HEALTH QUESTIONNAIRE - PHQ9: SUM OF ALL RESPONSES TO PHQ QUESTIONS 1-9: 20

## 2020-02-07 ASSESSMENT — ANXIETY QUESTIONNAIRES: GAD7 TOTAL SCORE: 17

## 2020-02-07 NOTE — TELEPHONE ENCOUNTER
Reason for call:  Patient reporting a symptom    Symptom or request: Patient calling asking about her BP stating that it has been low 107/67   patient stated that this is very low.     Duration (how long have symptoms been present): the last few days    Have you been treated for this before? Yes    Phone Number patient can be reached at:  Home number on file [phone] (home)    Best Time:  any    Can we leave a detailed message on this number:  YES    Call taken on 2/7/2020 at 2:05 PM by Vashti English

## 2020-02-07 NOTE — TELEPHONE ENCOUNTER
Reason for Call:  Other prescription    Detailed comments: Patient stating that her insurance will not cover the Ativan until 2/18/2020 patient is asking if there is anything else.    Phone Number Patient can be reached at: Home number on file [phone] (home)    Best Time: any    Can we leave a detailed message on this number? YES    Call taken on 2/7/2020 at 1:19 PM by Vashti English

## 2020-02-07 NOTE — TELEPHONE ENCOUNTER
Please see note below  Please advise in provider's absence.    Routing to provider.    Marianne JHA Rn

## 2020-02-07 NOTE — ED AVS SNAPSHOT
[facility] Emergency Department  [address] [address]  Phone:  [phone]  Fax:  [phone]                                    Molly Teague   MRN: [id number]    Department:  [facility] Emergency Department   Date of Visit:  2/7/2020           After Visit Summary Signature Page    I have received my discharge instructions, and my questions have been answered. I have discussed any challenges I see with this plan with the nurse or doctor.    ..........................................................................................................................................  Patient/Patient Representative Signature      ..........................................................................................................................................  Patient Representative Print Name and Relationship to Patient    ..................................................               ................................................  Date                                   Time    ..........................................................................................................................................  Reviewed by Signature/Title    ...................................................              ..............................................  Date                                               Time          22EPIC Rev 08/18

## 2020-02-07 NOTE — TELEPHONE ENCOUNTER
Patient reports:  She is not having any signs/symptoms of hypotension.  She is out of Ativan for 3 days.  She has been taking 2 tablets of Ativan a day since she broke her ankle 1/24/20  She was written a current Rx for 2/6/20 by GABRIELLA Puente, pharmacy will not let her fill it until 2/18/20, because she filled a prescription for Ativan 1/10/20.  Patient is asking for ok to fill for her anxiety.  Please advise    Routing to provider.    Marianne JHA Rn

## 2020-02-07 NOTE — TELEPHONE ENCOUNTER
Notified patient of Xanax sent to pharmacy  Patient aware this is in place of the Ativan.    Routing to provider for FYI, please disregard telephone note about the ATivan refill    Marianne JHA Rn

## 2020-02-08 NOTE — ED NOTES
Pt says her ankle and foot feel much better after splint was rewrapped by MD. CMS at baseline for pt.

## 2020-02-08 NOTE — ED NOTES
Pt has tib/fib fx R ankle from 2 weeks ago, pt says her toes were very dark, has pain. Wrap removed, splint is under R foot. CMS at baseline, pt has hx of neuropathy. Pt is also having heel pain. Toes have pinked up quickly after elevation on pillow.

## 2020-02-08 NOTE — ED PROVIDER NOTES
History     Chief Complaint   Patient presents with     Ankle Pain     HPI  Molly Teague is a 34 year old female with past medical history which includes scleroderma and crest syndrome with recent right sided ankle fracture after injury sustained 1/24/2020 who presents for evaluation of right ankle pain.  Patient explains that she has had 2 separate splints placed on the ankle but is now developing some discomfort along the right medial ankle and heel regions.  She denies weightbearing or recent injury.  Patient is scheduled for orthopedic surgery on Monday but says the discomfort is increasing so she decided to come in for splint evaluation.    Allergies:  Allergies   Allergen Reactions     No Known Allergies      Pollen Extract      Seasonal Allergies      Shellfish-Derived Products Nausea and Rash     Rash on face       Problem List:    Patient Active Problem List    Diagnosis Date Noted     Pain in joint, ankle and foot, right 02/06/2020     Priority: Medium     Added automatically from request for surgery [id number]       CKD (chronic kidney disease) stage 3, GFR 30-59 ml/min (H) 01/31/2020     Priority: Medium     Sinus tachycardia 01/31/2020     Priority: Medium     Ankle fracture, right, closed, initial encounter 01/30/2020     Priority: Medium     Added automatically from request for surgery [id number]       Diplopia 01/25/2020     Priority: Medium     Anemia, chronic disease 11/25/2019     Priority: Medium     Mirena IUD- Remove by 09/2024 09/06/2019     Priority: Medium     Lot: [id number]  Exp: 01/2022    Dinora Israel LPN         Malignant essential hypertension 07/24/2019     Priority: Medium     PTSD (post-traumatic stress disorder) 06/19/2019     Priority: Medium     Obesity (BMI 35.0-39.9) with comorbidity (H) 01/22/2019     Priority: Medium     Moderate major depression (H) 07/06/2018     Priority: Medium     Severe persistent asthma without complication 07/06/2018     Priority: Medium     On high  dose ICS/LABA + frequent albuterol use.  Next allergy/pulmonary referral       Aspiration of food 03/29/2018     Priority: Medium     Chronic pain syndrome 04/26/2017     Priority: Medium     Patient is followed by Grecia Barba DO for ongoing prescription of pain medication.  All refills should only be approved by this provider, or covering partner.    Medication(s): Oxycodone 15 mg.   Maximum quantity per month: 100  Clinic visit frequency required: Q 2 months     Controlled substance agreement:  Encounter-Level CSA - 04/26/2017:    Controlled Substance Agreement - Scan on 5/4/2017  8:58 AM: CONTROLLED SUBSTANCE AGREEMENT (below)       Patient-Level CSA:    Controlled Substance Agreement - Opioid - Scan on 2/6/2019  6:23 PM (below)         Pain Clinic evaluation in the past: No    DIRE Total Score(s):  No flowsheet data found.    Last [facility] website verification:  done on 4/26/17   9/26/2017  7/6/2018  10/16/2018  1/22/2019  8/2/2019           [email or web address]         Chronic migraine without aura without status migrainosus, not intractable 04/12/2017     Priority: Medium     With medication overuse.  Fioricet and not Imitrex is recommend to treat severe headache.  2/2017 [facility] recommend wean down from daily Fioricet and use Excedrin Migrain PRN.       AIN grade I 03/30/2017     Priority: Medium     Found at [facility] on colonoscopy 2/2017.  Recommend repeat anoscopy and anal pap in 6/2017.       Gastroesophageal reflux disease with esophagitis 03/30/2017     Priority: Medium     EGD done at [facility] 2/2017 showed esophagitis without GAVE.  Recommend max dose H2 and PPI, along with 4 tablets of Carafate dissolved in water and drink throughout the day.    Had LA Grade D esophagitis        Limited systemic sclerosis (H) 01/25/2017     Priority: Medium     Raynaud's, calcinosis, telangiectasias, peripheral neuropathy, GERD symptoms, history of scleroderma renal crisis.  Saw [facility] 1/2017 and follows with Rheum  Dr. Davis locally.       Polyneuropathy associated with underlying disease (H) 01/25/2017     Priority: Medium     Due to scleroderma and neurology thought possible due to ICU (critical illness neuropathy).  Recommend to max out dose of gabapentin to 1822-2574 mg/day, split BID or TID.  EMG 1/2017 positive for neuropathy       History of Clostridium difficile 11/29/2016     Priority: Medium     History of Helicobacter pylori infection 11/29/2016     Priority: Medium     Scleroderma with renal involvement (H) 11/09/2016     Priority: Medium     Stopped lisinopril per [facility] 1/2017.  Restarted lisinopril per [place] 3/2017       History of ARDS 11/09/2016     Priority: Medium     4/2015, prolonged ICU/vent/trach due to influenza, scleroderma renal crisis requiring hemodialysis.       Raynaud's disease without gangrene 11/09/2016     Priority: Medium     History of hemodialysis 11/09/2016     Priority: Medium     4/2015 due to scleroderma renal crisis       Bilateral retinitis 11/09/2016     Priority: Medium     History of pulmonary embolism 11/09/2016     Priority: Medium     Completed anti-coagulation.       REX (generalized anxiety disorder) 11/09/2016     Priority: Medium     CREST (calcinosis, Raynaud's phenomenon, esophageal dysfunction, sclerodactyly, telangiectasia) (H) 11/09/2016     Priority: Medium     Scleroderma (H) 09/22/2016     Priority: Medium     Nausea with vomiting 09/21/2016     Priority: Medium        Past Medical History:    Past Medical History:   Diagnosis Date     Acute pyelonephritis 6/9/2017     Altered mental state 3/29/2018     Arthritis      Blood clotting disorder (H)      History of blood transfusion      Hypertension      Long-term (current) use of anticoagulants [Z79.01] 9/9/2016     PE (pulmonary embolism) 9/7/2016     Rheumatism      Scleroderma (H) 9/22/2016     Uncomplicated asthma        Past Surgical History:    Past Surgical History:   Procedure Laterality Date     ANGIOGRAM   2015      SECTION  2014     THROAT SURGERY  2015       Family History:    Family History   Problem Relation Age of Onset     Hyperlipidemia Father      Cerebrovascular Disease Maternal Grandmother          of a stroke     Hyperlipidemia Paternal Grandfather      Depression Sister      Fibromyalgia Sister      Diabetes Maternal Aunt      Melanoma No family hx of      Skin Cancer No family hx of        Social History:  Marital Status:  Single [1]  Social History     Tobacco Use     Smoking status: Never Smoker     Smokeless tobacco: Never Used   Substance Use Topics     Alcohol use: Yes     Comment: Socially once on a weekend if any     Drug use: No     Comment: None        Medications:    ALPRAZolam (XANAX) 1 MG tablet  amLODIPine (NORVASC) 5 MG tablet  blood glucose (NO BRAND SPECIFIED) lancets standard  blood glucose (NO BRAND SPECIFIED) test strip  budesonide-formoterol (SYMBICORT) 160-4.5 MCG/ACT Inhaler  busPIRone HCl (BUSPAR) 30 MG tablet  Cyanocobalamin (B-12) 1000 MCG TBCR  Doxylamine Succinate, Sleep, (UNISOM PO)  DULoxetine (CYMBALTA) 30 MG capsule  famotidine (PEPCID) 20 MG tablet  ferrous gluconate (FERGON) 324 (38 FE) MG tablet  folic acid (FOLVITE) 1 MG tablet  gabapentin (NEURONTIN) 300 MG capsule  GOODSENSE MIGRAINE FORMULA 250-250-65 MG per tablet  hydrOXYzine (ATARAX) 25 MG tablet  lisinopril (PRINIVIL/ZESTRIL) 10 MG tablet  LORazepam (ATIVAN) 1 MG tablet  methocarbamol (ROBAXIN) 750 MG tablet  montelukast (SINGULAIR) 10 MG tablet  naloxone (NARCAN) 4 MG/0.1ML nasal spray  omeprazole (PRILOSEC) 40 MG DR capsule  ondansetron (ZOFRAN-ODT) 8 MG ODT tab  oxyCODONE IR (ROXICODONE) 15 MG tablet  polyethylene glycol (MIRALAX) powder  promethazine (PHENERGAN) 25 MG tablet  promethazine (PHENERGAN) 50 MG/ML SOLN IV injection  VENTOLIN  (90 Base) MCG/ACT inhaler          Review of Systems  Constitutional:  Negative for fever or recent illness.  Musculoskeletal: Positive right ankle  "pain.  Neurological:  Negative for sensory deficits.    All others reviewed and are negative.      Physical Exam   BP: 103/65  Pulse: 128  Temp: 97.5  F (36.4  C)  Resp: 16  Height: 157.5 cm (5' 2\")  SpO2: 98 %      Physical Exam  Constitutional:  Well developed, well nourished.  Appears nontoxic and in no acute distress.    HENT:  Normocephalic and atraumatic.  Symmetric in appearance.  Cardiovascular:  No cyanosis.    Respiratory:  Effort normal without sign of respiratory distress.    Musculoskeletal: Contusions of right medial calf and ankle.  Sensation intact of right dorsal foot, plantar foot, and digits including deep fibular distribution.  Achilles tendon intact.  2/4 palpable dorsalis pedis and posterior tibial pulses.  No cyanosis and capillary refill less than 2 seconds in each digit.    Neurological:  Patient is alert.  Skin:  Skin is warm and dry.  Psychiatric:  Normal mood and affect.      ED Course        Procedures               Critical Care time:  none               No results found for this or any previous visit (from the past 24 hour(s)).    Medications - No data to display    Assessments & Plan (with Medical Decision Making)   oMlly Teague is a 34 year old female who presents to the department for evaluation of splint discomfort.  This is the second splint she has been placed in since suffering an injury 2 weeks ago, scheduled for orthopedic surgery on Monday.  No significant skin tissue breakdown or wound.  Significant padding was replaced and splint reused, patient admits to improvement in comfort.  Recommend she continue monitoring and return for any developing concerns.          Disclaimer:  This note consists of symbols derived from keyboarding, dictation, and/or voice recognition software.  As a result, there may be errors in the script that have gone undetected.  Please consider this when interpreting information found in the chart.        I have reviewed the nursing notes.    I have " reviewed the findings, diagnosis, plan and need for follow up with the patient.      New Prescriptions    No medications on file       Final diagnoses:   Cast discomfort       2/7/2020   [facility] EMERGENCY DEPARTMENT     Fantasma Waddell DO  02/07/20 [id number]

## 2020-02-09 RX ORDER — GABAPENTIN 100 MG/1
100 CAPSULE ORAL ONCE
Status: CANCELLED | OUTPATIENT
Start: 2020-02-09 | End: 2020-02-09

## 2020-02-09 RX ORDER — ACETAMINOPHEN 325 MG/1
650 TABLET ORAL ONCE
Status: CANCELLED | OUTPATIENT
Start: 2020-02-09 | End: 2020-02-09

## 2020-02-09 NOTE — ANESTHESIA PREPROCEDURE EVALUATION
Anesthesia Pre-Procedure Evaluation    Patient: Molly Teague   MRN:     [id number] Gender:   female   Age:    34 year old :      1985        Preoperative Diagnosis: Pain in joint, ankle and foot, right [M25.571]   Procedure(s):  Right ankle open reduction internal fixation     Past Medical History:   Diagnosis Date     Acute pyelonephritis 2017     Altered mental state 3/29/2018     Arthritis      Blood clotting disorder (H)     P E     History of blood transfusion      Hypertension      Long-term (current) use of anticoagulants [Z79.01] 2016     PE (pulmonary embolism) 2016     Rheumatism      Scleroderma (H) 2016     Uncomplicated asthma       Past Surgical History:   Procedure Laterality Date     ANGIOGRAM        SECTION       THROAT SURGERY            Anesthesia Evaluation     . Pt has had prior anesthetic. Type: General    No history of anesthetic complications          ROS/MED HX    ENT/Pulmonary:     (+)Intermittent asthma Treatment: Inhaler prn,  , . Other pulmonary disease H/o tracheostomy while critically ill 4 years ago, decanullated. .    Neurologic:     (+)other neuro intermitent numbness hands     Cardiovascular:     (+) hypertension----. : . . . :. . Previous cardiac testing Echodate:2018results:1. The left ventricle is normal in structure, function and size. The visual ejection fraction is estimated at 60%.  2. The right ventricle is normal in structure, function and size.  3. No valve disease.  date: results:ECG reviewed date: results:Sinus tachycardia. date: results:          METS/Exercise Tolerance:  3 - Able to walk 1-2 blocks without stopping   Hematologic:     (+) History of blood clots pt is not anticoagulated, Anemia, -      Musculoskeletal:   (+) arthritis,  other musculoskeletal- Sceloroderma and Crest sydrome.      GI/Hepatic:     (+) GERD Asymptomatic on medication,       Renal/Genitourinary: Comment: Required HD for almost one year  around 3-4 years ago.     (+) chronic renal disease, Pt does not require dialysis,       Endo:     (+) Obesity, .      Psychiatric:  - neg psychiatric ROS       Infectious Disease:  - neg infectious disease ROS       Malignancy:      - no malignancy   Other: Comment: Scleroderma, CREST syndrome.    (+) H/O Chronic Pain,H/O chronic opiod use ,   - neg other ROS                     PHYSICAL EXAM:   Mental Status/Neuro: A/A/O   Airway: Facies: Challenging (very small mouth opening. )  Mallampati: IV  Mouth/Opening: Minimal  TM distance: > 6 cm  Neck ROM: Limited   Respiratory: Auscultation: CTAB     Resp. Rate: Normal     Resp. Effort: Normal      CV: Rhythm: Regular  Rate: Age appropriate  Heart: Normal Sounds  Edema: None   Comments:      Dental: Normal Dentition                LABS:  CBC:   Lab Results   Component Value Date    WBC 14.6 (H) 01/25/2020    WBC 9.7 12/02/2019    HGB 9.3 (L) 01/25/2020    HGB 10.7 (L) 12/02/2019    HCT 31.6 (L) 01/25/2020    HCT 35.1 12/02/2019     01/25/2020     12/02/2019     BMP:   Lab Results   Component Value Date     01/25/2020     12/02/2019    POTASSIUM 4.1 01/25/2020    POTASSIUM 4.0 12/02/2019    CHLORIDE 104 01/25/2020    CHLORIDE 105 12/02/2019    CO2 22 01/25/2020    CO2 20 12/02/2019    BUN 23 01/25/2020    BUN 15 12/02/2019    CR 1.50 (H) 01/25/2020    CR 1.22 (H) 12/02/2019    GLC 94 01/25/2020    GLC 78 12/02/2019     COAGS:   Lab Results   Component Value Date    PTT 39 (H) 03/30/2018    INR 1.15 (H) 03/30/2018     POC:   Lab Results   Component Value Date    BGM 87 04/03/2018    HCG Negative 09/06/2019    HCGS Negative 11/24/2019     OTHER:   Lab Results   Component Value Date    LACT 1.5 01/25/2020    UMER 9.9 01/25/2020    MAG 1.6 01/25/2020    ALBUMIN 3.7 01/25/2020    PROTTOTAL 7.6 01/25/2020    ALT 27 01/25/2020    AST 46 (H) 01/25/2020    ALKPHOS 73 01/25/2020    BILITOTAL 0.3 01/25/2020    LIPASE 123 07/24/2019    TSH 5.94 (H)  "01/25/2020    T4 0.91 01/25/2020    .0 (H) 06/08/2017     (H) 05/11/2017        Preop Vitals    BP Readings from Last 3 Encounters:   02/07/20 103/65   02/06/20 114/70   01/31/20 126/76    Pulse Readings from Last 3 Encounters:   02/07/20 128   02/06/20 100   01/31/20 110      Resp Readings from Last 3 Encounters:   02/07/20 16   02/06/20 14   01/31/20 12    SpO2 Readings from Last 3 Encounters:   02/07/20 98%   02/06/20 94%   01/31/20 94%      Temp Readings from Last 1 Encounters:   02/07/20 36.4  C (97.5  F) (Temporal)    Ht Readings from Last 1 Encounters:   02/07/20 1.575 m (5' 2\")      Wt Readings from Last 1 Encounters:   02/06/20 80.7 kg (178 lb)    Estimated body mass index is 32.56 kg/m  as calculated from the following:    Height as of 2/7/20: 1.575 m (5' 2\").    Weight as of 2/6/20: 80.7 kg (178 lb).     LDA:        Assessment:   ASA SCORE: 3    H&P: History and physical reviewed and following examination; no interval change.    NPO Status: NPO Appropriate     Plan:   Anes. Type:  Spinal   Pre-Medication: None   Induction:  IV (Standard)   Airway: CMAC/VL (Have diificult airway cart available.)   Access/Monitoring: PIV   Maintenance: Balanced     Postop Plan:   Postop Pain: Opioids  Postop Sedation/Airway: Not planned     PONV Management:   Adult Risk Factors: Female, Postop Opioids   Prevention: Ondansetron, Dexamethasone     CONSENT: Direct conversation   Plan and risks discussed with: Patient          Comments for Plan/Consent:  If the patient needs intubation, we are planing to do awake fiberoptic. Plan to do a spinal and have the difficult intubation cart available.          No current facility-administered medications on file prior to encounter.   amLODIPine (NORVASC) 5 MG tablet, Take 5 mg by mouth daily  blood glucose (NO BRAND SPECIFIED) lancets standard, Use to test blood sugar 1 time daily  blood glucose (NO BRAND SPECIFIED) test strip, Use to test blood sugar 1 time " daily  budesonide-formoterol (SYMBICORT) 160-4.5 MCG/ACT Inhaler, Inhale 2 puffs into the lungs 2 times daily  busPIRone HCl (BUSPAR) 30 MG tablet, TAKE ONE TABLET BY MOUTH TWICE A DAY (Patient taking differently: Take 30 mg by mouth 2 times daily )  Cyanocobalamin (B-12) 1000 MCG TBCR, Take 1,000 mcg by mouth daily  Doxylamine Succinate, Sleep, (UNISOM PO), Take 1 tablet by mouth nightly as needed   DULoxetine (CYMBALTA) 30 MG capsule, Take 1 capsule (30 mg) by mouth 2 times daily  famotidine (PEPCID) 20 MG tablet, Take 1 tablet (20 mg) by mouth 2 times daily  ferrous gluconate (FERGON) 324 (38 FE) MG tablet, TAKE ONE TABLET BY MOUTH EVERY DAY WITH BREAKFAST (Patient taking differently: Take 324 mg by mouth daily (with breakfast) )  folic acid (FOLVITE) 1 MG tablet, Take 1 tablet by mouth daily  gabapentin (NEURONTIN) 300 MG capsule, TAKE TWO CAPSULES BY MOUTH THREE TIMES A DAY (Patient taking differently: Take 600 mg by mouth 3 times daily )  GOODSENSE MIGRAINE FORMULA 250-250-65 MG per tablet, TAKE ONE TABLET BY MOUTH EVERY 6 HOURS AS NEEDED FOR HEADACHES (Patient taking differently: Take 1 tablet by mouth every 6 hours as needed )  hydrOXYzine (ATARAX) 25 MG tablet, Take 1-2 tablets (25-50 mg) by mouth 3 times daily as needed for itching  lisinopril (PRINIVIL/ZESTRIL) 10 MG tablet, Take 10 mg by mouth daily  LORazepam (ATIVAN) 1 MG tablet, Take 1 tablet (1 mg) by mouth as needed for anxiety  methocarbamol (ROBAXIN) 750 MG tablet, Take 1 tablet (750 mg) by mouth 4 times daily as needed for muscle spasms  montelukast (SINGULAIR) 10 MG tablet, TAKE ONE TABLET BY MOUTH AT BEDTIME (Patient taking differently: Take 10 mg by mouth At Bedtime )  naloxone (NARCAN) 4 MG/0.1ML nasal spray, Spray 1 spray (4 mg) into one nostril alternating nostrils once as needed for opioid reversal every 2-3 minutes until assistance arrives  omeprazole (PRILOSEC) 40 MG DR capsule, TAKE ONE CAPSULE BY MOUTH TWICE A DAY  ondansetron  (ZOFRAN-ODT) 8 MG ODT tab, Take 1 tablet (8 mg) by mouth every 8 hours as needed for nausea  oxyCODONE IR (ROXICODONE) 15 MG tablet, TAKE ONE TABLET BY MOUTH EVERY 4 HOURS AS NEEDED FOR SEVERE PAIN  polyethylene glycol (MIRALAX) powder, Take 17 g (1 capful) by mouth daily  promethazine (PHENERGAN) 25 MG tablet, Take 1 tablet (25 mg) by mouth 2 times daily as needed for nausea  promethazine (PHENERGAN) 50 MG/ML SOLN IV injection, Inject 0.5 mL (25 mg) into the muscle every 6 hours as needed  VENTOLIN  (90 Base) MCG/ACT inhaler, INHALE 2 PUFFS INTO THE LUNGS ONCE EVERY 4 HOURS AS NEEDED FOR SHORTNESS OF BREATH / DYSPNEA / WHEEZING (Patient taking differently: Inhale 2 puffs into the lungs every 4 hours as needed for shortness of breath / dyspnea or wheezing )              Brook Joseph MD

## 2020-02-10 ENCOUNTER — HOSPITAL ENCOUNTER (INPATIENT)
Facility: CLINIC | Age: 35
LOS: 1 days | Discharge: HOME | DRG: 982 | End: 2020-02-12
Attending: ORTHOPAEDIC SURGERY | Admitting: ORTHOPAEDIC SURGERY
Payer: MEDICARE

## 2020-02-10 ENCOUNTER — APPOINTMENT (OUTPATIENT)
Dept: GENERAL RADIOLOGY | Facility: CLINIC | Age: 35
DRG: 982 | End: 2020-02-10
Attending: ORTHOPAEDIC SURGERY
Payer: MEDICARE

## 2020-02-10 ENCOUNTER — ANESTHESIA (OUTPATIENT)
Dept: SURGERY | Facility: CLINIC | Age: 35
DRG: 982 | End: 2020-02-10
Payer: MEDICARE

## 2020-02-10 DIAGNOSIS — M25.571 PAIN IN JOINT, ANKLE AND FOOT, RIGHT: ICD-10-CM

## 2020-02-10 DIAGNOSIS — Z98.890 S/P ORIF (OPEN REDUCTION INTERNAL FIXATION) FRACTURE: ICD-10-CM

## 2020-02-10 DIAGNOSIS — S82.891A ANKLE FRACTURE, RIGHT, CLOSED, INITIAL ENCOUNTER: Primary | ICD-10-CM

## 2020-02-10 DIAGNOSIS — Z87.81 S/P ORIF (OPEN REDUCTION INTERNAL FIXATION) FRACTURE: ICD-10-CM

## 2020-02-10 LAB
GLUCOSE SERPL-MCNC: 88 MG/DL (ref 70–99)
HCG SERPL QL: NEGATIVE
POTASSIUM SERPL-SCNC: 4.4 MMOL/L (ref 3.4–5.3)

## 2020-02-10 PROCEDURE — 25000128 H RX IP 250 OP 636: Performed by: STUDENT IN AN ORGANIZED HEALTH CARE EDUCATION/TRAINING PROGRAM

## 2020-02-10 PROCEDURE — 71000014 ZZH RECOVERY PHASE 1 LEVEL 2 FIRST HR: Performed by: ORTHOPAEDIC SURGERY

## 2020-02-10 PROCEDURE — 25800030 ZZH RX IP 258 OP 636

## 2020-02-10 PROCEDURE — 36000064 ZZH SURGERY LEVEL 4 EA 15 ADDTL MIN - UMMC: Performed by: ORTHOPAEDIC SURGERY

## 2020-02-10 PROCEDURE — 71000015 ZZH RECOVERY PHASE 1 LEVEL 2 EA ADDTL HR: Performed by: ORTHOPAEDIC SURGERY

## 2020-02-10 PROCEDURE — 86902 BLOOD TYPE ANTIGEN DONOR EA: CPT | Performed by: ANESTHESIOLOGY

## 2020-02-10 PROCEDURE — 25000128 H RX IP 250 OP 636

## 2020-02-10 PROCEDURE — 86905 BLOOD TYPING RBC ANTIGENS: CPT | Performed by: ANESTHESIOLOGY

## 2020-02-10 PROCEDURE — 27210794 ZZH OR GENERAL SUPPLY STERILE: Performed by: ORTHOPAEDIC SURGERY

## 2020-02-10 PROCEDURE — 36000066 ZZH SURGERY LEVEL 4 W FLUORO 1ST 30 MIN - UMMC: Performed by: ORTHOPAEDIC SURGERY

## 2020-02-10 PROCEDURE — 86922 COMPATIBILITY TEST ANTIGLOB: CPT | Performed by: ANESTHESIOLOGY

## 2020-02-10 PROCEDURE — 40000278 XR SURGERY CARM FLUORO LESS THAN 5 MIN: Mod: TC

## 2020-02-10 PROCEDURE — C1713 ANCHOR/SCREW BN/BN,TIS/BN: HCPCS | Performed by: ORTHOPAEDIC SURGERY

## 2020-02-10 PROCEDURE — 40000171 ZZH STATISTIC PRE-PROCEDURE ASSESSMENT III: Performed by: ORTHOPAEDIC SURGERY

## 2020-02-10 PROCEDURE — 25000132 ZZH RX MED GY IP 250 OP 250 PS 637: Mod: GY | Performed by: PHYSICIAN ASSISTANT

## 2020-02-10 PROCEDURE — 99232 SBSQ HOSP IP/OBS MODERATE 35: CPT | Performed by: PHYSICIAN ASSISTANT

## 2020-02-10 PROCEDURE — 84703 CHORIONIC GONADOTROPIN ASSAY: CPT | Performed by: ANESTHESIOLOGY

## 2020-02-10 PROCEDURE — 99207 ZZC CONSULT E&M CHANGED TO SUBSEQUENT LEVEL: CPT | Performed by: PHYSICIAN ASSISTANT

## 2020-02-10 PROCEDURE — 25000125 ZZHC RX 250

## 2020-02-10 PROCEDURE — 86900 BLOOD TYPING SEROLOGIC ABO: CPT | Performed by: ANESTHESIOLOGY

## 2020-02-10 PROCEDURE — 86850 RBC ANTIBODY SCREEN: CPT | Performed by: ANESTHESIOLOGY

## 2020-02-10 PROCEDURE — 36415 COLL VENOUS BLD VENIPUNCTURE: CPT | Performed by: ANESTHESIOLOGY

## 2020-02-10 PROCEDURE — 27110028 ZZH OR GENERAL SUPPLY NON-STERILE: Performed by: ORTHOPAEDIC SURGERY

## 2020-02-10 PROCEDURE — 37000008 ZZH ANESTHESIA TECHNICAL FEE, 1ST 30 MIN: Performed by: ORTHOPAEDIC SURGERY

## 2020-02-10 PROCEDURE — 84132 ASSAY OF SERUM POTASSIUM: CPT | Performed by: ANESTHESIOLOGY

## 2020-02-10 PROCEDURE — 25000128 H RX IP 250 OP 636: Performed by: ORTHOPAEDIC SURGERY

## 2020-02-10 PROCEDURE — 25800030 ZZH RX IP 258 OP 636: Performed by: ANESTHESIOLOGY

## 2020-02-10 PROCEDURE — 25000125 ZZHC RX 250: Performed by: ANESTHESIOLOGY

## 2020-02-10 PROCEDURE — 25000132 ZZH RX MED GY IP 250 OP 250 PS 637: Mod: GY | Performed by: ANESTHESIOLOGY

## 2020-02-10 PROCEDURE — 0QSG04Z REPOSITION RIGHT TIBIA WITH INTERNAL FIXATION DEVICE, OPEN APPROACH: ICD-10-PCS | Performed by: ORTHOPAEDIC SURGERY

## 2020-02-10 PROCEDURE — 82947 ASSAY GLUCOSE BLOOD QUANT: CPT | Performed by: ANESTHESIOLOGY

## 2020-02-10 PROCEDURE — 25000128 H RX IP 250 OP 636: Performed by: ANESTHESIOLOGY

## 2020-02-10 PROCEDURE — 86901 BLOOD TYPING SEROLOGIC RH(D): CPT | Performed by: ANESTHESIOLOGY

## 2020-02-10 PROCEDURE — 96374 THER/PROPH/DIAG INJ IV PUSH: CPT

## 2020-02-10 PROCEDURE — 37000009 ZZH ANESTHESIA TECHNICAL FEE, EACH ADDTL 15 MIN: Performed by: ORTHOPAEDIC SURGERY

## 2020-02-10 PROCEDURE — G0378 HOSPITAL OBSERVATION PER HR: HCPCS

## 2020-02-10 PROCEDURE — 25800030 ZZH RX IP 258 OP 636: Performed by: PHYSICIAN ASSISTANT

## 2020-02-10 PROCEDURE — 86870 RBC ANTIBODY IDENTIFICATION: CPT | Performed by: ANESTHESIOLOGY

## 2020-02-10 PROCEDURE — 0QSJ04Z REPOSITION RIGHT FIBULA WITH INTERNAL FIXATION DEVICE, OPEN APPROACH: ICD-10-PCS | Performed by: ORTHOPAEDIC SURGERY

## 2020-02-10 PROCEDURE — 96375 TX/PRO/DX INJ NEW DRUG ADDON: CPT

## 2020-02-10 DEVICE — IMP SCR ZIM CANC HEX 4.0X16MM FT
Type: IMPLANTABLE DEVICE | Site: ANKLE | Status: NON-FUNCTIONAL
Removed: 2021-09-01

## 2020-02-10 DEVICE — IMP PLATE ZIM 1/3 TUBULAR 3.5X73MM 06H 00-4935-006-03
Type: IMPLANTABLE DEVICE | Site: ANKLE | Status: NON-FUNCTIONAL
Removed: 2021-09-01

## 2020-02-10 DEVICE — IMP SCR ZIM CORT 3.5X14MM SELF TAP SM HEX SS
Type: IMPLANTABLE DEVICE | Site: ANKLE | Status: NON-FUNCTIONAL
Removed: 2021-09-01

## 2020-02-10 DEVICE — IMP SCREW CANC HEX RECESS 4.0X50X15MM  00-4840-050-01
Type: IMPLANTABLE DEVICE | Site: ANKLE | Status: NON-FUNCTIONAL
Removed: 2021-09-01

## 2020-02-10 DEVICE — IMPLANTABLE DEVICE
Type: IMPLANTABLE DEVICE | Site: ANKLE | Status: NON-FUNCTIONAL
Removed: 2021-09-01

## 2020-02-10 RX ORDER — BUSPIRONE HYDROCHLORIDE 15 MG/1
30 TABLET ORAL 2 TIMES DAILY
Status: DISCONTINUED | OUTPATIENT
Start: 2020-02-10 | End: 2020-02-12 | Stop reason: HOSPADM

## 2020-02-10 RX ORDER — FENTANYL CITRATE 50 UG/ML
INJECTION, SOLUTION INTRAMUSCULAR; INTRAVENOUS PRN
Status: DISCONTINUED | OUTPATIENT
Start: 2020-02-10 | End: 2020-02-10

## 2020-02-10 RX ORDER — OXYCODONE HYDROCHLORIDE 5 MG/1
5 TABLET ORAL ONCE
Status: DISCONTINUED | OUTPATIENT
Start: 2020-02-10 | End: 2020-02-12 | Stop reason: HOSPADM

## 2020-02-10 RX ORDER — CEFAZOLIN SODIUM 2 G/100ML
2 INJECTION, SOLUTION INTRAVENOUS
Status: COMPLETED | OUTPATIENT
Start: 2020-02-10 | End: 2020-02-10

## 2020-02-10 RX ORDER — BUDESONIDE AND FORMOTEROL FUMARATE DIHYDRATE 160; 4.5 UG/1; UG/1
2 AEROSOL RESPIRATORY (INHALATION) 2 TIMES DAILY
Status: DISCONTINUED | OUTPATIENT
Start: 2020-02-10 | End: 2020-02-10

## 2020-02-10 RX ORDER — LANOLIN ALCOHOL/MO/W.PET/CERES
1000 CREAM (GRAM) TOPICAL DAILY
Status: DISCONTINUED | OUTPATIENT
Start: 2020-02-10 | End: 2020-02-12 | Stop reason: HOSPADM

## 2020-02-10 RX ORDER — LORAZEPAM 1 MG/1
1 TABLET ORAL DAILY PRN
Status: DISCONTINUED | OUTPATIENT
Start: 2020-02-10 | End: 2020-02-10

## 2020-02-10 RX ORDER — LIDOCAINE 40 MG/G
CREAM TOPICAL
Status: DISCONTINUED | OUTPATIENT
Start: 2020-02-10 | End: 2020-02-10 | Stop reason: HOSPADM

## 2020-02-10 RX ORDER — BUPIVACAINE HYDROCHLORIDE 7.5 MG/ML
INJECTION, SOLUTION INTRASPINAL PRN
Status: DISCONTINUED | OUTPATIENT
Start: 2020-02-10 | End: 2020-02-10

## 2020-02-10 RX ORDER — METOCLOPRAMIDE HYDROCHLORIDE 5 MG/ML
10 INJECTION INTRAMUSCULAR; INTRAVENOUS EVERY 6 HOURS PRN
Status: DISCONTINUED | OUTPATIENT
Start: 2020-02-10 | End: 2020-02-12 | Stop reason: HOSPADM

## 2020-02-10 RX ORDER — CEFAZOLIN SODIUM 1 G/3ML
1 INJECTION, POWDER, FOR SOLUTION INTRAMUSCULAR; INTRAVENOUS SEE ADMIN INSTRUCTIONS
Status: DISCONTINUED | OUTPATIENT
Start: 2020-02-10 | End: 2020-02-10 | Stop reason: HOSPADM

## 2020-02-10 RX ORDER — SODIUM CHLORIDE, SODIUM LACTATE, POTASSIUM CHLORIDE, CALCIUM CHLORIDE 600; 310; 30; 20 MG/100ML; MG/100ML; MG/100ML; MG/100ML
INJECTION, SOLUTION INTRAVENOUS CONTINUOUS
Status: DISCONTINUED | OUTPATIENT
Start: 2020-02-10 | End: 2020-02-10 | Stop reason: HOSPADM

## 2020-02-10 RX ORDER — POLYETHYLENE GLYCOL 3350 17 G/17G
17 POWDER, FOR SOLUTION ORAL DAILY
Status: DISCONTINUED | OUTPATIENT
Start: 2020-02-11 | End: 2020-02-12 | Stop reason: HOSPADM

## 2020-02-10 RX ORDER — ONDANSETRON 2 MG/ML
4 INJECTION INTRAMUSCULAR; INTRAVENOUS EVERY 6 HOURS PRN
Status: DISCONTINUED | OUTPATIENT
Start: 2020-02-10 | End: 2020-02-12 | Stop reason: HOSPADM

## 2020-02-10 RX ORDER — FENTANYL CITRATE 50 UG/ML
25-50 INJECTION, SOLUTION INTRAMUSCULAR; INTRAVENOUS
Status: DISCONTINUED | OUTPATIENT
Start: 2020-02-10 | End: 2020-02-10

## 2020-02-10 RX ORDER — CALCIUM CARBONATE 500 MG/1
500 TABLET, CHEWABLE ORAL DAILY PRN
Status: DISCONTINUED | OUTPATIENT
Start: 2020-02-10 | End: 2020-02-10 | Stop reason: HOSPADM

## 2020-02-10 RX ORDER — CITRIC ACID/SODIUM CITRATE 334-500MG
30 SOLUTION, ORAL ORAL ONCE
Status: COMPLETED | OUTPATIENT
Start: 2020-02-10 | End: 2020-02-10

## 2020-02-10 RX ORDER — ONDANSETRON 2 MG/ML
4 INJECTION INTRAMUSCULAR; INTRAVENOUS EVERY 30 MIN PRN
Status: COMPLETED | OUTPATIENT
Start: 2020-02-10 | End: 2020-02-10

## 2020-02-10 RX ORDER — ONDANSETRON 4 MG/1
4 TABLET, ORALLY DISINTEGRATING ORAL EVERY 30 MIN PRN
Status: COMPLETED | OUTPATIENT
Start: 2020-02-10 | End: 2020-02-10

## 2020-02-10 RX ORDER — HYDROXYZINE HYDROCHLORIDE 25 MG/1
25 TABLET, FILM COATED ORAL EVERY 6 HOURS PRN
Status: DISCONTINUED | OUTPATIENT
Start: 2020-02-10 | End: 2020-02-12 | Stop reason: HOSPADM

## 2020-02-10 RX ORDER — ALPRAZOLAM 0.5 MG
1 TABLET ORAL 3 TIMES DAILY PRN
Status: DISCONTINUED | OUTPATIENT
Start: 2020-02-10 | End: 2020-02-12 | Stop reason: HOSPADM

## 2020-02-10 RX ORDER — METHOCARBAMOL 750 MG/1
750 TABLET, FILM COATED ORAL 4 TIMES DAILY PRN
Status: DISCONTINUED | OUTPATIENT
Start: 2020-02-10 | End: 2020-02-10

## 2020-02-10 RX ORDER — ALBUTEROL SULFATE 90 UG/1
2 AEROSOL, METERED RESPIRATORY (INHALATION) EVERY 4 HOURS PRN
Status: DISCONTINUED | OUTPATIENT
Start: 2020-02-10 | End: 2020-02-12 | Stop reason: HOSPADM

## 2020-02-10 RX ORDER — ACETAMINOPHEN 325 MG/1
650 TABLET ORAL EVERY 4 HOURS PRN
Status: DISCONTINUED | OUTPATIENT
Start: 2020-02-13 | End: 2020-02-12 | Stop reason: HOSPADM

## 2020-02-10 RX ORDER — GABAPENTIN 300 MG/1
300 CAPSULE ORAL ONCE
Status: DISCONTINUED | OUTPATIENT
Start: 2020-02-10 | End: 2020-02-10 | Stop reason: HOSPADM

## 2020-02-10 RX ORDER — CEFAZOLIN SODIUM 2 G/100ML
2 INJECTION, SOLUTION INTRAVENOUS EVERY 8 HOURS
Status: DISCONTINUED | OUTPATIENT
Start: 2020-02-10 | End: 2020-02-12 | Stop reason: HOSPADM

## 2020-02-10 RX ORDER — GABAPENTIN 300 MG/1
600 CAPSULE ORAL 3 TIMES DAILY
Status: DISCONTINUED | OUTPATIENT
Start: 2020-02-10 | End: 2020-02-12 | Stop reason: HOSPADM

## 2020-02-10 RX ORDER — METHOCARBAMOL 750 MG/1
750 TABLET, FILM COATED ORAL 4 TIMES DAILY PRN
Status: DISCONTINUED | OUTPATIENT
Start: 2020-02-10 | End: 2020-02-12 | Stop reason: HOSPADM

## 2020-02-10 RX ORDER — ONDANSETRON 4 MG/1
8 TABLET, ORALLY DISINTEGRATING ORAL EVERY 8 HOURS PRN
Status: DISCONTINUED | OUTPATIENT
Start: 2020-02-10 | End: 2020-02-10

## 2020-02-10 RX ORDER — FERROUS GLUCONATE 324(38)MG
324 TABLET ORAL
Status: DISCONTINUED | OUTPATIENT
Start: 2020-02-11 | End: 2020-02-12 | Stop reason: HOSPADM

## 2020-02-10 RX ORDER — GABAPENTIN 300 MG/1
600 CAPSULE ORAL 3 TIMES DAILY
Status: DISCONTINUED | OUTPATIENT
Start: 2020-02-10 | End: 2020-02-10

## 2020-02-10 RX ORDER — ACETAMINOPHEN 325 MG/1
975 TABLET ORAL ONCE
Status: COMPLETED | OUTPATIENT
Start: 2020-02-10 | End: 2020-02-10

## 2020-02-10 RX ORDER — PROMETHAZINE HYDROCHLORIDE 25 MG/1
25 TABLET ORAL 2 TIMES DAILY PRN
Status: DISCONTINUED | OUTPATIENT
Start: 2020-02-10 | End: 2020-02-10

## 2020-02-10 RX ORDER — ACETAMINOPHEN 325 MG/1
975 TABLET ORAL EVERY 8 HOURS
Status: DISCONTINUED | OUTPATIENT
Start: 2020-02-10 | End: 2020-02-12 | Stop reason: HOSPADM

## 2020-02-10 RX ORDER — HYDROMORPHONE HYDROCHLORIDE 1 MG/ML
.5-1 INJECTION, SOLUTION INTRAMUSCULAR; INTRAVENOUS; SUBCUTANEOUS
Status: DISCONTINUED | OUTPATIENT
Start: 2020-02-10 | End: 2020-02-12 | Stop reason: HOSPADM

## 2020-02-10 RX ORDER — SODIUM CHLORIDE, SODIUM LACTATE, POTASSIUM CHLORIDE, CALCIUM CHLORIDE 600; 310; 30; 20 MG/100ML; MG/100ML; MG/100ML; MG/100ML
INJECTION, SOLUTION INTRAVENOUS CONTINUOUS PRN
Status: DISCONTINUED | OUTPATIENT
Start: 2020-02-10 | End: 2020-02-10

## 2020-02-10 RX ORDER — FLUMAZENIL 0.1 MG/ML
0.2 INJECTION, SOLUTION INTRAVENOUS
Status: DISCONTINUED | OUTPATIENT
Start: 2020-02-10 | End: 2020-02-10 | Stop reason: HOSPADM

## 2020-02-10 RX ORDER — SODIUM CHLORIDE 9 MG/ML
INJECTION, SOLUTION INTRAVENOUS CONTINUOUS
Status: DISCONTINUED | OUTPATIENT
Start: 2020-02-10 | End: 2020-02-11

## 2020-02-10 RX ORDER — HYDROXYZINE HYDROCHLORIDE 25 MG/1
25-50 TABLET, FILM COATED ORAL 3 TIMES DAILY PRN
Status: DISCONTINUED | OUTPATIENT
Start: 2020-02-10 | End: 2020-02-10

## 2020-02-10 RX ORDER — SODIUM CHLORIDE 9 MG/ML
INJECTION, SOLUTION INTRAVENOUS
Status: DISPENSED
Start: 2020-02-10 | End: 2020-02-11

## 2020-02-10 RX ORDER — ONDANSETRON 4 MG/1
4 TABLET, ORALLY DISINTEGRATING ORAL EVERY 6 HOURS PRN
Status: DISCONTINUED | OUTPATIENT
Start: 2020-02-10 | End: 2020-02-12 | Stop reason: HOSPADM

## 2020-02-10 RX ORDER — BUPIVACAINE HYDROCHLORIDE AND EPINEPHRINE 5; 5 MG/ML; UG/ML
INJECTION, SOLUTION PERINEURAL PRN
Status: DISCONTINUED | OUTPATIENT
Start: 2020-02-10 | End: 2020-02-10

## 2020-02-10 RX ORDER — NALOXONE HYDROCHLORIDE 0.4 MG/ML
.1-.4 INJECTION, SOLUTION INTRAMUSCULAR; INTRAVENOUS; SUBCUTANEOUS
Status: DISCONTINUED | OUTPATIENT
Start: 2020-02-10 | End: 2020-02-10 | Stop reason: HOSPADM

## 2020-02-10 RX ORDER — DULOXETIN HYDROCHLORIDE 30 MG/1
30 CAPSULE, DELAYED RELEASE ORAL 2 TIMES DAILY
Status: DISCONTINUED | OUTPATIENT
Start: 2020-02-10 | End: 2020-02-12 | Stop reason: HOSPADM

## 2020-02-10 RX ORDER — MONTELUKAST SODIUM 10 MG/1
10 TABLET ORAL AT BEDTIME
Status: DISCONTINUED | OUTPATIENT
Start: 2020-02-10 | End: 2020-02-12 | Stop reason: HOSPADM

## 2020-02-10 RX ORDER — FAMOTIDINE 20 MG/1
20 TABLET, FILM COATED ORAL 2 TIMES DAILY
Status: DISCONTINUED | OUTPATIENT
Start: 2020-02-10 | End: 2020-02-12 | Stop reason: HOSPADM

## 2020-02-10 RX ORDER — METOCLOPRAMIDE 10 MG/1
10 TABLET ORAL EVERY 6 HOURS PRN
Status: DISCONTINUED | OUTPATIENT
Start: 2020-02-10 | End: 2020-02-12 | Stop reason: HOSPADM

## 2020-02-10 RX ORDER — NALOXONE HYDROCHLORIDE 0.4 MG/ML
.1-.4 INJECTION, SOLUTION INTRAMUSCULAR; INTRAVENOUS; SUBCUTANEOUS
Status: DISCONTINUED | OUTPATIENT
Start: 2020-02-10 | End: 2020-02-12 | Stop reason: HOSPADM

## 2020-02-10 RX ORDER — FOLIC ACID 1 MG/1
1000 TABLET ORAL DAILY
Status: DISCONTINUED | OUTPATIENT
Start: 2020-02-11 | End: 2020-02-12 | Stop reason: HOSPADM

## 2020-02-10 RX ORDER — OXYCODONE HYDROCHLORIDE 5 MG/1
5-10 TABLET ORAL EVERY 4 HOURS PRN
Qty: 30 TABLET | Refills: 0 | Status: SHIPPED | OUTPATIENT
Start: 2020-02-10 | End: 2020-02-12

## 2020-02-10 RX ORDER — OXYCODONE HYDROCHLORIDE 5 MG/1
5-10 TABLET ORAL
Status: DISCONTINUED | OUTPATIENT
Start: 2020-02-10 | End: 2020-02-11 | Stop reason: DRUGHIGH

## 2020-02-10 RX ORDER — PROPOFOL 10 MG/ML
INJECTION, EMULSION INTRAVENOUS CONTINUOUS PRN
Status: DISCONTINUED | OUTPATIENT
Start: 2020-02-10 | End: 2020-02-10

## 2020-02-10 RX ORDER — PROCHLORPERAZINE MALEATE 10 MG
10 TABLET ORAL EVERY 6 HOURS PRN
Status: DISCONTINUED | OUTPATIENT
Start: 2020-02-10 | End: 2020-02-12 | Stop reason: HOSPADM

## 2020-02-10 RX ORDER — MEPERIDINE HYDROCHLORIDE 25 MG/ML
12.5 INJECTION INTRAMUSCULAR; INTRAVENOUS; SUBCUTANEOUS
Status: DISCONTINUED | OUTPATIENT
Start: 2020-02-10 | End: 2020-02-10 | Stop reason: HOSPADM

## 2020-02-10 RX ORDER — AMLODIPINE BESYLATE 5 MG/1
5 TABLET ORAL
Status: DISCONTINUED | OUTPATIENT
Start: 2020-02-10 | End: 2020-02-12 | Stop reason: HOSPADM

## 2020-02-10 RX ORDER — AMLODIPINE BESYLATE 5 MG/1
5 TABLET ORAL DAILY
Status: DISCONTINUED | OUTPATIENT
Start: 2020-02-10 | End: 2020-02-10 | Stop reason: CLARIF

## 2020-02-10 RX ADMIN — FAMOTIDINE 20 MG: 20 TABLET ORAL at 20:01

## 2020-02-10 RX ADMIN — CEFAZOLIN SODIUM 2 G: 2 INJECTION, SOLUTION INTRAVENOUS at 08:06

## 2020-02-10 RX ADMIN — PROPOFOL 100 MCG/KG/MIN: 10 INJECTION, EMULSION INTRAVENOUS at 08:16

## 2020-02-10 RX ADMIN — BUPIVACAINE HYDROCHLORIDE IN DEXTROSE 1.6 ML: 7.5 INJECTION, SOLUTION SUBARACHNOID at 08:23

## 2020-02-10 RX ADMIN — BUPIVACAINE HYDROCHLORIDE AND EPINEPHRINE BITARTRATE 30 ML: 5; .005 INJECTION, SOLUTION EPIDURAL; INTRACAUDAL; PERINEURAL at 07:45

## 2020-02-10 RX ADMIN — OXYCODONE HYDROCHLORIDE 10 MG: 5 TABLET ORAL at 23:50

## 2020-02-10 RX ADMIN — MIDAZOLAM 1 MG: 1 INJECTION INTRAMUSCULAR; INTRAVENOUS at 08:09

## 2020-02-10 RX ADMIN — ACETAMINOPHEN 975 MG: 325 TABLET, FILM COATED ORAL at 15:11

## 2020-02-10 RX ADMIN — DULOXETINE HYDROCHLORIDE 30 MG: 30 CAPSULE, DELAYED RELEASE ORAL at 20:01

## 2020-02-10 RX ADMIN — CYANOCOBALAMIN TAB 1000 MCG 1000 MCG: 1000 TAB at 15:11

## 2020-02-10 RX ADMIN — Medication 1 TABLET: at 12:07

## 2020-02-10 RX ADMIN — ONDANSETRON 4 MG: 2 INJECTION INTRAMUSCULAR; INTRAVENOUS at 09:01

## 2020-02-10 RX ADMIN — GABAPENTIN 600 MG: 300 CAPSULE ORAL at 20:01

## 2020-02-10 RX ADMIN — ALPRAZOLAM 1 MG: 0.5 TABLET ORAL at 23:54

## 2020-02-10 RX ADMIN — PHENYLEPHRINE HYDROCHLORIDE 100 MCG: 10 INJECTION INTRAVENOUS at 09:03

## 2020-02-10 RX ADMIN — OXYCODONE HYDROCHLORIDE 5 MG: 5 TABLET ORAL at 18:36

## 2020-02-10 RX ADMIN — DEXMEDETOMIDINE HYDROCHLORIDE 40 MCG: 100 INJECTION, SOLUTION INTRAVENOUS at 07:45

## 2020-02-10 RX ADMIN — GABAPENTIN 600 MG: 300 CAPSULE ORAL at 15:11

## 2020-02-10 RX ADMIN — MONTELUKAST 10 MG: 10 TABLET, FILM COATED ORAL at 21:43

## 2020-02-10 RX ADMIN — PHENYLEPHRINE HYDROCHLORIDE 0.4 MCG: 10 INJECTION INTRAVENOUS at 08:31

## 2020-02-10 RX ADMIN — FENTANYL CITRATE 50 MCG: 50 INJECTION, SOLUTION INTRAMUSCULAR; INTRAVENOUS at 07:49

## 2020-02-10 RX ADMIN — ACETAMINOPHEN 975 MG: 325 TABLET, FILM COATED ORAL at 07:05

## 2020-02-10 RX ADMIN — SODIUM CHLORIDE, POTASSIUM CHLORIDE, SODIUM LACTATE AND CALCIUM CHLORIDE: 600; 310; 30; 20 INJECTION, SOLUTION INTRAVENOUS at 07:53

## 2020-02-10 RX ADMIN — SODIUM CHLORIDE, POTASSIUM CHLORIDE, SODIUM LACTATE AND CALCIUM CHLORIDE: 600; 310; 30; 20 INJECTION, SOLUTION INTRAVENOUS at 09:42

## 2020-02-10 RX ADMIN — OXYCODONE HYDROCHLORIDE 5 MG: 5 TABLET ORAL at 23:51

## 2020-02-10 RX ADMIN — BUSPIRONE HYDROCHLORIDE 30 MG: 15 TABLET ORAL at 20:01

## 2020-02-10 RX ADMIN — METHOCARBAMOL 750 MG: 750 TABLET, FILM COATED ORAL at 20:05

## 2020-02-10 RX ADMIN — OXYCODONE HYDROCHLORIDE 5 MG: 5 TABLET ORAL at 20:05

## 2020-02-10 RX ADMIN — SODIUM CHLORIDE: 9 INJECTION, SOLUTION INTRAVENOUS at 16:44

## 2020-02-10 RX ADMIN — PHENYLEPHRINE HYDROCHLORIDE 100 MCG: 10 INJECTION INTRAVENOUS at 08:19

## 2020-02-10 RX ADMIN — MIDAZOLAM 1 MG: 1 INJECTION INTRAMUSCULAR; INTRAVENOUS at 07:49

## 2020-02-10 RX ADMIN — SODIUM CITRATE AND CITRIC ACID MONOHYDRATE 30 ML: 500; 334 SOLUTION ORAL at 07:49

## 2020-02-10 RX ADMIN — MEPERIDINE HYDROCHLORIDE 12.5 MG: 25 INJECTION INTRAMUSCULAR; INTRAVENOUS; SUBCUTANEOUS at 12:43

## 2020-02-10 RX ADMIN — OXYCODONE HYDROCHLORIDE 5 MG: 5 TABLET ORAL at 21:43

## 2020-02-10 RX ADMIN — OMEPRAZOLE 40 MG: 20 CAPSULE, DELAYED RELEASE ORAL at 16:43

## 2020-02-10 RX ADMIN — ACETAMINOPHEN 975 MG: 325 TABLET, FILM COATED ORAL at 23:17

## 2020-02-10 RX ADMIN — CEFAZOLIN SODIUM 2 G: 2 INJECTION, SOLUTION INTRAVENOUS at 23:18

## 2020-02-10 RX ADMIN — CALCIUM CARBONATE (ANTACID) CHEW TAB 500 MG 500 MG: 500 CHEW TAB at 09:53

## 2020-02-10 RX ADMIN — ONDANSETRON 4 MG: 2 INJECTION INTRAMUSCULAR; INTRAVENOUS at 09:30

## 2020-02-10 ASSESSMENT — MIFFLIN-ST. JEOR: SCORE: 1442.64

## 2020-02-10 NOTE — ANESTHESIA PROCEDURE NOTES
Peripheral Nerve Block Procedure Note  Date/Time: 2/10/2020 7:40 AM    Staff:     Anesthesiologist:  Dimitrios Asif MD  Location: Pre-op  Procedure Start/Stop TImes:      2/10/2020 7:35 AM     2/10/2020 7:40 AM    patient identified, IV checked, site marked, risks and benefits discussed, informed consent, monitors and equipment checked, pre-op evaluation, at physician/surgeon's request and post-op pain management      Correct Patient: Yes      Correct Position: Yes      Correct Site: Yes      Correct Procedure: Yes      Correct Laterality:  Yes    Site Marked:  Yes  Procedure details:     Procedure:  Popliteal    ASA:  3    Laterality:  Right    Position:  Supine    Sterile Prep: chloraprep, mask and sterile gloves      Needle:  Short bevel    Needle gauge:  21    Needle length (inches):  4    Ultrasound: Yes      Ultrasound used to identify targeted nerve, plexus, or vascular structure and placed a needle adjacent to it      Permanent Image entered into patiient's record      Abnormal pain on injection: No      Blood Aspirated: No      Paresthesias:  No    Bleeding at site: No      Test dose negative for signs of intravascular injection: Yes      Bolus via:  Needle    Infusion Method:  Single Shot    Complications:  None  Assessment/Narrative:     Injection made incrementally with aspirations every (mL):  5

## 2020-02-10 NOTE — ANESTHESIA PROCEDURE NOTES
Spinal/LP Procedure Note    Spinal Block  Staff:     Anesthesiologist:  Brook Joseph MD  Location: OR  Procedure Start/Stop Times:     patient identified, IV checked, site marked, risks and benefits discussed, informed consent, monitors and equipment checked, pre-op evaluation, at physician/surgeon's request and post-op pain management      Correct Patient: Yes      Correct Position: Yes      Correct Site: Yes      Correct Procedure: Yes      Correct Laterality:  N/A    Site Marked:  N/A  Procedure:     Procedure:  Intrathecal    ASA:  3    Position:  Sitting    Sterile Prep: chloraprep, mask, sterile gloves and patient draped      Insertion site:  L2-3    Approach:  Midline    Needle Type:  Carlos    Needle gauge (G):  25    Local Skin Infiltration:  1% lidocaine    amount (ml):  4    Needle Length (in):  3.5    Introducer used: Yes      Attempts:  3    Redirects:  2    CSF:  Clear    Paresthesias:  No  Assessment/Narrative:     Sensory Level:  T6     Difficult spinal placement, attempted in 3 different space. It was easy at L2-L3.

## 2020-02-10 NOTE — CONSULTS
[facility]  Consult Note - Hospitalist Service       Date of Admission:  2/10/2020     Consult Requested by:  Orthopedic surgery  Reason for Consult:  Hypotension, somnolence.       ASSESSMENT & PLAN      Molly Teague is a 34 year old female with history of scleroderma, CREST syndrome, polyneuropathy, CKD, HTN, prior PE, chronic anemia, obesity, asthma, migraines, GERD, REX, and chronic pain syndrome who was admitted 2/10/20 following scheduled ORIF of R ankle fracture due to hypotension and somnolence post-operatively.       # Hypotension.  Hx HTN on lisinopril PTA (did not take prior to surgery). Hypotensive post-op with SBP 80-90's with mild improvement following fluid boluses. Suspect hypotension largely d/t anesthesia and sedating medications. Mild volume depletion could also be contributing. Minimal blood loss reported with surgery, therefore hemorrhage unlikely.   - Continue MIVF with LR at 75 ml/h until taking PO  - Fluid bolus PRN to maintain MAP >65  - Hold PTA lisionpril    # Somnolence.  Suspect metabolic encephalopathy from anesthesia . Intermittent confusion noted in PACU. Currently oriented to person and place. No evidence of acute delirium. ? History of similar symptoms during recent admission to [facility] when patient fractured ankle. ? PTA medications too sedating for patient or not taking as prescribed.   - Monitor closely  - Consider additional metabolic work up if symptoms persist   - Cautious titration of opiates for pain control  - Avoid other sedating meds if able, although hesitant to stop benzos completely d/t risk of withdrawal syndrome     # R ankle fracture s/p ORIF.  Management per ortho. Cautious titration of opiates recommended given sedation and hypotension. Nerve block with surgery, therefore hopeful pain will be adequately controlled until mentation improved.   - Schedule tylenol 975mg TID  - Oxycodone 5-10mg q 3h prn  - Dilaudid IV  0.5-1mg q 2h prn  - Consider titrating oxycodone to 15mg q 4h prn if more awake/alert (PTA regimen)  - Consider DVT ppx given history of PE, will defer to ortho    # CKD III.  Baseline Cr 1.2-1.7. No pre-op labs obtained. Will check BMP in AM to monitor. Continue IVF as above.     # Chronic anemia.  Baseline Hgb 9-10. EBL 20 mL. Repeat Hgb in AM.     # Hx Scleroderma, CREST Syndrome, and chronic pain.  No acute concerns. Not on immunosuppression.     # Hx PE.  Not on chronic anticoagulation. No significant bleeding noted with surgery. Will defer VTE prophlaxis to surgical team.       Code Status: Prior  DVT Prophylaxis: Defer to primary service  Diet: None          The patient's care was discussed with the Attending Physician, Dr. Mtz.    Woo Jiang PA-C  Internal Medicine, [facility] Hospitalist Service  [facility], [place]  Pager [phone]      ______________________________________________________________________      History of Present Illness   Molly Teague is a 34 year old female with history of scleroderma, crest syndrome, polyneuropathy, CKD stage III, HTN, prior PE, chronic anemia, asthma, migraines, obesity, GERD, REX, and chronic pain syndrome who suffered R ankle fracture on 1/24/20 after suffering a fall at home in the setting of acute confusion. Patient does not recall the exact mechanism of her fall. She was admitted to [facility] at that time with suspected toxic encephalopathy. Her ankle was splinted with plans to follow up with orthopedic surgery for definitive treatment. She underwent ORIF today with Dr. Villalta. Following surgery she was noted to be hypotensive with SBP 80-90's. She was also very sedated and intermittently disoriented. She was subsequently admitted for further observation.     Patient currently has difficulty maintaining wakefulness during our conversation, however answers questions appropriately.  She currently denies any postoperative pain.  She  denies any fevers, chills, cough, dyspnea, chest pain, dizziness, nausea, or abdominal pain. She reports her baseline blood pressure to be 120-130's systolic. She also notes a fast heart rate at baseline. She notes that she has had multiple episodes of blacking out (similar to presenting symptoms 1/25 when she fractured her ankle). She does not know if she passes out or just loses consciousness. She does not recall being admitted to [facility] recently.       Review of Systems   The 10 point Review of Systems is negative other than noted in the HPI or here.     PMH    Scleroderma, with renal involvement    CREST syndrome    CKD stage III, secondary to scleroderma. Required HD in 4/2015. Baseline Cr 1.2-1.7.     Polyneuropathy, due to scleroderma    Hypertension    Pulmonary embolism (9/2016)    Chronic anemia    Asthma    Obesity    Migraines    GERD    REX    Chronic pain syndrome    Hx ARDS d/t influenza, requiring intubation    Asthma    C.difficile infection    H.pylori infection     PSH    R ankle ORIF    Angiogram    C section    Throat surgery      SH    Tobacco:  None    ETOH:  Occasional, social    Drugs:  None     FH    Reviewed, non-contributory        Meds    Xanax 1 mg TID    Amlodipine 5 mg daily    Symbicort 2 puffs BID    BuSpar 30 mg twice daily    B12 thousand  daily    Cymbalta 30 mg twice daily    Pepcid 20 mg twice daily    Ferrous gluconate 324 mg daily    Neurontin 600 mg 3 times daily    Hydroxyzine 25-50 mg 3 times daily as needed    Lisinopril 10 mg daily    Ativan 1 mg daily as needed    Robaxin-750 milligrams 4 times daily    Singulair 10 mg nightly    Narcan as needed    Prilosec 40 mg twice daily    Zofran 8 mg as needed    Oxycodone 15 mg every 4 hours as needed    MiraLAX daily    Phenergan 25 mg IM every 6 hours as needed    Albuterol 2 puffs every 4 hours as needed       Allergies    NKDA        Physical Exam   BP 95/56   Pulse 106   Temp 97.6  F (36.4  C) (Oral)   Resp 20    "Ht 1.575 m (5' 2.01\")   Wt 78.9 kg (174 lb)   SpO2 100%   BMI 31.82 kg/m     General:  Sedated but awakens to voice, answers questions appropriately, and follows commands. NAD. Appears comfortable.    HEENT:  No scleral icterus. Mucous membranes moist.   Cardiovascular:  RRR. S1, S2. No murmur.   Respiratory:  Normal effort. Lungs CTAB. No wheezing, rhonchi or rales.  Gastrointestinal:  Abdomen soft, non-distended. Active bowel sounds in all quadrants. No tenderness, guarding, or rebound.    Neurological:  Grossly non-focal. CN II-XII intact. EOMI. PERRLA. No facial droop. Distal sensory and motor intact.   Extremities:  Splint on RLE. No peripheral edema on LLE. No calf tenderness.   Skin:  Dry. No visible rash.    Data reviewed today: I personally reviewed all medications, new labs and imaging results over the last 24 hours. Pertinent results below:    None   "

## 2020-02-10 NOTE — OR NURSING
Dr. Joseph in to see patient, updated on BP's trending down, stated to give another bolus of 250mL LR now. Also updated on patient altered mental status, needs repeated stimulation to wake up. Is intermittently disoriented. Will continue to monitor closely.

## 2020-02-10 NOTE — PLAN OF CARE
"Dinora arrived to unit at 1455.      VS: BP 95/56   Pulse 106   Temp 98.2  F (36.8  C) (Oral)   Resp 19   Ht 1.575 m (5' 2.01\")   Wt 78.9 kg (174 lb)   SpO2 97%   BMI 31.82 kg/m     O2: 2 L oxygen, saturations > 90%   Output: Voids spontaneously in bathroom w/o difficulty PRV of 267 mL.   Last BM: 2/5/2020 per patient report, not passing gas. Patient recently started taking miralax   Activity: Up w/ SBA non WBA on right lower extremitiy   Skin: Incision on right ankle. Raynauds on all extremities is patient's baseline.   Pain: Patient complains of pain in RLE.    CMS: Intact. Patient states that she just wants to sleep.   Dressing: UTV under cast on RLE   Diet: Clear liquid currently, advance as tolerated   LDA: PIV infusing in right hand   Equipment: IV pump and pole. Foam elevator.   Plan: TBD. Patient's pain under control. AO x 4. Slightly groggy, but has improved through the shift. Continue to monitor. Possible discharge tomorrow.    Additional Info:        "

## 2020-02-10 NOTE — OR NURSING
PACU to Inpatient Nursing Handoff    Patient Molly Teague is a 34 year old female who speaks English.   Procedure Procedure(s):  Right ankle open reduction internal fixation   Surgeon(s) Primary: Natanael Villalta MD  Assisting: Beba Hyde PA-C     Allergies   Allergen Reactions     Blood Transfusion Related (Informational Only) Other (See Comments)     Patient has a history of a clinically significant antibody against RBC antigens.  A delay in compatible RBCs may occur.     No Known Allergies      Pollen Extract      Seasonal Allergies      Shellfish-Derived Products Nausea and Rash     Rash on face       Isolation  [unfilled]     Past Medical History   has a past medical history of Acute pyelonephritis (6/9/2017), Altered mental state (3/29/2018), Arthritis, Blood clotting disorder (H), History of blood transfusion, Hypertension, Long-term (current) use of anticoagulants [Z79.01] (9/9/2016), PE (pulmonary embolism) (9/7/2016), Rheumatism, Scleroderma (H) (9/22/2016), and Uncomplicated asthma.    Anesthesia Choice   Dermatome Level Dermatomes Left: S3,4,5  Dermatomes Right: S3,4,5   Preop Meds acetaminophen (Tylenol) - time given: 0705  gabapentin (Neurontin) - time given: 0500   Nerve block Adductor canal.  Location:right. Med:bupivacaine. Time given: 0757  popliteal .  Location:right. Med:bupivacaine. Time given: 0757   Intraop Meds dexmedetomidine (Precedex): 40 mcg total  fentanyl (Sublimaze): 50 mcg total  ondansetron (Zofran): last given at 0901   Local Meds No   Antibiotics cefazolin (Ancef) - last given at x     Pain Patient Currently in Pain: yes  Comfort: comfortably manageable   PACU meds  meperidine (Demerol): 12.5 mg (total dose) last given at 1243   ondansetron (Zofran): 4 mg (total dose) last given at x   oxycodone (Roxicodone): 5 mg (total dose) last given at 1207    PCA / epidural No   Capnography Respiratory Monitoring (EtCO2): 33 mmHg   Telemetry ECG Rhythm: Sinus tachycardia    Inpatient Telemetry Monitor Ordered? No        Labs Glucose Lab Results   Component Value Date    GLC 88 02/10/2020       Hgb Lab Results   Component Value Date    HGB 9.3 01/25/2020       INR Lab Results   Component Value Date    INR 1.15 03/30/2018      PACU Imaging Not applicable     Wound/Incision Incision/Surgical Site 02/10/20 Lateral;Right Ankle (Active)   Incision Assessment UTV 2/10/2020  9:22 AM   [place]-Incision Assessment UTV 2/10/2020  9:22 AM   Closure ANDER 2/10/2020  9:22 AM   Incision Drainage Amount UTV 2/10/2020  9:22 AM   Dressing Intervention Clean, dry, intact 2/10/2020  9:22 AM   Number of days: 0       Incision/Surgical Site 02/10/20 Lateral;Right Ankle (Active)   Incision Assessment UTV 2/10/2020  9:22 AM   Aemrica-Incision Assessment UTV 2/10/2020  9:22 AM   Closure ANDER 2/10/2020  9:22 AM   Incision Drainage Amount UTV 2/10/2020  9:22 AM   Dressing Intervention Clean, dry, intact 2/10/2020  9:22 AM   Number of days: 0      CMS        Equipment ice pack   Other LDA       IV Access Peripheral IV 02/10/20 Right  (Active)   Site Assessment WDL 2/10/2020  9:22 AM   Line Status Infusing 2/10/2020  9:22 AM   Phlebitis Scale 0-->no symptoms 2/10/2020  9:22 AM   Infiltration Scale 0 2/10/2020  9:22 AM   Extravasation? No 2/10/2020  6:54 AM   Dressing Intervention New dressing  2/10/2020  6:54 AM   Reason Not Rotated Anticipated discharge 2/10/2020  6:54 AM   Number of days: 0       Intrathecal/Epidural Catheter 02/10/20 (Active)   Number of days: 0      Blood Products Not applicable EBL 20 mL   Intake/Output Date 02/10/20 0700 - 02/11/20 0659   Shift 1099-6599 1681-1716 5436-3991 24 Hour Total   INTAKE   P.O. 50   50   I.V. 1150   1150   Shift Total(mL/kg) 1200(15.2)   1200(15.2)   OUTPUT   Urine 615   615   Blood 20   20   Shift Total(mL/kg) 635(8.05)   635(8.05)   Weight (kg) 78.92 78.92 78.92 78.92      Drains / Melissa     Time of void PreOp Void Prior to Procedure: 0500 (02/10/20 0657)    PostOp  Straight cath: 615 mL (02/10/20 1045)    Diapered? No   Bladder Scan Bladder Scan Volume (mL): 446 ml (02/10/20 1000)   PO 50 mL (02/10/20 1200)  tolerating sips     Vitals    B/P: 98/60  T: 97.5  F (36.4  C)    Temp src: Oral  P:  Pulse: 102 (02/10/20 1215)    Heart Rate: 104 (02/10/20 1230)     R: 19  O2:  SpO2: 98 %    O2 Device: Nasal cannula (02/10/20 1230)    Oxygen Delivery: 2 LPM (02/10/20 1230)         Family/support present significant other   Patient belongings     Patient transported on cart   DC meds/scripts (obs/outpt) Yes, meds   Inpatient Pain Meds Released? Yes       Special needs/considerations None   Tasks needing completion None       Karen Prescott, RN  ASCOM [id number]

## 2020-02-10 NOTE — ANESTHESIA PROCEDURE NOTES
Peripheral Nerve Block Procedure Note  Date/Time: 2/10/2020 7:45 AM    Staff:     Anesthesiologist:  Dimitrios Asif MD  Location: Pre-op  Procedure Start/Stop TImes:      2/10/2020 7:40 AM     2/10/2020 7:45 AM    patient identified, IV checked, site marked, risks and benefits discussed, informed consent, monitors and equipment checked, pre-op evaluation, at physician/surgeon's request and post-op pain management      Correct Patient: Yes      Correct Position: Yes      Correct Site: Yes      Correct Procedure: Yes      Correct Laterality:  Yes    Site Marked:  Yes  Procedure details:     Procedure:  Adductor canal    ASA:  3    Laterality:  Right    Position:  Supine    Sterile Prep: chloraprep, mask and sterile gloves      Needle:  Insulated    Needle gauge:  21    Needle length (inches):  4    Ultrasound: Yes      Ultrasound used to identify targeted nerve, plexus, or vascular structure and placed a needle adjacent to it      Permanent Image entered into patiient's record      Abnormal pain on injection: No      Blood Aspirated: No      Paresthesias:  No    Bleeding at site: No      Test dose negative for signs of intravascular injection: Yes      Bolus via:  Needle    Infusion Method:  Single Shot    Complications:  None  Assessment/Narrative:     Injection made incrementally with aspirations every (mL):  5

## 2020-02-10 NOTE — BRIEF OP NOTE
[facility], [place]    Brief Operative Note    Pre-operative diagnosis: Pain in joint, ankle and foot, right [M25.571]  Post-operative diagnosis Right ankle fracture     Procedure: Procedure(s):  Right ankle open reduction internal fixation  Surgeon: Surgeon(s) and Role:     * Natanael Villalta MD - Primary  Anesthesia: Choice   Estimated blood loss: 20cc  Findings:   see dictated operative note.  Complications: None.  Implants: * No implants in log *    Plan:  Same Day surgery discharge to home once criteria met.  Splint to remain on right  lower extremity and NWB at all times.  Oxycodone, atarax and gabapentin for pain.   Elevate leg on limb elevator for 21 hours/day x 10 days   No dressing change on own.  Leave dressing on until 2 weeks follow up appointment.  F/U in clinic in 2 weeks    Patient admitted to observation due to hypotensive, medical comorbidities and slow to wake up following ansthesia    I was asked by Dr. Villalta to assist with surgery. I positioned and prepped the patient. I retracted soft tissue.   I suctioned fluids when needed. I provided traction for dissection. I helped to ligate blood vessels. I helped Dr. Villalta identify and protect important structures. The procedure was medically necessary for an assistant because Dr. Villalta needed the operative exposure and assistance that I provided. This allowed him to safely and efficiently operate. It was also important that I help ligate blood vessels to maintain hemostasis and reduce the bleeding risk. I helped with the closure of the operative incisions as well as helping with the boot/cast/splint.  The assistance that I provided reduced operative time which meant less general anesthetic for the patient. No qualified residents were available to assist.    Beba Hyde PA-C PA-C

## 2020-02-10 NOTE — ANESTHESIA POSTPROCEDURE EVALUATION
Anesthesia POST Procedure Evaluation    Patient: Molly Teague   MRN:     [id number] Gender:   female   Age:    34 year old :      1985        Preoperative Diagnosis: Pain in joint, ankle and foot, right [M25.571]   Procedure(s):  Right ankle open reduction internal fixation   Postop Comments: No value filed.       Anesthesia Type:  Not documented  Spinal, Peripheral Nerve Block for post-op pain at surgeon's request    Reportable Event: NO     PAIN: Uncomplicated   Sign Out status: Comfortable, Well controlled pain     PONV: No PONV   Sign Out status:  No Nausea or Vomiting     Neuro/Psych: Uneventful perioperative course   Sign Out Status: Preoperative baseline; Age appropriate mentation     Airway/Resp.: Uneventful perioperative course   Sign Out Status: Non labored breathing, age appropriate RR; Resp. Status within EXPECTED Parameters     CV: Uneventful perioperative course   Sign Out status: Appropriate BP and perfusion indices; Appropriate HR/Rhythm     Disposition:   Sign Out in:  PACU  Disposition:  Phase II; Home  Recovery Course: Uneventful  Follow-Up: Not required     Comments/Narrative:  Very somnolent and borderline hypotensive in PACU, improved with fluid administration. We recommend admission in setting of multiple comorbidities and challenging pain control without getting her somnolent.            Last Anesthesia Record Vitals:  CRNA VITALS  2/10/2020 0849 - 2/10/2020 0949      2/10/2020             NIBP:  96/59    Pulse:  112    NIBP Mean:  74    Temp:  36.8  C (98.2  F)    SpO2:  100 %    Resp Rate (observed):  18          Last PACU Vitals:  Vitals Value Taken Time   BP 99/57 2/10/2020  1:00 PM   Temp 36.4  C (97.6  F) 2/10/2020  1:00 PM   Pulse 103 2/10/2020  1:00 PM   Resp 22 2/10/2020  1:15 PM   SpO2 99 % 2/10/2020  1:15 PM   Temp src     NIBP     Pulse     SpO2     Resp     Temp     Ht Rate     Temp 2     Vitals shown include unvalidated device data.      Electronically Signed By:  Brook Joseph MD, February 10, 2020, 1:16 PM

## 2020-02-10 NOTE — ANESTHESIA CARE TRANSFER NOTE
Patient: Molly Teague    Procedure(s):  Right ankle open reduction internal fixation    Diagnosis: Pain in joint, ankle and foot, right [M25.571]  Diagnosis Additional Information: No value filed.    Anesthesia Type:   Spinal     Note:  Airway :Nasal Cannula  Patient transferred to:PACU  Handoff Report: Identifed the Patient, Identified the Reponsible Provider, Reviewed the pertinent medical history, Discussed the surgical course, Reviewed Intra-OP anesthesia mangement and issues during anesthesia, Set expectations for post-procedure period and Allowed opportunity for questions and acknowledgement of understanding      Vitals: (Last set prior to Anesthesia Care Transfer)    CRNA VITALS  2/10/2020 0850 - 2/10/2020 0921      2/10/2020             Pulse:  115    SpO2:  96 %                Electronically Signed By: Fantasma Dumont  February 10, 2020  9:21 AM

## 2020-02-10 NOTE — OR NURSING
Dr. Joseph notified of BP of 81/43, MAP 54. Stated to give another (third) bolus of 250mL LR now.

## 2020-02-10 NOTE — DISCHARGE INSTRUCTIONS
Same-Day Surgery   Adult Discharge Orders & Instructions     For 24 hours after surgery:  1. Get plenty of rest.  A responsible adult must stay with you for at least 24 hours after you leave the hospital.   2. Pain medication can slow your reflexes. Do not drive or use heavy equipment.  If you have weakness or tingling, don't drive or use heavy equipment until this feeling goes away.  3. Mixing alcohol and pain medication can cause dizziness and slow your breathing. It can even be fatal. Do not drink alcohol while taking pain medication.  4. Avoid strenuous or risky activities.  Ask for help when climbing stairs.   5. You may feel lightheaded.  If so, sit for a few minutes before standing.  Have someone help you get up.   6. If you have nausea (feel sick to your stomach), drink only clear liquids such as apple juice, ginger ale, broth or 7-Up.  Rest may also help.  Be sure to drink enough fluids.  Move to a regular diet as you feel able. Take pain medications with a small amount of solid food, such as toast or crackers, to avoid nausea.   7. A slight fever is normal. Call the doctor if your fever is over 100 F (37.7 C) (taken under the tongue) or lasts longer than 24 hours.  8. You may have a dry mouth, muscle aches, trouble sleeping or a sore throat.  These symptoms should go away after 24 hours.  9. Do not make important or legal decisions.   Pain Management:      1. Take pain medication (if prescribed) for pain as directed by your physician.        2. WARNING: If the pain medication you have been prescribed contains Tylenol  (acetaminophen), DO NOT take additional doses of Tylenol (acetaminophen).     Call your doctor for any of the followin.  Signs of infection (fever, growing tenderness at the surgery site, severe pain, a large amount of drainage or bleeding, foul-smelling drainage, redness, swelling).    2.  It has been over 8 to 10 hours since surgery and you are still not able to urinate (pee).    3.   Headache for over 24 hours.    4.  Numbness, tingling or weakness the day after surgery (if you had spinal anesthesia).  To contact a doctor, call _____________________________________ or:      [phone] and ask for the Resident On Call for:          __________________________________________ (answered 24 hours a day)      Emergency Department:  [place] [facility]: [phone]  [place] [facility]: [phone]               Rev. 10/2014   POSTOPERATIVE INSTRUCTIONS FOOT AND ANKLE SURGERY   Natanael Villalta M.D.    Crutches/walker: You must use crutches/walker when up until instructed otherwise.    Bloody drainage on the dressing cast: This is normal. Cast and dressing material act as a sponge.    Bruising of toes: It is normal to experience some bruising in the toes after surgery.    Driving: For surgery on your right foot/ankle, you may not drive unless otherwise instructed. For surgery on the left foot/ankle, driving is not advised for the first week.    Elevate foot/ankle: Keep your operated foot/ankle elevated at or above your heart 95% of the day the first 24-48 hours following surgery (only use the bathroom). Excessive swelling of the operative foot/ankle causes increased pain, problems with the incision healing and problems with the surgical repair healing.  o The foot should be elevated when you sit to eat.  o Avoid sitting with your foot hanging down.  o You may lie on your side for periods during the day/night; however you should still keep       your foot elevated.  It is not advisable to lie on your stomach.    Ice:  Use ice on foot/ankle over dressing/cast for 2-3 days or more.    Lightheadedness when you get up:  To prevent lightheadedness, sit up and let your foot hang down for 3-5 minutes BEFORE you get up.    Can you move your toes? Only if you did not have surgery on your toes.  It is important to actively wiggle you toes at least 5-10 minutes each hour. This will help  prevent blood clots.    Dressing/Cast: Keep clean and dry.    Bathing:  Take a sponge bath/tub instead of a shower.  If you choose to shower, cover the cast/dressing with a waterproof covering (heavy duty plastic bag, or purchased shower cover) and sit on a chair/stool. Have someone available to help you if needed.    Cam Walker:  If issued, wear the CAM walker (boot) 24 hours/day until your follow up appointment unless instructed otherwise.    Pain Medications:    o Take with food to avoid nausea related to the medication.  o Take the medication regularly as prescribed to avoid pain becoming difficult to manage.  Generally your pain should improve by the third day. It is not unusual for the pain to be worse a day or two after surgery than it is on the day of surgery.  o Do not take the pain medication more frequently than prescribed.  If the medication does not relieve the pain or does not last the prescribed time, contact your physician.  o As your pain lessens you may diminish your pain medicine intake by taking one pain pill with one plain Tylenol.  (Unless you have a medical condition restricting the use of Tylenol)  o Tylenol is an ingredient in some pain medications.  To avoid liver damage, it must be limited to 8 tablets (500 mg or Extra Strength) or 12 tablets (325 mg or regular strength) per day.  o Do not supplement your pain medication with any type of anti-inflammatory medication (Ibuprofen, Motrin, Aleve, Celebrex, etc.) without first contacting your physician.  o If you need a refill, please call to allow 48-72 hours before you run out of medication, to avoid running out during evening, weekend or holiday hours.    Fever: It is not unusual to run a low grade fever after surgery.  If your temperature is elevated above 100 degrees, lasts for longer than 24 hours or is questionable in any way, contact the physician.    Constipation: all pain medications along with inactivity can cause  constipation.  o Increase your fluid intake to AT LEAST 1 quart of water per day.  o Increase your dietary fiber (Bran cereals, whole grains, fruits, and vegetables.) Eat the  P  fruits: peaches, plums, pears, prunes. Anise/black licorice is also known to act as a natural laxative.  o Avoid the BRAT diet, (bananas, rice, applesauce, and toast) as it may increase constipation.  o If no bowel movement occurs 3 days following surgery, a mild over the counter laxative is recommended.      Be sure you have your post-operative follow up appointments made.  Call    [facility] appointment and triage number

## 2020-02-11 ENCOUNTER — APPOINTMENT (OUTPATIENT)
Dept: PHYSICAL THERAPY | Facility: CLINIC | Age: 35
DRG: 982 | End: 2020-02-11
Attending: PHYSICIAN ASSISTANT
Payer: MEDICARE

## 2020-02-11 ENCOUNTER — APPOINTMENT (OUTPATIENT)
Dept: PHYSICAL THERAPY | Facility: CLINIC | Age: 35
DRG: 982 | End: 2020-02-11
Attending: ORTHOPAEDIC SURGERY
Payer: MEDICARE

## 2020-02-11 PROBLEM — S82.891A CLOSED RIGHT ANKLE FRACTURE: Status: ACTIVE | Noted: 2020-02-11

## 2020-02-11 LAB
ABO + RH BLD: ABNORMAL
ABO + RH BLD: ABNORMAL
BLD GP AB INVEST PLASRBC-IMP: ABNORMAL
BLD GP AB SCN SERPL QL: ABNORMAL
BLD PROD TYP BPU: ABNORMAL
BLOOD BANK CMNT PATIENT-IMP: ABNORMAL
CREAT SERPL-MCNC: 1.67 MG/DL (ref 0.52–1.04)
GFR SERPL CREATININE-BSD FRML MDRD: 39 ML/MIN/{1.73_M2}
HGB BLD-MCNC: 6.6 G/DL (ref 11.7–15.7)
HGB BLD-MCNC: 7.2 G/DL (ref 11.7–15.7)
HGB BLD-MCNC: NORMAL G/DL (ref 11.7–15.7)
NUM BPU REQUESTED: 1
SPECIMEN EXP DATE BLD: ABNORMAL

## 2020-02-11 PROCEDURE — 85018 HEMOGLOBIN: CPT

## 2020-02-11 PROCEDURE — 36415 COLL VENOUS BLD VENIPUNCTURE: CPT | Performed by: PHYSICIAN ASSISTANT

## 2020-02-11 PROCEDURE — 25000132 ZZH RX MED GY IP 250 OP 250 PS 637: Mod: GY | Performed by: STUDENT IN AN ORGANIZED HEALTH CARE EDUCATION/TRAINING PROGRAM

## 2020-02-11 PROCEDURE — 96376 TX/PRO/DX INJ SAME DRUG ADON: CPT

## 2020-02-11 PROCEDURE — 25000132 ZZH RX MED GY IP 250 OP 250 PS 637: Mod: GY | Performed by: PHYSICIAN ASSISTANT

## 2020-02-11 PROCEDURE — 85018 HEMOGLOBIN: CPT | Performed by: PHYSICIAN ASSISTANT

## 2020-02-11 PROCEDURE — 25800030 ZZH RX IP 258 OP 636

## 2020-02-11 PROCEDURE — 25000128 H RX IP 250 OP 636: Performed by: INTERNAL MEDICINE

## 2020-02-11 PROCEDURE — 97530 THERAPEUTIC ACTIVITIES: CPT | Mod: GP

## 2020-02-11 PROCEDURE — 99232 SBSQ HOSP IP/OBS MODERATE 35: CPT | Performed by: INTERNAL MEDICINE

## 2020-02-11 PROCEDURE — P9016 RBC LEUKOCYTES REDUCED: HCPCS | Performed by: ANESTHESIOLOGY

## 2020-02-11 PROCEDURE — G0378 HOSPITAL OBSERVATION PER HR: HCPCS

## 2020-02-11 PROCEDURE — 96375 TX/PRO/DX INJ NEW DRUG ADDON: CPT

## 2020-02-11 PROCEDURE — 25000128 H RX IP 250 OP 636: Performed by: STUDENT IN AN ORGANIZED HEALTH CARE EDUCATION/TRAINING PROGRAM

## 2020-02-11 PROCEDURE — 25000128 H RX IP 250 OP 636: Performed by: PHYSICIAN ASSISTANT

## 2020-02-11 PROCEDURE — 12000001 ZZH R&B MED SURG/OB UMMC

## 2020-02-11 PROCEDURE — 97162 PT EVAL MOD COMPLEX 30 MIN: CPT | Mod: GP

## 2020-02-11 PROCEDURE — 82565 ASSAY OF CREATININE: CPT | Performed by: PHYSICIAN ASSISTANT

## 2020-02-11 PROCEDURE — 36415 COLL VENOUS BLD VENIPUNCTURE: CPT

## 2020-02-11 PROCEDURE — 25800030 ZZH RX IP 258 OP 636: Performed by: INTERNAL MEDICINE

## 2020-02-11 RX ORDER — OXYCODONE HYDROCHLORIDE 10 MG/1
10 TABLET ORAL
Status: DISCONTINUED | OUTPATIENT
Start: 2020-02-11 | End: 2020-02-12 | Stop reason: HOSPADM

## 2020-02-11 RX ORDER — SODIUM CHLORIDE 9 MG/ML
INJECTION, SOLUTION INTRAVENOUS
Status: DISPENSED
Start: 2020-02-11 | End: 2020-02-12

## 2020-02-11 RX ORDER — METHYLPREDNISOLONE SODIUM SUCCINATE 125 MG/2ML
125 INJECTION, POWDER, LYOPHILIZED, FOR SOLUTION INTRAMUSCULAR; INTRAVENOUS
Status: DISCONTINUED | OUTPATIENT
Start: 2020-02-11 | End: 2020-02-12 | Stop reason: HOSPADM

## 2020-02-11 RX ORDER — OXYCODONE HYDROCHLORIDE 10 MG/1
10 TABLET ORAL EVERY 4 HOURS PRN
Status: DISCONTINUED | OUTPATIENT
Start: 2020-02-11 | End: 2020-02-11

## 2020-02-11 RX ORDER — DIPHENHYDRAMINE HYDROCHLORIDE 50 MG/ML
50 INJECTION INTRAMUSCULAR; INTRAVENOUS
Status: DISCONTINUED | OUTPATIENT
Start: 2020-02-11 | End: 2020-02-12 | Stop reason: HOSPADM

## 2020-02-11 RX ORDER — BUDESONIDE AND FORMOTEROL FUMARATE DIHYDRATE 160; 4.5 UG/1; UG/1
2 AEROSOL RESPIRATORY (INHALATION) 2 TIMES DAILY
Status: DISCONTINUED | OUTPATIENT
Start: 2020-02-11 | End: 2020-02-12 | Stop reason: HOSPADM

## 2020-02-11 RX ORDER — SODIUM CHLORIDE 9 MG/ML
INJECTION, SOLUTION INTRAVENOUS
Status: COMPLETED
Start: 2020-02-11 | End: 2020-02-11

## 2020-02-11 RX ADMIN — BUSPIRONE HYDROCHLORIDE 30 MG: 15 TABLET ORAL at 08:22

## 2020-02-11 RX ADMIN — GABAPENTIN 600 MG: 300 CAPSULE ORAL at 20:06

## 2020-02-11 RX ADMIN — MONTELUKAST 10 MG: 10 TABLET, FILM COATED ORAL at 22:04

## 2020-02-11 RX ADMIN — FAMOTIDINE 20 MG: 20 TABLET ORAL at 20:07

## 2020-02-11 RX ADMIN — HYDROXYZINE HYDROCHLORIDE 25 MG: 25 TABLET, FILM COATED ORAL at 18:56

## 2020-02-11 RX ADMIN — BUSPIRONE HYDROCHLORIDE 30 MG: 15 TABLET ORAL at 20:07

## 2020-02-11 RX ADMIN — HYDROMORPHONE HYDROCHLORIDE 0.5 MG: 1 INJECTION, SOLUTION INTRAMUSCULAR; INTRAVENOUS; SUBCUTANEOUS at 08:22

## 2020-02-11 RX ADMIN — BUDESONIDE AND FORMOTEROL FUMARATE DIHYDRATE 2 PUFF: 160; 4.5 AEROSOL RESPIRATORY (INHALATION) at 18:58

## 2020-02-11 RX ADMIN — CEFAZOLIN SODIUM 2 G: 2 INJECTION, SOLUTION INTRAVENOUS at 17:09

## 2020-02-11 RX ADMIN — DULOXETINE HYDROCHLORIDE 30 MG: 30 CAPSULE, DELAYED RELEASE ORAL at 20:07

## 2020-02-11 RX ADMIN — ACETAMINOPHEN 975 MG: 325 TABLET, FILM COATED ORAL at 23:51

## 2020-02-11 RX ADMIN — DULOXETINE HYDROCHLORIDE 30 MG: 30 CAPSULE, DELAYED RELEASE ORAL at 08:22

## 2020-02-11 RX ADMIN — OXYCODONE HYDROCHLORIDE 15 MG: 10 TABLET ORAL at 05:51

## 2020-02-11 RX ADMIN — OXYCODONE HYDROCHLORIDE 15 MG: 10 TABLET ORAL at 14:20

## 2020-02-11 RX ADMIN — METHOCARBAMOL 750 MG: 750 TABLET, FILM COATED ORAL at 12:12

## 2020-02-11 RX ADMIN — SODIUM CHLORIDE: 9 INJECTION, SOLUTION INTRAVENOUS at 10:03

## 2020-02-11 RX ADMIN — ACETAMINOPHEN 975 MG: 325 TABLET, FILM COATED ORAL at 08:21

## 2020-02-11 RX ADMIN — FERROUS GLUCONATE 324 MG: 324 TABLET ORAL at 08:22

## 2020-02-11 RX ADMIN — ALPRAZOLAM 1 MG: 0.5 TABLET ORAL at 11:29

## 2020-02-11 RX ADMIN — ALPRAZOLAM 1 MG: 0.5 TABLET ORAL at 05:53

## 2020-02-11 RX ADMIN — BUDESONIDE AND FORMOTEROL FUMARATE DIHYDRATE 2 PUFF: 160; 4.5 AEROSOL RESPIRATORY (INHALATION) at 08:27

## 2020-02-11 RX ADMIN — OXYCODONE HYDROCHLORIDE 15 MG: 10 TABLET ORAL at 03:06

## 2020-02-11 RX ADMIN — HYDROMORPHONE HYDROCHLORIDE 0.5 MG: 1 INJECTION, SOLUTION INTRAMUSCULAR; INTRAVENOUS; SUBCUTANEOUS at 23:52

## 2020-02-11 RX ADMIN — CEFAZOLIN SODIUM 2 G: 2 INJECTION, SOLUTION INTRAVENOUS at 05:50

## 2020-02-11 RX ADMIN — OXYCODONE HYDROCHLORIDE 15 MG: 10 TABLET ORAL at 18:56

## 2020-02-11 RX ADMIN — IRON SUCROSE 300 MG: 20 INJECTION, SOLUTION INTRAVENOUS at 17:47

## 2020-02-11 RX ADMIN — FAMOTIDINE 20 MG: 20 TABLET ORAL at 08:22

## 2020-02-11 RX ADMIN — CYANOCOBALAMIN TAB 1000 MCG 1000 MCG: 1000 TAB at 08:22

## 2020-02-11 RX ADMIN — GABAPENTIN 600 MG: 300 CAPSULE ORAL at 08:22

## 2020-02-11 RX ADMIN — OXYCODONE HYDROCHLORIDE 15 MG: 10 TABLET ORAL at 11:26

## 2020-02-11 RX ADMIN — FOLIC ACID 1000 MCG: 1 TABLET ORAL at 08:21

## 2020-02-11 RX ADMIN — OMEPRAZOLE 40 MG: 20 CAPSULE, DELAYED RELEASE ORAL at 08:22

## 2020-02-11 ASSESSMENT — ACTIVITIES OF DAILY LIVING (ADL)
RETIRED_COMMUNICATION: 0-->UNDERSTANDS/COMMUNICATES WITHOUT DIFFICULTY
NUMBER_OF_TIMES_PATIENT_HAS_FALLEN_WITHIN_LAST_SIX_MONTHS: 1
TRANSFERRING: 1-->ASSISTIVE EQUIPMENT
FALL_HISTORY_WITHIN_LAST_SIX_MONTHS: YES
BATHING: 3-->ASSISTIVE EQUIPMENT AND PERSON
DRESS: 0-->INDEPENDENT
AMBULATION: 1-->ASSISTIVE EQUIPMENT
TOILETING: 1-->ASSISTIVE EQUIPMENT
SWALLOWING: 0-->SWALLOWS FOODS/LIQUIDS WITHOUT DIFFICULTY
COGNITION: 0 - NO COGNITION ISSUES REPORTED
RETIRED_EATING: 0-->INDEPENDENT

## 2020-02-11 NOTE — PLAN OF CARE
Patient A/Ox4, becoming much more awake throughout shift. VSS. Denies CP, SOB, dizziness/LH. LSCTA. +fl/BS. Voiding well in commode. CMS intact. Dressing to foot (cast) CDI. Tolerating regular diet without NV. IS encouraged. Activity level is fair, pt has been mostly resting in bed but was determined to use commode. IV is running TKO between antibiotics. Pain rated as tolerable throughout shift, managed with oxycodone Q3H, pt is on 15mg at home. Possible discharge today. Patient has demonstrated ability to call appropriately. Patient is resting with call light within reach. Will continue to monitor.

## 2020-02-11 NOTE — OP NOTE
Procedure Date: 02/10/2020      PREOPERATIVE DIAGNOSES:     1.  Right ankle bimalleolar fracture.   2.  Right heel posterior ulcer.      POSTOPERATIVE DIAGNOSES:     1.  Right ankle bimalleolar fracture.   2.  Right heel posterior ulcer.      PROCEDURES:   1.  Right ankle lateral malleolus open reduction and internal fixation.    2.  Right ankle medial malleolus open reduction and internal fixation.   3.  Right ankle syndesmosis exam under anesthesia.      SURGEON:  Natanael Villalta MD      ASSISTANT:  MAURICIO Abbasi Ms.'s assistance was required in order to provide help with positioning, the surgery itself, holding retractors, closure of the wound and application of immobilization devices.  At the time of surgery, there was no available help from an orthopedic trainee.      COMPLICATIONS:  None.      DRAINS:  None.      ESTIMATED BLOOD LOSS:  20 mL      ANESTHESIA:  Spinal.      INDICATIONS:  Please refer to clinic notes for further details regarding the indications for Ms. Teague's case.       PROCEDURE NOTE:  On 02/10/2020, the patient was taken to surgery.  Preoperative antibiotics were administered to the patient prior to arrival to the OR.      After successful induction of a spinal anesthesia, she was placed supine on the operating table.  The right lower extremity was prepped and draped in a sterile fashion.  After exsanguination by gravity, the tourniquet cuff was inflated to 300 mmHg on the proximal third of the right thigh.      The pause for the cause was performed according to our institution's policy, which confirmed laterality of the procedure.       An incision was made along the lateral aspect of the ankle joint.  Subcutaneous tissues were dissected.  The lateral malleolus was identified, and a fracture hematoma was resected.      The patient presented with extremely soft bones, which were quite difficult to maneuver and mobilize.  Eventually, an anatomic reduction was  accomplished, and we proceeded with placement of a lag screw from anterior and proximal to distal and posterior with acceptable purchase followed by application of a one-third semitubular plate with purchase of 4 cortices proximal to the fracture site and 2 cortices distal to it.  Once again, acceptable purchase was noticed in all screws.      We focused then our attention along the medial aspect.  An incision was made along the medial malleolus.  Subcutaneous tissues were dissected.  The fracture hematoma and scar tissue were resected.  Anatomic reduction was accomplished, and we proceeded with placement of a screw from medial and distal to proximal and lateral with acceptable purchase as well.      We confirmed with fluoroscopic examination the absence of any instability of the ankle syndesmosis by placing a bone clamp along the lateral malleolus, and after applying lateral translation, we confirmed the absence of any widening of the medial gutter.  This was followed by a forced external rotation test, which also confirmed to have a stable syndesmosis.      Three views of the right ankle were obtained under fluoroscopic control, which confirmed to have excellent location of the hardware as well as alignment of the fracture fragments.  These images were sent to PACS for definitive documentation.      The tourniquet cuff was deflated.  Satisfactory hemostasis was accomplished.  The wound was closed in layers.  Sterile dressings were applied.  The patient was placed in a posterior splint, paying special attention to avoid any loading of the posterior heel ulcer.      The patient was transferred in stable condition to PACU.      PLAN:  The patient will remain nonweightbearing x6 weeks.  The patient will return to the clinic in 2 weeks for a wound check.  At that time, sutures will be removed if indicated.  The patient then will proceed with being placed in a short leg cast with a posterior heel window in order to  provide her with wound checks and wound care.  She will remain on strict nonweightbearing until the 6 week appointment.  At that time, 3 views of the right ankle will be obtained, and based on those findings, further recommendations will be given to the patient.         ASHA NARAYANAN MD             D: 02/10/2020   T: 02/10/2020   MT: [email or web address]      Name:     ANA KLEIN   MRN:      [id number]        Account:        [id number]   :      1985           Procedure Date: 02/10/2020      Document: [id number]

## 2020-02-11 NOTE — PLAN OF CARE
[id number]  -Return to baseline mental status,   -Hypercapnia, hypoventilation or hypoxia resolved for at least 2 hours without supplemental oxygen,   -Deficits in sensation, mobility or coordination have resolved if spinal or regional anesthesia was used.     Pt sleeping between cares. No new concerns. No new CMS issues, continues to be a likely candidate for discharge today.

## 2020-02-11 NOTE — PLAN OF CARE
[id number]  -Return to baseline mental status,   -Hypercapnia, hypoventilation or hypoxia resolved for at least 2 hours without supplemental oxygen,   -Deficits in sensation, mobility or coordination have resolved if spinal or regional anesthesia was used.     Pt seems to have significantly improved with mental status since start of shift. Doing well on 15mg oxycodone Q3H. Up to commode to void. Weaning off of O2. Overall seems to be doing well. Pharmacy labeled home inhalers - given to patient so she will not forget them. No new acute concerns, will run last antibiotic later with 6am pain medications.

## 2020-02-11 NOTE — PLAN OF CARE
[id number]  -Return to baseline mental status,   -Hypercapnia, hypoventilation or hypoxia resolved for at least 2 hours without supplemental oxygen,   -Deficits in sensation, mobility or coordination have resolved if spinal or regional anesthesia was used.    Pt doing well, still a bit lethargic but cooperative and interactive. Pt requested 10-15mg oxycodone (she takes 15mg at home), charge RN paged ortho for the change. CAPNO - reduced O2 to 1L, CAPNO still doing well. No other new concerns. Likely discharge tomorrow.

## 2020-02-11 NOTE — PROGRESS NOTES
"   02/11/20 0900   Quick Adds   Type of Visit Initial PT Evaluation   Living Environment   Lives With child(chencho), dependent;significant other;parent(s)   Living Arrangements house   Home Accessibility stairs within home;stairs to enter home   Number of Stairs, Main Entrance 2   Stair Railings, Main Entrance   (unable to state)   Number of Stairs, Within Home, Primary 9   Stair Railings, Within Home, Primary railing on left side (ascending)   Transportation Anticipated family or friend will provide;car, drives self   Living Environment Comment pt has 2 CECILE from garage ( provides significant assist to complete) then able to live on main floor with bathroom and make shift bed set up from prior injury. Pt with significant injury in past year leaving her on disability and ongoing rehab needs though pt unable to state exact injury- \"something with her feet\" leaving her unable to perform stairs. Pt's mother lives with her and has been providing 24/7 care as needed.   Self-Care   Usual Activity Tolerance moderate   Current Activity Tolerance fair   Regular Exercise Yes   Activity/Exercise Type other (see comments)  (OP PT 2x per week)   Exercise Amount/Frequency 2 times/wk   Equipment Currently Used at Home crutches;walker, standard;wheelchair, manual;orthotic device;grab bar, toilet;grab bar, tub/shower;cane, straight;shower chair;other (see comments)  (knee scooter)   Activity/Exercise/Self-Care Comment pt currently on disability and not working recently but unable to elaborate recent injury or when. States this happened ~ 1 year ago and \"messed up her feet\". Around 6 month ago was Patrick with FWW but then had functional decline (again unable to give specifics due to cognition) and most recently using knee scooter 2/2 fall causing presenting R ankle fx 1/24 and getting assist for dressing and bathing. Pt only leaves house for appointments.   Functional Level Prior   Ambulation 1-->assistive equipment   Transferring " "1-->assistive equipment   Toileting 1-->assistive equipment   Bathing 3-->assistive equipment and person   Communication 0-->understands/communicates without difficulty   Swallowing 0-->swallows foods/liquids without difficulty   Cognition 0 - no cognition issues reported   Fall history within last six months yes   Number of times patient has fallen within last six months 1   Which of the above functional risks had a recent onset or change? ambulation;transferring;toileting;bathing;dressing;fall history;cognition   Prior Functional Level Comment Pt most recently Patrick with knee scooter, gets assist from mother for dressing and bathing. Uses wc for community mobility.   General Information   Onset of Illness/Injury or Date of Surgery - Date 02/10/20   Referring Physician Beba Hyde PA-C   Patient/Family Goals Statement \"return home and keep doing rehab?   Pertinent History of Current Problem (include personal factors and/or comorbidities that impact the POC) per chart review, \"Molly Teague is a 34 year old female with history of scleroderma, CREST syndrome, polyneuropathy, CKD, HTN, prior PE, chronic anemia, obesity, asthma, migraines, GERD, REX, and chronic pain syndrome who was admitted 2/10/20 following scheduled ORIF of R ankle fracture due to hypotension and somnolence post-operatively.\"   Precautions/Limitations fall precautions   Weight-Bearing Status - LUE full weight-bearing   Weight-Bearing Status - RUE full weight-bearing   Weight-Bearing Status - LLE full weight-bearing   Weight-Bearing Status - RLE nonweight-bearing   General Observations R LE cast, IV, CAPNO   General Info Comments activity: up with assist   Cognitive Status Examination   Orientation person   Level of Consciousness lethargic/somnolent;confused   Follows Commands and Answers Questions 75% of the time   Personal Safety and Judgment at risk behaviors demonstrated;impulsive   Memory impaired   Cognitive Comment pt appears to " still be under sedative effects- admits to feeling confused and difficulty answering questions and elaborating on medical hx and PLOF.   Pain Assessment   Patient Currently in Pain Yes, see Vital Sign flowsheet   Integumentary/Edema   Integumentary/Edema no deficits were identifed   Posture    Posture Not impaired   Range of Motion (ROM)   ROM Comment B LEs WFL though limited by R LE cast   Strength   Strength Comments L LE grossly 4/5, R able to achieve gravity motions but NT formally due to pain, likely below baseline strength   Bed Mobility   Bed Mobility Comments Patrick with rail use and HOB elevated   Transfer Skills   Transfer Comments squat pivot to commode with CGA, cues for R NWB and safety needed   Gait   Gait Comments NT   Balance   Balance Comments good seated balance, mild unsteadiness noted during squat pivot needing CGA   Sensory Examination   Sensory Perception Comments R foot numbness consistent with surgery   General Therapy Interventions   Planned Therapy Interventions balance training;bed mobility training;gait training;neuromuscular re-education;ROM;strengthening;transfer training;risk factor education;home program guidelines;progressive activity/exercise   Clinical Impression   Criteria for Skilled Therapeutic Intervention yes, treatment indicated   PT Diagnosis impaired functional mobility   Influenced by the following impairments weakness, impaired balance, pain, R LE NWB restrictions   Functional limitations due to impairments bed mobility, transfers, amb (knee scooter), stairs   Clinical Presentation Evolving/Changing   Clinical Presentation Rationale PMH, clinician impression   Clinical Decision Making (Complexity) Moderate complexity   Therapy Frequency 2x/day   Predicted Duration of Therapy Intervention (days/wks) 2 days   Anticipated Discharge Disposition Home with Assist;Home with Outpatient Therapy   Risk & Benefits of therapy have been explained Yes   Patient, Family & other staff in  "agreement with plan of care Yes   Clinical Impression Comments PT eval complete, tx inidcated and initiated. Pt with significant medical hx and declining PLOF in past year due to \"work accident\". >1 year prior was IND with all cares and mobility and prior to recent fall with subsequent ankle fx 1/24 was Patrick with FWW, now using knee scooter and needs assist for ADLs. Given significant functional decline and R NWB status, would benefit from IP PT 1-2days to promote safe discharge plan for home to provide education on safe functional mobility and techniques. Pt will have 24/7 support from mother at home. Already owns all equipment needs.   [facility] [place] [employer] \"6 Clicks\"   2016, Trustees of [facility] [place], under license to [employer].  All rights reserved.   6 Clicks Short Forms Basic Mobility Inpatient Short Form   [facility] [place] [employer]  \"6 Clicks\" V.2 Basic Mobility Inpatient Short Form   1. Turning from your back to your side while in a flat bed without using bedrails? 4 - None   2. Moving from lying on your back to sitting on the side of a flat bed without using bedrails? 4 - None   3. Moving to and from a bed to a chair (including a wheelchair)? 3 - A Little   4. Standing up from a chair using your arms (e.g., wheelchair, or bedside chair)? 3 - A Little   5. To walk in hospital room? 3 - A Little   6. Climbing 3-5 steps with a railing? 2 - A Lot   Basic Mobility Raw Score (Score out of 24.Lower scores equate to lower levels of function) 19   Total Evaluation Time   Total Evaluation Time (Minutes) 20     "

## 2020-02-11 NOTE — PLAN OF CARE
Patient A & O x3-disoriented to time and lethargic at times. Tried to limit narcotics but persistently asks for PRNs. Continuous pulse ox on. VSS. Pt up with A1-PT note says only pivot transfers due to pt being lethargic and confused at times. Lung sounds . Patient denies chest pain, SOB, lightheadedness, dizziness, nausea, and vomiting. Bowel sounds active, last BM 2/11 smear, passing flatus, and tolerating regular diet. Drinking well and voiding spontaneously without difficulties. PIV infusing in R AC. Patient able to wiggle toes. CMS intact pt has baseline N/T RLE. Cast on RLE. Pain is tolerable and patient taking PRN oxy and robaxin for pain, ice pack applied. Educated pt and had her sign blood consent-placed in chart. Pt receiving one unit of blood should be done at 1550 and then needs IV iron and ancef. Plan is to discharge to home tomorrow. Patient demonstrates ability to use call light appropriately. Will continue to monitor patient.

## 2020-02-11 NOTE — PLAN OF CARE
[id number]  -Return to baseline mental status,   -Hypercapnia, hypoventilation or hypoxia resolved for at least 2 hours without supplemental oxygen,   -Deficits in sensation, mobility or coordination have resolved if spinal or regional anesthesia was used.     Pt sleeping between cares. HGB of 6.6 this morning per lab, will page moonlighter. No other new concerns. No new CMS issues.

## 2020-02-11 NOTE — PLAN OF CARE
"Discharge Planner PT   Patient plan for discharge: home with family assist + return to OP PT  Current status: PT eval complete, tx indicated and initiated. Pt with continued cognitive dysfunction, only oriented to self and difficulty answering questions about PLOF and PMH- pt inidicates >1 year prior was completely IND then had \"work accident that really messed her up\" but was unable to give further information. PT was able to obtain pt using walker in home distances prior to fall 1/24 causing subsequent R ankle fx and then using knee scooter and getting assist from mother and  for ADLs, will need to continue attempting to clarify PLOF. Pt demos SBA supine<>sit, squat pivot bed<>commode CGA all with good R NWB adherence. Pt demos some impulsivity and unsafe behaviors, anticipate this to improve with continued post-anesthesia recovery as confusion clears. Anticipate 1-2 days to reach PT goals and support safe discharge plan for home with 24/7 caregiver assist and knee scooter use for mobility. Recommend Ax1 for nursing for pivot transfers only at this time.  Barriers to return to prior living situation: medical needs, R LE NWB, cognition, 2 CECILE  Recommendations for discharge: anticipate good ability to return home with 24/7 support + resume OP PT pending going progress with therapies and safe knee scooter use (to test this pm)  Rationale for recommendations: Pt currently below baseline level of function and would benefit from ongoing therapy to address the above deficits in order to progress towards PLOF and promote IND mobility.       Entered by: Aline Tinajero 02/11/2020 12:22 PM       16:11 pm addendum- unable to see for PT this pm due to increased lethargy/difficulty following commands making knee scooter mobility unsafe. Pt seen self transferring commode>bed and demonstrating impulsive behaviors getting caught up in lines. Do not leave alone on commode this pm. Will retry knee scooter tomorrow to approve " for safe discharge home.

## 2020-02-11 NOTE — PLAN OF CARE
[id number]  -Return to baseline mental status,   -Hypercapnia, hypoventilation or hypoxia resolved for at least 2 hours without supplemental oxygen,   -Deficits in sensation, mobility or coordination have resolved if spinal or regional anesthesia was used.     Pt recovering and slowing getting better. Pt arouses very easily to voice and stays awake for medications. No issues with breathing thus far, pt dozes off and is a mouth breather but CAPNO is doing well. No new concerns with CMS per pt report. Pt would like to discharge tomorrow if possible.

## 2020-02-11 NOTE — PLAN OF CARE
[id number]  -Return to baseline mental status,   -Hypercapnia, hypoventilation or hypoxia resolved for at least 2 hours without supplemental oxygen,   -Deficits in sensation, mobility or coordination have resolved if spinal or regional anesthesia was used.    Did get pt adjusted for her oxycodone dose, pt stated that her doctor promised her that her pain would be controlled. Pt evidently had a pain plan that has  last month. Pt currently agreeable to pain plan and is gracious for care.  Seems knowledgeable of her medications. Will continue to wean off of O2. She is also on symbicort and ventolin and would like to take her own inhalers.  No new CMS issues.

## 2020-02-12 ENCOUNTER — APPOINTMENT (OUTPATIENT)
Dept: PHYSICAL THERAPY | Facility: CLINIC | Age: 35
DRG: 982 | End: 2020-02-12
Attending: ORTHOPAEDIC SURGERY
Payer: MEDICARE

## 2020-02-12 ENCOUNTER — APPOINTMENT (OUTPATIENT)
Dept: OCCUPATIONAL THERAPY | Facility: CLINIC | Age: 35
DRG: 982 | End: 2020-02-12
Attending: PHYSICIAN ASSISTANT
Payer: MEDICARE

## 2020-02-12 ENCOUNTER — PATIENT OUTREACH (OUTPATIENT)
Dept: CARE COORDINATION | Facility: CLINIC | Age: 35
End: 2020-02-12

## 2020-02-12 VITALS
RESPIRATION RATE: 14 BRPM | WEIGHT: 174 LBS | HEART RATE: 108 BPM | HEIGHT: 62 IN | SYSTOLIC BLOOD PRESSURE: 117 MMHG | DIASTOLIC BLOOD PRESSURE: 71 MMHG | BODY MASS INDEX: 32.02 KG/M2 | TEMPERATURE: 96.5 F | OXYGEN SATURATION: 99 %

## 2020-02-12 LAB
ANION GAP SERPL CALCULATED.3IONS-SCNC: 7 MMOL/L (ref 3–14)
BUN SERPL-MCNC: 14 MG/DL (ref 7–30)
CALCIUM SERPL-MCNC: 9 MG/DL (ref 8.5–10.1)
CHLORIDE SERPL-SCNC: 108 MMOL/L (ref 94–109)
CO2 SERPL-SCNC: 24 MMOL/L (ref 20–32)
CREAT SERPL-MCNC: 1.35 MG/DL (ref 0.52–1.04)
GFR SERPL CREATININE-BSD FRML MDRD: 51 ML/MIN/{1.73_M2}
GLUCOSE SERPL-MCNC: 118 MG/DL (ref 70–99)
HGB BLD-MCNC: 8 G/DL (ref 11.7–15.7)
POTASSIUM SERPL-SCNC: 4.2 MMOL/L (ref 3.4–5.3)
SODIUM SERPL-SCNC: 139 MMOL/L (ref 133–144)

## 2020-02-12 PROCEDURE — 97116 GAIT TRAINING THERAPY: CPT | Mod: GP | Performed by: PHYSICAL THERAPIST

## 2020-02-12 PROCEDURE — 36415 COLL VENOUS BLD VENIPUNCTURE: CPT | Performed by: PHYSICIAN ASSISTANT

## 2020-02-12 PROCEDURE — 97165 OT EVAL LOW COMPLEX 30 MIN: CPT | Mod: GO | Performed by: OCCUPATIONAL THERAPIST

## 2020-02-12 PROCEDURE — 97530 THERAPEUTIC ACTIVITIES: CPT | Mod: GO | Performed by: OCCUPATIONAL THERAPIST

## 2020-02-12 PROCEDURE — 25000128 H RX IP 250 OP 636: Performed by: STUDENT IN AN ORGANIZED HEALTH CARE EDUCATION/TRAINING PROGRAM

## 2020-02-12 PROCEDURE — 80048 BASIC METABOLIC PNL TOTAL CA: CPT | Performed by: INTERNAL MEDICINE

## 2020-02-12 PROCEDURE — 25000128 H RX IP 250 OP 636: Performed by: PHYSICIAN ASSISTANT

## 2020-02-12 PROCEDURE — 97535 SELF CARE MNGMENT TRAINING: CPT | Mod: GO | Performed by: OCCUPATIONAL THERAPIST

## 2020-02-12 PROCEDURE — 97530 THERAPEUTIC ACTIVITIES: CPT | Mod: GP | Performed by: PHYSICAL THERAPIST

## 2020-02-12 PROCEDURE — 99232 SBSQ HOSP IP/OBS MODERATE 35: CPT | Performed by: INTERNAL MEDICINE

## 2020-02-12 PROCEDURE — 85018 HEMOGLOBIN: CPT | Performed by: PHYSICIAN ASSISTANT

## 2020-02-12 PROCEDURE — 36415 COLL VENOUS BLD VENIPUNCTURE: CPT | Performed by: INTERNAL MEDICINE

## 2020-02-12 PROCEDURE — 25000132 ZZH RX MED GY IP 250 OP 250 PS 637: Mod: GY | Performed by: PHYSICIAN ASSISTANT

## 2020-02-12 PROCEDURE — 25000132 ZZH RX MED GY IP 250 OP 250 PS 637: Mod: GY | Performed by: STUDENT IN AN ORGANIZED HEALTH CARE EDUCATION/TRAINING PROGRAM

## 2020-02-12 RX ORDER — ACETAMINOPHEN 325 MG/1
650 TABLET ORAL EVERY 4 HOURS PRN
Qty: 1 BOTTLE | Refills: 0 | Status: SHIPPED | OUTPATIENT
Start: 2020-02-13

## 2020-02-12 RX ORDER — POLYETHYLENE GLYCOL 3350 17 G/17G
17 POWDER, FOR SOLUTION ORAL DAILY
Qty: 7 PACKET | Refills: 0 | Status: SHIPPED | OUTPATIENT
Start: 2020-02-13 | End: 2022-04-26

## 2020-02-12 RX ORDER — OXYCODONE HYDROCHLORIDE 10 MG/1
10 TABLET ORAL
Qty: 40 TABLET | Refills: 0 | Status: SHIPPED | OUTPATIENT
Start: 2020-02-12 | End: 2020-03-25

## 2020-02-12 RX ORDER — OXYCODONE HYDROCHLORIDE 10 MG/1
10-15 TABLET ORAL EVERY 6 HOURS PRN
COMMUNITY
End: 2020-03-25

## 2020-02-12 RX ADMIN — ACETAMINOPHEN 975 MG: 325 TABLET, FILM COATED ORAL at 09:31

## 2020-02-12 RX ADMIN — ONDANSETRON 4 MG: 4 TABLET, ORALLY DISINTEGRATING ORAL at 15:01

## 2020-02-12 RX ADMIN — GABAPENTIN 600 MG: 300 CAPSULE ORAL at 09:29

## 2020-02-12 RX ADMIN — FOLIC ACID 1000 MCG: 1 TABLET ORAL at 09:29

## 2020-02-12 RX ADMIN — METHOCARBAMOL 750 MG: 750 TABLET, FILM COATED ORAL at 09:58

## 2020-02-12 RX ADMIN — FAMOTIDINE 20 MG: 20 TABLET ORAL at 09:30

## 2020-02-12 RX ADMIN — BUSPIRONE HYDROCHLORIDE 30 MG: 15 TABLET ORAL at 09:31

## 2020-02-12 RX ADMIN — BUDESONIDE AND FORMOTEROL FUMARATE DIHYDRATE 2 PUFF: 160; 4.5 AEROSOL RESPIRATORY (INHALATION) at 09:39

## 2020-02-12 RX ADMIN — OXYCODONE HYDROCHLORIDE 10 MG: 10 TABLET ORAL at 09:59

## 2020-02-12 RX ADMIN — OMEPRAZOLE 40 MG: 20 CAPSULE, DELAYED RELEASE ORAL at 09:29

## 2020-02-12 RX ADMIN — GABAPENTIN 600 MG: 300 CAPSULE ORAL at 15:01

## 2020-02-12 RX ADMIN — CEFAZOLIN SODIUM 2 G: 2 INJECTION, SOLUTION INTRAVENOUS at 01:27

## 2020-02-12 RX ADMIN — DULOXETINE HYDROCHLORIDE 30 MG: 30 CAPSULE, DELAYED RELEASE ORAL at 09:30

## 2020-02-12 RX ADMIN — OXYCODONE HYDROCHLORIDE 15 MG: 10 TABLET ORAL at 07:05

## 2020-02-12 RX ADMIN — CYANOCOBALAMIN TAB 1000 MCG 1000 MCG: 1000 TAB at 09:30

## 2020-02-12 RX ADMIN — OXYCODONE HYDROCHLORIDE 10 MG: 10 TABLET ORAL at 15:05

## 2020-02-12 RX ADMIN — POLYETHYLENE GLYCOL 3350 17 G: 17 POWDER, FOR SOLUTION ORAL at 09:30

## 2020-02-12 RX ADMIN — CEFAZOLIN SODIUM 2 G: 2 INJECTION, SOLUTION INTRAVENOUS at 09:31

## 2020-02-12 RX ADMIN — FERROUS GLUCONATE 324 MG: 324 TABLET ORAL at 09:31

## 2020-02-12 NOTE — PLAN OF CARE
Discharge Planner PT   Patient plan for discharge: Home   Current status: Mod I for bed mobility and transfers from EOB and to walker and from walker to and from knee scooter. Pt was able to AMB NWB with RLE using walker and scooter. Walker: 2x25 Pretty, 300ft SBA with scooter.   Barriers to return to prior living situation: Pt has not been able to perform stairs.   Recommendations for discharge: Home with spousal and family support  Rationale for recommendations: Pt was lucid and appropriate with response to questions and functional mobility. She is near baseline at during am session.       Entered by: Richard Rodríguez 02/12/2020 8:16 AM

## 2020-02-12 NOTE — PROGRESS NOTES
02/12/20 0819   Quick Adds   Type of Visit Initial Occupational Therapy Evaluation   Living Environment   Lives With spouse;child(chencho), dependent;parent(s)  (Patient's mom is staying with her during recovery)   Living Arrangements house   Home Accessibility stairs to enter home;stairs within home   Number of Stairs, Main Entrance 2   Stair Railings, Main Entrance none   Number of Stairs, Within Home, Primary other (see comments)  (3 sets of stairs)   Transportation Anticipated family or friend will provide;car, drives self   Living Environment Comment Tub shower, standard height toilet, no grab bars or shower chair   Self-Care   Usual Activity Tolerance moderate   Current Activity Tolerance fair   Equipment Currently Used at Home wheelchair, manual;walker, standard;crutches;other (see comments)  (knee scooter (primarily used))   Activity/Exercise/Self-Care Comment Patient mod I with knee scooter for ADLs at home at baseline   Functional Level   Ambulation 1-->assistive equipment   Transferring 1-->assistive equipment   Toileting 1-->assistive equipment   Bathing 3-->assistive equipment and person   Dressing 0-->independent   Eating 0-->independent   Communication 0-->understands/communicates without difficulty   Swallowing 0-->swallows foods/liquids without difficulty   Cognition 0 - no cognition issues reported   Fall history within last six months yes   Number of times patient has fallen within last six months 1   Which of the above functional risks had a recent onset or change? bathing;dressing;toileting;transferring;ambulation   Prior Functional Level Comment Patient mod I with knee scooter for ADLs at home at baseline   General Information   Onset of Illness/Injury or Date of Surgery - Date 02/10/20   Referring Physician Natanael Villalta MD   Patient/Family Goals Statement Return home with family   Additional Occupational Profile Info/Pertinent History of Current Problem Molly Ho is a 34 year  old female who speaks English. Right ankle open reduction internal fixation   Precautions/Limitations other (see comments)  (NWB R LE)   Weight-Bearing Status - LUE full weight-bearing   Weight-Bearing Status - RUE full weight-bearing   Weight-Bearing Status - LLE full weight-bearing   Weight-Bearing Status - RLE nonweight-bearing   General Observations On RA, alert and oriented   Cognitive Status Examination   Orientation orientation to person, place and time   Level of Consciousness alert   Visual Perception   Visual Perception No deficits were identified   Sensory Examination   Sensory Quick Adds No deficits were identified   Pain Assessment   Patient Currently in Pain Yes, see Vital Sign flowsheet   Range of Motion (ROM)   ROM Comment Not formally tested   Strength   Strength Comments Not formally tested   Mobility   Bed Mobility Bed mobility skill: Sit to supine;Bed mobility skill: Supine to sit   Bed Mobility Skill: Sit to Supine   Level of [place]: Sit/Supine independent   Bed Mobility Skill: Supine to Sit   Level of [place]: Supine/Sit independent   Transfer Skill: Bed to Chair/Chair to Bed   Level of [place]: Bed to Chair independent   Weight-Bearing Restrictions nonweight-bearing  (R LE)   Assistive Device - Transfer Skill Bed to Chair Chair to Bed Rehab Eval other (see comments)  (knee scooter)   Transfer Skill: Sit to Stand   Level of [place]: Sit/Stand independent   Transfer Skill: Sit to Stand nonweight-bearing  (R LE)   Assistive Device for Transfer: Sit/Stand other (see comments)  (knee scooter)   Transfer Skill: Toilet Transfer   Level of [place]: Toilet stand-by assist   Weight-Bearing Restrictions: Toilet nonweight-bearing  (R LE)   Assistive Device other (see comments)  (knee scooter)   Upper Body Dressing   Level of [place]: Dress Upper Body independent   Lower Body Dressing   Level of [place]: Dress Lower Body independent   Toileting   Level of [place]:  "Toilet stand-by assist   Grooming   Level of [place]: Grooming independent   Eating/Self Feeding   Level of [place]: Eating independent   Activities of Daily Living Analysis   Impairments Contributing to Impaired Activities of Daily Living post surgical precautions;pain   General Therapy Interventions   Planned Therapy Interventions ADL retraining   Clinical Impression   Criteria for Skilled Therapeutic Interventions Met yes, treatment indicated   OT Diagnosis Patient is below baseline for ADLs/IADLs   Influenced by the following impairments pain, NWB R LE   Assessment of Occupational Performance 1-3 Performance Deficits   Identified Performance Deficits bathing, toileting, functional mobility   Clinical Decision Making (Complexity) Low complexity   Therapy Frequency Daily   Predicted Duration of Therapy Intervention (days/wks) OT eval and treat x1   Anticipated Equipment Needs at Discharge tub bench  (Rehab tech to check in)   Anticipated Discharge Disposition Home with Assist   Risks and Benefits of Treatment have been explained. Yes   Patient, Family & other staff in agreement with plan of care Yes   [facility] TM \"6 Clicks\"   2016, Trustees of [facility], under license to [employer].  All rights reserved.   6 Clicks Short Forms Daily Activity Inpatient Short Form   [facility]  \"6 Clicks\" Daily Activity Inpatient Short Form   1. Putting on and taking off regular lower body clothing? 4 - None   2. Bathing (including washing, rinsing, drying)? 3 - A Little   3. Toileting, which includes using toilet, bedpan or urinal? 4 - None   4. Putting on and taking off regular upper body clothing? 4 - None   5. Taking care of personal grooming such as brushing teeth? 4 - None   6. Eating meals? 4 - None   Daily Activity Raw Score (Score out of 24.Lower scores equate to lower levels of function) 23   Total Evaluation Time   Total Evaluation Time (Minutes) 8     "

## 2020-02-12 NOTE — PROGRESS NOTES
Clinic Care Coordination Contact  Care Team Conversations      The patient is a current inpatient following surgery on her ankle to repair a fracture.  The RN CC will monitor the chart for a discharge.   Patient will be contacted following discharge.    Plan:  RN CC to monitor for discharge.        Billy Rueda MSN, RN, PHN, [facility]   Primary Care Clinical RN Care Coordinator  [facility]  2/12/2020   12:26 PM  [email or web address]  Office: [phone]

## 2020-02-12 NOTE — PLAN OF CARE
Discharge Planner OT   Patient plan for discharge: Home with assist from family (/mom).   Current status: Patient alert and oriented. Patient is mod I using knee scooter for transfers, mobility and basic ADLs; overall moving well. No LOB or dizziness, reports pain is well managed.  Barriers to return to prior living situation: None.  Recommendations for discharge: Home with assist.  Rationale for recommendations: Discharge from OT, OT goals met.       Entered by: Colette Mcgarry 02/12/2020 11:21 AM      Occupational Therapy Discharge Summary    Reason for therapy discharge:    All goals and outcomes met, no further needs identified.    Progress towards therapy goal(s). See goals on Care Plan in [facility] electronic health record for goal details.  Goals met    Therapy recommendation(s):    No further therapy is recommended.

## 2020-02-12 NOTE — PLAN OF CARE
Pt discharged home with family via WC, discharge medication and discharge instruction given, all belongings sent home with pt and pt understand discharge plan.

## 2020-02-12 NOTE — PROGRESS NOTES
Pt feels improved  Pain controlled. Pt feels ready for discharge    No chest pain, cough, SOB    Afebrile, VSS  BP 110s    Alert, fully oriented, in good spirits  Lungs clear  CV rrr  Abd soft    Results for ANA KLEIN (MRN [id number]) as of 2/12/2020 11:01   Ref. Range 2/12/2020 06:50 2/12/2020 10:23   Sodium Latest Ref Range: 133 - 144 mmol/L  139   Potassium Latest Ref Range: 3.4 - 5.3 mmol/L  4.2   Chloride Latest Ref Range: 94 - 109 mmol/L  108   Carbon Dioxide Latest Ref Range: 20 - 32 mmol/L  24   Urea Nitrogen Latest Ref Range: 7 - 30 mg/dL  14   Creatinine Latest Ref Range: 0.52 - 1.04 mg/dL  1.35 (H)   GFR Estimate Latest Ref Range: >60 mL/min/1.73_m2  51 (L)   GFR Estimate If Black Latest Ref Range: >60 mL/min/1.73_m2  59 (L)   Calcium Latest Ref Range: 8.5 - 10.1 mg/dL  9.0   Anion Gap Latest Ref Range: 3 - 14 mmol/L  7   Glucose Latest Ref Range: 70 - 99 mg/dL  118 (H)   Hemoglobin Latest Ref Range: 11.7 - 15.7 g/dL 8.0 (L)        Assessment    S/p ORIF R ankle fracture.      Acute on chronic pain, improved today, on oxycodone 10-15 mg q 3 hours prn    Anemia, acute on chronic, stable after one unit of PRBC and IV Fe     EMERALD, superimposed upon chronic kidney disease, likely related to post op low BP, improved. Lisinopril on hold.     History of scleroderma, not on immunosuppression.     Hx PE, not on AC chronically. Not on chemical dVT proph post op--would not use in view of uncertain cause for anemia    Plan  Pt is medically stable for discharge  Short term increased oxycodone ( Rx written by ortho)  Pt to remain off Lisinopril, monitor BP at home, f/u with primary MD one week after discharge

## 2020-02-12 NOTE — PLAN OF CARE
VS: VSS, A&O X 4, pt denies CP or SOB.   O2: Room air sat. > 90%.   Output: Voiding adequate amount on BSC.   Last BM: 02/11/20 passing gas.    Activity: Up with PT.   Skin: Intact except surgical incision.    Pain: Comfortably manageable with po PRN medication.    CMS: CMS and neuro intact.   Dressing: CDI.    Diet: Regular tolerating without N/V.   LDA: PIV SL.    Equipment: IV pole, walker and personal belongings.    Plan: Discharge home today.   Additional Info:

## 2020-02-12 NOTE — PLAN OF CARE
VS: VSS. Tachy at pt's baseline.    O2: Room air >90%. Continuous pulse ox on.    Output: Voiding without difficulty to commode.    Last BM: 2/11 X2.    Activity: Up with SBA/Ax1 to pivot to the commode. NWB on RLE.    Skin: Intact except incision.    Pain: Pt has chronic pain. Takes 15 mg oxy at home originally. Has oxy, robaxin, xanax, atarax available.     CMS: RLE numbness at baseline per report.    Dressing: Hard cast CDI.    Diet: Regular. Fair.    LDA: PIV saline locked.    Equipment: Personal belongings, walker, commode, pillows to elevate RLE.    Plan: Possible discharge home tomorrow, 2/12.   Additional Info: Pt was very sleepy throughout the shift and woke up around 1900 very confused, thinking she was in her home. Pt easily redirected and pt came to understand where she was. When pt is sleeping, she is very out of it. Pt was not awake enough to take some PO meds around 1700.   HX of altered mental status.  Pt received 1 unit of blood during day shift. Recheck in the AM.

## 2020-02-12 NOTE — PROGRESS NOTES
Care Coordinator Progress Note    Admission Date/Time:  2/10/2020  Attending MD:  Agustin Mtz MD    Data  Chart reviewed, discussed with interdisciplinary team.   Patient was admitted for:    Pain in joint, ankle and foot, right  Pain in joint, ankle and foot, right  Ankle fracture, right, closed, initial encounter  S/P ORIF (open reduction internal fixation) fracture.    Concerns with insurance coverage for discharge needs: None.  Current Living Situation: Patient lives with family.  Support System: Supportive and Involved  Services Involved: Outpatient Rehab  Transportation at Discharge: Car and Family or friend will provide  Transportation to Medical Appointments:   - Name of caregiver: Mom  Barriers to Discharge: medical stability     Coordination of Care and Referrals: Provided patient/family with options for Outpatient Rehab.        Assessment  Met with Molly to discuss discharge planning. Molly stated that previously she used [facility] as OP PT and would like to resume therapy sessions there. Per earlier recommendations from therapy- patient was to resume therapy. This writer will refer to [facility] scheduling to assist with making appointment for patient. RNCC is available for any needs.      Plan  Anticipated Discharge Date:  Today   Anticipated Discharge Plan:  Home with OP PT.    SOM Cruz RN Care Coordinator  Pager [phone] (covering pager)

## 2020-02-12 NOTE — PLAN OF CARE
PT: Patient discharging this afternoon. No further questions identified. Does not need to demo stairs as patient bumps up on her bottom and her spouse or mother assists.     Physical Therapy Discharge Summary    Reason for therapy discharge:    All goals and outcomes met, no further needs identified.    Progress towards therapy goal(s). [facility] for goal details.  Goals met    Therapy recommendation(s):    Continued therapy is recommended.  Rationale/Recommendations:  home PT for safety evaluation and progression.

## 2020-02-13 ENCOUNTER — TELEPHONE (OUTPATIENT)
Dept: INTERNAL MEDICINE | Facility: CLINIC | Age: 35
End: 2020-02-13

## 2020-02-13 DIAGNOSIS — G89.4 CHRONIC PAIN SYNDROME: Chronic | ICD-10-CM

## 2020-02-14 LAB
BLD PROD TYP BPU: NORMAL
BLD UNIT ID BPU: 0
BLOOD PRODUCT CODE: NORMAL
BPU ID: NORMAL
TRANSFUSION STATUS PATIENT QL: NORMAL
TRANSFUSION STATUS PATIENT QL: NORMAL

## 2020-02-14 RX ORDER — OXYCODONE HYDROCHLORIDE 15 MG/1
TABLET ORAL
Qty: 100 TABLET | Refills: 0 | Status: SHIPPED | OUTPATIENT
Start: 2020-02-14 | End: 2020-03-17

## 2020-02-14 NOTE — TELEPHONE ENCOUNTER
I had very clear instructions that her postoperative pain should be managed by her surgery team.   She was not to use her regular oxycodone opiates for postoperative pain, she should have been using different opiates as managed and prescribed by the surgery team    This next refill of oxycodone should last 30 days, no early refills will be granted

## 2020-02-14 NOTE — TELEPHONE ENCOUNTER
Requested Prescriptions   Pending Prescriptions Disp Refills     oxyCODONE IR (ROXICODONE) 15 MG tablet [Pharmacy Med Name: OXYCODONE HCL 15MG TABS] 100 tablet 0     Sig: TAKE ONE TABLET BY MOUTH EVERY 4 HOURS AS NEEDED FOR SEVERE PAIN       There is no refill protocol information for this order        Last Written Prescription Date:  2/12/20  Last Fill Quantity: 40,  # refills: 0   Last Office Visit with [facility] P or [facility] prescribing provider:  2/6/20   Future Office Visit:    Next 5 appointments (look out 90 days)    Feb 17, 2020 10:45 AM CST  Return Visit with Selvin Mei DPM  [facility] ([facility]) [address]  [address]  [place] [address] [phone]

## 2020-02-14 NOTE — TELEPHONE ENCOUNTER
Routing refill request to provider for review/approval because:  Drug not on the [facility] refill protocol     15 mg was requested.  Associated Diagnoses  Ankle fracture, right, closed, initial encounter [S82.901Z]  - Primary       This Order Has Been Discontinued   Order Status Reason By On   Discontinued Stop at Discharge Agustin Mtz MD 2/12/20 1009      Patient was recently given 10 mg by   Anna Reagan APRN [facility]   Associated Diagnoses   S/P ORIF (open reduction internal fixation) fracture [Z98.890, Z87.81]         Deny?  [place] reviewed:  not reviewed- recommend provider review    [place]  [email or web address]     ALLEN Justice, RN

## 2020-02-17 NOTE — TELEPHONE ENCOUNTER
Patient called wanting to make sure that her refill for roxicodone was for her chronic pain/monthy supply, and not due to surgical pain.       Richelle SOLO  Station

## 2020-02-17 NOTE — TELEPHONE ENCOUNTER
Notified patient her postoperative pain should be managed by her surgery team.   She was not to use her regular oxycodone opiates for postoperative pain, she should have been using different opiates as managed and prescribed by the surgery team     This next refill of oxycodone should last 30 days, no early refills will be granted    Patient verbalized understanding and reports that she will use the Roxicodone for chronic pain and make it last 30 days.    Marianne JHA Rn

## 2020-02-17 NOTE — TELEPHONE ENCOUNTER
Gave patient Dr. Barba's message. She says she still has meds from the surgeon and has no idea when her refills are from Dr. Barba, shich is why she called. Laya Burger on 2/17/2020 at 1:14 PM

## 2020-02-19 ENCOUNTER — PATIENT OUTREACH (OUTPATIENT)
Dept: CARE COORDINATION | Facility: CLINIC | Age: 35
End: 2020-02-19

## 2020-02-19 NOTE — PROGRESS NOTES
Clinic Care Coordination Contact    Clinic Care Coordination Contact  OUTREACH    Referral Information:  Referral Source: ED Follow-Up    Primary Diagnosis: Orthopedic    Chief Complaint   Patient presents with     Clinic Care Coordination - Post Hospital     primary care RN CC        [place] Utilization: The patient uses the [facility] and the [facility].  Clinic Utilization  Difficulty keeping appointments:: No  Compliance Concerns: No  No-Show Concerns: No  No PCP office visit in Past Year: No  Utilization    Last refreshed: 2/19/2020 11:39 AM:  Hospital Admissions 3           Last refreshed: 2/19/2020 11:39 AM:  ED Visits 8           Last refreshed: 2/19/2020 11:39 AM:  No Show Count (past year) 6              Current as of: 2/19/2020 11:39 AM              Clinical Concerns:  Current Medical Concerns: The patient was taken to surgery for an ORIF of her ankle.  She is now home with the assistance of her mother and family.  Patient is elevating her ankle continually and placing some ice just behind her knee to aid in pain management.  Patient has chronic pain so there was a need to increase her pain medications for a short time and then go back to her PTA pain medications as scheduled.  The patient states that it is very painful at night and in the morning as she goes a length of time without having the ice changed or medications due to sleep.  The patient currently has no questions regarding treatment or medications at this time.  Medication reconciliation was completed with the patient and marked as reviewed.  Health maintenance was reviewed and questions were answered with the patient.  The assessment for hypertension was completed with the patient today as well as the social determinants of health and they were marked as reviewed.    Current Behavioral Concerns: None currently noted.  Education Provided to patient: Other modalities for pain management.  Pain  Pain (GOAL):: Yes  Type: Acute  "(<3mo)  Location of chronic pain:: ankle  Radiating: Yes  Location pain radiates to: up to the hip  Progression: Constant  Description of pain: Aching, Throbbing  Chronic pain severity:: 4  Limitation of routine activities due to chronic pain:: No  Alleviating Factors: Rest, Pain Medication  Aggravating Factors: Activity  Health Maintenance Reviewed: Due/Overdue   Health Maintenance Due   Topic Date Due     HIV SCREENING  08/04/2000     PNEUMOCOCCAL IMMUNIZATION 19-64 MEDIUM RISK (1 of 1 - PPSV23) 08/04/2004     MEDICARE ANNUAL WELLNESS VISIT  10/10/2018     ASTHMA ACTION PLAN  07/06/2019     ASTHMA CONTROL TEST  02/02/2020       Clinical Pathway: Clinic Care Coordination Hypertension Assessment    Discharge:  Hospital summary: ORIF surgery on ankle.  Day of hospital discharge: 2/13/20  What recommendations were made for follow up after your recent hospitalization?  Follow-up with PCP as well as orthopedist.  Have the follow up appointments been scheduled? Yes  If not, can I help you set up these appointments? N/A     Do you have after-hour contact numbers for your providers?  Yes     Hypertension:  Hypertension  Hypertension Associated with: : Chronic Renal Disease  Associated Hypertension Complications: : Kidney Disease                   Symptom Review:  Hypertension Symptoms  Anxiety: No  Blurred vision: No  Chest pain: : No  Cough: No  Numbness and Tingling in hands or feet: No  Weakness: Yes  Dizziness or Light-Headedness: No  Fatigue: No  Headaches: No  Neck pain: No  Orthopnea: No  Palpitations: No  Fluid retention:: Yes  Location: : ankles  Shortness of breath: No  Nosebleeds: No  Sweats: No  Home BP monitoring: : No    Medications:   \"How many new medications are you on since your hospitalization?\"           0 - 1  \"How many of your current medicines changed (dose, timing, name, etc.) while you were in the hospital?\" 0 - 1  \"Do you have questions about your medications?\" No  For patients on insulin: \"Did " "you start on insulin in the hospital or did you have your insulin dose changed?\"No  Medication reconciliation completed? Yes  Was MTM referral placed (*Make sure to put transitions as reason for referral)?  No     Activity:  Patient currently is not engaged in exercise:   Patient is able to perform ADLS/IADLS with assistance.  Patient referred to NONE.    Diet:  Reviewed well balanced diet with consideration to medical conditions/limitations.   Reviewed low sodium diet guidelines appropriate for patient.    Referred to NONE.    Emotions:  Patient does have adequate support.  Patient is not feeling down or depressed.    Follow up:   Referred patient to NONE.      Medication Management:  Patient is knowledgeable on medications and is adherent.  No financial concerns reported at this time.  Medication reconciliation was performed and marked as reviewed.  There are no questions or concerns at this time.    Functional Status:  Dependent ADLs:: Ambulation-walker, Wheelchair-independent  Dependent IADLs:: Transportation, Cleaning, Cooking, Shopping, Laundry, Meal Preparation  Bed or wheelchair confined:: No  Mobility Status: Independent w/Device  Fallen 2 or more times in the past year?: Yes  Any fall with injury in the past year?: Yes    Living Situation:  Current living arrangement:: I live in a private home with family  Type of residence:: Private home - stairs    Lifestyle & Psychosocial Needs:  Lifestyle     Physical activity:     Days per week: 0 days     Minutes per session: 0 min     Stress: Not on file     Social Needs     Financial resource strain: Not hard at all     Food insecurity:     Worry: Never true     Inability: Never true     Transportation needs:     Medical: No     Non-medical: No     Diet:: Regular  Inadequate nutrition (GOAL):: No  Tube Feeding: No  Inadequate activity/exercise (GOAL):: No  Significant changes in sleep pattern (GOAL): No  Family, Regular car     Hoahaoism or spiritual beliefs that " impact treatment:: No  Mental health DX:: No  Mental health management concern (GOAL):: No  Informal Support system:: Children, Family, Spouse   Socioeconomic History     Marital status: Single     Spouse name: Not on file     Number of children: Not on file     Years of education: Not on file     Highest education level: Not on file     Tobacco Use     Smoking status: Never Smoker     Smokeless tobacco: Never Used   Substance and Sexual Activity     Alcohol use: Yes     Comment: Socially once on a weekend if any     Drug use: No     Comment: None     Sexual activity: Yes     Partners: Male     Birth control/protection: None               Resources and Interventions:  Current Resources:      Community Resources: None  Supplies used at home:: None  Equipment Currently Used at Home: crutches, walker, standard, wheelchair, manual, orthotic device, grab bar, tub/shower    Advance Care Plan/Directive  Advanced Care Plans/Directives on file:: No  Advanced Care Plan/Directive Status: Considering Options    Referrals Placed: None     Goals:   Goals        General    #1  Pain Management (pt-stated)     Notes - Note edited  1/27/2020 10:37 AM by Billy Magallon RN    Goal Statement: I will use ice and elevation to help reduce the pain in the fractured ankle.  The ice can be placed in a plastic bag and double bagged, then wrapped in a towel to prevent the splint from getting wet.  Date Goal set: 1/27/2020  Date to Achieve By: 7/27/2020  Patient expressed understanding of goal: yes  Action steps to achieve this goal:  1. I will elevate my leg as often as possible.  2. I will prepare an ice pack as above and apply to my ankle as often as possible.                Patient/Caregiver understanding: Patient has a good understanding of the disease process.    Outreach Frequency: 2 weeks  Future Appointments              In 6 days Nurse, [facility], [facility]    In 2 weeks Grecia Barba DO [place]  [facility]  Arrive at: Clinic A    In 1 month Natanael Villalta MD [facility], [facility]          Plan: 1.  The patient will keep her ankle elevated at all times to keep it from swelling.  The use of ice will also reduce the inflammation as well as the pain.  2.  The patient will apply ice behind her knee as well in order to cool the blood that goes down to her ankle.  3.  The patient will make and attend all follow-up appointments as recommended in her discharge instructions.  4.   Care Coordination to remain available for the patient to contact in the event of future needs.            Billy Rueda MSN, RN, PHN, CCM   Primary Care Clinical RN Care Coordinator  [facility]  2/19/2020   12:29 PM  [email or web address]  Office: [phone]

## 2020-02-19 NOTE — PROGRESS NOTES
Reason for visit:    Molly Teague came in to the clinic for a two week post op check.    Her surgery was done 2/10/20 by Dr Villalta.  She had   Right ankle medial and lateral malleolus open reduction and internal fixation     Assessment:    Molly came into the clinic in postop splint Non-WB    The Surgical wounds were exposed and found to be well-healed; so the sutures were removed. CNS was found to be intact. Patient did report some paraesthesia along the anterior tibia.     Plan:     She was placed in a short leg cast with a window at the heel to allow for wound check/care.  She was told to remain Non-WB     She has an appointment to see Dr. Villalta at that time Dr. Villalta will determine further restrictions.    She has our phone number and will call with questions or problems.      Cast/splint application  Date/Time: 2/25/2020 8:41 AM  Performed by: Jayleen Muñoz ATC  Authorized by: Natanael Villalta MD     Consent:     Consent obtained:  Verbal    Consent given by:  Patient  Pre-procedure details:     Sensation:  Normal  Procedure details:     Laterality:  Right    Location:  Ankle    Ankle:  R ankle    Cast type:  Short leg  Post-procedure details:     Sensation:  Normal    Patient tolerance of procedure:  Tolerated well, no immediate complications    Patient provided with cast or splint care instructions: Yes    Comments:      A window was placed around the heel to allow for wound checks and dressing changes

## 2020-02-25 ENCOUNTER — OFFICE VISIT (OUTPATIENT)
Dept: ORTHOPEDICS | Facility: CLINIC | Age: 35
End: 2020-02-25
Payer: MEDICARE

## 2020-02-25 DIAGNOSIS — Z98.890 S/P ORIF (OPEN REDUCTION INTERNAL FIXATION) FRACTURE: ICD-10-CM

## 2020-02-25 DIAGNOSIS — Z51.89 VISIT FOR WOUND CHECK: ICD-10-CM

## 2020-02-25 DIAGNOSIS — Z48.02 VISIT FOR SUTURE REMOVAL: Primary | ICD-10-CM

## 2020-02-25 DIAGNOSIS — Z87.81 S/P ORIF (OPEN REDUCTION INTERNAL FIXATION) FRACTURE: ICD-10-CM

## 2020-03-04 ENCOUNTER — PATIENT OUTREACH (OUTPATIENT)
Dept: CARE COORDINATION | Facility: CLINIC | Age: 35
End: 2020-03-04

## 2020-03-04 NOTE — PROGRESS NOTES
Clinic Care Coordination Contact    Follow Up Progress Note      Assessment: The patient states that the pain in her ankle is less than before, although she is still having swelling.  The patient denies any discharge or redness of the wound on her heels.  With the use of a knee roller or a walker the patient is maintaining non-weight bearing.  The patient understands the directions including the use of ice and elevation to reduce any swelling.  Medication reconciliation was completed with the patient and marked as reviewed.  Health maintenance was reviewed and questions were answered with the patient.  The assessment for hypertension was completed with the patient today as well as the social determinants of health and they were marked as reviewed.       Goals addressed this encounter:   Goals Addressed                 This Visit's Progress      #1  Pain Management (pt-stated)   On track     Goal Statement: I will use ice and elevation to help reduce the pain in the fractured ankle.  The ice can be placed in a plastic bag and double bagged, then wrapped in a towel to prevent the splint from getting wet.  Date Goal set: 1/27/2020  Date to Achieve By: 7/27/2020  Patient expressed understanding of goal: yes  Action steps to achieve this goal:  1. I will elevate my leg as often as possible.  2. I will prepare an ice pack as above and apply to my ankle as often as possible.                 Intervention/Education provided during outreach: Review of monitoring the wound on the heel for signs of infection.     Outreach Frequency: monthly    Plan:   1.  The patient will ice and elevate her ankle frequently to prevent any type of swelling.  2. The patient will remain non-weight bearing as directed by the provider.  3.  The patient will monitor the wound on her heel for signs of infection and report to the orthopedic providers.  4.  Care Coordination to remain available for the patient to contact in the event of future needs.       RN  Care Coordinator will follow up in 4 weeks.      Billy Rueda MSN, RN, PHN, [facility]   Primary Care Clinical RN Care Coordinator  [facility]  3/4/2020   2:36 PM  [email or web address]  Office: [phone]

## 2020-03-05 DIAGNOSIS — Z98.890 S/P ORIF (OPEN REDUCTION INTERNAL FIXATION) FRACTURE: Primary | ICD-10-CM

## 2020-03-05 DIAGNOSIS — Z87.81 S/P ORIF (OPEN REDUCTION INTERNAL FIXATION) FRACTURE: Primary | ICD-10-CM

## 2020-03-10 ENCOUNTER — HEALTH MAINTENANCE LETTER (OUTPATIENT)
Age: 35
End: 2020-03-10

## 2020-03-10 ENCOUNTER — NURSE TRIAGE (OUTPATIENT)
Dept: INTERNAL MEDICINE | Facility: CLINIC | Age: 35
End: 2020-03-10

## 2020-03-10 NOTE — TELEPHONE ENCOUNTER
"  Reason for Disposition    Sounds like a life-threatening emergency to the triager    Additional Information    Systolic BP >= 160 OR Diastolic >= 100, and any cardiac or neurologic symptoms (e.g., chest pain, difficulty breathing, unsteady gait, blurred vision)    Negative: Pregnant > 20 weeks and new hand or face swelling    Negative: Pregnant > 20 weeks and BP > 140/90    Answer Assessment - Initial Assessment Questions  1. BLOOD PRESSURE: \"What is the blood pressure?\" \"Did you take at least two measurements 5 minutes apart?\"      150s over 103-106, .   2. ONSET: \"When did you take your blood pressure?\"      Patient took Bp a ew minutes ago with home Bp cuff.   3. HOW: \"How did you obtain the blood pressure?\" (e.g., visiting nurse, automatic home BP monitor)      Home BP cuff.   4. HISTORY: \"Do you have a history of high blood pressure?\"      Yes, was taken off of BP medication 1 month ago.   5. MEDICATIONS: \"Are you taking any medications for blood pressure?\" \"Have you missed any doses recently?\"      Not currently on BP medication.   6. OTHER SYMPTOMS: \"Do you have any symptoms?\" (e.g., headache, chest pain, blurred vision, difficulty breathing, weakness)      Chest pain, dizziness.   7. PREGNANCY: \"Is there any chance you are pregnant?\" \"When was your last menstrual period?\"      Unknown.     Patient declines to go to the Ed stating she has a broken ankle, advised to call 911. Patient states \"ok, I will see what I can do\".    Protocols used: HIGH BLOOD PRESSURE-A-OH    "

## 2020-03-10 NOTE — DISCHARGE SUMMARY
Admit Date:     02/10/2020   Discharge Date:     2020      DISCHARGE DIAGNOSIS:  Status post right ankle open reduction and internal fixation performed on 02/10/2020.      HOSPITAL COURSE:  The following dictation is to the best of my recollection as original dictation for discharge summary has been lost in the system.      Mrs. Teague is a 34-year-old female who sustained a right ankle fracture and she underwent open reduction internal fixation in 02/10/2020.  Secondary to her comorbidities, the patient was admitted to the hospital for observation, which was managed by Dr. Mtz's group at [facility].  The patient had a completely uneventful postoperative course and eventually she was discharged on 2020.      PROCEDURE PERFORMED:  Right ankle open reduction and internal fixation.        DISCHARGE MEDICATIONS:  Please refer to the chart for discharge medications.      PLAN:  The patient will follow up in 2 weeks from surgery for suture removal, as detailed in the operative note.         ASHA NARAYANAN MD             D: 03/10/2020   T: 03/10/2020   MT: YOMAIRA      Name:     ANA TEAGUE   MRN:      [id number]        Account:        [id number]   :      1985           Admit Date:     02/10/2020                                  Discharge Date: 2020      Document: [id number]       cc: Grecia Barba DO

## 2020-03-10 NOTE — TELEPHONE ENCOUNTER
Reason for call:  Patient reporting a symptom    Symptom or request: Patient is cornered about her BP and asking if she should take her medications she has stopped them for a month because her BP was getting low. Her 153/106     Duration (how long have symptoms been present): today    Have you been treated for this before? Yes    Phone Number patient can be reached at:  Home number on file [phone] (home)    Best Time:  any    Can we leave a detailed message on this number:  YES    Call taken on 3/10/2020 at 2:48 PM by Vashti English

## 2020-03-13 ENCOUNTER — TELEPHONE (OUTPATIENT)
Dept: FAMILY MEDICINE | Facility: CLINIC | Age: 35
End: 2020-03-13

## 2020-03-13 DIAGNOSIS — F41.1 GAD (GENERALIZED ANXIETY DISORDER): ICD-10-CM

## 2020-03-13 DIAGNOSIS — F43.10 PTSD (POST-TRAUMATIC STRESS DISORDER): ICD-10-CM

## 2020-03-13 DIAGNOSIS — G89.4 CHRONIC PAIN SYNDROME: Chronic | ICD-10-CM

## 2020-03-13 DIAGNOSIS — F32.1 MODERATE MAJOR DEPRESSION (H): ICD-10-CM

## 2020-03-13 RX ORDER — HYDROXYZINE HYDROCHLORIDE 25 MG/1
TABLET, FILM COATED ORAL
Qty: 30 TABLET | Refills: 1 | OUTPATIENT
Start: 2020-03-13

## 2020-03-13 NOTE — TELEPHONE ENCOUNTER
"Requested Prescriptions   Pending Prescriptions Disp Refills     DULoxetine (CYMBALTA) 30 MG capsule [Pharmacy Med Name: DULOXETINE HCL 30MG CPEP] 60 capsule 3     Sig: TAKE ONE CAPSULE BY MOUTH TWICE A DAY  Last Written Prescription Date:  11/1/2019  Last Fill Quantity: 60,  # refills: 3   Last office visit: 2/6/2020 with prescribing provider:  Kwame   Future Office Visit:           Serotonin-Norepinephrine Reuptake Inhibitors  Failed - 3/13/2020  2:08 PM        Failed - PHQ-9 score of less than 5 in past 6 months     Please review last PHQ-9 score.     REX-7 SCORE 12/2/2019 12/12/2019 2/6/2020   Total Score 17 13 17     PHQ 12/2/2019 12/12/2019 2/6/2020   PHQ-9 Total Score 14 13 20   Q9: Thoughts of better off dead/self-harm past 2 weeks Not at all Not at all Not at all   F/U: Thoughts of suicide or self-harm - - -   F/U: Self harm-plan - - -   F/U: Self-harm action - - -   F/U: Safety concerns - - -   PHQ-A Total Score - - 19   PHQ-A Depressed most days in past year - - No   PHQ-A Mood affect on daily activities - - Very difficult   PHQ-A Suicide Ideation past 2 weeks - - Not at all     .        Passed - Blood pressure under 140/90 in past 12 months     BP Readings from Last 3 Encounters:   02/12/20 117/71   02/07/20 103/65   02/06/20 114/70                 Passed - Medication is active on med list        Passed - Patient is age 18 or older        Passed - No active pregnancy on record        Passed - No positive pregnancy test in past 12 months        Passed - Recent (6 mo) or future (30 days) visit within the authorizing provider's specialty     Patient had office visit in the last 6 months or has a visit in the next 30 days with authorizing provider or within the authorizing provider's specialty.  See \"Patient Info\" tab in inbasket, or \"Choose Columns\" in Meds & Orders section of the refill encounter.               oxyCODONE IR (ROXICODONE) 15 MG tablet [Pharmacy Med Name: OXYCODONE HCL 15MG TABS] 100 tablet 0 "     Sig: TAKE ONE TABLET BY MOUTH EVERY 4 HOURS AS NEEDED FOR SEVERE PAIN       There is no refill protocol information for this order        Last Written Prescription Date:  2/14/2020  Last Fill Quantity: 100,   # refills: 0  Last Office Visit: 2/6/2020 with Kwame  Future Office visit:       Routing refill request to provider for review/approval because:  Drug not on the [facility], P or  Health refill protocol or controlled substance

## 2020-03-16 NOTE — TELEPHONE ENCOUNTER
Routing refill request to provider for review/approval because:  1.  Drug not on the [facility] refill protocol, oxycodone IR  2.  Cymbalta fails RN refill protocol due to REX and phq9 above 5.    Jia Oneill RN

## 2020-03-17 RX ORDER — DULOXETIN HYDROCHLORIDE 30 MG/1
CAPSULE, DELAYED RELEASE ORAL
Qty: 180 CAPSULE | Refills: 3 | Status: SHIPPED | OUTPATIENT
Start: 2020-03-17 | End: 2021-02-23

## 2020-03-17 RX ORDER — OXYCODONE HYDROCHLORIDE 15 MG/1
TABLET ORAL
Qty: 100 TABLET | Refills: 0 | Status: SHIPPED | OUTPATIENT
Start: 2020-03-17 | End: 2020-04-16

## 2020-03-24 ENCOUNTER — OFFICE VISIT (OUTPATIENT)
Dept: ORTHOPEDICS | Facility: CLINIC | Age: 35
End: 2020-03-24
Payer: MEDICARE

## 2020-03-24 ENCOUNTER — ANCILLARY PROCEDURE (OUTPATIENT)
Dept: GENERAL RADIOLOGY | Facility: CLINIC | Age: 35
End: 2020-03-24
Attending: ORTHOPAEDIC SURGERY
Payer: MEDICARE

## 2020-03-24 DIAGNOSIS — Z98.890 S/P ORIF (OPEN REDUCTION INTERNAL FIXATION) FRACTURE: Primary | ICD-10-CM

## 2020-03-24 DIAGNOSIS — Z87.81 S/P ORIF (OPEN REDUCTION INTERNAL FIXATION) FRACTURE: Primary | ICD-10-CM

## 2020-03-24 DIAGNOSIS — Z87.81 S/P ORIF (OPEN REDUCTION INTERNAL FIXATION) FRACTURE: ICD-10-CM

## 2020-03-24 DIAGNOSIS — Z98.890 S/P ORIF (OPEN REDUCTION INTERNAL FIXATION) FRACTURE: ICD-10-CM

## 2020-03-24 NOTE — NURSING NOTE
Reason For Visit:   Chief Complaint   Patient presents with     RECHECK     6 week POP right ankle ORIF DOS: 2/10/20       There were no vitals taken for this visit.    Pain Assessment  Patient Currently in Pain: Katerina Muñoz, ATC

## 2020-03-24 NOTE — LETTER
3/24/2020       RE: Molly Teague  [address] [place] [facility] [address]       Cast/splint application    Date/Time: 3/24/2020 1:01 PM  Performed by: Jayleen Muñoz ATC  Authorized by: Natanael Narayanan MD     Consent:     Consent obtained:  Verbal    Consent given by:  Patient  Pre-procedure details:     Sensation:  Normal  Procedure details:     Laterality:  Right    Location:  Ankle    Ankle:  R ankle    Cast type:  Short leg    Supplies:  Fiberglass  Post-procedure details:     Sensation:  Normal    Patient tolerance of procedure:  Tolerated well, no immediate complications    Patient provided with cast or splint care instructions: Yes             Dictation on: 03/24/2020  1:18 PM by: NATANAEL NARAYANAN [[id number]]         Natanael Narayanan MD

## 2020-03-24 NOTE — LETTER
3/24/2020       RE: Molly Teague  [address] [place] [facility] [address]     Dear Colleague,    Thank you for referring your patient, Molly Teague, to the [facility] at [facility]. Please see a copy of my visit note below.    Cast/splint application    Date/Time: 3/24/2020 1:01 PM  Performed by: Jyaleen Muñoz, TENA  Authorized by: Natanael Villalta MD     Consent:     Consent obtained:  Verbal    Consent given by:  Patient  Pre-procedure details:     Sensation:  Normal  Procedure details:     Laterality:  Right    Location:  Ankle    Ankle:  R ankle    Cast type:  Short leg    Supplies:  Fiberglass  Post-procedure details:     Sensation:  Normal    Patient tolerance of procedure:  Tolerated well, no immediate complications    Patient provided with cast or splint care instructions: Yes            March 24, 2020    CHIEF COMPLAINT:  Status post right ankle lateral malleolus open reduction and internal fixation with right medial malleolus open reduction and internal fixation performed on 02/10/2020.    HISTORY OF PRESENT ILLNESS:  Mrs. Teague presents today for further followup.  Reports to be compliant.  On further interview, she reports to put some pressure while getting in and out of the shower.  Reports to spend the majority of the time in bed.    PHYSICAL EXAMINATION:  On today's visit, she presents with minimum swelling across the ankle joint.  There is motion of approximately 25 degrees of combined plantar and dorsiflexion.  There are well-healed surgical incisions.    The patient presented with an eschar along the posterior aspect of the heel which has been evaluated today by Dr. Cruz.    RADIOGRAPHIC EVALUATION:  Three views of the ankle were obtained today which were significant for showing displacement of the fracture fragments for both the medial and lateral malleoli.  The patient presented with some slight lateral  displacement of the talus with respect to the distal tibia.  This is in clear contrast when compared to the postoperative x-rays.    ASSESSMENT:  1.  Status post right ankle open reduction and internal fixation.  2.  Right heel posterior scar eschar.    PLAN:  I discussed with the patient with regards to the ankle, I am suggesting she leave it the way it is for the time being.  I am concerned about her osteopenia, as well as the presence of the hardware, which will make any attempt of future fixation extremely difficult.  I also understand that given her lifestyle and low demands of the ankle that she will not require a completely perfect ankle for her to be happy with the outcome.    We also discussed the importance of avoiding any pressure on the ankle joint.  I encouraged her to acquire whatever device she needs in order to avoid any pressure.  However, I offered her help in that regard and she declined as she believes that all she needs is a higher shower chair which she will acquire on her own, which seems to be cheaper than if we provide it.    With regards to this, Dr. Cruz recommended to proceed with Betadine swabs daily.  She will be placed in a short leg cast and it will be windowed posteriorly in order for her to have access to the eschar.    The patient will be reevaluated in 6 weeks from now and at that time, plain x-rays of the ankle will be obtained.    I also discussed with the patient that if, in fact, she presented with some pain and discomfort down the road, given the current alignment of the ankle, we can also consider the possibility of an ankle fusion if conservative treatment is not successful for her.    All questions were answered.  The patient was pleased with the discussion.  The patient will follow up accordingly.      Natanael Villalta MD    Again, thank you for allowing me to participate in the care of your patient.      Sincerely,    Natanael Villalta MD

## 2020-03-24 NOTE — PROGRESS NOTES
March 24, 2020    CHIEF COMPLAINT:  Status post right ankle lateral malleolus open reduction and internal fixation with right medial malleolus open reduction and internal fixation performed on 02/10/2020.    HISTORY OF PRESENT ILLNESS:  Mrs. Teague presents today for further followup.  Reports to be compliant.  On further interview, she reports to put some pressure while getting in and out of the shower.  Reports to spend the majority of the time in bed.    PHYSICAL EXAMINATION:  On today's visit, she presents with minimum swelling across the ankle joint.  There is motion of approximately 25 degrees of combined plantar and dorsiflexion.  There are well-healed surgical incisions.    The patient presented with an eschar along the posterior aspect of the heel which has been evaluated today by Dr. Cruz.    RADIOGRAPHIC EVALUATION:  Three views of the ankle were obtained today which were significant for showing displacement of the fracture fragments for both the medial and lateral malleoli.  The patient presented with some slight lateral displacement of the talus with respect to the distal tibia.  This is in clear contrast when compared to the postoperative x-rays.    ASSESSMENT:  1.  Status post right ankle open reduction and internal fixation.  2.  Right heel posterior scar eschar.    PLAN:  I discussed with the patient with regards to the ankle, I am suggesting she leave it the way it is for the time being.  I am concerned about her osteopenia, as well as the presence of the hardware, which will make any attempt of future fixation extremely difficult.  I also understand that given her lifestyle and low demands of the ankle that she will not require a completely perfect ankle for her to be happy with the outcome.    We also discussed the importance of avoiding any pressure on the ankle joint.  I encouraged her to acquire whatever device she needs in order to avoid any pressure.  However, I offered her help in  that regard and she declined as she believes that all she needs is a higher shower chair which she will acquire on her own, which seems to be cheaper than if we provide it.    With regards to this, Dr. Cruz recommended to proceed with Betadine swabs daily.  She will be placed in a short leg cast and it will be windowed posteriorly in order for her to have access to the eschar.    The patient will be reevaluated in 6 weeks from now and at that time, plain x-rays of the ankle will be obtained.    I also discussed with the patient that if, in fact, she presented with some pain and discomfort down the road, given the current alignment of the ankle, we can also consider the possibility of an ankle fusion if conservative treatment is not successful for her.    All questions were answered.  The patient was pleased with the discussion.  The patient will follow up accordingly.      Natanael Villalta MD

## 2020-03-24 NOTE — PROGRESS NOTES
Cast/splint application    Date/Time: 3/24/2020 1:01 PM  Performed by: Jayleen Muñoz ATC  Authorized by: Natanael Villalta MD     Consent:     Consent obtained:  Verbal    Consent given by:  Patient  Pre-procedure details:     Sensation:  Normal  Procedure details:     Laterality:  Right    Location:  Ankle    Ankle:  R ankle    Cast type:  Short leg    Supplies:  Fiberglass  Post-procedure details:     Sensation:  Normal    Patient tolerance of procedure:  Tolerated well, no immediate complications    Patient provided with cast or splint care instructions: Yes

## 2020-03-25 ENCOUNTER — HOSPITAL ENCOUNTER (INPATIENT)
Facility: CLINIC | Age: 35
LOS: 2 days | Discharge: HOME | DRG: 392 | End: 2020-03-28
Attending: EMERGENCY MEDICINE | Admitting: INTERNAL MEDICINE
Payer: MEDICARE

## 2020-03-25 ENCOUNTER — APPOINTMENT (OUTPATIENT)
Dept: CT IMAGING | Facility: CLINIC | Age: 35
DRG: 392 | End: 2020-03-25
Attending: EMERGENCY MEDICINE
Payer: MEDICARE

## 2020-03-25 ENCOUNTER — TELEPHONE (OUTPATIENT)
Dept: INTERNAL MEDICINE | Facility: CLINIC | Age: 35
End: 2020-03-25

## 2020-03-25 DIAGNOSIS — R19.7 VOMITING AND DIARRHEA: ICD-10-CM

## 2020-03-25 DIAGNOSIS — A41.9 SEPSIS, DUE TO UNSPECIFIED ORGANISM, UNSPECIFIED WHETHER ACUTE ORGAN DYSFUNCTION PRESENT (H): ICD-10-CM

## 2020-03-25 DIAGNOSIS — R11.10 VOMITING AND DIARRHEA: ICD-10-CM

## 2020-03-25 DIAGNOSIS — R10.84 ABDOMINAL PAIN, GENERALIZED: ICD-10-CM

## 2020-03-25 LAB
ALBUMIN SERPL-MCNC: 4.1 G/DL (ref 3.4–5)
ALBUMIN UR-MCNC: NEGATIVE MG/DL
ALP SERPL-CCNC: 87 U/L (ref 40–150)
ALT SERPL W P-5'-P-CCNC: 27 U/L (ref 0–50)
ANION GAP SERPL CALCULATED.3IONS-SCNC: 8 MMOL/L (ref 3–14)
APPEARANCE UR: ABNORMAL
AST SERPL W P-5'-P-CCNC: 40 U/L (ref 0–45)
BASOPHILS # BLD AUTO: 0.2 10E9/L (ref 0–0.2)
BASOPHILS NFR BLD AUTO: 0.9 %
BILIRUB SERPL-MCNC: 0.4 MG/DL (ref 0.2–1.3)
BILIRUB UR QL STRIP: ABNORMAL
BUN SERPL-MCNC: 25 MG/DL (ref 7–30)
CALCIUM SERPL-MCNC: 10.6 MG/DL (ref 8.5–10.1)
CHLORIDE SERPL-SCNC: 104 MMOL/L (ref 94–109)
CO2 SERPL-SCNC: 26 MMOL/L (ref 20–32)
COLOR UR AUTO: ABNORMAL
CREAT SERPL-MCNC: 2.07 MG/DL (ref 0.52–1.04)
DIFFERENTIAL METHOD BLD: ABNORMAL
EOSINOPHIL # BLD AUTO: 0.4 10E9/L (ref 0–0.7)
EOSINOPHIL NFR BLD AUTO: 1.9 %
ERYTHROCYTE [DISTWIDTH] IN BLOOD BY AUTOMATED COUNT: 19.9 % (ref 10–15)
GFR SERPL CREATININE-BSD FRML MDRD: 30 ML/MIN/{1.73_M2}
GLUCOSE SERPL-MCNC: 111 MG/DL (ref 70–99)
GLUCOSE UR STRIP-MCNC: NEGATIVE MG/DL
HCT VFR BLD AUTO: 42.4 % (ref 35–47)
HGB BLD-MCNC: 12.8 G/DL (ref 11.7–15.7)
HGB UR QL STRIP: NEGATIVE
HYALINE CASTS #/AREA URNS LPF: 13 /LPF (ref 0–2)
IMM GRANULOCYTES # BLD: 0.1 10E9/L (ref 0–0.4)
IMM GRANULOCYTES NFR BLD: 0.5 %
KETONES UR STRIP-MCNC: 5 MG/DL
LACTATE BLD-SCNC: 2.7 MMOL/L (ref 0.7–2)
LEUKOCYTE ESTERASE UR QL STRIP: NEGATIVE
LIPASE SERPL-CCNC: 83 U/L (ref 73–393)
LYMPHOCYTES # BLD AUTO: 2.7 10E9/L (ref 0.8–5.3)
LYMPHOCYTES NFR BLD AUTO: 13.4 %
MCH RBC QN AUTO: 24.3 PG (ref 26.5–33)
MCHC RBC AUTO-ENTMCNC: 30.2 G/DL (ref 31.5–36.5)
MCV RBC AUTO: 81 FL (ref 78–100)
MONOCYTES # BLD AUTO: 1.3 10E9/L (ref 0–1.3)
MONOCYTES NFR BLD AUTO: 6.4 %
MUCOUS THREADS #/AREA URNS LPF: PRESENT /LPF
NEUTROPHILS # BLD AUTO: 15.4 10E9/L (ref 1.6–8.3)
NEUTROPHILS NFR BLD AUTO: 76.9 %
NITRATE UR QL: NEGATIVE
NRBC # BLD AUTO: 0 10*3/UL
NRBC BLD AUTO-RTO: 0 /100
PH UR STRIP: 5 PH (ref 5–7)
PLATELET # BLD AUTO: 297 10E9/L (ref 150–450)
POTASSIUM SERPL-SCNC: 4.2 MMOL/L (ref 3.4–5.3)
PROT SERPL-MCNC: 7.9 G/DL (ref 6.8–8.8)
RBC # BLD AUTO: 5.26 10E12/L (ref 3.8–5.2)
RBC #/AREA URNS AUTO: 1 /HPF (ref 0–2)
SODIUM SERPL-SCNC: 138 MMOL/L (ref 133–144)
SOURCE: ABNORMAL
SP GR UR STRIP: 1.02 (ref 1–1.03)
SQUAMOUS #/AREA URNS AUTO: 2 /HPF (ref 0–1)
UROBILINOGEN UR STRIP-MCNC: 2 MG/DL (ref 0–2)
WBC # BLD AUTO: 20 10E9/L (ref 4–11)
WBC #/AREA URNS AUTO: 2 /HPF (ref 0–5)

## 2020-03-25 PROCEDURE — 83690 ASSAY OF LIPASE: CPT | Performed by: EMERGENCY MEDICINE

## 2020-03-25 PROCEDURE — 96375 TX/PRO/DX INJ NEW DRUG ADDON: CPT

## 2020-03-25 PROCEDURE — 25000128 H RX IP 250 OP 636: Performed by: EMERGENCY MEDICINE

## 2020-03-25 PROCEDURE — 99285 EMERGENCY DEPT VISIT HI MDM: CPT | Mod: 25 | Performed by: EMERGENCY MEDICINE

## 2020-03-25 PROCEDURE — 25800030 ZZH RX IP 258 OP 636: Performed by: EMERGENCY MEDICINE

## 2020-03-25 PROCEDURE — 96374 THER/PROPH/DIAG INJ IV PUSH: CPT

## 2020-03-25 PROCEDURE — 74176 CT ABD & PELVIS W/O CONTRAST: CPT

## 2020-03-25 PROCEDURE — 80053 COMPREHEN METABOLIC PANEL: CPT | Performed by: EMERGENCY MEDICINE

## 2020-03-25 PROCEDURE — G0378 HOSPITAL OBSERVATION PER HR: HCPCS

## 2020-03-25 PROCEDURE — 83605 ASSAY OF LACTIC ACID: CPT | Performed by: EMERGENCY MEDICINE

## 2020-03-25 PROCEDURE — 81001 URINALYSIS AUTO W/SCOPE: CPT | Performed by: EMERGENCY MEDICINE

## 2020-03-25 PROCEDURE — 99285 EMERGENCY DEPT VISIT HI MDM: CPT | Mod: 25

## 2020-03-25 PROCEDURE — 85025 COMPLETE CBC W/AUTO DIFF WBC: CPT | Performed by: EMERGENCY MEDICINE

## 2020-03-25 PROCEDURE — 96361 HYDRATE IV INFUSION ADD-ON: CPT

## 2020-03-25 RX ORDER — HYDROMORPHONE HYDROCHLORIDE 1 MG/ML
0.5 INJECTION, SOLUTION INTRAMUSCULAR; INTRAVENOUS; SUBCUTANEOUS ONCE
Status: COMPLETED | OUTPATIENT
Start: 2020-03-25 | End: 2020-03-25

## 2020-03-25 RX ORDER — ONDANSETRON 2 MG/ML
4 INJECTION INTRAMUSCULAR; INTRAVENOUS
Status: DISCONTINUED | OUTPATIENT
Start: 2020-03-25 | End: 2020-03-28 | Stop reason: HOSPADM

## 2020-03-25 RX ORDER — IOPAMIDOL 755 MG/ML
81 INJECTION, SOLUTION INTRAVASCULAR ONCE
Status: DISCONTINUED | OUTPATIENT
Start: 2020-03-25 | End: 2020-03-25

## 2020-03-25 RX ADMIN — SODIUM CHLORIDE, POTASSIUM CHLORIDE, SODIUM LACTATE AND CALCIUM CHLORIDE 1000 ML: 600; 310; 30; 20 INJECTION, SOLUTION INTRAVENOUS at 19:19

## 2020-03-25 RX ADMIN — PROMETHAZINE HYDROCHLORIDE 25 MG: 25 INJECTION INTRAMUSCULAR; INTRAVENOUS at 20:09

## 2020-03-25 RX ADMIN — SODIUM CHLORIDE, POTASSIUM CHLORIDE, SODIUM LACTATE AND CALCIUM CHLORIDE 1000 ML: 600; 310; 30; 20 INJECTION, SOLUTION INTRAVENOUS at 20:59

## 2020-03-25 RX ADMIN — HYDROMORPHONE HYDROCHLORIDE 0.5 MG: 1 INJECTION, SOLUTION INTRAMUSCULAR; INTRAVENOUS; SUBCUTANEOUS at 20:38

## 2020-03-25 ASSESSMENT — MIFFLIN-ST. JEOR
SCORE: 1421.25
SCORE: 1401.69

## 2020-03-25 NOTE — LETTER
Transition Communication Hand-off for Care Transitions to Next Level of Care Provider    Name: Molly Ho  : 1985  MRN #: [id number]  Primary Care Provider: Grecia Barba  Primary Care MD Name: Dr. Barba  Primary Clinic: [address] [facility] [address]  Primary Care Clinic Name: [facility]  Reason for Hospitalization:  Abdominal pain, generalized [R10.84]  Vomiting and diarrhea [R11.10, R19.7]  Sepsis, due to unspecified organism, unspecified whether acute organ dysfunction present (H) [A41.9]  Admit Date/Time: 3/25/2020  6:43 PM  Discharge Date: 1-2 days  Payor Source: Payor: MEDICARE / Plan: MEDICARE / Product Type: Medicare /     Readmission Assessment Measure (LATRICIA) Risk Score/category: Elevated    Reason for Communication Hand-off Referral: Current with Clinic Care Coordination.  Elevated LATRICIA.    Discharge Plan:  Home       Concern for non-adherence with plan of care:   Y/N No  Discharge Needs Assessment:    Follow-up plan:    Future Appointments   Date Time Provider Department Center   2020 10:30 AM Natanael Villalta MD Counts include 234 beds at the [facility]         Bobbi Eagle RN, Care Coordinator    AVS/Discharge Summary is the source of truth; this is a helpful guide for improved communication of patient story

## 2020-03-25 NOTE — ED NOTES
Patient reports feeling lightheaded and dizzy, mildly confusion. N/V/D started today. Denies blood in stool or emesis. Vomiting started this morning/afternoon x 4 episodes. Diarrhea x 6 episodes. Hx of stage 3 kidney disease. Eschar on right heel.

## 2020-03-25 NOTE — TELEPHONE ENCOUNTER
Panel Management Review      Patient has the following on her problem list:     Depression / Dysthymia review    Measure:  Needs PHQ-9 score of 4 or less during index window.  Administer PHQ-9 and if score is 5 or more, send encounter to provider for next steps.    5 - 7 month window range: 2//117/20 to 6//16/20    PHQ-9 SCORE 12/2/2019 12/12/2019 2/6/2020   PHQ-9 Total Score 14 13 20   PHQ-A Total Score - - 19       If PHQ-9 recheck is 5 or more, route to provider for next steps.    Patient is due for:  PHQ9    Action needed:   Patient needs to do PHQ9.    Type of outreach:    Sent [employer] message.    Questions for provider review:    None                                                                                                                                    Sunni Guzman CMA (AAMA)   (aka: Jenifer Guzman)       Chart routed to Care Team .

## 2020-03-26 PROBLEM — K52.9 ENTERITIS: Status: ACTIVE | Noted: 2020-03-26

## 2020-03-26 PROBLEM — M25.571 PAIN IN JOINT, ANKLE AND FOOT, RIGHT: Status: RESOLVED | Noted: 2020-02-06 | Resolved: 2020-03-26

## 2020-03-26 LAB
ANION GAP SERPL CALCULATED.3IONS-SCNC: 7 MMOL/L (ref 3–14)
BUN SERPL-MCNC: 22 MG/DL (ref 7–30)
C COLI+JEJUNI+LARI FUSA STL QL NAA+PROBE: NOT DETECTED
C DIFF TOX B STL QL: NEGATIVE
CALCIUM SERPL-MCNC: 8.7 MG/DL (ref 8.5–10.1)
CHLORIDE SERPL-SCNC: 105 MMOL/L (ref 94–109)
CO2 SERPL-SCNC: 26 MMOL/L (ref 20–32)
CREAT SERPL-MCNC: 1.52 MG/DL (ref 0.52–1.04)
EC STX1 GENE STL QL NAA+PROBE: NOT DETECTED
EC STX2 GENE STL QL NAA+PROBE: NOT DETECTED
ENTERIC PATHOGEN COMMENT: NORMAL
ERYTHROCYTE [DISTWIDTH] IN BLOOD BY AUTOMATED COUNT: 19.6 % (ref 10–15)
GFR SERPL CREATININE-BSD FRML MDRD: 44 ML/MIN/{1.73_M2}
GLUCOSE SERPL-MCNC: 130 MG/DL (ref 70–99)
HCT VFR BLD AUTO: 32.7 % (ref 35–47)
HGB BLD-MCNC: 10 G/DL (ref 11.7–15.7)
LACTATE BLD-SCNC: 2.3 MMOL/L (ref 0.7–2)
MCH RBC QN AUTO: 24.6 PG (ref 26.5–33)
MCHC RBC AUTO-ENTMCNC: 30.6 G/DL (ref 31.5–36.5)
MCV RBC AUTO: 81 FL (ref 78–100)
NOROV GI+II ORF1-ORF2 JNC STL QL NAA+PR: NOT DETECTED
PLATELET # BLD AUTO: 262 10E9/L (ref 150–450)
POTASSIUM SERPL-SCNC: 4.2 MMOL/L (ref 3.4–5.3)
RBC # BLD AUTO: 4.06 10E12/L (ref 3.8–5.2)
RVA NSP5 STL QL NAA+PROBE: NOT DETECTED
SALMONELLA SP RPOD STL QL NAA+PROBE: NOT DETECTED
SHIGELLA SP+EIEC IPAH STL QL NAA+PROBE: NOT DETECTED
SODIUM SERPL-SCNC: 138 MMOL/L (ref 133–144)
SPECIMEN SOURCE: NORMAL
V CHOL+PARA RFBL+TRKH+TNAA STL QL NAA+PR: NOT DETECTED
WBC # BLD AUTO: 16.1 10E9/L (ref 4–11)
Y ENTERO RECN STL QL NAA+PROBE: NOT DETECTED

## 2020-03-26 PROCEDURE — 99223 1ST HOSP IP/OBS HIGH 75: CPT | Mod: AI | Performed by: INTERNAL MEDICINE

## 2020-03-26 PROCEDURE — 25000132 ZZH RX MED GY IP 250 OP 250 PS 637: Mod: GY | Performed by: INTERNAL MEDICINE

## 2020-03-26 PROCEDURE — 12000000 ZZH R&B MED SURG/OB

## 2020-03-26 PROCEDURE — 83605 ASSAY OF LACTIC ACID: CPT | Performed by: INTERNAL MEDICINE

## 2020-03-26 PROCEDURE — 36415 COLL VENOUS BLD VENIPUNCTURE: CPT | Performed by: INTERNAL MEDICINE

## 2020-03-26 PROCEDURE — 85027 COMPLETE CBC AUTOMATED: CPT | Performed by: INTERNAL MEDICINE

## 2020-03-26 PROCEDURE — 87493 C DIFF AMPLIFIED PROBE: CPT | Performed by: INTERNAL MEDICINE

## 2020-03-26 PROCEDURE — 80048 BASIC METABOLIC PNL TOTAL CA: CPT | Performed by: INTERNAL MEDICINE

## 2020-03-26 PROCEDURE — G0378 HOSPITAL OBSERVATION PER HR: HCPCS

## 2020-03-26 PROCEDURE — 25000128 H RX IP 250 OP 636: Performed by: INTERNAL MEDICINE

## 2020-03-26 PROCEDURE — 25800030 ZZH RX IP 258 OP 636: Performed by: INTERNAL MEDICINE

## 2020-03-26 PROCEDURE — 25800030 ZZH RX IP 258 OP 636: Performed by: FAMILY MEDICINE

## 2020-03-26 PROCEDURE — 99207 ZZC CDG-CODE CATEGORY CHANGED: CPT | Performed by: INTERNAL MEDICINE

## 2020-03-26 PROCEDURE — 25000132 ZZH RX MED GY IP 250 OP 250 PS 637: Mod: GY | Performed by: FAMILY MEDICINE

## 2020-03-26 PROCEDURE — 25000128 H RX IP 250 OP 636: Performed by: EMERGENCY MEDICINE

## 2020-03-26 PROCEDURE — 87506 IADNA-DNA/RNA PROBE TQ 6-11: CPT | Performed by: INTERNAL MEDICINE

## 2020-03-26 RX ORDER — ALPRAZOLAM 0.5 MG
1 TABLET ORAL 3 TIMES DAILY PRN
Status: DISCONTINUED | OUTPATIENT
Start: 2020-03-26 | End: 2020-03-28 | Stop reason: HOSPADM

## 2020-03-26 RX ORDER — PROMETHAZINE HYDROCHLORIDE 25 MG/1
25 TABLET ORAL 2 TIMES DAILY PRN
Status: DISCONTINUED | OUTPATIENT
Start: 2020-03-26 | End: 2020-03-28 | Stop reason: HOSPADM

## 2020-03-26 RX ORDER — FAMOTIDINE 20 MG/1
20 TABLET, FILM COATED ORAL 2 TIMES DAILY
Status: DISCONTINUED | OUTPATIENT
Start: 2020-03-26 | End: 2020-03-26

## 2020-03-26 RX ORDER — BUDESONIDE AND FORMOTEROL FUMARATE DIHYDRATE 160; 4.5 UG/1; UG/1
2 AEROSOL RESPIRATORY (INHALATION) 2 TIMES DAILY
Status: DISCONTINUED | OUTPATIENT
Start: 2020-03-26 | End: 2020-03-26

## 2020-03-26 RX ORDER — GABAPENTIN 300 MG/1
300 CAPSULE ORAL 2 TIMES DAILY
Status: DISCONTINUED | OUTPATIENT
Start: 2020-03-26 | End: 2020-03-26 | Stop reason: DRUGHIGH

## 2020-03-26 RX ORDER — VANCOMYCIN HYDROCHLORIDE 50 MG/ML
125 KIT ORAL 4 TIMES DAILY
Status: DISCONTINUED | OUTPATIENT
Start: 2020-03-26 | End: 2020-03-26

## 2020-03-26 RX ORDER — FAMOTIDINE 20 MG/1
20 TABLET, FILM COATED ORAL 2 TIMES DAILY
Status: DISCONTINUED | OUTPATIENT
Start: 2020-03-26 | End: 2020-03-28 | Stop reason: HOSPADM

## 2020-03-26 RX ORDER — BUSPIRONE HYDROCHLORIDE 15 MG/1
30 TABLET ORAL 2 TIMES DAILY
Status: DISCONTINUED | OUTPATIENT
Start: 2020-03-26 | End: 2020-03-28 | Stop reason: HOSPADM

## 2020-03-26 RX ORDER — LORAZEPAM 1 MG/1
1 TABLET ORAL 3 TIMES DAILY PRN
Status: DISCONTINUED | OUTPATIENT
Start: 2020-03-26 | End: 2020-03-28 | Stop reason: HOSPADM

## 2020-03-26 RX ORDER — DULOXETIN HYDROCHLORIDE 30 MG/1
30 CAPSULE, DELAYED RELEASE ORAL 2 TIMES DAILY
Status: DISCONTINUED | OUTPATIENT
Start: 2020-03-26 | End: 2020-03-28 | Stop reason: HOSPADM

## 2020-03-26 RX ORDER — ALBUTEROL SULFATE 90 UG/1
2 AEROSOL, METERED RESPIRATORY (INHALATION) EVERY 4 HOURS PRN
Status: DISCONTINUED | OUTPATIENT
Start: 2020-03-26 | End: 2020-03-28 | Stop reason: HOSPADM

## 2020-03-26 RX ORDER — GABAPENTIN 300 MG/1
900 CAPSULE ORAL 2 TIMES DAILY
Status: DISCONTINUED | OUTPATIENT
Start: 2020-03-26 | End: 2020-03-28 | Stop reason: HOSPADM

## 2020-03-26 RX ORDER — DULOXETIN HYDROCHLORIDE 30 MG/1
30 CAPSULE, DELAYED RELEASE ORAL 2 TIMES DAILY
Status: DISCONTINUED | OUTPATIENT
Start: 2020-03-26 | End: 2020-03-26

## 2020-03-26 RX ORDER — SODIUM CHLORIDE 9 MG/ML
INJECTION, SOLUTION INTRAVENOUS CONTINUOUS
Status: DISCONTINUED | OUTPATIENT
Start: 2020-03-26 | End: 2020-03-28 | Stop reason: HOSPADM

## 2020-03-26 RX ORDER — OXYCODONE HYDROCHLORIDE 5 MG/1
15 TABLET ORAL EVERY 4 HOURS PRN
Status: DISCONTINUED | OUTPATIENT
Start: 2020-03-26 | End: 2020-03-28 | Stop reason: HOSPADM

## 2020-03-26 RX ORDER — BUDESONIDE AND FORMOTEROL FUMARATE DIHYDRATE 160; 4.5 UG/1; UG/1
2 AEROSOL RESPIRATORY (INHALATION) 2 TIMES DAILY
Status: DISCONTINUED | OUTPATIENT
Start: 2020-03-26 | End: 2020-03-28 | Stop reason: HOSPADM

## 2020-03-26 RX ORDER — GABAPENTIN 300 MG/1
300 CAPSULE ORAL 2 TIMES DAILY
Status: DISCONTINUED | OUTPATIENT
Start: 2020-03-26 | End: 2020-03-26

## 2020-03-26 RX ORDER — BUSPIRONE HYDROCHLORIDE 15 MG/1
30 TABLET ORAL 2 TIMES DAILY
Status: DISCONTINUED | OUTPATIENT
Start: 2020-03-26 | End: 2020-03-26

## 2020-03-26 RX ADMIN — OXYCODONE HYDROCHLORIDE 15 MG: 5 TABLET ORAL at 23:49

## 2020-03-26 RX ADMIN — FAMOTIDINE 20 MG: 20 TABLET ORAL at 02:04

## 2020-03-26 RX ADMIN — BUSPIRONE HYDROCHLORIDE 30 MG: 15 TABLET ORAL at 08:02

## 2020-03-26 RX ADMIN — PROMETHAZINE HYDROCHLORIDE 25 MG: 25 INJECTION INTRAMUSCULAR; INTRAVENOUS at 12:33

## 2020-03-26 RX ADMIN — OXYCODONE HYDROCHLORIDE 15 MG: 5 TABLET ORAL at 11:56

## 2020-03-26 RX ADMIN — ACETAMINOPHEN, ASPIRIN AND CAFFEINE 1 TABLET: 250; 250; 65 TABLET, FILM COATED ORAL at 17:18

## 2020-03-26 RX ADMIN — FAMOTIDINE 20 MG: 20 TABLET ORAL at 08:02

## 2020-03-26 RX ADMIN — SODIUM CHLORIDE: 9 INJECTION, SOLUTION INTRAVENOUS at 01:30

## 2020-03-26 RX ADMIN — VANCOMYCIN HYDROCHLORIDE 125 MG: KIT at 09:55

## 2020-03-26 RX ADMIN — FAMOTIDINE 20 MG: 20 TABLET ORAL at 20:08

## 2020-03-26 RX ADMIN — DULOXETINE HYDROCHLORIDE 30 MG: 30 CAPSULE, DELAYED RELEASE ORAL at 20:08

## 2020-03-26 RX ADMIN — OXYCODONE HYDROCHLORIDE 15 MG: 5 TABLET ORAL at 02:04

## 2020-03-26 RX ADMIN — BUDESONIDE AND FORMOTEROL FUMARATE DIHYDRATE 2 PUFF: 160; 4.5 AEROSOL RESPIRATORY (INHALATION) at 20:10

## 2020-03-26 RX ADMIN — SODIUM CHLORIDE: 9 INJECTION, SOLUTION INTRAVENOUS at 20:11

## 2020-03-26 RX ADMIN — OXYCODONE HYDROCHLORIDE 15 MG: 5 TABLET ORAL at 20:08

## 2020-03-26 RX ADMIN — GABAPENTIN 900 MG: 300 CAPSULE ORAL at 20:08

## 2020-03-26 RX ADMIN — DULOXETINE HYDROCHLORIDE 30 MG: 30 CAPSULE, DELAYED RELEASE ORAL at 08:02

## 2020-03-26 RX ADMIN — BUSPIRONE HYDROCHLORIDE 30 MG: 15 TABLET ORAL at 02:04

## 2020-03-26 RX ADMIN — OMEPRAZOLE 40 MG: 20 CAPSULE, DELAYED RELEASE ORAL at 02:04

## 2020-03-26 RX ADMIN — GABAPENTIN 900 MG: 300 CAPSULE ORAL at 02:37

## 2020-03-26 RX ADMIN — OXYCODONE HYDROCHLORIDE 15 MG: 5 TABLET ORAL at 06:46

## 2020-03-26 RX ADMIN — PROMETHAZINE HYDROCHLORIDE 25 MG: 25 INJECTION INTRAMUSCULAR; INTRAVENOUS at 02:38

## 2020-03-26 RX ADMIN — OMEPRAZOLE 40 MG: 20 CAPSULE, DELAYED RELEASE ORAL at 16:13

## 2020-03-26 RX ADMIN — SODIUM CHLORIDE, POTASSIUM CHLORIDE, SODIUM LACTATE AND CALCIUM CHLORIDE 1000 ML: 600; 310; 30; 20 INJECTION, SOLUTION INTRAVENOUS at 09:55

## 2020-03-26 RX ADMIN — DULOXETINE HYDROCHLORIDE 30 MG: 30 CAPSULE, DELAYED RELEASE ORAL at 02:04

## 2020-03-26 RX ADMIN — BUSPIRONE HYDROCHLORIDE 30 MG: 15 TABLET ORAL at 20:08

## 2020-03-26 RX ADMIN — ALPRAZOLAM 1 MG: 0.5 TABLET ORAL at 14:59

## 2020-03-26 RX ADMIN — OMEPRAZOLE 40 MG: 20 CAPSULE, DELAYED RELEASE ORAL at 08:02

## 2020-03-26 RX ADMIN — BUDESONIDE AND FORMOTEROL FUMARATE DIHYDRATE 2 PUFF: 160; 4.5 AEROSOL RESPIRATORY (INHALATION) at 08:04

## 2020-03-26 RX ADMIN — GABAPENTIN 900 MG: 300 CAPSULE ORAL at 08:02

## 2020-03-26 RX ADMIN — BUDESONIDE AND FORMOTEROL FUMARATE DIHYDRATE 2 PUFF: 160; 4.5 AEROSOL RESPIRATORY (INHALATION) at 02:07

## 2020-03-26 RX ADMIN — SODIUM CHLORIDE: 9 INJECTION, SOLUTION INTRAVENOUS at 11:58

## 2020-03-26 RX ADMIN — PROMETHAZINE HYDROCHLORIDE 25 MG: 25 INJECTION INTRAMUSCULAR; INTRAVENOUS at 20:28

## 2020-03-26 RX ADMIN — VANCOMYCIN HYDROCHLORIDE 125 MG: KIT at 11:56

## 2020-03-26 RX ADMIN — ONDANSETRON 4 MG: 2 INJECTION INTRAMUSCULAR; INTRAVENOUS at 23:46

## 2020-03-26 RX ADMIN — ALPRAZOLAM 1 MG: 0.5 TABLET ORAL at 06:46

## 2020-03-26 RX ADMIN — OXYCODONE HYDROCHLORIDE 15 MG: 5 TABLET ORAL at 16:12

## 2020-03-26 RX ADMIN — ALPRAZOLAM 1 MG: 0.5 TABLET ORAL at 23:27

## 2020-03-26 ASSESSMENT — ACTIVITIES OF DAILY LIVING (ADL)
ADLS_ACUITY_SCORE: 15

## 2020-03-26 NOTE — ED NOTES
DATE:  3/25/2020   TIME OF RECEIPT FROM LAB: 1919  LAB TEST:  Lactic  LAB VALUE:  2.7  RESULTS GIVEN WITH READ-BACK TO (PROVIDER):  {Choose the provider you called     Dr. Cunningham  TIME LAB VALUE REPORTED TO PROVIDER:   1920

## 2020-03-26 NOTE — CONSULTS
Care Transition Initial Assessment - RN      Spoke with patient via phone.    DATA   Principal Problem:    Vomiting and diarrhea  Active Problems:    Scleroderma (H)    Raynaud's disease without gangrene    CREST (calcinosis, Raynaud's phenomenon, esophageal dysfunction, sclerodactyly, telangiectasia) (H)    Chronic pain syndrome    CKD (chronic kidney disease) stage 3, GFR 30-59 ml/min (H)    Enteritis       Primary Care Clinic Name: [facility]  Primary Care MD Name: Dr. Barba  Contact information and PCP information verified: Yes    ASSESSMENT  Cognitive Status: awake, alert and oriented.  Lives With: child(chencho), dependent, spouse      Description of Support System: Supportive, Involved   Who is your support system?:    Support Assessment: Adequate family and caregiver support, Adequate social supports   Insurance Concerns: No Insurance issues identified      This writer spoke with the patient .  I introduced myself and role.      Care Transitions is consulted for a elevated risk for readmission.  The patient is current with Clinic Care Coordination.  She lives independently in the community with her  and 6 year old child.    There are no discharge needs identified.  Referral placed for Clinic Care Coordination.  The patient will schedule a post follow up appointment upon discharge.    PLAN    Home      Bobbi Eagle RN, Care Coordinator [phone]

## 2020-03-26 NOTE — PROGRESS NOTES
Skin affirmation note    Admitting nurse completed full skin assessment, David score and David interventions. This writer agrees with the initial skin assessment findings.

## 2020-03-26 NOTE — PLAN OF CARE
[place] [place]    Patient admitted to room 2300 at approximately 0000 via cart from emergency room. Patient was accompanied by transport tech.     Verbal SBAR report received from SOM Spence prior to patient arrival.     Patient ambulated to bed with stand-by assist. Patient alert and oriented X 3. Pain is controlled with current analgesics.  Medication(s) being used: narcotic analgesics including hydromorphone (Dilaudid). 0-10 Pain Scale: 7. Admission vital signs: Blood pressure 93/56, pulse 98, temperature 97.6  F (36.4  C), temperature source Oral, resp. rate 18, height 1.575 m (5' 2\"), weight 76.8 kg (169 lb 5 oz), SpO2 97 %, not currently breastfeeding. Patient was oriented to plan of care, call light, bed controls, tv, telephone, bathroom, and visiting hours.     Risk Assessment    The following safety risks were identified during admission: none. Yellow risk band applied: NO.     Skin Initial Assessment    This writer admitted this patient and completed a full skin assessment and David score in the Adult PCS flowsheet. Appropriate interventions initiated as needed.     Secondary skin check completed by June PUGA RN.    David Risk Assessment  Sensory Perception: 3-->slightly limited(raynauds)  Moisture: 4-->rarely moist  Activity: 3-->walks occasionally  Mobility: 3-->slightly limited  Nutrition: 3-->adequate  Friction and Shear: 3-->no apparent problem  David Score: 19  Bed Support Surface: Atmos Air mattress    Education    Patient has a [place] to Observation order: Yes  Observation education completed and documented: Yes      Nayana Liz RN      "

## 2020-03-26 NOTE — H&P
[facility]    History and Physical  Hospital Medicine       Date of Admission:  3/25/2020  Date of Service: 3/26/2020     Assessment & Plan   Molly Teague is a 34 year old female who presents on 3/25/2020 with acute onset of lower abdominal pain, nausea, vomiting and diarrhea.     Vomiting and diarrhea    Lower abdominal pain and some blood with the diarrhea. CT abdomen/pelvis without abnormality but mild colitis cannot be ruled out. Marked elevated WBC. H/o prior C difficile infection. Patient does not recall antibiotics recently but did have ORIF a month ago and may have had periop antibiotics. Possible gastroenteritis.    - IV fluids   - phenergan prn   - stool for enteric panel and also C difficile tox B PCR   - follow for improvement   - clears as tolerated    Acute kidney injury on chronic kidney disease    Creatinine 2.07, GFR 35. Previous creatinine 1.35/GFR 51. Suspect prerenal related to acute illness. 2 liters IVF in the ED.    - NS at 125   - recheck BMP in the morning   - avoid nephrotoxic medications       Scleroderma (H)  Raynaud's disease without gangrene  CREST (calcinosis, Raynaud's phenomenon, esophageal dysfunction, sclerodactyly, telangiectasia) (H)    At baseline. Not on immunosuppression. On amlodipine for the Raynauds but BP running low.    - hold amlodipine for now    Chronic pain syndrome   - continue home oxycodone 15 mg q 4 prn   - continue gabapentin as at home    Recent ankle fracture with ORIF, right   Decubitus ulcer right heel due to prior cast   - outpatient follow up       Diet: Advance Diet as Tolerated: Clear Liquid Diet    DVT Prophylaxis: Low Risk/Ambulatory with no VTE prophylaxis indicated  Melissa Catheter: not present  Code Status: Full  Lines: PIV    Disposition Plan   Expected discharge: 1-2 days, recommended to prior living arrangement once Patient tolerating oral intake and diarrhea improving and hydration improved.  Entered: Jude Mills  MD [facility] 03/26/2020, 12:48 AM       Jude Rausch MD        Primary Care Physician   Barba Grecia Kraus [phone]    History is obtained from the patient who is a good historian, discussion with the ED physician and review of old records via the EMR.    History of Present Illness   Molly Teague is a 34 year old female with complex medical history largely due to scleroderma with CREST complicated by chronic pain syndrome, peripheral neuropathy, PTSD/anxiety disorder, recurrent GI bleeding, prior VTE, prior EMERALD requiring temporary hemodialysis and ankle fracture s/p ORIF a month ago now presents on 3/25/2020 with acute onset of lower abdominal pain, diarrhea and nausea with vomiting.  Patient had been in her usual state of health yesterday and this morning woke up feeling tired and fatigued but did go to her doctor's visit where she got a new cast on her right ankle.  After coming home around 1:30 she had onset of lower abdominal pain in the mid abdomen.  It is crampy in nature and comes and goes.  She feels like it radiates up to her chest.  She has not noticed anything that makes it better or worse.  It is not better after vomiting nor after a bowel movement.  Not long after the onset of the abdominal pain, she had some intense diarrhea going 5 or 6 times.  She did not notice blood at home but had some small amount of blood with her diarrhea here in the hospital.  With onset of diarrhea she had nausea with vomiting having thrown up twice.  No blood with that.  She is now having some heartburn and reflux which is been exacerbated.  She is not received her evening PPI or H2 blocker which is also contributing.  No fevers or chills.  No ill contacts.  Her family is not sick.  She has been urinating okay.  Does carry a distant history of C. difficile.  She does not know of any recent antibiotics however she did recently have an ORIF of the ankle and may have had perioperative antibiotics with that  though she does not recall.    Review of Systems   The 10 point Review of Systems is negative other than noted in the HPI or here.  Her scleroderma symptoms have been at baseline.    Past Medical History      Past Medical History:   Diagnosis Date     Acute pyelonephritis 2017     Altered mental state 3/29/2018     Arthritis      Blood clotting disorder (H)     P E     History of blood transfusion      Hypertension      Long-term (current) use of anticoagulants [Z79.01] 2016     PE (pulmonary embolism) 2016     Rheumatism      Scleroderma (H) 2016     Uncomplicated asthma      Past Surgical History     Past Surgical History:   Procedure Laterality Date     ANGIOGRAM        SECTION       OPEN REDUCTION INTERNAL FIXATION ANKLE Right 2/10/2020    Procedure: Right ankle open reduction internal fixation;  Surgeon: Natanael Villalta MD;  Location: [facility] OR     THROAT SURGERY          Prior to Admission Medications   Prior to Admission Medications   Prescriptions Last Dose Informant Patient Reported? Taking?   ALPRAZolam (XANAX) 1 MG tablet Past Month at As Needed  No Yes   Sig: Take 1 tablet (1 mg) by mouth 3 times daily as needed for anxiety   Cyanocobalamin (B-12) 1000 MCG TBCR 3/25/2020 at 0700 Self No Yes   Sig: Take 1,000 mcg by mouth daily   DULoxetine (CYMBALTA) 30 MG capsule 3/25/2020 at 0700  No Yes   Sig: TAKE ONE CAPSULE BY MOUTH TWICE A DAY   Patient taking differently: Take 30 mg by mouth 2 times daily    GOODSENSE MIGRAINE FORMULA 250-250-65 MG per tablet More than a month at Only when migraines Self No No   Sig: TAKE ONE TABLET BY MOUTH EVERY 6 HOURS AS NEEDED FOR HEADACHES   Patient taking differently: Take 1 tablet by mouth every 6 hours as needed    LORazepam (ATIVAN) 1 MG tablet Unknown at Patient states she has at home  No No   Sig: Take 1 tablet (1 mg) by mouth as needed for anxiety   VENTOLIN  (90 Base) MCG/ACT inhaler Past Month at Unknown time Self No  Yes   Sig: INHALE 2 PUFFS INTO THE LUNGS ONCE EVERY 4 HOURS AS NEEDED FOR SHORTNESS OF BREATH / DYSPNEA / WHEEZING   Patient taking differently: Inhale 2 puffs into the lungs every 4 hours as needed for shortness of breath / dyspnea or wheezing    acetaminophen (TYLENOL) 325 MG tablet Past Week at Patient states has taken in last week  No Yes   Sig: Take 2 tablets (650 mg) by mouth every 4 hours as needed for mild pain or other   amLODIPine (NORVASC) 5 MG tablet 3/25/2020 at 0700 Self Yes Yes   Sig: Take 5 mg by mouth daily   blood glucose (NO BRAND SPECIFIED) lancets standard  Self No No   Sig: Use to test blood sugar 1 time daily   blood glucose (NO BRAND SPECIFIED) test strip  Self No No   Sig: Use to test blood sugar 1 time daily   budesonide-formoterol (SYMBICORT) 160-4.5 MCG/ACT Inhaler 3/25/2020 at 0700 Self No Yes   Sig: Inhale 2 puffs into the lungs 2 times daily   busPIRone HCl (BUSPAR) 30 MG tablet 3/25/2020 at 0700 Self No Yes   Sig: TAKE ONE TABLET BY MOUTH TWICE A DAY   Patient taking differently: Take 30 mg by mouth 2 times daily    famotidine (PEPCID) 20 MG tablet 3/25/2020 at 0700  No Yes   Sig: Take 1 tablet (20 mg) by mouth 2 times daily   ferrous gluconate (FERGON) 324 (38 FE) MG tablet 3/25/2020 at 0700 Self No Yes   Sig: TAKE ONE TABLET BY MOUTH EVERY DAY WITH BREAKFAST   Patient taking differently: Take 324 mg by mouth daily (with breakfast)    folic acid (FOLVITE) 1 MG tablet 3/25/2020 at 0700 Self Yes Yes   Sig: Take 1 tablet by mouth daily   gabapentin (NEURONTIN) 300 MG capsule 3/25/2020 at 0700 Self No Yes   Sig: TAKE TWO CAPSULES BY MOUTH THREE TIMES A DAY   Patient taking differently: Take 300 mg by mouth 2 times daily    hydrOXYzine (ATARAX) 25 MG tablet Unknown at Patient states she has at home  No No   Sig: Take 1-2 tablets (25-50 mg) by mouth 3 times daily as needed for itching   methocarbamol (ROBAXIN) 750 MG tablet Unknown at Patient states has at home  No No   Sig: Take 1 tablet  (750 mg) by mouth 4 times daily as needed for muscle spasms   montelukast (SINGULAIR) 10 MG tablet Past Month at HS Self No Yes   Sig: TAKE ONE TABLET BY MOUTH AT BEDTIME   Patient taking differently: Take 10 mg by mouth At Bedtime    naloxone (NARCAN) 4 MG/0.1ML nasal spray  at At HOME Self No No   Sig: Spray 1 spray (4 mg) into one nostril alternating nostrils once as needed for opioid reversal every 2-3 minutes until assistance arrives   omeprazole (PRILOSEC) 40 MG DR capsule 3/25/2020 at AM  No Yes   Sig: TAKE ONE CAPSULE BY MOUTH TWICE A DAY   oxyCODONE IR (ROXICODONE) 15 MG tablet 3/25/2020 at 0900  No Yes   Sig: TAKE ONE TABLET BY MOUTH EVERY 4 HOURS AS NEEDED FOR SEVERE PAIN   polyethylene glycol (MIRALAX/GLYCOLAX) packet 3/25/2020 at Unknown time  No Yes   Sig: Take 17 g by mouth daily   promethazine (PHENERGAN) 25 MG tablet Past Month at Unknown time Self No Yes   Sig: Take 1 tablet (25 mg) by mouth 2 times daily as needed for nausea      Facility-Administered Medications: None     Allergies   Allergies   Allergen Reactions     Blood Transfusion Related (Informational Only) Other (See Comments)     Patient has a history of a clinically significant antibody against RBC antigens.  A delay in compatible RBCs may occur.     No Known Allergies      Pollen Extract      Seasonal Allergies      Shellfish-Derived Products Nausea and Rash     Rash on face       Family History    Family History   Problem Relation Age of Onset     Hyperlipidemia Father      Cerebrovascular Disease Maternal Grandmother          of a stroke     Hyperlipidemia Paternal Grandfather      Depression Sister      Fibromyalgia Sister      Diabetes Maternal Aunt      Melanoma No family hx of      Skin Cancer No family hx of        Social History   Social History     Socioeconomic History     Marital status: Single     Spouse name: Not on file     Number of children: Not on file     Years of education: Not on file     Highest education  "level: Not on file   Occupational History     Not on file   Social Needs     Financial resource strain: Not hard at all     Food insecurity     Worry: Never true     Inability: Never true     Transportation needs     Medical: No     Non-medical: No   Tobacco Use     Smoking status: Never Smoker     Smokeless tobacco: Never Used   Substance and Sexual Activity     Alcohol use: Yes     Comment: Socially once on a weekend if any     Drug use: No     Comment: None     Sexual activity: Yes     Partners: Male     Birth control/protection: None   Lifestyle     Physical activity     Days per week: 0 days     Minutes per session: 0 min     Stress: Not on file   Relationships     Social connections     Talks on phone: Not on file     Gets together: Not on file     Attends [employer] service: Not on file     Active member of club or organization: Not on file     Attends meetings of clubs or organizations: Not on file     Relationship status: Not on file     Intimate partner violence     Fear of current or ex partner: Not on file     Emotionally abused: Not on file     Physically abused: Not on file     Forced sexual activity: Not on file   Other Topics Concern     Parent/sibling w/ CABG, MI or angioplasty before 65F 55M? No   Social History Narrative    11/2019: Lives at home with significant other and their son, Td (2014).        Physical Exam   BP 93/56 (BP Location: Left arm)   Pulse 98   Temp 97.6  F (36.4  C) (Oral)   Resp 18   Ht 1.575 m (5' 2\")   Wt 76.8 kg (169 lb 5 oz)   SpO2 97%   BMI 30.97 kg/m       Weight: 169 lbs 5.01 oz Body mass index is 30.97 kg/m .     Constitutional: Alert, oriented, cooperative, appears uncomfortable, appears pale but otherwise nontoxic  Eyes: Eyes are clear, pupils are reactive.  HEENT: Oropharynx is clear and moist. No evidence of cranial trauma.  Lymph/Hematologic: No epitrochlear, axillary, anterior or posterior cervical, or supraclavicular lymphadenopathy is " appreciated.  Cardiovascular: Regular rate and rhythm, normal S1 and S2, and no murmur noted. JVP is normal. Good peripheral pulses in wrists bilaterally.  Respiratory: Clear to auscultation bilaterally.   GI: Soft, mild to moderate lower abdominal tenderness to deep palpation particularly more on the right lower quadrant but no guarding or rebound, normal bowel sounds, no hepatomegaly.  Genitourinary: Deferred  Musculoskeletal: Extensive scleroderma changes in her fingers and hands.  Right lower extremity is in a new cast.  Skin: Warm and dry, no rashes.  The right heel is exposed on the cast and there is a decubitus ulcer with eschar.  No erythema to suggest infection.  Neurologic: Neck supple. Cranial nerves are grossly intact.     Data   Data reviewed today:   Recent Labs   Lab 03/25/20  1907   WBC 20.0*   HGB 12.8   MCV 81         POTASSIUM 4.2   CHLORIDE 104   CO2 26   BUN 25   CR 2.07*   ANIONGAP 8   UMER 10.6*   *   ALBUMIN 4.1   PROTTOTAL 7.9   BILITOTAL 0.4   ALKPHOS 87   ALT 27   AST 40   LIPASE 83       Recent Results (from the past 24 hour(s))   CT Abdomen Pelvis w/o Contrast    Narrative    EXAM: CT ABDOMEN PELVIS W/O CONTRAST  LOCATION: [facility]  DATE/TIME: 3/25/2020 9:09 PM    INDICATION: Acute diffuse abdominal pain with abdominal distention.  COMPARISON: 11/24/2019, 07/24/2019.  TECHNIQUE: CT scan of the abdomen and pelvis was performed without IV contrast. Multiplanar reformats were obtained. Dose reduction techniques were used.  CONTRAST: None.    FINDINGS:   LOWER CHEST: No focal infiltrate or consolidation. No pleural fluid.    HEPATOBILIARY: Mild diffuse fatty infiltration of the liver. No calcified gallstones or biliary dilatation.    PANCREAS: No ductal dilatation or acute inflammatory change.    SPLEEN: Normal size spleen.    ADRENAL GLANDS: No significant nodules.    KIDNEYS/BLADDER: Kidneys demonstrating normal shape, size and position. No urinary  collecting system dilatation or calculi. Bladder unremarkable.    BOWEL: Appendix unremarkable. Moderate amount of stool in the proximal colon without evidence for overt wall thickening allowing for the noncontrast study. The mid transverse colon through the mid sigmoid colon is completely collapsed and difficult to   evaluate for wall thickening. There is no evidence for overt inflammatory change in this area. No small bowel dilatation. Minimal sliding esophageal hiatal hernia with fluid in the distal esophagus. Stomach and duodenum unremarkable.    LYMPH NODES: No lymphadenopathy.    VASCULATURE: No abdominal aortic aneurysm.    PELVIC ORGANS: IUD within the central aspect of the uterus. Well-circumscribed homogeneously low-attenuation left adnexal cyst measuring 2.8 cm in maximal diameter.    MUSCULOSKELETAL: Minimal degenerative disc disease L3-L4.      Impression    IMPRESSION:   1.  No evidence for bowel dilatation or obstruction.    2.  The mid transverse colon through the mid sigmoid colon is completely collapsed and difficult to evaluate for wall thickening. Allowing for this there is no evidence for overt wall thickening involving the colon or inflammatory change in the   pericolonic fat planes.    3.  Appendix unremarkable.    4.  IUD within the central aspect of the uterus.    5.  Well-circumscribed homogeneously low-attenuation left adnexal cyst measuring 2.8 cm in maximal diameter. Management recommendations as detailed below.    Management Recommendations:    Benign Appearing Cyst (round or oval; thin wall; fluid attenuation; no solid areas; <10cm):     Premenopausal Patient  < 5 cm: Benign. No follow up.  > 5 cm: Ultrasound at 6-12 weeks    Reference: Guidelines for Incidental Benign or Probably Benign Adnexal Cyst on CT/MRI. Managing Incidental Findings on Abdominal and Pelvic CT and MRI, Part 1: White Paper of the ACR Incidental Finding Committee II on Adnexal Findings. Journal of the   American  College of Radiology 2013; 10:675-681         I personally reviewed the abdominal CT image(s) showing no evidence of colitis to my eye though the transverse and descending colon appears largely fluid-filled distal bowel wall is not easy to see.

## 2020-03-26 NOTE — ED PROVIDER NOTES
History     Chief Complaint   Patient presents with     Nausea, Vomiting, & Diarrhea     for the last 2 days     HPI  Molly Teague is a 34 year old female who presents with vomiting and diarrhea.  She last vomited early this morning, has had dry heaves intermittently throughout the day.  Describes watery stool and diffuse abdominal cramps.  Denies ill contacts, recent antibiotics, travel.  Denies melena or hematochezia.  Describes similar symptoms in the past.  Chronic narcotic pain meds 7.5 mg of oxycodone every 4 hours.  Complex past medical history as below.    Allergies:  Allergies   Allergen Reactions     Blood Transfusion Related (Informational Only) Other (See Comments)     Patient has a history of a clinically significant antibody against RBC antigens.  A delay in compatible RBCs may occur.     No Known Allergies      Pollen Extract      Seasonal Allergies      Shellfish-Derived Products Nausea and Rash     Rash on face       Problem List:    Patient Active Problem List    Diagnosis Date Noted     Closed right ankle fracture 02/11/2020     Priority: Medium     S/P ORIF (open reduction internal fixation) fracture 02/10/2020     Priority: Medium     Pain in joint, ankle and foot, right 02/06/2020     Priority: Medium     Added automatically from request for surgery [id number]       CKD (chronic kidney disease) stage 3, GFR 30-59 ml/min (H) 01/31/2020     Priority: Medium     Sinus tachycardia 01/31/2020     Priority: Medium     Ankle fracture, right, closed, initial encounter 01/30/2020     Priority: Medium     Added automatically from request for surgery [id number]       Diplopia 01/25/2020     Priority: Medium     Anemia, chronic disease 11/25/2019     Priority: Medium     Mirena IUD- Remove by 09/2024 09/06/2019     Priority: Medium     Lot: [id number]  Exp: 01/2022    Dinora Israel LPN         Malignant essential hypertension 07/24/2019     Priority: Medium     PTSD (post-traumatic stress disorder)  06/19/2019     Priority: Medium     Obesity (BMI 35.0-39.9) with comorbidity (H) 01/22/2019     Priority: Medium     Moderate major depression (H) 07/06/2018     Priority: Medium     Severe persistent asthma without complication 07/06/2018     Priority: Medium     On high dose ICS/LABA + frequent albuterol use.  Next allergy/pulmonary referral       Aspiration of food 03/29/2018     Priority: Medium     Chronic pain syndrome 04/26/2017     Priority: Medium     Patient is followed by Grecia Barba DO for ongoing prescription of pain medication.  All refills should only be approved by this provider, or covering partner.    Medication(s): Oxycodone 15 mg.   Maximum quantity per month: 100  Clinic visit frequency required: Q 2 months     Controlled substance agreement:  Encounter-Level CSA - 04/26/2017:    Controlled Substance Agreement - Scan on 5/4/2017  8:58 AM: CONTROLLED SUBSTANCE AGREEMENT (below)       Patient-Level CSA:    Controlled Substance Agreement - Opioid - Scan on 2/6/2019  6:23 PM (below)         Pain Clinic evaluation in the past: No    DIRE Total Score(s):  No flowsheet data found.    Last [facility] website verification:  done on 4/26/17   9/26/2017  7/6/2018  10/16/2018  1/22/2019  8/2/2019           [email or web address]         Chronic migraine without aura without status migrainosus, not intractable 04/12/2017     Priority: Medium     With medication overuse.  Fioricet and not Imitrex is recommend to treat severe headache.  2/2017 [place] recommend wean down from daily Fioricet and use Excedrin Migrain PRN.       AIN grade I 03/30/2017     Priority: Medium     Found at [facility] on colonoscopy 2/2017.  Recommend repeat anoscopy and anal pap in 6/2017.       Gastroesophageal reflux disease with esophagitis 03/30/2017     Priority: Medium     EGD done at [facility] 2/2017 showed esophagitis without GAVE.  Recommend max dose H2 and PPI, along with 4 tablets of Carafate dissolved in water and drink  throughout the day.    Had LA Grade D esophagitis        Limited systemic sclerosis (H) 01/25/2017     Priority: Medium     Raynaud's, calcinosis, telangiectasias, peripheral neuropathy, GERD symptoms, history of scleroderma renal crisis.  Saw [place] 1/2017 and follows with Rheum Dr. Davis locally.       Polyneuropathy associated with underlying disease (H) 01/25/2017     Priority: Medium     Due to scleroderma and neurology thought possible due to ICU (critical illness neuropathy).  Recommend to max out dose of gabapentin to 2109-8604 mg/day, split BID or TID.  EMG 1/2017 positive for neuropathy       History of Clostridium difficile 11/29/2016     Priority: Medium     History of Helicobacter pylori infection 11/29/2016     Priority: Medium     Scleroderma with renal involvement (H) 11/09/2016     Priority: Medium     Stopped lisinopril per [facility] 1/2017.  Restarted lisinopril per [place] 3/2017       History of ARDS 11/09/2016     Priority: Medium     4/2015, prolonged ICU/vent/trach due to influenza, scleroderma renal crisis requiring hemodialysis.       Raynaud's disease without gangrene 11/09/2016     Priority: Medium     History of hemodialysis 11/09/2016     Priority: Medium     4/2015 due to scleroderma renal crisis       Bilateral retinitis 11/09/2016     Priority: Medium     History of pulmonary embolism 11/09/2016     Priority: Medium     Completed anti-coagulation.       REX (generalized anxiety disorder) 11/09/2016     Priority: Medium     CREST (calcinosis, Raynaud's phenomenon, esophageal dysfunction, sclerodactyly, telangiectasia) (H) 11/09/2016     Priority: Medium     Scleroderma (H) 09/22/2016     Priority: Medium     Nausea with vomiting 09/21/2016     Priority: Medium        Past Medical History:    Past Medical History:   Diagnosis Date     Acute pyelonephritis 6/9/2017     Altered mental state 3/29/2018     Arthritis      Blood clotting disorder (H)      History of blood transfusion       Hypertension      Long-term (current) use of anticoagulants [Z79.01] 2016     PE (pulmonary embolism) 2016     Rheumatism      Scleroderma (H) 2016     Uncomplicated asthma        Past Surgical History:    Past Surgical History:   Procedure Laterality Date     ANGIOGRAM        SECTION  2014     OPEN REDUCTION INTERNAL FIXATION ANKLE Right 2/10/2020    Procedure: Right ankle open reduction internal fixation;  Surgeon: Natanael Villalta MD;  Location: [facility] OR     THROAT SURGERY         Family History:    Family History   Problem Relation Age of Onset     Hyperlipidemia Father      Cerebrovascular Disease Maternal Grandmother          of a stroke     Hyperlipidemia Paternal Grandfather      Depression Sister      Fibromyalgia Sister      Diabetes Maternal Aunt      Melanoma No family hx of      Skin Cancer No family hx of        Social History:  Marital Status:  Single [1]  Social History     Tobacco Use     Smoking status: Never Smoker     Smokeless tobacco: Never Used   Substance Use Topics     Alcohol use: Yes     Comment: Socially once on a weekend if any     Drug use: No     Comment: None        Medications:    acetaminophen (TYLENOL) 325 MG tablet  ALPRAZolam (XANAX) 1 MG tablet  amLODIPine (NORVASC) 5 MG tablet  budesonide-formoterol (SYMBICORT) 160-4.5 MCG/ACT Inhaler  busPIRone HCl (BUSPAR) 30 MG tablet  Cyanocobalamin (B-12) 1000 MCG TBCR  DULoxetine (CYMBALTA) 30 MG capsule  famotidine (PEPCID) 20 MG tablet  ferrous gluconate (FERGON) 324 (38 FE) MG tablet  folic acid (FOLVITE) 1 MG tablet  gabapentin (NEURONTIN) 300 MG capsule  montelukast (SINGULAIR) 10 MG tablet  omeprazole (PRILOSEC) 40 MG DR capsule  oxyCODONE IR (ROXICODONE) 15 MG tablet  polyethylene glycol (MIRALAX/GLYCOLAX) packet  promethazine (PHENERGAN) 25 MG tablet  VENTOLIN  (90 Base) MCG/ACT inhaler  blood glucose (NO BRAND SPECIFIED) lancets standard  blood glucose (NO BRAND SPECIFIED) test  "strip  GOODSENSE MIGRAINE FORMULA 250-250-65 MG per tablet  hydrOXYzine (ATARAX) 25 MG tablet  LORazepam (ATIVAN) 1 MG tablet  methocarbamol (ROBAXIN) 750 MG tablet  naloxone (NARCAN) 4 MG/0.1ML nasal spray          Review of Systems  All other systems reviewed and are negative.    Physical Exam   BP: 91/65  Pulse: 118  Resp: 18  Height: 157.5 cm (5' 2\")  Weight: 74.8 kg (165 lb)(stated)  SpO2: 99 %      Physical Exam  Nontoxic appearing no respiratory distress alert and oriented ×3  Head atraumatic normocephalic  Conjunctiva noninjected, oropharynx moist without lesions or erythema  No cervical adenopathy   Neck supple full active painless range of motion  Lungs clear to auscultation  Heart regular no murmur  Abdomen soft mild diffuse tenderness without guarding or rebound bowel sounds positive   Strength and sensation grossly intact throughout the extremities, gait and station normal  Speech is fluent, good eye contact, thought processes are rational  Lower extremities without swelling, redness or tenderness  Pedal pulses symmetrical and strong    Repeat abdominal exam persistently soft, mild diffuse tenderness, no guarding or rebound, bowel sounds remain positive nonsurgical abdomen.    ED Course        Procedures               Critical Care time:  none     The Lactic acid level is elevated due to Dehydration, at this time there is no sign of severe sepsis or septic shock.         Results for orders placed or performed during the hospital encounter of 03/25/20 (from the past 24 hour(s))   CBC with platelets differential   Result Value Ref Range    WBC 20.0 (H) 4.0 - 11.0 10e9/L    RBC Count 5.26 (H) 3.8 - 5.2 10e12/L    Hemoglobin 12.8 11.7 - 15.7 g/dL    Hematocrit 42.4 35.0 - 47.0 %    MCV 81 78 - 100 fl    MCH 24.3 (L) 26.5 - 33.0 pg    MCHC 30.2 (L) 31.5 - 36.5 g/dL    RDW 19.9 (H) 10.0 - 15.0 %    Platelet Count 297 150 - 450 10e9/L    Diff Method Automated Method     % Neutrophils 76.9 %    % Lymphocytes " 13.4 %    % Monocytes 6.4 %    % Eosinophils 1.9 %    % Basophils 0.9 %    % Immature Granulocytes 0.5 %    Nucleated RBCs 0 0 /100    Absolute Neutrophil 15.4 (H) 1.6 - 8.3 10e9/L    Absolute Lymphocytes 2.7 0.8 - 5.3 10e9/L    Absolute Monocytes 1.3 0.0 - 1.3 10e9/L    Absolute Eosinophils 0.4 0.0 - 0.7 10e9/L    Absolute Basophils 0.2 0.0 - 0.2 10e9/L    Abs Immature Granulocytes 0.1 0 - 0.4 10e9/L    Absolute Nucleated RBC 0.0    Comprehensive metabolic panel   Result Value Ref Range    Sodium 138 133 - 144 mmol/L    Potassium 4.2 3.4 - 5.3 mmol/L    Chloride 104 94 - 109 mmol/L    Carbon Dioxide 26 20 - 32 mmol/L    Anion Gap 8 3 - 14 mmol/L    Glucose 111 (H) 70 - 99 mg/dL    Urea Nitrogen 25 7 - 30 mg/dL    Creatinine 2.07 (H) 0.52 - 1.04 mg/dL    GFR Estimate 30 (L) >60 mL/min/[1.73_m2]    GFR Estimate If Black 35 (L) >60 mL/min/[1.73_m2]    Calcium 10.6 (H) 8.5 - 10.1 mg/dL    Bilirubin Total 0.4 0.2 - 1.3 mg/dL    Albumin 4.1 3.4 - 5.0 g/dL    Protein Total 7.9 6.8 - 8.8 g/dL    Alkaline Phosphatase 87 40 - 150 U/L    ALT 27 0 - 50 U/L    AST 40 0 - 45 U/L   Lipase   Result Value Ref Range    Lipase 83 73 - 393 U/L   Lactic acid whole blood   Result Value Ref Range    Lactic Acid 2.7 (H) 0.7 - 2.0 mmol/L   UA reflex to Microscopic   Result Value Ref Range    Color Urine Tricia     Appearance Urine Slightly Cloudy     Glucose Urine Negative NEG^Negative mg/dL    Bilirubin Urine Small (A) NEG^Negative    Ketones Urine 5 (A) NEG^Negative mg/dL    Specific Gravity Urine 1.025 1.003 - 1.035    Blood Urine Negative NEG^Negative    pH Urine 5.0 5.0 - 7.0 pH    Protein Albumin Urine Negative NEG^Negative mg/dL    Urobilinogen mg/dL 2.0 0.0 - 2.0 mg/dL    Nitrite Urine Negative NEG^Negative    Leukocyte Esterase Urine Negative NEG^Negative    Source Midstream Urine     RBC Urine 1 0 - 2 /HPF    WBC Urine 2 0 - 5 /HPF    Squamous Epithelial /HPF Urine 2 (H) 0 - 1 /HPF    Mucous Urine Present (A) NEG^Negative /LPF     Hyaline Casts 13 (H) 0 - 2 /LPF   CT Abdomen Pelvis w/o Contrast    Narrative    EXAM: CT ABDOMEN PELVIS W/O CONTRAST  LOCATION: [facility]  DATE/TIME: 3/25/2020 9:09 PM    INDICATION: Acute diffuse abdominal pain with abdominal distention.  COMPARISON: 11/24/2019, 07/24/2019.  TECHNIQUE: CT scan of the abdomen and pelvis was performed without IV contrast. Multiplanar reformats were obtained. Dose reduction techniques were used.  CONTRAST: None.    FINDINGS:   LOWER CHEST: No focal infiltrate or consolidation. No pleural fluid.    HEPATOBILIARY: Mild diffuse fatty infiltration of the liver. No calcified gallstones or biliary dilatation.    PANCREAS: No ductal dilatation or acute inflammatory change.    SPLEEN: Normal size spleen.    ADRENAL GLANDS: No significant nodules.    KIDNEYS/BLADDER: Kidneys demonstrating normal shape, size and position. No urinary collecting system dilatation or calculi. Bladder unremarkable.    BOWEL: Appendix unremarkable. Moderate amount of stool in the proximal colon without evidence for overt wall thickening allowing for the noncontrast study. The mid transverse colon through the mid sigmoid colon is completely collapsed and difficult to   evaluate for wall thickening. There is no evidence for overt inflammatory change in this area. No small bowel dilatation. Minimal sliding esophageal hiatal hernia with fluid in the distal esophagus. Stomach and duodenum unremarkable.    LYMPH NODES: No lymphadenopathy.    VASCULATURE: No abdominal aortic aneurysm.    PELVIC ORGANS: IUD within the central aspect of the uterus. Well-circumscribed homogeneously low-attenuation left adnexal cyst measuring 2.8 cm in maximal diameter.    MUSCULOSKELETAL: Minimal degenerative disc disease L3-L4.      Impression    IMPRESSION:   1.  No evidence for bowel dilatation or obstruction.    2.  The mid transverse colon through the mid sigmoid colon is completely collapsed and difficult to evaluate for  wall thickening. Allowing for this there is no evidence for overt wall thickening involving the colon or inflammatory change in the   pericolonic fat planes.    3.  Appendix unremarkable.    4.  IUD within the central aspect of the uterus.    5.  Well-circumscribed homogeneously low-attenuation left adnexal cyst measuring 2.8 cm in maximal diameter. Management recommendations as detailed below.    Management Recommendations:    Benign Appearing Cyst (round or oval; thin wall; fluid attenuation; no solid areas; <10cm):     Premenopausal Patient  < 5 cm: Benign. No follow up.  > 5 cm: Ultrasound at 6-12 weeks    Reference: Guidelines for Incidental Benign or Probably Benign Adnexal Cyst on CT/MRI. Managing Incidental Findings on Abdominal and Pelvic CT and MRI, Part 1: White Paper of the ACR Incidental Finding Committee II on Adnexal Findings. Journal of the   American College of Radiology 2013; 10:675-681         Medications   ondansetron (ZOFRAN) injection 4 mg (4 mg Intravenous Not Given 3/25/20 1932)   lactated ringers BOLUS 1,000 mL (0 mLs Intravenous Stopped 3/25/20 2059)   promethazine (PHENERGAN) 25 mg in sodium chloride 0.9 % 50 mL intermittent infusion (25 mg Intravenous Given 3/25/20 2009)   lactated ringers BOLUS 1,000 mL (1,000 mLs Intravenous New Bag 3/25/20 2059)   HYDROmorphone (PF) (DILAUDID) injection 0.5 mg (0.5 mg Intravenous Given 3/25/20 2038)       Assessments & Plan (with Medical Decision Making)  34-year-old female multiple medical problems presents with diffuse abdominal pain and tenderness, vomiting and diarrhea.  She has elevated white count of 20,000, mild elevation of lactate at 2.7.  She is treated with antiemetics, Dilaudid, 2 L LR.  She is admitted to hospital for ongoing IV fluid and antiemetic therapy.  No indication for antibiotics at this time.  CT scan abdomen pelvis negative for acute finding.  Reviewed with  accepts patient for admission.     I have reviewed the  nursing notes.    I have reviewed the findings, diagnosis, plan and need for follow up with the patient.       New Prescriptions    No medications on file       Final diagnoses:   Vomiting and diarrhea   Abdominal pain, generalized   Sepsis, due to unspecified organism, unspecified whether acute organ dysfunction present (H)       3/25/2020   [facility] EMERGENCY DEPARTMENT     Joshua Cunningham MD  03/25/20 [id number]

## 2020-03-26 NOTE — PLAN OF CARE
Patient has had watery bloody stools today . Tolerating full liquid diet. Did request phenergan for nausea with relief. Abdominal cramping pain 8/10 with some decrease in pain after oxycodone. Up in room with stand by assist and walker. Patient does well not putting weight on right leg. Right leg elevated on pillow so pressure is off heel. Skin on heel is dry and black. Patient using betadine swabs periodically throughout the day to clean right heel.  IV fluids infusing as ordered. C-difficile and enteric panel negative. Dr Veloz updated, oral vancomycin stopped, enteric precautions discontinued.

## 2020-03-26 NOTE — PROGRESS NOTES
ASSESSMENT: PLAN:   Admitted 3/25    Molly Teague is a 34 year old female who presents on 3/25/2020 with acute onset of lower abdominal pain, nausea, vomiting and diarrhea.      Vomiting and diarrhea    Lower abdominal pain and some blood with the diarrhea. CT abdomen/pelvis without abnormality but mild colitis cannot be ruled out. Marked elevated WBC. H/o prior C difficile infection. Patient does not recall antibiotics recently but did have ORIF a month ago and may have had periop antibiotics. Possible gastroenteritis.    - IV fluids   - phenergan prn   - stool for enteric panel and also C difficile tox B PCR   -because of the history of Cdiff and recent exposure to abx 1 month ago and the very high WBC, will start oral vanco while waiting for the stool studies.  Stop if negative.       Acute kidney injury on chronic kidney disease    Creatinine 2.07, GFR 35. Previous creatinine 1.35/GFR 51. Suspect prerenal related to acute illness. 2 liters IVF in the ED.    - NS at 125   - gave another L LR.         Scleroderma (H)  Raynaud's disease without gangrene  CREST (calcinosis, Raynaud's phenomenon, esophageal dysfunction, sclerodactyly, telangiectasia) (H)    At baseline. Not on immunosuppression. On amlodipine for the Raynauds but BP running low.    - hold amlodipine for now     Chronic pain syndrome   - continue home oxycodone 15 mg q 4 prn   - continue gabapentin as at home  - because of the chronic narcotic use, will be difficult to control pain.       Recent ankle fracture with ORIF, right   Decubitus ulcer right heel due to prior cast   - outpatient follow up        Diet: Advance Diet as Tolerated: Clear Liquid Diet    DVT Prophylaxis: Low Risk/Ambulatory with no VTE prophylaxis indicated  Melissa Catheter: not present  Code Status: Full  Lines: PIV        Disposition Plan     Expected discharge: 1-2 days, recommended to prior living arrangement once Patient tolerating oral intake and diarrhea improving and  "hydration improved.      SUBJECTIVE:  Only one stool this AM but grossly bloody  No further vomiting  Still some marked pain      ROS:4 point ROS including Respiratory, CV, GI and , other than that noted in the HPI,  is negative       OBJECTIVE:   /63 (BP Location: Left arm)   Pulse 124   Temp 99  F (37.2  C) (Oral)   Resp 16   Ht 1.575 m (5' 2\")   Wt 76.8 kg (169 lb 5 oz)   SpO2 98%   BMI 30.97 kg/m      GENERAL APPEARANCE:  Alert, some distress with pain      RESP:clear      CV: regular rate and rhythm,  no murmur , edema: none      Abdomen: soft, mild diffuse tenderness to percussion in the lower abdomen only,  no liver or spleen enlargement, no masses, BSs normal   Skin: no cyanosis, pallor, or jaundice    CMP  Recent Labs   Lab 03/26/20 0426 03/25/20  1907    138   POTASSIUM 4.2 4.2   CHLORIDE 105 104   CO2 26 26   ANIONGAP 7 8   * 111*   BUN 22 25   CR 1.52* 2.07*   GFRESTIMATED 44* 30*   GFRESTBLACK 51* 35*   UMER 8.7 10.6*   PROTTOTAL  --  7.9   ALBUMIN  --  4.1   BILITOTAL  --  0.4   ALKPHOS  --  87   AST  --  40   ALT  --  27     CBC  Recent Labs   Lab 03/26/20 0426 03/25/20  1907   WBC 16.1* 20.0*   RBC 4.06 5.26*   HGB 10.0* 12.8   HCT 32.7* 42.4   MCV 81 81   MCH 24.6* 24.3*   MCHC 30.6* 30.2*   RDW 19.6* 19.9*    297     INRNo lab results found in last 7 days.  Arterial BloodGas  No lab results found in last 7 days.   Venous Blood Gas  No lab results found in last 7 days.    Medications     budesonide-formoterol  2 puff Inhalation BID     busPIRone  30 mg Oral BID     DULoxetine  30 mg Oral BID     famotidine  20 mg Oral BID     gabapentin  900 mg Oral BID     omeprazole  40 mg Oral BID AC     vancomycin  125 mg Oral 4x Daily       Intake/Output Summary (Last 24 hours) at 3/26/2020 0839  Last data filed at 3/26/2020 0600  Gross per 24 hour   Intake 832.5 ml   Output 50 ml   Net 782.5 ml           "

## 2020-03-26 NOTE — PROGRESS NOTES
Sepsis Evaluation Progress Note    I was called to see Molly Teague due to abnormal vital signs triggering the Sepsis SIRS screening alert. She is known to have an infection. Possible infectious colitis.    Physical Exam   Vital Signs:  Temp: 98.1  F (36.7  C) Temp src: Oral BP: 127/68 Pulse: 102   Resp: 18 SpO2: 97 % O2 Device: None (Room air)      Lab:  Lactic Acid   Date Value Ref Range Status   03/25/2020 2.7 (H) 0.7 - 2.0 mmol/L Final     Comment:     Significant value called to and read back by  SUZANNE PEREZ 03/25/2020 1918 YW       Lactate for Sepsis Protocol   Date Value Ref Range Status   03/26/2020 2.3 (H) 0.7 - 2.0 mmol/L Final     Comment:     Significant value called to and read back by  BLAISE NG MED SURG [id number] AT 0429 NJP         The patient is at baseline mental status.   Patient has chronic sinus tachycardia at baseline. .     Assessment & Plan   NO EVIDENCE OF SEPSIS at this time.  Vital sign, physical exam, and lab findings are likely due to nausea, vomiting and diarrhea.    Disposition: The patient will remain on the current unit. We will continue to monitor this patient closely..  Jude Rausch MD    Sepsis Criteria   Sepsis: 2+ SIRS criteria due to infection  Severe Sepsis: Sepsis AND 1+ new sign of acute organ dysfunction (Note: lactate >2 is organ dysfunction)  Septic Shock: Sepsis AND hypotension despite volume resuscitation with 30 ml/kg crystalloid

## 2020-03-26 NOTE — PLAN OF CARE
Problem: Adult Inpatient Plan of Care  Goal: Plan of Care Review  Outcome: No Change    Pt had one episode of loose bloody stool this am, small amount but enough to send for testing.  Pt reports when she had c-diff in the past it wasn't as bloody appearance.     Problem: Pain Acute  Goal: Optimal Pain Control  3/26/2020 0623 by Nayana Liz, RN  Outcome: Improving     Pt reports abdomen pain and cramping has improved and also denies any nasuea.  Diet advance to full liquid this am.  Pt currently reports feeling hungry and is eating some jello and pudding.

## 2020-03-26 NOTE — PHARMACY
Medication list updated via phone protocol with patient.    Patient was tired and had to be reminded of medications on multiple occassions during phone call. Patient states has her Symbicort inhaler on her, would just need a label if she stays inpatient. Patient also states she is taking two antacid medications.  List has been updated as best possible with patient.    Medication Reconciliation Completed.

## 2020-03-26 NOTE — ED NOTES
Patient has  [place] to Observation  order. Patient has been given Patient Bill of Rights, Observation brochure and  What does Observation mean to me  forms.  Patient has been given the opportunity to ask questions about observation status and their plan of care. .    Jaquelin Vogt RN

## 2020-03-27 ENCOUNTER — PATIENT OUTREACH (OUTPATIENT)
Dept: CARE COORDINATION | Facility: CLINIC | Age: 35
End: 2020-03-27
Payer: MEDICARE

## 2020-03-27 DIAGNOSIS — Z53.9 ERRONEOUS ENCOUNTER--DISREGARD: Primary | ICD-10-CM

## 2020-03-27 LAB
ALBUMIN SERPL-MCNC: 2.8 G/DL (ref 3.4–5)
ALP SERPL-CCNC: 64 U/L (ref 40–150)
ALT SERPL W P-5'-P-CCNC: 16 U/L (ref 0–50)
ANION GAP SERPL CALCULATED.3IONS-SCNC: 6 MMOL/L (ref 3–14)
AST SERPL W P-5'-P-CCNC: 10 U/L (ref 0–45)
BILIRUB SERPL-MCNC: 0.4 MG/DL (ref 0.2–1.3)
BUN SERPL-MCNC: 12 MG/DL (ref 7–30)
CALCIUM SERPL-MCNC: 8.3 MG/DL (ref 8.5–10.1)
CHLORIDE SERPL-SCNC: 111 MMOL/L (ref 94–109)
CO2 SERPL-SCNC: 25 MMOL/L (ref 20–32)
CREAT SERPL-MCNC: 1.55 MG/DL (ref 0.52–1.04)
ERYTHROCYTE [DISTWIDTH] IN BLOOD BY AUTOMATED COUNT: 20.2 % (ref 10–15)
GFR SERPL CREATININE-BSD FRML MDRD: 43 ML/MIN/{1.73_M2}
GLUCOSE SERPL-MCNC: 116 MG/DL (ref 70–99)
HCT VFR BLD AUTO: 30.8 % (ref 35–47)
HGB BLD-MCNC: 9.1 G/DL (ref 11.7–15.7)
LACTATE BLD-SCNC: 1.3 MMOL/L (ref 0.7–2)
MCH RBC QN AUTO: 24.7 PG (ref 26.5–33)
MCHC RBC AUTO-ENTMCNC: 29.5 G/DL (ref 31.5–36.5)
MCV RBC AUTO: 84 FL (ref 78–100)
PLATELET # BLD AUTO: 210 10E9/L (ref 150–450)
POTASSIUM SERPL-SCNC: 4.3 MMOL/L (ref 3.4–5.3)
PROT SERPL-MCNC: 6.1 G/DL (ref 6.8–8.8)
RBC # BLD AUTO: 3.69 10E12/L (ref 3.8–5.2)
SODIUM SERPL-SCNC: 142 MMOL/L (ref 133–144)
WBC # BLD AUTO: 14.6 10E9/L (ref 4–11)

## 2020-03-27 PROCEDURE — 25800030 ZZH RX IP 258 OP 636: Performed by: INTERNAL MEDICINE

## 2020-03-27 PROCEDURE — 25000132 ZZH RX MED GY IP 250 OP 250 PS 637: Mod: GY | Performed by: FAMILY MEDICINE

## 2020-03-27 PROCEDURE — 83605 ASSAY OF LACTIC ACID: CPT | Performed by: FAMILY MEDICINE

## 2020-03-27 PROCEDURE — 25000128 H RX IP 250 OP 636: Performed by: INTERNAL MEDICINE

## 2020-03-27 PROCEDURE — 36415 COLL VENOUS BLD VENIPUNCTURE: CPT | Performed by: FAMILY MEDICINE

## 2020-03-27 PROCEDURE — 99233 SBSQ HOSP IP/OBS HIGH 50: CPT | Performed by: FAMILY MEDICINE

## 2020-03-27 PROCEDURE — 80053 COMPREHEN METABOLIC PANEL: CPT | Performed by: FAMILY MEDICINE

## 2020-03-27 PROCEDURE — 12000000 ZZH R&B MED SURG/OB

## 2020-03-27 PROCEDURE — 25000132 ZZH RX MED GY IP 250 OP 250 PS 637: Mod: GY | Performed by: INTERNAL MEDICINE

## 2020-03-27 PROCEDURE — 40000901 ZZH STATISTIC WOC PT EDUCATION, 0-15 MIN

## 2020-03-27 PROCEDURE — 85027 COMPLETE CBC AUTOMATED: CPT | Performed by: FAMILY MEDICINE

## 2020-03-27 RX ORDER — ACETAMINOPHEN 325 MG/1
650 TABLET ORAL EVERY 4 HOURS PRN
Status: DISCONTINUED | OUTPATIENT
Start: 2020-03-27 | End: 2020-03-28 | Stop reason: HOSPADM

## 2020-03-27 RX ORDER — ACETAMINOPHEN 500 MG
1000 TABLET ORAL 3 TIMES DAILY
Status: DISCONTINUED | OUTPATIENT
Start: 2020-03-27 | End: 2020-03-28 | Stop reason: HOSPADM

## 2020-03-27 RX ADMIN — DULOXETINE HYDROCHLORIDE 30 MG: 30 CAPSULE, DELAYED RELEASE ORAL at 20:38

## 2020-03-27 RX ADMIN — OXYCODONE HYDROCHLORIDE 15 MG: 5 TABLET ORAL at 10:24

## 2020-03-27 RX ADMIN — GABAPENTIN 900 MG: 300 CAPSULE ORAL at 20:37

## 2020-03-27 RX ADMIN — BUSPIRONE HYDROCHLORIDE 30 MG: 15 TABLET ORAL at 20:37

## 2020-03-27 RX ADMIN — BUDESONIDE AND FORMOTEROL FUMARATE DIHYDRATE 2 PUFF: 160; 4.5 AEROSOL RESPIRATORY (INHALATION) at 08:42

## 2020-03-27 RX ADMIN — SODIUM CHLORIDE: 9 INJECTION, SOLUTION INTRAVENOUS at 13:20

## 2020-03-27 RX ADMIN — SODIUM CHLORIDE: 9 INJECTION, SOLUTION INTRAVENOUS at 22:05

## 2020-03-27 RX ADMIN — OXYCODONE HYDROCHLORIDE 15 MG: 5 TABLET ORAL at 20:38

## 2020-03-27 RX ADMIN — OXYCODONE HYDROCHLORIDE 15 MG: 5 TABLET ORAL at 16:33

## 2020-03-27 RX ADMIN — DULOXETINE HYDROCHLORIDE 30 MG: 30 CAPSULE, DELAYED RELEASE ORAL at 08:40

## 2020-03-27 RX ADMIN — BUDESONIDE AND FORMOTEROL FUMARATE DIHYDRATE 2 PUFF: 160; 4.5 AEROSOL RESPIRATORY (INHALATION) at 20:37

## 2020-03-27 RX ADMIN — PROMETHAZINE HYDROCHLORIDE 25 MG: 25 INJECTION INTRAMUSCULAR; INTRAVENOUS at 08:40

## 2020-03-27 RX ADMIN — ALPRAZOLAM 1 MG: 0.5 TABLET ORAL at 12:27

## 2020-03-27 RX ADMIN — FAMOTIDINE 20 MG: 20 TABLET ORAL at 08:40

## 2020-03-27 RX ADMIN — FAMOTIDINE 20 MG: 20 TABLET ORAL at 20:37

## 2020-03-27 RX ADMIN — ACETAMINOPHEN 1000 MG: 500 TABLET, FILM COATED ORAL at 20:38

## 2020-03-27 RX ADMIN — PROMETHAZINE HYDROCHLORIDE 25 MG: 25 INJECTION INTRAMUSCULAR; INTRAVENOUS at 19:16

## 2020-03-27 RX ADMIN — BUSPIRONE HYDROCHLORIDE 30 MG: 15 TABLET ORAL at 08:40

## 2020-03-27 RX ADMIN — OMEPRAZOLE 40 MG: 20 CAPSULE, DELAYED RELEASE ORAL at 05:57

## 2020-03-27 RX ADMIN — LORAZEPAM 1 MG: 1 TABLET ORAL at 08:45

## 2020-03-27 RX ADMIN — OMEPRAZOLE 40 MG: 20 CAPSULE, DELAYED RELEASE ORAL at 16:32

## 2020-03-27 RX ADMIN — ACETAMINOPHEN 650 MG: 325 TABLET, FILM COATED ORAL at 13:19

## 2020-03-27 RX ADMIN — OXYCODONE HYDROCHLORIDE 15 MG: 5 TABLET ORAL at 05:57

## 2020-03-27 RX ADMIN — GABAPENTIN 900 MG: 300 CAPSULE ORAL at 08:40

## 2020-03-27 RX ADMIN — ALPRAZOLAM 1 MG: 0.5 TABLET ORAL at 20:37

## 2020-03-27 RX ADMIN — SODIUM CHLORIDE: 9 INJECTION, SOLUTION INTRAVENOUS at 04:22

## 2020-03-27 ASSESSMENT — ACTIVITIES OF DAILY LIVING (ADL)
ADLS_ACUITY_SCORE: 15

## 2020-03-27 NOTE — PLAN OF CARE
Patient alert and oriented.  Had 4 small loose bloody stools overnight.  Remains tachycardic. IV infusing at 125 ml/hour.   Pain improved with 15 mg Oxycodone as needed.   Received Phenergan and  zofran for nausea.   Up with stand by assistance to bathroom, using walker to gt to bathroom.  No change in right heel.   Bed alarm on for safety.

## 2020-03-27 NOTE — PROGRESS NOTES
The patient is still an inpatient.    Billy Rueda, MSN, RN, PHN, [facility]   Primary Care RN Clinic Care Coordinator  [facility]    Phone:  [phone]

## 2020-03-27 NOTE — PROGRESS NOTES
ASSESSMENT: PLAN:   Admitted 3/25     Molly Teague is a 34 year old female who presents on 3/25/2020 with acute onset of lower abdominal pain, nausea, vomiting and diarrhea.      Vomiting and diarrhea    Lower abdominal pain and some blood with the diarrhea. CT abdomen/pelvis without abnormality but mild colitis cannot be ruled out. Marked elevated WBC. H/o prior C difficile infection. Patient does not recall antibiotics recently but did have ORIF a month ago and may have had periop antibiotics. Possible gastroenteritis.    - IV fluids   - phenergan prn   - stool for enteric panel and also C difficile tox B PCR   -because of the history of Cdiff and recent exposure to abx 1 month ago and the very high WBC, will start oral vanco while waiting for the stool studies.  Stopped after one dose when test negative.   - still some bloody stools so will watch another day.  Hb only down slightly   .       Acute kidney injury on chronic kidney disease    Creatinine 2.07, GFR 35. Previous creatinine 1.35/GFR 51. Suspect prerenal related to acute illness. 2 liters IVF in the ED.    - NS at 125   - gave another L LR pm day 2  - Cr 1.79-->1.55       Scleroderma (H)  Raynaud's disease without gangrene  CREST (calcinosis, Raynaud's phenomenon, esophageal dysfunction, sclerodactyly, telangiectasia) (H)    At baseline. Not on immunosuppression. On amlodipine for the Raynauds but BP running low.    - hold amlodipine for now     Chronic pain syndrome   - continue home oxycodone 15 mg q 4 prn   - continue gabapentin as at home  - because of the chronic narcotic use, will be difficult to control pain.       Recent ankle fracture with ORIF, right   Decubitus ulcer right heel due to prior cast   - outpatient follow up        Diet: Advance Diet as Tolerated: Clear Liquid Diet    DVT Prophylaxis: Low Risk/Ambulatory with no VTE prophylaxis indicated  Melissa Catheter: not present  Code Status: Full  Lines: PIV        Disposition Plan  IVF and  "watch one more day.    Discuss with her primary GI spcialist at [facility].         SUBJECTIVE:  Still 5 loose bloody stools since yesterday AM.    Pain is about the same, diffuse abdominal aching  Vomiting and nausea resolved,  Eating well.        ROS:4 point ROS including Respiratory, CV, GI and , other than that noted in the HPI,  is negative     OBJECTIVE:   /72   Pulse 106   Temp 98.2  F (36.8  C) (Oral)   Resp 16   Ht 1.575 m (5' 2\")   Wt 76.8 kg (169 lb 5 oz)   SpO2 93%   BMI 30.97 kg/m      GENERAL APPEARANCE:  Alert, Ox3, NAD      RESP:clear      CV: regular rate and rhythm,  No  murmur , edema: none       Abdomen: soft, some mild diffuse tenderness, mostly low abdomen , no liver or spleen enlargement, no masses, BSs normal   Skin: no cyanosis, pallor, or jaundice    CMP  Recent Labs   Lab 03/27/20  0516 03/26/20  0426 03/25/20  1907    138 138   POTASSIUM 4.3 4.2 4.2   CHLORIDE 111* 105 104   CO2 25 26 26   ANIONGAP 6 7 8   * 130* 111*   BUN 12 22 25   CR 1.55* 1.52* 2.07*   GFRESTIMATED 43* 44* 30*   GFRESTBLACK 50* 51* 35*   UMER 8.3* 8.7 10.6*   PROTTOTAL 6.1*  --  7.9   ALBUMIN 2.8*  --  4.1   BILITOTAL 0.4  --  0.4   ALKPHOS 64  --  87   AST 10  --  40   ALT 16  --  27     CBC  Recent Labs   Lab 03/27/20  0516 03/26/20  0426 03/25/20  1907   WBC 14.6* 16.1* 20.0*   RBC 3.69* 4.06 5.26*   HGB 9.1* 10.0* 12.8   HCT 30.8* 32.7* 42.4   MCV 84 81 81   MCH 24.7* 24.6* 24.3*   MCHC 29.5* 30.6* 30.2*   RDW 20.2* 19.6* 19.9*    262 297     INRNo lab results found in last 7 days.  Arterial BloodGas  No lab results found in last 7 days.   Venous Blood Gas  No lab results found in last 7 days.    Medications     budesonide-formoterol  2 puff Inhalation BID     busPIRone  30 mg Oral BID     DULoxetine  30 mg Oral BID     famotidine  20 mg Oral BID     gabapentin  900 mg Oral BID     omeprazole  40 mg Oral BID AC       Intake/Output Summary (Last 24 hours) at 3/27/2020 0924  Last data " filed at 3/27/2020 0755  Gross per 24 hour   Intake 4512.08 ml   Output 2950 ml   Net 1562.08 ml

## 2020-03-27 NOTE — PROGRESS NOTES
Patient continues to be tachycardic, she reports this is her baseline.  Reports continued abdominal cramping and requests pain medicine, oxycodone given.  Had a small amount of loose bloody stool.  Denies any nausea and requesting to eat regular dinner, diet advanced to regular.

## 2020-03-27 NOTE — PROGRESS NOTES
Reason for consult:  Black heel ulcer present on admission    Molly Teague 34 year old admitted on 3/25/20 for acute onset of lower abdominal pain, nausea, vomiting and diarrhea.       Talked with charge RN and pts primary nurse.  Both state this heel eschar is stable and longstanding.  The current plan is daily painting of eschar with betadine.  The wound is a result of a cast on pts foot after an ORIF of right ankle fracture.  Staff report there is a  cut out of the cast to relieve the pressure and treat/monitor the eschar.      Plan:  Will continue with present plan of care using betadine painting to the eschar and constant offloading of heel pressure.     Mary Hurtado RN CWON

## 2020-03-28 VITALS
DIASTOLIC BLOOD PRESSURE: 77 MMHG | SYSTOLIC BLOOD PRESSURE: 131 MMHG | OXYGEN SATURATION: 95 % | WEIGHT: 169.31 LBS | HEIGHT: 62 IN | TEMPERATURE: 98.3 F | HEART RATE: 126 BPM | RESPIRATION RATE: 18 BRPM | BODY MASS INDEX: 31.16 KG/M2

## 2020-03-28 LAB
ERYTHROCYTE [DISTWIDTH] IN BLOOD BY AUTOMATED COUNT: 20.7 % (ref 10–15)
HCT VFR BLD AUTO: 31.4 % (ref 35–47)
HGB BLD-MCNC: 9.2 G/DL (ref 11.7–15.7)
LACTATE BLD-SCNC: 1.6 MMOL/L (ref 0.7–2)
MCH RBC QN AUTO: 24.9 PG (ref 26.5–33)
MCHC RBC AUTO-ENTMCNC: 29.3 G/DL (ref 31.5–36.5)
MCV RBC AUTO: 85 FL (ref 78–100)
PLATELET # BLD AUTO: 224 10E9/L (ref 150–450)
RBC # BLD AUTO: 3.7 10E12/L (ref 3.8–5.2)
WBC # BLD AUTO: 13.6 10E9/L (ref 4–11)

## 2020-03-28 PROCEDURE — 85027 COMPLETE CBC AUTOMATED: CPT | Performed by: FAMILY MEDICINE

## 2020-03-28 PROCEDURE — 99238 HOSP IP/OBS DSCHRG MGMT 30/<: CPT | Performed by: FAMILY MEDICINE

## 2020-03-28 PROCEDURE — 25000132 ZZH RX MED GY IP 250 OP 250 PS 637: Mod: GY | Performed by: FAMILY MEDICINE

## 2020-03-28 PROCEDURE — 83605 ASSAY OF LACTIC ACID: CPT | Performed by: FAMILY MEDICINE

## 2020-03-28 PROCEDURE — 25800030 ZZH RX IP 258 OP 636: Performed by: INTERNAL MEDICINE

## 2020-03-28 PROCEDURE — 25000132 ZZH RX MED GY IP 250 OP 250 PS 637: Mod: GY | Performed by: INTERNAL MEDICINE

## 2020-03-28 PROCEDURE — 36415 COLL VENOUS BLD VENIPUNCTURE: CPT | Performed by: FAMILY MEDICINE

## 2020-03-28 PROCEDURE — 25000128 H RX IP 250 OP 636: Performed by: INTERNAL MEDICINE

## 2020-03-28 RX ADMIN — PROMETHAZINE HYDROCHLORIDE 25 MG: 25 INJECTION INTRAMUSCULAR; INTRAVENOUS at 09:53

## 2020-03-28 RX ADMIN — DULOXETINE HYDROCHLORIDE 30 MG: 30 CAPSULE, DELAYED RELEASE ORAL at 07:36

## 2020-03-28 RX ADMIN — ACETAMINOPHEN 1000 MG: 500 TABLET, FILM COATED ORAL at 07:36

## 2020-03-28 RX ADMIN — BUDESONIDE AND FORMOTEROL FUMARATE DIHYDRATE 2 PUFF: 160; 4.5 AEROSOL RESPIRATORY (INHALATION) at 07:38

## 2020-03-28 RX ADMIN — GABAPENTIN 900 MG: 300 CAPSULE ORAL at 07:35

## 2020-03-28 RX ADMIN — FAMOTIDINE 20 MG: 20 TABLET ORAL at 07:36

## 2020-03-28 RX ADMIN — OMEPRAZOLE 40 MG: 20 CAPSULE, DELAYED RELEASE ORAL at 06:18

## 2020-03-28 RX ADMIN — BUSPIRONE HYDROCHLORIDE 30 MG: 15 TABLET ORAL at 07:36

## 2020-03-28 RX ADMIN — SODIUM CHLORIDE: 9 INJECTION, SOLUTION INTRAVENOUS at 07:12

## 2020-03-28 ASSESSMENT — ACTIVITIES OF DAILY LIVING (ADL)
ADLS_ACUITY_SCORE: 17
ADLS_ACUITY_SCORE: 15
ADLS_ACUITY_SCORE: 15

## 2020-03-28 NOTE — PLAN OF CARE
Alert and oriented. Continues to have some abdominal pain and cramping but is trying to use less oxycodone, and she requested to go to the vending machine this am and bought snacks. Denies any nausea.  Right foot elevated, non weight bearing, heel wound black.   Had small formed soft stool with no blood this am.   Vital signs stable.

## 2020-03-28 NOTE — PROGRESS NOTES
[facility] [facility] DISCHARGE NOTE    Patient discharged to home at 10:43 AM via wheel chair. Accompanied by other:pt will wait for mother in law to  outside and staff. Discharge instructions reviewed with patient, opportunity offered to ask questions. Prescriptions - None ordered for discharge. All belongings sent with patient.    Jami Lamar RN

## 2020-03-28 NOTE — DISCHARGE SUMMARY
[facility] Discharge Summary    Molly Teague MRN# [id number]   Age: 34 year old YOB: 1985     Date of Admission:  3/25/2020  Date of Discharge::  3/28/2020 10:30 AM  Admitting Physician:  Gene Veloz MD  Discharge Physician:  Gene Veloz MD  Primary Physician: Grecia Barba       Home clinic: [facility]               Discharge Diagnosis:   Principle diagnosis: gastroenteritis unknown etiology     Secondary diagnoses:  Systemic sclerosis  Chronic pain syndrome   EMERALD  Decubitus ulcer right heel.      Discharge Instructions:   For the enteritis  -this was unclear cause.    -diet as tolerated.   -some of the bleeding was likely anal origin, fissure or hemorrhoid  -need to set up colonoscopy with your GI provider in the next 2-4 weeks.           Follow up with primary care provider in 7 days        Procedures:       Results for orders placed or performed during the hospital encounter of 03/25/20   CT Abdomen Pelvis w/o Contrast    Narrative    EXAM: CT ABDOMEN PELVIS W/O CONTRAST  LOCATION: [facility]  DATE/TIME: 3/25/2020 9:09 PM    INDICATION: Acute diffuse abdominal pain with abdominal distention.  COMPARISON: 11/24/2019, 07/24/2019.  TECHNIQUE: CT scan of the abdomen and pelvis was performed without IV contrast. Multiplanar reformats were obtained. Dose reduction techniques were used.  CONTRAST: None.    FINDINGS:   LOWER CHEST: No focal infiltrate or consolidation. No pleural fluid.    HEPATOBILIARY: Mild diffuse fatty infiltration of the liver. No calcified gallstones or biliary dilatation.    PANCREAS: No ductal dilatation or acute inflammatory change.    SPLEEN: Normal size spleen.    ADRENAL GLANDS: No significant nodules.    KIDNEYS/BLADDER: Kidneys demonstrating normal shape, size and position. No urinary collecting system dilatation or calculi. Bladder unremarkable.    BOWEL: Appendix unremarkable. Moderate amount of stool in the  proximal colon without evidence for overt wall thickening allowing for the noncontrast study. The mid transverse colon through the mid sigmoid colon is completely collapsed and difficult to   evaluate for wall thickening. There is no evidence for overt inflammatory change in this area. No small bowel dilatation. Minimal sliding esophageal hiatal hernia with fluid in the distal esophagus. Stomach and duodenum unremarkable.    LYMPH NODES: No lymphadenopathy.    VASCULATURE: No abdominal aortic aneurysm.    PELVIC ORGANS: IUD within the central aspect of the uterus. Well-circumscribed homogeneously low-attenuation left adnexal cyst measuring 2.8 cm in maximal diameter.    MUSCULOSKELETAL: Minimal degenerative disc disease L3-L4.      Impression    IMPRESSION:   1.  No evidence for bowel dilatation or obstruction.    2.  The mid transverse colon through the mid sigmoid colon is completely collapsed and difficult to evaluate for wall thickening. Allowing for this there is no evidence for overt wall thickening involving the colon or inflammatory change in the   pericolonic fat planes.    3.  Appendix unremarkable.    4.  IUD within the central aspect of the uterus.    5.  Well-circumscribed homogeneously low-attenuation left adnexal cyst measuring 2.8 cm in maximal diameter. Management recommendations as detailed below.    Management Recommendations:    Benign Appearing Cyst (round or oval; thin wall; fluid attenuation; no solid areas; <10cm):     Premenopausal Patient  < 5 cm: Benign. No follow up.  > 5 cm: Ultrasound at 6-12 weeks    Reference: Guidelines for Incidental Benign or Probably Benign Adnexal Cyst on CT/MRI. Managing Incidental Findings on Abdominal and Pelvic CT and MRI, Part 1: White Paper of the ACR Incidental Finding Committee II on Adnexal Findings. Journal of the   American College of Radiology 2013; 10:675-681                      Allergies:      Allergies   Allergen Reactions     Blood Transfusion  Related (Informational Only) Other (See Comments)     Patient has a history of a clinically significant antibody against RBC antigens.  A delay in compatible RBCs may occur.     No Known Allergies      Pollen Extract      Seasonal Allergies      Shellfish-Derived Products Nausea and Rash     Rash on face                  Discharge Medications:     Current Discharge Medication List      CONTINUE these medications which have NOT CHANGED    Details   acetaminophen (TYLENOL) 325 MG tablet Take 2 tablets (650 mg) by mouth every 4 hours as needed for mild pain or other  Qty: 1 Bottle, Refills: 0    Associated Diagnoses: S/P ORIF (open reduction internal fixation) fracture      ALPRAZolam (XANAX) 1 MG tablet Take 1 tablet (1 mg) by mouth 3 times daily as needed for anxiety  Qty: 21 tablet, Refills: 0    Associated Diagnoses: Anxiety      amLODIPine (NORVASC) 5 MG tablet Take 5 mg by mouth daily      budesonide-formoterol (SYMBICORT) 160-4.5 MCG/ACT Inhaler Inhale 2 puffs into the lungs 2 times daily  Qty: 6 g, Refills: 11    Associated Diagnoses: Moderate persistent asthma without complication      busPIRone HCl (BUSPAR) 30 MG tablet TAKE ONE TABLET BY MOUTH TWICE A DAY  Qty: 180 tablet, Refills: 3    Associated Diagnoses: Moderate major depression (H)      Cyanocobalamin (B-12) 1000 MCG TBCR Take 1,000 mcg by mouth daily  Qty: 100 tablet, Refills: 1    Associated Diagnoses: Vitamin B12 deficiency (non anemic)      DULoxetine (CYMBALTA) 30 MG capsule TAKE ONE CAPSULE BY MOUTH TWICE A DAY  Qty: 180 capsule, Refills: 3    Associated Diagnoses: Moderate major depression (H); PTSD (post-traumatic stress disorder)      famotidine (PEPCID) 20 MG tablet Take 1 tablet (20 mg) by mouth 2 times daily  Qty: 180 tablet, Refills: 3    Associated Diagnoses: CREST (calcinosis, Raynaud's phenomenon, esophageal dysfunction, sclerodactyly, telangiectasia) (H); Gastroesophageal reflux disease with esophagitis      ferrous gluconate (FERGON)  324 (38 FE) MG tablet TAKE ONE TABLET BY MOUTH EVERY DAY WITH BREAKFAST  Qty: 100 tablet, Refills: 3    Associated Diagnoses: CREST (calcinosis, Raynaud's phenomenon, esophageal dysfunction, sclerodactyly, telangiectasia) (H)      folic acid (FOLVITE) 1 MG tablet Take 1 tablet by mouth daily      gabapentin (NEURONTIN) 300 MG capsule TAKE TWO CAPSULES BY MOUTH THREE TIMES A DAY  Qty: 540 capsule, Refills: 3    Associated Diagnoses: Limited systemic sclerosis (H)      montelukast (SINGULAIR) 10 MG tablet TAKE ONE TABLET BY MOUTH AT BEDTIME  Qty: 90 tablet, Refills: 3    Associated Diagnoses: Severe persistent asthma without complication      omeprazole (PRILOSEC) 40 MG DR capsule TAKE ONE CAPSULE BY MOUTH TWICE A DAY  Qty: 180 capsule, Refills: 1    Associated Diagnoses: CREST (calcinosis, Raynaud's phenomenon, esophageal dysfunction, sclerodactyly, telangiectasia) (H); Gastroesophageal reflux disease with esophagitis      oxyCODONE IR (ROXICODONE) 15 MG tablet TAKE ONE TABLET BY MOUTH EVERY 4 HOURS AS NEEDED FOR SEVERE PAIN  Qty: 100 tablet, Refills: 0    Associated Diagnoses: Chronic pain syndrome      polyethylene glycol (MIRALAX/GLYCOLAX) packet Take 17 g by mouth daily  Qty: 7 packet, Refills: 0    Associated Diagnoses: S/P ORIF (open reduction internal fixation) fracture      promethazine (PHENERGAN) 25 MG tablet Take 1 tablet (25 mg) by mouth 2 times daily as needed for nausea  Qty: 30 tablet, Refills: 11    Associated Diagnoses: Nausea and vomiting, intractability of vomiting not specified, unspecified vomiting type; Severe motion sickness, sequela; Seasonal allergic rhinitis, unspecified trigger      VENTOLIN  (90 Base) MCG/ACT inhaler INHALE 2 PUFFS INTO THE LUNGS ONCE EVERY 4 HOURS AS NEEDED FOR SHORTNESS OF BREATH / DYSPNEA / WHEEZING  Qty: 18 g, Refills: 11    Associated Diagnoses: Moderate persistent asthma without complication      blood glucose (NO BRAND SPECIFIED) lancets standard Use to test  blood sugar 1 time daily  Qty: 100 each, Refills: 3    Associated Diagnoses: Hypoglycemia      blood glucose (NO BRAND SPECIFIED) test strip Use to test blood sugar 1 time daily  Qty: 100 each, Refills: 3    Associated Diagnoses: Hypoglycemia      GOODSENSE MIGRAINE FORMULA 250-250-65 MG per tablet TAKE ONE TABLET BY MOUTH EVERY 6 HOURS AS NEEDED FOR HEADACHES  Qty: 24 tablet, Refills: 1    Associated Diagnoses: Chronic daily headache      hydrOXYzine (ATARAX) 25 MG tablet Take 1-2 tablets (25-50 mg) by mouth 3 times daily as needed for itching  Qty: 90 tablet, Refills: 11    Associated Diagnoses: PTSD (post-traumatic stress disorder); REX (generalized anxiety disorder)      LORazepam (ATIVAN) 1 MG tablet Take 1 tablet (1 mg) by mouth as needed for anxiety  Qty: 30 tablet, Refills: 0    Associated Diagnoses: Anxiety      methocarbamol (ROBAXIN) 750 MG tablet Take 1 tablet (750 mg) by mouth 4 times daily as needed for muscle spasms  Qty: 30 tablet, Refills: 2    Associated Diagnoses: Ankle fracture, right, closed, initial encounter      naloxone (NARCAN) 4 MG/0.1ML nasal spray Spray 1 spray (4 mg) into one nostril alternating nostrils once as needed for opioid reversal every 2-3 minutes until assistance arrives  Qty: 0.2 mL, Refills: 3    Associated Diagnoses: Scleroderma (H)                   Consultations:   Telephone with GI           Brief History of Presenting Illness:   Molly Teague is a 34 year old female with complex medical history largely due to scleroderma with CREST complicated by chronic pain syndrome, peripheral neuropathy, PTSD/anxiety disorder, recurrent GI bleeding, prior VTE, prior EMERALD requiring temporary hemodialysis and ankle fracture s/p ORIF a month ago now presents on 3/25/2020 with acute onset of lower abdominal pain, diarrhea and nausea with vomiting.  Patient had been in her usual state of health yesterday and this morning woke up feeling tired and fatigued but did go to her doctor's  visit where she got a new cast on her right ankle.  After coming home around 1:30 she had onset of lower abdominal pain in the mid abdomen.  It is crampy in nature and comes and goes.  She feels like it radiates up to her chest.  She has not noticed anything that makes it better or worse.  It is not better after vomiting nor after a bowel movement.  Not long after the onset of the abdominal pain, she had some intense diarrhea going 5 or 6 times.  She did not notice blood at home but had some small amount of blood with her diarrhea here in the hospital.  With onset of diarrhea she had nausea with vomiting having thrown up twice.  No blood with that.  She is now having some heartburn and reflux which is been exacerbated.  She is not received her evening PPI or H2 blocker which is also contributing.  No fevers or chills.  No ill contacts.  Her family is not sick.  She has been urinating okay.  Does carry a distant history of C. difficile.  She does not know of any recent antibiotics however she did recently have an ORIF of the ankle and may have had perioperative antibiotics with that though she does not recall.             Hospital Course:   Vomiting and diarrhea    Lower abdominal pain and some blood with the diarrhea. CT abdomen/pelvis without abnormality but mild colitis cannot be ruled out. Marked elevated WBC. H/o prior C difficile infection. Patient does not recall antibiotics recently but did have ORIF a month ago and may have had periop antibiotics. Possible gastroenteritis.    - IV fluids   - phenergan prn   - stool for enteric panel and also C difficile tox B PCR   -because of the history of Cdiff and recent exposure to abx 1 month ago and the very high WBC, will start oral vanco while waiting for the stool studies.  Stopped after one dose when test negative.   - still some bloody stools on day prior to discharge but only streaks on brown stool just prior to discharge.  Suspect some of the blood may be  "abel-anal source   .       Acute kidney injury on chronic kidney disease    Creatinine 2.07, GFR 35. Previous creatinine 1.35/GFR 51. Suspect prerenal related to acute illness. 2 liters IVF in the ED.    - NS at 125   - gave another L LR pm day 2  - Cr 1.79-->1.55       Scleroderma (H)  Raynaud's disease without gangrene  CREST (calcinosis, Raynaud's phenomenon, esophageal dysfunction, sclerodactyly, telangiectasia) (H)    At baseline. Not on immunosuppression. On amlodipine for the Raynauds but BP running low.    - hold amlodipine for now     Chronic pain syndrome   - continue home oxycodone 15 mg q 4 prn   - continue gabapentin as at home  - because of the chronic narcotic use, will be difficult to control pain.       Recent ankle fracture with ORIF, right   Decubitus ulcer right heel due to prior cast   - outpatient follow up        Diet: Advance Diet as Tolerated: Clear Liquid Diet    DVT Prophylaxis: Low Risk/Ambulatory with no VTE prophylaxis indicated  Melissa Catheter: not present  Code Status: Full  Lines: PIV                     Discharge Exam:   OBJECTIVE:   /77 (BP Location: Right arm)   Pulse 126   Temp 98.3  F (36.8  C) (Oral)   Resp 18   Ht 1.575 m (5' 2\")   Wt 76.8 kg (169 lb 5 oz)   SpO2 95%   BMI 30.97 kg/m      GENERAL APPEARANCE:  Alert, NAD, Ox3     RESP:clear      CV: regular rates and rhythm,no murmur, no click or rub - no edema     Abdomen: soft, still diffuse mild tenderness. , no liver or spleen enlargement, no masses, BSs normal   Skin: no cyanosis, pallor, or jaundice            Pending Tests at Discharge:     Unresulted Labs Ordered in the Past 30 Days of this Admission     Date and Time Order Name Status Description    3/28/2020 0834 CBC with platelets In process     3/28/2020 0826 Lactic acid level STAT In process                    Discharge Disposition:   Discharged to home      Attestation:  Amount of time performed on this discharge : 30  minutes.    Gene Veloz, " MD FREITAS

## 2020-03-30 ENCOUNTER — PATIENT OUTREACH (OUTPATIENT)
Dept: CARE COORDINATION | Facility: CLINIC | Age: 35
End: 2020-03-30

## 2020-03-30 NOTE — PROGRESS NOTES
Clinic Care Coordination Contact  [facility]/Voicemail       Clinical Data: Care Coordinator Outreach  Outreach attempted x 1.  Left message on patient's voicemail with call back information and requested return call.  Plan: Care Coordinator sent care coordination introduction letter on 1/27/2020 via [employer]. Care Coordinator will try to reach patient again in 1-2 business days.            Billy Rueda MSN, RN, PHN, CCM   Primary Care Clinical RN Care Coordinator  [facility]  3/30/2020   12:09 PM  [email or web address]  Office: [phone]

## 2020-03-31 ENCOUNTER — TELEPHONE (OUTPATIENT)
Dept: INTERNAL MEDICINE | Facility: CLINIC | Age: 35
End: 2020-03-31

## 2020-03-31 NOTE — TELEPHONE ENCOUNTER
Pt has an phone visit with Dr. Barba today at 10 am.  Staff called 1st time 9:52 am for the phone visit.  Left a voice msg for pt to call back. If she calls back until 10:15 am transfer to the care team *ext [phone].  Sunni Guzman CMA (ARCELIA)   (aka: Jenifer Guzman)

## 2020-03-31 NOTE — TELEPHONE ENCOUNTER
(2nd attempt) Left a voice msg for pt to call back.  If she calls back until 10:15 transfer to the care team Sunni Turk CMA for Dr. Barba Ext [phone].  Sunni Guzman CMA (ARCELIA)   (aka: Jenifer Guzman)

## 2020-03-31 NOTE — PROGRESS NOTES
Clinic Care Coordination Contact  [facility]/Voicemail       Clinical Data: Care Coordinator Outreach  Outreach attempted x 3.  Left message on patient's voicemail with call back information and requested return call.  Plan: Care Coordinator sent care coordination introduction letter on 1/27/2020 via [employer]. Care Coordinator will contact the patient again in 1 month.            Billy Rueda MSN, RN, PHN, CCM   Primary Care Clinical RN Care Coordinator  [facility]  3/31/2020   9:25 AM  [email or web address]  Office: [phone]

## 2020-03-31 NOTE — TELEPHONE ENCOUNTER
(3rd attempt) Last attempt to contact pt.  If she calls back schedule a new appointment with provider.  Please check if provider's is in clinic or in virtual before scheduling.  Sunni Guzman CMA (ARCELIA)   (aka: Jenifer Guzman)

## 2020-04-07 ENCOUNTER — TELEPHONE (OUTPATIENT)
Dept: INTERNAL MEDICINE | Facility: CLINIC | Age: 35
End: 2020-04-07

## 2020-04-07 NOTE — TELEPHONE ENCOUNTER
Pt answer the PHQ9 through the MY-CHART, score high 16 with zero on the last question.  Route to the PCP.  Sunni Guzman CMA (ARCELIA)   (aka: Jenifer Guzman)

## 2020-04-07 NOTE — TELEPHONE ENCOUNTER
(2nd attempt) Spoke with the pt today over the phone and she will answer the PHQ9 through My-Chart.  If pt calls back transfer to the care team to perform PHQ9.  Sunni Guzman CMA (ARCELIA)   (aka: Jenifer Guzman)

## 2020-04-08 NOTE — TELEPHONE ENCOUNTER
Dr. Barba,  Patient's insurance will not cover promethazine they will cover Ondansetron hcl (Pf) inj syringe or Granisetron Hcl IV Sln.  Emi Garcia

## 2020-04-14 ENCOUNTER — TELEPHONE (OUTPATIENT)
Dept: INTERNAL MEDICINE | Facility: CLINIC | Age: 35
End: 2020-04-14

## 2020-04-14 ENCOUNTER — TELEPHONE (OUTPATIENT)
Dept: FAMILY MEDICINE | Facility: CLINIC | Age: 35
End: 2020-04-14

## 2020-04-14 DIAGNOSIS — F41.9 ANXIETY: ICD-10-CM

## 2020-04-14 DIAGNOSIS — G89.4 CHRONIC PAIN SYNDROME: Chronic | ICD-10-CM

## 2020-04-14 NOTE — TELEPHONE ENCOUNTER
Prior Authorization Retail Medication Request    Medication/Dose: Promethazine hcl 25mg/ml soln  ICD code (if different than what is on RX):    Previously Tried and Failed:  Phenergan injection; promethazine hcl 50 mg;   Rationale:  Patient has used since 2017    Insurance Name:  [employer] Part D  Insurance ID:  [id number]      Pharmacy Information (if different than what is on RX)  Name:    Phone:      Thanks,   Nohemy Cox  Certified Pharmacy Technician  [facility]  [phone]

## 2020-04-15 NOTE — TELEPHONE ENCOUNTER
Prior Authorization Approval    Authorization Effective Date: 4/15/2020  Authorization Expiration Date: 12/31/2020  Medication: Promethazine hcl 25mg/ml soln-APPROVED  Approved Dose/Quantity:   Reference #:     Insurance Company: [employer] - Phone [phone] Fax [phone]  Expected CoPay:       CoPay Card Available:      Foundation Assistance Needed:    Which Pharmacy is filling the prescription (Not needed for infusion/clinic administered): [facility] [place], [place] - [address] [place] [address]  Pharmacy Notified: Yes  Patient Notified: No    Pharmacy will notify patient when medication is ready.

## 2020-04-15 NOTE — TELEPHONE ENCOUNTER
Central Prior Authorization Team   Phone: [phone]      PA Initiation    Medication: Promethazine hcl 25mg/ml soln-Initiated  Insurance Company: [email or web address] - Phone [phone] Fax [phone]  Pharmacy Filling the Rx: [facility] [place], [place] - [address]  Filling Pharmacy Phone: [phone]  Filling Pharmacy Fax:    Start Date: 4/15/2020

## 2020-04-16 ENCOUNTER — MYC MEDICAL ADVICE (OUTPATIENT)
Dept: FAMILY MEDICINE | Facility: CLINIC | Age: 35
End: 2020-04-16

## 2020-04-16 ENCOUNTER — VIRTUAL VISIT (OUTPATIENT)
Dept: FAMILY MEDICINE | Facility: CLINIC | Age: 35
End: 2020-04-16
Payer: MEDICARE

## 2020-04-16 DIAGNOSIS — I10 MALIGNANT ESSENTIAL HYPERTENSION: Primary | ICD-10-CM

## 2020-04-16 DIAGNOSIS — G89.4 CHRONIC PAIN SYNDROME: Chronic | ICD-10-CM

## 2020-04-16 DIAGNOSIS — R51.9 CHRONIC DAILY HEADACHE: ICD-10-CM

## 2020-04-16 DIAGNOSIS — M34.9 LIMITED SYSTEMIC SCLEROSIS (H): ICD-10-CM

## 2020-04-16 DIAGNOSIS — F41.9 ANXIETY: ICD-10-CM

## 2020-04-16 PROCEDURE — 99214 OFFICE O/P EST MOD 30 MIN: CPT | Mod: 95 | Performed by: INTERNAL MEDICINE

## 2020-04-16 RX ORDER — LORAZEPAM 1 MG/1
TABLET ORAL
Qty: 30 TABLET | Refills: 5 | OUTPATIENT
Start: 2020-04-16

## 2020-04-16 RX ORDER — OXYCODONE HYDROCHLORIDE 15 MG/1
15 TABLET ORAL EVERY 4 HOURS PRN
Qty: 100 TABLET | Refills: 0 | Status: SHIPPED | OUTPATIENT
Start: 2020-04-16 | End: 2020-05-15

## 2020-04-16 RX ORDER — OXYCODONE HYDROCHLORIDE 15 MG/1
TABLET ORAL
Qty: 100 TABLET | Refills: 0 | OUTPATIENT
Start: 2020-04-16

## 2020-04-16 RX ORDER — LORAZEPAM 1 MG/1
1 TABLET ORAL DAILY PRN
Qty: 30 TABLET | Refills: 5 | Status: SHIPPED | OUTPATIENT
Start: 2020-04-16 | End: 2020-11-18

## 2020-04-16 RX ORDER — GABAPENTIN 300 MG/1
300 CAPSULE ORAL 3 TIMES DAILY
Qty: 540 CAPSULE | Refills: 3 | Status: SHIPPED | OUTPATIENT
Start: 2020-04-16 | End: 2021-02-23

## 2020-04-16 NOTE — PATIENT INSTRUCTIONS
1. Recommend telephone visit with [facility] to review recent hospital stay and make plans for another scope.  2. The 'losing time' could be related to medications, lack of sleep, stress.  We will start by decreasing the gabapentin, since the dose is a bit too high for your reduced kidney function    Currently you are taking  600-600-600.  Decrease by 1 pill per week  300-600-600  300-300-600  300-300-300    Let me know if pain is significantly worse and/or the cognitive changes don't get better with the lower dose    3. Refills of oxycodone and lorazepam  4. Contact the counselor and ask for virtual visits, and more frequent follow-up visits.

## 2020-04-16 NOTE — TELEPHONE ENCOUNTER
Patient is calling and stating she still has not gotten her refills. I see the note, but not sure she is aware of the fact that she needs an appt. Please advise.  Laya Schmitt  Clinic Station

## 2020-04-16 NOTE — TELEPHONE ENCOUNTER
Pt needs appt. She 'no showed' for scheduled 3/31/2020 Virtual Visit.   Lorazepam Rx written by HERNANDEZ POLO and oxycodone IR written by Dr. Barba. CSA is .     TELA [employer] message sent to pt.    Emily LEI RN, BSN

## 2020-04-16 NOTE — TELEPHONE ENCOUNTER
Thank you,  Deana Holder - Pharmacy Technician  [facility]  [phone]

## 2020-04-16 NOTE — PROGRESS NOTES
"Molly Teague is a 34 year old female who is being evaluated via a billable telephone visit.      The patient has been notified of following:     \"This telephone visit will be conducted via a call between you and your physician/provider. We have found that certain health care needs can be provided without the need for a physical exam.  This service lets us provide the care you need with a short phone conversation.  If a prescription is necessary we can send it directly to your pharmacy.  If lab work is needed we can place an order for that and you can then stop by our lab to have the test done at a later time.    Telephone visits are billed at different rates depending on your insurance coverage. During this emergency period, for some insurers they may be billed the same as an in-person visit.  Please reach out to your insurance provider with any questions.    If during the course of the call the physician/provider feels a telephone visit is not appropriate, you will not be charged for this service.\"    Patient has given verbal consent for Telephone visit?  Yes    How would you like to obtain your AVS? MyChart    Amanda     Molly Teague is a 34 year old female who presents to clinic today for the following health issues:    Anxiety Follow-Up    How are you doing with your anxiety since your last visit? Worsened     Are you having other symptoms that might be associated with anxiety? Yes:  panic attacks, trouble sleeping    Have you had a significant life event? No     Are you feeling depressed? Yes:  has gotten more secluded recently    Do you have any concerns with your use of alcohol or other drugs? No    Social History     Tobacco Use     Smoking status: Never Smoker     Smokeless tobacco: Never Used   Substance Use Topics     Alcohol use: Yes     Comment: Socially once on a weekend if any     Drug use: No     Comment: None     REX-7 SCORE 12/12/2019 2/6/2020 4/7/2020   Total Score - - 14 (moderate " anxiety)   Total Score 13 17 14     PHQ 12/12/2019 2/6/2020 4/7/2020   PHQ-9 Total Score 13 20 16   Q9: Thoughts of better off dead/self-harm past 2 weeks Not at all Not at all Not at all   F/U: Thoughts of suicide or self-harm - - -   F/U: Self harm-plan - - -   F/U: Self-harm action - - -   F/U: Safety concerns - - -   PHQ-A Total Score - 19 -   PHQ-A Depressed most days in past year - No -   PHQ-A Mood affect on daily activities - Very difficult -   PHQ-A Suicide Ideation past 2 weeks - Not at all -     Last PHQ-9 4/7/2020   1.  Little interest or pleasure in doing things 2   2.  Feeling down, depressed, or hopeless 1   3.  Trouble falling or staying asleep, or sleeping too much 3   4.  Feeling tired or having little energy 2   5.  Poor appetite or overeating 2   6.  Feeling bad about yourself 2   7.  Trouble concentrating 2   8.  Moving slowly or restless 2   Q9: Thoughts of better off dead/self-harm past 2 weeks 0   PHQ-9 Total Score 16   Difficulty at work, home, or with people -   In the past two weeks have you had thoughts of suicide or self harm? -   Do you have concerns about your personal safety or the safety of others? -   In the past 2 weeks have you thought about a plan or had intention to harm yourself? -   In the past 2 weeks have you acted on these thoughts in any way? -     ERX-7  4/7/2020   1. Feeling nervous, anxious, or on edge 3   2. Not being able to stop or control worrying 2   3. Worrying too much about different things 2   4. Trouble relaxing 2   5. Being so restless that it is hard to sit still 1   6. Becoming easily annoyed or irritable 1   7. Feeling afraid, as if something awful might happen 3   REX-7 Total Score 14   If you checked any problems, how difficult have they made it for you to do your work, take care of things at home, or get along with other people? -       Chronic Pain Follow-Up    Where in your body do you have pain? All over, spine, joints   How has your pain affected  your ability to work? Unable to work  Which of these pain treatments have you tried since your last clinic visit? Cold and Heat  How well are you sleeping? Poor  How has your mood been since your last visit? Slightly worse  Have you had a significant life event? Other: broke leg, not healing well, very secluded for past 3 months  Other aggravating factors: prolonged sitting and prolonged standing  Taking medication as directed? Yes  --had 2 falls  --and she reports she is 'losing time' that is leading to the falls.  This has been going on for many months.  She reports she is not sleeping for 4 days at a time and that is leading to these episodes as well.  --last fall was 4 days ago.  Foot got caught up in rolling walker.  --the cousnelor is in [place].  --is using ativan sparingly, can go 1 or more week w/out using. Hydroxyzine seems to help for sleep for anxiety.  --gabapentin dose 600 mg TID    --GI bleed -recent hospital stay.  Was recommend to follow-up with JESSICA Duran.  Has not heard back despite multiple outreaches.    PHQ-9 SCORE 12/12/2019 2/6/2020 4/7/2020   PHQ-9 Total Score MyChart - - 16 (Moderately severe depression)   PHQ-9 Total Score 13 20 16   PHQ-A Total Score - 19 -     REX-7 SCORE 12/12/2019 2/6/2020 4/7/2020   Total Score - - 14 (moderate anxiety)   Total Score 13 17 14     No flowsheet data found.  Encounter-Level CSA - 04/26/2017:    Controlled Substance Agreement - Scan on 5/4/2017  8:58 AM: CONTROLLED SUBSTANCE AGREEMENT     Patient-Level CSA:    Controlled Substance Agreement - Opioid - Scan on 2/6/2019  6:23 PM           Current Outpatient Medications   Medication Sig Dispense Refill     acetaminophen (TYLENOL) 325 MG tablet Take 2 tablets (650 mg) by mouth every 4 hours as needed for mild pain or other 1 Bottle 0     amLODIPine (NORVASC) 5 MG tablet Take 5 mg by mouth daily       budesonide-formoterol (SYMBICORT) 160-4.5 MCG/ACT Inhaler Inhale 2 puffs into the lungs 2 times daily 6 g 11      busPIRone HCl (BUSPAR) 30 MG tablet TAKE ONE TABLET BY MOUTH TWICE A DAY (Patient taking differently: Take 30 mg by mouth 2 times daily ) 180 tablet 3     Cyanocobalamin (B-12) 1000 MCG TBCR Take 1,000 mcg by mouth daily 100 tablet 1     DULoxetine (CYMBALTA) 30 MG capsule TAKE ONE CAPSULE BY MOUTH TWICE A DAY (Patient taking differently: Take 30 mg by mouth 2 times daily ) 180 capsule 3     famotidine (PEPCID) 20 MG tablet Take 1 tablet (20 mg) by mouth 2 times daily 180 tablet 3     ferrous gluconate (FERGON) 324 (38 FE) MG tablet TAKE ONE TABLET BY MOUTH EVERY DAY WITH BREAKFAST (Patient taking differently: Take 324 mg by mouth daily (with breakfast) ) 100 tablet 3     folic acid (FOLVITE) 1 MG tablet Take 1 tablet by mouth daily       gabapentin (NEURONTIN) 300 MG capsule Take 1 capsule (300 mg) by mouth 3 times daily 540 capsule 3     GOODSENSE MIGRAINE FORMULA 250-250-65 MG per tablet TAKE ONE TABLET BY MOUTH EVERY 6 HOURS AS NEEDED FOR HEADACHES (Patient taking differently: Take 1 tablet by mouth every 6 hours as needed ) 24 tablet 1     hydrOXYzine (ATARAX) 25 MG tablet Take 1-2 tablets (25-50 mg) by mouth 3 times daily as needed for itching 90 tablet 11     LORazepam (ATIVAN) 1 MG tablet Take 1 tablet (1 mg) by mouth daily as needed for anxiety 30 tablet 5     methocarbamol (ROBAXIN) 750 MG tablet Take 1 tablet (750 mg) by mouth 4 times daily as needed for muscle spasms 30 tablet 2     montelukast (SINGULAIR) 10 MG tablet TAKE ONE TABLET BY MOUTH AT BEDTIME (Patient taking differently: Take 10 mg by mouth At Bedtime ) 90 tablet 3     omeprazole (PRILOSEC) 40 MG DR capsule TAKE ONE CAPSULE BY MOUTH TWICE A  capsule 1     oxyCODONE IR (ROXICODONE) 15 MG tablet Take 1 tablet (15 mg) by mouth every 4 hours as needed for severe pain 100 tablet 0     polyethylene glycol (MIRALAX/GLYCOLAX) packet Take 17 g by mouth daily 7 packet 0     promethazine (PHENERGAN) 25 MG tablet Take 1 tablet (25 mg) by mouth 2  times daily as needed for nausea 30 tablet 11     VENTOLIN  (90 Base) MCG/ACT inhaler INHALE 2 PUFFS INTO THE LUNGS ONCE EVERY 4 HOURS AS NEEDED FOR SHORTNESS OF BREATH / DYSPNEA / WHEEZING (Patient taking differently: Inhale 2 puffs into the lungs every 4 hours as needed for shortness of breath / dyspnea or wheezing ) 18 g 11     blood glucose (NO BRAND SPECIFIED) lancets standard Use to test blood sugar 1 time daily 100 each 3     blood glucose (NO BRAND SPECIFIED) test strip Use to test blood sugar 1 time daily 100 each 3     naloxone (NARCAN) 4 MG/0.1ML nasal spray Spray 1 spray (4 mg) into one nostril alternating nostrils once as needed for opioid reversal every 2-3 minutes until assistance arrives 0.2 mL 3       Reviewed and updated as needed this visit by Provider         Review of Systems   ROS COMP: Constitutional, HEENT, cardiovascular, pulmonary, gi and gu systems are negative, except as otherwise noted.       Objective   Reported vitals:  There were no vitals taken for this visit.   healthy, alert and no distress  PSYCH: Alert and oriented times 3; coherent speech, normal   rate and volume, able to articulate logical thoughts, able   to abstract reason, no tangential thoughts, no hallucinations   or delusions  Her affect is normal  RESP: No cough, no audible wheezing, able to talk in full sentences  Remainder of exam unable to be completed due to telephone visits    Diagnostic Test Results:  Labs reviewed in Epic  none         Assessment/Plan:  1. Anxiety - use is occasional and appropriate.  Was on more frequent dosing in the past and did not have cognitive impairment.  Refill given  - LORazepam (ATIVAN) 1 MG tablet; Take 1 tablet (1 mg) by mouth daily as needed for anxiety  Dispense: 30 tablet; Refill: 5    2. Chronic pain syndrome- she has decreased the dose over the years and has been hesitant for further dose decreases.  If the cognitive changes persist despite lower dose of gabapentin, will  decrease oxy next.  Can call in for refills in 30 and 60 days, with visit in 3 months (clinic or telephone pending pandemic)  - oxyCODONE IR (ROXICODONE) 15 MG tablet; Take 1 tablet (15 mg) by mouth every 4 hours as needed for severe pain  Dispense: 100 tablet; Refill: 0    3. Chronic daily headache - worsened due to increase in pain.  She is managing resonable, no changes to therapy    4. Limited systemic sclerosis (H) - decrease dose from 600 TID to 300 TID.  GFR is reduced, so may be having accumulation causing the cognitive side effects. If cognitive side effects persist and/or pain is much worse, may to escalate dose again.  - gabapentin (NEURONTIN) 300 MG capsule; Take 1 capsule (300 mg) by mouth 3 times daily  Dispense: 540 capsule; Refill: 3    Return in about 3 months (around 7/16/2020) for Routine Follow-Up/Med Check.      Phone call duration:  25 minutes    Grecia Barba DO

## 2020-04-19 NOTE — TELEPHONE ENCOUNTER
Patient is requesting a refill of lisinopril 5 mg, which is not on current med list. Thanks!    Jia Munoz, [facility] [place]  [phone]

## 2020-04-20 RX ORDER — LISINOPRIL 10 MG/1
10 TABLET ORAL DAILY
Qty: 90 TABLET | Refills: 3 | Status: SHIPPED | OUTPATIENT
Start: 2020-04-20 | End: 2021-02-23

## 2020-04-20 NOTE — TELEPHONE ENCOUNTER
Routing refill request to provider for review/approval because:  Drug not active on patient's medication list  This Order Has Been Discontinued   Order Status  Reason  By  On    Discontinued  Stop at Discharge  Agustin Mtz MD  2/12/20 1009        Associated Diagnoses   CREST (calcinosis, Raynaud's phenomenon, esophageal dysfunction, sclerodactyly, telangiectasia) (H) [M34.1]         LOV 04/16/20 with Dr. Barba.      Nataly SANTILLAN BSN, RN

## 2020-04-22 DIAGNOSIS — Z87.81 S/P ORIF (OPEN REDUCTION INTERNAL FIXATION) FRACTURE: Primary | ICD-10-CM

## 2020-04-22 DIAGNOSIS — Z98.890 S/P ORIF (OPEN REDUCTION INTERNAL FIXATION) FRACTURE: Primary | ICD-10-CM

## 2020-04-30 ENCOUNTER — PATIENT OUTREACH (OUTPATIENT)
Dept: CARE COORDINATION | Facility: CLINIC | Age: 35
End: 2020-04-30

## 2020-04-30 NOTE — PROGRESS NOTES
Clinic Care Coordination Contact    Situation: Patient chart reviewed by care coordinator.    Background: Patient with complex medical history including scleroderma and chronic pain syndrome was admitted to [facility] on 4/29/20 per CareEverywhere.     Assessment: Patient has elevated LATRICIA score and has engaged with clinic care coordination intermittently in the past. She engaged in an outreach call on 3/4/20 but was unreachable 3/30-3/31 attempts.    Plan/Recommendations: Care Coordination will perform chart review within 1 week to assess if patient has discharged from [facility], and outreach appropriately.    ROSS QuarlesN, RN   [facility]  - Clinic Care Coordinator  Phone: [phone]

## 2020-04-30 NOTE — LETTER
[place] CARE COORDINATION  [facility]  [address] [place] [address]    May 5, 2020    Molly Teague  [address]  [facility] [address]      Dear Molly,    I am a clinic care coordinator who works with Grecia Barba DO at [facility]. I am reaching out to you to introduce Clinic Care Coordination, which is an additional and voluntary source of support for our patients.     The clinic care coordinator team is made up of a registered nurse,  and community health worker who understand the health care system. The goal of clinic care coordination is to help you manage your health and improve access to the health care system in the most efficient manner. The team can assist you in meeting your health care goals by providing education, strengthening the communication among your providers and supporting you with any resource needs.     Please feel free to contact Miriam, our community health worker at [phone] if you would like to schedule an initial assessment with the nurse or  care coordinator. We recognize the ever-changing and stressful situation we are all facing with the current COVID-19 outbreak. We are focused on providing you with the highest-quality healthcare experience possible and ensuring you have the resources and information needed to remain safe and as healthy as possible.     Sincerely,     SOM Flores Care Coordinator  Part of the Care Coordination team at [facility]

## 2020-05-01 NOTE — PROGRESS NOTES
Clinic Care Coordination Contact  [facility]/Voicemail    Referral Source: External discharge report   Patient flagging on external discharge report as having hospital admission to [facility], however upon review on 5/1/20, it appears patient had several outpatient visits at [facility], not a hospital admission.    Clinical Data: Care Coordinator Outreach    Outreach attempted x 1.  Left message on patient's voicemail with call back information and requested return call.    Plan:  Care Coordinator will try to reach patient again in 3-5 business days.    ALLEN Quarles, RN   [facility]  - [facility] Care Coordinator  Phone: [phone]

## 2020-05-04 ENCOUNTER — TELEPHONE (OUTPATIENT)
Dept: ORTHOPEDICS | Facility: CLINIC | Age: 35
End: 2020-05-04

## 2020-05-04 DIAGNOSIS — Z87.81 S/P ORIF (OPEN REDUCTION INTERNAL FIXATION) FRACTURE: Primary | ICD-10-CM

## 2020-05-04 DIAGNOSIS — S82.891D CLOSED FRACTURE OF RIGHT ANKLE WITH ROUTINE HEALING, SUBSEQUENT ENCOUNTER: ICD-10-CM

## 2020-05-04 DIAGNOSIS — Z98.890 S/P ORIF (OPEN REDUCTION INTERNAL FIXATION) FRACTURE: Primary | ICD-10-CM

## 2020-05-04 NOTE — TELEPHONE ENCOUNTER
[facility] Call Center    Phone Message    May a detailed message be left on voicemail: yes     Reason for Call: Other: pt calling because she needs the order for her scooter extened. Please fax that extention letter to [phone].Please call the pt back to discuss.      Action Taken: Message routed to:  [facility] orthopedics    Travel Screening: Not Applicable

## 2020-05-04 NOTE — TELEPHONE ENCOUNTER
Updated order for Roll-a-[employer]out knee scooter was entered and will be faxed as requested to [phone].    Molly is s/p Right ankle bimalleolar fracture open reduction internal fixation on 2/10/2020.  Claribel Houston RN

## 2020-05-05 NOTE — PROGRESS NOTES
Clinic Care Coordination Contact  [facility]/Voicemail    Referral Source: see initial notes below    Clinical Data: Care Coordinator Outreach  Outreach attempted x 2.  Left message on patient's voicemail with call back information and requested return call.    Per chart review, patient is maintaining follow up with her specialists as scheduled.     Plan: Care Coordinator will send care coordination letter with care coordinator contact information via [employer]. Care Coordinator will do no further outreaches at this time but remain available if future needs arise.    ROSS QuarlesN, RN   [facility]  - Clinic Care Coordinator  Phone: [phone]

## 2020-05-12 ENCOUNTER — ANCILLARY PROCEDURE (OUTPATIENT)
Dept: GENERAL RADIOLOGY | Facility: CLINIC | Age: 35
End: 2020-05-12
Attending: ORTHOPAEDIC SURGERY
Payer: MEDICARE

## 2020-05-12 ENCOUNTER — OFFICE VISIT (OUTPATIENT)
Dept: ORTHOPEDICS | Facility: CLINIC | Age: 35
End: 2020-05-12
Payer: MEDICARE

## 2020-05-12 DIAGNOSIS — Z98.890 S/P ORIF (OPEN REDUCTION INTERNAL FIXATION) FRACTURE: ICD-10-CM

## 2020-05-12 DIAGNOSIS — M25.571 PAIN IN JOINT, ANKLE AND FOOT, RIGHT: Primary | ICD-10-CM

## 2020-05-12 DIAGNOSIS — Z87.81 S/P ORIF (OPEN REDUCTION INTERNAL FIXATION) FRACTURE: ICD-10-CM

## 2020-05-12 NOTE — NURSING NOTE
Reason For Visit:   Chief Complaint   Patient presents with     RECHECK     follow up right ankle ORIF DOS: 2/10/20       There were no vitals taken for this visit.    Pain Assessment  Patient Currently in Pain: Yes  0-10 Pain Scale: 2  Primary Pain Location: Ankle    Jayleen Muñoz ATC

## 2020-05-12 NOTE — PROGRESS NOTES
CHIEF COMPLAINT:  Status post right ankle open reduction and internal fixation performed on 02/10/2020.      HISTORY OF PRESENT ILLNESS:  Ms. Teague presents today for further followup.  Reports to be compliant.  Denies to have any significant discomfort, although reports to have had some aches and pains while being in the cast.        PHYSICAL EXAMINATION:  On today's visit, she presents with minimum swelling across the ankle joint.  There is some stiffness across the ankle with motion from neutral down to 25 degrees of plantar flexion.  The patient with healthy looking posterior scab along the heel and continues having a scab along the medial incision, although there are no signs of infection or inflammation.      RADIOGRAPHIC EVALUATION:  Three views of the ankle were reviewed today which were significant for showing callus formation across the fibula and to a lesser degree across the medial malleolus.  There is some slight lateral displacement of the talus with some valgus tilt.  However, this is not much changed when compared to previous x-rays.      ASSESSMENT:  Status post right ankle open reduction and internal fixation with displaced fragments from surgery.      PLAN:  I discussed with the patient that at this point we can proceed with weightbearing as tolerated with the CAM Walker when outdoors and with regular shoes or barefoot when indoors.  A prescription for physical therapy will be given to the patient.      I emphasized to her the importance of not pushing through the pain and to avoid any increased discomfort when she is ambulating.      The patient will follow up in 6 weeks from now, and at that time, 3 views of the right ankle will be obtained, and based on those findings, further recommendations will be given to the patient.      All questions were answered.      TT 15 minutes, CT 10 minutes.

## 2020-05-12 NOTE — LETTER
5/12/2020       RE: Molly Teague  [address] [place] [facility] [address]     Dear Colleague,    Thank you for referring your patient, Molly Teague, to the [facility] at [facility]. Please see a copy of my visit note below.    CHIEF COMPLAINT:  Status post right ankle open reduction and internal fixation performed on 02/10/2020.      HISTORY OF PRESENT ILLNESS:  Ms. Teague presents today for further followup.  Reports to be compliant.  Denies to have any significant discomfort, although reports to have had some aches and pains while being in the cast.        PHYSICAL EXAMINATION:  On today's visit, she presents with minimum swelling across the ankle joint.  There is some stiffness across the ankle with motion from neutral down to 25 degrees of plantar flexion.  The patient with healthy looking posterior scab along the heel and continues having a scab along the medial incision, although there are no signs of infection or inflammation.      RADIOGRAPHIC EVALUATION:  Three views of the ankle were reviewed today which were significant for showing callus formation across the fibula and to a lesser degree across the medial malleolus.  There is some slight lateral displacement of the talus with some valgus tilt.  However, this is not much changed when compared to previous x-rays.      ASSESSMENT:  Status post right ankle open reduction and internal fixation with displaced fragments from surgery.      PLAN:  I discussed with the patient that at this point we can proceed with weightbearing as tolerated with the CAM Walker when outdoors and with regular shoes or barefoot when indoors.  A prescription for physical therapy will be given to the patient.      I emphasized to her the importance of not pushing through the pain and to avoid any increased discomfort when she is ambulating.      The patient will follow up in 6 weeks from now, and at that time, 3  views of the right ankle will be obtained, and based on those findings, further recommendations will be given to the patient.      All questions were answered.      TT 15 minutes, CT 10 minutes.         Again, thank you for allowing me to participate in the care of your patient.      Sincerely,    Natanael Villalta MD

## 2020-05-14 DIAGNOSIS — G89.4 CHRONIC PAIN SYNDROME: Chronic | ICD-10-CM

## 2020-05-15 ENCOUNTER — HOSPITAL ENCOUNTER (OUTPATIENT)
Dept: PHYSICAL THERAPY | Facility: CLINIC | Age: 35
Setting detail: THERAPIES SERIES
End: 2020-05-15
Attending: ORTHOPAEDIC SURGERY
Payer: MEDICARE

## 2020-05-15 DIAGNOSIS — M25.571 PAIN IN JOINT, ANKLE AND FOOT, RIGHT: ICD-10-CM

## 2020-05-15 PROCEDURE — 97162 PT EVAL MOD COMPLEX 30 MIN: CPT | Mod: GP | Performed by: PHYSICAL THERAPIST

## 2020-05-15 PROCEDURE — 97110 THERAPEUTIC EXERCISES: CPT | Mod: GP | Performed by: PHYSICAL THERAPIST

## 2020-05-15 RX ORDER — OXYCODONE HYDROCHLORIDE 15 MG/1
TABLET ORAL
Qty: 100 TABLET | Refills: 0 | Status: SHIPPED | OUTPATIENT
Start: 2020-05-15 | End: 2020-06-15

## 2020-05-15 NOTE — PROGRESS NOTES
"   05/15/20 1000   General Information   Type of Visit Initial OP Ortho PT Evaluation   Start of Care Date 05/15/20   Referring Physician Natanael Villalta    Patient/Family Goals Statement Walking, stairs normally, prolonged standing.    Orders Evaluate and Treat   Orders Comment \"Don't push through pain\"   Date of Order 05/12/20   Certification Required? Yes  ([facility] PMAP )   Medical Diagnosis Pain in R Joint, Ankle and Foot   Surgical/Medical history reviewed   (Anem, Depr, High BP, OA, RA, Scleroderma, C-Sect, Angiogram)   Precautions/Limitations no known precautions/limitations   Weight-Bearing Status - RLE weight-bearing as tolerated  (with Cam Walker when outside. )   Special Instructions Surgery 2/10/20 for ORIF.    General Information Comments Has a night splint to keep foot DF'd.    Body Part(s)   Body Part(s) Ankle/Foot   Presentation and Etiology   Pertinent history of current problem (include personal factors and/or comorbidities that impact the POC) Blacked out and fell at the end of January 2020, woke up on the ground, went to ER, X'Ray'd given splint, and was sent to the  d/t significant break of R  ankle. Surgery 2/10/20, Released from Hospital 2/12/20. Has been on bed rest d/t ankle has been shifting, using scooter. Has wound on back of foot which has been improving. Has arthritis in B knees and neuropathy in front lower leg and knee. Hypersensitivity Dorsal foot and posterior leg.    Impairments A. Pain;B. Decreased WB tolerance;F. Decreased strength and endurance;H. Impaired gait;K. Numbness;L. Tingling;N. Headaches;O. Blurred vision;Q. Dizziness   Functional Limitations perform activities of daily living;perform required work activities;perform desired leisure / sports activities   Symptom Location R Calf, Ankle, Sx's up back of calf.    How/Where did it occur With a fall  (Fell on 1/24/20. )   Onset date of current episode/exacerbation 02/10/20  (Surgical date, Fell at end of January ) "   Chronicity Chronic   Pain rating (0-10 point scale)   (4-9/10 )   Pain quality A. Sharp;C. Aching;E. Shooting;G. Cramping   Frequency of pain/symptoms C. With activity   Pain/symptoms are: Worse in the morning   Pain/symptoms exacerbated by B. Walking;G. Certain positions   Pain/symptoms eased by C. Rest;F. Certain positions;I. OTC medication(s)   Progression of symptoms since onset: Improved   Current / Previous Interventions   Diagnostic Tests: X-ray   X-ray Results Results   X-ray results Post Surgical ORIF w/ongoing healing, signs of osteopenia.    Prior Level of Function   Functional Level Prior Comment Currently Independent w/ADL's, but slow.    Current Level of Function   Current Community Support Family/friend caregiver   Patient role/employment history G. Disabled   [facility]   Home/community accessibility 3 level, Using pillows to go up and down stairs.    Current equipment-Gait/Locomotion Front wheeled walker;Standard cane;Other  (Scooter. )   Current equipment-ADL   (Has all equipment that she needs. )   Fall Risk Screen   Fall screen completed by PT   Have you fallen 2 or more times in the past year? Yes   Have you fallen and had an injury in the past year? Yes   Timed Up and Go score (seconds) 28   Is patient a fall risk? Yes   Abuse Screen (yes response referral indicated)   Feels Unsafe at Home or Work/School no   Feels Threatened by Someone no   Does Anyone Try to Keep You From Having Contact with Others or Doing Things Outside Your Home? no   Physical Signs of Abuse Present no   System Outcome Measures   Outcome Measures   (LEFS  18/80 )   Functional Scales   Functional Scales OPTIMAL   Optimal (1=able to do without difficulty, 2=able to do with little difficulty, 3=able to do with moderate difficulty, 4=able to do with much difficulty, 5=unable to do, 9=NA) Activity 1;Activity 2;Activity 3   Activity 1 comment Extreme difficulty w/ walking any distance    Activity 2  "comment A lot of difficulty going up/down stairs and on knees.    Activity 3 comment A lot of difficulty walking between rooms.    Ankle/Foot Objective Findings   Integumentary Some well healed scars on Ankle, but 3/4\" scar still healing on posterior ankle.    Gait/Locomotion Using Scooter into dept, Rolling walker for TUG.    Ankle/Foot ROM Comment PF R 30, L 54. Eversion R 80% of L, Inversion R 25% of L.    Ankle/Foot Strength Comments R Foot DF 4+ w/ Pain, Inv 4 w/ P, Eversion 4 w/P.    Ankle/Foot Flexibility Comments HS's R -35, L -20. DF Gastroc R 2, L 12; Soleus R 3, L 18.    Planned Therapy Interventions   Planned Therapy Interventions Comment Gentle jt mobs to start. Develop HEP for ROM, Strength, Gait, Stairs   Planned Modality Interventions   Planned Modality Interventions Comments ptrx# [id number], No video visits authorized.    Clinical Impression   Criteria for Skilled Therapeutic Interventions Met yes, treatment indicated   PT Diagnosis Limited mobility d/t recent ORIF R Ankle, Osteopenia, Poor healing of Post Heel.    Influenced by the following impairments Pain, Limited ROM and strength, Poor Gait tolerance.    Functional limitations due to impairments Stairs normally, Mobility at home and in the community, ADL's more difficulty   Clinical Presentation Evolving/Changing   Clinical Presentation Rationale LEFS, MMT, AROM, Flexibility, High BP, Multiple Comorbidities.    Clinical Decision Making (Complexity) Moderate complexity   Therapy Frequency 1 time/week   Predicted Duration of Therapy Intervention (days/wks) 8 weeks, 9 visits, then reassess.    Risk & Benefits of therapy have been explained Yes   Patient, Family & other staff in agreement with plan of care Yes   Clinical Impression Comments Limited mobility d/t recent ORIF R Ankle, Osteopenia, Poor healing of Post Heel.    Education Assessment   Preferred Learning Style Listening;Demonstration;Pictures/video   Barriers to Learning No barriers "   ORTHO GOALS   PT Ortho Eval Goals 1;2;3;4;5   Ortho Goal 1   Goal Identifier 1   Goal Description STG: Pt will be able to walk short distances w/ walker w/ moderate difficulty.    Target Date 06/26/20   Ortho Goal 2   Goal Identifier 2   Goal Description STG: Pt will be able to walk between rooms w/ moderate difficulty.    Target Date 06/26/20   Ortho Goal 3   Goal Identifier 3   Goal Description STG: Pt will be able to do stairs reciprocally w/crutches or walker.    Target Date 06/26/20   Ortho Goal 4   Goal Identifier 4   Goal Description STG: Pt will be able to stand for prolonged periods w/ moderate difficulty.    Target Date 06/26/20   Ortho Goal 5   Goal Identifier 5   Goal Description LTG: Pt will be independent w/ HEP and self cares to be able to improve mobility at home and in the community.    Target Date 07/10/20   Total Evaluation Time   PT Eval, Moderate Complexity Minutes (58724) 35   Therapy Certification   Certification date from 05/15/20   Certification date to 06/26/20   Medical Diagnosis Pain in R Joint, Ankle and Foot   Taniya Junior, PT, MTC (#[id number])  [facility]           [phone]  Fax          [phone]  Appt #      [phone]

## 2020-05-15 NOTE — TELEPHONE ENCOUNTER
Patient called and needs medication tomorrow, asking for refill to be processed as soon as possible. Laya Burger on 5/15/2020 at 12:43 PM

## 2020-05-15 NOTE — PROGRESS NOTES
[facility]    OUTPATIENT PHYSICAL THERAPY ORTHOPEDIC EVALUATION  PLAN OF TREATMENT FOR OUTPATIENT REHABILITATION  (COMPLETE FOR INITIAL CLAIMS ONLY)  Patient's Last Name, First Name, M.I.  YOB: 1985  Molly Teague       Provider s Name:  [facility]   Medical Record No.  [id number]   Start of Care Date:  05/15/20   Onset Date:  02/10/20(Surgical date, Fell at end of January )   Type:     _X__PT   ___OT   ___SLP Medical Diagnosis:  Pain in R Joint, Ankle and Foot     PT Diagnosis:  Limited mobility d/t recent ORIF R Ankle, Osteopenia, Poor healing of Post Heel.    Visits from SOC:  1      _________________________________________________________________________________  Plan of Treatment/Functional Goals:     Gentle jt mobs to start. Develop HEP for ROM, Strength, Gait, Stairs     ptrx# [id number], No video visits authorized.   Goals  Goal Identifier: 1  Goal Description: STG: Pt will be able to walk short distances w/ walker w/ moderate difficulty.   Target Date: 06/26/20    Goal Identifier: 2  Goal Description: STG: Pt will be able to walk between rooms w/ moderate difficulty.   Target Date: 06/26/20    Goal Identifier: 3  Goal Description: STG: Pt will be able to do stairs reciprocally w/crutches or walker.   Target Date: 06/26/20    Goal Identifier: 4  Goal Description: STG: Pt will be able to stand for prolonged periods w/ moderate difficulty.   Target Date: 06/26/20    Goal Identifier: 5  Goal Description: LTG: Pt will be independent w/ HEP and self cares to be able to improve mobility at home and in the community.   Target Date: 07/10/20    Therapy Frequency:  1 time/week  Predicted Duration of Therapy Intervention:  8 weeks, 9 visits, then reassess.     Taniya Junior, PT, MTC                 I CERTIFY THE NEED FOR THESE SERVICES FURNISHED UNDER        THIS PLAN OF TREATMENT AND WHILE UNDER MY CARE     (Physician co-signature of this document  indicates review and certification of the therapy plan).                     Certification Date From:  05/15/20   Certification Date To:  06/26/20    Referring Provider:  Natanael Villalta     Initial Assessment        See Epic Evaluation Start of Care Date: 05/15/20

## 2020-05-15 NOTE — TELEPHONE ENCOUNTER
Routing refill request to provider for review/approval because:  Drug not on the [facility] refill protocol   Last Written Prescription Date:  04/16/20  Last Fill Quantity: 100,  # refills: 0   Last office visit: 2/6/2020   Future Office Visit:  None    Patient states she needs medication tomorrow.     [place] reviewed:  not reviewed- recommend provider review    [place]  [email or web address]       ROSS JusticeN, RN

## 2020-06-09 NOTE — TELEPHONE ENCOUNTER
[facility] Call Center    Phone Message    May a detailed message be left on voicemail: yes     Reason for Call: Order(s): Other:   Reason for requested: Roll-a-Bout Knee Scooter    The patient said she needs another order please, if theres any question or concerns please call patient, Please fax that extention letter to [phone]  Date needed: asap  Provider name: Natanael Villalta MD       Action Taken: Message routed to:  [facility] ([facility]): ortho    Travel Screening: Not Applicable

## 2020-06-11 NOTE — TELEPHONE ENCOUNTER
Updated Roll-a -bout knee scooter order was entered and will be faxed to [phone] as Requested.  Claribel Houston RN

## 2020-06-15 ENCOUNTER — TELEPHONE (OUTPATIENT)
Dept: INTERNAL MEDICINE | Facility: CLINIC | Age: 35
End: 2020-06-15

## 2020-06-15 DIAGNOSIS — G89.4 CHRONIC PAIN SYNDROME: Chronic | ICD-10-CM

## 2020-06-15 RX ORDER — OXYCODONE HYDROCHLORIDE 15 MG/1
TABLET ORAL
Qty: 100 TABLET | Refills: 0 | Status: SHIPPED | OUTPATIENT
Start: 2020-06-15 | End: 2020-07-15

## 2020-06-15 NOTE — TELEPHONE ENCOUNTER
Reason for Call:  Medication or medication refill:    Do you use a [facility]?  Name of the pharmacy and phone number for the current request:  [facility] [phone]    Name of the medication requested: Oxycodone - Pt calling for refill.  No need to call patient back, unless there are questions or problems.      Oxycodone  Last Written Prescription Date:  5/15/20  Last Fill Quantity: 100,  # refills: 0   Last office visit: Visit date not found with prescribing provider:     Future Office Visit:      Other request:     Can we leave a detailed message on this number? YES    Phone number patient can be reached at: Home number on file [phone] (home)    Best Time: any    Call taken on 6/15/2020 at 8:45 AM by Nataliia Monte

## 2020-06-15 NOTE — TELEPHONE ENCOUNTER
Provider covering for Dr. Barba,    Routing refill request to provider for review/approval because:  Drug not on the [facility] refill protocol     LOV 4/16/2020 with instructions to scheduled again 7/16/2020. Reyna BAEZA RN

## 2020-06-17 DIAGNOSIS — R11.0 NAUSEA: Primary | ICD-10-CM

## 2020-06-18 RX ORDER — PROMETHAZINE HYDROCHLORIDE 25 MG/ML
INJECTION, SOLUTION INTRAMUSCULAR; INTRAVENOUS
Qty: 25 ML | Refills: 4 | Status: SHIPPED | OUTPATIENT
Start: 2020-06-18 | End: 2020-11-04

## 2020-07-06 DIAGNOSIS — Z98.890 S/P ORIF (OPEN REDUCTION INTERNAL FIXATION) FRACTURE: Primary | ICD-10-CM

## 2020-07-06 DIAGNOSIS — Z87.81 S/P ORIF (OPEN REDUCTION INTERNAL FIXATION) FRACTURE: Primary | ICD-10-CM

## 2020-07-07 ENCOUNTER — OFFICE VISIT (OUTPATIENT)
Dept: ORTHOPEDICS | Facility: CLINIC | Age: 35
End: 2020-07-07
Payer: MEDICARE

## 2020-07-07 ENCOUNTER — ANCILLARY PROCEDURE (OUTPATIENT)
Dept: GENERAL RADIOLOGY | Facility: CLINIC | Age: 35
End: 2020-07-07
Attending: ORTHOPAEDIC SURGERY
Payer: MEDICARE

## 2020-07-07 DIAGNOSIS — Z87.81 S/P ORIF (OPEN REDUCTION INTERNAL FIXATION) FRACTURE: ICD-10-CM

## 2020-07-07 DIAGNOSIS — Z87.81 S/P ORIF (OPEN REDUCTION INTERNAL FIXATION) FRACTURE: Primary | ICD-10-CM

## 2020-07-07 DIAGNOSIS — Z98.890 S/P ORIF (OPEN REDUCTION INTERNAL FIXATION) FRACTURE: Primary | ICD-10-CM

## 2020-07-07 DIAGNOSIS — Z98.890 S/P ORIF (OPEN REDUCTION INTERNAL FIXATION) FRACTURE: ICD-10-CM

## 2020-07-07 NOTE — PROGRESS NOTES
CHIEF COMPLAINT:  Status post right ankle open reduction and internal fixation performed on 02/10/2020.      HISTORY OF PRESENT ILLNESS:  Ms. Teague presents today for further followup.  Reports to be doing okay; however, she reports to have some swelling of the ankle joint.  Reports to be ambulating without any assistive devices although on today's visit, she presents with a Roll-A-Bout.      PHYSICAL EXAMINATION:  On today's visit, she presents with full range of motion of the right ankle, hindfoot and midfoot joints.  CMS intact.  Skin intact.  She presents; however, with some swelling across the ankle joint.  There is no pain on palpation.      RADIOGRAPHIC EVALUATION:  Three views of the right ankle were obtained today which were significant for showing what seems to be healing across the fracture sites and no significant change on her alignment as she already lost some of the fixation on both malleoli early on.      ASSESSMENT:  Status post right ankle open reduction and internal fixation.      PLAN:  I discussed with the patient that at this point the most important features are to make sure that she presents with an ankle that is acceptable to her.  At this point, I would like to proceed with a CT scan in order to confirm that she has union of the fracture sites.  If that is the case, we may consider the possibility of undergoing hardware removal.      In the meantime, she has no activity restrictions.  She will be contacted via phone with regards to the CT scan results.      All questions were answered.  The patient was pleased with the discussion.      TT 15 minutes, CT 10 minutes.

## 2020-07-07 NOTE — NURSING NOTE
Reason For Visit:   Chief Complaint   Patient presents with     RECHECK     follow up Right ankle open reduction internal fixation        There were no vitals taken for this visit.    Pain Assessment  Patient Currently in Pain: Yes  0-10 Pain Scale: 8  Primary Pain Location: Ankle    Jayleen Muñoz ATC

## 2020-07-07 NOTE — LETTER
7/7/2020         RE: Molly Teague  [address] [place] [address]        Dear Colleague,    Thank you for referring your patient, Molly Teague, to the [facility]. Please see a copy of my visit note below.    CHIEF COMPLAINT:  Status post right ankle open reduction and internal fixation performed on 02/10/2020.      HISTORY OF PRESENT ILLNESS:  Ms. Teague presents today for further followup.  Reports to be doing okay; however, she reports to have some swelling of the ankle joint.  Reports to be ambulating without any assistive devices although on today's visit, she presents with a Roll-A-Bout.      PHYSICAL EXAMINATION:  On today's visit, she presents with full range of motion of the right ankle, hindfoot and midfoot joints.  CMS intact.  Skin intact.  She presents; however, with some swelling across the ankle joint.  There is no pain on palpation.      RADIOGRAPHIC EVALUATION:  Three views of the right ankle were obtained today which were significant for showing what seems to be healing across the fracture sites and no significant change on her alignment as she already lost some of the fixation on both malleoli early on.      ASSESSMENT:  Status post right ankle open reduction and internal fixation.      PLAN:  I discussed with the patient that at this point the most important features are to make sure that she presents with an ankle that is acceptable to her.  At this point, I would like to proceed with a CT scan in order to confirm that she has union of the fracture sites.  If that is the case, we may consider the possibility of undergoing hardware removal.      In the meantime, she has no activity restrictions.  She will be contacted via phone with regards to the CT scan results.      All questions were answered.  The patient was pleased with the discussion.      TT 15 minutes, CT 10 minutes.

## 2020-07-10 ENCOUNTER — ANCILLARY PROCEDURE (OUTPATIENT)
Dept: CT IMAGING | Facility: CLINIC | Age: 35
End: 2020-07-10
Attending: ORTHOPAEDIC SURGERY
Payer: MEDICARE

## 2020-07-10 DIAGNOSIS — Z87.81 S/P ORIF (OPEN REDUCTION INTERNAL FIXATION) FRACTURE: ICD-10-CM

## 2020-07-10 DIAGNOSIS — Z98.890 S/P ORIF (OPEN REDUCTION INTERNAL FIXATION) FRACTURE: ICD-10-CM

## 2020-07-14 ENCOUNTER — TELEPHONE (OUTPATIENT)
Dept: ORTHOPEDICS | Facility: CLINIC | Age: 35
End: 2020-07-14

## 2020-07-14 NOTE — TELEPHONE ENCOUNTER
Molly was called by RN regarding her recent CT scan right ankle.  Dr Villalta reviewed the images, and stated pt may have hardware removed if she likes.  She should return to clinic if that is her choice.  Pt verbalized understanding, will discuss with her  and call back to schedule.  Claribel Houston RN

## 2020-07-15 DIAGNOSIS — K21.00 GASTROESOPHAGEAL REFLUX DISEASE WITH ESOPHAGITIS: ICD-10-CM

## 2020-07-15 DIAGNOSIS — M34.1 CREST (CALCINOSIS, RAYNAUD'S PHENOMENON, ESOPHAGEAL DYSFUNCTION, SCLERODACTYLY, TELANGIECTASIA) (H): ICD-10-CM

## 2020-07-15 DIAGNOSIS — G89.4 CHRONIC PAIN SYNDROME: Chronic | ICD-10-CM

## 2020-07-15 RX ORDER — OXYCODONE HYDROCHLORIDE 15 MG/1
TABLET ORAL
Qty: 100 TABLET | Refills: 0 | Status: SHIPPED | OUTPATIENT
Start: 2020-07-15 | End: 2020-08-04

## 2020-07-15 RX ORDER — OMEPRAZOLE 40 MG/1
CAPSULE, DELAYED RELEASE ORAL
Qty: 180 CAPSULE | Refills: 3 | Status: SHIPPED | OUTPATIENT
Start: 2020-07-15 | End: 2021-07-02

## 2020-07-15 NOTE — TELEPHONE ENCOUNTER
Sent one month refill.  Please let patient know that she needs to follow-up with virtual visit for further pain medication refills.  Marisa Davidson NP on 7/15/2020 at 2:48 PM

## 2020-07-15 NOTE — TELEPHONE ENCOUNTER
"Requested Prescriptions   Pending Prescriptions Disp Refills     omeprazole (PRILOSEC) 40 MG DR capsule [Pharmacy Med Name: OMEPRAZOLE 40MG CPDR] 180 capsule 1     Sig: TAKE ONE CAPSULE BY MOUTH TWICE A DAY       PPI Protocol Passed - 7/15/2020 11:16 AM        Passed - Not on Clopidogrel (unless Pantoprazole ordered)        Passed - No diagnosis of osteoporosis on record        Passed - Recent (12 mo) or future (30 days) visit within the authorizing provider's specialty     Patient has had an office visit with the authorizing provider or a provider within the authorizing providers department within the previous 12 mos or has a future within next 30 days. See \"Patient Info\" tab in inbasket, or \"Choose Columns\" in Meds & Orders section of the refill encounter.              Passed - Medication is active on med list        Passed - Patient is age 18 or older        Passed - No active pregnacy on record        Passed - No positive pregnancy test in past 12 months             "

## 2020-07-15 NOTE — TELEPHONE ENCOUNTER
Routing refill request to provider for review/approval because:  Drug not on the [facility] refill protocol     Sandra Alvarado RN

## 2020-07-15 NOTE — TELEPHONE ENCOUNTER
Second request. Patient called will be out tomorrow.  [facility]  [facility]

## 2020-07-30 NOTE — PROGRESS NOTES
"SUBJECTIVE:   Molly Teague is a 34 year old female who presents for Preventive Visit.    Needs: ACT, PAP, PPSV3  Are you in the first 12 months of your Medicare coverage?  No    HPI   Chronic Kidney Disease Follow-up      Do you take any over the counter pain medicine?: {Yes/No:879679}    Chronic Pain Follow-Up    Where in your body do you have pain? Chronic pain syndrome  How has your pain affected your ability to work? { :217673}  Which of these pain treatments have you tried since your last clinic visit? { :86987}  How well are you sleeping? { :363806}  How has your mood been since your last visit? { :686214}  Have you had a significant life event? { :20824}  Other aggravating factors: { :622139::\"none\"}  Taking medication as directed? { :512415::\"Yes\"}  Last wean of opiates was 8/2019.    \"Spells\" saw Neurology at [place] this spring.  EEG was normal.  Recommend sleep study.  Query opiate and/or psych component    Memory: met with PMR at [facility] 5/6.  Recommend cognitive rehab.    GI Bleed: saw GI at [facility].  Plan for scopes, Hgb Q 2 months, regular IV Iron infusions.    Anxiety: was seeing psychology for a short time, last visit 10/2019.  Was seeing psychiatry, last visit 6/2019.  Changed from celexa to cymbalta.  zoloft did not help. Psychiatry recommended ongoing benzo, her use is responsible; severe anxiety.  ambien was stopped by myself due to concern for polypharmacy and multiple sedating medications.    PHQ-9 SCORE 12/12/2019 2/6/2020 4/7/2020   PHQ-9 Total Score MyChart - - 16 (Moderately severe depression)   PHQ-9 Total Score 13 20 16   PHQ-A Total Score - 19 -     REX-7 SCORE 12/12/2019 2/6/2020 4/7/2020   Total Score - - 14 (moderate anxiety)   Total Score 13 17 14     No flowsheet data found.  Encounter-Level CSA - 04/26/2017:    Controlled Substance Agreement - Scan on 5/4/2017  8:58 AM: CONTROLLED SUBSTANCE AGREEMENT     Patient-Level CSA:    Controlled Substance Agreement - Opioid - Scan on " "2/6/2019  6:23 PM           Do you feel safe in your environment? { :554674}    Have you ever done Advance Care Planning? (For example, a Health Directive, POLST, or a discussion with a medical provider or your loved ones about your wishes): { :264318}    {Hearing Test Done (Optional):456175}  Fall risk  { :538652}  {If any of the above assessments are answered yes, consider ordering appropriate referrals (Optional):601276::\"click delete button to remove this line now\"}  Cognitive Screening { :396228}    {Do you have sleep apnea, excessive snoring or daytime drowsiness? (Optional):707554}    Reviewed and updated as needed this visit by clinical staff         Reviewed and updated as needed this visit by Provider        Social History     Tobacco Use     Smoking status: Never Smoker     Smokeless tobacco: Never Used   Substance Use Topics     Alcohol use: Yes     Comment: Socially once on a weekend if any     {Rooming Staff- Complete this question if Prescreen response is not shown below for today's visit. If you drink alcohol do you typically have >3 drinks per day or >7 drinks per week? (Optional):494079}    No flowsheet data found.{add AUDIT responses (Optional) (A score of 7 for adult men is an indication of hazardous drinking; a score of 8 or more is an indication of an alcohol use disorder.  A score of 7 or more for adult women is an indication of hazardous drinking or an alchohol use disorder):033246}    {Outside tests to abstract? :605178}    {additional problems to add (Optional):884350}    Current providers sharing in care for this patient include: {Rooming staff:  Please update Care Team in Rooming Activity, refresh this note and then delete this statement}  Patient Care Team:  Grecia Barba DO as PCP - General (Internal Medicine)  Grecia Barba DO as Assigned PCP  Maritza Harrison MD as MD (OB/Gyn)    The following health maintenance items are reviewed in Epic and correct as of " "today:  Health Maintenance   Topic Date Due     HIV SCREENING  2000     PNEUMOCOCCAL IMMUNIZATION 19-64 MEDIUM RISK (1 of 1 - PPSV23) 2004     HEPATITIS B IMMUNIZATION (1 of 3 - Risk 3-dose series) 2004     MEDICARE ANNUAL WELLNESS VISIT  10/10/2018     ASTHMA ACTION PLAN  2019     ASTHMA CONTROL TEST  2020     URINE DRUG SCREEN  2020     HPV TEST  10/10/2020     PAP  10/10/2020     INFLUENZA VACCINE (1) 2020     PHQ-9  2020     ADVANCE CARE PLANNING  2025     DTAP/TDAP/TD IMMUNIZATION (2 - Td) 2027     DEPRESSION ACTION PLAN  Completed     IPV IMMUNIZATION  Aged Out     MENINGITIS IMMUNIZATION  Aged Out     {Chronicprobdata (optional):632848}  {Decision Support (Optional):358671}    Review of Systems  {ROS COMP (Optional):760579}    OBJECTIVE:   There were no vitals taken for this visit. Estimated body mass index is 30.97 kg/m  as calculated from the following:    Height as of 3/25/20: 1.575 m (5' 2\").    Weight as of 3/25/20: 76.8 kg (169 lb 5 oz).  Physical Exam  {Exam (Optional) :203201}    {Diagnostic Test Results (Optional):646667::\"Diagnostic Test Results:\",\"Labs reviewed in Epic\"}    ASSESSMENT / PLAN:   {Diag Picklist:184439}    COUNSELING:  {Medicare Counselin}    Estimated body mass index is 30.97 kg/m  as calculated from the following:    Height as of 3/25/20: 1.575 m (5' 2\").    Weight as of 3/25/20: 76.8 kg (169 lb 5 oz).    {Weight Management Plan (ACO) Complete if BMI is abnormal-  Ages 18-64  BMI >24.9.  Age 65+ with BMI <23 or >30 (Optional):090331}     reports that she has never smoked. She has never used smokeless tobacco.  {Tobacco Cessation -- Complete if patient is a smoker (Optional):734694}    Appropriate preventive services were discussed with this patient, including applicable screening as appropriate for cardiovascular disease, diabetes, osteopenia/osteoporosis, and glaucoma.  As appropriate for age/gender, discussed " screening for colorectal cancer, prostate cancer, breast cancer, and cervical cancer. Checklist reviewing preventive services available has been given to the patient.    Reviewed patients plan of care and provided an AVS. The {CarePlan:044943} for Molly meets the Care Plan requirement. This Care Plan has been established and reviewed with the {PATIENT, FAMILY MEMBER, CAREGIVER:987753}.    Counseling Resources:  ATP IV Guidelines  Pooled Cohorts Equation Calculator  Breast Cancer Risk Calculator  FRAX Risk Assessment  ICSI Preventive Guidelines  Dietary Guidelines for Americans, 2010  USDA's MyPlate  ASA Prophylaxis  Lung CA Screening    Grecia Barba DO  [facility]    Identified Health Risks:

## 2020-07-30 NOTE — PATIENT INSTRUCTIONS
Patient Education   Personalized Prevention Plan  You are due for the preventive services outlined below.  Your care team is available to assist you in scheduling these services.  If you have already completed any of these items, please share that information with your care team to update in your medical record.  Health Maintenance Due   Topic Date Due     HIV Screening  08/04/2000     Pneumococcal Vaccine (1 of 1 - PPSV23) 08/04/2004     Hepatitis B Vaccine (1 of 3 - Risk 3-dose series) 08/04/2004     Annual Wellness Visit  10/10/2018     Asthma Action Plan - yearly  07/06/2019     Asthma Control Test  02/02/2020     URINE DRUG SCREEN  08/02/2020     HPV Screening  10/10/2020     PAP Smear  10/10/2020        Preventive Health Recommendations  Female Ages 26 - 39  Yearly exam:   See your health care provider every year in order to    Review health changes.     Discuss preventive care.      Review your medicines if you your doctor has prescribed any.    Until age 30: Get a Pap test every three years (more often if you have had an abnormal result).    After age 30: Talk to your doctor about whether you should have a Pap test every 3 years or have a Pap test with HPV screening every 5 years.   You do not need a Pap test if your uterus was removed (hysterectomy) and you have not had cancer.  You should be tested each year for STDs (sexually transmitted diseases), if you're at risk.   Talk to your provider about how often to have your cholesterol checked.  If you are at risk for diabetes, you should have a diabetes test (fasting glucose).  Shots: Get a flu shot each year. Get a tetanus shot every 10 years.   Nutrition:     Eat at least 5 servings of fruits and vegetables each day.    Eat whole-grain bread, whole-wheat pasta and brown rice instead of white grains and rice.    Get adequate Calcium and Vitamin D.     Lifestyle    Exercise at least 150 minutes a week (30 minutes a day, 5 days of the week). This will help  you control your weight and prevent disease.    Limit alcohol to one drink per day.    No smoking.     Wear sunscreen to prevent skin cancer.    See your dentist every six months for an exam and cleaning.      1. Referral to [facility] Psychiatry for anxiety  2. Labs today  3. Order for iron infusions, stop oral iron  4. Refill of oxycodone on 8/14  5. Standing blood counts every 2 months.  Would recommend iron infusion if Hgb is < 10, Iron < 35

## 2020-08-04 ENCOUNTER — OFFICE VISIT (OUTPATIENT)
Dept: FAMILY MEDICINE | Facility: CLINIC | Age: 35
End: 2020-08-04
Payer: MEDICARE

## 2020-08-04 VITALS
BODY MASS INDEX: 32.76 KG/M2 | SYSTOLIC BLOOD PRESSURE: 118 MMHG | HEART RATE: 113 BPM | HEIGHT: 62 IN | TEMPERATURE: 99.3 F | RESPIRATION RATE: 18 BRPM | WEIGHT: 178 LBS | OXYGEN SATURATION: 96 % | DIASTOLIC BLOOD PRESSURE: 76 MMHG

## 2020-08-04 DIAGNOSIS — K21.00 GASTROESOPHAGEAL REFLUX DISEASE WITH ESOPHAGITIS: ICD-10-CM

## 2020-08-04 DIAGNOSIS — M34.89 SCLERODERMA WITH RENAL INVOLVEMENT (H): ICD-10-CM

## 2020-08-04 DIAGNOSIS — N08 SCLERODERMA WITH RENAL INVOLVEMENT (H): ICD-10-CM

## 2020-08-04 DIAGNOSIS — Z00.00 ENCOUNTER FOR MEDICARE ANNUAL WELLNESS EXAM: Primary | ICD-10-CM

## 2020-08-04 DIAGNOSIS — G89.4 CHRONIC PAIN SYNDROME: Chronic | ICD-10-CM

## 2020-08-04 DIAGNOSIS — J45.40 MODERATE PERSISTENT ASTHMA WITHOUT COMPLICATION: ICD-10-CM

## 2020-08-04 DIAGNOSIS — M34.1 CREST (CALCINOSIS, RAYNAUD'S PHENOMENON, ESOPHAGEAL DYSFUNCTION, SCLERODACTYLY, TELANGIECTASIA) (H): ICD-10-CM

## 2020-08-04 DIAGNOSIS — J45.50 SEVERE PERSISTENT ASTHMA WITHOUT COMPLICATION (H): ICD-10-CM

## 2020-08-04 DIAGNOSIS — Z11.4 SCREENING FOR HIV WITHOUT PRESENCE OF RISK FACTORS: ICD-10-CM

## 2020-08-04 DIAGNOSIS — M34.9 SCLERODERMA (H): Chronic | ICD-10-CM

## 2020-08-04 DIAGNOSIS — N18.30 CKD (CHRONIC KIDNEY DISEASE) STAGE 3, GFR 30-59 ML/MIN (H): ICD-10-CM

## 2020-08-04 DIAGNOSIS — F41.1 GAD (GENERALIZED ANXIETY DISORDER): ICD-10-CM

## 2020-08-04 DIAGNOSIS — Z86.711 HISTORY OF PULMONARY EMBOLISM: ICD-10-CM

## 2020-08-04 DIAGNOSIS — F32.1 MODERATE MAJOR DEPRESSION (H): ICD-10-CM

## 2020-08-04 DIAGNOSIS — D50.0 IRON DEFICIENCY ANEMIA DUE TO CHRONIC BLOOD LOSS: ICD-10-CM

## 2020-08-04 DIAGNOSIS — Z12.4 SCREENING FOR MALIGNANT NEOPLASM OF CERVIX: ICD-10-CM

## 2020-08-04 PROBLEM — K52.9 ENTERITIS: Status: RESOLVED | Noted: 2020-03-26 | Resolved: 2020-08-04

## 2020-08-04 PROCEDURE — G0145 SCR C/V CYTO,THINLAYER,RESCR: HCPCS | Performed by: INTERNAL MEDICINE

## 2020-08-04 PROCEDURE — 87624 HPV HI-RISK TYP POOLED RSLT: CPT | Performed by: INTERNAL MEDICINE

## 2020-08-04 PROCEDURE — 87389 HIV-1 AG W/HIV-1&-2 AB AG IA: CPT | Performed by: INTERNAL MEDICINE

## 2020-08-04 PROCEDURE — 99395 PREV VISIT EST AGE 18-39: CPT | Performed by: INTERNAL MEDICINE

## 2020-08-04 PROCEDURE — 99214 OFFICE O/P EST MOD 30 MIN: CPT | Mod: 25 | Performed by: INTERNAL MEDICINE

## 2020-08-04 PROCEDURE — 36415 COLL VENOUS BLD VENIPUNCTURE: CPT | Performed by: INTERNAL MEDICINE

## 2020-08-04 PROCEDURE — G0476 HPV COMBO ASSAY CA SCREEN: HCPCS | Performed by: INTERNAL MEDICINE

## 2020-08-04 RX ORDER — MONTELUKAST SODIUM 10 MG/1
10 TABLET ORAL AT BEDTIME
Qty: 90 TABLET | Refills: 3 | Status: SHIPPED | OUTPATIENT
Start: 2020-08-04 | End: 2021-11-10

## 2020-08-04 RX ORDER — BUSPIRONE HYDROCHLORIDE 30 MG/1
30 TABLET ORAL 2 TIMES DAILY
Qty: 180 TABLET | Refills: 3 | Status: SHIPPED | OUTPATIENT
Start: 2020-08-04 | End: 2021-07-02

## 2020-08-04 RX ORDER — AMLODIPINE BESYLATE 5 MG/1
5 TABLET ORAL DAILY
Qty: 90 TABLET | Refills: 3 | Status: SHIPPED | OUTPATIENT
Start: 2020-08-04 | End: 2021-04-27

## 2020-08-04 RX ORDER — OXYCODONE HYDROCHLORIDE 15 MG/1
15 TABLET ORAL EVERY 4 HOURS PRN
Qty: 100 TABLET | Refills: 0 | Status: SHIPPED | OUTPATIENT
Start: 2020-08-14 | End: 2020-09-17

## 2020-08-04 RX ORDER — ALBUTEROL SULFATE 90 UG/1
AEROSOL, METERED RESPIRATORY (INHALATION)
Qty: 18 G | Refills: 11 | Status: SHIPPED | OUTPATIENT
Start: 2020-08-04 | End: 2020-09-17

## 2020-08-04 ASSESSMENT — ANXIETY QUESTIONNAIRES
5. BEING SO RESTLESS THAT IT IS HARD TO SIT STILL: SEVERAL DAYS
1. FEELING NERVOUS, ANXIOUS, OR ON EDGE: MORE THAN HALF THE DAYS
IF YOU CHECKED OFF ANY PROBLEMS ON THIS QUESTIONNAIRE, HOW DIFFICULT HAVE THESE PROBLEMS MADE IT FOR YOU TO DO YOUR WORK, TAKE CARE OF THINGS AT HOME, OR GET ALONG WITH OTHER PEOPLE: SOMEWHAT DIFFICULT
7. FEELING AFRAID AS IF SOMETHING AWFUL MIGHT HAPPEN: SEVERAL DAYS
6. BECOMING EASILY ANNOYED OR IRRITABLE: MORE THAN HALF THE DAYS
2. NOT BEING ABLE TO STOP OR CONTROL WORRYING: SEVERAL DAYS
3. WORRYING TOO MUCH ABOUT DIFFERENT THINGS: MORE THAN HALF THE DAYS
GAD7 TOTAL SCORE: 11

## 2020-08-04 ASSESSMENT — MIFFLIN-ST. JEOR: SCORE: 1455.65

## 2020-08-04 ASSESSMENT — PATIENT HEALTH QUESTIONNAIRE - PHQ9
5. POOR APPETITE OR OVEREATING: MORE THAN HALF THE DAYS
SUM OF ALL RESPONSES TO PHQ QUESTIONS 1-9: 9

## 2020-08-04 NOTE — PROGRESS NOTES
SUBJECTIVE:   CC: Molly Teague is an 35 year old woman who presents for preventive health visit.   Subjective     Molly Teague is a 35 year old female who presents to clinic today for the following health issues:    HPI       Healthy Habits:    Do you get at least three servings of calcium containing foods daily (dairy, green leafy vegetables, etc.)? yes    Amount of exercise or daily activities, outside of work: 3 day(s) per week    Problems taking medications regularly No    Medication side effects: many side affects     Have you had an eye exam in the past two years? yes    Do you see a dentist twice per year? 2 yrs ago     Do you have sleep apnea, excessive snoring or daytime drowsiness?yesxcessive snoring or daytime drowsiness      Medication Followup of all medication     Taking Medication as prescribed: yes    Side Effects:  Yes many    Medication Helping Symptoms:  Unsure      Chronic Pain Follow-Up    Where in your body do you have pain? Lower back knee ankle migraines   How has your pain affected your ability to work? Unable to work  Which of these pain treatments have you tried since your last clinic visit? Activity or exercise, Cold, Heat, Massage, Physical Therapy, Relaxation techniques / Biofeedback, Steroid Injections and Stretching  How well are you sleeping? Good  How has your mood been since your last visit? About the same  Have you had a significant life event? No  Other aggravating factors: prolonged sitting and prolonged standing  Taking medication as directed? Yes    GERD: on TID PPI as recommended by GI.  Needs regular CBC and iron infusions    Depression and Anxiety Follow-Up    How are you doing with your depression since your last visit? No change    How are you doing with your anxiety since your last visit?  Worsened in the last mo    Are you having other symptoms that might be associated with depression or anxiety? Yes:  health condition     Have you had a significant life event?  No     Do you have any concerns with your use of alcohol or other drugs? No     Is meditating and seeing counselor     Following with counselor Dr. Palacios in [facility] for many months.    Reports 2 panic attacks/week.  Doesn't feel ativan is helping    Social History     Tobacco Use     Smoking status: Never Smoker     Smokeless tobacco: Never Used   Substance Use Topics     Alcohol use: Yes     Comment: Socially once on a weekend if any     Drug use: No     Comment: None     PHQ 12/12/2019 2/6/2020 4/7/2020   PHQ-9 Total Score 13 20 16   Q9: Thoughts of better off dead/self-harm past 2 weeks Not at all Not at all Not at all   F/U: Thoughts of suicide or self-harm - - -   F/U: Self harm-plan - - -   F/U: Self-harm action - - -   F/U: Safety concerns - - -   PHQ-A Total Score - 19 -   PHQ-A Depressed most days in past year - No -   PHQ-A Mood affect on daily activities - Very difficult -   PHQ-A Suicide Ideation past 2 weeks - Not at all -     REX-7 SCORE 12/12/2019 2/6/2020 4/7/2020   Total Score - - 14 (moderate anxiety)   Total Score 13 17 14     Last PHQ-9 4/7/2020   1.  Little interest or pleasure in doing things 2   2.  Feeling down, depressed, or hopeless 1   3.  Trouble falling or staying asleep, or sleeping too much 3   4.  Feeling tired or having little energy 2   5.  Poor appetite or overeating 2   6.  Feeling bad about yourself 2   7.  Trouble concentrating 2   8.  Moving slowly or restless 2   Q9: Thoughts of better off dead/self-harm past 2 weeks 0   PHQ-9 Total Score 16   Difficulty at work, home, or with people -   In the past two weeks have you had thoughts of suicide or self harm? -   Do you have concerns about your personal safety or the safety of others? -   In the past 2 weeks have you thought about a plan or had intention to harm yourself? -   In the past 2 weeks have you acted on these thoughts in any way? -     REX-7  4/7/2020   1. Feeling nervous, anxious, or on edge 3   2. Not being able to stop or  control worrying 2   3. Worrying too much about different things 2   4. Trouble relaxing 2   5. Being so restless that it is hard to sit still 1   6. Becoming easily annoyed or irritable 1   7. Feeling afraid, as if something awful might happen 3   REX-7 Total Score 14   If you checked any problems, how difficult have they made it for you to do your work, take care of things at home, or get along with other people? -       Suicide Assessment Five-step Evaluation and Treatment (SAFE-T)    Chronic Kidney Disease Follow-up    Do you take any over the counter pain medicine?: Yes  What over the counter medicine are you taking for your pain?:  Tylenol and Excedrin       How often do you take this medicine?:  A few times a week      Today's PHQ-2 Score:   PHQ-2 ( 1999 Pfizer) 8/4/2020 2/6/2020   Q1: Little interest or pleasure in doing things 1 2   Q2: Feeling down, depressed or hopeless 1 3   PHQ-2 Score 2 5     Abuse: Current or Past(Physical, Sexual or Emotional)- No  Do you feel safe in your environment? Yes        Social History     Tobacco Use     Smoking status: Never Smoker     Smokeless tobacco: Never Used   Substance Use Topics     Alcohol use: Yes     Comment: Socially once on a weekend if any     If you drink alcohol do you typically have >3 drinks per day or >7 drinks per week? No                     Reviewed orders with patient.  Reviewed health maintenance and updated orders accordingly - Yes  Current Outpatient Medications   Medication Sig Dispense Refill     amLODIPine (NORVASC) 5 MG tablet Take 5 mg by mouth daily       blood glucose (NO BRAND SPECIFIED) lancets standard Use to test blood sugar 1 time daily 100 each 3     blood glucose (NO BRAND SPECIFIED) test strip Use to test blood sugar 1 time daily 100 each 3     budesonide-formoterol (SYMBICORT) 160-4.5 MCG/ACT Inhaler Inhale 2 puffs into the lungs 2 times daily 6 g 11     busPIRone HCl (BUSPAR) 30 MG tablet TAKE ONE TABLET BY MOUTH TWICE A DAY  (Patient taking differently: Take 30 mg by mouth 2 times daily ) 180 tablet 3     Cyanocobalamin (B-12) 1000 MCG TBCR Take 1,000 mcg by mouth daily 100 tablet 1     DULoxetine (CYMBALTA) 30 MG capsule TAKE ONE CAPSULE BY MOUTH TWICE A DAY (Patient taking differently: Take 30 mg by mouth 2 times daily ) 180 capsule 3     famotidine (PEPCID) 20 MG tablet Take 1 tablet (20 mg) by mouth 2 times daily 180 tablet 3     folic acid (FOLVITE) 1 MG tablet Take 1 tablet by mouth daily       gabapentin (NEURONTIN) 300 MG capsule Take 1 capsule (300 mg) by mouth 3 times daily (Patient taking differently: Take 600 mg by mouth 3 times daily ) 540 capsule 3     GOODSENSE MIGRAINE FORMULA 250-250-65 MG per tablet TAKE ONE TABLET BY MOUTH EVERY 6 HOURS AS NEEDED FOR HEADACHES (Patient taking differently: Take 1 tablet by mouth every 6 hours as needed ) 24 tablet 1     hydrOXYzine (ATARAX) 25 MG tablet Take 1-2 tablets (25-50 mg) by mouth 3 times daily as needed for itching 90 tablet 11     lisinopril (ZESTRIL) 10 MG tablet Take 1 tablet (10 mg) by mouth daily 90 tablet 3     LORazepam (ATIVAN) 1 MG tablet Take 1 tablet (1 mg) by mouth daily as needed for anxiety 30 tablet 5     montelukast (SINGULAIR) 10 MG tablet TAKE ONE TABLET BY MOUTH AT BEDTIME (Patient taking differently: Take 10 mg by mouth At Bedtime ) 90 tablet 3     omeprazole (PRILOSEC) 40 MG DR capsule TAKE ONE CAPSULE BY MOUTH TWICE A DAY (Patient taking differently: Take 40 mg by mouth 3 times daily ) 180 capsule 3     order for DME Roll-A-Bout Walker. Patient can use for three months    Diagnosis Right ankle fracture and surgery 2/10/2020 1 Units 0     order for DME Roll-A-[employer]out Walker. Patient can use for another two months  Diagnosis: Right ankle bimalleolar fractures, open reduction internal fixation on 2/10/2020 1 Units 0     oxyCODONE IR (ROXICODONE) 15 MG tablet TAKE ONE TABLET BY MOUTH EVERY 4 HOURS AS NEEDED FOR SEVERE PAIN 100 tablet 0     polyethylene glycol  (MIRALAX/GLYCOLAX) packet Take 17 g by mouth daily 7 packet 0     promethazine (PHENERGAN) 25 MG tablet Take 1 tablet (25 mg) by mouth 2 times daily as needed for nausea 30 tablet 11     promethazine (PHENERGAN) 25 MG/ML IV injection INJECT 1 ML (25MG) INTRAMUSCULARLY EVERY 6 HOURS AS NEEDED 25 mL 4     VENTOLIN  (90 Base) MCG/ACT inhaler INHALE 2 PUFFS INTO THE LUNGS ONCE EVERY 4 HOURS AS NEEDED FOR SHORTNESS OF BREATH / DYSPNEA / WHEEZING (Patient taking differently: Inhale 2 puffs into the lungs every 4 hours as needed for shortness of breath / dyspnea or wheezing ) 18 g 11     acetaminophen (TYLENOL) 325 MG tablet Take 2 tablets (650 mg) by mouth every 4 hours as needed for mild pain or other (Patient not taking: Reported on 8/4/2020) 1 Bottle 0     naloxone (NARCAN) 4 MG/0.1ML nasal spray Spray 1 spray (4 mg) into one nostril alternating nostrils once as needed for opioid reversal every 2-3 minutes until assistance arrives 0.2 mL 3       Mammogram not appropriate for this patient based on age.    Pertinent mammograms are reviewed under the imaging tab.  History of abnormal Pap smear: NO - age 30- 65 PAP every 3 years recommended  PAP / HPV Latest Ref Rng & Units 10/10/2017   PAP - NIL   HPV 16 DNA NEG:Negative Negative   HPV 18 DNA NEG:Negative Negative   OTHER HR HPV NEG:Negative Negative     Reviewed and updated as needed this visit by clinical staff  Tobacco  Allergies  Meds  Problems  Med Hx  Surg Hx  Fam Hx  Soc Hx          Reviewed and updated as needed this visit by Provider  Meds  Problems            ROS: negative except as noted above  CONSTITUTIONAL: NEGATIVE for fever, chills, change in weight  INTEGUMENTARU/SKIN: NEGATIVE for worrisome rashes, moles or lesions  EYES: NEGATIVE for vision changes or irritation  ENT: NEGATIVE for ear, mouth and throat problems  RESP: NEGATIVE for significant cough or SOB  BREAST: NEGATIVE for masses, tenderness or discharge  CV: NEGATIVE for chest pain,  "palpitations or peripheral edema  GI: NEGATIVE forabdominal pain, or change in bowel habits  : NEGATIVE for unusual urinary or vaginal symptoms. Periods are regular.  MUSCULOSKELETAL: NEGATIVE for significant arthralgias or myalgia  NEURO: NEGATIVE for weakness, or paresthesias  PSYCHIATRIC: NEGATIVE for changes in mood or affect    OBJECTIVE:   /76   Pulse 113   Temp 99.3  F (37.4  C) (Tympanic)   Resp 18   Ht 1.575 m (5' 2\")   Wt 80.7 kg (178 lb)   SpO2 96%   BMI 32.56 kg/m    EXAM:  GENERAL: alert and no distress  EYES: Eyes grossly normal to inspection, PERRL and conjunctivae and sclerae normal  HENT: normal cephalic/atraumatic, ear canals and TM's normal, oropharynx clear, oral mucous membranes moist and skin tightening of mouth  NECK: no adenopathy, thyroid normal to palpation and trach scar  RESP: lungs clear to auscultation - no rales, rhonchi or wheezes  CV: tachycardia, normal S1 S2, no S3 or S4, no murmur, click or rub, peripheral pulses strong and no peripheral edema  ABDOMEN: soft, nontender, no hepatosplenomegaly, no masses and bowel sounds normal   (female): normal female external genitalia, normal urethral meatus, vaginal mucosa pink, moist, well rugated, and normal cervix/adnexa/uterus without masses or discharge  MS: contractures of fingers of bilateral hands  SKIN: skin tightening  NEURO: Normal strength and tone, mentation intact and speech normal  PSYCH: mentation appears normal, affect normal/bright      ASSESSMENT/PLAN:   1. Encounter for Medicare annual wellness exam    2. Scleroderma (H) - follows with [facility], multiple specialists  - OFFICE/OUTPT VISIT,EST,LEVL IV    3. Chronic pain syndrome - refill given.  She seems the best I have seen her in terms of mood, pain control, etc.  We have already weaned down the oxycodone.  Discussed narcan, she still has on hand.  Next virtual visit for refill in 3 months, will discuss weaning down  - oxyCODONE IR (ROXICODONE) 15 MG tablet; " Take 1 tablet (15 mg) by mouth every 4 hours as needed for severe pain TAKE ONE TABLET BY MOUTH EVERY 4 HOURS AS NEEDED FOR SEVERE PAIN  Dispense: 100 tablet; Refill: 0  - OFFICE/OUTPT VISIT,EST,LEVL IV    4. CKD (chronic kidney disease) stage 3, GFR 30-59 ml/min (H) - follows wit nephrology.  - amLODIPine (NORVASC) 5 MG tablet; Take 1 tablet (5 mg) by mouth daily  Dispense: 90 tablet; Refill: 3  - OFFICE/OUTPT VISIT,EST,LEVL IV    5. CREST (calcinosis, Raynaud's phenomenon, esophageal dysfunction, sclerodactyly, telangiectasia) (H)  - Basic metabolic panel; Standing  - CBC with platelets; Standing  - OFFICE/OUTPT VISIT,EST,LEVL IV    6. Screening for HIV without presence of risk factors  - HIV Screening  - OFFICE/OUTPT VISIT,EST,LEVL IV    7. Iron deficiency anemia due to chronic blood loss - GI recommend routine Iron infusions due to chronic GI bleeding. Standing blood counts every 2 months.  Would recommend iron infusion if Hgb is < 10, Iron < 35. Stop oral iron  - CBC with platelets; Standing  - Iron and iron binding capacity; Standing  - OFFICE/OUTPT VISIT,EST,LEVL IV    8. Scleroderma with renal involvement (H) - follows with rheum  - OFFICE/OUTPT VISIT,EST,LEVL IV    9. History of pulmonary embolism  - OFFICE/OUTPT VISIT,EST,LEVL IV    10. Gastroesophageal reflux disease with esophagitis - on TID PPI  - OFFICE/OUTPT VISIT,EST,LEVL IV    11. REX (generalized anxiety disorder) - see psychiatry.  maxed out on meds  - MENTAL HEALTH REFERRAL  - Adult; Psychiatry; Psychiatry; [facility]: Collaborative Care Psychiatry Service/Bridge to Long-Term Psychiatry as indicated ([phone]); Yes; Chronic Mental Health without improvement; Yes; We will contact you to schedule ...  - OFFICE/OUTPT VISIT,EST,LEVL IV    12. Moderate major depression (H) - significantly improved  - busPIRone HCl (BUSPAR) 30 MG tablet; Take 1 tablet (30 mg) by mouth 2 times daily  Dispense: 180 tablet; Refill: 3  - OFFICE/OUTPT VISIT,EST,LEVL  "IV    13. Severe persistent asthma without complication  - stable, refill provided  - montelukast (SINGULAIR) 10 MG tablet; Take 1 tablet (10 mg) by mouth At Bedtime  Dispense: 90 tablet; Refill: 3  - OFFICE/OUTPT VISIT,ESTLEVL IV    14. Screening for malignant neoplasm of cervix  - HPV High Risk Types DNA Cervical  - Pap imaged thin layer screen with HPV - recommended age 30 - 65 years (select HPV order below)    15. Moderate persistent asthma without complication  - albuterol (VENTOLIN HFA) 108 (90 Base) MCG/ACT inhaler; INHALE 2 PUFFS INTO THE LUNGS ONCE EVERY 4 HOURS AS NEEDED FOR SHORTNESS OF BREATH / DYSPNEA / WHEEZING  Dispense: 18 g; Refill: 11  - OFFICE/OUTPT VISIT,EST,LEVL IV    COUNSELING:   Reviewed preventive health counseling, as reflected in patient instructions    Estimated body mass index is 32.56 kg/m  as calculated from the following:    Height as of this encounter: 1.575 m (5' 2\").    Weight as of this encounter: 80.7 kg (178 lb).    Weight management plan: Discussed healthy diet and exercise guidelines     reports that she has never smoked. She has never used smokeless tobacco.      Counseling Resources:  ATP IV Guidelines  Pooled Cohorts Equation Calculator  Breast Cancer Risk Calculator  FRAX Risk Assessment  ICSI Preventive Guidelines  Dietary Guidelines for Americans, 2010  USDA's MyPlate  ASA Prophylaxis  Lung CA Screening    Grecia Barba, DO  [facility]  "

## 2020-08-04 NOTE — PROGRESS NOTES
"   SUBJECTIVE:   CC: Molly Teague is an 35 year old woman who presents for preventive health visit.     HPI        {additional problems to add (Optional):559478}    Today's PHQ-2 Score:   PHQ-2 ( 1999 Pfizer) 8/4/2020   Q1: Little interest or pleasure in doing things 1   Q2: Feeling down, depressed or hopeless 1   PHQ-2 Score 2       Abuse: Current or Past(Physical, Sexual or Emotional)- { :524829}  Do you feel safe in your environment? { :939577}        Social History     Tobacco Use     Smoking status: Never Smoker     Smokeless tobacco: Never Used   Substance Use Topics     Alcohol use: Yes     Comment: Socially once on a weekend if any     {Rooming Staff- Complete this question if Prescreen response is not shown below for today's visit. If you drink alcohol do you typically have >3 drinks per day or >7 drinks per week? (Optional):164837}    No flowsheet data found.{add AUDIT responses (Optional) (A score of 7 for adult men is an indication of hazardous drinking; a score of 8 or more is an indication of an alcohol use disorder.  A score of 7 or more for adult women is an indication of hazardous drinking or an alchohol use disorder):959341}    Reviewed orders with patient.  Reviewed health maintenance and updated orders accordingly - { :887135::\"Yes\"}  {Chronicprobdata (optional):405452}    {Mammo Decision Support (Optional):124624}    Pertinent mammograms are reviewed under the imaging tab.  History of abnormal Pap smear: { :292776}  PAP / HPV Latest Ref Rng & Units 10/10/2017   PAP - NIL   HPV 16 DNA NEG:Negative Negative   HPV 18 DNA NEG:Negative Negative   OTHER HR HPV NEG:Negative Negative     Reviewed and updated as needed this visit by clinical staff  Tobacco  Allergies  Meds  Problems  Med Hx  Surg Hx  Fam Hx  Soc Hx          Reviewed and updated as needed this visit by Provider  Problems        {HISTORY OPTIONS (Optional):348377}    Review of Systems  {FEMALE ROS (Optional):312964}   " "  OBJECTIVE:   There were no vitals taken for this visit.  Physical Exam  {Exam Choices (Optional):661973}    {Diagnostic Test Results (Optional):072005::\"Diagnostic Test Results:\",\"Labs reviewed in Epic\"}    ASSESSMENT/PLAN:   {Diag Picklist:747356}    COUNSELING:  {FEMALE COUNSELING MESSAGES:995237::\"Reviewed preventive health counseling, as reflected in patient instructions\"}    Estimated body mass index is 30.97 kg/m  as calculated from the following:    Height as of 3/25/20: 1.575 m (5' 2\").    Weight as of 3/25/20: 76.8 kg (169 lb 5 oz).    {Weight Management Plan (ACO) Complete if BMI is abnormal-  Ages 18-64  BMI >24.9.  Age 65+ with BMI <23 or >30 (Optional):584118}     reports that she has never smoked. She has never used smokeless tobacco.  {Tobacco Cessation -- Complete if patient is a smoker (Optional):166964}    Counseling Resources:  ATP IV Guidelines  Pooled Cohorts Equation Calculator  Breast Cancer Risk Calculator  FRAX Risk Assessment  ICSI Preventive Guidelines  Dietary Guidelines for Americans, 2010  USDA's MyPlate  ASA Prophylaxis  Lung CA Screening    Grecia Barba, DO  [facility]  "

## 2020-08-05 LAB — HIV 1+2 AB+HIV1 P24 AG SERPL QL IA: NONREACTIVE

## 2020-08-05 ASSESSMENT — ANXIETY QUESTIONNAIRES: GAD7 TOTAL SCORE: 11

## 2020-08-05 ASSESSMENT — ASTHMA QUESTIONNAIRES: ACT_TOTALSCORE: 20

## 2020-08-06 LAB
COPATH REPORT: NORMAL
PAP: NORMAL

## 2020-08-10 LAB
FINAL DIAGNOSIS: NORMAL
HPV HR 12 DNA CVX QL NAA+PROBE: NEGATIVE
HPV16 DNA SPEC QL NAA+PROBE: NEGATIVE
HPV18 DNA SPEC QL NAA+PROBE: NEGATIVE
SPECIMEN DESCRIPTION: NORMAL
SPECIMEN SOURCE CVX/VAG CYTO: NORMAL

## 2020-08-11 ENCOUNTER — HOSPITAL ENCOUNTER (EMERGENCY)
Facility: CLINIC | Age: 35
Discharge: HOME | End: 2020-08-11
Attending: EMERGENCY MEDICINE | Admitting: EMERGENCY MEDICINE
Payer: MEDICARE

## 2020-08-11 VITALS
SYSTOLIC BLOOD PRESSURE: 117 MMHG | RESPIRATION RATE: 18 BRPM | OXYGEN SATURATION: 97 % | HEART RATE: 115 BPM | BODY MASS INDEX: 32.92 KG/M2 | TEMPERATURE: 99.1 F | DIASTOLIC BLOOD PRESSURE: 103 MMHG | WEIGHT: 180 LBS

## 2020-08-11 DIAGNOSIS — L97.411 SKIN ULCER OF RIGHT HEEL, LIMITED TO BREAKDOWN OF SKIN (H): ICD-10-CM

## 2020-08-11 PROCEDURE — 99282 EMERGENCY DEPT VISIT SF MDM: CPT | Mod: Z6 | Performed by: EMERGENCY MEDICINE

## 2020-08-11 PROCEDURE — 99282 EMERGENCY DEPT VISIT SF MDM: CPT | Performed by: EMERGENCY MEDICINE

## 2020-08-11 ASSESSMENT — ENCOUNTER SYMPTOMS
CONSTITUTIONAL NEGATIVE: 1
COLOR CHANGE: 0
WOUND: 1
NEUROLOGICAL NEGATIVE: 1

## 2020-08-11 NOTE — ED TRIAGE NOTES
Pt had ankle surgery in January, had to keep LE elevated, developed a pressure ulcer on heel. Pt though wound had healed but hit it on a wood banister yesterday and had foul smelling drainage come from it. Concerned about infection.

## 2020-08-11 NOTE — ED AVS SNAPSHOT
[facility]  [address] [address]  Phone:  [phone]  Fax:  [phone]                                    Molly Teague   MRN: [id number]    Department:  [facility]   Date of Visit:  8/11/2020           After Visit Summary Signature Page    I have received my discharge instructions, and my questions have been answered. I have discussed any challenges I see with this plan with the nurse or doctor.    ..........................................................................................................................................  Patient/Patient Representative Signature      ..........................................................................................................................................  Patient Representative Print Name and Relationship to Patient    ..................................................               ................................................  Date                                   Time    ..........................................................................................................................................  Reviewed by Signature/Title    ...................................................              ..............................................  Date                                               Time          22EPIC Rev 08/18

## 2020-08-11 NOTE — ED NOTES
Had Tib/Fib FX in Feb on RT side and is going to have another surgery due not improving. She has noticed pain increase to rt foot/ankle 7 days which she thought was from her cam-walker however when she removed it last noc a large amount of fluid drained from foot. She has a sore there follow surgery 2/10 and up until last noc it was going well. She denies fever/chills, N/V/D. She was seen 8/4/2020 and it appeared to be closed. She is a/o x 4. Monitor

## 2020-08-12 NOTE — ED PROVIDER NOTES
History     Chief Complaint   Patient presents with     Wound Infection     HPI  Molly Teague is a 35 year old female who has a slowly healing posterior right heel pressure ulcer after prolonged stasis and pressure to this area after right ankle fracture ORIF with hardware placement in February of this year who developed a yellow foul-smelling purulent drainage from the wound yesterday, now resolved with no further drainage today. She is concerned about wound infection.  No fever or chills.  She had worsened pain in the wound area in the past 1 week.  No proximal leg pain or swelling or redness.  She has follow-up with her Podiatrist at [facility] scheduled in 2 weeks.    Allergies:  Allergies   Allergen Reactions     Blood Transfusion Related (Informational Only) Other (See Comments)     Patient has a history of a clinically significant antibody against RBC antigens.  A delay in compatible RBCs may occur.     No Known Allergies      Pollen Extract      Seasonal Allergies      Shellfish-Derived Products Nausea and Rash     Rash on face       Problem List:    Patient Active Problem List    Diagnosis Date Noted     Iron deficiency anemia due to chronic blood loss 08/04/2020     Priority: Medium     Vomiting and diarrhea 03/25/2020     Priority: Medium     Closed right ankle fracture 02/11/2020     Priority: Medium     S/P ORIF (open reduction internal fixation) fracture 02/10/2020     Priority: Medium     CKD (chronic kidney disease) stage 3, GFR 30-59 ml/min (H) 01/31/2020     Priority: Medium     Sinus tachycardia 01/31/2020     Priority: Medium     Ankle fracture, right, closed, initial encounter 01/30/2020     Priority: Medium     Added automatically from request for surgery [id number]       Diplopia 01/25/2020     Priority: Medium     Anemia, chronic disease 11/25/2019     Priority: Medium     Mirena IUD- Remove by 09/2024 09/06/2019     Priority: Medium     Lot: [id number]  Exp: 01/2022    Dinora  LESA Israel         Malignant essential hypertension 07/24/2019     Priority: Medium     PTSD (post-traumatic stress disorder) 06/19/2019     Priority: Medium     Obesity (BMI 35.0-39.9) with comorbidity (H) 01/22/2019     Priority: Medium     Moderate major depression (H) 07/06/2018     Priority: Medium     Severe persistent asthma without complication 07/06/2018     Priority: Medium     On high dose ICS/LABA + frequent albuterol use.  Next allergy/pulmonary referral       Aspiration of food 03/29/2018     Priority: Medium     Chronic pain syndrome 04/26/2017     Priority: Medium     Patient is followed by Grecia Barba DO for ongoing prescription of pain medication.  All refills should only be approved by this provider, or covering partner.    Medication(s): Oxycodone 15 mg.   Maximum quantity per month: 100  Clinic visit frequency required: Q 2 months     Controlled substance agreement:  Encounter-Level CSA - 04/26/2017:    Controlled Substance Agreement - Scan on 5/4/2017  8:58 AM: CONTROLLED SUBSTANCE AGREEMENT (below)       Patient-Level CSA:    Controlled Substance Agreement - Opioid - Scan on 2/6/2019  6:23 PM (below)         Pain Clinic evaluation in the past: No    DIRE Total Score(s):  No flowsheet data found.    Last [facility] website verification:  done on 4/26/17   9/26/2017  7/6/2018  10/16/2018  1/22/2019  8/2/2019           [email or web address]         Chronic migraine without aura without status migrainosus, not intractable 04/12/2017     Priority: Medium     With medication overuse.  Fioricet and not Imitrex is recommend to treat severe headache.  2/2017 [facility] recommend wean down from daily Fioricet and use Excedrin Migrain PRN.       AIN grade I 03/30/2017     Priority: Medium     Found at [facility] on colonoscopy 2/2017.  Recommend repeat anoscopy and anal pap in 6/2017.       Gastroesophageal reflux disease with esophagitis 03/30/2017     Priority: Medium     EGD done at [facility] 2/2017  showed esophagitis without GAVE.  Recommend max dose H2 and PPI, along with 4 tablets of Carafate dissolved in water and drink throughout the day.    Had LA Grade D esophagitis, on TID PPI       Limited systemic sclerosis (H) 01/25/2017     Priority: Medium     Raynaud's, calcinosis, telangiectasias, peripheral neuropathy, GERD symptoms, history of scleroderma renal crisis.  Saw [facility] 1/2017 and follows with Rheum Dr. Davis locally.       Polyneuropathy associated with underlying disease (H) 01/25/2017     Priority: Medium     Due to scleroderma and neurology thought possible due to ICU (critical illness neuropathy).  Recommend to max out dose of gabapentin to 9796-5240 mg/day, split BID or TID.  EMG 1/2017 positive for neuropathy       History of Clostridium difficile 11/29/2016     Priority: Medium     History of Helicobacter pylori infection 11/29/2016     Priority: Medium     Scleroderma with renal involvement (H) 11/09/2016     Priority: Medium     Needs lisinopril long term       History of ARDS 11/09/2016     Priority: Medium     4/2015, prolonged ICU/vent/trach due to influenza, scleroderma renal crisis requiring hemodialysis.       Raynaud's disease without gangrene 11/09/2016     Priority: Medium     History of hemodialysis 11/09/2016     Priority: Medium     4/2015 due to scleroderma renal crisis       Bilateral retinitis 11/09/2016     Priority: Medium     History of pulmonary embolism 11/09/2016     Priority: Medium     Completed anti-coagulation 2017.  pulmonary embolism due to critical illness       REX (generalized anxiety disorder) 11/09/2016     Priority: Medium     CREST (calcinosis, Raynaud's phenomenon, esophageal dysfunction, sclerodactyly, telangiectasia) (H) 11/09/2016     Priority: Medium     Scleroderma (H) 09/22/2016     Priority: Medium        Past Medical History:    Past Medical History:   Diagnosis Date     Acute pyelonephritis 6/9/2017     Altered mental state 3/29/2018     Arthritis       Blood clotting disorder (H)      History of blood transfusion      Hypertension      Long-term (current) use of anticoagulants [Z79.01] 2016     PE (pulmonary embolism) 2016     Rheumatism      Scleroderma (H) 2016     Uncomplicated asthma        Past Surgical History:    Past Surgical History:   Procedure Laterality Date     ANGIOGRAM  2015      SECTION       OPEN REDUCTION INTERNAL FIXATION ANKLE Right 2/10/2020    Procedure: Right ankle open reduction internal fixation;  Surgeon: Natanael Villalta MD;  Location: [facility] OR     THROAT SURGERY         Family History:    Family History   Problem Relation Age of Onset     Hyperlipidemia Father      Cerebrovascular Disease Maternal Grandmother          of a stroke     Hyperlipidemia Paternal Grandfather      Depression Sister      Fibromyalgia Sister      Diabetes Maternal Aunt      Melanoma No family hx of      Skin Cancer No family hx of        Social History:  Marital Status:  Single [1]  Social History     Tobacco Use     Smoking status: Never Smoker     Smokeless tobacco: Never Used   Substance Use Topics     Alcohol use: Yes     Comment: Socially once on a weekend if any     Drug use: No     Comment: None        Medications:    acetaminophen (TYLENOL) 325 MG tablet  albuterol (VENTOLIN HFA) 108 (90 Base) MCG/ACT inhaler  amLODIPine (NORVASC) 5 MG tablet  blood glucose (NO BRAND SPECIFIED) lancets standard  blood glucose (NO BRAND SPECIFIED) test strip  budesonide-formoterol (SYMBICORT) 160-4.5 MCG/ACT Inhaler  busPIRone HCl (BUSPAR) 30 MG tablet  Cyanocobalamin (B-12) 1000 MCG TBCR  DULoxetine (CYMBALTA) 30 MG capsule  famotidine (PEPCID) 20 MG tablet  folic acid (FOLVITE) 1 MG tablet  gabapentin (NEURONTIN) 300 MG capsule  GOODSENSE MIGRAINE FORMULA 250-250-65 MG per tablet  hydrOXYzine (ATARAX) 25 MG tablet  lisinopril (ZESTRIL) 10 MG tablet  LORazepam (ATIVAN) 1 MG tablet  montelukast (SINGULAIR) 10 MG tablet  naloxone  (NARCAN) 4 MG/0.1ML nasal spray  omeprazole (PRILOSEC) 40 MG DR capsule  order for DME  order for DME  [START ON 8/14/2020] oxyCODONE IR (ROXICODONE) 15 MG tablet  polyethylene glycol (MIRALAX/GLYCOLAX) packet  promethazine (PHENERGAN) 25 MG tablet  promethazine (PHENERGAN) 25 MG/ML IV injection        Review of Systems   Constitutional: Negative.    Skin: Positive for wound ( Posterior right heel pressure ulcer). Negative for color change, pallor and rash.   Neurological: Negative.          Physical Exam   BP: (!) 149/94  Pulse: 114  Temp: 99.1  F (37.3  C)  Resp: 18  Weight: 81.6 kg (180 lb)  SpO2: 100 %      Physical Exam  Vitals signs and nursing note reviewed.   Constitutional:       General: She is not in acute distress.     Appearance: Normal appearance. She is well-developed. She is not ill-appearing or diaphoretic.   HENT:      Head: Normocephalic and atraumatic.      Right Ear: External ear normal.      Left Ear: External ear normal.   Eyes:      General: No scleral icterus.     Extraocular Movements: Extraocular movements intact.      Conjunctiva/sclera: Conjunctivae normal.   Neck:      Musculoskeletal: Normal range of motion and neck supple.      Trachea: No tracheal deviation.   Cardiovascular:      Rate and Rhythm: Normal rate and regular rhythm.      Pulses: Normal pulses.      Heart sounds: Normal heart sounds.   Pulmonary:      Effort: Pulmonary effort is normal. No respiratory distress.   Musculoskeletal: Normal range of motion.         General: No tenderness.      Right lower leg: No edema.      Left lower leg: No edema.        Feet:    Skin:     General: Skin is warm and dry.      Coloration: Skin is not pale.      Findings: No erythema or rash.   Neurological:      General: No focal deficit present.      Mental Status: She is alert and oriented to person, place, and time.      Coordination: Coordination normal.   Psychiatric:         Mood and Affect: Mood normal.         Behavior: Behavior  normal.         ED Course        Procedures                 No results found for this or any previous visit (from the past 24 hour(s)).    Medications - No data to display    Assessments & Plan (with Medical Decision Making)   35 year old female who has a slowly healing posterior right heel pressure ulcer after prolonged stasis and pressure to this area after right ankle fracture ORIF with hardware placement in February of this year who developed a yellow foul-smelling purulent drainage from the wound yesterday, now resolved with no further drainage today. No evidence of wound infection. She  was provided instructions for supportive care and will return as needed for worsened condition or worsening symptoms, or new problems or concerns. She will f/u with her Podiatrist in the near future.      I have reviewed the nursing notes.    I have reviewed the findings, diagnosis, plan and need for follow up with the patient.    New Prescriptions    No medications on file       Final diagnoses:   Skin ulcer of right heel, limited to breakdown of skin (H)       8/11/2020   [facility] EMERGENCY DEPARTMENT     Michael Nix MD  08/14/20 [id number]

## 2020-08-12 NOTE — ED NOTES
Pt vitals reviewed. Blood pressure and HR at baseline per patient as she takes medication that are now due. D/c instructions reviewed and received. There are no unanswered questions at the time of discharge. Pt escorted to lobby for discharge.

## 2020-08-27 NOTE — PLAN OF CARE
Patient is alert and oriented. Sba using walker. Nwb on r foot. Vss on room air. PRN pain medication for generalized chronic pain and acute gastro pain. She does her own wound care to right heel pressure wound. Alarms on.    Alert and oriented to person, place, time/situation. normal mood and affect. no apparent risk to self or others.

## 2020-09-16 ENCOUNTER — HOSPITAL ENCOUNTER (OUTPATIENT)
Dept: PHYSICAL THERAPY | Facility: CLINIC | Age: 35
Setting detail: THERAPIES SERIES
End: 2020-09-16
Attending: ORTHOPAEDIC SURGERY
Payer: MEDICARE

## 2020-09-16 DIAGNOSIS — G89.4 CHRONIC PAIN SYNDROME: Chronic | ICD-10-CM

## 2020-09-16 DIAGNOSIS — J45.40 MODERATE PERSISTENT ASTHMA WITHOUT COMPLICATION: ICD-10-CM

## 2020-09-16 PROCEDURE — 97110 THERAPEUTIC EXERCISES: CPT | Mod: GP | Performed by: PHYSICAL THERAPIST

## 2020-09-16 NOTE — TELEPHONE ENCOUNTER
"Requested Prescriptions   Pending Prescriptions Disp Refills     albuterol (VENTOLIN HFA) 108 (90 Base) MCG/ACT inhaler [Pharmacy Med Name: VENTOLIN HFA 108MCG/ACT AERS] 18 g 11     Sig: INHALE 2 PUFFS INTO THE LUNGS ONCE EVERY 4 HOURS AS NEEDED FOR SHORTNESS OF BREATH / DYSPNEA / WHEEZING       Asthma Maintenance Inhalers - Anticholinergics Passed - 9/16/2020  1:34 PM        Passed - Patient is age 12 years or older        Passed - Asthma control assessment score within normal limits in last 6 months     Please review ACT score.           Passed - Medication is active on med list        Passed - Recent (6 mo) or future (30 days) visit within the authorizing provider's specialty     Patient had office visit in the last 6 months or has a visit in the next 30 days with authorizing provider or within the authorizing provider's specialty.  See \"Patient Info\" tab in inbasket, or \"Choose Columns\" in Meds & Orders section of the refill encounter.           Short-Acting Beta Agonist Inhalers Protocol  Passed - 9/16/2020  1:34 PM        Passed - Patient is age 12 or older        Passed - Asthma control assessment score within normal limits in last 6 months     Please review ACT score.           Passed - Medication is active on med list        Passed - Recent (6 mo) or future (30 days) visit within the authorizing provider's specialty     Patient had office visit in the last 6 months or has a visit in the next 30 days with authorizing provider or within the authorizing provider's specialty.  See \"Patient Info\" tab in inbasket, or \"Choose Columns\" in Meds & Orders section of the refill encounter.               oxyCODONE IR (ROXICODONE) 15 MG tablet [Pharmacy Med Name: OXYCODONE HCL 15MG TABS] 100 tablet 0     Sig: TAKE ONE TABLET BY MOUTH EVERY 4 HOURS AS NEEDED FOR SEVERE PAIN       There is no refill protocol information for this order          "

## 2020-09-16 NOTE — PROGRESS NOTES
Outpatient Physical Therapy Progress Note     Patient: Molly Teague  : 1985    Beginning/End Dates of Reporting Period:  5/15/20 to 2020.  Total # of Rx sessions: 2    Referring Provider: Natanael Villalta Diagnosis: Pain I R Joint, Ankle and Foot. ORIF.      Client Self Report: Going to have a consult w/Ortho to discuss surgery. Depends on healing whether they will take out screws or reinforce it. Mostly wears open shoe w/ memory foam.  Pain Scale: 7/10.  Comes in w/ cane and cam walker. Pt has not been seen x 4 months. Returns today. Does have lesion on back of R Heel.     Objective Measurements:  Objective Measure: LEFS   Details: 20  Score 41/80.  INITIALLY:       Objective Measure: MMT:   Details: 20  R Foot DF 5-, Ever 5-, Inv 4+ w/ Pain. INITIALlY:  R Foot DF 4+ w/ Pain, Inv 4 w/ P, Eversion 4 w/P.     Objective Measure: AROM:   Details: 20  PF R 49, Eversion 90% of L, Inversion 50% of L. INITIALLY:  PF R 30, L 54. Eversion R 80% of L, Inversion R 25% of L. DF Gastroc R 2, L 12; Soleus R 3, L 18.     Objective Measure: Flexibility:  Details:   HS's R -25; R Gas 6, Soleus 12.  INITIALlY:  HS's R -35, L -20. DF Gastroc R 2, L 12; Soleus R 3, L 18.      Goals:  Goal Identifier 1   Goal Description STG: Pt will be able to walk short distances w/ walker w/ moderate difficulty. 20 Walking w/ cane short distances w/ a lot of difficulty.    Target Date 20   Date Met  20   Progress:     Goal Identifier 2   Goal Description STG: Pt will be able to walk between rooms w/ moderate difficulty.  20  No difficulty.    Target Date 20   Date Met  20   Progress:     Goal Identifier 3   Goal Description STG: Pt will be able to do stairs reciprocally w/crutches or walker. 20  Goes up stairs reciprically.  NOT MET    Target Date 06/26/20   Date Met      Progress:     Goal Identifier 4   Goal Description STG: Pt will be able to stand for  prolonged periods w/ moderate difficulty.  9/16/20  Unable    Target Date 06/26/20   Date Met      Progress:     Goal Identifier 5   Goal Description LTG: Pt will be independent w/ HEP and self cares to be able to improve mobility at home and in the community.    Target Date 07/10/20   Date Met      Progress:     Progress Toward Goals:   Progress this reporting period: Pt met 2/4 STG's.     Plan:  Continue therapy per current plan of care.  Changes to therapy plan of care: Portillo Treviño.     Discharge:  No  RECERTIFICATION    Molly Teague  1985    Session Number: 2/9 since start of care.    Reasons for Continuing Treatment:   Still difficulty w/ some functional mobility. Strength and ROM has improved greatly.     Frequency/Duration  1 times per week for 8 weeks for a total of 8 visits.    Recertification Period  9/16/20 - 11/18/20    Physician Signature:    Date:    X_______________________________________________________    Physician Name: Natanael Villalta     I certify the need for these services furnished under this plan of treatment and while under my care. Physician co-signature of this document indicates review and certification of the therapy plan.  This signature may be written on paper, or electronically signed within [facility].     Taniya Junior, PT, [facility] (#[id number])  [facility]           [phone]  Fax          [phone]  Appt #      [phone]

## 2020-09-17 ENCOUNTER — HOSPITAL ENCOUNTER (EMERGENCY)
Facility: CLINIC | Age: 35
Discharge: HOME | End: 2020-09-18
Attending: EMERGENCY MEDICINE | Admitting: EMERGENCY MEDICINE
Payer: MEDICARE

## 2020-09-17 ENCOUNTER — APPOINTMENT (OUTPATIENT)
Dept: GENERAL RADIOLOGY | Facility: CLINIC | Age: 35
End: 2020-09-17
Attending: EMERGENCY MEDICINE
Payer: MEDICARE

## 2020-09-17 DIAGNOSIS — K62.5 BLOOD PER RECTUM: ICD-10-CM

## 2020-09-17 DIAGNOSIS — R07.9 CHEST PAIN, UNSPECIFIED TYPE: ICD-10-CM

## 2020-09-17 DIAGNOSIS — R11.0 NAUSEA: ICD-10-CM

## 2020-09-17 DIAGNOSIS — M34.9 SCLERODERMA (H): ICD-10-CM

## 2020-09-17 LAB
ALBUMIN SERPL-MCNC: 4.5 G/DL (ref 3.4–5)
ALBUMIN UR-MCNC: NEGATIVE MG/DL
ALP SERPL-CCNC: 96 U/L (ref 40–150)
ALT SERPL W P-5'-P-CCNC: 26 U/L (ref 0–50)
ANION GAP SERPL CALCULATED.3IONS-SCNC: 8 MMOL/L (ref 3–14)
APPEARANCE UR: CLEAR
AST SERPL W P-5'-P-CCNC: 33 U/L (ref 0–45)
BASOPHILS # BLD AUTO: 0.1 10E9/L (ref 0–0.2)
BASOPHILS NFR BLD AUTO: 1 %
BILIRUB SERPL-MCNC: 0.4 MG/DL (ref 0.2–1.3)
BILIRUB UR QL STRIP: NEGATIVE
BUN SERPL-MCNC: 23 MG/DL (ref 7–30)
CALCIUM SERPL-MCNC: 9.4 MG/DL (ref 8.5–10.1)
CHLORIDE SERPL-SCNC: 108 MMOL/L (ref 94–109)
CO2 SERPL-SCNC: 22 MMOL/L (ref 20–32)
COLOR UR AUTO: NORMAL
CREAT SERPL-MCNC: 1.06 MG/DL (ref 0.52–1.04)
DIFFERENTIAL METHOD BLD: ABNORMAL
EOSINOPHIL # BLD AUTO: 0.3 10E9/L (ref 0–0.7)
EOSINOPHIL NFR BLD AUTO: 2.3 %
ERYTHROCYTE [DISTWIDTH] IN BLOOD BY AUTOMATED COUNT: 15.7 % (ref 10–15)
GFR SERPL CREATININE-BSD FRML MDRD: 68 ML/MIN/{1.73_M2}
GLUCOSE SERPL-MCNC: 87 MG/DL (ref 70–99)
GLUCOSE UR STRIP-MCNC: NEGATIVE MG/DL
HCT VFR BLD AUTO: 38.6 % (ref 35–47)
HEMOCCULT STL QL: NEGATIVE
HGB BLD-MCNC: 11.7 G/DL (ref 11.7–15.7)
HGB UR QL STRIP: NEGATIVE
IMM GRANULOCYTES # BLD: 0.1 10E9/L (ref 0–0.4)
IMM GRANULOCYTES NFR BLD: 0.4 %
INTERNAL QC OK POCT: YES
KETONES UR STRIP-MCNC: NEGATIVE MG/DL
LEUKOCYTE ESTERASE UR QL STRIP: NEGATIVE
LIPASE SERPL-CCNC: 128 U/L (ref 73–393)
LYMPHOCYTES # BLD AUTO: 2.4 10E9/L (ref 0.8–5.3)
LYMPHOCYTES NFR BLD AUTO: 19.5 %
MCH RBC QN AUTO: 23.6 PG (ref 26.5–33)
MCHC RBC AUTO-ENTMCNC: 30.3 G/DL (ref 31.5–36.5)
MCV RBC AUTO: 78 FL (ref 78–100)
MONOCYTES # BLD AUTO: 0.6 10E9/L (ref 0–1.3)
MONOCYTES NFR BLD AUTO: 4.6 %
NEUTROPHILS # BLD AUTO: 9 10E9/L (ref 1.6–8.3)
NEUTROPHILS NFR BLD AUTO: 72.2 %
NITRATE UR QL: NEGATIVE
NRBC # BLD AUTO: 0 10*3/UL
NRBC BLD AUTO-RTO: 0 /100
PH UR STRIP: 6 PH (ref 5–7)
PLATELET # BLD AUTO: 405 10E9/L (ref 150–450)
POTASSIUM SERPL-SCNC: 3.9 MMOL/L (ref 3.4–5.3)
PROT SERPL-MCNC: 9.3 G/DL (ref 6.8–8.8)
RBC # BLD AUTO: 4.96 10E12/L (ref 3.8–5.2)
SODIUM SERPL-SCNC: 138 MMOL/L (ref 133–144)
SOURCE: NORMAL
SP GR UR STRIP: 1.01 (ref 1–1.03)
TEST CARD LOT NUMBER: NORMAL
TROPONIN I SERPL-MCNC: <0.015 UG/L (ref 0–0.04)
UROBILINOGEN UR STRIP-MCNC: 0 MG/DL (ref 0–2)
WBC # BLD AUTO: 12.4 10E9/L (ref 4–11)

## 2020-09-17 PROCEDURE — 96361 HYDRATE IV INFUSION ADD-ON: CPT | Performed by: EMERGENCY MEDICINE

## 2020-09-17 PROCEDURE — 96375 TX/PRO/DX INJ NEW DRUG ADDON: CPT | Performed by: EMERGENCY MEDICINE

## 2020-09-17 PROCEDURE — 25800030 ZZH RX IP 258 OP 636: Performed by: EMERGENCY MEDICINE

## 2020-09-17 PROCEDURE — 25000128 H RX IP 250 OP 636: Performed by: EMERGENCY MEDICINE

## 2020-09-17 PROCEDURE — 85025 COMPLETE CBC W/AUTO DIFF WBC: CPT | Performed by: EMERGENCY MEDICINE

## 2020-09-17 PROCEDURE — 93010 ELECTROCARDIOGRAM REPORT: CPT | Mod: Z6 | Performed by: EMERGENCY MEDICINE

## 2020-09-17 PROCEDURE — 83690 ASSAY OF LIPASE: CPT | Performed by: EMERGENCY MEDICINE

## 2020-09-17 PROCEDURE — 80053 COMPREHEN METABOLIC PANEL: CPT | Performed by: EMERGENCY MEDICINE

## 2020-09-17 PROCEDURE — 81003 URINALYSIS AUTO W/O SCOPE: CPT | Performed by: EMERGENCY MEDICINE

## 2020-09-17 PROCEDURE — 99285 EMERGENCY DEPT VISIT HI MDM: CPT | Mod: 25 | Performed by: EMERGENCY MEDICINE

## 2020-09-17 PROCEDURE — 71046 X-RAY EXAM CHEST 2 VIEWS: CPT

## 2020-09-17 PROCEDURE — 96374 THER/PROPH/DIAG INJ IV PUSH: CPT | Performed by: EMERGENCY MEDICINE

## 2020-09-17 PROCEDURE — 93005 ELECTROCARDIOGRAM TRACING: CPT | Performed by: EMERGENCY MEDICINE

## 2020-09-17 PROCEDURE — 82272 OCCULT BLD FECES 1-3 TESTS: CPT | Performed by: EMERGENCY MEDICINE

## 2020-09-17 PROCEDURE — 84484 ASSAY OF TROPONIN QUANT: CPT | Performed by: EMERGENCY MEDICINE

## 2020-09-17 PROCEDURE — 96376 TX/PRO/DX INJ SAME DRUG ADON: CPT | Performed by: EMERGENCY MEDICINE

## 2020-09-17 RX ORDER — ALBUTEROL SULFATE 90 UG/1
AEROSOL, METERED RESPIRATORY (INHALATION)
Qty: 18 G | Refills: 11 | Status: SHIPPED | OUTPATIENT
Start: 2020-09-17 | End: 2021-11-10

## 2020-09-17 RX ORDER — PROMETHAZINE HYDROCHLORIDE 25 MG/ML
25 INJECTION INTRAMUSCULAR; INTRAVENOUS ONCE
Status: DISCONTINUED | OUTPATIENT
Start: 2020-09-17 | End: 2020-09-17

## 2020-09-17 RX ORDER — HYDROMORPHONE HYDROCHLORIDE 1 MG/ML
0.5 INJECTION, SOLUTION INTRAMUSCULAR; INTRAVENOUS; SUBCUTANEOUS ONCE
Status: COMPLETED | OUTPATIENT
Start: 2020-09-17 | End: 2020-09-17

## 2020-09-17 RX ORDER — HYDROMORPHONE HYDROCHLORIDE 1 MG/ML
0.3 INJECTION, SOLUTION INTRAMUSCULAR; INTRAVENOUS; SUBCUTANEOUS ONCE
Status: COMPLETED | OUTPATIENT
Start: 2020-09-17 | End: 2020-09-17

## 2020-09-17 RX ORDER — OXYCODONE HYDROCHLORIDE 15 MG/1
TABLET ORAL
Qty: 100 TABLET | Refills: 0 | Status: SHIPPED | OUTPATIENT
Start: 2020-09-17 | End: 2020-10-15

## 2020-09-17 RX ADMIN — HYDROMORPHONE HYDROCHLORIDE 0.3 MG: 1 INJECTION, SOLUTION INTRAMUSCULAR; INTRAVENOUS; SUBCUTANEOUS at 20:56

## 2020-09-17 RX ADMIN — SODIUM CHLORIDE 500 ML: 9 INJECTION, SOLUTION INTRAVENOUS at 23:39

## 2020-09-17 RX ADMIN — SODIUM CHLORIDE, POTASSIUM CHLORIDE, SODIUM LACTATE AND CALCIUM CHLORIDE 1000 ML: 600; 310; 30; 20 INJECTION, SOLUTION INTRAVENOUS at 20:57

## 2020-09-17 RX ADMIN — PROMETHAZINE HYDROCHLORIDE 25 MG: 25 INJECTION INTRAMUSCULAR; INTRAVENOUS at 21:06

## 2020-09-17 RX ADMIN — HYDROMORPHONE HYDROCHLORIDE 0.5 MG: 1 INJECTION, SOLUTION INTRAMUSCULAR; INTRAVENOUS; SUBCUTANEOUS at 22:11

## 2020-09-17 NOTE — ED AVS SNAPSHOT
[facility] Emergency Department  [address] [address]  Phone:  [phone]  Fax:  [phone]                                    Molly Teague   MRN: [id number]    Department:  [facility] Emergency Department   Date of Visit:  9/17/2020           After Visit Summary Signature Page    I have received my discharge instructions, and my questions have been answered. I have discussed any challenges I see with this plan with the nurse or doctor.    ..........................................................................................................................................  Patient/Patient Representative Signature      ..........................................................................................................................................  Patient Representative Print Name and Relationship to Patient    ..................................................               ................................................  Date                                   Time    ..........................................................................................................................................  Reviewed by Signature/Title    ...................................................              ..............................................  Date                                               Time          22EPIC Rev 08/18

## 2020-09-18 VITALS
HEART RATE: 100 BPM | OXYGEN SATURATION: 100 % | TEMPERATURE: 99.8 F | SYSTOLIC BLOOD PRESSURE: 134 MMHG | RESPIRATION RATE: 18 BRPM | DIASTOLIC BLOOD PRESSURE: 82 MMHG

## 2020-09-18 NOTE — ED NOTES
Here with nausea & vomiting for past 2 days, Has hx of severe anemia along with GERD & feeling some chest tightness/burning. C/o blood in her stool but states small amounts are normal for her, has had scopes in the past r/t her scleroderma & had this worked up by PCP. Feeling dizzy constantly, states UOP has been on low side & she hasn't been able to take any of her pills today & is in a lot of pain.

## 2020-09-18 NOTE — ED NOTES
Nausea resolved after phenergan, still having significant generalized pain, states her tolerance is too high, MD updated

## 2020-09-18 NOTE — DISCHARGE INSTRUCTIONS
Return if symptoms worsen or new symptoms develop.  Follow-up with primary care physician next available.  Drink plenty of fluids.  If increased fevers chills vomiting shortness of breath abdominal pain back pain or other symptoms present please return for further evaluation and care.  We discussed possible CT scan of the abdomen but you have had these recently and did not want another one.  Continue current medications.

## 2020-09-18 NOTE — ED NOTES
Pt reports pain is manageable and nausea improved. Pt denies any needs at this time. Will continue to observe.

## 2020-09-19 NOTE — ED PROVIDER NOTES
History     Chief Complaint   Patient presents with     Rectal Bleeding     Respiratory Problems     Patient has chest tightness HX of Sclleraderma   Has been using using inhaler often      Dizziness     Nausea & Vomiting     HPI  Molly Teague is a 35 year old female with a history of scleroderma who presents to the emergency department with nausea vomiting, chest tightness and rectal bleeding.  Patient states symptoms have been present for the past 2 days.  Patient has been having nausea with vomiting several times.  She has been feeling some chest tightness and feels this is more likely secondary to GERD but also has had a small amount of blood in her stool.  Patient has not been able to keep much fluids down is feeling lightheaded she states urine output has been low and she has not been able to take her pain pills for scleroderma and therefore is having significant pain.  She has not had a fever.  She denies any headache.  She has not had any visual changes.  She does not feel significantly short of breath.  She denies any acute back pain but has chronic back pain.  She denies any change in focal numbness weakness in her extremities.  She has not had a rash.  She is having normal bowel movements although there has been some blood in them which is typical.    Allergies:  Allergies   Allergen Reactions     Blood Transfusion Related (Informational Only) Other (See Comments)     Patient has a history of a clinically significant antibody against RBC antigens.  A delay in compatible RBCs may occur.     No Known Allergies      Pollen Extract      Seasonal Allergies      Shellfish-Derived Products Nausea and Rash     Rash on face       Problem List:    Patient Active Problem List    Diagnosis Date Noted     Iron deficiency anemia due to chronic blood loss 08/04/2020     Priority: Medium     Vomiting and diarrhea 03/25/2020     Priority: Medium     Closed right ankle fracture 02/11/2020     Priority: Medium     S/P  ORIF (open reduction internal fixation) fracture 02/10/2020     Priority: Medium     CKD (chronic kidney disease) stage 3, GFR 30-59 ml/min (H) 01/31/2020     Priority: Medium     Sinus tachycardia 01/31/2020     Priority: Medium     Ankle fracture, right, closed, initial encounter 01/30/2020     Priority: Medium     Added automatically from request for surgery [id number]       Diplopia 01/25/2020     Priority: Medium     Anemia, chronic disease 11/25/2019     Priority: Medium     Mirena IUD- Remove by 09/2024 09/06/2019     Priority: Medium     Lot: [id number]  Exp: 01/2022    Dinora Israel LPN         Malignant essential hypertension 07/24/2019     Priority: Medium     PTSD (post-traumatic stress disorder) 06/19/2019     Priority: Medium     Obesity (BMI 35.0-39.9) with comorbidity (H) 01/22/2019     Priority: Medium     Moderate major depression (H) 07/06/2018     Priority: Medium     Severe persistent asthma without complication 07/06/2018     Priority: Medium     On high dose ICS/LABA + frequent albuterol use.  Next allergy/pulmonary referral       Aspiration of food 03/29/2018     Priority: Medium     Chronic pain syndrome 04/26/2017     Priority: Medium     Patient is followed by Grecia Barba DO for ongoing prescription of pain medication.  All refills should only be approved by this provider, or covering partner.    Medication(s): Oxycodone 15 mg.   Maximum quantity per month: 100  Clinic visit frequency required: Q 2 months     Controlled substance agreement:  Encounter-Level CSA - 04/26/2017:    Controlled Substance Agreement - Scan on 5/4/2017  8:58 AM: CONTROLLED SUBSTANCE AGREEMENT (below)       Patient-Level CSA:    Controlled Substance Agreement - Opioid - Scan on 2/6/2019  6:23 PM (below)         Pain Clinic evaluation in the past: No    DIRE Total Score(s):  No flowsheet data found.    Last [facility] website verification:  done on 4/26/17    9/26/2017  7/6/2018  10/16/2018  1/22/2019  8/2/2019           [email or web address]         Chronic migraine without aura without status migrainosus, not intractable 04/12/2017     Priority: Medium     With medication overuse.  Fioricet and not Imitrex is recommend to treat severe headache.  2/2017 [place] recommend wean down from daily Fioricet and use Excedrin Migrain PRN.       AIN grade I 03/30/2017     Priority: Medium     Found at [facility] on colonoscopy 2/2017.  Recommend repeat anoscopy and anal pap in 6/2017.       Gastroesophageal reflux disease with esophagitis 03/30/2017     Priority: Medium     EGD done at [facility] 2/2017 showed esophagitis without GAVE.  Recommend max dose H2 and PPI, along with 4 tablets of Carafate dissolved in water and drink throughout the day.    Had LA Grade D esophagitis, on TID PPI       Limited systemic sclerosis (H) 01/25/2017     Priority: Medium     Raynaud's, calcinosis, telangiectasias, peripheral neuropathy, GERD symptoms, history of scleroderma renal crisis.  Saw [place] 1/2017 and follows with Rheum Dr. Davis locally.       Polyneuropathy associated with underlying disease (H) 01/25/2017     Priority: Medium     Due to scleroderma and neurology thought possible due to ICU (critical illness neuropathy).  Recommend to max out dose of gabapentin to 8566-5657 mg/day, split BID or TID.  EMG 1/2017 positive for neuropathy       History of Clostridium difficile 11/29/2016     Priority: Medium     History of Helicobacter pylori infection 11/29/2016     Priority: Medium     Scleroderma with renal involvement (H) 11/09/2016     Priority: Medium     Needs lisinopril long term       History of ARDS 11/09/2016     Priority: Medium     4/2015, prolonged ICU/vent/trach due to influenza, scleroderma renal crisis requiring hemodialysis.       Raynaud's disease without gangrene 11/09/2016     Priority: Medium     History of hemodialysis 11/09/2016     Priority: Medium     4/2015 due to  scleroderma renal crisis       Bilateral retinitis 2016     Priority: Medium     History of pulmonary embolism 2016     Priority: Medium     Completed anti-coagulation .  pulmonary embolism due to critical illness       REX (generalized anxiety disorder) 2016     Priority: Medium     CREST (calcinosis, Raynaud's phenomenon, esophageal dysfunction, sclerodactyly, telangiectasia) (H) 2016     Priority: Medium     Scleroderma (H) 2016     Priority: Medium        Past Medical History:    Past Medical History:   Diagnosis Date     Acute pyelonephritis 2017     Altered mental state 3/29/2018     Arthritis      Blood clotting disorder (H)      History of blood transfusion      Hypertension      Long-term (current) use of anticoagulants [Z79.01] 2016     PE (pulmonary embolism) 2016     Rheumatism      Scleroderma (H) 2016     Uncomplicated asthma        Past Surgical History:    Past Surgical History:   Procedure Laterality Date     ANGIOGRAM        SECTION  2014     OPEN REDUCTION INTERNAL FIXATION ANKLE Right 2/10/2020    Procedure: Right ankle open reduction internal fixation;  Surgeon: Natanael Villalta MD;  Location: [facility] OR     THROAT SURGERY         Family History:    Family History   Problem Relation Age of Onset     Hyperlipidemia Father      Cerebrovascular Disease Maternal Grandmother          of a stroke     Hyperlipidemia Paternal Grandfather      Depression Sister      Fibromyalgia Sister      Diabetes Maternal Aunt      Melanoma No family hx of      Skin Cancer No family hx of        Social History:  Marital Status:  Single [1]  Social History     Tobacco Use     Smoking status: Never Smoker     Smokeless tobacco: Never Used   Substance Use Topics     Alcohol use: Yes     Comment: Socially once on a weekend if any     Drug use: No     Comment: None        Medications:    acetaminophen (TYLENOL) 325 MG tablet  albuterol (VENTOLIN HFA)  108 (90 Base) MCG/ACT inhaler  amLODIPine (NORVASC) 5 MG tablet  blood glucose (NO BRAND SPECIFIED) lancets standard  blood glucose (NO BRAND SPECIFIED) test strip  budesonide-formoterol (SYMBICORT) 160-4.5 MCG/ACT Inhaler  busPIRone HCl (BUSPAR) 30 MG tablet  Cyanocobalamin (B-12) 1000 MCG TBCR  DULoxetine (CYMBALTA) 30 MG capsule  famotidine (PEPCID) 20 MG tablet  folic acid (FOLVITE) 1 MG tablet  gabapentin (NEURONTIN) 300 MG capsule  GOODSENSE MIGRAINE FORMULA 250-250-65 MG per tablet  hydrOXYzine (ATARAX) 25 MG tablet  lisinopril (ZESTRIL) 10 MG tablet  LORazepam (ATIVAN) 1 MG tablet  montelukast (SINGULAIR) 10 MG tablet  naloxone (NARCAN) 4 MG/0.1ML nasal spray  omeprazole (PRILOSEC) 40 MG DR capsule  order for DME  order for DME  oxyCODONE IR (ROXICODONE) 15 MG tablet  polyethylene glycol (MIRALAX/GLYCOLAX) packet  promethazine (PHENERGAN) 25 MG tablet  promethazine (PHENERGAN) 25 MG/ML IV injection          Review of Systems  All symptoms reviewed and other than pertinent positives and negatives in HPI all other symptoms are negative.  Physical Exam   BP: 137/85  Pulse: 121  Temp: 99.8  F (37.7  C)  Resp: 18  SpO2: (!) 86 %      Physical Exam  Nursing note reviewed.   Constitutional:       Appearance: Normal appearance. She is ill-appearing. She is not toxic-appearing.      Comments: Mild generalized distress   HENT:      Head: Normocephalic.      Nose: Nose normal.      Mouth/Throat:      Mouth: Mucous membranes are moist.      Pharynx: Oropharynx is clear.   Eyes:      Conjunctiva/sclera: Conjunctivae normal.   Neck:      Musculoskeletal: Normal range of motion and neck supple.      Comments: There is no JVD noted.  Cardiovascular:      Rate and Rhythm: Normal rate and regular rhythm.      Pulses: Normal pulses.      Heart sounds: Normal heart sounds.   Pulmonary:      Effort: Pulmonary effort is normal.      Breath sounds: Normal breath sounds.   Abdominal:      General: Abdomen is flat. Bowel sounds are  normal. There is no distension.      Palpations: Abdomen is soft.      Tenderness: There is no abdominal tenderness.   Genitourinary:     Comments: No external rectal lesions.  Normal rectal tone.  No masses heme-negative stool  Musculoskeletal:      Comments: There is no midline back tenderness.  Hands have sclerotic changes and have contractures.  No peripheral edema is noted significantly.  Calves are nontender.   Skin:     General: Skin is warm and dry.      Findings: No rash.   Neurological:      General: No focal deficit present.      Mental Status: She is alert and oriented to person, place, and time.      Motor: No weakness.      Coordination: Coordination normal.   Psychiatric:         Mood and Affect: Mood normal.         ED Course        Procedures                  EKG Interpretation:      Interpreted by Shane Ocampo MD  Rhythm: Normal sinus   Rate: Normal  Axis: Normal  Ectopy: None  Conduction: Normal  ST Segments/ T Waves: No ST-T wave changes and No acute ischemic changes  Q Waves: None  Comparison to prior: Unchanged from 1/25/20    Clinical Impression: Normal EKG.    Critical Care time:  none               Labs Ordered and Resulted from Time of ED Arrival Up to the Time of Departure from the ED   CBC WITH PLATELETS DIFFERENTIAL - Abnormal; Notable for the following components:       Result Value    WBC 12.4 (*)     MCH 23.6 (*)     MCHC 30.3 (*)     RDW 15.7 (*)     Absolute Neutrophil 9.0 (*)     All other components within normal limits   COMPREHENSIVE METABOLIC PANEL - Abnormal; Notable for the following components:    Creatinine 1.06 (*)     Protein Total 9.3 (*)     All other components within normal limits   URINE MACROSCOPIC WITH REFLEX TO MICRO   TROPONIN I   LIPASE   OCCULT BLOOD STOOL POCT       Medications   HYDROmorphone (PF) (DILAUDID) injection 0.3 mg (0.3 mg Intravenous Given 9/17/20 2056)   lactated ringers BOLUS 1,000 mL (0 mLs Intravenous Stopped 9/17/20 2154)    HYDROmorphone (PF) (DILAUDID) injection 0.5 mg (0.5 mg Intravenous Given 9/17/20 2211)   0.9% sodium chloride BOLUS (0 mLs Intravenous Stopped 9/18/20 0000)     Results for orders placed or performed during the hospital encounter of 09/17/20   Chest XR,  PA & LAT    Narrative    EXAM: XR CHEST 2 VW  LOCATION: [facility]  DATE/TIME: 9/17/2020 10:25 PM    INDICATION: Abdominal pain.  COMPARISON: None.      Impression    IMPRESSION: Negative chest.         Assessments & Plan (with Medical Decision Making) records were reviewed.  Labs were obtained.  Patient was given Phenergan IV and Dilaudid for pain.  EKG revealed a normal EKG.  Patient's white count was elevated at 12.4.  Hemoglobin was 11.7 which is improved.  There was a mild left shift.  Comprehensive metabolic panel without significant abnormality.  Troponin was unremarkable.  UA was unremarkable.  Lipase was within normal limits.  Patient's nausea improved but still feeling generalized pain.  A second dose of Dilaudid was given and patient with improvement of her symptoms.  A chest x-ray revealed no obvious infiltrate or other abnormality.  Findings were discussed in detail with the patient she is feeling better after the fluids pain meds and nausea medication.  I discussed possible CT scan abdomen pelvis with the patient due to her nausea and vomiting but patient is not having any pain on examination and does not feel this is needed at this time.  She understands if any fevers worsening chest pain shortness of breath weakness or other symptoms present she should return for further evaluation and care.  I discussed possible chest CT with patient but I do not feel PE is possible as she is not tachypneic or tachycardic has no shortness of breath and satting between 97/99% on room air.  Patient is agreement with this and feels comfortable going home at this time.     I have reviewed the nursing notes.    I have reviewed the findings, diagnosis,  plan and need for follow up with the patient.       Discharge Medication List as of 9/18/2020 12:24 AM          Final diagnoses:   Chest pain, unspecified type   Nausea   Blood per rectum   Scleroderma (H) - pain       9/17/2020   [facility] EMERGENCY DEPARTMENT     Shane Ocampo MD  09/19/20 0551

## 2020-09-20 ENCOUNTER — TELEPHONE (OUTPATIENT)
Dept: INTERNAL MEDICINE | Facility: CLINIC | Age: 35
End: 2020-09-20

## 2020-09-20 NOTE — TELEPHONE ENCOUNTER
Patient has Iron infusion scheduled for 9/21.  Most recent Hemoglobin is normal, so Venofer infusion is not needed.  Recommend recheck iron studies in 2 months

## 2020-09-21 NOTE — TELEPHONE ENCOUNTER
Patient is called and told of no need for the iron infusion.  Joanne in 2 months. Reyna BAEZA RN

## 2020-10-15 ENCOUNTER — TELEPHONE (OUTPATIENT)
Dept: INTERNAL MEDICINE | Facility: CLINIC | Age: 35
End: 2020-10-15

## 2020-10-15 DIAGNOSIS — G89.4 CHRONIC PAIN SYNDROME: Chronic | ICD-10-CM

## 2020-10-15 RX ORDER — OXYCODONE HYDROCHLORIDE 15 MG/1
TABLET ORAL
Qty: 100 TABLET | Refills: 0 | Status: SHIPPED | OUTPATIENT
Start: 2020-10-15 | End: 2020-11-19

## 2020-10-15 NOTE — TELEPHONE ENCOUNTER
Panel Management Review      Patient has the following on her problem list:     Depression / Dysthymia review    Measure:  Needs PHQ-9 score of 4 or less during index window.  Administer PHQ-9 and if score is 5 or more, send encounter to provider for next steps.    5 - 7 month window range: 8/18/20 to 12/16/20    PHQ-9 SCORE 2/6/2020 4/7/2020 8/4/2020   PHQ-9 Total Score MyChart - 16 (Moderately severe depression) -   PHQ-9 Total Score 20 16 9   PHQ-A Total Score 19 - -       If PHQ-9 recheck is 5 or more, route to provider for next steps.    Patient is due for:  PHQ9      Action needed:   Patient needs to do PHQ9.    Type of outreach:    Sent unrival message.    Questions for provider review:    None                                                                                                                                    Sunni Guzman CMA (AAMA)   (aka: Jenifer Guzman)       Chart routed to Care Team .

## 2020-10-28 ENCOUNTER — TELEPHONE (OUTPATIENT)
Dept: INTERNAL MEDICINE | Facility: CLINIC | Age: 35
End: 2020-10-28

## 2020-10-28 DIAGNOSIS — M34.9 SCLERODERMA (H): Primary | Chronic | ICD-10-CM

## 2020-10-28 NOTE — TELEPHONE ENCOUNTER
I called pt to clarify since it appears that her last order for PT of hand was 10/17/18.    Pt says she completed therapy but needs renewal to start again.    Bilateral hand therapy for CREST and bilateral hand pain.    Jia Oneill RN

## 2020-10-28 NOTE — TELEPHONE ENCOUNTER
Reason for Call: Request for an order:    Order being requested: Hand Therapy at [facility]    Date needed: as soon as possible    Has the patient been seen by the PCP for this problem? YES    Additional comments: Previous order     Phone number Patient can be reached at:  Home number on file [phone] (home)    Best Time:  Any    Can we leave a detailed message on this number?  YES    Call taken on 10/28/2020 at 9:17 AM by Laya Burger

## 2020-10-29 NOTE — TELEPHONE ENCOUNTER
(2nd attempt) Left a voice msg for pt to call back or check my-chart.  Whenever she calls back transfer to the care team to perform PHQ9/GAD7.  Sunni Guzman CMA (ARCELIA)   (aka: Jenifer Guzman)

## 2020-11-03 DIAGNOSIS — R11.0 NAUSEA: ICD-10-CM

## 2020-11-04 RX ORDER — PROMETHAZINE HYDROCHLORIDE 25 MG/ML
INJECTION, SOLUTION INTRAMUSCULAR; INTRAVENOUS
Refills: 4 | OUTPATIENT
Start: 2020-11-04

## 2020-11-04 RX ORDER — PROMETHAZINE HYDROCHLORIDE 25 MG/ML
INJECTION, SOLUTION INTRAMUSCULAR; INTRAVENOUS
Qty: 25 ML | Refills: 4 | Status: SHIPPED | OUTPATIENT
Start: 2020-11-04 | End: 2021-03-30

## 2020-11-04 NOTE — TELEPHONE ENCOUNTER
"Requested Prescriptions   Pending Prescriptions Disp Refills     promethazine (PHENERGAN) 25 MG/ML IV injection [Pharmacy Med Name: PROMETHAZINE HCL 25MG/ML SOLN]  4     Sig: INJECT 1 ML INTRAMUSCULARLY EVERY 6 HOURS AS NEEDED        Antivertigo/Antiemetic Agents Passed - 11/3/2020  5:31 PM        Passed - Recent (12 mo) or future (30 days) visit within the authorizing provider's specialty     Patient has had an office visit with the authorizing provider or a provider within the authorizing providers department within the previous 12 mos or has a future within next 30 days. See \"Patient Info\" tab in inbasket, or \"Choose Columns\" in Meds & Orders section of the refill encounter.              Passed - Medication is active on med list        Passed - Patient is 18 years of age or older             "

## 2020-11-10 DIAGNOSIS — D50.0 IRON DEFICIENCY ANEMIA DUE TO CHRONIC BLOOD LOSS: ICD-10-CM

## 2020-11-10 DIAGNOSIS — M34.1 CREST (CALCINOSIS, RAYNAUD'S PHENOMENON, ESOPHAGEAL DYSFUNCTION, SCLERODACTYLY, TELANGIECTASIA) (H): ICD-10-CM

## 2020-11-10 DIAGNOSIS — Z11.52 ENCOUNTER FOR SCREENING LABORATORY TESTING FOR COVID-19 VIRUS: Primary | ICD-10-CM

## 2020-11-10 LAB
ANION GAP SERPL CALCULATED.3IONS-SCNC: 9 MMOL/L (ref 3–14)
BUN SERPL-MCNC: 17 MG/DL (ref 7–30)
CALCIUM SERPL-MCNC: 9.4 MG/DL (ref 8.5–10.1)
CHLORIDE SERPL-SCNC: 104 MMOL/L (ref 94–109)
CO2 SERPL-SCNC: 23 MMOL/L (ref 20–32)
CREAT SERPL-MCNC: 1.32 MG/DL (ref 0.52–1.04)
ERYTHROCYTE [DISTWIDTH] IN BLOOD BY AUTOMATED COUNT: 17.7 % (ref 10–15)
GFR SERPL CREATININE-BSD FRML MDRD: 52 ML/MIN/{1.73_M2}
GLUCOSE SERPL-MCNC: 61 MG/DL (ref 70–99)
HCT VFR BLD AUTO: 36.4 % (ref 35–47)
HGB BLD-MCNC: 10.8 G/DL (ref 11.7–15.7)
IRON SATN MFR SERPL: 5 % (ref 15–46)
IRON SERPL-MCNC: 21 UG/DL (ref 35–180)
MCH RBC QN AUTO: 22.7 PG (ref 26.5–33)
MCHC RBC AUTO-ENTMCNC: 29.7 G/DL (ref 31.5–36.5)
MCV RBC AUTO: 77 FL (ref 78–100)
PLATELET # BLD AUTO: 365 10E9/L (ref 150–450)
POTASSIUM SERPL-SCNC: 4.3 MMOL/L (ref 3.4–5.3)
RBC # BLD AUTO: 4.75 10E12/L (ref 3.8–5.2)
SODIUM SERPL-SCNC: 136 MMOL/L (ref 133–144)
TIBC SERPL-MCNC: 396 UG/DL (ref 240–430)
WBC # BLD AUTO: 11.6 10E9/L (ref 4–11)

## 2020-11-10 PROCEDURE — 80048 BASIC METABOLIC PNL TOTAL CA: CPT | Performed by: INTERNAL MEDICINE

## 2020-11-10 PROCEDURE — 36415 COLL VENOUS BLD VENIPUNCTURE: CPT | Performed by: INTERNAL MEDICINE

## 2020-11-10 PROCEDURE — 85027 COMPLETE CBC AUTOMATED: CPT | Performed by: INTERNAL MEDICINE

## 2020-11-10 PROCEDURE — 83550 IRON BINDING TEST: CPT | Performed by: INTERNAL MEDICINE

## 2020-11-10 PROCEDURE — 83540 ASSAY OF IRON: CPT | Performed by: INTERNAL MEDICINE

## 2020-11-10 RX ORDER — HEPARIN SODIUM (PORCINE) LOCK FLUSH IV SOLN 100 UNIT/ML 100 UNIT/ML
5 SOLUTION INTRAVENOUS
Status: CANCELLED | OUTPATIENT
Start: 2020-11-10

## 2020-11-10 RX ORDER — HEPARIN SODIUM,PORCINE 10 UNIT/ML
5 VIAL (ML) INTRAVENOUS
Status: CANCELLED | OUTPATIENT
Start: 2020-11-10

## 2020-11-10 NOTE — RESULT ENCOUNTER NOTE
The kidney function is slightly worse than 1 month ago.  The electrolytes are normal.  The blood sugar is slightly low.  There is ongoing iron deficiency anemia.  There are orders in for iron infusion to be done at the infusion center.  A Covid test will need to be done a few days prior to having the infusions

## 2020-11-11 DIAGNOSIS — Z11.52 ENCOUNTER FOR SCREENING LABORATORY TESTING FOR COVID-19 VIRUS: ICD-10-CM

## 2020-11-11 PROCEDURE — U0003 INFECTIOUS AGENT DETECTION BY NUCLEIC ACID (DNA OR RNA); SEVERE ACUTE RESPIRATORY SYNDROME CORONAVIRUS 2 (SARS-COV-2) (CORONAVIRUS DISEASE [COVID-19]), AMPLIFIED PROBE TECHNIQUE, MAKING USE OF HIGH THROUGHPUT TECHNOLOGIES AS DESCRIBED BY CMS-2020-01-R: HCPCS | Performed by: INTERNAL MEDICINE

## 2020-11-12 LAB
SARS-COV-2 RNA SPEC QL NAA+PROBE: NOT DETECTED
SPECIMEN SOURCE: NORMAL

## 2020-11-13 ENCOUNTER — HOSPITAL ENCOUNTER (EMERGENCY)
Facility: CLINIC | Age: 35
Discharge: HOME | End: 2020-11-13
Attending: EMERGENCY MEDICINE | Admitting: EMERGENCY MEDICINE
Payer: MEDICARE

## 2020-11-13 VITALS
BODY MASS INDEX: 32.92 KG/M2 | HEART RATE: 94 BPM | DIASTOLIC BLOOD PRESSURE: 85 MMHG | RESPIRATION RATE: 16 BRPM | OXYGEN SATURATION: 99 % | WEIGHT: 180 LBS | SYSTOLIC BLOOD PRESSURE: 130 MMHG | TEMPERATURE: 99 F

## 2020-11-13 DIAGNOSIS — D50.9 IRON DEFICIENCY ANEMIA, UNSPECIFIED IRON DEFICIENCY ANEMIA TYPE: ICD-10-CM

## 2020-11-13 LAB
ALBUMIN SERPL-MCNC: 4.4 G/DL (ref 3.4–5)
ALP SERPL-CCNC: 103 U/L (ref 40–150)
ALT SERPL W P-5'-P-CCNC: 24 U/L (ref 0–50)
ANION GAP SERPL CALCULATED.3IONS-SCNC: 8 MMOL/L (ref 3–14)
AST SERPL W P-5'-P-CCNC: 30 U/L (ref 0–45)
BASOPHILS # BLD AUTO: 0.1 10E9/L (ref 0–0.2)
BASOPHILS NFR BLD AUTO: 1 %
BILIRUB SERPL-MCNC: 0.4 MG/DL (ref 0.2–1.3)
BUN SERPL-MCNC: 14 MG/DL (ref 7–30)
CALCIUM SERPL-MCNC: 9.2 MG/DL (ref 8.5–10.1)
CHLORIDE SERPL-SCNC: 105 MMOL/L (ref 94–109)
CO2 SERPL-SCNC: 24 MMOL/L (ref 20–32)
CREAT SERPL-MCNC: 1.21 MG/DL (ref 0.52–1.04)
DIFFERENTIAL METHOD BLD: ABNORMAL
EOSINOPHIL # BLD AUTO: 0.3 10E9/L (ref 0–0.7)
EOSINOPHIL NFR BLD AUTO: 2.8 %
ERYTHROCYTE [DISTWIDTH] IN BLOOD BY AUTOMATED COUNT: 17.6 % (ref 10–15)
GFR SERPL CREATININE-BSD FRML MDRD: 58 ML/MIN/{1.73_M2}
GLUCOSE SERPL-MCNC: 107 MG/DL (ref 70–99)
HCT VFR BLD AUTO: 33.2 % (ref 35–47)
HGB BLD-MCNC: 9.8 G/DL (ref 11.7–15.7)
IMM GRANULOCYTES # BLD: 0 10E9/L (ref 0–0.4)
IMM GRANULOCYTES NFR BLD: 0.4 %
LYMPHOCYTES # BLD AUTO: 2.5 10E9/L (ref 0.8–5.3)
LYMPHOCYTES NFR BLD AUTO: 22 %
MCH RBC QN AUTO: 22.9 PG (ref 26.5–33)
MCHC RBC AUTO-ENTMCNC: 29.5 G/DL (ref 31.5–36.5)
MCV RBC AUTO: 78 FL (ref 78–100)
MONOCYTES # BLD AUTO: 0.7 10E9/L (ref 0–1.3)
MONOCYTES NFR BLD AUTO: 6.4 %
NEUTROPHILS # BLD AUTO: 7.6 10E9/L (ref 1.6–8.3)
NEUTROPHILS NFR BLD AUTO: 67.4 %
NRBC # BLD AUTO: 0 10*3/UL
NRBC BLD AUTO-RTO: 0 /100
PLATELET # BLD AUTO: 316 10E9/L (ref 150–450)
POTASSIUM SERPL-SCNC: 3.9 MMOL/L (ref 3.4–5.3)
PROT SERPL-MCNC: 8.3 G/DL (ref 6.8–8.8)
RBC # BLD AUTO: 4.28 10E12/L (ref 3.8–5.2)
SODIUM SERPL-SCNC: 137 MMOL/L (ref 133–144)
WBC # BLD AUTO: 11.3 10E9/L (ref 4–11)

## 2020-11-13 PROCEDURE — 96361 HYDRATE IV INFUSION ADD-ON: CPT | Performed by: EMERGENCY MEDICINE

## 2020-11-13 PROCEDURE — 258N000003 HC RX IP 258 OP 636: Performed by: EMERGENCY MEDICINE

## 2020-11-13 PROCEDURE — 85025 COMPLETE CBC W/AUTO DIFF WBC: CPT | Performed by: FAMILY MEDICINE

## 2020-11-13 PROCEDURE — 250N000011 HC RX IP 250 OP 636: Performed by: EMERGENCY MEDICINE

## 2020-11-13 PROCEDURE — 96374 THER/PROPH/DIAG INJ IV PUSH: CPT | Performed by: EMERGENCY MEDICINE

## 2020-11-13 PROCEDURE — 99284 EMERGENCY DEPT VISIT MOD MDM: CPT | Mod: 25 | Performed by: EMERGENCY MEDICINE

## 2020-11-13 PROCEDURE — 99284 EMERGENCY DEPT VISIT MOD MDM: CPT | Performed by: EMERGENCY MEDICINE

## 2020-11-13 PROCEDURE — 80053 COMPREHEN METABOLIC PANEL: CPT | Performed by: EMERGENCY MEDICINE

## 2020-11-13 RX ADMIN — PROMETHAZINE HYDROCHLORIDE 25 MG: 25 INJECTION INTRAMUSCULAR; INTRAVENOUS at 19:04

## 2020-11-13 RX ADMIN — SODIUM CHLORIDE 1000 ML: 9 INJECTION, SOLUTION INTRAVENOUS at 19:03

## 2020-11-13 NOTE — ED AVS SNAPSHOT
[facility] Emergency Dept  [address] [address]  Phone: [phone]  Fax: [phone]                                    Molly Teague   MRN: [id number]    Department: [facility] Emergency Dept   Date of Visit: 11/13/2020           After Visit Summary Signature Page    I have received my discharge instructions, and my questions have been answered. I have discussed any challenges I see with this plan with the nurse or doctor.    ..........................................................................................................................................  Patient/Patient Representative Signature      ..........................................................................................................................................  Patient Representative Print Name and Relationship to Patient    ..................................................               ................................................  Date                                   Time    ..........................................................................................................................................  Reviewed by Signature/Title    ...................................................              ..............................................  Date                                               Time          22EPIC Rev 08/18

## 2020-11-13 NOTE — ED PROVIDER NOTES
History     Chief Complaint   Patient presents with     Anemia     feeling more weak, iron level low. DIzziness, fatigue, nausea and weakness.      HPI  Molly Teague is a 35 year old female who presents secondary to fatigue, nausea vomiting x2 without black or bloody component, no known fever.  Has complex past medical history, scleroderma, iron deficiency anemia, chronic GI bleeding.  Hemoglobin 10.8 on 11/10.  Denies black or bloody stool currently.  Describes bowel movements as loose and diarrhea.  Poor oral intake over the last couple of days.  She stays at home with her child,  works managing a restaurant,  has not been ill.  Patient tested negative for Covid yesterday anticipating iron infusions x3 next week schedule Monday/Wednesday/Friday.  Continues to take iron orally although reports poor GI absorption secondary to scleroderma that causes multiple chronic lesions of the esophagus and gastric mucosa.    Allergies:  Allergies   Allergen Reactions     Blood Transfusion Related (Informational Only) Other (See Comments)     Patient has a history of a clinically significant antibody against RBC antigens.  A delay in compatible RBCs may occur.     No Known Allergies      Pollen Extract      Seasonal Allergies      Shellfish-Derived Products Nausea and Rash     Rash on face       Problem List:    Patient Active Problem List    Diagnosis Date Noted     Iron deficiency anemia due to chronic blood loss 08/04/2020     Priority: Medium     Vomiting and diarrhea 03/25/2020     Priority: Medium     Closed right ankle fracture 02/11/2020     Priority: Medium     S/P ORIF (open reduction internal fixation) fracture 02/10/2020     Priority: Medium     CKD (chronic kidney disease) stage 3, GFR 30-59 ml/min 01/31/2020     Priority: Medium     Sinus tachycardia 01/31/2020     Priority: Medium     Ankle fracture, right, closed, initial encounter 01/30/2020     Priority: Medium     Added automatically from  request for surgery [id number]       Diplopia 01/25/2020     Priority: Medium     Anemia, chronic disease 11/25/2019     Priority: Medium     Mirena IUD- Remove by 09/2024 09/06/2019     Priority: Medium     Lot: [id number]  Exp: 01/2022    Dinora Israel LPN         Malignant essential hypertension 07/24/2019     Priority: Medium     PTSD (post-traumatic stress disorder) 06/19/2019     Priority: Medium     Obesity (BMI 35.0-39.9) with comorbidity (H) 01/22/2019     Priority: Medium     Moderate major depression (H) 07/06/2018     Priority: Medium     Severe persistent asthma without complication 07/06/2018     Priority: Medium     On high dose ICS/LABA + frequent albuterol use.  Next allergy/pulmonary referral       Aspiration of food 03/29/2018     Priority: Medium     Chronic pain syndrome 04/26/2017     Priority: Medium     Patient is followed by Grecia Barba DO for ongoing prescription of pain medication.  All refills should only be approved by this provider, or covering partner.    Medication(s): Oxycodone 15 mg.   Maximum quantity per month: 100  Clinic visit frequency required: Q 2 months     Controlled substance agreement:  Encounter-Level CSA - 04/26/2017:    Controlled Substance Agreement - Scan on 5/4/2017  8:58 AM: CONTROLLED SUBSTANCE AGREEMENT (below)       Patient-Level CSA:    Controlled Substance Agreement - Opioid - Scan on 2/6/2019  6:23 PM (below)         Pain Clinic evaluation in the past: No    DIRE Total Score(s):  No flowsheet data found.    Last [facility] website verification:  done on 4/26/17   9/26/2017  7/6/2018  10/16/2018  1/22/2019  8/2/2019           [email or web address]         Chronic migraine without aura without status migrainosus, not intractable 04/12/2017     Priority: Medium     With medication overuse.  Fioricet and not Imitrex is recommend to treat severe headache.  2/2017 [place] recommend wean down from daily Fioricet and use Excedrin Migrain PRN.       AIN grade I  03/30/2017     Priority: Medium     Found at [facility] on colonoscopy 2/2017.  Recommend repeat anoscopy and anal pap in 6/2017.       Gastroesophageal reflux disease with esophagitis 03/30/2017     Priority: Medium     EGD done at [facility] 2/2017 showed esophagitis without GAVE.  Recommend max dose H2 and PPI, along with 4 tablets of Carafate dissolved in water and drink throughout the day.    Had LA Grade D esophagitis, on TID PPI       Limited systemic sclerosis (H) 01/25/2017     Priority: Medium     Raynaud's, calcinosis, telangiectasias, peripheral neuropathy, GERD symptoms, history of scleroderma renal crisis.  Saw [facility] 1/2017 and follows with Rheum Dr. Davis locally.       Polyneuropathy associated with underlying disease (H) 01/25/2017     Priority: Medium     Due to scleroderma and neurology thought possible due to ICU (critical illness neuropathy).  Recommend to max out dose of gabapentin to 7421-2607 mg/day, split BID or TID.  EMG 1/2017 positive for neuropathy       History of Clostridium difficile 11/29/2016     Priority: Medium     History of Helicobacter pylori infection 11/29/2016     Priority: Medium     Scleroderma with renal involvement (H) 11/09/2016     Priority: Medium     Needs lisinopril long term       History of ARDS 11/09/2016     Priority: Medium     4/2015, prolonged ICU/vent/trach due to influenza, scleroderma renal crisis requiring hemodialysis.       Raynaud's disease without gangrene 11/09/2016     Priority: Medium     History of hemodialysis 11/09/2016     Priority: Medium     4/2015 due to scleroderma renal crisis       Bilateral retinitis 11/09/2016     Priority: Medium     History of pulmonary embolism 11/09/2016     Priority: Medium     Completed anti-coagulation 2017.  pulmonary embolism due to critical illness       REX (generalized anxiety disorder) 11/09/2016     Priority: Medium     CREST (calcinosis, Raynaud's phenomenon, esophageal dysfunction, sclerodactyly, telangiectasia) (H)  2016     Priority: Medium     Scleroderma (H) 2016     Priority: Medium        Past Medical History:    Past Medical History:   Diagnosis Date     Acute pyelonephritis 2017     Altered mental state 3/29/2018     Arthritis      Blood clotting disorder (H)      History of blood transfusion      Hypertension      Long-term (current) use of anticoagulants [Z79.01] 2016     PE (pulmonary embolism) 2016     Rheumatism      Scleroderma (H) 2016     Uncomplicated asthma        Past Surgical History:    Past Surgical History:   Procedure Laterality Date     ANGIOGRAM  2015      SECTION  2014     OPEN REDUCTION INTERNAL FIXATION ANKLE Right 2/10/2020    Procedure: Right ankle open reduction internal fixation;  Surgeon: Natanael Villalta MD;  Location: [facility] OR     THROAT SURGERY         Family History:    Family History   Problem Relation Age of Onset     Hyperlipidemia Father      Cerebrovascular Disease Maternal Grandmother          of a stroke     Hyperlipidemia Paternal Grandfather      Depression Sister      Fibromyalgia Sister      Diabetes Maternal Aunt      Melanoma No family hx of      Skin Cancer No family hx of        Social History:  Marital Status:  Single [1]  Social History     Tobacco Use     Smoking status: Never Smoker     Smokeless tobacco: Never Used   Substance Use Topics     Alcohol use: Yes     Comment: Socially once on a weekend if any     Drug use: No     Comment: None        Medications:         acetaminophen (TYLENOL) 325 MG tablet       albuterol (VENTOLIN HFA) 108 (90 Base) MCG/ACT inhaler       amLODIPine (NORVASC) 5 MG tablet       blood glucose (NO BRAND SPECIFIED) lancets standard       blood glucose (NO BRAND SPECIFIED) test strip       budesonide-formoterol (SYMBICORT) 160-4.5 MCG/ACT Inhaler       busPIRone HCl (BUSPAR) 30 MG tablet       Cyanocobalamin (B-12) 1000 MCG TBCR       DULoxetine (CYMBALTA) 30 MG capsule       famotidine (PEPCID) 20  MG tablet       folic acid (FOLVITE) 1 MG tablet       gabapentin (NEURONTIN) 300 MG capsule       GOODSENSE MIGRAINE FORMULA 250-250-65 MG per tablet       hydrOXYzine (ATARAX) 25 MG tablet       lisinopril (ZESTRIL) 10 MG tablet       LORazepam (ATIVAN) 1 MG tablet       montelukast (SINGULAIR) 10 MG tablet       naloxone (NARCAN) 4 MG/0.1ML nasal spray       omeprazole (PRILOSEC) 40 MG DR capsule       order for DME       order for DME       oxyCODONE IR (ROXICODONE) 15 MG tablet       polyethylene glycol (MIRALAX/GLYCOLAX) packet       promethazine (PHENERGAN) 25 MG tablet       promethazine (PHENERGAN) 25 MG/ML IV injection          Review of Systems  All other systems reviewed and are negative.    Physical Exam   BP: 125/84  Pulse: 57  Temp: 99  F (37.2  C)  Resp: 16  Weight: 81.6 kg (180 lb)  SpO2: 100 %      Physical Exam  Nontoxic appearing no respiratory distress alert and oriented ×3, chronically ill-appearing head atraumatic normocephalic  Neck supple full active painless range of motion  Lungs clear to auscultation  Heart regular no murmur  Abdomen soft nontender bowel sounds positive   Strength and sensation grossly intact throughout the extremities  Speech is fluent, good eye contact, thought processes are rational  Lower extremities without swelling, redness or tenderness  Pedal pulses symmetrical and strong    ED Course        Procedures               Critical Care time:  none                 Results for orders placed or performed during the hospital encounter of 11/13/20 (from the past 24 hour(s))   Comprehensive metabolic panel   Result Value Ref Range    Sodium 137 133 - 144 mmol/L    Potassium 3.9 3.4 - 5.3 mmol/L    Chloride 105 94 - 109 mmol/L    Carbon Dioxide 24 20 - 32 mmol/L    Anion Gap 8 3 - 14 mmol/L    Glucose 107 (H) 70 - 99 mg/dL    Urea Nitrogen 14 7 - 30 mg/dL    Creatinine 1.21 (H) 0.52 - 1.04 mg/dL    GFR Estimate 58 (L) >60 mL/min/[1.73_m2]    GFR Estimate If Black 67 >60  mL/min/[1.73_m2]    Calcium 9.2 8.5 - 10.1 mg/dL    Bilirubin Total 0.4 0.2 - 1.3 mg/dL    Albumin 4.4 3.4 - 5.0 g/dL    Protein Total 8.3 6.8 - 8.8 g/dL    Alkaline Phosphatase 103 40 - 150 U/L    ALT 24 0 - 50 U/L    AST 30 0 - 45 U/L   CBC with platelets differential   Result Value Ref Range    WBC 11.3 (H) 4.0 - 11.0 10e9/L    RBC Count 4.28 3.8 - 5.2 10e12/L    Hemoglobin 9.8 (L) 11.7 - 15.7 g/dL    Hematocrit 33.2 (L) 35.0 - 47.0 %    MCV 78 78 - 100 fl    MCH 22.9 (L) 26.5 - 33.0 pg    MCHC 29.5 (L) 31.5 - 36.5 g/dL    RDW 17.6 (H) 10.0 - 15.0 %    Platelet Count 316 150 - 450 10e9/L    Diff Method Automated Method     % Neutrophils 67.4 %    % Lymphocytes 22.0 %    % Monocytes 6.4 %    % Eosinophils 2.8 %    % Basophils 1.0 %    % Immature Granulocytes 0.4 %    Nucleated RBCs 0 0 /100    Absolute Neutrophil 7.6 1.6 - 8.3 10e9/L    Absolute Lymphocytes 2.5 0.8 - 5.3 10e9/L    Absolute Monocytes 0.7 0.0 - 1.3 10e9/L    Absolute Eosinophils 0.3 0.0 - 0.7 10e9/L    Absolute Basophils 0.1 0.0 - 0.2 10e9/L    Abs Immature Granulocytes 0.0 0 - 0.4 10e9/L    Absolute Nucleated RBC 0.0        Medications   promethazine (PHENERGAN) 25 mg in sodium chloride 0.9 % 50 mL intermittent infusion (25 mg Intravenous Given 11/13/20 1904)   0.9% sodium chloride BOLUS (0 mLs Intravenous Stopped 11/13/20 2019)       Assessments & Plan (with Medical Decision Making)  Nausea vomiting, fatigue shortness of air chronic pain, history of iron deficiency anemia and chronic GI blood loss.  Hemoglobin today is 9.8, vitals normal, no vomiting in the ED tolerating oral intake.  No indication for admission or further evaluation at this time.  Continue current medications, follow-up primary care, follow-up infusion therapy next week as scheduled.  Return criteria reviewed     I have reviewed the nursing notes.    I have reviewed the findings, diagnosis, plan and need for follow up with the patient.       Discharge Medication List as of  11/13/2020  8:20 PM          Final diagnoses:   Iron deficiency anemia, unspecified iron deficiency anemia type       11/13/2020   [facility] EMERGENCY DEPT     Joshua Cunningham MD  11/13/20 [id number]

## 2020-11-13 NOTE — ED NOTES
Pt has a hx of scleroderma, pt reports she recently had her blood drawn and her hemoglobin and iron were low. Pt is scheduled for iron infusions. Pt reports since yesterday she has has felt lightheaded, dizzy, feeling like she is going to pass out, fatigue. Pt reports she vomited X2 today.

## 2020-11-14 NOTE — DISCHARGE INSTRUCTIONS
Continue current medication regimen, follow-up next week with infusion therapy as scheduled for iron infusions    Return here for shortness of air, passing out, persistent vomiting or any other concern.

## 2020-11-16 ENCOUNTER — INFUSION THERAPY VISIT (OUTPATIENT)
Dept: INFUSION THERAPY | Facility: CLINIC | Age: 35
End: 2020-11-16
Attending: INTERNAL MEDICINE
Payer: MEDICARE

## 2020-11-16 VITALS
DIASTOLIC BLOOD PRESSURE: 90 MMHG | OXYGEN SATURATION: 98 % | TEMPERATURE: 99 F | SYSTOLIC BLOOD PRESSURE: 137 MMHG | RESPIRATION RATE: 16 BRPM | HEART RATE: 110 BPM

## 2020-11-16 DIAGNOSIS — D50.0 IRON DEFICIENCY ANEMIA DUE TO CHRONIC BLOOD LOSS: Primary | ICD-10-CM

## 2020-11-16 PROCEDURE — 999N000127 HC STATISTIC PERIPHERAL IV START W US GUIDANCE

## 2020-11-16 PROCEDURE — 258N000003 HC RX IP 258 OP 636: Performed by: INTERNAL MEDICINE

## 2020-11-16 PROCEDURE — 250N000011 HC RX IP 250 OP 636: Performed by: INTERNAL MEDICINE

## 2020-11-16 PROCEDURE — 96365 THER/PROPH/DIAG IV INF INIT: CPT

## 2020-11-16 PROCEDURE — 96366 THER/PROPH/DIAG IV INF ADDON: CPT

## 2020-11-16 RX ORDER — HEPARIN SODIUM,PORCINE 10 UNIT/ML
5 VIAL (ML) INTRAVENOUS
Status: CANCELLED | OUTPATIENT
Start: 2020-11-18

## 2020-11-16 RX ORDER — HEPARIN SODIUM (PORCINE) LOCK FLUSH IV SOLN 100 UNIT/ML 100 UNIT/ML
5 SOLUTION INTRAVENOUS
Status: CANCELLED | OUTPATIENT
Start: 2020-11-18

## 2020-11-16 RX ADMIN — IRON SUCROSE 300 MG: 20 INJECTION, SOLUTION INTRAVENOUS at 15:32

## 2020-11-16 NOTE — PROGRESS NOTES
Outpatient Physical Therapy Discharge Note     Patient: Molly Teague  : 1985    Beginning/End Dates of Reporting Period:  5/15/20 to 20 when last seen. Discharge written on 2020.  Total # of Rx sessions: 2    Referring Provider: Natanael Villalta Diagnosis: Pain in R Joint, Ankle and Foot      Client Self Report: Going to have a consult w/Ortho to discuss surgery. Depends on healing whether they will take out screws or reinforce it. Mostly wears open shoe w/ memory foam.  Pain Scale: 7/10.  Comes in w/ cane and cam walker. Pt has not been seen x 4 months. Returns today. Does have lesion on back of R Heel.     Objective Measurements:  Objective Measure: LEFS   Details: 20  Score 41/80.  INITIALLY:       Objective Measure: MMT:   Details: 20  R Foot DF 5-, Ever 5-, Inv 4+ w/ Pain. INITIALlY:  R Foot DF 4+ w/ Pain, Inv 4 w/ P, Eversion 4 w/P.     Objective Measure: AROM:   Details: 20  PF R 49, Eversion 90% of L, Inversion 50% of L. INITIALLY:  PF R 30, L 54. Eversion R 80% of L, Inversion R 25% of L. DF Gastroc R 2, L 12; Soleus R 3, L 18.     Objective Measure: Flexibility:  Details:   HS's R -25; R Gas 6, Soleus 12.  INITIALlY:  HS's R -35, L -20. DF Gastroc R 2, L 12; Soleus R 3, L 18.      Goals:  Goal Identifier 1   Goal Description STG: Pt will be able to walk short distances w/ walker w/ moderate difficulty. 20 Walking w/ cane short distances w/ a lot of difficulty.    Target Date 20   Date Met  20   Progress:     Goal Identifier 2   Goal Description STG: Pt will be able to walk between rooms w/ moderate difficulty.  20  No difficulty.    Target Date 20   Date Met  20   Progress:     Goal Identifier 3   Goal Description STG: Pt will be able to do stairs reciprocally w/crutches or walker. 20  Goes up stairs reciprically.  NOT MET    Target Date 20   Date Met      Progress:     Goal Identifier 4   Goal Description  STG: Pt will be able to stand for prolonged periods w/ moderate difficulty.  9/16/20  Unable    Target Date 06/26/20   Date Met      Progress:     Goal Identifier 5   Goal Description LTG: Pt will be independent w/ HEP and self cares to be able to improve mobility at home and in the community.    Target Date 07/10/20   Date Met      Progress:     Progress:     Progress Toward Goals:   Progress this reporting period: Pt met 2/4 STG's. Seen 2x/only.     Plan:  Discharge from therapy.    Discharge:    Reason for Discharge: Patient has failed to schedule further appointments.    Equipment Issued:      Discharge Plan: Patient to continue home program.  Pt to follow up w/MD as appropriate.   Taniya Junior, PT, [facility] (#[id number])  [facility]           [phone]  Fax          [phone]  Appt #      [phone]

## 2020-11-16 NOTE — PATIENT INSTRUCTIONS
Dear Molly Teague    Thank you for choosing [facility] for your Iron (venofer) infusion.  The following information is a summary of our appointment as well as important reminders.      Patient Education     Patient Education    Iron Sucrose Chewable tablet    Iron Sucrose Solution for injection  Iron Sucrose Solution for injection  What is this medicine?  IRON SUCROSE (RENETTA calvillos) is an iron complex. Iron is used to make healthy red blood cells, which carry oxygen and nutrients throughout the body. This medicine is used to treat iron deficiency anemia in people with chronic kidney disease.  This medicine may be used for other purposes; ask your health care provider or pharmacist if you have questions.  What should I tell my health care provider before I take this medicine?  They need to know if you have any of these conditions:    anemia not caused by low iron levels    heart disease    high levels of iron in the blood    kidney disease    liver disease    an unusual or allergic reaction to iron, other medicines, foods, dyes, or preservatives    pregnant or trying to get pregnant    breast-feeding  How should I use this medicine?  This medicine is for infusion into a vein. It is given by a health care professional in a hospital or clinic setting.  Talk to your pediatrician regarding the use of this medicine in children. While this drug may be prescribed for children as young as 2 years for selected conditions, precautions do apply.  Overdosage: If you think you have taken too much of this medicine contact a poison control center or emergency room at once.  NOTE: This medicine is only for you. Do not share this medicine with others.  What if I miss a dose?  It is important not to miss your dose. Call your doctor or health care professional if you are unable to keep an appointment.  What may interact with this medicine?  Do not take this  medicine with any of the following medications:    deferoxamine    dimercaprol    other iron products  This medicine may also interact with the following medications:    chloramphenicol    deferasirox  This list may not describe all possible interactions. Give your health care provider a list of all the medicines, herbs, non-prescription drugs, or dietary supplements you use. Also tell them if you smoke, drink alcohol, or use illegal drugs. Some items may interact with your medicine.  What should I watch for while using this medicine?  Visit your doctor or healthcare professional regularly. Tell your doctor or healthcare professional if your symptoms do not start to get better or if they get worse. You may need blood work done while you are taking this medicine.  You may need to follow a special diet. Talk to your doctor. Foods that contain iron include: whole grains/cereals, dried fruits, beans, or peas, leafy green vegetables, and organ meats (liver, kidney).  What side effects may I notice from receiving this medicine?  Side effects that you should report to your doctor or health care professional as soon as possible:    allergic reactions like skin rash, itching or hives, swelling of the face, lips, or tongue    breathing problems    changes in blood pressure    cough    fast, irregular heartbeat    feeling faint or lightheaded, falls    fever or chills    flushing, sweating, or hot feelings    joint or muscle aches/pains    seizures    swelling of the ankles or feet    unusually weak or tired  Side effects that usually do not require medical attention (report to your doctor or health care professional if they continue or are bothersome):    diarrhea    feeling achy    headache    irritation at site where injected    nausea, vomiting    stomach upset    tiredness  This list may not describe all possible side effects. Call your doctor for medical advice about side effects. You may report side effects to FDA at  1-800-FDA-1088.  Where should I keep my medicine?  This drug is given in a hospital or clinic and will not be stored at home.  NOTE:This sheet is a summary. It may not cover all possible information. If you have questions about this medicine, talk to your doctor, pharmacist, or health care provider. Copyright  2016 Gold Standard             We look forward in seeing you on your next appointment here at [facility] ([facility]).  Please don t hesitate to call us at [phone] to reschedule any of your appointments or to speak with one of the [facility] registered nurses.  It was a pleasure taking care of you today.    Sincerely,    [facility]  [address] [place] [address]  [id number]  Phone:  [phone]

## 2020-11-16 NOTE — PROGRESS NOTES
Nursing Note  Molly Teague presents today to [facility] for:   Chief Complaint   Patient presents with     Infusion     Venofer     During today's [facility] appointment, orders from Dr. Grecia Barba were completed.  Frequency: for total three doses. Today is dose 1/3.    Progress note:  Patient identification verified by name and date of birth.  Assessment completed.  Vitals recorded in Doc Flowsheets.  Patient was provided with education regarding medication/procedure and possible side effects.  Patient verbalized understanding.     present during visit today: Not Applicable.    Treatment Conditions: Non-applicable.    Premedications: were not ordered.    Drug Waste Record: No    Infusion length and rate:  infusion given over approximately 90 minutes at 193 ml/hr.    Labs: were not ordered for this appointment.    Vascular access: peripheral IV was placed by vascular access nurse.    Post Infusion Assessment:  Patient tolerated infusion well and without incident. Printed AVS given to patient, denies further questions/concerns. [facility] with next infusion scheduled 11/18.     Discharge Plan:   Follow up plan of care with: ongoing infusions at [facility] ordering provider as scheduled. and after visit summary given to patient  Discharge instructions were reviewed with patient.  Patient/representative verbalized understanding of discharge instructions and all questions answered.  Patient discharged from [facility] in stable condition.    Mari Bae RN       Administrations This Visit     iron sucrose (VENOFER) 300 mg in sodium chloride 0.9 % 250 mL intermittent infusion     Admin Date  11/16/2020 Action  New Bag Dose  300 mg Route  Intravenous Administered By  Mari Bae, RN                BP (!) 140/88   Pulse 125   Temp 99  F (37.2  C)  (Oral)   Resp 16   SpO2 98%

## 2020-11-16 NOTE — LETTER
11/16/2020         RE: Molly Teague  [address]  [facility] [place] [address]        Dear Colleague,    Thank you for referring your patient, Molly Teague, to the [facility] TREATMENT [facility]. Please see a copy of my visit note below.    Nursing Note  [facility]olly Teague [facility]:   Chief Complaint   Patient presents with     Infusion     Venofer     During [facility]'s [facility] appointment, orders from Dr. Grecia Barba were completed.  Frequency: for total three doses. Today is dose 1/3.    Progress note:  Patient identification verified by name and date of birth.  Assessment completed.  Vitals recorded in Doc Flowsheets.  Patient was provided with education regarding medication/procedure and possible side effects.  Patient verbalized understanding.     present during visit today: Not Applicable.    Treatment Conditions: Non-applicable.    Premedications: were not ordered.    Drug Waste Record: No    Infusion length and rate:  infusion given over approximately 90 minutes at 193 ml/hr.    Labs: were not ordered for this appointment.    Vascular access: peripheral IV was placed by vascular access nurse.    Post Infusion Assessment:  Patient tolerated infusion well and without incident. Printed AVS given to patient, denies further questions/concerns. Discharged from [facility] in stable condition with next infusion scheduled 11/18.     Discharge Plan:   Follow up plan of care with: ongoing infusions at [facility]., ordering provider as scheduled. and after visit summary given to patient  Discharge instructions were reviewed with patient.  Patient/representative verbalized understanding of discharge instructions and all questions answered.  Patient discharged from [facility] in stable condition.    Mari Ramírez RN        Administrations This Visit     iron sucrose (VENOFER) 300 mg in sodium chloride 0.9 % 250 mL intermittent infusion     Admin Date  11/16/2020 Action  New Bag Dose  300 mg Route  Intravenous Administered By  Mari Bae, RN                BP (!) 140/88   Pulse 125   Temp 99  F (37.2  C) (Oral)   Resp 16   SpO2 98%         Again, thank you for allowing me to participate in the care of your patient.        Sincerely,        [facility]

## 2020-11-17 DIAGNOSIS — F41.9 ANXIETY: ICD-10-CM

## 2020-11-17 NOTE — TELEPHONE ENCOUNTER
Requested Prescriptions   Pending Prescriptions Disp Refills     LORazepam (ATIVAN) 1 MG tablet [Pharmacy Med Name: LORAZEPAM 1MG TABS] 30 tablet 0     Sig: TAKE 1 TABLET BY MOUTH ONCE DAILY AS NEEDED FOR ANXIETY       There is no refill protocol information for this order

## 2020-11-18 ENCOUNTER — OFFICE VISIT (OUTPATIENT)
Dept: ORTHOPEDICS | Facility: CLINIC | Age: 35
End: 2020-11-18
Payer: MEDICARE

## 2020-11-18 ENCOUNTER — INFUSION THERAPY VISIT (OUTPATIENT)
Dept: INFUSION THERAPY | Facility: CLINIC | Age: 35
End: 2020-11-18
Attending: INTERNAL MEDICINE
Payer: MEDICARE

## 2020-11-18 VITALS — SYSTOLIC BLOOD PRESSURE: 118 MMHG | RESPIRATION RATE: 18 BRPM | HEART RATE: 128 BPM | DIASTOLIC BLOOD PRESSURE: 79 MMHG

## 2020-11-18 DIAGNOSIS — Z98.890 S/P ORIF (OPEN REDUCTION INTERNAL FIXATION) FRACTURE: ICD-10-CM

## 2020-11-18 DIAGNOSIS — J45.40 MODERATE PERSISTENT ASTHMA WITHOUT COMPLICATION: ICD-10-CM

## 2020-11-18 DIAGNOSIS — D50.0 IRON DEFICIENCY ANEMIA DUE TO CHRONIC BLOOD LOSS: Primary | ICD-10-CM

## 2020-11-18 DIAGNOSIS — L84 TYLOMA: Primary | ICD-10-CM

## 2020-11-18 DIAGNOSIS — M25.571 PAIN IN JOINT, ANKLE AND FOOT, RIGHT: ICD-10-CM

## 2020-11-18 DIAGNOSIS — Z87.81 S/P ORIF (OPEN REDUCTION INTERNAL FIXATION) FRACTURE: ICD-10-CM

## 2020-11-18 DIAGNOSIS — G89.4 CHRONIC PAIN SYNDROME: Chronic | ICD-10-CM

## 2020-11-18 DIAGNOSIS — Q66.70 PES CAVUS: ICD-10-CM

## 2020-11-18 PROCEDURE — 258N000003 HC RX IP 258 OP 636: Performed by: INTERNAL MEDICINE

## 2020-11-18 PROCEDURE — 99213 OFFICE O/P EST LOW 20 MIN: CPT | Performed by: PODIATRIST

## 2020-11-18 PROCEDURE — 250N000011 HC RX IP 250 OP 636: Performed by: INTERNAL MEDICINE

## 2020-11-18 PROCEDURE — 999N000127 HC STATISTIC PERIPHERAL IV START W US GUIDANCE

## 2020-11-18 PROCEDURE — 96365 THER/PROPH/DIAG IV INF INIT: CPT

## 2020-11-18 RX ORDER — UREA 40 %
CREAM (GRAM) TOPICAL DAILY
Qty: 85 G | Refills: 4 | Status: SHIPPED | OUTPATIENT
Start: 2020-11-18 | End: 2021-02-23

## 2020-11-18 RX ORDER — HEPARIN SODIUM,PORCINE 10 UNIT/ML
5 VIAL (ML) INTRAVENOUS
Status: DISCONTINUED | OUTPATIENT
Start: 2020-11-18 | End: 2020-11-18 | Stop reason: HOSPADM

## 2020-11-18 RX ORDER — HEPARIN SODIUM,PORCINE 10 UNIT/ML
5 VIAL (ML) INTRAVENOUS
Status: CANCELLED | OUTPATIENT
Start: 2020-11-20

## 2020-11-18 RX ORDER — HEPARIN SODIUM (PORCINE) LOCK FLUSH IV SOLN 100 UNIT/ML 100 UNIT/ML
5 SOLUTION INTRAVENOUS
Status: DISCONTINUED | OUTPATIENT
Start: 2020-11-18 | End: 2020-11-18 | Stop reason: HOSPADM

## 2020-11-18 RX ORDER — HEPARIN SODIUM (PORCINE) LOCK FLUSH IV SOLN 100 UNIT/ML 100 UNIT/ML
5 SOLUTION INTRAVENOUS
Status: CANCELLED | OUTPATIENT
Start: 2020-11-20

## 2020-11-18 RX ORDER — LORAZEPAM 1 MG/1
TABLET ORAL
Qty: 30 TABLET | Refills: 5 | Status: SHIPPED | OUTPATIENT
Start: 2020-11-18 | End: 2021-03-23

## 2020-11-18 RX ADMIN — IRON SUCROSE 300 MG: 20 INJECTION, SOLUTION INTRAVENOUS at 15:13

## 2020-11-18 NOTE — PROGRESS NOTES
Chief Complaint   Patient presents with     RECHECK     F/U R broken ankle, figure out if she needs the second surgery to remove the hardware or not. pt reported orthortics have already fallen apart             Allergies   Allergen Reactions     Blood Transfusion Related (Informational Only) Other (See Comments)     Patient has a history of a clinically significant antibody against RBC antigens.  A delay in compatible RBCs may occur.     No Known Allergies      Pollen Extract      Seasonal Allergies      Shellfish-Derived Products Nausea and Rash     Rash on face         Subjective: Molly is a 35 year old female who presents to the clinic today for a follow up of right ankle pain. Relates that she is considering having the right ankle hardware removed with Dr. Villalta. She relates that the orthotics worked well, however they have broken down. She relates that she has a newly healed ulcer on the back of the right heel.     Objective  PT and DP pulses are 1/4 bilaterally. CRT is 4 seconds. Absent pedal hair.   Gross sensation is diminished bilaterally.    Equinus is moderate bilaterally. . Severely hammered digits to the lesser digits BL with R>L. These are not reducible. pain in the medial and lateral ankles with palpation and ROM.   Nails normal bilaterally. No open lesions are noted. Tyloma noted to the right posterior heel without wound underneath. No s/s of infection noted.     Assessment: Right ankle pain. She is considering having the hardware removed.   Tyloma - Area was debrided today. No wound underneath.   Pes cavus with hammertoes to the lesser digits.       Plan:   - Pt seen and evaluated  - Orthotics were molded and sent to the lab.   - Start urea cream on the tyloma.   - Pt to return to clinic in 2 months.

## 2020-11-18 NOTE — TELEPHONE ENCOUNTER
Dr. Barba,    Routing refill request to provider for review/approval because:  Drug not on the [facility] refill protocol Reyna BAEZA RN

## 2020-11-18 NOTE — NURSING NOTE
Reason For Visit:   Chief Complaint   Patient presents with     RECHECK     F/U R broken ankle, figure out if she needs the second surgery to remove the hardware or not. pt reported orthortics have already fallen apart        There were no vitals taken for this visit.    Pain Assessment  Patient Currently in Pain: Yes  0-10 Pain Scale: 6    Beba Moore ATC

## 2020-11-18 NOTE — PATIENT INSTRUCTIONS
Patient Education     Iron Sucrose injection  Brand Name: Venofer  What is this medicine?  IRON SUCROSE (RENETTA thompson) is an iron complex. Iron is used to make healthy red blood cells, which carry oxygen and nutrients throughout the body. This medicine is used to treat iron deficiency anemia in people with chronic kidney disease.  How should I use this medicine?  This medicine is for infusion into a vein. It is given by a health care professional in a hospital or clinic setting.  Talk to your pediatrician regarding the use of this medicine in children. While this drug may be prescribed for children as young as 2 years for selected conditions, precautions do apply.  What side effects may I notice from receiving this medicine?  Side effects that you should report to your doctor or health care professional as soon as possible:    allergic reactions like skin rash, itching or hives, swelling of the face, lips, or tongue    breathing problems    changes in blood pressure    cough    fast, irregular heartbeat    feeling faint or lightheaded, falls    fever or chills    flushing, sweating, or hot feelings    joint or muscle aches/pains    seizures    swelling of the ankles or feet    unusually weak or tired  Side effects that usually do not require medical attention (report to your doctor or health care professional if they continue or are bothersome):    diarrhea    feeling achy    headache    irritation at site where injected    nausea, vomiting    stomach upset    tiredness  What may interact with this medicine?  Do not take this medicine with any of the following medications:    deferoxamine    dimercaprol    other iron products  This medicine may also interact with the following medications:    chloramphenicol    deferasirox  What if I miss a dose?  It is important not to miss your dose. Call your doctor or health care professional if you are unable to keep an appointment.  Where should I keep my medicine?  This  drug is given in a hospital or clinic and will not be stored at home.  What should I tell my health care provider before I take this medicine?  They need to know if you have any of these conditions:    anemia not caused by low iron levels    heart disease    high levels of iron in the blood    kidney disease    liver disease    an unusual or allergic reaction to iron, other medicines, foods, dyes, or preservatives    pregnant or trying to get pregnant    breast-feeding  What should I watch for while using this medicine?  Visit your doctor or healthcare professional regularly. Tell your doctor or healthcare professional if your symptoms do not start to get better or if they get worse. You may need blood work done while you are taking this medicine.  You may need to follow a special diet. Talk to your doctor. Foods that contain iron include: whole grains/cereals, dried fruits, beans, or peas, leafy green vegetables, and organ meats (liver, kidney).  NOTE:This sheet is a summary. It may not cover all possible information. If you have questions about this medicine, talk to your doctor, pharmacist, or health care provider. Copyright  2020 [employer]

## 2020-11-18 NOTE — PROGRESS NOTES
Nursing Note  Molly Teague [facility] for:   Chief Complaint   Patient presents with     Infusion     Venofer     During [facility]'s [facility] appointment, orders from Dr. Babra were completed.  Frequency: today is dose 2 of 3 total     Progress note:  Patient identification verified by name and date of birth.  Assessment completed.  Vitals recorded in Doc Flowsheets.  Patient was provided with education regarding medication/procedure and possible side effects.  Patient verbalized understanding.     present during visit today: Not Applicable.    Treatment Conditions: Non-applicable.    Premedications: were not ordered.    Drug Waste Record: No    Infusion length and rate:  infusion given over approximately 90 minutes    Labs: were not ordered for this appointment.    Vascular access: peripheral IV was placed by vascular access nurse.    Post Infusion Assessment:  Patient tolerated infusion without incident.     Discharge Plan:   Follow up plan of care with: ongoing infusions [facility].  Discharge instructions were reviewed with patient.  Patient/representative verbalized understanding of discharge instructions and all questions answered.  [facility] in stable condition.    Laura Robin RN       Administrations This Visit     iron sucrose (VENOFER) 300 mg in sodium chloride 0.9 % 250 mL intermittent infusion     Admin Date  11/18/2020 Action  New Bag Dose  300 mg Rate  193.3 mL/hr Route  Intravenous Administered By  Laura Robin RN                /75   Pulse 134   Resp 18

## 2020-11-18 NOTE — TELEPHONE ENCOUNTER
"Requested Prescriptions   Pending Prescriptions Disp Refills     oxyCODONE IR (ROXICODONE) 15 MG tablet [Pharmacy Med Name: OXYCODONE HCL 15MG TABS] 100 tablet 0     Sig: TAKE ONE TABLET BY MOUTH EVERY 4 HOURS AS NEEDED FOR SEVERE PAIN       There is no refill protocol information for this order        SYMBICORT 160-4.5 MCG/ACT Inhaler [Pharmacy Med Name: SYMBICORT 160-4.5MCG/ACT AERO]  11     Sig: INHALE TWO PUFFS BY MOUTH TWICE A DAY       Long-Acting Beta Agonist Inhalers Protocol  Failed - 11/18/2020  9:08 AM        Failed - Order for Serevent, Striverdi, or Foradil and pt has steroid inhaler        Passed - Patient is age 12 or older        Passed - Asthma control assessment score within normal limits in last 6 months     Please review ACT score.           Passed - Medication is active on med list        Passed - Recent (6 mo) or future (30 days) visit within the authorizing provider's specialty     Patient had office visit in the last 6 months or has a visit in the next 30 days with authorizing provider or within the authorizing provider's specialty.  See \"Patient Info\" tab in inbasket, or \"Choose Columns\" in Meds & Orders section of the refill encounter.           Inhaled Steroids Protocol Passed - 11/18/2020  9:08 AM        Passed - Patient is age 12 or older        Passed - Asthma control assessment score within normal limits in last 6 months     Please review ACT score.           Passed - Medication is active on med list        Passed - Recent (6 mo) or future (30 days) visit within the authorizing provider's specialty     Patient had office visit in the last 6 months or has a visit in the next 30 days with authorizing provider or within the authorizing provider's specialty.  See \"Patient Info\" tab in inbasket, or \"Choose Columns\" in Meds & Orders section of the refill encounter.                 "

## 2020-11-18 NOTE — LETTER
11/18/2020         RE: Molly Teague  [address] [facility] [address]        Dear Colleague,    Thank you for referring your patient, Molly Teague, to the [facility]AT[facility]. Please see a copy of my visit note below.    Nursing Note  Molly Teague [facility]:   Chief Complaint   Patient presents with     Infusion     Venofer     During [facility]'s [facility] appointment, orders from Dr. Barba were completed.  Frequency: today is dose 2 of 3 total     Progress note:  Patient identification verified by name and date of birth.  Assessment completed.  Vitals recorded in Doc Flowsheets.  Patient was provided with education regarding medication/procedure and possible side effects.  Patient verbalized understanding.     present during visit today: Not Applicable.    Treatment Conditions: Non-applicable.    Premedications: were not ordered.    Drug Waste Record: No    Infusion length and rate:  infusion given over approximately 90 minutes    Labs: were not ordered for this appointment.    Vascular access: peripheral IV was placed by vascular access nurse.    Post Infusion Assessment:  Patient tolerated infusion without incident.     Discharge Plan:   Follow up plan of care with: ongoing infusions at [facility].  Discharge instructions were reviewed with patient.  Patient/representative verbalized understanding of discharge instructions and all questions answered.  [facility] in stable condition.    Laura Robin RN       Administrations This Visit     iron sucrose (VENOFER) 300 mg in sodium chloride 0.9 % 250 mL intermittent infusion     Admin Date  11/18/2020 Action  New Bag Dose  300 mg Rate  193.3 mL/hr Route  Intravenous Administered By  Laura Robin RN                /75    Pulse 134   Resp 18           Again, thank you for allowing me to participate in the care of your patient.        Sincerely,        [facility]

## 2020-11-19 RX ORDER — BUDESONIDE AND FORMOTEROL FUMARATE DIHYDRATE 160; 4.5 UG/1; UG/1
AEROSOL RESPIRATORY (INHALATION)
Qty: 1 INHALER | Refills: 11 | Status: SHIPPED | OUTPATIENT
Start: 2020-11-19 | End: 2021-11-10

## 2020-11-19 RX ORDER — OXYCODONE HYDROCHLORIDE 15 MG/1
TABLET ORAL
Qty: 100 TABLET | Refills: 0 | Status: SHIPPED | OUTPATIENT
Start: 2020-11-19 | End: 2020-12-17

## 2020-11-20 ENCOUNTER — INFUSION THERAPY VISIT (OUTPATIENT)
Dept: INFUSION THERAPY | Facility: CLINIC | Age: 35
End: 2020-11-20
Attending: INTERNAL MEDICINE
Payer: MEDICARE

## 2020-11-20 VITALS
DIASTOLIC BLOOD PRESSURE: 76 MMHG | OXYGEN SATURATION: 99 % | TEMPERATURE: 98.7 F | HEART RATE: 114 BPM | RESPIRATION RATE: 18 BRPM | SYSTOLIC BLOOD PRESSURE: 124 MMHG

## 2020-11-20 DIAGNOSIS — D50.0 IRON DEFICIENCY ANEMIA DUE TO CHRONIC BLOOD LOSS: Primary | ICD-10-CM

## 2020-11-20 PROCEDURE — 250N000011 HC RX IP 250 OP 636: Performed by: INTERNAL MEDICINE

## 2020-11-20 PROCEDURE — 96365 THER/PROPH/DIAG IV INF INIT: CPT

## 2020-11-20 PROCEDURE — 258N000003 HC RX IP 258 OP 636: Performed by: INTERNAL MEDICINE

## 2020-11-20 PROCEDURE — 96366 THER/PROPH/DIAG IV INF ADDON: CPT

## 2020-11-20 PROCEDURE — 999N000127 HC STATISTIC PERIPHERAL IV START W US GUIDANCE

## 2020-11-20 RX ORDER — HEPARIN SODIUM,PORCINE 10 UNIT/ML
5 VIAL (ML) INTRAVENOUS
Status: CANCELLED | OUTPATIENT
Start: 2020-11-20

## 2020-11-20 RX ORDER — HEPARIN SODIUM (PORCINE) LOCK FLUSH IV SOLN 100 UNIT/ML 100 UNIT/ML
5 SOLUTION INTRAVENOUS
Status: CANCELLED | OUTPATIENT
Start: 2020-11-20

## 2020-11-20 RX ADMIN — IRON SUCROSE 300 MG: 20 INJECTION, SOLUTION INTRAVENOUS at 13:30

## 2020-11-20 NOTE — PATIENT INSTRUCTIONS
Patient Education     Patient Education    Iron Sucrose Chewable tablet    Iron Sucrose Solution for injection  Iron Sucrose Chewable tablet  What is this medicine?  IRON SUCROSE (RENETTA thompson) also known as SUCROFERRIC OXYHYDROXIDE is an iron complex. It binds phosphates in the stomach and prevents them from being absorbed by the body. This medicine is used in patients with chronic kidney disease on dialysis to prevent dangerous increases in phosphates.  This medicine may be used for other purposes; ask your health care provider or pharmacist if you have questions.  What should I tell my health care provider before I take this medicine?  They need to know if you have any of these conditions:    high levels of iron in the blood    liver disease    stomach or intestine problems or surgery    an unusual or allergic reaction to iron sucrose, other medicines, foods, dyes, or preservatives    pregnant or trying to get pregnant    breast-feeding  How should I use this medicine?  Take this medicine by mouth. Chew it completely before swallowing. The tablets may be crushed to help with chewing and swallowing. Follow the directions on the prescription label. Take this medicine with food. Take your medicine at regular intervals. Do not take it more often than directed. Do not stop taking except on your doctor's advice.  Talk to your pediatrician regarding the use of this medicine in children. Special care may be needed.  Overdosage: If you think you have taken too much of this medicine contact a poison control center or emergency room at once.  NOTE: This medicine is only for you. Do not share this medicine with others.  What if I miss a dose?  If you miss one or more doses, restart your medicine with the next meal. Do not take double or extra doses.  What may interact with this medicine?    alendronate    calcitriol    certain antibiotics like doxycycline and tetracycline    doxercalciferol    paricalcitol    thyroid  hormones  This list may not describe all possible interactions. Give your health care provider a list of all the medicines, herbs, non-prescription drugs, or dietary supplements you use. Also tell them if you smoke, drink alcohol, or use illegal drugs. Some items may interact with your medicine.  What should I watch for while using this medicine?  Visit your doctor or healthcare professional for regular check ups. You may need blood work done while taking this medicine.  What side effects may I notice from receiving this medicine?  Side effects that you should report to your doctor or health care professional as soon as possible:    allergic reactions like skin rash, itching or hives, swelling of the face, lips, or tongue    dark colored stools (this may be due to the iron, but can indicate a more serious condition)  Side effects that usually do not require medical attention (Report these to your doctor or health care professional if they continue or are bothersome.):    diarrhea    nausea  This list may not describe all possible side effects. Call your doctor for medical advice about side effects. You may report side effects to FDA at 9-567-FDA-5305.  Where should I keep my medicine?  Keep out of the reach of children.  Store at room temperature between 15 and 30 degrees C (59 and 86 degrees F). Throw away any unused medicine after the expiration date.  NOTE: This sheet is a summary. It may not cover all possible information. If you have questions about this medicine, talk to your doctor, pharmacist, or health care provider.  NOTE:This sheet is a summary. It may not cover all possible information. If you have questions about this medicine, talk to your doctor, pharmacist, or health care provider. Copyright  2016 [employer]

## 2020-11-20 NOTE — PROGRESS NOTES
Nursing Note  Molly Teague [facility] for:   Chief Complaint   Patient presents with     Infusion     iron sucrose (VENOFER)     During [facility]'s [facility] appointment, orders from Dr. Barba were completed.  Frequency: today is dose 3 of 3 total     Progress note:  Patient identification verified by name and date of birth.  Assessment completed.  Vitals recorded in Doc Flowsheets.  Patient was provided with education regarding medication/procedure and possible side effects.  Patient verbalized understanding.     present during visit today: Not Applicable.    Treatment Conditions: Non-applicable.    Premedications: were not ordered.    Drug Waste Record: No    Infusion length and rate:  infusion given over approximately 90 minutes    Labs: were not ordered for this appointment.    Vascular access: peripheral IV was placed by vascular access nurse.    Post Infusion Assessment:  Patient tolerated infusion without incident.     Discharge Plan:   Follow up plan of care with: ongoing [facility]usions [facility].  Discharge instructions were reviewed with patient.  Patient/representative verbalized understanding of discharge instructions and all questions answered.  [facility] in stable condition.  Marisa Turcios RN    Administrations This Visit     iron sucrose (VENOFER) 300 mg in sodium chloride 0.9 % 250 mL intermittent infusion     Admin Date  11/20/2020 Action  New Bag Dose  300 mg Rate  193.3 mL/hr Route  Intravenous Administered By  Marisa Turcios RN                /76   Pulse 114   Temp 98.7  F (37.1  C) (Oral)   Resp 18   SpO2 99%

## 2020-11-20 NOTE — LETTER
11/20/2020         RE: Molly Teague  [address] [facility] [place] [place] [address]        Dear Colleague,    Thank you for referring your patient, Molly Teague, to the [facility]. Please see a copy of my visit note below.    Nursing Note  Molly Teague [facility]:   Chief Complaint   Patient presents with     Infusion     iron sucrose (VENOFER)     During [facility]'s [facility] appointment, orders from Dr. Barba were completed.  Frequency: today is dose 3 of 3 total     Progress note:  Patient identification verified by name and date of birth.  Assessment completed.  Vitals recorded in Doc Flowsheets.  Patient was provided with education regarding medication/procedure and possible side effects.  Patient verbalized understanding.     present during visit today: Not Applicable.    Treatment Conditions: Non-applicable.    Premedications: were not ordered.    Drug Waste Record: No    Infusion length and rate:  infusion given over approximately 90 minutes    Labs: were not ordered for this appointment.    Vascular access: peripheral IV was placed by vascular access nurse.    Post Infusion Assessment:  Patient tolerated infusion without incident.     Discharge Plan:   Follow up plan of care with: ongoing [facility].  Discharge instructions were reviewed with patient.  Patient/representative verbalized understanding of discharge instructions and all questions answered.  Patient discharged from [facility] in stable condition.  Marisa Turcios RN    Administrations This Visit     iron sucrose (VENOFER) 300 mg in sodium chloride 0.9 % 250 mL intermittent infusion     Admin Date  11/20/2020 Action  New Bag Dose  300 mg Rate  193.3 mL/hr Route  Intravenous Administered By  Marisa Turcios RN                 /76   Pulse 114   Temp 98.7  F (37.1  C) (Oral)   Resp 18   SpO2 99%           Again, thank you for allowing me to participate in the care of your patient.        Sincerely,        [facility]

## 2020-11-24 ENCOUNTER — TELEPHONE (OUTPATIENT)
Dept: INTERNAL MEDICINE | Facility: CLINIC | Age: 35
End: 2020-11-24

## 2020-11-24 NOTE — TELEPHONE ENCOUNTER
Patient is requesting a refill for ferrous gluconate 324 mg which is not on current medlist. Please call pharmacy or patient directly with any questions.    Nadege Holland Technician   [facility]  On behalf of  [facility]  [id number])  [address] [place] [address]  Phone : [phone]  Fax : [phone]

## 2020-11-25 NOTE — TELEPHONE ENCOUNTER
Message left for patient to return call to clinic.  CSS - ok to deliver message below.    Demetria JHA MSN, RN

## 2020-11-27 NOTE — TELEPHONE ENCOUNTER
3rd attempt.  Message left for patient to return call to clinic.  CSS - ok to deliver message below.  Closing encounter.  Demetria JHA MSN, RN

## 2020-12-02 DIAGNOSIS — S82.891A ANKLE FRACTURE, RIGHT, CLOSED, INITIAL ENCOUNTER: ICD-10-CM

## 2020-12-02 NOTE — TELEPHONE ENCOUNTER
Requested Prescriptions   Pending Prescriptions Disp Refills     methocarbamol (ROBAXIN) 750 MG tablet [Pharmacy Med Name: METHOCARBAMOL 750MG TABS] 30 tablet 2     Sig: TAKE ONE TABLET BY MOUTH FOUR TIMES A DAY AS NEEDED FOR MUSCLE SPASMS       There is no refill protocol information for this order

## 2020-12-03 NOTE — TELEPHONE ENCOUNTER
Venessa,,    Routing refill request to provider for review/approval because:  Drug not on the [facility] refill protocol Reyna BAEZA RN

## 2020-12-04 RX ORDER — METHOCARBAMOL 750 MG/1
TABLET, FILM COATED ORAL
Qty: 30 TABLET | Refills: 2 | Status: SHIPPED | OUTPATIENT
Start: 2020-12-04 | End: 2021-11-10 | Stop reason: SINTOL

## 2020-12-07 DIAGNOSIS — M25.571 PAIN IN JOINT, ANKLE AND FOOT, RIGHT: Primary | ICD-10-CM

## 2020-12-17 ENCOUNTER — OFFICE VISIT (OUTPATIENT)
Dept: FAMILY MEDICINE | Facility: CLINIC | Age: 35
End: 2020-12-17
Payer: MEDICARE

## 2020-12-17 VITALS
OXYGEN SATURATION: 98 % | SYSTOLIC BLOOD PRESSURE: 122 MMHG | BODY MASS INDEX: 33.65 KG/M2 | TEMPERATURE: 98.9 F | WEIGHT: 184 LBS | HEART RATE: 116 BPM | DIASTOLIC BLOOD PRESSURE: 78 MMHG | RESPIRATION RATE: 16 BRPM

## 2020-12-17 DIAGNOSIS — M34.1 CREST (CALCINOSIS, RAYNAUD'S PHENOMENON, ESOPHAGEAL DYSFUNCTION, SCLERODACTYLY, TELANGIECTASIA) (H): ICD-10-CM

## 2020-12-17 DIAGNOSIS — Z23 NEED FOR PROPHYLACTIC VACCINATION AND INOCULATION AGAINST INFLUENZA: ICD-10-CM

## 2020-12-17 DIAGNOSIS — D63.8 ANEMIA, CHRONIC DISEASE: ICD-10-CM

## 2020-12-17 DIAGNOSIS — G89.4 CHRONIC PAIN SYNDROME: Primary | Chronic | ICD-10-CM

## 2020-12-17 PROBLEM — E66.01 MORBID OBESITY (H): Status: RESOLVED | Noted: 2019-01-22 | Resolved: 2020-12-17

## 2020-12-17 LAB
AMPHETAMINES UR QL: NOT DETECTED NG/ML
BARBITURATES UR QL SCN: NOT DETECTED NG/ML
BENZODIAZ UR QL SCN: NOT DETECTED NG/ML
BUPRENORPHINE UR QL: NOT DETECTED NG/ML
CANNABINOIDS UR QL: ABNORMAL NG/ML
COCAINE UR QL SCN: NOT DETECTED NG/ML
D-METHAMPHET UR QL: NOT DETECTED NG/ML
ERYTHROCYTE [DISTWIDTH] IN BLOOD BY AUTOMATED COUNT: 23.9 % (ref 10–15)
FERRITIN SERPL-MCNC: 145 NG/ML (ref 12–150)
HCT VFR BLD AUTO: 42.6 % (ref 35–47)
HGB BLD-MCNC: 13.2 G/DL (ref 11.7–15.7)
IRON SERPL-MCNC: 54 UG/DL (ref 35–180)
MCH RBC QN AUTO: 26 PG (ref 26.5–33)
MCHC RBC AUTO-ENTMCNC: 31 G/DL (ref 31.5–36.5)
MCV RBC AUTO: 84 FL (ref 78–100)
METHADONE UR QL SCN: NOT DETECTED NG/ML
OPIATES UR QL SCN: NOT DETECTED NG/ML
OXYCODONE UR QL SCN: ABNORMAL NG/ML
PCP UR QL SCN: NOT DETECTED NG/ML
PLATELET # BLD AUTO: 338 10E9/L (ref 150–450)
PROPOXYPH UR QL: NOT DETECTED NG/ML
RBC # BLD AUTO: 5.08 10E12/L (ref 3.8–5.2)
TRICYCLICS UR QL SCN: NOT DETECTED NG/ML
WBC # BLD AUTO: 12.5 10E9/L (ref 4–11)

## 2020-12-17 PROCEDURE — 36415 COLL VENOUS BLD VENIPUNCTURE: CPT | Performed by: INTERNAL MEDICINE

## 2020-12-17 PROCEDURE — 85027 COMPLETE CBC AUTOMATED: CPT | Performed by: INTERNAL MEDICINE

## 2020-12-17 PROCEDURE — 80306 DRUG TEST PRSMV INSTRMNT: CPT | Performed by: INTERNAL MEDICINE

## 2020-12-17 PROCEDURE — 83540 ASSAY OF IRON: CPT | Performed by: INTERNAL MEDICINE

## 2020-12-17 PROCEDURE — G0008 ADMIN INFLUENZA VIRUS VAC: HCPCS | Performed by: INTERNAL MEDICINE

## 2020-12-17 PROCEDURE — 90732 PPSV23 VACC 2 YRS+ SUBQ/IM: CPT | Performed by: INTERNAL MEDICINE

## 2020-12-17 PROCEDURE — 90686 IIV4 VACC NO PRSV 0.5 ML IM: CPT | Performed by: INTERNAL MEDICINE

## 2020-12-17 PROCEDURE — G0009 ADMIN PNEUMOCOCCAL VACCINE: HCPCS | Performed by: INTERNAL MEDICINE

## 2020-12-17 PROCEDURE — 82728 ASSAY OF FERRITIN: CPT | Performed by: INTERNAL MEDICINE

## 2020-12-17 PROCEDURE — 99215 OFFICE O/P EST HI 40 MIN: CPT | Mod: 25 | Performed by: INTERNAL MEDICINE

## 2020-12-17 RX ORDER — OXYCODONE HYDROCHLORIDE 15 MG/1
15 TABLET ORAL EVERY 4 HOURS PRN
Qty: 90 TABLET | Refills: 0 | Status: SHIPPED | OUTPATIENT
Start: 2020-12-17 | End: 2021-01-20

## 2020-12-17 RX ORDER — FAMOTIDINE 20 MG/1
20 TABLET, FILM COATED ORAL 2 TIMES DAILY
Qty: 180 TABLET | Refills: 3 | Status: SHIPPED | OUTPATIENT
Start: 2020-12-17 | End: 2022-04-12

## 2020-12-17 ASSESSMENT — ANXIETY QUESTIONNAIRES
2. NOT BEING ABLE TO STOP OR CONTROL WORRYING: NEARLY EVERY DAY
IF YOU CHECKED OFF ANY PROBLEMS ON THIS QUESTIONNAIRE, HOW DIFFICULT HAVE THESE PROBLEMS MADE IT FOR YOU TO DO YOUR WORK, TAKE CARE OF THINGS AT HOME, OR GET ALONG WITH OTHER PEOPLE: VERY DIFFICULT
7. FEELING AFRAID AS IF SOMETHING AWFUL MIGHT HAPPEN: SEVERAL DAYS
5. BEING SO RESTLESS THAT IT IS HARD TO SIT STILL: SEVERAL DAYS
6. BECOMING EASILY ANNOYED OR IRRITABLE: MORE THAN HALF THE DAYS
1. FEELING NERVOUS, ANXIOUS, OR ON EDGE: NEARLY EVERY DAY
3. WORRYING TOO MUCH ABOUT DIFFERENT THINGS: NEARLY EVERY DAY
GAD7 TOTAL SCORE: 15

## 2020-12-17 ASSESSMENT — PATIENT HEALTH QUESTIONNAIRE - PHQ9
SUM OF ALL RESPONSES TO PHQ QUESTIONS 1-9: 7
5. POOR APPETITE OR OVEREATING: MORE THAN HALF THE DAYS

## 2020-12-17 NOTE — LETTER
December 22, 2020      Molly Ho  [address] [facility] [id number]        Dear ,    We are writing to inform you of your test results.    Iron levels have improved.  Hemoglobin has improved from 1 month ago.  Mildly elevated white blood cell count, similar to previous values seen over the years.     Urine drug screen as expected     Resulted Orders   CBC with platelets   Result Value Ref Range    WBC 12.5 (H) 4.0 - 11.0 10e9/L    RBC Count 5.08 3.8 - 5.2 10e12/L    Hemoglobin 13.2 11.7 - 15.7 g/dL    Hematocrit 42.6 35.0 - 47.0 %    MCV 84 78 - 100 fl    MCH 26.0 (L) 26.5 - 33.0 pg    MCHC 31.0 (L) 31.5 - 36.5 g/dL    RDW 23.9 (H) 10.0 - 15.0 %    Platelet Count 338 150 - 450 10e9/L   Iron   Result Value Ref Range    Iron 54 35 - 180 ug/dL   Ferritin   Result Value Ref Range    Ferritin 145 12 - 150 ng/mL   Drug Abuse Screen Panel 13, Urine (Pain Care Package)   Result Value Ref Range    Cannabinoids (19-iwv-7-carboxy-9-THC) Detected, Abnormal Result (A) NDET^Not Detected ng/mL      Comment:      Cutoff for a positive cannabinoid is greater than 50 ng/ml.  This is an unconfirmed screening result to be used for medical purposes only.   Order [id number] for confirmation or individual confirmation tests to [facility].      Phencyclidine (Phencyclidine) Not Detected NDET^Not Detected ng/mL      Comment:      Cutoff for a negative PCP is 25 ng/mL or less.    Cocaine (Benzoylecgonine) Not Detected NDET^Not Detected ng/mL      Comment:      Cutoff for a negative cocaine is 150 ng/ml or less.    Methamphetamine (d-Methamphetamine) Not Detected NDET^Not Detected ng/mL      Comment:      Cutoff for a negative methamphetamine is 500 ng/ml or less.    Opiates (Morphine) Not Detected NDET^Not Detected ng/mL      Comment:      Cutoff for a negative opiate is 100 ng/ml or less.    Amphetamine (d-Amphetamine) Not Detected NDET^Not Detected ng/mL      Comment:      Cutoff for a negative amphetamine is 500  ng/mL or less.    Benzodiazepines (Nordiazepam) Not Detected NDET^Not Detected ng/mL      Comment:      Cutoff for a negative benzodiazepine is 150 ng/ml or less.    Tricyclic Antidepressants (Desipramine) Not Detected NDET^Not Detected ng/mL      Comment:      Cutoff for a negative tricyclic antidepressant is 300 ng/ml or less.    Methadone (Methadone) Not Detected NDET^Not Detected ng/mL      Comment:      Cutoff for a negative methadone is 200 ng/ml or less.    Barbiturates (Butalbital) Not Detected NDET^Not Detected ng/mL      Comment:      Cutoff for a negative barbituate is 200 ng/ml or less.    Oxycodone (Oxycodone) Detected, Abnormal Result (A) NDET^Not Detected ng/mL      Comment:      Cutoff for a positive oxycodone is greater than 100 ng/ml.  This is an unconfirmed screening result to be used for medical purposes only.   Order [id number] for confirmation or individual confirmation tests to [facility].      Propoxyphene (Norpropoxyphene) Not Detected NDET^Not Detected ng/mL      Comment:      Cutoff for a negative propoxyphene is 300 ng/ml or less    Buprenorphine (Buprenorphine) Not Detected NDET^Not Detected ng/mL      Comment:      Cutoff for a negative buprenorphine is 10 ng/ml or less       If you have any questions or concerns, please call the clinic at the number listed above.       Sincerely,      Grecia Barba, DO

## 2020-12-17 NOTE — PATIENT INSTRUCTIONS
1. Stop iron pill since you are getting iron infusions  2. Blood work and urine test today  3. Refills today  4. Decrease oxycodone to #90 per month  5. Schedule virtual visit in 1 month prior to next refill of oxycodone - we will discuss further taper down  6. You should get an email - check junk email folder just in case.

## 2020-12-17 NOTE — LETTER
[facility]   12/17/20    Patient: Molly Teague  YOB: 1985  Medical Record Number: [id number]                                                                  Opioid / Opioid Plus Controlled Substance Agreement    I understand that my care provider has prescribed an opioid (narcotic) controlled substance to help manage my condition(s). I am taking this medicine to help me function or work. I know this is strong medicine, and that it can cause serious side effects. Opioid medicine can be sedating, addicting and may cause a dependency on the drug. They can affect my ability to drive or think, and cause depression. They need to be taken exactly as prescribed. Combining opioids with certain medicines or chemicals (such as cocaine, sedatives and tranquilizers, sleeping pills, meth) can be dangerous or even fatal. Also, if I stop opioids suddenly, I may have severe withdrawal symptoms. Last, I understand that opioids do not work for all types of pain nor for all patients. If not helpful, I may be asked to stop them.        The risks, benefits, and side effects of these medicine(s) were explained to me. I agree that:    1. I will take part in other treatments as advised by my care team. This may be psychiatry or counseling, physical therapy, behavioral therapy, group treatment or a referral to a pain clinic. I will reduce or stop my medicine when my care team tells me to do so.  2. I will take my medicines as prescribed. I will not change the dose or schedule unless my care team tells me to. There will be no refills if I  run out early.   I may be contactedwithout warning and asked to complete a urine drug test or pill count at any time.   3. I will keep all my appointments, and understand this is part of the monitoring of opioids. My care team may require an office visit for EVERY opioid/controlled substance refill. If I miss appointments or don t follow instructions, my care  team may stop my medicine.  4. I will not ask other providers to prescribe controlled substances, and I will not accept controlled substances from other people. If I need another prescribed controlled substance for a new reason, I will tell my care team within 1 business day.  5. I will use one pharmacy to fill all of my controlled substance prescriptions, and it is up to me to make sure that I do not run out of my medicines on weekends or holidays. If my care team is willing to refill my opioid prescription without a visit, I must request refills only during office hours, refills may take up to 3 days to process, and it may take up to 5 to 7 days for my medicine to be mailed and ready at my pharmacy. Prescriptions will not be mailed anywhere except my pharmacy.        [id number]  Rev 12/18         Registration to scan to EHR                             Page 1 of 2               Controlled Substance Agreement [facility]   12/17/20  Patient: Molly Teague  YOB: 1985  Medical Record Number: [id number]                                                                  6. I am responsible for my prescriptions. If the medicine/prescription is lost or stolen, it will not be replaced. I also agree not to share controlled substance medicines with anyone.  7. I agree to not use ANY illegal or recreational drugs. This includes marijuana, cocaine, bath salts or other drugs. I agree not to use alcohol unless my care team says I may.          I agree to give urine samples whenever asked. If I don t give a urine sample, the care team may stop my medicine.    8. If I enroll in the [place] Medical Marijuana program, I will tell my care team. I will also sign an agreement to share my medical records with my care team.   9. I will bring in my list of medicines (or my medicine bottles) each time I come to the clinic.   10. I will tell my care team right away if I become pregnant or  have a new medical problem treated outside of my regular clinic.  11. I understand that this medicine can affect my thinking and judgment. It may be unsafe for me to drive, use machinery and do dangerous tasks. I will not do any of these things until I know how the medicine affects me. If my dose changes, I will wait to see how it affects me. I will contact my care team if I have concerns about medicine side effects.    I understand that if I do not follow any of the conditions above, my prescriptions or treatment may be stopped.      I agree that my provider, clinic care team, and pharmacy may work with any city, state or federal law enforcement agency that investigates the misuse, sale, or other diversion of my controlled medicine. I will allow my provider to discuss my care with or share a copy of this agreement with any other treating provider, pharmacy or emergency room where I receive care. I agree to give up (waive) any right of privacy or confidentiality with respect to these consents.     I have read this agreement and have asked questions about anything I did not understand.      ________________________________________________________________________  Patient signature - Date/Time -  Molly Teague                                      ________________________________________________________________________  Witness signature                                                            ________________________________________________________________________  Provider signature - Grecia Barba DO      [id number]  Rev 12/18         Registration to scan to EHR                         Page 2 of 2                   Controlled Substance Agreement Opioid           Page 1 of 2  Opioid Pain Medicines (also known as Narcotics)  What You Need to Know    What are opioids?   Opioids are pain medicines that must be prescribed by a doctor.  They are also known as narcotics.    Examples are:     morphine (MS Contin,  Catrachita)    oxycodone (Oxycontin)    oxycodone and acetaminophen (Percocet)    hydrocodone and acetaminophen (Vicodin, Norco)     fentanyl patch (Duragesic)     hydromorphone (Dilaudid)     methadone     What do opioids do well?   Opioids are best for short-term pain after a surgery or injury. They also work well for cancer pain. Unlike other pain medicines, they do not cause liver or kidney failure or ulcers. They may help some people with long-lasting (chronic) pain.     What do opioids NOT do well?   Opioids never get rid of pain entirely, and they do not work well for most patients with chronic pain. Opioids do not reduce swelling, one of the causes of pain. They also don t work well for nerve pain.                           For informational purposes only.  Not to replace the advice of your care provider.  Copyright [address] [facility]. All right reserved. [employer] 915347-[id number] 02/18.      Page 2 of 2    Risks and side effects   Talk to your doctor before you start or decide to keep taking one of these medicines. Side effects include:    Lowering your breathing rate enough to cause death    Overdose, including death, especially if taking higher than prescribed doses    Long-term opioid use    Worse depression symptoms; less pleasure in things you usually enjoy    Feeling tired or sluggish    Slower thoughts or cloudy thinking    Being more sensitive to pain over time; pain is harder to control    Trouble sleeping or restless sleep    Changes in hormone levels (for example, less testosterone)    Changes in sex drive or ability to have sex    Constipation    Unsafe driving    Itching and sweating    Feeling dizzy    Nausea, vomiting and dry mouth    What else should I know about opioids?  When someone takes opioids for too long or too often, they become dependent. This means that if you stop or reduce the medicine too quickly, you will have withdrawal symptoms.    Dependence is not the same as addiction.  Addiction is when people keep using a substance that harms their body, their mind or their relations with others. If you have a history of drug or alcohol abuse, taking opioids can cause a relapse.    Over time, opioids don t work as well. Most people will need higher and higher doses. The higher the dose, the more serious the side effects. We don t know the long-term effects of opioids.      Prescribed opioids aren't the best way to manage chronic pain    Other ways to manage pain include:      Ibuprofen or acetaminophen.  You should always try this first.      Treat health problems that may be causing pain.      acupuncture or massage, deep breathing, meditation, visual imagery, aromatherapy.      Use heat or ice at the pain site      Physical therapy and exercise      Stop smoking      See a counselor or therapist                                                  People who have used opioids for a long time may have a lower quality of life, worse depression, higher levels of pain and more visits to doctors.    Never share your opioids with others. Be sure to store opioids in a secure place, locked if possible.Young children can easily swallow them and overdose.     You can overdose on opioids.  Signs of overdose include decrease or loss of consciousness, slowed breathing, trouble waking and blue lips.  If someone is worried about overdose, they should call 911.    If you are at risk for overdose, you may get naloxone (Narcan, a medicine that reverses the effects of opioids.  If you overdose, a friend or family member can give you Narcan while waiting for the ambulance.  They need to know the signs of overdose and how to give Narcan.    While you're taking opioids:    Don't use alcohol or street drugs. Taking them together can cause death.    Don't take any of these medicines unless your doctor says its okay.  Taking these with opioids can cause death.    Benzodiazepines (such as lorazepam         or  diazepam)    Muscle relaxers (such as cyclobenzaprine)    sleeping pills    other opioids    Safe disposal of opioids  Find your area drug take-back program, your pharmacy mail-back program, buy a special disposal bag (such as Deterra) from your pharmacy or flush them down the toilet.  Use the guidelines at:  www.fda.gov/drugs/resourcesforyou

## 2020-12-17 NOTE — PROGRESS NOTES
Subjective     Molly Teague is a 35 year old female who presents to clinic today for the following health issues:    HPI       Flu shot:Today  PPSV23: Not showing on [facility] that she needs - stop smoking - but pt wants to have - PCP advise       PTSD follow up:    Depression and Anxiety Follow-Up    How are you doing with your depression since your last visit? No change    How are you doing with your anxiety since your last visit?  Worsened     Are you having other symptoms that might be associated with depression or anxiety? No    Have you had a significant life event? No     Do you have any concerns with your use of alcohol or other drugs? No     Had a bad episode a few weeks ago.  Got through it w/support of     Having increase in panic attacks.    Feels Cymbalta has helped a lot    Is meditating and was seeing counselor     Following with counselor Dr. Palacios in [place] but not since COVID started    Social History     Tobacco Use     Smoking status: Never Smoker     Smokeless tobacco: Never Used   Substance Use Topics     Alcohol use: Yes     Comment: Socially once on a weekend if any     Drug use: No     Comment: None     PHQ 4/7/2020 8/4/2020 12/17/2020   PHQ-9 Total Score 16 9 7   Q9: Thoughts of better off dead/self-harm past 2 weeks Not at all Not at all Not at all   F/U: Thoughts of suicide or self-harm - - -   F/U: Self harm-plan - - -   F/U: Self-harm action - - -   F/U: Safety concerns - - -   PHQ-A Total Score - - -   PHQ-A Depressed most days in past year - - -   PHQ-A Mood affect on daily activities - - -   PHQ-A Suicide Ideation past 2 weeks - - -     REX-7 SCORE 4/7/2020 8/4/2020 12/17/2020   Total Score 14 (moderate anxiety) - -   Total Score 14 11 15     Last PHQ-9 12/17/2020   1.  Little interest or pleasure in doing things 2   2.  Feeling down, depressed, or hopeless 1   3.  Trouble falling or staying asleep, or sleeping too much 1   4.  Feeling tired or having little energy 1   5.  Poor  appetite or overeating 1   6.  Feeling bad about yourself 1   7.  Trouble concentrating 0   8.  Moving slowly or restless 0   Q9: Thoughts of better off dead/self-harm past 2 weeks 0   PHQ-9 Total Score 7   Difficulty at work, home, or with people Somewhat difficult   In the past two weeks have you had thoughts of suicide or self harm? -   Do you have concerns about your personal safety or the safety of others? -   In the past 2 weeks have you thought about a plan or had intention to harm yourself? -   In the past 2 weeks have you acted on these thoughts in any way? -     REX-7  12/17/2020   1. Feeling nervous, anxious, or on edge 3   2. Not being able to stop or control worrying 3   3. Worrying too much about different things 3   4. Trouble relaxing 2   5. Being so restless that it is hard to sit still 1   6. Becoming easily annoyed or irritable 2   7. Feeling afraid, as if something awful might happen 1   REX-7 Total Score 15   If you checked any problems, how difficult have they made it for you to do your work, take care of things at home, or get along with other people? Very difficult       Suicide Assessment Five-step Evaluation and Treatment (SAFE-T)    GERD is controlled.  Migraines still a problem.  Sleep is improved.  Asthma not well controlled.            Chronic Pain Follow-Up    Where in your body do you have pain? Chronic pain syndrome  How has your pain affected your ability to work? Unable to work  Which of these pain treatments have you tried since your last clinic visit? Other: na  How well are you sleeping? Good  How has your mood been since your last visit? About the same  Have you had a significant life event? No  Other aggravating factors: none  Taking medication as directed? Yes    PHQ-9 SCORE 4/7/2020 8/4/2020 12/17/2020   PHQ-9 Total Score MyChart 16 (Moderately severe depression) - -   PHQ-9 Total Score 16 9 7   PHQ-A Total Score - - -     REX-7 SCORE 4/7/2020 8/4/2020 12/17/2020   Total  Score 14 (moderate anxiety) - -   Total Score 14 11 15     No flowsheet data found.  Encounter-Level CSA - 04/26/2017:    Controlled Substance Agreement - Scan on 5/4/2017  8:58 AM: CONTROLLED SUBSTANCE AGREEMENT     Patient-Level CSA:    Controlled Substance Agreement - Opioid - Scan on 2/6/2019  6:23 PM           Medical Cannabis Certification      MEDICAL DECISION MAKING: Molly Teague is a 35 year old female who presents in consultation for certification into the [facility] Medical Cannabis Program.    The known standards of accepted treatment options for the patient's qualifying condition (s) were discussed today. Molly Teague is a 35 year old female medical history including diagnotic tests, past medication and treatment trials, including outcome reviewed for the past 12 months. The patient's current medication and pan of care were also reviewed, The potential risks and benefits of medical cannabis was discussed. The patient is aware, that the scientific evidence of medical cannabis in regards to the therapeutic benefit and risk as well as  interaction with other drugs is limited.  The patient is also aware that  combined with other therapies medical cannabis may or may not  improve the qualifying condition and may also worsen other conditions such as depression, anxiety, insomnia and substance use disorders. Molly Teague is a 35 year old female is aware of the formulations that are currently available. The patient is also aware that the cost of medical cannabis registration and purchase is not covered under medical insurance. The patient was also informed that there is limitations of use of medical cannabis in public places, while on the job, as well as across state lines.  Molly Teague is a 35 year old femaleis aware that this practitioner is under no obligation to to certify for medical marijuana, even if the patient has a qualifying medical condition.    If this practitioner  certifies a qualifying medical condition(s) for this patient, they mutually agree to regular follow up of 12 months with the certifying practitioner.  The patient also agrees to periodic review of the medical cannabis registry, documentation of enrollment in the program, and  random urine drug screen      1. Patient presents to the clinic with request for medical cannabis  --is patient a [place] resident? YES    2. Medical Condition:      Cancer associated with severe/chronic pain, nausea or severe vomiting, or cachexia or severe wasting  Glaucoma  HIV/AIDS  Tourette syndrome  Amyotrophic lateral sclerosis (ALS)  Seizures, including those characteristic of epilepsy  Severe and persistent muscle spasms, including those characteristic of multiple sclerosis  Inflammatory bowel disease, including Crohn s disease  Terminal illness, with a life expectancy of less than one year, if the illness or treatment produces severe/chronic pain, nausea or severe vomiting, cachexia or severe wasting  Intractable pain  Post-traumatic stress disorder  Autism  Obstructive sleep apnea  Alzheimer's disease  Chronic pain  Sickle cell disease (effective Aug. 2021)  Chronic motor or vocal tic disorder (effective Aug. 2021)    If Chronic Pain, PEG 3 score 7    3.Relevant history and/or tests relating to the above condition: Chronic pain due to CREST, systemic sclerosis, polyneuropathy, scleroderma, Raynaud's    4. Standard Treatments Tried  --Opiates, physical therapy, Cymbalta, behavior therapy    5. Discussion about cost - initial registration is $200.  30 day supply is estimated to cost $200-500 which is not covered by health insurance    6. Does the patient have a disability the prevents him/her from self-administering of mediations, or is the patient unable to acquire medical cannabiss from a distribution center?  NO       RECOMMENDATIONS/PLAN:   [place] in the [facility] Medical Cannabis Program Yes  Patient  e-mail collected  Yes  [place] statement given to the patient  Yes  Updated medication and problem list given to the patient   Yes              Review of Systems   Constitutional, HEENT, cardiovascular, pulmonary, GI, , musculoskeletal, neuro, skin, endocrine and psych systems are negative, except as otherwise noted.      Objective    /78   Pulse 116   Temp 98.9  F (37.2  C) (Tympanic)   Resp 16   Wt 83.5 kg (184 lb)   SpO2 98%   Breastfeeding No   BMI 33.65 kg/m    Body mass index is 33.65 kg/m .  Physical Exam   GENERAL APPEARANCE: alert, no distress and over weight  PSYCH: mentation appears normal and affect normal/bright          Assessment & Plan     Chronic pain syndrome -refill provided for 90 instead of 100 pills.  Will attempt a slow taper down, patient really agreeable.  She has stated this is been her goal since she first came under my care many years ago.  She would like to start medical cannabis.  I certified her for the program today.  Because she is on a higher dose of oxycodone and has been for many years, I placed a consult to the pain clinic to help with tapering down as she starts using the medical cannabis for PTSD and for pain.  Due for urine drug test today.  - oxyCODONE IR (ROXICODONE) 15 MG tablet; Take 1 tablet (15 mg) by mouth every 4 hours as needed for severe pain  - Drug Abuse Screen Panel 13, Urine (Pain Care Package)  - E-CONSULT TO PAIN MANAGEMENT (ADULT OUTPATIENT PCP TO SPECIALIST)    CREST (calcinosis, Raynaud's phenomenon, esophageal dysfunction, sclerodactyly, telangiectasia) (H) -improved.  Follows with GI at [facility]  - famotidine (PEPCID) 20 MG tablet; Take 1 tablet (20 mg) by mouth 2 times daily    Gastroesophageal reflux disease with esophagitis    Anemia, chronic disease -getting iron infusions.  No need for oral iron  - CBC with platelets  - Iron  - Ferritin    Need for prophylactic vaccination and inoculation against influenza  - INFLUENZA VACCINE IM > 6  MONTHS VALENT IIV4 [37128]  - Pneumococcal vaccine 23 valent PPSV23  (Pneumovax) [99832]  - ADMIN PNEUMOVAX VACCINE (For MEDICARE Patients ONLY) []        Patient Instructions   1. Stop iron pill since you are getting iron infusions  2. Blood work and urine test today  3. Refills today  4. Decrease oxycodone to #90 per month  5. Schedule virtual visit in 1 month prior to next refill of oxycodone - we will discuss further taper down  6. You should get an email - check junk email folder just in case.      No follow-ups on file.     Greater than 40 minutes was spent face-to-face with the patient by the provider.  Greater than 50% was spent in counseling or coordinating care for this patient.      Grecia Barba DO  [facility]

## 2020-12-18 ASSESSMENT — ANXIETY QUESTIONNAIRES: GAD7 TOTAL SCORE: 15

## 2020-12-22 ENCOUNTER — TELEPHONE (OUTPATIENT)
Dept: INTERNAL MEDICINE | Facility: CLINIC | Age: 35
End: 2020-12-22

## 2020-12-22 DIAGNOSIS — R11.2 NAUSEA AND VOMITING, INTRACTABILITY OF VOMITING NOT SPECIFIED, UNSPECIFIED VOMITING TYPE: ICD-10-CM

## 2020-12-22 DIAGNOSIS — G89.4 CHRONIC PAIN SYNDROME: Chronic | ICD-10-CM

## 2020-12-22 DIAGNOSIS — J30.2 SEASONAL ALLERGIC RHINITIS, UNSPECIFIED TRIGGER: ICD-10-CM

## 2020-12-22 DIAGNOSIS — T75.3XXS SEVERE MOTION SICKNESS, SEQUELA: ICD-10-CM

## 2020-12-22 NOTE — TELEPHONE ENCOUNTER
Huddled with Dr. Barba.  This will have to wait until Kathy KEARNS comes back.  She apparently has a hot line number to call.  I can find the neighborhood new flyer but no numbers. I called pain clinic and they no Nothing about E consults.  Reyna BAEZA RN

## 2020-12-22 NOTE — TELEPHONE ENCOUNTER
[facility] care team -I saw patient in clinic on 12/17.  I placed an E consult for the pain clinic, and I have not heard back yet.    Demetria QUIROZ worked to troubleshoot an E-consult for me a few weeks back.  She called a hotline number to report that the e-consult had not been completed.

## 2020-12-23 NOTE — TELEPHONE ENCOUNTER
This RN does not have a phone # for the e-consults, just a contact name to msg via [employer] Teams = Dr. Joel Wegener, he is the contact for e-consults.    RN has sent him a msg today via [employer] Teams regarding this patient and inquiring about f/u?    JESUS Garrison, RN

## 2020-12-23 NOTE — TELEPHONE ENCOUNTER
"Requested Prescriptions   Pending Prescriptions Disp Refills     promethazine (PHENERGAN) 25 MG tablet [Pharmacy Med Name: PROMETHAZINE HCL 25MG TABS] 30 tablet 11     Sig: TAKE ONE TABLET BY MOUTH TWICE A DAY AS NEEDED NAUSEA        Antivertigo/Antiemetic Agents Passed - 12/22/2020  7:25 PM        Passed - Recent (12 mo) or future (30 days) visit within the authorizing provider's specialty     Patient has had an office visit with the authorizing provider or a provider within the authorizing providers department within the previous 12 mos or has a future within next 30 days. See \"Patient Info\" tab in inbasket, or \"Choose Columns\" in Meds & Orders section of the refill encounter.              Passed - Medication is active on med list        Passed - Patient is 18 years of age or older           ondansetron (ZOFRAN-ODT) 8 MG ODT tab [Pharmacy Med Name: ONDANSETRON 8MG TBDP] 30 tablet 11     Sig: DISSOLVE ONE TABLET ON TONGUE EVERY 8 HOURS AS NEEDED FOR NAUSEA        Antivertigo/Antiemetic Agents Failed - 12/22/2020  7:25 PM        Failed - Medication is active on med list        Passed - Recent (12 mo) or future (30 days) visit within the authorizing provider's specialty     Patient has had an office visit with the authorizing provider or a provider within the authorizing providers department within the previous 12 mos or has a future within next 30 days. See \"Patient Info\" tab in inbasket, or \"Choose Columns\" in Meds & Orders section of the refill encounter.              Passed - Patient is 18 years of age or older           "

## 2020-12-24 NOTE — TELEPHONE ENCOUNTER
Dr. Wegener did state via Teams yesterday he would look into this e-consult.    Demetria JHA, MSN, RN

## 2020-12-29 RX ORDER — ONDANSETRON 8 MG/1
TABLET, ORALLY DISINTEGRATING ORAL
Qty: 30 TABLET | Refills: 11 | OUTPATIENT
Start: 2020-12-29

## 2020-12-29 RX ORDER — PROMETHAZINE HYDROCHLORIDE 25 MG/1
TABLET ORAL
Qty: 30 TABLET | Refills: 7 | Status: SHIPPED | OUTPATIENT
Start: 2020-12-29 | End: 2021-07-02

## 2020-12-29 NOTE — TELEPHONE ENCOUNTER
Routed to Dr Wegener, the contact provider for e-consults.  Also, routed to Dr Barba.  Any updates on moving this forward?  Jia Oneill RN

## 2020-12-30 ENCOUNTER — E-CONSULT (OUTPATIENT)
Dept: ANESTHESIOLOGY | Facility: CLINIC | Age: 35
End: 2020-12-30

## 2020-12-30 NOTE — TELEPHONE ENCOUNTER
Reviewed the E consult recommendations     Will plan to wean oxycodone over 4 months.    At the next refill, I recommend prescribing oxycodone IR 10 mg (#90).  The following months, the script should be as follows:  1.  Oxycodone IR 5 mg (#90); 3 tabs per day  2.  Oxycodone IR 5 mg (#60); 2 tabs per day  3.  Oxycodone IR 5 mg (#30): 1 tab per day, then stop

## 2020-12-30 NOTE — PROGRESS NOTES
12/30/2020     E-Consult has been accepted.    Interprofessional consultation requested by: Grecia Barba     Clinical Question/Purpose: Patient has a h/o chronic pain (on opioids-oxycodone 15 mg q4h) .  Patient has been accepted into the medical cannabis program and the PCP would like to wean the opioids    Patient assessment and information reviewed: via note on 12/17/2020    Recommendations: Chart reviewed and noted that the PCP decreased the monthly amount of pills by 10.  Patient has 90 tablets for the month. This suggests that the patient uses three tabs per day.   Withdrawal is not expected if decrease in daily opioid dose is less than 30%.  At the next refill, I recommend prescribing oxycodone IR 10 mg (#90).  The following months, the script should be as follows:  1.  Oxycodone IR 5 mg (#90); 3 tabs per day  2.  Oxycodone IR 5 mg (#60); 2 tabs per day  3.  Oxycodone IR 5 mg (#30): 1 tab per day, then off    Seems like a long wean however the patient has been on opioids for some period of time and she may be more compliant with this regimen.    The recommendations provided in this E-Consult are based on the clinical data available to me at this time, and are furnished without the benefit of a comprehensive in-person or virtual patient evaluation, Any new clinical issues or changes in patient status since the filing of this E-Consult will need to be taken into account when assessing these recommendations. Please contact me if you have further questions.    My total time spent reviewing clinical information and formulating assessment was 15 minutes.    Report sent automatically to requesting provider once signed.     Charge code: [id number] (5+ minutes)     Grecia Barba DO

## 2021-01-05 ENCOUNTER — OFFICE VISIT (OUTPATIENT)
Dept: ORTHOPEDICS | Facility: CLINIC | Age: 36
End: 2021-01-05
Payer: MEDICARE

## 2021-01-05 ENCOUNTER — ANCILLARY PROCEDURE (OUTPATIENT)
Dept: GENERAL RADIOLOGY | Facility: CLINIC | Age: 36
End: 2021-01-05
Attending: ORTHOPAEDIC SURGERY
Payer: MEDICARE

## 2021-01-05 VITALS — BODY MASS INDEX: 34.72 KG/M2 | HEIGHT: 61 IN | WEIGHT: 183.9 LBS

## 2021-01-05 VITALS — HEIGHT: 61 IN | BODY MASS INDEX: 34.55 KG/M2 | WEIGHT: 183 LBS

## 2021-01-05 DIAGNOSIS — M76.829 PTTD (POSTERIOR TIBIAL TENDON DYSFUNCTION): Primary | ICD-10-CM

## 2021-01-05 DIAGNOSIS — M25.571 PAIN IN JOINT, ANKLE AND FOOT, RIGHT: ICD-10-CM

## 2021-01-05 DIAGNOSIS — M25.571 PAIN IN JOINT, ANKLE AND FOOT, RIGHT: Primary | ICD-10-CM

## 2021-01-05 PROCEDURE — 73610 X-RAY EXAM OF ANKLE: CPT | Mod: RT | Performed by: RADIOLOGY

## 2021-01-05 PROCEDURE — 99213 OFFICE O/P EST LOW 20 MIN: CPT | Performed by: ORTHOPAEDIC SURGERY

## 2021-01-05 PROCEDURE — 20606 DRAIN/INJ JOINT/BURSA W/US: CPT | Performed by: FAMILY MEDICINE

## 2021-01-05 PROCEDURE — 99207 PR DROP WITH A PROCEDURE: CPT | Performed by: FAMILY MEDICINE

## 2021-01-05 RX ORDER — LIDOCAINE HYDROCHLORIDE 10 MG/ML
1 INJECTION, SOLUTION EPIDURAL; INFILTRATION; INTRACAUDAL; PERINEURAL
Status: DISCONTINUED | OUTPATIENT
Start: 2021-01-05 | End: 2021-09-01

## 2021-01-05 RX ORDER — TRIAMCINOLONE ACETONIDE 40 MG/ML
40 INJECTION, SUSPENSION INTRA-ARTICULAR; INTRAMUSCULAR
Status: DISCONTINUED | OUTPATIENT
Start: 2021-01-05 | End: 2022-07-14

## 2021-01-05 RX ADMIN — LIDOCAINE HYDROCHLORIDE 1 ML: 10 INJECTION, SOLUTION EPIDURAL; INFILTRATION; INTRACAUDAL; PERINEURAL at 12:19

## 2021-01-05 RX ADMIN — TRIAMCINOLONE ACETONIDE 40 MG: 40 INJECTION, SUSPENSION INTRA-ARTICULAR; INTRAMUSCULAR at 12:19

## 2021-01-05 ASSESSMENT — MIFFLIN-ST. JEOR
SCORE: 1465.95
SCORE: 1470.03

## 2021-01-05 NOTE — LETTER
1/5/2021         RE: Molly Teague  [address] [facility] [place] [place] [address]        Dear Colleague,    Thank you for referring your patient, Molly Teague, to the [facility] [place]. Please see a copy of my visit note below.    CHIEF COMPLAINT:  Status post right ankle open reduction and internal fixation performed on 02/10/2020.      HISTORY OF PRESENT ILLNESS:  Mrs. Teague presents today for further follow-up.  Overall, she reports to be doing okay, but reports to have some pain and discomfort along the right ankle.  The patient has been evaluated by Dr. Cruz in the past, who has recommended to visit with us to discuss the possibility of removing the hardware from the right ankle.      Patient states with the fact that the pain is located along the medial aspect of the ankle.  Reports to have pain and discomfort with activities.  Denies to have any pain along the lateral aspect of the ankle joint.      PHYSICAL EXAMINATION:  On today's visit, she presents with limited range of motion of the ankle joint with no more than probably 25 degrees of combined plantar and dorsiflexion.  There is a large amount of pain with palpation of the posterior tibialis tendon sheath.  Laterally, the patient does not present with any discomfort along the lateral malleolus.      ASSESSMENT:   1.  Status post right ankle open reduction and internal fixation.   2.  Right ankle posttraumatic arthritis.   3.  Right ankle posterior tibialis tendinitis.      PLAN:  I discussed with the patient that at this point I would not recommend to undergo hardware removal, even though there is some impingement on the CT scan.  I discussed with her that given her current comorbidities and the pandemic and, therefore, to decrease the chances of having some perisurgical complications.      With regards to the posteromedial ankle pain, I recommended her to undergo an injection of the posterior tibialis  tendon sheath under ultrasound guidance with lidocaine and Kenalog for a diagnosis of tendinitis.      All questions were answered.  The patient will try the injection once she gets her custom-made orthotics, which are currently being molded.      CT 20 minutes.  CT 15 minutes.       Natanael Villalta MD

## 2021-01-05 NOTE — LETTER
"    2021         RE: Molly Teague  [address] [facility] [place] [address]        Dear Colleague,    Thank you for referring your patient, Molly Teague, to the [facility]. Please see a copy of my visit note below.    PROCEDURE ENCOUNTER    [facility]  Orthopedics  Shane Crowder DO  2021     Name: Molly Teague  MRN: [id number]  Age: 35 year old  : 1985    Referring provider: Dr. Natanael Villalta  Diagnosis: Posterior tibial tendinitis of right ankle      Procedure:   Sheath of right posterior tibial tendon- Ultrasound Guided  The patient was informed of the risks and the benefits of the procedure and a written consent was signed.  The patient s right medial ankle was prepped with chlorhexidine in sterile fashion.   40 mg of triamcinolone suspension was drawn up into a 3 mL syringe with 1 mL of 1% lidocaine.  Injection was performed using sterile technique.  Under ultrasound guidance a 1.5\" 25-gauge needle was used to enter the posterior tibial tendon sheath.  Needle placement was visualized and documented with ultrasound.  Ultrasound visualization required to ensure injection material enters the tendon sheath and not the tendon itself.  Injection performed long axis to the probe, short axis to tendon.  Injection solution visualized within the tendon sheath.  Images were permanently stored for the patient's record.  There were no complications. The patient tolerated the procedure well. There was negligible bleeding.   The patient was instructed to ice the ankle upon leaving clinic and refrain from overuse over the next 3 days.   The patient was instructed to call or go to the emergency room with any unusual pain, swelling, redness, or if otherwise concerned.  A follow up appointment with Dr. Villalta will be scheduled to evaluate response to the injection, and to assess range of motion and pain.    Shane Crowder, DO [facility]  Primary Care " Sports Medicine  [facility]          Medium Joint Injection: Right posterior tibial tendon sheath    Date/Time: 1/5/2021 12:19 PM  Performed by: Shane Crowder DO  Authorized by: Shane Crowder DO     Indications:  Pain  Needle Size:  25 G  Guidance: ultrasound    Approach:  Medial  Location:  Ankle  Location comment:  Right posterior tibial tendon sheath  Medications:  40 mg triamcinolone 40 MG/ML; 1 mL lidocaine (PF) 1 %  Outcome:  Tolerated well, no immediate complications  Procedure discussed: discussed risks, benefits, and alternatives    Consent Given by:  Patient  Timeout: timeout called immediately prior to procedure    Prep: patient was prepped and draped in usual sterile fashion

## 2021-01-05 NOTE — PROGRESS NOTES
Medium Joint Injection: Right posterior tibial tendon sheath    Date/Time: 1/5/2021 12:19 PM  Performed by: Shane Crowder DO  Authorized by: Shane Crowder DO     Indications:  Pain  Needle Size:  25 G  Guidance: ultrasound    Approach:  Medial  Location:  Ankle  Location comment:  Right posterior tibial tendon sheath  Medications:  40 mg triamcinolone 40 MG/ML; 1 mL lidocaine (PF) 1 %  Outcome:  Tolerated well, no immediate complications  Procedure discussed: discussed risks, benefits, and alternatives    Consent Given by:  Patient  Timeout: timeout called immediately prior to procedure    Prep: patient was prepped and draped in usual sterile fashion

## 2021-01-05 NOTE — PROGRESS NOTES
CHIEF COMPLAINT:  Status post right ankle open reduction and internal fixation performed on 02/10/2020.      HISTORY OF PRESENT ILLNESS:  Mrs. Teague presents today for further follow-up.  Overall, she reports to be doing okay, but reports to have some pain and discomfort along the right ankle.  The patient has been evaluated by Dr. Cruz in the past, who has recommended to visit with us to discuss the possibility of removing the hardware from the right ankle.      Patient states with the fact that the pain is located along the medial aspect of the ankle.  Reports to have pain and discomfort with activities.  Denies to have any pain along the lateral aspect of the ankle joint.      PHYSICAL EXAMINATION:  On today's visit, she presents with limited range of motion of the ankle joint with no more than probably 25 degrees of combined plantar and dorsiflexion.  There is a large amount of pain with palpation of the posterior tibialis tendon sheath.  Laterally, the patient does not present with any discomfort along the lateral malleolus.      ASSESSMENT:   1.  Status post right ankle open reduction and internal fixation.   2.  Right ankle posttraumatic arthritis.   3.  Right ankle posterior tibialis tendinitis.      PLAN:  I discussed with the patient that at this point I would not recommend to undergo hardware removal, even though there is some impingement on the CT scan.  I discussed with her that given her current comorbidities and the pandemic and, therefore, to decrease the chances of having some perisurgical complications.      With regards to the posteromedial ankle pain, I recommended her to undergo an injection of the posterior tibialis tendon sheath under ultrasound guidance with lidocaine and Kenalog for a diagnosis of tendinitis.      All questions were answered.  The patient will try the injection once she gets her custom-made orthotics, which are currently being molded.      CT 20 minutes.  CT 15  minutes.

## 2021-01-05 NOTE — NURSING NOTE
[address] [address]  Dept: [phone]  ______________________________________________________________________________    Patient: Molly Teague   : 1985   MRN: [id number]   2021    INVASIVE PROCEDURE SAFETY CHECKLIST    Date: 2021  Procedure: Right posterior tibial tendon sheath kenalog injection  Patient Name: Molly Teague  MRN: [id number]  YOB: 1985    Action: Complete sections as appropriate. Any discrepancy results in a HARD COPY until resolved.     PRE PROCEDURE:  Patient ID verified with 2 identifiers (name and  or MRN): Yes  Procedure and site verified with patient/designee (when able): Yes  Accurate consent documentation in medical record: Yes  H&P (or appropriate assessment) documented in medical record: Yes  H&P must be up to 20 days prior to procedure and updates within 24 hours of procedure as applicable: NA  Relevant diagnostic and radiology test results appropriately labeled and displayed as applicable: Yes  Procedure site(s) marked with provider initials: NA    TIMEOUT:  Time-Out performed immediately prior to starting procedure, including verbal and active participation of all team members addressing the following:Yes  * Correct patient identify  * Confirmed that the correct side and site are marked  * An accurate procedure consent form  * Agreement on the procedure to be done  * Correct patient position  * Relevant images and results are properly labeled and appropriately displayed  * The need to administer antibiotics or fluids for irrigation purposes during the procedure as applicable   * Safety precautions based on patient history or medication use    DURING PROCEDURE: Verification of correct person, site, and procedures any time the responsibility for care of the patient is transferred to another member of the care team.     The following medication was given:     MEDICATION:   Kenalog 40 mg  ROUTE: tendon sheath  SITE: right Ankle  DOSE: 1mL  LOT #: [id number]  : [employer]  EXPIRATION DATE: 09/2022  NDC#: [address]-9   Was there drug waste? No    MEDICATION:  Lidocaine without epinephrine  ROUTE: tendon sheath  SITE: right Ankle  DOSE: 1mL  LOT #: [id number]  : [employer]  EXPIRATION DATE: 04/2024  NDC#: 90038-637-13   Was there drug waste? Yes  Amount of drug waste (mL): 4.  Reason for waste:  As per MD    Prior to injection, verified patient identity using patient's name and date of birth.  Due to injection administration, patient instructed to remain in clinic for 15 minutes  afterwards, and to report any adverse reaction to me immediately.    Cindy Hoang, TENA  January 5, 2021

## 2021-01-05 NOTE — PROGRESS NOTES
"PROCEDURE ENCOUNTER    [facility]  Orthopedics  Shane Crowder DO  2021     Name: Molly Teague  MRN: [id number]  Age: 35 year old  : 1985    Referring provider: Dr. Natanael Villalta  Diagnosis: Posterior tibial tendinitis of right ankle      Procedure:   Sheath of right posterior tibial tendon- Ultrasound Guided  The patient was informed of the risks and the benefits of the procedure and a written consent was signed.  The patient s right medial ankle was prepped with chlorhexidine in sterile fashion.   40 mg of triamcinolone suspension was drawn up into a 3 mL syringe with 1 mL of 1% lidocaine.  Injection was performed using sterile technique.  Under ultrasound guidance a 1.5\" 25-gauge needle was used to enter the posterior tibial tendon sheath.  Needle placement was visualized and documented with ultrasound.  Ultrasound visualization required to ensure injection material enters the tendon sheath and not the tendon itself.  Injection performed long axis to the probe, short axis to tendon.  Injection solution visualized within the tendon sheath.  Images were permanently stored for the patient's record.  There were no complications. The patient tolerated the procedure well. There was negligible bleeding.   The patient was instructed to ice the ankle upon leaving clinic and refrain from overuse over the next 3 days.   The patient was instructed to call or go to the emergency room with any unusual pain, swelling, redness, or if otherwise concerned.  A follow up appointment with Dr. Villalta will be scheduled to evaluate response to the injection, and to assess range of motion and pain.    Shane Crowder DO CAQSM  Primary Care Sports Medicine  [facility]        "

## 2021-01-05 NOTE — NURSING NOTE
Reason For Visit:   Chief Complaint   Patient presents with     RECHECK     S/p right ankle ORIF DOS: 2/10/2020. Discuss hardware removal.        Pain Assessment  Patient Currently in Pain: Yes  0-10 Pain Scale: 7  Primary Pain Location: Ankle

## 2021-01-11 NOTE — PROGRESS NOTES
1/10/2021     E-Consult has been accepted.    Interprofessional consultation requested by: Medicine Service    Clinical Question/Purpose: Wean opioids     Patient assessment and information reviewed: Yes    Recommendations: as below     The recommendations provided in this E-Consult are based on the clinical data available to me at this time, and are furnished without the benefit of a comprehensive in-person or virtual patient evaluation, Any new clinical issues or changes in patient status since the filing of this E-Consult will need to be taken into account when assessing these recommendations. Please contact me if you have further questions.    My total time spent reviewing clinical information and formulating assessment was 30 minutes.    Report sent automatically to requesting provider once signed.     Charge codes: [id number] (5+ minutes)                  [id number] (No Charge code)    Khadar Smith MD

## 2021-01-13 ENCOUNTER — OFFICE VISIT (OUTPATIENT)
Dept: ORTHOPEDICS | Facility: CLINIC | Age: 36
End: 2021-01-13
Payer: MEDICARE

## 2021-01-13 DIAGNOSIS — M20.41 HAMMER TOES OF BOTH FEET: ICD-10-CM

## 2021-01-13 DIAGNOSIS — Q66.70 PES CAVUS: ICD-10-CM

## 2021-01-13 DIAGNOSIS — M25.571 PAIN IN JOINT, ANKLE AND FOOT, RIGHT: Primary | ICD-10-CM

## 2021-01-13 DIAGNOSIS — M20.42 HAMMER TOES OF BOTH FEET: ICD-10-CM

## 2021-01-13 PROCEDURE — 99213 OFFICE O/P EST LOW 20 MIN: CPT | Performed by: PODIATRIST

## 2021-01-13 NOTE — LETTER
1/13/2021         RE: Molly Teague  [address] [place] [facility] [address]        Dear Colleague,    Thank you for referring your patient, Molly Teague, to the [facility] [place]. Please see a copy of my visit note below.    Chief Complaint   Patient presents with     Right Ankle - Follow Up     Orthotics            Allergies   Allergen Reactions     Blood Transfusion Related (Informational Only) Other (See Comments)     Patient has a history of a clinically significant antibody against RBC antigens.  A delay in compatible RBCs may occur.     No Known Allergies      Pollen Extract      Seasonal Allergies      Shellfish-Derived Products Nausea and Rash     Rash on face         Subjective: Molly is a 35 year old female who presents to the clinic today for a follow up of foot pain. She did not get the new orthotics yet. They should be ready in 2 weeks. She is not planning for sx with Dr. Villalta yet. She continues to have pain in the right hammertoes. She relates that they cause pain with walking.    Objective  Data Unavailable Data Unavailable Data Unavailable Data Unavailable Data Unavailable 0 lbs 0 oz  PT and DP pulses are 1/4 bilaterally. CRT is 4 seconds. Absent pedal hair.   Gross sensation is diminished bilaterally.    Equinus is moderate bilaterally. . Severely hammered digits to the lesser digits BL with R>L. These are not reducible. pain in the medial and lateral ankles with palpation and ROM.   Nails normal bilaterally. No open lesions are noted.    Assessment: Molly is a 34 YO with right foot pain 2/2 hammertoes. I discussed with her that conservative measures may not work for her, as the toes are very rigid. Additionally, with her Raynaud's, she may have complications from hammertoe sx correction. I discussed this with her. I recommend that we make a molded splint for her right foot and this was made in clinic.    Plan:   - Pt seen and evaluated  - Ella  brace was molded for the right foot.   - Get orthotics in two weeks.   - Pt to return to clinic in 10 weeks.       Kenroy Cruz DPM

## 2021-01-13 NOTE — PROGRESS NOTES
Chief Complaint   Patient presents with     Right Ankle - Follow Up     Orthotics            Allergies   Allergen Reactions     Blood Transfusion Related (Informational Only) Other (See Comments)     Patient has a history of a clinically significant antibody against RBC antigens.  A delay in compatible RBCs may occur.     No Known Allergies      Pollen Extract      Seasonal Allergies      Shellfish-Derived Products Nausea and Rash     Rash on face         Subjective: Molly is a 35 year old female who presents to the clinic today for a follow up of foot pain. She did not get the new orthotics yet. They should be ready in 2 weeks. She is not planning for sx with Dr. Villalta yet. She continues to have pain in the right hammertoes. She relates that they cause pain with walking.    Objective  Data Unavailable Data Unavailable Data Unavailable Data Unavailable Data Unavailable 0 lbs 0 oz  PT and DP pulses are 1/4 bilaterally. CRT is 4 seconds. Absent pedal hair.   Gross sensation is diminished bilaterally.    Equinus is moderate bilaterally. . Severely hammered digits to the lesser digits BL with R>L. These are not reducible. pain in the medial and lateral ankles with palpation and ROM.   Nails normal bilaterally. No open lesions are noted.    Assessment: Molly is a 34 YO with right foot pain 2/2 hammertoes. I discussed with her that conservative measures may not work for her, as the toes are very rigid. Additionally, with her Raynaud's, she may have complications from hammertoe sx correction. I discussed this with her. I recommend that we make a molded splint for her right foot and this was made in clinic.    Plan:   - Pt seen and evaluated  - Hammertoe brace was molded for the right foot.   - Get orthotics in two weeks.   - Pt to return to clinic in 10 weeks.

## 2021-01-18 DIAGNOSIS — G89.4 CHRONIC PAIN SYNDROME: Chronic | ICD-10-CM

## 2021-01-18 NOTE — TELEPHONE ENCOUNTER
Requested Prescriptions   Pending Prescriptions Disp Refills     oxyCODONE IR (ROXICODONE) 15 MG tablet [Pharmacy Med Name: OXYCODONE HCL 15MG TABS] 90 tablet 0     Sig: TAKE 1 TABLET (15 MG) BY MOUTH EVERY 4 HOURS AS NEEDED FOR SEVERE PAIN       There is no refill protocol information for this order

## 2021-01-20 RX ORDER — OXYCODONE HYDROCHLORIDE 10 MG/1
15 TABLET ORAL 3 TIMES DAILY
Qty: 90 TABLET | Refills: 0 | Status: SHIPPED | OUTPATIENT
Start: 2021-01-20 | End: 2021-02-23

## 2021-01-20 NOTE — TELEPHONE ENCOUNTER
We are doing a taper of oxycodone  Oxycodone 10 mg TID #90 this month, THEN  1.  Oxycodone IR 5 mg (#90); 3 tabs per day x 1 month  2.  Oxycodone IR 5 mg (#60); 2 tabs per day x 1 month  3.  Oxycodone IR 5 mg (#30): 1 tab per day, then off x 1 month

## 2021-02-18 DIAGNOSIS — F32.1 MODERATE MAJOR DEPRESSION (H): ICD-10-CM

## 2021-02-18 DIAGNOSIS — G89.4 CHRONIC PAIN SYNDROME: Chronic | ICD-10-CM

## 2021-02-18 DIAGNOSIS — F41.1 GAD (GENERALIZED ANXIETY DISORDER): ICD-10-CM

## 2021-02-18 DIAGNOSIS — F43.10 PTSD (POST-TRAUMATIC STRESS DISORDER): ICD-10-CM

## 2021-02-19 NOTE — TELEPHONE ENCOUNTER
"Requested Prescriptions   Pending Prescriptions Disp Refills     DULoxetine (CYMBALTA) 30 MG capsule [Pharmacy Med Name: DULOXETINE HCL 30MG CPEP] 180 capsule 3     Sig: TAKE ONE CAPSULE BY MOUTH TWICE A DAY       Serotonin-Norepinephrine Reuptake Inhibitors  Failed - 2/18/2021  2:35 PM        Failed - PHQ-9 score of less than 5 in past 6 months     Please review last PHQ-9 score.           Passed - Blood pressure under 140/90 in past 12 months     BP Readings from Last 3 Encounters:   12/17/20 122/78   11/20/20 124/76   11/18/20 118/79                 Passed - Medication is active on med list        Passed - Patient is age 18 or older        Passed - No active pregnancy on record        Passed - No positive pregnancy test in past 12 months        Passed - Recent (6 mo) or future (30 days) visit within the authorizing provider's specialty     Patient had office visit in the last 6 months or has a visit in the next 30 days with authorizing provider or within the authorizing provider's specialty.  See \"Patient Info\" tab in inbasket, or \"Choose Columns\" in Meds & Orders section of the refill encounter.               hydrOXYzine (ATARAX) 25 MG tablet [Pharmacy Med Name: hydrOXYzine HCL 25MG TAB] 90 tablet 11     Sig: TAKE ONE TO TWO TABLETS BY MOUTH THREE TIMES A DAY AS NEEDED FOR ITCHING       Antihistamines Protocol Passed - 2/18/2021  2:35 PM        Passed - Recent (12 mo) or future (30 days) visit within the authorizing provider's specialty     Patient has had an office visit with the authorizing provider or a provider within the authorizing providers department within the previous 12 mos or has a future within next 30 days. See \"Patient Info\" tab in inbasket, or \"Choose Columns\" in Meds & Orders section of the refill encounter.              Passed - Patient is age 3 or older     Apply age and/or weight-based dosing for peds patients age 3 and older.    Forward request to provider for patients under the age of 3.   "        Passed - Medication is active on med list           oxyCODONE IR (ROXICODONE) 10 MG tablet [Pharmacy Med Name: OXYCODONE HCL 10MG TABS] 90 tablet 0     Sig: TAKE ONE AND ONE-HALF TABLETS BY MOUTH THREE TIMES A DAY       There is no refill protocol information for this order

## 2021-02-23 ENCOUNTER — NURSE TRIAGE (OUTPATIENT)
Dept: INTERNAL MEDICINE | Facility: CLINIC | Age: 36
End: 2021-02-23

## 2021-02-23 ENCOUNTER — VIRTUAL VISIT (OUTPATIENT)
Dept: FAMILY MEDICINE | Facility: CLINIC | Age: 36
End: 2021-02-23
Payer: MEDICARE

## 2021-02-23 DIAGNOSIS — N18.31 STAGE 3A CHRONIC KIDNEY DISEASE (H): ICD-10-CM

## 2021-02-23 DIAGNOSIS — M34.9 SCLERODERMA (H): ICD-10-CM

## 2021-02-23 DIAGNOSIS — R04.0 RECURRENT EPISTAXIS: Primary | ICD-10-CM

## 2021-02-23 DIAGNOSIS — F43.10 PTSD (POST-TRAUMATIC STRESS DISORDER): ICD-10-CM

## 2021-02-23 DIAGNOSIS — N64.4 BREAST PAIN, RIGHT: ICD-10-CM

## 2021-02-23 DIAGNOSIS — G89.4 CHRONIC PAIN SYNDROME: Primary | Chronic | ICD-10-CM

## 2021-02-23 DIAGNOSIS — F32.1 MODERATE MAJOR DEPRESSION (H): ICD-10-CM

## 2021-02-23 DIAGNOSIS — N08 SCLERODERMA WITH RENAL INVOLVEMENT (H): ICD-10-CM

## 2021-02-23 DIAGNOSIS — M34.9 LIMITED SYSTEMIC SCLEROSIS (H): ICD-10-CM

## 2021-02-23 DIAGNOSIS — I10 MALIGNANT ESSENTIAL HYPERTENSION: ICD-10-CM

## 2021-02-23 DIAGNOSIS — M34.89 SCLERODERMA WITH RENAL INVOLVEMENT (H): ICD-10-CM

## 2021-02-23 DIAGNOSIS — G63 POLYNEUROPATHY ASSOCIATED WITH UNDERLYING DISEASE (H): ICD-10-CM

## 2021-02-23 PROCEDURE — 99214 OFFICE O/P EST MOD 30 MIN: CPT | Mod: 95 | Performed by: INTERNAL MEDICINE

## 2021-02-23 RX ORDER — GABAPENTIN 300 MG/1
300 CAPSULE ORAL 3 TIMES DAILY
Qty: 270 CAPSULE | Refills: 3 | Status: SHIPPED | OUTPATIENT
Start: 2021-02-23 | End: 2021-04-27

## 2021-02-23 RX ORDER — OXYCODONE HYDROCHLORIDE 10 MG/1
10 TABLET ORAL 3 TIMES DAILY
Qty: 90 TABLET | Refills: 0 | Status: SHIPPED | OUTPATIENT
Start: 2021-02-23 | End: 2021-03-25

## 2021-02-23 RX ORDER — OXYCODONE HYDROCHLORIDE 5 MG/1
5 TABLET ORAL 3 TIMES DAILY
Qty: 90 TABLET | Refills: 0 | Status: SHIPPED | OUTPATIENT
Start: 2021-03-25 | End: 2021-04-09

## 2021-02-23 RX ORDER — LISINOPRIL 10 MG/1
10 TABLET ORAL DAILY
Qty: 90 TABLET | Refills: 3 | Status: SHIPPED | OUTPATIENT
Start: 2021-02-23 | End: 2022-04-12

## 2021-02-23 RX ORDER — OXYCODONE HYDROCHLORIDE 10 MG/1
TABLET ORAL
Qty: 90 TABLET | Refills: 0 | OUTPATIENT
Start: 2021-02-23

## 2021-02-23 RX ORDER — DULOXETIN HYDROCHLORIDE 30 MG/1
CAPSULE, DELAYED RELEASE ORAL
Qty: 180 CAPSULE | Refills: 3 | OUTPATIENT
Start: 2021-02-23

## 2021-02-23 RX ORDER — DULOXETIN HYDROCHLORIDE 30 MG/1
CAPSULE, DELAYED RELEASE ORAL
Qty: 180 CAPSULE | Refills: 3 | Status: SHIPPED | OUTPATIENT
Start: 2021-02-23 | End: 2021-03-23

## 2021-02-23 ASSESSMENT — PATIENT HEALTH QUESTIONNAIRE - PHQ9
5. POOR APPETITE OR OVEREATING: SEVERAL DAYS
SUM OF ALL RESPONSES TO PHQ QUESTIONS 1-9: 10

## 2021-02-23 ASSESSMENT — ANXIETY QUESTIONNAIRES
6. BECOMING EASILY ANNOYED OR IRRITABLE: SEVERAL DAYS
5. BEING SO RESTLESS THAT IT IS HARD TO SIT STILL: SEVERAL DAYS
7. FEELING AFRAID AS IF SOMETHING AWFUL MIGHT HAPPEN: NEARLY EVERY DAY
3. WORRYING TOO MUCH ABOUT DIFFERENT THINGS: SEVERAL DAYS
IF YOU CHECKED OFF ANY PROBLEMS ON THIS QUESTIONNAIRE, HOW DIFFICULT HAVE THESE PROBLEMS MADE IT FOR YOU TO DO YOUR WORK, TAKE CARE OF THINGS AT HOME, OR GET ALONG WITH OTHER PEOPLE: VERY DIFFICULT
2. NOT BEING ABLE TO STOP OR CONTROL WORRYING: NEARLY EVERY DAY
GAD7 TOTAL SCORE: 11
1. FEELING NERVOUS, ANXIOUS, OR ON EDGE: SEVERAL DAYS

## 2021-02-23 NOTE — TELEPHONE ENCOUNTER
Detailed VM left for patient (states okay in initial message below) with ENT/otolaryngology referral information.  Advised she can check [facility] as well and reach out via [facility] or call clinic at [phone] with any further questions/concerns.     Isaura Rios RN  [facility]

## 2021-02-23 NOTE — TELEPHONE ENCOUNTER
"Forwarding to PCP for review please.  Patient forgot to mention nose bleeds at visit today.  I gathered as much information as possible-please see below. Please advise.     Patient saw PCP today, forgot to mention nosebleeds.  PCP has ordered labs, including iron, ferritin, CBC and BMP.  Patient triaged.     Additional Information    Negative: Fainted (passed out), or too weak to stand following large blood loss    Negative: Sounds like a life-threatening emergency to the triager    Negative: Nosebleed followed nose injury    Negative: Bleeding present > 30 minutes and using correct method of direct pressure    Negative: Bleeding now and second call after being instructed in correct technique of direct pressure    Negative: Pale skin (pallor) of new onset or worsening    Negative: Has nasal packing (inserted by health care provider to control bleeding) and now has new rash    Negative: Has nasal packing and now has bleeding around the packing (Exception: few drops or ooze)    Negative: Patient sounds very sick or weak to the triager    Negative: Large amount of blood has been lost (e.g., one cup)    Lightheadedness or dizziness     Patient does report intermittent lightheadedness, but not right now.  She also reports history of anemia and need for transfusions.    Answer Assessment - Initial Assessment Questions  1. AMOUNT OF BLEEDING: \"How bad is the bleeding?\" \"How much blood was lost?\" \"Has the bleeding stopped?\"    - MILD: needed a couple tissues    - MODERATE: needed many tissues    - SEVERE: large blood clots, soaked many tissues, lasted more than 30 minutes       Moderate-uses several tissues and takes about 10 minutes to get it to stop.   2. ONSET: \"When did the nosebleed start?\"       7-10 days  3. FREQUENCY: \"How many nosebleeds have you had in the last 24 hours?\"       None so far today, but have been 1-2x/day.    4. RECURRENT SYMPTOMS: \"Have there been other recent nosebleeds?\" If so, ask: \"How long did " "it take you to stop the bleeding?\" \"What worked best?\"      Yes, see above.  Otherwise has never had a problem with it.    5. CAUSE: \"What do you think caused this nosebleed?\"      Unsure, she uses a humidifier in her room, does not take blood thinners. She does get migraine headaches.    6. LOCAL FACTORS: \"Do you have any cold symptoms?\", \"Have you been rubbing or picking at your nose?\"      No  7. SYSTEMIC FACTORS: \"Do you have high blood pressure or any bleeding problems?\"      She has HTN, does take lisinopril, otherwise no bleeding problems.   8. BLOOD THINNERS: \"Do you take any blood thinners?\" (e.g., coumadin, heparin, aspirin, Plavix)      No  9. OTHER SYMPTOMS: \"Do you have any other symptoms?\" (e.g., lightheadedness)      No other signs of bleeding from gums, stool, urine.  She reports slight dizziness with it, then brain \"fogginess\", fatigue during the day.   10. PREGNANCY: \"Is there any chance you are pregnant?\" \"When was your last menstrual period?\"        NA-she has IUD in place and just had menses.    Protocols used: MAXWELL Rios RN  [facility]    "

## 2021-02-23 NOTE — PATIENT INSTRUCTIONS
Breast pain:  1. Mammogram ordered.  Please call [phone] to schedule.    Pain;  1.  We will continue oxycodone 10 mg 3 times a day for this month.  Next month on or about 3/25, you can call pharmacy to fill oxycodone 5 mg 3 times a day  2.  Other medications were refilled  3.  Try the medical cannabis and if you find that you have cognitive side effects we can always reassess the plan.    Scleroderma:  1.  You are due for multiple follow-ups at the [facility].

## 2021-02-23 NOTE — PROGRESS NOTES
"Molly is a 35 year old who is being evaluated via a billable video visit.      How would you like to obtain your AVS? MyChart  If the video visit is dropped, the invitation should be resent by: Text to cell phone: [phone]  Will anyone else be joining your video visit? No      Video Start Time: 7:16 AM    Assessment & Plan   Problem List Items Addressed This Visit     Chronic pain syndrome - Primary (Chronic)    Relevant Medications    oxyCODONE IR (ROXICODONE) 10 MG tablet    oxyCODONE (ROXICODONE) 5 MG tablet (Start on 3/25/2021)    CKD (chronic kidney disease) stage 3, GFR 30-59 ml/min    Relevant Orders    **Basic metabolic panel FUTURE anytime    **CBC with platelets FUTURE anytime    **Iron and iron binding capacity FUTURE anytime    Ferritin    Limited systemic sclerosis (H)    Relevant Medications    gabapentin (NEURONTIN) 300 MG capsule    Malignant essential hypertension    Relevant Medications    lisinopril (ZESTRIL) 10 MG tablet    Moderate major depression (H)    Relevant Medications    DULoxetine (CYMBALTA) 30 MG capsule    Polyneuropathy associated with underlying disease (H)    Relevant Medications    DULoxetine (CYMBALTA) 30 MG capsule    gabapentin (NEURONTIN) 300 MG capsule    PTSD (post-traumatic stress disorder)    Relevant Medications    DULoxetine (CYMBALTA) 30 MG capsule    gabapentin (NEURONTIN) 300 MG capsule    Scleroderma with renal involvement (H)    Relevant Orders    **Basic metabolic panel FUTURE anytime    **CBC with platelets FUTURE anytime    **Iron and iron binding capacity FUTURE anytime    Ferritin      Other Visit Diagnoses     Breast pain, right        Relevant Orders    MA Diagnostic Digital Bilateral    US Breast Right Complete 4 Quadrants                    BMI:   Estimated body mass index is 34.33 kg/m  as calculated from the following:    Height as of 1/5/21: 1.555 m (5' 1.22\").    Weight as of 1/5/21: 83 kg (183 lb).       Patient Instructions   Breast pain:  1. " Mammogram ordered.  Please call [phone] to schedule.    Pain;  1.  We will continue oxycodone 10 mg 3 times a day for this month.  Next month on or about 3/25, you can call pharmacy to fill oxycodone 5 mg 3 times a day  2.  Other medications were refilled  3.  Try the medical cannabis and if you find that you have cognitive side effects we can always reassess the plan.    Scleroderma:  1.  You are [facility].      No follow-ups on file.    Grecia Barba, DO  [facility]    Amanda Barnes is a 35 year old who presents for the following health issues     HPI       Depression and Anxiety Follow-Up    How are you doing with your depression since your last visit? No change    How are you doing with your anxiety since your last visit?  A little worse    Are you having other symptoms that might be associated with depression or anxiety? Yes:  lightheadedness, dizziness lately    Have you had a significant life event? OTHER: father has Covid and is currently hospitalized for this This is triggering for her, brings back trauma related to her own prolonged hospitalization    Do you have any concerns with your use of alcohol or other drugs? No     'losing words' - 'losing train of thought a lot'.   Is noticing it a lot more lately.    Feeling more fatigued and tired lately.    Social History     Tobacco Use     Smoking status: Never Smoker     Smokeless tobacco: Never Used   Substance Use Topics     Alcohol use: Yes     Comment: Socially once on a weekend if any     Drug use: No     Comment: None     PHQ 8/4/2020 12/17/2020 2/23/2021   PHQ-9 Total Score 9 7 10   Q9: Thoughts of better off dead/self-harm past 2 weeks Not at all Not at all Not at all   F/U: Thoughts of suicide or self-harm - - -   F/U: Self harm-plan - - -   F/U: Self-harm action - - -   F/U: Safety concerns - - -   PHQ-A Total Score - - -   PHQ-A Depressed most days in past year - - -    PHQ-A Mood affect on daily activities - - -   PHQ-A Suicide Ideation past 2 weeks - - -     REX-7 SCORE 8/4/2020 12/17/2020 2/23/2021   Total Score - - -   Total Score 11 15 11     Last PHQ-9 2/23/2021   1.  Little interest or pleasure in doing things 1   2.  Feeling down, depressed, or hopeless 0   3.  Trouble falling or staying asleep, or sleeping too much 3   4.  Feeling tired or having little energy 2   5.  Poor appetite or overeating 0   6.  Feeling bad about yourself 2   7.  Trouble concentrating 1   8.  Moving slowly or restless 1   Q9: Thoughts of better off dead/self-harm past 2 weeks 0   PHQ-9 Total Score 10   Difficulty at work, home, or with people Very difficult   In the past two weeks have you had thoughts of suicide or self harm? -   Do you have concerns about your personal safety or the safety of others? -   In the past 2 weeks have you thought about a plan or had intention to harm yourself? -   In the past 2 weeks have you acted on these thoughts in any way? -     REX-7  2/23/2021   1. Feeling nervous, anxious, or on edge 1   2. Not being able to stop or control worrying 3   3. Worrying too much about different things 1   4. Trouble relaxing 1   5. Being so restless that it is hard to sit still 1   6. Becoming easily annoyed or irritable 1   7. Feeling afraid, as if something awful might happen 3   REX-7 Total Score 11   If you checked any problems, how difficult have they made it for you to do your work, take care of things at home, or get along with other people? Very difficult       Suicide Assessment Five-step Evaluation and Treatment (SAFE-T)    Chronic Pain Follow-Up    Where in your body do you have pain? Hands, joints, feet, low back  How has your pain affected your ability to work? Unable to work  Which of these pain treatments have you tried since your last clinic visit? Activity or exercise, Cold, Heat, Relaxation techniques / Biofeedback and Stretching, steroid injection recently on  right ankle.  How well are you sleeping? Poor  How has your mood been since your last visit? Much worse  Have you had a significant life event? Other: father has Covid, currrently hospitalized (for one month)  Other aggravating factors: prolonged sitting, prolonged standing, poor posture, sedentary lifestyle and repetitive activities - multiple  Taking medication as directed? Yes  --checked [place] , last filled oxycodone on 1/20  --last filled ativan 2/18  --following with Orthopedics for ankle wounds, tendon/ligament issues.  --is overdue to follow-up with [facility], last was April  --she has not gotten the certification for medical cannabis.  --last visit in Dec, we decreased oxycodone, and this has gone OK.  Feels GI symptoms have actually improved.  --worried about using medical cannabis affecting cognition.        Withdrawal is not expected if decrease in daily opioid dose is less than 30%.  At the next refill, I recommend prescribing oxycodone IR 10 mg (#90).  The following months, the script should be as follows:  1.  Oxycodone IR 5 mg (#90); 3 tabs per day  2.  Oxycodone IR 5 mg (#60); 2 tabs per day  3.  Oxycodone IR 5 mg (#30): 1 tab per day, then off    PHQ-9 SCORE 8/4/2020 12/17/2020 2/23/2021   PHQ-9 Total Score MyChart - - -   PHQ-9 Total Score 9 7 10   PHQ-A Total Score - - -     REX-7 SCORE 8/4/2020 12/17/2020 2/23/2021   Total Score - - -   Total Score 11 15 11     No flowsheet data found.  Encounter-Level CSA - 04/26/2017:    Controlled Substance Agreement - Scan on 5/4/2017  8:58 AM: CONTROLLED SUBSTANCE AGREEMENT     Patient-Level CSA:    Controlled Substance Agreement - Opioid - Scan on 12/27/2020  1:35 PM  Controlled Substance Agreement - Opioid - Scan on 2/6/2019  6:23 PM       Breast issue: right breast feels enlarged and thinks there may be lumps.  Wants a mammogram    Review of Systems   Constitutional, HEENT, cardiovascular, pulmonary, GI, , musculoskeletal, neuro, skin, endocrine  and psych systems are negative, except as otherwise noted.      Objective           Vitals:  No vitals were obtained today due to virtual visit.    Physical Exam   GENERAL: alert and no distress  EYES: Eyes grossly normal to inspection.  No discharge or erythema, or obvious scleral/conjunctival abnormalities.  RESP: No audible wheeze, cough, or visible cyanosis.  No visible retractions or increased work of breathing.    SKIN: Visible skin clear. No significant rash, abnormal pigmentation or lesions.  NEURO: Cranial nerves grossly intact.  Mentation and speech appropriate for age.  PSYCH: Mentation appears normal, affect normal/bright, judgement and insight intact, normal speech and appearance well-groomed.                Video-Visit Details    Type of service:  Video Visit    Video End Time:7:36 AM    Originating Location (pt. Location): Home    Distant Location (provider location):  [facility]     Platform used for Video Visit: [employer] ([employer])

## 2021-02-23 NOTE — TELEPHONE ENCOUNTER
Duloxetine and Oxycodone were written today.  Only refill needed is Atarax.    Last written on 1/31/20 for #90 + 11 refills.  Advise.  Marissa

## 2021-02-23 NOTE — TELEPHONE ENCOUNTER
Reason for call:    Symptom or request:     Patient had a virtual visit with Dr Barba today, and forgot to mention, nose bleeds she has been having for the last week-once daily, and becoming more frequent to twice daily(sun/monday) and brain fogginess.        Best Time:  any    Can we leave a detailed message on this number?  YES     Richelle SOLO  Station

## 2021-02-24 DIAGNOSIS — M34.89 SCLERODERMA WITH RENAL INVOLVEMENT (H): ICD-10-CM

## 2021-02-24 DIAGNOSIS — N18.31 STAGE 3A CHRONIC KIDNEY DISEASE (H): ICD-10-CM

## 2021-02-24 DIAGNOSIS — N08 SCLERODERMA WITH RENAL INVOLVEMENT (H): ICD-10-CM

## 2021-02-24 LAB
ANION GAP SERPL CALCULATED.3IONS-SCNC: 5 MMOL/L (ref 3–14)
BUN SERPL-MCNC: 11 MG/DL (ref 7–30)
CALCIUM SERPL-MCNC: 9 MG/DL (ref 8.5–10.1)
CHLORIDE SERPL-SCNC: 109 MMOL/L (ref 94–109)
CO2 SERPL-SCNC: 25 MMOL/L (ref 20–32)
CREAT SERPL-MCNC: 0.95 MG/DL (ref 0.52–1.04)
ERYTHROCYTE [DISTWIDTH] IN BLOOD BY AUTOMATED COUNT: 16.4 % (ref 10–15)
FERRITIN SERPL-MCNC: 48 NG/ML (ref 12–150)
GFR SERPL CREATININE-BSD FRML MDRD: 77 ML/MIN/{1.73_M2}
GLUCOSE SERPL-MCNC: 73 MG/DL (ref 70–99)
HCT VFR BLD AUTO: 42.2 % (ref 35–47)
HGB BLD-MCNC: 14 G/DL (ref 11.7–15.7)
IRON SATN MFR SERPL: 22 % (ref 15–46)
IRON SERPL-MCNC: 63 UG/DL (ref 35–180)
MCH RBC QN AUTO: 29.2 PG (ref 26.5–33)
MCHC RBC AUTO-ENTMCNC: 33.2 G/DL (ref 31.5–36.5)
MCV RBC AUTO: 88 FL (ref 78–100)
PLATELET # BLD AUTO: 284 10E9/L (ref 150–450)
POTASSIUM SERPL-SCNC: 4.2 MMOL/L (ref 3.4–5.3)
RBC # BLD AUTO: 4.8 10E12/L (ref 3.8–5.2)
SODIUM SERPL-SCNC: 139 MMOL/L (ref 133–144)
TIBC SERPL-MCNC: 288 UG/DL (ref 240–430)
WBC # BLD AUTO: 11.7 10E9/L (ref 4–11)

## 2021-02-24 PROCEDURE — 80048 BASIC METABOLIC PNL TOTAL CA: CPT | Performed by: INTERNAL MEDICINE

## 2021-02-24 PROCEDURE — 83540 ASSAY OF IRON: CPT | Performed by: INTERNAL MEDICINE

## 2021-02-24 PROCEDURE — 82728 ASSAY OF FERRITIN: CPT | Performed by: INTERNAL MEDICINE

## 2021-02-24 PROCEDURE — 85027 COMPLETE CBC AUTOMATED: CPT | Performed by: INTERNAL MEDICINE

## 2021-02-24 PROCEDURE — 83550 IRON BINDING TEST: CPT | Performed by: INTERNAL MEDICINE

## 2021-02-24 PROCEDURE — 36415 COLL VENOUS BLD VENIPUNCTURE: CPT | Performed by: INTERNAL MEDICINE

## 2021-02-24 RX ORDER — HYDROXYZINE HYDROCHLORIDE 25 MG/1
TABLET, FILM COATED ORAL
Qty: 90 TABLET | Refills: 11 | Status: SHIPPED | OUTPATIENT
Start: 2021-02-24 | End: 2021-11-10

## 2021-02-24 ASSESSMENT — ANXIETY QUESTIONNAIRES: GAD7 TOTAL SCORE: 11

## 2021-03-04 NOTE — TELEPHONE ENCOUNTER
PRIOR AUTHORIZATION DENIED    Medication: one touch delica lancets     Denial Date:      Denial Rational: MEDICATION DENIED THRU PATIENT'S BCBS        Appeal Information: N/A     Patient Jessica Staples calling to report change in pharmacy to [facility].   Requests refills of his meds, Tadalafil 10 MG Oral Tab,DULoxetine HCl 30 MG Oral Cap DR Particles,atenolol 25 MG Oral Tab, clonazePAM 1 MG Oral Tab  Last OV 11-18-20

## 2021-03-07 ENCOUNTER — NURSE TRIAGE (OUTPATIENT)
Dept: NURSING | Facility: CLINIC | Age: 36
End: 2021-03-07

## 2021-03-08 ENCOUNTER — HOSPITAL ENCOUNTER (EMERGENCY)
Facility: CLINIC | Age: 36
Discharge: HOME | End: 2021-03-08
Attending: STUDENT IN AN ORGANIZED HEALTH CARE EDUCATION/TRAINING PROGRAM | Admitting: STUDENT IN AN ORGANIZED HEALTH CARE EDUCATION/TRAINING PROGRAM
Payer: MEDICARE

## 2021-03-08 ENCOUNTER — APPOINTMENT (OUTPATIENT)
Dept: GENERAL RADIOLOGY | Facility: CLINIC | Age: 36
End: 2021-03-08
Attending: STUDENT IN AN ORGANIZED HEALTH CARE EDUCATION/TRAINING PROGRAM
Payer: MEDICARE

## 2021-03-08 VITALS
TEMPERATURE: 98.8 F | HEART RATE: 70 BPM | BODY MASS INDEX: 34.33 KG/M2 | WEIGHT: 183 LBS | DIASTOLIC BLOOD PRESSURE: 74 MMHG | RESPIRATION RATE: 18 BRPM | SYSTOLIC BLOOD PRESSURE: 113 MMHG | OXYGEN SATURATION: 95 %

## 2021-03-08 DIAGNOSIS — B34.9 VIRAL SYNDROME: ICD-10-CM

## 2021-03-08 PROBLEM — N18.30 STAGE 3 CHRONIC KIDNEY DISEASE (H): Status: ACTIVE | Noted: 2017-02-27

## 2021-03-08 PROBLEM — I15.9 SECONDARY HYPERTENSION: Status: ACTIVE | Noted: 2017-03-22

## 2021-03-08 PROBLEM — M19.90 ARTHRITIS: Status: ACTIVE | Noted: 2017-02-10

## 2021-03-08 PROBLEM — R41.3 AMNESIA: Status: ACTIVE | Noted: 2020-04-29

## 2021-03-08 LAB
FLUAV RNA RESP QL NAA+PROBE: NEGATIVE
FLUBV RNA RESP QL NAA+PROBE: NEGATIVE
LABORATORY COMMENT REPORT: NORMAL
RSV RNA SPEC QL NAA+PROBE: NORMAL
SARS-COV-2 RNA RESP QL NAA+PROBE: NEGATIVE
SPECIMEN SOURCE: NORMAL

## 2021-03-08 PROCEDURE — C9803 HOPD COVID-19 SPEC COLLECT: HCPCS | Performed by: STUDENT IN AN ORGANIZED HEALTH CARE EDUCATION/TRAINING PROGRAM

## 2021-03-08 PROCEDURE — 71045 X-RAY EXAM CHEST 1 VIEW: CPT

## 2021-03-08 PROCEDURE — 87636 SARSCOV2 & INF A&B AMP PRB: CPT | Performed by: STUDENT IN AN ORGANIZED HEALTH CARE EDUCATION/TRAINING PROGRAM

## 2021-03-08 PROCEDURE — 99284 EMERGENCY DEPT VISIT MOD MDM: CPT | Performed by: STUDENT IN AN ORGANIZED HEALTH CARE EDUCATION/TRAINING PROGRAM

## 2021-03-08 PROCEDURE — 99284 EMERGENCY DEPT VISIT MOD MDM: CPT | Mod: 25 | Performed by: STUDENT IN AN ORGANIZED HEALTH CARE EDUCATION/TRAINING PROGRAM

## 2021-03-08 NOTE — ED PROVIDER NOTES
History     Chief Complaint   Patient presents with     Fatigue     Pt reports she started feeling weak 2 days ago, pt reports she did aspirate yesterday and has not felt well since, difficulty getting warm, sob. Pt does have a hx of rynauds and systemic scledema.     HPI  Molly Teague is a 35 year old female with complex past medical history which includes Raynaud's disease, systemic sclerosis with crest syndrome, asthma, arthritis and chronic pain syndrome who presents to the department for evaluation of symptoms including 2 days of tactile fever with chills and cough.  Patient says the cough is mild but began after she choked while eating a sandwich yesterday, questions whether she may have aspirated.  She also complains of feeling short of breath but appears to be breathing comfortably in the department without signs of hypoxia.  She maintains that she received influenza vaccination this year but has concerns that her  works as a  and is exposed to many people in the public, no known or ill contacts per patient.  She has been without chest pain, hemoptysis, abdominal pain or new gastrointestinal symptoms.  She says that she has been screened for Covid 3 times over the winter and all have been negative.  Of note, patient denies loss of taste or smell as written in triage note.    Allergies:  Allergies   Allergen Reactions     Blood Transfusion Related (Informational Only) Other (See Comments)     Patient has a history of a clinically significant antibody against RBC antigens.  A delay in compatible RBCs may occur.     No Known Allergies      Pollen Extract      Seasonal Allergies      Shellfish-Derived Products Nausea and Rash     Rash on face       Problem List:    Patient Active Problem List    Diagnosis Date Noted     Iron deficiency anemia due to chronic blood loss 08/04/2020     Priority: Medium     Amnesia 04/29/2020     Priority: Medium     Vomiting and diarrhea 03/25/2020      Priority: Medium     Closed right ankle fracture 02/11/2020     Priority: Medium     S/P ORIF (open reduction internal fixation) fracture 02/10/2020     Priority: Medium     Sinus tachycardia 01/31/2020     Priority: Medium     Ankle fracture, right, closed, initial encounter 01/30/2020     Priority: Medium     Added automatically from request for surgery [id number]       Diplopia 01/25/2020     Priority: Medium     Anemia, chronic disease 11/25/2019     Priority: Medium     Mirena IUD- Remove by 09/2024 09/06/2019     Priority: Medium     Lot: [id number]  Exp: 01/2022    Dinora Israel LPN         Malignant essential hypertension 07/24/2019     Priority: Medium     PTSD (post-traumatic stress disorder) 06/19/2019     Priority: Medium     Moderate major depression (H) 07/06/2018     Priority: Medium     Severe persistent asthma without complication 07/06/2018     Priority: Medium     On high dose ICS/LABA + frequent albuterol use.  Next allergy/pulmonary referral       Aspiration of food 03/29/2018     Priority: Medium     Chronic pain syndrome 04/26/2017     Priority: Medium     Patient is followed by Grecia Barba DO for ongoing prescription of pain medication.  All refills should only be approved by this provider, or covering partner.    Medication(s): Oxycodone 15 mg.   Maximum quantity per month: 100  Clinic visit frequency required: Q 2 months     Controlled substance agreement:  Encounter-Level CSA - 04/26/2017:    Controlled Substance Agreement - Scan on 5/4/2017  8:58 AM: CONTROLLED SUBSTANCE AGREEMENT (below)       Patient-Level CSA:    Controlled Substance Agreement - Opioid - Scan on 2/6/2019  6:23 PM (below)         Pain Clinic evaluation in the past: No    DIRE Total Score(s):  No flowsheet data found.    Last [facility] website verification:  done on 4/26/17   9/26/2017  7/6/2018  10/16/2018  1/22/2019  8/2/2019           [email or web address]         Chronic migraine without aura without status  migrainosus, not intractable 04/12/2017     Priority: Medium     With medication overuse.  Fioricet and not Imitrex is recommend to treat severe headache.  2/2017 [facility] recommend wean down from daily Fioricet and use Excedrin Migrain PRN.       AIN grade I 03/30/2017     Priority: Medium     Found at [facility] on colonoscopy 2/2017.  Recommend repeat anoscopy and anal pap in 6/2017.       Gastroesophageal reflux disease with esophagitis 03/30/2017     Priority: Medium     EGD done at [facility] 2/2017 showed esophagitis without GAVE.  Recommend max dose H2 and PPI, along with 4 tablets of Carafate dissolved in water and drink throughout the day.    Had LA Grade D esophagitis, on TID PPI       Secondary hypertension 03/22/2017     Priority: Medium     Stage 3 chronic kidney disease 02/27/2017     Priority: Medium     Arthritis 02/10/2017     Priority: Medium     Limited systemic sclerosis (H) 01/25/2017     Priority: Medium     Raynaud's, calcinosis, telangiectasias, peripheral neuropathy, GERD symptoms, history of scleroderma renal crisis.  Saw [facility] 1/2017 and follows with Rheum Dr. Davis locally.       Polyneuropathy associated with underlying disease (H) 01/25/2017     Priority: Medium     Due to scleroderma and neurology thought possible due to ICU (critical illness neuropathy).  Recommend to max out dose of gabapentin to 7176-7175 mg/day, split BID or TID.  EMG 1/2017 positive for neuropathy       History of Clostridium difficile 11/29/2016     Priority: Medium     History of Helicobacter pylori infection 11/29/2016     Priority: Medium     Scleroderma with renal involvement (H) 11/09/2016     Priority: Medium     Needs lisinopril long term       History of ARDS 11/09/2016     Priority: Medium     4/2015, prolonged ICU/vent/trach due to influenza, scleroderma renal crisis requiring hemodialysis.       Raynaud's disease without gangrene 11/09/2016     Priority: Medium     History of hemodialysis 11/09/2016     Priority:  Medium     2015 due to scleroderma renal crisis       Bilateral retinitis 2016     Priority: Medium     History of pulmonary embolism 2016     Priority: Medium     Completed anti-coagulation .  pulmonary embolism due to critical illness       REX (generalized anxiety disorder) 2016     Priority: Medium     CREST (calcinosis, Raynaud's phenomenon, esophageal dysfunction, sclerodactyly, telangiectasia) (H) 2016     Priority: Medium     Systemic sclerosis (H) 2016     Priority: Medium        Past Medical History:    Past Medical History:   Diagnosis Date     Acute pyelonephritis 2017     Altered mental state 3/29/2018     Arthritis      Blood clotting disorder (H)      History of blood transfusion      Hypertension      Long-term (current) use of anticoagulants [Z79.01] 2016     PE (pulmonary embolism) 2016     Rheumatism      Scleroderma (H) 2016     Uncomplicated asthma        Past Surgical History:    Past Surgical History:   Procedure Laterality Date     ANGIOGRAM        SECTION  2014     OPEN REDUCTION INTERNAL FIXATION ANKLE Right 2/10/2020    Procedure: Right ankle open reduction internal fixation;  Surgeon: Natanael Villalta MD;  Location: [facility] OR     THROAT SURGERY         Family History:    Family History   Problem Relation Age of Onset     Hyperlipidemia Father      Cerebrovascular Disease Maternal Grandmother          of a stroke     Hyperlipidemia Paternal Grandfather      Depression Sister      Fibromyalgia Sister      Diabetes Maternal Aunt      Melanoma No family hx of      Skin Cancer No family hx of        Social History:  Marital Status:  Single [1]  Social History     Tobacco Use     Smoking status: Never Smoker     Smokeless tobacco: Never Used   Substance Use Topics     Alcohol use: Yes     Comment: Socially once on a weekend if any     Drug use: No     Comment: None        Medications:    acetaminophen (TYLENOL) 325 MG  tablet  albuterol (VENTOLIN HFA) 108 (90 Base) MCG/ACT inhaler  amLODIPine (NORVASC) 5 MG tablet  blood glucose (NO BRAND SPECIFIED) lancets standard  blood glucose (NO BRAND SPECIFIED) test strip  busPIRone HCl (BUSPAR) 30 MG tablet  Cyanocobalamin (B-12) 1000 MCG TBCR  DULoxetine (CYMBALTA) 30 MG capsule  famotidine (PEPCID) 20 MG tablet  folic acid (FOLVITE) 1 MG tablet  gabapentin (NEURONTIN) 300 MG capsule  GOODSENSE MIGRAINE FORMULA 250-250-65 MG per tablet  hydrOXYzine (ATARAX) 25 MG tablet  lisinopril (ZESTRIL) 10 MG tablet  LORazepam (ATIVAN) 1 MG tablet  methocarbamol (ROBAXIN) 750 MG tablet  montelukast (SINGULAIR) 10 MG tablet  naloxone (NARCAN) 4 MG/0.1ML nasal spray  omeprazole (PRILOSEC) 40 MG DR capsule  order for DME  order for DME  [START ON 3/25/2021] oxyCODONE (ROXICODONE) 5 MG tablet  oxyCODONE IR (ROXICODONE) 10 MG tablet  polyethylene glycol (MIRALAX/GLYCOLAX) packet  promethazine (PHENERGAN) 25 MG tablet  promethazine (PHENERGAN) 25 MG/ML IV injection  SYMBICORT 160-4.5 MCG/ACT Inhaler          Review of Systems  Constitutional: Positive for chills with tactile fevers.  Cardiovascular:  Negative for chest discomfort.  Respiratory: Positive for mild dry cough.  Gastrointestinal:  Negative for abdominal pain.  Persistent nausea/vomiting baseline per patient.  Musculoskeletal:  Denies recent injury.  Neurological:  Negative for headache or dizziness.  Skin: Negative for new rash or lesions.    All others reviewed and are negative.      Physical Exam   BP: (!) 158/103  Pulse: 92  Temp: 98.8  F (37.1  C)  Resp: 20  Weight: 83 kg (183 lb)  SpO2: 96 %      Physical Exam  Constitutional:  Well developed, well nourished.  Appears nontoxic and in no acute distress.  Resting comfortably on the gurney.  HENT:  Normocephalic and atraumatic.  Symmetric in appearance.  Eyes:  Conjunctivae are normal.  Neck:  Neck supple.  Cardiovascular:  No cyanosis.  RRR.  No audible murmurs noted.    Respiratory:  Effort  normal without sign of respiratory distress.  No audible wheezing or stridor.  CTAB.   Gastrointestinal:  Soft nondistended abdomen.  Nontender and without guarding.    Musculoskeletal:  Moves extremities spontaneously.  Neurological:  Patient is alert.  Skin:  Skin is warm and dry.  Psychiatric:  Normal mood and affect.      ED Course        Procedures              Critical Care time:  none               Results for orders placed or performed during the hospital encounter of 03/08/21 (from the past 24 hour(s))   Symptomatic Influenza A/B & SARS-CoV2 (COVID-19) Virus PCR Multiplex    Specimen: Nasopharyngeal   Result Value Ref Range    Flu A/B & SARS-COV-2 PCR Source Nasopharyngeal     SARS-CoV-2 PCR Result NEGATIVE     Influenza A PCR Negative NEG^Negative    Influenza B PCR Negative NEG^Negative    Respiratory Syncytial Virus PCR (Note)     Flu A/B & SARS-CoV-2 PCR Comment (Note)    XR Chest Port 1 View    Narrative    CHEST ONE VIEW PORTABLE  3/8/2021 11:38 AM     HISTORY:  Cough, fever/chills, PMH scleroderma.    COMPARISON: None.      Impression    IMPRESSION: Heart size is normal. Slight elevation of the right  hemidiaphragm. Linear opacities in the right lung base thought to be  atelectasis. No pleural effusion, pneumothorax, or abnormal area of  consolidation.    LESTER ALEJANDRO MD       Medications - No data to display    Assessments & Plan (with Medical Decision Making)   Molly Teague is a 35 year old female who presents to the department for evaluation of 2 days of tactile fever with chills and dry cough.  He has a complex past medical history but does not currently appear acutely ill or suffering from respiratory distress.  Lung sounds clear auscultation and oxygen saturations appropriate.  Rapid influenza and Covid testing negative.  Chest radiograph independently reviewed and no sign of obvious infiltrate, radiologist questions minimal atelectasis.  Patient could have symptoms representative of  viral illness, although Covid negative test is reassuring it is still possible subsequent testing would give different results.  Recommend patient continue with prescribed medications and monitor symptoms closely, return to the department immediately for any concerning changes or progressive symptoms.  Patient was also arranged for follow-up appointment on Thursday for reassessment.          Disclaimer:  This note consists of symbols derived from keyboarding, dictation, and/or voice recognition software.  As a result, there may be errors in the script that have gone undetected.  Please consider this when interpreting information found in the chart.        I have reviewed the nursing notes.    I have reviewed the findings, diagnosis, plan and need for follow up with the patient.       Discharge Medication List as of 3/8/2021 12:53 PM          Final diagnoses:   Viral syndrome       3/8/2021   [facility] EMERGENCY DEPT     Fantasma Waddell DO  03/08/21 1321

## 2021-03-08 NOTE — ED NOTES
Pt reports weakness for the past few days. Aspirated food last night (hx of chronic aspiration), since then pt reports increased SOB. Pt reports feeling really hot and sweating when ambulating.

## 2021-03-08 NOTE — TELEPHONE ENCOUNTER
"SX: T=100.2 Shortness of breath, loss of smell and taste. She states she aspirated last night and it is really hard to breathe. Hx of aspiration pneumonia. She states she has severe anemia, and is getting weaker. She states she tends to vomit when unable to cough it up. She says she has not moved from her bed today. When she coughs the phlegm is \" funny colored: light green colored\".     Triaged to a disposition of Go to ED now. She states she lives across the [place], doesn't need someone to drive her to the ER, and will go now.     Rubia Mo RN Triage Nurse Advisor 10:21 PM 3/7/2021    Reason for Disposition    [1] Patient cleared the FB spontaneously BUT [2] continues to have coughing, hoarseness, or wheezing > 30 minutes    Shortness of breath after choking spell    Additional Information    Negative: [1] Choking or struggling to breathe now AND [2] lasts > 60 seconds    Negative: Can't cough, speak, or make any noise now (i.e., stops breathing)    Negative: Has passed out or is limp.    Negative: Bluish (or gray) lips or face now    Negative: Sounds like a life-threatening emergency to the triager    Negative: [1] Recovered from choking AND [2] may have swallowed a FB (foreign body)    Negative: Coughed up blood  (Exception:  blood-streaked sputum and once only)    Negative: [1] Patient cleared the FB spontaneously BUT [2] difficulty swallowing or gagging persist    Protocols used: CHOKING - INHALED FOREIGN BODY-A-AH  COVID 19 Nurse Triage Plan/Patient Instructions    Please be aware that novel coronavirus (COVID-19) may be circulating in the community. If you develop symptoms such as fever, cough, or SOB or if you have concerns about the presence of another infection including coronavirus (COVID-19), please contact your health care provider or visit [email or web address].     Disposition/Instructions    ED Visit recommended. Follow protocol based instructions.     Bring Your Own Device:  Please " also bring your smart device(s) (smart phones, tablets, laptops) and their charging cables for your personal use and to communicate with your care team during your visit.    Thank you for taking steps to prevent the spread of this virus.  o Limit your contact with others.  o Wear a simple mask to cover your cough.  o Wash your hands well and often.    Resources    [facility]: About COVID-19: [email or web address]    CDC: What to Do If You're Sick: www.cdc.gov/coronavirus/2019-ncov/about/steps-when-sick.html    CDC: Ending Home Isolation: www.cdc.gov/coronavirus/2019-ncov/hcp/disposition-in-home-patients.html     CDC: Caring for Someone: www.cdc.gov/coronavirus/2019-ncov/if-you-are-sick/care-for-someone.html     [facility]: Interim Guidance for Hospital Discharge to Home: www.health.[facility].[place]/diseases/coronavirus/hcp/hospdischarge.pdf    [facility] clinical trials (COVID-19 research studies): [email or web address]     Below are the COVID-19 hotlines at the [place] [facility] ([facility]). Interpreters are available.   o For health questions: Call 948-909-0321 or 1-121.797.3122 (7 a.m. to 7 p.m.)  o For questions about schools and childcare: Call 900-594-9770 or 1-684.985.3458 (7 a.m. to 7 p.m.)

## 2021-03-12 ENCOUNTER — IMMUNIZATION (OUTPATIENT)
Dept: NURSING | Facility: CLINIC | Age: 36
End: 2021-03-12
Payer: MEDICARE

## 2021-03-12 PROCEDURE — 0001A PR COVID VAC PFIZER DIL RECON 30 MCG/0.3 ML IM: CPT

## 2021-03-12 PROCEDURE — 91300 PR COVID VAC PFIZER DIL RECON 30 MCG/0.3 ML IM: CPT

## 2021-03-22 DIAGNOSIS — M34.1 CREST (CALCINOSIS, RAYNAUD'S PHENOMENON, ESOPHAGEAL DYSFUNCTION, SCLERODACTYLY, TELANGIECTASIA) (H): ICD-10-CM

## 2021-03-22 DIAGNOSIS — D50.0 IRON DEFICIENCY ANEMIA DUE TO CHRONIC BLOOD LOSS: ICD-10-CM

## 2021-03-22 LAB
ERYTHROCYTE [DISTWIDTH] IN BLOOD BY AUTOMATED COUNT: 14.1 % (ref 10–15)
HCT VFR BLD AUTO: 48.2 % (ref 35–47)
HGB BLD-MCNC: 15.2 G/DL (ref 11.7–15.7)
IRON SATN MFR SERPL: 20 % (ref 15–46)
IRON SERPL-MCNC: 65 UG/DL (ref 35–180)
MCH RBC QN AUTO: 29.2 PG (ref 26.5–33)
MCHC RBC AUTO-ENTMCNC: 31.5 G/DL (ref 31.5–36.5)
MCV RBC AUTO: 93 FL (ref 78–100)
PLATELET # BLD AUTO: 307 10E9/L (ref 150–450)
RBC # BLD AUTO: 5.21 10E12/L (ref 3.8–5.2)
TIBC SERPL-MCNC: 329 UG/DL (ref 240–430)
WBC # BLD AUTO: 13.4 10E9/L (ref 4–11)

## 2021-03-22 PROCEDURE — 83540 ASSAY OF IRON: CPT | Performed by: INTERNAL MEDICINE

## 2021-03-22 PROCEDURE — 83550 IRON BINDING TEST: CPT | Performed by: INTERNAL MEDICINE

## 2021-03-22 PROCEDURE — 36415 COLL VENOUS BLD VENIPUNCTURE: CPT | Performed by: INTERNAL MEDICINE

## 2021-03-22 PROCEDURE — 85027 COMPLETE CBC AUTOMATED: CPT | Performed by: INTERNAL MEDICINE

## 2021-03-23 ENCOUNTER — VIRTUAL VISIT (OUTPATIENT)
Dept: FAMILY MEDICINE | Facility: CLINIC | Age: 36
End: 2021-03-23
Payer: MEDICARE

## 2021-03-23 DIAGNOSIS — F41.1 GAD (GENERALIZED ANXIETY DISORDER): ICD-10-CM

## 2021-03-23 DIAGNOSIS — F43.10 PTSD (POST-TRAUMATIC STRESS DISORDER): ICD-10-CM

## 2021-03-23 DIAGNOSIS — F32.1 MODERATE MAJOR DEPRESSION (H): Primary | ICD-10-CM

## 2021-03-23 DIAGNOSIS — G89.4 CHRONIC PAIN SYNDROME: Chronic | ICD-10-CM

## 2021-03-23 PROCEDURE — 99443 PR PHYSICIAN TELEPHONE EVALUATION 21-30 MIN: CPT | Mod: 95 | Performed by: INTERNAL MEDICINE

## 2021-03-23 RX ORDER — LORAZEPAM 1 MG/1
1 TABLET ORAL 2 TIMES DAILY PRN
Qty: 45 TABLET | Refills: 1 | Status: SHIPPED | OUTPATIENT
Start: 2021-03-23 | End: 2021-04-27

## 2021-03-23 RX ORDER — DULOXETIN HYDROCHLORIDE 30 MG/1
90 CAPSULE, DELAYED RELEASE ORAL DAILY
Qty: 270 CAPSULE | Refills: 3 | Status: SHIPPED | OUTPATIENT
Start: 2021-03-23 | End: 2022-04-12

## 2021-03-23 ASSESSMENT — ANXIETY QUESTIONNAIRES
1. FEELING NERVOUS, ANXIOUS, OR ON EDGE: NEARLY EVERY DAY
7. FEELING AFRAID AS IF SOMETHING AWFUL MIGHT HAPPEN: NEARLY EVERY DAY
2. NOT BEING ABLE TO STOP OR CONTROL WORRYING: NEARLY EVERY DAY
3. WORRYING TOO MUCH ABOUT DIFFERENT THINGS: NEARLY EVERY DAY
5. BEING SO RESTLESS THAT IT IS HARD TO SIT STILL: NOT AT ALL
GAD7 TOTAL SCORE: 18
IF YOU CHECKED OFF ANY PROBLEMS ON THIS QUESTIONNAIRE, HOW DIFFICULT HAVE THESE PROBLEMS MADE IT FOR YOU TO DO YOUR WORK, TAKE CARE OF THINGS AT HOME, OR GET ALONG WITH OTHER PEOPLE: VERY DIFFICULT
6. BECOMING EASILY ANNOYED OR IRRITABLE: NEARLY EVERY DAY

## 2021-03-23 ASSESSMENT — PATIENT HEALTH QUESTIONNAIRE - PHQ9
5. POOR APPETITE OR OVEREATING: NEARLY EVERY DAY
SUM OF ALL RESPONSES TO PHQ QUESTIONS 1-9: 17

## 2021-03-23 NOTE — PROGRESS NOTES
Molly is a 35 year old who is being evaluated via a billable telephone visit.      What phone number would you like to be contacted at?   How would you like to obtain your AVS? MyChart    Assessment & Plan     Moderate major depression (H) -increase duloxetine to 60 mg/day to 90 mg/day.  Increased ativan from #30/mo to #45 /mo x 2 months.  If mental health is still not well controlled at follow-up visit in 1 month, would refer back to psychiatry  - DULoxetine (CYMBALTA) 30 MG capsule; Take 3 capsules (90 mg) by mouth daily  - LORazepam (ATIVAN) 1 MG tablet; Take 1 tablet (1 mg) by mouth 2 times daily as needed for anxiety    PTSD (post-traumatic stress disorder)  - DULoxetine (CYMBALTA) 30 MG capsule; Take 3 capsules (90 mg) by mouth daily  - LORazepam (ATIVAN) 1 MG tablet; Take 1 tablet (1 mg) by mouth 2 times daily as needed for anxiety    REX (generalized anxiety disorder)  - DULoxetine (CYMBALTA) 30 MG capsule; Take 3 capsules (90 mg) by mouth daily  - LORazepam (ATIVAN) 1 MG tablet; Take 1 tablet (1 mg) by mouth 2 times daily as needed for anxiety    Chronic Pain: continue oxycodone 5 mg TID.      Mental Health  1. Agree with plan to get back in to see your counselor/psychologist.   2. Increase Duloxetine to 90 mg once daily.  3. May need to see psychiatry if mood/anxiety continues to be uncontrolled.  4. Increase ativan 1 mg to twice daily as needed, #45 per month for 2 months, then plan to go back to #30 per month  5. If mental health is uncontrolled after another 1-2 months, then will refer to Psychiatry.    Auto-immune  1. You are due for follow-up with specialists at [place]    Pain  1. Schedule follow-up in 1 month - see how mental health and pain is going        No follow-ups on file.    Grecia Barba, DO  [facility]    Subjective   Molly is a 35 year old who presents for the following health issues     HPI     Depression Followup    How are you doing with your depression since  your last visit? Worsened     Are you having other symptoms that might be associated with depression? Yes:  antisocial, speaking slowly, fatigue    Have you had a significant life event?  OTHER: family member who has been ill     Are you feeling anxious or having panic attacks?   Yes:  3 in the past 10 days    Do you have any concerns with your use of alcohol or other drugs? No     [place] , [place] 1 mg #30 on 3/20, 2/18, 1/14    Currently taking buspar 30 mg BID, cymbalta 30 mg BID, ativan 1 mg daily PRN    Father with recent prolonged hospital stay due to COVID.  She feels retraumatized because of her own prolonged hospital stay many years ago    Was following with counselor in [facility] (outside ), last visit was pre-COVID.  Felt like it was a good relationship    Feels 'flat'    Last visit with psychiatry was 6/2019    celexa caused irritability and angry    Wonders about increasing dose of ativan to 45 per month    Social History     Tobacco Use     Smoking status: Never Smoker     Smokeless tobacco: Never Used   Substance Use Topics     Alcohol use: Yes     Comment: Socially once on a weekend if any     Drug use: No     Comment: None     PHQ 8/4/2020 12/17/2020 2/23/2021   PHQ-9 Total Score 9 7 10   Q9: Thoughts of better off dead/self-harm past 2 weeks Not at all Not at all Not at all   F/U: Thoughts of suicide or self-harm - - -   F/U: Self harm-plan - - -   F/U: Self-harm action - - -   F/U: Safety concerns - - -   PHQ-A Total Score - - -   PHQ-A Depressed most days in past year - - -   PHQ-A Mood affect on daily activities - - -   PHQ-A Suicide Ideation past 2 weeks - - -     REX-7 SCORE 8/4/2020 12/17/2020 2/23/2021   Total Score - - -   Total Score 11 15 11     Last PHQ-9 3/23/2021   1.  Little interest or pleasure in doing things 3   2.  Feeling down, depressed, or hopeless 1   3.  Trouble falling or staying asleep, or sleeping too much 3   4.  Feeling tired or having little energy 3   5.   Poor appetite or overeating 3   6.  Feeling bad about yourself 2   7.  Trouble concentrating 2   8.  Moving slowly or restless 0   Q9: Thoughts of better off dead/self-harm past 2 weeks 0   PHQ-9 Total Score 17   Difficulty at work, home, or with people Not difficult at all   In the past two weeks have you had thoughts of suicide or self harm? -   Do you have concerns about your personal safety or the safety of others? -   In the past 2 weeks have you thought about a plan or had intention to harm yourself? -   In the past 2 weeks have you acted on these thoughts in any way? -     REX-7  3/23/2021   1. Feeling nervous, anxious, or on edge 3   2. Not being able to stop or control worrying 3   3. Worrying too much about different things 3   4. Trouble relaxing 3   5. Being so restless that it is hard to sit still 0   6. Becoming easily annoyed or irritable 3   7. Feeling afraid, as if something awful might happen 3   REX-7 Total Score 18   If you checked any problems, how difficult have they made it for you to do your work, take care of things at home, or get along with other people? Very difficult       Suicide Assessment Five-step Evaluation and Treatment (SAFE-T)    Chronic Pain Follow-Up    Where in your body do you have pain? Joints, back muscles  How has your pain affected your ability to work? Unable to work  Which of these pain treatments have you tried since your last clinic visit? Acupuncture  How well are you sleeping? Poor  How has your mood been since your last visit? Slightly worse  Have you had a significant life event? Other: family member who has been ill  Other aggravating factors: prolonged standing  Taking medication as directed? Yes  --[place] , [place] 10 mg #90 on 2/23.  I had already filled the Oxycodone 5 mg #90 to be filled on 3/25   --we are doing a planned taper  --I did an E-consult with pain doctor on 12/30 for planned weaning of opiates.  --she doesn't think pain is  "any worse on the lower dose of oxycodone 10 mg  --some days wishes she had 4 per day.      \"Chart reviewed and noted that the PCP decreased the monthly amount of pills by 10.  Patient has 90 tablets for the month. This suggests that the patient uses three tabs per day.   Withdrawal is not expected if decrease in daily opioid dose is less than 30%.  At the next refill, I recommend prescribing oxycodone IR 10 mg (#90).  The following months, the script should be as follows:  1.  Oxycodone IR 5 mg (#90); 3 tabs per day  2.  Oxycodone IR 5 mg (#60); 2 tabs per day  3.  Oxycodone IR 5 mg (#30): 1 tab per day, then off     Seems like a long wean however the patient has been on opioids for some period of time and she may be more compliant with this regimen.\"        PHQ-9 SCORE 8/4/2020 12/17/2020 2/23/2021   PHQ-9 Total Score MyChart - - -   PHQ-9 Total Score 9 7 10   PHQ-A Total Score - - -     REX-7 SCORE 8/4/2020 12/17/2020 2/23/2021   Total Score - - -   Total Score 11 15 11     No flowsheet data found.  Encounter-Level CSA - 04/26/2017:    Controlled Substance Agreement - Scan on 5/4/2017  8:58 AM: CONTROLLED SUBSTANCE AGREEMENT     Patient-Level CSA:    Controlled Substance Agreement - Opioid - Scan on 12/27/2020  1:35 PM  Controlled Substance Agreement - Opioid - Scan on 2/6/2019  6:23 PM             Review of Systems   Constitutional, HEENT, cardiovascular, pulmonary, GI, , musculoskeletal, neuro, skin, endocrine and psych systems are negative, except as otherwise noted.      Objective      Vitals:  No vitals were obtained today due to virtual visit.    Physical Exam   alert and no distress  PSYCH: Alert and oriented times 3; coherent speech, normal   rate and volume, able to articulate logical thoughts, able   to abstract reason, no tangential thoughts, no hallucinations   or delusions  Her affect is normal  RESP: No cough, no audible wheezing, able to talk in full sentences  Remainder of exam unable to be " completed due to telephone visits                Phone call duration: 27 minutes

## 2021-03-23 NOTE — PATIENT INSTRUCTIONS
Mental Health  1. Agree with plan to get back in to see your counselor/psychologist.   2. Increase Duloxetine to 90 mg once daily.  3. May need to see psychiatry if mood/anxiety continues to be uncontrolled.  4. Increase ativan 1 mg to twice daily as needed, #45 per month for 2 months, then plan to go back to #30 per month  5. If mental health is uncontrolled after another 1-2 months, then will refer to Psychiatry.    Auto-immune  1. You are due for follow-up with specialists at [place]    Pain  1. Schedule follow-up in 1 month - see how mental health and pain is going

## 2021-03-24 ENCOUNTER — HOSPITAL ENCOUNTER (EMERGENCY)
Facility: CLINIC | Age: 36
Discharge: HOME | End: 2021-03-24
Attending: EMERGENCY MEDICINE | Admitting: EMERGENCY MEDICINE
Payer: MEDICARE

## 2021-03-24 ENCOUNTER — APPOINTMENT (OUTPATIENT)
Dept: MRI IMAGING | Facility: CLINIC | Age: 36
End: 2021-03-24
Attending: EMERGENCY MEDICINE
Payer: MEDICARE

## 2021-03-24 VITALS
BODY MASS INDEX: 33.77 KG/M2 | HEART RATE: 116 BPM | TEMPERATURE: 97 F | WEIGHT: 180 LBS | RESPIRATION RATE: 16 BRPM | DIASTOLIC BLOOD PRESSURE: 87 MMHG | OXYGEN SATURATION: 97 % | SYSTOLIC BLOOD PRESSURE: 111 MMHG

## 2021-03-24 DIAGNOSIS — R41.82 ALTERED MENTAL STATUS, UNSPECIFIED ALTERED MENTAL STATUS TYPE: ICD-10-CM

## 2021-03-24 DIAGNOSIS — F41.9 ANXIETY: ICD-10-CM

## 2021-03-24 DIAGNOSIS — R11.0 NAUSEA: ICD-10-CM

## 2021-03-24 DIAGNOSIS — R44.1 HALLUCINATIONS, VISUAL: ICD-10-CM

## 2021-03-24 LAB
ALBUMIN SERPL-MCNC: 3.5 G/DL (ref 3.4–5)
ALBUMIN UR-MCNC: NEGATIVE MG/DL
ALP SERPL-CCNC: 100 U/L (ref 40–150)
ALT SERPL W P-5'-P-CCNC: 41 U/L (ref 0–50)
AMMONIA PLAS-SCNC: 25 UMOL/L (ref 10–50)
ANION GAP SERPL CALCULATED.3IONS-SCNC: 8 MMOL/L (ref 3–14)
APPEARANCE UR: CLEAR
AST SERPL W P-5'-P-CCNC: 32 U/L (ref 0–45)
BASOPHILS # BLD AUTO: 0.1 10E9/L (ref 0–0.2)
BASOPHILS NFR BLD AUTO: 0.7 %
BILIRUB SERPL-MCNC: 0.3 MG/DL (ref 0.2–1.3)
BILIRUB UR QL STRIP: NEGATIVE
BUN SERPL-MCNC: 13 MG/DL (ref 7–30)
CALCIUM SERPL-MCNC: 8.3 MG/DL (ref 8.5–10.1)
CHLORIDE SERPL-SCNC: 108 MMOL/L (ref 94–109)
CO2 SERPL-SCNC: 22 MMOL/L (ref 20–32)
COLOR UR AUTO: NORMAL
CREAT SERPL-MCNC: 1.1 MG/DL (ref 0.52–1.04)
DIFFERENTIAL METHOD BLD: ABNORMAL
EOSINOPHIL # BLD AUTO: 0.3 10E9/L (ref 0–0.7)
EOSINOPHIL NFR BLD AUTO: 2.6 %
ERYTHROCYTE [DISTWIDTH] IN BLOOD BY AUTOMATED COUNT: 13.8 % (ref 10–15)
GFR SERPL CREATININE-BSD FRML MDRD: 65 ML/MIN/{1.73_M2}
GLUCOSE BLDC GLUCOMTR-MCNC: 64 MG/DL (ref 70–99)
GLUCOSE SERPL-MCNC: 88 MG/DL (ref 70–99)
GLUCOSE UR STRIP-MCNC: NEGATIVE MG/DL
HCG UR QL: NEGATIVE
HCT VFR BLD AUTO: 42.3 % (ref 35–47)
HGB BLD-MCNC: 13.4 G/DL (ref 11.7–15.7)
HGB UR QL STRIP: NEGATIVE
IMM GRANULOCYTES # BLD: 0 10E9/L (ref 0–0.4)
IMM GRANULOCYTES NFR BLD: 0.3 %
KETONES UR STRIP-MCNC: NEGATIVE MG/DL
LACTATE BLD-SCNC: 1.9 MMOL/L (ref 0.7–2)
LEUKOCYTE ESTERASE UR QL STRIP: NEGATIVE
LYMPHOCYTES # BLD AUTO: 2 10E9/L (ref 0.8–5.3)
LYMPHOCYTES NFR BLD AUTO: 17 %
MCH RBC QN AUTO: 29.4 PG (ref 26.5–33)
MCHC RBC AUTO-ENTMCNC: 31.7 G/DL (ref 31.5–36.5)
MCV RBC AUTO: 93 FL (ref 78–100)
MONOCYTES # BLD AUTO: 0.8 10E9/L (ref 0–1.3)
MONOCYTES NFR BLD AUTO: 7.1 %
NEUTROPHILS # BLD AUTO: 8.3 10E9/L (ref 1.6–8.3)
NEUTROPHILS NFR BLD AUTO: 72.3 %
NITRATE UR QL: NEGATIVE
NRBC # BLD AUTO: 0 10*3/UL
NRBC BLD AUTO-RTO: 0 /100
PH UR STRIP: 6 PH (ref 5–7)
PLATELET # BLD AUTO: 245 10E9/L (ref 150–450)
POTASSIUM SERPL-SCNC: 4.2 MMOL/L (ref 3.4–5.3)
PROT SERPL-MCNC: 7.2 G/DL (ref 6.8–8.8)
RBC # BLD AUTO: 4.56 10E12/L (ref 3.8–5.2)
SODIUM SERPL-SCNC: 138 MMOL/L (ref 133–144)
SOURCE: NORMAL
SP GR UR STRIP: 1 (ref 1–1.03)
TSH SERPL DL<=0.005 MIU/L-ACNC: 1.39 MU/L (ref 0.4–4)
UROBILINOGEN UR STRIP-MCNC: 0 MG/DL (ref 0–2)
WBC # BLD AUTO: 11.5 10E9/L (ref 4–11)

## 2021-03-24 PROCEDURE — 999N001017 HC STATISTIC GLUCOSE BY METER IP

## 2021-03-24 PROCEDURE — 250N000011 HC RX IP 250 OP 636: Performed by: EMERGENCY MEDICINE

## 2021-03-24 PROCEDURE — 80053 COMPREHEN METABOLIC PANEL: CPT | Performed by: EMERGENCY MEDICINE

## 2021-03-24 PROCEDURE — 255N000002 HC RX 255 OP 636: Performed by: EMERGENCY MEDICINE

## 2021-03-24 PROCEDURE — 96361 HYDRATE IV INFUSION ADD-ON: CPT | Performed by: EMERGENCY MEDICINE

## 2021-03-24 PROCEDURE — 99285 EMERGENCY DEPT VISIT HI MDM: CPT | Performed by: EMERGENCY MEDICINE

## 2021-03-24 PROCEDURE — 70553 MRI BRAIN STEM W/O & W/DYE: CPT | Mod: ME

## 2021-03-24 PROCEDURE — 250N000013 HC RX MED GY IP 250 OP 250 PS 637: Performed by: EMERGENCY MEDICINE

## 2021-03-24 PROCEDURE — 81003 URINALYSIS AUTO W/O SCOPE: CPT | Performed by: EMERGENCY MEDICINE

## 2021-03-24 PROCEDURE — 84443 ASSAY THYROID STIM HORMONE: CPT | Performed by: EMERGENCY MEDICINE

## 2021-03-24 PROCEDURE — 99285 EMERGENCY DEPT VISIT HI MDM: CPT | Mod: 25 | Performed by: EMERGENCY MEDICINE

## 2021-03-24 PROCEDURE — 85025 COMPLETE CBC W/AUTO DIFF WBC: CPT | Performed by: EMERGENCY MEDICINE

## 2021-03-24 PROCEDURE — 96374 THER/PROPH/DIAG INJ IV PUSH: CPT | Mod: 59 | Performed by: EMERGENCY MEDICINE

## 2021-03-24 PROCEDURE — A9585 GADOBUTROL INJECTION: HCPCS | Performed by: EMERGENCY MEDICINE

## 2021-03-24 PROCEDURE — 82140 ASSAY OF AMMONIA: CPT | Performed by: EMERGENCY MEDICINE

## 2021-03-24 PROCEDURE — 83605 ASSAY OF LACTIC ACID: CPT | Performed by: EMERGENCY MEDICINE

## 2021-03-24 PROCEDURE — 81025 URINE PREGNANCY TEST: CPT | Performed by: EMERGENCY MEDICINE

## 2021-03-24 RX ORDER — LORAZEPAM 2 MG/ML
0.5 INJECTION INTRAMUSCULAR ONCE
Status: COMPLETED | OUTPATIENT
Start: 2021-03-24 | End: 2021-03-24

## 2021-03-24 RX ORDER — DEXTROSE, SODIUM CHLORIDE, SODIUM LACTATE, POTASSIUM CHLORIDE, AND CALCIUM CHLORIDE 5; .6; .31; .03; .02 G/100ML; G/100ML; G/100ML; G/100ML; G/100ML
1000 INJECTION, SOLUTION INTRAVENOUS ONCE
Status: COMPLETED | OUTPATIENT
Start: 2021-03-24 | End: 2021-03-24

## 2021-03-24 RX ORDER — OXYCODONE HYDROCHLORIDE 5 MG/1
5 TABLET ORAL EVERY 4 HOURS PRN
Status: DISCONTINUED | OUTPATIENT
Start: 2021-03-24 | End: 2021-03-24 | Stop reason: HOSPADM

## 2021-03-24 RX ORDER — GADOBUTROL 604.72 MG/ML
8 INJECTION INTRAVENOUS ONCE
Status: COMPLETED | OUTPATIENT
Start: 2021-03-24 | End: 2021-03-24

## 2021-03-24 RX ADMIN — LORAZEPAM 0.5 MG: 2 INJECTION INTRAMUSCULAR; INTRAVENOUS at 11:03

## 2021-03-24 RX ADMIN — GADOBUTROL 8 ML: 604.72 INJECTION INTRAVENOUS at 13:42

## 2021-03-24 RX ADMIN — SODIUM CHLORIDE, SODIUM LACTATE, POTASSIUM CHLORIDE, CALCIUM CHLORIDE AND DEXTROSE MONOHYDRATE 1000 ML: 5; 600; 310; 30; 20 INJECTION, SOLUTION INTRAVENOUS at 11:03

## 2021-03-24 RX ADMIN — OXYCODONE HYDROCHLORIDE 5 MG: 5 TABLET ORAL at 14:47

## 2021-03-24 ASSESSMENT — ENCOUNTER SYMPTOMS
FREQUENCY: 0
SHORTNESS OF BREATH: 0
FEVER: 0
CHILLS: 0
HALLUCINATIONS: 1
DIARRHEA: 0
VOMITING: 1
COUGH: 0
NERVOUS/ANXIOUS: 1
NAUSEA: 1
APPETITE CHANGE: 1
SORE THROAT: 0
DYSURIA: 0
ARTHRALGIAS: 1
HEADACHES: 1
ABDOMINAL PAIN: 0

## 2021-03-24 ASSESSMENT — ANXIETY QUESTIONNAIRES: GAD7 TOTAL SCORE: 18

## 2021-03-24 NOTE — DISCHARGE INSTRUCTIONS
Your evaluation in the emergency department was reassuring for underlying medical cause for you current symptoms.  It is possible that that this is due to a worsening of your underlying anxiety and depression.  I recommend that you follow-up with your psychiatrist for further evaluation.    If your symptoms worsen, or you develop any new or concerning symptoms, thoughts of harming yourself or others, you should return to the emergency department immediately for further evaluation and treatment.

## 2021-03-24 NOTE — ED PROVIDER NOTES
"  History     Chief Complaint   Patient presents with     Altered Mental Status     \" shes been confused \"  hallucinations, sees people, no auditory, \" I can't remeber my passwords \" no HA,  having blurred vision since saturday, \" I get iron infusions \"  stroke eval called from triage     HPI  Molly Teague is a 35 year old female with history complex past medical history which is significant for crest syndrome, CKD 3, hypertension, chronic pain syndrome, migraine headaches, severe persistent asthma anxiety, anemia, depression, PTSD, who presents emergency department for evaluation of altered mental status.  Patient reports feeling confused starting 5 days ago on Sunday.  She was also having visual hallucinations.  She did see people in the peripheral of her vision and then turned to the side and nobody was there.  No auditory hallucinations.  Chest would increase blurriness of her vision.  She has decreased vision in her right eye which she describes as blurry which has been present for the past 5 or 6 years and reports that she had retinitis.  No headaches or neck pain.  The following day she had a few episodes of nonbloody nonbilious vomiting.  Her vomiting continued last night through today.  Having difficulty keeping any food down his little appetite.  She has been feeling confused.  The other day, she tried to put her son to bed when it was time for him to get ready and go to school.  She has also been putting medications in her mouth and then forgetting to swallow them.  Her  Joshua is present at bedside and able to provide collateral information.  He said she is also been forgetful and confusing the days & appointments which is unusual for her.  She has had a problem of migraine headaches and had headaches intermittently over the past 2 weeks but not currently having a headache now.  She has been having worsening anxiety and increase in panic attacks.  States she feels emotionally \"numb\".  Denies any " suicidal homicidal ideations.    Chart review shows that she had a virtual visit with her primary provider yesterday regarding increased anxiety and depression and her duloxetine was increased from 60 mg a day to 90 mg a day and her lorazepam was increased to 1 mg twice daily.  Plan for revisit in 1 month and if not improved will refer back to psychiatry.    The patient's PMHx, Surgical Hx, Allergies, and Medications were all reviewed with the patient.    Allergies:  Allergies   Allergen Reactions     Blood Transfusion Related (Informational Only) Other (See Comments)     Patient has a history of a clinically significant antibody against RBC antigens.  A delay in compatible RBCs may occur.     No Known Allergies      Pollen Extract      Seasonal Allergies      Shellfish-Derived Products Nausea and Rash     Rash on face       Problem List:    Patient Active Problem List    Diagnosis Date Noted     Iron deficiency anemia due to chronic blood loss 08/04/2020     Priority: Medium     Amnesia 04/29/2020     Priority: Medium     Vomiting and diarrhea 03/25/2020     Priority: Medium     Closed right ankle fracture 02/11/2020     Priority: Medium     S/P ORIF (open reduction internal fixation) fracture 02/10/2020     Priority: Medium     Sinus tachycardia 01/31/2020     Priority: Medium     Ankle fracture, right, closed, initial encounter 01/30/2020     Priority: Medium     Added automatically from request for surgery [id number]       Diplopia 01/25/2020     Priority: Medium     Anemia, chronic disease 11/25/2019     Priority: Medium     Mirena IUD- Remove by 09/2024 09/06/2019     Priority: Medium     Lot: [id number]  Exp: 01/2022    Dinora Israel LPN         Malignant essential hypertension 07/24/2019     Priority: Medium     PTSD (post-traumatic stress disorder) 06/19/2019     Priority: Medium     Moderate major depression (H) 07/06/2018     Priority: Medium     Severe persistent asthma without complication 07/06/2018      Priority: Medium     On high dose ICS/LABA + frequent albuterol use.  Next allergy/pulmonary referral       Aspiration of food 03/29/2018     Priority: Medium     Chronic pain syndrome 04/26/2017     Priority: Medium     Patient is followed by Grecia Barba DO for ongoing prescription of pain medication.  All refills should only be approved by this provider, or covering partner.    Medication(s): Oxycodone 15 mg.   Maximum quantity per month: 100  Clinic visit frequency required: Q 2 months     Controlled substance agreement:  Encounter-Level CSA - 04/26/2017:    Controlled Substance Agreement - Scan on 5/4/2017  8:58 AM: CONTROLLED SUBSTANCE AGREEMENT (below)       Patient-Level CSA:    Controlled Substance Agreement - Opioid - Scan on 2/6/2019  6:23 PM (below)         Pain Clinic evaluation in the past: No    DIRE Total Score(s):  No flowsheet data found.    Last [facility] website verification:  done on 4/26/17   9/26/2017  7/6/2018  10/16/2018  1/22/2019  8/2/2019           [email or web address]         Chronic migraine without aura without status migrainosus, not intractable 04/12/2017     Priority: Medium     With medication overuse.  Fioricet and not Imitrex is recommend to treat severe headache.  2/2017 [facility] recommend wean down from daily Fioricet and use Excedrin Migrain PRN.       AIN grade I 03/30/2017     Priority: Medium     Found at [facility] on colonoscopy 2/2017.  Recommend repeat anoscopy and anal pap in 6/2017.       Gastroesophageal reflux disease with esophagitis 03/30/2017     Priority: Medium     EGD done at [facility] 2/2017 showed esophagitis without GAVE.  Recommend max dose H2 and PPI, along with 4 tablets of Carafate dissolved in water and drink throughout the day.    Had LA Grade D esophagitis, on TID PPI       Secondary hypertension 03/22/2017     Priority: Medium     Stage 3 chronic kidney disease 02/27/2017     Priority: Medium     Arthritis 02/10/2017     Priority: Medium     Limited  systemic sclerosis (H) 01/25/2017     Priority: Medium     Raynaud's, calcinosis, telangiectasias, peripheral neuropathy, GERD symptoms, history of scleroderma renal crisis.  Saw [facility] 1/2017 and follows with Rheum Dr. Davis locally.       Polyneuropathy associated with underlying disease (H) 01/25/2017     Priority: Medium     Due to scleroderma and neurology thought possible due to ICU (critical illness neuropathy).  Recommend to max out dose of gabapentin to 5831-2627 mg/day, split BID or TID.  EMG 1/2017 positive for neuropathy       History of Clostridium difficile 11/29/2016     Priority: Medium     History of Helicobacter pylori infection 11/29/2016     Priority: Medium     Scleroderma with renal involvement (H) 11/09/2016     Priority: Medium     Needs lisinopril long term       History of ARDS 11/09/2016     Priority: Medium     4/2015, prolonged ICU/vent/trach due to influenza, scleroderma renal crisis requiring hemodialysis.       Raynaud's disease without gangrene 11/09/2016     Priority: Medium     History of hemodialysis 11/09/2016     Priority: Medium     4/2015 due to scleroderma renal crisis       Bilateral retinitis 11/09/2016     Priority: Medium     History of pulmonary embolism 11/09/2016     Priority: Medium     Completed anti-coagulation 2017.  pulmonary embolism due to critical illness       REX (generalized anxiety disorder) 11/09/2016     Priority: Medium     CREST (calcinosis, Raynaud's phenomenon, esophageal dysfunction, sclerodactyly, telangiectasia) (H) 11/09/2016     Priority: Medium     Systemic sclerosis (H) 09/22/2016     Priority: Medium        Past Medical History:    Past Medical History:   Diagnosis Date     Acute pyelonephritis 6/9/2017     Altered mental state 3/29/2018     Arthritis      Blood clotting disorder (H)      History of blood transfusion      Hypertension      Long-term (current) use of anticoagulants [Z79.01] 9/9/2016     PE (pulmonary embolism) 9/7/2016      Rheumatism      Scleroderma (H) 2016     Uncomplicated asthma        Past Surgical History:    Past Surgical History:   Procedure Laterality Date     ANGIOGRAM  2015      SECTION  2014     OPEN REDUCTION INTERNAL FIXATION ANKLE Right 2/10/2020    Procedure: Right ankle open reduction internal fixation;  Surgeon: Natanael Villalta MD;  Location: [facility] OR     THROAT SURGERY         Family History:    Family History   Problem Relation Age of Onset     Hyperlipidemia Father      Cerebrovascular Disease Maternal Grandmother          of a stroke     Diabetes Maternal Grandmother      Hyperlipidemia Paternal Grandfather      Depression Sister      Fibromyalgia Sister      Diabetes Maternal Aunt      Hyperlipidemia Sister      Depression Other      Melanoma No family hx of      Skin Cancer No family hx of        Social History:  Marital Status:  Single [1]  Social History     Tobacco Use     Smoking status: Never Smoker     Smokeless tobacco: Never Used   Substance Use Topics     Alcohol use: Yes     Comment: Socially once on a weekend if any     Drug use: No     Comment: None        Medications:    acetaminophen (TYLENOL) 325 MG tablet  albuterol (VENTOLIN HFA) 108 (90 Base) MCG/ACT inhaler  amLODIPine (NORVASC) 5 MG tablet  blood glucose (NO BRAND SPECIFIED) lancets standard  blood glucose (NO BRAND SPECIFIED) test strip  busPIRone HCl (BUSPAR) 30 MG tablet  Cyanocobalamin (B-12) 1000 MCG TBCR  DULoxetine (CYMBALTA) 30 MG capsule  famotidine (PEPCID) 20 MG tablet  folic acid (FOLVITE) 1 MG tablet  gabapentin (NEURONTIN) 300 MG capsule  GOODSENSE MIGRAINE FORMULA 250-250-65 MG per tablet  hydrOXYzine (ATARAX) 25 MG tablet  lisinopril (ZESTRIL) 10 MG tablet  LORazepam (ATIVAN) 1 MG tablet  methocarbamol (ROBAXIN) 750 MG tablet  montelukast (SINGULAIR) 10 MG tablet  naloxone (NARCAN) 4 MG/0.1ML nasal spray  omeprazole (PRILOSEC) 40 MG DR capsule  order for DME  order for DME  [START ON 3/25/2021]  oxyCODONE (ROXICODONE) 5 MG tablet  oxyCODONE IR (ROXICODONE) 10 MG tablet  polyethylene glycol (MIRALAX/GLYCOLAX) packet  promethazine (PHENERGAN) 25 MG tablet  promethazine (PHENERGAN) 25 MG/ML IV injection  SYMBICORT 160-4.5 MCG/ACT Inhaler          Review of Systems   Constitutional: Positive for appetite change. Negative for chills and fever.   HENT: Negative for sore throat.    Eyes: Positive for visual disturbance.   Respiratory: Negative for cough and shortness of breath.    Cardiovascular: Negative for chest pain.   Gastrointestinal: Positive for nausea and vomiting. Negative for abdominal pain and diarrhea.   Genitourinary: Negative for dysuria, frequency and urgency.   Musculoskeletal: Positive for arthralgias.   Skin: Negative for rash.   Neurological: Positive for headaches.   Psychiatric/Behavioral: Positive for hallucinations. The patient is nervous/anxious.        Physical Exam   BP: 127/74  Pulse: 63  Temp: 97  F (36.1  C)  Resp: 16  Weight: 81.6 kg (180 lb)  SpO2: 99 %    Physical Exam  GEN: Awake, alert, and cooperative. Appears distressed.   HENT: MMM. External ears and nose normal bilaterally.  EYES: EOM intact. Conjunctiva clear. No discharge. No RAPD. Tearing from both eyes.   NECK: Symmetric, freely mobile.   CV : Tachycardic rate.  Regular rhythm.  No murmurs appreciated.  PULM: Normal effort.  No wheezing or rales.  Good air movement.  No prolongation of expiratory phase.  Faint rales in right base.  ABD: Soft, non-tender, non-distended. No rebound or guarding.   NEURO: GCS 15.  Normal speech. Following commands. CN II-XII grossly intact. Answering questions and interacting appropriately.  Decreased sensation in right foot (chronic)  EXT: No gross deformity. Warm and well perfused.  Erythema fingers bilaterally and hands in flexion.  INT: Warm and dry.  Scarring of skin consistent with scleroderma.  PSYCH: flat affect. Logical thought process.        ED Course        Procedures            Critical Care time:  none               Results for orders placed or performed during the hospital encounter of 03/24/21 (from the past 24 hour(s))   Glucose by meter   Result Value Ref Range    Glucose 64 (L) 70 - 99 mg/dL   CBC with platelets differential   Result Value Ref Range    WBC 11.5 (H) 4.0 - 11.0 10e9/L    RBC Count 4.56 3.8 - 5.2 10e12/L    Hemoglobin 13.4 11.7 - 15.7 g/dL    Hematocrit 42.3 35.0 - 47.0 %    MCV 93 78 - 100 fl    MCH 29.4 26.5 - 33.0 pg    MCHC 31.7 31.5 - 36.5 g/dL    RDW 13.8 10.0 - 15.0 %    Platelet Count 245 150 - 450 10e9/L    Diff Method Automated Method     % Neutrophils 72.3 %    % Lymphocytes 17.0 %    % Monocytes 7.1 %    % Eosinophils 2.6 %    % Basophils 0.7 %    % Immature Granulocytes 0.3 %    Nucleated RBCs 0 0 /100    Absolute Neutrophil 8.3 1.6 - 8.3 10e9/L    Absolute Lymphocytes 2.0 0.8 - 5.3 10e9/L    Absolute Monocytes 0.8 0.0 - 1.3 10e9/L    Absolute Eosinophils 0.3 0.0 - 0.7 10e9/L    Absolute Basophils 0.1 0.0 - 0.2 10e9/L    Abs Immature Granulocytes 0.0 0 - 0.4 10e9/L    Absolute Nucleated RBC 0.0    Comprehensive metabolic panel   Result Value Ref Range    Sodium 138 133 - 144 mmol/L    Potassium 4.2 3.4 - 5.3 mmol/L    Chloride 108 94 - 109 mmol/L    Carbon Dioxide 22 20 - 32 mmol/L    Anion Gap 8 3 - 14 mmol/L    Glucose 88 70 - 99 mg/dL    Urea Nitrogen 13 7 - 30 mg/dL    Creatinine 1.10 (H) 0.52 - 1.04 mg/dL    GFR Estimate 65 >60 mL/min/[1.73_m2]    GFR Estimate If Black 75 >60 mL/min/[1.73_m2]    Calcium 8.3 (L) 8.5 - 10.1 mg/dL    Bilirubin Total 0.3 0.2 - 1.3 mg/dL    Albumin 3.5 3.4 - 5.0 g/dL    Protein Total 7.2 6.8 - 8.8 g/dL    Alkaline Phosphatase 100 40 - 150 U/L    ALT 41 0 - 50 U/L    AST 32 0 - 45 U/L   Lactic acid whole blood   Result Value Ref Range    Lactic Acid 1.9 0.7 - 2.0 mmol/L   Ammonia   Result Value Ref Range    Ammonia 25 10 - 50 umol/L   TSH with free T4 reflex   Result Value Ref Range    TSH 1.39 0.40 - 4.00 mU/L   UA reflex  to Microscopic   Result Value Ref Range    Color Urine Straw     Appearance Urine Clear     Glucose Urine Negative NEG^Negative mg/dL    Bilirubin Urine Negative NEG^Negative    Ketones Urine Negative NEG^Negative mg/dL    Specific Gravity Urine 1.005 1.003 - 1.035    Blood Urine Negative NEG^Negative    pH Urine 6.0 5.0 - 7.0 pH    Protein Albumin Urine Negative NEG^Negative mg/dL    Urobilinogen mg/dL 0.0 0.0 - 2.0 mg/dL    Nitrite Urine Negative NEG^Negative    Leukocyte Esterase Urine Negative NEG^Negative    Source Midstream Urine    HCG qualitative urine   Result Value Ref Range    HCG Qual Urine Negative NEG^Negative   MR Brain w/o & w Contrast    Narrative    MRI BRAIN WITHOUT AND WITH CONTRAST  3/24/2021 2:06 PM     HISTORY:  Five days of confusion and visual hallucinations.    TECHNIQUE:  Multiplanar, multisequence MRI of the brain without and  with 8 mL Gadavist.    COMPARISON: Head CT 1/25/2020.     FINDINGS: There is no evidence of acute infarct, hemorrhage, mass, or  herniation. The brain parenchyma, ventricles and subarachnoid spaces  appear normal.     There is no abnormal intracranial enhancement.     The facial structures appear normal. The major arterial T2 flow voids  at the base of the brain appear patent.       Impression    IMPRESSION:  Normal MRI of the brain.     MARQUEZ CLINE MD       Medications   oxyCODONE (ROXICODONE) tablet 5 mg (5 mg Oral Given 3/24/21 1447)   dextrose 5% in lactated ringers infusion (0 mLs Intravenous Stopped 3/24/21 1326)   LORazepam (ATIVAN) injection 0.5 mg (0.5 mg Intravenous Given 3/24/21 1103)   gadobutrol (GADAVIST) injection 8 mL (8 mLs Intravenous Given 3/24/21 1342)       Assessments & Plan (with Medical Decision Making)   35 year old female with complex past medical history including scleroderma, made by crest syndrome, asthma, PTSD, anxiety, and depression with 5 days of intermittent confusion, visual hallucinations, and 3 days of nausea and vomiting.   Patient had a virtual encounter with her primary care physician yesterday regarding worsening anxiety and depression with recent medication changes with increase of her fluoxetine and lorazepam.  Consideration for psychiatric follow-up if symptoms do not improve after 1 month.  Concern for possible underlying metabolic or toxic cause of her encephalopathy.  Lab work generally unrevealing.  CBC with mild leukocytosis of 11.5 however she chronically has mild leukocytosis.  CMP with creatinine of 1.1 which is at or better than her baseline.  UA without evidence of acute infection.  No signs of uremia.  TSH within normal limits at 1.39.  UPT negative.  Ammonia within normal limits at 25.  Chart review shows that she has a baseline tachycardia which she confirms.  Heart rate in the 1 teens for the majority of her time in the department.  MR brain obtained and no acute findings.     Given her unremarkable medical work-up concern for worsening underlying psychiatric disease.  Did recommend that she speaks with a DEC  however patient declined.  She states that she has previously seen a psychiatrist who she is comfortable with and that she will reach out to them to schedule an appointment.  She reports that she felt this was likely due to a psychiatric illness. No thoughts of harming herself or others.     I have reviewed the nursing notes.         New Prescriptions    No medications on file       Final diagnoses:   Altered mental status, unspecified altered mental status type   Hallucinations, visual   Anxiety     Kel Cruz MD    3/24/2021   [facility] EMERGENCY DEPT    Disclaimer: This note consists of words and symbols derived from keyboarding and dictation using voice recognition software.  As a result, there may be errors that have gone undetected.  Please consider this when interpreting information found in this note.             Kel Cruz MD  03/24/21 [id number]

## 2021-03-24 NOTE — TELEPHONE ENCOUNTER
"Requested Prescriptions   Pending Prescriptions Disp Refills     promethazine (PHENERGAN) 25 MG/ML IV injection [Pharmacy Med Name: PROMETHAZINE HCL 25MG/ML SOLN]  4     Sig: INJECT 1 ML INTRAMUSCULARLY EVERY 6 HOURS AS NEEDED        Antivertigo/Antiemetic Agents Passed - 3/24/2021  4:03 PM        Passed - Recent (12 mo) or future (30 days) visit within the authorizing provider's specialty     Patient has had an office visit with the authorizing provider or a provider within the authorizing providers department within the previous 12 mos or has a future within next 30 days. See \"Patient Info\" tab in inbasket, or \"Choose Columns\" in Meds & Orders section of the refill encounter.              Passed - Medication is active on med list        Passed - Patient is 18 years of age or older             "

## 2021-03-25 ENCOUNTER — PATIENT OUTREACH (OUTPATIENT)
Dept: NURSING | Facility: CLINIC | Age: 36
End: 2021-03-25
Payer: MEDICARE

## 2021-03-25 DIAGNOSIS — Z71.89 OTHER SPECIFIED COUNSELING: ICD-10-CM

## 2021-03-25 NOTE — PROGRESS NOTES
Clinic Care Coordination Contact  Community Health Worker Initial Outreach    Patient accepts CC: Yes. Patient scheduled for assessment with  CC on 3/29 at 11:30am. Patient noted desire to discuss resources and support available to patient.     The Clinic Community Health Worker spoke with the patient today to discuss possible Clinic Care Coordination enrollment. The service was described to the patient and immediate needs were discussed. The patient agreed to enrollment and an assessment was scheduled. The patient was provided with contact information for the clinic CHW.             Assessment date: 3/29 @11:30am    Miriam PUGA  Community Health Worker   [facility]  Clinic Care Coordination  [facility]  [email or web address]   Office: [phone]

## 2021-03-29 ENCOUNTER — PATIENT OUTREACH (OUTPATIENT)
Dept: NURSING | Facility: CLINIC | Age: 36
End: 2021-03-29
Attending: INTERNAL MEDICINE
Payer: MEDICARE

## 2021-03-29 ASSESSMENT — ACTIVITIES OF DAILY LIVING (ADL): DEPENDENT_IADLS:: INDEPENDENT

## 2021-03-29 NOTE — PROGRESS NOTES
Clinic Care Coordination Contact    Clinic Care Coordination Contact  OUTREACH    Referral Information:  Referral Source: PCP    Primary Diagnosis: Psychosocial    Chief Complaint   Patient presents with     Clinic Care Coordination - Initial     social work        Universal Utilization:   Clinic Utilization  Difficulty keeping appointments:: No  Compliance Concerns: No  No-Show Concerns: No  No PCP office visit in Past Year: No  Utilization    Last refreshed: 3/29/2021 12:20 PM: Hospital Admissions 0           Last refreshed: 3/29/2021 12:20 PM: ED Visits 5           Last refreshed: 3/29/2021 12:20 PM: No Show Count (past year) 4              Current as of: 3/29/2021 12:20 PM              Clinical Concerns:  Current Medical Concerns:   Patient Active Problem List   Diagnosis     Systemic sclerosis (H)     Scleroderma with renal involvement (H)     History of ARDS     Raynaud's disease without gangrene     History of hemodialysis     Bilateral retinitis     History of pulmonary embolism     REX (generalized anxiety disorder)     CREST (calcinosis, Raynaud's phenomenon, esophageal dysfunction, sclerodactyly, telangiectasia) (H)     History of Clostridium difficile     History of Helicobacter pylori infection     Limited systemic sclerosis (H)     Polyneuropathy associated with underlying disease (H)     AIN grade I     Gastroesophageal reflux disease with esophagitis     Chronic migraine without aura without status migrainosus, not intractable     Chronic pain syndrome     Aspiration of food     Moderate major depression (H)     Severe persistent asthma without complication     PTSD (post-traumatic stress disorder)     Malignant essential hypertension     Mirena IUD- Remove by 09/2024     Anemia, chronic disease     Diplopia     Ankle fracture, right, closed, initial encounter     Stage 3 chronic kidney disease     Sinus tachycardia     S/P ORIF (open reduction internal fixation) fracture     Closed right ankle  fracture     Vomiting and diarrhea     Iron deficiency anemia due to chronic blood loss     Amnesia     Secondary hypertension     Arthritis       Current Behavioral Concerns: Patient has history of both controlled and uncontrolled depression and anxiety and is struggling with her mental health right now. Often having panic attacks before bed. Patient is currently looking for a counselor that she can see to give her more anxiety coping techniques.     Education provided to patient: Patient was given contacts of multiple therapists and a referral for psychiatry through [facility] to get a cheek swab.       Pain  Pain (GOAL):: No  Health Maintenance Reviewed: Due/Overdue   Health Maintenance Due   Topic Date Due     HEPATITIS C SCREENING  Never done     ASTHMA ACTION PLAN  07/06/2019     ASTHMA CONTROL TEST  02/04/2021     COVID-19 Vaccine (2 - Pfizer 2-dose series) 04/02/2021       Clinical Pathway: None    Medication Management:  Current Outpatient Medications   Medication     acetaminophen (TYLENOL) 325 MG tablet     albuterol (VENTOLIN HFA) 108 (90 Base) MCG/ACT inhaler     amLODIPine (NORVASC) 5 MG tablet     blood glucose (NO BRAND SPECIFIED) lancets standard     blood glucose (NO BRAND SPECIFIED) test strip     busPIRone HCl (BUSPAR) 30 MG tablet     Cyanocobalamin (B-12) 1000 MCG TBCR     DULoxetine (CYMBALTA) 30 MG capsule     famotidine (PEPCID) 20 MG tablet     folic acid (FOLVITE) 1 MG tablet     gabapentin (NEURONTIN) 300 MG capsule     GOODSENSE MIGRAINE FORMULA 250-250-65 MG per tablet     hydrOXYzine (ATARAX) 25 MG tablet     lisinopril (ZESTRIL) 10 MG tablet     LORazepam (ATIVAN) 1 MG tablet     methocarbamol (ROBAXIN) 750 MG tablet     montelukast (SINGULAIR) 10 MG tablet     naloxone (NARCAN) 4 MG/0.1ML nasal spray     omeprazole (PRILOSEC) 40 MG DR capsule     order for DME     order for DME     oxyCODONE (ROXICODONE) 5 MG tablet     polyethylene glycol (MIRALAX/GLYCOLAX) packet      promethazine (PHENERGAN) 25 MG tablet     promethazine (PHENERGAN) 25 MG/ML IV injection     SYMBICORT 160-4.5 MCG/ACT Inhaler     Current Facility-Administered Medications   Medication     lidocaine (PF) (XYLOCAINE) 1 % injection 1 mL     triamcinolone (KENALOG-40) injection 40 mg     Patient self manages medications. Reports she just increased her dose of her anti-anxiety medication.     Functional Status:  Dependent ADLs:: Independent  Dependent IADLs:: Independent  Bed or wheelchair confined:: No  Mobility Status: Independent  Fallen 2 or more times in the past year?: No  Any fall with injury in the past year?: No    Living Situation:  Current living arrangement:: I live alone  Type of residence:: Private home - stairs    Lifestyle & Psychosocial Needs:  Lifestyle     Physical activity     Days per week: 0 days     Minutes per session: 0 min     Stress: Not on file     Social Needs     Financial resource strain: Not hard at all     Food insecurity     Worry: Never true     Inability: Never true     Transportation needs     Medical: No     Non-medical: No     Diet:: Regular  Inadequate nutrition (GOAL):: No  Tube Feeding: No  Inadequate activity/exercise (GOAL):: No  Significant changes in sleep pattern (GOAL): No  Transportation means:: Regular car     Buddhism or spiritual beliefs that impact treatment:: No  Mental health DX:: Yes  Mental health DX how managed:: Medication  Mental health management concern (GOAL):: Yes  Chemical Dependency Status: Past Concern  Chemical Dependency Management: Previous treatment  Informal Support system:: Children   Socioeconomic History     Marital status: Single     Spouse name: Not on file     Number of children: Not on file     Years of education: Not on file     Highest education level: Not on file     Tobacco Use     Smoking status: Never Smoker     Smokeless tobacco: Never Used   Substance and Sexual Activity     Alcohol use: Yes     Comment: Socially once on a weekend  "if any     Drug use: No     Comment: None     Sexual activity: Yes     Partners: Male     Birth control/protection: I.U.D.       Patient was referred to care coordination off the ED discharge report. [facility] called patient and introduced self, title and role. Patient was unaware I would be calling despite having the appt. Patient interested in talking to a counselor but is having trouble finding on. Patient reports she is \"ready to talk to someone\". Her anxiety is extremely high. She is not able to sleep - she just started regulating it. It also stresses her body when she cannot sleep and when her anxiety feels hard to handle. Patient was sent a list of counselors and the contact number to [facility].         Resources and Interventions:  Current Resources:      Community Resources: None  Supplies used at home:: None  Equipment Currently Used at Home: none  Employment Status: disabled)   )    Advance Care Plan/Directive  Advanced Care Plans/Directives on file:: No  Advanced Care Plan/Directive Status: Not Applicable    Referrals Placed: Mental Health     Goals:   Goals        General    Mental Health Management (pt-stated)     Notes - Note created  3/29/2021  2:02 PM by Aylin Vera LSW    Goal Statement: I will manage my mental health  Date Goal set: 03/29/21  Barriers: mental illness;   Strengths: motivated to improve mental health  Date to Achieve By: 08/29/21   Patient expressed understanding of goal: yes  Action steps to achieve this goal:  1. I will see a counselor  2. I will get a cheek swab from psychiatry to determine which medication would be best for my chemistry  3. I will take meds as prescribed.              Patient/Caregiver understanding: Patient verbalized understanding    Outreach Frequency: monthly  Future Appointments              In 4 days [facility] COVID VACCINE 21 DAY PFIZER [facility] [place]    In 2 weeks [address] [facility] [place], " [facility]    In 2 weeks [id number]          Plan: Patient will see a therapist. Patient will get a cheek swab to determine which anti depressant is best. Patient will continue to take meds as prescribed. [facility] will send intro letter and CCP. Care Coordinator will review progress to goals and plan of care in 6 weeks.    HENRY Welch   [facility] Care Coordination  Tel: [phone]

## 2021-03-29 NOTE — LETTER
[facility] CARE COORDINATION    March 29, 2021    Molly Teague  [address] [address]      Dear Molly,    I am a clinic care coordinator who works with Grecia Barba DO at [facility]. I wanted to thank you for spending the time to talk with me.      The psychiatrist we discussed is [facility] at [phone].    Potential Counselors for you to consider:     [facility]  [address] [address] [place] [address]  [phone]    Lali Neville  [address] [address] [place] [address]  [phone]  Offices in [place] and [place]    [facility], [facility]  [address], [address]  [place] [address]  Call Jayleen [phone]    [facility]  [phone]  [address]  [place] [address]      Please feel free to contact me at [phone] with any questions or concerns. We are focused on providing you with the highest-quality healthcare experience possible and that all starts with you.     Sincerely,     HENRY Welch   [facility] Care Coordination  Tel: [phone]      Enclosed: I have enclosed a copy of the Complex Care Plan. This has helpful information and goals that we have talked about. Please keep this in an easy to access place to use as needed.

## 2021-03-29 NOTE — LETTER
[facility]  Complex Care Plan  About Me:    Patient Name:  Molly Teague    YOB: 1985  Age:         35 year old   [place] MRN:    [id number] Telephone Information:  Home Phone [phone]   Mobile [phone]       Address:  [address]  [facility] [address] Email address:  [email or web address]      Emergency Contact(s)    Name Relationship Lgl Grd Work Phone Home Phone Mobile Phone   1. BENJAMIN MELENDEZ Significant ot* No  [phone]           Primary language:  English     needed? Data Unavailable   [place] [facility]:  [phone] op. 1  Other communication barriers:    Preferred Method of Communication:  Mail  Current living arrangement: I live alone  Mobility Status/ Medical Equipment: Independent    Health Maintenance  Health Maintenance Reviewed: Due/Overdue   Health Maintenance Due   Topic Date Due     HEPATITIS C SCREENING  Never done     ASTHMA ACTION PLAN  07/06/2019     ASTHMA CONTROL TEST  02/04/2021     COVID-19 Vaccine (2 - Pfizer 2-dose series) 04/02/2021         My Access Plan  Medical Emergency 911   Primary Clinic Line [facility] - [phone]   24 Hour Appointment Line [phone] or  [phone]) (toll-free)   24 Hour Nurse Line [phone] (toll-free)   Preferred Urgent Care [facility], [phone]   Preferred Hospital [place], [place]  [phone]   Preferred Pharmacy [facility] [address] [place] [address]     Behavioral Health Crisis Line The National Suicide Prevention Lifeline at 1-746.224.3989 or 911             My Care Team Members  Patient Care Team       Relationship Specialty Notifications Start End    Grecia Barba DO PCP - General Internal Medicine Admissions 11/9/16     Phone: [phone] Pager: [phone] Fax: [phone]        [address] [facility]  [address]    Grecia Barba DO Assigned PCP   1/28/18     Phone: [phone] Pager: [phone] Fax: [phone] [address] [facility] [address]    Maritza Harrison MD MD OB/Gyn  4/29/19     Phone: [phone] Fax: [phone] [address] [facility] [address]    Natanael Villalta MD Assigned Musculoskeletal Provider   10/23/20     Phone: [phone] Fax: [phone]         [address] [facility] [address]            My Care Plans  Self Management and Treatment Plan  Goals and (Comments)  Goals        General    Mental Health Management (pt-stated)     Notes - Note created  3/29/2021  2:02 PM by Aylin Vera LSW    Goal Statement: I will manage my mental health  Date Goal set: 03/29/21  Barriers: mental illness;   Strengths: motivated to improve mental health  Date to Achieve By: 08/29/21   Patient expressed understanding of goal: yes  Action steps to achieve this goal:  1. I will see a counselor  2. I will get a cheek swab from psychiatry to determine which medication would be best for my chemistry  3. I will take meds as prescribed.               Action Plans on File:   Asthma        Depression          Advance Care Plans/Directives Type:        My Medical and Care Information  Problem List   Patient Active Problem List   Diagnosis     Systemic sclerosis (H)     Scleroderma with renal involvement (H)     History of ARDS     Raynaud's disease without gangrene     History of hemodialysis     Bilateral retinitis     History of pulmonary embolism     REX (generalized anxiety disorder)     CREST (calcinosis, Raynaud's phenomenon, esophageal dysfunction, sclerodactyly, telangiectasia) (H)     History of Clostridium difficile     History of Helicobacter pylori infection     Limited systemic sclerosis (H)     Polyneuropathy associated with underlying disease (H)     AIN grade I     Gastroesophageal reflux disease with esophagitis     Chronic migraine without  aura without status migrainosus, not intractable     Chronic pain syndrome     Aspiration of food     Moderate major depression (H)     Severe persistent asthma without complication     PTSD (post-traumatic stress disorder)     Malignant essential hypertension     Mirena IUD- Remove by 09/2024     Anemia, chronic disease     Diplopia     Ankle fracture, right, closed, initial encounter     Stage 3 chronic kidney disease     Sinus tachycardia     S/P ORIF (open reduction internal fixation) fracture     Closed right ankle fracture     Vomiting and diarrhea     Iron deficiency anemia due to chronic blood loss     Amnesia     Secondary hypertension     Arthritis      Current Medications and Allergies:  See printed Medication Report.    Care Coordination Start Date: 3/29/2021   Frequency of Care Coordination: monthly   Form Last Updated: 03/29/2021

## 2021-03-30 RX ORDER — PROMETHAZINE HYDROCHLORIDE 25 MG/ML
INJECTION, SOLUTION INTRAMUSCULAR; INTRAVENOUS
Qty: 3 ML | Refills: 4 | Status: SHIPPED | OUTPATIENT
Start: 2021-03-30 | End: 2021-04-27

## 2021-04-02 ENCOUNTER — IMMUNIZATION (OUTPATIENT)
Dept: NURSING | Facility: CLINIC | Age: 36
End: 2021-04-02
Attending: INTERNAL MEDICINE
Payer: MEDICARE

## 2021-04-02 PROCEDURE — 91300 PR COVID VAC PFIZER DIL RECON 30 MCG/0.3 ML IM: CPT

## 2021-04-02 PROCEDURE — 0002A PR COVID VAC PFIZER DIL RECON 30 MCG/0.3 ML IM: CPT

## 2021-04-05 ENCOUNTER — TELEPHONE (OUTPATIENT)
Dept: INTERNAL MEDICINE | Facility: CLINIC | Age: 36
End: 2021-04-05

## 2021-04-05 NOTE — TELEPHONE ENCOUNTER
Reason for Call:  Medication or medication refill:    Do you use a [facility]?  Name of the pharmacy and phone number for the current request:  [facility] [phone]    Name of the medication requested: Oxycodone    Other request: Patient trying to decrease Oxycodone, taking 5 mg, every 4-6 hours; patient taking them every 4 hours, but still in significant pain. She did well on 10mg, but not doing well on 5mg.    Can we leave a detailed message on this number? YES    Phone number patient can be reached at: Home number on file [phone] (home)    Best Time: Any    Call taken on 4/5/2021 at 5:39 PM by Laya Burger

## 2021-04-05 NOTE — TELEPHONE ENCOUNTER
Call placed to patient. She has appointment scheduled 4/20 but is asking for one sooner. Scheduled 4/9.    Claribel FIELD RN

## 2021-04-09 ENCOUNTER — OFFICE VISIT (OUTPATIENT)
Dept: FAMILY MEDICINE | Facility: CLINIC | Age: 36
End: 2021-04-09
Payer: MEDICARE

## 2021-04-09 ENCOUNTER — PATIENT OUTREACH (OUTPATIENT)
Dept: NURSING | Facility: CLINIC | Age: 36
End: 2021-04-09
Payer: MEDICARE

## 2021-04-09 VITALS
HEART RATE: 112 BPM | TEMPERATURE: 99.9 F | BODY MASS INDEX: 33.77 KG/M2 | RESPIRATION RATE: 16 BRPM | SYSTOLIC BLOOD PRESSURE: 116 MMHG | WEIGHT: 180 LBS | DIASTOLIC BLOOD PRESSURE: 60 MMHG

## 2021-04-09 DIAGNOSIS — F41.1 GAD (GENERALIZED ANXIETY DISORDER): ICD-10-CM

## 2021-04-09 DIAGNOSIS — F33.3 SEVERE EPISODE OF RECURRENT MAJOR DEPRESSIVE DISORDER, WITH PSYCHOTIC FEATURES (H): ICD-10-CM

## 2021-04-09 DIAGNOSIS — F43.10 PTSD (POST-TRAUMATIC STRESS DISORDER): ICD-10-CM

## 2021-04-09 DIAGNOSIS — G89.4 CHRONIC PAIN SYNDROME: Primary | Chronic | ICD-10-CM

## 2021-04-09 PROBLEM — H53.2 DIPLOPIA: Status: RESOLVED | Noted: 2020-01-25 | Resolved: 2021-04-09

## 2021-04-09 PROCEDURE — 99214 OFFICE O/P EST MOD 30 MIN: CPT | Performed by: INTERNAL MEDICINE

## 2021-04-09 RX ORDER — OXYCODONE HYDROCHLORIDE 10 MG/1
10 TABLET ORAL 3 TIMES DAILY
Qty: 90 TABLET | Refills: 0 | Status: SHIPPED | OUTPATIENT
Start: 2021-04-09 | End: 2021-04-27

## 2021-04-09 NOTE — PROGRESS NOTES
Clinic Care Coordination Contact    Follow Up Progress Note      Assessment: CC contacted patient as she saw her doctor this morning and reported she has not been able to schedule an appt although she has been trying. [facility] reviewed this with her and patient has taken the correct steps and the intake process with psychiatry has taken 9 days. Patient was encouraged to call again today as she has not yet called since turning in her health history to the psychiatrist.    Discussed potentially stress reduction meditation but patient says that she has practiced that in the past and was discouraged in doing so as when she meditates that she actually disassociates. She reports feeling like her  is overwhelmed. Patient wants her  to get help as well.    Goals addressed this encounter:   Goals Addressed                 This Visit's Progress      Mental Health Management (pt-stated)   20%     Goal Statement: I will manage my mental health  Date Goal set: 03/29/21  Barriers: mental illness;   Strengths: motivated to improve mental health  Date to Achieve By: 08/29/21   Patient expressed understanding of goal: yes  Action steps to achieve this goal:  1. I will see a counselor  2. I will get a cheek swab from psychiatry to determine which medication would be best for my chemistry  3. I will take meds as prescribed.               Intervention/Education provided during outreach: SW[facility] reviewed goals     Outreach Frequency: monthly    Plan:   Patient will outreach to psychiatry.  Care Coordinator will follow up in 1 week.    Aylin Vera [facility] Care Coordination  Tel: [phone]

## 2021-04-09 NOTE — Clinical Note
She is not doing well with mental health.  Has not been able to schedule an appointment despite trying.  Please reach out to her sooner than your planned 6 weeks, if able.  Thanks

## 2021-04-09 NOTE — PATIENT INSTRUCTIONS
Pain:  1. Increase oxycodone to 10 mg 3 x day  2. Follow-up in 1 month, sooner if needed    Mental Health:   1. Follow-up with [facility] at [phone]. Since you have not heard back. If they are not able to provide you care in a few weeks time, consider reaching out the other mental health clinics you were given.  2. I am referring you to [facility] [place]

## 2021-04-09 NOTE — PROGRESS NOTES
Assessment & Plan     Chronic pain syndrome-worsening pain on 5 mg 3 times a day.  We will continue 10 mg 3 times a day.  This is likely the lowest dose we will be able to use.  Follow-up in 1 month if pain is still not going well, otherwise visits every 3 months  - oxyCODONE (ROXICODONE) 10 MG tablet; Take 1 tablet (10 mg) by mouth 3 times daily    PTSD (post-traumatic stress disorder) -severe.  Recognizes that she needs ongoing long-term therapy.  She is having trouble scheduling psychiatry and psychology.  Forward a message to the care coordinators are working with her to help with this transition.    REX (generalized anxiety disorder) -doing better with Ativan 45/month    Severe episode of recurrent major depressive disorder, with psychotic features (H) -dissociation features.  Patient plans to see a psychiatrist as above        Patient Instructions   Pain:  1. Increase oxycodone to 10 mg 3 x day  2. Follow-up in 1 month, sooner if needed    Mental Health:   1. Follow-up with [facility] at [phone]. Since you have not heard back. If they are not able to provide you care in a few weeks time, consider reaching out the other mental health clinics you were given.  2. I am referring you to [facility] at [place]           No follow-ups on file.    Grecia Barba,   [facility]   Molly is a 35 year old who presents for the following health issues     HPI       ED/UC Followup:    Facility:  [facility] ED   Date of visit: 3/24/21  Reason for visit: Altered mental status + Hallucinations, visual + Anxiety  Current Status: Feeling better  --patient and ER doctor thought it was mental health causing her symptoms   --she was enrolled in care coordination.  --she reports several episodes of 'dissociation' and doesn't remember events well after  --feels very flat, no emotion during episodes  --it is scary not to  "remember episodes         Chronic Pain Follow-Up    Where in your body do you have pain? Chronic pain syndrome  How has your pain affected your ability to work? Not applicable  Which of these pain treatments have you tried since your last clinic visit? Other: na  Doing PT   How well are you sleeping? Poor  How has your mood been since your last visit? About the same  Have you had a significant life event? No  Other aggravating factors: none  Taking medication as directed? Yes  --have been doing slow taper down on opiates.  In Feb we slowed down the taper because pain was worse.  --Oxycodone 5 mg TID 3/25  --Oxycodone 10 mg TID on 2/23  --Oxycodone 10 mg TID on 1/20  --Oxycodone 15 mg TID on 12/17.  --today, she reports 5 mg TID is not helping and is having severe pain in chest wall, knees, feet, hands.  Reports significant stiffness.    --I did an E-consult with pain doctor on 12/30 for planned weaning of opiates.       \"Chart reviewed and noted that the PCP decreased the monthly amount of pills by 10.  Patient has 90 tablets for the month. This suggests that the patient uses three tabs per day.   Withdrawal is not expected if decrease in daily opioid dose is less than 30%.  At the next refill, I recommend prescribing oxycodone IR 10 mg (#90).  The following months, the script should be as follows:  1.  Oxycodone IR 5 mg (#90); 3 tabs per day  2.  Oxycodone IR 5 mg (#60); 2 tabs per day  3.  Oxycodone IR 5 mg (#30): 1 tab per day, then off     Seems like a long wean however the patient has been on opioids for some period of time and she may be more compliant with this regimen.\"    PHQ-9 SCORE 12/17/2020 2/23/2021 3/23/2021   PHQ-9 Total Score MyChart - - -   PHQ-9 Total Score 7 10 17   PHQ-A Total Score - - -     REX-7 SCORE 12/17/2020 2/23/2021 3/23/2021   Total Score - - -   Total Score 15 11 18     No flowsheet data found.  Encounter-Level CSA - 04/26/2017:    Controlled Substance Agreement - Scan on 5/4/2017  " 8:58 AM: CONTROLLED SUBSTANCE AGREEMENT     Patient-Level CSA:    Controlled Substance Agreement - Opioid - Scan on 12/27/2020  1:35 PM  Controlled Substance Agreement - Opioid - Scan on 2/6/2019  6:23 PM            Depression and Anxiety Follow-Up    How are you doing with your depression since your last visit? Worsened     How are you doing with your anxiety since your last visit?  Worsened     Are you having other symptoms that might be associated with depression or anxiety? Yes:  declined on health duo to severe pain     Have you had a significant life event? No     Do you have any concerns with your use of alcohol or other drugs? No     Care coordinator gave her names of psychologist and psychiatrist.  She called to make appointment but it required 3 hours of paperwork.  This was at [facility].  She reports she is waiting to hear back on an appointment - it has been > 1 week    Still struggling with PTSD related to her father's COVID diagnosis and her own ICU stay many years ago    Is doing better with higher amt of ativan        Social History     Tobacco Use     Smoking status: Never Smoker     Smokeless tobacco: Never Used   Substance Use Topics     Alcohol use: Yes     Comment: Socially once on a weekend if any     Drug use: No     Comment: None     PHQ 12/17/2020 2/23/2021 3/23/2021   PHQ-9 Total Score 7 10 17   Q9: Thoughts of better off dead/self-harm past 2 weeks Not at all Not at all Not at all   F/U: Thoughts of suicide or self-harm - - -   F/U: Self harm-plan - - -   F/U: Self-harm action - - -   F/U: Safety concerns - - -   PHQ-A Total Score - - -   PHQ-A Depressed most days in past year - - -   PHQ-A Mood affect on daily activities - - -   PHQ-A Suicide Ideation past 2 weeks - - -     REX-7 SCORE 12/17/2020 2/23/2021 3/23/2021   Total Score - - -   Total Score 15 11 18     Last PHQ-9 3/23/2021   1.  Little interest or pleasure in doing things 3   2.  Feeling down, depressed, or  hopeless 1   3.  Trouble falling or staying asleep, or sleeping too much 3   4.  Feeling tired or having little energy 3   5.  Poor appetite or overeating 3   6.  Feeling bad about yourself 2   7.  Trouble concentrating 2   8.  Moving slowly or restless 0   Q9: Thoughts of better off dead/self-harm past 2 weeks 0   PHQ-9 Total Score 17   Difficulty at work, home, or with people Not difficult at all   In the past two weeks have you had thoughts of suicide or self harm? -   Do you have concerns about your personal safety or the safety of others? -   In the past 2 weeks have you thought about a plan or had intention to harm yourself? -   In the past 2 weeks have you acted on these thoughts in any way? -     REX-7  3/23/2021   1. Feeling nervous, anxious, or on edge 3   2. Not being able to stop or control worrying 3   3. Worrying too much about different things 3   4. Trouble relaxing 3   5. Being so restless that it is hard to sit still 0   6. Becoming easily annoyed or irritable 3   7. Feeling afraid, as if something awful might happen 3   REX-7 Total Score 18   If you checked any problems, how difficult have they made it for you to do your work, take care of things at home, or get along with other people? Very difficult       Suicide Assessment Five-step Evaluation and Treatment (SAFE-T)        Review of Systems   Constitutional, HEENT, cardiovascular, pulmonary, GI, , musculoskeletal, neuro, skin, endocrine and psych systems are negative, except as otherwise noted.      Objective    /60   Pulse 112   Temp 99.9  F (37.7  C) (Tympanic)   Resp 16   Wt 81.6 kg (180 lb)   Breastfeeding No   BMI 33.77 kg/m    Body mass index is 33.77 kg/m .  Physical Exam   GENERAL APPEARANCE: alert and mild distress  PSYCH: Tearful, anxious, flat affect otherwise

## 2021-04-19 DIAGNOSIS — G89.4 CHRONIC PAIN SYNDROME: Chronic | ICD-10-CM

## 2021-04-19 DIAGNOSIS — R11.2 NAUSEA AND VOMITING, INTRACTABILITY OF VOMITING NOT SPECIFIED, UNSPECIFIED VOMITING TYPE: ICD-10-CM

## 2021-04-20 RX ORDER — OXYCODONE HYDROCHLORIDE 10 MG/1
10 TABLET ORAL 3 TIMES DAILY
Qty: 90 TABLET | Refills: 0 | OUTPATIENT
Start: 2021-04-20 | End: 2021-05-20

## 2021-04-20 RX ORDER — ONDANSETRON 8 MG/1
TABLET, ORALLY DISINTEGRATING ORAL
Qty: 30 TABLET | Refills: 11 | Status: SHIPPED | OUTPATIENT
Start: 2021-04-20 | End: 2021-04-20 | Stop reason: ALTCHOICE

## 2021-04-20 NOTE — TELEPHONE ENCOUNTER
Approved Zofran when RN meant to deny it since pt has promethazine on med list.    Called [facility] and spoke with Laya, asked her to CANCEL the ondansetron (Zofran ODT) 8mg tabs and look at filling the Phenergan 25mg tabs which is on her med list.    Emily LEI RN, BSN

## 2021-04-27 ENCOUNTER — OFFICE VISIT (OUTPATIENT)
Dept: FAMILY MEDICINE | Facility: CLINIC | Age: 36
End: 2021-04-27
Payer: MEDICARE

## 2021-04-27 VITALS
RESPIRATION RATE: 16 BRPM | HEART RATE: 126 BPM | DIASTOLIC BLOOD PRESSURE: 84 MMHG | OXYGEN SATURATION: 96 % | HEIGHT: 61 IN | TEMPERATURE: 98.7 F | SYSTOLIC BLOOD PRESSURE: 126 MMHG | BODY MASS INDEX: 32.66 KG/M2 | WEIGHT: 173 LBS

## 2021-04-27 DIAGNOSIS — G89.4 CHRONIC PAIN SYNDROME: Chronic | ICD-10-CM

## 2021-04-27 DIAGNOSIS — F43.10 PTSD (POST-TRAUMATIC STRESS DISORDER): ICD-10-CM

## 2021-04-27 DIAGNOSIS — M34.9 LIMITED SYSTEMIC SCLEROSIS (H): Primary | ICD-10-CM

## 2021-04-27 DIAGNOSIS — I73.00 RAYNAUD'S DISEASE WITHOUT GANGRENE: ICD-10-CM

## 2021-04-27 DIAGNOSIS — G63 POLYNEUROPATHY ASSOCIATED WITH UNDERLYING DISEASE (H): ICD-10-CM

## 2021-04-27 DIAGNOSIS — F41.1 GAD (GENERALIZED ANXIETY DISORDER): ICD-10-CM

## 2021-04-27 DIAGNOSIS — R11.0 NAUSEA: ICD-10-CM

## 2021-04-27 DIAGNOSIS — E16.2 HYPOGLYCEMIA: ICD-10-CM

## 2021-04-27 DIAGNOSIS — F32.1 MODERATE MAJOR DEPRESSION (H): ICD-10-CM

## 2021-04-27 PROCEDURE — 99215 OFFICE O/P EST HI 40 MIN: CPT | Performed by: INTERNAL MEDICINE

## 2021-04-27 RX ORDER — PROMETHAZINE HYDROCHLORIDE 25 MG/ML
INJECTION, SOLUTION INTRAMUSCULAR; INTRAVENOUS
Qty: 6 ML | Refills: 11 | Status: SHIPPED | OUTPATIENT
Start: 2021-04-27 | End: 2021-12-08

## 2021-04-27 RX ORDER — GABAPENTIN 300 MG/1
600 CAPSULE ORAL 3 TIMES DAILY
Qty: 540 CAPSULE | Refills: 3 | Status: SHIPPED | OUTPATIENT
Start: 2021-04-27 | End: 2022-04-14

## 2021-04-27 RX ORDER — AMLODIPINE BESYLATE 10 MG/1
10 TABLET ORAL DAILY
Qty: 90 TABLET | Refills: 3 | Status: SHIPPED | OUTPATIENT
Start: 2021-04-27 | End: 2022-06-17

## 2021-04-27 RX ORDER — LORAZEPAM 1 MG/1
1 TABLET ORAL 2 TIMES DAILY PRN
Qty: 45 TABLET | Refills: 5 | Status: SHIPPED | OUTPATIENT
Start: 2021-04-27 | End: 2021-07-02

## 2021-04-27 RX ORDER — OXYCODONE HYDROCHLORIDE 10 MG/1
10 TABLET ORAL 3 TIMES DAILY
Qty: 90 TABLET | Refills: 0 | Status: SHIPPED | OUTPATIENT
Start: 2021-05-09 | End: 2021-06-09

## 2021-04-27 ASSESSMENT — MIFFLIN-ST. JEOR: SCORE: 1417.48

## 2021-04-27 NOTE — PROGRESS NOTES
Assessment & Plan     Limited systemic sclerosis (H) -patient is taking gabapentin 600 3 times daily and not 300 3 times daily to help with chronic pain related to scleroderma.  She needs to resume hand therapy.  She reports it is difficult to drive due to her contractures.  She wonders about modifying her vehicle so that she can safely drive.  Referral placed to OT for adaptive technology consult  - gabapentin (NEURONTIN) 300 MG capsule; Take 2 capsules (600 mg) by mouth 3 times daily  - OCCUPATIONAL THERAPY REFERRAL; Future  - OCCUPATIONAL THERAPY REFERRAL; Future    Polyneuropathy associated with underlying disease (H) -as above  - gabapentin (NEURONTIN) 300 MG capsule; Take 2 capsules (600 mg) by mouth 3 times daily  - OCCUPATIONAL THERAPY REFERRAL; Future    Hypoglycemia -refill needed  - blood glucose (NO BRAND SPECIFIED) lancets standard; Use to test blood sugar 1 time daily  - blood glucose (NO BRAND SPECIFIED) test strip; Use to test blood sugar 1 time daily    Moderate major depression (H) o- improved with Cymbalta 90 mg and Ativan 45 pills/month.  We have had multiple discussions about the risks and benefits of long-term benzo use, especially with chronic opiate use.  Her mental health can be severe at times and this combination appears to be working well.  - LORazepam (ATIVAN) 1 MG tablet; Take 1 tablet (1 mg) by mouth 2 times daily as needed for anxiety    PTSD (post-traumatic stress disorder) -as above  - LORazepam (ATIVAN) 1 MG tablet; Take 1 tablet (1 mg) by mouth 2 times daily as needed for anxiety    REX (generalized anxiety disorder) -as above  - LORazepam (ATIVAN) 1 MG tablet; Take 1 tablet (1 mg) by mouth 2 times daily as needed for anxiety    Chronic pain syndrome  -we will not plan further taper at this time.  May reconsider down the road, but previous attempts at tapering have been unsuccessful  - oxyCODONE IR (ROXICODONE) 10 MG tablet; Take 1 tablet (10 mg) by mouth 3 times daily  -  "OCCUPATIONAL THERAPY REFERRAL; Future    Nausea -   Due to severe GERD and esophageal changes related to her scleroderma.  Refill not needed- promethazine (PHENERGAN) 25 MG/ML IV injection; INJECT 1 ML INTRAMUSCULARLY EVERY 6 HOURS AS NEEDED    Raynaud's disease without gangrene -no signs of critical ischemia or infection.  Increase amlodipine from 5 to 10 mg.  She is overdue for follow-up with rheumatology.  She should follow-up right away if signs of critical ischemia develop  - amLODIPine (NORVASC) 10 MG tablet; Take 1 tablet (10 mg) by mouth daily  - OCCUPATIONAL THERAPY REFERRAL; Future  - OCCUPATIONAL THERAPY REFERRAL; Future      42 minutes spent on the date of the encounter doing chart review, history and exam, documentation and further activities per the note       BMI:   Estimated body mass index is 32.66 kg/m  as calculated from the following:    Height as of this encounter: 1.55 m (5' 1.02\").    Weight as of this encounter: 78.5 kg (173 lb).   Weight management plan: Discussed healthy diet and exercise guidelines    Patient Instructions   meds  1. Double check on Cymbalta - THREE 30 mg is better than ONE 60 mg and one 30 mg  2. Updated gabapentin Rx  3. Order for test strips and lancets  4. Refill of ativan and oxycodone.  Follow-up in 3 months for ongoing refills.    Raynaud  1. Increase amlodipine to 10 mg daily  2. Make follow-up with Rheumatology   3. Referral to hand therapy and OT for assistive technology      No follow-ups on file.    Grecia Barba [facility]    Amanda Barnes is a 35 year old who presents for the following health issues     HPI     PTSD follow up:    Labs today: Blood sugar test strips today - gets lightheaded    Scleroderma/Raynauds:  --having new right thumb ulcer  --ran out of gabapentin because she was taking 600 TID, but our Rx was 300 TID - Rheum had recommended higher dose.  --knows she needs follow-up with [facility]    GERD:  --GERD - " severe, supposed to take omeprazole TID per GI at [place]. Also on pepcid 20 BID  --increase in nausea. Vomiting after meals  --eating bland diet which is helping.    Depression/anxiety:   --last visit we had increased cymbalta from 60 -90.  She reports insurance is charging $1000 for this. Feels the higher dose has helped.  --higher amt of ativan per month has helped significantly    Hypertension Follow-up      Do you check your blood pressure regularly outside of the clinic? Yes     Are you following a low salt diet? Yes    Are your blood pressures ever more than 140 on the top number (systolic) OR more   than 90 on the bottom number (diastolic), for example 140/90? No     BP Readings from Last 6 Encounters:   04/27/21 126/84   04/09/21 116/60   03/24/21 111/87   03/08/21 113/74   12/17/20 122/78   11/20/20 124/76     Medication problem: last visit was told to take gabapentin 2 times a day total 800 mg - sig is different - ran out. (didn't not take this medication for 12 days) because was short.    Asthma Follow-Up    Was ACT completed today?    Yes    ACT Total Scores 8/4/2020   ACT TOTAL SCORE (Goal Greater than or Equal to 20) 20   In the past 12 months, how many times did you visit the emergency room for your asthma without being admitted to the hospital? 0   In the past 12 months, how many times were you hospitalized overnight because of your asthma? 0          How many days per week do you miss taking your asthma controller medication?  0    Please describe any recent triggers for your asthma: smoke, animal dander, mold, humidity, strong odors and fumes, exercise or sports and emotions    Have you had any Emergency Room Visits, Urgent Care Visits, or Hospital Admissions since your last office visit?  No    Chronic Pain Follow-Up    Where in your body do you have pain? Chronic pain syndrome  How has your pain affected your ability to work? Not applicable  Which of these pain treatments have you tried since your  "last clinic visit? Other: na  How well are you sleeping? Fair  How has your mood been since your last visit? Slightly worse because gabapentin - than took the medication again and is feeling better in the past 4 days.  Have you had a significant life event? No  Other aggravating factors: none  Taking medication as directed? Yes  --using oxycodone 10 mg TID which is going much better    PHQ-9 SCORE 12/17/2020 2/23/2021 3/23/2021   PHQ-9 Total Score MyChart - - -   PHQ-9 Total Score 7 10 17   PHQ-A Total Score - - -     REX-7 SCORE 12/17/2020 2/23/2021 3/23/2021   Total Score - - -   Total Score 15 11 18     No flowsheet data found.  Encounter-Level CSA - 04/26/2017:    Controlled Substance Agreement - Scan on 5/4/2017  8:58 AM: CONTROLLED SUBSTANCE AGREEMENT     Patient-Level CSA:    Controlled Substance Agreement - Opioid - Scan on 12/27/2020  1:35 PM  Controlled Substance Agreement - Opioid - Scan on 2/6/2019  6:23 PM             Review of Systems   Constitutional, HEENT, cardiovascular, pulmonary, GI, , musculoskeletal, neuro, skin, endocrine and psych systems are negative, except as otherwise noted.      Objective    /84   Pulse 126   Temp 98.7  F (37.1  C) (Tympanic)   Resp 16   Ht 1.55 m (5' 1.02\")   Wt 78.5 kg (173 lb)   SpO2 96%   Breastfeeding No   BMI 32.66 kg/m    Body mass index is 32.66 kg/m .  Physical Exam   GENERAL APPEARANCE: alert and no distress  SKIN: right thumb tip with callused ulceration.  Normal radial and ulnar pulses of the right arm  PSYCH: Brighter affect, less anxious and tearful        "

## 2021-04-27 NOTE — PATIENT INSTRUCTIONS
meds  1. Double check on Cymbalta - THREE 30 mg is better than ONE 60 mg and one 30 mg  2. Updated gabapentin Rx  3. Order for test strips and lancets  4. Refill of ativan and oxycodone.  Follow-up in 3 months for ongoing refills.    Raynaud  1. Increase amlodipine to 10 mg daily  2. Make follow-up with Rheumatology   3. Referral to hand therapy and OT for assistive technology

## 2021-04-30 ENCOUNTER — TELEPHONE (OUTPATIENT)
Dept: INTERNAL MEDICINE | Facility: CLINIC | Age: 36
End: 2021-04-30

## 2021-04-30 ENCOUNTER — THERAPY VISIT (OUTPATIENT)
Dept: OCCUPATIONAL THERAPY | Facility: CLINIC | Age: 36
End: 2021-04-30
Attending: INTERNAL MEDICINE
Payer: MEDICARE

## 2021-04-30 DIAGNOSIS — M34.9 LIMITED SYSTEMIC SCLEROSIS (H): ICD-10-CM

## 2021-04-30 DIAGNOSIS — R11.0 NAUSEA: ICD-10-CM

## 2021-04-30 DIAGNOSIS — I73.00 RAYNAUD'S DISEASE WITHOUT GANGRENE: ICD-10-CM

## 2021-04-30 DIAGNOSIS — M24.541 CONTRACTURE OF FINGER JOINT, RIGHT: Primary | ICD-10-CM

## 2021-04-30 DIAGNOSIS — M34.1 CREST (CALCINOSIS, RAYNAUD'S PHENOMENON, ESOPHAGEAL DYSFUNCTION, SCLERODACTYLY, TELANGIECTASIA) (H): ICD-10-CM

## 2021-04-30 DIAGNOSIS — M24.541 CONTRACTURE OF HAND JOINT, RIGHT: ICD-10-CM

## 2021-04-30 PROBLEM — M24.542 CONTRACTURE OF JOINT OF FINGER, LEFT: Status: ACTIVE | Noted: 2018-12-13

## 2021-04-30 PROCEDURE — 97165 OT EVAL LOW COMPLEX 30 MIN: CPT | Mod: GO | Performed by: OCCUPATIONAL THERAPIST

## 2021-04-30 PROCEDURE — 97760 ORTHOTIC MGMT&TRAING 1ST ENC: CPT | Mod: GO | Performed by: OCCUPATIONAL THERAPIST

## 2021-04-30 NOTE — PROGRESS NOTES
Hand Therapy Initial Evaluation    Current Date:  2021    Diagnosis:  Bilateral hand contractures: significant in Left long finger and RIght small finger Limited Systemic Sclerosis  Polyneuropathy associated with underlying disease (H)/; Chronic pain syndrome; Raynaud's disease without gangrene    DOI:  R: 2020  L: Several year, 4-5 years, DX of LSSc: 8 years ago  MD visit: 21    Referring MD: Grecia Barba DO     Initial Subjective:  Molly Teague is a 35 year old  bilateral  hand dominant female.    Patient reports symptoms of pain and stiffness/loss of motion of the bilateral hand which occurred due to LSSc. Since onset symptoms are Gradually getting worse.  Special tests:  x-ray.  Previous treatment: hand therapy for Left hand but none for the Right.    General health as reported by patient is fair.  Pertinent medical history includes:  Past Medical History:   Diagnosis Date     Acute pyelonephritis 2017     Altered mental state 3/29/2018     Arthritis      Blood clotting disorder (H)     P E     History of blood transfusion      Hypertension      Long-term (current) use of anticoagulants [Z79.01] 2016     PE (pulmonary embolism) 2016     Rheumatism      Scleroderma (H) 2016     Uncomplicated asthma       Medical allergies:   Allergies   Allergen Reactions     Blood Transfusion Related (Informational Only) Other (See Comments)     Patient has a history of a clinically significant antibody against RBC antigens.  A delay in compatible RBCs may occur.     No Known Allergies      Pollen Extract      Seasonal Allergies      Shellfish-Derived Products Nausea and Rash     Rash on face     Surgical history:   Past Surgical History:   Procedure Laterality Date     ANGIOGRAM  2015      SECTION       OPEN REDUCTION INTERNAL FIXATION ANKLE Right 2/10/2020    Procedure: Right ankle open reduction internal fixation;  Surgeon: Natanael Villalta MD;  Location:   OR     THROAT SURGERY  2015     Medication history:   Current Outpatient Medications   Medication     acetaminophen (TYLENOL) 325 MG tablet     albuterol (VENTOLIN HFA) 108 (90 Base) MCG/ACT inhaler     amLODIPine (NORVASC) 10 MG tablet     blood glucose (NO BRAND SPECIFIED) lancets standard     blood glucose (NO BRAND SPECIFIED) test strip     busPIRone HCl (BUSPAR) 30 MG tablet     Cyanocobalamin (B-12) 1000 MCG TBCR     DULoxetine (CYMBALTA) 30 MG capsule     famotidine (PEPCID) 20 MG tablet     folic acid (FOLVITE) 1 MG tablet     gabapentin (NEURONTIN) 300 MG capsule     GOODSENSE MIGRAINE FORMULA 250-250-65 MG per tablet     hydrOXYzine (ATARAX) 25 MG tablet     lisinopril (ZESTRIL) 10 MG tablet     LORazepam (ATIVAN) 1 MG tablet     methocarbamol (ROBAXIN) 750 MG tablet     montelukast (SINGULAIR) 10 MG tablet     naloxone (NARCAN) 4 MG/0.1ML nasal spray     omeprazole (PRILOSEC) 40 MG DR capsule     order for DME     order for DME     [START ON 5/9/2021] oxyCODONE IR (ROXICODONE) 10 MG tablet     polyethylene glycol (MIRALAX/GLYCOLAX) packet     promethazine (PHENERGAN) 25 MG tablet     promethazine (PHENERGAN) 25 MG/ML IV injection     SYMBICORT 160-4.5 MCG/ACT Inhaler     Current Facility-Administered Medications   Medication     lidocaine (PF) (XYLOCAINE) 1 % injection 1 mL     triamcinolone (KENALOG-40) injection 40 mg       Occupational Profile Information:  Current occupation is none, on disability, 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, age8  Job Tasks: Computer Work, Driving, Lifting, Carrying, Pushing, Pulling, Repetitive Tasks  Prior functional level:  independent-all household chores  Barriers include:none  Mobility: No difficulty  Transportation: drives  Leisure activities/hobbies: reading, books    Functional Outcome Measure:  See flowsheet      Objective:  Observation: Color/Temperature:     []    Normal  []    Abnormal    Pain Level (Scale 0-10):   4/30/2021   At Rest 0   With Use 7     Pain  Description:  Date 4/30/2021    bilateral   Location  long finger and small finger   Pain Quality Aching and Tender   Frequency intermittent or daily     Pain is worst  daytime or nighttime   Exacerbated by  use and moving the finger    Relieved by heat, rest and meditation   Progression Gradually getting worse.        ROM:   Fingers 4/30/2021 4/30/2021   AROM(PROM) Right Left   Index MP 0/70 0/76   PIP -50/92 -60/100   DIP -25//50 -30/46   DOCKERY     Long MP 0/80 He/90   PIP -63/105 -107/110  (-90/x)   DIP -23/55 -25/47   DOCKERY     Ring MP 0/77 0/70   PIP -60/108 -48/100   DIP -15/45 -32/67   DOCKERY     Small MP 0/55 0/60   PIP -65/105 -63/70   DIP -35/60 -29/56   DOCKERY         Strength:  [x]   Contraindicated  Edema:  none of the  hand  Wound/Scar: noting digital ulcer on tip of R thumb pad  Palpation:  []     Normal        [x]   Tender       [x]  Mild         [x]   Moderate []   Severe   Location: PIPs of both hands      Sensation: has neuropathy that affects hand sensation        Assessment:   Patient presents with symptoms consistent with above diagnosis,  with conservative intervention.     Patient's limitations or Problem List includes: Pain, Decreased ROM/motion and Decreased stability of the right small finger and left long finger and  which interferes with the patient's ability to perform Self Care Tasks (dressing, eating, hygiene/toileting), Recreational Activities, Household Chores and Driving  as compared to previous level of function.    Rehab Potential:  Good - Return to full activity, some limitations    Patient will benefit from skilled Occupational Therapy to increase ROM, flexibility, coordination and dexterity and decrease pain to return to previous activity level and resume normal daily tasks and to reach their rehab potential.    Barriers to Learning:  No barrier    Communication Issues:  Patient appears to be able to clearly communicate and understand verbal and written communication and follow  directions correctly.  Chart Review: Chart Review, Brief history including review of medical and/or therapy records relating to the presenting problem and Simple history review with patient    Identified Performance Deficits: bathing/showering, dressing, feeding, hygiene and grooming, care of others, driving and community mobility, home establishment and management, meal preparation and cleanup, work, play and leisure activities    Assessment of Occupational Performance:  5 or more Performance Deficits    Clinical Decision Making (Complexity): Low complexity    Treatment Explanation:  The following has been discussed with the patient:  RX ordered/plan of care  Anticipated outcomes  Possible risks and side effects    Treatment Plan:   Frequency:  1 X week, once daily  Duration:  for 3 weeks tapering to 2 X a month over 9 weeks     Modalities:  Paraffin  Therapeutic Exercise:  PROM  Self Care:  Ergonomic Considerations  Orthotic Fabrication: Static progressive  finger based orthosis hand based intrinsic plus orthosis, IPs included to gain extension in both orthoses    Discharge Plan:  Achieve all LTG  [place] in home treatment program.  Reach maximal therapeutic benefit.  Please refer to the daily flowsheet for treatment today and total treatment time.      Home Exercise Program:  R: wear orthosis at night  L: wear orthosis daytime    Next Visit:  L fabricate night wrist and hand finger extension orthosis

## 2021-04-30 NOTE — LETTER
DEPARTMENT OF HEALTH AND HUMAN SERVICES  CENTERS FOR MEDICARE & MEDICAID SERVICES    PLAN/UPDATED PLAN OF PROGRESS FOR OUTPATIENT REHABILITATION    PATIENTS NAME:  Molly Teague   : 1985    PROVIDER NUMBER:  [id number]    [id number]     PROVIDER NAME: [facility]    MEDICAL RECORD NUMBER: [id number]     START OF CARE DATE:    SOC Date: 21   TYPE:  OT    PRIMARY/TREATMENT DIAGNOSIS: (Pertinent Medical Diagnosis)   Limited systemic sclerosis (H)  Raynaud's disease without gangrene  CREST (calcinosis, Raynaud's phenomenon, esophageal dysfunction, sclerodactyly, telangiectasia) (H)  Contracture of finger joint, right  Contracture of hand joint, right    VISITS FROM START OF CARE:  Rxs Used: 1     Hand Therapy Initial Evaluation  Current Date:  2021    Diagnosis:  Bilateral hand contractures: significant in Left long finger and RIght small finger Limited Systemic Sclerosis  Polyneuropathy associated with underlying disease (H)/; Chronic pain syndrome; Raynaud's disease without gangrene    DOI:  R: 2020  L: Several year, 4-5 years, DX of LSSc: 8 years ago  MD visit: 21    Referring MD: Grecia Barba DO     Initial Subjective:  Molly Teague is a 35 year old  bilateral  hand dominant female.  Patient reports symptoms of pain and stiffness/loss of motion of the bilateral hand which occurred due to LSSc. Since onset symptoms are Gradually getting worse.  Special tests:  x-ray.  Previous treatment: hand therapy for Left hand but none for the Right.    General health as reported by patient is fair.                       PATIENTS NAME:  Molly Teague   : 1985     Pertinent medical history includes:  Past Medical History:   Diagnosis Date     Acute pyelonephritis 2017     Altered mental state 3/29/2018     Arthritis      Blood clotting disorder (H)     P E     History of blood transfusion      Hypertension      Long-term  (current) use of anticoagulants [Z79.01] 2016     PE (pulmonary embolism) 2016     Rheumatism      Scleroderma (H) 2016     Uncomplicated asthma       Medical allergies:   Allergies   Allergen Reactions     Blood Transfusion Related (Informational Only) Other (See Comments)     Patient has a history of a clinically significant antibody against RBC antigens.  A delay in compatible RBCs may occur.     No Known Allergies      Pollen Extract      Seasonal Allergies      Shellfish-Derived Products Nausea and Rash     Rash on face     Surgical history:   Past Surgical History:   Procedure Laterality Date     ANGIOGRAM  2015      SECTION  2014     OPEN REDUCTION INTERNAL FIXATION ANKLE Right 2/10/2020    Procedure: Right ankle open reduction internal fixation;  Surgeon: Natanael Villalta MD;  Location: [facility] OR     THROAT SURGERY       Medication history:   Current Outpatient Medications   Medication     acetaminophen (TYLENOL) 325 MG tablet     albuterol (VENTOLIN HFA) 108 (90 Base) MCG/ACT inhaler     amLODIPine (NORVASC) 10 MG tablet     blood glucose (NO BRAND SPECIFIED) lancets standard     blood glucose (NO BRAND SPECIFIED) test strip     busPIRone HCl (BUSPAR) 30 MG tablet     Cyanocobalamin (B-12) 1000 MCG TBCR     DULoxetine (CYMBALTA) 30 MG capsule     famotidine (PEPCID) 20 MG tablet     folic acid (FOLVITE) 1 MG tablet     gabapentin (NEURONTIN) 300 MG capsule     GOODSENSE MIGRAINE FORMULA 250-250-65 MG per tablet     hydrOXYzine (ATARAX) 25 MG tablet     lisinopril (ZESTRIL) 10 MG tablet     LORazepam (ATIVAN) 1 MG tablet     methocarbamol (ROBAXIN) 750 MG tablet     montelukast (SINGULAIR) 10 MG tablet     naloxone (NARCAN) 4 MG/0.1ML nasal spray     omeprazole (PRILOSEC) 40 MG DR capsule     order for DME     order for DME     [START ON 2021] oxyCODONE IR (ROXICODONE) 10 MG tablet     polyethylene glycol (MIRALAX/GLYCOLAX) packet     promethazine (PHENERGAN) 25 MG tablet      promethazine (PHENERGAN) 25 MG/ML IV injection     SYMBICORT 160-4.5 MCG/ACT Inhaler   PATIENTS NAME:  Molly Teague   : 1985    Current Facility-Administered Medications   Medication     lidocaine (PF) (XYLOCAINE) 1 % injection 1 mL     triamcinolone (KENALOG-40) injection 40 mg     Occupational Profile Information:  Current occupation is none, on disability, 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, age6  Job Tasks: Computer Work, Driving, Lifting, Carrying, Pushing, Pulling, Repetitive Tasks  Prior functional level:  independent-all household chores  Barriers include:none  Mobility: No difficulty  Transportation: drives  Leisure activities/hobbies: reading, books    Functional Outcome Measure:  See flowsheet    Objective:  Observation: Color/Temperature:     []    Normal  []    Abnormal    Pain Level (Scale 0-10):   2021   At Rest 0   With Use 7     Pain Description:  Date 2021    bilateral   Location  long finger and small finger   Pain Quality Aching and Tender   Frequency intermittent or daily     Pain is worst  daytime or nighttime   Exacerbated by  use and moving the finger    Relieved by heat, rest and meditation   Progression Gradually getting worse.                  PATIENTS NAME:  Molly Teague   : 1985    ROM:   Fingers 2021   AROM(PROM) Right Left   Index MP 0/70 0/76   PIP -50/92 -60/100   DIP -25//50 -30/46   DOCKERY     Long MP 0/80 He/90   PIP -63/105 -107/110  (-90/x)   DIP -23/55 -25/47   DOCKERY     Ring MP 0/77 0/70   PIP -60/108 -48/100   DIP -15/45 -32/67   DOCKERY     Small MP 0/55 0/60   PIP -65/105 -63/70   DIP -35/60 -29/56   DOCKERY       Strength:  [x]   Contraindicated  Edema:  none of the  hand  Wound/Scar: noting digital ulcer on tip of R thumb pad  Palpation:  []     Normal        [x]   Tender       [x]  Mild         [x]   Moderate []   Severe   Location: PIPs of both hands      Sensation: has neuropathy that affects hand sensation    Assessment:   Patient  presents with symptoms consistent with above diagnosis,  with conservative intervention.     Patient's limitations or Problem List includes: Pain, Decreased ROM/motion and Decreased stability of the right small finger and left long finger and  which interferes with the patient's ability to perform Self Care Tasks (dressing, eating, hygiene/toileting), Recreational Activities, Household Chores and Driving  as compared to previous level of function.    Rehab Potential:  Good - Return to full activity, some limitations    Patient will benefit from skilled Occupational Therapy to increase ROM, flexibility, coordination and dexterity and decrease pain to return to previous activity level and resume normal daily tasks and to reach their rehab potential.    Barriers to Learning:  No barrier    Communication Issues:  Patient appears to be able to clearly communicate and understand verbal and written communication and follow directions correctly.  Chart Review: Chart Review, Brief history including review of medical and/or therapy records relating to the presenting problem and Simple history review with patient        PATIENTS NAME:  Molly Teague   : 1985    Identified Performance Deficits: bathing/showering, dressing, feeding, hygiene and grooming, care of others, driving and community mobility, home establishment and management, meal preparation and cleanup, work, play and leisure activities    Assessment of Occupational Performance:  5 or more Performance Deficits    Clinical Decision Making (Complexity): Low complexity    Treatment Explanation:  The following has been discussed with the patient:  RX ordered/plan of care  Anticipated outcomes  Possible risks and side effects    Treatment Plan:   Frequency:  1 X week, once daily/ Duration:  for 3 weeks tapering to 2 X a month over 9 weeks     Modalities:  Paraffin  Therapeutic Exercise:  PROM  Self Care:  Ergonomic Considerations  Orthotic Fabrication: Static  "progressive  finger based orthosis hand based intrinsic plus orthosis, IPs included to gain extension in both orthoses    Discharge Plan:  Achieve all LTG  [place] in home treatment program.  Reach maximal therapeutic benefit.  Please refer to the daily flowsheet for treatment today and total treatment time.      Home Exercise Program:  R: wear orthosis at night  L: wear orthosis daytime    Next Visit:  L fabricate night wrist and hand finger extension orthosis    Caregiver Signature/Credentials ______________________________ Date ________       Treating Provider: Kezia Max OTQUANG OTR CHT    I have reviewed and certified the need for these services and plan of treatment while under my care.        PHYSICIAN'S SIGNATURE:   _________________________________________  Date___________    Grecia Barba DO    Certification period: Beginning of Cert date period: 04/30/21 End of Cert period date: 07/28/21     Functional Level Progress Report: Please see attached \"Goal Flow sheet for Functional level.\"    ___X_____ Continue Services or       ________ DC Services                Service dates: SOC Date: 04/30/21  to present                                                                     "

## 2021-04-30 NOTE — TELEPHONE ENCOUNTER
Molly is requesting larger amount of promethazine inj per fill. The most recent rx was written for 6 vials and she is requesting #25    Thanks,    Mau Fairchild, Pharm D  [facility]  [phone]

## 2021-04-30 NOTE — TELEPHONE ENCOUNTER
Phenergan is really meant as a very last resort for severe nausea and vomiting, not meant to be used more than a 2 x times a month at the very most.  It can cause tissue necrosis hence the short supply given.  Request denied due to safety concerns

## 2021-05-01 ENCOUNTER — TELEPHONE (OUTPATIENT)
Dept: INTERNAL MEDICINE | Facility: CLINIC | Age: 36
End: 2021-05-01

## 2021-05-01 NOTE — TELEPHONE ENCOUNTER
Prior Authorization Retail Medication Request    Medication/Dose: Duloxetine 30- MAX 2.6 CAPSULES PER DAY   Insurance is requiring a prior authorization for 3 per day    Insurance Name: [employer]  Insurance ID:  [id number]    Thank you,  Ela Davila, [facility] [place]

## 2021-05-04 NOTE — TELEPHONE ENCOUNTER
Patient called to says she is out of her medication.  Told her that a PA is a process not sure if we can push this through.    Laya Yoder, [place]

## 2021-05-04 NOTE — TELEPHONE ENCOUNTER
PA Initiation    Medication: Duloxetine 30- MAX 2.6 CAPSULES PER DAY- INITIATED  Insurance Company: [employer] - Phone [phone] Fax [phone]  Pharmacy Filling the Rx: [facility] [place], [place] - [facility]  Filling Pharmacy Phone: [phone]  Filling Pharmacy Fax: [phone]  Start Date: 5/4/2021

## 2021-05-04 NOTE — TELEPHONE ENCOUNTER
Prior Authorization Approval    Authorization Effective Date: 5/4/2021  Authorization Expiration Date: 12/31/2021  Medication: Duloxetine 30MG CAPS- APPROVED  Approved Dose/Quantity: 270 CAPS  Reference #: [id number]   Insurance Company: [employer] - Phone [phone] Fax [phone]  Expected CoPay:       CoPay Card Available:      Foundation Assistance Needed:    Which Pharmacy is filling the prescription (Not needed for infusion/clinic administered): [facility] [address]  Pharmacy Notified: Yes  Patient Notified: Yes              Central Prior Authorization Team  Phone: [phone]

## 2021-05-04 NOTE — TELEPHONE ENCOUNTER
Received message:  'PA APPROVED   Please close encounter when finished.   Thank you!     Mary '    Pt was called and notified    Emily LEI RN, BSN

## 2021-05-11 ENCOUNTER — PATIENT OUTREACH (OUTPATIENT)
Dept: NURSING | Facility: CLINIC | Age: 36
End: 2021-05-11
Payer: MEDICARE

## 2021-05-11 NOTE — PROGRESS NOTES
Clinic Care Coordination Contact    Follow Up Progress Note      Assessment: Pt reported that the  increased dose of medication for anxiety/depression has helped.    Goals addressed this encounter:   Goals Addressed                 This Visit's Progress      1. Mental Health Management (pt-stated)   30%     Goal Statement: I will manage my mental health.  Date Goal set: 03/29/21  Barriers: mental illness;   Strengths: motivated to improve mental health  Date to Achieve By: 08/29/21   Patient expressed understanding of goal: yes    Action steps to achieve this goal:  1. I will see a counselor.  2. I will get a cheek swab from psychiatry to determine which medication would be best for my chemistry.  3. I will take meds as prescribed.               Intervention/Education provided during outreach: ERROL CC outreach to pt for monthly check in. Pt reported she has not heard back from [facility] yet. It has been about 3 weeks. Pt said she spent about 3 hrs on intake paperwork she completed, thought maybe it would take a couple weeks for them to get through it. CC advised pt to reach out to agency to check in, pt has phone # still. Pt agreed. (#[phone])    CC reviewed next appts in chart.     Outreach Frequency: Monthly    Plan: ERROL CC will follow up in one month. Pt will call [facility] to check in on status of first appt. Pt will continue to take medications as prescribed, attend upcoming appts, and follow plan of care.     HENRY Vee   Social Work Clinic Care Coordinator   [facility]  [phone]  [email or web address]

## 2021-05-19 ENCOUNTER — TELEPHONE (OUTPATIENT)
Dept: INTERNAL MEDICINE | Facility: CLINIC | Age: 36
End: 2021-05-19

## 2021-05-19 NOTE — TELEPHONE ENCOUNTER
Prior Authorization Retail Medication Request    Medication/Dose: Promethazine hcl 25mg/ml sol  ICD code (if different than what is on RX):    Previously Tried and Failed:    Rationale:      Insurance Name:  [employer] Part D  Insurance ID:  [id number]      Pharmacy Information (if different than what is on RX)  Name:    Phone:     Thanks,   Nohemy Cox  Certified Pharmacy Technician  [facility]  [phone]

## 2021-05-20 NOTE — TELEPHONE ENCOUNTER
Central Prior Authorization Team   Phone: [phone]      PA Initiation    Medication: Promethazine hcl 25mg/ml sol  Insurance Company: [employer] - Phone [phone] Fax [phone]  Pharmacy Filling the Rx: [facility] [place], [place] - [address] [facility]  Filling Pharmacy Phone: [phone]  Filling Pharmacy Fax:    Start Date: 5/20/2021

## 2021-05-20 NOTE — TELEPHONE ENCOUNTER
Prior Authorization Approval    Authorization Effective Date: 5/20/2021  Authorization Expiration Date: 12/31/2021  Medication: Promethazine hcl 25mg/ml sol  Approved Dose/Quantity:    Reference #:     Insurance Company: [employer] - Phone [phone] Fax [phone]  Expected CoPay:       CoPay Card Available:      Foundation Assistance Needed:    Which Pharmacy is filling the prescription (Not needed for infusion/clinic administered): [facility] [address]  Pharmacy Notified: Yes  Patient Notified: Yes

## 2021-06-09 DIAGNOSIS — G89.4 CHRONIC PAIN SYNDROME: Chronic | ICD-10-CM

## 2021-06-09 RX ORDER — OXYCODONE HYDROCHLORIDE 10 MG/1
TABLET ORAL
Qty: 90 TABLET | Refills: 0 | Status: SHIPPED | OUTPATIENT
Start: 2021-06-09 | End: 2021-07-02

## 2021-06-17 ENCOUNTER — PATIENT OUTREACH (OUTPATIENT)
Dept: CARE COORDINATION | Facility: CLINIC | Age: 36
End: 2021-06-17

## 2021-06-17 NOTE — PROGRESS NOTES
Clinic Care Coordination Contact  [facility]/Voicemail    Clinical Data: Care Coordinator Outreach  Goal check in     Outreach attempted x 1.  Left message on patient's voicemail with call back information and requested return call.    Plan: Care Coordinator will try to reach patient again in 10 business days.    HENRY Vee   Social Work Clinic Care Coordinator   [facility]  [phone]  [email or web address]

## 2021-07-01 ENCOUNTER — PATIENT OUTREACH (OUTPATIENT)
Dept: NURSING | Facility: CLINIC | Age: 36
End: 2021-07-01
Payer: MEDICARE

## 2021-07-01 NOTE — PROGRESS NOTES
Clinic Care Coordination Contact    Follow Up Progress Note      Assessment: Pt has not heard back from [facility] yet regarding starting psychiatry services.     Goals addressed this encounter:   Goals Addressed                 This Visit's Progress      1. Mental Health Management (pt-stated)   30%     Goal Statement: I will manage my mental health.  Date Goal set: 21  Barriers: mental illness;   Strengths: motivated to improve mental health  Date to Achieve By: 21   Patient expressed understanding of goal: yes    Action steps to achieve this goal:  1. I will see a counselor.  2. I will get a cheek swab from psychiatry to determine which medication would be best for my chemistry.  3. I will take meds as prescribed.               Intervention/Education provided during outreach: ERROL WATTS outreach to pt for monthly check in.     Patient was given contacts of multiple therapists and a referral for psychiatry through [facility] [phone].     CC inquired about the last time she tried them, about 2 weeks ago. CC offered to reach out as well. Pt agreed.     ERROL CC made call to [facility]. They said they do not have any pt by this name or .     CC sent [place] message to pt. See separate encounter.     Outreach Frequency: Monthly    Plan: ERROL CC will try to connect with [facility] on behalf of pt. After that, ERROL CC will follow up in one month.    HENRY Vee   Social Work Clinic Care Coordinator   [facility]  [phone]  [email or web address]

## 2021-07-02 ENCOUNTER — OFFICE VISIT (OUTPATIENT)
Dept: FAMILY MEDICINE | Facility: CLINIC | Age: 36
End: 2021-07-02
Payer: MEDICARE

## 2021-07-02 VITALS
RESPIRATION RATE: 16 BRPM | OXYGEN SATURATION: 98 % | WEIGHT: 172 LBS | BODY MASS INDEX: 32.47 KG/M2 | HEART RATE: 119 BPM | DIASTOLIC BLOOD PRESSURE: 80 MMHG | TEMPERATURE: 98.7 F | SYSTOLIC BLOOD PRESSURE: 116 MMHG

## 2021-07-02 DIAGNOSIS — J30.2 SEASONAL ALLERGIC RHINITIS, UNSPECIFIED TRIGGER: ICD-10-CM

## 2021-07-02 DIAGNOSIS — G89.4 CHRONIC PAIN SYNDROME: Chronic | ICD-10-CM

## 2021-07-02 DIAGNOSIS — F32.1 MODERATE MAJOR DEPRESSION (H): Primary | ICD-10-CM

## 2021-07-02 DIAGNOSIS — F41.1 GAD (GENERALIZED ANXIETY DISORDER): ICD-10-CM

## 2021-07-02 DIAGNOSIS — F43.10 PTSD (POST-TRAUMATIC STRESS DISORDER): ICD-10-CM

## 2021-07-02 DIAGNOSIS — R11.2 NAUSEA AND VOMITING, INTRACTABILITY OF VOMITING NOT SPECIFIED, UNSPECIFIED VOMITING TYPE: ICD-10-CM

## 2021-07-02 DIAGNOSIS — K21.01 GASTROESOPHAGEAL REFLUX DISEASE WITH ESOPHAGITIS AND HEMORRHAGE: ICD-10-CM

## 2021-07-02 DIAGNOSIS — M34.1 CREST (CALCINOSIS, RAYNAUD'S PHENOMENON, ESOPHAGEAL DYSFUNCTION, SCLERODACTYLY, TELANGIECTASIA) (H): ICD-10-CM

## 2021-07-02 DIAGNOSIS — T75.3XXS SEVERE MOTION SICKNESS, SEQUELA: ICD-10-CM

## 2021-07-02 PROCEDURE — 99214 OFFICE O/P EST MOD 30 MIN: CPT | Performed by: INTERNAL MEDICINE

## 2021-07-02 RX ORDER — PROMETHAZINE HYDROCHLORIDE 25 MG/1
TABLET ORAL
Qty: 30 TABLET | Refills: 7 | Status: SHIPPED | OUTPATIENT
Start: 2021-07-02 | End: 2022-12-15

## 2021-07-02 RX ORDER — OMEPRAZOLE 40 MG/1
CAPSULE, DELAYED RELEASE ORAL
Qty: 180 CAPSULE | Refills: 3 | Status: SHIPPED | OUTPATIENT
Start: 2021-07-02 | End: 2022-06-03

## 2021-07-02 RX ORDER — LORATADINE 10 MG/1
10 TABLET ORAL DAILY PRN
COMMUNITY

## 2021-07-02 RX ORDER — OXYCODONE HYDROCHLORIDE 10 MG/1
10 TABLET ORAL 3 TIMES DAILY
Qty: 90 TABLET | Refills: 0 | Status: SHIPPED | OUTPATIENT
Start: 2021-07-05 | End: 2021-08-04

## 2021-07-02 RX ORDER — LORAZEPAM 1 MG/1
1 TABLET ORAL 2 TIMES DAILY PRN
Qty: 45 TABLET | Refills: 5 | Status: SHIPPED | OUTPATIENT
Start: 2021-07-29 | End: 2021-08-28

## 2021-07-02 RX ORDER — ONDANSETRON 4 MG/1
4 TABLET, ORALLY DISINTEGRATING ORAL EVERY 8 HOURS PRN
Qty: 30 TABLET | Refills: 4 | Status: SHIPPED | OUTPATIENT
Start: 2021-07-02 | End: 2021-11-10

## 2021-07-02 RX ORDER — BUSPIRONE HYDROCHLORIDE 30 MG/1
30 TABLET ORAL 2 TIMES DAILY
Qty: 180 TABLET | Refills: 3 | Status: SHIPPED | OUTPATIENT
Start: 2021-07-02 | End: 2022-12-15

## 2021-07-02 ASSESSMENT — ASTHMA QUESTIONNAIRES
QUESTION_1 LAST FOUR WEEKS HOW MUCH OF THE TIME DID YOUR ASTHMA KEEP YOU FROM GETTING AS MUCH DONE AT WORK, SCHOOL OR AT HOME: A LITTLE OF THE TIME
QUESTION_5 LAST FOUR WEEKS HOW WOULD YOU RATE YOUR ASTHMA CONTROL: WELL CONTROLLED
QUESTION_2 LAST FOUR WEEKS HOW OFTEN HAVE YOU HAD SHORTNESS OF BREATH: ONCE OR TWICE A WEEK
ACT_TOTALSCORE: 21
QUESTION_4 LAST FOUR WEEKS HOW OFTEN HAVE YOU USED YOUR RESCUE INHALER OR NEBULIZER MEDICATION (SUCH AS ALBUTEROL): ONCE A WEEK OR LESS
ACUTE_EXACERBATION_TODAY: NO
QUESTION_3 LAST FOUR WEEKS HOW OFTEN DID YOUR ASTHMA SYMPTOMS (WHEEZING, COUGHING, SHORTNESS OF BREATH, CHEST TIGHTNESS OR PAIN) WAKE YOU UP AT NIGHT OR EARLIER THAN USUAL IN THE MORNING: NOT AT ALL

## 2021-07-02 NOTE — PROGRESS NOTES
Assessment & Plan   Problem List Items Addressed This Visit     Chronic pain syndrome (Chronic)    Relevant Medications    oxyCODONE IR (ROXICODONE) 10 MG tablet (Start on 7/5/2021)    CREST (calcinosis, Raynaud's phenomenon, esophageal dysfunction, sclerodactyly, telangiectasia) (H)    Relevant Medications    loratadine (CLARITIN) 10 MG tablet    promethazine (PHENERGAN) 25 MG tablet    omeprazole (PRILOSEC) 40 MG DR capsule    REX (generalized anxiety disorder)    Relevant Medications    LORazepam (ATIVAN) 1 MG tablet (Start on 7/29/2021)    busPIRone HCl (BUSPAR) 30 MG tablet    Gastroesophageal reflux disease with esophagitis    Relevant Medications    loratadine (CLARITIN) 10 MG tablet    promethazine (PHENERGAN) 25 MG tablet    omeprazole (PRILOSEC) 40 MG DR capsule    PTSD (post-traumatic stress disorder)    Relevant Medications    LORazepam (ATIVAN) 1 MG tablet (Start on 7/29/2021)    busPIRone HCl (BUSPAR) 30 MG tablet      Other Visit Diagnoses     Moderate major depression (H)    -  Primary    Relevant Medications    LORazepam (ATIVAN) 1 MG tablet (Start on 7/29/2021)    busPIRone HCl (BUSPAR) 30 MG tablet    Nausea and vomiting, intractability of vomiting not specified, unspecified vomiting type        Relevant Medications    ondansetron (ZOFRAN-ODT) 4 MG ODT tab    promethazine (PHENERGAN) 25 MG tablet    Severe motion sickness, sequela        Seasonal allergic rhinitis, unspecified trigger        Relevant Medications    loratadine (CLARITIN) 10 MG tablet    promethazine (PHENERGAN) 25 MG tablet               Patient is doing quite well with current medication regimen, best in > 2 years. Reviewed recent [place] visit, discussed progress she has made with caring for her autistic son, losing weight, feeling more like her self.  She is still dedicated to finding a long term counselor.  Congratulated patient on her progress. Return to clinic 3 months.         No follow-ups on file.    Grecia Barba,  DO  [facility]    Amanda Barnes is a 35 year old who presents for the following health issues     HPI       Chronic Pain Follow-Up    Where in your body do you have pain?  Chronic pain Syndrome  How has your pain affected your ability to work? Not applicable  Which of these pain treatments have you tried since your last clinic visit? Other: na  How well are you sleeping? Fair  How has your mood been since your last visit? About the same  Have you had a significant life event? No  Other aggravating factors: none  Taking medication as directed? Yes  --checked [place] , [place] ativan 1 mg #45 on 6/30, Oxycodone 10 mg #90 on 6/9    PHQ-9 SCORE 12/17/2020 2/23/2021 3/23/2021   PHQ-9 Total Score MyChart - - -   PHQ-9 Total Score 7 10 17   PHQ-A Total Score - - -     REX-7 SCORE 12/17/2020 2/23/2021 3/23/2021   Total Score - - -   Total Score 15 11 18     No flowsheet data found.  Encounter-Level CSA - 04/26/2017:    Controlled Substance Agreement - Scan on 5/4/2017  8:58 AM: CONTROLLED SUBSTANCE AGREEMENT     Patient-Level CSA:    Controlled Substance Agreement - Opioid - Scan on 12/27/2020  1:35 PM  Controlled Substance Agreement - Opioid - Scan on 2/6/2019  6:23 PM        Mental Health:  --still having hard time establishing with a counselor; she has filled out the forms, signed VIDA, called clinic multiple times but no appointment yet.  Working with our care coordinator to help get appointment scheduled.  --had very severe panic attack;  helped talk her through  --cymbalta is working very well      Scleroderma:   --saw Rheumatologoy 6/15  --MRI ankle to r/o hardware infection  --had PFT, needs echo      MRI 6/24  1. Small right ankle joint effusion, with prominent synovitis. No definite  enhancing synovitis. No compelling evidence of ankle joint infection or  osteomyelitis.    2. 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     3. The scalloped deformity of the  talar dome favors a chronic, post-traumatic  deformity, though a subacute insufficiency fracture cannot be excluded. The T1  marrow signal of the remaining talus is normal, making larger avascular necrosis  of the talus not likely.    4. Marked right posterior tibialis tendinopathy with long split tear.      Review of Systems   Constitutional, HEENT, cardiovascular, pulmonary, gi and gu systems are negative, except as otherwise noted.      Objective    /80   Pulse 119   Temp 98.7  F (37.1  C) (Tympanic)   Resp 16   Wt 78 kg (172 lb)   SpO2 98%   Breastfeeding No   BMI 32.47 kg/m    Body mass index is 32.47 kg/m .  Physical Exam   GENERAL APPEARANCE: alert and no distress  PSYCH: mentation appears normal and affect normal/bright

## 2021-07-03 ASSESSMENT — ASTHMA QUESTIONNAIRES: ACT_TOTALSCORE: 21

## 2021-07-15 DIAGNOSIS — M25.571 PAIN IN JOINT, ANKLE AND FOOT, RIGHT: Primary | ICD-10-CM

## 2021-08-02 ENCOUNTER — PATIENT OUTREACH (OUTPATIENT)
Dept: CARE COORDINATION | Facility: CLINIC | Age: 36
End: 2021-08-02

## 2021-08-02 NOTE — PROGRESS NOTES
Clinic Care Coordination Contact  [facility]/Voicemail    Clinical Data: Care Coordinator Outreach  Goal check in    Outreach attempted x 1.  Left message on patient's voicemail with call back information and requested return call. Gave update from CC call to [facility], inquired if pt would like to pursue a different agency due to wait time thus far.    Plan: Care Coordinator will try to reach patient again in 10 business days.    HENRY Vee   Social Work Clinic Care Coordinator   [facility]  [phone]  [email or web address]

## 2021-08-03 DIAGNOSIS — G89.4 CHRONIC PAIN SYNDROME: Chronic | ICD-10-CM

## 2021-08-04 RX ORDER — OXYCODONE HYDROCHLORIDE 10 MG/1
10 TABLET ORAL 3 TIMES DAILY
Qty: 90 TABLET | Refills: 0 | Status: SHIPPED | OUTPATIENT
Start: 2021-08-04 | End: 2021-09-07

## 2021-08-12 ENCOUNTER — PATIENT OUTREACH (OUTPATIENT)
Dept: NURSING | Facility: CLINIC | Age: 36
End: 2021-08-12
Payer: MEDICARE

## 2021-08-12 NOTE — PROGRESS NOTES
Clinic Care Coordination Contact    Follow Up Progress Note      Assessment: Pt has not heard from [facility].     Care Gaps:  Postponed to next outreach    Pt had PCP OV 7/2/21    Goals addressed this encounter:   Goals Addressed                    This Visit's Progress       1. Mental Health Management (pt-stated)   30%      Goal Statement: I will manage my mental health.  Date Goal set: 03/29/21 // updated 8/12/21  Barriers: mental illness;   Strengths: motivated to improve mental health  Date to Achieve By: 08/29/21 // extended 2/1/22  Patient expressed understanding of goal: yes    Action steps to achieve this goal:  1. I will see a counselor/therapist.  2. I will see a psychiatrist.   3. I will get a cheek swab from psychiatry to determine which medication would be best for my chemistry.  4. I will take medications as prescribed.  5. I will continue to work with my care team and care coordination.               Intervention/Education provided during outreach: ERROL CC outreach to pt for check in. Pt has not heard from psychiatry provider, despite completing many pages on intake paperwork. They could not give CC information either when called. Inquired if pt would like to pursue a new provider, she would. CC will send psychiatry/therapy options to pt via [place].      Outreach Frequency: Monthly    Plan:  ERROL CC will gather resources and send to pt. ERROL CC will follow up in one month, or sooner if needed.     Ingrid Wilkinson, FRANNY   Social Work Clinic Care Coordinator   [facility]  [phone]  [email or web address]

## 2021-08-24 ENCOUNTER — PREP FOR PROCEDURE (OUTPATIENT)
Dept: ORTHOPEDICS | Facility: CLINIC | Age: 36
End: 2021-08-24

## 2021-08-24 ENCOUNTER — OFFICE VISIT (OUTPATIENT)
Dept: ORTHOPEDICS | Facility: CLINIC | Age: 36
End: 2021-08-24
Payer: MEDICARE

## 2021-08-24 ENCOUNTER — ANCILLARY PROCEDURE (OUTPATIENT)
Dept: GENERAL RADIOLOGY | Facility: CLINIC | Age: 36
End: 2021-08-24
Attending: ORTHOPAEDIC SURGERY
Payer: MEDICARE

## 2021-08-24 VITALS — WEIGHT: 160.8 LBS | HEIGHT: 61 IN | BODY MASS INDEX: 30.36 KG/M2

## 2021-08-24 DIAGNOSIS — Z11.59 ENCOUNTER FOR SCREENING FOR OTHER VIRAL DISEASES: ICD-10-CM

## 2021-08-24 DIAGNOSIS — M25.571 PAIN IN JOINT, ANKLE AND FOOT, RIGHT: ICD-10-CM

## 2021-08-24 DIAGNOSIS — M25.571 PAIN IN JOINT OF RIGHT ANKLE: Primary | ICD-10-CM

## 2021-08-24 DIAGNOSIS — M25.571 PAIN IN JOINT, ANKLE AND FOOT, RIGHT: Primary | ICD-10-CM

## 2021-08-24 PROCEDURE — 99214 OFFICE O/P EST MOD 30 MIN: CPT | Performed by: ORTHOPAEDIC SURGERY

## 2021-08-24 PROCEDURE — 73610 X-RAY EXAM OF ANKLE: CPT | Mod: RT | Performed by: RADIOLOGY

## 2021-08-24 ASSESSMENT — MIFFLIN-ST. JEOR: SCORE: 1357.14

## 2021-08-24 NOTE — LETTER
8/24/2021         RE: Molly Teague  [address]  [facility] [place], [place] [address]        Dear Colleague,    Thank you for referring your patient, Molly Teague, to the [facility] [place]. Please see a copy of my visit note below.    CHIEF COMPLAINT:  Status post right ankle open reduction and internal fixation performed on 02/10/2020.    HISTORY OF PRESENT ILLNESS:  Ms. Teague presents in the company of her  for further evaluation.  Reports to continue having pain and discomfort and to have an interest in undergoing hardware removal from the right ankle.    The patient also reports to be wearing the boot on and off and to have some difficulties because of the boot.  It is wearing against the medial aspect of the ankle joint.    She has also undergone an injection into the ankle joint, which apparently provided her with approximately 2-3 weeks of pain relief.    PHYSICAL EXAMINATION:  She presented with a fairly limited ankle motion with no more than 20 degrees of plantar and dorsiflexion.  There are no signs of infection or inflammation.  There are well-healed surgical incisions.    IMAGING:  Three views of the right ankle were reviewed today, which were significant for showing some arthritic changes across the ankle joint for the lateral half.  Presents with a well-healed medial malleolus as well as lateral malleolus.  Hardware is intact and in place.    ASSESSMENT:    1. Right ankle posttraumatic arthritis, status post right ankle ORIF.    2. Painful retained hardware, right ankle.    PLAN:  Discussed with the patient and her  that at this point, I think it is reasonable to proceed with hardware removal from the right ankle.  If her pain is not improved enough that I would consider a new injection to the ankle joint.  If neither of those 2 maneuvers are sufficient to improve her pain, she will have to consider an ankle fusion.    I discussed with her the  most likely postoperative course and complications from undergoing right ankle hardware removal.  Complications include but not limited to infection, bleeding, nerve damage, residual pain and stiffness.    All questions were answered.  She has no activity restrictions.  She will follow up on a p.r.n. basis and schedule the surgery at her convenience.    TT:  20 minutes.  CT:  15 minutes.      Natanael Villalta MD

## 2021-08-24 NOTE — PROGRESS NOTES
CHIEF COMPLAINT:  Status post right ankle open reduction and internal fixation performed on 02/10/2020.    HISTORY OF PRESENT ILLNESS:  Ms. Teague presents in the company of her  for further evaluation.  Reports to continue having pain and discomfort and to have an interest in undergoing hardware removal from the right ankle.    The patient also reports to be wearing the boot on and off and to have some difficulties because of the boot.  It is wearing against the medial aspect of the ankle joint.    She has also undergone an injection into the ankle joint, which apparently provided her with approximately 2-3 weeks of pain relief.    PHYSICAL EXAMINATION:  She presented with a fairly limited ankle motion with no more than 20 degrees of plantar and dorsiflexion.  There are no signs of infection or inflammation.  There are well-healed surgical incisions.    IMAGING:  Three views of the right ankle were reviewed today, which were significant for showing some arthritic changes across the ankle joint for the lateral half.  Presents with a well-healed medial malleolus as well as lateral malleolus.  Hardware is intact and in place.    ASSESSMENT:    1. Right ankle posttraumatic arthritis, status post right ankle ORIF.    2. Painful retained hardware, right ankle.    PLAN:  Discussed with the patient and her  that at this point, I think it is reasonable to proceed with hardware removal from the right ankle.  If her pain is not improved enough that I would consider a new injection to the ankle joint.  If neither of those 2 maneuvers are sufficient to improve her pain, she will have to consider an ankle fusion.    I discussed with her the most likely postoperative course and complications from undergoing right ankle hardware removal.  Complications include but not limited to infection, bleeding, nerve damage, residual pain and stiffness.    All questions were answered.  She has no activity restrictions.  She  will follow up on a p.r.n. basis and schedule the surgery at her convenience.    TT:  20 minutes.  CT:  15 minutes.

## 2021-08-24 NOTE — TELEPHONE ENCOUNTER
FUTURE VISIT INFORMATION      SURGERY INFORMATION:    Date: 21    Location: [facility] OR    Surgeon:  Natanael Villalta MD    Anesthesia Type:  choice    Procedure: Right ankle hardware removal    Consult: ov 21    RECORDS REQUESTED FROM:       Primary Care Provider: Grecia Barba, - [facility]    Most recent EKG+ Tracin20    Most recent ECHO: 3/29/18    Most recent PFT's: 17

## 2021-08-24 NOTE — NURSING NOTE
"Reason For Visit:   Chief Complaint   Patient presents with     RECHECK     s/p right ankle ORIF DOS: 2/10/20 requesting hardware removal        Ht 1.55 m (5' 1.02\")   Wt 72.9 kg (160 lb 12.8 oz)   BMI 30.36 kg/m      Pain Assessment  Patient Currently in Pain: Yes  0-10 Pain Scale: 7  Primary Pain Location: Ankle    Jayleen Toni, ATC    "

## 2021-08-25 NOTE — NURSING NOTE
Teaching Flowsheet   Relevant Diagnosis: Painful HW right ankle  Teaching Topic: Preop Teaching Right Ankle HW removal    Molly lives with her partner in [place], planning surgery next week on 9/1/21 at [facility].       Person(s) involved in teaching:   Patient and      Motivation Level:  Asks Questions: Yes  Eager to Learn: Yes  Cooperative: Yes  Receptive (willing/able to accept information): Yes  Any cultural factors/Mandaeism beliefs that may influence understanding or compliance? No       Patient and Family demonstrates understanding of the following:  Reason for the appointment, diagnosis and treatment plan: Yes  Knowledge of proper use of medications and conditions for which they are ordered (with special attention to potential side effects or drug interactions): Yes  Which situations necessitate calling provider and whom to contact: Yes       Teaching Concerns Addressed:        Proper use and care of dressings (medical equip, care aids, etc.): Yes  Nutritional needs and diet plan: Yes  Pain management techniques: Yes  Wound Care: Yes  How and/when to access community resources: NA     Instructional Materials Used/Given: Preop Packet and Antiseptic Soap     Time spent with patient: 15 minutes.

## 2021-08-26 ENCOUNTER — ANESTHESIA EVENT (OUTPATIENT)
Dept: SURGERY | Facility: AMBULATORY SURGERY CENTER | Age: 36
End: 2021-08-26
Payer: MEDICARE

## 2021-08-26 ENCOUNTER — PRE VISIT (OUTPATIENT)
Dept: SURGERY | Facility: CLINIC | Age: 36
End: 2021-08-26

## 2021-08-26 ENCOUNTER — VIRTUAL VISIT (OUTPATIENT)
Dept: SURGERY | Facility: CLINIC | Age: 36
End: 2021-08-26
Payer: MEDICARE

## 2021-08-26 DIAGNOSIS — Z01.818 PREOP EXAMINATION: Primary | ICD-10-CM

## 2021-08-26 PROCEDURE — 99203 OFFICE O/P NEW LOW 30 MIN: CPT | Mod: 95 | Performed by: PHYSICIAN ASSISTANT

## 2021-08-26 ASSESSMENT — PATIENT HEALTH QUESTIONNAIRE - PHQ9: SUM OF ALL RESPONSES TO PHQ QUESTIONS 1-9: 14

## 2021-08-26 ASSESSMENT — PAIN SCALES - GENERAL: PAINLEVEL: SEVERE PAIN (6)

## 2021-08-26 ASSESSMENT — LIFESTYLE VARIABLES: TOBACCO_USE: 0

## 2021-08-26 ASSESSMENT — ENCOUNTER SYMPTOMS: SEIZURES: 0

## 2021-08-26 NOTE — H&P (VIEW-ONLY)
Pre-Operative H & P     CC:  Preoperative exam to assess for increased cardiopulmonary risk while undergoing surgery and anesthesia.    Date of Encounter: 8/26/2021  Primary Care Physician:  Grecia Barba   Reason for Visit: Pain in joint of right ankle    Video-Visit Details    Type of service:  Video Visit    Patient verbally consented to video service today: YES      Video Start Time: 0938  Video End Time (time video stopped): 0948    Originating Location (pt. Location): Home    Distant Location (provider location):  [facility]     Mode of Communication:  Video Conference via [employer]      [facility]  Molly Teague is a 37 y/o female who presents for pre-operative H&P in preparation for Right ankle hardware removal with Natanael Villalta MD on 9/1/21 at [facility] for treatment of Pain in joint of right ankle. Patient is being evaluated for comorbid conditions of scleroderma, mild ILD, allergic rhinitis, anxiety, depression, PTSD, neuropathy, Raynauds, CKD3, GERD, hx severe esophagitis, motion sickness.    Ms. Teague Status post right ankle open reduction and internal fixation performed on 02/10/2020. She has posttraumatic arthritis of the joint and pain secondary to retained hardware. She now presents for the above procedure.    History was obtained from patient & chart review.     Hx of abnormal bleeding or anti-platelet use: denies    Menstrual history: Patient's last menstrual period was 08/19/2021.:      Prior to Admission Medications  Current Outpatient Medications   Medication Sig Dispense Refill     acetaminophen (TYLENOL) 325 MG tablet Take 2 tablets (650 mg) by mouth every 4 hours as needed for mild pain or other 1 Bottle 0     albuterol (VENTOLIN HFA) 108 (90 Base) MCG/ACT inhaler INHALE 2 PUFFS INTO THE LUNGS ONCE EVERY 4 HOURS AS NEEDED FOR SHORTNESS OF BREATH / DYSPNEA / WHEEZING (Patient taking differently: every 6 hours as needed INHALE  2 PUFFS INTO THE LUNGS ONCE EVERY 4 HOURS AS NEEDED FOR SHORTNESS OF BREATH / DYSPNEA / WHEEZING) 18 g 11     amLODIPine (NORVASC) 10 MG tablet Take 1 tablet (10 mg) by mouth daily (Patient taking differently: Take 10 mg by mouth every morning ) 90 tablet 3     busPIRone HCl (BUSPAR) 30 MG tablet Take 1 tablet (30 mg) by mouth 2 times daily 180 tablet 3     Cyanocobalamin (B-12) 1000 MCG TBCR Take 1,000 mcg by mouth daily (Patient taking differently: Take 1,000 mcg by mouth every morning ) 100 tablet 1     DULoxetine (CYMBALTA) 30 MG capsule Take 3 capsules (90 mg) by mouth daily (Patient taking differently: Take 90 mg by mouth 2 times daily ) 270 capsule 3     famotidine (PEPCID) 20 MG tablet Take 1 tablet (20 mg) by mouth 2 times daily 180 tablet 3     gabapentin (NEURONTIN) 300 MG capsule Take 2 capsules (600 mg) by mouth 3 times daily 540 capsule 3     GOODSENSE MIGRAINE FORMULA 250-250-65 MG per tablet TAKE ONE TABLET BY MOUTH EVERY 6 HOURS AS NEEDED FOR HEADACHES (Patient taking differently: Take 1 tablet by mouth every 6 hours as needed ) 24 tablet 1     hydrOXYzine (ATARAX) 25 MG tablet TAKE ONE TO TWO TABLETS BY MOUTH THREE TIMES A DAY AS NEEDED FOR ITCHING (Patient taking differently: daily as needed ) 90 tablet 11     lisinopril (ZESTRIL) 10 MG tablet Take 1 tablet (10 mg) by mouth daily (Patient taking differently: Take 10 mg by mouth every evening ) 90 tablet 3     loratadine (CLARITIN) 10 MG tablet Take 10 mg by mouth daily as needed        LORazepam (ATIVAN) 1 MG tablet Take 1 tablet (1 mg) by mouth 2 times daily as needed for anxiety #45 to last 30 days 45 tablet 5     methocarbamol (ROBAXIN) 750 MG tablet TAKE ONE TABLET BY MOUTH FOUR TIMES A DAY AS NEEDED FOR MUSCLE SPASMS (Patient taking differently: 3 times daily as needed ) 30 tablet 2     montelukast (SINGULAIR) 10 MG tablet Take 1 tablet (10 mg) by mouth At Bedtime 90 tablet 3     omeprazole (PRILOSEC) 40 MG DR capsule TAKE ONE CAPSULE BY  MOUTH TWICE A DAY (Patient taking differently: Take 40 mg by mouth 2 times daily TAKE ONE CAPSULE BY MOUTH TWICE A DAY) 180 capsule 3     ondansetron (ZOFRAN-ODT) 4 MG ODT tab Take 1 tablet (4 mg) by mouth every 8 hours as needed for nausea 30 tablet 4     oxyCODONE IR (ROXICODONE) 10 MG tablet Take 1 tablet (10 mg) by mouth 3 times daily 90 tablet 0     polyethylene glycol (MIRALAX/GLYCOLAX) packet Take 17 g by mouth daily 7 packet 0     promethazine (PHENERGAN) 25 MG tablet TAKE ONE TABLET BY MOUTH TWICE A DAY AS NEEDED NAUSEA (Patient taking differently: daily as needed TAKE ONE TABLET BY MOUTH TWICE A DAY AS NEEDED NAUSEA) 30 tablet 7     promethazine (PHENERGAN) 25 MG/ML IV injection INJECT 1 ML INTRAMUSCULARLY EVERY 6 HOURS AS NEEDED (Patient taking differently: daily as needed INJECT 1 ML INTRAMUSCULARLY EVERY 6 HOURS AS NEEDED) 6 mL 11     SYMBICORT 160-4.5 MCG/ACT Inhaler INHALE TWO PUFFS BY MOUTH TWICE A DAY (Patient taking differently: 2 times daily ) 1 Inhaler 11     blood glucose (NO BRAND SPECIFIED) lancets standard Use to test blood sugar 1 time daily 100 each 3     blood glucose (NO BRAND SPECIFIED) test strip Use to test blood sugar 1 time daily 100 strip 11     folic acid (FOLVITE) 1 MG tablet Take 1 tablet by mouth daily (Patient not taking: Reported on 8/26/2021)       naloxone (NARCAN) 4 MG/0.1ML nasal spray Spray 1 spray (4 mg) into one nostril alternating nostrils once as needed for opioid reversal every 2-3 minutes until assistance arrives 0.2 mL 3     order for DME [employer]. Patient can use for three months    Diagnosis Right ankle fracture and surgery 2/10/2020 1 Units 0     order for DME [employer]. Patient can use for another two months  Diagnosis: Right ankle bimalleolar fractures, open reduction internal fixation on 2/10/2020 1 Units 0       Family History  Family History   Problem Relation Age of Onset     Hyperlipidemia Father      Depression Sister      Fibromyalgia  Sister      Hyperlipidemia Sister      Cerebrovascular Disease Maternal Grandmother          of a stroke     Diabetes Maternal Grandmother      Hyperlipidemia Paternal Grandfather      Diabetes Maternal Aunt      Depression Other      Melanoma No family hx of      Skin Cancer No family hx of      Anesthesia Reaction No family hx of      Cardiovascular No family hx of      Deep Vein Thrombosis (DVT) No family hx of        The complete review of systems is negative other than noted in the HPI or here.         Anesthesia Pre-Procedure Evaluation    Patient: Molly Teague   MRN: [id number] : 1985        Preoperative Diagnosis: Pain in joint of right ankle [M25.571]   Procedure : Procedure(s):  Right ankle hardware removal     Past Medical History:   Diagnosis Date     Acute pyelonephritis 2017     Altered mental state 3/29/2018     Arthritis      Blood clotting disorder (H)     P E     History of blood transfusion      Hypertension      Long-term (current) use of anticoagulants [Z79.01] 2016     PE (pulmonary embolism) 2016     Rheumatism      Scleroderma (H) 2016     Uncomplicated asthma       Past Surgical History:   Procedure Laterality Date     ANGIOGRAM  2015      SECTION  2014     OPEN REDUCTION INTERNAL FIXATION ANKLE Right 2/10/2020    Procedure: Right ankle open reduction internal fixation;  Surgeon: Natanael Villalta MD;  Location: [facility] OR     THROAT SURGERY        Allergies   Allergen Reactions     Blood Transfusion Related (Informational Only) Other (See Comments)     Patient has a history of a clinically significant antibody against RBC antigens.  A delay in compatible RBCs may occur.     No Known Allergies      Pollen Extract      Seasonal Allergies      Shellfish-Derived Products Nausea and Rash     Rash on face      Social History     Tobacco Use     Smoking status: Never Smoker     Smokeless tobacco: Never Used   Substance Use Topics     Alcohol use: Yes      Comment: Socially once on a weekend if any      Wt Readings from Last 1 Encounters:   08/24/21 72.9 kg (160 lb 12.8 oz)              ROS/MED HX  The complete review of systems is negative other than noted in the HPI or here.  Patient denies recent illness, fever and respiratory infection during past month.  Pt denies steroid use during past year.    ENT/Pulmonary: Comment: Uses symbicort bid. Last used albuterol yesterday after chasing her son.    Scleroderma, mild ILD    PFT 6/24/21:  FEV1 2.93,  FEV1/FVC 83.78,  DLCO 99%      (+) asthma Treatment: Inhaler daily,   (-) tobacco use   Neurologic:     (+) peripheral neuropathy,  (-) no seizures and no CVA   Cardiovascular:     (+) hypertension-----Previous cardiac testing   Echo: Date: 7/9/21 Results:  1. Normal left ventricular chamber size, no regional wall motion abnormalities, calculated 2-D   linear ejection fraction 59 %.   2. Normal right ventricular chamber size, normal systolic function, estimated right ventricular   systolic pressure 23 mmHg (systolic blood pressure 139 mmHg).   3. No significant valvular heart disease.   4. Tiny posterior pericardial effusion.   5. Compared to the report of 04/30/2020 no significant change has occurred.    Stress Test: Date: Results:    ECG Reviewed: Date: Results:    Cath: Date: Results:      METS/Exercise Tolerance: 4 - Raking leaves, gardening Comment: Activity limited due to significant knee & ankle pain.   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  Neurologic: Awake, alert, oriented to name, place and time.   Neuropsychiatric: Calm, cooperative. Normal affect. Answers questions appropriately.    ** Patient's visit was done virtually today.  A full physical exam was not completed.  Please refer to the physical examination documented by the anesthesiologist in the anesthesia record on the day of surgery. **        PRIOR LABS/DIAGNOSTIC STUDIES:   All labs and imaging personally reviewed       3 views right ankle radiographs 8/24/2021  Impression:  1. Stable postsurgical changes of the right ankle. No evidence of  hardware complication.     2. Advanced degenerative changes of the tibiotalar joint, not  substantially changed from prior.      ECHOCARDIOGRAM 7/9/21  Final Impressions   1. Normal left ventricular chamber size, no regional wall motion abnormalities, calculated 2-D   linear ejection fraction 59 %.   2. Normal right ventricular chamber size, normal systolic function, estimated right ventricular   systolic pressure 23 mmHg (systolic blood pressure 139 mmHg).   3. No significant valvular heart disease.   4. Tiny posterior pericardial effusion.   5. Compared to the report of 04/30/2020 no significant change has occurred.      PFT  VC MAX PRE  L 3.5        FVC  L 3.5        FEV1  L 2.93        FEV1/FVC  % 83.78        JLX94-60%  L/s 3.19        PEF PRE  L/s 10.02        PIF PRE  L/s 7.46        FEF 50 % FIF 50 PRE  % 52.45        FET PRE  sec 7.11        DLCO   ml/(min*mmHg) 20.47        DLCOc  ml/(min*mmHg) 19.78        HB  g(Hb)/dL 14.6        VA  L 4.4        D6GcnHeue  % 99        PulseRest  1/min 120        G5MszNaxm  % 91        PulseExer  1/min 160        EXER TIME  min 3        STEP HEIGHT PRE  Inch 9        % PRED VC MAX  % 104 %        FVC%  % 103 %        FEV1%  % 104 %        % PRED FEV1/FVC  % 100 %        % PRED FEF 25-75%  % 102 %        % PRED PEF  % 167 %        DLCO%  % 102 %        DLCOc%  % 99 %        PRED VC MAX   3.38        PRED FVC   3.38        PRED FEV 1   2.82        PRED FEV1/FVC   83.8        PRED FEF 25-75%   3.14        PRED PEF   6        PRED DLCO   20.1        PRED DLCOc   20.1          CBC:   Lab Results   Component Value Date    WBC 11.5 (H) 03/24/2021    WBC 13.4 (H) 03/22/2021    HGB 13.4 03/24/2021    HGB 15.2 03/22/2021    HCT 42.3 03/24/2021    HCT 48.2 (H) 03/22/2021     03/24/2021     03/22/2021     BMP:   Lab Results   Component Value Date     03/24/2021     02/24/2021    POTASSIUM 4.2 03/24/2021    POTASSIUM 4.2 02/24/2021    CHLORIDE 108 03/24/2021    CHLORIDE 109 02/24/2021    CO2 22 03/24/2021    CO2 25 02/24/2021    BUN 13 03/24/2021    BUN 11 02/24/2021    CR 1.10 (H) 03/24/2021    CR 0.95 02/24/2021    GLC 88 03/24/2021    GLC 73 02/24/2021     COAGS:   Lab Results   Component Value Date    PTT 39 (H) 03/30/2018    INR 1.15 (H) 03/30/2018     POC:   Lab Results   Component Value Date    BGM 64 (L) 03/24/2021    HCG Negative 03/24/2021    HCGS Negative 02/10/2020     HEPATIC:   Lab Results   Component Value Date    ALBUMIN 3.5 03/24/2021    PROTTOTAL 7.2 03/24/2021    ALT 41 03/24/2021    AST 32 03/24/2021    ALKPHOS 100 03/24/2021    BILITOTAL 0.3 03/24/2021    LEONIDES 25 03/24/2021     OTHER:   Lab Results   Component Value Date    LACT 1.9 03/24/2021    UMER 8.3 (L) 03/24/2021    MAG 1.6 01/25/2020    LIPASE 128 09/17/2020    TSH 1.39 03/24/2021    T4 0.91 01/25/2020    .0 (H) 06/08/2017      (H) 05/11/2017         The patient's records and results personally reviewed by this provider.     Outside records reviewed from: Care everywhere (echocardiogram @ [place])    COVID19 testing scheduled on 8/29/21      ASSESSMENT and PLAN  Molly Teague is a 37 y/o female who presents for pre-operative H&P in preparation for Right ankle hardware removal with Natanael Villalta MD on 9/1/21 at [facility] for treatment of Pain in joint of right ankle.    Pt has had prior anesthetic.  History of anesthetic complications - Motion sickness    She has the following specific operative considerations:   # JAKOB 2/8 = low risk  # VTE risk: 1.8%  # Risk of PONV score = 3.  If > 2, anti-emetic intervention recommended.  # Anesthesia considerations:  Refer to PAC assessment in anesthesia records    # Increased risk of postoperative nausea/vomiting: Recommend use of antiemetic agents in the perioperative period.       CARDIAC: METS 4      # RCRI : No serious cardiac risks.  0.4% risk of major adverse cardiac event.     #  Echo 7/9/21:  EF 59%, normal function     #  HTN, managed with lisinopril & norvasc       PULMONARY:     # Scleroderma, mild ILD    # Uses symbicort bid. Last used albuterol yesterday after chasing her son.    # PFT 6/24/21:  FEV1 2.93,  FEV1/FVC 83.78,  DLCO 99%    # Never smoked      GI:     # GERD w/ esophagitis, currently stable, asymptomatic on pepcid & prilosec       /RENAL:     # Hx renal crisis 2/2 prednisone in 2016    # Creatinine 1.33, GFR 52 on 6/15/21    ENDO: BMI 30   # No DM    HEME:     # Hx PE in 2016 during renal crisis. Not currently anticoagulated    # +RBC antibody    ORTHO:     # Chronic B/L knee pain    # Hx right ankle fx, s/p surgery w/ hardware    # Chronic diffuse pain       NEURO/PSYCH:     # anxiety, depression, PTSD    MISC:    # Neuropathy    # Raynauds      Patient is optimized and is acceptable candidate for the proposed procedure. No further diagnostic  evaluation is needed.    ** Patient's visit was done virtually today.  A full physical exam was not completed.  Please refer to the physical examination documented by the anesthesiologist in the anesthesia record on the day of surgery. **    Final plan per anesthesiologist on day of surgery.     Arrival time, NPO, shower and medication instructions provided by nursing staff today.  Preparing For Your Surgery handout given.        On the day of service:     Prep time: 15 minutes  Visit time: 10 minutes  Documentation time: 15 minutes  ------------------------------------------  Total time: 40 minutes      Anna Cruz PA-C  Preoperative Assessment Center  [facility]  Clinic and Surgery Center  Phone: [phone]  Fax: [phone]

## 2021-08-26 NOTE — ANESTHESIA PREPROCEDURE EVALUATION
Anesthesia Pre-Procedure Evaluation    Patient: Molly Teague   MRN: [id number] : 1985        Preoperative Diagnosis: Pain in joint of right ankle [M25.571]   Procedure : Procedure(s):  Right ankle hardware removal     Past Medical History:   Diagnosis Date     Acute pyelonephritis 2017     Altered mental state 3/29/2018     Arthritis      Blood clotting disorder (H)     P E     History of blood transfusion      Hypertension      Long-term (current) use of anticoagulants [Z79.01] 2016     PE (pulmonary embolism) 2016     Rheumatism      Scleroderma (H) 2016     Uncomplicated asthma       Past Surgical History:   Procedure Laterality Date     ANGIOGRAM        SECTION       OPEN REDUCTION INTERNAL FIXATION ANKLE Right 2/10/2020    Procedure: Right ankle open reduction internal fixation;  Surgeon: Natanael Villalta MD;  Location: [facility] OR     THROAT SURGERY        Allergies   Allergen Reactions     Blood Transfusion Related (Informational Only) Other (See Comments)     Patient has a history of a clinically significant antibody against RBC antigens.  A delay in compatible RBCs may occur.     No Known Allergies      Pollen Extract      Seasonal Allergies      Shellfish-Derived Products Nausea and Rash     Rash on face      Social History     Tobacco Use     Smoking status: Never Smoker     Smokeless tobacco: Never Used   Substance Use Topics     Alcohol use: Yes     Comment: Socially once on a weekend if any      Wt Readings from Last 1 Encounters:   21 72.9 kg (160 lb 12.8 oz)        Anesthesia Evaluation   Pt has had prior anesthetic.     History of anesthetic complications  - motion sickness.      ROS/MED HX  ENT/Pulmonary: Comment: Uses symbicort bid. Last used albuterol yesterday after chasing her son.    Scleroderma, mild ILD    PFT 21:  FEV1 2.93,  FEV1/FVC 83.78,  DLCO 99%      (+) asthma Treatment: Inhaler daily,   (-) tobacco use   Neurologic:     (+)  peripheral neuropathy,  (-) no seizures and no CVA   Cardiovascular:     (+) hypertension-----Previous cardiac testing   Echo: Date: 7/9/21 Results:  1. Normal left ventricular chamber size, no regional wall motion abnormalities, calculated 2-D   linear ejection fraction 59 %.   2. Normal right ventricular chamber size, normal systolic function, estimated right ventricular   systolic pressure 23 mmHg (systolic blood pressure 139 mmHg).   3. No significant valvular heart disease.   4. Tiny posterior pericardial effusion.   5. Compared to the report of 04/30/2020 no significant change has occurred.    Stress Test: Date: Results:    ECG Reviewed: Date: Results:    Cath: Date: Results:      METS/Exercise Tolerance: 4 - Raking leaves, gardening Comment: Activity limited due to significant knee & ankle pain.   Hematologic: Comments: Had PE during renal crisis in 2016      (+) History of blood clots, pt is not anticoagulated, history of blood transfusion, no previous transfusion reaction, Known PRBC Anitbodies: Yes,     Musculoskeletal: Comment: Chronic B/L knee pain    Hx right ankle fx, s/p surgery w/ hardware    Chronic diffuse pain      GI/Hepatic: Comment: Severe esophagitis, stable currently    (+) GERD, Asymptomatic on medication,  (-) liver disease   Renal/Genitourinary: Comment: Hx renal crisis 2/2 prednisone in 2016    Creatinine 1.33, GFR 52 on 6/15/21      (+) renal disease, type: CRI,     Endo: Comment: Hypoglycemia     (-) Type II DM   Psychiatric/Substance Use: Comment: PTSD    (+) psychiatric history anxiety and depression     Infectious Disease:  - neg infectious disease ROS     Malignancy:  - neg malignancy ROS     Other: Comment: Raynauds             Physical Exam    Airway        Mallampati: II   TM distance: > 3 FB      Respiratory Devices and Support         Dental           Cardiovascular          Rhythm and rate: regular and normal     Pulmonary           breath sounds clear to auscultation            OUTSIDE LABS:  CBC:   Lab Results   Component Value Date    WBC 11.5 (H) 03/24/2021    WBC 13.4 (H) 03/22/2021    HGB 13.4 03/24/2021    HGB 15.2 03/22/2021    HCT 42.3 03/24/2021    HCT 48.2 (H) 03/22/2021     03/24/2021     03/22/2021     BMP:   Lab Results   Component Value Date     03/24/2021     02/24/2021    POTASSIUM 4.2 03/24/2021    POTASSIUM 4.2 02/24/2021    CHLORIDE 108 03/24/2021    CHLORIDE 109 02/24/2021    CO2 22 03/24/2021    CO2 25 02/24/2021    BUN 13 03/24/2021    BUN 11 02/24/2021    CR 1.10 (H) 03/24/2021    CR 0.95 02/24/2021    GLC 88 03/24/2021    GLC 73 02/24/2021     COAGS:   Lab Results   Component Value Date    PTT 39 (H) 03/30/2018    INR 1.15 (H) 03/30/2018     POC:   Lab Results   Component Value Date    BGM 64 (L) 03/24/2021    HCG Negative 03/24/2021    HCGS Negative 02/10/2020     HEPATIC:   Lab Results   Component Value Date    ALBUMIN 3.5 03/24/2021    PROTTOTAL 7.2 03/24/2021    ALT 41 03/24/2021    AST 32 03/24/2021    ALKPHOS 100 03/24/2021    BILITOTAL 0.3 03/24/2021    LEONIDES 25 03/24/2021     OTHER:   Lab Results   Component Value Date    LACT 1.9 03/24/2021    UMER 8.3 (L) 03/24/2021    MAG 1.6 01/25/2020    LIPASE 128 09/17/2020    TSH 1.39 03/24/2021    T4 0.91 01/25/2020    .0 (H) 06/08/2017     (H) 05/11/2017       Anesthesia Plan    ASA Status:  3   NPO Status:  NPO Appropriate    Anesthesia Type: General.     - Airway: LMA   Induction: Intravenous.   Maintenance: TIVA.        Consents    Anesthesia Plan(s) and associated risks, benefits, and realistic alternatives discussed. Questions answered and patient/representative(s) expressed understanding.     - Discussed with:  Patient         Postoperative Care    Pain management: Oral pain medications, Multi-modal analgesia.   PONV prophylaxis: Ondansetron (or other 5HT-3), Dexamethasone or Solumedrol     Comments:              PAC Discussion and Assessment    ASA  Classification: 3  Case is suitable for: ASC                    PAC Resident/NP Anesthesia Assessment: Molly Teague is a 37 y/o female who presents for pre-operative H&P in preparation for Right ankle hardware removal with Natanael Villalta MD on 9/1/21 at [facility] for treatment of Pain in joint of right ankle.    Pt has had prior anesthetic.  History of anesthetic complications - Motion sickness    She has the following specific operative considerations:   # JAKOB 2/8 = low risk  # VTE risk: 1.8%  # Risk of PONV score = 3.  If > 2, anti-emetic intervention recommended.  # Anesthesia considerations:  Refer to PAC assessment in anesthesia records    # Increased risk of postoperative nausea/vomiting: Recommend use of antiemetic agents in the perioperative period.       CARDIAC: METS 4      # RCRI : No serious cardiac risks.  0.4% risk of major adverse cardiac event.     #  Echo 7/9/21:  EF 59%, normal function     #  HTN, managed with lisinopril & norvasc       PULMONARY:     # Scleroderma, mild ILD    # Uses symbicort bid. Last used albuterol yesterday after chasing her son.    # PFT 6/24/21:  FEV1 2.93,  FEV1/FVC 83.78,  DLCO 99%    # Never smoked      GI:     # GERD w/ esophagitis, currently stable, asymptomatic on pepcid & prilosec       /RENAL:     # Hx renal crisis 2/2 prednisone in 2016    # Creatinine 1.33, GFR 52 on 6/15/21    ENDO: BMI 30   # No DM    HEME:     # Hx PE in 2016 during renal crisis. Not currently anticoagulated    # +RBC antibody    ORTHO:     # Chronic B/L knee pain    # Hx right ankle fx, s/p surgery w/ hardware    # Chronic diffuse pain       NEURO/PSYCH:     # anxiety, depression, PTSD    MISC:    # Neuropathy    # Raynauds      Patient is optimized and is acceptable candidate for the proposed procedure. No further diagnostic evaluation is needed.    ** Patient's visit was done virtually today.  A full physical exam was not completed.  Please refer to the physical  examination documented by the anesthesiologist in the anesthesia record on the day of surgery. **    Final plan per anesthesiologist on day of surgery.     Reviewed and Signed by PAC Mid-Level Provider/Resident  Mid-Level Provider/Resident: Anna Cruz PA-C  Date: 8/26/21  Time: 1033                               Anna Cruz PA-C

## 2021-08-26 NOTE — H&P
Pre-Operative H & P     CC:  Preoperative exam to assess for increased cardiopulmonary risk while undergoing surgery and anesthesia.    Date of Encounter: 8/26/2021  Primary Care Physician:  Grecia Barba   Reason for Visit: Pain in joint of right ankle    Video-Visit Details    Type of service:  Video Visit    Patient verbally consented to video service today: YES      Video Start Time: 0938  Video End Time (time video stopped): 0948    Originating Location (pt. Location): Home    Distant Location (provider location):  [facility] PREOPERAT[facility]     Mode of Communication:  Video Conference via mCASH      [facility]  Molly Teague is a 35 y/o female who presents for pre-operative H&P in preparation for Right ankle hardware removal with Natanael Villalta MD on 9/1/21 at [facility] for treatment of Pain in joint of right ankle. Patient is being evaluated for comorbid conditions of scleroderma, mild ILD, allergic rhinitis, anxiety, depression, PTSD, neuropathy, Raynauds, CKD3, GERD, hx severe esophagitis, motion sickness.    Ms. Teague Status post right ankle open reduction and internal fixation performed on 02/10/2020. She has posttraumatic arthritis of the joint and pain secondary to retained hardware. She now presents for the above procedure.    History was obtained from patient & chart review.     Hx of abnormal bleeding or anti-platelet use: denies    Menstrual history: Patient's last menstrual period was 08/19/2021.:      Prior to Admission Medications  Current Outpatient Medications   Medication Sig Dispense Refill     acetaminophen (TYLENOL) 325 MG tablet Take 2 tablets (650 mg) by mouth every 4 hours as needed for mild pain or other 1 Bottle 0     albuterol (VENTOLIN HFA) 108 (90 Base) MCG/ACT inhaler INHALE 2 PUFFS INTO THE LUNGS ONCE EVERY 4 HOURS AS NEEDED FOR SHORTNESS OF BREATH / DYSPNEA / WHEEZING (Patient taking differently: every 6 hours as needed INHALE  2 PUFFS INTO THE LUNGS ONCE EVERY 4 HOURS AS NEEDED FOR SHORTNESS OF BREATH / DYSPNEA / WHEEZING) 18 g 11     amLODIPine (NORVASC) 10 MG tablet Take 1 tablet (10 mg) by mouth daily (Patient taking differently: Take 10 mg by mouth every morning ) 90 tablet 3     busPIRone HCl (BUSPAR) 30 MG tablet Take 1 tablet (30 mg) by mouth 2 times daily 180 tablet 3     Cyanocobalamin (B-12) 1000 MCG TBCR Take 1,000 mcg by mouth daily (Patient taking differently: Take 1,000 mcg by mouth every morning ) 100 tablet 1     DULoxetine (CYMBALTA) 30 MG capsule Take 3 capsules (90 mg) by mouth daily (Patient taking differently: Take 90 mg by mouth 2 times daily ) 270 capsule 3     famotidine (PEPCID) 20 MG tablet Take 1 tablet (20 mg) by mouth 2 times daily 180 tablet 3     gabapentin (NEURONTIN) 300 MG capsule Take 2 capsules (600 mg) by mouth 3 times daily 540 capsule 3     GOODSENSE MIGRAINE FORMULA 250-250-65 MG per tablet TAKE ONE TABLET BY MOUTH EVERY 6 HOURS AS NEEDED FOR HEADACHES (Patient taking differently: Take 1 tablet by mouth every 6 hours as needed ) 24 tablet 1     hydrOXYzine (ATARAX) 25 MG tablet TAKE ONE TO TWO TABLETS BY MOUTH THREE TIMES A DAY AS NEEDED FOR ITCHING (Patient taking differently: daily as needed ) 90 tablet 11     lisinopril (ZESTRIL) 10 MG tablet Take 1 tablet (10 mg) by mouth daily (Patient taking differently: Take 10 mg by mouth every evening ) 90 tablet 3     loratadine (CLARITIN) 10 MG tablet Take 10 mg by mouth daily as needed        LORazepam (ATIVAN) 1 MG tablet Take 1 tablet (1 mg) by mouth 2 times daily as needed for anxiety #45 to last 30 days 45 tablet 5     methocarbamol (ROBAXIN) 750 MG tablet TAKE ONE TABLET BY MOUTH FOUR TIMES A DAY AS NEEDED FOR MUSCLE SPASMS (Patient taking differently: 3 times daily as needed ) 30 tablet 2     montelukast (SINGULAIR) 10 MG tablet Take 1 tablet (10 mg) by mouth At Bedtime 90 tablet 3     omeprazole (PRILOSEC) 40 MG DR capsule TAKE ONE CAPSULE BY  MOUTH TWICE A DAY (Patient taking differently: Take 40 mg by mouth 2 times daily TAKE ONE CAPSULE BY MOUTH TWICE A DAY) 180 capsule 3     ondansetron (ZOFRAN-ODT) 4 MG ODT tab Take 1 tablet (4 mg) by mouth every 8 hours as needed for nausea 30 tablet 4     oxyCODONE IR (ROXICODONE) 10 MG tablet Take 1 tablet (10 mg) by mouth 3 times daily 90 tablet 0     polyethylene glycol (MIRALAX/GLYCOLAX) packet Take 17 g by mouth daily 7 packet 0     promethazine (PHENERGAN) 25 MG tablet TAKE ONE TABLET BY MOUTH TWICE A DAY AS NEEDED NAUSEA (Patient taking differently: daily as needed TAKE ONE TABLET BY MOUTH TWICE A DAY AS NEEDED NAUSEA) 30 tablet 7     promethazine (PHENERGAN) 25 MG/ML IV injection INJECT 1 ML INTRAMUSCULARLY EVERY 6 HOURS AS NEEDED (Patient taking differently: daily as needed INJECT 1 ML INTRAMUSCULARLY EVERY 6 HOURS AS NEEDED) 6 mL 11     SYMBICORT 160-4.5 MCG/ACT Inhaler INHALE TWO PUFFS BY MOUTH TWICE A DAY (Patient taking differently: 2 times daily ) 1 Inhaler 11     blood glucose (NO BRAND SPECIFIED) lancets standard Use to test blood sugar 1 time daily 100 each 3     blood glucose (NO BRAND SPECIFIED) test strip Use to test blood sugar 1 time daily 100 strip 11     folic acid (FOLVITE) 1 MG tablet Take 1 tablet by mouth daily (Patient not taking: Reported on 8/26/2021)       naloxone (NARCAN) 4 MG/0.1ML nasal spray Spray 1 spray (4 mg) into one nostril alternating nostrils once as needed for opioid reversal every 2-3 minutes until assistance arrives 0.2 mL 3     order for DME [employer]. Patient can use for three months    Diagnosis Right ankle fracture and surgery 2/10/2020 1 Units 0     order for DME [employer]. Patient can use for another two months  Diagnosis: Right ankle bimalleolar fractures, open reduction internal fixation on 2/10/2020 1 Units 0       Family History  Family History   Problem Relation Age of Onset     Hyperlipidemia Father      Depression Sister      Fibromyalgia  Sister      Hyperlipidemia Sister      Cerebrovascular Disease Maternal Grandmother          of a stroke     Diabetes Maternal Grandmother      Hyperlipidemia Paternal Grandfather      Diabetes Maternal Aunt      Depression Other      Melanoma No family hx of      Skin Cancer No family hx of      Anesthesia Reaction No family hx of      Cardiovascular No family hx of      Deep Vein Thrombosis (DVT) No family hx of        The complete review of systems is negative other than noted in the HPI or here.         Anesthesia Pre-Procedure Evaluation    Patient: Molly Teague   MRN: [id number] : 1985        Preoperative Diagnosis: Pain in joint of right ankle [M25.571]   Procedure : Procedure(s):  Right ankle hardware removal     Past Medical History:   Diagnosis Date     Acute pyelonephritis 2017     Altered mental state 3/29/2018     Arthritis      Blood clotting disorder (H)     P E     History of blood transfusion      Hypertension      Long-term (current) use of anticoagulants [Z79.01] 2016     PE (pulmonary embolism) 2016     Rheumatism      Scleroderma (H) 2016     Uncomplicated asthma       Past Surgical History:   Procedure Laterality Date     ANGIOGRAM  2015      SECTION  2014     OPEN REDUCTION INTERNAL FIXATION ANKLE Right 2/10/2020    Procedure: Right ankle open reduction internal fixation;  Surgeon: Natanael Villalta MD;  Location: [facility] OR     THROAT SURGERY        Allergies   Allergen Reactions     Blood Transfusion Related (Informational Only) Other (See Comments)     Patient has a history of a clinically significant antibody against RBC antigens.  A delay in compatible RBCs may occur.     No Known Allergies      Pollen Extract      Seasonal Allergies      Shellfish-Derived Products Nausea and Rash     Rash on face      Social History     Tobacco Use     Smoking status: Never Smoker     Smokeless tobacco: Never Used   Substance Use Topics     Alcohol use: Yes      Comment: Socially once on a weekend if any      Wt Readings from Last 1 Encounters:   08/24/21 72.9 kg (160 lb 12.8 oz)              ROS/MED HX  The complete review of systems is negative other than noted in the HPI or here.  Patient denies recent illness, fever and respiratory infection during past month.  Pt denies steroid use during past year.    ENT/Pulmonary: Comment: Uses symbicort bid. Last used albuterol yesterday after chasing her son.    Scleroderma, mild ILD    PFT 6/24/21:  FEV1 2.93,  FEV1/FVC 83.78,  DLCO 99%      (+) asthma Treatment: Inhaler daily,   (-) tobacco use   Neurologic:     (+) peripheral neuropathy,  (-) no seizures and no CVA   Cardiovascular:     (+) hypertension-----Previous cardiac testing   Echo: Date: 7/9/21 Results:  1. Normal left ventricular chamber size, no regional wall motion abnormalities, calculated 2-D   linear ejection fraction 59 %.   2. Normal right ventricular chamber size, normal systolic function, estimated right ventricular   systolic pressure 23 mmHg (systolic blood pressure 139 mmHg).   3. No significant valvular heart disease.   4. Tiny posterior pericardial effusion.   5. Compared to the report of 04/30/2020 no significant change has occurred.    Stress Test: Date: Results:    ECG Reviewed: Date: Results:    Cath: Date: Results:      METS/Exercise Tolerance: 4 - Raking leaves, gardening Comment: Activity limited due to significant knee & ankle pain.   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  Neurologic: Awake, alert, oriented to name, place and time.   Neuropsychiatric: Calm, cooperative. Normal affect. Answers questions appropriately.    ** Patient's visit was done virtually today.  A full physical exam was not completed.  Please refer to the physical examination documented by the anesthesiologist in the anesthesia record on the day of surgery. **        PRIOR LABS/DIAGNOSTIC STUDIES:   All labs and imaging personally reviewed       3 views right ankle radiographs 8/24/2021  Impression:  1. Stable postsurgical changes of the right ankle. No evidence of  hardware complication.     2. Advanced degenerative changes of the tibiotalar joint, not  substantially changed from prior.      ECHOCARDIOGRAM 7/9/21  Final Impressions   1. Normal left ventricular chamber size, no regional wall motion abnormalities, calculated 2-D   linear ejection fraction 59 %.   2. Normal right ventricular chamber size, normal systolic function, estimated right ventricular   systolic pressure 23 mmHg (systolic blood pressure 139 mmHg).   3. No significant valvular heart disease.   4. Tiny posterior pericardial effusion.   5. Compared to the report of 04/30/2020 no significant change has occurred.      PFT  VC MAX PRE  L 3.5        FVC  L 3.5        FEV1  L 2.93        FEV1/FVC  % 83.78        WKD30-39%  L/s 3.19        PEF PRE  L/s 10.02        PIF PRE  L/s 7.46        FEF 50 % FIF 50 PRE  % 52.45        FET PRE  sec 7.11        DLCO   ml/(min*mmHg) 20.47        DLCOc  ml/(min*mmHg) 19.78        HB  g(Hb)/dL 14.6        VA  L 4.4        W7VufAuon  % 99        PulseRest  1/min 120        W8DpkArjm  % 91        PulseExer  1/min 160        EXER TIME  min 3        STEP HEIGHT PRE  Inch 9        % PRED VC MAX  % 104 %        FVC%  % 103 %        FEV1%  % 104 %        % PRED FEV1/FVC  % 100 %        % PRED FEF 25-75%  % 102 %        % PRED PEF  % 167 %        DLCO%  % 102 %        DLCOc%  % 99 %        PRED VC MAX   3.38        PRED FVC   3.38        PRED FEV 1   2.82        PRED FEV1/FVC   83.8        PRED FEF 25-75%   3.14        PRED PEF   6        PRED DLCO   20.1        PRED DLCOc   20.1          CBC:   Lab Results   Component Value Date    WBC 11.5 (H) 03/24/2021    WBC 13.4 (H) 03/22/2021    HGB 13.4 03/24/2021    HGB 15.2 03/22/2021    HCT 42.3 03/24/2021    HCT 48.2 (H) 03/22/2021     03/24/2021     03/22/2021     BMP:   Lab Results   Component Value Date     03/24/2021     02/24/2021    POTASSIUM 4.2 03/24/2021    POTASSIUM 4.2 02/24/2021    CHLORIDE 108 03/24/2021    CHLORIDE 109 02/24/2021    CO2 22 03/24/2021    CO2 25 02/24/2021    BUN 13 03/24/2021    BUN 11 02/24/2021    CR 1.10 (H) 03/24/2021    CR 0.95 02/24/2021    GLC 88 03/24/2021    GLC 73 02/24/2021     COAGS:   Lab Results   Component Value Date    PTT 39 (H) 03/30/2018    INR 1.15 (H) 03/30/2018     POC:   Lab Results   Component Value Date    BGM 64 (L) 03/24/2021    HCG Negative 03/24/2021    HCGS Negative 02/10/2020     HEPATIC:   Lab Results   Component Value Date    ALBUMIN 3.5 03/24/2021    PROTTOTAL 7.2 03/24/2021    ALT 41 03/24/2021    AST 32 03/24/2021    ALKPHOS 100 03/24/2021    BILITOTAL 0.3 03/24/2021    LEONIDES 25 03/24/2021     OTHER:   Lab Results   Component Value Date    LACT 1.9 03/24/2021    UMER 8.3 (L) 03/24/2021    MAG 1.6 01/25/2020    LIPASE 128 09/17/2020    TSH 1.39 03/24/2021    T4 0.91 01/25/2020    .0 (H) 06/08/2017      (H) 05/11/2017         The patient's records and results personally reviewed by this provider.     Outside records reviewed from: Care everywhere (echocardiogram @ [place])    COVID19 testing scheduled on 8/29/21      ASSESSMENT and PLAN  Molly Teague is a 37 y/o female who presents for pre-operative H&P in preparation for Right ankle hardware removal with Natanael Villalta MD on 9/1/21 at [facility] for treatment of Pain in joint of right ankle.    Pt has had prior anesthetic.  History of anesthetic complications - Motion sickness    She has the following specific operative considerations:   # JAKOB 2/8 = low risk  # VTE risk: 1.8%  # Risk of PONV score = 3.  If > 2, anti-emetic intervention recommended.  # Anesthesia considerations:  Refer to PAC assessment in anesthesia records    # Increased risk of postoperative nausea/vomiting: Recommend use of antiemetic agents in the perioperative period.       CARDIAC: METS 4      # RCRI : No serious cardiac risks.  0.4% risk of major adverse cardiac event.     #  Echo 7/9/21:  EF 59%, normal function     #  HTN, managed with lisinopril & norvasc       PULMONARY:     # Scleroderma, mild ILD    # Uses symbicort bid. Last used albuterol yesterday after chasing her son.    # PFT 6/24/21:  FEV1 2.93,  FEV1/FVC 83.78,  DLCO 99%    # Never smoked      GI:     # GERD w/ esophagitis, currently stable, asymptomatic on pepcid & prilosec       /RENAL:     # Hx renal crisis 2/2 prednisone in 2016    # Creatinine 1.33, GFR 52 on 6/15/21    ENDO: BMI 30   # No DM    HEME:     # Hx PE in 2016 during renal crisis. Not currently anticoagulated    # +RBC antibody    ORTHO:     # Chronic B/L knee pain    # Hx right ankle fx, s/p surgery w/ hardware    # Chronic diffuse pain       NEURO/PSYCH:     # anxiety, depression, PTSD    MISC:    # Neuropathy    # Raynauds      Patient is optimized and is acceptable candidate for the proposed procedure. No further diagnostic  evaluation is needed.    ** Patient's visit was done virtually today.  A full physical exam was not completed.  Please refer to the physical examination documented by the anesthesiologist in the anesthesia record on the day of surgery. **    Final plan per anesthesiologist on day of surgery.     Arrival time, NPO, shower and medication instructions provided by nursing staff today.  Preparing For Your Surgery handout given.        On the day of service:     Prep time: 15 minutes  Visit time: 10 minutes  Documentation time: 15 minutes  ------------------------------------------  Total time: 40 minutes      Anna Cruz PA-C  Preoperative Assessment Center  [facility]  [facility]  Phone: [phone]  Fax: [phone]

## 2021-08-26 NOTE — PROGRESS NOTES
Molly is a 36 year old who is being evaluated via a billable video visit.      How would you like to obtain your AVS? MyChart    HPI          Review of Systems   Objective    Vitals - Patient Reported  Pain Score: Severe Pain (6)        Physical Exam     MARII Nichols LPN    Depression Response    Patient completed the PHQ-9 assessment for depression and scored >9? Yes 14  Question 9 on the PHQ-9 was positive for suicidality? No  Does patient have current mental health provider? Yes    Is this a virtual visit? Yes   Does patient have suicidal ideation (positive question 9)? No - offer to place Mental Health Referral.  Patient declined referral/not needed    I personally notified the following: visit provider Anna Nichols LPN

## 2021-08-26 NOTE — PATIENT INSTRUCTIONS
Preparing for Your Surgery      Name:  Molly Teague   MRN:  [id number]   :  1985   Today's Date:  2021         Arriving for surgery:  Surgery date:  21  Arrival time:  10:15 am    Restrictions due to COVID 19:  One consistent visitor is allowed per patient  No ill visitors  All visitors must wear face mask     parking is available for anyone with mobility limitations or disabilities. (Monday- Friday 7 am- 5 pm)    Please come to:    [facility]  [address] [place] [id number]    Please check in on the 5th floor at the Ambulatory Surgery Center       What can I eat or drink?    -  You may eat and drink normally until 8 hours before surgery. (Until 3:00 am)  -  You may have clear liquids up to 4 hours before surgery. (Until 7:00 am)    Examples of clear liquids:  Water  Clear broth  Juices (apple, white grape, white cranberry  and cider) without pulp  Noncarbonated, powder based beverages  (lemonade and Amado-Aid)  Sodas (Sprite, 7-Up, ginger ale and seltzer)  Coffee or tea (without milk or cream)  Gatorade    --No alcohol for at least 24 hours before surgery    Which medicines can I take?    **Hold Aspirin (including GOODSENSE MIGRAINE) for 7 days before surgery - take your last dose on 21, or start holding now.**     Hold Multivitamins for 7 days before surgery.  Hold Supplements for 7 days before surgery.  Hold Ibuprofen (Advil, Motrin) for 1 day before surgery--unless otherwise directed by surgeon.  Hold Naproxen (Aleve) for 4 days before surgery.    **Bring inhalers with you to surgery.**    -  DO NOT take the following medications the day of surgery:  Polyethylene Glycol (Miralax)    -  PLEASE TAKE the following medications the day of surgery:  Acetaminophen (Tylenol)  Albuterol (Ventolin) inhaler  Amlodipine (Norvasc)  Buspirone (Buspar)  Duloxetine (Cymbalta)  Famotidine (Pepcid)  Gabapentin (Neurontin)  Hydroxyzine (Atarax)  Loratadine  (Claritin)  Lorazepam (Ativan)  Methocarbamol (Robaxin)  Omeprazole (Prilosec)  Ondansetron (Zofran)  Oxycodone (Roxicodone)  Symbicort Inhaler    How do I prepare myself?  - Please take 2 showers before surgery using Scrubcare or Hibiclens soap.    Use this soap only from the neck to your toes.     Leave the soap on your skin for one minute--then rinse thoroughly.      You may use your own shampoo and conditioner; no other hair products.   - Please remove all jewelry and body piercings.  - No lotions, deodorants or fragrance.  - No makeup or fingernail polish.   - Bring your ID and insurance card.    -If you have a Deep Brain Stimulator, a Spinal Cord Stimulator or any implanted Neuro Device you must bring the remote to the Surgery Center         - All patients are required to have a Covid-19 test within 4 days of surgery/procedure.      -Patients will be contacted by the [facility] scheduling team within 1 week of surgery to make an appointment.      - Patients may call the Scheduling team at [phone] if they have not been scheduled within 4 days of  surgery.      ALL PATIENTS ARE REQUIRED TO HAVE A RESPONSIBLE ADULT TO DRIVE AND BE IN ATTENDANCE WITH THEM FOR 24 HOURS FOLLOWING SURGERY       Questions or Concerns:    -For questions regarding the day of surgery please contact the Ambulatory Surgery Center at [phone].    -If you have health changes between today and your surgery please contact your surgeon.     For questions after surgery please call your surgeons office.

## 2021-08-29 ENCOUNTER — LAB (OUTPATIENT)
Dept: FAMILY MEDICINE | Facility: CLINIC | Age: 36
End: 2021-08-29
Attending: ORTHOPAEDIC SURGERY
Payer: MEDICARE

## 2021-08-29 DIAGNOSIS — Z11.59 ENCOUNTER FOR SCREENING FOR OTHER VIRAL DISEASES: ICD-10-CM

## 2021-08-29 LAB — SARS-COV-2 RNA RESP QL NAA+PROBE: NEGATIVE

## 2021-08-29 PROCEDURE — U0003 INFECTIOUS AGENT DETECTION BY NUCLEIC ACID (DNA OR RNA); SEVERE ACUTE RESPIRATORY SYNDROME CORONAVIRUS 2 (SARS-COV-2) (CORONAVIRUS DISEASE [COVID-19]), AMPLIFIED PROBE TECHNIQUE, MAKING USE OF HIGH THROUGHPUT TECHNOLOGIES AS DESCRIBED BY CMS-2020-01-R: HCPCS

## 2021-08-29 PROCEDURE — U0005 INFEC AGEN DETEC AMPLI PROBE: HCPCS

## 2021-09-01 ENCOUNTER — HOSPITAL ENCOUNTER (OUTPATIENT)
Facility: AMBULATORY SURGERY CENTER | Age: 36
End: 2021-09-01
Attending: ORTHOPAEDIC SURGERY
Payer: MEDICARE

## 2021-09-01 ENCOUNTER — ANESTHESIA (OUTPATIENT)
Dept: SURGERY | Facility: AMBULATORY SURGERY CENTER | Age: 36
End: 2021-09-01
Payer: MEDICARE

## 2021-09-01 VITALS
TEMPERATURE: 97.5 F | RESPIRATION RATE: 14 BRPM | BODY MASS INDEX: 29.64 KG/M2 | HEART RATE: 86 BPM | OXYGEN SATURATION: 97 % | SYSTOLIC BLOOD PRESSURE: 109 MMHG | HEIGHT: 61 IN | DIASTOLIC BLOOD PRESSURE: 73 MMHG | WEIGHT: 157 LBS

## 2021-09-01 DIAGNOSIS — Z98.890 S/P ORIF (OPEN REDUCTION INTERNAL FIXATION) FRACTURE: Primary | ICD-10-CM

## 2021-09-01 DIAGNOSIS — M25.571 ACUTE RIGHT ANKLE PAIN: ICD-10-CM

## 2021-09-01 DIAGNOSIS — Z87.81 S/P ORIF (OPEN REDUCTION INTERNAL FIXATION) FRACTURE: Primary | ICD-10-CM

## 2021-09-01 DIAGNOSIS — M25.571 PAIN IN JOINT OF RIGHT ANKLE: ICD-10-CM

## 2021-09-01 LAB
HCG UR QL: NEGATIVE
INTERNAL QC OK POCT: NORMAL

## 2021-09-01 PROCEDURE — 20680 REMOVAL OF IMPLANT DEEP: CPT | Mod: RT | Performed by: ORTHOPAEDIC SURGERY

## 2021-09-01 PROCEDURE — 20680 REMOVAL OF IMPLANT DEEP: CPT | Mod: RT

## 2021-09-01 PROCEDURE — 81025 URINE PREGNANCY TEST: CPT | Performed by: PATHOLOGY

## 2021-09-01 RX ORDER — CEFAZOLIN SODIUM 2 G/50ML
2 SOLUTION INTRAVENOUS
Status: COMPLETED | OUTPATIENT
Start: 2021-09-01 | End: 2021-09-01

## 2021-09-01 RX ORDER — OXYCODONE HYDROCHLORIDE 5 MG/1
5-10 TABLET ORAL EVERY 4 HOURS PRN
Qty: 20 TABLET | Refills: 0 | Status: SHIPPED | OUTPATIENT
Start: 2021-09-01 | End: 2021-11-10

## 2021-09-01 RX ORDER — CEFAZOLIN SODIUM 2 G/50ML
2 SOLUTION INTRAVENOUS SEE ADMIN INSTRUCTIONS
Status: DISCONTINUED | OUTPATIENT
Start: 2021-09-01 | End: 2021-09-01 | Stop reason: HOSPADM

## 2021-09-01 RX ORDER — ACETAMINOPHEN 325 MG/1
650 TABLET ORAL
Status: DISCONTINUED | OUTPATIENT
Start: 2021-09-01 | End: 2021-09-03 | Stop reason: HOSPADM

## 2021-09-01 RX ORDER — FENTANYL CITRATE 50 UG/ML
50 INJECTION, SOLUTION INTRAMUSCULAR; INTRAVENOUS EVERY 5 MIN PRN
Status: DISCONTINUED | OUTPATIENT
Start: 2021-09-01 | End: 2021-09-01 | Stop reason: HOSPADM

## 2021-09-01 RX ORDER — ONDANSETRON 2 MG/ML
4 INJECTION INTRAMUSCULAR; INTRAVENOUS EVERY 30 MIN PRN
Status: DISCONTINUED | OUTPATIENT
Start: 2021-09-01 | End: 2021-09-03 | Stop reason: HOSPADM

## 2021-09-01 RX ORDER — HYDROXYZINE HYDROCHLORIDE 25 MG/1
25 TABLET, FILM COATED ORAL
Status: DISCONTINUED | OUTPATIENT
Start: 2021-09-01 | End: 2021-09-03 | Stop reason: HOSPADM

## 2021-09-01 RX ORDER — DEXAMETHASONE SODIUM PHOSPHATE 4 MG/ML
INJECTION, SOLUTION INTRA-ARTICULAR; INTRALESIONAL; INTRAMUSCULAR; INTRAVENOUS; SOFT TISSUE PRN
Status: DISCONTINUED | OUTPATIENT
Start: 2021-09-01 | End: 2021-09-01

## 2021-09-01 RX ORDER — ONDANSETRON 2 MG/ML
INJECTION INTRAMUSCULAR; INTRAVENOUS PRN
Status: DISCONTINUED | OUTPATIENT
Start: 2021-09-01 | End: 2021-09-01

## 2021-09-01 RX ORDER — HYDROXYZINE HYDROCHLORIDE 25 MG/1
25 TABLET, FILM COATED ORAL EVERY 8 HOURS PRN
Qty: 20 TABLET | Refills: 0 | Status: SHIPPED | OUTPATIENT
Start: 2021-09-01 | End: 2021-11-10

## 2021-09-01 RX ORDER — BUPIVACAINE HYDROCHLORIDE 5 MG/ML
INJECTION, SOLUTION PERINEURAL PRN
Status: DISCONTINUED | OUTPATIENT
Start: 2021-09-01 | End: 2021-09-01 | Stop reason: HOSPADM

## 2021-09-01 RX ORDER — SODIUM CHLORIDE, SODIUM LACTATE, POTASSIUM CHLORIDE, CALCIUM CHLORIDE 600; 310; 30; 20 MG/100ML; MG/100ML; MG/100ML; MG/100ML
INJECTION, SOLUTION INTRAVENOUS CONTINUOUS
Status: DISCONTINUED | OUTPATIENT
Start: 2021-09-01 | End: 2021-09-01 | Stop reason: HOSPADM

## 2021-09-01 RX ORDER — OXYCODONE HYDROCHLORIDE 5 MG/1
10 TABLET ORAL
Status: DISCONTINUED | OUTPATIENT
Start: 2021-09-01 | End: 2021-09-03 | Stop reason: HOSPADM

## 2021-09-01 RX ORDER — MEPERIDINE HYDROCHLORIDE 25 MG/ML
12.5 INJECTION INTRAMUSCULAR; INTRAVENOUS; SUBCUTANEOUS
Status: DISCONTINUED | OUTPATIENT
Start: 2021-09-01 | End: 2021-09-03 | Stop reason: HOSPADM

## 2021-09-01 RX ORDER — PROPOFOL 10 MG/ML
INJECTION, EMULSION INTRAVENOUS CONTINUOUS PRN
Status: DISCONTINUED | OUTPATIENT
Start: 2021-09-01 | End: 2021-09-01

## 2021-09-01 RX ORDER — ACETAMINOPHEN 325 MG/1
975 TABLET ORAL ONCE
Status: COMPLETED | OUTPATIENT
Start: 2021-09-01 | End: 2021-09-01

## 2021-09-01 RX ORDER — FENTANYL CITRATE 50 UG/ML
INJECTION, SOLUTION INTRAMUSCULAR; INTRAVENOUS PRN
Status: DISCONTINUED | OUTPATIENT
Start: 2021-09-01 | End: 2021-09-01

## 2021-09-01 RX ORDER — ONDANSETRON 4 MG/1
4 TABLET, ORALLY DISINTEGRATING ORAL EVERY 30 MIN PRN
Status: DISCONTINUED | OUTPATIENT
Start: 2021-09-01 | End: 2021-09-03 | Stop reason: HOSPADM

## 2021-09-01 RX ORDER — GLYCOPYRROLATE 0.2 MG/ML
INJECTION, SOLUTION INTRAMUSCULAR; INTRAVENOUS PRN
Status: DISCONTINUED | OUTPATIENT
Start: 2021-09-01 | End: 2021-09-01

## 2021-09-01 RX ORDER — GABAPENTIN 300 MG/1
300 CAPSULE ORAL
Status: COMPLETED | OUTPATIENT
Start: 2021-09-01 | End: 2021-09-01

## 2021-09-01 RX ORDER — PROPOFOL 10 MG/ML
INJECTION, EMULSION INTRAVENOUS PRN
Status: DISCONTINUED | OUTPATIENT
Start: 2021-09-01 | End: 2021-09-01

## 2021-09-01 RX ORDER — SODIUM CHLORIDE, SODIUM LACTATE, POTASSIUM CHLORIDE, CALCIUM CHLORIDE 600; 310; 30; 20 MG/100ML; MG/100ML; MG/100ML; MG/100ML
INJECTION, SOLUTION INTRAVENOUS CONTINUOUS
Status: DISCONTINUED | OUTPATIENT
Start: 2021-09-01 | End: 2021-09-03 | Stop reason: HOSPADM

## 2021-09-01 RX ORDER — OXYCODONE HYDROCHLORIDE 5 MG/1
5 TABLET ORAL EVERY 4 HOURS PRN
Status: DISCONTINUED | OUTPATIENT
Start: 2021-09-01 | End: 2021-09-03 | Stop reason: HOSPADM

## 2021-09-01 RX ORDER — LIDOCAINE 40 MG/G
CREAM TOPICAL
Status: DISCONTINUED | OUTPATIENT
Start: 2021-09-01 | End: 2021-09-01 | Stop reason: HOSPADM

## 2021-09-01 RX ADMIN — ACETAMINOPHEN 975 MG: 325 TABLET ORAL at 10:33

## 2021-09-01 RX ADMIN — FENTANYL CITRATE 50 MCG: 50 INJECTION, SOLUTION INTRAMUSCULAR; INTRAVENOUS at 11:12

## 2021-09-01 RX ADMIN — GABAPENTIN 300 MG: 300 CAPSULE ORAL at 10:33

## 2021-09-01 RX ADMIN — SODIUM CHLORIDE, SODIUM LACTATE, POTASSIUM CHLORIDE, CALCIUM CHLORIDE: 600; 310; 30; 20 INJECTION, SOLUTION INTRAVENOUS at 10:41

## 2021-09-01 RX ADMIN — DEXAMETHASONE SODIUM PHOSPHATE 4 MG: 4 INJECTION, SOLUTION INTRA-ARTICULAR; INTRALESIONAL; INTRAMUSCULAR; INTRAVENOUS; SOFT TISSUE at 11:12

## 2021-09-01 RX ADMIN — ONDANSETRON 4 MG: 2 INJECTION INTRAMUSCULAR; INTRAVENOUS at 11:10

## 2021-09-01 RX ADMIN — GLYCOPYRROLATE 0.2 MG: 0.2 INJECTION, SOLUTION INTRAMUSCULAR; INTRAVENOUS at 11:09

## 2021-09-01 RX ADMIN — CEFAZOLIN SODIUM 2 G: 2 SOLUTION INTRAVENOUS at 11:00

## 2021-09-01 RX ADMIN — PROPOFOL 200 MG: 10 INJECTION, EMULSION INTRAVENOUS at 11:01

## 2021-09-01 RX ADMIN — SODIUM CHLORIDE, SODIUM LACTATE, POTASSIUM CHLORIDE, CALCIUM CHLORIDE: 600; 310; 30; 20 INJECTION, SOLUTION INTRAVENOUS at 10:52

## 2021-09-01 RX ADMIN — PROPOFOL 150 MCG/KG/MIN: 10 INJECTION, EMULSION INTRAVENOUS at 11:03

## 2021-09-01 RX ADMIN — FENTANYL CITRATE 50 MCG: 50 INJECTION, SOLUTION INTRAMUSCULAR; INTRAVENOUS at 11:09

## 2021-09-01 ASSESSMENT — MIFFLIN-ST. JEOR: SCORE: 1339.53

## 2021-09-01 NOTE — ANESTHESIA POSTPROCEDURE EVALUATION
Patient: Molly Teague    Procedure(s):  Right ankle hardware removal    Diagnosis:Pain in joint of right ankle [M25.571]  Diagnosis Additional Information: No value filed.    Anesthesia Type:  General    Note:  Disposition: Outpatient   Postop Pain Control: Uneventful            Sign Out: Well controlled pain   PONV: No   Neuro/Psych: Uneventful            Sign Out: Acceptable/Baseline neuro status   Airway/Respiratory: Uneventful            Sign Out: Acceptable/Baseline resp. status   CV/Hemodynamics: Uneventful            Sign Out: Acceptable CV status; No obvious hypovolemia; No obvious fluid overload   Other NRE: NONE   DID A NON-ROUTINE EVENT OCCUR? No           Last vitals:  Vitals Value Taken Time   /75 09/01/21 1215   Temp 36.4  C (97.5  F) 09/01/21 1215   Pulse 86 09/01/21 1215   Resp 20 09/01/21 1215   SpO2 100 % 09/01/21 1215   Vitals shown include unvalidated device data.    Electronically Signed By: Jorge Ceballos MD  September 1, 2021  2:09 PM

## 2021-09-01 NOTE — DISCHARGE INSTRUCTIONS
"[facility]  Home Care Following Anesthesia  For 24 hours after surgery:  1. Get plenty of rest.  A responsible adult must stay with you for at least 24 hours after you leave the surgery center.  2. Do not drive or use heavy equipment.  If you have weakness or tingling, don't drive or use heavy equipment until this feeling goes away.   3. Do not drink alcohol.   4. Avoid strenuous or risky activities.  Ask for help when climbing stairs.  5. You may feel lightheaded.  IF so, sit for a few minutes before standing.  Have someone help you get up.   6. If you have nausea (feel sick to your stomach): Drink only clear liquids such as apple juice, ginger ale, broth or 7-Up.  Rest may also help.  Be sure to drink enough fluids.  Move to a regular diet as you feel able.   7. You may have a slight fever.  Call the doctor if your fever is over 100 F (37.7 C) (taken under the tongue) or lasts longer than 24 hours.  8. You may have a dry mouth, a sore throat, muscle aches or trouble sleeping. These should go away after 24 hours.  9. Do not make important or legal decisions.   10. It is recommended to avoid smoking.        Today you received a Marcaine or bupivacaine block to numb the nerves near your surgery site.  This is a block using local anesthetic or \"numbing\" medication injected around the nerves to anesthetize or \"numb\" the area supplied by those nerves.  This block is injected into the muscle layer near your surgical site.  The medication may numb the location where you had surgery for 6-18 hours, but may last up to 24 hours.  If your surgical site is an arm or leg you should be careful with your affected limb, since it is possible to injure your limb without being aware of it due to the numbing.  Until full feeling returns, you should guard against bumping or hitting your limb, and avoid extreme hot or cold temperatures on the skin.  As the block wears off, the feeling will return as " a tingling or prickly sensation near your surgical site.  You will experience more discomfort from your incision as the feeling returns.  You may want to take a pain pill (a narcotic or Tylenol if this was prescribed by your surgeon) when you start to experience mild pain before the pain beccomes more severe.  If your pain medications do not control your pain you should notifiy your surgeon.    Tips for taking pain medications  To get the best pain relief possible, remember these points:    Take pain medications as directed, before pain becomes severe.    Pain medication can upset your stomach: taking it with food may help.    Constipation is a common side effect of pain medication. Drink plenty of  fluids.    Eat foods high in fiber. Take a stool softener if recommended by your doctor or pharmacist.    Do not drink alcohol, drive or operate machinery while taking pain medications.    Ask about other ways to control pain, such as with heat, ice or relaxation.    Tylenol/Acetaminophen Consumption  To help encourage the safe use of acetaminophen, the makers of TYLENOL  have lowered the maximum daily dose for single-ingredient Extra Strength TYLENOL  (acetaminophen) products sold in the [place] from 8 pills per day (4,000 mg) to 6 pills per day (3,000 mg). The dosing interval has also changed from 2 pills every 4-6 hours to 2 pills every 6 hours.    If you feel your pain relief is insufficient, you may take Tylenol/Acetaminophen in addition to your narcotic pain medication.     Be careful not to exceed 3,000 mg of Tylenol/Acetaminophen in a 24 hour period from all sources.    If you are taking extra strength Tylenol/acetaminophen (500 mg), the maximum dose is 6 tablets in 24 hours.    If you are taking regular strength acetaminophen (325 mg), the maximum dose is 9 tablets in 24 hours.    Call a doctor for any of the followin. Signs of infection (fever, growing tenderness at the surgery site, a large amount of  drainage or bleeding, severe pain, foul-smelling drainage, redness, swelling).  2. It has been over 8 to 10 hours since surgery and you are still not able to urinate (pass water).  3. Headache for over 24 hours.  4. Numbness, tingling or weakness the day after surgery (if you had spinal anesthesia).  5. Signs of Covid-19 infection (temperature over 100 degrees, shortness of breath, cough, loss of taste/smell, generalized body aches, persistent headache, chills, sore throat, nausea/vomiting/diarrhea)  Your doctor is:       Dr. Natanael Villalta, Orthopaedics: [phone]               Or dial [phone] and ask for the resident on call for:  Orthopaedics  For emergency care, call the:  [facility] Emergency Department: [phone] (TTY for hearing impaired: [phone])

## 2021-09-01 NOTE — ANESTHESIA CARE TRANSFER NOTE
Patient: Molly Teague    Procedure(s):  Right ankle hardware removal    Diagnosis: Pain in joint of right ankle [M25.571]  Diagnosis Additional Information: No value filed.    Anesthesia Type:   General     Note:    Oropharynx: oropharynx clear of all foreign objects and spontaneously breathing  Level of Consciousness: drowsy  Oxygen Supplementation: room air    Independent Airway: airway patency satisfactory and stable  Dentition: dentition unchanged  Vital Signs Stable: post-procedure vital signs reviewed and stable  Report to RN Given: handoff report given  Patient transferred to: PACU    Handoff Report: Identifed the Patient, Identified the Reponsible Provider, Reviewed the pertinent medical history, Discussed the surgical course, Reviewed Intra-OP anesthesia mangement and issues during anesthesia, Set expectations for post-procedure period and Allowed opportunity for questions and acknowledgement of understanding      Vitals:  Vitals Value Taken Time   /56 09/01/21 1142   Temp 36.4  C (97.6  F) 09/01/21 1142   Pulse 77 09/01/21 1142   Resp 16 09/01/21 1142   SpO2 98 % 09/01/21 1142       Electronically Signed By: CHRIST Rubio CRNA  September 1, 2021  11:48 AM

## 2021-09-07 DIAGNOSIS — G89.4 CHRONIC PAIN SYNDROME: Chronic | ICD-10-CM

## 2021-09-07 RX ORDER — OXYCODONE HYDROCHLORIDE 10 MG/1
10 TABLET ORAL 3 TIMES DAILY
Qty: 90 TABLET | Refills: 0 | Status: SHIPPED | OUTPATIENT
Start: 2021-09-07 | End: 2021-10-12

## 2021-09-07 NOTE — OP NOTE
Procedure Date: 09/01/2021    PREOPERATIVE DIAGNOSIS:  Painful retained hardware, right ankle.    POSTOPERATIVE DIAGNOSIS:  Painful retained hardware, right ankle.    PROCEDURE:  Right ankle hardware removal.    SURGEON:  Natanael Villalta MD.    ASSISTANT:  Beba Aguilar PA-C.   Ms. Aguilar's assistance was required in order to provide assistance with positioning, the surgery itself, holding retractors, closure of the wound and application of immobilization devices.  At the time of the surgery, there was no available help from an orthopedic trainee.    COMPLICATIONS:  None.    DRAINS:  None.    ESTIMATED BLOOD LOSS:  Less than 20 mL.    ANESTHESIA:  General endotracheal.    INDICATIONS FOR PROCEDURE:  Please refer to clinic note for further details regarding indications Mrs. Teague case.    DESCRIPTION OF PROCEDURE:  On August 09/01/2021, patient was taken to surgery.  Preoperative antibiotics were administered to the patient prior to arrival to the OR.    After successful induction of general endotracheal anesthesia, she was placed supine on the operating table.  The right lower extremity was prepped and draped in sterile fashion.  After exsanguination by gravity, and tourniquet cuff was inflated to 300 mmHg on the proximal third of the right thigh.    The pause for the cause was performed according to institution's policy which confirmed laterality of the procedure.    An incision was made along the most anteromedial portion of the distal tibia.  We proceeded with extraction of a single screw across the medial malleolus.  Another incision was made laterally, which we proceeded with extraction of the 5 screws in the plate.  All screws be removed in 1 piece.    Copious irrigation was applied.  Tourniquet cuff was deflated.  Satisfactory hemostasis was accomplished.  Wound was closed in layers and sterile dressings were applied.  The patient was placed in a tall Cam walker and transferred in stable condition to  PACU.    PLAN:  The patient will remain weightbearing as tolerated with use of the CAM Walker.  CAM walker will be utilized at all times except for hygiene for the first 2 weeks from surgery.  At that time, sutures will be removed if indicated.  She will proceed with the use of the CAM Walker for comfort purposes, although she will be encouraged to discontinue the use of the CAM Walker.    The patient is not expected to require any physical therapy during the postoperative course; however, if that is her desire that will be performed without any restrictions.    The patient will also be granted the possibility of skipping the 6-week appointment, assuming that the ankle is working up to her expectations.  In the eventuality of her return to the 6-week appointment, no x-rays will be obtained.    Natanael Villalta MD        D: 2021   T: 2021   MT: ignacio    Name:     ANA KLEIN  MRN:      [id number]        Account:        [id number]   :      1985           Procedure Date: 2021     Document: [id number]

## 2021-09-10 PROBLEM — M24.541 CONTRACTURE OF HAND JOINT, RIGHT: Status: RESOLVED | Noted: 2021-04-30 | Resolved: 2021-09-10

## 2021-09-10 PROBLEM — M24.542 CONTRACTURE OF JOINT OF FINGER, LEFT: Status: RESOLVED | Noted: 2018-12-13 | Resolved: 2021-09-10

## 2021-09-10 NOTE — PROGRESS NOTES
Discharge Summary - Hand Therapy    Patient did not return to therapy.  Assume all goals were met to patient's satisfaction. D/C from hand therapy.

## 2021-09-14 NOTE — PROGRESS NOTES
Reason for visit:    Molly Teague came in to the clinic for a two week post op check.    Her surgery was done 9/1/21 by Dr Villalta.  She had Right ankle hardware removal     Assessment:    Molly came into the clinic in CAM walker WBAT    The Surgical wounds were exposed and found to be well-healed and without evidence of infection; so the sutures were removed. CMS was found to be intact.    Plan:     She was placed in CAM walker that will be utilized for comfort purposes and start to transition into a regular shoe.  She was told to remain WBAT     She has an appointment to see Dr. Villalta although she will be granted the possibility of skipping the 6 week appointment assuming the foot is working up to her standards.      She has our phone number and will call with questions or problems.        Jayleen Muñoz, ATC

## 2021-09-15 ENCOUNTER — OFFICE VISIT (OUTPATIENT)
Dept: ORTHOPEDICS | Facility: CLINIC | Age: 36
End: 2021-09-15
Payer: MEDICARE

## 2021-09-15 DIAGNOSIS — M25.571 PAIN IN JOINT OF RIGHT ANKLE: Primary | ICD-10-CM

## 2021-09-15 PROCEDURE — 99024 POSTOP FOLLOW-UP VISIT: CPT

## 2021-09-21 ENCOUNTER — PATIENT OUTREACH (OUTPATIENT)
Dept: NURSING | Facility: CLINIC | Age: 36
End: 2021-09-21
Payer: MEDICARE

## 2021-09-21 NOTE — PROGRESS NOTES
Clinic Care Coordination Contact    SW  outreach to pt for goal check in.     Pt reported she just opened [employer] message from CC today, requested call back later this week to review further.    Ingrid Wilkinson, HENRY   Social Work Clinic Care Coordinator   [facility]  [phone]  [email or web address]

## 2021-09-22 ENCOUNTER — LAB (OUTPATIENT)
Dept: LAB | Facility: CLINIC | Age: 36
End: 2021-09-22
Payer: MEDICARE

## 2021-09-22 DIAGNOSIS — M34.1 CREST (CALCINOSIS, RAYNAUD'S PHENOMENON, ESOPHAGEAL DYSFUNCTION, SCLERODACTYLY, TELANGIECTASIA) (H): ICD-10-CM

## 2021-09-22 DIAGNOSIS — D50.0 IRON DEFICIENCY ANEMIA DUE TO CHRONIC BLOOD LOSS: ICD-10-CM

## 2021-09-22 LAB
ANION GAP SERPL CALCULATED.3IONS-SCNC: 11 MMOL/L (ref 3–14)
BUN SERPL-MCNC: 17 MG/DL (ref 7–30)
CALCIUM SERPL-MCNC: 9.3 MG/DL (ref 8.5–10.1)
CHLORIDE BLD-SCNC: 111 MMOL/L (ref 94–109)
CO2 SERPL-SCNC: 19 MMOL/L (ref 20–32)
CREAT SERPL-MCNC: 1.33 MG/DL (ref 0.52–1.04)
ERYTHROCYTE [DISTWIDTH] IN BLOOD BY AUTOMATED COUNT: 14.6 % (ref 10–15)
GFR SERPL CREATININE-BSD FRML MDRD: 51 ML/MIN/1.73M2
GLUCOSE BLD-MCNC: 82 MG/DL (ref 70–99)
HCT VFR BLD AUTO: 41.3 % (ref 35–47)
HGB BLD-MCNC: 13.2 G/DL (ref 11.7–15.7)
IRON SATN MFR SERPL: 26 % (ref 15–46)
IRON SERPL-MCNC: 79 UG/DL (ref 35–180)
MCH RBC QN AUTO: 30 PG (ref 26.5–33)
MCHC RBC AUTO-ENTMCNC: 32 G/DL (ref 31.5–36.5)
MCV RBC AUTO: 94 FL (ref 78–100)
PLATELET # BLD AUTO: 320 10E3/UL (ref 150–450)
POTASSIUM BLD-SCNC: 3.8 MMOL/L (ref 3.4–5.3)
RBC # BLD AUTO: 4.4 10E6/UL (ref 3.8–5.2)
SODIUM SERPL-SCNC: 141 MMOL/L (ref 133–144)
TIBC SERPL-MCNC: 303 UG/DL (ref 240–430)
WBC # BLD AUTO: 14 10E3/UL (ref 4–11)

## 2021-09-22 PROCEDURE — 80048 BASIC METABOLIC PNL TOTAL CA: CPT

## 2021-09-22 PROCEDURE — 36415 COLL VENOUS BLD VENIPUNCTURE: CPT

## 2021-09-22 PROCEDURE — 85027 COMPLETE CBC AUTOMATED: CPT

## 2021-09-22 PROCEDURE — 83550 IRON BINDING TEST: CPT

## 2021-09-23 NOTE — RESULT ENCOUNTER NOTE
The kidney function is mildly low but similar to previous values seen over the last year.    The white blood cell count is mildly elevated.  There is been persistently elevated white blood cell count for greater than 1 year but this is slightly higher.  Iron levels are normal    If Molly is not feeling well, an appointment should be made to be seen

## 2021-09-28 ENCOUNTER — PATIENT OUTREACH (OUTPATIENT)
Dept: CARE COORDINATION | Facility: CLINIC | Age: 36
End: 2021-09-28

## 2021-09-28 NOTE — PROGRESS NOTES
Clinic Care Coordination Contact  [facility]/Voicemail    Clinical Data: Care Coordinator Outreach  Goal check in     Outreach attempted x 1. Attempted to leave a message on patient's voicemail with call back information but line connected, then disconnected.     Plan: Care Coordinator will try to reach patient again in 10 business days.    Ingrid Wilkinson FRANNY   Social Work Clinic Care Coordinator   [facility]  [phone]  [email or web address]

## 2021-10-04 ENCOUNTER — TELEPHONE (OUTPATIENT)
Dept: FAMILY MEDICINE | Facility: CLINIC | Age: 36
End: 2021-10-04

## 2021-10-04 NOTE — TELEPHONE ENCOUNTER
"S-(situation): Pt feels like she might pass out. She is sleepy and dizzy.      B-(background): She states she has history of low blood sugars and low BP     A-(assessment): No fever  Sleepy  Slightly slurred speech.  Oriented.  Nausea  Ate toast and jelly this morning but vomited half of it.   Drinking fluids  Urinating  No pain  Blood pressures low normal range per patient checks at home    R-(recommendations): she will have her family member drive her to the ED as she feels \"scared\".    "

## 2021-10-04 NOTE — TELEPHONE ENCOUNTER
Reason for call:  Low blood pressure symptoms    Symptom or request: dizziniss light headedness  93/71 5 min ago  96/71  30min ago   Down 185lbs from 146lbs      Duration (how long have symptoms been present): has appt coming up on the 19th.     Have you been treated for this before?     Phone Number patient can be reached at:  Home number on file [phone] (home)    Can we leave a detailed message on this number:      Call taken on 10/4/2021 at 12:00 PM by Hortencia Charles

## 2021-10-08 DIAGNOSIS — G89.4 CHRONIC PAIN SYNDROME: Chronic | ICD-10-CM

## 2021-10-08 NOTE — PROGRESS NOTES
Clinic Care Coordination Contact  [facility]/Voicemail    Clinical Data: Care Coordinator Outreach  Goal check in     Outreach attempted x 2. Attempted to leave a message on patient's voicemail with call back information but line connected, then disconnected.    Plan: CHW Care Coordinator will try to reach patient again in 3-5 business days.    HENRY Vee   Social Work Clinic Care Coordinator   [facility]  [phone]  [email or web address]

## 2021-10-09 ENCOUNTER — HEALTH MAINTENANCE LETTER (OUTPATIENT)
Age: 36
End: 2021-10-09

## 2021-10-11 NOTE — TELEPHONE ENCOUNTER
Routing refill request to provider for review/approval because:  Drug not on the [facility] refill protocol     Lali Garcia RN

## 2021-10-12 ENCOUNTER — TELEPHONE (OUTPATIENT)
Dept: FAMILY MEDICINE | Facility: CLINIC | Age: 36
End: 2021-10-12

## 2021-10-12 RX ORDER — OXYCODONE HYDROCHLORIDE 10 MG/1
TABLET ORAL
Qty: 90 TABLET | Refills: 0 | Status: SHIPPED | OUTPATIENT
Start: 2021-10-12 | End: 2021-11-09

## 2021-10-12 NOTE — TELEPHONE ENCOUNTER
S-(situation): covid +    B-(background): boyfriend and child are COVID + also in the household.  Quarantined.  Patient has some N/V.  Fatigue and body aches. She is vaccinated and her boyfriend is not.    A-(assessment): covid    R-(recommendations): to hydrate, treat symptoms. Can try to boost immune system with Vitamin C and or Zinc. If needed go to the ER. S &S to go to the ER gone over with the patient.    Reyna BAEZA RN

## 2021-10-12 NOTE — TELEPHONE ENCOUNTER
Reason for call:    Symptom or request:     patient called stating that she tested positive for covid --today-- testing was done at [facility]. She reports having an autoimmune disease. -- she is asking what next step is.   patient was immunized with pfizer x 2.    Sx started 10/01-- tested Saturday at [facility].     Patient does have an appt scheduled for 10/19--with dr crawley--fyi    Best Time:  any    Can we leave a detailed message on this number?  YES     Richelle SOLO  Station

## 2021-10-18 ENCOUNTER — PATIENT OUTREACH (OUTPATIENT)
Dept: CARE COORDINATION | Facility: CLINIC | Age: 36
End: 2021-10-18

## 2021-10-19 NOTE — PROGRESS NOTES
Clinic Care Coordination Contact  [facility]/Voicemail    Clinical Data: Care Coordination Outreach  Outreach attempted x 1.  10/8 SW CC L/M (2nd attempt)  CHW left message on patient's voicemail with call back information and requested return call.  Plan: CHW will try to reach patient again in 5-7 business days.    Current Action steps to achieve active Mental Health management goal:    I will see a psychiatrist.    I will get a cheek swab from psychiatry to determine which medication would be best for my chemistry.    Note: Patient has a PCP/Clinic appt on 10/19.    Miriam PUGA  Community Health Worker  [facility] Care Coordination  [facility]  [email or web address]   Office: [phone]     Patricia Webster (:  1956) is a 59 y.o. male,Established patient, here for evaluation of the following chief complaint(s):  Leg Swelling      ASSESSMENT/PLAN:  1. Chronic deep vein thrombosis (DVT) of distal vein of left lower extremity ([address]  Assessment & Plan:  US ordered, pt reports will not get d/t financial constraints  Eliquis 10mg BID x10 days, then 5mg BID x6 months  Educated on risks of bleeding  Follow-up 1 month or if not getting better  Orders:  -     US DUP LOWER EXTREMITY LEFT DEISY; Future  2. Cellulitis of left leg  Assessment & Plan:  Clindamycin [id number]  3. Atopic dermatitis, unspecified type      Return in about 4 weeks (around 3/18/2021). SUBJECTIVE/OBJECTIVE:  Pt presents for concern for recurrent blood clot in LLE. Hx DVT in 2019 in same extremity, resolved completely in Oct 2020. Pt was taking Eliquis 5mg intermittently for treatment, but stopped with resolution in October. 2-3 weeks ago started with swelling to ankle and foot, pain to calf and posterior thigh, as well as redness - now spreading above knee. Pt states swelling improves with elevation overnight, but pain is progressively worsening. Pt denies fevers, N/V/D, chest pains, or SOB. Current Outpatient Medications   Medication Sig Dispense Refill    apixaban (ELIQUIS) 5 MG TABS tablet Take 1 tablet by mouth 2 times daily Start with 10mg twice daily for 10 days then 5mg twice daily for 6 months 80 tablet 5    clindamycin (CLEOCIN) 300 MG capsule Take 1 capsule by mouth 3 times daily for 10 days 30 capsule 0     No current facility-administered medications for this visit. Review of Systems   Constitutional: Negative for chills, fatigue, fever and unexpected weight change. HENT: Negative. Eyes: Negative. Respiratory: Negative for cough, chest tightness, shortness of breath, wheezing and stridor. Cardiovascular: Positive for leg swelling. Negative for chest pain and palpitations.

## 2021-10-24 ENCOUNTER — NURSE TRIAGE (OUTPATIENT)
Dept: NURSING | Facility: CLINIC | Age: 36
End: 2021-10-24

## 2021-10-24 NOTE — TELEPHONE ENCOUNTER
"Patient calling reporting she had low blood pressure reading of 99/65 with an increased heart rate of 132. Patient states she \"panicked\" and her blood pressure reading now is 121/90 with a heart rate of 125. States she has pressure on her chest. Reports feeling weak on her right side of her body. Per guideline, advised patient to call 911. Patient refused disposition.     States she lives across from the emergency department and will have her  take her to the emergency department.     Ranjit Howard RN  [facility]       COVID 19 Nurse Triage Plan/Patient Instructions    Please be aware that novel coronavirus (COVID-19) may be circulating in the community. If you develop symptoms such as fever, cough, or SOB or if you have concerns about the presence of another infection including coronavirus (COVID-19), please contact your health care provider or visit [email or web address].     Disposition/Instructions    Call to EMS/911 recommended. Follow protocol based instructions.     Bring Your Own Device:  Please also bring your smart device(s) (smart phones, tablets, laptops) and their charging cables for your personal use and to communicate with your care team during your visit.    Thank you for taking steps to prevent the spread of this virus.  o Limit your contact with others.  o Wear a simple mask to cover your cough.  o Wash your hands well and often.    Resources    [facility]: About COVID-19: [email or web address]    CDC: What to Do If You're Sick: www.cdc.gov/coronavirus/2019-ncov/about/steps-when-sick.html    CDC: Ending Home Isolation: www.cdc.gov/coronavirus/2019-ncov/hcp/disposition-in-home-patients.html     CDC: Caring for Someone: www.cdc.gov/coronavirus/2019-ncov/if-you-are-sick/care-for-someone.html     [facility]: Interim Guidance for Hospital Discharge to Home: www.health.[facility].mn.us/diseases/coronavirus/hcp/hospdischarge.pdf    [facility] clinical " trials (COVID-19 research studies): [email or web address]/[email or web address]-trials     Below are the COVID-19 hotlines at the [place] Department of Health ([facility]). Interpreters are available.   o For health questions: Call 052-140-3142 or 1-206.532.8601 (7 a.m. to 7 p.m.)  o For questions about schools and childcare: Call 799-271-5068 or 1-151.697.5648 (7 a.m. to 7 p.m.)                       Reason for Disposition    Severe difficulty breathing (e.g., struggling for each breath, speaks in single words)    Additional Information    Negative: Difficult to awaken or acting confused (e.g., disoriented, slurred speech)    Protocols used: HIGH BLOOD PRESSURE-A-AH

## 2021-10-28 ENCOUNTER — PATIENT OUTREACH (OUTPATIENT)
Dept: NURSING | Facility: CLINIC | Age: 36
End: 2021-10-28
Payer: MEDICARE

## 2021-10-28 NOTE — PROGRESS NOTES
Clinic Care Coordination Contact    Community Health Worker Follow Up    Care Gaps:     Health Maintenance Due   Topic Date Due     HEPATITIS C SCREENING  Never done     ASTHMA ACTION PLAN  07/06/2019     MEDICARE ANNUAL WELLNESS VISIT  08/04/2021     INFLUENZA VACCINE (1) 09/01/2021       Postponed to next outreach     Goals: Not discussed.     Intervention and Education during outreach: Patient stated she is at therapy with her son and asked for a call back another day.     Note: Current goal is to manage mental health        - see psychiatry and determine what medication is best for patient's chemistry.     CHW Plan: will attempt outreach next week to discuss care gaps, current goal and also address any new concerns/goals.     Miriam PUGA  Community Health Worker  [facility]  Clinic Care Coordination  [facility]  [email or web address]   Office: [phone]

## 2021-10-28 NOTE — LETTER
[facility]  Patient Centered Plan of Care  About Me:        Patient Name:  Molly Teague    YOB: 1985  Age:         36 year old   [place] MRN:    [id number] Telephone Information:  Home Phone [phone]   Mobile [phone]       Address:  [address]  [facility] [place] [address] Email address:  [email or web address]      Emergency Contact(s)    Name Relationship Lgl Grd Work Phone Home Phone Mobile Phone   1. BENJAMIN MELENDEZ Significant ot* No  [phone]           Primary language:  English     needed? No   [place] [facility]:  [phone] op. 1  Other communication barriers:No data recorded  Preferred Method of Communication:  Mail  Current living arrangement: I live alone  Mobility Status/ Medical Equipment: Independent    Health Maintenance  Health Maintenance Reviewed: Due/Overdue   Health Maintenance Due   Topic Date Due     HEPATITIS C SCREENING  Never done     ASTHMA ACTION PLAN  07/06/2019     MEDICARE ANNUAL WELLNESS VISIT  08/04/2021     INFLUENZA VACCINE (1) 09/01/2021     My Access Plan  Medical Emergency 911   Primary Clinic Line [facility] - [phone]   24 Hour Appointment Line [phone] or  [phone]) (toll-free)   24 Hour Nurse Line [phone] (toll-free)   Preferred Urgent Care [facility], [phone]     Preferred Hospital [place], [place]  [phone]     Preferred Pharmacy [facility] [address] [facility]     Behavioral Health Crisis Line The National Suicide Prevention Lifeline at 1-372.507.5133 or 911     My Care Team Members  Patient Care Team       Relationship Specialty Notifications Start End    Grecia Barba DO PCP - General Internal Medicine Admissions 11/9/16     Phone: [phone] Pager: [phone] Fax: [phone] [address] [facility] [address]    Grecia Barba  DO Nayana Assigned PCP   1/28/18     Phone: [phone] Pager: [phone] Fax: [phone]        [address] [facility] [address]    Maritza Harrison MD MD OB/Gyn  4/29/19     Phone: [phone] Fax: [phone]         [address] [facility] [address]    Natanael Villalta MD Assigned Musculoskeletal Provider   10/23/20     Phone: [phone] Fax: [phone]         [address] [facility] [address]    Ingrid Wilkinson LSW Lead Care Coordinator Primary Care - CC Admissions 5/5/21     Phone: [phone] [address] [facility] [address]    Cindy Lorenzana MD Resident Internal Medicine  6/24/21     Phone: [phone] Fax: [phone]         [address] [facility] [address]    Anna Cruz PA-C Assigned Heart and Vascular Provider   9/5/21     Phone: [phone] Fax: [phone]         [facility], SURGERY [address] [facility] [address] [facility] [address]    Miriam Wan Community Health Worker Primary Care - CC  10/8/21     Phone: [phone]                 My Care Plans  Self Management and Treatment Plan  Goals and (Comments)  Goals        General     1. Mental Health Management (pt-stated)      Notes - Note edited  8/12/2021 10:15 AM by Ingrid Wilkinson LSW     Goal Statement: I will manage my mental health.  Date Goal set: 03/29/21 // updated 8/12/21  Barriers: mental illness;   Strengths: motivated to improve mental health  Date to Achieve By: 08/29/21 // extended 2/1/22  Patient expressed understanding of goal: yes    Action steps to achieve this goal:  1. I will see a counselor/therapist.  2. I will see a psychiatrist.   3. I will get a cheek swab from psychiatry to determine which medication would be best for my chemistry.  4. I will take medications as prescribed.  5. I will continue to work with my care team and care coordination.                Action Plans on File:   Asthma        Depression          Advance Care Plans/Directives Type:   No  data recorded    My Medical and Care Information  Problem List   Patient Active Problem List   Diagnosis     Systemic sclerosis (H)     Scleroderma with renal involvement (H)     History of ARDS     History of hemodialysis     Bilateral retinitis     History of pulmonary embolism     REX (generalized anxiety disorder)     History of Clostridium difficile     History of Helicobacter pylori infection     Limited systemic sclerosis (H)     Polyneuropathy associated with underlying disease (H)     AIN grade I     Gastroesophageal reflux disease with esophagitis     Chronic migraine without aura without status migrainosus, not intractable     Chronic pain syndrome     Aspiration of food     Severe episode of recurrent major depressive disorder, with psychotic features (H)     Severe persistent asthma without complication     PTSD (post-traumatic stress disorder)     Malignant essential hypertension     Mirena IUD- Remove by 09/2024     Anemia, chronic disease     Ankle fracture, right, closed, initial encounter     Stage 3 chronic kidney disease (H)     Sinus tachycardia     S/P ORIF (open reduction internal fixation) fracture     Closed right ankle fracture     Vomiting and diarrhea     Iron deficiency anemia due to chronic blood loss     Amnesia     Secondary hypertension     Arthritis     Pain in joint of right ankle      Current Medications and Allergies:   Current Outpatient Medications   Medication Instructions     acetaminophen (TYLENOL) 650 mg, Oral, EVERY 4 HOURS PRN     albuterol (VENTOLIN HFA) 108 (90 Base) MCG/ACT inhaler INHALE 2 PUFFS INTO THE LUNGS ONCE EVERY 4 HOURS AS NEEDED FOR SHORTNESS OF BREATH / DYSPNEA / WHEEZING     amLODIPine (NORVASC) 10 mg, Oral, DAILY     B-12 1,000 mcg, Oral, DAILY     blood glucose (NO BRAND SPECIFIED) lancets standard Use to test blood sugar 1 time daily     blood glucose (NO BRAND SPECIFIED) test strip Use to test blood sugar 1 time daily     busPIRone HCl (BUSPAR) 30 mg,  Oral, 2 TIMES DAILY     DULoxetine (CYMBALTA) 90 mg, Oral, DAILY     famotidine (PEPCID) 20 mg, Oral, 2 TIMES DAILY     gabapentin (NEURONTIN) 600 mg, Oral, 3 TIMES DAILY     GOODSENSE MIGRAINE FORMULA 250-250-65 MG per tablet TAKE ONE TABLET BY MOUTH EVERY 6 HOURS AS NEEDED FOR HEADACHES     hydrOXYzine (ATARAX) 25 MG tablet TAKE ONE TO TWO TABLETS BY MOUTH THREE TIMES A DAY AS NEEDED FOR ITCHING     hydrOXYzine (ATARAX) 25 mg, Oral, EVERY 8 HOURS PRN     lisinopril (ZESTRIL) 10 mg, Oral, DAILY     loratadine (CLARITIN) 10 mg, Oral, DAILY PRN     methocarbamol (ROBAXIN) 750 MG tablet TAKE ONE TABLET BY MOUTH FOUR TIMES A DAY AS NEEDED FOR MUSCLE SPASMS     montelukast (SINGULAIR) 10 mg, Oral, AT BEDTIME     naloxone (NARCAN) 4 mg, Alternating Nostrils, ONCE PRN, every 2-3 minutes until assistance arrives     omeprazole (PRILOSEC) 40 MG DR capsule TAKE ONE CAPSULE BY MOUTH TWICE A DAY     ondansetron (ZOFRAN-ODT) 4 mg, Oral, EVERY 8 HOURS PRN     order for [facility]. Patient can use for another two months<BR>Diagnosis: Right ankle bimalleolar fractures, open reduction internal fixation on 2/10/2020     order for [facility]. Patient can use for three months<BR><BR>Diagnosis Right ankle fracture and surgery 2/10/2020     oxyCODONE (ROXICODONE) 5-10 mg, Oral, EVERY 4 HOURS PRN     oxyCODONE IR (ROXICODONE) 10 MG tablet TAKE 1 TABLET BY MOUTH 3 TIMES DAILY     polyethylene glycol (MIRALAX) 17 g, Oral, DAILY     promethazine (PHENERGAN) 25 MG tablet TAKE ONE TABLET BY MOUTH TWICE A DAY AS NEEDED NAUSEA     promethazine (PHENERGAN) 25 MG/ML IV injection INJECT 1 ML INTRAMUSCULARLY EVERY 6 HOURS AS NEEDED     SYMBICORT 160-4.5 MCG/ACT Inhaler INHALE TWO PUFFS BY MOUTH TWICE A DAY     Care Coordination Start Date: 3/29/2021   Frequency of Care Coordination: monthly     Form Last Updated: 11/08/2021

## 2021-11-06 DIAGNOSIS — G89.4 CHRONIC PAIN SYNDROME: Chronic | ICD-10-CM

## 2021-11-06 DIAGNOSIS — K21.01 GASTROESOPHAGEAL REFLUX DISEASE WITH ESOPHAGITIS AND HEMORRHAGE: Primary | ICD-10-CM

## 2021-11-06 RX ORDER — SYRINGE WITH NEEDLE, 1 ML 25GX5/8"
SYRINGE, EMPTY DISPOSABLE MISCELLANEOUS
Status: CANCELLED | OUTPATIENT
Start: 2021-11-06

## 2021-11-08 ENCOUNTER — PATIENT OUTREACH (OUTPATIENT)
Dept: CARE COORDINATION | Facility: CLINIC | Age: 36
End: 2021-11-08
Payer: MEDICARE

## 2021-11-08 NOTE — PROGRESS NOTES
Please review resources sent via [employer] 8/12/21 to help pt find a new psychiatrist/therapist.     ERROL CC sent 90 day complex care plan.     HENRY Vee   Social Work Clinic Care Coordinator   [facility]  [phone]  [email or web address]

## 2021-11-08 NOTE — PROGRESS NOTES
Clinic Care Coordination Contact    Care Coordination Clinician Chart Review    Situation: Patient chart reviewed by care coordinator.       Background: Care Coordination initial assessment and enrollment to Care Coordination was 3/29/21. Patient centered goals were developed with participation from patient. ERROL WATTS handed patient off to CHW for continued outreach every 30 days.        Assessment: Per chart review, patient outreach completed by CC CHW on 10/28/21. Patient is actively working to accomplish goal.      Patient's goal remains appropriate and relevant at this time.       Patient is due for updated Plan of Care.      Annual assessment will be due 3/29/22.        Goals        1. Mental Health Management (pt-stated)       Goal Statement: I will manage my mental health.  Date Goal set: 03/29/21 // updated 8/12/21  Barriers: mental illness;   Strengths: motivated to improve mental health  Date to Achieve By: 08/29/21 // extended 2/1/22  Patient expressed understanding of goal: yes    Action steps to achieve this goal:  1. I will see a counselor/therapist.  2. I will see a psychiatrist.   3. I will get a cheek swab from psychiatry to determine which medication would be best for my chemistry.  4. I will take medications as prescribed.  5. I will continue to work with my care team and care coordination.                   Plan/Recommendations: The patient will continue working with Care Coordination to achieve goal as above.      CHW will involve ERROL WATTS as needed or if patient is ready to move to maintenance. Next outreach noted as 11/1/21.     ERROL CC will continue to monitor progress to goals and CHW outreaches every 6 weeks.     Plan of Care updated and mailed to patient: Yes, via HENRY Dia   Social Work Clinic Care Coordinator   [facility]  [phone]  [email or web address]

## 2021-11-08 NOTE — PROGRESS NOTES
Clinic Care Coordination Contact  [facility]/Voicemail    Clinical Data: Care Coordination Outreach  Outreach attempted x 1.  Left message on patient's voicemail with call back information and requested return call.  Plan: CHW will try to reach patient again in 5-10 business days.    CHW's message included the following information: Please review resources sent via [employer] 8/12/21 to help with finding a new psychiatrist/therapist.     Miriam PUGA  Community Health Worker  [facility] Care Coordination  [facility]  [email or web address]   Office: [phone]

## 2021-11-09 RX ORDER — OXYCODONE HYDROCHLORIDE 10 MG/1
TABLET ORAL
Qty: 90 TABLET | Refills: 0 | Status: SHIPPED | OUTPATIENT
Start: 2021-11-09 | End: 2022-01-14

## 2021-11-09 RX ORDER — NEEDLES, SAFETY 18GX1 1/2"
1 NEEDLE, DISPOSABLE MISCELLANEOUS DAILY PRN
Qty: 10 EACH | Refills: 11 | Status: SHIPPED | OUTPATIENT
Start: 2021-11-09 | End: 2023-05-10

## 2021-11-09 NOTE — PROGRESS NOTES
Assessment & Plan     REX (generalized anxiety disorder) -continue with medications unchanged.  She has yet to connect with a counselor, referral placed for community resources  - MENTAL HEALTH REFERRAL  - Adult; Outpatient Treatment; Individual/Couples/Family/Group Therapy/Health Psychology; [facility] [phone]; We will contact you to schedule the appointment or please call with any questions; Future    Chronic pain syndrome  -refill sent for oxycodone yesterday, December, January.  Follow-up in 3 months for next refill  - oxyCODONE (ROXICODONE) 10 MG tablet; Take 1 tablet (10 mg) by mouth 3 times daily  - oxyCODONE (ROXICODONE) 10 MG tablet; Take 1 tablet (10 mg) by mouth 3 times daily    Moderate persistent asthma without complication  - stable, refill provided  - albuterol (VENTOLIN HFA) 108 (90 Base) MCG/ACT inhaler; INHALE 2 PUFFS INTO THE LUNGS ONCE EVERY 4 HOURS AS NEEDED FOR SHORTNESS OF BREATH / DYSPNEA / WHEEZING  - budesonide-formoterol (SYMBICORT) 160-4.5 MCG/ACT Inhaler; INHALE TWO PUFFS BY MOUTH TWICE A DAY  - montelukast (SINGULAIR) 10 MG tablet; Take 1 tablet (10 mg) by mouth At Bedtime    Nausea and vomiting, intractability of vomiting not specified, unspecified vomiting type  - stable, refill provided  - ondansetron (ZOFRAN-ODT) 4 MG ODT tab; Take 1 tablet (4 mg) by mouth every 8 hours as needed for nausea    Callus of foot -she is already following with podiatry.  She has issues with poor wound healing due to scleroderma.  This callus will likely take a long time to heal.  Recommend she contact her podiatrist for sooner appointment then 3 weeks.  Referral placed to wound care in the meantime.  - Wound Care Referral; Future    AIN grade I -patient is overdue for follow-up at the [facility] where this was diagnosed.  Notified the patient and she is aware    High priority for 2019-nCoV vaccine  - COVID-19,PF,PFIZER (12+ Yrs PURPLE LABEL)    Need for hepatitis C screening test  -  "Hepatitis C Screen Reflex to HCV RNA Quant and Genotype; Future    Need for prophylactic vaccination and inoculation against influenza  - INFLUENZA VACCINE IM > 6 MONTHS VALENT IIV4 (AFLURIA/FLUZONE)             BMI:   Estimated body mass index is 29.29 kg/m  as calculated from the following:    Height as of 21: 1.549 m (5' 1\").    Weight as of this encounter: 70.3 kg (155 lb).       Patient Instructions   Pain   1. Will get an email for recertification of medical cannabis by end of the day  2. 3 month refills of oxycodone sent to pharmacy  3. Follow-up in 3 months.    Callus  1. Referral to wound RN  2. Call your podiatrist for sooner appointment       No follow-ups on file.    Grecia Barba [facility]    Amanda Barnes is a 36 year old who presents for the following health issues     HPI     Flu shot: Today  Medical Cannabis card: need new one - last one has .  Pfizer: 3rd dose - Today - Need consent   PHQ9/GAD7: handle    Chief Complaint   Patient presents with     Pain     Chronic pain syndrome + PTSD      Imm/Inj     Flu Shot     Imm/Inj     COVID-19 VACCINE     Check Open wound on the right foot -       Anxiety Follow-Up    How are you doing with your anxiety since your last visit? Worsened     Are you having other symptoms that might be associated with anxiety? Yes:  fatigue/nauseas    Have you had a significant life event? OTHER: after the surgery things went well but having some problems walking.     Are you feeling depressed? Yes:  sometimes    Do you have any concerns with your use of alcohol or other drugs? No    Social History     Tobacco Use     Smoking status: Never Smoker     Smokeless tobacco: Never Used   Substance Use Topics     Alcohol use: Yes     Comment: Socially once on a weekend if any     Drug use: No     Comment: None     REX-7 SCORE 2020 2021 3/23/2021   Total Score - - -   Total Score 15 11 18     PHQ 2021 3/23/2021 " 8/26/2021   PHQ-9 Total Score 10 17 14   Q9: Thoughts of better off dead/self-harm past 2 weeks Not at all Not at all Not at all   F/U: Thoughts of suicide or self-harm - - -   F/U: Self harm-plan - - -   F/U: Self-harm action - - -   F/U: Safety concerns - - -   PHQ-A Total Score - - -   PHQ-A Depressed most days in past year - - -   PHQ-A Mood affect on daily activities - - -   PHQ-A Suicide Ideation past 2 weeks - - -     Last PHQ-9 8/26/2021   1.  Little interest or pleasure in doing things 2   2.  Feeling down, depressed, or hopeless 1   3.  Trouble falling or staying asleep, or sleeping too much 2   4.  Feeling tired or having little energy 2   5.  Poor appetite or overeating 3   6.  Feeling bad about yourself 2   7.  Trouble concentrating 0   8.  Moving slowly or restless 2   Q9: Thoughts of better off dead/self-harm past 2 weeks 0   PHQ-9 Total Score 14   Difficulty at work, home, or with people -   In the past two weeks have you had thoughts of suicide or self harm? -   Do you have concerns about your personal safety or the safety of others? -   In the past 2 weeks have you thought about a plan or had intention to harm yourself? -   In the past 2 weeks have you acted on these thoughts in any way? -     REX-7  3/23/2021   1. Feeling nervous, anxious, or on edge 3   2. Not being able to stop or control worrying 3   3. Worrying too much about different things 3   4. Trouble relaxing 3   5. Being so restless that it is hard to sit still 0   6. Becoming easily annoyed or irritable 3   7. Feeling afraid, as if something awful might happen 3   REX-7 Total Score 18   If you checked any problems, how difficult have they made it for you to do your work, take care of things at home, or get along with other people? Very difficult       Chronic Pain Follow-Up    Where in your body do you have pain? Chronic Pain Syndrome  How has your pain affected your ability to work? 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 Other: na  How well are you sleeping? Poor  How has your mood been since your last visit? Slightly worse  Have you had a significant life event? Other: see details above  Other aggravating factors: none  Taking medication as directed? Yes  [place] , no aberrant [place]    PHQ-9 SCORE 2/23/2021 3/23/2021 8/26/2021   PHQ-9 Total Score MyChart - - -   PHQ-9 Total Score 10 17 14   PHQ-A Total Score - - -     REX-7 SCORE 12/17/2020 2/23/2021 3/23/2021   Total Score - - -   Total Score 15 11 18     No flowsheet data found.  CSA -- Encounter Level on 04/26/2017:    Controlled Substance Agreement - Scan on 5/4/2017  8:58 AM: CONTROLLED SUBSTANCE AGREEMENT     CSA -- Patient Level:    Controlled Substance Agreement - Opioid - Scan on 12/27/2020  1:35 PM  Controlled Substance Agreement - Opioid - Scan on 2/6/2019  6:23 PM            Medical Cannabis Certification      MEDICAL DECISION MAKING: Molly Teague is a 36 year old female who presents in consultation for certification into the [facility]abis [facility].    The known standards of accepted treatment options for the patient's qualifying condition (s) were discussed today. Molly Teague is a 36 year old female medical history including diagnotic tests, past medication and treatment trials, including outcome reviewed for the past 12 months. The patient's current medication and pan of care were also reviewed, The potential risks and benefits of medical cannabis was discussed. The patient is aware, that the scientific evidence of medical cannabis in regards to the therapeutic benefit and risk as well as  interaction with other drugs is limited.  The patient is also aware that  combined with other therapies medical cannabis may or may not  improve the qualifying condition and may also worsen other conditions  such as depression, anxiety, insomnia and substance use disorders. Molly Teague is a 36 year old female is aware of the formulations that are currently available. The patient is also aware that the cost of medical cannabis registration and purchase is not covered under medical insurance. The patient was also informed that there is limitations of use of medical cannabis in public places, while on the job, as well as across state lines.  Molly Teague is a 36 year old femaleis aware that this practitioner is under no obligation to to certify for medical marijuana, even if the patient has a qualifying medical condition.    If this practitioner certifies a qualifying medical condition(s) for this patient, they mutually agree to regular follow up of 12 months with the certifying practitioner.  The patient also agrees to periodic review of the medical cannabis registry, documentation of enrollment in the program, and  random urine drug screen      1. Patient presents to the clinic with request for medical cannabis  --is patient a [place] resident? YES    2. 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 Standard Treatments Tried  ----Opiates, physical therapy, Cymbalta, behavior therapy    5. Discussion about cost - initial registration is $200.  30 day supply is estimated to cost $200-500 which is not covered by health insurance    6. Does the patient have a disability the prevents him/her from self-administering of mediations, or is the patient unable to acquire medical cannabiss from a distribution center?   NO       RECOMMENDATIONS/PLAN:   [place] in the [facility] Medical Cannabis Program Yes  Patient e-mail collected  Yes  [place] statement given to the patient  Yes  Updated  medication and problem list given to the patient   Yes       Nausea and Vomiting -     Onset: Couple  month(s) ago     Description  Nausea: YES  Vomiting: YES- when had covid (10/1/21)  Vomiting up blood or coffee grounds: no  Abdominal Pain: no                 Weight loss: YES- 180lb in April and decrease since today 155lb                  Diarrhea: no  Dysuria: no                 Fever: no  Headaches: YES  Chest pain: no    Precipitating and/or Alleviating factors:    Anyone else in the family with this: no  Worse with food: no (waking up nausea) can be empty stomach and will have nausea or after eating  Increased anxiety: YES  Any new medications: no  If female, any chance of pregnancy: no No LMP recorded. (Menstrual status: IUD).  Therapies tried and outcome: na  --has constant nausea but it has been worse since having COVID in early Oct  --has chronic severe GERD that has been well controlled  --using zofran ~ 2 x week;  --using phenergan tablet very rarely, inj most rarely  --using CBD gummies which helps the most  --medical cannabis helps a lot with nausea    Fatigue    Onset: Ongoing - unable to get out of the bed until takes the pain med                                                     Description:  Description of activities and lifestyle: busy  Schedule and responsibilities: Take kids, autistic son, house chores, 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  How much sleep are you gettin hours at a time  Daily exercise: minimal exercise  Are there episodes of normal energy levels: yes - end of the night because the house is quiet    Accompanying Signs & Symptoms:  Falling asleep during the day: YES- trying to when had insomnia   Snoring: YES  Do you stop breathing while sleeping: no                 Night sweats: YES  Chest Pain: no  History of Alcohol/drug abuse:no  History of Depression: YES  Any new anxiety/stressors:YES  Abdominal pain: no   Change in appetite: sometimes                  Weight gain/loss: YES-  see above   Dark or bloody stools: no    Therapies tried and outcome: na    Has chronic fatigue - has been worse since COVID diagnosis in early Oct    Is going to bed earlier than normal due to fatigue    Does not feel that her sleep is restful/restorative     Not napping during the day           Derm: callus on the bottom of the right foot that is peeling off; there is associated pain; needing to use cane to ambulate.  Had recent ankle surgery so thinks the callus is because of 'shifting weight' when she walks.  Has follow-up with the podiatrist on 11/29          Review of Systems   Constitutional, HEENT, cardiovascular, pulmonary, GI, , musculoskeletal, neuro, skin, endocrine and psych systems are negative, except as otherwise noted.      Objective    /70   Pulse 107   Temp 99  F (37.2  C) (Tympanic)   Resp 16   Wt 70.3 kg (155 lb)   SpO2 97%   Breastfeeding No   BMI 29.29 kg/m    Body mass index is 29.29 kg/m .  Physical Exam   GENERAL APPEARANCE: alert and no distress  SKIN: On the plantar surface of the right 1st MTP there is a hardened callus with undermining, good healthy pink tissue at edges of wound base without drainage, surrounding erythema or odor.

## 2021-11-09 NOTE — TELEPHONE ENCOUNTER
"Pending Prescriptions:                       Disp   Refills    oxyCODONE IR (ROXICODONE) 10 MG tablet [P*90 tab*0            Sig: TAKE ONE TABLET BY MOUTH THREE TIMES A DAY    B-D LUER-EVA SYRINGE 25G X 1\" 3 ML MISC [*       PRN          Sig: USE AS DIRECTED    Routing refill request to provider for review/approval because:  Drug not on the [facility] refill protocol     Gopi Birmingham RN          "

## 2021-11-10 ENCOUNTER — OFFICE VISIT (OUTPATIENT)
Dept: FAMILY MEDICINE | Facility: CLINIC | Age: 36
End: 2021-11-10
Payer: MEDICARE

## 2021-11-10 VITALS
DIASTOLIC BLOOD PRESSURE: 70 MMHG | OXYGEN SATURATION: 97 % | TEMPERATURE: 99 F | WEIGHT: 155 LBS | SYSTOLIC BLOOD PRESSURE: 114 MMHG | BODY MASS INDEX: 29.29 KG/M2 | RESPIRATION RATE: 16 BRPM | HEART RATE: 107 BPM

## 2021-11-10 DIAGNOSIS — L84 CALLUS OF FOOT: ICD-10-CM

## 2021-11-10 DIAGNOSIS — J45.40 MODERATE PERSISTENT ASTHMA WITHOUT COMPLICATION: ICD-10-CM

## 2021-11-10 DIAGNOSIS — F41.1 GAD (GENERALIZED ANXIETY DISORDER): Primary | ICD-10-CM

## 2021-11-10 DIAGNOSIS — Z11.59 NEED FOR HEPATITIS C SCREENING TEST: ICD-10-CM

## 2021-11-10 DIAGNOSIS — G89.4 CHRONIC PAIN SYNDROME: Chronic | ICD-10-CM

## 2021-11-10 DIAGNOSIS — R11.2 NAUSEA AND VOMITING, INTRACTABILITY OF VOMITING NOT SPECIFIED, UNSPECIFIED VOMITING TYPE: ICD-10-CM

## 2021-11-10 DIAGNOSIS — Z23 HIGH PRIORITY FOR 2019-NCOV VACCINE: ICD-10-CM

## 2021-11-10 DIAGNOSIS — Z23 NEED FOR PROPHYLACTIC VACCINATION AND INOCULATION AGAINST INFLUENZA: ICD-10-CM

## 2021-11-10 DIAGNOSIS — K62.82 AIN GRADE I: ICD-10-CM

## 2021-11-10 PROBLEM — N18.31 STAGE 3A CHRONIC KIDNEY DISEASE (H): Status: ACTIVE | Noted: 2017-02-27

## 2021-11-10 PROCEDURE — 0004A COVID-19,PF,PFIZER (12+ YRS): CPT | Performed by: INTERNAL MEDICINE

## 2021-11-10 PROCEDURE — 90686 IIV4 VACC NO PRSV 0.5 ML IM: CPT | Performed by: INTERNAL MEDICINE

## 2021-11-10 PROCEDURE — 99214 OFFICE O/P EST MOD 30 MIN: CPT | Mod: 25 | Performed by: INTERNAL MEDICINE

## 2021-11-10 PROCEDURE — G0008 ADMIN INFLUENZA VIRUS VAC: HCPCS | Performed by: INTERNAL MEDICINE

## 2021-11-10 PROCEDURE — 91300 COVID-19,PF,PFIZER (12+ YRS): CPT | Performed by: INTERNAL MEDICINE

## 2021-11-10 RX ORDER — OXYCODONE HYDROCHLORIDE 10 MG/1
10 TABLET ORAL
Qty: 90 TABLET | Refills: 0 | Status: SHIPPED | OUTPATIENT
Start: 2021-12-10 | End: 2022-01-09

## 2021-11-10 RX ORDER — ONDANSETRON 4 MG/1
4 TABLET, ORALLY DISINTEGRATING ORAL EVERY 8 HOURS PRN
Qty: 30 TABLET | Refills: 4 | Status: SHIPPED | OUTPATIENT
Start: 2021-11-10 | End: 2023-05-10

## 2021-11-10 RX ORDER — ALBUTEROL SULFATE 90 UG/1
AEROSOL, METERED RESPIRATORY (INHALATION)
Qty: 18 G | Refills: 11 | Status: SHIPPED | OUTPATIENT
Start: 2021-11-10 | End: 2022-11-29

## 2021-11-10 RX ORDER — OXYCODONE HYDROCHLORIDE 10 MG/1
10 TABLET ORAL 3 TIMES DAILY
Qty: 90 TABLET | Refills: 0 | Status: SHIPPED | OUTPATIENT
Start: 2022-01-09 | End: 2022-02-08

## 2021-11-10 RX ORDER — BUDESONIDE AND FORMOTEROL FUMARATE DIHYDRATE 160; 4.5 UG/1; UG/1
AEROSOL RESPIRATORY (INHALATION)
Qty: 10.2 G | Refills: 11 | Status: SHIPPED | OUTPATIENT
Start: 2021-11-10 | End: 2023-05-10

## 2021-11-10 RX ORDER — MONTELUKAST SODIUM 10 MG/1
10 TABLET ORAL AT BEDTIME
Qty: 90 TABLET | Refills: 3 | Status: SHIPPED | OUTPATIENT
Start: 2021-11-10 | End: 2022-12-15

## 2021-11-10 ASSESSMENT — ANXIETY QUESTIONNAIRES
GAD7 TOTAL SCORE: 14
2. NOT BEING ABLE TO STOP OR CONTROL WORRYING: NEARLY EVERY DAY
5. BEING SO RESTLESS THAT IT IS HARD TO SIT STILL: SEVERAL DAYS
7. FEELING AFRAID AS IF SOMETHING AWFUL MIGHT HAPPEN: SEVERAL DAYS
6. BECOMING EASILY ANNOYED OR IRRITABLE: SEVERAL DAYS
IF YOU CHECKED OFF ANY PROBLEMS ON THIS QUESTIONNAIRE, HOW DIFFICULT HAVE THESE PROBLEMS MADE IT FOR YOU TO DO YOUR WORK, TAKE CARE OF THINGS AT HOME, OR GET ALONG WITH OTHER PEOPLE: SOMEWHAT DIFFICULT
1. FEELING NERVOUS, ANXIOUS, OR ON EDGE: NEARLY EVERY DAY
3. WORRYING TOO MUCH ABOUT DIFFERENT THINGS: NEARLY EVERY DAY

## 2021-11-10 ASSESSMENT — PATIENT HEALTH QUESTIONNAIRE - PHQ9: 5. POOR APPETITE OR OVEREATING: MORE THAN HALF THE DAYS

## 2021-11-10 NOTE — PATIENT INSTRUCTIONS
Pain   1. Will get an email for recertification of medical cannabis by end of the day  2. 3 month refills of oxycodone sent to pharmacy  3. Follow-up in 3 months.    Callus  1. Referral to wound RN  2. Call your podiatrist for sooner appointment

## 2021-11-11 ASSESSMENT — PATIENT HEALTH QUESTIONNAIRE - PHQ9: SUM OF ALL RESPONSES TO PHQ QUESTIONS 1-9: 12

## 2021-11-11 ASSESSMENT — ANXIETY QUESTIONNAIRES: GAD7 TOTAL SCORE: 14

## 2021-11-16 ENCOUNTER — TELEPHONE (OUTPATIENT)
Dept: FAMILY MEDICINE | Facility: CLINIC | Age: 36
End: 2021-11-16
Payer: MEDICARE

## 2021-11-16 NOTE — TELEPHONE ENCOUNTER
Received call from wound RN - I had referred patient due to foot wound.  She sees podiatry Dr. Cruz; he likes to have all of his patients wound care done by him.  She has appointment with him but not until end of Nov.  She should contact his office directly to arrange a sooner follow-up

## 2021-11-17 NOTE — TELEPHONE ENCOUNTER
Patient is called and she has already heard from the wound care RN.  And she has a call into Dr. Shukla office. Reyna BAEZA RN

## 2021-11-29 ENCOUNTER — OFFICE VISIT (OUTPATIENT)
Dept: ORTHOPEDICS | Facility: CLINIC | Age: 36
End: 2021-11-29
Payer: MEDICARE

## 2021-11-29 DIAGNOSIS — Q66.70 PES CAVUS: ICD-10-CM

## 2021-11-29 DIAGNOSIS — M20.41 HAMMER TOES OF BOTH FEET: ICD-10-CM

## 2021-11-29 DIAGNOSIS — R52 PAIN: Primary | ICD-10-CM

## 2021-11-29 DIAGNOSIS — M20.42 HAMMER TOES OF BOTH FEET: ICD-10-CM

## 2021-11-29 DIAGNOSIS — M25.571 PAIN IN JOINT, ANKLE AND FOOT, RIGHT: ICD-10-CM

## 2021-11-29 DIAGNOSIS — L97.512 ULCER OF RIGHT FOOT WITH FAT LAYER EXPOSED (H): ICD-10-CM

## 2021-11-29 PROCEDURE — 99213 OFFICE O/P EST LOW 20 MIN: CPT | Mod: 25 | Performed by: PODIATRIST

## 2021-11-29 PROCEDURE — 11042 DBRDMT SUBQ TIS 1ST 20SQCM/<: CPT | Performed by: PODIATRIST

## 2021-11-29 NOTE — LETTER
11/29/2021         RE: Molly Teague  [address] [place] [facility] [address]        Dear Colleague,    Thank you for referring your patient, Molly Teague, to the [facility] [place]. Please see a copy of my visit note below.    Chief Complaint   Patient presents with     RECHECK     Pain, right foot. Patient stated that she had a bump on the bottom on her foot. Patient stated that this has developed a wound. Inserts are only a few months old and falling apart.            Allergies   Allergen Reactions     Blood Transfusion Related (Informational Only) Other (See Comments)     Patient has a history of a clinically significant antibody against RBC antigens.  A delay in compatible RBCs may occur.     No Known Allergies      Pollen Extract      Seasonal Allergies      Shellfish-Derived Products Nausea and Rash     Rash on face         Subjective: Molly is a 36 year old female who presents to the clinic today for a follow up of right foot ulcer. She needs a new pair of orthotics. Right ankle hardware removed.  She relates that a couple weeks ago, she started to get a wound on the bottom of the right foot.  She did see her primary doctor and they sent her to see us.  She relates that this causes pain to the foot.    Objective    PT and DP pulses are 1/4 bilaterally. CRT is 4 seconds. Absent pedal hair.   Gross sensation is diminished bilaterally.    Equinus is moderate bilaterally. . Severely hammered digits to the lesser digits BL with R>L. These are not reducible. pain in the medial and lateral ankles with palpation and ROM.   Nails normal bilaterally.      A pressure wound is noted at right  foot measuring 0.7cm x 0.4cm x 0.1cm.    Ortiz Classification: 2    Wound base: Pink/Granulation    Edges: hyperkeratotic    Drainage: scant/serous    Odor: no    Undermining: around entire wound This was sharply removed.     Bone Exposure: No    Clinical Signs of Infection:  No    After obtaining patient consent, the wound was irrigated with copious amounts of saline. A scalpel was then used to debride the wound into subcutaneous tissue. The wound edges were debrided back to healthy, bleeding tissue. The wound base exhibited healthy bleeding. Given the patient's lack of sensation, no anesthesia was necessary for the procedure.    Barriers to Healing: scleroderma, area of pressure.    Treatment Plan: Hydrofera Blue and Mepilex. Offloading with DH2 shoe.     Pt Ability to Follow Plan: Good.       Assessment: Molly is a 36-year-old with systemic sclerosis and scleroderma with new ulceration on the plantar right foot.  I have recommended that she start using dressings as above.  She does agree to this.  She also has pes cavus.  She needs new inserts.  I did take the ones that she had and will send them back to be resurfaced.  I have also asked the orthotic lab to check to see if she is covered to get a new pair.    Plan:   - Pt seen and evaluated  -Wound was debrided as described.  -Dressings as above.  -We will send her orthotics back to the lab.  - Pt to return to clinic in 2 weeks.      Kenroy Cruz DPM

## 2021-11-29 NOTE — PROGRESS NOTES
Chief Complaint   Patient presents with     RECHECK     Pain, right foot. Patient stated that she had a bump on the bottom on her foot. Patient stated that this has developed a wound. Inserts are only a few months old and falling apart.            Allergies   Allergen Reactions     Blood Transfusion Related (Informational Only) Other (See Comments)     Patient has a history of a clinically significant antibody against RBC antigens.  A delay in compatible RBCs may occur.     No Known Allergies      Pollen Extract      Seasonal Allergies      Shellfish-Derived Products Nausea and Rash     Rash on face         Subjective: Molly is a 36 year old female who presents to the clinic today for a follow up of right foot ulcer. She needs a new pair of orthotics. Right ankle hardware removed.  She relates that a couple weeks ago, she started to get a wound on the bottom of the right foot.  She did see her primary doctor and they sent her to see us.  She relates that this causes pain to the foot.    Objective    PT and DP pulses are 1/4 bilaterally. CRT is 4 seconds. Absent pedal hair.   Gross sensation is diminished bilaterally.    Equinus is moderate bilaterally. . Severely hammered digits to the lesser digits BL with R>L. These are not reducible. pain in the medial and lateral ankles with palpation and ROM.   Nails normal bilaterally.      A pressure wound is noted at right  foot measuring 0.7cm x 0.4cm x 0.1cm.    Ortiz Classification: 2    Wound base: Pink/Granulation    Edges: hyperkeratotic    Drainage: scant/serous    Odor: no    Undermining: around entire wound This was sharply removed.     Bone Exposure: No    Clinical Signs of Infection: No    After obtaining patient consent, the wound was irrigated with copious amounts of saline. A scalpel was then used to debride the wound into subcutaneous tissue. The wound edges were debrided back to healthy, bleeding tissue. The wound base exhibited healthy bleeding. Given the  patient's lack of sensation, no anesthesia was necessary for the procedure.    Barriers to Healing: scleroderma, area of pressure.    Treatment Plan: Hydrofera Blue and Mepilex. Offloading with DH2 shoe.     Pt Ability to Follow Plan: Good.       Assessment: Molly is a 36-year-old with systemic sclerosis and scleroderma with new ulceration on the plantar right foot.  I have recommended that she start using dressings as above.  She does agree to this.  She also has pes cavus.  She needs new inserts.  I did take the ones that she had and will send them back to be resurfaced.  I have also asked the orthotic lab to check to see if she is covered to get a new pair.    Plan:   - Pt seen and evaluated  -Wound was debrided as described.  -Dressings as above.  -We will send her orthotics back to the lab.  - Pt to return to clinic in 2 weeks.

## 2021-12-02 ENCOUNTER — PATIENT OUTREACH (OUTPATIENT)
Dept: NURSING | Facility: CLINIC | Age: 36
End: 2021-12-02
Payer: MEDICARE

## 2021-12-03 NOTE — PROGRESS NOTES
Clinic Care Coordination Contact    Community Health Worker Follow Up    Care Gaps:     Health Maintenance Due   Topic Date Due     LIPID  Never done     MICROALBUMIN  Never done     HEPATITIS C SCREENING  Never done     ASTHMA ACTION PLAN  07/06/2019     MEDICARE ANNUAL WELLNESS VISIT  08/04/2021     URINE DRUG SCREEN  12/17/2021     ASTHMA CONTROL TEST  01/02/2022     Patient acknowledged care gaps and stated that she did see overdue list when she logged into her MyChart.   Patient stated she will discuss an asthma action plan at her next visit w/PCP.  CHW asked if patient needed assistance with scheduling an annual wellness visit and/or other appts and patient stated she will schedule appts on her own.     Goals:   Goals Addressed as of 12/2/2021 at 9:43 PM                    Today    8/12/21       Patient Stated       1. Mental Health Management (pt-stated)   70%  30%    Added 3/29/21 by Aylin Vera LSW      Goal Statement: I will manage my mental health.  Date Goal set: 03/29/21 // updated 8/12/21  Barriers: mental illness;   Strengths: motivated to improve mental health  Date to Achieve By: 08/29/21 // extended 2/1/22  Patient expressed understanding of goal: yes    Action steps to achieve this goal:  1. I will see a counselor/therapist.  2. I will see a psychiatrist.   3. I will get a cheek swab from psychiatry to determine which medication would be best for my chemistry.  4. I will take medications as prescribed.  5. I will continue to work with my care team and care coordination.             Intervention and Education during outreach: Patient stated she is seeing a psychiatrist and that she mentioned getting a cheek swab from psychiatry to determine which medication would be best and they were unaware of this process.     CHW provided a reminder of upcoming VIDEO VISIT appt on 12/13 FIDEL Dasilva [facility].     Patient stated she has been doing well on current medications with no concerns or questions at  this time.     Patient stated she now has a boot for her foot and has been doing her own wound care and has assistance from others (wound RN) when needed.     CHW Plan: will outreach in one month and address any new needs for support and/or resources.  CHW will also address patient instructions from OV 11/10 with PCP:  Patient Instructions   Pain   1. Will get an email for recertification of medical cannabis by end of the day  2. 3 month refills of oxycodone sent to pharmacy  3. Follow-up in 3 months.  Callus  1. Referral to wound RN  2. Call your podiatrist for sooner appointment     CHW will remind patient to schedule a F/U appt with PCP in February 2022 (3 months from 11/10).      Miriam PUGA  Community Health Worker  [facility]  Clinic Care Coordination  [facility]  [email or web address]   Office: [phone]

## 2021-12-07 DIAGNOSIS — R11.0 NAUSEA: ICD-10-CM

## 2021-12-08 RX ORDER — PROMETHAZINE HYDROCHLORIDE 25 MG/ML
INJECTION, SOLUTION INTRAMUSCULAR; INTRAVENOUS
Qty: 6 ML | Refills: 11 | Status: SHIPPED | OUTPATIENT
Start: 2021-12-08 | End: 2022-01-28

## 2021-12-10 ENCOUNTER — LAB (OUTPATIENT)
Dept: LAB | Facility: CLINIC | Age: 36
End: 2021-12-10
Payer: MEDICARE

## 2021-12-10 DIAGNOSIS — D50.0 IRON DEFICIENCY ANEMIA DUE TO CHRONIC BLOOD LOSS: Primary | ICD-10-CM

## 2021-12-10 DIAGNOSIS — Z11.52 ENCOUNTER FOR SCREENING FOR COVID-19: ICD-10-CM

## 2021-12-10 DIAGNOSIS — Z11.59 NEED FOR HEPATITIS C SCREENING TEST: ICD-10-CM

## 2021-12-10 DIAGNOSIS — N18.31 STAGE 3A CHRONIC KIDNEY DISEASE (H): ICD-10-CM

## 2021-12-10 DIAGNOSIS — D50.0 IRON DEFICIENCY ANEMIA DUE TO CHRONIC BLOOD LOSS: ICD-10-CM

## 2021-12-10 DIAGNOSIS — M34.1 CREST (CALCINOSIS, RAYNAUD'S PHENOMENON, ESOPHAGEAL DYSFUNCTION, SCLERODACTYLY, TELANGIECTASIA) (H): ICD-10-CM

## 2021-12-10 LAB
ANION GAP SERPL CALCULATED.3IONS-SCNC: 6 MMOL/L (ref 3–14)
BUN SERPL-MCNC: 28 MG/DL (ref 7–30)
CALCIUM SERPL-MCNC: 9.1 MG/DL (ref 8.5–10.1)
CHLORIDE BLD-SCNC: 106 MMOL/L (ref 94–109)
CO2 SERPL-SCNC: 23 MMOL/L (ref 20–32)
CREAT SERPL-MCNC: 1.58 MG/DL (ref 0.52–1.04)
ERYTHROCYTE [DISTWIDTH] IN BLOOD BY AUTOMATED COUNT: 13.8 % (ref 10–15)
GFR SERPL CREATININE-BSD FRML MDRD: 42 ML/MIN/1.73M2
GLUCOSE BLD-MCNC: 78 MG/DL (ref 70–99)
HCT VFR BLD AUTO: 34.2 % (ref 35–47)
HCV AB SERPL QL IA: NONREACTIVE
HGB BLD-MCNC: 10.5 G/DL (ref 11.7–15.7)
IRON SATN MFR SERPL: 11 % (ref 15–46)
IRON SERPL-MCNC: 45 UG/DL (ref 35–180)
MCH RBC QN AUTO: 27.8 PG (ref 26.5–33)
MCHC RBC AUTO-ENTMCNC: 30.7 G/DL (ref 31.5–36.5)
MCV RBC AUTO: 91 FL (ref 78–100)
PLATELET # BLD AUTO: 337 10E3/UL (ref 150–450)
POTASSIUM BLD-SCNC: 4.7 MMOL/L (ref 3.4–5.3)
RBC # BLD AUTO: 3.78 10E6/UL (ref 3.8–5.2)
SODIUM SERPL-SCNC: 135 MMOL/L (ref 133–144)
TIBC SERPL-MCNC: 406 UG/DL (ref 240–430)
WBC # BLD AUTO: 6.8 10E3/UL (ref 4–11)

## 2021-12-10 PROCEDURE — 83550 IRON BINDING TEST: CPT

## 2021-12-10 PROCEDURE — 36415 COLL VENOUS BLD VENIPUNCTURE: CPT

## 2021-12-10 PROCEDURE — 80048 BASIC METABOLIC PNL TOTAL CA: CPT

## 2021-12-10 PROCEDURE — 86803 HEPATITIS C AB TEST: CPT

## 2021-12-10 PROCEDURE — 85027 COMPLETE CBC AUTOMATED: CPT

## 2021-12-10 RX ORDER — NALOXONE HYDROCHLORIDE 0.4 MG/ML
0.2 INJECTION, SOLUTION INTRAMUSCULAR; INTRAVENOUS; SUBCUTANEOUS
Status: CANCELLED | OUTPATIENT
Start: 2021-12-10

## 2021-12-10 RX ORDER — MEPERIDINE HYDROCHLORIDE 25 MG/ML
25 INJECTION INTRAMUSCULAR; INTRAVENOUS; SUBCUTANEOUS EVERY 30 MIN PRN
Status: CANCELLED | OUTPATIENT
Start: 2021-12-10

## 2021-12-10 RX ORDER — HEPARIN SODIUM (PORCINE) LOCK FLUSH IV SOLN 100 UNIT/ML 100 UNIT/ML
5 SOLUTION INTRAVENOUS
Status: CANCELLED | OUTPATIENT
Start: 2021-12-10

## 2021-12-10 RX ORDER — METHYLPREDNISOLONE SODIUM SUCCINATE 125 MG/2ML
125 INJECTION, POWDER, LYOPHILIZED, FOR SOLUTION INTRAMUSCULAR; INTRAVENOUS
Status: CANCELLED
Start: 2021-12-10

## 2021-12-10 RX ORDER — ALBUTEROL SULFATE 90 UG/1
1-2 AEROSOL, METERED RESPIRATORY (INHALATION)
Status: CANCELLED
Start: 2021-12-10

## 2021-12-10 RX ORDER — DIPHENHYDRAMINE HYDROCHLORIDE 50 MG/ML
50 INJECTION INTRAMUSCULAR; INTRAVENOUS
Status: CANCELLED
Start: 2021-12-10

## 2021-12-10 RX ORDER — HEPARIN SODIUM,PORCINE 10 UNIT/ML
5 VIAL (ML) INTRAVENOUS
Status: CANCELLED | OUTPATIENT
Start: 2021-12-10

## 2021-12-10 RX ORDER — ALBUTEROL SULFATE 0.83 MG/ML
2.5 SOLUTION RESPIRATORY (INHALATION)
Status: CANCELLED | OUTPATIENT
Start: 2021-12-10

## 2021-12-10 RX ORDER — EPINEPHRINE 1 MG/ML
0.3 INJECTION, SOLUTION, CONCENTRATE INTRAVENOUS EVERY 5 MIN PRN
Status: CANCELLED | OUTPATIENT
Start: 2021-12-10

## 2021-12-10 NOTE — LETTER
December 21, 2021      Molly Ho  [address] [facility] [address]        Dear ,    We are writing to inform you of your test results.    Routine screening for Hepatitis C is negative       You have become iron deficient again.  I would recommend Venofer iron infusions, order placed.  You will need a Covid test prior to the infusions.  You should be called to schedule both tests.  We should check iron levels about 1-2 months after infusion     Your kidney function is slightly worse compared to 2 months ago.  Be sure you are drinking enough water.  Electrolytes are normal. We will recheck in 1-2 months.     Resulted Orders   Hepatitis C Screen Reflex to HCV RNA Quant and Genotype   Result Value Ref Range    Hepatitis C Antibody Nonreactive Nonreactive    Narrative    Assay performance characteristics have not been established for newborns, infants, and children.   Iron and iron binding capacity   Result Value Ref Range    Iron 45 35 - 180 ug/dL    Iron Binding Capacity 406 240 - 430 ug/dL    Iron Sat Index 11 (L) 15 - 46 %       If you have any questions or concerns, please call the clinic at the number listed above.       Sincerely,      Grecia Barba, DO

## 2021-12-10 NOTE — RESULT ENCOUNTER NOTE
You have become iron deficient again.  I would recommend Venofer iron infusions, order placed.  You will need a Covid test prior to the infusions.  You should be called to schedule both tests.  We should check iron levels about 1-2 months after infusion    Your kidney function is slightly worse compared to 2 months ago.  Be sure you are drinking enough water.  Electrolytes are normal. We will recheck in 1-2 months.

## 2021-12-13 ENCOUNTER — TELEPHONE (OUTPATIENT)
Dept: PSYCHOLOGY | Facility: CLINIC | Age: 36
End: 2021-12-13
Payer: MEDICARE

## 2021-12-13 NOTE — TELEPHONE ENCOUNTER
Provider waited on patient at scheduled time for 8 minutes without patient. Provider called patient's number on file and talked with patient. Patient reported that she is feeling sick and will call intake to reschedule appointment.

## 2021-12-15 ENCOUNTER — OFFICE VISIT (OUTPATIENT)
Dept: ORTHOPEDICS | Facility: CLINIC | Age: 36
End: 2021-12-15
Payer: MEDICARE

## 2021-12-15 DIAGNOSIS — L97.512 ULCER OF RIGHT FOOT WITH FAT LAYER EXPOSED (H): Primary | ICD-10-CM

## 2021-12-15 PROCEDURE — 99212 OFFICE O/P EST SF 10 MIN: CPT | Mod: 25 | Performed by: PODIATRIST

## 2021-12-15 PROCEDURE — 97597 DBRDMT OPN WND 1ST 20 CM/<: CPT | Performed by: PODIATRIST

## 2021-12-15 NOTE — PROGRESS NOTES
Chief Complaint   Patient presents with     Wound Check     2 week follow up. Wound, right foot.               Allergies   Allergen Reactions     Blood Transfusion Related (Informational Only) Other (See Comments)     Patient has a history of a clinically significant antibody against RBC antigens.  A delay in compatible RBCs may occur.     No Known Allergies      Pollen Extract      Seasonal Allergies      Shellfish-Derived Products Nausea and Rash     Rash on face         Subjective: Molly is a 36 year old female who presents to the clinic today for a follow up of right foot ulcer. She needs a new pair of orthotics. Right ankle hardware removed.  She relates that she has been using the DH2 shoe and changing the dressing daily with the Hydrofera.     Objective    PT and DP pulses are 1/4 bilaterally. CRT is 4 seconds. Absent pedal hair.   Gross sensation is diminished bilaterally.    Equinus is moderate bilaterally. . Severely hammered digits to the lesser digits BL with R>L. These are not reducible. pain in the medial and lateral ankles with palpation and ROM.   Nails normal bilaterally.          A pressure wound is noted at right  foot measuring 0.4cm x 0.2cm x 0.1cm.    Ortiz Classification: 2    Wound base: Pink/Granulation    Edges: hyperkeratotic    Drainage: scant/serous    Odor: no    Undermining: no.    Bone Exposure: No    Clinical Signs of Infection: No    After obtaining patient consent, the wound was irrigated with copious amounts of saline. A scalpel was then used to debride the wound into dermal tissue. The wound edges were debrided back to healthy, bleeding tissue. The wound base exhibited healthy bleeding. Given the patient's lack of sensation, no anesthesia was necessary for the procedure.    Barriers to Healing: scleroderma, area of pressure.    Treatment Plan: Hydrofera Blue and Mepilex. Offloading with DH2 shoe.     Pt Ability to Follow Plan: Good.       Assessment: Molly is a 36-year-old with  systemic sclerosis and scleroderma with new ulceration on the plantar right foot.  I have recommended that she start using dressings as above.  She does agree to this.  She also has pes cavus.  .    Plan:   - Pt seen and evaluated  -Wound was debrided as described.  -Dressings as above.  - Pt to return to clinic in 3 weeks.

## 2021-12-15 NOTE — LETTER
12/15/2021         RE: Molly Teague  [address] [place] [address]        Dear Colleague,    Thank you for referring your patient, Molly Teague, to the [facility] [place]. Please see a copy of my visit note below.    Chief Complaint   Patient presents with     Wound Check     2 week follow up. Wound, right foot.               Allergies   Allergen Reactions     Blood Transfusion Related (Informational Only) Other (See Comments)     Patient has a history of a clinically significant antibody against RBC antigens.  A delay in compatible RBCs may occur.     No Known Allergies      Pollen Extract      Seasonal Allergies      Shellfish-Derived Products Nausea and Rash     Rash on face         Subjective: Molly is a 36 year old female who presents to the clinic today for a follow up of right foot ulcer. She needs a new pair of orthotics. Right ankle hardware removed.  She relates that she has been using the DH2 shoe and changing the dressing daily with the Hydrofera.     Objective    PT and DP pulses are 1/4 bilaterally. CRT is 4 seconds. Absent pedal hair.   Gross sensation is diminished bilaterally.    Equinus is moderate bilaterally. . Severely hammered digits to the lesser digits BL with R>L. These are not reducible. pain in the medial and lateral ankles with palpation and ROM.   Nails normal bilaterally.          A pressure wound is noted at right  foot measuring 0.4cm x 0.2cm x 0.1cm.    Ortiz Classification: 2    Wound base: Pink/Granulation    Edges: hyperkeratotic    Drainage: scant/serous    Odor: no    Undermining: no.    Bone Exposure: No    Clinical Signs of Infection: No    After obtaining patient consent, the wound was irrigated with copious amounts of saline. A scalpel was then used to debride the wound into dermal tissue. The wound edges were debrided back to healthy, bleeding tissue. The wound base exhibited healthy bleeding. Given the patient's  lack of sensation, no anesthesia was necessary for the procedure.    Barriers to Healing: scleroderma, area of pressure.    Treatment Plan: Hydrofera Blue and Mepilex. Offloading with DH2 shoe.     Pt Ability to Follow Plan: Good.       Assessment: Molly is a 36-year-old with systemic sclerosis and scleroderma with new ulceration on the plantar right foot.  I have recommended that she start using dressings as above.  She does agree to this.  She also has pes cavus.  .    Plan:   - Pt seen and evaluated  -Wound was debrided as described.  -Dressings as above.  - Pt to return to clinic in 3 weeks.          Sincerely,        Kenroy Cruz DPM

## 2021-12-21 ENCOUNTER — PATIENT OUTREACH (OUTPATIENT)
Dept: CARE COORDINATION | Facility: CLINIC | Age: 36
End: 2021-12-21
Payer: MEDICARE

## 2021-12-21 NOTE — PROGRESS NOTES
Clinic Care Coordination Contact    Care Coordination Clinician Chart Review    Situation: Patient chart reviewed by care coordinator.       Background: Care Coordination initial assessment and enrollment to Care Coordination was 3/29/21. Patient centered goals were developed with participation from patient. [facility] CC handed patient off to CHW for continued outreach every 30 days.        Assessment: Per chart review, patient outreach completed by CC CHW on 12/2/21. Patient is actively working to accomplish goal. Patient's goal remains appropriate and relevant at this time.       Patient is not due for updated Plan of Care.      Annual assessment will be due 3/29/22.        Goals        1. Mental Health Management (pt-stated)       Goal Statement: I will manage my mental health.  Date Goal set: 03/29/21 // updated 8/12/21  Barriers: mental illness;   Strengths: motivated to improve mental health  Date to Achieve By: 08/29/21 // extended 2/1/22  Patient expressed understanding of goal: yes    Action steps to achieve this goal:  1. I will see a counselor/therapist.  2. I will see a psychiatrist.   3. I will get a cheek swab from psychiatry to determine which medication would be best for my chemistry.  4. I will take medications as prescribed.  5. I will continue to work with my care team and care coordination.                   Plan/Recommendations: The patient will continue working with Care Coordination to achieve goal as above.      CHW will involve [facility] WATTS as needed or if patient is ready to move to maintenance. Please inquire who/what agency pt's psychiatrist and therapists are with and how often they meet.     ERROL CC will continue to monitor progress to goals and CHW outreaches every 6 weeks.     Plan of Care updated and mailed to patient: No    HENRY Vee   Social Work Clinic Care Coordinator   [facility]  [phone]  [email or web address]

## 2022-01-05 ENCOUNTER — HOSPITAL ENCOUNTER (EMERGENCY)
Facility: CLINIC | Age: 37
Discharge: HOME | End: 2022-01-05
Attending: NURSE PRACTITIONER | Admitting: NURSE PRACTITIONER
Payer: MEDICARE

## 2022-01-05 VITALS
SYSTOLIC BLOOD PRESSURE: 114 MMHG | WEIGHT: 155 LBS | DIASTOLIC BLOOD PRESSURE: 76 MMHG | TEMPERATURE: 98 F | RESPIRATION RATE: 18 BRPM | BODY MASS INDEX: 29.29 KG/M2 | HEART RATE: 104 BPM | OXYGEN SATURATION: 94 %

## 2022-01-05 DIAGNOSIS — R19.7 DIARRHEA: ICD-10-CM

## 2022-01-05 DIAGNOSIS — R11.2 NAUSEA WITH VOMITING: ICD-10-CM

## 2022-01-05 LAB
ALBUMIN SERPL-MCNC: 3.8 G/DL (ref 3.4–5)
ALP SERPL-CCNC: 88 U/L (ref 40–150)
ALT SERPL W P-5'-P-CCNC: 42 U/L (ref 0–50)
ANION GAP SERPL CALCULATED.3IONS-SCNC: 6 MMOL/L (ref 3–14)
AST SERPL W P-5'-P-CCNC: 34 U/L (ref 0–45)
BASOPHILS # BLD AUTO: 0.1 10E3/UL (ref 0–0.2)
BASOPHILS NFR BLD AUTO: 1 %
BILIRUB SERPL-MCNC: 0.3 MG/DL (ref 0.2–1.3)
BUN SERPL-MCNC: 18 MG/DL (ref 7–30)
CALCIUM SERPL-MCNC: 9.5 MG/DL (ref 8.5–10.1)
CHLORIDE BLD-SCNC: 111 MMOL/L (ref 94–109)
CO2 SERPL-SCNC: 23 MMOL/L (ref 20–32)
CREAT SERPL-MCNC: 1.34 MG/DL (ref 0.52–1.04)
EOSINOPHIL # BLD AUTO: 0 10E3/UL (ref 0–0.7)
EOSINOPHIL NFR BLD AUTO: 0 %
ERYTHROCYTE [DISTWIDTH] IN BLOOD BY AUTOMATED COUNT: 14 % (ref 10–15)
FLUAV RNA SPEC QL NAA+PROBE: NEGATIVE
FLUBV RNA RESP QL NAA+PROBE: NEGATIVE
GFR SERPL CREATININE-BSD FRML MDRD: 52 ML/MIN/1.73M2
GLUCOSE BLD-MCNC: 96 MG/DL (ref 70–99)
GLUCOSE BLDC GLUCOMTR-MCNC: 92 MG/DL (ref 70–99)
HCT VFR BLD AUTO: 34.7 % (ref 35–47)
HGB BLD-MCNC: 10.5 G/DL (ref 11.7–15.7)
HOLD SPECIMEN: NORMAL
HOLD SPECIMEN: NORMAL
IMM GRANULOCYTES # BLD: 0 10E3/UL
IMM GRANULOCYTES NFR BLD: 0 %
LIPASE SERPL-CCNC: 127 U/L (ref 73–393)
LYMPHOCYTES # BLD AUTO: 1.3 10E3/UL (ref 0.8–5.3)
LYMPHOCYTES NFR BLD AUTO: 14 %
MCH RBC QN AUTO: 25.9 PG (ref 26.5–33)
MCHC RBC AUTO-ENTMCNC: 30.3 G/DL (ref 31.5–36.5)
MCV RBC AUTO: 86 FL (ref 78–100)
MONOCYTES # BLD AUTO: 0.5 10E3/UL (ref 0–1.3)
MONOCYTES NFR BLD AUTO: 6 %
NEUTROPHILS # BLD AUTO: 7.2 10E3/UL (ref 1.6–8.3)
NEUTROPHILS NFR BLD AUTO: 79 %
NRBC # BLD AUTO: 0 10E3/UL
NRBC BLD AUTO-RTO: 0 /100
PLATELET # BLD AUTO: 337 10E3/UL (ref 150–450)
POTASSIUM BLD-SCNC: 4.2 MMOL/L (ref 3.4–5.3)
PROT SERPL-MCNC: 8.3 G/DL (ref 6.8–8.8)
RBC # BLD AUTO: 4.06 10E6/UL (ref 3.8–5.2)
SARS-COV-2 RNA RESP QL NAA+PROBE: NEGATIVE
SODIUM SERPL-SCNC: 140 MMOL/L (ref 133–144)
WBC # BLD AUTO: 9.2 10E3/UL (ref 4–11)

## 2022-01-05 PROCEDURE — 83690 ASSAY OF LIPASE: CPT | Performed by: NURSE PRACTITIONER

## 2022-01-05 PROCEDURE — 99284 EMERGENCY DEPT VISIT MOD MDM: CPT | Mod: 25 | Performed by: NURSE PRACTITIONER

## 2022-01-05 PROCEDURE — 87636 SARSCOV2 & INF A&B AMP PRB: CPT | Performed by: NURSE PRACTITIONER

## 2022-01-05 PROCEDURE — 96374 THER/PROPH/DIAG INJ IV PUSH: CPT | Performed by: NURSE PRACTITIONER

## 2022-01-05 PROCEDURE — 99284 EMERGENCY DEPT VISIT MOD MDM: CPT | Performed by: NURSE PRACTITIONER

## 2022-01-05 PROCEDURE — 36415 COLL VENOUS BLD VENIPUNCTURE: CPT | Performed by: NURSE PRACTITIONER

## 2022-01-05 PROCEDURE — 82374 ASSAY BLOOD CARBON DIOXIDE: CPT | Performed by: NURSE PRACTITIONER

## 2022-01-05 PROCEDURE — C9803 HOPD COVID-19 SPEC COLLECT: HCPCS | Performed by: NURSE PRACTITIONER

## 2022-01-05 PROCEDURE — 250N000011 HC RX IP 250 OP 636: Performed by: NURSE PRACTITIONER

## 2022-01-05 PROCEDURE — 85025 COMPLETE CBC W/AUTO DIFF WBC: CPT | Performed by: NURSE PRACTITIONER

## 2022-01-05 RX ORDER — ONDANSETRON 2 MG/ML
4 INJECTION INTRAMUSCULAR; INTRAVENOUS ONCE
Status: COMPLETED | OUTPATIENT
Start: 2022-01-05 | End: 2022-01-05

## 2022-01-05 RX ADMIN — ONDANSETRON 4 MG: 2 INJECTION INTRAMUSCULAR; INTRAVENOUS at 11:29

## 2022-01-05 ASSESSMENT — ENCOUNTER SYMPTOMS
EYE DISCHARGE: 0
SORE THROAT: 0
BLOOD IN STOOL: 0
VOMITING: 1
JOINT SWELLING: 0
ABDOMINAL PAIN: 0
ABDOMINAL DISTENTION: 0
HEMATURIA: 0
ARTHRALGIAS: 0
NUMBNESS: 0
LIGHT-HEADEDNESS: 0
FEVER: 1
FATIGUE: 0
SINUS PRESSURE: 0
HEADACHES: 0
MYALGIAS: 0
SINUS PAIN: 0
RHINORRHEA: 0
DYSURIA: 0
TROUBLE SWALLOWING: 0
SHORTNESS OF BREATH: 0
DIARRHEA: 1
WEAKNESS: 0
NAUSEA: 1
COUGH: 0
EYE REDNESS: 0
CHILLS: 0
DIZZINESS: 0

## 2022-01-05 NOTE — ED PROVIDER NOTES
History     Chief Complaint   Patient presents with     Diarrhea     diarrhea for 2 days, had covid 3 months ago     Nausea     loss of taste and smell     Vomiting     HX of anemia receives iron transfusion      HPI  Molly Teague is a 36 year old female with complex medical history that presents to the emergency department for evaluation of nausea, vomiting and diarrhea for 2 days. Loss of taste and smell, LUQ abdominal pain and fatigue. T-max 102 response to Tylenol. She is fully vaccinated to Covid 19 and influenza. Denies headache, dizziness, couch, chest pain, shortness of breath, dysuria and hematuria. No presence of blood/bile in emesis or diarrhea.     Allergies:  Allergies   Allergen Reactions     Blood Transfusion Related (Informational Only) Other (See Comments)     Patient has a history of a clinically significant antibody against RBC antigens.  A delay in compatible RBCs may occur.     No Known Allergies      Pollen Extract      Seasonal Allergies      Shellfish-Derived Products Nausea and Rash     Rash on face       Problem List:    Patient Active Problem List    Diagnosis Date Noted     Pain in joint of right ankle 08/24/2021     Priority: Medium     Added automatically from request for surgery [id number]       Iron deficiency anemia due to chronic blood loss 08/04/2020     Priority: Medium     Amnesia 04/29/2020     Priority: Medium     Vomiting and diarrhea 03/25/2020     Priority: Medium     Closed right ankle fracture 02/11/2020     Priority: Medium     S/P ORIF (open reduction internal fixation) fracture 02/10/2020     Priority: Medium     Sinus tachycardia 01/31/2020     Priority: Medium     Ankle fracture, right, closed, initial encounter 01/30/2020     Priority: Medium     Added automatically from request for surgery [id number]       Anemia, chronic disease 11/25/2019     Priority: Medium     Mirena IUD- Remove by 09/2024 09/06/2019     Priority: Medium     Lot: [id number]  Exp: 01/2022    Dinora  LESA Israel         Malignant essential hypertension 07/24/2019     Priority: Medium     PTSD (post-traumatic stress disorder) 06/19/2019     Priority: Medium     Severe episode of recurrent major depressive disorder, with psychotic features (H) 07/06/2018     Priority: Medium     Severe persistent asthma without complication 07/06/2018     Priority: Medium     On high dose ICS/LABA + frequent albuterol use.  Next allergy/pulmonary referral       Aspiration of food 03/29/2018     Priority: Medium     Chronic pain syndrome 04/26/2017     Priority: Medium     Patient is followed by Grecia Barba DO for ongoing prescription of pain medication.  All refills should only be approved by this provider, or covering partner.    Medication(s): Oxycodone 10 mg.   Maximum quantity per month: 90  Clinic visit frequency required: Q 3 months     Controlled substance agreement:  Encounter-Level CSA - 04/26/2017:    Controlled Substance Agreement - Scan on 5/4/2017  8:58 AM: CONTROLLED SUBSTANCE AGREEMENT (below)       Patient-Level CSA:    Controlled Substance Agreement - Opioid - Scan on 2/6/2019  6:23 PM (below)         Pain Clinic evaluation in the past: No    DIRE Total Score(s):  No flowsheet data found.    Last [facility] website verification:  done on 4/26/17   9/26/2017  7/6/2018  10/16/2018  1/22/2019  8/2/2019           [email or web address]         Chronic migraine without aura without status migrainosus, not intractable 04/12/2017     Priority: Medium     With medication overuse.  Fioricet and not Imitrex is recommend to treat severe headache.  2/2017 [facility] recommend wean down from daily Fioricet and use Excedrin Migrain PRN.       AIN grade I 03/30/2017     Priority: Medium     Found at [facility] on colonoscopy 2/2017.  Recommend repeat anoscopy and anal pap in 6/2017.       Gastroesophageal reflux disease with esophagitis 03/30/2017     Priority: Medium     EGD done at [facility] 2/2017 showed esophagitis without GAVE.   Recommend max dose H2 and PPI, along with 4 tablets of Carafate dissolved in water and drink throughout the day.    Had LA Grade D esophagitis, on TID PPI       Secondary hypertension 03/22/2017     Priority: Medium     Stage 3a chronic kidney disease (H) 02/27/2017     Priority: Medium     Arthritis 02/10/2017     Priority: Medium     Limited systemic sclerosis (H) 01/25/2017     Priority: Medium     Raynaud's, calcinosis, telangiectasias, peripheral neuropathy, GERD symptoms, history of scleroderma renal crisis.  Saw [facility] 1/2017 and follows with Rheum Dr. Davis locally.       Polyneuropathy associated with underlying disease (H) 01/25/2017     Priority: Medium     Due to scleroderma and neurology thought possible due to ICU (critical illness neuropathy).  Recommend to max out dose of gabapentin to 1314-1057 mg/day, split BID or TID.  EMG 1/2017 positive for neuropathy       History of Clostridium difficile 11/29/2016     Priority: Medium     History of Helicobacter pylori infection 11/29/2016     Priority: Medium     Scleroderma with renal involvement (H) 11/09/2016     Priority: Medium     Needs lisinopril long term       History of ARDS 11/09/2016     Priority: Medium     4/2015, prolonged ICU/vent/trach due to influenza, scleroderma renal crisis requiring hemodialysis.       History of hemodialysis 11/09/2016     Priority: Medium     4/2015 due to scleroderma renal crisis       Bilateral retinitis 11/09/2016     Priority: Medium     History of pulmonary embolism 11/09/2016     Priority: Medium     Completed anti-coagulation 2017.  pulmonary embolism due to critical illness       REX (generalized anxiety disorder) 11/09/2016     Priority: Medium     Systemic sclerosis (H) 09/22/2016     Priority: Medium        Past Medical History:    Past Medical History:   Diagnosis Date     Acute pulmonary embolism (H) 2016     Acute pyelonephritis 06/09/2017     Altered mental state 03/29/2018     Anxiety      Arthritis       Depression      Gastroesophageal reflux disease with esophagitis      History of blood transfusion      Hypertension      Long-term (current) use of anticoagulants [Z79.01] 2016     Neuropathy      PE (pulmonary embolism) 2016     PTSD (post-traumatic stress disorder)      Raynaud's disease without gangrene      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      Rheumatism      Scleroderma (H) 2016     Uncomplicated asthma        Past Surgical History:    Past Surgical History:   Procedure Laterality Date     ANGIOGRAM  2015      SECTION  2014     OPEN REDUCTION INTERNAL FIXATION ANKLE Right 2/10/2020    Procedure: Right ankle open reduction internal fixation;  Surgeon: Natanael Villalta MD;  Location: [facility] OR     REMOVE HARDWARE ANKLE Right 2021    Procedure: Right ankle hardware removal;  Surgeon: Natanael Villalta MD;  Location: [facility] OR     THROAT SURGERY         Family History:    Family History   Problem Relation Age of Onset     Hyperlipidemia Father      Depression Sister      Fibromyalgia Sister      Hyperlipidemia Sister      Cerebrovascular Disease Maternal Grandmother          of a stroke     Diabetes Maternal Grandmother      Hyperlipidemia Paternal Grandfather      Diabetes Maternal Aunt      Depression Other      Melanoma No family hx of      Skin Cancer No family hx of      Anesthesia Reaction No family hx of      Cardiovascular No family hx of      Deep Vein Thrombosis (DVT) No family hx of        Social History:  Marital Status:  Single [1]  Social History     Tobacco Use     Smoking status: Never Smoker     Smokeless tobacco: Never Used   Substance Use Topics     Alcohol use: Yes     Comment: Socially once on a weekend if any     Drug use: No     Comment: None        Medications:    acetaminophen (TYLENOL) 325 MG tablet  albuterol (VENTOLIN HFA) 108 (90 Base) MCG/ACT inhaler  amLODIPine (NORVASC) 10 MG tablet  blood  "glucose (NO BRAND SPECIFIED) lancets standard  blood glucose (NO BRAND SPECIFIED) test strip  budesonide-formoterol (SYMBICORT) 160-4.5 MCG/ACT Inhaler  busPIRone HCl (BUSPAR) 30 MG tablet  Cyanocobalamin (B-12) 1000 MCG TBCR  DULoxetine (CYMBALTA) 30 MG capsule  famotidine (PEPCID) 20 MG tablet  gabapentin (NEURONTIN) 300 MG capsule  GOODSENSE MIGRAINE FORMULA 250-250-65 MG per tablet  lisinopril (ZESTRIL) 10 MG tablet  loratadine (CLARITIN) 10 MG tablet  montelukast (SINGULAIR) 10 MG tablet  naloxone (NARCAN) 4 MG/0.1ML nasal spray  omeprazole (PRILOSEC) 40 MG DR capsule  ondansetron (ZOFRAN-ODT) 4 MG ODT tab  order for DME  order for DME  oxyCODONE (ROXICODONE) 10 MG tablet  [START ON 1/9/2022] oxyCODONE (ROXICODONE) 10 MG tablet  oxyCODONE IR (ROXICODONE) 10 MG tablet  polyethylene glycol (MIRALAX/GLYCOLAX) packet  promethazine (PHENERGAN) 25 MG tablet  promethazine (PHENERGAN) 25 MG/ML IV injection  syringe/needle, disp, (BD ECLIPSE SYRINGE) 25G X 1\" 3 ML MISC          Review of Systems   Constitutional: Positive for fever. Negative for chills and fatigue.   HENT: Negative for congestion, ear discharge, ear pain, rhinorrhea, sinus pressure, sinus pain, sore throat and trouble swallowing.    Eyes: Negative for discharge and redness.   Respiratory: Negative for cough and shortness of breath.    Cardiovascular: Negative for chest pain.   Gastrointestinal: Positive for diarrhea, nausea and vomiting. Negative for abdominal distention, abdominal pain and blood in stool.   Genitourinary: Negative for dysuria and hematuria.   Musculoskeletal: Negative for arthralgias, joint swelling and myalgias.   Skin: Negative for rash.   Neurological: Negative for dizziness, weakness, light-headedness, numbness and headaches.   All other systems reviewed and are negative.      Physical Exam   BP: 114/76  Pulse: (!) 128  Temp: 98  F (36.7  C)  Resp: 12  Weight: 70.3 kg (155 lb)  SpO2: 100 %      Physical Exam  Constitutional:       " General: She is not in acute distress.     Appearance: She is well-developed. She is not diaphoretic.   HENT:      Head: Normocephalic.   Eyes:      Conjunctiva/sclera: Conjunctivae normal.      Pupils: Pupils are equal, round, and reactive to light.   Cardiovascular:      Rate and Rhythm: Normal rate and regular rhythm.      Pulses: Normal pulses.   Pulmonary:      Effort: Pulmonary effort is normal. No respiratory distress.      Breath sounds: Normal breath sounds and air entry. No decreased air movement. No decreased breath sounds, wheezing or rhonchi.   Abdominal:      General: Abdomen is protuberant. There is no distension.      Palpations: Abdomen is soft.      Tenderness: There is no abdominal tenderness. There is no right CVA tenderness, left CVA tenderness, guarding or rebound. Negative signs include Meyers's sign and McBurney's sign.   Musculoskeletal:         General: Normal range of motion.      Cervical back: Normal range of motion and neck supple.   Skin:     General: Skin is warm.      Capillary Refill: Capillary refill takes less than 2 seconds.   Neurological:      General: No focal deficit present.      Mental Status: She is alert and oriented to person, place, and time.   Psychiatric:         Mood and Affect: Mood normal.         ED Course             Procedures    Results for orders placed or performed during the hospital encounter of 01/05/22 (from the past 24 hour(s))   Glucose by meter   Result Value Ref Range    GLUCOSE BY METER POCT 92 70 - 99 mg/dL   CBC with platelets, differential    Narrative    The following orders were created for panel order CBC with platelets, differential.  Procedure                               Abnormality         Status                     ---------                               -----------         ------                     CBC with platelets and d...[[id number]]  Abnormal            Final result                 Please view results for these tests on the  individual orders.   Comprehensive metabolic panel   Result Value Ref Range    Sodium 140 133 - 144 mmol/L    Potassium 4.2 3.4 - 5.3 mmol/L    Chloride 111 (H) 94 - 109 mmol/L    Carbon Dioxide (CO2) 23 20 - 32 mmol/L    Anion Gap 6 3 - 14 mmol/L    Urea Nitrogen 18 7 - 30 mg/dL    Creatinine 1.34 (H) 0.52 - 1.04 mg/dL    Calcium 9.5 8.5 - 10.1 mg/dL    Glucose 96 70 - 99 mg/dL    Alkaline Phosphatase 88 40 - 150 U/L    AST 34 0 - 45 U/L    ALT 42 0 - 50 U/L    Protein Total 8.3 6.8 - 8.8 g/dL    Albumin 3.8 3.4 - 5.0 g/dL    Bilirubin Total 0.3 0.2 - 1.3 mg/dL    GFR Estimate 52 (L) >60 mL/min/1.73m2   Lipase   Result Value Ref Range    Lipase 127 73 - 393 U/L   CBC with platelets and differential   Result Value Ref Range    WBC Count 9.2 4.0 - 11.0 10e3/uL    RBC Count 4.06 3.80 - 5.20 10e6/uL    Hemoglobin 10.5 (L) 11.7 - 15.7 g/dL    Hematocrit 34.7 (L) 35.0 - 47.0 %    MCV 86 78 - 100 fL    MCH 25.9 (L) 26.5 - 33.0 pg    MCHC 30.3 (L) 31.5 - 36.5 g/dL    RDW 14.0 10.0 - 15.0 %    Platelet Count 337 150 - 450 10e3/uL    % Neutrophils 79 %    % Lymphocytes 14 %    % Monocytes 6 %    % Eosinophils 0 %    % Basophils 1 %    % Immature Granulocytes 0 %    NRBCs per 100 WBC 0 <1 /100    Absolute Neutrophils 7.2 1.6 - 8.3 10e3/uL    Absolute Lymphocytes 1.3 0.8 - 5.3 10e3/uL    Absolute Monocytes 0.5 0.0 - 1.3 10e3/uL    Absolute Eosinophils 0.0 0.0 - 0.7 10e3/uL    Absolute Basophils 0.1 0.0 - 0.2 10e3/uL    Absolute Immature Granulocytes 0.0 <=0.4 10e3/uL    Absolute NRBCs 0.0 10e3/uL   [place] Draw    Narrative    The following orders were created for panel order [place] Draw.  Procedure                               Abnormality         Status                     ---------                               -----------         ------                     Extra Blue Top Tube[[id number]]                              Final result               Extra Red Top Tube[[id number]]                               Final result                  Please view results for these tests on the individual orders.   Extra Blue Top Tube   Result Value Ref Range    Hold Specimen JIC    Extra Red Top Tube   Result Value Ref Range    Hold Specimen JIC    Symptomatic; Yes; 1/2/2022 Influenza A/B & SARS-CoV2 (COVID-19) Virus PCR Multiplex Nasopharyngeal    Specimen: Nasopharyngeal; Swab   Result Value Ref Range    Influenza A PCR Negative Negative    Influenza B PCR Negative Negative    SARS CoV2 PCR Negative Negative    Narrative    Testing was performed using the rukhsana SARS-CoV-2 & Influenza A/B Assay on the rukhsana Yuli System. This test should be ordered for the detection of SARS-CoV-2 and influenza viruses in individuals who meet clinical and/or epidemiological criteria. Test performance is unknown in asymptomatic patients. This test is for in vitro diagnostic use under the FDA EUA for laboratories certified under CLIA to perform moderate and/or high complexity testing. This test has not been FDA cleared or approved. A negative result does not rule out the presence of PCR inhibitors in the specimen or target RNA in concentration below the limit of detection for the assay. If only one viral target is positive but coinfection with multiple targets is suspected, the sample should be re-tested with another FDA cleared, approved or authorized test, if coinfection would change clinical management. [facility] are certified under the Clinical Laboratory Improvement Amendments of 1988 (CLIA-88) as  qualified to perform moderate and/or high complexity laboratory testing.     Medications   ondansetron (ZOFRAN) injection 4 mg (4 mg Intravenous Given 1/5/22 1129)     Assessments & Plan (with Medical Decision Making)   Molly Teague is a 36 year old female with complex medical history that presents to the emergency department for evaluation of nausea, vomiting and diarrhea for 2 days. Loss of taste and smell, LUQ abdominal pain and fatigue. Vitals  normal. Exam as above. She exhibits no tenderness throughout the abdomen. Hcg stable from previous at 10.5, remaining CMP and CBC unchanged from baseline. Normal lipase. Negative influenza and Covid. Given normal vitals, exam and blood work will defer imaging at this time due to low suspicion for intraabdominal abnormality. Patient is agreeable to this plan and feels comfortable to discharge home. She has upcoming maintenance appointments and plans for evaluation at [facility] in the next two weeks. Return to the department with new or worsening symptoms. Patient discharged in good condition.     I have reviewed the nursing notes.    I have reviewed the findings, diagnosis, plan and need for follow up with the patient.  Discharge Medication List as of 1/5/2022  1:27 PM        Final diagnoses:   Diarrhea   Nausea with vomiting     1/5/2022   [facility] EMERGENCY DEPT     Shima Combs, APRN CNP  01/05/22 1507

## 2022-01-05 NOTE — ED TRIAGE NOTES
diarrhea for 2 days, had covid 3 months ago  loss of taste and smell  HX of anemia receives iron transfusion

## 2022-01-06 ENCOUNTER — INFUSION THERAPY VISIT (OUTPATIENT)
Dept: INFUSION THERAPY | Facility: CLINIC | Age: 37
End: 2022-01-06
Attending: INTERNAL MEDICINE
Payer: MEDICARE

## 2022-01-06 ENCOUNTER — PATIENT OUTREACH (OUTPATIENT)
Dept: CARE COORDINATION | Facility: CLINIC | Age: 37
End: 2022-01-06
Payer: MEDICARE

## 2022-01-06 VITALS
HEART RATE: 60 BPM | SYSTOLIC BLOOD PRESSURE: 126 MMHG | DIASTOLIC BLOOD PRESSURE: 78 MMHG | TEMPERATURE: 98.9 F | OXYGEN SATURATION: 93 %

## 2022-01-06 DIAGNOSIS — D50.0 IRON DEFICIENCY ANEMIA DUE TO CHRONIC BLOOD LOSS: Primary | ICD-10-CM

## 2022-01-06 PROCEDURE — 96375 TX/PRO/DX INJ NEW DRUG ADDON: CPT

## 2022-01-06 PROCEDURE — 250N000011 HC RX IP 250 OP 636: Performed by: INTERNAL MEDICINE

## 2022-01-06 PROCEDURE — 258N000003 HC RX IP 258 OP 636: Performed by: INTERNAL MEDICINE

## 2022-01-06 PROCEDURE — 96365 THER/PROPH/DIAG IV INF INIT: CPT

## 2022-01-06 PROCEDURE — 250N000013 HC RX MED GY IP 250 OP 250 PS 637: Performed by: INTERNAL MEDICINE

## 2022-01-06 RX ORDER — METHYLPREDNISOLONE SODIUM SUCCINATE 125 MG/2ML
125 INJECTION, POWDER, LYOPHILIZED, FOR SOLUTION INTRAMUSCULAR; INTRAVENOUS
Status: DISCONTINUED | OUTPATIENT
Start: 2022-01-06 | End: 2022-01-06 | Stop reason: HOSPADM

## 2022-01-06 RX ORDER — NALOXONE HYDROCHLORIDE 0.4 MG/ML
0.2 INJECTION, SOLUTION INTRAMUSCULAR; INTRAVENOUS; SUBCUTANEOUS
Status: CANCELLED | OUTPATIENT
Start: 2022-01-08

## 2022-01-06 RX ORDER — ALBUTEROL SULFATE 90 UG/1
1-2 AEROSOL, METERED RESPIRATORY (INHALATION)
Status: DISCONTINUED | OUTPATIENT
Start: 2022-01-06 | End: 2022-01-06 | Stop reason: HOSPADM

## 2022-01-06 RX ORDER — HYDROMORPHONE HYDROCHLORIDE 2 MG/ML
2 INJECTION, SOLUTION INTRAMUSCULAR; INTRAVENOUS; SUBCUTANEOUS ONCE
Status: DISCONTINUED | OUTPATIENT
Start: 2022-01-06 | End: 2022-01-06

## 2022-01-06 RX ORDER — ALBUTEROL SULFATE 0.83 MG/ML
2.5 SOLUTION RESPIRATORY (INHALATION)
Status: DISCONTINUED | OUTPATIENT
Start: 2022-01-06 | End: 2022-01-06 | Stop reason: HOSPADM

## 2022-01-06 RX ORDER — NALOXONE HYDROCHLORIDE 0.4 MG/ML
0.2 INJECTION, SOLUTION INTRAMUSCULAR; INTRAVENOUS; SUBCUTANEOUS
Status: DISCONTINUED | OUTPATIENT
Start: 2022-01-06 | End: 2022-01-06 | Stop reason: HOSPADM

## 2022-01-06 RX ORDER — ALBUTEROL SULFATE 90 UG/1
1-2 AEROSOL, METERED RESPIRATORY (INHALATION)
Status: CANCELLED
Start: 2022-01-08

## 2022-01-06 RX ORDER — HEPARIN SODIUM (PORCINE) LOCK FLUSH IV SOLN 100 UNIT/ML 100 UNIT/ML
5 SOLUTION INTRAVENOUS
Status: CANCELLED | OUTPATIENT
Start: 2022-01-08

## 2022-01-06 RX ORDER — EPINEPHRINE 1 MG/ML
0.3 INJECTION, SOLUTION, CONCENTRATE INTRAVENOUS EVERY 5 MIN PRN
Status: CANCELLED | OUTPATIENT
Start: 2022-01-08

## 2022-01-06 RX ORDER — MEPERIDINE HYDROCHLORIDE 25 MG/ML
25 INJECTION INTRAMUSCULAR; INTRAVENOUS; SUBCUTANEOUS EVERY 30 MIN PRN
Status: CANCELLED | OUTPATIENT
Start: 2022-01-08

## 2022-01-06 RX ORDER — HEPARIN SODIUM,PORCINE 10 UNIT/ML
5 VIAL (ML) INTRAVENOUS
Status: CANCELLED | OUTPATIENT
Start: 2022-01-08

## 2022-01-06 RX ORDER — EPINEPHRINE 1 MG/ML
0.3 INJECTION, SOLUTION, CONCENTRATE INTRAVENOUS EVERY 5 MIN PRN
Status: DISCONTINUED | OUTPATIENT
Start: 2022-01-06 | End: 2022-01-06 | Stop reason: HOSPADM

## 2022-01-06 RX ORDER — HEPARIN SODIUM (PORCINE) LOCK FLUSH IV SOLN 100 UNIT/ML 100 UNIT/ML
5 SOLUTION INTRAVENOUS
Status: DISCONTINUED | OUTPATIENT
Start: 2022-01-06 | End: 2022-01-06 | Stop reason: HOSPADM

## 2022-01-06 RX ORDER — HEPARIN SODIUM,PORCINE 10 UNIT/ML
5 VIAL (ML) INTRAVENOUS
Status: DISCONTINUED | OUTPATIENT
Start: 2022-01-06 | End: 2022-01-06 | Stop reason: HOSPADM

## 2022-01-06 RX ORDER — DIPHENHYDRAMINE HYDROCHLORIDE 50 MG/ML
INJECTION INTRAMUSCULAR; INTRAVENOUS
Status: DISCONTINUED
Start: 2022-01-06 | End: 2022-01-06 | Stop reason: HOSPADM

## 2022-01-06 RX ORDER — HYDROMORPHONE HYDROCHLORIDE 1 MG/ML
2 SOLUTION ORAL ONCE
Status: DISCONTINUED | OUTPATIENT
Start: 2022-01-06 | End: 2022-01-06

## 2022-01-06 RX ORDER — DIPHENHYDRAMINE HYDROCHLORIDE 50 MG/ML
50 INJECTION INTRAMUSCULAR; INTRAVENOUS
Status: COMPLETED | OUTPATIENT
Start: 2022-01-06 | End: 2022-01-06

## 2022-01-06 RX ORDER — METHYLPREDNISOLONE SODIUM SUCCINATE 125 MG/2ML
125 INJECTION, POWDER, LYOPHILIZED, FOR SOLUTION INTRAMUSCULAR; INTRAVENOUS
Status: CANCELLED
Start: 2022-01-08

## 2022-01-06 RX ORDER — DIPHENHYDRAMINE HYDROCHLORIDE 50 MG/ML
50 INJECTION INTRAMUSCULAR; INTRAVENOUS
Status: CANCELLED
Start: 2022-01-08

## 2022-01-06 RX ORDER — MEPERIDINE HYDROCHLORIDE 25 MG/ML
25 INJECTION INTRAMUSCULAR; INTRAVENOUS; SUBCUTANEOUS EVERY 30 MIN PRN
Status: DISCONTINUED | OUTPATIENT
Start: 2022-01-06 | End: 2022-01-06 | Stop reason: HOSPADM

## 2022-01-06 RX ORDER — ALBUTEROL SULFATE 0.83 MG/ML
2.5 SOLUTION RESPIRATORY (INHALATION)
Status: CANCELLED | OUTPATIENT
Start: 2022-01-08

## 2022-01-06 RX ORDER — ACETAMINOPHEN 325 MG/1
975 TABLET ORAL EVERY 4 HOURS PRN
Status: DISCONTINUED | OUTPATIENT
Start: 2022-01-06 | End: 2022-01-06 | Stop reason: HOSPADM

## 2022-01-06 RX ADMIN — SODIUM CHLORIDE 250 ML: 9 INJECTION, SOLUTION INTRAVENOUS at 13:59

## 2022-01-06 RX ADMIN — IRON SUCROSE 300 MG: 20 INJECTION, SOLUTION INTRAVENOUS at 14:00

## 2022-01-06 RX ADMIN — FAMOTIDINE 20 MG: 20 INJECTION, SOLUTION INTRAVENOUS at 15:59

## 2022-01-06 RX ADMIN — METHYLPREDNISOLONE SODIUM SUCCINATE 125 MG: 125 INJECTION, POWDER, FOR SOLUTION INTRAMUSCULAR; INTRAVENOUS at 15:54

## 2022-01-06 RX ADMIN — ACETAMINOPHEN 975 MG: 325 TABLET, FILM COATED ORAL at 16:16

## 2022-01-06 RX ADMIN — HYDROMORPHONE HYDROCHLORIDE 2 MG: 1 INJECTION, SOLUTION INTRAMUSCULAR; INTRAVENOUS; SUBCUTANEOUS at 16:28

## 2022-01-06 RX ADMIN — DIPHENHYDRAMINE HYDROCHLORIDE 50 MG: 50 INJECTION, SOLUTION INTRAMUSCULAR; INTRAVENOUS at 15:50

## 2022-01-06 RX ADMIN — SODIUM CHLORIDE 500 ML: 9 INJECTION, SOLUTION INTRAVENOUS at 15:55

## 2022-01-06 ASSESSMENT — ACTIVITIES OF DAILY LIVING (ADL): DEPENDENT_IADLS:: INDEPENDENT

## 2022-01-06 NOTE — PATIENT INSTRUCTIONS
Patient Education     Medicine Reaction: Allergic  You are having an allergic reaction to a medicine you have taken. This may cause an itchy rash and sometimes swelling of various parts of the body. It could also cause trouble swallowing or breathing. The rash may take a few hours or up to 2 weeks to go away. In the future, remember to tell your healthcare provider about your allergy to this medicine so that medicines of this type won't be used again.   Any medicine can cause an allergic reaction. But most allergic reactions are caused by:     Penicillin and related medicines    Antibiotics containing sulfonamides (sulfa ,medicines)    Aspirin    Ibuprofen or other nonsteroidal anti-inflammatory drugs (NSAIDs)    Seizure medicines  Vaccines may also trigger allergies. People whose parents or siblings have allergies are at a higher risk of developing a medicine allergy. Allergy testing may sometimes be needed to figure out the cause.   Symptoms may occur within minutes, hours, or even weeks after exposure to the medicine. It can be a mild or severe reaction, or potentially life threatening. Most of us think of allergic reactions when we have a rash or itchy skin. Symptoms can include:     Rash, hives, redness, welts, blisters    Itching, burning, stinging, pain    Dry, flaky, cracking, scaly skin    Belly (abdominal) cramps or nausea or stomach pain    Fever. Sometimes fever is the only symptom of a medicine reaction. In older adults, the risk of fever increases with the number of medicines the person takes.  More severe symptoms include:    Swelling of the face or lips, or drooling    Trouble swallowing, feeling like your throat is closing    Trouble breathing, wheezing    Hoarse voice or trouble speaking    Severe nausea or vomiting or diarrhea      Feeling faint or lightheaded, rapid heart rate    Blistering of the skin or ulcers in the mouth or on the genitals  Home care    The goal of treatment is to help  relieve the symptoms and get you feeling better. Mild to medium medicine reactions usually respond quickly to taking antihistamines or steroids and stopping the medicine. The rash will usually fade over several days. But it can sometimes last a couple of weeks. Over the next couple of days, there may be times when it gets a little worse and then better again. Here are some things to do:     Dispose of the medicine safely and don t take it again. The next reaction could be the same or worse.    Call your healthcare provider to discuss adding this medicine's allergy reaction to your electronic medical record.    When getting a new medicine, always tell the healthcare provider that you are allergic to this medicine. Make certain the provider writes it down in your medical record.    Don't wear tight clothing and stay way from anything that heats up your skin (hot showers or baths, direct sunlight). Heat will make itching worse.    An ice pack will relieve local areas of intense itching and redness. To make an ice pack, put ice cubes in a plastic bag that seals at the top. Wrap the bag in a clean, thin towel or cloth. Don t put ice directly on the skin.    To help prevent an infection, don't scratch the affected area. Scratching may worsen the reaction. It can damage your skin and lead to an infection. Always check the affected site for signs of an infection.    Your provider may give you a prescription antihistamine.    If you are not given a prescription antihistamine, oral diphenhydramine is an over-the-counter antihistamine available at pharmacies and grocery stores. This may be used to reduce itching if large areas of the skin are involved. This antihistamine may make you sleepy, so be careful using it in the daytime or when going to school, working, or driving. Note: Don t use diphenhydramine if you have glaucoma or if you are a man with trouble urinating due to an enlarged prostate. There are other antihistamines  that cause less drowsiness and are a good choice for daytime use. Ask your pharmacist or healthcare provider for suggestions.    Don't use diphenhydramine cream on your skin. It can cause a worse skin reaction for some people.    Contact your healthcare provider and ask what can be used on the affected area to help decrease the itching.  Follow-up care  Follow up with your healthcare provider, or as advised, if your symptoms do not continue to improve or they get worse.   Call 911  Call 911 if any of these occur:     Shortness of breath    Cool, moist, pale skin    Swelling in the face, eyelids, mouth, tongue, or lips    Drooling    Trouble breathing or swallowing, wheezing    New or worsening swelling in the mouth, throat, or tongue    Hoarse voice or trouble speaking     Fainting or loss of consciousness    Rapid heart rate    Feeling of dizziness or weakness or a sudden drop in blood pressure    Feeling of doom    Feeling lightheaded    Severe nausea, vomiting, or diarrhea  When to seek medical advice  Call your healthcare provider or get medical care right away if any of these occur:     Continuing or recurring symptoms    Nausea, abdominal cramps, or stomach pain    Spreading areas of itching, redness, or swelling    Blistering of the skin, or sores or ulcers in the mouth or on the genitals    Signs of infection:  ? Spreading redness  ? Increased pain or swelling  ? Fever of 100.4 F (38 C) or above lasting for 24 to 48 hours, or as directed by your provider  ? Fluid or colored drainage from the affected area  [employer] last reviewed this educational content on 11/1/2019 2000-2021 [employer]. All rights reserved. This information is not intended as a substitute for professional medical care. Always follow your healthcare professional's instructions.

## 2022-01-06 NOTE — PROGRESS NOTES
Infusion Nursing Note:  Molly Teague presents today for Iron sucrose.    Patient seen by provider today: No   present during visit today: Not Applicable.      Intravenous Access:  Peripheral IV placed.    Treatment Conditions:  Not Applicable.      Post Infusion Assessment:  Patient tolerated infusion poorly due to : Hypersensitivity:    Pt had severe pain due to muscle spasms in back and abdomen 20 minutes after iron sucrose infusion was completed, during the post infusion observation period.  BP dropped and pt c/o difficulty breathing due to the spasms. Hypersensitivity protocol followed and patient received Benadryl, solumedrol and Pepcid IV along with NS bolus.  Pt remained conscience during the episode.  Pain due to back spasms improved slightly after IV meds but patient asked for medication to help with continued pain.  Contacted Dr. Grecia Barba, the ordering provider, to report the patient's reaction.  Dr. Barba ok'd a dose of IV Dilaudid.  Pt was also given Tylenol.    Pt is instructed to make an appt with Dr. Barba at her first available appt per Dr. Barba's recommendation.        Discharge Plan:   AVS to patient via MYCHART.  Patient will not receive the additional doses of iron sucrose. Appts for those infusion are cancelled per Dr. Barba.  Patient discharged in stable condition accompanied by: father in law.  Departure Mode: Wheelchair.    Nohemy Simon RN

## 2022-01-06 NOTE — Clinical Note
See my notes regarding the hypersensitivity reaction. I asked the patient to make an appt at your 1st available.

## 2022-01-06 NOTE — LETTER
[facility]  Patient Centered Plan of Care  About Me:        Patient Name:  Molly Teague    YOB: 1985  Age:         36 year old   Renny MRN:    [id number] Telephone Information:  Home Phone [phone]   Mobile [phone]       Address:  [address]  [facility] [address] Email address:  [email or web address]      Emergency Contact(s)    Name Relationship Lgl Grd Work Phone Home Phone Mobile Phone   1. BENJAMIN MELENDEZ Significant ot* No  [phone]           Primary language:  English     needed? No   [employer]:  [phone] op. 1  Other communication barriers:No data recorded  Preferred Method of Communication:  Mail  Current living arrangement:  Mobility Status/ Medical Equipment: Independent    Health Maintenance  Health Maintenance Reviewed: Due/Overdue   Health Maintenance Due   Topic Date Due     LIPID  Never done     MICROALBUMIN  Never done     ASTHMA ACTION PLAN  07/06/2019     MEDICARE ANNUAL WELLNESS VISIT  08/04/2021     URINE DRUG SCREEN  12/17/2021     Pneumococcal Vaccine: Pediatrics (0 to 5 Years) and At-Risk Patients (6 to 64 Years) (2 of 4 - PCV13) 12/17/2021     ASTHMA CONTROL TEST  01/02/2022     My Access Plan  Medical Emergency 911   Primary Clinic Line [facility] - [phone]   24 Hour Appointment Line [phone] or  [phone]) (toll-free)   24 Hour Nurse Line [phone] (toll-free)   Preferred Urgent Care [facility], [phone]     Preferred Hospital [place], [place]  [phone]     Preferred Pharmacy [facility], [facility] - [address] [facility]     Behavioral Health Crisis Line The National Suicide Prevention Lifeline at 1-300.963.7572 or 911     My Care Team Members  Patient Care Team       Relationship Specialty Notifications Start End    Grecia Barba DO PCP - General Internal  Medicine Admissions 11/9/16     Phone: [phone] Pager: [phone] Fax: [phone]        [address] [facility] [address]    Grecia Barba DO Assigned PCP   1/28/18     Phone: [phone] Pager: [phone] Fax: [phone]        [address] [facility] [address]    Maritza Harrison MD MD OB/Gyn  4/29/19     Phone: [phone] Fax: [phone]         [address] [facility] [address]    Ingrid Wilkinson LSW Lead Care Coordinator Primary Care - CC Admissions 5/5/21     Phone: [phone]          [address] [facility] [address]    Cindy Lorenzana MD Resident Internal Medicine  6/24/21     Phone: [phone] Fax: [phone]         [address] [place] [address]    Miriam Wan [facility] Health Worker Primary Care - CC  10/8/21     Phone: [phone]         Kenroy Cruz DPM Assigned Musculoskeletal Provider   12/5/21     Phone: [phone] Fax: [phone]         [address] [place] [address]    Anna Cruz PA-C Assigned Gastroenterology Provider   12/5/21     Phone: [phone] Fax: [phone]         [facility], SURGERY [address] [facility] [address]            My Care Plans  Self Management and Treatment Plan  Goals and (Comments)  Goals        General     1. Mental Health Management (pt-stated)      Notes - Note edited  8/12/2021 10:15 AM by Ingrid Wilkinson LSW     Goal Statement: I will manage my mental health.  Date Goal set: 03/29/21 // updated 8/12/21  Barriers: mental illness;   Strengths: motivated to improve mental health  Date to Achieve By: 08/29/21 // extended 2/1/22  Patient expressed understanding of goal: yes    Action steps to achieve this goal:  1. I will see a counselor/therapist.  2. I will see a psychiatrist.   3. I will get a cheek swab from psychiatry to determine which medication would be best for my chemistry.  4. I will take medications as prescribed.  5. I will continue to work with  my care team and care coordination.                Action Plans on File:   Asthma        Depression          Advance Care Plans/Directives Type:     Honoring Choices    Advance Care Planning and Health Care Directives  When it comes to decisions about your health care, it s important that your voice is heard. You may not always be able to speak for yourself.    We encourage you to have discussions with your loved ones, cultural or spiritual leaders and health care providers about your goals, values, beliefs and choices.    We are a part of [employer] [place] , supporting and promoting the benefits of advance care planning conversations.    Our goals are to:    Help you make informed decisions about your healthcare choices and ensure that those choices are honored    Offer advance care planning discussions with trained staff    Ensure your choices are clearly defined, documented and available in your medical record    Translate your choices into medical orders as needed    Why is Advance Care Planning important?    Know what your health care choices are and decide what is right for you    An unexpected illness or injury could leave you unable to participate in important treatment decisions    When choices are left to others to decide that responsibility can be difficult and stressful    By discussing and outlining your choices, your voice is heard in the care you want to receive    How can I learn more?  We offer free classes at multiple locations, days and times. Our trained facilitators will provide information and guide you through a Health Care Directive document. They can also review, notarize and add your document to your medical record.    Call [employer] at [phone] or toll free at [phone] for assistance.      My Medical and Care Information  Problem List   Patient Active Problem List   Diagnosis     Systemic sclerosis (H)     Scleroderma with renal involvement (H)     History  of ARDS     History of hemodialysis     Bilateral retinitis     History of pulmonary embolism     REX (generalized anxiety disorder)     History of Clostridium difficile     History of Helicobacter pylori infection     Limited systemic sclerosis (H)     Polyneuropathy associated with underlying disease (H)     AIN grade I     Gastroesophageal reflux disease with esophagitis     Chronic migraine without aura without status migrainosus, not intractable     Chronic pain syndrome     Aspiration of food     Severe episode of recurrent major depressive disorder, with psychotic features (H)     Severe persistent asthma without complication     PTSD (post-traumatic stress disorder)     Malignant essential hypertension     Mirena IUD- Remove by 09/2024     Anemia, chronic disease     Ankle fracture, right, closed, initial encounter     Stage 3a chronic kidney disease (H)     Sinus tachycardia     S/P ORIF (open reduction internal fixation) fracture     Closed right ankle fracture     Vomiting and diarrhea     Iron deficiency anemia due to chronic blood loss     Amnesia     Secondary hypertension     Arthritis     Pain in joint of right ankle      Current Medications and Allergies:   Current Outpatient Medications   Medication Instructions     acetaminophen (TYLENOL) 650 mg, Oral, EVERY 4 HOURS PRN     albuterol (VENTOLIN HFA) 108 (90 Base) MCG/ACT inhaler INHALE 2 PUFFS INTO THE LUNGS ONCE EVERY 4 HOURS AS NEEDED FOR SHORTNESS OF BREATH / DYSPNEA / WHEEZING     amLODIPine (NORVASC) 10 mg, Oral, DAILY     B-12 1,000 mcg, Oral, DAILY     blood glucose (NO BRAND SPECIFIED) lancets standard Use to test blood sugar 1 time daily     blood glucose (NO BRAND SPECIFIED) test strip Use to test blood sugar 1 time daily     budesonide-formoterol (SYMBICORT) 160-4.5 MCG/ACT Inhaler INHALE TWO PUFFS BY MOUTH TWICE A DAY     busPIRone HCl (BUSPAR) 30 mg, Oral, 2 TIMES DAILY     DULoxetine (CYMBALTA) 90 mg, Oral, DAILY     famotidine  "(PEPCID) 20 mg, Oral, 2 TIMES DAILY     gabapentin (NEURONTIN) 600 mg, Oral, 3 TIMES DAILY     GOODSENSE MIGRAINE FORMULA 250-250-65 MG per tablet TAKE ONE TABLET BY MOUTH EVERY 6 HOURS AS NEEDED FOR HEADACHES     lisinopril (ZESTRIL) 10 mg, Oral, DAILY     loratadine (CLARITIN) 10 mg, Oral, DAILY PRN     montelukast (SINGULAIR) 10 mg, Oral, AT BEDTIME     naloxone (NARCAN) 4 mg, Alternating Nostrils, ONCE PRN, every 2-3 minutes until assistance arrives     omeprazole (PRILOSEC) 40 MG DR capsule TAKE ONE CAPSULE BY MOUTH TWICE A DAY     ondansetron (ZOFRAN-ODT) 4 mg, Oral, EVERY 8 HOURS PRN     order for DME [facility]. Patient can use for another two months<BR>Diagnosis: Right ankle bimalleolar fractures, open reduction internal fixation on 2/10/2020     order for DME [facility] [facility]. Patient can use for three months<BR><BR>Diagnosis Right ankle fracture and surgery 2/10/2020     oxyCODONE IR (ROXICODONE) 10 MG tablet TAKE ONE TABLET BY MOUTH THREE TIMES A DAY     oxyCODONE IR (ROXICODONE) 10 mg, Oral, 3 TIMES DAILY (Diuretics and Nitrates)     [START ON 1/9/2022] oxyCODONE IR (ROXICODONE) 10 mg, Oral, 3 TIMES DAILY     polyethylene glycol (MIRALAX) 17 g, Oral, DAILY     promethazine (PHENERGAN) 25 MG tablet TAKE ONE TABLET BY MOUTH TWICE A DAY AS NEEDED NAUSEA     promethazine (PHENERGAN) 25 MG/ML IV injection INJECT 1 ML INTRAMUSCULARLY EVERY 6 HOURS AS NEEDED     syringe/needle, disp, (BD ECLIPSE SYRINGE) 25G X 1\" 3 ML MISC 1 each, Does not apply, DAILY PRN     Care Coordination Start Date: 3/29/2021   Frequency of Care Coordination: monthly     Form Last Updated: 01/06/2022       "

## 2022-01-06 NOTE — PROGRESS NOTES
Clinic Care Coordination Contact    Situation: Patient chart reviewed by care coordinator.    Background: Pt presented to [facility] ED 1/5/22 for evaluation of diarrhea, nausea, and vomiting for 2 days.      Assessment: COVID-19 and flu test negative. Follow up with PCP as needed. Upcoming appts at [facility] reported.    Plan/Recommendations: CHW will continue with outreaches at this time. CHW will inform  CC of any concerns or changes regarding patient as needed. SW CC will chart review every 6 weeks and outreach to patient as needed. ERROL CC sent complex care plan.     HENRY Vee   Social Work Clinic Care Coordinator   [facility]  [phone]  [email or web address]

## 2022-01-07 ENCOUNTER — HOSPITAL ENCOUNTER (EMERGENCY)
Facility: CLINIC | Age: 37
Discharge: HOME | End: 2022-01-08
Attending: EMERGENCY MEDICINE | Admitting: EMERGENCY MEDICINE
Payer: MEDICARE

## 2022-01-07 ENCOUNTER — TELEPHONE (OUTPATIENT)
Dept: FAMILY MEDICINE | Facility: CLINIC | Age: 37
End: 2022-01-07

## 2022-01-07 ENCOUNTER — APPOINTMENT (OUTPATIENT)
Dept: GENERAL RADIOLOGY | Facility: CLINIC | Age: 37
End: 2022-01-07
Attending: EMERGENCY MEDICINE
Payer: MEDICARE

## 2022-01-07 DIAGNOSIS — M62.81 GENERALIZED MUSCLE WEAKNESS: ICD-10-CM

## 2022-01-07 DIAGNOSIS — R51.9 ACUTE NONINTRACTABLE HEADACHE, UNSPECIFIED HEADACHE TYPE: ICD-10-CM

## 2022-01-07 LAB
ALBUMIN SERPL-MCNC: 3.3 G/DL (ref 3.4–5)
ALP SERPL-CCNC: 86 U/L (ref 40–150)
ALT SERPL W P-5'-P-CCNC: 38 U/L (ref 0–50)
ANION GAP SERPL CALCULATED.3IONS-SCNC: 10 MMOL/L (ref 3–14)
AST SERPL W P-5'-P-CCNC: 37 U/L (ref 0–45)
BASOPHILS # BLD AUTO: 0.1 10E3/UL (ref 0–0.2)
BASOPHILS NFR BLD AUTO: 1 %
BILIRUB SERPL-MCNC: 0.1 MG/DL (ref 0.2–1.3)
BUN SERPL-MCNC: 23 MG/DL (ref 7–30)
CALCIUM SERPL-MCNC: 8.6 MG/DL (ref 8.5–10.1)
CHLORIDE BLD-SCNC: 111 MMOL/L (ref 94–109)
CO2 SERPL-SCNC: 19 MMOL/L (ref 20–32)
CREAT SERPL-MCNC: 1.37 MG/DL (ref 0.52–1.04)
EOSINOPHIL # BLD AUTO: 0 10E3/UL (ref 0–0.7)
EOSINOPHIL NFR BLD AUTO: 0 %
ERYTHROCYTE [DISTWIDTH] IN BLOOD BY AUTOMATED COUNT: 14.4 % (ref 10–15)
FLUAV RNA SPEC QL NAA+PROBE: NEGATIVE
FLUBV RNA RESP QL NAA+PROBE: NEGATIVE
GFR SERPL CREATININE-BSD FRML MDRD: 51 ML/MIN/1.73M2
GLUCOSE BLD-MCNC: 87 MG/DL (ref 70–99)
GLUCOSE BLDC GLUCOMTR-MCNC: 117 MG/DL (ref 70–99)
HCT VFR BLD AUTO: 28.3 % (ref 35–47)
HGB BLD-MCNC: 8.7 G/DL (ref 11.7–15.7)
IMM GRANULOCYTES # BLD: 0.1 10E3/UL
IMM GRANULOCYTES NFR BLD: 1 %
LACTATE SERPL-SCNC: 1.3 MMOL/L (ref 0.7–2)
LIPASE SERPL-CCNC: 163 U/L (ref 73–393)
LYMPHOCYTES # BLD AUTO: 2.7 10E3/UL (ref 0.8–5.3)
LYMPHOCYTES NFR BLD AUTO: 16 %
MCH RBC QN AUTO: 26.5 PG (ref 26.5–33)
MCHC RBC AUTO-ENTMCNC: 30.7 G/DL (ref 31.5–36.5)
MCV RBC AUTO: 86 FL (ref 78–100)
MONOCYTES # BLD AUTO: 1 10E3/UL (ref 0–1.3)
MONOCYTES NFR BLD AUTO: 6 %
NEUTROPHILS # BLD AUTO: 12.6 10E3/UL (ref 1.6–8.3)
NEUTROPHILS NFR BLD AUTO: 76 %
NRBC # BLD AUTO: 0 10E3/UL
NRBC BLD AUTO-RTO: 0 /100
PLATELET # BLD AUTO: 321 10E3/UL (ref 150–450)
POTASSIUM BLD-SCNC: 3.8 MMOL/L (ref 3.4–5.3)
PROT SERPL-MCNC: 7.2 G/DL (ref 6.8–8.8)
RBC # BLD AUTO: 3.28 10E6/UL (ref 3.8–5.2)
SARS-COV-2 RNA RESP QL NAA+PROBE: NEGATIVE
SODIUM SERPL-SCNC: 140 MMOL/L (ref 133–144)
WBC # BLD AUTO: 16.6 10E3/UL (ref 4–11)

## 2022-01-07 PROCEDURE — 36415 COLL VENOUS BLD VENIPUNCTURE: CPT | Performed by: EMERGENCY MEDICINE

## 2022-01-07 PROCEDURE — 85025 COMPLETE CBC W/AUTO DIFF WBC: CPT | Performed by: EMERGENCY MEDICINE

## 2022-01-07 PROCEDURE — 93005 ELECTROCARDIOGRAM TRACING: CPT | Performed by: EMERGENCY MEDICINE

## 2022-01-07 PROCEDURE — 99285 EMERGENCY DEPT VISIT HI MDM: CPT | Mod: 25 | Performed by: EMERGENCY MEDICINE

## 2022-01-07 PROCEDURE — 96374 THER/PROPH/DIAG INJ IV PUSH: CPT | Performed by: EMERGENCY MEDICINE

## 2022-01-07 PROCEDURE — 258N000003 HC RX IP 258 OP 636: Performed by: EMERGENCY MEDICINE

## 2022-01-07 PROCEDURE — 80053 COMPREHEN METABOLIC PANEL: CPT | Performed by: EMERGENCY MEDICINE

## 2022-01-07 PROCEDURE — 96361 HYDRATE IV INFUSION ADD-ON: CPT | Performed by: EMERGENCY MEDICINE

## 2022-01-07 PROCEDURE — 87040 BLOOD CULTURE FOR BACTERIA: CPT | Performed by: EMERGENCY MEDICINE

## 2022-01-07 PROCEDURE — C9803 HOPD COVID-19 SPEC COLLECT: HCPCS | Performed by: EMERGENCY MEDICINE

## 2022-01-07 PROCEDURE — 99284 EMERGENCY DEPT VISIT MOD MDM: CPT | Mod: 25 | Performed by: EMERGENCY MEDICINE

## 2022-01-07 PROCEDURE — 250N000013 HC RX MED GY IP 250 OP 250 PS 637: Performed by: EMERGENCY MEDICINE

## 2022-01-07 PROCEDURE — 83605 ASSAY OF LACTIC ACID: CPT | Performed by: EMERGENCY MEDICINE

## 2022-01-07 PROCEDURE — 83690 ASSAY OF LIPASE: CPT | Performed by: EMERGENCY MEDICINE

## 2022-01-07 PROCEDURE — 71046 X-RAY EXAM CHEST 2 VIEWS: CPT

## 2022-01-07 PROCEDURE — 250N000011 HC RX IP 250 OP 636: Performed by: EMERGENCY MEDICINE

## 2022-01-07 PROCEDURE — 87636 SARSCOV2 & INF A&B AMP PRB: CPT | Performed by: EMERGENCY MEDICINE

## 2022-01-07 PROCEDURE — 93010 ELECTROCARDIOGRAM REPORT: CPT | Performed by: EMERGENCY MEDICINE

## 2022-01-07 RX ORDER — DROPERIDOL 2.5 MG/ML
2.5 INJECTION, SOLUTION INTRAMUSCULAR; INTRAVENOUS ONCE
Status: COMPLETED | OUTPATIENT
Start: 2022-01-07 | End: 2022-01-07

## 2022-01-07 RX ORDER — OXYCODONE HYDROCHLORIDE 5 MG/1
10 TABLET ORAL ONCE
Status: COMPLETED | OUTPATIENT
Start: 2022-01-07 | End: 2022-01-07

## 2022-01-07 RX ADMIN — OXYCODONE HYDROCHLORIDE 10 MG: 5 TABLET ORAL at 21:58

## 2022-01-07 RX ADMIN — DROPERIDOL 2.5 MG: 2.5 INJECTION, SOLUTION INTRAMUSCULAR; INTRAVENOUS at 21:58

## 2022-01-07 RX ADMIN — SODIUM CHLORIDE, POTASSIUM CHLORIDE, SODIUM LACTATE AND CALCIUM CHLORIDE 1000 ML: 600; 310; 30; 20 INJECTION, SOLUTION INTRAVENOUS at 22:03

## 2022-01-07 ASSESSMENT — MIFFLIN-ST. JEOR: SCORE: 1330.46

## 2022-01-07 NOTE — ED TRIAGE NOTES
Pt reports she had an allergic reaction during her infusion last night. Reports bilateral hand swelling has improved but still feels SOB. States she has hallucinations, happens when BS gets low. Has not checked her blood sugar. Weakness. Low BP.

## 2022-01-07 NOTE — TELEPHONE ENCOUNTER
"S-(situation): Pt is calling complains of low BP 88/69.      A-(assessment): Pt says she is lightheaded.  She feels like she is \"hallucinating\".  Pt 's speech was slightly slurred.   She was oriented on phone.  She says she had a reaction yesterday at Infusion therapy and was given Benadryl and a pain pill.    R-(recommendations): Pt was advised to call 911 now as she does not have anyone with her now.    "

## 2022-01-08 VITALS
TEMPERATURE: 99.9 F | OXYGEN SATURATION: 99 % | HEART RATE: 85 BPM | RESPIRATION RATE: 14 BRPM | BODY MASS INDEX: 29.27 KG/M2 | SYSTOLIC BLOOD PRESSURE: 112 MMHG | WEIGHT: 155 LBS | HEIGHT: 61 IN | DIASTOLIC BLOOD PRESSURE: 72 MMHG

## 2022-01-08 NOTE — ED PROVIDER NOTES
History     Chief Complaint   Patient presents with     Generalized Pain     HPI  Molly Teague is a 36 year old female with a history of iron deficiency anemia due to chronic blood loss, asthma, chronic pain syndrome, migraines, stage III kidney disease, and systemic sclerosis as well as prior pulmonary embolism related to critical illness not currently on anticoagulation who presents for generalized weakness and body aches.  The patient says that her symptoms have gotten bad since she had an iron transfusion yesterday.  During the iron transfusion she became itchy all over, had swelling in her hands, was given diphenhydramine but has only felt worse since then.  She woke up this morning with a posterior headache radiating to the front, pain was not sudden onset, has gotten worse over time.  She does have a history of migraines but this is different than her normal migraine.  No double vision.  No focal numbness or weakness that is new from her prior.  She does have some chronic neuropathy in the right foot.  She reports nausea and ongoing episode of nonbloody vomiting began.  Also had an episode of rectal nonbloody loose stools this morning.  No chest pain.  She does have a cough productive of clear sputum.  No dysuria or urinary frequency.    I reviewed the records from her infusion therapy yesterday and at that time she was given IV fluids, IV hydromorphone, methylprednisolone, diphenhydramine, and famotidine for hypersensitivity reaction.    Allergies:  Allergies   Allergen Reactions     Blood Transfusion Related (Informational Only) Other (See Comments)     Patient has a history of a clinically significant antibody against RBC antigens.  A delay in compatible RBCs may occur.     No Known Allergies      Pollen Extract      Seasonal Allergies      Shellfish-Derived Products Nausea and Rash     Rash on face       Problem List:    Patient Active Problem List    Diagnosis Date Noted     Pain in joint of right  ankle 08/24/2021     Priority: Medium     Added automatically from request for surgery [id number]       Iron deficiency anemia due to chronic blood loss 08/04/2020     Priority: Medium     Amnesia 04/29/2020     Priority: Medium     Vomiting and diarrhea 03/25/2020     Priority: Medium     Closed right ankle fracture 02/11/2020     Priority: Medium     S/P ORIF (open reduction internal fixation) fracture 02/10/2020     Priority: Medium     Sinus tachycardia 01/31/2020     Priority: Medium     Ankle fracture, right, closed, initial encounter 01/30/2020     Priority: Medium     Added automatically from request for surgery [id number]       Anemia, chronic disease 11/25/2019     Priority: Medium     Mirena IUD- Remove by 09/2024 09/06/2019     Priority: Medium     Lot: [id number]  Exp: 01/2022    Dinora Israel LPN         Malignant essential hypertension 07/24/2019     Priority: Medium     PTSD (post-traumatic stress disorder) 06/19/2019     Priority: Medium     Severe episode of recurrent major depressive disorder, with psychotic features (H) 07/06/2018     Priority: Medium     Severe persistent asthma without complication 07/06/2018     Priority: Medium     On high dose ICS/LABA + frequent albuterol use.  Next allergy/pulmonary referral       Aspiration of food 03/29/2018     Priority: Medium     Chronic pain syndrome 04/26/2017     Priority: Medium     Patient is followed by Grecia Barba DO for ongoing prescription of pain medication.  All refills should only be approved by this provider, or covering partner.    Medication(s): Oxycodone 10 mg.   Maximum quantity per month: 90  Clinic visit frequency required: Q 3 months     Controlled substance agreement:  Encounter-Level CSA - 04/26/2017:    Controlled Substance Agreement - Scan on 5/4/2017  8:58 AM: CONTROLLED SUBSTANCE AGREEMENT (below)       Patient-Level CSA:    Controlled Substance Agreement - Opioid - Scan on 2/6/2019  6:23 PM (below)         Pain Clinic  evaluation in the past: No    DIRE Total Score(s):  No flowsheet data found.    Last [facility] website verification:  done on 4/26/17   9/26/2017  7/6/2018  10/16/2018  1/22/2019  8/2/2019           [email or web address]         Chronic migraine without aura without status migrainosus, not intractable 04/12/2017     Priority: Medium     With medication overuse.  Fioricet and not Imitrex is recommend to treat severe headache.  2/2017 [facility] recommend wean down from daily Fioricet and use Excedrin Migrain PRN.       AIN grade I 03/30/2017     Priority: Medium     Found at [facility] on colonoscopy 2/2017.  Recommend repeat anoscopy and anal pap in 6/2017.       Gastroesophageal reflux disease with esophagitis 03/30/2017     Priority: Medium     EGD done at [facility] 2/2017 showed esophagitis without GAVE.  Recommend max dose H2 and PPI, along with 4 tablets of Carafate dissolved in water and drink throughout the day.    Had LA Grade D esophagitis, on TID PPI       Secondary hypertension 03/22/2017     Priority: Medium     Stage 3a chronic kidney disease (H) 02/27/2017     Priority: Medium     Arthritis 02/10/2017     Priority: Medium     Limited systemic sclerosis (H) 01/25/2017     Priority: Medium     Raynaud's, calcinosis, telangiectasias, peripheral neuropathy, GERD symptoms, history of scleroderma renal crisis.  Saw [facility] 1/2017 and follows with Rheum Dr. Davis locally.       Polyneuropathy associated with underlying disease (H) 01/25/2017     Priority: Medium     Due to scleroderma and neurology thought possible due to ICU (critical illness neuropathy).  Recommend to max out dose of gabapentin to 8737-4206 mg/day, split BID or TID.  EMG 1/2017 positive for neuropathy       History of Clostridium difficile 11/29/2016     Priority: Medium     History of Helicobacter pylori infection 11/29/2016     Priority: Medium     Scleroderma with renal involvement (H) 11/09/2016     Priority: Medium     Needs lisinopril long term        History of ARDS 2016     Priority: Medium     2015, prolonged ICU/vent/trach due to influenza, scleroderma renal crisis requiring hemodialysis.       History of hemodialysis 2016     Priority: Medium     2015 due to scleroderma renal crisis       Bilateral retinitis 2016     Priority: Medium     History of pulmonary embolism 2016     Priority: Medium     Completed anti-coagulation .  pulmonary embolism due to critical illness       REX (generalized anxiety disorder) 2016     Priority: Medium     Systemic sclerosis (H) 2016     Priority: Medium        Past Medical History:    Past Medical History:   Diagnosis Date     Acute pulmonary embolism (H) 2016     Acute pyelonephritis 2017     Altered mental state 2018     Anxiety      Arthritis      Depression      Gastroesophageal reflux disease with esophagitis      History of blood transfusion      Hypertension      Long-term (current) use of anticoagulants [Z79.01] 2016     Neuropathy      PE (pulmonary embolism) 2016     PTSD (post-traumatic stress disorder)      Raynaud's disease without gangrene      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      Rheumatism      Scleroderma (H) 2016     Uncomplicated asthma        Past Surgical History:    Past Surgical History:   Procedure Laterality Date     ANGIOGRAM  2015      SECTION  2014     OPEN REDUCTION INTERNAL FIXATION ANKLE Right 2/10/2020    Procedure: Right ankle open reduction internal fixation;  Surgeon: Natanael Villalta MD;  Location: [facility] OR     REMOVE HARDWARE ANKLE Right 2021    Procedure: Right ankle hardware removal;  Surgeon: Natanael Villalta MD;  Location: [facility] OR     THROAT SURGERY         Family History:    Family History   Problem Relation Age of Onset     Hyperlipidemia Father      Depression Sister      Fibromyalgia Sister      Hyperlipidemia Sister      Cerebrovascular  "Disease Maternal Grandmother          of a stroke     Diabetes Maternal Grandmother      Hyperlipidemia Paternal Grandfather      Diabetes Maternal Aunt      Depression Other      Melanoma No family hx of      Skin Cancer No family hx of      Anesthesia Reaction No family hx of      Cardiovascular No family hx of      Deep Vein Thrombosis (DVT) No family hx of        Social History:  Marital Status:  Single [1]  Social History     Tobacco Use     Smoking status: Never Smoker     Smokeless tobacco: Never Used   Substance Use Topics     Alcohol use: Yes     Comment: Socially once on a weekend if any     Drug use: No     Comment: None        Medications:    acetaminophen (TYLENOL) 325 MG tablet  albuterol (VENTOLIN HFA) 108 (90 Base) MCG/ACT inhaler  amLODIPine (NORVASC) 10 MG tablet  blood glucose (NO BRAND SPECIFIED) lancets standard  blood glucose (NO BRAND SPECIFIED) test strip  budesonide-formoterol (SYMBICORT) 160-4.5 MCG/ACT Inhaler  busPIRone HCl (BUSPAR) 30 MG tablet  Cyanocobalamin (B-12) 1000 MCG TBCR  DULoxetine (CYMBALTA) 30 MG capsule  famotidine (PEPCID) 20 MG tablet  gabapentin (NEURONTIN) 300 MG capsule  GOODSENSE MIGRAINE FORMULA 250-250-65 MG per tablet  lisinopril (ZESTRIL) 10 MG tablet  loratadine (CLARITIN) 10 MG tablet  LORazepam (ATIVAN) 1 MG tablet  montelukast (SINGULAIR) 10 MG tablet  naloxone (NARCAN) 4 MG/0.1ML nasal spray  omeprazole (PRILOSEC) 40 MG DR capsule  ondansetron (ZOFRAN-ODT) 4 MG ODT tab  order for DME  order for DME  oxyCODONE (ROXICODONE) 10 MG tablet  [START ON 2022] oxyCODONE (ROXICODONE) 10 MG tablet  oxyCODONE IR (ROXICODONE) 10 MG tablet  polyethylene glycol (MIRALAX/GLYCOLAX) packet  promethazine (PHENERGAN) 25 MG tablet  promethazine (PHENERGAN) 25 MG/ML IV injection  syringe/needle, disp, (BD ECLIPSE SYRINGE) 25G X 1\" 3 ML MISC          Review of Systems  Pertinent positives and negatives listed in the HPI, all other systems reviewed and are " "negative.    Physical Exam   BP: 98/63  Pulse: (!) 131  Temp: 99.9  F (37.7  C)  Resp: 20  Height: 154.9 cm (5' 1\")  Weight: 70.3 kg (155 lb)  SpO2: 97 %      Physical Exam  Vitals and nursing note reviewed.   Constitutional:       General: She is in acute distress.      Appearance: She is well-developed. She is not diaphoretic.   HENT:      Head: Normocephalic and atraumatic.      Right Ear: External ear normal.      Left Ear: External ear normal.      Nose: Nose normal.   Eyes:      General: No scleral icterus.     Conjunctiva/sclera: Conjunctivae normal.   Cardiovascular:      Rate and Rhythm: Regular rhythm. Tachycardia present.      Heart sounds: No murmur heard.      Pulmonary:      Effort: Pulmonary effort is normal. No respiratory distress.      Breath sounds: No stridor. No wheezing or rales.   Abdominal:      General: There is no distension.      Palpations: Abdomen is soft.      Tenderness: There is no abdominal tenderness. There is no guarding or rebound.   Musculoskeletal:      Cervical back: Normal range of motion.   Skin:     General: Skin is warm and dry.   Neurological:      Mental Status: She is alert and oriented to person, place, and time.      GCS: GCS eye subscore is 4. GCS verbal subscore is 5. GCS motor subscore is 6.      Cranial Nerves: Cranial nerves are intact. No cranial nerve deficit or facial asymmetry.      Motor: No tremor, abnormal muscle tone or pronator drift.      Coordination: Finger-Nose-Finger Test normal.   Psychiatric:         Behavior: Behavior normal.         ED Course                Procedures              EKG Interpretation:      Interpreted by Paulie Dennis MD  Time reviewed: 2102  Symptoms at time of EKG: Weakness, tachycardia   Rhythm: sinus tachycardia  Rate: 122  Axis: Normal  Ectopy: none  Conduction: normal  ST Segments/ T Waves: No acute ischemic changes  Q Waves: none  Comparison to prior: Unchanged    Clinical Impression: Sinus tachycardia    Critical " Care time:  none               Results for orders placed or performed during the hospital encounter of 01/07/22 (from the past 24 hour(s))   Glucose by meter   Result Value Ref Range    GLUCOSE BY METER POCT 117 (H) 70 - 99 mg/dL   CBC with Platelets & Differential    Narrative    The following orders were created for panel order CBC with Platelets & Differential.  Procedure                               Abnormality         Status                     ---------                               -----------         ------                     CBC with platelets and d...[[id number]]  Abnormal            Final result                 Please view results for these tests on the individual orders.   Comprehensive metabolic panel   Result Value Ref Range    Sodium 140 133 - 144 mmol/L    Potassium 3.8 3.4 - 5.3 mmol/L    Chloride 111 (H) 94 - 109 mmol/L    Carbon Dioxide (CO2) 19 (L) 20 - 32 mmol/L    Anion Gap 10 3 - 14 mmol/L    Urea Nitrogen 23 7 - 30 mg/dL    Creatinine 1.37 (H) 0.52 - 1.04 mg/dL    Calcium 8.6 8.5 - 10.1 mg/dL    Glucose 87 70 - 99 mg/dL    Alkaline Phosphatase 86 40 - 150 U/L    AST 37 0 - 45 U/L    ALT 38 0 - 50 U/L    Protein Total 7.2 6.8 - 8.8 g/dL    Albumin 3.3 (L) 3.4 - 5.0 g/dL    Bilirubin Total 0.1 (L) 0.2 - 1.3 mg/dL    GFR Estimate 51 (L) >60 mL/min/1.73m2   Lipase   Result Value Ref Range    Lipase 163 73 - 393 U/L   Lactic acid whole blood   Result Value Ref Range    Lactic Acid 1.3 0.7 - 2.0 mmol/L   CBC with platelets and differential   Result Value Ref Range    WBC Count 16.6 (H) 4.0 - 11.0 10e3/uL    RBC Count 3.28 (L) 3.80 - 5.20 10e6/uL    Hemoglobin 8.7 (L) 11.7 - 15.7 g/dL    Hematocrit 28.3 (L) 35.0 - 47.0 %    MCV 86 78 - 100 fL    MCH 26.5 26.5 - 33.0 pg    MCHC 30.7 (L) 31.5 - 36.5 g/dL    RDW 14.4 10.0 - 15.0 %    Platelet Count 321 150 - 450 10e3/uL    % Neutrophils 76 %    % Lymphocytes 16 %    % Monocytes 6 %    % Eosinophils 0 %    % Basophils 1 %    % Immature Granulocytes 1 %     NRBCs per 100 WBC 0 <1 /100    Absolute Neutrophils 12.6 (H) 1.6 - 8.3 10e3/uL    Absolute Lymphocytes 2.7 0.8 - 5.3 10e3/uL    Absolute Monocytes 1.0 0.0 - 1.3 10e3/uL    Absolute Eosinophils 0.0 0.0 - 0.7 10e3/uL    Absolute Basophils 0.1 0.0 - 0.2 10e3/uL    Absolute Immature Granulocytes 0.1 <=0.4 10e3/uL    Absolute NRBCs 0.0 10e3/uL   Symptomatic; Yes; 1/6/2022 Influenza A/B & SARS-CoV2 (COVID-19) Virus PCR Multiplex Nasopharyngeal    Specimen: Nasopharyngeal; Swab   Result Value Ref Range    Influenza A PCR Negative Negative    Influenza B PCR Negative Negative    SARS CoV2 PCR Negative Negative    Narrative    Testing was performed using the rukhsana SARS-CoV-2 & Influenza A/B Assay on the rukhsana Yuli System. This test should be ordered for the detection of SARS-CoV-2 and influenza viruses in individuals who meet clinical and/or epidemiological criteria. Test performance is unknown in asymptomatic patients. This test is for in vitro diagnostic use under the FDA EUA for laboratories certified under CLIA to perform moderate and/or high complexity testing. This test has not been FDA cleared or approved. A negative result does not rule out the presence of PCR inhibitors in the specimen or target RNA in concentration below the limit of detection for the assay. If only one viral target is positive but coinfection with multiple targets is suspected, the sample should be re-tested with another FDA cleared, approved or authorized test, if coinfection would change clinical management. [facility] are certified under the Clinical Laboratory Improvement Amendments of 1988 (CLIA-88) as  qualified to perform moderate and/or high complexity laboratory testing.   Chest XR,  PA & LAT    Narrative    EXAM: XR CHEST 2 VW  LOCATION: [facility]  DATE/TIME: 1/7/2022 11:39 PM    INDICATION: Cough. COVID negative.  COMPARISON: 3/8/2021.    FINDINGS: The heart size is normal. Minimal  atelectasis or scarring at the lung bases. The lungs are otherwise clear. No pneumothorax or pleural effusion.      Impression    IMPRESSION: No acute abnormality.       Medications   lactated ringers BOLUS 1,000 mL (0 mLs Intravenous Stopped 1/8/22 0053)   droperidol (INAPSINE) injection 2.5 mg (2.5 mg Intravenous Given 1/7/22 2158)   oxyCODONE (ROXICODONE) tablet 10 mg (10 mg Oral Given 1/7/22 2158)       Assessments & Plan (with Medical Decision Making)   36-year-old female who presents for headache, body aches, lightheadedness, nausea, vomiting.  Blood pressure is 98/63, temperature 37.7  C, heart 131, SPO2 is 97% on room air.  She is given IV fluids, oxycodone for the pain, IV droperidol for nausea and headache.  EKG is sinus tachycardia without signs of ischemia.  Electrolytes are within normal limits.  Also, is mildly up at 16.6, nonspecific.  Blood cultures are pending.  Lactic acid is normal which is reassuring.  Hemoglobin is 8.7 which is down from yesterday, however believe this is likely somewhat delusional from the fluid that she received yesterday.  Covid swab is negative.  Lipase is normal, no signs of pancreatitis.  LFTs are normal, no signs of hepatitis.  Chest x-ray obtained, images reviewed independently as well as radiology read reviewed, no signs of pneumonia, pneumothorax, or heart failure.  On recheck she is feeling much better, tolerating oral intake, and is safe to discharge home with instructions to return if she has worsening of her symptoms or other concerns, otherwise follow-up in clinic.  The patient is in agreement to this plan.    I have reviewed the nursing notes.    I have reviewed the findings, diagnosis, plan and need for follow up with the patient.       Discharge Medication List as of 1/8/2022  2:00 AM          Final diagnoses:   Generalized muscle weakness   Acute nonintractable headache, unspecified headache type       1/7/2022   [facility] EMERGENCY DEPT      Paulie Dennis MD  01/08/22 0731

## 2022-01-08 NOTE — DISCHARGE INSTRUCTIONS
Return to the emergency department for worsening symptoms, repeated vomiting, or other concerns.  Follow-up in clinic this week for a recheck

## 2022-01-08 NOTE — ED NOTES
Generalized pain/soreness and hypotensive.  Patient stated that she had an infusion yesterday for her anemia and while she was there developed generalized pain and chest tightness.  Patient stated that they gave her Benadryl and sent her home instead of giving her the second and third infusion that she normally gets.  Patient still having soreness and hypotension 80's/50's.  Patient stated that her blood pressure is usually 120's-130's/70's-80's.  Patient on blood pressure for hypertension.  Patient stated that she does suffer from chronic pain.

## 2022-01-10 ENCOUNTER — PATIENT OUTREACH (OUTPATIENT)
Dept: CARE COORDINATION | Facility: CLINIC | Age: 37
End: 2022-01-10
Payer: MEDICARE

## 2022-01-10 ASSESSMENT — ACTIVITIES OF DAILY LIVING (ADL): DEPENDENT_IADLS:: INDEPENDENT

## 2022-01-10 NOTE — PROGRESS NOTES
Clinic Care Coordination Contact    Situation: Patient chart reviewed by care coordinator.    Background: Pt presented to [facility] ED 1/7/22 for evaluation of weakness and body aches. Patient said that her symptoms have gotten bad since she had an iron transfusion day prior. During the iron transfusion, she became itchy all over, had swelling in her hands, was given diphenhydramine but has only felt worse since then. She woke up with a posterior headache radiating to the front, pain was not sudden onset, had gotten worse over time. She does have a history of migraines but this was different than her normal migraine.    Assessment: Pt was given IV fluids, oxycodone for the pain, and IV droperidol for nausea and headache. Pt felt much better. PCP appt scheduled 1/11/22.    Plan/Recommendations: CHW will continue with outreaches at this time. CHW will inform  CC of any concerns or changes regarding patient as needed. SW CC will chart review every 6 weeks and outreach to patient as needed.    EHNRY Vee   Social Work Clinic Care Coordinator   [facility]  [phone]  [email or web address]

## 2022-01-11 ENCOUNTER — PATIENT OUTREACH (OUTPATIENT)
Dept: NURSING | Facility: CLINIC | Age: 37
End: 2022-01-11
Payer: MEDICARE

## 2022-01-11 NOTE — PROGRESS NOTES
Clinic Care Coordination Contact    Community Health Worker Follow Up    Care Gaps:     Health Maintenance Due   Topic Date Due     LIPID  Never done     MICROALBUMIN  Never done     ASTHMA ACTION PLAN  07/06/2019     MEDICARE ANNUAL WELLNESS VISIT  08/04/2021     URINE DRUG SCREEN  12/17/2021     Pneumococcal Vaccine: Pediatrics (0 to 5 Years) and At-Risk Patients (6 to 64 Years) (2 of 4 - PCV13) 12/17/2021     ASTHMA CONTROL TEST  01/02/2022       Goals:   Goals Addressed as of 1/13/2022 at 2:30 AM                    1/11/22 12/2/21       Patient Stated       1. Mental Health Management (pt-stated)   50%  70%    Added 3/29/21 by Aylin Vera LSW      Goal Statement: I will manage my mental health.  Date Goal set: 03/29/21 // updated 8/12/21  Barriers: mental illness;   Strengths: motivated to improve mental health  Date to Achieve By: 08/29/21 // extended 2/1/22  Patient expressed understanding of goal: yes    Action steps to achieve this goal:  1. I will see a counselor/therapist.  2. I will see a psychiatrist.   3. I will get a cheek swab from psychiatry to determine which medication would be best for my chemistry.  4. I will take medications as prescribed.  5. I will continue to work with my care team and care coordination.             Intervention and Education during outreach: Patient stated she has not been feeling well, feels weak.  Patient stated she has been more focused on her physical health and would like to have CCC reach out in 1 month to address current goal/action steps re: mental health.    Patient stated her appointment with Dr. Cruz was cancelled due to a conflict with his schedule. Patient stated she did not mind as she was not feeling well enough to go.  Orthopedics appointment has been rescheduled to 1/17 for 3 weeks follow up. Wound, right foot per appointment desk.     Patient has upcoming appointment with Dr. Barba to address bad reaction to infusion.       CHW Plan: will  outreach again in 1 month.       Miriam PUGA  Community Health Worker  [facility] Care Coordination  [facility]  [email or web address]   Office: [phone]

## 2022-01-13 LAB
BACTERIA BLD CULT: NO GROWTH
BACTERIA BLD CULT: NO GROWTH

## 2022-01-14 ENCOUNTER — OFFICE VISIT (OUTPATIENT)
Dept: FAMILY MEDICINE | Facility: CLINIC | Age: 37
End: 2022-01-14
Payer: MEDICARE

## 2022-01-14 VITALS
TEMPERATURE: 98.7 F | HEIGHT: 61 IN | RESPIRATION RATE: 18 BRPM | OXYGEN SATURATION: 98 % | WEIGHT: 163 LBS | SYSTOLIC BLOOD PRESSURE: 110 MMHG | DIASTOLIC BLOOD PRESSURE: 68 MMHG | HEART RATE: 114 BPM | BODY MASS INDEX: 30.78 KG/M2

## 2022-01-14 DIAGNOSIS — K21.01 GASTROESOPHAGEAL REFLUX DISEASE WITH ESOPHAGITIS AND HEMORRHAGE: ICD-10-CM

## 2022-01-14 DIAGNOSIS — N18.31 STAGE 3A CHRONIC KIDNEY DISEASE (H): ICD-10-CM

## 2022-01-14 DIAGNOSIS — F43.10 PTSD (POST-TRAUMATIC STRESS DISORDER): ICD-10-CM

## 2022-01-14 DIAGNOSIS — G63 POLYNEUROPATHY ASSOCIATED WITH UNDERLYING DISEASE (H): ICD-10-CM

## 2022-01-14 DIAGNOSIS — D50.0 IRON DEFICIENCY ANEMIA DUE TO CHRONIC BLOOD LOSS: ICD-10-CM

## 2022-01-14 DIAGNOSIS — F33.3 SEVERE EPISODE OF RECURRENT MAJOR DEPRESSIVE DISORDER, WITH PSYCHOTIC FEATURES (H): ICD-10-CM

## 2022-01-14 DIAGNOSIS — R11.10 VOMITING AND DIARRHEA: ICD-10-CM

## 2022-01-14 DIAGNOSIS — R19.7 VOMITING AND DIARRHEA: ICD-10-CM

## 2022-01-14 DIAGNOSIS — R00.0 SINUS TACHYCARDIA: ICD-10-CM

## 2022-01-14 DIAGNOSIS — M34.9 SYSTEMIC SCLEROSIS (H): Primary | ICD-10-CM

## 2022-01-14 PROCEDURE — 99214 OFFICE O/P EST MOD 30 MIN: CPT | Performed by: INTERNAL MEDICINE

## 2022-01-14 ASSESSMENT — MIFFLIN-ST. JEOR: SCORE: 1366.74

## 2022-01-14 NOTE — PATIENT INSTRUCTIONS
1. We discussed carafate - hard to dose w/your current medications  2. We discussed prednisone to help with pain - can worsen GI symptoms  3. May try Tums or Rolaids for GI symptoms  4. Discussed eating small amounts more often to help offset nausea  5. Can try shaista, acupressure bands - Sea bands  6. Referral to Acupuncture

## 2022-01-14 NOTE — PROGRESS NOTES
Assessment & Plan     Systemic sclerosis (H) -she appears to be having acute on chronic pain and acute on chronic nausea and vomiting that preceded the iron infusion but clearly got worse after the iron infusion.  Her underlying mental health plays a role as well as it makes it more challenging for her to cope with setbacks in her health.  Discussed that steroids are often given after an adverse reaction to an iron infusion, but worried this would worsen her known esophagitis/gastritis.  Discussed a short increase in her oxycodone but she declines.  She reports she needs a new referral for hand therapy and would like to try acupuncture  - Occupational Therapy Referral; Future  - Acupuncture Referral; Future    Vomiting and diarrhea-acute on chronic nausea and vomiting.  She has multiple antiemetics at home.  She has significant polypharmacy and I have concern for drug interactions.  Advised over the counter therapies. She is working on using her coping strategies     Gastroesophageal reflux disease with esophagitis and hemorrhage -chronic and severe.  On max dose of H2 blocker and PPI.  Has used Carafate in the past.  This makes taking her other medications challenging due to absorption issues.  She declines Carafate at this point.  Advised antacids such as Tums and Rolaids to help with nausea and vomiting.Discussed acupressure bands and B vitamins to help with nausea.  Discussed smaller more frequent meals/snacks to help offset the nausea.  Advised pushing fluids.    Stage 3a chronic kidney disease (H) - Known issue that I take into account for their medical decisions, no current exacerbations or new concerns    Sinus tachycardia -longstanding known condition that is worsened when her health has worsened    Severe episode of recurrent major depressive disorder, with psychotic features (H) -contributes to the primary symptoms that she has today as her severe mental health issues make it challenging for her to  "fully cope with setbacks in her health    PTSD (post-traumatic stress disorder) -contributes to primary symptoms    Polyneuropathy associated with underlying disease (H) -contributes to primary symptoms    Iron deficiency anemia due to chronic blood loss -had reaction to iron sucrose, so we will address in another 1 to 2 months when she is over this current health crisis to determine if she needs another iron formulation.               BMI:   Estimated body mass index is 30.8 kg/m  as calculated from the following:    Height as of this encounter: 1.549 m (5' 1\").    Weight as of this encounter: 73.9 kg (163 lb).       Patient Instructions   1. We discussed carafate - hard to dose w/your current medications  2. We discussed prednisone to help with pain - can worsen GI symptoms  3. May try Tums or Rolaids for GI symptoms  4. Discussed eating small amounts more often to help offset nausea  5. Can try shaista, acupressure bands - Sea bands  6. Referral to Acupuncture      Return in about 1 week (around 1/21/2022) for If symptoms don't improve or if they worsen.    Grecia Barba, [facility]Y Barnes is a 36 year old who presents for the following health issues     HPI     ED/UC Followup:    Facility:  [facility]   Date of visit: 01/07/22  Reason for visit: Generalized muscle weakness   Current Status: Pt is in a lot of pain, migraines, muscle aches, foggy, fatigued, nausea, and vomiting, hard time eating. Anxiety is high.      Iron Sucrose Infusion Reaction:  --reports significant fatigue and myalgias, headaches  --significant nausea and vomiting (every 1-2 days)  --she was seen in the ER on 1/5 (prior to iron infusion) for diarrhea, nausea  --she reports gradual onset of generalized weakness and pain prior to iron infusion, but got much worse after the iron infusion  --having increase in chronic pain of feet, ankles, back; using cane to ambulate due to severe pain  --feeling very " frustrated with her current medical state because she is unable to care for her family or herself  --has not used phenergan IM because of muscle pain; has used phenergan tablet      Current Outpatient Medications   Medication Sig Dispense Refill     acetaminophen (TYLENOL) 325 MG tablet Take 2 tablets (650 mg) by mouth every 4 hours as needed for mild pain or other 1 Bottle 0     albuterol (VENTOLIN HFA) 108 (90 Base) MCG/ACT inhaler INHALE 2 PUFFS INTO THE LUNGS ONCE EVERY 4 HOURS AS NEEDED FOR SHORTNESS OF BREATH / DYSPNEA / WHEEZING (Patient taking differently: INHALE 2 PUFFS INTO THE LUNGS ONCE EVERY 4 HOURS AS NEEDED FOR SHORTNESS OF BREATH / DYSPNEA / WHEEZING.) 18 g 11     amLODIPine (NORVASC) 10 MG tablet Take 1 tablet (10 mg) by mouth daily (Patient taking differently: Take 10 mg by mouth every morning ) 90 tablet 3     budesonide-formoterol (SYMBICORT) 160-4.5 MCG/ACT Inhaler INHALE TWO PUFFS BY MOUTH TWICE A DAY 10.2 g 11     busPIRone HCl (BUSPAR) 30 MG tablet Take 1 tablet (30 mg) by mouth 2 times daily 180 tablet 3     DULoxetine (CYMBALTA) 30 MG capsule Take 3 capsules (90 mg) by mouth daily (Patient taking differently: Take 90 mg by mouth 2 times daily ) 270 capsule 3     famotidine (PEPCID) 20 MG tablet Take 1 tablet (20 mg) by mouth 2 times daily 180 tablet 3     gabapentin (NEURONTIN) 300 MG capsule Take 2 capsules (600 mg) by mouth 3 times daily 540 capsule 3     GOODSENSE MIGRAINE FORMULA 250-250-65 MG per tablet TAKE ONE TABLET BY MOUTH EVERY 6 HOURS AS NEEDED FOR HEADACHES (Patient taking differently: Take 1 tablet by mouth every 6 hours as needed ) 24 tablet 1     lisinopril (ZESTRIL) 10 MG tablet Take 1 tablet (10 mg) by mouth daily (Patient taking differently: Take 10 mg by mouth every evening ) 90 tablet 3     loratadine (CLARITIN) 10 MG tablet Take 10 mg by mouth daily as needed        LORazepam (ATIVAN) 1 MG tablet        montelukast (SINGULAIR) 10 MG tablet Take 1 tablet (10 mg) by  "mouth At Bedtime 90 tablet 3     naloxone (NARCAN) 4 MG/0.1ML nasal spray Spray 1 spray (4 mg) into one nostril alternating nostrils once as needed for opioid reversal every 2-3 minutes until assistance arrives 0.2 mL 3     omeprazole (PRILOSEC) 40 MG DR capsule TAKE ONE CAPSULE BY MOUTH TWICE A DAY (Patient taking differently: Take 40 mg by mouth 2 times daily TAKE ONE CAPSULE BY MOUTH TWICE A DAY) 180 capsule 3     ondansetron (ZOFRAN-ODT) 4 MG ODT tab Take 1 tablet (4 mg) by mouth every 8 hours as needed for nausea 30 tablet 4     oxyCODONE (ROXICODONE) 10 MG tablet Take 1 tablet (10 mg) by mouth 3 times daily 90 tablet 0     polyethylene glycol (MIRALAX/GLYCOLAX) packet Take 17 g by mouth daily 7 packet 0     promethazine (PHENERGAN) 25 MG tablet TAKE ONE TABLET BY MOUTH TWICE A DAY AS NEEDED NAUSEA (Patient taking differently: daily as needed TAKE ONE TABLET BY MOUTH TWICE A DAY AS NEEDED NAUSEA) 30 tablet 7     promethazine (PHENERGAN) 25 MG/ML IV injection INJECT 1 ML INTRAMUSCULARLY EVERY 6 HOURS AS NEEDED 6 mL 11     blood glucose (NO BRAND SPECIFIED) lancets standard Use to test blood sugar 1 time daily 100 each 3     blood glucose (NO BRAND SPECIFIED) test strip Use to test blood sugar 1 time daily 100 strip 11     Cyanocobalamin (B-12) 1000 MCG TBCR Take 1,000 mcg by mouth daily (Patient not taking: Reported on 1/14/2022) 100 tablet 1     order for DME [employer] [facility]. Patient can use for three months    Diagnosis Right ankle fracture and surgery 2/10/2020 (Patient not taking: Reported on 11/10/2021) 1 Units 0     order for DME [employer] [facility]. Patient can use for another two months  Diagnosis: Right ankle bimalleolar fractures, open reduction internal fixation on 2/10/2020 (Patient not taking: Reported on 11/10/2021) 1 Units 0     oxyCODONE IR (ROXICODONE) 10 MG tablet TAKE ONE TABLET BY MOUTH THREE TIMES A DAY 90 tablet 0     syringe/needle, disp, (BD ECLIPSE SYRINGE) 25G X 1\" 3 ML MISC 1 each " "daily as needed 10 each 11             Review of Systems   Constitutional, HEENT, cardiovascular, pulmonary, GI, , musculoskeletal, neuro, skin, endocrine and psych systems are negative, except as otherwise noted.      Objective    /68   Pulse 114   Temp 98.7  F (37.1  C) (Tympanic)   Resp 18   Ht 1.549 m (5' 1\")   Wt 73.9 kg (163 lb)   SpO2 98%   BMI 30.80 kg/m    Body mass index is 30.8 kg/m .  Physical Exam   GENERAL APPEARANCE: alert and no distress  RESP: lungs clear to auscultation - no rales, rhonchi or wheezes  CV: normal S1 S2, no S3 or S4, no murmur, click or rub and tachycardia  PSYCH: initially short tempered and irritable but later in the visit was pleasant an calm                "

## 2022-01-15 ASSESSMENT — ASTHMA QUESTIONNAIRES: ACT_TOTALSCORE: 16

## 2022-01-17 ENCOUNTER — OFFICE VISIT (OUTPATIENT)
Dept: ORTHOPEDICS | Facility: CLINIC | Age: 37
End: 2022-01-17
Payer: MEDICARE

## 2022-01-17 DIAGNOSIS — M20.41 HAMMER TOES OF BOTH FEET: ICD-10-CM

## 2022-01-17 DIAGNOSIS — M20.42 HAMMER TOES OF BOTH FEET: ICD-10-CM

## 2022-01-17 DIAGNOSIS — R52 PAIN: ICD-10-CM

## 2022-01-17 DIAGNOSIS — L97.512 ULCER OF RIGHT FOOT WITH FAT LAYER EXPOSED (H): Primary | ICD-10-CM

## 2022-01-17 DIAGNOSIS — Q66.70 PES CAVUS: ICD-10-CM

## 2022-01-17 PROCEDURE — 11042 DBRDMT SUBQ TIS 1ST 20SQCM/<: CPT | Performed by: PODIATRIST

## 2022-01-17 PROCEDURE — 99213 OFFICE O/P EST LOW 20 MIN: CPT | Mod: 25 | Performed by: PODIATRIST

## 2022-01-17 NOTE — PROGRESS NOTES
Chief Complaint   Patient presents with     RECHECK     3 week follow up for wound on right foot              Allergies   Allergen Reactions     Blood Transfusion Related (Informational Only) Other (See Comments)     Patient has a history of a clinically significant antibody against RBC antigens.  A delay in compatible RBCs may occur.     No Known Allergies      Pollen Extract      Seasonal Allergies      Shellfish-Derived Products Nausea and Rash     Rash on face         Subjective: Molly is a 36 year old female who presents to the clinic today for a follow up of right foot ulcer.  She relates that she has continued to have pain in the right foot.  She does use a petit egg to the area to get rid of some of the callus.  She relates that she continues to use the DH shoe.  Without it, she has significant pain in the area.    Objective    PT and DP pulses are 1/4 bilaterally. CRT is 4 seconds. Absent pedal hair.   Gross sensation is diminished bilaterally.    Equinus is moderate bilaterally. . Severely hammered digits to the lesser digits BL with R>L. These are not reducible. pain in the medial and lateral ankles with palpation and ROM.   Nails normal bilaterally.              A pressure wound is noted at right  foot measuring 0.4cm x 0.2cm x 0.1cm.    Ortiz Classification: 2    Wound base: Pink/Granulation    Edges: hyperkeratotic    Drainage: scant/serous    Odor: no    Undermining: no.    Bone Exposure: No    Clinical Signs of Infection: No    After obtaining patient consent, the wound was irrigated with copious amounts of saline. A scalpel was then used to debride the wound into subcutaneous tissue. The wound edges were debrided back to healthy, bleeding tissue. The wound base exhibited healthy bleeding. Given the patient's lack of sensation, no anesthesia was necessary for the procedure.    Barriers to Healing: scleroderma, area of pressure.    Treatment Plan: Iodosorb and Mepilex. Offloading with DH2 shoe.      Pt Ability to Follow Plan: Good.       Assessment: Molly is a 36-year-old with systemic sclerosis and scleroderma with new ulceration on the plantar right foot.  I have recommended that she start using dressings as above.  She does agree to this.  She also has pes cavus.      Plan:   - Pt seen and evaluated  -Wound was debrided as described.  -Dressings as above.  - Pt to return to clinic in 10 days.

## 2022-01-17 NOTE — LETTER
1/17/2022         RE: Molly Teague  [address] [facility] [address]        Dear Colleague,    Thank you for referring your patient, Molly Teague, to the [facility] [place]. Please see a copy of my visit note below.    Chief Complaint   Patient presents with     RECHECK     3 week follow up for wound on right foot              Allergies   Allergen Reactions     Blood Transfusion Related (Informational Only) Other (See Comments)     Patient has a history of a clinically significant antibody against RBC antigens.  A delay in compatible RBCs may occur.     No Known Allergies      Pollen Extract      Seasonal Allergies      Shellfish-Derived Products Nausea and Rash     Rash on face         Subjective: Molly is a 36 year old female who presents to the clinic today for a follow up of right foot ulcer.  She relates that she has continued to have pain in the right foot.  She does use a petit egg to the area to get rid of some of the callus.  She relates that she continues to use the DH shoe.  Without it, she has significant pain in the area.    Objective    PT and DP pulses are 1/4 bilaterally. CRT is 4 seconds. Absent pedal hair.   Gross sensation is diminished bilaterally.    Equinus is moderate bilaterally. . Severely hammered digits to the lesser digits BL with R>L. These are not reducible. pain in the medial and lateral ankles with palpation and ROM.   Nails normal bilaterally.              A pressure wound is noted at right  foot measuring 0.4cm x 0.2cm x 0.1cm.    Ortiz Classification: 2    Wound base: Pink/Granulation    Edges: hyperkeratotic    Drainage: scant/serous    Odor: no    Undermining: no.    Bone Exposure: No    Clinical Signs of Infection: No    After obtaining patient consent, the wound was irrigated with copious amounts of saline. A scalpel was then used to debride the wound into subcutaneous tissue. The wound edges were debrided back to healthy,  bleeding tissue. The wound base exhibited healthy bleeding. Given the patient's lack of sensation, no anesthesia was necessary for the procedure.    Barriers to Healing: scleroderma, area of pressure.    Treatment Plan: Iodosorb and Mepilex. Offloading with DH2 shoe.     Pt Ability to Follow Plan: Good.       Assessment: Molly is a 36-year-old with systemic sclerosis and scleroderma with new ulceration on the plantar right foot.  I have recommended that she start using dressings as above.  She does agree to this.  She also has pes cavus.      Plan:   - Pt seen and evaluated  -Wound was debrided as described.  -Dressings as above.  - Pt to return to clinic in 10 days.          Kenroy Cruz DPM

## 2022-01-28 DIAGNOSIS — R11.0 NAUSEA: ICD-10-CM

## 2022-01-28 RX ORDER — PROMETHAZINE HYDROCHLORIDE 25 MG/ML
INJECTION, SOLUTION INTRAMUSCULAR; INTRAVENOUS
Qty: 6 ML | Refills: 11 | Status: SHIPPED | OUTPATIENT
Start: 2022-01-28 | End: 2023-02-17

## 2022-01-28 NOTE — TELEPHONE ENCOUNTER
Forwarding refill request for IM phenergan to PCP for review. This passes RN protocol, but appears there was concern for polypharmacy at recent visit, so wanting to ensure appropriate to continue this medication.   Last Rx was on 12/8/21 for 6 mL and 11 refills.     Isaura Rios RN  [facility]

## 2022-02-08 ENCOUNTER — VIRTUAL VISIT (OUTPATIENT)
Dept: FAMILY MEDICINE | Facility: CLINIC | Age: 37
End: 2022-02-08
Payer: MEDICARE

## 2022-02-08 ENCOUNTER — LAB (OUTPATIENT)
Dept: FAMILY MEDICINE | Facility: CLINIC | Age: 37
End: 2022-02-08
Attending: INTERNAL MEDICINE
Payer: MEDICARE

## 2022-02-08 DIAGNOSIS — R50.9 FEVER, UNSPECIFIED FEVER CAUSE: ICD-10-CM

## 2022-02-08 DIAGNOSIS — G89.4 CHRONIC PAIN SYNDROME: Chronic | ICD-10-CM

## 2022-02-08 DIAGNOSIS — R50.9 FEVER, UNSPECIFIED FEVER CAUSE: Primary | ICD-10-CM

## 2022-02-08 LAB
FLUAV AG SPEC QL IA: NEGATIVE
FLUBV AG SPEC QL IA: NEGATIVE
SARS-COV-2 RNA RESP QL NAA+PROBE: NEGATIVE

## 2022-02-08 PROCEDURE — 99214 OFFICE O/P EST MOD 30 MIN: CPT | Mod: 95 | Performed by: INTERNAL MEDICINE

## 2022-02-08 PROCEDURE — U0005 INFEC AGEN DETEC AMPLI PROBE: HCPCS

## 2022-02-08 PROCEDURE — 87804 INFLUENZA ASSAY W/OPTIC: CPT

## 2022-02-08 PROCEDURE — U0003 INFECTIOUS AGENT DETECTION BY NUCLEIC ACID (DNA OR RNA); SEVERE ACUTE RESPIRATORY SYNDROME CORONAVIRUS 2 (SARS-COV-2) (CORONAVIRUS DISEASE [COVID-19]), AMPLIFIED PROBE TECHNIQUE, MAKING USE OF HIGH THROUGHPUT TECHNOLOGIES AS DESCRIBED BY CMS-2020-01-R: HCPCS

## 2022-02-08 RX ORDER — OXYCODONE HYDROCHLORIDE 10 MG/1
10 TABLET ORAL 3 TIMES DAILY
Qty: 90 TABLET | Refills: 0 | Status: SHIPPED | OUTPATIENT
Start: 2022-03-10 | End: 2022-04-09

## 2022-02-08 RX ORDER — OXYCODONE HYDROCHLORIDE 10 MG/1
10 TABLET ORAL 3 TIMES DAILY
Qty: 90 TABLET | Refills: 0 | Status: SHIPPED | OUTPATIENT
Start: 2022-04-09 | End: 2022-04-26

## 2022-02-08 RX ORDER — OXYCODONE HYDROCHLORIDE 10 MG/1
10 TABLET ORAL 3 TIMES DAILY
Qty: 90 TABLET | Refills: 0 | Status: SHIPPED | OUTPATIENT
Start: 2022-02-08 | End: 2022-03-10

## 2022-02-08 RX ORDER — LORAZEPAM 1 MG/1
1 TABLET ORAL 2 TIMES DAILY PRN
Qty: 45 TABLET | Refills: 5 | Status: SHIPPED | OUTPATIENT
Start: 2022-02-08 | End: 2022-06-24

## 2022-02-08 NOTE — PATIENT INSTRUCTIONS
1.  Oxycodone refilled starting today and for the next 3 months.  Make a follow-up appointment in 3 months for your next pain med refill.  2.  Order placed for Covid and flu test.  If your flu test is positive, I would start Tamiflu  3.  Continue to treat the fever and body aches as you have been doing with ice, heat, Tylenol  4.  Follow-up with face-to-face visit if symptoms worsen

## 2022-02-08 NOTE — RESULT ENCOUNTER NOTE
Influenza test is negative. Continue plan of care discussed at the time of visit. Covid test pending

## 2022-02-08 NOTE — PROGRESS NOTES
"Molly is a 36 year old who is being evaluated via a billable video visit.      How would you like to obtain your AVS? MyChart  If the video visit is dropped, the invitation should be resent by: Text to cell phone: [phone]  Will anyone else be joining your video visit? No      Video Start Time: 8:59 AM    Assessment & Plan     Fever, unspecified fever cause-viral versus influenza versus Covid.  Order placed for flu and Covid test.  No sore throat to suggest strep throat.  No significant lower respiratory symptoms to indicate pneumonia.  Would recommend starting Tamiflu if flu test is positive  - Symptomatic; Yes; 2/6/2022 COVID-19 Virus (Coronavirus) by PCR; Future  - Influenza A & B Antigen; Future    Chronic pain syndrome -okay for one-time early fill due to multiple recent episodes of setback causing acute pain.  Refill x3 months sent to the pharmacy.  Next visit is due for face-to-face follow-up, due for U tox and signing CSA  - oxyCODONE IR (ROXICODONE) 10 MG tablet; Take 1 tablet (10 mg) by mouth 3 times daily  - LORazepam (ATIVAN) 1 MG tablet; Take 1 tablet (1 mg) by mouth 2 times daily as needed for anxiety #45 for 30 days  - oxyCODONE (ROXICODONE) 10 MG tablet; Take 1 tablet (10 mg) by mouth 3 times daily  - oxyCODONE (ROXICODONE) 10 MG tablet; Take 1 tablet (10 mg) by mouth 3 times daily             BMI:   Estimated body mass index is 30.8 kg/m  as calculated from the following:    Height as of 1/14/22: 1.549 m (5' 1\").    Weight as of 1/14/22: 73.9 kg (163 lb).       Patient Instructions   1.  Oxycodone refilled starting today and for the next 3 months.  Make a follow-up appointment in 3 months for your next pain med refill.  2.  Order placed for Covid and flu test.  If your flu test is positive, I would start Tamiflu  3.  Continue to treat the fever and body aches as you have been doing with ice, heat, Tylenol  4.  Follow-up with face-to-face visit if symptoms worsen      No follow-ups on " file.    Grecia Barba, [facility]    Amanda Barnes is a 36 year old who presents for the following health issues;     HPI     ENT Symptoms    2 days of symptoms              Symptoms: cc Present Absent Comment   Fever/Chills  x  Chills, fever last night 102.0   Fatigue  x     Muscle Aches  x     Eye Irritation  x     Sneezing   x    Nasal Tyrone/Drg  x     Sinus Pressure/Pain  x     Loss of smell   x    Dental pain   x    Sore Throat   x    Swollen Glands  x     Ear Pain/Fullness  x  Pressure in both ears    Cough   x    Wheeze   x    Chest Pain   x    Shortness of breath  x  Thinks more due to fatigue and nasal congestion   Rash   x    Other  x  Nausea, chronic     Symptom duration:  2 days    Symptom severity:  moderate    Treatments tried:  tylenol cold and flu, hot tea   Contacts:   had a cold, son is starting to get it       --had COVID in early Oct      Pain/Anxiety:   --needs refills of meds  --she reports she is totally out of oxycodone as of this morning.  At her last visit on 1/14 she was having acute on chronic pain due to iron infusion reaction.  At that visit, I offered a short increase in oxycodone to manage pain but she declined at the time.  Today she reports she did use a few extra days of the oxycodone to help manage pain hence why she is out of her oxycodone 2 days early.  She has not run out of her oxycodone early in the past.      Review of Systems   Constitutional, HEENT, cardiovascular, pulmonary, gi and gu systems are negative, except as otherwise noted.      Objective           Vitals:  No vitals were obtained today due to virtual visit.    Physical Exam   GENERAL: no distress and fatigued, chronically ill  EYES: Eyes grossly normal to inspection.  No discharge or erythema, or obvious scleral/conjunctival abnormalities.  RESP: No audible wheeze, cough, or visible cyanosis.  No visible retractions or increased work of breathing.    SKIN: Visible skin  clear. No significant rash, abnormal pigmentation or lesions.  PSYCH: Mentation appears normal, affect normal/bright, judgement and insight intact, normal speech and appearance well-groomed.                Video-Visit Details    Type of service:  Video Visit    Video End Time:9:17 AM    Originating Location (pt. Location): Home    Distant Location (provider location):  [facility]     Platform used for Video Visit: Art Qualified   Self

## 2022-02-21 ENCOUNTER — PATIENT OUTREACH (OUTPATIENT)
Dept: CARE COORDINATION | Facility: CLINIC | Age: 37
End: 2022-02-21
Payer: MEDICARE

## 2022-02-21 NOTE — PROGRESS NOTES
Clinic Care Coordination Contact  [facility]/Voicemail    Clinical Data: Care Coordination Outreach  Outreach attempted x 1.  Left message on patient's voicemail with call back information and requested return call.  Plan: CHW will try to reach patient again in 10-12 business days.    Miriam PUGA  Community Health Worker  [facility] Care Coordination  [facility]  [email or web address]   Office: [phone]

## 2022-02-24 ENCOUNTER — PATIENT OUTREACH (OUTPATIENT)
Dept: CARE COORDINATION | Facility: CLINIC | Age: 37
End: 2022-02-24
Payer: MEDICARE

## 2022-02-24 NOTE — PROGRESS NOTES
Clinic Care Coordination Contact    Situation: Patient chart reviewed by care coordinator.     Background: Care Coordination following patient to assist with Goal(s).     Assessment: Per chart review, CC CHW in process of contacting patient. Next CHW outreach is scheduled for 3/7/22. Goal is  and has already been extended - establish new goal.     Plan/Recommendations:  CC will await update following CC CHW patient outreach.      HENRY Vee   Social Work Clinic Care Coordinator   [facility]  [phone]  [email or web address]

## 2022-02-25 ENCOUNTER — LAB (OUTPATIENT)
Dept: LAB | Facility: CLINIC | Age: 37
End: 2022-02-25
Payer: MEDICARE

## 2022-02-25 DIAGNOSIS — D63.8 ANEMIA, CHRONIC DISEASE: Primary | ICD-10-CM

## 2022-02-25 DIAGNOSIS — D50.0 IRON DEFICIENCY ANEMIA DUE TO CHRONIC BLOOD LOSS: ICD-10-CM

## 2022-02-25 DIAGNOSIS — N18.31 STAGE 3A CHRONIC KIDNEY DISEASE (H): ICD-10-CM

## 2022-02-25 LAB
ANION GAP SERPL CALCULATED.3IONS-SCNC: 6 MMOL/L (ref 3–14)
BUN SERPL-MCNC: 18 MG/DL (ref 7–30)
CALCIUM SERPL-MCNC: 9.2 MG/DL (ref 8.5–10.1)
CHLORIDE BLD-SCNC: 109 MMOL/L (ref 94–109)
CO2 SERPL-SCNC: 20 MMOL/L (ref 20–32)
CREAT SERPL-MCNC: 1.21 MG/DL (ref 0.52–1.04)
ERYTHROCYTE [DISTWIDTH] IN BLOOD BY AUTOMATED COUNT: 18.5 % (ref 10–15)
GFR SERPL CREATININE-BSD FRML MDRD: 59 ML/MIN/1.73M2
GLUCOSE BLD-MCNC: 78 MG/DL (ref 70–99)
HCT VFR BLD AUTO: 40.8 % (ref 35–47)
HGB BLD-MCNC: 12.5 G/DL (ref 11.7–15.7)
IRON SATN MFR SERPL: 8 % (ref 15–46)
IRON SERPL-MCNC: 39 UG/DL (ref 35–180)
MCH RBC QN AUTO: 26.4 PG (ref 26.5–33)
MCHC RBC AUTO-ENTMCNC: 30.6 G/DL (ref 31.5–36.5)
MCV RBC AUTO: 86 FL (ref 78–100)
PLATELET # BLD AUTO: 288 10E3/UL (ref 150–450)
POTASSIUM BLD-SCNC: 4.4 MMOL/L (ref 3.4–5.3)
RBC # BLD AUTO: 4.74 10E6/UL (ref 3.8–5.2)
SODIUM SERPL-SCNC: 135 MMOL/L (ref 133–144)
TIBC SERPL-MCNC: 494 UG/DL (ref 240–430)
WBC # BLD AUTO: 8 10E3/UL (ref 4–11)

## 2022-02-25 PROCEDURE — 85027 COMPLETE CBC AUTOMATED: CPT

## 2022-02-25 PROCEDURE — 36415 COLL VENOUS BLD VENIPUNCTURE: CPT

## 2022-02-25 PROCEDURE — 80048 BASIC METABOLIC PNL TOTAL CA: CPT

## 2022-02-25 PROCEDURE — 83550 IRON BINDING TEST: CPT

## 2022-02-25 NOTE — RESULT ENCOUNTER NOTE
The hemoglobin has improved significantly which is good news. The iron level is still slightly low. Because of the bad infusion reaction that you had, lets hold off on any more iron infusions. Recommend to recheck your anemia labs in 2-3 months

## 2022-03-18 ENCOUNTER — PATIENT OUTREACH (OUTPATIENT)
Dept: CARE COORDINATION | Facility: CLINIC | Age: 37
End: 2022-03-18
Payer: MEDICARE

## 2022-03-18 NOTE — LETTER
[facility]  [address] [place] [address]    March 18, 2022      Dear Molly,    We have been trying to reach you as you are currently enrolled in [facility] s Care Coordination program.  The goal of care coordination is to help you manage your health and improve access to the [facility] system in the most efficient manner.  The Care Coordination team understands the healthcare system and will assist you in improving your access to care.     We will continue to reach out for monthly check in; however, if you are able to call Care Coordination that would be appreciated.      Health Maintenance Due   Topic Date Due     LIPID  Never done     MICROALBUMIN  Never done     ASTHMA ACTION PLAN  07/06/2019     MEDICARE ANNUAL WELLNESS VISIT  08/04/2021     URINE DRUG SCREEN  12/17/2021     Pneumococcal Vaccine: Pediatrics (0 to 5 Years) and At-Risk Patients (6 to 64 Years) (2 of 4 - PCV13) 12/17/2021     ASTHMA CONTROL TEST       Please let us know if you are needing any assistance making future appointments.     We at [facility] are focused on providing you with the highest-quality healthcare experience possible.  It is a pleasure to partner with you as we work towards achieving your optimal state of wellness.        Sincerely,      Miriam PUGA  Community Health Worker  [facility] Care Coordination  [facility]  [email or web address]   Office: [phone]

## 2022-03-18 NOTE — PROGRESS NOTES
Clinic Care Coordination Contact  [facility]/Voicemail     Clinical Data: Care Coordination Outreach  Outreach attempted x 2.  Left message on patient's voicemail with call back information and requested return call.  Plan: CHW will send unable to contact letter with contact information and patient's health maintenance via [employer]. (sent 3/21/22)  CHW will try to reach patient again in 10-12 business days.    CHW Note: goal has    - counselor/therapist   - psychiatrist   - medication     Miriam PUGA  Community Health Worker  [facility]  Clinic Care Coordination  [facility]  [email or web address]   Office: [phone]

## 2022-03-22 NOTE — PROGRESS NOTES
CHW in process of contacting pt. CHW to attempt one more time.     HENRY Vee   Social Work Clinic Care Coordinator   [facility]  [phone]  [email or web address]

## 2022-03-25 ENCOUNTER — OFFICE VISIT (OUTPATIENT)
Dept: ORTHOPEDICS | Facility: CLINIC | Age: 37
End: 2022-03-25
Payer: MEDICARE

## 2022-03-25 DIAGNOSIS — L84 TYLOMA: ICD-10-CM

## 2022-03-25 DIAGNOSIS — Q66.70 PES CAVUS: Primary | ICD-10-CM

## 2022-03-25 DIAGNOSIS — M25.571 PAIN IN JOINT OF RIGHT ANKLE: ICD-10-CM

## 2022-03-25 DIAGNOSIS — M20.42 HAMMER TOES OF BOTH FEET: ICD-10-CM

## 2022-03-25 DIAGNOSIS — G63 POLYNEUROPATHY ASSOCIATED WITH UNDERLYING DISEASE (H): ICD-10-CM

## 2022-03-25 DIAGNOSIS — M25.571 PAIN IN JOINT, ANKLE AND FOOT, RIGHT: ICD-10-CM

## 2022-03-25 DIAGNOSIS — M20.41 HAMMER TOES OF BOTH FEET: ICD-10-CM

## 2022-03-25 PROCEDURE — 99213 OFFICE O/P EST LOW 20 MIN: CPT | Performed by: PODIATRIST

## 2022-03-25 NOTE — PROGRESS NOTES
Chief Complaint:   Chief Complaint   Patient presents with     Wound Check     Right foot.           Allergies   Allergen Reactions     Blood Transfusion Related (Informational Only) Other (See Comments)     Patient has a history of a clinically significant antibody against RBC antigens.  A delay in compatible RBCs may occur.     No Known Allergies      Pollen Extract      Seasonal Allergies      Venofer [Iron Sucrose] Difficulty breathing and Cramps     Severe infusion reaction 1/2022     Shellfish-Derived Products Nausea and Rash     Rash on face         Subjective: Molly is a 36 year old female who presents to the clinic today for a follow up of right foot ulcer.  She relates that the area is healed.  She is quite pleased with the amount of healing she is had.  It is still caused her some pain when she is walking.  She does need a new pair of inserts.    Objective  Data Unavailable Data Unavailable Data Unavailable Data Unavailable Data Unavailable 0 lbs 0 oz  Small tyloma is noted to the right first metatarsal head.  This does have some intradermal bleeding.  I did debride this away and there was no wound noted underneath.    Assessment: Molly is a 36-year-old with a tyloma on the right first metatarsal head where a wound was.  The wound is completely healed today.  She is doing well.  She does need new orthotics.    Plan:   - Pt seen and evaluated  -Order for orthotics was put in.  They can use the same mold as before.  -No need to cover the area.  -Activity as tolerated.  - Pt to return to clinic in 3 months.

## 2022-03-25 NOTE — LETTER
3/25/2022         RE: Molly Teague  [address] [facility] [address]        Dear Colleague,    Thank you for referring your patient, Molly Teague, to the [facility] [place]. Please see a copy of my visit note below.    Chief Complaint:   Chief Complaint   Patient presents with     Wound Check     Right foot.           Allergies   Allergen Reactions     Blood Transfusion Related (Informational Only) Other (See Comments)     Patient has a history of a clinically significant antibody against RBC antigens.  A delay in compatible RBCs may occur.     No Known Allergies      Pollen Extract      Seasonal Allergies      Venofer [Iron Sucrose] Difficulty breathing and Cramps     Severe infusion reaction 1/2022     Shellfish-Derived Products Nausea and Rash     Rash on face         Subjective: Molly is a 36 year old female who presents to the clinic today for a follow up of right foot ulcer.  She relates that the area is healed.  She is quite pleased with the amount of healing she is had.  It is still caused her some pain when she is walking.  She does need a new pair of inserts.    Objective  Data Unavailable Data Unavailable Data Unavailable Data Unavailable Data Unavailable 0 lbs 0 oz  Small tyloma is noted to the right first metatarsal head.  This does have some intradermal bleeding.  I did debride this away and there was no wound noted underneath.    Assessment: Molly is a 36-year-old with a tyloma on the right first metatarsal head where a wound was.  The wound is completely healed today.  She is doing well.  She does need new orthotics.    Plan:   - Pt seen and evaluated  -Order for orthotics was put in.  They can use the same mold as before.  -No need to cover the area.  -Activity as tolerated.  - Pt to return to clinic in 3 months.      Kenroy Cruz DPM

## 2022-03-25 NOTE — NURSING NOTE
Reason For Visit:   Chief Complaint   Patient presents with     Wound Check     Right foot.                 Allergies   Allergen Reactions     Blood Transfusion Related (Informational Only) Other (See Comments)     Patient has a history of a clinically significant antibody against RBC antigens.  A delay in compatible RBCs may occur.     No Known Allergies      Pollen Extract      Seasonal Allergies      Venofer [Iron Sucrose] Difficulty breathing and Cramps     Severe infusion reaction 1/2022     Shellfish-Derived Products Nausea and Rash     Rash on face           Tricia Wellington LPN

## 2022-04-06 ENCOUNTER — PATIENT OUTREACH (OUTPATIENT)
Dept: CARE COORDINATION | Facility: CLINIC | Age: 37
End: 2022-04-06
Payer: MEDICARE

## 2022-04-06 NOTE — LETTER
[facility] CARE COORDINATION  [facility]  [address] [place] [address]    April 8, 2022    Molly Teague  [address] [address]      Dear Molly,    Care coordination has been unsuccessful in reaching you since our last contact. At this time the Care Coordination team will make no further attempts to reach you, however this does not change your ability to continue receiving care from your providers at your primary care clinic. If you need additional support from a care coordinator in the future please contact CHICA Pineda at [phone].    All of us at [facility] are invested in your health and are here to assist you in meeting your goals.     Sincerely,    HENRY Vee   Social Work Primary Care Clinic Care Coordinator   [facility]

## 2022-04-07 DIAGNOSIS — I10 MALIGNANT ESSENTIAL HYPERTENSION: ICD-10-CM

## 2022-04-07 DIAGNOSIS — F32.1 MODERATE MAJOR DEPRESSION (H): ICD-10-CM

## 2022-04-07 DIAGNOSIS — F43.10 PTSD (POST-TRAUMATIC STRESS DISORDER): ICD-10-CM

## 2022-04-07 DIAGNOSIS — M34.1 CREST (CALCINOSIS, RAYNAUD'S PHENOMENON, ESOPHAGEAL DYSFUNCTION, SCLERODACTYLY, TELANGIECTASIA) (H): ICD-10-CM

## 2022-04-07 DIAGNOSIS — F41.1 GAD (GENERALIZED ANXIETY DISORDER): ICD-10-CM

## 2022-04-08 NOTE — TELEPHONE ENCOUNTER
Pending Prescriptions:                       Disp   Refills    DULoxetine (CYMBALTA) 30 MG capsule [Pharm*270 ca*3        Sig: Take 3 capsules (90 mg) by mouth daily    lisinopril (ZESTRIL) 10 MG tablet [Pharmac*90 tab*3        Sig: Take 1 tablet (10 mg) by mouth daily    famotidine (PEPCID) 20 MG tablet [Pharmacy*180 ta*3        Sig: TAKE ONE TABLET BY MOUTH TWICE A DAY    Routing refill request to provider for review/approval because:  Labs out of range:  Creatinine  PHQ-9 not in range for RN refill protocol    Creatinine   Date Value Ref Range Status   02/25/2022 1.21 (H) 0.52 - 1.04 mg/dL Final   03/24/2021 1.10 (H) 0.52 - 1.04 mg/dL Final     PHQ 3/23/2021 8/26/2021 11/10/2021   PHQ-9 Total Score 17 14 12   Q9: Thoughts of better off dead/self-harm past 2 weeks Not at all Not at all Not at all   F/U: Thoughts of suicide or self-harm - - -   F/U: Self harm-plan - - -   F/U: Self-harm action - - -   F/U: Safety concerns - - -   PHQ-A Total Score - - -   PHQ-A Depressed most days in past year - - -   PHQ-A Mood affect on daily activities - - -   PHQ-A Suicide Ideation past 2 weeks - - -     Tricia Ferrara RN  [facility]

## 2022-04-12 RX ORDER — LISINOPRIL 10 MG/1
10 TABLET ORAL EVERY EVENING
Qty: 90 TABLET | Refills: 3 | Status: SHIPPED | OUTPATIENT
Start: 2022-04-12 | End: 2023-05-10

## 2022-04-12 RX ORDER — FAMOTIDINE 20 MG/1
TABLET, FILM COATED ORAL
Qty: 180 TABLET | Refills: 3 | Status: SHIPPED | OUTPATIENT
Start: 2022-04-12 | End: 2023-05-03

## 2022-04-12 RX ORDER — DULOXETIN HYDROCHLORIDE 30 MG/1
90 CAPSULE, DELAYED RELEASE ORAL DAILY
Qty: 270 CAPSULE | Refills: 3 | Status: SHIPPED | OUTPATIENT
Start: 2022-04-12 | End: 2022-11-03

## 2022-04-13 DIAGNOSIS — M34.9 LIMITED SYSTEMIC SCLEROSIS (H): ICD-10-CM

## 2022-04-13 DIAGNOSIS — G63 POLYNEUROPATHY ASSOCIATED WITH UNDERLYING DISEASE (H): ICD-10-CM

## 2022-04-13 NOTE — TELEPHONE ENCOUNTER
Routing refill request to provider for review/approval because:  Drug not on the [facility] refill protocol     Pending Prescriptions:                       Disp   Refills    gabapentin (NEURONTIN) 300 MG capsule [Pha*540 ca*3        Sig: Take 2 capsules (600 mg) by mouth 3 times daily    Savannah Cunningham RN on 4/13/2022 at 1:04 PM

## 2022-04-14 RX ORDER — GABAPENTIN 300 MG/1
600 CAPSULE ORAL 3 TIMES DAILY
Qty: 540 CAPSULE | Refills: 3 | Status: SHIPPED | OUTPATIENT
Start: 2022-04-14 | End: 2022-04-22

## 2022-04-16 ENCOUNTER — HOSPITAL ENCOUNTER (EMERGENCY)
Facility: CLINIC | Age: 37
Discharge: HOME | End: 2022-04-16
Attending: EMERGENCY MEDICINE | Admitting: EMERGENCY MEDICINE
Payer: MEDICARE

## 2022-04-16 VITALS
SYSTOLIC BLOOD PRESSURE: 100 MMHG | DIASTOLIC BLOOD PRESSURE: 72 MMHG | HEIGHT: 62 IN | RESPIRATION RATE: 16 BRPM | WEIGHT: 160 LBS | TEMPERATURE: 98 F | BODY MASS INDEX: 29.44 KG/M2

## 2022-04-16 DIAGNOSIS — R11.2 NON-INTRACTABLE VOMITING WITH NAUSEA, UNSPECIFIED VOMITING TYPE: ICD-10-CM

## 2022-04-16 LAB
ALBUMIN SERPL-MCNC: 4 G/DL (ref 3.4–5)
ALP SERPL-CCNC: 97 U/L (ref 40–150)
ALT SERPL W P-5'-P-CCNC: 22 U/L (ref 0–50)
ANION GAP SERPL CALCULATED.3IONS-SCNC: 9 MMOL/L (ref 3–14)
AST SERPL W P-5'-P-CCNC: 21 U/L (ref 0–45)
BASOPHILS # BLD AUTO: 0.1 10E3/UL (ref 0–0.2)
BASOPHILS NFR BLD AUTO: 1 %
BILIRUB SERPL-MCNC: 0.5 MG/DL (ref 0.2–1.3)
BUN SERPL-MCNC: 18 MG/DL (ref 7–30)
CALCIUM SERPL-MCNC: 9.3 MG/DL (ref 8.5–10.1)
CHLORIDE BLD-SCNC: 104 MMOL/L (ref 94–109)
CO2 SERPL-SCNC: 23 MMOL/L (ref 20–32)
CREAT SERPL-MCNC: 1.42 MG/DL (ref 0.52–1.04)
EOSINOPHIL # BLD AUTO: 0.4 10E3/UL (ref 0–0.7)
EOSINOPHIL NFR BLD AUTO: 4 %
ERYTHROCYTE [DISTWIDTH] IN BLOOD BY AUTOMATED COUNT: 16.6 % (ref 10–15)
GFR SERPL CREATININE-BSD FRML MDRD: 49 ML/MIN/1.73M2
GLUCOSE BLD-MCNC: 90 MG/DL (ref 70–99)
HCT VFR BLD AUTO: 43.9 % (ref 35–47)
HGB BLD-MCNC: 13.8 G/DL (ref 11.7–15.7)
IMM GRANULOCYTES # BLD: 0 10E3/UL
IMM GRANULOCYTES NFR BLD: 0 %
LIPASE SERPL-CCNC: 111 U/L (ref 73–393)
LYMPHOCYTES # BLD AUTO: 2 10E3/UL (ref 0.8–5.3)
LYMPHOCYTES NFR BLD AUTO: 18 %
MCH RBC QN AUTO: 27 PG (ref 26.5–33)
MCHC RBC AUTO-ENTMCNC: 31.4 G/DL (ref 31.5–36.5)
MCV RBC AUTO: 86 FL (ref 78–100)
MONOCYTES # BLD AUTO: 0.8 10E3/UL (ref 0–1.3)
MONOCYTES NFR BLD AUTO: 7 %
NEUTROPHILS # BLD AUTO: 7.9 10E3/UL (ref 1.6–8.3)
NEUTROPHILS NFR BLD AUTO: 70 %
NRBC # BLD AUTO: 0 10E3/UL
NRBC BLD AUTO-RTO: 0 /100
PLATELET # BLD AUTO: 220 10E3/UL (ref 150–450)
POTASSIUM BLD-SCNC: 3.9 MMOL/L (ref 3.4–5.3)
PROT SERPL-MCNC: 8.3 G/DL (ref 6.8–8.8)
RBC # BLD AUTO: 5.12 10E6/UL (ref 3.8–5.2)
SODIUM SERPL-SCNC: 136 MMOL/L (ref 133–144)
WBC # BLD AUTO: 11.2 10E3/UL (ref 4–11)

## 2022-04-16 PROCEDURE — 99284 EMERGENCY DEPT VISIT MOD MDM: CPT | Mod: 25 | Performed by: EMERGENCY MEDICINE

## 2022-04-16 PROCEDURE — 96361 HYDRATE IV INFUSION ADD-ON: CPT | Performed by: EMERGENCY MEDICINE

## 2022-04-16 PROCEDURE — 250N000011 HC RX IP 250 OP 636: Performed by: EMERGENCY MEDICINE

## 2022-04-16 PROCEDURE — 85025 COMPLETE CBC W/AUTO DIFF WBC: CPT | Performed by: EMERGENCY MEDICINE

## 2022-04-16 PROCEDURE — 80053 COMPREHEN METABOLIC PANEL: CPT | Performed by: EMERGENCY MEDICINE

## 2022-04-16 PROCEDURE — 99284 EMERGENCY DEPT VISIT MOD MDM: CPT | Performed by: EMERGENCY MEDICINE

## 2022-04-16 PROCEDURE — 83690 ASSAY OF LIPASE: CPT | Performed by: EMERGENCY MEDICINE

## 2022-04-16 PROCEDURE — 258N000003 HC RX IP 258 OP 636: Performed by: EMERGENCY MEDICINE

## 2022-04-16 PROCEDURE — 96374 THER/PROPH/DIAG INJ IV PUSH: CPT | Performed by: EMERGENCY MEDICINE

## 2022-04-16 PROCEDURE — 36415 COLL VENOUS BLD VENIPUNCTURE: CPT | Performed by: EMERGENCY MEDICINE

## 2022-04-16 RX ORDER — METOCLOPRAMIDE 10 MG/1
10 TABLET ORAL 3 TIMES DAILY PRN
Qty: 50 TABLET | Refills: 1 | Status: SHIPPED | OUTPATIENT
Start: 2022-04-16 | End: 2022-11-03

## 2022-04-16 RX ORDER — ONDANSETRON 2 MG/ML
4 INJECTION INTRAMUSCULAR; INTRAVENOUS EVERY 30 MIN PRN
Status: DISCONTINUED | OUTPATIENT
Start: 2022-04-16 | End: 2022-04-17 | Stop reason: HOSPADM

## 2022-04-16 RX ORDER — METOCLOPRAMIDE HYDROCHLORIDE 5 MG/ML
10 INJECTION INTRAMUSCULAR; INTRAVENOUS ONCE
Status: COMPLETED | OUTPATIENT
Start: 2022-04-16 | End: 2022-04-16

## 2022-04-16 RX ORDER — SODIUM CHLORIDE 9 MG/ML
INJECTION, SOLUTION INTRAVENOUS CONTINUOUS
Status: DISCONTINUED | OUTPATIENT
Start: 2022-04-16 | End: 2022-04-17 | Stop reason: HOSPADM

## 2022-04-16 RX ADMIN — SODIUM CHLORIDE: 9 INJECTION, SOLUTION INTRAVENOUS at 22:21

## 2022-04-16 RX ADMIN — METOCLOPRAMIDE HYDROCHLORIDE 10 MG: 5 INJECTION INTRAMUSCULAR; INTRAVENOUS at 21:23

## 2022-04-16 RX ADMIN — SODIUM CHLORIDE 1000 ML: 9 INJECTION, SOLUTION INTRAVENOUS at 21:19

## 2022-04-16 ASSESSMENT — ENCOUNTER SYMPTOMS
NAUSEA: 1
SHORTNESS OF BREATH: 0
ABDOMINAL PAIN: 1
DIARRHEA: 0
FEVER: 0
VOMITING: 1

## 2022-04-17 NOTE — ED PROVIDER NOTES
History     Chief Complaint   Patient presents with     Nausea & Vomiting     Vomiting for the last 6 days. Has small amount of blood in vomit and in the last few weeks has seen blood in stool     HPI  Molly Teague is a 36 year old female who has past medical history significant for multiple medical issues including history of scleroderma, hypertension, depression, neuropathy, GERD, anxiety, PTSD, presenting the emergency department with concerns regarding nausea, and vomiting.  Patient reports frequent episodes of vomiting over the past 6 days.  Over the past couple of weeks has had occasional episodes when she has had small amounts of speckled bright red blood in the vomit.  Patient has also had a couple episodes when she has seen blood in the stool.  She has decreased bowel movements, however has still been passing gas.  Difficulty tolerating any foods secondary to nausea, and episodes of vomiting.  Last episode of vomiting was this morning.  No recent travel.  No recent antibiotic use.  No ill contacts.  Denies any fever, or chills.  No chest pain, shortness of breath.  Denies any severe abdominal discomfort.  Patient has been trying to eat very small amounts of food, to the point that even crackers are causing her to feel nauseous.  Has had similar symptoms previously, and has used promethazine, both as pill in addition to intramuscular injection in order to help control symptoms.  Patient denies any medication changes.  Does take as needed Ativan at home.  Also takes 3 times a day oxycodone.  Denies any changes of those medications.  Denies any other medication changes.    Allergies:  Allergies   Allergen Reactions     Blood Transfusion Related (Informational Only) Other (See Comments)     Patient has a history of a clinically significant antibody against RBC antigens.  A delay in compatible RBCs may occur.     Pollen Extract      Seasonal Allergies      Venofer [Iron Sucrose] Difficulty breathing and  Cramps     Severe infusion reaction 1/2022     Shellfish-Derived Products Nausea and Rash     Rash on face       Problem List:    Patient Active Problem List    Diagnosis Date Noted     Pain in joint of right ankle 08/24/2021     Priority: Medium     Added automatically from request for surgery [id number]       Iron deficiency anemia due to chronic blood loss 08/04/2020     Priority: Medium     Amnesia 04/29/2020     Priority: Medium     Vomiting and diarrhea 03/25/2020     Priority: Medium     Closed right ankle fracture 02/11/2020     Priority: Medium     S/P ORIF (open reduction internal fixation) fracture 02/10/2020     Priority: Medium     Sinus tachycardia 01/31/2020     Priority: Medium     Ankle fracture, right, closed, initial encounter 01/30/2020     Priority: Medium     Added automatically from request for surgery [id number]       Anemia, chronic disease 11/25/2019     Priority: Medium     Mirena IUD- Remove by 09/2024 09/06/2019     Priority: Medium     Lot: [id number]  Exp: 01/2022    iDnora Israel LPN         Malignant essential hypertension 07/24/2019     Priority: Medium     PTSD (post-traumatic stress disorder) 06/19/2019     Priority: Medium     Severe episode of recurrent major depressive disorder, with psychotic features (H) 07/06/2018     Priority: Medium     Severe persistent asthma without complication 07/06/2018     Priority: Medium     On high dose ICS/LABA + frequent albuterol use.  Next allergy/pulmonary referral       Aspiration of food 03/29/2018     Priority: Medium     Chronic pain syndrome 04/26/2017     Priority: Medium     Patient is followed by Grecia Barba DO for ongoing prescription of pain medication.  All refills should only be approved by this provider, or covering partner.    Medication(s): Oxycodone 10 mg.   Maximum quantity per month: 90  Clinic visit frequency required: Q 3 months     Controlled substance agreement:  Encounter-Level CSA - 04/26/2017:    Controlled  Substance Agreement - Scan on 5/4/2017  8:58 AM: CONTROLLED SUBSTANCE AGREEMENT (below)       Patient-Level CSA:    Controlled Substance Agreement - Opioid - Scan on 2/6/2019  6:23 PM (below)         Pain Clinic evaluation in the past: No    DIRE Total Score(s):  No flowsheet data found.    Last [facility] website verification:  done on 4/26/17   9/26/2017  7/6/2018  10/16/2018  1/22/2019  8/2/2019           [email or web address][email or web address]         Chronic migraine without aura without status migrainosus, not intractable 04/12/2017     Priority: Medium     With medication overuse.  Fioricet and not Imitrex is recommend to treat severe headache.  2/2017 [facility] recommend wean down from daily Fioricet and use Excedrin Migrain PRN.       AIN grade I 03/30/2017     Priority: Medium     Found at [facility] on colonoscopy 2/2017.  Recommend repeat anoscopy and anal pap in 6/2017.       Gastroesophageal reflux disease with esophagitis 03/30/2017     Priority: Medium     EGD done at [facility] 2/2017 showed esophagitis without GAVE.  Recommend max dose H2 and PPI, along with 4 tablets of Carafate dissolved in water and drink throughout the day.    Had LA Grade D esophagitis, on TID PPI       Secondary hypertension 03/22/2017     Priority: Medium     Stage 3a chronic kidney disease (H) 02/27/2017     Priority: Medium     Arthritis 02/10/2017     Priority: Medium     Limited systemic sclerosis (H) 01/25/2017     Priority: Medium     Raynaud's, calcinosis, telangiectasias, peripheral neuropathy, GERD symptoms, history of scleroderma renal crisis.  Saw [facility] 1/2017 and follows with Rheum Dr. Davis locally.       Polyneuropathy associated with underlying disease (H) 01/25/2017     Priority: Medium     Due to scleroderma and neurology thought possible due to ICU (critical illness neuropathy).  Recommend to max out dose of gabapentin to 0069-8085 mg/day, split BID or TID.  EMG 1/2017 positive for neuropathy       History of Clostridium difficile  2016     Priority: Medium     History of Helicobacter pylori infection 2016     Priority: Medium     Scleroderma with renal involvement (H) 2016     Priority: Medium     Needs lisinopril long term       History of ARDS 2016     Priority: Medium     2015, prolonged ICU/vent/trach due to influenza, scleroderma renal crisis requiring hemodialysis.       History of hemodialysis 2016     Priority: Medium     2015 due to scleroderma renal crisis       Bilateral retinitis 2016     Priority: Medium     History of pulmonary embolism 2016     Priority: Medium     Completed anti-coagulation 2017.  pulmonary embolism due to critical illness       REX (generalized anxiety disorder) 2016     Priority: Medium     Systemic sclerosis (H) 2016     Priority: Medium        Past Medical History:    Past Medical History:   Diagnosis Date     Acute pulmonary embolism (H) 2016     Acute pyelonephritis 2017     Altered mental state 2018     Anxiety      Arthritis      Depression      Gastroesophageal reflux disease with esophagitis      History of blood transfusion      Hypertension      Long-term (current) use of anticoagulants [Z79.01] 2016     Neuropathy      PE (pulmonary embolism) 2016     PTSD (post-traumatic stress disorder)      Raynaud's disease without gangrene      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      Rheumatism      Scleroderma (H) 2016     Uncomplicated asthma        Past Surgical History:    Past Surgical History:   Procedure Laterality Date     ANGIOGRAM  2015      SECTION       OPEN REDUCTION INTERNAL FIXATION ANKLE Right 2/10/2020    Procedure: Right ankle open reduction internal fixation;  Surgeon: Natanael Villalta MD;  Location:  OR     REMOVE HARDWARE ANKLE Right 2021    Procedure: Right ankle hardware removal;  Surgeon: Natanael Villalta MD;  [place] [facility] [place]      THROAT SURGERY         Family History:    Family History   Problem Relation Age of Onset     Hyperlipidemia Father      Depression Sister      Fibromyalgia Sister      Hyperlipidemia Sister      Cerebrovascular Disease Maternal Grandmother          of a stroke     Diabetes Maternal Grandmother      Hyperlipidemia Paternal Grandfather      Diabetes Maternal Aunt      Depression Other      Melanoma No family hx of      Skin Cancer No family hx of      Anesthesia Reaction No family hx of      Cardiovascular No family hx of      Deep Vein Thrombosis (DVT) No family hx of        Social History:  Marital Status:  Single [1]  Social History     Tobacco Use     Smoking status: Never Smoker     Smokeless tobacco: Never Used   Vaping Use     Vaping Use: Every day     Substances: Flavoring, delta 8     Devices: Disposable   Substance Use Topics     Alcohol use: Yes     Comment: Socially once on a weekend if any     Drug use: No     Comment: None        Medications:    metoclopramide (REGLAN) 10 MG tablet  acetaminophen (TYLENOL) 325 MG tablet  albuterol (VENTOLIN HFA) 108 (90 Base) MCG/ACT inhaler  amLODIPine (NORVASC) 10 MG tablet  blood glucose (NO BRAND SPECIFIED) lancets standard  blood glucose (NO BRAND SPECIFIED) test strip  budesonide-formoterol (SYMBICORT) 160-4.5 MCG/ACT Inhaler  busPIRone HCl (BUSPAR) 30 MG tablet  Cyanocobalamin (B-12) 1000 MCG TBCR  DULoxetine (CYMBALTA) 30 MG capsule  famotidine (PEPCID) 20 MG tablet  gabapentin (NEURONTIN) 300 MG capsule  GOODSENSE MIGRAINE FORMULA 250-250-65 MG per tablet  lisinopril (ZESTRIL) 10 MG tablet  loratadine (CLARITIN) 10 MG tablet  LORazepam (ATIVAN) 1 MG tablet  montelukast (SINGULAIR) 10 MG tablet  naloxone (NARCAN) 4 MG/0.1ML nasal spray  omeprazole (PRILOSEC) 40 MG DR capsule  ondansetron (ZOFRAN-ODT) 4 MG ODT tab  order for DME  order for DME  oxyCODONE (ROXICODONE) 10 MG tablet  polyethylene glycol (MIRALAX/GLYCOLAX) packet  promethazine (PHENERGAN)  "25 MG tablet  promethazine (PHENERGAN) 25 MG/ML IV injection  syringe/needle, disp, (BD ECLIPSE SYRINGE) 25G X 1\" 3 ML MISC          Review of Systems   Constitutional: Negative for fever.   Respiratory: Negative for shortness of breath.    Cardiovascular: Negative for chest pain.   Gastrointestinal: Positive for abdominal pain, nausea and vomiting. Negative for diarrhea.   All other systems reviewed and are negative.      Physical Exam   BP: 100/72  Temp: 98  F (36.7  C)  Resp: 16  Height: 157.5 cm (5' 2\")  Weight: 72.6 kg (160 lb) (stated)      Physical Exam   /72   Temp 98  F (36.7  C)   Resp 16   Ht 1.575 m (5' 2\")   Wt 72.6 kg (160 lb)   BMI 29.26 kg/m    General: alert, ill but non-toxic appearing.    Head: atraumatic  Nose: no rhinorrhea or epistaxis  Ears: no external auditory canal discharge or bleeding.    Eyes: Sclera nonicteric. Conjunctiva noninjected. PERRL, EOMI  Mouth: no tonsillar erythema, edema, or exudate  Neck: supple, no palp LAD  Lungs: CTAB  CV: RRR, S1/S2; peripheral pulses palpable and symmetric  Abdomen: soft, nt, nd, no guarding or rebound. Positive bowel sounds  Extremities: no cyanosis or edema  Skin: no rash or diaphoresis  Neuro:  strength 5/5 in UE and LEs bilaterally, sensation intact to light touch in UE and LEs bilaterally;         ED Course                 Procedures              Critical Care time:  none               Results for orders placed or performed during the hospital encounter of 04/16/22 (from the past 24 hour(s))   CBC with platelets differential    Narrative    The following orders were created for panel order CBC with platelets differential.  Procedure                               Abnormality         Status                     ---------                               -----------         ------                     CBC with platelets and d...[[id number]]  Abnormal            Final result                 Please view results for these tests on the individual " orders.   Comprehensive metabolic panel   Result Value Ref Range    Sodium 136 133 - 144 mmol/L    Potassium 3.9 3.4 - 5.3 mmol/L    Chloride 104 94 - 109 mmol/L    Carbon Dioxide (CO2) 23 20 - 32 mmol/L    Anion Gap 9 3 - 14 mmol/L    Urea Nitrogen 18 7 - 30 mg/dL    Creatinine 1.42 (H) 0.52 - 1.04 mg/dL    Calcium 9.3 8.5 - 10.1 mg/dL    Glucose 90 70 - 99 mg/dL    Alkaline Phosphatase 97 40 - 150 U/L    AST 21 0 - 45 U/L    ALT 22 0 - 50 U/L    Protein Total 8.3 6.8 - 8.8 g/dL    Albumin 4.0 3.4 - 5.0 g/dL    Bilirubin Total 0.5 0.2 - 1.3 mg/dL    GFR Estimate 49 (L) >60 mL/min/1.73m2   Lipase   Result Value Ref Range    Lipase 111 73 - 393 U/L   CBC with platelets and differential   Result Value Ref Range    WBC Count 11.2 (H) 4.0 - 11.0 10e3/uL    RBC Count 5.12 3.80 - 5.20 10e6/uL    Hemoglobin 13.8 11.7 - 15.7 g/dL    Hematocrit 43.9 35.0 - 47.0 %    MCV 86 78 - 100 fL    MCH 27.0 26.5 - 33.0 pg    MCHC 31.4 (L) 31.5 - 36.5 g/dL    RDW 16.6 (H) 10.0 - 15.0 %    Platelet Count 220 150 - 450 10e3/uL    % Neutrophils 70 %    % Lymphocytes 18 %    % Monocytes 7 %    % Eosinophils 4 %    % Basophils 1 %    % Immature Granulocytes 0 %    NRBCs per 100 WBC 0 <1 /100    Absolute Neutrophils 7.9 1.6 - 8.3 10e3/uL    Absolute Lymphocytes 2.0 0.8 - 5.3 10e3/uL    Absolute Monocytes 0.8 0.0 - 1.3 10e3/uL    Absolute Eosinophils 0.4 0.0 - 0.7 10e3/uL    Absolute Basophils 0.1 0.0 - 0.2 10e3/uL    Absolute Immature Granulocytes 0.0 <=0.4 10e3/uL    Absolute NRBCs 0.0 10e3/uL       Medications   0.9% sodium chloride BOLUS (0 mLs Intravenous Stopped 4/16/22 2303)     Followed by   sodium chloride 0.9% infusion (0 mLs Intravenous Stopped 4/16/22 2225)   ondansetron (ZOFRAN) injection 4 mg (has no administration in time range)   metoclopramide (REGLAN) injection 10 mg (10 mg Intravenous Given 4/16/22 2123)       Assessments & Plan (with Medical Decision Making)  36 year old female, presenting the emergency department with  nausea, vomiting.  Symptoms present over the past 6 days.  IV established, electrolytes to be evaluated with laboratory studies given the frequent, and prolonged episodes of vomiting.  IV fluids administered.  Reglan given.  Patient denies any abdominal pains.  Abdominal exam is benign.  Nonsurgical abdomen present.    Laboratory work-up showing CMP with creatinine slightly elevated, however has been elevated previously, and does not appear significantly away from baseline.  1 month ago it was 1.21.  Has been in the 1.3 region frequently.  Currently 1.42.  Received IV normal saline.  White blood cell count is slightly elevated at 11.2.  No other infectious symptoms other than vomiting.  No diarrhea has been noted.  No other ill contacts.  Abdominal exam is benign, do not feel that acute surgical etiology is likely.    Patient felt improved after Reglan administered.  I will send outpatient prescription for Reglan.  Patient encouraged close monitoring of symptoms, follow-up as needed, and be seen if new or worsening symptoms develop.     I have reviewed the nursing notes.    I have reviewed the findings, diagnosis, plan and need for follow up with the patient.       New Prescriptions    METOCLOPRAMIDE (REGLAN) 10 MG TABLET    Take 1 tablet (10 mg) by mouth 3 times daily as needed (nausea/vomiting)       Final diagnoses:   Non-intractable vomiting with nausea, unspecified vomiting type       4/16/2022   [facility] EMERGENCY DEPT     Akash, Sahne Patel MD  04/16/22 9598

## 2022-04-21 VITALS
RESPIRATION RATE: 16 BRPM | OXYGEN SATURATION: 95 % | WEIGHT: 153 LBS | DIASTOLIC BLOOD PRESSURE: 112 MMHG | HEART RATE: 80 BPM | TEMPERATURE: 97.5 F | BODY MASS INDEX: 27.98 KG/M2 | SYSTOLIC BLOOD PRESSURE: 167 MMHG

## 2022-04-21 DIAGNOSIS — M34.9 LIMITED SYSTEMIC SCLEROSIS (H): ICD-10-CM

## 2022-04-21 DIAGNOSIS — G63 POLYNEUROPATHY ASSOCIATED WITH UNDERLYING DISEASE (H): ICD-10-CM

## 2022-04-21 PROCEDURE — 99283 EMERGENCY DEPT VISIT LOW MDM: CPT | Performed by: EMERGENCY MEDICINE

## 2022-04-21 PROCEDURE — 99284 EMERGENCY DEPT VISIT MOD MDM: CPT | Performed by: EMERGENCY MEDICINE

## 2022-04-21 NOTE — TELEPHONE ENCOUNTER
Reason for Call:  Medication or medication refill:  Gabapentin 300 mg capsule  Do you use a [facility]?  Name of the pharmacy and phone number for the [facility] [place]  Name of the medication requested: gabapentin 300 MG capsule    Can we leave a detailed message on this number? YES    Phone number patient can be reached at: Home number on file [phone] (home)    Best Time: any    Call taken on 4/21/2022 at 2:29 PM by Hortencia Charles

## 2022-04-22 ENCOUNTER — HOSPITAL ENCOUNTER (EMERGENCY)
Facility: CLINIC | Age: 37
Discharge: HOME | End: 2022-04-22
Attending: EMERGENCY MEDICINE | Admitting: EMERGENCY MEDICINE
Payer: MEDICARE

## 2022-04-22 DIAGNOSIS — R11.0 NAUSEA: ICD-10-CM

## 2022-04-22 DIAGNOSIS — G89.4 CHRONIC PAIN SYNDROME: ICD-10-CM

## 2022-04-22 DIAGNOSIS — M34.9 SCLERODERMA (H): ICD-10-CM

## 2022-04-22 DIAGNOSIS — R45.0 JITTERY: ICD-10-CM

## 2022-04-22 PROCEDURE — 250N000013 HC RX MED GY IP 250 OP 250 PS 637: Performed by: EMERGENCY MEDICINE

## 2022-04-22 RX ORDER — GABAPENTIN 300 MG/1
600 CAPSULE ORAL ONCE
Status: COMPLETED | OUTPATIENT
Start: 2022-04-22 | End: 2022-04-22

## 2022-04-22 RX ORDER — GABAPENTIN 600 MG/1
600 TABLET ORAL 3 TIMES DAILY
Qty: 21 TABLET | Refills: 0 | Status: SHIPPED | OUTPATIENT
Start: 2022-04-22 | End: 2022-05-17

## 2022-04-22 RX ORDER — GABAPENTIN 300 MG/1
600 CAPSULE ORAL 3 TIMES DAILY
Qty: 540 CAPSULE | Refills: 3 | Status: SHIPPED | OUTPATIENT
Start: 2022-04-22 | End: 2022-06-24

## 2022-04-22 RX ADMIN — GABAPENTIN 600 MG: 300 CAPSULE ORAL at 01:28

## 2022-04-22 NOTE — DISCHARGE INSTRUCTIONS
Continue your home medications.  Return to the emergency department for fevers, worsening symptoms, chest pain, difficulty breathing, or other concerns.  Otherwise a follow-up in clinic to discuss further refills of your medications.

## 2022-04-22 NOTE — TELEPHONE ENCOUNTER
I talked with the pharmacy staff at [place].  She can  RX from Dr Barba on 3/27/22.  Molly was notified of this.  Anamika Blair RN

## 2022-04-22 NOTE — TELEPHONE ENCOUNTER
Patient went to ER because medication refill was not addressed.  I will refill medication now.  There was a previous refill request that also was not addressed.  I was never notified of either requestion

## 2022-04-22 NOTE — TELEPHONE ENCOUNTER
The only refill recently for Gabapentin I see is 4/14/2022 and it was routed to you, approved and closed.   On 4/27/2021 you filled it for one year but the patient said the pharmacy would not let her get the 4/14/2022 refill until May 6,2022. Patient said she called and talked with Central scheduling and they where supposed to send message.  I called the pharmacy and they said that they do monitor this medication they did the math and they said May 3, 2022.    Hope this helps.       Vashti Hutchins PSC on 4/22/2022 at 10:30 AM

## 2022-04-22 NOTE — ED PROVIDER NOTES
History     Chief Complaint   Patient presents with     Medication Refill     Patient filled gabapentin Rx (for scleraderma) and realized it wasn't the correct fill amount. Is out earlier than expected, needs refill today.     HPI  Molly Teague is a 36 year old female with history of scleroderma and chronic pain who presents for worsening pain and jitteriness with nausea ever since running out of her gabapentin several days ago.  The patient reports that she thinks there was an error in the prescription and she did not get enough of her gabapentin tablets.  She ran out 3 days ago and has been trying to reach out to her primary doctor to get these filled but has not been able to get that filled yet.  She feels jittery and anxious.  She reports some nausea and her scleroderma and Raynaud's symptoms are worse.  She is asking for a short course of her gabapentin until she can get a refill from her physician.    Allergies:  Allergies   Allergen Reactions     Blood Transfusion Related (Informational Only) Other (See Comments)     Patient has a history of a clinically significant antibody against RBC antigens.  A delay in compatible RBCs may occur.     Pollen Extract      Seasonal Allergies      Venofer [Iron Sucrose] Difficulty breathing and Cramps     Severe infusion reaction 1/2022     Shellfish-Derived Products Nausea and Rash     Rash on face       Problem List:    Patient Active Problem List    Diagnosis Date Noted     Pain in joint of right ankle 08/24/2021     Priority: Medium     Added automatically from request for surgery [id number]       Iron deficiency anemia due to chronic blood loss 08/04/2020     Priority: Medium     Amnesia 04/29/2020     Priority: Medium     Vomiting and diarrhea 03/25/2020     Priority: Medium     Closed right ankle fracture 02/11/2020     Priority: Medium     S/P ORIF (open reduction internal fixation) fracture 02/10/2020     Priority: Medium     Sinus tachycardia 01/31/2020      Priority: Medium     Ankle fracture, right, closed, initial encounter 01/30/2020     Priority: Medium     Added automatically from request for surgery [id number]       Anemia, chronic disease 11/25/2019     Priority: Medium     Mirena IUD- Remove by 09/2024 09/06/2019     Priority: Medium     Lot: [id number]  Exp: 01/2022    Dinora Israel LPN         Malignant essential hypertension 07/24/2019     Priority: Medium     PTSD (post-traumatic stress disorder) 06/19/2019     Priority: Medium     Severe episode of recurrent major depressive disorder, with psychotic features (H) 07/06/2018     Priority: Medium     Severe persistent asthma without complication 07/06/2018     Priority: Medium     On high dose ICS/LABA + frequent albuterol use.  Next allergy/pulmonary referral       Aspiration of food 03/29/2018     Priority: Medium     Chronic pain syndrome 04/26/2017     Priority: Medium     Patient is followed by Grecia Barba DO for ongoing prescription of pain medication.  All refills should only be approved by this provider, or covering partner.    Medication(s): Oxycodone 10 mg.   Maximum quantity per month: 90  Clinic visit frequency required: Q 3 months     Controlled substance agreement:  Encounter-Level CSA - 04/26/2017:    Controlled Substance Agreement - Scan on 5/4/2017  8:58 AM: CONTROLLED SUBSTANCE AGREEMENT (below)       Patient-Level CSA:    Controlled Substance Agreement - Opioid - Scan on 2/6/2019  6:23 PM (below)         Pain Clinic evaluation in the past: No    DIRE Total Score(s):  No flowsheet data found.    [facility] website verification:  done on 4/26/17   9/26/2017  7/6/2018  10/16/2018  1/22/2019  8/2/2019           [email or web address]         Chronic migraine without aura without status migrainosus, not intractable 04/12/2017     Priority: Medium     With medication overuse.  Fioricet and not Imitrex is recommend to treat severe headache.  2/2017 [place] recommend wean down from daily  Fioricet and use Excedrin Migrain PRN.       AIN grade I 03/30/2017     Priority: Medium     Found at [facility] on colonoscopy 2/2017.  Recommend repeat anoscopy and anal pap in 6/2017.       Gastroesophageal reflux disease with esophagitis 03/30/2017     Priority: Medium     EGD done at [facility] 2/2017 showed esophagitis without GAVE.  Recommend max dose H2 and PPI, along with 4 tablets of Carafate dissolved in water and drink throughout the day.    Had LA Grade D esophagitis, on TID PPI       Secondary hypertension 03/22/2017     Priority: Medium     Stage 3a chronic kidney disease (H) 02/27/2017     Priority: Medium     Arthritis 02/10/2017     Priority: Medium     Limited systemic sclerosis (H) 01/25/2017     Priority: Medium     Raynaud's, calcinosis, telangiectasias, peripheral neuropathy, GERD symptoms, history of scleroderma renal crisis.  Saw [facility] 1/2017 and follows with Rheum Dr. Davis locally.       Polyneuropathy associated with underlying disease (H) 01/25/2017     Priority: Medium     Due to scleroderma and neurology thought possible due to ICU (critical illness neuropathy).  Recommend to max out dose of gabapentin to 0231-5022 mg/day, split BID or TID.  EMG 1/2017 positive for neuropathy       History of Clostridium difficile 11/29/2016     Priority: Medium     History of Helicobacter pylori infection 11/29/2016     Priority: Medium     Scleroderma with renal involvement (H) 11/09/2016     Priority: Medium     Needs lisinopril long term       History of ARDS 11/09/2016     Priority: Medium     4/2015, prolonged ICU/vent/trach due to influenza, scleroderma renal crisis requiring hemodialysis.       History of hemodialysis 11/09/2016     Priority: Medium     4/2015 due to scleroderma renal crisis       Bilateral retinitis 11/09/2016     Priority: Medium     History of pulmonary embolism 11/09/2016     Priority: Medium     Completed anti-coagulation 2017.  pulmonary embolism due to critical illness       REX  (generalized anxiety disorder) 2016     Priority: Medium     Systemic sclerosis (H) 2016     Priority: Medium        Past Medical History:    Past Medical History:   Diagnosis Date     Acute pulmonary embolism (H) 2016     Acute pyelonephritis 2017     Altered mental state 2018     Anxiety      Arthritis      Depression      Gastroesophageal reflux disease with esophagitis      History of blood transfusion      Hypertension      Long-term (current) use of anticoagulants [Z79.01] 2016     Neuropathy      PE (pulmonary embolism) 2016     PTSD (post-traumatic stress disorder)      Raynaud's disease without gangrene      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      Rheumatism      Scleroderma (H) 2016     Uncomplicated asthma        Past Surgical History:    Past Surgical History:   Procedure Laterality Date     ANGIOGRAM  2015      SECTION  2014     OPEN REDUCTION INTERNAL FIXATION ANKLE Right 2/10/2020    Procedure: Right ankle open reduction internal fixation;  Surgeon: Natanael Villalta MD;  Location: [facility] OR     REMOVE HARDWARE ANKLE Right 2021    Procedure: Right ankle hardware removal;  Surgeon: Natanael Villalta MD;  Location: [facility] OR     THROAT SURGERY         Family History:    Family History   Problem Relation Age of Onset     Hyperlipidemia Father      Depression Sister      Fibromyalgia Sister      Hyperlipidemia Sister      Cerebrovascular Disease Maternal Grandmother          of a stroke     Diabetes Maternal Grandmother      Hyperlipidemia Paternal Grandfather      Diabetes Maternal Aunt      Depression Other      Melanoma No family hx of      Skin Cancer No family hx of      Anesthesia Reaction No family hx of      Cardiovascular No family hx of      Deep Vein Thrombosis (DVT) No family hx of        Social History:  Marital Status:  Single [1]  Social History     Tobacco Use     Smoking status:  "Never Smoker     Smokeless tobacco: Never Used   Vaping Use     Vaping Use: Every day     Substances: Flavoring, delta 8     Devices: Disposable   Substance Use Topics     Alcohol use: Yes     Comment: Socially once on a weekend if any     Drug use: No     Comment: None        Medications:    gabapentin (NEURONTIN) 600 MG tablet  acetaminophen (TYLENOL) 325 MG tablet  albuterol (VENTOLIN HFA) 108 (90 Base) MCG/ACT inhaler  amLODIPine (NORVASC) 10 MG tablet  blood glucose (NO BRAND SPECIFIED) lancets standard  blood glucose (NO BRAND SPECIFIED) test strip  budesonide-formoterol (SYMBICORT) 160-4.5 MCG/ACT Inhaler  busPIRone HCl (BUSPAR) 30 MG tablet  Cyanocobalamin (B-12) 1000 MCG TBCR  DULoxetine (CYMBALTA) 30 MG capsule  famotidine (PEPCID) 20 MG tablet  gabapentin (NEURONTIN) 300 MG capsule  GOODSENSE MIGRAINE FORMULA 250-250-65 MG per tablet  lisinopril (ZESTRIL) 10 MG tablet  loratadine (CLARITIN) 10 MG tablet  LORazepam (ATIVAN) 1 MG tablet  metoclopramide (REGLAN) 10 MG tablet  montelukast (SINGULAIR) 10 MG tablet  naloxone (NARCAN) 4 MG/0.1ML nasal spray  omeprazole (PRILOSEC) 40 MG DR capsule  ondansetron (ZOFRAN-ODT) 4 MG ODT tab  order for DME  order for DME  oxyCODONE (ROXICODONE) 10 MG tablet  polyethylene glycol (MIRALAX/GLYCOLAX) packet  promethazine (PHENERGAN) 25 MG tablet  promethazine (PHENERGAN) 25 MG/ML IV injection  syringe/needle, disp, (BD ECLIPSE SYRINGE) 25G X 1\" 3 ML MISC          Review of Systems  A 4 point review of systems was performed. All pertinent positives and negatives were listed in the HPI and rest of ROS were otherwise negative.    Physical Exam   BP: (!) 167/112  Pulse: 80  Temp: 97.5  F (36.4  C)  Resp: 16  Weight: 69.4 kg (153 lb)  SpO2: 95 %      Physical Exam  Vitals and nursing note reviewed.   Constitutional:       General: She is in acute distress.      Appearance: She is well-developed. She is not diaphoretic.   HENT:      Head: Normocephalic and atraumatic.      Right " Ear: External ear normal.      Left Ear: External ear normal.      Nose: Nose normal.   Eyes:      General: No scleral icterus.     Conjunctiva/sclera: Conjunctivae normal.   Cardiovascular:      Rate and Rhythm: Normal rate and regular rhythm.   Pulmonary:      Effort: Pulmonary effort is normal. No respiratory distress.      Breath sounds: No stridor.   Musculoskeletal:      Cervical back: Normal range of motion.   Skin:     General: Skin is warm and dry.      Comments: Scleroderma skin changes   Neurological:      Mental Status: She is alert and oriented to person, place, and time.   Psychiatric:         Behavior: Behavior normal.         ED Course                 Procedures              Critical Care time:  none               No results found for this or any previous visit (from the past 24 hour(s)).    Medications   gabapentin (NEURONTIN) capsule 600 mg (600 mg Oral Given 4/22/22 0128)       Assessments & Plan (with Medical Decision Making)   36-year-old female with history of scleroderma and chronic pain syndrome who presents for jitteriness and nausea with increasing pain after running out of her gabapentin 3 days ago.  Vital signs are stable here.  She is given dose of her gabapentin here.  I reviewed her prescription monitoring database reports, she has consistently received her medications from one provider over the past year.  She says that she ran out early.  I will give her a short course of her gabapentin to help cover for the next week.  She is told to return if worse, otherwise follow-up with her primary doctor for further refills.  The patient is in agreement with this plan.    I have reviewed the nursing notes.    I have reviewed the findings, diagnosis, plan and need for follow up with the patient.       New Prescriptions    GABAPENTIN (NEURONTIN) 600 MG TABLET    Take 1 tablet (600 mg) by mouth 3 times daily for 7 days       Final diagnoses:   Nausea   Jittery   Chronic pain syndrome    Scleroderma (H)       4/21/2022   [facility] EMERGENCY DEPT     Paulie Dennis MD  04/22/22 [id number]

## 2022-04-22 NOTE — ED TRIAGE NOTES
Patient filled gabapentin Rx (for scleraderma) and realized it wasn't the correct fill amount. Is out earlier than expected, needs refill today.

## 2022-04-26 ENCOUNTER — VIRTUAL VISIT (OUTPATIENT)
Dept: FAMILY MEDICINE | Facility: CLINIC | Age: 37
End: 2022-04-26
Payer: MEDICARE

## 2022-04-26 DIAGNOSIS — M34.89 SCLERODERMA WITH RENAL INVOLVEMENT (H): Primary | ICD-10-CM

## 2022-04-26 DIAGNOSIS — Z13.220 SCREENING FOR HYPERLIPIDEMIA: ICD-10-CM

## 2022-04-26 DIAGNOSIS — N08 SCLERODERMA WITH RENAL INVOLVEMENT (H): Primary | ICD-10-CM

## 2022-04-26 DIAGNOSIS — K21.01 GASTROESOPHAGEAL REFLUX DISEASE WITH ESOPHAGITIS AND HEMORRHAGE: ICD-10-CM

## 2022-04-26 DIAGNOSIS — M34.9 SCLERODERMA (H): ICD-10-CM

## 2022-04-26 DIAGNOSIS — G89.4 CHRONIC PAIN SYNDROME: Chronic | ICD-10-CM

## 2022-04-26 DIAGNOSIS — Z87.81 S/P ORIF (OPEN REDUCTION INTERNAL FIXATION) FRACTURE: ICD-10-CM

## 2022-04-26 DIAGNOSIS — Z98.890 S/P ORIF (OPEN REDUCTION INTERNAL FIXATION) FRACTURE: ICD-10-CM

## 2022-04-26 PROCEDURE — 99214 OFFICE O/P EST MOD 30 MIN: CPT | Mod: 95 | Performed by: INTERNAL MEDICINE

## 2022-04-26 RX ORDER — OXYCODONE HYDROCHLORIDE 10 MG/1
10 TABLET ORAL 3 TIMES DAILY
Qty: 90 TABLET | Refills: 0 | Status: SHIPPED | OUTPATIENT
Start: 2022-05-09 | End: 2022-06-09

## 2022-04-26 RX ORDER — POLYETHYLENE GLYCOL 3350 17 G/17G
17 POWDER, FOR SOLUTION ORAL DAILY
Qty: 510 G | Refills: 11 | Status: SHIPPED | OUTPATIENT
Start: 2022-04-26

## 2022-04-26 ASSESSMENT — PAIN SCALES - GENERAL: PAINLEVEL: SEVERE PAIN (7)

## 2022-04-26 NOTE — PATIENT INSTRUCTIONS
Nausea/Vomiting  Referral to GI in [place]; attempt to still re-establish with GI at [facility]    Hypertension:  No changes to medications;   Monitor blood pressure a few times/week, and if blood pressure continues to be elevated, follow-up with Dr. Barba     Pain   Refills x 1 months  Next visit should be face to face for pain med refills

## 2022-04-26 NOTE — PROGRESS NOTES
"Molly is a 36 year old who is being evaluated via a billable video visit.      How would you like to obtain your AVS? MyChart  If the video visit is dropped, the invitation should be resent by: Send to e-mail at: [email or web address]  Will anyone else be joining your video visit? No      Video Start Time: 5:08 pm    Assessment & Plan     Scleroderma with renal involvement (H) -flareup and nausea/vomiting.  Has multi antiemetics on hand.  Discussed risk of long-term Reglan use.  Unfortunately, she is unable to see her GI provider at's been more than 18 months since the last visit and she is now considered a new patient.  Referred to local GI.  Hopefully she will be able to reestablish with [facility]  - Adult Gastro Ref - Consult Only; Future    Gastroesophageal reflux disease with esophagitis and hemorrhage -on high-dose H2 blocker and PPI  - Adult Gastro Ref - Consult Only; Future    S/P ORIF (open reduction internal fixation) fracture  - polyethylene glycol (MIRALAX) 17 GM/Dose powder; Take 17 g by mouth daily    Scleroderma (H) - due for refill  - naloxone (NARCAN) 4 MG/0.1ML nasal spray; Spray 1 spray (4 mg) into one nostril alternating nostrils once as needed for opioid reversal every 2-3 minutes until assistance arrives    Chronic pain syndrome refill x 1 month.  Due for CSA, UDS  - oxyCODONE IR (ROXICODONE) 10 MG tablet; Take 1 tablet (10 mg) by mouth 3 times daily      Screening for hyperlipidemia  - Lipid panel reflex to direct LDL Fasting; Future             BMI:   Estimated body mass index is 27.98 kg/m  as calculated from the following:    Height as of 4/16/22: 1.575 m (5' 2\").    Weight as of 4/21/22: 69.4 kg (153 lb).       Patient Instructions   Nausea/Vomiting  1. Referral to GI in [place]; attempt to still re-establish with GI at [facility]    Hypertension:  1. No changes to medications;   2. Monitor blood pressure a few times/week, and if blood pressure continues to be elevated, follow-up with Dr. Barba "     Pain   1. Refills x 1 months  2. Next visit should be face to face for pain med refills      No follow-ups on file.    Grecia Barba, [facility]    Amanda Barnes is a 36 year old who presents for the following health issues     HPI     Chief Complaint   Patient presents with     scleroderma       Chronic Pain:  --needing pain med refills  --no change in pain  --using Delta-8 hemp/cannabis product which is helpful more than medical cannabis - made her too 'lazy' and sedated    Nausea/Vomiting:  --had flare up leaving her bedridden for 2 weeks;  Cut back on diet to soft, liquids and this helped  --was in the ER x2 (4/16 and 4/22) and was given Reglan; this helped a lot  --had appointment with GI but has to be a 'new patient' because it was > 18 months since last GI visit.  --this is not until June that she was able to see her Rheum at [facility]  --is vomiting bright red blood at times; flecks of blood, not massive bleeding;    Hypertension:  Today 143/100, 138/102  --is losing weight - doing yoga, cut back on carbs    BP Readings from Last 6 Encounters:   04/21/22 (!) 167/112   04/16/22 100/72   01/14/22 110/68   01/08/22 112/72   01/06/22 126/78   01/05/22 114/76           Current Outpatient Medications   Medication Sig Dispense Refill     acetaminophen (TYLENOL) 325 MG tablet Take 2 tablets (650 mg) by mouth every 4 hours as needed for mild pain or other 1 Bottle 0     albuterol (VENTOLIN HFA) 108 (90 Base) MCG/ACT inhaler INHALE 2 PUFFS INTO THE LUNGS ONCE EVERY 4 HOURS AS NEEDED FOR SHORTNESS OF BREATH / DYSPNEA / WHEEZING (Patient taking differently: INHALE 2 PUFFS INTO THE LUNGS ONCE EVERY 4 HOURS AS NEEDED FOR SHORTNESS OF BREATH / DYSPNEA / WHEEZING.) 18 g 11     amLODIPine (NORVASC) 10 MG tablet Take 1 tablet (10 mg) by mouth daily (Patient taking differently: Take 10 mg by mouth every morning) 90 tablet 3     blood glucose (NO BRAND SPECIFIED) lancets standard Use to test  blood sugar 1 time daily 100 each 3     blood glucose (NO BRAND SPECIFIED) test strip Use to test blood sugar 1 time daily 100 strip 11     budesonide-formoterol (SYMBICORT) 160-4.5 MCG/ACT Inhaler INHALE TWO PUFFS BY MOUTH TWICE A DAY 10.2 g 11     busPIRone HCl (BUSPAR) 30 MG tablet Take 1 tablet (30 mg) by mouth 2 times daily 180 tablet 3     Cyanocobalamin (B-12) 1000 MCG TBCR Take 1,000 mcg by mouth daily 100 tablet 1     DULoxetine (CYMBALTA) 30 MG capsule TAKE 3 CAPSULES (90 MG) BY MOUTH DAILY 270 capsule 3     famotidine (PEPCID) 20 MG tablet TAKE ONE TABLET BY MOUTH TWICE A  tablet 3     gabapentin (NEURONTIN) 300 MG capsule Take 2 capsules (600 mg) by mouth 3 times daily 540 capsule 3     gabapentin (NEURONTIN) 600 MG tablet Take 1 tablet (600 mg) by mouth 3 times daily for 7 days 21 tablet 0     GOODSENSE MIGRAINE FORMULA 250-250-65 MG per tablet TAKE ONE TABLET BY MOUTH EVERY 6 HOURS AS NEEDED FOR HEADACHES (Patient taking differently: Take 1 tablet by mouth every 6 hours as needed) 24 tablet 1     lisinopril (ZESTRIL) 10 MG tablet Take 1 tablet (10 mg) by mouth every evening 90 tablet 3     loratadine (CLARITIN) 10 MG tablet Take 10 mg by mouth daily as needed        LORazepam (ATIVAN) 1 MG tablet Take 1 tablet (1 mg) by mouth 2 times daily as needed for anxiety #45 for 30 days 45 tablet 5     metoclopramide (REGLAN) 10 MG tablet Take 1 tablet (10 mg) by mouth 3 times daily as needed (nausea/vomiting) 50 tablet 1     montelukast (SINGULAIR) 10 MG tablet Take 1 tablet (10 mg) by mouth At Bedtime 90 tablet 3     naloxone (NARCAN) 4 MG/0.1ML nasal spray Spray 1 spray (4 mg) into one nostril alternating nostrils once as needed for opioid reversal every 2-3 minutes until assistance arrives 0.2 mL 3     omeprazole (PRILOSEC) 40 MG DR capsule TAKE ONE CAPSULE BY MOUTH TWICE A DAY (Patient taking differently: Take 40 mg by mouth 2 times daily TAKE ONE CAPSULE BY MOUTH TWICE A DAY) 180 capsule 3      "ondansetron (ZOFRAN-ODT) 4 MG ODT tab Take 1 tablet (4 mg) by mouth every 8 hours as needed for nausea 30 tablet 4     order for DME [facility]. Patient can use for three months    Diagnosis Right ankle fracture and surgery 2/10/2020 1 Units 0     order for DME [facility]. Patient can use for another two months  Diagnosis: Right ankle bimalleolar fractures, open reduction internal fixation on 2/10/2020 1 Units 0     oxyCODONE (ROXICODONE) 10 MG tablet Take 1 tablet (10 mg) by mouth 3 times daily 90 tablet 0     polyethylene glycol (MIRALAX/GLYCOLAX) packet Take 17 g by mouth daily (Patient taking differently: Take 17 g by mouth 2 times daily as needed) 7 packet 0     promethazine (PHENERGAN) 25 MG tablet TAKE ONE TABLET BY MOUTH TWICE A DAY AS NEEDED NAUSEA (Patient taking differently: daily as needed TAKE ONE TABLET BY MOUTH TWICE A DAY AS NEEDED NAUSEA) 30 tablet 7     promethazine (PHENERGAN) 25 MG/ML IV injection INJECT 1 ML INTRAMUSCULARLY EVERY 6 HOURS AS NEEDED 6 mL 11     syringe/needle, disp, (BD ECLIPSE SYRINGE) 25G X 1\" 3 ML MISC 1 each daily as needed 10 each 11         Review of Systems   Constitutional, HEENT, cardiovascular, pulmonary, gi and gu systems are negative, except as otherwise noted.      Objective           Vitals:  No vitals were obtained today due to virtual visit.    Physical Exam   GENERAL: alert, no distress and nontoxic, not acutely ill  EYES: Eyes grossly normal to inspection.  No discharge or erythema, or obvious scleral/conjunctival abnormalities.  RESP: No audible wheeze, cough, or visible cyanosis.  No visible retractions or increased work of breathing.    SKIN: Visible skin clear. No significant rash, abnormal pigmentation or lesions.  NEURO: Cranial nerves grossly intact.  Mentation and speech appropriate for age.  PSYCH: Mentation appears normal, affect normal/bright, judgement and insight intact, normal speech and appearance well-groomed.            "     Video-Visit Details    Type of service:  Video Visit    Video End Time:5:25 pm    Originating Location (pt. Location): Home    Distant Location (provider location):  [facility]     Platform used for Video Visit: [employer]

## 2022-05-05 ENCOUNTER — TELEPHONE (OUTPATIENT)
Dept: FAMILY MEDICINE | Facility: CLINIC | Age: 37
End: 2022-05-05
Payer: MEDICARE

## 2022-05-05 NOTE — TELEPHONE ENCOUNTER
Dr Barba  Pt states is going out of town and would like her oxycodone filled tomorrow instead of 5/9. Also requested lorazepam early. She will check with pharmacy on that rx as pt has refills left.       Gopi Birmingham RN

## 2022-05-05 NOTE — TELEPHONE ENCOUNTER
[facility] staff notified of Dr. Barba's approval for early refills of oxycodone and lorazepam.    Tricia Ferrara RN  [facility]

## 2022-05-16 ENCOUNTER — TELEPHONE (OUTPATIENT)
Dept: GASTROENTEROLOGY | Facility: CLINIC | Age: 37
End: 2022-05-16
Payer: MEDICARE

## 2022-05-16 NOTE — TELEPHONE ENCOUNTER
Patient was called to remind of appointment on May 17, 2022 at 3:45PM with Dr. Jalen Moses in the Gastroenterology clinic located in [place]. Patient did not answer the phone. Left detailed voice message informing patient that we will send her a Chairisht message with reason of call.    Ewelina Calderón [facility]

## 2022-05-17 ENCOUNTER — OFFICE VISIT (OUTPATIENT)
Dept: GASTROENTEROLOGY | Facility: CLINIC | Age: 37
End: 2022-05-17
Attending: INTERNAL MEDICINE
Payer: MEDICARE

## 2022-05-17 VITALS
OXYGEN SATURATION: 100 % | SYSTOLIC BLOOD PRESSURE: 147 MMHG | TEMPERATURE: 97.7 F | HEIGHT: 62 IN | HEART RATE: 85 BPM | WEIGHT: 151.3 LBS | BODY MASS INDEX: 27.84 KG/M2 | DIASTOLIC BLOOD PRESSURE: 94 MMHG

## 2022-05-17 DIAGNOSIS — M34.89 SCLERODERMA WITH RENAL INVOLVEMENT (H): ICD-10-CM

## 2022-05-17 DIAGNOSIS — K92.1 HEMATOCHEZIA: Primary | ICD-10-CM

## 2022-05-17 DIAGNOSIS — K21.01 GASTROESOPHAGEAL REFLUX DISEASE WITH ESOPHAGITIS AND HEMORRHAGE: ICD-10-CM

## 2022-05-17 DIAGNOSIS — N08 SCLERODERMA WITH RENAL INVOLVEMENT (H): ICD-10-CM

## 2022-05-17 PROCEDURE — 99205 OFFICE O/P NEW HI 60 MIN: CPT | Performed by: INTERNAL MEDICINE

## 2022-05-17 ASSESSMENT — PAIN SCALES - GENERAL: PAINLEVEL: MILD PAIN (3)

## 2022-05-17 NOTE — PATIENT INSTRUCTIONS
Here is what we discussed today.    #1. continue omeprazole 3 times per day and Pepcid 2 times per day.  Hold on taking further metoclopramide.    #2.  Schedule for upper endoscopy with [facility].  They will call you to schedule this you may need to get a COVID test prior to the procedure and any ask you to get a preop H&P done through your primary care.    #3.  Get blood test and stool test done to check for inflammatory markers and blood counts.  If these are abnormal then we may need to schedule for colonoscopy at the same time as upper endoscopy.

## 2022-05-17 NOTE — LETTER
5/17/2022         RE: Molly Teague  [address] [facility] [address]        Dear Colleague,    Thank you for referring your patient, Molly Teague, to the [facility]. Please see a copy of my visit note below.          GASTROENTEROLOGY NEW PATIENT CLINIC VISIT    CC/REFERRING MD:    Grecia Barba    REASON FOR CONSULTATION:   Grecia Barba for   Chief Complaint   Patient presents with     New Patient     New consult for GERD with esophagitis and hemorrhage       HISTORY OF PRESENT ILLNESS:    Molly Teague is 36 year old female who presents for evaluation of esophagitis and rectal bleeding.  She has a longstanding history of severe reflux esophagitis previously followed at [facility].  She has had several endoscopies over the years noting LA grade D esophagitis on most of the endoscopies and has been treated with high-dose PPI therapy and H2 blockers over this time.  She notes now that she has been having episodes of vomiting with black-colored material.  She is worried that the esophagitis is worse and contacted [facility] but was told she would need to reschedule as a new patient and it would take some time to get established again.  Therefore, she is scheduled with us for further evaluation.  She reports that she is vomiting a few times per week, black-colored emesis.  She does not feel that reflux has been worse.  She is using omeprazole 40 mg 3 times daily as has previously been recommended to her.  She is taking Pepcid 20 mg twice daily as well.  She used to take Reglan 10 mg 3 times daily but has not been taking this lately because she was taken off of this medication due to concern for side effects.  She is not having any dysphagia currently but this has been an issue in the past.  She has history of scleroderma with renal involvement.  She has not had esophageal manometry in the past.  She reports she was diagnosed with ulcerative colitis at  "one point.  She notes she has had several colonoscopies in the past which were due to anemia or rectal bleeding.  Last colonoscopy was in April 2020 for iron deficiency anemia and that was normal.    PHYSICAL EXAMINATION:  Constitutional: aaox3, cooperative, pleasant, not dyspneic/diaphoretic, no acute distress  Vitals reviewed: BP (!) 147/94 (BP Location: Left arm, Patient Position: Sitting, Cuff Size: Adult Regular)   Pulse 85   Temp 97.7  F (36.5  C) (Oral)   Ht 1.568 m (5' 1.75\")   Wt 68.6 kg (151 lb 4.8 oz)   SpO2 100%   BMI 27.90 kg/m    Wt:   Wt Readings from Last 2 Encounters:   05/17/22 68.6 kg (151 lb 4.8 oz)   04/21/22 69.4 kg (153 lb)      Eyes: Sclera anicteric/injected  Ears/nose/mouth/throat: Normal oropharynx without ulcers or exudate, mucus membranes moist, hearing intact  Neck: supple, thyroid normal size  CV: No edema  Respiratory: Unlabored breathing  Lymph: No notable submandibular, supraclavicular or inguinal lymphadenopathy  Abd:  Nondistended, no masses, no hepatosplenomegaly, nontender, no peritoneal signs  Skin: warm, perfused, no jaundice  Psych: Normal affect  MSK: Normal gait      Pertinent lab and radiology studies have been reviewed.     ASSESSMENT/PLAN:    Molly Teague is a 36 year old female who presents for evaluation of severe reflux esophagitis in the setting of scleroderma.  She has a well-documented history of LA grade D esophagitis despite high-dose PPI therapy.  Last EGD was in 2020 noting LA grade D esophagitis on twice daily omeprazole 40 mg.  She is now on omeprazole 40 mg 3 times daily but is having black-colored emesis.  I recommend proceeding with another upper endoscopy with MAC at this time.  She does have a history of throat surgery and is having some ongoing pulmonary test but she reports she has tolerated MAC anesthesia very well multiple times in the past and so I think scheduling at the ASC is probably reasonable unless her anesthesia provider has any " specific concerns.  She can continue with omeprazole 3 times daily, Pepcid 3 times daily at this time.  I do not recommend restarting Reglan.  If she has documented esophagitis on omeprazole 3 times daily, then perhaps we can get esophageal manometry to evaluate for any motility disorders in the setting of scleroderma.  If she did not have any severe esophageal dysmotility, perhaps there are surgical options to treat her reflux.    Given her reports of recent rectal bleeding in the setting of a self-reported history of ulcerative colitis, I would like to recheck blood counts, get blood inflammatory markers and stool inflammatory markers.  On review of prior colonoscopies, it is not absolutely definitive that she has an established diagnosis of ulcerative colitis.  If any of these labs are abnormal then I think another colonoscopy is indicated.  If they are all normal, I think we can hold off on another colonoscopy at this time.      RTC 3 months    Thank you for this consultation.  It was a pleasure to participate in the care of this patient; please contact us with any further questions.  A total of 62 minutes was spent with reviewing the chart, discussing with the patient, documentation and coordination of care.    This note was created with voice recognition software, and while reviewed for accuracy, typos may remain.     Jlaen Moses MD  Adjunct  of Medicine  Division of Gastroenterology, Hepatology and Nutrition  [facility]  [phone]      Again, thank you for allowing me to participate in the care of your patient.        Sincerely,        Jalen Moses MD

## 2022-05-17 NOTE — PROGRESS NOTES
GASTROENTEROLOGY NEW PATIENT CLINIC VISIT    CC/REFERRING MD:    Grecia Barba    REASON FOR CONSULTATION:   Grecia Barba for   Chief Complaint   Patient presents with     New Patient     New consult for GERD with esophagitis and hemorrhage       HISTORY OF PRESENT ILLNESS:    Molly Teague is 36 year old female who presents for evaluation of esophagitis and rectal bleeding.  She has a longstanding history of severe reflux esophagitis previously followed at [facility].  She has had several endoscopies over the years noting LA grade D esophagitis on most of the endoscopies and has been treated with high-dose PPI therapy and H2 blockers over this time.  She notes now that she has been having episodes of vomiting with black-colored material.  She is worried that the esophagitis is worse and contacted [facility] but was told she would need to reschedule as a new patient and it would take some time to get established again.  Therefore, she is scheduled with us for further evaluation.  She reports that she is vomiting a few times per week, black-colored emesis.  She does not feel that reflux has been worse.  She is using omeprazole 40 mg 3 times daily as has previously been recommended to her.  She is taking Pepcid 20 mg twice daily as well.  She used to take Reglan 10 mg 3 times daily but has not been taking this lately because she was taken off of this medication due to concern for side effects.  She is not having any dysphagia currently but this has been an issue in the past.  She has history of scleroderma with renal involvement.  She has not had esophageal manometry in the past.  She reports she was diagnosed with ulcerative colitis at one point.  She notes she has had several colonoscopies in the past which were due to anemia or rectal bleeding.  Last colonoscopy was in April 2020 for iron deficiency anemia and that was normal.    PHYSICAL EXAMINATION:  Constitutional: aaox3, cooperative,  "pleasant, not dyspneic/diaphoretic, no acute distress  Vitals reviewed: BP (!) 147/94 (BP Location: Left arm, Patient Position: Sitting, Cuff Size: Adult Regular)   Pulse 85   Temp 97.7  F (36.5  C) (Oral)   Ht 1.568 m (5' 1.75\")   Wt 68.6 kg (151 lb 4.8 oz)   SpO2 100%   BMI 27.90 kg/m    Wt:   Wt Readings from Last 2 Encounters:   05/17/22 68.6 kg (151 lb 4.8 oz)   04/21/22 69.4 kg (153 lb)      Eyes: Sclera anicteric/injected  Ears/nose/mouth/throat: Normal oropharynx without ulcers or exudate, mucus membranes moist, hearing intact  Neck: supple, thyroid normal size  CV: No edema  Respiratory: Unlabored breathing  Lymph: No notable submandibular, supraclavicular or inguinal lymphadenopathy  Abd:  Nondistended, no masses, no hepatosplenomegaly, nontender, no peritoneal signs  Skin: warm, perfused, no jaundice  Psych: Normal affect  MSK: Normal gait      Pertinent lab and radiology studies have been reviewed.     ASSESSMENT/PLAN:    Molly Teague is a 36 year old female who presents for evaluation of severe reflux esophagitis in the setting of scleroderma.  She has a well-documented history of LA grade D esophagitis despite high-dose PPI therapy.  Last EGD was in 2020 noting LA grade D esophagitis on twice daily omeprazole 40 mg.  She is now on omeprazole 40 mg 3 times daily but is having black-colored emesis.  I recommend proceeding with another upper endoscopy with MAC at this time.  She does have a history of throat surgery and is having some ongoing pulmonary test but she reports she has tolerated MAC anesthesia very well multiple times in the past and so I think scheduling at the ASC is probably reasonable unless her anesthesia provider has any specific concerns.  She can continue with omeprazole 3 times daily, Pepcid 3 times daily at this time.  I do not recommend restarting Reglan.  If she has documented esophagitis on omeprazole 3 times daily, then perhaps we can get esophageal manometry to evaluate " for any motility disorders in the setting of scleroderma.  If she did not have any severe esophageal dysmotility, perhaps there are surgical options to treat her reflux.    Given her reports of recent rectal bleeding in the setting of a self-reported history of ulcerative colitis, I would like to recheck blood counts, get blood inflammatory markers and stool inflammatory markers.  On review of prior colonoscopies, it is not absolutely definitive that she has an established diagnosis of ulcerative colitis.  If any of these labs are abnormal then I think another colonoscopy is indicated.  If they are all normal, I think we can hold off on another colonoscopy at this time.      RTC 3 months    Thank you for this consultation.  It was a pleasure to participate in the care of this patient; please contact us with any further questions.  A total of 62 minutes was spent with reviewing the chart, discussing with the patient, documentation and coordination of care.    This note was created with voice recognition software, and while reviewed for accuracy, typos may remain.     Jalen Moses MD  Adjunct  of Medicine  Division of Gastroenterology, Hepatology and Nutrition  [facility]  [phone]

## 2022-05-17 NOTE — NURSING NOTE
"Molly Teague's goals for this visit include:   Chief Complaint   Patient presents with     New Patient     New consult for GERD with esophagitis and hemorrhage     She requests these members of her care team be copied on today's visit information: PCP    PCP: Grecia Barba    Referring Provider:  Grecia Barba DO  [address] [place] [address]        Vitals:    05/17/22 1549   BP: (!) 147/94   BP Location: Left arm   Patient Position: Sitting   Cuff Size: Adult Regular   Pulse: 85   Temp: 97.7  F (36.5  C)   TempSrc: Oral   SpO2: 100%   Weight: 68.6 kg (151 lb 4.8 oz)   Height: 1.568 m (5' 1.75\")       Body mass index is 27.9 kg/m .    Do you need any medication refills at today's visit? No        Ewelina Calderón CMA    "

## 2022-05-19 ENCOUNTER — TELEPHONE (OUTPATIENT)
Dept: GASTROENTEROLOGY | Facility: CLINIC | Age: 37
End: 2022-05-19
Payer: MEDICARE

## 2022-05-19 NOTE — TELEPHONE ENCOUNTER
Screening Questions  BlueKIND OF PREP RedLOCATION [review exclusion criteria] GreenSEDATION TYPE  1. Have you had a positive covid test in the last 90 days? N    2. Do you have a legal guardian or medical Power of ?  Are you able to give consent for your medical care?Yes (Sedation review/consideration needed)    3. Are you active on mychart? Yes    4. What insurance is in the chart? [employer]     3.   Ordering/Referring Provider: Josué    4. BMI 26.7 [BMI OVER 40-EXTENDED PREP]  If greater than 40 review exclusion criteria [PAC APPT IF @ [facility]]        5.  Respiratory Screening :  [If yes to any of the following HOSPITAL setting only]     Do you use daily home oxygen? N    Do you have mod to severe Obstructive Sleep Apnea? N  [OKAY @ [facility] SH PH RI]   Do you have Pulmonary Hypertension? N     Do you have UNCONTROLLED asthma? N        6.   Have you had a heart or lung transplant? N      7.   Are you currently on dialysis? N [ If yes, G-PREP & HOSPITAL setting only]     8.   Do you have chronic kidney disease? Yes-stage 3 [ If yes, G-PREP ]    9.   Have you had a stroke or Transient ischemic attack (TIA - aka  mini stroke ) within 6 months?  N (If yes, please review exclusion criteria)    10.   In the past 6 months, have you had any heart related issues including cardiomyopathy or heart attack? N           If yes, did it require cardiac stenting or other implantable device? N      11.   Do you have any implantable devices in your body (pacemaker, defib, LVAD)? N (If yes, please review exclusion criteria)    12.   Do you take nitroglycerin? N           If yes, how often? NA  (if yes, HOSPITAL setting ONLY)    13.   Are you currently taking any blood thinners? N           [IF YES, INFORM PATIENT TO FOLLOW UP W/ ORDERING PROVIDER FOR BRIDGING INSTRUCTIONS]     14.   Do you have a diagnosis of diabetes? N   [ If yes, G-PREP ]    15.   [FEMALES] Are you currently pregnant? N    If yes, how many weeks?  NA    16.   Are you taking any prescription pain medications on a routine schedule?  Yes [ If yes, EXTENDED PREP.] [If yes, MAC]    17.   Do you have any chemical dependencies such as alcohol, street drugs, or methadone?  N [If yes, MAC]    18.   Do you have any history of post-traumatic stress syndrome, severe anxiety or history of psychosis?  Yes  [If yes, MAC]    19.   Do you transfer independently?  Yes    20.  On a regular basis do you go 3-5 days between bowel movements? NA   [ If yes, EXTENDED PREP.]    21.   Preferred LOCAL Pharmacy for Pre Prescription      [facility] [place], [place] - [address]      Scheduling Details      Caller : Molly  (Please ask for phone number if not scheduled by patient)    Type of Procedure Scheduled: EGD  Which Colonoscopy Prep was Sent?: DEEPA CH CF PATIENTS & GROEN'S PATIENTS NEEDS EXTENDED PREP  Surgeon: Josué  Date of Procedure: 7/14/22  Location:       Sedation Type: MAC  Conscious Sedation- Needs  for 6 hours after the procedure  MAC/General-Needs  for 24 hours after procedure    Pre-op Required at [facility] [place] and OR for MAC sedation: YES  (advise patient they will need a pre-op prior to procedure -)      Informed patient they will need an adult  Yes  Cannot take any type of public or medical transportation alone    [facility]Pro[facility] or Externally: [place] 7/11/22    Confirmed Nurse will call to complete assessment Yes    Additional comments:

## 2022-05-22 ENCOUNTER — E-VISIT (OUTPATIENT)
Dept: URGENT CARE | Facility: CLINIC | Age: 37
End: 2022-05-22
Payer: MEDICARE

## 2022-05-22 DIAGNOSIS — Z20.822 SUSPECTED COVID-19 VIRUS INFECTION: Primary | ICD-10-CM

## 2022-05-22 PROCEDURE — 99421 OL DIG E/M SVC 5-10 MIN: CPT | Mod: CS | Performed by: PHYSICIAN ASSISTANT

## 2022-05-22 ASSESSMENT — PATIENT HEALTH QUESTIONNAIRE - PHQ9
SUM OF ALL RESPONSES TO PHQ QUESTIONS 1-9: 9
10. IF YOU CHECKED OFF ANY PROBLEMS, HOW DIFFICULT HAVE THESE PROBLEMS MADE IT FOR YOU TO DO YOUR WORK, TAKE CARE OF THINGS AT HOME, OR GET ALONG WITH OTHER PEOPLE: SOMEWHAT DIFFICULT
SUM OF ALL RESPONSES TO PHQ QUESTIONS 1-9: 9

## 2022-05-22 NOTE — PATIENT INSTRUCTIONS
Molly,    Your symptoms show that you may have coronavirus (COVID-19). This illness can cause fever, cough and trouble breathing. Many people get a mild case and get better on their own. Some people can get very sick.    Because you reported additional symptoms, I would like to also test you for COVID, influenza, and strep throat.    What should I do?  I have placed orders for these tests.   To schedule: go to your [employer] home page and scroll down to the section that says  You have an appointment that needs to be scheduled  and click the large green button that says  Schedule Now  and follow the steps to find the next available openings.     If you are unable to complete these [employer] scheduling steps, please call [phone] to schedule your testing.     These guidelines are for isolating before returning to work, school or .     For employers, schools and day cares: This is an official notice for this person s medical guidelines for returning in-person.     For health care sites: The CDC gives different isolation and quarantine guidelines for healthcare sites, please check with these sites before arriving.     How do I self-isolate?  You isolate when you have symptoms of COVID or a test shows you have COVID, even if you don t have symptoms.     If you DO have symptoms:  o Stay home and away from others  - For at least 5 days after your symptoms started, AND   - You are fever free for 24 hours (with no medicine that reduces fever), AND  - Your other symptoms are better.  o Wear a mask for 10 full days any time you are around others.    If you DON T have symptoms:  o Stay at home and away from others for at least 5 days after your positive test.  o Wear a mask for 10 full days any time you are around others.    How can I take care of myself?  Over the counter medications may help with your symptoms such as runny or stuffy nose, cough, chills, or fever. Talk to your care team about your options.     Some  people are at high risk of severe illness (for example, you have a weak immune system, you re 65 years or older, or you have certain medical problems). If your risk is high and your symptoms started in the last 5 to 7 days, we strongly recommend for you to get COVID treatment as soon as possible. Paxlovid, Molnupiravir and the monoclonal antibody treatments are proven safe and effective, make you feel better faster, and prevent hospitalization and death.       To schedule an appointment to discuss COVID treatment, request an appointment on [employer] (select  COVID-19 Treatment ) or call [address] [place] [address] ([phone]), press 7.      Get lots of rest. Drink extra fluids (unless a doctor has told you not to)    Take Tylenol (acetaminophen) or ibuprofen for fever or pain. If you have liver or kidney problems, ask your family doctor if it's okay to take Tylenol or ibuprofen    Take over the counter medications for your symptoms, as directed by your doctor. You may also talk to your pharmacist.      If you have other health problems (like cancer, heart failure, an organ transplant or severe kidney disease): Call your specialty clinic if you don't feel better in the next 2 days.    Know when to call 911. Emergency warning signs include:  o Trouble breathing or shortness of breath  o Pain or pressure in the chest that doesn't go away  o Feeling confused like you haven't felt before, or not being able to wake up  o Bluish-colored lips or face    Where can I get more information?    [facility] - About COVID-19: [email or web address]     CDC - What to Do If You're Sick: https://www.cdc.gov/coronavirus/2019-ncov/if-you-are-sick/index.html     CDC - Quarantine & Isolation: https://www.cdc.gov/coronavirus/2019-ncov/your-health/quarantine-isolation.html     [facility] clinical trials (COVID-19 research studies): [email or web address]    Below are the COVID-19 hotlines at the  [place] Department of Health ([facility]). Interpreters are available.  o For health questions: Call 128-471-5093 or 1-101.327.7330 (7 a.m. to 7 p.m.)  o For questions about schools and childcare: Call 281-434-2695 or 1-881.781.7155 (7 a.m. to 7 p.m.)

## 2022-05-23 ENCOUNTER — LAB (OUTPATIENT)
Dept: FAMILY MEDICINE | Facility: CLINIC | Age: 37
End: 2022-05-23
Attending: PHYSICIAN ASSISTANT
Payer: MEDICARE

## 2022-05-23 DIAGNOSIS — Z20.822 SUSPECTED COVID-19 VIRUS INFECTION: ICD-10-CM

## 2022-05-23 LAB
DEPRECATED S PYO AG THROAT QL EIA: NEGATIVE
FLUAV AG SPEC QL IA: NEGATIVE
FLUBV AG SPEC QL IA: NEGATIVE
GROUP A STREP BY PCR: NOT DETECTED
SARS-COV-2 RNA RESP QL NAA+PROBE: NEGATIVE

## 2022-05-23 PROCEDURE — U0005 INFEC AGEN DETEC AMPLI PROBE: HCPCS

## 2022-05-23 PROCEDURE — U0003 INFECTIOUS AGENT DETECTION BY NUCLEIC ACID (DNA OR RNA); SEVERE ACUTE RESPIRATORY SYNDROME CORONAVIRUS 2 (SARS-COV-2) (CORONAVIRUS DISEASE [COVID-19]), AMPLIFIED PROBE TECHNIQUE, MAKING USE OF HIGH THROUGHPUT TECHNOLOGIES AS DESCRIBED BY CMS-2020-01-R: HCPCS

## 2022-05-23 PROCEDURE — 87804 INFLUENZA ASSAY W/OPTIC: CPT

## 2022-05-23 PROCEDURE — 87651 STREP A DNA AMP PROBE: CPT

## 2022-05-23 NOTE — LETTER
11/18/2020         RE: Molly Teague  [address] [facility] [address]        Dear Colleague,    Thank you for referring your patient, Molly Teague, to the [facility] [place]. Please see a copy of my visit note below.    Chief Complaint   Patient presents with     RECHECK     F/U R broken ankle, figure out if she needs the second surgery to remove the hardware or not. pt reported orthortics have already fallen apart             Allergies   Allergen Reactions     Blood Transfusion Related (Informational Only) Other (See Comments)     Patient has a history of a clinically significant antibody against RBC antigens.  A delay in compatible RBCs may occur.     No Known Allergies      Pollen Extract      Seasonal Allergies      Shellfish-Derived Products Nausea and Rash     Rash on face         Subjective: Molly is a 35 year old female who presents to the clinic today for a follow up of right ankle pain. Relates that she is considering having the right ankle hardware removed with Dr. Villatla. She relates that the orthotics worked well, however they have broken down. She relates that she has a newly healed ulcer on the back of the right heel.     Objective  PT and DP pulses are 1/4 bilaterally. CRT is 4 seconds. Absent pedal hair.   Gross sensation is diminished bilaterally.    Equinus is moderate bilaterally. . Severely hammered digits to the lesser digits BL with R>L. These are not reducible. pain in the medial and lateral ankles with palpation and ROM.   Nails normal bilaterally. No open lesions are noted. Tyloma noted to the right posterior heel without wound underneath. No s/s of infection noted.     Assessment: Right ankle pain. She is considering having the hardware removed.   Tyloma - Area was debrided today. No wound underneath.   Pes cavus with hammertoes to the lesser digits.       Plan:   - Pt seen and evaluated  - Orthotics were molded and sent to the lab.   -  [place] [address] Internal Medicine    Discharge Summary     Patient ID: Ibrahima Armando  :  1975   MRN: [id number]     ACCOUNT:  [de-identified]   Patient's PCP: GOYO Poole CNP  Admit Date: 2022   Discharge Date: 2022    Length of Stay: 1  Code Status:  Full Code  Admitting Physician: Monica Turner MD  Discharge Physician: Monica Turner MD     Active Discharge Diagnoses:     Primary Problem  Hypomagnesemia      [place] Problems    Diagnosis Date Noted    Hypomagnesemia [E83.42] 2022     Priority: Medium       Admission Condition:  fair     Discharged Condition: fair    Hospital Stay:     Hospital Course:  Ibrahima Armando is a 52 y.o. male who was admitted for the management of Hypomagnesemia , presented to ER with Dizziness and Altered Mental Status       12-year-old gentleman class I obese who lives with his father history of gastroesophageal reflux disease his father has Ibarra's esophagus who has underlying schizophrenia  Admitted for severe fatigue not feeling well electrolytes check showed severe hypomagnesemia hypokalemia patient denies any nausea vomiting diarrhea admits to be overusing PPI for gastroesophageal reflux  PPI discontinued started on Pepcid aggressive replacement of potassium magnesium overnight  Will be discharged on oral magnesium or potassium  Follow-up with PCP within a week  GI was consulted outpatient EGD plan for gastroesophageal reflux    Significant therapeutic interventions:     Significant Diagnostic Studies:   Labs / Micro:        ,     Radiology:    CT Head WO Contrast    Result Date: 2022  EXAMINATION: CT OF THE HEAD WITHOUT CONTRAST  2022 5:16 pm TECHNIQUE: CT of the head was performed without the administration of intravenous contrast. Automated exposure control, iterative reconstruction, and/or weight based adjustment of the mA/kV was utilized to reduce the radiation dose Start urea cream on the tyloma.   - Pt to return to clinic in 2 months.         Kenroy Cruz DPM     to as low as reasonably achievable. COMPARISON: None. HISTORY: ORDERING SYSTEM PROVIDED HISTORY: AMS, unsteady gait TECHNOLOGIST PROVIDED HISTORY: AMS, unsteady gait Decision Support Exception - unselect if not a suspected or confirmed emergency medical condition->Emergency Medical Condition (MA) Reason for Exam: ams, dizziness FINDINGS: BRAIN/VENTRICLES: There is no acute intracranial hemorrhage, mass effect or midline shift. No abnormal extra-axial fluid collection. The gray-white differentiation is maintained without evidence of an acute infarct. There is no evidence of hydrocephalus. ORBITS: The visualized portion of the orbits demonstrate no acute abnormality. SINUSES: The visualized paranasal sinuses and mastoid air cells demonstrate no acute abnormality. SOFT TISSUES/SKULL:  No acute abnormality of the visualized skull or soft tissues. No acute intracranial abnormality. Consultations:    Consults:     Final Specialist Recommendations/Findings:   IP CONSULT TO INTERNAL MEDICINE  IP CONSULT TO GI      The patient was seen and examined on day of discharge and this discharge summary is in conjunction with any daily progress note from day of discharge. Discharge plan:     Disposition: Home    Physician Follow Up:     No follow-up provider specified.      Requiring Further Evaluation/Follow Up POST HOSPITALIZATION/Incidental Findings:    Diet: regular diet    Activity: As tolerated    Instructions to Patient:     Discharge Medications:      Medication List      START taking these medications    famotidine 20 MG tablet  Commonly known as: PEPCID  Take 1 tablet by mouth 2 times daily     Magnesium 400 MG Caps  Take 1 capsule by mouth in the morning and at bedtime     potassium bicarb-citric acid 20 MEQ Tbef effervescent tablet  Commonly known as: EFFER-K  Take 1 tablet by mouth daily for 15 days     Vitamin D (Ergocalciferol) 59699 units Caps  Take 50,000 Units by mouth once a week  Start taking on: May 29, 2022        CHANGE how you take these medications    risperiDONE 2 MG tablet  Commonly known as: RISPERDAL  Take 1 tablet by mouth 2 times daily. What changed: Another medication with the same name was removed. Continue taking this medication, and follow the directions you see here. CONTINUE taking these medications    benztropine 1 MG tablet  Commonly known as: COGENTIN  Take 1 tablet by mouth 2 times daily. Where to Get Your Medications      These medications were sent to [facility]  [address], [address] [facility] [address]    Hours: JESSICA FENG (Mon-Fri 9AM-5:30PM) Phone: [phone]   · famotidine 20 MG tablet  · Magnesium 400 MG Caps  · potassium bicarb-citric acid 20 MEQ Tbef effervescent tablet  · Vitamin D (Ergocalciferol) 94817 units Caps         Time Spent on discharge is  35 mins in patient examination, evaluation, counseling as well as medication reconciliation, prescriptions for required medications, discharge plan and follow up. Electronically signed by   Bozena Arrington MD  5/23/2022  10:01 AM      Thank you . GOYO Magallanes - DEAN for the opportunity to be involved in this patient's care.

## 2022-05-24 ENCOUNTER — LAB (OUTPATIENT)
Dept: LAB | Facility: CLINIC | Age: 37
End: 2022-05-24
Payer: MEDICARE

## 2022-05-24 DIAGNOSIS — Z13.220 SCREENING FOR HYPERLIPIDEMIA: ICD-10-CM

## 2022-05-24 DIAGNOSIS — D63.8 ANEMIA, CHRONIC DISEASE: ICD-10-CM

## 2022-05-24 DIAGNOSIS — K92.1 HEMATOCHEZIA: ICD-10-CM

## 2022-05-24 LAB
ALBUMIN SERPL-MCNC: 4.2 G/DL (ref 3.4–5)
ALP SERPL-CCNC: 87 U/L (ref 40–150)
ALT SERPL W P-5'-P-CCNC: 20 U/L (ref 0–50)
ANION GAP SERPL CALCULATED.3IONS-SCNC: 4 MMOL/L (ref 3–14)
AST SERPL W P-5'-P-CCNC: 16 U/L (ref 0–45)
BASOPHILS # BLD AUTO: 0.1 10E3/UL (ref 0–0.2)
BASOPHILS NFR BLD AUTO: 1 %
BILIRUB SERPL-MCNC: 0.4 MG/DL (ref 0.2–1.3)
BUN SERPL-MCNC: 11 MG/DL (ref 7–30)
CALCIUM SERPL-MCNC: 9.3 MG/DL (ref 8.5–10.1)
CHLORIDE BLD-SCNC: 107 MMOL/L (ref 94–109)
CHOLEST SERPL-MCNC: 238 MG/DL
CO2 SERPL-SCNC: 26 MMOL/L (ref 20–32)
CREAT SERPL-MCNC: 0.99 MG/DL (ref 0.52–1.04)
CRP SERPL-MCNC: <2.9 MG/L (ref 0–8)
EOSINOPHIL # BLD AUTO: 0.3 10E3/UL (ref 0–0.7)
EOSINOPHIL NFR BLD AUTO: 4 %
ERYTHROCYTE [DISTWIDTH] IN BLOOD BY AUTOMATED COUNT: 15.9 % (ref 10–15)
FASTING STATUS PATIENT QL REPORTED: YES
FERRITIN SERPL-MCNC: 9 NG/ML (ref 12–150)
GFR SERPL CREATININE-BSD FRML MDRD: 75 ML/MIN/1.73M2
GLUCOSE BLD-MCNC: 69 MG/DL (ref 70–99)
HCT VFR BLD AUTO: 40.3 % (ref 35–47)
HDLC SERPL-MCNC: 46 MG/DL
HGB BLD-MCNC: 12.4 G/DL (ref 11.7–15.7)
IRON SATN MFR SERPL: 9 % (ref 15–46)
IRON SERPL-MCNC: 38 UG/DL (ref 35–180)
LDLC SERPL CALC-MCNC: 145 MG/DL
LYMPHOCYTES # BLD AUTO: 2.2 10E3/UL (ref 0.8–5.3)
LYMPHOCYTES NFR BLD AUTO: 25 %
MCH RBC QN AUTO: 27.7 PG (ref 26.5–33)
MCHC RBC AUTO-ENTMCNC: 30.8 G/DL (ref 31.5–36.5)
MCV RBC AUTO: 90 FL (ref 78–100)
MONOCYTES # BLD AUTO: 0.6 10E3/UL (ref 0–1.3)
MONOCYTES NFR BLD AUTO: 7 %
NEUTROPHILS # BLD AUTO: 5.4 10E3/UL (ref 1.6–8.3)
NEUTROPHILS NFR BLD AUTO: 63 %
NONHDLC SERPL-MCNC: 192 MG/DL
PLATELET # BLD AUTO: 233 10E3/UL (ref 150–450)
POTASSIUM BLD-SCNC: 4.1 MMOL/L (ref 3.4–5.3)
PROT SERPL-MCNC: 8.3 G/DL (ref 6.8–8.8)
RBC # BLD AUTO: 4.47 10E6/UL (ref 3.8–5.2)
SODIUM SERPL-SCNC: 137 MMOL/L (ref 133–144)
TIBC SERPL-MCNC: 416 UG/DL (ref 240–430)
TRIGL SERPL-MCNC: 233 MG/DL
WBC # BLD AUTO: 8.6 10E3/UL (ref 4–11)

## 2022-05-24 PROCEDURE — 85025 COMPLETE CBC W/AUTO DIFF WBC: CPT

## 2022-05-24 PROCEDURE — 80053 COMPREHEN METABOLIC PANEL: CPT

## 2022-05-24 PROCEDURE — 80061 LIPID PANEL: CPT

## 2022-05-24 PROCEDURE — 83550 IRON BINDING TEST: CPT

## 2022-05-24 PROCEDURE — 86140 C-REACTIVE PROTEIN: CPT

## 2022-05-24 PROCEDURE — 36415 COLL VENOUS BLD VENIPUNCTURE: CPT

## 2022-05-24 PROCEDURE — 82728 ASSAY OF FERRITIN: CPT

## 2022-05-25 DIAGNOSIS — D63.8 ANEMIA, CHRONIC DISEASE: Primary | ICD-10-CM

## 2022-05-25 NOTE — RESULT ENCOUNTER NOTE
The cholesterol is mildly elevated.  No need for cholesterol medications at this time.  There is mild iron deficiency without anemia.  Would hold on an iron infusion at this time, we should recheck anemia labs in about 2 months, orders placed

## 2022-06-02 DIAGNOSIS — K21.01 GASTROESOPHAGEAL REFLUX DISEASE WITH ESOPHAGITIS AND HEMORRHAGE: ICD-10-CM

## 2022-06-02 DIAGNOSIS — M34.1 CREST (CALCINOSIS, RAYNAUD'S PHENOMENON, ESOPHAGEAL DYSFUNCTION, SCLERODACTYLY, TELANGIECTASIA) (H): ICD-10-CM

## 2022-06-03 RX ORDER — OMEPRAZOLE 40 MG/1
CAPSULE, DELAYED RELEASE ORAL
Qty: 180 CAPSULE | Refills: 1 | Status: SHIPPED | OUTPATIENT
Start: 2022-06-03 | End: 2022-12-15

## 2022-06-03 NOTE — TELEPHONE ENCOUNTER
Prescription approved per [facility] Refill Protocol.    Marisa CARVER RN

## 2022-06-09 ENCOUNTER — TELEPHONE (OUTPATIENT)
Dept: GASTROENTEROLOGY | Facility: CLINIC | Age: 37
End: 2022-06-09
Payer: MEDICARE

## 2022-06-09 DIAGNOSIS — G89.4 CHRONIC PAIN SYNDROME: Chronic | ICD-10-CM

## 2022-06-09 RX ORDER — OXYCODONE HYDROCHLORIDE 10 MG/1
10 TABLET ORAL 3 TIMES DAILY
Qty: 90 TABLET | Refills: 0 | Status: SHIPPED | OUTPATIENT
Start: 2022-06-09 | End: 2022-07-07

## 2022-06-09 NOTE — TELEPHONE ENCOUNTER
Pending Prescriptions:                       Disp   Refills    oxyCODONE IR (ROXICODONE) 10 MG tablet [P*90 tab*0            Sig: TAKE 1 TABLET (10 MG) BY MOUTH 3 TIMES DAILY    Routing refill request to provider for review/approval because:  Drug not on the [facility] refill protocol       Gopi Birmingham RN

## 2022-06-15 DIAGNOSIS — I73.00 RAYNAUD'S DISEASE WITHOUT GANGRENE: ICD-10-CM

## 2022-06-15 DIAGNOSIS — S82.891A ANKLE FRACTURE, RIGHT, CLOSED, INITIAL ENCOUNTER: ICD-10-CM

## 2022-06-15 RX ORDER — METHOCARBAMOL 750 MG/1
750 TABLET, FILM COATED ORAL 3 TIMES DAILY PRN
Qty: 180 TABLET | Refills: 3 | Status: SHIPPED | OUTPATIENT
Start: 2022-06-15 | End: 2023-05-10

## 2022-06-15 NOTE — TELEPHONE ENCOUNTER
Routing refill request to provider for review/approval because:  Drug not on the [facility] refill protocol       Pending Prescriptions:                       Disp   Refills    methocarbamol (ROBAXIN) 750 MG tablet     30 tab*2            Savannah Cunningham RN on 6/15/2022 at 9:05 AM

## 2022-06-15 NOTE — TELEPHONE ENCOUNTER
Patient would like a refill on Methocarbamol please.    Thank you,  Deana Holder - Pharmacy Technician  [facility]  [phone]

## 2022-06-17 RX ORDER — AMLODIPINE BESYLATE 10 MG/1
TABLET ORAL
Qty: 90 TABLET | Refills: 3 | Status: SHIPPED | OUTPATIENT
Start: 2022-06-17 | End: 2022-06-24

## 2022-06-18 ENCOUNTER — MYC MEDICAL ADVICE (OUTPATIENT)
Dept: FAMILY MEDICINE | Facility: CLINIC | Age: 37
End: 2022-06-18
Payer: MEDICARE

## 2022-06-21 ENCOUNTER — TELEPHONE (OUTPATIENT)
Dept: FAMILY MEDICINE | Facility: CLINIC | Age: 37
End: 2022-06-21

## 2022-06-21 NOTE — TELEPHONE ENCOUNTER
Patient Quality Outreach    Patient is due for the following:   Hypertension -  Hypertension follow-up visit  Physical  - Due after 8/04/2021    NEXT STEPS:   Patient has upcoming appointment, these items will be addressed at that time.    Type of outreach:    Chart review performed, no outreach needed.      Questions for provider review:    None     Jayleen Reeves MA

## 2022-06-24 ENCOUNTER — OFFICE VISIT (OUTPATIENT)
Dept: ORTHOPEDICS | Facility: CLINIC | Age: 37
End: 2022-06-24
Payer: MEDICARE

## 2022-06-24 ENCOUNTER — VIRTUAL VISIT (OUTPATIENT)
Dept: FAMILY MEDICINE | Facility: CLINIC | Age: 37
End: 2022-06-24
Payer: MEDICARE

## 2022-06-24 DIAGNOSIS — M20.41 HAMMER TOES OF BOTH FEET: ICD-10-CM

## 2022-06-24 DIAGNOSIS — L84 TYLOMA: ICD-10-CM

## 2022-06-24 DIAGNOSIS — G89.4 CHRONIC PAIN SYNDROME: Chronic | ICD-10-CM

## 2022-06-24 DIAGNOSIS — R42 DIZZINESS: Primary | ICD-10-CM

## 2022-06-24 DIAGNOSIS — G63 POLYNEUROPATHY ASSOCIATED WITH UNDERLYING DISEASE (H): ICD-10-CM

## 2022-06-24 DIAGNOSIS — M25.571 PAIN IN JOINT OF RIGHT ANKLE: ICD-10-CM

## 2022-06-24 DIAGNOSIS — M20.42 HAMMER TOES OF BOTH FEET: ICD-10-CM

## 2022-06-24 DIAGNOSIS — I10 ESSENTIAL HYPERTENSION: ICD-10-CM

## 2022-06-24 DIAGNOSIS — Q66.70 PES CAVUS: Primary | ICD-10-CM

## 2022-06-24 DIAGNOSIS — M34.9 LIMITED SYSTEMIC SCLEROSIS (H): ICD-10-CM

## 2022-06-24 PROCEDURE — 99214 OFFICE O/P EST MOD 30 MIN: CPT | Performed by: PODIATRIST

## 2022-06-24 PROCEDURE — 99214 OFFICE O/P EST MOD 30 MIN: CPT | Mod: 95 | Performed by: INTERNAL MEDICINE

## 2022-06-24 RX ORDER — GABAPENTIN 300 MG/1
600 CAPSULE ORAL 3 TIMES DAILY
Qty: 540 CAPSULE | Refills: 3 | Status: SHIPPED | OUTPATIENT
Start: 2022-06-24 | End: 2023-05-10

## 2022-06-24 RX ORDER — LORAZEPAM 1 MG/1
1 TABLET ORAL 2 TIMES DAILY PRN
Qty: 45 TABLET | Refills: 1 | Status: SHIPPED | OUTPATIENT
Start: 2022-06-24 | End: 2022-10-11

## 2022-06-24 ASSESSMENT — ANXIETY QUESTIONNAIRES
GAD7 TOTAL SCORE: 7
3. WORRYING TOO MUCH ABOUT DIFFERENT THINGS: MORE THAN HALF THE DAYS
GAD7 TOTAL SCORE: 7
6. BECOMING EASILY ANNOYED OR IRRITABLE: NOT AT ALL
7. FEELING AFRAID AS IF SOMETHING AWFUL MIGHT HAPPEN: NOT AT ALL
1. FEELING NERVOUS, ANXIOUS, OR ON EDGE: NOT AT ALL
5. BEING SO RESTLESS THAT IT IS HARD TO SIT STILL: MORE THAN HALF THE DAYS
2. NOT BEING ABLE TO STOP OR CONTROL WORRYING: NOT AT ALL
IF YOU CHECKED OFF ANY PROBLEMS ON THIS QUESTIONNAIRE, HOW DIFFICULT HAVE THESE PROBLEMS MADE IT FOR YOU TO DO YOUR WORK, TAKE CARE OF THINGS AT HOME, OR GET ALONG WITH OTHER PEOPLE: NOT DIFFICULT AT ALL

## 2022-06-24 ASSESSMENT — PAIN SCALES - GENERAL: PAINLEVEL: SEVERE PAIN (7)

## 2022-06-24 ASSESSMENT — PATIENT HEALTH QUESTIONNAIRE - PHQ9: 5. POOR APPETITE OR OVEREATING: NEARLY EVERY DAY

## 2022-06-24 NOTE — NURSING NOTE
Reason For Visit:   Chief Complaint   Patient presents with     RECHECK     Right foot wound        There were no vitals taken for this visit.    Pain Assessment  Patient Currently in Pain: Yes  Patient's Stated Pain Goal: 7    Lorie Mojica

## 2022-06-24 NOTE — PROGRESS NOTES
"Molly is a 36 year old who is being evaluated via a billable video visit.      How would you like to obtain your AVS? MyChart  If the video visit is dropped, the invitation should be resent by: Text to cell phone: [phone]  Will anyone else be joining your video visit? No          Assessment & Plan     Dizziness -no clear cause w/out exam and labs.  Does not appear related to amlodipine since she has been off this medicine for 2 weeks and episodes were still happening.  She has been on lisinopril 10 mg for many years without problems.  Seems to be related to her chronic GI issues.  Hemoglobin was normal last month, so is not likely a GI bleed.  Stay hydrated and keep appointment for EGD in July    Essential hypertension -stay off the amlodipine and continue lisinopril    Chronic pain syndrome  - stable, refill provided  - LORazepam (ATIVAN) 1 MG tablet; Take 1 tablet (1 mg) by mouth 2 times daily as needed for anxiety #45 for 30 days    Limited systemic sclerosis (H)  - stable, refill provided  - gabapentin (NEURONTIN) 300 MG capsule; Take 2 capsules (600 mg) by mouth 3 times daily    Polyneuropathy associated with underlying disease (H)  - gabapentin (NEURONTIN) 300 MG capsule; Take 2 capsules (600 mg) by mouth 3 times daily             BMI:   Estimated body mass index is 27.9 kg/m  as calculated from the following:    Height as of 5/17/22: 1.568 m (5' 1.75\").    Weight as of 5/17/22: 68.6 kg (151 lb 4.8 oz).       Patient Instructions   1. Stay off amlodipine  2. On days when blood pressure is lower, hold lisinopril       No follow-ups on file.    Grecia Barba, [facility]    Subjective   Molly is a 36 year old accompanied by her self., presenting for the following health issues:  Anxiety      HPI     Chief Complaint   Patient presents with     Anxiety         Depression and Anxiety Follow-Up    How are you doing with your depression since your last visit? No change    How are " you doing with your anxiety since your last visit?  No change    Are you having other symptoms that might be associated with depression or anxiety? No    Have you had a significant life event? No     Do you have any concerns with your use of alcohol or other drugs? No    Social History     Tobacco Use     Smoking status: Never Smoker     Smokeless tobacco: Never Used   Vaping Use     Vaping Use: Former     Substances: Flavoring, delta 8     Devices: Disposable   Substance Use Topics     Alcohol use: Yes     Comment: Socially once on a weekend if any     Drug use: No     Comment: None     PHQ 8/26/2021 11/10/2021 5/22/2022   PHQ-9 Total Score 14 12 9   Q9: Thoughts of better off dead/self-harm past 2 weeks Not at all Not at all Not at all   F/U: Thoughts of suicide or self-harm - - -   F/U: Self harm-plan - - -   F/U: Self-harm action - - -   F/U: Safety concerns - - -   PHQ-A Total Score - - -   PHQ-A Depressed most days in past year - - -   PHQ-A Mood affect on daily activities - - -   PHQ-A Suicide Ideation past 2 weeks - - -     REX-7 SCORE 3/23/2021 11/10/2021 6/24/2022   Total Score - - -   Total Score 18 14 7     Last PHQ-9 5/22/2022   1.  Little interest or pleasure in doing things 1   2.  Feeling down, depressed, or hopeless 1   3.  Trouble falling or staying asleep, or sleeping too much 1   4.  Feeling tired or having little energy 1   5.  Poor appetite or overeating 2   6.  Feeling bad about yourself 2   7.  Trouble concentrating 1   8.  Moving slowly or restless 0   Q9: Thoughts of better off dead/self-harm past 2 weeks 0   PHQ-9 Total Score 9   Difficulty at work, home, or with people -   In the past two weeks have you had thoughts of suicide or self harm? -   Do you have concerns about your personal safety or the safety of others? -   In the past 2 weeks have you thought about a plan or had intention to harm yourself? -   In the past 2 weeks have you acted on these thoughts in any way? -     REX-7   6/24/2022   1. Feeling nervous, anxious, or on edge 0   2. Not being able to stop or control worrying 0   3. Worrying too much about different things 2   4. Trouble relaxing 3   5. Being so restless that it is hard to sit still 2   6. Becoming easily annoyed or irritable 0   7. Feeling afraid, as if something awful might happen 0   REX-7 Total Score 7   If you checked any problems, how difficult have they made it for you to do your work, take care of things at home, or get along with other people? Not difficult at all       Suicide Assessment Five-step Evaluation and Treatment (SAFE-T)      How many servings of fruits and vegetables do you eat daily?  2-3    On average, how many sweetened beverages do you drink each day (Examples: soda, juice, sweet tea, etc.  Do NOT count diet or artificially sweetened beverages)?   0    How many days per week do you exercise enough to make your heart beat faster? 5    How many minutes a day do you exercise enough to make your heart beat faster? 60 or more    How many days per week do you miss taking your medication? 0    Chronic pain:  --last visit in April we discussed meeting for face to face visit;    Hypertension:  --labile blood pressure for the last 6 weeks or so, intermittent episodes x 3, will wipe her out for a few days  --sometimes 100s, sometimes very high 140s; the highs are not very frequent  --often feeling dizzy with the lower blood pressure   --has not taken amlodipine in 2 weeks but blood pressure is still running low  --will skip lisinopril when blood pressure is running low  --last episode of low blood pressure was 3 days ago; during episode will have nausea, fatigue   --has EGD scheduled for 7/21      Current Outpatient Medications   Medication Sig Dispense Refill     acetaminophen (TYLENOL) 325 MG tablet Take 2 tablets (650 mg) by mouth every 4 hours as needed for mild pain or other 1 Bottle 0     albuterol (VENTOLIN HFA) 108 (90 Base) MCG/ACT inhaler INHALE 2  PUFFS INTO THE LUNGS ONCE EVERY 4 HOURS AS NEEDED FOR SHORTNESS OF BREATH / DYSPNEA / WHEEZING (Patient taking differently: INHALE 2 PUFFS INTO THE LUNGS ONCE EVERY 4 HOURS AS NEEDED FOR SHORTNESS OF BREATH / DYSPNEA / WHEEZING.) 18 g 11     amLODIPine (NORVASC) 10 MG tablet TAKE ONE TABLET BY MOUTH ONCE DAILY 90 tablet 3     blood glucose (NO BRAND SPECIFIED) lancets standard Use to test blood sugar 1 time daily 100 each 3     blood glucose (NO BRAND SPECIFIED) test strip Use to test blood sugar 1 time daily 100 strip 11     budesonide-formoterol (SYMBICORT) 160-4.5 MCG/ACT Inhaler INHALE TWO PUFFS BY MOUTH TWICE A DAY 10.2 g 11     busPIRone HCl (BUSPAR) 30 MG tablet Take 1 tablet (30 mg) by mouth 2 times daily 180 tablet 3     Cyanocobalamin (B-12) 1000 MCG TBCR Take 1,000 mcg by mouth daily 100 tablet 1     DULoxetine (CYMBALTA) 30 MG capsule TAKE 3 CAPSULES (90 MG) BY MOUTH DAILY 270 capsule 3     famotidine (PEPCID) 20 MG tablet TAKE ONE TABLET BY MOUTH TWICE A  tablet 3     gabapentin (NEURONTIN) 300 MG capsule Take 2 capsules (600 mg) by mouth 3 times daily 540 capsule 3     GOODSENSE MIGRAINE FORMULA 250-250-65 MG per tablet TAKE ONE TABLET BY MOUTH EVERY 6 HOURS AS NEEDED FOR HEADACHES (Patient taking differently: Take 1 tablet by mouth every 6 hours as needed) 24 tablet 1     lisinopril (ZESTRIL) 10 MG tablet Take 1 tablet (10 mg) by mouth every evening 90 tablet 3     loratadine (CLARITIN) 10 MG tablet Take 10 mg by mouth daily as needed        LORazepam (ATIVAN) 1 MG tablet Take 1 tablet (1 mg) by mouth 2 times daily as needed for anxiety #45 for 30 days 45 tablet 5     methocarbamol (ROBAXIN) 750 MG tablet Take 1 tablet (750 mg) by mouth 3 times daily as needed for muscle spasms 180 tablet 3     metoclopramide (REGLAN) 10 MG tablet Take 1 tablet (10 mg) by mouth 3 times daily as needed (nausea/vomiting) 50 tablet 1     montelukast (SINGULAIR) 10 MG tablet Take 1 tablet (10 mg) by mouth At  "Bedtime 90 tablet 3     naloxone (NARCAN) 4 MG/0.1ML nasal spray Spray 1 spray (4 mg) into one nostril alternating nostrils once as needed for opioid reversal every 2-3 minutes until assistance arrives 0.2 mL 3     omeprazole (PRILOSEC) 40 MG DR capsule TAKE ONE CAPSULE BY MOUTH TWICE A  capsule 1     ondansetron (ZOFRAN-ODT) 4 MG ODT tab Take 1 tablet (4 mg) by mouth every 8 hours as needed for nausea 30 tablet 4     order for DME [employer]. Patient can use for three months    Diagnosis Right ankle fracture and surgery 2/10/2020 1 Units 0     order for DME [employer]. Patient can use for another two months  Diagnosis: Right ankle bimalleolar fractures, open reduction internal fixation on 2/10/2020 1 Units 0     oxyCODONE IR (ROXICODONE) 10 MG tablet TAKE 1 TABLET (10 MG) BY MOUTH 3 TIMES DAILY 90 tablet 0     polyethylene glycol (MIRALAX) 17 GM/Dose powder Take 17 g by mouth daily 510 g 11     promethazine (PHENERGAN) 25 MG tablet TAKE ONE TABLET BY MOUTH TWICE A DAY AS NEEDED NAUSEA (Patient taking differently: daily as needed TAKE ONE TABLET BY MOUTH TWICE A DAY AS NEEDED NAUSEA) 30 tablet 7     promethazine (PHENERGAN) 25 MG/ML IV injection INJECT 1 ML INTRAMUSCULARLY EVERY 6 HOURS AS NEEDED 6 mL 11     syringe/needle, disp, (BD ECLIPSE SYRINGE) 25G X 1\" 3 ML MISC 1 each daily as needed 10 each 11         Review of Systems   Constitutional, HEENT, cardiovascular, pulmonary, gi and gu systems are negative, except as otherwise noted.      Objective    Vitals - Patient Reported  Pain Score: Severe Pain (7)  Pain Loc: Other - see comment (shoulder, elbow, knees bilateral )      Vitals:  No vitals were obtained today due to virtual visit.    Physical Exam   GENERAL: alert and no distress  EYES: Eyes grossly normal to inspection.  No discharge or erythema, or obvious scleral/conjunctival abnormalities.  RESP: No audible wheeze, cough, or visible cyanosis.  No visible retractions or increased " work of breathing.    SKIN: Visible skin clear. No significant rash, abnormal pigmentation or lesions.  NEURO: Cranial nerves grossly intact.  Mentation and speech appropriate for age.  PSYCH: Mentation appears normal, affect normal/bright, judgement and insight intact, normal speech and appearance well-groomed.                Video-Visit Details    Video Start Time: 3:16 PM    Type of service:  Video Visit    Video End Time:3:28 PM    Originating Location (pt. Location): Home    Distant Location (provider location):  [facility]     Platform used for Video Visit: [employer]    Grace Edwards.

## 2022-06-24 NOTE — PROGRESS NOTES
Chief Complaint   Patient presents with     RECHECK     Right foot wound           Allergies   Allergen Reactions     Blood Transfusion Related (Informational Only) Other (See Comments)     Patient has a history of a clinically significant antibody against RBC antigens.  A delay in compatible RBCs may occur.     Pollen Extract      Seasonal Allergies      Venofer [Iron Sucrose] Difficulty breathing and Cramps     Severe infusion reaction 1/2022     Shellfish-Derived Products Nausea and Rash     Rash on face         Subjective: Molly is a 36 year old female who presents to the clinic today for a follow up of right foot ulcer.  She relates that the area is healed.  She uses a pumice stone.  She does need a new pair of inserts still.    Objective  Data Unavailable Data Unavailable Data Unavailable Data Unavailable Data Unavailable 0 lbs 0 oz  Small tyloma is noted to the right first metatarsal head.  This does have some intradermal bleeding.  I did debride this away and there was no wound noted underneath.    Assessment: Molly is a 36-year-old with a tyloma on the right first metatarsal head where a wound was.  The wound is completely healed today.  She is doing well.  She does need new orthotics.    Plan:   - Pt seen and evaluated  -A previous manufacture orthotics is no longer present.  I did molded her again for orthotics.  -No need to cover the area.  -Activity as tolerated.  - Pt to return to clinic PRN.

## 2022-06-24 NOTE — LETTER
6/24/2022         RE: Molly Teague  [address] [facility] [place] [address]        Dear Colleague,    Thank you for referring your patient, Molly Teague, to the [facility] [place]. Please see a copy of my visit note below.    Chief Complaint   Patient presents with     RECHECK     Right foot wound           Allergies   Allergen Reactions     Blood Transfusion Related (Informational Only) Other (See Comments)     Patient has a history of a clinically significant antibody against RBC antigens.  A delay in compatible RBCs may occur.     Pollen Extract      Seasonal Allergies      Venofer [Iron Sucrose] Difficulty breathing and Cramps     Severe infusion reaction 1/2022     Shellfish-Derived Products Nausea and Rash     Rash on face         Subjective: Molly is a 36 year old female who presents to the clinic today for a follow up of right foot ulcer.  She relates that the area is healed.  She uses a pumice stone.  She does need a new pair of inserts still.    Objective  Data Unavailable Data Unavailable Data Unavailable Data Unavailable Data Unavailable 0 lbs 0 oz  Small tyloma is noted to the right first metatarsal head.  This does have some intradermal bleeding.  I did debride this away and there was no wound noted underneath.    Assessment: Molly is a 36-year-old with a tyloma on the right first metatarsal head where a wound was.  The wound is completely healed today.  She is doing well.  She does need new orthotics.    Plan:   - Pt seen and evaluated  -A previous manufacture orthotics is no longer present.  I did molded her again for orthotics.  -No need to cover the area.  -Activity as tolerated.  - Pt to return to clinic PRN.      Kenory Cruz DPM

## 2022-07-07 ENCOUNTER — OFFICE VISIT (OUTPATIENT)
Dept: FAMILY MEDICINE | Facility: CLINIC | Age: 37
End: 2022-07-07
Payer: MEDICARE

## 2022-07-07 VITALS
SYSTOLIC BLOOD PRESSURE: 132 MMHG | TEMPERATURE: 98.4 F | HEART RATE: 75 BPM | BODY MASS INDEX: 27.94 KG/M2 | HEIGHT: 62 IN | DIASTOLIC BLOOD PRESSURE: 96 MMHG | RESPIRATION RATE: 20 BRPM | WEIGHT: 151.8 LBS | OXYGEN SATURATION: 100 %

## 2022-07-07 DIAGNOSIS — J45.50 SEVERE PERSISTENT ASTHMA WITHOUT COMPLICATION (H): ICD-10-CM

## 2022-07-07 DIAGNOSIS — M34.9 LIMITED SYSTEMIC SCLEROSIS (H): ICD-10-CM

## 2022-07-07 DIAGNOSIS — Z01.818 PREOP GENERAL PHYSICAL EXAM: Primary | ICD-10-CM

## 2022-07-07 DIAGNOSIS — G63 POLYNEUROPATHY ASSOCIATED WITH UNDERLYING DISEASE (H): ICD-10-CM

## 2022-07-07 DIAGNOSIS — K21.01 GASTROESOPHAGEAL REFLUX DISEASE WITH ESOPHAGITIS AND HEMORRHAGE: ICD-10-CM

## 2022-07-07 DIAGNOSIS — K62.82 AIN GRADE I: ICD-10-CM

## 2022-07-07 DIAGNOSIS — N18.31 STAGE 3A CHRONIC KIDNEY DISEASE (H): ICD-10-CM

## 2022-07-07 DIAGNOSIS — G89.4 CHRONIC PAIN SYNDROME: ICD-10-CM

## 2022-07-07 DIAGNOSIS — F43.10 PTSD (POST-TRAUMATIC STRESS DISORDER): ICD-10-CM

## 2022-07-07 DIAGNOSIS — Z87.09 HISTORY OF ARDS: ICD-10-CM

## 2022-07-07 DIAGNOSIS — I10 MALIGNANT ESSENTIAL HYPERTENSION: ICD-10-CM

## 2022-07-07 DIAGNOSIS — G43.709 CHRONIC MIGRAINE WITHOUT AURA WITHOUT STATUS MIGRAINOSUS, NOT INTRACTABLE: ICD-10-CM

## 2022-07-07 DIAGNOSIS — D63.8 ANEMIA, CHRONIC DISEASE: ICD-10-CM

## 2022-07-07 DIAGNOSIS — I73.00 RAYNAUD'S DISEASE WITHOUT GANGRENE: ICD-10-CM

## 2022-07-07 DIAGNOSIS — Z23 NEED FOR VACCINATION: ICD-10-CM

## 2022-07-07 DIAGNOSIS — Z86.711 HISTORY OF PULMONARY EMBOLISM: ICD-10-CM

## 2022-07-07 DIAGNOSIS — M34.9 SYSTEMIC SCLEROSIS (H): ICD-10-CM

## 2022-07-07 DIAGNOSIS — F41.1 GAD (GENERALIZED ANXIETY DISORDER): ICD-10-CM

## 2022-07-07 PROBLEM — R80.9 MICROALBUMINURIA: Status: ACTIVE | Noted: 2022-07-07

## 2022-07-07 PROBLEM — R41.3 AMNESIA: Status: RESOLVED | Noted: 2020-04-29 | Resolved: 2022-07-07

## 2022-07-07 LAB
CREAT UR-MCNC: 151 MG/DL
CREAT UR-MCNC: 152 MG/DL
MICROALBUMIN UR-MCNC: 48 MG/L
MICROALBUMIN/CREAT UR: 31.79 MG/G CR (ref 0–25)

## 2022-07-07 PROCEDURE — 99214 OFFICE O/P EST MOD 30 MIN: CPT | Mod: 25 | Performed by: INTERNAL MEDICINE

## 2022-07-07 PROCEDURE — 82043 UR ALBUMIN QUANTITATIVE: CPT | Performed by: INTERNAL MEDICINE

## 2022-07-07 PROCEDURE — 90677 PCV20 VACCINE IM: CPT | Performed by: INTERNAL MEDICINE

## 2022-07-07 PROCEDURE — G0009 ADMIN PNEUMOCOCCAL VACCINE: HCPCS | Performed by: INTERNAL MEDICINE

## 2022-07-07 PROCEDURE — 80307 DRUG TEST PRSMV CHEM ANLYZR: CPT | Performed by: INTERNAL MEDICINE

## 2022-07-07 RX ORDER — AMLODIPINE BESYLATE 2.5 MG/1
2.5 TABLET ORAL DAILY
Qty: 90 TABLET | Refills: 3 | Status: SHIPPED | OUTPATIENT
Start: 2022-07-07 | End: 2023-05-10

## 2022-07-07 RX ORDER — OXYCODONE HYDROCHLORIDE 10 MG/1
10 TABLET ORAL 3 TIMES DAILY PRN
Qty: 90 TABLET | Refills: 0 | Status: SHIPPED | OUTPATIENT
Start: 2022-08-06 | End: 2022-09-05

## 2022-07-07 RX ORDER — OXYCODONE HYDROCHLORIDE 10 MG/1
10 TABLET ORAL 3 TIMES DAILY
Qty: 90 TABLET | Refills: 0 | Status: SHIPPED | OUTPATIENT
Start: 2022-07-07 | End: 2022-07-14

## 2022-07-07 RX ORDER — OXYCODONE HYDROCHLORIDE 10 MG/1
10 TABLET ORAL 3 TIMES DAILY PRN
Qty: 90 TABLET | Refills: 0 | Status: SHIPPED | OUTPATIENT
Start: 2022-09-05 | End: 2022-07-14

## 2022-07-07 ASSESSMENT — PAIN SCALES - GENERAL: PAINLEVEL: SEVERE PAIN (7)

## 2022-07-07 ASSESSMENT — PATIENT HEALTH QUESTIONNAIRE - PHQ9
10. IF YOU CHECKED OFF ANY PROBLEMS, HOW DIFFICULT HAVE THESE PROBLEMS MADE IT FOR YOU TO DO YOUR WORK, TAKE CARE OF THINGS AT HOME, OR GET ALONG WITH OTHER PEOPLE: SOMEWHAT DIFFICULT
SUM OF ALL RESPONSES TO PHQ QUESTIONS 1-9: 13
SUM OF ALL RESPONSES TO PHQ QUESTIONS 1-9: 13

## 2022-07-07 NOTE — PATIENT INSTRUCTIONS
Pre-Op  Continue all medications as normal up through surgery  No blood work or EKG needed    Hypertension/Raynauds  1/. Resume amlodipine but at 2.5 mg daily instead of 10 mg daily    Pain:  Refills x 3 months sent  Due for controlled substance agreement and urine drug screen      Preparing for Your Surgery  Getting started  A nurse will call you to review your health history and instructions. They will give you an arrival time based on your scheduled surgery time. Please be ready to share:  Your doctor's clinic name and phone number  Your medical, surgical and anesthesia history  A list of allergies and sensitivities  A list of medicines, including herbal treatments and over-the-counter drugs  Whether the patient has a legal guardian (ask how to send us the papers in advance)  Please tell us if you're pregnant--or if there's any chance you might be pregnant. Some surgeries may injure a fetus (unborn baby), so they require a pregnancy test. Surgeries that are safe for a fetus don't always need a test, and you can choose whether to have one.   If you have a child who's having surgery, please ask for a copy of Preparing for Your Child's Surgery.    Preparing for surgery  Within 30 days of surgery: Have a pre-op exam (sometimes called an H&P, or History and Physical). This can be done at a clinic or pre-operative center.  If you're having a , you may not need this exam. Talk to your care team.  At your pre-op exam, talk to your care team about all medicines you take. If you need to stop any medicines before surgery, ask when to start taking them again.  We do this for your safety. Many medicines can make you bleed too much during surgery. Some change how well surgery (anesthesia) drugs work.  Call your insurance company to let them know you're having surgery. (If you don't have insurance, call [phone].)  Call your clinic if there's any change in your health. This includes signs of a cold or flu (sore  throat, runny nose, cough, rash, fever). It also includes a scrape or scratch near the surgery site.  If you have questions on the day of surgery, call your hospital or surgery center.  COVID testing  You may need to be tested for COVID-19 before having surgery. If so, we will give you instructions.  Eating and drinking guidelines  For your safety: Unless your surgeon tells you otherwise, follow the guidelines below.  Eat and drink as usual until 8 hours before surgery. After that, no food or milk.  Drink clear liquids until 2 hours before surgery. These are liquids you can see through, like water, Gatorade and Propel Water. You may also have black coffee and tea (no cream or milk).  Nothing by mouth within 2 hours of surgery. This includes gum, candy and breath mints.  If you drink alcohol: Stop drinking it the night before surgery.  If your care team tells you to take medicine on the morning of surgery, it's okay to take it with a sip of water.  Preventing infection  Shower or bathe the night before and morning of your surgery. Follow the instructions your clinic gave you. (If no instructions, use regular soap.)  Don't shave or clip hair near your surgery site. We'll remove the hair if needed.  Don't smoke or vape the morning of surgery. You may chew nicotine gum up to 2 hours before surgery. A nicotine patch is okay.  Note: Some surgeries require you to completely quit smoking and nicotine. Check with your surgeon.  Your care team will make every effort to keep you safe from infection. We will:  Clean our hands often with soap and water (or an alcohol-based hand rub).  Clean the skin at your surgery site with a special soap that kills germs.  Give you a special gown to keep you warm. (Cold raises the risk of infection.)  Wear special hair covers, masks, gowns and gloves during surgery.  Give antibiotic medicine, if prescribed. Not all surgeries need antibiotics.  What to bring on the day of surgery  Photo ID and  insurance card  Copy of your health care directive, if you have one  Glasses and hearing aides (bring cases)  You can't wear contacts during surgery  Inhaler and eye drops, if you use them (tell us about these when you arrive)  CPAP machine or breathing device, if you use them  A few personal items, if spending the night  If you have . . .  A pacemaker, ICD (cardiac defibrillator) or other implant: Bring the ID card.  An implanted stimulator: Bring the remote control.  A legal guardian: Bring a copy of the certified (court-stamped) guardianship papers.  Please remove any jewelry, including body piercings. Leave jewelry and other valuables at home.  If you're going home the day of surgery  You must have a responsible adult drive you home. They should stay with you overnight as well.  If you don't have someone to stay with you, and you aren't safe to go home alone, we may keep you overnight. Insurance often won't pay for this.  After surgery  If it's hard to control your pain or you need more pain medicine, please call your surgeon's office.  Questions?   If you have any questions for your care team, list them here: _________________________________________________________________________________________________________________________________________________________________________ ____________________________________ ____________________________________ ____________________________________  For informational purposes only. Not to replace the advice of your health care provider. Copyright   2003, 2019 [facility]. All rights reserved. Clinically reviewed by Madai Rahman MD. [employer] 501467 - REV 07/21.

## 2022-07-07 NOTE — LETTER
July 15, 2022      Molly Ho  [address] [place] [address]        Dear ,    We are writing to inform you of your test results.    Urine drug screen as expected.     Resulted Orders   Albumin Random Urine Quantitative with Creat Ratio   Result Value Ref Range    Creatinine Urine mg/dL 151 mg/dL    Albumin Urine mg/L 48 mg/L    Albumin Urine mg/g Cr 31.79 (H) 0.00 - 25.00 mg/g Cr   Urine Drug Confirmation Panel   Result Value Ref Range    Oxycodone ng/mL 1,120 (H) <50 ng/mL    Oxycodone 737 Absent ng/mg [creat]      Comment:      Sources of oxycodone are scheduled prescription medications.    Oxymorphone ng/mL 192 (H) <50 ng/mL    Oxymorphone 126 Absent ng/mg [creat]      Comment:      Oxymorphone is an expected metabolite of oxycodone. Oxymorphone is also available as a scheduled prescription medication.    Gabapentin (Neurontin) Present (A) Absent      Comment:      Sources of gabapentin are prescription medications.    Narrative    This test was developed and its performance characteristics determined by the [facility],  Special [facility] Laboratory. It has not been cleared or approved by the FDA. The laboratory is regulated under CLIA as qualified to perform high-complexity testing. This test is used for clinical purposes. It should not be regarded as investigational or for research.   Urine Creatinine for Drug Screen Panel   Result Value Ref Range    Creatinine Urine for Drug Screen 152 mg/dL      Comment:      The reference range has not been established for creatinine in random urines. The results should be integrated into the clinical context for interpretation.       If you have any questions or concerns, please call the clinic at the number listed above.       Sincerely,      Grecia Barba, DO

## 2022-07-07 NOTE — H&P (VIEW-ONLY)
[facility]  [address] [address]  Phone: [phone]  Primary Provider: Nilsa Chaudhry  Pre-op Performing Provider: NILSA CHAUDHRY      PREOPERATIVE EVALUATION:  Today's date: 7/7/2022    Molly Teague is a 36 year old female who presents for a preoperative evaluation.    Surgical Information:  Surgery/Procedure: ESOPHAGOGASTRODUODENOSCOPY, WITH CO2 INSUFFLATION  Surgery Location: [facility]  Surgeon: Jalen Moses MD  Surgery Date: 7/14/22  Time of Surgery: 1:05 pm  Where patient plans to recover: At home with family  Fax number for surgical facility: Note does not need to be faxed, will be available electronically in Epic.    Type of Anesthesia Anticipated: Monitor Anesthesia Care    Assessment & Plan     The proposed surgical procedure is considered LOW risk.    Problem List Items Addressed This Visit     AIN grade I    Anemia, chronic disease    Chronic migraine without aura without status migrainosus, not intractable    Relevant Medications    amLODIPine (NORVASC) 2.5 MG tablet    oxyCODONE IR (ROXICODONE) 10 MG tablet    oxyCODONE (ROXICODONE) 10 MG tablet (Start on 8/6/2022)    oxyCODONE (ROXICODONE) 10 MG tablet (Start on 9/5/2022)    Chronic pain syndrome (Chronic)    Relevant Medications    oxyCODONE IR (ROXICODONE) 10 MG tablet    oxyCODONE (ROXICODONE) 10 MG tablet (Start on 8/6/2022)    oxyCODONE (ROXICODONE) 10 MG tablet (Start on 9/5/2022)    medical cannabis (Patient's own supply)    Other Relevant Orders    Albumin Random Urine Quantitative with Creat Ratio    [id number] - Urine Drug Confirmation Panel (Comprehensive)    REX (generalized anxiety disorder)    Gastroesophageal reflux disease with esophagitis    History of ARDS    History of pulmonary embolism    Limited systemic sclerosis (H)    Malignant essential hypertension    Relevant Medications    amLODIPine (NORVASC) 2.5 MG tablet    Polyneuropathy associated with  underlying disease (H)    PTSD (post-traumatic stress disorder)    Severe persistent asthma without complication    Stage 3a chronic kidney disease (H)    Relevant Orders    Albumin Random Urine Quantitative with Creat Ratio    Systemic sclerosis (H)      Other Visit Diagnoses     Preop general physical exam    -  Primary    Need for vaccination        Relevant Orders    PNEUMOCOCCAL 20 VALENT CONJUGATE (PREVNAR 20) (Completed)    Raynaud's disease without gangrene        Relevant Medications    amLODIPine (NORVASC) 2.5 MG tablet               Risks and Recommendations:  The patient has the following additional risks and recommendations for perioperative complications:   - No identified additional risk factors other than previously addressed    Medication Instructions:  Patient is to take all scheduled medications on the day of surgery    RECOMMENDATION:  APPROVAL GIVEN to proceed with proposed procedure, without further diagnostic evaluation.                      Subjective     HPI related to upcoming procedure: severe GERD due to systemic sclerosis      Preop Questions 7/7/2022   1. Have you ever had a heart attack or stroke? No   2. Have you ever had surgery on your heart or blood vessels, such as a stent placement, a coronary artery bypass, or surgery on an artery in your head, neck, heart, or legs? No   3. Do you have chest pain with activity? No   4. Do you have a history of  heart failure? No   5. Do you currently have a cold, bronchitis or symptoms of other infection? No   6. Do you have a cough, shortness of breath, or wheezing? YES - from Asthma    7. Do you or anyone in your family have previous history of blood clots? YES - self had Pulmonary Embolism during critical illness   8. Do you or does anyone in your family have a serious bleeding problem such as prolonged bleeding following surgeries or cuts? No   9. Have you ever had problems with anemia or been told to take iron pills? YES - self - Anemic     10. Have you had any abnormal blood loss such as black, tarry or bloody stools, or abnormal vaginal bleeding? YES - stools    11. Have you ever had a blood transfusion? YES -    11a. Have you ever had a transfusion reaction? No, had iron infusion reaction   12. Are you willing to have a blood transfusion if it is medically needed before, during, or after your surgery? Yes   13. Have you or any of your relatives ever had problems with anesthesia? No   14. Do you have sleep apnea, excessive snoring or daytime drowsiness? No   15. Do you have any artifical heart valves or other implanted medical devices like a pacemaker, defibrillator, or continuous glucose monitor? No   16. Do you have artificial joints? No   17. Are you allergic to latex? No   18. Is there any chance that you may be pregnant? No     Health Care Directive:  Patient does not have a Health Care Directive or Living Will: Patient states has Advance Directive and will bring in a copy to clinic.    Preoperative Review of :   reviewed - controlled substances reflected in medication list.      Status of Chronic Conditions:  See problem list for active medical problems.  Problems all longstanding and stable, except as noted/documented.  See ROS for pertinent symptoms related to these conditions.         Scleroderma with renal involvement/CREST: Follows with rheumatology, nephrology at [facility].  She has contractures of distal bilateral upper and lower extremities.  She developed significant scarring with trauma.  She has tightening of the skin in her face, leading to difficulty opening her mouth wide.  She aspirates frequently and has severe GERD with esophagitis.  She has a history of occult GI bleed due to the esophagitis.  She has chronic nausea and vomiting due to her esophagitis.  She has hypertension due to her renal involvement of her scleroderma.     Raynauds: On amlodipine for blood pressure and for Raynauds.  History of digital ulcerations.   Often difficult to get a pulse ox.     History of ARDS: Due to scleroderma renal crisis.  She was ventilated for many weeks, required a trach.  She has severe medical PTSD from her prolonged ICU stay     Depression/PTSD/Insomnia/Anxiety: Because of her prolonged ICU stay she has a history of medical PTSD.  She needs multiple medications to manage her depression and anxiety.  She has followed with psychology.     Chronic Pain: From her scleroderma.  She is on chronic opiates.  She is a high tolerance to opiates  --using over the counter CBD Delta gummy which helps  --using oxycodone 10 mg TID  --has not liked how other medical cannabis from [place] has made her feel - hiigher THC preparations caused side effects, but more CBD was beneficial    Hypertension:   --has been off amlodipine for 3 weeks due to lower blood pressure   --raynauds has been worse off the amlodipine   --the dizziness has resolved with staying off the amlodipine     Review of Systems  CONSTITUTIONAL: NEGATIVE for fever, chills, change in weight  INTEGUMENTARY/SKIN: NEGATIVE for worrisome rashes, moles or lesions  EYES: NEGATIVE for vision changes or irritation  ENT/MOUTH: NEGATIVE for ear, mouth and throat problems  RESP: NEGATIVE for significant cough or SOB  CV: NEGATIVE for chest pain, palpitations or peripheral edema  GI: NEGATIVE for nausea, abdominal pain, heartburn, or change in bowel habits  : NEGATIVE for frequency, dysuria, or hematuria  MUSCULOSKELETAL: NEGATIVE for significant arthralgias or myalgia  NEURO: NEGATIVE for weakness, dizziness or paresthesias  ENDOCRINE: NEGATIVE for temperature intolerance, skin/hair changes  HEME: NEGATIVE for bleeding problems  PSYCHIATRIC: NEGATIVE for changes in mood or affect    Patient Active Problem List    Diagnosis Date Noted     Pain in joint of right ankle 08/24/2021     Priority: Medium     Added automatically from request for surgery [id number]       Iron deficiency anemia due to chronic  blood loss 08/04/2020     Priority: Medium     Vomiting and diarrhea 03/25/2020     Priority: Medium     Closed right ankle fracture 02/11/2020     Priority: Medium     S/P ORIF (open reduction internal fixation) fracture 02/10/2020     Priority: Medium     Sinus tachycardia 01/31/2020     Priority: Medium     Ankle fracture, right, closed, initial encounter 01/30/2020     Priority: Medium     Added automatically from request for surgery [id number]       Anemia, chronic disease 11/25/2019     Priority: Medium     Mirena IUD- Remove by 09/2024 09/06/2019     Priority: Medium     Lot: [id number]  Exp: 01/2022    Dinora Israel LPN         Malignant essential hypertension 07/24/2019     Priority: Medium     PTSD (post-traumatic stress disorder) 06/19/2019     Priority: Medium     Severe episode of recurrent major depressive disorder, with psychotic features (H) 07/06/2018     Priority: Medium     Severe persistent asthma without complication 07/06/2018     Priority: Medium     On high dose ICS/LABA + frequent albuterol use.  Next allergy/pulmonary referral       Aspiration of food 03/29/2018     Priority: Medium     Chronic pain syndrome 04/26/2017     Priority: Medium     Patient is followed by Grecia Barba DO for ongoing prescription of pain medication.  All refills should only be approved by this provider, or covering partner.    Medication(s): Oxycodone 10 mg.   Maximum quantity per month: 90  Clinic visit frequency required: Q 3 months     Controlled substance agreement:  Encounter-Level CSA - 04/26/2017:    Controlled Substance Agreement - Scan on 5/4/2017  8:58 AM: CONTROLLED SUBSTANCE AGREEMENT (below)       Patient-Level CSA:    Controlled Substance Agreement - Opioid - Scan on 2/6/2019  6:23 PM (below)         Pain Clinic evaluation in the past: No    DIRE Total Score(s):  No flowsheet data found.    Last [facility] website verification:  done on 4/26/17    9/26/2017  7/6/2018  10/16/2018  1/22/2019  8/2/2019           [email or web address]         Chronic migraine without aura without status migrainosus, not intractable 04/12/2017     Priority: Medium     With medication overuse.  Fioricet and not Imitrex is recommend to treat severe headache.  2/2017 [facility] recommend wean down from daily Fioricet and use Excedrin Migrain PRN.       AIN grade I 03/30/2017     Priority: Medium     Found at [facility] on colonoscopy 2/2017.  Recommend repeat anoscopy and anal pap in 6/2017.       Gastroesophageal reflux disease with esophagitis 03/30/2017     Priority: Medium     EGD done at [facility] 2/2017 showed esophagitis without GAVE.  Recommend max dose H2 and PPI, along with 4 tablets of Carafate dissolved in water and drink throughout the day.    Had LA Grade D esophagitis, on TID PPI       Secondary hypertension 03/22/2017     Priority: Medium     Stage 3a chronic kidney disease (H) 02/27/2017     Priority: Medium     Arthritis 02/10/2017     Priority: Medium     Limited systemic sclerosis (H) 01/25/2017     Priority: Medium     Raynaud's, calcinosis, telangiectasias, peripheral neuropathy, GERD symptoms, history of scleroderma renal crisis.  Saw [facility] 1/2017 and follows with Rheum Dr. Davis locally.       Polyneuropathy associated with underlying disease (H) 01/25/2017     Priority: Medium     Due to scleroderma and neurology thought possible due to ICU (critical illness neuropathy).  Recommend to max out dose of gabapentin to 2048-7226 mg/day, split BID or TID.  EMG 1/2017 positive for neuropathy       History of Clostridium difficile 11/29/2016     Priority: Medium     History of Helicobacter pylori infection 11/29/2016     Priority: Medium     Scleroderma with renal involvement (H) 11/09/2016     Priority: Medium     Needs lisinopril long term       History of ARDS 11/09/2016     Priority: Medium     4/2015, prolonged ICU/vent/trach due to influenza, scleroderma renal crisis  requiring hemodialysis.       History of hemodialysis 2016     Priority: Medium     2015 due to scleroderma renal crisis       Bilateral retinitis 2016     Priority: Medium     History of pulmonary embolism 2016     Priority: Medium     Completed anti-coagulation .  pulmonary embolism due to critical illness       REX (generalized anxiety disorder) 2016     Priority: Medium     Systemic sclerosis (H) 2016     Priority: Medium      Past Medical History:   Diagnosis Date     Acute pulmonary embolism (H) 2016    During renal crisis     Acute pyelonephritis 2017     Altered mental state 2018     Anxiety      Arthritis      Complication of anesthesia      Depression      Gastroesophageal reflux disease with esophagitis      History of blood transfusion      Hypertension      Long-term (current) use of anticoagulants [Z79.01] 2016     Neuropathy      PE (pulmonary embolism) 2016     PTSD (post-traumatic stress disorder)      Raynaud's disease without gangrene      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      Rheumatism      Scleroderma (H) 2016     Slow to wake up after anesthesia     pt states it took 2 days to come out of anesthesia after her ankle surgery     Uncomplicated asthma      Past Surgical History:   Procedure Laterality Date     ANGIOGRAM  2015      SECTION  2014     OPEN REDUCTION INTERNAL FIXATION ANKLE Right 2/10/2020    Procedure: Right ankle open reduction internal fixation;  Surgeon: Natanael Villalta MD;  Location: [facility] OR     REMOVE HARDWARE ANKLE Right 2021    Procedure: Right ankle hardware removal;  Surgeon: Natanael Villalta MD;  Location: [facility] OR     THROAT SURGERY  2015     Current Outpatient Medications   Medication Sig Dispense Refill     acetaminophen (TYLENOL) 325 MG tablet Take 2 tablets (650 mg) by mouth every 4 hours as needed for mild pain or other 1 Bottle 0      albuterol (VENTOLIN HFA) 108 (90 Base) MCG/ACT inhaler INHALE 2 PUFFS INTO THE LUNGS ONCE EVERY 4 HOURS AS NEEDED FOR SHORTNESS OF BREATH / DYSPNEA / WHEEZING (Patient taking differently: INHALE 2 PUFFS INTO THE LUNGS ONCE EVERY 4 HOURS AS NEEDED FOR SHORTNESS OF BREATH / DYSPNEA / WHEEZING.) 18 g 11     blood glucose (NO BRAND SPECIFIED) lancets standard Use to test blood sugar 1 time daily 100 each 3     blood glucose (NO BRAND SPECIFIED) test strip Use to test blood sugar 1 time daily 100 strip 11     budesonide-formoterol (SYMBICORT) 160-4.5 MCG/ACT Inhaler INHALE TWO PUFFS BY MOUTH TWICE A DAY 10.2 g 11     busPIRone HCl (BUSPAR) 30 MG tablet Take 1 tablet (30 mg) by mouth 2 times daily 180 tablet 3     Cyanocobalamin (B-12) 1000 MCG TBCR Take 1,000 mcg by mouth daily 100 tablet 1     DULoxetine (CYMBALTA) 30 MG capsule TAKE 3 CAPSULES (90 MG) BY MOUTH DAILY 270 capsule 3     famotidine (PEPCID) 20 MG tablet TAKE ONE TABLET BY MOUTH TWICE A  tablet 3     gabapentin (NEURONTIN) 300 MG capsule Take 2 capsules (600 mg) by mouth 3 times daily 540 capsule 3     GOODSENSE MIGRAINE FORMULA 250-250-65 MG per tablet TAKE ONE TABLET BY MOUTH EVERY 6 HOURS AS NEEDED FOR HEADACHES (Patient taking differently: Take 1 tablet by mouth every 6 hours as needed) 24 tablet 1     lisinopril (ZESTRIL) 10 MG tablet Take 1 tablet (10 mg) by mouth every evening 90 tablet 3     loratadine (CLARITIN) 10 MG tablet Take 10 mg by mouth daily as needed        LORazepam (ATIVAN) 1 MG tablet Take 1 tablet (1 mg) by mouth 2 times daily as needed for anxiety #45 for 30 days 45 tablet 1     methocarbamol (ROBAXIN) 750 MG tablet Take 1 tablet (750 mg) by mouth 3 times daily as needed for muscle spasms 180 tablet 3     metoclopramide (REGLAN) 10 MG tablet Take 1 tablet (10 mg) by mouth 3 times daily as needed (nausea/vomiting) 50 tablet 1     montelukast (SINGULAIR) 10 MG tablet Take 1 tablet (10 mg) by mouth At Bedtime 90 tablet 3      "naloxone (NARCAN) 4 MG/0.1ML nasal spray Spray 1 spray (4 mg) into one nostril alternating nostrils once as needed for opioid reversal every 2-3 minutes until assistance arrives 0.2 mL 3     omeprazole (PRILOSEC) 40 MG DR capsule TAKE ONE CAPSULE BY MOUTH TWICE A  capsule 1     ondansetron (ZOFRAN-ODT) 4 MG ODT tab Take 1 tablet (4 mg) by mouth every 8 hours as needed for nausea 30 tablet 4     order for DME [facility]. Patient can use for three months    Diagnosis Right ankle fracture and surgery 2/10/2020 1 Units 0     order for DME [facility] [facility]. Patient can use for another two months  Diagnosis: Right ankle bimalleolar fractures, open reduction internal fixation on 2/10/2020 1 Units 0     oxyCODONE IR (ROXICODONE) 10 MG tablet TAKE 1 TABLET (10 MG) BY MOUTH 3 TIMES DAILY 90 tablet 0     polyethylene glycol (MIRALAX) 17 GM/Dose powder Take 17 g by mouth daily 510 g 11     promethazine (PHENERGAN) 25 MG tablet TAKE ONE TABLET BY MOUTH TWICE A DAY AS NEEDED NAUSEA (Patient taking differently: daily as needed TAKE ONE TABLET BY MOUTH TWICE A DAY AS NEEDED NAUSEA) 30 tablet 7     promethazine (PHENERGAN) 25 MG/ML IV injection INJECT 1 ML INTRAMUSCULARLY EVERY 6 HOURS AS NEEDED 6 mL 11     syringe/needle, disp, (BD ECLIPSE SYRINGE) 25G X 1\" 3 ML MISC 1 each daily as needed 10 each 11       Allergies   Allergen Reactions     Blood Transfusion Related (Informational Only) Other (See Comments)     Patient has a history of a clinically significant antibody against RBC antigens.  A delay in compatible RBCs may occur.     Pollen Extract      Seasonal Allergies      Venofer [Iron Sucrose] Difficulty breathing and Cramps     Severe infusion reaction 1/2022     Shellfish-Derived Products Nausea and Rash     Rash on face        Social History     Tobacco Use     Smoking status: Never Smoker     Smokeless tobacco: Never Used   Substance Use Topics     Alcohol use: Yes     Comment: Socially once on a weekend " "if any     Family History   Problem Relation Age of Onset     Hyperlipidemia Father      Depression Sister      Fibromyalgia Sister      Hyperlipidemia Sister      Cerebrovascular Disease Maternal Grandmother          of a stroke     Diabetes Maternal Grandmother      Hyperlipidemia Paternal Grandfather      Diabetes Maternal Aunt      Depression Other      Melanoma No family hx of      Skin Cancer No family hx of      Anesthesia Reaction No family hx of      Cardiovascular No family hx of      Deep Vein Thrombosis (DVT) No family hx of      History   Drug Use No     Comment: None         Objective     BP (!) 122/90 (BP Location: Right arm, Patient Position: Chair, Cuff Size: Adult Regular)   Pulse 75   Temp 98.4  F (36.9  C) (Tympanic)   Resp 20   Ht 1.568 m (5' 1.75\")   Wt 68.9 kg (151 lb 12.8 oz)   SpO2 100%   Breastfeeding No   BMI 27.99 kg/m      Physical Exam    GENERAL APPEARANCE: active, no distress     EYES: EOMI, PERRL     HENT: ear canals and TM's normal and nose and mouth without ulcers or lesions     NECK: no adenopathy, no asymmetry, masses, or scars and thyroid normal to palpation     RESP: lungs clear to auscultation - no rales, rhonchi or wheezes     CV: regular rates and rhythm, normal S1 S2, no S3 or S4 and no murmur, click or rub     ABDOMEN:  soft, nontender, no HSM or masses and bowel sounds normal     MS: contractures of bilateral hands/feet     SKIN: skin thinning and tightening of face, hands, feet     NEURO: Normal strength and tone, sensory exam grossly normal, mentation intact and speech normal     PSYCH: mentation appears normal and affect normal, slightly flat     LYMPHATICS: No cervical adenopathy      Recent Labs   Lab Test 22  1136 22  2118   HGB 12.4 13.8    220    136   POTASSIUM 4.1 3.9   CR 0.99 1.42*        Diagnostics:  No labs were ordered during this visit.   No EKG required, no history of coronary heart disease, significant arrhythmia, " peripheral arterial disease or other structural heart disease.    Revised Cardiac Risk Index (RCRI):  The patient has the following serious cardiovascular risks for perioperative complications:   - No serious cardiac risks = 0 points     RCRI Interpretation: 0 points: Class I (very low risk - 0.4% complication rate)           Signed Electronically by: Grecia Barba DO  Copy of this evaluation report is provided to requesting physician.      Answers for HPI/ROS submitted by the patient on 7/7/2022  If you checked off any problems, how difficult have these problems made it for you to do your work, take care of things at home, or get along with other people?: Somewhat difficult  PHQ9 TOTAL SCORE: 13

## 2022-07-07 NOTE — RESULT ENCOUNTER NOTE
There is a trace amount of protein seen in the urine which has not been seen in the past in [facility] system.  It is not a significant amount of protein, but it is a sign of very mild kidney disease/damage.  We will fax the result to rheumatologist at [facility], Daniel LIZ M.D.    [address] [place] [address]    Phone: [phone]    Fax: [phone]        Urine drug test still in process

## 2022-07-07 NOTE — LETTER
Opioid / Opioid Plus Controlled Substance Agreement    This is an agreement between you and your provider about the safe and appropriate use of controlled substance/opioids prescribed by your care team. Controlled substances are medicines that can cause physical and mental dependence (abuse).    There are strict laws about having and using these medicines. We here at [facility] are committing to working with you in your efforts to get better. To support you in this work, we ll help you schedule regular office appointments for medicine refills. If we must cancel or change your appointment for any reason, we ll make sure you have enough medicine to last until your next appointment.     As a Provider, I will:    Listen carefully to your concerns and treat you with respect.     Recommend a treatment plan that I believe is in your best interest. This plan may involve therapies other than opioid pain medication.     Talk with you often about the possible benefits, and the risk of harm of any medicine that we prescribe for you.     Provide a plan on how to taper (discontinue or go off) using this medicine if the decision is made to stop its use.    As a Patient, I understand that opioid(s):     Are a controlled substance prescribed by my care team to help me function or work and manage my condition(s).     Are strong medicines and can cause serious side effects such as:    Drowsiness, which can seriously affect my driving ability    A lower breathing rate, enough to cause death    Harm to my thinking ability     Depression     Abuse of and addiction to this medicine    Need to be taken exactly as prescribed. Combining opioids with certain medicines or chemicals (such as illegal drugs, sedatives, sleeping pills, and benzodiazepines) can be dangerous or even fatal. If I stop opioids suddenly, I may have severe withdrawal symptoms.    Do not work for all types of pain nor for all patients. If they re not helpful, I may  be asked to stop them.        The risks, benefits and side effects of these medicine(s) were explained to me. I agree that:  1. I will take part in other treatments as advised by my care team. This may be psychiatry or counseling, physical therapy, behavioral therapy, group treatment or a referral to a specialist.     2. I will keep all my appointments. I understand that this is part of the monitoring of opioids. My care team may require an office visit for EVERY opioid/controlled substance refill. If I miss appointments or don t follow instructions, my care team may stop my medicine.    3. I will take my medicines as prescribed. I will not change the dose or schedule unless my care team tells me to. There will be no refills if I run out early.     4. I may be asked to come to the clinic and complete a urine drug test or complete a pill count at any time. If I don t give a urine sample or participate in a pill count, the care team may stop my medicine.    5. I will only receive prescriptions from this clinic for chronic pain. If I am treated by another provider for acute pain issues, I will tell them that I am taking opioid pain medication for chronic pain and that I have a treatment agreement with this provider. I will inform my [facility] care team within one business day if I am given a prescription for any pain medication by another healthcare provider. My [facility] care team can contact other providers and pharmacists about my use of any medicines.    6. It is up to me to make sure that I don t run out of my medicines on weekends or holidays. If my care team is willing to refill my opioid prescription without a visit, I must request refills only during office hours. Refills may take up to 3 business days to process. I will use one pharmacy to fill all my opioid and other controlled substance prescriptions. I will notify the clinic about any changes to my insurance or medication  availability.    7. I am responsible for my prescriptions. If the medicine/prescription is lost, stolen or destroyed, it will not be replaced. I also agree not to share controlled substance medicines with anyone.    8. I am aware I should not use any illegal or recreational drugs. I agree not to drink alcohol unless my care team says I can.       9. If I enroll in the [place] [facility] program, I will tell my care team prior to my next refill.     10. I will tell my care team right away if I become pregnant, have a new medical problem treated outside of my regular clinic, or have a change in my medications.    11. I understand that this medicine can affect my thinking, judgment and reaction time. Alcohol and drugs affect the brain and body, which can affect the safety of my driving. Being under the influence of alcohol or drugs can affect my decision-making, behaviors, personal safety, and the safety of others. Driving while impaired (DWI) can occur if a person is driving, operating, or in physical control of a car, motorcycle, boat, snowmobile, ATV, motorbike, off-road vehicle, or any other motor vehicle ([place] Statute 169A.20). I understand the risk if I choose to drive or operate any vehicle or machinery.    I understand that if I do not follow any of the conditions above, my prescriptions or treatment may be stopped or changed.          Opioids  What You Need to Know    What are opioids?   Opioids are pain medicines that must be prescribed by a doctor. They are also known as narcotics.     Examples are:   1. morphine (MS Contin, Catrachita)  2. oxycodone (Oxycontin)  3. oxycodone and acetaminophen (Percocet)  4. hydrocodone and acetaminophen (Vicodin, Norco)   5. fentanyl patch (Duragesic)   6. hydromorphone (Dilaudid)   7. methadone  8. codeine (Tylenol #3)     What do opioids do well?   Opioids are best for severe short-term pain such as after a surgery or injury. They may work well for cancer pain. They may  help some people with long-lasting (chronic) pain.     What do opioids NOT do well?   Opioids never get rid of pain entirely, and they don t work well for most patients with chronic pain. Opioids don t reduce swelling, one of the causes of pain.                                    Other ways to manage chronic pain and improve function include:       Treat the health problem that may be causing pain    Anti-inflammation medicines, which reduce swelling and tenderness, such as ibuprofen (Advil, Motrin) or naproxen (Aleve)    Acetaminophen (Tylenol)    Antidepressants and anti-seizure medicines, especially for nerve pain    Topical treatments such as patches or creams    Injections or nerve blocks    Chiropractic or osteopathic treatment    Acupuncture, massage, deep breathing, meditation, visual imagery, aromatherapy    Use heat or ice at the pain site    Physical therapy     Exercise    Stop smoking    Take part in therapy       Risks and side effects     Talk to your doctor before you start or decide to keep taking opioids. Possible side effects include:      Lowering your breathing rate enough to cause death    Overdose, including death, especially if taking higher than prescribed doses    Worse depression symptoms; less pleasure in things you usually enjoy    Feeling tired or sluggish    Slower thoughts or cloudy thinking    Being more sensitive to pain over time; pain is harder to control    Trouble sleeping or restless sleep    Changes in hormone levels (for example, less testosterone)    Changes in sex drive or ability to have sex    Constipation    Unsafe driving    Itching and sweating    Dizziness    Nausea, throwing up and dry mouth    What else should I know about opioids?    Opioids may lead to dependence, tolerance, or addiction.      Dependence means that if you stop or reduce the medicine too quickly, you will have withdrawal symptoms. These include loose poop (diarrhea), jitters, flu-like symptoms,  nervousness and tremors. Dependence is not the same as addiction.                       Tolerance means needing higher doses over time to get the same effect. This may increase the chance of serious side effects.      Addiction is when people improperly use a substance that harms their body, their mind or their relations with others. Use of opiates can cause a relapse of addiction if you have a history of drug or alcohol abuse.      People who have used opioids for a long time may have a lower quality of life, worse depression, higher levels of pain and more visits to doctors.    You can overdose on opioids. Take these steps to lower your risk of overdose:    1. Recognize the signs:  Signs of overdose include decrease or loss of consciousness (blackout), slowed breathing, trouble waking up and blue lips. If someone is worried about overdose, they should call 911.    2. Talk to your doctor about Narcan (naloxone).   If you are at risk for overdose, you may be given a prescription for Narcan. This medicine very quickly reverses the effects of opioids.   If you overdose, a friend or family member can give you Narcan while waiting for the ambulance. They need to know the signs of overdose and how to give Narcan.     3. Don't use alcohol or street drugs.   Taking them with opioids can cause death.    4. Do not take any of these medicines unless your doctor says it s OK. Taking these with opioids can cause death:    Benzodiazepines, such as lorazepam (Ativan), alprazolam (Xanax) or diazepam (Valium)    Muscle relaxers, such as cyclobenzaprine (Flexeril)    Sleeping pills like zolpidem (Ambien)     Other opioids      How to keep you and other people safe while taking opioids:    1. Never share your opioids with others.  Opioid medicines are regulated by the Drug Enforcement Agency ([employer]). Selling or sharing medications is a criminal act.    2. Be sure to store opioids in a secure place, locked up if possible. Young children  can easily swallow them and overdose.    3. When you are traveling with your medicines, keep them in the original bottles. If you use a pill box, be sure you also carry a copy of your medicine list from your clinic or pharmacy.    4. Safe disposal of opioids    Most pharmacies have places to get rid of medicine, called disposal kiosks. Medicine disposal options are also available in every [facility]. Search your county and  medication disposal  to find more options. You can find more details at:  [email or web address]     I agree that my provider, clinic care team, and pharmacy may work with any city, state or federal law enforcement agency that investigates the misuse, sale, or other diversion of my controlled medicine. I will allow my provider to discuss my care with, or share a copy of, this agreement with any other treating provider, pharmacy or emergency room where I receive care.    I have read this agreement and have asked questions about anything I did not understand.    _______________________________________________________  Patient Signature - Molly Teague _____________________                   Date     _______________________________________________________  Provider Signature - Grecia Barba DO   _____________________                   Date     _______________________________________________________  Witness Signature (required if provider not present while patient signing)   _____________________                   Date

## 2022-07-07 NOTE — PROGRESS NOTES
[facility]  [address] [address]  Phone: [phone]  Primary Provider: Nilsa Chaudhry  Pre-op Performing Provider: NILSA CHAUDHRY      PREOPERATIVE EVALUATION:  Today's date: 7/7/2022    Molly Teague is a 36 year old female who presents for a preoperative evaluation.    Surgical Information:  Surgery/Procedure: ESOPHAGOGASTRODUODENOSCOPY, WITH CO2 INSUFFLATION  Surgery Location: [place]  Surgeon: Jalen Moses MD  Surgery Date: 7/14/22  Time of Surgery: 1:05 pm  Where patient plans to recover: At home with family  Fax number for surgical facility: Note does not need to be faxed, will be available electronically in Epic.    Type of Anesthesia Anticipated: Monitor Anesthesia Care    Assessment & Plan     The proposed surgical procedure is considered LOW risk.    Problem List Items Addressed This Visit     AIN grade I    Anemia, chronic disease    Chronic migraine without aura without status migrainosus, not intractable    Relevant Medications    amLODIPine (NORVASC) 2.5 MG tablet    oxyCODONE IR (ROXICODONE) 10 MG tablet    oxyCODONE (ROXICODONE) 10 MG tablet (Start on 8/6/2022)    oxyCODONE (ROXICODONE) 10 MG tablet (Start on 9/5/2022)    Chronic pain syndrome (Chronic)    Relevant Medications    oxyCODONE IR (ROXICODONE) 10 MG tablet    oxyCODONE (ROXICODONE) 10 MG tablet (Start on 8/6/2022)    oxyCODONE (ROXICODONE) 10 MG tablet (Start on 9/5/2022)    medical cannabis (Patient's own supply)    Other Relevant Orders    Albumin Random Urine Quantitative with Creat Ratio    [id number] - Urine Drug Confirmation Panel (Comprehensive)    REX (generalized anxiety disorder)    Gastroesophageal reflux disease with esophagitis    History of ARDS    History of pulmonary embolism    Limited systemic sclerosis (H)    Malignant essential hypertension    Relevant Medications    amLODIPine (NORVASC) 2.5 MG tablet    Polyneuropathy associated with  underlying disease (H)    PTSD (post-traumatic stress disorder)    Severe persistent asthma without complication    Stage 3a chronic kidney disease (H)    Relevant Orders    Albumin Random Urine Quantitative with Creat Ratio    Systemic sclerosis (H)      Other Visit Diagnoses     Preop general physical exam    -  Primary    Need for vaccination        Relevant Orders    PNEUMOCOCCAL 20 VALENT CONJUGATE (PREVNAR 20) (Completed)    Raynaud's disease without gangrene        Relevant Medications    amLODIPine (NORVASC) 2.5 MG tablet               Risks and Recommendations:  The patient has the following additional risks and recommendations for perioperative complications:   - No identified additional risk factors other than previously addressed    Medication Instructions:  Patient is to take all scheduled medications on the day of surgery    RECOMMENDATION:  APPROVAL GIVEN to proceed with proposed procedure, without further diagnostic evaluation.                      Subjective     HPI related to upcoming procedure: severe GERD due to systemic sclerosis      Preop Questions 7/7/2022   1. Have you ever had a heart attack or stroke? No   2. Have you ever had surgery on your heart or blood vessels, such as a stent placement, a coronary artery bypass, or surgery on an artery in your head, neck, heart, or legs? No   3. Do you have chest pain with activity? No   4. Do you have a history of  heart failure? No   5. Do you currently have a cold, bronchitis or symptoms of other infection? No   6. Do you have a cough, shortness of breath, or wheezing? YES - from Asthma    7. Do you or anyone in your family have previous history of blood clots? YES - self had Pulmonary Embolism during critical illness   8. Do you or does anyone in your family have a serious bleeding problem such as prolonged bleeding following surgeries or cuts? No   9. Have you ever had problems with anemia or been told to take iron pills? YES - self - Anemic     10. Have you had any abnormal blood loss such as black, tarry or bloody stools, or abnormal vaginal bleeding? YES - stools    11. Have you ever had a blood transfusion? YES -    11a. Have you ever had a transfusion reaction? No, had iron infusion reaction   12. Are you willing to have a blood transfusion if it is medically needed before, during, or after your surgery? Yes   13. Have you or any of your relatives ever had problems with anesthesia? No   14. Do you have sleep apnea, excessive snoring or daytime drowsiness? No   15. Do you have any artifical heart valves or other implanted medical devices like a pacemaker, defibrillator, or continuous glucose monitor? No   16. Do you have artificial joints? No   17. Are you allergic to latex? No   18. Is there any chance that you may be pregnant? No     Health Care Directive:  Patient does not have a Health Care Directive or Living Will: Patient states has Advance Directive and will bring in a copy to clinic.    Preoperative Review of :   reviewed - controlled substances reflected in medication list.      Status of Chronic Conditions:  See problem list for active medical problems.  Problems all longstanding and stable, except as noted/documented.  See ROS for pertinent symptoms related to these conditions.         Scleroderma with renal involvement/CREST: Follows with rheumatology, nephrology at [place].  She has contractures of distal bilateral upper and lower extremities.  She developed significant scarring with trauma.  She has tightening of the skin in her face, leading to difficulty opening her mouth wide.  She aspirates frequently and has severe GERD with esophagitis.  She has a history of occult GI bleed due to the esophagitis.  She has chronic nausea and vomiting due to her esophagitis.  She has hypertension due to her renal involvement of her scleroderma.     Raynauds: On amlodipine for blood pressure and for Raynauds.  History of digital ulcerations.   Often difficult to get a pulse ox.     History of ARDS: Due to scleroderma renal crisis.  She was ventilated for many weeks, required a trach.  She has severe medical PTSD from her prolonged ICU stay     Depression/PTSD/Insomnia/Anxiety: Because of her prolonged ICU stay she has a history of medical PTSD.  She needs multiple medications to manage her depression and anxiety.  She has followed with psychology.     Chronic Pain: From her scleroderma.  She is on chronic opiates.  She is a high tolerance to opiates  --using over the counter CBD Delta gummy which helps  --using oxycodone 10 mg TID  --has not liked how other medical cannabis from [place] has made her feel - hiigher THC preparations caused side effects, but more CBD was beneficial    Hypertension:   --has been off amlodipine for 3 weeks due to lower blood pressure   --raynauds has been worse off the amlodipine   --the dizziness has resolved with staying off the amlodipine     Review of Systems  CONSTITUTIONAL: NEGATIVE for fever, chills, change in weight  INTEGUMENTARY/SKIN: NEGATIVE for worrisome rashes, moles or lesions  EYES: NEGATIVE for vision changes or irritation  ENT/MOUTH: NEGATIVE for ear, mouth and throat problems  RESP: NEGATIVE for significant cough or SOB  CV: NEGATIVE for chest pain, palpitations or peripheral edema  GI: NEGATIVE for nausea, abdominal pain, heartburn, or change in bowel habits  : NEGATIVE for frequency, dysuria, or hematuria  MUSCULOSKELETAL: NEGATIVE for significant arthralgias or myalgia  NEURO: NEGATIVE for weakness, dizziness or paresthesias  ENDOCRINE: NEGATIVE for temperature intolerance, skin/hair changes  HEME: NEGATIVE for bleeding problems  PSYCHIATRIC: NEGATIVE for changes in mood or affect    Patient Active Problem List    Diagnosis Date Noted     Pain in joint of right ankle 08/24/2021     Priority: Medium     Added automatically from request for surgery [id number]       Iron deficiency anemia due to chronic  blood loss 08/04/2020     Priority: Medium     Vomiting and diarrhea 03/25/2020     Priority: Medium     Closed right ankle fracture 02/11/2020     Priority: Medium     S/P ORIF (open reduction internal fixation) fracture 02/10/2020     Priority: Medium     Sinus tachycardia 01/31/2020     Priority: Medium     Ankle fracture, right, closed, initial encounter 01/30/2020     Priority: Medium     Added automatically from request for surgery [id number]       Anemia, chronic disease 11/25/2019     Priority: Medium     Mirena IUD- Remove by 09/2024 09/06/2019     Priority: Medium     Lot: [id number]  Exp: 01/2022    Dinora Israel LPN         Malignant essential hypertension 07/24/2019     Priority: Medium     PTSD (post-traumatic stress disorder) 06/19/2019     Priority: Medium     Severe episode of recurrent major depressive disorder, with psychotic features (H) 07/06/2018     Priority: Medium     Severe persistent asthma without complication 07/06/2018     Priority: Medium     On high dose ICS/LABA + frequent albuterol use.  Next allergy/pulmonary referral       Aspiration of food 03/29/2018     Priority: Medium     Chronic pain syndrome 04/26/2017     Priority: Medium     Patient is followed by Grecia Barba DO for ongoing prescription of pain medication.  All refills should only be approved by this provider, or covering partner.    Medication(s): Oxycodone 10 mg.   Maximum quantity per month: 90  Clinic visit frequency required: Q 3 months     Controlled substance agreement:  Encounter-Level CSA - 04/26/2017:    Controlled Substance Agreement - Scan on 5/4/2017  8:58 AM: CONTROLLED SUBSTANCE AGREEMENT (below)       Patient-Level CSA:    Controlled Substance Agreement - Opioid - Scan on 2/6/2019  6:23 PM (below)         Pain Clinic evaluation in the past: No    DIRE Total Score(s):  No flowsheet data found.    [facility] website verification:  done on 4/26/17    9/26/2017  7/6/2018  10/16/2018  1/22/2019  8/2/2019           [email or web address]         Chronic migraine without aura without status migrainosus, not intractable 04/12/2017     Priority: Medium     With medication overuse.  Fioricet and not Imitrex is recommend to treat severe headache.  2/2017 [place] recommend wean down from daily Fioricet and use Excedrin Migrain PRN.       AIN grade I 03/30/2017     Priority: Medium     Found at [facility] on colonoscopy 2/2017.  Recommend repeat anoscopy and anal pap in 6/2017.       Gastroesophageal reflux disease with esophagitis 03/30/2017     Priority: Medium     EGD done at [facility] 2/2017 showed esophagitis without GAVE.  Recommend max dose H2 and PPI, along with 4 tablets of Carafate dissolved in water and drink throughout the day.    Had LA Grade D esophagitis, on TID PPI       Secondary hypertension 03/22/2017     Priority: Medium     Stage 3a chronic kidney disease (H) 02/27/2017     Priority: Medium     Arthritis 02/10/2017     Priority: Medium     Limited systemic sclerosis (H) 01/25/2017     Priority: Medium     Raynaud's, calcinosis, telangiectasias, peripheral neuropathy, GERD symptoms, history of scleroderma renal crisis.  Saw [facility] 1/2017 and follows with Rheum Dr. Davis locally.       Polyneuropathy associated with underlying disease (H) 01/25/2017     Priority: Medium     Due to scleroderma and neurology thought possible due to ICU (critical illness neuropathy).  Recommend to max out dose of gabapentin to 5530-6061 mg/day, split BID or TID.  EMG 1/2017 positive for neuropathy       History of Clostridium difficile 11/29/2016     Priority: Medium     History of Helicobacter pylori infection 11/29/2016     Priority: Medium     Scleroderma with renal involvement (H) 11/09/2016     Priority: Medium     Needs lisinopril long term       History of ARDS 11/09/2016     Priority: Medium     4/2015, prolonged ICU/vent/trach due to influenza, scleroderma renal crisis  requiring hemodialysis.       History of hemodialysis 2016     Priority: Medium     2015 due to scleroderma renal crisis       Bilateral retinitis 2016     Priority: Medium     History of pulmonary embolism 2016     Priority: Medium     Completed anti-coagulation .  pulmonary embolism due to critical illness       REX (generalized anxiety disorder) 2016     Priority: Medium     Systemic sclerosis (H) 2016     Priority: Medium      Past Medical History:   Diagnosis Date     Acute pulmonary embolism (H) 2016    During renal crisis     Acute pyelonephritis 2017     Altered mental state 2018     Anxiety      Arthritis      Complication of anesthesia      Depression      Gastroesophageal reflux disease with esophagitis      History of blood transfusion      Hypertension      Long-term (current) use of anticoagulants [Z79.01] 2016     Neuropathy      PE (pulmonary embolism) 2016     PTSD (post-traumatic stress disorder)      Raynaud's disease without gangrene      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      Rheumatism      Scleroderma (H) 2016     Slow to wake up after anesthesia     pt states it took 2 days to come out of anesthesia after her ankle surgery     Uncomplicated asthma      Past Surgical History:   Procedure Laterality Date     ANGIOGRAM  2015      SECTION  2014     OPEN REDUCTION INTERNAL FIXATION ANKLE Right 2/10/2020    Procedure: Right ankle open reduction internal fixation;  Surgeon: Natanael Villalta MD;  Location: [facility] OR     REMOVE HARDWARE ANKLE Right 2021    Procedure: Right ankle hardware removal;  Surgeon: Natanael Villalta MD;  Location: [facility] OR     THROAT SURGERY  2015     Current Outpatient Medications   Medication Sig Dispense Refill     acetaminophen (TYLENOL) 325 MG tablet Take 2 tablets (650 mg) by mouth every 4 hours as needed for mild pain or other 1 Bottle 0      albuterol (VENTOLIN HFA) 108 (90 Base) MCG/ACT inhaler INHALE 2 PUFFS INTO THE LUNGS ONCE EVERY 4 HOURS AS NEEDED FOR SHORTNESS OF BREATH / DYSPNEA / WHEEZING (Patient taking differently: INHALE 2 PUFFS INTO THE LUNGS ONCE EVERY 4 HOURS AS NEEDED FOR SHORTNESS OF BREATH / DYSPNEA / WHEEZING.) 18 g 11     blood glucose (NO BRAND SPECIFIED) lancets standard Use to test blood sugar 1 time daily 100 each 3     blood glucose (NO BRAND SPECIFIED) test strip Use to test blood sugar 1 time daily 100 strip 11     budesonide-formoterol (SYMBICORT) 160-4.5 MCG/ACT Inhaler INHALE TWO PUFFS BY MOUTH TWICE A DAY 10.2 g 11     busPIRone HCl (BUSPAR) 30 MG tablet Take 1 tablet (30 mg) by mouth 2 times daily 180 tablet 3     Cyanocobalamin (B-12) 1000 MCG TBCR Take 1,000 mcg by mouth daily 100 tablet 1     DULoxetine (CYMBALTA) 30 MG capsule TAKE 3 CAPSULES (90 MG) BY MOUTH DAILY 270 capsule 3     famotidine (PEPCID) 20 MG tablet TAKE ONE TABLET BY MOUTH TWICE A  tablet 3     gabapentin (NEURONTIN) 300 MG capsule Take 2 capsules (600 mg) by mouth 3 times daily 540 capsule 3     GOODSENSE MIGRAINE FORMULA 250-250-65 MG per tablet TAKE ONE TABLET BY MOUTH EVERY 6 HOURS AS NEEDED FOR HEADACHES (Patient taking differently: Take 1 tablet by mouth every 6 hours as needed) 24 tablet 1     lisinopril (ZESTRIL) 10 MG tablet Take 1 tablet (10 mg) by mouth every evening 90 tablet 3     loratadine (CLARITIN) 10 MG tablet Take 10 mg by mouth daily as needed        LORazepam (ATIVAN) 1 MG tablet Take 1 tablet (1 mg) by mouth 2 times daily as needed for anxiety #45 for 30 days 45 tablet 1     methocarbamol (ROBAXIN) 750 MG tablet Take 1 tablet (750 mg) by mouth 3 times daily as needed for muscle spasms 180 tablet 3     metoclopramide (REGLAN) 10 MG tablet Take 1 tablet (10 mg) by mouth 3 times daily as needed (nausea/vomiting) 50 tablet 1     montelukast (SINGULAIR) 10 MG tablet Take 1 tablet (10 mg) by mouth At Bedtime 90 tablet 3      "naloxone (NARCAN) 4 MG/0.1ML nasal spray Spray 1 spray (4 mg) into one nostril alternating nostrils once as needed for opioid reversal every 2-3 minutes until assistance arrives 0.2 mL 3     omeprazole (PRILOSEC) 40 MG DR capsule TAKE ONE CAPSULE BY MOUTH TWICE A  capsule 1     ondansetron (ZOFRAN-ODT) 4 MG ODT tab Take 1 tablet (4 mg) by mouth every 8 hours as needed for nausea 30 tablet 4     order for DME [employer] [facility]. Patient can use for three months    Diagnosis Right ankle fracture and surgery 2/10/2020 1 Units 0     order for DME [employer] [facility]. Patient can use for another two months  Diagnosis: Right ankle bimalleolar fractures, open reduction internal fixation on 2/10/2020 1 Units 0     oxyCODONE IR (ROXICODONE) 10 MG tablet TAKE 1 TABLET (10 MG) BY MOUTH 3 TIMES DAILY 90 tablet 0     polyethylene glycol (MIRALAX) 17 GM/Dose powder Take 17 g by mouth daily 510 g 11     promethazine (PHENERGAN) 25 MG tablet TAKE ONE TABLET BY MOUTH TWICE A DAY AS NEEDED NAUSEA (Patient taking differently: daily as needed TAKE ONE TABLET BY MOUTH TWICE A DAY AS NEEDED NAUSEA) 30 tablet 7     promethazine (PHENERGAN) 25 MG/ML IV injection INJECT 1 ML INTRAMUSCULARLY EVERY 6 HOURS AS NEEDED 6 mL 11     syringe/needle, disp, (BD ECLIPSE SYRINGE) 25G X 1\" 3 ML MISC 1 each daily as needed 10 each 11       Allergies   Allergen Reactions     Blood Transfusion Related (Informational Only) Other (See Comments)     Patient has a history of a clinically significant antibody against RBC antigens.  A delay in compatible RBCs may occur.     Pollen Extract      Seasonal Allergies      Venofer [Iron Sucrose] Difficulty breathing and Cramps     Severe infusion reaction 1/2022     Shellfish-Derived Products Nausea and Rash     Rash on face        Social History     Tobacco Use     Smoking status: Never Smoker     Smokeless tobacco: Never Used   Substance Use Topics     Alcohol use: Yes     Comment: Socially once on a weekend " "if any     Family History   Problem Relation Age of Onset     Hyperlipidemia Father      Depression Sister      Fibromyalgia Sister      Hyperlipidemia Sister      Cerebrovascular Disease Maternal Grandmother          of a stroke     Diabetes Maternal Grandmother      Hyperlipidemia Paternal Grandfather      Diabetes Maternal Aunt      Depression Other      Melanoma No family hx of      Skin Cancer No family hx of      Anesthesia Reaction No family hx of      Cardiovascular No family hx of      Deep Vein Thrombosis (DVT) No family hx of      History   Drug Use No     Comment: None         Objective     BP (!) 122/90 (BP Location: Right arm, Patient Position: Chair, Cuff Size: Adult Regular)   Pulse 75   Temp 98.4  F (36.9  C) (Tympanic)   Resp 20   Ht 1.568 m (5' 1.75\")   Wt 68.9 kg (151 lb 12.8 oz)   SpO2 100%   Breastfeeding No   BMI 27.99 kg/m      Physical Exam    GENERAL APPEARANCE: active, no distress     EYES: EOMI, PERRL     HENT: ear canals and TM's normal and nose and mouth without ulcers or lesions     NECK: no adenopathy, no asymmetry, masses, or scars and thyroid normal to palpation     RESP: lungs clear to auscultation - no rales, rhonchi or wheezes     CV: regular rates and rhythm, normal S1 S2, no S3 or S4 and no murmur, click or rub     ABDOMEN:  soft, nontender, no HSM or masses and bowel sounds normal     MS: contractures of bilateral hands/feet     SKIN: skin thinning and tightening of face, hands, feet     NEURO: Normal strength and tone, sensory exam grossly normal, mentation intact and speech normal     PSYCH: mentation appears normal and affect normal, slightly flat     LYMPHATICS: No cervical adenopathy      Recent Labs   Lab Test 22  1136 22  2118   HGB 12.4 13.8    220    136   POTASSIUM 4.1 3.9   CR 0.99 1.42*        Diagnostics:  No labs were ordered during this visit.   No EKG required, no history of coronary heart disease, significant arrhythmia, " peripheral arterial disease or other structural heart disease.    Revised Cardiac Risk Index (RCRI):  The patient has the following serious cardiovascular risks for perioperative complications:   - No serious cardiac risks = 0 points     RCRI Interpretation: 0 points: Class I (very low risk - 0.4% complication rate)           Signed Electronically by: Grecia Barba DO  Copy of this evaluation report is provided to requesting physician.      Answers for HPI/ROS submitted by the patient on 7/7/2022  If you checked off any problems, how difficult have these problems made it for you to do your work, take care of things at home, or get along with other people?: Somewhat difficult  PHQ9 TOTAL SCORE: 13

## 2022-07-07 NOTE — NURSING NOTE
Prior to immunization administration, verified patients identity using patient s name and date of birth. Please see Immunization Activity for additional information.     Screening Questionnaire for Adult Immunization    Are you sick today?   No   Do you have allergies to medications, food, a vaccine component or latex?   Yes   Have you ever had a serious reaction after receiving a vaccination?   No   Do you have a long-term health problem with heart, lung, kidney, or metabolic disease (e.g., diabetes), asthma, a blood disorder, no spleen, complement component deficiency, a cochlear implant, or a spinal fluid leak?  Are you on long-term aspirin therapy?   Yes   Do you have cancer, leukemia, HIV/AIDS, or any other immune system problem?   Yes   Do you have a parent, brother, or sister with an immune system problem?   No   In the past 3 months, have you taken medications that affect  your immune system, such as prednisone, other steroids, or anticancer drugs; drugs for the treatment of rheumatoid arthritis, Crohn s disease, or psoriasis; or have you had radiation treatments?   No   Have you had a seizure, or a brain or other nervous system problem?   No   During the past year, have you received a transfusion of blood or blood    products, or been given immune (gamma) globulin or antiviral drug?   No   For women: Are you pregnant or is there a chance you could become       pregnant during the next month?   No   Have you received any vaccinations in the past 4 weeks?   No     Immunization questionnaire was positive for at least one answer.  Ok per guidelines .        Per orders of Dr. Barba , injection of prevnar 20 given by John Pinto CMA. Patient instructed to remain in clinic for 15 minutes afterwards, and to report any adverse reaction to me immediately.       Screening performed by John Pinto CMA on 7/7/2022 at 10:44 AM.

## 2022-07-11 LAB
GABAPENTIN UR QL CFM: PRESENT
OXYCODONE UR CFM-MCNC: 1120 NG/ML
OXYCODONE/CREAT UR: 737 NG/MG {CREAT}
OXYMORPHONE UR CFM-MCNC: 192 NG/ML
OXYMORPHONE/CREAT UR: 126 NG/MG {CREAT}

## 2022-07-13 ENCOUNTER — ANESTHESIA EVENT (OUTPATIENT)
Dept: SURGERY | Facility: AMBULATORY SURGERY CENTER | Age: 37
End: 2022-07-13
Payer: MEDICARE

## 2022-07-14 ENCOUNTER — ANESTHESIA (OUTPATIENT)
Dept: SURGERY | Facility: AMBULATORY SURGERY CENTER | Age: 37
End: 2022-07-14
Payer: MEDICARE

## 2022-07-14 ENCOUNTER — HOSPITAL ENCOUNTER (OUTPATIENT)
Facility: AMBULATORY SURGERY CENTER | Age: 37
Discharge: HOME | End: 2022-07-14
Attending: INTERNAL MEDICINE
Payer: MEDICARE

## 2022-07-14 VITALS
SYSTOLIC BLOOD PRESSURE: 123 MMHG | RESPIRATION RATE: 16 BRPM | HEART RATE: 83 BPM | OXYGEN SATURATION: 94 % | TEMPERATURE: 97.2 F | DIASTOLIC BLOOD PRESSURE: 74 MMHG

## 2022-07-14 VITALS — HEART RATE: 82 BPM

## 2022-07-14 LAB — UPPER GI ENDOSCOPY: NORMAL

## 2022-07-14 PROCEDURE — G8918 PT W/O PREOP ORDER IV AB PRO: HCPCS

## 2022-07-14 PROCEDURE — 43239 EGD BIOPSY SINGLE/MULTIPLE: CPT

## 2022-07-14 PROCEDURE — G8907 PT DOC NO EVENTS ON DISCHARG: HCPCS

## 2022-07-14 RX ORDER — NALOXONE HYDROCHLORIDE 0.4 MG/ML
0.4 INJECTION, SOLUTION INTRAMUSCULAR; INTRAVENOUS; SUBCUTANEOUS
Status: DISCONTINUED | OUTPATIENT
Start: 2022-07-14 | End: 2022-07-15 | Stop reason: HOSPADM

## 2022-07-14 RX ORDER — ONDANSETRON 4 MG/1
4 TABLET, ORALLY DISINTEGRATING ORAL EVERY 30 MIN PRN
Status: DISCONTINUED | OUTPATIENT
Start: 2022-07-14 | End: 2022-07-15 | Stop reason: HOSPADM

## 2022-07-14 RX ORDER — ONDANSETRON 2 MG/ML
4 INJECTION INTRAMUSCULAR; INTRAVENOUS EVERY 6 HOURS PRN
Status: DISCONTINUED | OUTPATIENT
Start: 2022-07-14 | End: 2022-07-15 | Stop reason: HOSPADM

## 2022-07-14 RX ORDER — NALOXONE HYDROCHLORIDE 0.4 MG/ML
0.2 INJECTION, SOLUTION INTRAMUSCULAR; INTRAVENOUS; SUBCUTANEOUS
Status: DISCONTINUED | OUTPATIENT
Start: 2022-07-14 | End: 2022-07-15 | Stop reason: HOSPADM

## 2022-07-14 RX ORDER — PROCHLORPERAZINE MALEATE 10 MG
10 TABLET ORAL EVERY 6 HOURS PRN
Status: DISCONTINUED | OUTPATIENT
Start: 2022-07-14 | End: 2022-07-15 | Stop reason: HOSPADM

## 2022-07-14 RX ORDER — LIDOCAINE HYDROCHLORIDE 20 MG/ML
INJECTION, SOLUTION INFILTRATION; PERINEURAL PRN
Status: DISCONTINUED | OUTPATIENT
Start: 2022-07-14 | End: 2022-07-14

## 2022-07-14 RX ORDER — SODIUM CHLORIDE, SODIUM LACTATE, POTASSIUM CHLORIDE, CALCIUM CHLORIDE 600; 310; 30; 20 MG/100ML; MG/100ML; MG/100ML; MG/100ML
INJECTION, SOLUTION INTRAVENOUS CONTINUOUS PRN
Status: DISCONTINUED | OUTPATIENT
Start: 2022-07-14 | End: 2022-07-14

## 2022-07-14 RX ORDER — HYDRALAZINE HYDROCHLORIDE 20 MG/ML
2.5-5 INJECTION INTRAMUSCULAR; INTRAVENOUS EVERY 10 MIN PRN
Status: DISCONTINUED | OUTPATIENT
Start: 2022-07-14 | End: 2022-07-15 | Stop reason: HOSPADM

## 2022-07-14 RX ORDER — FLUMAZENIL 0.1 MG/ML
0.2 INJECTION, SOLUTION INTRAVENOUS
Status: DISCONTINUED | OUTPATIENT
Start: 2022-07-14 | End: 2022-07-15 | Stop reason: HOSPADM

## 2022-07-14 RX ORDER — ONDANSETRON 4 MG/1
4 TABLET, ORALLY DISINTEGRATING ORAL EVERY 6 HOURS PRN
Status: DISCONTINUED | OUTPATIENT
Start: 2022-07-14 | End: 2022-07-15 | Stop reason: HOSPADM

## 2022-07-14 RX ORDER — DEXMEDETOMIDINE HYDROCHLORIDE 4 UG/ML
INJECTION, SOLUTION INTRAVENOUS PRN
Status: DISCONTINUED | OUTPATIENT
Start: 2022-07-14 | End: 2022-07-14

## 2022-07-14 RX ORDER — ONDANSETRON 2 MG/ML
4 INJECTION INTRAMUSCULAR; INTRAVENOUS
Status: DISCONTINUED | OUTPATIENT
Start: 2022-07-14 | End: 2022-07-15 | Stop reason: HOSPADM

## 2022-07-14 RX ORDER — ONDANSETRON 2 MG/ML
4 INJECTION INTRAMUSCULAR; INTRAVENOUS EVERY 30 MIN PRN
Status: DISCONTINUED | OUTPATIENT
Start: 2022-07-14 | End: 2022-07-15 | Stop reason: HOSPADM

## 2022-07-14 RX ORDER — PROPOFOL 10 MG/ML
INJECTION, EMULSION INTRAVENOUS PRN
Status: DISCONTINUED | OUTPATIENT
Start: 2022-07-14 | End: 2022-07-14

## 2022-07-14 RX ORDER — PROPOFOL 10 MG/ML
INJECTION, EMULSION INTRAVENOUS CONTINUOUS PRN
Status: DISCONTINUED | OUTPATIENT
Start: 2022-07-14 | End: 2022-07-14

## 2022-07-14 RX ORDER — METOPROLOL TARTRATE 1 MG/ML
1-2 INJECTION, SOLUTION INTRAVENOUS EVERY 5 MIN PRN
Status: DISCONTINUED | OUTPATIENT
Start: 2022-07-14 | End: 2022-07-15 | Stop reason: HOSPADM

## 2022-07-14 RX ORDER — LIDOCAINE 40 MG/G
CREAM TOPICAL
Status: DISCONTINUED | OUTPATIENT
Start: 2022-07-14 | End: 2022-07-15 | Stop reason: HOSPADM

## 2022-07-14 RX ORDER — SODIUM CHLORIDE, SODIUM LACTATE, POTASSIUM CHLORIDE, CALCIUM CHLORIDE 600; 310; 30; 20 MG/100ML; MG/100ML; MG/100ML; MG/100ML
INJECTION, SOLUTION INTRAVENOUS CONTINUOUS
Status: DISCONTINUED | OUTPATIENT
Start: 2022-07-14 | End: 2022-07-15 | Stop reason: HOSPADM

## 2022-07-14 RX ADMIN — PROPOFOL 150 MCG/KG/MIN: 10 INJECTION, EMULSION INTRAVENOUS at 13:26

## 2022-07-14 RX ADMIN — SODIUM CHLORIDE, SODIUM LACTATE, POTASSIUM CHLORIDE, CALCIUM CHLORIDE: 600; 310; 30; 20 INJECTION, SOLUTION INTRAVENOUS at 13:21

## 2022-07-14 RX ADMIN — DEXMEDETOMIDINE HYDROCHLORIDE 6 MCG: 4 INJECTION, SOLUTION INTRAVENOUS at 13:26

## 2022-07-14 RX ADMIN — LIDOCAINE HYDROCHLORIDE 60 MG: 20 INJECTION, SOLUTION INFILTRATION; PERINEURAL at 13:26

## 2022-07-14 RX ADMIN — SODIUM CHLORIDE, SODIUM LACTATE, POTASSIUM CHLORIDE, CALCIUM CHLORIDE: 600; 310; 30; 20 INJECTION, SOLUTION INTRAVENOUS at 13:07

## 2022-07-14 RX ADMIN — PROPOFOL 100 MG: 10 INJECTION, EMULSION INTRAVENOUS at 13:26

## 2022-07-14 NOTE — ANESTHESIA CARE TRANSFER NOTE
Patient: Molly Teague    Procedure: Procedure(s):  ESOPHAGOGASTRODUODENOSCOPY, WITH CO2 INSUFFLATION  ESOPHAGOGASTRODUODENOSCOPY, WITH BIOPSY       Diagnosis: Gastroesophageal reflux disease with esophagitis and hemorrhage [K21.01]  Diagnosis Additional Information: No value filed.    Anesthesia Type:   MAC     Note:    Oropharynx: oropharynx clear of all foreign objects and spontaneously breathing  Level of Consciousness: drowsy  Oxygen Supplementation: room air    Independent Airway: airway patency satisfactory and stable  Dentition: dentition unchanged  Vital Signs Stable: post-procedure vital signs reviewed and stable  Report to RN Given: handoff report given  Patient transferred to: Phase II    Handoff Report: Identifed the Patient, Identified the Reponsible Provider, Reviewed the pertinent medical history, Discussed the surgical course, Reviewed Intra-OP anesthesia mangement and issues during anesthesia, Set expectations for post-procedure period and Allowed opportunity for questions and acknowledgement of understanding      Vitals:  Vitals Value Taken Time   BP     Temp     Pulse     Resp     SpO2         Electronically Signed By: CHRIST Mendez CRNA  July 14, 2022  1:43 PM

## 2022-07-14 NOTE — INTERVAL H&P NOTE
"I have reviewed the surgical (or preoperative) H&P that is linked to this encounter, and examined the patient. There are no significant changes    Clinical Conditions Present on Arrival:  Clinically Significant Risk Factors Present on Admission                   # Overweight: Estimated body mass index is 27.99 kg/m  as calculated from the following:    Height as of 7/7/22: 1.568 m (5' 1.75\").    Weight as of 7/7/22: 68.9 kg (151 lb 12.8 oz).       "

## 2022-07-14 NOTE — ANESTHESIA POSTPROCEDURE EVALUATION
Patient: Molly Teague    Procedure: Procedure(s):  ESOPHAGOGASTRODUODENOSCOPY, WITH CO2 INSUFFLATION  ESOPHAGOGASTRODUODENOSCOPY, WITH BIOPSY       Anesthesia Type:  MAC    Note:  Disposition: Outpatient   Postop Pain Control: Uneventful            Sign Out: Well controlled pain   PONV: No   Neuro/Psych: Uneventful            Sign Out: Acceptable/Baseline neuro status   Airway/Respiratory: Uneventful            Sign Out: Acceptable/Baseline resp. status   CV/Hemodynamics: Uneventful            Sign Out: Acceptable CV status; No obvious hypovolemia; No obvious fluid overload   Other NRE: NONE   DID A NON-ROUTINE EVENT OCCUR? No           Last vitals:  Vitals Value Taken Time   /74 07/14/22 1400   Temp     Pulse     Resp 16 07/14/22 1400   SpO2 94 % 07/14/22 1400       Electronically Signed By: Rodger Reyes MD  July 14, 2022  2:49 PM

## 2022-07-14 NOTE — ANESTHESIA PREPROCEDURE EVALUATION
Anesthesia Pre-Procedure Evaluation    Patient: Molly Teague   MRN: [id number] : 1985        Procedure : Procedure(s):  ESOPHAGOGASTRODUODENOSCOPY, WITH CO2 INSUFFLATION          Past Medical History:   Diagnosis Date     Acute pulmonary embolism (H) 2016    During renal crisis     Acute pyelonephritis 2017     Altered mental state 2018     Anxiety      Arthritis      Complication of anesthesia      Depression      Gastroesophageal reflux disease with esophagitis      History of blood transfusion      Hypertension      Long-term (current) use of anticoagulants [Z79.01] 2016     Neuropathy      PE (pulmonary embolism) 2016     PTSD (post-traumatic stress disorder)      Raynaud's disease without gangrene      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      Rheumatism      Scleroderma (H) 2016     Slow to wake up after anesthesia     pt states it took 2 days to come out of anesthesia after her ankle surgery     Uncomplicated asthma       Past Surgical History:   Procedure Laterality Date     ANGIOGRAM  2015      SECTION  2014     OPEN REDUCTION INTERNAL FIXATION ANKLE Right 2/10/2020    Procedure: Right ankle open reduction internal fixation;  Surgeon: Natanael Villalta MD;  Location: [facility] OR     REMOVE HARDWARE ANKLE Right 2021    Procedure: Right ankle hardware removal;  Surgeon: Natanael Villalta MD;  Location: [facility] OR     THROAT SURGERY        Allergies   Allergen Reactions     Blood Transfusion Related (Informational Only) Other (See Comments)     Patient has a history of a clinically significant antibody against RBC antigens.  A delay in compatible RBCs may occur.     Pollen Extract      Seasonal Allergies      Venofer [Iron Sucrose] Difficulty breathing and Cramps     Severe infusion reaction 2022     Shellfish-Derived Products Nausea and Rash     Rash on face      Social History     Tobacco Use     Smoking status:  Never Smoker     Smokeless tobacco: Never Used   Substance Use Topics     Alcohol use: Yes     Comment: Socially once on a weekend if any      Wt Readings from Last 1 Encounters:   07/07/22 68.9 kg (151 lb 12.8 oz)        Anesthesia Evaluation            ROS/MED HX  ENT/Pulmonary:     (+) Moderate Persistent, asthma Treatment: Inhaler prn and Inhaler daily,      Neurologic:       Cardiovascular:     (+) hypertension-----    METS/Exercise Tolerance: >4 METS    Hematologic:     (+) History of blood clots (PE while hospitalized with renal issues), pt is not anticoagulated, anemia,     Musculoskeletal:       GI/Hepatic:       Renal/Genitourinary:     (+) renal disease (previous admission for scleroderma renal julio), type: CRI,     Endo:       Psychiatric/Substance Use:     (+) psychiatric history anxiety and depression     Infectious Disease:       Malignancy:       Other: Comment: Scleroderma     (+) , H/O Chronic Pain,        Physical Exam    Airway        Mallampati: II   TM distance: > 3 FB   Neck ROM: full   Mouth opening: < 3 cm    Respiratory Devices and Support         Dental  no notable dental history         Cardiovascular   cardiovascular exam normal          Pulmonary   pulmonary exam normal                OUTSIDE LABS:  CBC:   Lab Results   Component Value Date    WBC 8.6 05/24/2022    WBC 11.2 (H) 04/16/2022    HGB 12.4 05/24/2022    HGB 13.8 04/16/2022    HCT 40.3 05/24/2022    HCT 43.9 04/16/2022     05/24/2022     04/16/2022     BMP:   Lab Results   Component Value Date     05/24/2022     04/16/2022    POTASSIUM 4.1 05/24/2022    POTASSIUM 3.9 04/16/2022    CHLORIDE 107 05/24/2022    CHLORIDE 104 04/16/2022    CO2 26 05/24/2022    CO2 23 04/16/2022    BUN 11 05/24/2022    BUN 18 04/16/2022    CR 0.99 05/24/2022    CR 1.42 (H) 04/16/2022    GLC 69 (L) 05/24/2022    GLC 90 04/16/2022     COAGS:   Lab Results   Component Value Date    PTT 39 (H) 03/30/2018    INR 1.15 (H)  03/30/2018     POC:   Lab Results   Component Value Date    BGM 64 (L) 03/24/2021    HCG Negative 09/01/2021    HCGS Negative 02/10/2020     HEPATIC:   Lab Results   Component Value Date    ALBUMIN 4.2 05/24/2022    PROTTOTAL 8.3 05/24/2022    ALT 20 05/24/2022    AST 16 05/24/2022    ALKPHOS 87 05/24/2022    BILITOTAL 0.4 05/24/2022    LEONIDES 25 03/24/2021     OTHER:   Lab Results   Component Value Date    LACT 1.3 01/07/2022    UMER 9.3 05/24/2022    MAG 1.6 01/25/2020    LIPASE 111 04/16/2022    TSH 1.39 03/24/2021    T4 0.91 01/25/2020    CRP <2.9 05/24/2022     (H) 05/11/2017       Anesthesia Plan    ASA Status:  3   NPO Status:  NPO Appropriate    Anesthesia Type: MAC.     - Reason for MAC: immobility needed   Induction: Intravenous, Propofol.   Maintenance: TIVA.        Consents    Anesthesia Plan(s) and associated risks, benefits, and realistic alternatives discussed. Questions answered and patient/representative(s) expressed understanding.    - Discussed:     - Discussed with:  Patient      - Extended Intubation/Ventilatory Support Discussed: No.      - Patient is DNR/DNI Status: No    Use of blood products discussed: No .     Postoperative Care    Pain management: IV analgesics.   PONV prophylaxis: Ondansetron (or other 5HT-3), Background Propofol Infusion     Comments:    Other Comments: Somewhat limited mouth opening due to scleroderma. Past history of PE while hospitalized for scleroderma renal crisis. Discussed plan for IV anesthetic with native airway. Discussed potential need for conversion to general anesthetic with airway management and potential for recall.         H&P reviewed: Unable to attach H&P to encounter due to EHR limitations. H&P Update: appropriate H&P reviewed, patient examined. No interval changes since H&P (within 30 days).         Rodger Reyes MD

## 2022-07-18 LAB
PATH REPORT.COMMENTS IMP SPEC: NORMAL
PATH REPORT.FINAL DX SPEC: NORMAL
PATH REPORT.GROSS SPEC: NORMAL
PATH REPORT.MICROSCOPIC SPEC OTHER STN: NORMAL
PATH REPORT.RELEVANT HX SPEC: NORMAL
PHOTO IMAGE: NORMAL

## 2022-07-18 PROCEDURE — 88305 TISSUE EXAM BY PATHOLOGIST: CPT | Mod: 26 | Performed by: PATHOLOGY

## 2022-09-11 ENCOUNTER — HEALTH MAINTENANCE LETTER (OUTPATIENT)
Age: 37
End: 2022-09-11

## 2022-10-26 ENCOUNTER — HOSPITAL ENCOUNTER (EMERGENCY)
Facility: CLINIC | Age: 37
Discharge: HOME | End: 2022-10-26
Attending: EMERGENCY MEDICINE | Admitting: EMERGENCY MEDICINE
Payer: MEDICARE

## 2022-10-26 ENCOUNTER — APPOINTMENT (OUTPATIENT)
Dept: GENERAL RADIOLOGY | Facility: CLINIC | Age: 37
End: 2022-10-26
Attending: EMERGENCY MEDICINE
Payer: MEDICARE

## 2022-10-26 VITALS
HEIGHT: 62 IN | DIASTOLIC BLOOD PRESSURE: 98 MMHG | HEART RATE: 91 BPM | BODY MASS INDEX: 27.97 KG/M2 | RESPIRATION RATE: 18 BRPM | SYSTOLIC BLOOD PRESSURE: 146 MMHG | WEIGHT: 152 LBS | OXYGEN SATURATION: 97 % | TEMPERATURE: 98.3 F

## 2022-10-26 DIAGNOSIS — R79.89 ELEVATED SERUM CREATININE: ICD-10-CM

## 2022-10-26 DIAGNOSIS — S51.011A LACERATION OF RIGHT ELBOW, INITIAL ENCOUNTER: ICD-10-CM
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
## 2022-10-26 LAB
ALBUMIN SERPL BCG-MCNC: 4.7 G/DL (ref 3.5–5.2)
ALP SERPL-CCNC: 91 U/L (ref 35–104)
ALT SERPL W P-5'-P-CCNC: 33 U/L (ref 10–35)
ANION GAP SERPL CALCULATED.3IONS-SCNC: 11 MMOL/L (ref 7–15)
AST SERPL W P-5'-P-CCNC: 93 U/L (ref 10–35)
BASOPHILS # BLD AUTO: 0.1 10E3/UL (ref 0–0.2)
BASOPHILS NFR BLD AUTO: 1 %
BILIRUB SERPL-MCNC: 0.4 MG/DL
BUN SERPL-MCNC: 13.8 MG/DL (ref 6–20)
CALCIUM SERPL-MCNC: 9.8 MG/DL (ref 8.6–10)
CHLORIDE SERPL-SCNC: 99 MMOL/L (ref 98–107)
CREAT SERPL-MCNC: 1.32 MG/DL (ref 0.51–0.95)
DEPRECATED HCO3 PLAS-SCNC: 25 MMOL/L (ref 22–29)
EOSINOPHIL # BLD AUTO: 0.5 10E3/UL (ref 0–0.7)
EOSINOPHIL NFR BLD AUTO: 5 %
ERYTHROCYTE [DISTWIDTH] IN BLOOD BY AUTOMATED COUNT: 15 % (ref 10–15)
GFR SERPL CREATININE-BSD FRML MDRD: 53 ML/MIN/1.73M2
GLUCOSE BLDC GLUCOMTR-MCNC: 101 MG/DL (ref 70–99)
GLUCOSE SERPL-MCNC: 96 MG/DL (ref 70–99)
HCG SERPL QL: NEGATIVE
HCT VFR BLD AUTO: 35.5 % (ref 35–47)
HGB BLD-MCNC: 11.2 G/DL (ref 11.7–15.7)
HOLD SPECIMEN: NORMAL
IMM GRANULOCYTES # BLD: 0 10E3/UL
IMM GRANULOCYTES NFR BLD: 0 %
LYMPHOCYTES # BLD AUTO: 2.1 10E3/UL (ref 0.8–5.3)
LYMPHOCYTES NFR BLD AUTO: 18 %
MCH RBC QN AUTO: 27.1 PG (ref 26.5–33)
MCHC RBC AUTO-ENTMCNC: 31.5 G/DL (ref 31.5–36.5)
MCV RBC AUTO: 86 FL (ref 78–100)
MONOCYTES # BLD AUTO: 0.7 10E3/UL (ref 0–1.3)
MONOCYTES NFR BLD AUTO: 6 %
NEUTROPHILS # BLD AUTO: 8.1 10E3/UL (ref 1.6–8.3)
NEUTROPHILS NFR BLD AUTO: 70 %
NRBC # BLD AUTO: 0 10E3/UL
NRBC BLD AUTO-RTO: 0 /100
PLATELET # BLD AUTO: 297 10E3/UL (ref 150–450)
POTASSIUM SERPL-SCNC: 4.1 MMOL/L (ref 3.4–5.3)
PROT SERPL-MCNC: 7.9 G/DL (ref 6.4–8.3)
RBC # BLD AUTO: 4.14 10E6/UL (ref 3.8–5.2)
SODIUM SERPL-SCNC: 135 MMOL/L (ref 136–145)
WBC # BLD AUTO: 11.5 10E3/UL (ref 4–11)

## 2022-10-26 PROCEDURE — 12001 RPR S/N/AX/GEN/TRNK 2.5CM/<: CPT | Performed by: EMERGENCY MEDICINE

## 2022-10-26 PROCEDURE — 80053 COMPREHEN METABOLIC PANEL: CPT | Performed by: EMERGENCY MEDICINE

## 2022-10-26 PROCEDURE — 85025 COMPLETE CBC W/AUTO DIFF WBC: CPT | Performed by: EMERGENCY MEDICINE

## 2022-10-26 PROCEDURE — 36415 COLL VENOUS BLD VENIPUNCTURE: CPT | Performed by: EMERGENCY MEDICINE

## 2022-10-26 PROCEDURE — 250N000013 HC RX MED GY IP 250 OP 250 PS 637: Performed by: EMERGENCY MEDICINE

## 2022-10-26 PROCEDURE — 73070 X-RAY EXAM OF ELBOW: CPT | Mod: RT

## 2022-10-26 PROCEDURE — 84703 CHORIONIC GONADOTROPIN ASSAY: CPT | Performed by: EMERGENCY MEDICINE

## 2022-10-26 PROCEDURE — 82040 ASSAY OF SERUM ALBUMIN: CPT | Performed by: EMERGENCY MEDICINE

## 2022-10-26 PROCEDURE — 99284 EMERGENCY DEPT VISIT MOD MDM: CPT | Performed by: EMERGENCY MEDICINE

## 2022-10-26 PROCEDURE — 99284 EMERGENCY DEPT VISIT MOD MDM: CPT | Mod: 25 | Performed by: EMERGENCY MEDICINE

## 2022-10-26 RX ORDER — OXYCODONE HYDROCHLORIDE 5 MG/1
10 TABLET ORAL ONCE
Status: COMPLETED | OUTPATIENT
Start: 2022-10-26 | End: 2022-10-26

## 2022-10-26 RX ADMIN — OXYCODONE HYDROCHLORIDE 10 MG: 5 TABLET ORAL at 22:23

## 2022-10-26 ASSESSMENT — ENCOUNTER SYMPTOMS
SHORTNESS OF BREATH: 0
CONSTIPATION: 0
LIGHT-HEADEDNESS: 1
APPETITE CHANGE: 0
DIARRHEA: 0
CONFUSION: 0
SORE THROAT: 0
ABDOMINAL PAIN: 0
CHEST TIGHTNESS: 0
FEVER: 0
NECK PAIN: 0
NUMBNESS: 0
BACK PAIN: 0
NAUSEA: 1
WEAKNESS: 0
FATIGUE: 1
DIAPHORESIS: 0
HEADACHES: 0
WOUND: 1
VOMITING: 1
COUGH: 0

## 2022-10-26 ASSESSMENT — ACTIVITIES OF DAILY LIVING (ADL): ADLS_ACUITY_SCORE: 37

## 2022-10-27 NOTE — ED TRIAGE NOTES
Pt states she has a history of falls due to her past medical history. Pt has kidney failure, heart issues, lung issues, scleroderma, and hypoglycemia. Pt states she felt dizzy today and fell down. Pt braced herself with her right elbow and now has a laceration. Pt denies hitting her head. Pt denies loss of consciousness.      Triage Assessment     Row Name 10/26/22 1943       Triage Assessment (Adult)    Airway WDL WDL       Respiratory WDL    Respiratory WDL WDL       Skin Circulation/Temperature WDL    Skin Circulation/Temperature WDL X       Cardiac WDL    Cardiac WDL WDL       Peripheral/Neurovascular WDL    Peripheral Neurovascular WDL WDL       Cognitive/Neuro/Behavioral WDL    Cognitive/Neuro/Behavioral WDL WDL

## 2022-10-27 NOTE — ED PROVIDER NOTES
History     Chief Complaint   Patient presents with     Fall     Pt states she has a history of falls due to her past medical history. Pt has kidney failure, heart issues, lung issues, scleroderma, and hypoglycemia. Pt states she felt dizzy today and fell down. Pt braced herself with her right elbow and now has a laceration. Pt denies hitting her head. Pt denies loss of consciousness.      HPI  Molly Teague is a 37 year old female with extensive past medical history including scleroderma, renal insufficiency, and chronic pain presenting for evaluation after fall.  Patient reports she stumbled causing her to fall onto her knees and her right elbow.  Denies loss of conscious.  She does report feeling somewhat lightheaded intermittently but this is relatively baseline for her.  She is been having intermittent episodes of nausea and vomiting for months and was throwing up yesterday.  Denies any vomiting today.  She reports that oral cannabis helps control her nausea and she took some this morning which made a difference.  Denies fevers or chills.  No known bad food exposure.  Denies diarrhea.  Denies new rashes.  Patient reports feeling fatigued today but this is also not unusual for her.  Reports drinking plenty of water and urinating regularly.  Patient's main concern is regarding her elbow where she suffered a laceration.  Denies striking her head.  Denies neck or back pain.  Does have some soreness in her knees but is ambulating well and denies any significant pain in the knees.    Allergies:  Allergies   Allergen Reactions     Blood Transfusion Related (Informational Only) Other (See Comments)     Patient has a history of a clinically significant antibody against RBC antigens.  A delay in compatible RBCs may occur.     Pollen Extract      Seasonal Allergies      Venofer [Iron Sucrose] Difficulty breathing and Cramps     Severe infusion reaction 1/2022     Shellfish-Derived Products Nausea and Rash     Rash on  face       Problem List:    Patient Active Problem List    Diagnosis Date Noted     Microalbuminuria 07/07/2022     Priority: Medium     Pain in joint of right ankle 08/24/2021     Priority: Medium     Added automatically from request for surgery [id number]       Iron deficiency anemia due to chronic blood loss 08/04/2020     Priority: Medium     Vomiting and diarrhea 03/25/2020     Priority: Medium     Closed right ankle fracture 02/11/2020     Priority: Medium     S/P ORIF (open reduction internal fixation) fracture 02/10/2020     Priority: Medium     Sinus tachycardia 01/31/2020     Priority: Medium     Ankle fracture, right, closed, initial encounter 01/30/2020     Priority: Medium     Added automatically from request for surgery [id number]       Anemia, chronic disease 11/25/2019     Priority: Medium     Mirena IUD- Remove by 09/2024 09/06/2019     Priority: Medium     Lot: [id number]  Exp: 01/2022    Dinora Israel LPN         Malignant essential hypertension 07/24/2019     Priority: Medium     PTSD (post-traumatic stress disorder) 06/19/2019     Priority: Medium     Severe episode of recurrent major depressive disorder, with psychotic features (H) 07/06/2018     Priority: Medium     Severe persistent asthma without complication 07/06/2018     Priority: Medium     On high dose ICS/LABA + frequent albuterol use.  Next allergy/pulmonary referral       Aspiration of food 03/29/2018     Priority: Medium     Chronic pain syndrome 04/26/2017     Priority: Medium     Patient is followed by Grecia Barab DO for ongoing prescription of pain medication.  All refills should only be approved by this provider, or covering partner.    Medication(s): Oxycodone 10 mg.   Maximum quantity per month: 90  Clinic visit frequency required: Q 3 months     Controlled substance agreement:  Encounter-Level CSA - 04/26/2017:    Controlled Substance Agreement - Scan on 5/4/2017  8:58 AM: CONTROLLED SUBSTANCE AGREEMENT (below)        Patient-Level CSA:    Controlled Substance Agreement - Opioid - Scan on 2/6/2019  6:23 PM (below)         Pain Clinic evaluation in the past: No    DIRE Total Score(s):  No flowsheet data found.    Last [facility] website verification:  done on 4/26/17   9/26/2017  7/6/2018  10/16/2018  1/22/2019  8/2/2019           [email or web address]         Chronic migraine without aura without status migrainosus, not intractable 04/12/2017     Priority: Medium     With medication overuse.  Fioricet and not Imitrex is recommend to treat severe headache.  2/2017 [facility] recommend wean down from daily Fioricet and use Excedrin Migrain PRN.       AIN grade I 03/30/2017     Priority: Medium     Found at [facility] on colonoscopy 2/2017.  Recommend repeat anoscopy and anal pap in 6/2017.       Gastroesophageal reflux disease with esophagitis 03/30/2017     Priority: Medium     EGD done at [facility] 2/2017 showed esophagitis without GAVE.  Recommend max dose H2 and PPI, along with 4 tablets of Carafate dissolved in water and drink throughout the day.    Had LA Grade D esophagitis, on TID PPI       Secondary hypertension 03/22/2017     Priority: Medium     Stage 3a chronic kidney disease (H) 02/27/2017     Priority: Medium     Arthritis 02/10/2017     Priority: Medium     Limited systemic sclerosis (H) 01/25/2017     Priority: Medium     Raynaud's, calcinosis, telangiectasias, peripheral neuropathy, GERD symptoms, history of scleroderma renal crisis.  Saw [facility] 1/2017 and follows with Rheum Dr. Davis locally.       Polyneuropathy associated with underlying disease (H) 01/25/2017     Priority: Medium     Due to scleroderma and neurology thought possible due to ICU (critical illness neuropathy).  Recommend to max out dose of gabapentin to 0807-7877 mg/day, split BID or TID.  EMG 1/2017 positive for neuropathy       History of Clostridium difficile 11/29/2016     Priority: Medium     History of Helicobacter pylori infection 11/29/2016      Priority: Medium     Scleroderma with renal involvement (H) 2016     Priority: Medium     Needs lisinopril long term       History of ARDS 2016     Priority: Medium     2015, prolonged ICU/vent/trach due to influenza, scleroderma renal crisis requiring hemodialysis.       History of hemodialysis 2016     Priority: Medium     2015 due to scleroderma renal crisis       Bilateral retinitis 2016     Priority: Medium     History of pulmonary embolism 2016     Priority: Medium     Completed anti-coagulation 2017.  pulmonary embolism due to critical illness       REX (generalized anxiety disorder) 2016     Priority: Medium     Systemic sclerosis (H) 2016     Priority: Medium        Past Medical History:    Past Medical History:   Diagnosis Date     Acute pulmonary embolism (H) 2016     Acute pyelonephritis 2017     Altered mental state 2018     Anxiety      Arthritis      Complication of anesthesia      Depression      Gastroesophageal reflux disease with esophagitis      History of blood transfusion      Hypertension      Long-term (current) use of anticoagulants [Z79.01] 2016     Neuropathy      PE (pulmonary embolism) 2016     PTSD (post-traumatic stress disorder)      Raynaud's disease without gangrene      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      Rheumatism      Scleroderma (H) 2016     Slow to wake up after anesthesia      Uncomplicated asthma        Past Surgical History:    Past Surgical History:   Procedure Laterality Date     ANGIOGRAM        SECTION       COMBINED ESOPHAGOSCOPY, GASTROSCOPY, DUODENOSCOPY (EGD) WITH CO2 INSUFFLATION N/A 2022    Procedure: ESOPHAGOGASTRODUODENOSCOPY, WITH CO2 INSUFFLATION;  Surgeon: Jalen Moses MD;  Location:  OR     ESOPHAGOSCOPY, GASTROSCOPY, DUODENOSCOPY (EGD), COMBINED N/A 2022    Procedure: ESOPHAGOGASTRODUODENOSCOPY,  WITH BIOPSY;  Surgeon: Jalen Moses MD;  Location: [facility] OR     OPEN REDUCTION INTERNAL FIXATION ANKLE Right 2/10/2020    Procedure: Right ankle open reduction internal fixation;  Surgeon: Natanael Villalta MD;  Location: [facility] OR     REMOVE HARDWARE ANKLE Right 2021    Procedure: Right ankle hardware removal;  Surgeon: Natanael Villalta MD;  Location: [facility] OR     THROAT SURGERY         Family History:    Family History   Problem Relation Age of Onset     Hyperlipidemia Father      Depression Sister      Fibromyalgia Sister      Hyperlipidemia Sister      Cerebrovascular Disease Maternal Grandmother          of a stroke     Diabetes Maternal Grandmother      Hyperlipidemia Paternal Grandfather      Diabetes Maternal Aunt      Depression Other      Melanoma No family hx of      Skin Cancer No family hx of      Anesthesia Reaction No family hx of      Cardiovascular No family hx of      Deep Vein Thrombosis (DVT) No family hx of        Social History:  Marital Status:  Single [1]  Social History     Tobacco Use     Smoking status: Never     Smokeless tobacco: Never   Vaping Use     Vaping Use: Former     Substances: Flavoring, delta 8     Devices: Disposable   Substance Use Topics     Alcohol use: Yes     Comment: Socially once on a weekend if any     Drug use: No     Comment: None        Medications:    acetaminophen (TYLENOL) 325 MG tablet  albuterol (VENTOLIN HFA) 108 (90 Base) MCG/ACT inhaler  amLODIPine (NORVASC) 2.5 MG tablet  blood glucose (NO BRAND SPECIFIED) lancets standard  blood glucose (NO BRAND SPECIFIED) test strip  budesonide-formoterol (SYMBICORT) 160-4.5 MCG/ACT Inhaler  busPIRone HCl (BUSPAR) 30 MG tablet  Cyanocobalamin (B-12) 1000 MCG TBCR  DULoxetine (CYMBALTA) 30 MG capsule  famotidine (PEPCID) 20 MG tablet  gabapentin (NEURONTIN) 300 MG capsule  GOODSENSE MIGRAINE FORMULA 250-250-65 MG per tablet  lisinopril (ZESTRIL) 10 MG tablet  loratadine (CLARITIN) 10 MG  "tablet  LORazepam (ATIVAN) 1 MG tablet  medical cannabis (Patient's own supply)  methocarbamol (ROBAXIN) 750 MG tablet  metoclopramide (REGLAN) 10 MG tablet  montelukast (SINGULAIR) 10 MG tablet  naloxone (NARCAN) 4 MG/0.1ML nasal spray  omeprazole (PRILOSEC) 40 MG DR capsule  ondansetron (ZOFRAN-ODT) 4 MG ODT tab  oxyCODONE IR (ROXICODONE) 10 MG tablet  polyethylene glycol (MIRALAX) 17 GM/Dose powder  promethazine (PHENERGAN) 25 MG tablet  promethazine (PHENERGAN) 25 MG/ML IV injection  syringe/needle, disp, (BD ECLIPSE SYRINGE) 25G X 1\" 3 ML MISC          Review of Systems   Constitutional: Positive for fatigue. Negative for appetite change, diaphoresis and fever.   HENT: Negative for congestion and sore throat.    Respiratory: Negative for cough, chest tightness and shortness of breath.    Cardiovascular: Negative for chest pain.   Gastrointestinal: Positive for nausea (Not currently) and vomiting (Yesterday). Negative for abdominal pain, constipation and diarrhea.   Genitourinary: Negative for decreased urine volume and vaginal bleeding (LMP about 1 month ago).   Musculoskeletal: Negative for back pain and neck pain.   Skin: Positive for wound (Right elbow).   Neurological: Positive for light-headedness (Intermittent). Negative for syncope, weakness, numbness and headaches.   Psychiatric/Behavioral: Negative for confusion.   All other systems reviewed and are negative.      Physical Exam   BP: (!) 146/98  Pulse: 91  Temp: 98.3  F (36.8  C)  Resp: 18  Height: 157.5 cm (5' 2\")  Weight: 68.9 kg (152 lb)  SpO2: 97 %      Physical Exam  Vitals and nursing note reviewed.   Constitutional:       Appearance: Normal appearance. She is not ill-appearing or diaphoretic.      Comments: Awake and alert sitting upright in no distress.  Able to provide a full history.   HENT:      Head: Atraumatic.      Nose: Nose normal.   Eyes:      Conjunctiva/sclera: Conjunctivae normal.   Cardiovascular:      Rate and Rhythm: Normal rate. "      Pulses: Normal pulses.   Pulmonary:      Effort: Pulmonary effort is normal.      Breath sounds: Normal breath sounds.   Abdominal:      Palpations: Abdomen is soft.      Tenderness: There is no abdominal tenderness.   Musculoskeletal:      Right elbow: Laceration present. No deformity. Normal range of motion. Tenderness present in olecranon process (Mild).      Cervical back: Normal range of motion. No tenderness.      Comments: Superficial bruising to both knees over the patella   Skin:     General: Skin is warm and dry.      Capillary Refill: Capillary refill takes less than 2 seconds.   Neurological:      Mental Status: She is alert and oriented to person, place, and time.   Psychiatric:         Mood and Affect: Mood normal.       Right elbow          ED [facility]    -Laceration Repair    Date/Time: 10/26/2022 10:20 PM  Performed by: Jalen Arnold MD  Authorized by: Jalen Arnold MD     Risks, benefits and alternatives discussed.      ANESTHESIA (see MAR for exact dosages):     Anesthesia method:  Local infiltration    Local anesthetic:  Bupivacaine 0.5% w/o epi  LACERATION DETAILS     Location:  Shoulder/arm    Shoulder/arm location:  R elbow    Length (cm):  1.5    Depth (mm):  2    REPAIR TYPE:     Repair type:  Simple        TREATMENT:     Area cleansed with:  Saline    Amount of cleaning:  Standard    Irrigation solution:  Sterile saline    Irrigation method:  Syringe    SKIN REPAIR     Repair method:  Sutures    Suture size:  4-0    Suture material:  Nylon    Suture technique:  Simple interrupted    Number of sutures:  2    APPROXIMATION     Approximation:  Close    POST-PROCEDURE DETAILS     Dressing:  Antibiotic ointment and adhesive bandage        PROCEDURE    Patient Tolerance:  Patient tolerated the procedure well with no immediate complications                  Results for orders placed or performed during the hospital  encounter of 10/26/22   Elbow XR, 2 views, right     Status: None    Narrative    EXAM: XR ELBOW RIGHT 2 VIEWS  LOCATION: [facility]  DATE/TIME: 10/26/2022 10:46 PM    INDICATION: Laceration after fall.  COMPARISON: None.      Impression    IMPRESSION: There is a laceration at the dorsal aspect of the elbow. No radiopaque foreign body.    No radiographic evidence of acute fracture or malalignment. No effusion. No significant osteoarthritic changes. Chronic heterotopic ossification along the medial joint space.   Glucose by meter     Status: Abnormal   Result Value Ref Range    GLUCOSE BY METER POCT 101 (H) 70 - 99 mg/dL   [facility] Draw     Status: None    Narrative    The following orders were created for panel order [facility] Draw.  Procedure                               Abnormality         Status                     ---------                               -----------         ------                     Extra Blue Top Tube[[id number]]                              Final result               Extra Red Top Tube[[id number]]                               Final result               Extra Green Top (Lithium...[[id number]]                      Final result               Extra Purple Top Tube[[id number]]                            Final result                 Please view results for these tests on the individual orders.   Extra Blue Top Tube     Status: None   Result Value Ref Range    Hold Specimen JIC    Extra Red Top Tube     Status: None   Result Value Ref Range    Hold Specimen JIC    Extra Green Top (Lithium Heparin) Tube     Status: None   Result Value Ref Range    Hold Specimen JIC    Extra Purple Top Tube     Status: None   Result Value Ref Range    Hold Specimen JIC    Comprehensive metabolic panel     Status: Abnormal   Result Value Ref Range    Sodium 135 (L) 136 - 145 mmol/L    Potassium 4.1 3.4 - 5.3 mmol/L    Chloride 99 98 - 107 mmol/L    Carbon Dioxide (CO2) 25 22 - 29 mmol/L    Anion  Gap 11 7 - 15 mmol/L    Urea Nitrogen 13.8 6.0 - 20.0 mg/dL    Creatinine 1.32 (H) 0.51 - 0.95 mg/dL    Calcium 9.8 8.6 - 10.0 mg/dL    Glucose 96 70 - 99 mg/dL    Alkaline Phosphatase 91 35 - 104 U/L    AST 93 (H) 10 - 35 U/L    ALT 33 10 - 35 U/L    Protein Total 7.9 6.4 - 8.3 g/dL    Albumin 4.7 3.5 - 5.2 g/dL    Bilirubin Total 0.4 <=1.2 mg/dL    GFR Estimate 53 (L) >60 mL/min/1.73m2   HCG qualitative Blood     Status: Normal   Result Value Ref Range    hCG Serum Qualitative Negative Negative   CBC with platelets and differential     Status: Abnormal   Result Value Ref Range    WBC Count 11.5 (H) 4.0 - 11.0 10e3/uL    RBC Count 4.14 3.80 - 5.20 10e6/uL    Hemoglobin 11.2 (L) 11.7 - 15.7 g/dL    Hematocrit 35.5 35.0 - 47.0 %    MCV 86 78 - 100 fL    MCH 27.1 26.5 - 33.0 pg    MCHC 31.5 31.5 - 36.5 g/dL    RDW 15.0 10.0 - 15.0 %    Platelet Count 297 150 - 450 10e3/uL    % Neutrophils 70 %    % Lymphocytes 18 %    % Monocytes 6 %    % Eosinophils 5 %    % Basophils 1 %    % Immature Granulocytes 0 %    NRBCs per 100 WBC 0 <1 /100    Absolute Neutrophils 8.1 1.6 - 8.3 10e3/uL    Absolute Lymphocytes 2.1 0.8 - 5.3 10e3/uL    Absolute Monocytes 0.7 0.0 - 1.3 10e3/uL    Absolute Eosinophils 0.5 0.0 - 0.7 10e3/uL    Absolute Basophils 0.1 0.0 - 0.2 10e3/uL    Absolute Immature Granulocytes 0.0 <=0.4 10e3/uL    Absolute NRBCs 0.0 10e3/uL   CBC with platelets differential     Status: Abnormal    Narrative    The following orders were created for panel order CBC with platelets differential.  Procedure                               Abnormality         Status                     ---------                               -----------         ------                     CBC with platelets and d...[[id number]]  Abnormal            Final result                 Please view results for these tests on the individual orders.       No results found for this or any previous visit (from the past 24 hour(s)).    Medications   oxyCODONE  (ROXICODONE) tablet 10 mg (10 mg Oral Given 10/26/22 [id number])       Assessments & Plan (with Medical Decision Making)  Patient female presenting for evaluation of an injury to her elbow after falling.  No other significant injury.  Has localized pain over the olecranon process with subsequent laceration as noted above.  Laceration cleaned and repaired as above.  Patient does have a more extensive past medical history is a screening labs obtained.  Labs did show some mild renal insufficiency.  Patient tolerating oral intake.  Encouraged to stay well-hydrated.  Recommended primary care follow-up in about a week to 10 days for suture removal and recheck of renal function.  Counseled on appropriate wound care and return precautions if evidence of infection were to develop.     I have reviewed the nursing notes.    I have reviewed the findings, diagnosis, plan and need for follow up with the patient.       Discharge Medication List as of 10/26/2022 11:35 PM          Final diagnoses:   Fall, initial encounter   Laceration of right elbow, initial encounter   Elevated serum creatinine       10/26/2022   [facility] EMERGENCY DEPT     Arnold, Jalen Patel MD  10/28/22 0143

## 2022-11-03 ENCOUNTER — OFFICE VISIT (OUTPATIENT)
Dept: FAMILY MEDICINE | Facility: CLINIC | Age: 37
End: 2022-11-03
Payer: MEDICARE

## 2022-11-03 VITALS
HEART RATE: 62 BPM | HEIGHT: 62 IN | BODY MASS INDEX: 27.79 KG/M2 | DIASTOLIC BLOOD PRESSURE: 74 MMHG | WEIGHT: 151 LBS | RESPIRATION RATE: 18 BRPM | SYSTOLIC BLOOD PRESSURE: 118 MMHG | TEMPERATURE: 98.7 F

## 2022-11-03 DIAGNOSIS — G89.4 CHRONIC PAIN SYNDROME: Primary | Chronic | ICD-10-CM

## 2022-11-03 DIAGNOSIS — Z23 HIGH PRIORITY FOR 2019-NCOV VACCINE: ICD-10-CM

## 2022-11-03 DIAGNOSIS — F32.1 MODERATE MAJOR DEPRESSION (H): ICD-10-CM

## 2022-11-03 DIAGNOSIS — F41.1 GAD (GENERALIZED ANXIETY DISORDER): ICD-10-CM

## 2022-11-03 DIAGNOSIS — R11.2 NAUSEA AND VOMITING, UNSPECIFIED VOMITING TYPE: ICD-10-CM

## 2022-11-03 DIAGNOSIS — F43.10 PTSD (POST-TRAUMATIC STRESS DISORDER): ICD-10-CM

## 2022-11-03 DIAGNOSIS — Z23 NEED FOR PROPHYLACTIC VACCINATION AND INOCULATION AGAINST INFLUENZA: ICD-10-CM

## 2022-11-03 PROCEDURE — G0008 ADMIN INFLUENZA VIRUS VAC: HCPCS | Performed by: INTERNAL MEDICINE

## 2022-11-03 PROCEDURE — 90686 IIV4 VACC NO PRSV 0.5 ML IM: CPT | Performed by: INTERNAL MEDICINE

## 2022-11-03 PROCEDURE — 0124A COVID-19,PF,PFIZER BOOSTER BIVALENT: CPT | Performed by: INTERNAL MEDICINE

## 2022-11-03 PROCEDURE — 91312 COVID-19,PF,PFIZER BOOSTER BIVALENT: CPT | Performed by: INTERNAL MEDICINE

## 2022-11-03 PROCEDURE — 99214 OFFICE O/P EST MOD 30 MIN: CPT | Mod: 25 | Performed by: INTERNAL MEDICINE

## 2022-11-03 PROCEDURE — 96127 BRIEF EMOTIONAL/BEHAV ASSMT: CPT | Performed by: INTERNAL MEDICINE

## 2022-11-03 RX ORDER — OXYCODONE HYDROCHLORIDE 10 MG/1
10 TABLET ORAL 3 TIMES DAILY PRN
Qty: 90 TABLET | Refills: 0 | Status: SHIPPED | OUTPATIENT
Start: 2022-12-10 | End: 2022-12-09

## 2022-11-03 RX ORDER — DULOXETIN HYDROCHLORIDE 30 MG/1
90 CAPSULE, DELAYED RELEASE ORAL DAILY
Qty: 270 CAPSULE | Refills: 3 | Status: SHIPPED | OUTPATIENT
Start: 2022-11-03 | End: 2023-09-28

## 2022-11-03 RX ORDER — METOCLOPRAMIDE 10 MG/1
10 TABLET ORAL 3 TIMES DAILY PRN
Qty: 50 TABLET | Refills: 1 | Status: SHIPPED | OUTPATIENT
Start: 2022-11-03 | End: 2023-05-10

## 2022-11-03 RX ORDER — OXYCODONE HYDROCHLORIDE 10 MG/1
10 TABLET ORAL 3 TIMES DAILY PRN
Qty: 90 TABLET | Refills: 0 | Status: SHIPPED | OUTPATIENT
Start: 2023-01-09 | End: 2023-02-08

## 2022-11-03 RX ORDER — OXYCODONE HYDROCHLORIDE 10 MG/1
10 TABLET ORAL 3 TIMES DAILY PRN
Qty: 90 TABLET | Refills: 0 | Status: SHIPPED | OUTPATIENT
Start: 2022-11-10 | End: 2022-12-10

## 2022-11-03 ASSESSMENT — PATIENT HEALTH QUESTIONNAIRE - PHQ9
SUM OF ALL RESPONSES TO PHQ QUESTIONS 1-9: 11
SUM OF ALL RESPONSES TO PHQ QUESTIONS 1-9: 11
10. IF YOU CHECKED OFF ANY PROBLEMS, HOW DIFFICULT HAVE THESE PROBLEMS MADE IT FOR YOU TO DO YOUR WORK, TAKE CARE OF THINGS AT HOME, OR GET ALONG WITH OTHER PEOPLE: SOMEWHAT DIFFICULT

## 2022-11-03 ASSESSMENT — PAIN SCALES - GENERAL: PAINLEVEL: MODERATE PAIN (5)

## 2022-11-03 ASSESSMENT — ASTHMA QUESTIONNAIRES: ACT_TOTALSCORE: 14

## 2022-11-03 NOTE — PROGRESS NOTES
Assessment & Plan     Chronic pain syndrome -refilled oxycodone for 3 months.  - oxyCODONE IR (ROXICODONE) 10 MG tablet; Take 1 tablet (10 mg) by mouth 3 times daily as needed for severe pain  - oxyCODONE (ROXICODONE) 10 MG tablet; Take 1 tablet (10 mg) by mouth 3 times daily as needed for pain  - oxyCODONE (ROXICODONE) 10 MG tablet; Take 1 tablet (10 mg) by mouth 3 times daily as needed for pain    REX (generalized anxiety disorder) -discussed that the high dose of the duloxetine could certainly be causing or at a minimum contributing to the nausea.  She can also have small bowel overgrowth syndrome or anxiety is contributing to the nausea.  She has follow-up with GI next month.  We discussed decreasing the duloxetine but she strongly declines as she feels it is significantly helped with her mental health.  She is on the max dose of duloxetine and buspirone.  I would not recommend increasing the Ativan due to concurrent opiate use.  Discussed that she would need to see psychiatry for changes to medication management of her anxiety.  We have discussed seeing counselor multiple times in the past due to PTSD related to her hospital stays many years ago.  She recognizes that it is important for her overall health, but just has not been able to prioritize it or find the time due to family obligations.  - DULoxetine (CYMBALTA) 30 MG capsule; Take 3 capsules (90 mg) by mouth daily  - metoclopramide (REGLAN) 10 MG tablet; Take 1 tablet (10 mg) by mouth 3 times daily as needed (nausea/vomiting)  - Adult Mental Health  Referral; Future  - Adult Mental Health  Referral; Future    Moderate major depression (H) - see above  - DULoxetine (CYMBALTA) 30 MG capsule; Take 3 capsules (90 mg) by mouth daily  - metoclopramide (REGLAN) 10 MG tablet; Take 1 tablet (10 mg) by mouth 3 times daily as needed (nausea/vomiting)  - Adult Mental Health  Referral; Future  - Adult Mental Health  Referral;  "Future    PTSD (post-traumatic stress disorder)  - DULoxetine (CYMBALTA) 30 MG capsule; Take 3 capsules (90 mg) by mouth daily  - metoclopramide (REGLAN) 10 MG tablet; Take 1 tablet (10 mg) by mouth 3 times daily as needed (nausea/vomiting)  - Adult Mental Health  Referral; Future  - Adult Mental Health  Referral; Future    Nausea and vomiting, unspecified vomiting type -she was previously using metoclopramide regularly which was very helpful.  We discussed tardive dyskinesia as a potential side effect.  I had also prescribed injectable Phenergan to use for very rare rescue treatment of nausea and vomiting, but we discontinued use many years ago due to concern of tissue necrosis and her scleroderma.  It is reasonable to use the Reglan once a week as she finds it very helpful.  She would not use it more often, and when the nausea is better, we will skip it entirely.  Advised to keep upcoming visit with GI  - metoclopramide (REGLAN) 10 MG tablet; Take 1 tablet (10 mg) by mouth 3 times daily as needed (nausea/vomiting)    Need for prophylactic vaccination and inoculation against influenza  - INFLUENZA VACCINE IM > 6 MONTHS VALENT IIV4 (AFLURIA/FLUZONE)    High priority for 2019-nCoV vaccine  - COVID-19,PF,PFIZER BOOSTER BIVALENT 12+Yrs             BMI:   Estimated body mass index is 27.62 kg/m  as calculated from the following:    Height as of this encounter: 1.575 m (5' 2\").    Weight as of this encounter: 68.5 kg (151 lb).       Patient Instructions   Nausea:  1. Recommend to use Metoclopramide once weekly  2. We discussed Duloxetine may be contributing or causing the nausea but it helps your depression a lot so you declined to decrease dose  3. Keep upcoming visit with GI in Dec    Mental health:  1. Referral to Psychiatry and Psychology    Fall   1. 2 sutures removed  2. Ankle is not sprained, continue to care as you have      Behavioral Health Resources for Psychiatry:    [facility]     [phone]    [facility]-works with community providers [phone]    [facility] Psychiatry- [facility]   [phone]    [facility]      [phone]   [facility] [place]     [facility]     [phone]   Psychiatrist staff available at all 15 [place] locations    [facility] ([facility])  [phone]    [facility] Mental [facility]-[place]  [phone]    [facility]     [phone]      [facility] (Intake & Assessment)  [phone]    [facility]   [phone]   [facility]     [phone]   [place]              No follow-ups on file.    Grecia Barba DO  [facility]    Amanda Barnes is a 37 year old, presenting for the following health issues:  Anxiety    Discuss Reglan    [facility]     ED/UC Followup:    Facility:  [facility] Emergency Dept  Date of visit: 10/26/22  Reason for visit: Fall, initial encounter  Laceration of right elbow, initial encounter  Elevated serum creatinine  Current Status: Waking up nauseous every day, no appetite, forces self to eat and is still feeling nauseous after. Feeling like anxiety is playing a big role in numerousness. Pt states elbow and knees still hurt from fall, pt states overexerted self the day before fall and legs gave out.     Nausea:  --she has followed with [facility], most recently  GI; has appointment in Dec  --food smells and tasts terrible.  --using cannabis but it is not helping  --she has been having nausea for years, but worse the last 6+ months;  Saw GI last in May  --has been on higher dose duloxetine since 3/2021;  She feels the high dose of duloxetine has helped manage her depression significantly and strongly declines to decrease the dose    Pain:  --multiple joint pains are significantly worse, started prior to the fall  --feels elbow is healing reasonably  well  --increase in chronic right ankle pain  --pain is lateral right ankle;  Constant, worse with walking;  No swelling of the ankle since the fall   -- Normally does yoga to exercise, but has not been able to find the time to do this.    Depression and Anxiety Follow-Up    How are you doing with your depression since your last visit? No change    How are you doing with your anxiety since your last visit?  Worsened     Are you having other symptoms that might be associated with depression or anxiety? Yes:  panic attacks, nauseousness, sweating, hard time sleeping    Have you had a significant life event? No     Do you have any concerns with your use of alcohol or other drugs? No     Feels depression is controlled, anxiety feels out of control    Has not been able to schedule therapist because of busy life;   working more due to inflation;  Son has special needs and this takes a lot of her time    Social History     Tobacco Use     Smoking status: Never     Smokeless tobacco: Never   Vaping Use     Vaping Use: Every day     Substances: Flavoring, delta 8     Devices: Disposable   Substance Use Topics     Alcohol use: Not Currently     Comment: Socially once on a weekend if any     Drug use: Yes     Comment: delta     PHQ 5/22/2022 7/7/2022 11/3/2022   PHQ-9 Total Score 9 13 11   Q9: Thoughts of better off dead/self-harm past 2 weeks Not at all Not at all Not at all   F/U: Thoughts of suicide or self-harm - - -   F/U: Self harm-plan - - -   F/U: Self-harm action - - -   F/U: Safety concerns - - -   PHQ-A Total Score - - -   PHQ-A Depressed most days in past year - - -   PHQ-A Mood affect on daily activities - - -   PHQ-A Suicide Ideation past 2 weeks - - -     REX-7 SCORE 3/23/2021 11/10/2021 6/24/2022   Total Score - - -   Total Score 18 14 7     Last PHQ-9 11/3/2022   1.  Little interest or pleasure in doing things 1   2.  Feeling down, depressed, or hopeless 1   3.  Trouble falling or staying asleep, or  sleeping too much 2   4.  Feeling tired or having little energy 1   5.  Poor appetite or overeating 2   6.  Feeling bad about yourself 2   7.  Trouble concentrating 1   8.  Moving slowly or restless 1   Q9: Thoughts of better off dead/self-harm past 2 weeks 0   PHQ-9 Total Score 11   Difficulty at work, home, or with people -   In the past two weeks have you had thoughts of suicide or self harm? -   Do you have concerns about your personal safety or the safety of others? -   In the past 2 weeks have you thought about a plan or had intention to harm yourself? -   In the past 2 weeks have you acted on these thoughts in any way? -     REX-7  6/24/2022   1. Feeling nervous, anxious, or on edge 0   2. Not being able to stop or control worrying 0   3. Worrying too much about different things 2   4. Trouble relaxing 3   5. Being so restless that it is hard to sit still 2   6. Becoming easily annoyed or irritable 0   7. Feeling afraid, as if something awful might happen 0   REX-7 Total Score 7   If you checked any problems, how difficult have they made it for you to do your work, take care of things at home, or get along with other people? Not difficult at all       Suicide Assessment Five-step Evaluation and Treatment (SAFE-T)        Current Outpatient Medications   Medication Sig Dispense Refill     acetaminophen (TYLENOL) 325 MG tablet Take 2 tablets (650 mg) by mouth every 4 hours as needed for mild pain or other 1 Bottle 0     albuterol (VENTOLIN HFA) 108 (90 Base) MCG/ACT inhaler INHALE 2 PUFFS INTO THE LUNGS ONCE EVERY 4 HOURS AS NEEDED FOR SHORTNESS OF BREATH / DYSPNEA / WHEEZING (Patient taking differently: INHALE 2 PUFFS INTO THE LUNGS ONCE EVERY 4 HOURS AS NEEDED FOR SHORTNESS OF BREATH / DYSPNEA / WHEEZING.) 18 g 11     amLODIPine (NORVASC) 2.5 MG tablet Take 1 tablet (2.5 mg) by mouth daily 90 tablet 3     budesonide-formoterol (SYMBICORT) 160-4.5 MCG/ACT Inhaler INHALE TWO PUFFS BY MOUTH TWICE A DAY 10.2 g 11      busPIRone HCl (BUSPAR) 30 MG tablet Take 1 tablet (30 mg) by mouth 2 times daily 180 tablet 3     DULoxetine (CYMBALTA) 30 MG capsule TAKE 3 CAPSULES (90 MG) BY MOUTH DAILY 270 capsule 3     famotidine (PEPCID) 20 MG tablet TAKE ONE TABLET BY MOUTH TWICE A  tablet 3     gabapentin (NEURONTIN) 300 MG capsule Take 2 capsules (600 mg) by mouth 3 times daily 540 capsule 3     GOODSENSE MIGRAINE FORMULA 250-250-65 MG per tablet TAKE ONE TABLET BY MOUTH EVERY 6 HOURS AS NEEDED FOR HEADACHES (Patient taking differently: Take 1 tablet by mouth every 6 hours as needed) 24 tablet 1     lisinopril (ZESTRIL) 10 MG tablet Take 1 tablet (10 mg) by mouth every evening 90 tablet 3     loratadine (CLARITIN) 10 MG tablet Take 10 mg by mouth daily as needed        LORazepam (ATIVAN) 1 MG tablet TAKE 1 TABLET (1 MG) BY MOUTH 2 TIMES DAILY AS NEEDED FOR ANXIETY #45 FOR 30 DAYS 45 tablet 0     medical cannabis (Patient's own supply) (The purpose of this order is to document that the patient reports taking medical cannabis.  This is not a prescription, and is not used to certify that the patient has a qualifying medical condition.)  CBG gummy over the counter 0 Information only 0     montelukast (SINGULAIR) 10 MG tablet Take 1 tablet (10 mg) by mouth At Bedtime 90 tablet 3     naloxone (NARCAN) 4 MG/0.1ML nasal spray Spray 1 spray (4 mg) into one nostril alternating nostrils once as needed for opioid reversal every 2-3 minutes until assistance arrives 0.2 mL 3     omeprazole (PRILOSEC) 40 MG DR capsule TAKE ONE CAPSULE BY MOUTH TWICE A  capsule 1     ondansetron (ZOFRAN-ODT) 4 MG ODT tab Take 1 tablet (4 mg) by mouth every 8 hours as needed for nausea 30 tablet 4     oxyCODONE IR (ROXICODONE) 10 MG tablet TAKE 1 TABLET (10 MG) BY MOUTH 3 TIMES DAILY AS NEEDED FOR PAIN **FILL ON/AFTER 9-5-22** 90 tablet 0     polyethylene glycol (MIRALAX) 17 GM/Dose powder Take 17 g by mouth daily 510 g 11     promethazine (PHENERGAN) 25  "MG tablet TAKE ONE TABLET BY MOUTH TWICE A DAY AS NEEDED NAUSEA (Patient taking differently: daily as needed TAKE ONE TABLET BY MOUTH TWICE A DAY AS NEEDED NAUSEA) 30 tablet 7     promethazine (PHENERGAN) 25 MG/ML IV injection INJECT 1 ML INTRAMUSCULARLY EVERY 6 HOURS AS NEEDED 6 mL 11     blood glucose (NO BRAND SPECIFIED) lancets standard Use to test blood sugar 1 time daily 100 each 3     blood glucose (NO BRAND SPECIFIED) test strip Use to test blood sugar 1 time daily 100 strip 11     Cyanocobalamin (B-12) 1000 MCG TBCR Take 1,000 mcg by mouth daily (Patient not taking: Reported on 11/3/2022) 100 tablet 1     methocarbamol (ROBAXIN) 750 MG tablet Take 1 tablet (750 mg) by mouth 3 times daily as needed for muscle spasms (Patient not taking: Reported on 11/3/2022) 180 tablet 3     metoclopramide (REGLAN) 10 MG tablet Take 1 tablet (10 mg) by mouth 3 times daily as needed (nausea/vomiting) (Patient not taking: Reported on 11/3/2022) 50 tablet 1     syringe/needle, disp, (BD ECLIPSE SYRINGE) 25G X 1\" 3 ML MISC 1 each daily as needed 10 each 11         Review of Systems   Constitutional, HEENT, cardiovascular, pulmonary, GI, , musculoskeletal, neuro, skin, endocrine and psych systems are negative, except as otherwise noted.      Objective    /74   Pulse 62   Temp 98.7  F (37.1  C) (Tympanic)   Resp 18   Ht 1.575 m (5' 2\")   Wt 68.5 kg (151 lb)   BMI 27.62 kg/m    Body mass index is 27.62 kg/m .  Physical Exam   GENERAL APPEARANCE: alert and no distress  MS: No swelling of right lateral ankle.  No pain with palpation.  Chronic deformity of the right medial ankle  SKIN: Right elbow wound is well healed and approximated, no erythema or purulence.  2 sutures were removed without complication  PSYCH: mentation appears normal, affect normal/bright and worried                    Answers for HPI/ROS submitted by the patient on 11/3/2022  If you checked off any problems, how difficult have these problems made " it for you to do your work, take care of things at home, or get along with other people?: Somewhat difficult  PHQ9 TOTAL SCORE: 11

## 2022-11-03 NOTE — PATIENT INSTRUCTIONS
Nausea:  Recommend to use Metoclopramide once weekly  We discussed Duloxetine may be contributing or causing the nausea but it helps your depression a lot so you declined to decrease dose  Keep upcoming visit with GI in Dec    Mental health:  Referral to Psychiatry and Psychology    Fall   2 sutures removed  Ankle is not sprained, continue to care as you have      Behavioral Health Resources for Psychiatry:    [facility]     [phone]    [facility]-works with community providers [phone]    [facility] Psychiat[facility]- [facility]   [phone]    [facility]      [phone]   [facility]     [phone]   Psychiatrist staff available at all 15 [place] locations    [facility] (BH)  [phone]    [facility] Mental Health-[place]  [phone]    [facility]     [phone]      [facility] (Intake & Assessment)  [phone]    [facility]   [phone]   [facility]     [phone]   [place]

## 2022-11-07 ENCOUNTER — TELEPHONE (OUTPATIENT)
Dept: FAMILY MEDICINE | Facility: CLINIC | Age: 37
End: 2022-11-07

## 2022-11-07 NOTE — TELEPHONE ENCOUNTER
The patient called to get early fill for Roxicodone 10 mg start date of 11/10/2022.  The patient has an family emergency and are leaving today.  Will send to covering provider to review.     Thank you  Marisa CARVER RN

## 2022-11-07 NOTE — TELEPHONE ENCOUNTER
Patient has been following her monthly refills according to PDMP, and she was seen on 11/3 for chronic pain by  Dr. Barba in clinic. Called pharmacy and they will get this filled today.  Patient notified by telephone and was appreciative.  Marisa Davidson NP on 11/7/2022 at 9:02 AM

## 2022-11-22 ENCOUNTER — VIRTUAL VISIT (OUTPATIENT)
Dept: FAMILY MEDICINE | Facility: CLINIC | Age: 37
End: 2022-11-22
Payer: MEDICARE

## 2022-11-22 DIAGNOSIS — Z53.9 ERRONEOUS ENCOUNTER--DISREGARD: Primary | ICD-10-CM

## 2022-11-22 NOTE — PROGRESS NOTES
The patient left the virtual room before the visit could be conducted.    Tried to reach patient by phone 3x without success- reached VM. Left 2 voicemails with patient.

## 2022-11-23 ENCOUNTER — HOSPITAL ENCOUNTER (EMERGENCY)
Facility: CLINIC | Age: 37
Discharge: HOME | End: 2022-11-23
Attending: PHYSICIAN ASSISTANT | Admitting: PHYSICIAN ASSISTANT
Payer: MEDICARE

## 2022-11-23 VITALS
HEART RATE: 68 BPM | RESPIRATION RATE: 16 BRPM | TEMPERATURE: 97.6 F | SYSTOLIC BLOOD PRESSURE: 150 MMHG | DIASTOLIC BLOOD PRESSURE: 95 MMHG

## 2022-11-23 DIAGNOSIS — K04.7 DENTAL INFECTION: ICD-10-CM

## 2022-11-23 PROCEDURE — 99213 OFFICE O/P EST LOW 20 MIN: CPT | Performed by: PHYSICIAN ASSISTANT

## 2022-11-23 PROCEDURE — G0463 HOSPITAL OUTPT CLINIC VISIT: HCPCS | Performed by: PHYSICIAN ASSISTANT

## 2022-11-23 RX ORDER — PENICILLIN V POTASSIUM 500 MG/1
500 TABLET, FILM COATED ORAL 4 TIMES DAILY
Qty: 40 TABLET | Refills: 0 | Status: SHIPPED | OUTPATIENT
Start: 2022-11-23 | End: 2022-12-03

## 2022-11-23 NOTE — DISCHARGE INSTRUCTIONS
Many of these clinics offer a sliding fee option for patients that qualify, and see patients on a walk-in or same day basis. Please call each clinic directly. As services, hours, fees and policies vary greatly.          [facility], [facility]  [phone]  Sees no insurance  [facility], [place]  [phone]  Preventive services only  [facility] (mult loc) [phone]  [facility]    ([facility]), [facility]  [phone]  [facility], [place]       [phone]  Preventive services only  [facility]  [phone]  Accepts MA & sees no ins  [facility],      Accepts MA & sees no ins   [place]   [phone]; [phone]  [facility], [facility]   Accepts MA & sees no ins       [phone]  [facility], [facility]  [phone]   Accepts MA emergencies  [facility], [place] [phone]  [facility],     Accepts MA   [place]   [phone]    [facility] [phone]  Accepts MA & sees no ins   [facility]   Dental Clinic    [phone]  [facility], [facility]  [phone]   [facility] [phone]  [facility]  Preventive services only   [place]   [phone]  [facility] [phone]  [facility], [place]  [phone]  Same day [facility] [phone]  Same day [facility],      Same day [facility]   [phone]    [facility], [facility] [phone]  Free [facility], walk-in only  [facility] (multiple locations) [phone]      [facility] , [facility] [phone]    [facility] [place] [phone]  Free clinic, walk-in only  [place] [facility]  [phone]  [facility] [phone] (adults)       [phone]  (children)  [facility] [phone]    Also, referral service for low cost dental and healthcare: [phone]  And [phone]

## 2022-11-23 NOTE — ED PROVIDER NOTES
History     Chief Complaint   Patient presents with     Dental Pain     Dental infection in lower left; began two days ago.     HPI  Molly Teague is a 37 year old female who presents the urgent care with concern over suspected dental infection.  For the last 2 days patient has had left lower dental pain, facial swelling.  She also reports that recently had influenza A after household contact tested positive and she developed fevers, chills, illness, nasal congestion, cough within the last 2 weeks.  Those symptoms are resolving.  She states that symptoms feel identical to when she has been diagnosed with a dental abscess previously.  She states that she was told that by dentist she could not have procedures to definitively fix tooth due to her history of scleroderma and difficulty opening and closing her jaw.      Allergies:  Allergies   Allergen Reactions     Blood Transfusion Related (Informational Only) Other (See Comments)     Patient has a history of a clinically significant antibody against RBC antigens.  A delay in compatible RBCs may occur.     Pollen Extract      Seasonal Allergies      Venofer [Iron Sucrose] Difficulty breathing and Cramps     Severe infusion reaction 1/2022     Shellfish-Derived Products Nausea and Rash     Rash on face       Problem List:    Patient Active Problem List    Diagnosis Date Noted     Microalbuminuria 07/07/2022     Priority: Medium     Pain in joint of right ankle 08/24/2021     Priority: Medium     Added automatically from request for surgery [id number]       Iron deficiency anemia due to chronic blood loss 08/04/2020     Priority: Medium     Vomiting and diarrhea 03/25/2020     Priority: Medium     Closed right ankle fracture 02/11/2020     Priority: Medium     S/P ORIF (open reduction internal fixation) fracture 02/10/2020     Priority: Medium     Sinus tachycardia 01/31/2020     Priority: Medium     Ankle fracture, right, closed, initial encounter 01/30/2020      Priority: Medium     Added automatically from request for surgery [id number]       Anemia, chronic disease 11/25/2019     Priority: Medium     Mirena IUD- Remove by 09/2024 09/06/2019     Priority: Medium     Lot: [id number]  Exp: 01/2022    Dinora Israel LPN         Malignant essential hypertension 07/24/2019     Priority: Medium     PTSD (post-traumatic stress disorder) 06/19/2019     Priority: Medium     Severe episode of recurrent major depressive disorder, with psychotic features (H) 07/06/2018     Priority: Medium     Severe persistent asthma without complication 07/06/2018     Priority: Medium     On high dose ICS/LABA + frequent albuterol use.  Next allergy/pulmonary referral       Aspiration of food 03/29/2018     Priority: Medium     Chronic pain syndrome 04/26/2017     Priority: Medium     Patient is followed by Grecia Barba DO for ongoing prescription of pain medication.  All refills should only be approved by this provider, or covering partner.    Medication(s): Oxycodone 10 mg.   Maximum quantity per month: 90  Clinic visit frequency required: Q 3 months     Controlled substance agreement:  Encounter-Level CSA - 04/26/2017:    Controlled Substance Agreement - Scan on 5/4/2017  8:58 AM: CONTROLLED SUBSTANCE AGREEMENT (below)       Patient-Level CSA:    Controlled Substance Agreement - Opioid - Scan on 2/6/2019  6:23 PM (below)         Pain Clinic evaluation in the past: No    DIRE Total Score(s):  No flowsheet data found.    Last [facility] website verification:  done on 4/26/17   9/26/2017  7/6/2018  10/16/2018  1/22/2019  8/2/2019           [email or web address]         Chronic migraine without aura without status migrainosus, not intractable 04/12/2017     Priority: Medium     With medication overuse.  Fioricet and not Imitrex is recommend to treat severe headache.  2/2017 [place] recommend wean down from daily Fioricet and use Excedrin Migrain PRN.       AIN grade I 03/30/2017     Priority:  Medium     Found at [facility] on colonoscopy 2/2017.  Recommend repeat anoscopy and anal pap in 6/2017.       Gastroesophageal reflux disease with esophagitis 03/30/2017     Priority: Medium     EGD done at [facility] 2/2017 showed esophagitis without GAVE.  Recommend max dose H2 and PPI, along with 4 tablets of Carafate dissolved in water and drink throughout the day.    Had LA Grade D esophagitis, on TID PPI       Secondary hypertension 03/22/2017     Priority: Medium     Stage 3a chronic kidney disease (H) 02/27/2017     Priority: Medium     Arthritis 02/10/2017     Priority: Medium     Limited systemic sclerosis (H) 01/25/2017     Priority: Medium     Raynaud's, calcinosis, telangiectasias, peripheral neuropathy, GERD symptoms, history of scleroderma renal crisis.  Saw [facility] 1/2017 and follows with Rheum Dr. Davis locally.       Polyneuropathy associated with underlying disease (H) 01/25/2017     Priority: Medium     Due to scleroderma and neurology thought possible due to ICU (critical illness neuropathy).  Recommend to max out dose of gabapentin to 0721-9567 mg/day, split BID or TID.  EMG 1/2017 positive for neuropathy       History of Clostridium difficile 11/29/2016     Priority: Medium     History of Helicobacter pylori infection 11/29/2016     Priority: Medium     Scleroderma with renal involvement (H) 11/09/2016     Priority: Medium     Needs lisinopril long term       History of ARDS 11/09/2016     Priority: Medium     4/2015, prolonged ICU/vent/trach due to influenza, scleroderma renal crisis requiring hemodialysis.       History of hemodialysis 11/09/2016     Priority: Medium     4/2015 due to scleroderma renal crisis       Bilateral retinitis 11/09/2016     Priority: Medium     History of pulmonary embolism 11/09/2016     Priority: Medium     Completed anti-coagulation 2017.  pulmonary embolism due to critical illness       REX (generalized anxiety disorder) 11/09/2016     Priority: Medium     Systemic sclerosis  (H) 2016     Priority: Medium        Past Medical History:    Past Medical History:   Diagnosis Date     Acute pulmonary embolism (H) 2016     Acute pyelonephritis 2017     Altered mental state 2018     Anxiety      Arthritis      Complication of anesthesia      Depression      Gastroesophageal reflux disease with esophagitis      History of blood transfusion      Hypertension      Long-term (current) use of anticoagulants [Z79.01] 2016     Neuropathy      PE (pulmonary embolism) 2016     PTSD (post-traumatic stress disorder)      Raynaud's disease without gangrene      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      Rheumatism      Scleroderma (H) 2016     Slow to wake up after anesthesia      Uncomplicated asthma        Past Surgical History:    Past Surgical History:   Procedure Laterality Date     ANGIOGRAM  2015      SECTION       COMBINED ESOPHAGOSCOPY, GASTROSCOPY, DUODENOSCOPY (EGD) WITH CO2 INSUFFLATION N/A 2022    Procedure: ESOPHAGOGASTRODUODENOSCOPY, WITH CO2 INSUFFLATION;  Surgeon: Jalen Moses MD;  Location: [facility] OR     ESOPHAGOSCOPY, GASTROSCOPY, DUODENOSCOPY (EGD), COMBINED N/A 2022    Procedure: ESOPHAGOGASTRODUODENOSCOPY, WITH BIOPSY;  Surgeon: Jalen Moses MD;  Location: [facility] OR     OPEN REDUCTION INTERNAL FIXATION ANKLE Right 2/10/2020    Procedure: Right ankle open reduction internal fixation;  Surgeon: Natanael Villalta MD;  Location: [facility] OR     REMOVE HARDWARE ANKLE Right 2021    Procedure: Right ankle hardware removal;  Surgeon: Natanael Villalta MD;  Location: [facility] OR     THROAT SURGERY         Family History:    Family History   Problem Relation Age of Onset     Hyperlipidemia Father      Depression Sister      Fibromyalgia Sister      Hyperlipidemia Sister      Cerebrovascular Disease Maternal Grandmother          of a stroke     Diabetes Maternal  Grandmother      Hyperlipidemia Paternal Grandfather      Diabetes Maternal Aunt      Depression Other      Melanoma No family hx of      Skin Cancer No family hx of      Anesthesia Reaction No family hx of      Cardiovascular No family hx of      Deep Vein Thrombosis (DVT) No family hx of        Social History:  Marital Status:  Single [1]  Social History     Tobacco Use     Smoking status: Never     Smokeless tobacco: Never   Vaping Use     Vaping Use: Every day     Substances: Flavoring, delta 8     Devices: Disposable   Substance Use Topics     Alcohol use: Not Currently     Comment: Socially once on a weekend if any     Drug use: Yes     Types: Marijuana     Comment: delta        Medications:    penicillin V (VEETID) 500 MG tablet  acetaminophen (TYLENOL) 325 MG tablet  albuterol (VENTOLIN HFA) 108 (90 Base) MCG/ACT inhaler  amLODIPine (NORVASC) 2.5 MG tablet  blood glucose (NO BRAND SPECIFIED) lancets standard  blood glucose (NO BRAND SPECIFIED) test strip  budesonide-formoterol (SYMBICORT) 160-4.5 MCG/ACT Inhaler  busPIRone HCl (BUSPAR) 30 MG tablet  Cyanocobalamin (B-12) 1000 MCG TBCR  DULoxetine (CYMBALTA) 30 MG capsule  famotidine (PEPCID) 20 MG tablet  gabapentin (NEURONTIN) 300 MG capsule  GOODSENSE MIGRAINE FORMULA 250-250-65 MG per tablet  lisinopril (ZESTRIL) 10 MG tablet  loratadine (CLARITIN) 10 MG tablet  LORazepam (ATIVAN) 1 MG tablet  medical cannabis (Patient's own supply)  methocarbamol (ROBAXIN) 750 MG tablet  metoclopramide (REGLAN) 10 MG tablet  montelukast (SINGULAIR) 10 MG tablet  naloxone (NARCAN) 4 MG/0.1ML nasal spray  omeprazole (PRILOSEC) 40 MG DR capsule  ondansetron (ZOFRAN-ODT) 4 MG ODT tab  [START ON 1/9/2023] oxyCODONE (ROXICODONE) 10 MG tablet  [START ON 12/10/2022] oxyCODONE (ROXICODONE) 10 MG tablet  oxyCODONE IR (ROXICODONE) 10 MG tablet  polyethylene glycol (MIRALAX) 17 GM/Dose powder  promethazine (PHENERGAN) 25 MG tablet  promethazine (PHENERGAN) 25 MG/ML IV  "injection  syringe/needle, disp, (BD ECLIPSE SYRINGE) 25G X 1\" 3 ML MISC      Review of Systems  CONSTITUTIONAL:NEGATIVE for current  fever, chills, change in weight  INTEGUMENTARY/SKIN: NEGATIVE for worrisome rashes, moles or lesions  EYES: NEGATIVE for vision changes or irritation  ENT/MOUTH: POSITIVE for left upper dental pain, facial swelling NEGATIVE for sore throat, nasal congestion   RESP:POSITIVE for resolving cough NEGATIVE for dyspnea, wheezing   GI: NEGATIVE for vomiting, diarrhea  Physical Exam   BP: (!) 150/95  Pulse: 68  Temp: 97.6  F (36.4  C)  Resp: 16  SpO2:  (unable to obtain due to Raynaud's)  Physical Exam  GENERAL APPEARANCE: alert, cooperative, no acute distress  EYES: EOMI,  PERRL, conjunctiva clear  HENT: ear canals and TM's normal.  Oral mucosa moist.  There is palpation of tooth #18 with some localized swelling, tenderness to palpation of her left jaw and cheek.    NECK: supple, nontender, no lymphadenopathy  RESP: lungs clear to auscultation - no rales, rhonchi or wheezes  CV: regular rates and rhythm, normal S1 S2, no murmur noted  SKIN: no suspicious lesions or rashes   ED Course           Procedures       Critical Care time:  none        No results found for this or any previous visit (from the past 24 hour(s)).  Medications - No data to display    Assessments & Plan (with Medical Decision Making)     I have reviewed the nursing notes.  I have reviewed the findings, diagnosis, plan and need for follow up with the patient.     New Prescriptions    PENICILLIN V (VEETID) 500 MG TABLET    Take 1 tablet (500 mg) by mouth 4 times daily for 10 days     Final diagnoses:   Dental infection     37-year-old female presents the urgent care with concern over 2-day history of left-sided dental pain and facial swelling.  She was febrile upon arrival, remainder vital signs stable.  Physical exam findings significant for focal dental tenderness palpation, left-sided facial swelling.  Symptoms are " consistent with dental infection.  No abscess that is amenable to drainage at this time.  I do not suspect parotitis.  She was discharged home stable with prescription for penicillin.  Follow up with dentist as soon as possible for definitive evaluation/management.  Worrisome reasons to return to ER/UC sooner discussed.     Disclaimer: This note consists of symbols derived from keyboarding, dictation, and/or voice recognition software. As a result, there may be errors in the script that have gone undetected.  Please consider this when interpreting information found in the chart.    11/23/2022   [facility] EMERGENCY DEPT    Tricia Turner PA-C  11/28/22 1448

## 2022-11-28 DIAGNOSIS — J45.40 MODERATE PERSISTENT ASTHMA WITHOUT COMPLICATION: ICD-10-CM

## 2022-11-29 RX ORDER — ALBUTEROL SULFATE 90 UG/1
AEROSOL, METERED RESPIRATORY (INHALATION)
Qty: 18 G | Refills: 11 | Status: SHIPPED | OUTPATIENT
Start: 2022-11-29 | End: 2023-09-28

## 2022-11-29 NOTE — TELEPHONE ENCOUNTER
"Requested Prescriptions   Pending Prescriptions Disp Refills    VENTOLIN  (90 Base) MCG/ACT inhaler [Pharmacy Med Name: VENTOLIN HFA 108MCG/ACT AERS] 18 g 11     Sig: INHALE 2 PUFFS INTO THE LUNGS ONCE EVERY 4 HOURS AS NEEDED FOR SHORTNESS OF BREATH / DYSPNEA / WHEEZING       Asthma Maintenance Inhalers - Anticholinergics Failed - 11/28/2022 11:29 AM        Failed - Asthma control assessment score within normal limits in last 6 months     Please review ACT score.           Passed - Patient is age 12 years or older        Passed - Medication is active on med list        Passed - Recent (6 mo) or future (30 days) visit within the authorizing provider's specialty     Patient had office visit in the last 6 months or has a visit in the next 30 days with authorizing provider or within the authorizing provider's specialty.  See \"Patient Info\" tab in inbasket, or \"Choose Columns\" in Meds & Orders section of the refill encounter.           Short-Acting Beta Agonist Inhalers Protocol  Failed - 11/28/2022 11:29 AM        Failed - Asthma control assessment score within normal limits in last 6 months     Please review ACT score.           Passed - Patient is age 12 or older        Passed - Medication is active on med list        Passed - Recent (6 mo) or future (30 days) visit within the authorizing provider's specialty     Patient had office visit in the last 6 months or has a visit in the next 30 days with authorizing provider or within the authorizing provider's specialty.  See \"Patient Info\" tab in inbasket, or \"Choose Columns\" in Meds & Orders section of the refill encounter.                 "

## 2022-12-06 ENCOUNTER — HOSPITAL ENCOUNTER (EMERGENCY)
Facility: CLINIC | Age: 37
Discharge: HOME | End: 2022-12-06
Attending: NURSE PRACTITIONER | Admitting: NURSE PRACTITIONER
Payer: MEDICARE

## 2022-12-06 VITALS
HEART RATE: 108 BPM | SYSTOLIC BLOOD PRESSURE: 145 MMHG | TEMPERATURE: 99.1 F | DIASTOLIC BLOOD PRESSURE: 96 MMHG | RESPIRATION RATE: 20 BRPM

## 2022-12-06 DIAGNOSIS — N39.0 URINARY TRACT INFECTION: ICD-10-CM

## 2022-12-06 LAB
ALBUMIN UR-MCNC: 100 MG/DL
APPEARANCE UR: ABNORMAL
BACTERIA #/AREA URNS HPF: ABNORMAL /HPF
BILIRUB UR QL STRIP: ABNORMAL
COLOR UR AUTO: YELLOW
GLUCOSE UR STRIP-MCNC: NEGATIVE MG/DL
HGB UR QL STRIP: ABNORMAL
KETONES UR STRIP-MCNC: NEGATIVE MG/DL
LEUKOCYTE ESTERASE UR QL STRIP: ABNORMAL
MUCOUS THREADS #/AREA URNS LPF: PRESENT /LPF
NITRATE UR QL: NEGATIVE
PH UR STRIP: 5.5 [PH] (ref 5–7)
RBC URINE: 16 /HPF
SP GR UR STRIP: 1.02 (ref 1–1.03)
SQUAMOUS EPITHELIAL: 2 /HPF
UROBILINOGEN UR STRIP-MCNC: NORMAL MG/DL
WBC CLUMPS #/AREA URNS HPF: PRESENT /HPF
WBC URINE: >182 /HPF

## 2022-12-06 PROCEDURE — 81001 URINALYSIS AUTO W/SCOPE: CPT | Performed by: NURSE PRACTITIONER

## 2022-12-06 PROCEDURE — 87086 URINE CULTURE/COLONY COUNT: CPT | Performed by: NURSE PRACTITIONER

## 2022-12-06 PROCEDURE — 99213 OFFICE O/P EST LOW 20 MIN: CPT | Performed by: NURSE PRACTITIONER

## 2022-12-06 PROCEDURE — G0463 HOSPITAL OUTPT CLINIC VISIT: HCPCS | Performed by: NURSE PRACTITIONER

## 2022-12-06 RX ORDER — SULFAMETHOXAZOLE/TRIMETHOPRIM 800-160 MG
1 TABLET ORAL 2 TIMES DAILY
Qty: 10 TABLET | Refills: 0 | Status: SHIPPED | OUTPATIENT
Start: 2022-12-06 | End: 2022-12-11

## 2022-12-06 ASSESSMENT — ENCOUNTER SYMPTOMS
FREQUENCY: 1
DYSURIA: 1

## 2022-12-06 ASSESSMENT — ACTIVITIES OF DAILY LIVING (ADL): ADLS_ACUITY_SCORE: 37

## 2022-12-07 ENCOUNTER — TELEPHONE (OUTPATIENT)
Dept: FAMILY MEDICINE | Facility: CLINIC | Age: 37
End: 2022-12-07

## 2022-12-07 DIAGNOSIS — G89.4 CHRONIC PAIN SYNDROME: Chronic | ICD-10-CM

## 2022-12-07 NOTE — TELEPHONE ENCOUNTER
Medication Question or Refill    Contacts       Type Contact Phone/Fax    12/07/2022 11:45 AM CST Phone (Incoming) Molly Teague (Self) [phone] (M)          What medication are you calling about (include dose and sig)?: Would like a ok for early refill on oxyCODONE (ROXICODONE) 10 MG tablet    Controlled Substance Agreement on file:   CSA -- Patient Level:     [Media Unavailable] Controlled Substance Agreement - Opioid - Scan on 7/11/2022 12:11 PM   [Media Unavailable] Controlled Substance Agreement - Opioid - Scan on 12/27/2020  1:35 PM   [Media Unavailable] Controlled Substance Agreement - Opioid - Scan on 2/6/2019  6:23 PM       Who prescribed the medication?: Jameson    Do you need a refill? No    When did you use the medication last? today    Patient offered an appointment? No    Do you have any questions or concerns?  No    Preferred Pharmacy:   [facility] [place] [address] [place] [address]  [address] [place] [id number]  Phone: [phone] Fax: [phone] Alternate Fax: [phone], [phone]    Could we send this information to you in [employer] or would you prefer to receive a phone call?:   Patient would prefer a phone call   Okay to leave a detailed message?: Yes at Home number on file [phone] (home)

## 2022-12-07 NOTE — ED PROVIDER NOTES
History     Chief Complaint   Patient presents with     Rule out Urinary Tract Infection     HPI  Molly Teague is a 37 year old female who presents urgent care with concern for urinary tract infection.  Patient has been having 5 days of urinary urgency, frequency and dysuria. Denies fever, headache, dizziness, congestion, sore throat, cough, shortness of breath, chest pain, abdominal pain, nausea, vomiting, diarrhea, hematuria and rash.      Allergies:  Allergies   Allergen Reactions     Blood Transfusion Related (Informational Only) Other (See Comments)     Patient has a history of a clinically significant antibody against RBC antigens.  A delay in compatible RBCs may occur.     Pollen Extract      Seasonal Allergies      Venofer [Iron Sucrose] Difficulty breathing and Cramps     Severe infusion reaction 1/2022     Shellfish-Derived Products Nausea and Rash     Rash on face       Problem List:    Patient Active Problem List    Diagnosis Date Noted     Microalbuminuria 07/07/2022     Priority: Medium     Pain in joint of right ankle 08/24/2021     Priority: Medium     Added automatically from request for surgery [id number]       Iron deficiency anemia due to chronic blood loss 08/04/2020     Priority: Medium     Vomiting and diarrhea 03/25/2020     Priority: Medium     Closed right ankle fracture 02/11/2020     Priority: Medium     S/P ORIF (open reduction internal fixation) fracture 02/10/2020     Priority: Medium     Sinus tachycardia 01/31/2020     Priority: Medium     Ankle fracture, right, closed, initial encounter 01/30/2020     Priority: Medium     Added automatically from request for surgery [id number]       Anemia, chronic disease 11/25/2019     Priority: Medium     Mirena IUD- Remove by 09/2024 09/06/2019     Priority: Medium     Lot: [id number]  Exp: 01/2022    Dinora Israel LPN         Malignant essential hypertension 07/24/2019     Priority: Medium     PTSD (post-traumatic stress disorder) 06/19/2019      Priority: Medium     Severe episode of recurrent major depressive disorder, with psychotic features (H) 07/06/2018     Priority: Medium     Severe persistent asthma without complication 07/06/2018     Priority: Medium     On high dose ICS/LABA + frequent albuterol use.  Next allergy/pulmonary referral       Aspiration of food 03/29/2018     Priority: Medium     Chronic pain syndrome 04/26/2017     Priority: Medium     Patient is followed by Grecia Barba DO for ongoing prescription of pain medication.  All refills should only be approved by this provider, or covering partner.    Medication(s): Oxycodone 10 mg.   Maximum quantity per month: 90  Clinic visit frequency required: Q 3 months     Controlled substance agreement:  Encounter-Level CSA - 04/26/2017:    Controlled Substance Agreement - Scan on 5/4/2017  8:58 AM: CONTROLLED SUBSTANCE AGREEMENT (below)       Patient-Level CSA:    Controlled Substance Agreement - Opioid - Scan on 2/6/2019  6:23 PM (below)         Pain Clinic evaluation in the past: No    DIRE Total Score(s):  No flowsheet data found.    Last [facility] website verification:  done on 4/26/17   9/26/2017  7/6/2018  10/16/2018  1/22/2019  8/2/2019           [email or web address]         Chronic migraine without aura without status migrainosus, not intractable 04/12/2017     Priority: Medium     With medication overuse.  Fioricet and not Imitrex is recommend to treat severe headache.  2/2017 [facility] recommend wean down from daily Fioricet and use Excedrin Migrain PRN.       AIN grade I 03/30/2017     Priority: Medium     Found at [facility] on colonoscopy 2/2017.  Recommend repeat anoscopy and anal pap in 6/2017.       Gastroesophageal reflux disease with esophagitis 03/30/2017     Priority: Medium     EGD done at [facility] 2/2017 showed esophagitis without GAVE.  Recommend max dose H2 and PPI, along with 4 tablets of Carafate dissolved in water and drink throughout the day.    Had LA Grade D  esophagitis, on TID PPI       Secondary hypertension 03/22/2017     Priority: Medium     Stage 3a chronic kidney disease (H) 02/27/2017     Priority: Medium     Arthritis 02/10/2017     Priority: Medium     Limited systemic sclerosis (H) 01/25/2017     Priority: Medium     Raynaud's, calcinosis, telangiectasias, peripheral neuropathy, GERD symptoms, history of scleroderma renal crisis.  Saw [facility] 1/2017 and follows with Rheum Dr. Davis locally.       Polyneuropathy associated with underlying disease (H) 01/25/2017     Priority: Medium     Due to scleroderma and neurology thought possible due to ICU (critical illness neuropathy).  Recommend to max out dose of gabapentin to 8341-3860 mg/day, split BID or TID.  EMG 1/2017 positive for neuropathy       History of Clostridium difficile 11/29/2016     Priority: Medium     History of Helicobacter pylori infection 11/29/2016     Priority: Medium     Scleroderma with renal involvement (H) 11/09/2016     Priority: Medium     Needs lisinopril long term       History of ARDS 11/09/2016     Priority: Medium     4/2015, prolonged ICU/vent/trach due to influenza, scleroderma renal crisis requiring hemodialysis.       History of hemodialysis 11/09/2016     Priority: Medium     4/2015 due to scleroderma renal crisis       Bilateral retinitis 11/09/2016     Priority: Medium     History of pulmonary embolism 11/09/2016     Priority: Medium     Completed anti-coagulation 2017.  pulmonary embolism due to critical illness       REX (generalized anxiety disorder) 11/09/2016     Priority: Medium     Systemic sclerosis (H) 09/22/2016     Priority: Medium        Past Medical History:    Past Medical History:   Diagnosis Date     Acute pulmonary embolism (H) 2016     Acute pyelonephritis 06/09/2017     Altered mental state 03/29/2018     Anxiety      Arthritis      Complication of anesthesia      Depression      Gastroesophageal reflux disease with esophagitis      History of blood  transfusion      Hypertension      Long-term (current) use of anticoagulants [Z79.01] 2016     Neuropathy      PE (pulmonary embolism) 2016     PTSD (post-traumatic stress disorder)      Raynaud's disease without gangrene      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      Rheumatism      Scleroderma (H) 2016     Slow to wake up after anesthesia      Uncomplicated asthma        Past Surgical History:    Past Surgical History:   Procedure Laterality Date     ANGIOGRAM        SECTION       COMBINED ESOPHAGOSCOPY, GASTROSCOPY, DUODENOSCOPY (EGD) WITH CO2 INSUFFLATION N/A 2022    Procedure: ESOPHAGOGASTRODUODENOSCOPY, WITH CO2 INSUFFLATION;  Surgeon: Jalen Moses MD;  Location: [facility] OR     ESOPHAGOSCOPY, GASTROSCOPY, DUODENOSCOPY (EGD), COMBINED N/A 2022    Procedure: ESOPHAGOGASTRODUODENOSCOPY, WITH BIOPSY;  Surgeon: Jalen Moses MD;  Location: [facility] OR     OPEN REDUCTION INTERNAL FIXATION ANKLE Right 2/10/2020    Procedure: Right ankle open reduction internal fixation;  Surgeon: Natanael Villalta MD;  Location: [facility]     REMOVE HARDWARE ANKLE Right 2021    Procedure: Right ankle hardware removal;  Surgeon: Natanael Villalta MD;  Location: [facility] OR     THROAT SURGERY         Family History:    Family History   Problem Relation Age of Onset     Hyperlipidemia Father      Depression Sister      Fibromyalgia Sister      Hyperlipidemia Sister      Cerebrovascular Disease Maternal Grandmother          of a stroke     Diabetes Maternal Grandmother      Hyperlipidemia Paternal Grandfather      Diabetes Maternal Aunt      Depression Other      Melanoma No family hx of      Skin Cancer No family hx of      Anesthesia Reaction No family hx of      Cardiovascular No family hx of      Deep Vein Thrombosis (DVT) No family hx of        Social History:  Marital Status:  Single [1]  Social History     Tobacco Use  "    Smoking status: Never     Smokeless tobacco: Never   Vaping Use     Vaping Use: Every day     Substances: Flavoring, delta 8     Devices: Disposable   Substance Use Topics     Alcohol use: Not Currently     Comment: Socially once on a weekend if any     Drug use: Yes     Types: Marijuana     Comment: delta        Medications:    sulfamethoxazole-trimethoprim (BACTRIM DS) 800-160 MG tablet  acetaminophen (TYLENOL) 325 MG tablet  albuterol (VENTOLIN HFA) 108 (90 Base) MCG/ACT inhaler  amLODIPine (NORVASC) 2.5 MG tablet  blood glucose (NO BRAND SPECIFIED) lancets standard  blood glucose (NO BRAND SPECIFIED) test strip  budesonide-formoterol (SYMBICORT) 160-4.5 MCG/ACT Inhaler  busPIRone HCl (BUSPAR) 30 MG tablet  Cyanocobalamin (B-12) 1000 MCG TBCR  DULoxetine (CYMBALTA) 30 MG capsule  famotidine (PEPCID) 20 MG tablet  gabapentin (NEURONTIN) 300 MG capsule  GOODSENSE MIGRAINE FORMULA 250-250-65 MG per tablet  lisinopril (ZESTRIL) 10 MG tablet  loratadine (CLARITIN) 10 MG tablet  LORazepam (ATIVAN) 1 MG tablet  medical cannabis (Patient's own supply)  methocarbamol (ROBAXIN) 750 MG tablet  metoclopramide (REGLAN) 10 MG tablet  montelukast (SINGULAIR) 10 MG tablet  naloxone (NARCAN) 4 MG/0.1ML nasal spray  omeprazole (PRILOSEC) 40 MG DR capsule  ondansetron (ZOFRAN-ODT) 4 MG ODT tab  [START ON 1/9/2023] oxyCODONE (ROXICODONE) 10 MG tablet  [START ON 12/10/2022] oxyCODONE (ROXICODONE) 10 MG tablet  oxyCODONE IR (ROXICODONE) 10 MG tablet  polyethylene glycol (MIRALAX) 17 GM/Dose powder  promethazine (PHENERGAN) 25 MG tablet  promethazine (PHENERGAN) 25 MG/ML IV injection  syringe/needle, disp, (BD ECLIPSE SYRINGE) 25G X 1\" 3 ML MISC          Review of Systems   Genitourinary: Positive for dysuria, frequency and urgency.   All other systems reviewed and are negative.      Physical Exam   BP: (!) 145/96  Pulse: 108  Temp: 99.1  F (37.3  C)  Resp: 20      Physical Exam  Constitutional:       General: She is not in acute " distress.     Appearance: Normal appearance.   Eyes:      Conjunctiva/sclera: Conjunctivae normal.      Pupils: Pupils are equal, round, and reactive to light.   Cardiovascular:      Rate and Rhythm: Normal rate.   Pulmonary:      Effort: Pulmonary effort is normal.   Abdominal:      General: There is no distension.      Palpations: Abdomen is soft.      Tenderness: There is no abdominal tenderness. There is no right CVA tenderness, left CVA tenderness, guarding or rebound.   Musculoskeletal:         General: Normal range of motion.      Cervical back: Normal range of motion.   Skin:     General: Skin is warm.      Capillary Refill: Capillary refill takes less than 2 seconds.   Neurological:      General: No focal deficit present.      Mental Status: She is alert.         ED Course                 Procedures      Results for orders placed or performed during the hospital encounter of 12/06/22 (from the past 24 hour(s))   UA with Microscopic reflex to Culture    Specimen: Urine, Clean Catch   Result Value Ref Range    Color Urine Yellow Colorless, Straw, Light Yellow, Yellow    Appearance Urine Cloudy (A) Clear    Glucose Urine Negative Negative mg/dL    Bilirubin Urine Small (A) Negative    Ketones Urine Negative Negative mg/dL    Specific Gravity Urine 1.020 1.003 - 1.035    Blood Urine Moderate (A) Negative    pH Urine 5.5 5.0 - 7.0    Protein Albumin Urine 100 (A) Negative mg/dL    Urobilinogen Urine Normal Normal, 2.0 mg/dL    Nitrite Urine Negative Negative    Leukocyte Esterase Urine Small (A) Negative    Bacteria Urine Few (A) None Seen /HPF    WBC Clumps Urine Present (A) None Seen /HPF    Mucus Urine Present (A) None Seen /LPF    RBC Urine 16 (H) <=2 /HPF    WBC Urine >182 (H) <=5 /HPF    Squamous Epithelials Urine 2 (H) <=1 /HPF    Narrative    Urine Culture ordered based on laboratory criteria       Medications - No data to display    Assessments & Plan (with Medical Decision Making)   Molly Teague is  a 37 year old female who presents urgent care with concern for urinary tract infection.  Patient has been having 5 days of urinary urgency, frequency and dysuria. Slightly hypertensive, remaining vitals normal. Exam as above. Urinalysis indicating infection, culture pending. Started on Bactrim DS. May use over the counter medications as needed and appropriate. Increase rest and hydration. Return precautions reviewed, all questions answered. Patient agreeable to plan of care and discharged in good condition.    I have reviewed the nursing notes.    I have reviewed the findings, diagnosis, plan and need for follow up with the patient.  Discharge Medication List as of 12/6/2022  4:11 PM      START taking these medications    Details   sulfamethoxazole-trimethoprim (BACTRIM DS) 800-160 MG tablet Take 1 tablet by mouth 2 times daily for 5 days, Disp-10 tablet, R-0, E-Prescribe             Final diagnoses:   Urinary tract infection       12/6/2022   [facility] EMERGENCY DEPT     Shima Combs, APRN CNP  12/06/22 [id number]

## 2022-12-08 LAB — BACTERIA UR CULT: ABNORMAL

## 2022-12-08 NOTE — RESULT ENCOUNTER NOTE
Final Urine Culture Report on 12/8/22  [facility] Emergency Dept discharge antibiotic prescribed: Sulfamethoxazole-Trimethoprim (Bactrim DS, Septra DS) 800-160 mg PO tablet, 1 tablet by mouth 2 times daily for 5 days  #1. Bacteria, 50,000 - 100,000 CFU/ML Escherichia coli , is SUSCEPTIBLE to Antibiotic.    No change in treatment per [facility] ED lab result Urine Culture protocol.

## 2022-12-09 RX ORDER — OXYCODONE HYDROCHLORIDE 10 MG/1
10 TABLET ORAL 3 TIMES DAILY PRN
Qty: 90 TABLET | Refills: 0 | Status: SHIPPED | OUTPATIENT
Start: 2022-12-10 | End: 2023-03-16

## 2022-12-09 NOTE — TELEPHONE ENCOUNTER
Routed to provider for refill consideration.    Pt called to check status of refill request.    Pt is completely out as of this morning.  Asks to fill 2 days early?    Last written 11/10/22.   Pt explains that it was a bad month for her.  She had a fall before Thanksgiving, had a tooth infection, and a UTI.    Jia Oneill RN

## 2022-12-13 ENCOUNTER — OFFICE VISIT (OUTPATIENT)
Dept: GASTROENTEROLOGY | Facility: CLINIC | Age: 37
End: 2022-12-13
Payer: MEDICARE

## 2022-12-13 VITALS
DIASTOLIC BLOOD PRESSURE: 80 MMHG | HEIGHT: 62 IN | HEART RATE: 87 BPM | WEIGHT: 145.7 LBS | BODY MASS INDEX: 26.81 KG/M2 | SYSTOLIC BLOOD PRESSURE: 112 MMHG | OXYGEN SATURATION: 99 %

## 2022-12-13 DIAGNOSIS — K21.01 GASTROESOPHAGEAL REFLUX DISEASE WITH ESOPHAGITIS AND HEMORRHAGE: Primary | ICD-10-CM

## 2022-12-13 DIAGNOSIS — R11.0 CHRONIC NAUSEA: ICD-10-CM

## 2022-12-13 PROCEDURE — 99214 OFFICE O/P EST MOD 30 MIN: CPT | Performed by: INTERNAL MEDICINE

## 2022-12-13 ASSESSMENT — PAIN SCALES - GENERAL: PAINLEVEL: NO PAIN (0)

## 2022-12-13 NOTE — LETTER
12/13/2022         RE: Molly Teague  [address] [facility] [place] [address]        Dear Colleague,    Thank you for referring your patient, Molly Teague, to the [facility]. Please see a copy of my visit note below.          GASTROENTEROLOGY FOLLOW UP CLINIC VISIT    CC/REFERRING MD:    Grecia Barba  No ref. provider found    REASON FOR CONSULTATION:   No ref. provider found for   Chief Complaint   Patient presents with     Follow Up     Follow up visit for nausea.       HISTORY OF PRESENT ILLNESS:    Molly Teague is 37 year old female who presents for follow up of esophagitis and chronic.  This occurs in the setting of multiple issues including chronic migraine, chronic pain, systemic sclerosis, PTSD, chronic kidney disease.  We previously did upper endoscopy July 2022 which noted acute esophagitis with 3 times daily high-dose PPI therapy.  She has a long history of esophagitis with prior LA grade D esophagitis documented by upper endoscopy.  She reports that twice daily high-dose PPI therapy was not successful in healing of esophagitis so she currently takes twice daily PPI therapy as prescribed and answered an additional over-the-counter dose to make 3 times daily PPI therapy.  She reports she also takes famotidine twice per day.  With this regimen, she reports that reflux symptoms are controlled.  She has had issues with ongoing nausea.  She typically takes Zofran for mild nausea.  When symptoms are severe she takes Phenergan and also has a limited supply of metoclopramide.  The Phenergan or metoclopramide provide the best relief of symptoms but she uses these sparingly.  She also uses shaista tea and reports that she has referral for acupuncture which she plans to do.  She reports that her health has otherwise been fairly stable.  She does continue to take chronic oxycodone therapy for pain issues.      PHYSICAL EXAMINATION:  Constitutional: aaox3,  "cooperative, pleasant, not dyspneic/diaphoretic, no acute distress  Vitals reviewed: /80 (BP Location: Left arm, Patient Position: Sitting, Cuff Size: Adult Regular)   Pulse 87   Ht 1.568 m (5' 1.75\")   Wt 66.1 kg (145 lb 11.2 oz)   SpO2 99%   BMI 26.87 kg/m    Wt:   Wt Readings from Last 2 Encounters:   12/13/22 66.1 kg (145 lb 11.2 oz)   11/03/22 68.5 kg (151 lb)      Eyes: Sclera anicteric/injected  Ears/nose/mouth/throat: Normal oropharynx without ulcers or exudate, mucus membranes moist, hearing intact  Neck: supple, thyroid normal size  CV: No edema  Respiratory: Unlabored breathing  Lymph: No submandibular, supraclavicular or inguinal lymphadenopathy  Abd:  Nondistended, no masses, +bs, no hepatosplenomegaly, nontender, no peritoneal signs  Skin: warm, perfused, no jaundice  Psych: Normal affect  MSK: Normal gait    Pertinent labs and imaging have been reviewed.    ASSESSMENT/PLAN:    Molly Teague is a 37 year old female who presents for follow up of esophagitis and nausea.  She has history of severe LA grade D esophagitis by endoscopy and takes 3 times daily PPI therapy for this.  We discussed today that this is not an FDA approved regimen for the treatment of esophagitis and we do not know what the risk versus benefit of current regimen is.  However, she clearly does note that she had ongoing esophagitis with twice daily high-dose PPI therapy and last endoscopy on 3 times daily PPI therapy did have healed esophagitis.  Therefore, the options include continued 3 times daily PPI therapy versus referral for antireflux surgery and we discussed both of these options.  She prefers to avoid any type of surgical evaluation at present but will consider it in the future.  In the meantime, she will continue with the current PPI regimen and we will try to lower amount to the lowest effective dosage as able.    We also discussed her chronic nausea in detail today.  This is likely due to a side effect of " other medications or combination.  She is on oxycodone chronically which is the most likely medication causing this, possibly due to delayed gastric emptying.  Recommend limiting the amount of this medication.  She also is on BuSpar, amlodipine, Cymbalta, Neurontin which also may cause nausea.  At present, agree with continued regimen of Zofran, with rescue Phenergan or metoclopramide and limited amounts given the risk of side effects.  Also recommend continued use of shaista tea liberally and pursue acupuncture as planned.      RTC 6 months    Thank you for this consultation.  It was a pleasure to participate in the care of this patient; please contact us with any further questions.  A total of 45 minutes was spent with reviewing the chart, discussing with the patient, documentation and coordination of care.    This note was created with voice recognition software, and while reviewed for accuracy, typos may remain.     Jalen Moses MD  Adjunct  of Medicine  Division of Gastroenterology, Hepatology and Nutrition  [facility]  [phone]      Again, thank you for allowing me to participate in the care of your patient.        Sincerely,        Jalen Moses MD

## 2022-12-13 NOTE — PROGRESS NOTES
"      GASTROENTEROLOGY FOLLOW UP CLINIC VISIT    CC/REFERRING MD:    Grecia Barba  No ref. provider found    REASON FOR CONSULTATION:   No ref. provider found for   Chief Complaint   Patient presents with     Follow Up     Follow up visit for nausea.       HISTORY OF PRESENT ILLNESS:    Molly Teague is 37 year old female who presents for follow up of esophagitis and chronic.  This occurs in the setting of multiple issues including chronic migraine, chronic pain, systemic sclerosis, PTSD, chronic kidney disease.  We previously did upper endoscopy July 2022 which noted acute esophagitis with 3 times daily high-dose PPI therapy.  She has a long history of esophagitis with prior LA grade D esophagitis documented by upper endoscopy.  She reports that twice daily high-dose PPI therapy was not successful in healing of esophagitis so she currently takes twice daily PPI therapy as prescribed and answered an additional over-the-counter dose to make 3 times daily PPI therapy.  She reports she also takes famotidine twice per day.  With this regimen, she reports that reflux symptoms are controlled.  She has had issues with ongoing nausea.  She typically takes Zofran for mild nausea.  When symptoms are severe she takes Phenergan and also has a limited supply of metoclopramide.  The Phenergan or metoclopramide provide the best relief of symptoms but she uses these sparingly.  She also uses shaista tea and reports that she has referral for acupuncture which she plans to do.  She reports that her health has otherwise been fairly stable.  She does continue to take chronic oxycodone therapy for pain issues.      PHYSICAL EXAMINATION:  Constitutional: aaox3, cooperative, pleasant, not dyspneic/diaphoretic, no acute distress  Vitals reviewed: /80 (BP Location: Left arm, Patient Position: Sitting, Cuff Size: Adult Regular)   Pulse 87   Ht 1.568 m (5' 1.75\")   Wt 66.1 kg (145 lb 11.2 oz)   SpO2 99%   BMI 26.87 kg/m  "   Wt:   Wt Readings from Last 2 Encounters:   12/13/22 66.1 kg (145 lb 11.2 oz)   11/03/22 68.5 kg (151 lb)      Eyes: Sclera anicteric/injected  Ears/nose/mouth/throat: Normal oropharynx without ulcers or exudate, mucus membranes moist, hearing intact  Neck: supple, thyroid normal size  CV: No edema  Respiratory: Unlabored breathing  Lymph: No submandibular, supraclavicular or inguinal lymphadenopathy  Abd:  Nondistended, no masses, +bs, no hepatosplenomegaly, nontender, no peritoneal signs  Skin: warm, perfused, no jaundice  Psych: Normal affect  MSK: Normal gait    Pertinent labs and imaging have been reviewed.    ASSESSMENT/PLAN:    Molly Teague is a 37 year old female who presents for follow up of esophagitis and nausea.  She has history of severe LA grade D esophagitis by endoscopy and takes 3 times daily PPI therapy for this.  We discussed today that this is not an FDA approved regimen for the treatment of esophagitis and we do not know what the risk versus benefit of current regimen is.  However, she clearly does note that she had ongoing esophagitis with twice daily high-dose PPI therapy and last endoscopy on 3 times daily PPI therapy did have healed esophagitis.  Therefore, the options include continued 3 times daily PPI therapy versus referral for antireflux surgery and we discussed both of these options.  She prefers to avoid any type of surgical evaluation at present but will consider it in the future.  In the meantime, she will continue with the current PPI regimen and we will try to lower amount to the lowest effective dosage as able.    We also discussed her chronic nausea in detail today.  This is likely due to a side effect of other medications or combination.  She is on oxycodone chronically which is the most likely medication causing this, possibly due to delayed gastric emptying.  Recommend limiting the amount of this medication.  She also is on BuSpar, amlodipine, Cymbalta, Neurontin which  also may cause nausea.  At present, agree with continued regimen of Zofran, with rescue Phenergan or metoclopramide and limited amounts given the risk of side effects.  Also recommend continued use of shaista tea liberally and pursue acupuncture as planned.      RTC 6 months    Thank you for this consultation.  It was a pleasure to participate in the care of this patient; please contact us with any further questions.  A total of 45 minutes was spent with reviewing the chart, discussing with the patient, documentation and coordination of care.    This note was created with voice recognition software, and while reviewed for accuracy, typos may remain.     Jalen Moses MD  Adjunct  of Medicine  Division of Gastroenterology, Hepatology and Nutrition  [facility]  [phone]

## 2022-12-15 ENCOUNTER — VIRTUAL VISIT (OUTPATIENT)
Dept: FAMILY MEDICINE | Facility: CLINIC | Age: 37
End: 2022-12-15
Payer: MEDICARE

## 2022-12-15 ENCOUNTER — TELEPHONE (OUTPATIENT)
Dept: FAMILY MEDICINE | Facility: CLINIC | Age: 37
End: 2022-12-15

## 2022-12-15 DIAGNOSIS — D63.8 ANEMIA, CHRONIC DISEASE: ICD-10-CM

## 2022-12-15 DIAGNOSIS — F32.1 MODERATE MAJOR DEPRESSION (H): ICD-10-CM

## 2022-12-15 DIAGNOSIS — M34.1 CREST (CALCINOSIS, RAYNAUD'S PHENOMENON, ESOPHAGEAL DYSFUNCTION, SCLERODACTYLY, TELANGIECTASIA) (H): ICD-10-CM

## 2022-12-15 DIAGNOSIS — G89.4 CHRONIC PAIN SYNDROME: Primary | Chronic | ICD-10-CM

## 2022-12-15 DIAGNOSIS — K21.01 GASTROESOPHAGEAL REFLUX DISEASE WITH ESOPHAGITIS AND HEMORRHAGE: ICD-10-CM

## 2022-12-15 DIAGNOSIS — R53.82 CHRONIC FATIGUE: ICD-10-CM

## 2022-12-15 DIAGNOSIS — J45.40 MODERATE PERSISTENT ASTHMA WITHOUT COMPLICATION: ICD-10-CM

## 2022-12-15 DIAGNOSIS — E55.9 VITAMIN D DEFICIENCY: ICD-10-CM

## 2022-12-15 PROCEDURE — 99214 OFFICE O/P EST MOD 30 MIN: CPT | Mod: 95 | Performed by: INTERNAL MEDICINE

## 2022-12-15 RX ORDER — OMEPRAZOLE 40 MG/1
40 CAPSULE, DELAYED RELEASE ORAL 3 TIMES DAILY
Qty: 270 CAPSULE | Refills: 3 | Status: SHIPPED | OUTPATIENT
Start: 2022-12-15 | End: 2023-09-28

## 2022-12-15 RX ORDER — PROMETHAZINE HYDROCHLORIDE 25 MG/1
25 TABLET ORAL 2 TIMES DAILY PRN
Qty: 30 TABLET | Refills: 3 | Status: SHIPPED | OUTPATIENT
Start: 2022-12-15 | End: 2023-05-10

## 2022-12-15 RX ORDER — MONTELUKAST SODIUM 10 MG/1
10 TABLET ORAL AT BEDTIME
Qty: 90 TABLET | Refills: 3 | Status: SHIPPED | OUTPATIENT
Start: 2022-12-15 | End: 2023-09-28

## 2022-12-15 RX ORDER — OXYCODONE HYDROCHLORIDE 10 MG/1
10 TABLET ORAL 3 TIMES DAILY PRN
Qty: 90 TABLET | Refills: 0 | Status: CANCELLED | OUTPATIENT
Start: 2022-12-15

## 2022-12-15 RX ORDER — OMEPRAZOLE 40 MG/1
40 CAPSULE, DELAYED RELEASE ORAL 3 TIMES DAILY
Qty: 270 CAPSULE | Refills: 3 | Status: CANCELLED | OUTPATIENT
Start: 2022-12-15

## 2022-12-15 RX ORDER — BUSPIRONE HYDROCHLORIDE 30 MG/1
30 TABLET ORAL 2 TIMES DAILY
Qty: 180 TABLET | Refills: 3 | Status: SHIPPED | OUTPATIENT
Start: 2022-12-15 | End: 2024-01-12

## 2022-12-15 ASSESSMENT — ANXIETY QUESTIONNAIRES
7. FEELING AFRAID AS IF SOMETHING AWFUL MIGHT HAPPEN: NEARLY EVERY DAY
GAD7 TOTAL SCORE: 11
1. FEELING NERVOUS, ANXIOUS, OR ON EDGE: SEVERAL DAYS
5. BEING SO RESTLESS THAT IT IS HARD TO SIT STILL: NEARLY EVERY DAY
3. WORRYING TOO MUCH ABOUT DIFFERENT THINGS: SEVERAL DAYS
6. BECOMING EASILY ANNOYED OR IRRITABLE: SEVERAL DAYS
IF YOU CHECKED OFF ANY PROBLEMS ON THIS QUESTIONNAIRE, HOW DIFFICULT HAVE THESE PROBLEMS MADE IT FOR YOU TO DO YOUR WORK, TAKE CARE OF THINGS AT HOME, OR GET ALONG WITH OTHER PEOPLE: VERY DIFFICULT
GAD7 TOTAL SCORE: 11
2. NOT BEING ABLE TO STOP OR CONTROL WORRYING: SEVERAL DAYS

## 2022-12-15 ASSESSMENT — PATIENT HEALTH QUESTIONNAIRE - PHQ9
SUM OF ALL RESPONSES TO PHQ QUESTIONS 1-9: 15
5. POOR APPETITE OR OVEREATING: SEVERAL DAYS

## 2022-12-15 NOTE — PATIENT INSTRUCTIONS
Mental health  Best treatment for anxiety and depression is therapy  Would recommend specific PTSD therapist     Pain:  Recommend to see pain clinic - switch to Butrans patch and pain physical therapy   Nausea is at least partially driven by the opiates     Fatigue  Order for labs for tomorrow

## 2022-12-15 NOTE — PROGRESS NOTES
Molly is a 37 year old who is being evaluated via a billable video visit.      How would you like to obtain your AVS? MyChart  If the video visit is dropped, the invitation should be resent by: Text to cell phone: [phone]  Will anyone else be joining your video visit? No        Assessment & Plan     Chronic pain syndrome -patient continues to have uncontrolled pain despite moderate/high doses of oxycodone.  She is also having escalating side effects including ongoing nausea and vomiting and interfering mental health.  May benefit more from a Butrans patch?  Would also benefit from pain physical therapy to help improve function and decrease falls.  Referral placed to the pain clinic.  She did request a refill early this month, anticipated due to recent falls and dental issues  - Pain Management  Referral; Future    Moderate major depression (H) -not well controlled, especially with PTSD/anxiety.  We have discussed going back to psychotherapy but her busy schedule is caring for her special needs son has interfered.  No changes to medications.  Encouraged her to prioritize the mental health appointment   - busPIRone HCl (BUSPAR) 30 MG tablet; Take 1 tablet (30 mg) by mouth 2 times daily    Moderate persistent asthma without complication  - stable, refill provided  - montelukast (SINGULAIR) 10 MG tablet; Take 1 tablet (10 mg) by mouth At Bedtime    CREST (calcinosis, Raynaud's phenomenon, esophageal dysfunction, sclerodactyly, telangiectasia) (H)  -she met with GI recently her reluctantly approved 3 times daily dosing of PPI.  There is discussion about surgical procedure for refractory GERD, the patient opts to continue with conservative care at this time  - omeprazole (PRILOSEC) 40 MG DR capsule; Take 1 capsule (40 mg) by mouth 3 times daily Has seen GI who has approved TID dosing  - promethazine (PHENERGAN) 25 MG tablet; Take 1 tablet (25 mg) by mouth 2 times daily as needed for  "nausea    Gastroesophageal reflux disease with esophagitis and hemorrhage  - omeprazole (PRILOSEC) 40 MG DR capsule; Take 1 capsule (40 mg) by mouth 3 times daily Has seen GI who has approved TID dosing  - Iron; Future    Chronic fatigue - check labs  - CBC with platelets; Future  - Iron; Future  - Vitamin B12; Future  - Basic metabolic panel  (Ca, Cl, CO2, Creat, Gluc, K, Na, BUN); Future  - TSH with free T4 reflex; Future  - Vitamin D Deficiency; Future    Vitamin D deficiency  - Vitamin D Deficiency; Future    Anemia, chronic disease  - Iron; Future             BMI:   Estimated body mass index is 26.87 kg/m  as calculated from the following:    Height as of 12/13/22: 1.568 m (5' 1.75\").    Weight as of 12/13/22: 66.1 kg (145 lb 11.2 oz).       Patient Instructions   Mental health  1. Best treatment for anxiety and depression is therapy  2. Would recommend specific PTSD therapist     Pain:  1. Recommend to see pain clinic - switch to Butrans patch and pain physical therapy   2. Nausea is at least partially driven by the opiates     Fatigue  1. Order for labs for tomorrow      No follow-ups on file.    Grecia Barba, [facility]    Amanda Barnes is a 37 year old, presenting for the following health issues:  Recheck Medication    HPI     Pain History:  When did you first notice your pain? - Chronic Pain   Have you seen this provider for your pain in the past?   Yes   Where in your body do you have pain? All over joints   Are you seeing anyone else for your pain? No     PHQ-9 SCORE 7/7/2022 11/3/2022 12/15/2022   PHQ-9 Total Score MyChart 13 (Moderate depression) 11 (Moderate depression) -   PHQ-9 Total Score 13 11 15   PHQ-A Total Score - - -       REX-7 SCORE 11/10/2021 6/24/2022 12/15/2022   Total Score - - -   Total Score 14 7 11               Chronic Pain Follow Up:    Location of pain: all over  Analgesia/pain control:    - Recent changes:  worse    - Overall control: " Inadequate pain control    - Current treatments: oxycodone   Adherence:     - Do you ever take more pain medicine than prescribed? Yes: recent falls, dental issues    - When did you take your last dose of pain medicine?  today   Adverse effects: Yes: nausea   -she called in on 12/7 asking for early refill of oxycodone due to recent falls and dental issues;  She was out of meds 2 days early;  She did have w/d side effects   --reports the fall caused PTSD/panic attack because she felt like she had no control of her body  --was triggered by a TV show where the character was struggling with PTSD    Fatigue:  --is sleeping more - wants to check labs;  Has appointment tomorrow for lab  --thinks she fell due to fatigue    Rheum - has appointment in march with [facility];  On cancellation list    PDMP Review       Value Time User    State PDMP site checked  Yes 12/6/2022  3:43 PM Shima Combs, [facility] CNP        Last CSA Agreement:   CSA -- Patient Level:     [Media Unavailable] Controlled Substance Agreement - Opioid - Scan on 7/11/2022 12:11 PM   [Media Unavailable] Controlled Substance Agreement - Opioid - Scan on 12/27/2020  1:35 PM   [Media Unavailable] Controlled Substance Agreement - Opioid - Scan on 2/6/2019  6:23 PM       Last UDS: 7/11/2022          Depression and Anxiety Follow-Up    How are you doing with your depression since your last visit? Worsened, feeling embarrassed having to use walker and cane more frequently   How are you doing with your anxiety since your last visit?  Worsened Pt have taken a couple falls in the past couple days, getting exhausted without assistance, feet get so tired that have a hard time lifting them and it is making pt anxious. Feeling scared is going to fall again, still bruised, falls are hurting.     Are you having other symptoms that might be associated with depression or anxiety? Yes:  feeling exhausted and panic attacks    Have you had a significant life event? OTHER: health  concerns, and anxious because of pain and falls.      Do you have any concerns with your use of alcohol or other drugs? No    Social History     Tobacco Use     Smoking status: Never     Smokeless tobacco: Never   Vaping Use     Vaping Use: Every day     Substances: Flavoring, delta 8     Devices: Disposable   Substance Use Topics     Alcohol use: Not Currently     Comment: Socially once on a weekend if any     Drug use: Yes     Types: Marijuana     Comment: delta     PHQ 7/7/2022 11/3/2022 12/15/2022   PHQ-9 Total Score 13 11 15   Q9: Thoughts of better off dead/self-harm past 2 weeks Not at all Not at all Not at all   F/U: Thoughts of suicide or self-harm - - -   F/U: Self harm-plan - - -   F/U: Self-harm action - - -   F/U: Safety concerns - - -   PHQ-A Total Score - - -   PHQ-A Depressed most days in past year - - -   PHQ-A Mood affect on daily activities - - -   PHQ-A Suicide Ideation past 2 weeks - - -     REX-7 SCORE 11/10/2021 6/24/2022 12/15/2022   Total Score - - -   Total Score 14 7 11     Last PHQ-9 12/15/2022   1.  Little interest or pleasure in doing things 0   2.  Feeling down, depressed, or hopeless 1   3.  Trouble falling or staying asleep, or sleeping too much 3   4.  Feeling tired or having little energy 3   5.  Poor appetite or overeating 3   6.  Feeling bad about yourself 1   7.  Trouble concentrating 3   8.  Moving slowly or restless 1   Q9: Thoughts of better off dead/self-harm past 2 weeks 0   PHQ-9 Total Score 15   Difficulty at work, home, or with people Very difficult   In the past two weeks have you had thoughts of suicide or self harm? -   Do you have concerns about your personal safety or the safety of others? -   In the past 2 weeks have you thought about a plan or had intention to harm yourself? -   In the past 2 weeks have you acted on these thoughts in any way? -     REX-7  12/15/2022   1. Feeling nervous, anxious, or on edge 1   2. Not being able to stop or control worrying 1   3.  Worrying too much about different things 1   4. Trouble relaxing 1   5. Being so restless that it is hard to sit still 3   6. Becoming easily annoyed or irritable 1   7. Feeling afraid, as if something awful might happen 3   REX-7 Total Score 11   If you checked any problems, how difficult have they made it for you to do your work, take care of things at home, or get along with other people? Very difficult       Suicide Assessment Five-step Evaluation and Treatment (SAFE-T)            Current Outpatient Medications   Medication Sig Dispense Refill     acetaminophen (TYLENOL) 325 MG tablet Take 2 tablets (650 mg) by mouth every 4 hours as needed for mild pain or other 1 Bottle 0     albuterol (VENTOLIN HFA) 108 (90 Base) MCG/ACT inhaler INHALE 2 PUFFS INTO THE LUNGS ONCE EVERY 4 HOURS AS NEEDED FOR SHORTNESS OF BREATH / DYSPNEA / WHEEZING 18 g 11     amLODIPine (NORVASC) 2.5 MG tablet Take 1 tablet (2.5 mg) by mouth daily 90 tablet 3     busPIRone HCl (BUSPAR) 30 MG tablet Take 1 tablet (30 mg) by mouth 2 times daily 180 tablet 3     DULoxetine (CYMBALTA) 30 MG capsule Take 3 capsules (90 mg) by mouth daily 270 capsule 3     famotidine (PEPCID) 20 MG tablet TAKE ONE TABLET BY MOUTH TWICE A  tablet 3     gabapentin (NEURONTIN) 300 MG capsule Take 2 capsules (600 mg) by mouth 3 times daily 540 capsule 3     GOODSENSE MIGRAINE FORMULA 250-250-65 MG per tablet TAKE ONE TABLET BY MOUTH EVERY 6 HOURS AS NEEDED FOR HEADACHES (Patient taking differently: Take 1 tablet by mouth every 6 hours as needed) 24 tablet 1     lisinopril (ZESTRIL) 10 MG tablet Take 1 tablet (10 mg) by mouth every evening 90 tablet 3     loratadine (CLARITIN) 10 MG tablet Take 10 mg by mouth daily as needed        LORazepam (ATIVAN) 1 MG tablet Take 1 tablet (1 mg) by mouth 2 times daily as needed for anxiety #45 for 30 days 45 tablet 5     medical cannabis (Patient's own supply) (The purpose of this order is to document that the patient reports  taking medical cannabis.  This is not a prescription, and is not used to certify that the patient has a qualifying medical condition.)  CBG gummy over the counter 0 Information only 0     methocarbamol (ROBAXIN) 750 MG tablet Take 1 tablet (750 mg) by mouth 3 times daily as needed for muscle spasms 180 tablet 3     metoclopramide (REGLAN) 10 MG tablet Take 1 tablet (10 mg) by mouth 3 times daily as needed (nausea/vomiting) 50 tablet 1     montelukast (SINGULAIR) 10 MG tablet Take 1 tablet (10 mg) by mouth At Bedtime 90 tablet 3     naloxone (NARCAN) 4 MG/0.1ML nasal spray Spray 1 spray (4 mg) into one nostril alternating nostrils once as needed for opioid reversal every 2-3 minutes until assistance arrives 0.2 mL 3     omeprazole (PRILOSEC) 40 MG DR capsule TAKE ONE CAPSULE BY MOUTH TWICE A  capsule 1     ondansetron (ZOFRAN-ODT) 4 MG ODT tab Take 1 tablet (4 mg) by mouth every 8 hours as needed for nausea 30 tablet 4     [START ON 1/9/2023] oxyCODONE (ROXICODONE) 10 MG tablet Take 1 tablet (10 mg) by mouth 3 times daily as needed for pain 90 tablet 0     polyethylene glycol (MIRALAX) 17 GM/Dose powder Take 17 g by mouth daily 510 g 11     promethazine (PHENERGAN) 25 MG tablet TAKE ONE TABLET BY MOUTH TWICE A DAY AS NEEDED NAUSEA (Patient taking differently: daily as needed TAKE ONE TABLET BY MOUTH TWICE A DAY AS NEEDED NAUSEA) 30 tablet 7     promethazine (PHENERGAN) 25 MG/ML IV injection INJECT 1 ML INTRAMUSCULARLY EVERY 6 HOURS AS NEEDED 6 mL 11     blood glucose (NO BRAND SPECIFIED) lancets standard Use to test blood sugar 1 time daily 100 each 3     blood glucose (NO BRAND SPECIFIED) test strip Use to test blood sugar 1 time daily 100 strip 11     budesonide-formoterol (SYMBICORT) 160-4.5 MCG/ACT Inhaler INHALE TWO PUFFS BY MOUTH TWICE A DAY 10.2 g 11     Cyanocobalamin (B-12) 1000 MCG TBCR Take 1,000 mcg by mouth daily (Patient not taking: Reported on 11/3/2022) 100 tablet 1     oxyCODONE IR  "(ROXICODONE) 10 MG tablet Take 1 tablet (10 mg) by mouth 3 times daily as needed for pain 90 tablet 0     syringe/needle, disp, (BD ECLIPSE SYRINGE) 25G X 1\" 3 ML MISC 1 each daily as needed 10 each 11         Review of Systems   Constitutional, HEENT, cardiovascular, pulmonary, gi and gu systems are negative, except as otherwise noted.      Objective           Vitals:  No vitals were obtained today due to virtual visit.    Physical Exam   GENERAL: alert and no distress  EYES: Eyes grossly normal to inspection.  No discharge or erythema, or obvious scleral/conjunctival abnormalities.  RESP: No audible wheeze, cough, or visible cyanosis.  No visible retractions or increased work of breathing.    SKIN: Visible skin clear. No significant rash, abnormal pigmentation or lesions.  NEURO: Cranial nerves grossly intact.  Mentation and speech appropriate for age.  PSYCH: Mentation appears normal, affect normal/bright, judgement and insight intact, normal speech and appearance well-groomed.                Video-Visit Details    Video Start Time: 2:31 PM    Type of service:  Video Visit    Video End Time:2:57 PM    Originating Location (pt. Location): Home        Distant Location (provider location):  On-site    Platform used for Video Visit: Jimbo"

## 2022-12-15 NOTE — TELEPHONE ENCOUNTER
Prior Authorization Retail Medication Request    Medication/Dose: Omeprazole 40 mg  ICD code (if different than what is on RX):    Previously Tried and Failed:    Rationale:      Insurance Name:  [employer] PART D  Insurance ID:  [id number]      Pharmacy Information (if different than what is on RX)  Name:  [facility]  Phone:  [phone]    Happy Thursday!    This medication needs a PA. Please contact pharmacy with PA denial/approval.     Thank you for all you do,  Antonia Guillen CPhT  [facility]  On Behalf of [facility]

## 2022-12-16 ENCOUNTER — LAB (OUTPATIENT)
Dept: LAB | Facility: CLINIC | Age: 37
End: 2022-12-16
Payer: MEDICARE

## 2022-12-16 DIAGNOSIS — D63.8 ANEMIA, CHRONIC DISEASE: ICD-10-CM

## 2022-12-16 DIAGNOSIS — K21.01 GASTROESOPHAGEAL REFLUX DISEASE WITH ESOPHAGITIS AND HEMORRHAGE: ICD-10-CM

## 2022-12-16 DIAGNOSIS — R53.82 CHRONIC FATIGUE: ICD-10-CM

## 2022-12-16 DIAGNOSIS — E55.9 VITAMIN D DEFICIENCY: ICD-10-CM

## 2022-12-16 LAB
ANION GAP SERPL CALCULATED.3IONS-SCNC: 9 MMOL/L (ref 7–15)
BUN SERPL-MCNC: 14.4 MG/DL (ref 6–20)
CALCIUM SERPL-MCNC: 9.2 MG/DL (ref 8.6–10)
CHLORIDE SERPL-SCNC: 96 MMOL/L (ref 98–107)
CREAT SERPL-MCNC: 0.96 MG/DL (ref 0.51–0.95)
DEPRECATED HCO3 PLAS-SCNC: 24 MMOL/L (ref 22–29)
ERYTHROCYTE [DISTWIDTH] IN BLOOD BY AUTOMATED COUNT: 16.8 % (ref 10–15)
FERRITIN SERPL-MCNC: 24 NG/ML (ref 6–175)
GFR SERPL CREATININE-BSD FRML MDRD: 78 ML/MIN/1.73M2
GLUCOSE SERPL-MCNC: 79 MG/DL (ref 70–99)
HCT VFR BLD AUTO: 35.4 % (ref 35–47)
HGB BLD-MCNC: 10.9 G/DL (ref 11.7–15.7)
IRON SERPL-MCNC: 59 UG/DL (ref 37–145)
MCH RBC QN AUTO: 26.5 PG (ref 26.5–33)
MCHC RBC AUTO-ENTMCNC: 30.8 G/DL (ref 31.5–36.5)
MCV RBC AUTO: 86 FL (ref 78–100)
PLATELET # BLD AUTO: 430 10E3/UL (ref 150–450)
POTASSIUM SERPL-SCNC: 4.3 MMOL/L (ref 3.4–5.3)
RBC # BLD AUTO: 4.12 10E6/UL (ref 3.8–5.2)
SODIUM SERPL-SCNC: 129 MMOL/L (ref 136–145)
TSH SERPL DL<=0.005 MIU/L-ACNC: 1.24 UIU/ML (ref 0.3–4.2)
VIT B12 SERPL-MCNC: <150 PG/ML (ref 232–1245)
WBC # BLD AUTO: 12.3 10E3/UL (ref 4–11)

## 2022-12-16 PROCEDURE — 82607 VITAMIN B-12: CPT

## 2022-12-16 PROCEDURE — 36415 COLL VENOUS BLD VENIPUNCTURE: CPT

## 2022-12-16 PROCEDURE — 85027 COMPLETE CBC AUTOMATED: CPT

## 2022-12-16 PROCEDURE — 84443 ASSAY THYROID STIM HORMONE: CPT

## 2022-12-16 PROCEDURE — 82728 ASSAY OF FERRITIN: CPT

## 2022-12-16 PROCEDURE — 80048 BASIC METABOLIC PNL TOTAL CA: CPT

## 2022-12-16 PROCEDURE — 83540 ASSAY OF IRON: CPT

## 2022-12-16 PROCEDURE — 82306 VITAMIN D 25 HYDROXY: CPT

## 2022-12-16 NOTE — RESULT ENCOUNTER NOTE
The sodium is low; ensure you are drinking adequate fluids.  Iron and ferritin levels are normal, hemoglobin is slightly lower than previous.  You do not need iron infusion or blood transfusion at this time.  The white blood cell count is mildly elevated, similar to multiple previous values

## 2022-12-16 NOTE — TELEPHONE ENCOUNTER
Central Prior Authorization Team   Phone: [phone]      Prior Authorization Approval    Authorization Effective Date: 1/1/2022  Authorization Expiration Date: 12/31/2023  Medication: omeprazole (PRILOSEC) 40 MG DR capsule - PA APPROVED  Insurance Company: [employer] - Phone [phone] Fax [phone]  Which Pharmacy is filling the prescription (Not needed for infusion/clinic administered): [facility] [address]  Pharmacy Notified: Yes  Patient Notified: Yes (pharmacy will notify patient when ready)

## 2022-12-18 LAB — DEPRECATED CALCIDIOL+CALCIFEROL SERPL-MC: 8 UG/L (ref 20–75)

## 2022-12-20 DIAGNOSIS — E53.8 VITAMIN B12 DEFICIENCY (NON ANEMIC): Primary | ICD-10-CM

## 2022-12-20 DIAGNOSIS — E55.9 VITAMIN D DEFICIENCY: ICD-10-CM

## 2022-12-20 RX ORDER — ERGOCALCIFEROL 1.25 MG/1
50000 CAPSULE, LIQUID FILLED ORAL WEEKLY
Qty: 12 CAPSULE | Refills: 0 | Status: SHIPPED | OUTPATIENT
Start: 2022-12-20 | End: 2023-03-20

## 2022-12-20 RX ORDER — CHOLECALCIFEROL (VITAMIN D3) 50 MCG
1 TABLET ORAL DAILY
Qty: 90 TABLET | Refills: 3 | Status: SHIPPED | OUTPATIENT
Start: 2022-12-20 | End: 2023-09-29

## 2022-12-20 NOTE — RESULT ENCOUNTER NOTE
I would still like her to come back in the next 1-2 weeks to repeat the blood work, hold off on starting back the B12 until we have the repeat blood work

## 2022-12-20 NOTE — TELEPHONE ENCOUNTER
Duplicate request    Render Risk Assessment In Note?: no Recommendation Preamble: The following recommendations were made during the visit: hats, sunglasses and UV clothing. Detail Level: Zone

## 2022-12-20 NOTE — RESULT ENCOUNTER NOTE
The vitamin D level is extremely low.  I recommend to take high-dose vitamin D ergocalciferol 50,000 units weekly for 3 months, then maintenance dose vitamin D cholecalciferol 2000 units daily.  Both medications were sent to the pharmacy.  We should recheck your vit D levels in about 2 months      The B12 level is extremely low.  I would recommend repeating this level, as I am sometimes seen falsely low levels.  I will also check further testing to verify that the B12 is truly low and to determine the cause of the low B12.  Proton pump inhibitors such as omeprazole are known to cause low B12    Please schedule a lab only appointment to recheck the B12 levels

## 2023-01-03 ENCOUNTER — LAB (OUTPATIENT)
Dept: LAB | Facility: CLINIC | Age: 38
End: 2023-01-03
Payer: MEDICARE

## 2023-01-03 DIAGNOSIS — E53.8 VITAMIN B12 DEFICIENCY (NON ANEMIC): ICD-10-CM

## 2023-01-03 LAB
FOLATE SERPL-MCNC: 8.9 NG/ML (ref 4.6–34.8)
VIT B12 SERPL-MCNC: 377 PG/ML (ref 232–1245)

## 2023-01-03 PROCEDURE — 83090 ASSAY OF HOMOCYSTEINE: CPT

## 2023-01-03 PROCEDURE — 82746 ASSAY OF FOLIC ACID SERUM: CPT

## 2023-01-03 PROCEDURE — 82607 VITAMIN B-12: CPT

## 2023-01-03 PROCEDURE — 36415 COLL VENOUS BLD VENIPUNCTURE: CPT

## 2023-01-03 PROCEDURE — 83921 ORGANIC ACID SINGLE QUANT: CPT

## 2023-01-04 LAB — HCYS SERPL-SCNC: 18.7 UMOL/L (ref 4–12)

## 2023-01-05 DIAGNOSIS — E53.8 FOLATE DEFICIENCY: ICD-10-CM

## 2023-01-05 DIAGNOSIS — E53.8 VITAMIN B12 DEFICIENCY (NON ANEMIC): Primary | ICD-10-CM

## 2023-01-05 LAB — METHYLMALONATE SERPL-SCNC: 0.15 UMOL/L (ref 0–0.4)

## 2023-01-05 RX ORDER — PNV NO.95/FERROUS FUM/FOLIC AC 28MG-0.8MG
1 TABLET ORAL DAILY
Qty: 90 TABLET | Refills: 3 | Status: SHIPPED | OUTPATIENT
Start: 2023-01-05 | End: 2024-05-17

## 2023-01-05 NOTE — RESULT ENCOUNTER NOTE
There is evidence of both mild B 12 and folate deficiency, likely due to chronic malabsorption from reflux and the omeprazole needed to control the reflux.  I recommend a prenatal vitamin once daily - it has both B 12 and folate in it.  I discussed with pharmacy, they will fill for her.  We will recheck levels in 1-2 months, orders placed (in another encounter)

## 2023-01-23 ENCOUNTER — HEALTH MAINTENANCE LETTER (OUTPATIENT)
Age: 38
End: 2023-01-23

## 2023-02-15 DIAGNOSIS — R11.0 NAUSEA: ICD-10-CM

## 2023-02-17 RX ORDER — PROMETHAZINE HYDROCHLORIDE 25 MG/ML
INJECTION, SOLUTION INTRAMUSCULAR; INTRAVENOUS
Qty: 6 ML | Refills: 5 | Status: SHIPPED | OUTPATIENT
Start: 2023-02-17 | End: 2023-02-21

## 2023-02-17 NOTE — TELEPHONE ENCOUNTER
Pending Prescriptions:                       Disp   Refills    promethazine (PHENERGAN) 25 MG/ML IV inje*6 mL   5            Sig: INJECT 1 ML INTRAMUSCULARLY EVERY 6 HOURS AS           NEEDED    Prescription approved per [facility] Refill Protocol.  Jia Oneill RN

## 2023-02-21 RX ORDER — PROMETHAZINE HYDROCHLORIDE 25 MG/ML
25 INJECTION, SOLUTION INTRAMUSCULAR; INTRAVENOUS EVERY 6 HOURS PRN
Qty: 6 ML | Refills: 1 | Status: SHIPPED | OUTPATIENT
Start: 2023-02-21 | End: 2023-04-17

## 2023-03-15 DIAGNOSIS — G89.4 CHRONIC PAIN SYNDROME: Chronic | ICD-10-CM

## 2023-03-16 RX ORDER — OXYCODONE HYDROCHLORIDE 10 MG/1
10 TABLET ORAL 3 TIMES DAILY PRN
Qty: 90 TABLET | Refills: 0 | Status: SHIPPED | OUTPATIENT
Start: 2023-03-16 | End: 2023-04-17

## 2023-04-14 DIAGNOSIS — R11.0 NAUSEA: ICD-10-CM

## 2023-04-14 DIAGNOSIS — G89.4 CHRONIC PAIN SYNDROME: Chronic | ICD-10-CM

## 2023-04-17 RX ORDER — OXYCODONE HYDROCHLORIDE 10 MG/1
10 TABLET ORAL 3 TIMES DAILY PRN
Qty: 90 TABLET | Refills: 0 | Status: SHIPPED | OUTPATIENT
Start: 2023-04-17 | End: 2023-05-10

## 2023-04-17 RX ORDER — PROMETHAZINE HYDROCHLORIDE 25 MG/ML
25 INJECTION, SOLUTION INTRAMUSCULAR; INTRAVENOUS EVERY 6 HOURS PRN
Qty: 6 ML | Refills: 1 | Status: SHIPPED | OUTPATIENT
Start: 2023-04-17 | End: 2024-01-12

## 2023-04-17 NOTE — TELEPHONE ENCOUNTER
Message given to patient with good understanding.Patient made f/u appt for 5/11/23. Laya Burger on 4/17/2023 at 10:11 AM

## 2023-04-19 ENCOUNTER — MYC MEDICAL ADVICE (OUTPATIENT)
Dept: FAMILY MEDICINE | Facility: CLINIC | Age: 38
End: 2023-04-19
Payer: MEDICARE

## 2023-04-20 ENCOUNTER — OFFICE VISIT (OUTPATIENT)
Dept: FAMILY MEDICINE | Facility: CLINIC | Age: 38
End: 2023-04-20
Payer: MEDICARE

## 2023-04-20 ENCOUNTER — TELEPHONE (OUTPATIENT)
Dept: FAMILY MEDICINE | Facility: CLINIC | Age: 38
End: 2023-04-20

## 2023-04-20 VITALS
DIASTOLIC BLOOD PRESSURE: 80 MMHG | BODY MASS INDEX: 26.68 KG/M2 | RESPIRATION RATE: 16 BRPM | HEART RATE: 78 BPM | TEMPERATURE: 98 F | WEIGHT: 145 LBS | SYSTOLIC BLOOD PRESSURE: 118 MMHG | HEIGHT: 62 IN

## 2023-04-20 DIAGNOSIS — D50.0 IRON DEFICIENCY ANEMIA DUE TO CHRONIC BLOOD LOSS: ICD-10-CM

## 2023-04-20 DIAGNOSIS — G89.4 CHRONIC PAIN SYNDROME: Chronic | ICD-10-CM

## 2023-04-20 DIAGNOSIS — D63.8 ANEMIA, CHRONIC DISEASE: Primary | ICD-10-CM

## 2023-04-20 PROCEDURE — 99214 OFFICE O/P EST MOD 30 MIN: CPT | Performed by: INTERNAL MEDICINE

## 2023-04-20 RX ORDER — ALBUTEROL SULFATE 0.83 MG/ML
2.5 SOLUTION RESPIRATORY (INHALATION)
Status: CANCELLED | OUTPATIENT
Start: 2023-04-20

## 2023-04-20 RX ORDER — HEPARIN SODIUM (PORCINE) LOCK FLUSH IV SOLN 100 UNIT/ML 100 UNIT/ML
5 SOLUTION INTRAVENOUS
Status: CANCELLED | OUTPATIENT
Start: 2023-04-20

## 2023-04-20 RX ORDER — ALBUTEROL SULFATE 90 UG/1
1-2 AEROSOL, METERED RESPIRATORY (INHALATION)
Status: CANCELLED
Start: 2023-04-20

## 2023-04-20 RX ORDER — DIPHENHYDRAMINE HYDROCHLORIDE 50 MG/ML
50 INJECTION INTRAMUSCULAR; INTRAVENOUS
Status: CANCELLED
Start: 2023-04-20

## 2023-04-20 RX ORDER — HEPARIN SODIUM,PORCINE 10 UNIT/ML
5 VIAL (ML) INTRAVENOUS
Status: CANCELLED | OUTPATIENT
Start: 2023-04-20

## 2023-04-20 RX ORDER — MEPERIDINE HYDROCHLORIDE 25 MG/ML
25 INJECTION INTRAMUSCULAR; INTRAVENOUS; SUBCUTANEOUS EVERY 30 MIN PRN
Status: CANCELLED | OUTPATIENT
Start: 2023-04-20

## 2023-04-20 RX ORDER — METHYLPREDNISOLONE SODIUM SUCCINATE 125 MG/2ML
125 INJECTION, POWDER, LYOPHILIZED, FOR SOLUTION INTRAMUSCULAR; INTRAVENOUS
Status: CANCELLED
Start: 2023-04-20

## 2023-04-20 RX ORDER — EPINEPHRINE 1 MG/ML
0.3 INJECTION, SOLUTION, CONCENTRATE INTRAVENOUS EVERY 5 MIN PRN
Status: CANCELLED | OUTPATIENT
Start: 2023-04-20

## 2023-04-20 ASSESSMENT — ANXIETY QUESTIONNAIRES
3. WORRYING TOO MUCH ABOUT DIFFERENT THINGS: MORE THAN HALF THE DAYS
7. FEELING AFRAID AS IF SOMETHING AWFUL MIGHT HAPPEN: MORE THAN HALF THE DAYS
IF YOU CHECKED OFF ANY PROBLEMS ON THIS QUESTIONNAIRE, HOW DIFFICULT HAVE THESE PROBLEMS MADE IT FOR YOU TO DO YOUR WORK, TAKE CARE OF THINGS AT HOME, OR GET ALONG WITH OTHER PEOPLE: SOMEWHAT DIFFICULT
1. FEELING NERVOUS, ANXIOUS, OR ON EDGE: SEVERAL DAYS
2. NOT BEING ABLE TO STOP OR CONTROL WORRYING: SEVERAL DAYS
GAD7 TOTAL SCORE: 8
6. BECOMING EASILY ANNOYED OR IRRITABLE: SEVERAL DAYS
GAD7 TOTAL SCORE: 8
5. BEING SO RESTLESS THAT IT IS HARD TO SIT STILL: NOT AT ALL

## 2023-04-20 ASSESSMENT — PATIENT HEALTH QUESTIONNAIRE - PHQ9
5. POOR APPETITE OR OVEREATING: SEVERAL DAYS
SUM OF ALL RESPONSES TO PHQ QUESTIONS 1-9: 16
SUM OF ALL RESPONSES TO PHQ QUESTIONS 1-9: 16
10. IF YOU CHECKED OFF ANY PROBLEMS, HOW DIFFICULT HAVE THESE PROBLEMS MADE IT FOR YOU TO DO YOUR WORK, TAKE CARE OF THINGS AT HOME, OR GET ALONG WITH OTHER PEOPLE: SOMEWHAT DIFFICULT

## 2023-04-20 ASSESSMENT — ASTHMA QUESTIONNAIRES: ACT_TOTALSCORE: 16

## 2023-04-20 ASSESSMENT — PAIN SCALES - GENERAL: PAINLEVEL: SEVERE PAIN (7)

## 2023-04-20 NOTE — PATIENT INSTRUCTIONS
GI  Anemia  I will contact Dr. Moses about the bleeding - may need upper and lower scope  Keep appointment for GI in June  Order for iron infusion- Injectafer 750 mg x 2 doses, 1 week apart.  Will order Methylprednisolone 125 mg IV to be given before iron infusion  Check hemoglobin ~ 1-2 weeks after 2nd infusion, sooner if symptoms are worsening

## 2023-04-20 NOTE — TELEPHONE ENCOUNTER
[facility] care team    Patient sees Dr. Moses at [facility].  She has acute on chronic anemia due to upper and lower GI bleed (see my note). She is not scheduled to see Dr. Moses until June.  She will likely need upper and lower scopes ASAP.  I ordered iron infusions due to anemia and will manage her hemoglobin.  Please contact Dr. Moses clinic to relay the message so he can formulate a plan

## 2023-04-20 NOTE — PROGRESS NOTES
Assessment & Plan     Anemia, chronic disease - order for iron infusion - see plan below.  Give steroid prior to history of infusion related reaction.  Needs ASAP follow-up with GI given UGI and Lower GI bleeding.  Sent message to her GI doc to manage    Chronic pain syndrome - has follow-up with me in may    Iron deficiency anemia due to chronic blood loss - see above      Patient Instructions   GI  Anemia  1. I will contact Dr. Moses about the bleeding - may need upper and lower scope  2. Keep appointment for GI in [place]  3. Order for iron infusion- Injectafer 750 mg x 2 doses, 1 week apart.  Will order Methylprednisolone 125 mg IV to be given before iron infusion  4. Check hemoglobin ~ 1-2 weeks after 2nd infusion, sooner if symptoms are worsening           Grecia Barba, [facility]    Amanda Barnes is a 37 year old, presenting for the following health issues:  Anemia        4/20/2023    11:54 AM   Additional Questions   Roomed by Jovita GAMA     Anemia  Onset: 2-3 weeks   Description: feeling weak, nausea, fatigued. Was seen at [facility] yesterday   Intensity: severe  Progression of Symptoms:  worsening  Accompanying Signs & Symptoms: headache  Previous history of similar problem: headache  Precipitating factors:        Worsened by: dehydration   Alleviating factors:        Improved by: nothing   Therapies tried and outcome:  none   --Hgb was 8.7 on 4/18  --had infusion related reaction to Venofer (iron sucrose) on 1/6/22  --Rheum recommended to see GI - passing bright red blood from rectum, sometimes melena  --has not had lower scope done recently  --feels very fatigued recently - onset of when she developed GI bleeding    Rheum:  --saw [facility] Rheum on 4/18l not is not available on Care Everywhere  --she has Derm, CV appointment 7/11  --having increase in pain, bilateral knees and elbows  --worsening raynauds  --Rheum did not have any recommendation for pain or raynauds.   They referred her to derm for skin pigment issues  --immunosuppression was discussed but decided against      Pain:  --I had recommended referral to Pain clinic  --rheum wanted her to see Acupuncture    Anxiety:  --has been better    Current Outpatient Medications   Medication Sig Dispense Refill     acetaminophen (TYLENOL) 325 MG tablet Take 2 tablets (650 mg) by mouth every 4 hours as needed for mild pain or other 1 Bottle 0     albuterol (VENTOLIN HFA) 108 (90 Base) MCG/ACT inhaler INHALE 2 PUFFS INTO THE LUNGS ONCE EVERY 4 HOURS AS NEEDED FOR SHORTNESS OF BREATH / DYSPNEA / WHEEZING 18 g 11     amLODIPine (NORVASC) 2.5 MG tablet Take 1 tablet (2.5 mg) by mouth daily 90 tablet 3     budesonide-formoterol (SYMBICORT) 160-4.5 MCG/ACT Inhaler INHALE TWO PUFFS BY MOUTH TWICE A DAY 10.2 g 11     busPIRone HCl (BUSPAR) 30 MG tablet Take 1 tablet (30 mg) by mouth 2 times daily 180 tablet 3     DULoxetine (CYMBALTA) 30 MG capsule Take 3 capsules (90 mg) by mouth daily 270 capsule 3     famotidine (PEPCID) 20 MG tablet TAKE ONE TABLET BY MOUTH TWICE A  tablet 3     gabapentin (NEURONTIN) 300 MG capsule Take 2 capsules (600 mg) by mouth 3 times daily 540 capsule 3     GOODSENSE MIGRAINE FORMULA 250-250-65 MG per tablet TAKE ONE TABLET BY MOUTH EVERY 6 HOURS AS NEEDED FOR HEADACHES (Patient taking differently: Take 1 tablet by mouth every 6 hours as needed) 24 tablet 1     lisinopril (ZESTRIL) 10 MG tablet Take 1 tablet (10 mg) by mouth every evening 90 tablet 3     loratadine (CLARITIN) 10 MG tablet Take 10 mg by mouth daily as needed        LORazepam (ATIVAN) 1 MG tablet Take 1 tablet (1 mg) by mouth 2 times daily as needed for anxiety #45 for 30 days 45 tablet 5     medical cannabis (Patient's own supply) (The purpose of this order is to document that the patient reports taking medical cannabis.  This is not a prescription, and is not used to certify that the patient has a qualifying medical condition.)  CBG gummy  over the counter 0 Information only 0     methocarbamol (ROBAXIN) 750 MG tablet Take 1 tablet (750 mg) by mouth 3 times daily as needed for muscle spasms 180 tablet 3     metoclopramide (REGLAN) 10 MG tablet Take 1 tablet (10 mg) by mouth 3 times daily as needed (nausea/vomiting) 50 tablet 1     montelukast (SINGULAIR) 10 MG tablet Take 1 tablet (10 mg) by mouth At Bedtime 90 tablet 3     naloxone (NARCAN) 4 MG/0.1ML nasal spray Spray 1 spray (4 mg) into one nostril alternating nostrils once as needed for opioid reversal every 2-3 minutes until assistance arrives 0.2 mL 3     omeprazole (PRILOSEC) 40 MG DR capsule Take 1 capsule (40 mg) by mouth 3 times daily Has seen GI who has approved TID dosing 270 capsule 3     ondansetron (ZOFRAN-ODT) 4 MG ODT tab Take 1 tablet (4 mg) by mouth every 8 hours as needed for nausea 30 tablet 4     oxyCODONE IR (ROXICODONE) 10 MG tablet TAKE 1 TABLET (10 MG) BY MOUTH 3 TIMES DAILY AS NEEDED FOR SEVERE PAIN (7-10) 90 tablet 0     polyethylene glycol (MIRALAX) 17 GM/Dose powder Take 17 g by mouth daily 510 g 11     Prenatal Vit-Fe Fumarate-FA (PRENATAL VITAMIN AND MINERAL) 28-0.8 MG TABS Take 1 tablet by mouth daily 90 tablet 3     promethazine (PHENERGAN) 25 MG tablet Take 1 tablet (25 mg) by mouth 2 times daily as needed for nausea 30 tablet 3     promethazine (PHENERGAN) 25 MG/ML IV injection INJECT 1 ML (25 MG) INTO THE MUSCLE EVERY 6 HOURS AS NEEDED FOR NAUSEA OR VOMITING 6 mL 1     vitamin D3 (CHOLECALCIFEROL) 50 mcg (2000 units) tablet Take 1 tablet (50 mcg) by mouth daily Start after Ergocalciferol course is complete 90 tablet 3     blood glucose (NO BRAND SPECIFIED) lancets standard Use to test blood sugar 1 time daily 100 each 3     blood glucose (NO BRAND SPECIFIED) test strip Use to test blood sugar 1 time daily 100 strip 11     Cyanocobalamin (B-12) 1000 MCG TBCR Take 1,000 mcg by mouth daily (Patient not taking: Reported on 4/20/2023) 100 tablet 1     syringe/needle,  "disp, (BD ECLIPSE SYRINGE) 25G X 1\" 3 ML MISC 1 each daily as needed 10 each 11         Review of Systems   Constitutional, HEENT, cardiovascular, pulmonary, gi and gu systems are negative, except as otherwise noted.      Objective    /80   Pulse 78   Temp 98  F (36.7  C) (Tympanic)   Resp 16   Ht 1.568 m (5' 1.75\")   Wt 65.8 kg (145 lb)   BMI 26.74 kg/m    Body mass index is 26.74 kg/m .  Physical Exam   GENERAL APPEARANCE: alert and no distress                    Answers for HPI/ROS submitted by the patient on 4/20/2023  If you checked off any problems, how difficult have these problems made it for you to do your work, take care of things at home, or get along with other people?: Somewhat difficult  PHQ9 TOTAL SCORE: 16      "

## 2023-04-21 NOTE — TELEPHONE ENCOUNTER
Jalen Moses MD Sevelin, Valorie, RN  Cc: P [facility]  Caller: Unspecified (Yesterday,  1:30 PM)  Agree, will need EGD/COL MAC given progressive anemia.     MG-team, can we check with the patient regarding proceeding with EGD/COL MAC @ MG prior to OV?  If she has questions or concerns, please let me know or we can have her schedule OV first to discuss with Efra Keating or Josy if they have a sooner open slot.   If she wants to schedule the EGD/COl then let me know and I can place orders.       Attempted to reach patient with the above info from provider. No answer, message left with call back info and also updating that writer will also send a [employer] message.    Claudia Rush RN

## 2023-05-04 ENCOUNTER — INFUSION THERAPY VISIT (OUTPATIENT)
Dept: INFUSION THERAPY | Facility: CLINIC | Age: 38
End: 2023-05-04
Attending: INTERNAL MEDICINE
Payer: MEDICARE

## 2023-05-04 VITALS — DIASTOLIC BLOOD PRESSURE: 71 MMHG | HEART RATE: 80 BPM | SYSTOLIC BLOOD PRESSURE: 112 MMHG | TEMPERATURE: 98.5 F

## 2023-05-04 DIAGNOSIS — D50.0 IRON DEFICIENCY ANEMIA DUE TO CHRONIC BLOOD LOSS: Primary | ICD-10-CM

## 2023-05-04 PROCEDURE — 96374 THER/PROPH/DIAG INJ IV PUSH: CPT

## 2023-05-04 PROCEDURE — 258N000003 HC RX IP 258 OP 636: Performed by: INTERNAL MEDICINE

## 2023-05-04 PROCEDURE — 250N000011 HC RX IP 250 OP 636: Performed by: INTERNAL MEDICINE

## 2023-05-04 PROCEDURE — 96375 TX/PRO/DX INJ NEW DRUG ADDON: CPT

## 2023-05-04 RX ORDER — DIPHENHYDRAMINE HYDROCHLORIDE 50 MG/ML
50 INJECTION INTRAMUSCULAR; INTRAVENOUS
Status: CANCELLED
Start: 2023-05-11

## 2023-05-04 RX ORDER — ALBUTEROL SULFATE 0.83 MG/ML
2.5 SOLUTION RESPIRATORY (INHALATION)
Status: CANCELLED | OUTPATIENT
Start: 2023-05-11

## 2023-05-04 RX ORDER — HEPARIN SODIUM,PORCINE 10 UNIT/ML
5 VIAL (ML) INTRAVENOUS
Status: CANCELLED | OUTPATIENT
Start: 2023-05-11

## 2023-05-04 RX ORDER — EPINEPHRINE 1 MG/ML
0.3 INJECTION, SOLUTION, CONCENTRATE INTRAVENOUS EVERY 5 MIN PRN
Status: CANCELLED | OUTPATIENT
Start: 2023-05-11

## 2023-05-04 RX ORDER — METHYLPREDNISOLONE SODIUM SUCCINATE 125 MG/2ML
125 INJECTION, POWDER, LYOPHILIZED, FOR SOLUTION INTRAMUSCULAR; INTRAVENOUS
Status: DISCONTINUED | OUTPATIENT
Start: 2023-05-04 | End: 2023-05-04 | Stop reason: HOSPADM

## 2023-05-04 RX ORDER — ALBUTEROL SULFATE 90 UG/1
1-2 AEROSOL, METERED RESPIRATORY (INHALATION)
Status: CANCELLED
Start: 2023-05-11

## 2023-05-04 RX ORDER — MEPERIDINE HYDROCHLORIDE 25 MG/ML
25 INJECTION INTRAMUSCULAR; INTRAVENOUS; SUBCUTANEOUS EVERY 30 MIN PRN
Status: CANCELLED | OUTPATIENT
Start: 2023-05-11

## 2023-05-04 RX ORDER — METHYLPREDNISOLONE SODIUM SUCCINATE 125 MG/2ML
125 INJECTION, POWDER, LYOPHILIZED, FOR SOLUTION INTRAMUSCULAR; INTRAVENOUS
Status: CANCELLED
Start: 2023-05-11

## 2023-05-04 RX ORDER — HEPARIN SODIUM (PORCINE) LOCK FLUSH IV SOLN 100 UNIT/ML 100 UNIT/ML
5 SOLUTION INTRAVENOUS
Status: CANCELLED | OUTPATIENT
Start: 2023-05-11

## 2023-05-04 RX ADMIN — SODIUM CHLORIDE 250 ML: 9 INJECTION, SOLUTION INTRAVENOUS at 13:44

## 2023-05-04 RX ADMIN — FERRIC CARBOXYMALTOSE INJECTION 750 MG: 50 INJECTION, SOLUTION INTRAVENOUS at 14:12

## 2023-05-04 RX ADMIN — METHYLPREDNISOLONE SODIUM SUCCINATE 125 MG: 125 INJECTION INTRAMUSCULAR; INTRAVENOUS at 13:45

## 2023-05-04 NOTE — PROGRESS NOTES
Infusion Nursing Note:  Molly Teague presents today for Injectafer 1/2.    Patient seen by provider today: No   present during visit today: Not Applicable.    Note: Pt has hx of hypersensitivity reaction to Venofer last January.  Solumedrol administered as ordered, 30 minutes prior to starting Injectafer.      Intravenous Access:  Peripheral IV placed.    Treatment Conditions:  Not Applicable.      Post Infusion Assessment:  Patient tolerated infusion without incident.  Patient observed for 30 minutes post Injectafer per protocol.  Blood return noted pre and post infusion.  Site patent and intact, free from redness, edema or discomfort.  No evidence of extravasations.  Access discontinued per protocol.       Discharge Plan:   Patient discharged in stable condition accompanied by: self.  Departure Mode: Ambulatory.  Pt returns in 1 week for next dose.      Sophy Mahoney RN

## 2023-05-10 ENCOUNTER — OFFICE VISIT (OUTPATIENT)
Dept: FAMILY MEDICINE | Facility: CLINIC | Age: 38
End: 2023-05-10
Payer: MEDICARE

## 2023-05-10 VITALS
HEART RATE: 89 BPM | OXYGEN SATURATION: 100 % | DIASTOLIC BLOOD PRESSURE: 70 MMHG | TEMPERATURE: 99 F | SYSTOLIC BLOOD PRESSURE: 100 MMHG | BODY MASS INDEX: 27.6 KG/M2 | WEIGHT: 150 LBS | RESPIRATION RATE: 20 BRPM | HEIGHT: 62 IN

## 2023-05-10 DIAGNOSIS — F43.10 PTSD (POST-TRAUMATIC STRESS DISORDER): ICD-10-CM

## 2023-05-10 DIAGNOSIS — Z00.00 ENCOUNTER FOR MEDICARE ANNUAL WELLNESS EXAM: Primary | ICD-10-CM

## 2023-05-10 DIAGNOSIS — F11.20 CONTINUOUS OPIOID DEPENDENCE (H): ICD-10-CM

## 2023-05-10 DIAGNOSIS — I15.0 RENOVASCULAR HYPERTENSION: ICD-10-CM

## 2023-05-10 DIAGNOSIS — K62.82 AIN GRADE I: ICD-10-CM

## 2023-05-10 DIAGNOSIS — J45.40 MODERATE PERSISTENT ASTHMA WITHOUT COMPLICATION: ICD-10-CM

## 2023-05-10 DIAGNOSIS — M34.9 LIMITED SYSTEMIC SCLEROSIS (H): ICD-10-CM

## 2023-05-10 DIAGNOSIS — E16.2 HYPOGLYCEMIA: ICD-10-CM

## 2023-05-10 DIAGNOSIS — E55.9 VITAMIN D DEFICIENCY: ICD-10-CM

## 2023-05-10 DIAGNOSIS — I73.00 RAYNAUD'S DISEASE WITHOUT GANGRENE: ICD-10-CM

## 2023-05-10 DIAGNOSIS — M34.1 CREST (CALCINOSIS, RAYNAUD'S PHENOMENON, ESOPHAGEAL DYSFUNCTION, SCLERODACTYLY, TELANGIECTASIA) (H): ICD-10-CM

## 2023-05-10 DIAGNOSIS — Z12.4 CERVICAL CANCER SCREENING: ICD-10-CM

## 2023-05-10 DIAGNOSIS — Z13.220 SCREENING FOR HYPERLIPIDEMIA: ICD-10-CM

## 2023-05-10 DIAGNOSIS — G63 POLYNEUROPATHY ASSOCIATED WITH UNDERLYING DISEASE (H): ICD-10-CM

## 2023-05-10 DIAGNOSIS — G89.4 CHRONIC PAIN SYNDROME: Chronic | ICD-10-CM

## 2023-05-10 DIAGNOSIS — R11.2 NAUSEA AND VOMITING, UNSPECIFIED VOMITING TYPE: ICD-10-CM

## 2023-05-10 DIAGNOSIS — N18.31 STAGE 3A CHRONIC KIDNEY DISEASE (H): ICD-10-CM

## 2023-05-10 DIAGNOSIS — F41.1 GAD (GENERALIZED ANXIETY DISORDER): ICD-10-CM

## 2023-05-10 DIAGNOSIS — D63.8 ANEMIA, CHRONIC DISEASE: ICD-10-CM

## 2023-05-10 DIAGNOSIS — K21.01 GASTROESOPHAGEAL REFLUX DISEASE WITH ESOPHAGITIS AND HEMORRHAGE: ICD-10-CM

## 2023-05-10 PROBLEM — M25.571 PAIN IN JOINT OF RIGHT ANKLE: Status: RESOLVED | Noted: 2021-08-24 | Resolved: 2023-05-10

## 2023-05-10 PROBLEM — M19.90 ARTHRITIS: Status: RESOLVED | Noted: 2017-02-10 | Resolved: 2023-05-10

## 2023-05-10 PROBLEM — R19.7 VOMITING AND DIARRHEA: Status: RESOLVED | Noted: 2020-03-25 | Resolved: 2023-05-10

## 2023-05-10 PROBLEM — S82.891A ANKLE FRACTURE, RIGHT, CLOSED, INITIAL ENCOUNTER: Status: RESOLVED | Noted: 2020-01-30 | Resolved: 2023-05-10

## 2023-05-10 PROBLEM — R11.10 VOMITING AND DIARRHEA: Status: RESOLVED | Noted: 2020-03-25 | Resolved: 2023-05-10

## 2023-05-10 PROBLEM — I15.9 SECONDARY HYPERTENSION: Status: RESOLVED | Noted: 2017-03-22 | Resolved: 2023-05-10

## 2023-05-10 PROBLEM — S82.891A CLOSED RIGHT ANKLE FRACTURE: Status: RESOLVED | Noted: 2020-02-11 | Resolved: 2023-05-10

## 2023-05-10 PROCEDURE — G0145 SCR C/V CYTO,THINLAYER,RESCR: HCPCS | Performed by: INTERNAL MEDICINE

## 2023-05-10 PROCEDURE — G0439 PPPS, SUBSEQ VISIT: HCPCS | Performed by: INTERNAL MEDICINE

## 2023-05-10 PROCEDURE — 99214 OFFICE O/P EST MOD 30 MIN: CPT | Mod: 25 | Performed by: INTERNAL MEDICINE

## 2023-05-10 PROCEDURE — 87624 HPV HI-RISK TYP POOLED RSLT: CPT | Performed by: INTERNAL MEDICINE

## 2023-05-10 RX ORDER — PROMETHAZINE HYDROCHLORIDE 25 MG/1
25 TABLET ORAL 2 TIMES DAILY PRN
Qty: 30 TABLET | Refills: 3 | Status: SHIPPED | OUTPATIENT
Start: 2023-05-10 | End: 2024-05-17

## 2023-05-10 RX ORDER — NEEDLES, SAFETY 18GX1 1/2"
1 NEEDLE, DISPOSABLE MISCELLANEOUS DAILY PRN
Qty: 10 EACH | Refills: 11 | Status: SHIPPED | OUTPATIENT
Start: 2023-05-10 | End: 2024-05-17

## 2023-05-10 RX ORDER — BUSPIRONE HYDROCHLORIDE 30 MG/1
30 TABLET ORAL 2 TIMES DAILY
Qty: 180 TABLET | Refills: 3 | Status: CANCELLED | OUTPATIENT
Start: 2023-05-10

## 2023-05-10 RX ORDER — DULOXETIN HYDROCHLORIDE 30 MG/1
90 CAPSULE, DELAYED RELEASE ORAL DAILY
Qty: 270 CAPSULE | Refills: 3 | Status: CANCELLED | OUTPATIENT
Start: 2023-05-10

## 2023-05-10 RX ORDER — PNV NO.95/FERROUS FUM/FOLIC AC 28MG-0.8MG
1 TABLET ORAL DAILY
Qty: 90 TABLET | Refills: 3 | Status: CANCELLED | OUTPATIENT
Start: 2023-05-10

## 2023-05-10 RX ORDER — ACETAMINOPHEN, ASPIRIN, CAFFEINE 250; 250; 65 MG/1; MG/1; MG/1
TABLET, FILM COATED ORAL
Qty: 24 TABLET | Refills: 1 | Status: CANCELLED | OUTPATIENT
Start: 2023-05-10

## 2023-05-10 RX ORDER — OMEPRAZOLE 40 MG/1
40 CAPSULE, DELAYED RELEASE ORAL 3 TIMES DAILY
Qty: 270 CAPSULE | Refills: 3 | Status: CANCELLED | OUTPATIENT
Start: 2023-05-10

## 2023-05-10 RX ORDER — ACETAMINOPHEN 325 MG/1
650 TABLET ORAL EVERY 4 HOURS PRN
Qty: 1 TABLET | Refills: 0 | Status: CANCELLED | OUTPATIENT
Start: 2023-05-10

## 2023-05-10 RX ORDER — GABAPENTIN 300 MG/1
600 CAPSULE ORAL 3 TIMES DAILY
Qty: 540 CAPSULE | Refills: 3 | Status: SHIPPED | OUTPATIENT
Start: 2023-05-10 | End: 2024-05-17

## 2023-05-10 RX ORDER — OXYCODONE HYDROCHLORIDE 10 MG/1
10 TABLET ORAL 3 TIMES DAILY PRN
Qty: 90 TABLET | Refills: 0 | Status: SHIPPED | OUTPATIENT
Start: 2023-05-17 | End: 2023-06-16

## 2023-05-10 RX ORDER — LORAZEPAM 1 MG/1
1 TABLET ORAL 2 TIMES DAILY PRN
Qty: 45 TABLET | Refills: 5 | Status: SHIPPED | OUTPATIENT
Start: 2023-05-10 | End: 2023-09-28

## 2023-05-10 RX ORDER — CHOLECALCIFEROL (VITAMIN D3) 50 MCG
1 TABLET ORAL DAILY
Qty: 90 TABLET | Refills: 3 | Status: CANCELLED | OUTPATIENT
Start: 2023-05-10

## 2023-05-10 RX ORDER — ALBUTEROL SULFATE 90 UG/1
AEROSOL, METERED RESPIRATORY (INHALATION)
Qty: 18 G | Refills: 11 | Status: CANCELLED | OUTPATIENT
Start: 2023-05-10

## 2023-05-10 RX ORDER — MONTELUKAST SODIUM 10 MG/1
10 TABLET ORAL AT BEDTIME
Qty: 90 TABLET | Refills: 3 | Status: CANCELLED | OUTPATIENT
Start: 2023-05-10

## 2023-05-10 RX ORDER — METOCLOPRAMIDE 10 MG/1
10 TABLET ORAL 3 TIMES DAILY PRN
Qty: 50 TABLET | Refills: 1 | Status: SHIPPED | OUTPATIENT
Start: 2023-05-10 | End: 2024-05-17

## 2023-05-10 RX ORDER — BUDESONIDE AND FORMOTEROL FUMARATE DIHYDRATE 160; 4.5 UG/1; UG/1
AEROSOL RESPIRATORY (INHALATION)
Qty: 10.2 G | Refills: 11 | Status: SHIPPED | OUTPATIENT
Start: 2023-05-10 | End: 2023-09-28

## 2023-05-10 RX ORDER — METHOCARBAMOL 750 MG/1
750 TABLET, FILM COATED ORAL 3 TIMES DAILY PRN
Qty: 180 TABLET | Refills: 3 | Status: SHIPPED | OUTPATIENT
Start: 2023-05-10 | End: 2024-05-17

## 2023-05-10 RX ORDER — PROMETHAZINE HYDROCHLORIDE 25 MG/ML
25 INJECTION, SOLUTION INTRAMUSCULAR; INTRAVENOUS EVERY 6 HOURS PRN
Qty: 6 ML | Refills: 1 | Status: CANCELLED | OUTPATIENT
Start: 2023-05-10

## 2023-05-10 RX ORDER — FAMOTIDINE 20 MG/1
20 TABLET, FILM COATED ORAL 2 TIMES DAILY
Qty: 180 TABLET | Refills: 1 | Status: CANCELLED | OUTPATIENT
Start: 2023-05-10

## 2023-05-10 RX ORDER — OXYCODONE HYDROCHLORIDE 10 MG/1
10 TABLET ORAL 3 TIMES DAILY PRN
Qty: 90 TABLET | Refills: 0 | Status: SHIPPED | OUTPATIENT
Start: 2023-07-16 | End: 2023-08-15

## 2023-05-10 RX ORDER — AMLODIPINE BESYLATE 2.5 MG/1
2.5 TABLET ORAL DAILY
Qty: 90 TABLET | Refills: 3 | Status: SHIPPED | OUTPATIENT
Start: 2023-05-10 | End: 2024-05-17

## 2023-05-10 RX ORDER — OXYCODONE HYDROCHLORIDE 10 MG/1
10 TABLET ORAL 3 TIMES DAILY PRN
Qty: 90 TABLET | Refills: 0 | Status: SHIPPED | OUTPATIENT
Start: 2023-06-16 | End: 2023-07-14

## 2023-05-10 RX ORDER — LISINOPRIL 10 MG/1
10 TABLET ORAL EVERY EVENING
Qty: 90 TABLET | Refills: 3 | Status: SHIPPED | OUTPATIENT
Start: 2023-05-10 | End: 2024-05-17

## 2023-05-10 RX ORDER — ONDANSETRON 4 MG/1
4 TABLET, ORALLY DISINTEGRATING ORAL EVERY 8 HOURS PRN
Qty: 30 TABLET | Refills: 4 | Status: SHIPPED | OUTPATIENT
Start: 2023-05-10 | End: 2024-05-17

## 2023-05-10 ASSESSMENT — ANXIETY QUESTIONNAIRES
GAD7 TOTAL SCORE: 12
7. FEELING AFRAID AS IF SOMETHING AWFUL MIGHT HAPPEN: SEVERAL DAYS
4. TROUBLE RELAXING: NEARLY EVERY DAY
3. WORRYING TOO MUCH ABOUT DIFFERENT THINGS: SEVERAL DAYS
5. BEING SO RESTLESS THAT IT IS HARD TO SIT STILL: SEVERAL DAYS
2. NOT BEING ABLE TO STOP OR CONTROL WORRYING: SEVERAL DAYS
GAD7 TOTAL SCORE: 12
8. IF YOU CHECKED OFF ANY PROBLEMS, HOW DIFFICULT HAVE THESE MADE IT FOR YOU TO DO YOUR WORK, TAKE CARE OF THINGS AT HOME, OR GET ALONG WITH OTHER PEOPLE?: EXTREMELY DIFFICULT
GAD7 TOTAL SCORE: 12
6. BECOMING EASILY ANNOYED OR IRRITABLE: MORE THAN HALF THE DAYS
7. FEELING AFRAID AS IF SOMETHING AWFUL MIGHT HAPPEN: SEVERAL DAYS
IF YOU CHECKED OFF ANY PROBLEMS ON THIS QUESTIONNAIRE, HOW DIFFICULT HAVE THESE PROBLEMS MADE IT FOR YOU TO DO YOUR WORK, TAKE CARE OF THINGS AT HOME, OR GET ALONG WITH OTHER PEOPLE: EXTREMELY DIFFICULT
1. FEELING NERVOUS, ANXIOUS, OR ON EDGE: NEARLY EVERY DAY

## 2023-05-10 ASSESSMENT — ASTHMA QUESTIONNAIRES
ACT_TOTALSCORE: 14
QUESTION_5 LAST FOUR WEEKS HOW WOULD YOU RATE YOUR ASTHMA CONTROL: SOMEWHAT CONTROLLED
QUESTION_4 LAST FOUR WEEKS HOW OFTEN HAVE YOU USED YOUR RESCUE INHALER OR NEBULIZER MEDICATION (SUCH AS ALBUTEROL): ONE OR TWO TIMES PER DAY
ACT_TOTALSCORE: 14
QUESTION_1 LAST FOUR WEEKS HOW MUCH OF THE TIME DID YOUR ASTHMA KEEP YOU FROM GETTING AS MUCH DONE AT WORK, SCHOOL OR AT HOME: A LITTLE OF THE TIME
QUESTION_2 LAST FOUR WEEKS HOW OFTEN HAVE YOU HAD SHORTNESS OF BREATH: ONCE A DAY
QUESTION_3 LAST FOUR WEEKS HOW OFTEN DID YOUR ASTHMA SYMPTOMS (WHEEZING, COUGHING, SHORTNESS OF BREATH, CHEST TIGHTNESS OR PAIN) WAKE YOU UP AT NIGHT OR EARLIER THAN USUAL IN THE MORNING: ONCE A WEEK

## 2023-05-10 ASSESSMENT — PAIN SCALES - GENERAL: PAINLEVEL: EXTREME PAIN (9)

## 2023-05-10 NOTE — PROGRESS NOTES
Assessment & Plan     Encounter for medicare annual wellness exam    F11.2 - Continuous opioid dependence (H)    Chronic pain syndrome stable, refill provided.  We have discussed the risks of concurrent opiate and benzo use multiple times in the past.  She is thus far tolerating well.  Refill oxycodone x3 months.  Follow-up in 3 months for next refill  - methocarbamol (ROBAXIN) 750 MG tablet; Take 1 tablet (750 mg) by mouth 3 times daily as needed for muscle spasms  - oxyCODONE IR (ROXICODONE) 10 MG tablet; Take 1 tablet (10 mg) by mouth 3 times daily as needed for severe pain  - oxyCODONE (ROXICODONE) 10 MG tablet; Take 1 tablet (10 mg) by mouth 3 times daily as needed for pain  - oxyCODONE (ROXICODONE) 10 MG tablet; Take 1 tablet (10 mg) by mouth 3 times daily as needed for pain    REX (generalized anxiety disorder)  - stable, refill provided  - LORazepam (ATIVAN) 1 MG tablet; Take 1 tablet (1 mg) by mouth 2 times daily as needed for anxiety #45 for 30 days    PTSD (post-traumatic stress disorder) - Known issue that I take into account for their medical decisions, no current exacerbations or new concerns    CREST (calcinosis, Raynaud's phenomenon, esophageal dysfunction, sclerodactyly, telangiectasia) (H)  - stable, refill provided.  Follows with [place] [facility]  - promethazine (PHENERGAN) 25 MG tablet; Take 1 tablet (25 mg) by mouth 2 times daily as needed for nausea    Raynaud's disease without gangrene  - stable, refill provided  - amLODIPine (NORVASC) 2.5 MG tablet; Take 1 tablet (2.5 mg) by mouth daily    Stage 3a chronic kidney disease (H)   - CBC with platelets; Future    Limited systemic sclerosis (H)  - stable, refill provided  - gabapentin (NEURONTIN) 300 MG capsule; Take 2 capsules (600 mg) by mouth 3 times daily    Polyneuropathy associated with underlying disease (H)  - stable, refill provided  - gabapentin (NEURONTIN) 300 MG capsule; Take 2 capsules (600 mg) by mouth 3 times daily    Renovascular  "hypertension blood pressures well controlled  - amLODIPine (NORVASC) 2.5 MG tablet; Take 1 tablet (2.5 mg) by mouth daily  - lisinopril (ZESTRIL) 10 MG tablet; Take 1 tablet (10 mg) by mouth every evening    Moderate persistent asthma without complication  - stable, refill provided  - budesonide-formoterol (SYMBICORT) 160-4.5 MCG/ACT Inhaler; INHALE TWO PUFFS BY MOUTH TWICE A DAY    Hypoglycemia  - blood glucose (NO BRAND SPECIFIED) lancets standard; Use to test blood sugar 1 time daily  - blood glucose (NO BRAND SPECIFIED) test strip; Use to test blood sugar 1 time daily    Nausea and vomiting, unspecified vomiting type - follows with [facility].  - metoclopramide (REGLAN) 10 MG tablet; Take 1 tablet (10 mg) by mouth 3 times daily as needed (nausea/vomiting)  - ondansetron (ZOFRAN ODT) 4 MG ODT tab; Take 1 tablet (4 mg) by mouth every 8 hours as needed for nausea    Gastroesophageal reflux disease with esophagitis and hemorrhage  - syringe/needle, disp, (BD ECLIPSE SYRINGE) 25G X 1\" 3 ML MISC; 1 each daily as needed    Vitamin D deficiency due for recheck  - Vitamin D Deficiency; Future    Screening for hyperlipidemia due   - Lipid panel reflex to direct LDL Non-fasting; Future    Cervical cancer screening - done today.  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    Weight as of this encounter: 68 kg (150 lb).       History of precancerous rectal cells  1. You should follow-up with Colorectal Surgery ([place] or [place])    Blood work  1. Check blood work ~ 2-4 weeks from infusion    Pain  1. Refills sent x 3 months, follow-up in 3 months    Medications  1. Refills sent - " call pharmacy to fill    Health Care Maintenance  1. Pap done today - if normal, can repeat in 5 years    Grecia Barba DO  [facility]    Amanda Barnes is a 37 year old, presenting for the following health issues:  Hypertension, Asthma, and medicare wellness         5/10/2023     7:16 AM   Additional Questions   Roomed by christian salvador   Accompanied by self         5/10/2023     7:16 AM   Patient Reported Additional Medications   Patient reports taking the following new medications none     History of Present Illness     Asthma:  She presents for follow up of asthma.  She has no cough, some wheezing, and some shortness of breath. She is using a relief medication 2-3 times per day. She does not miss any doses of her controller medication throughout the week.Patient is aware of the following triggers: animal dander, dust mites, exercise or sports, gastric reflux, mold, pollens, smoke and strong odors and fumes. The patient has not had a visit to the Emergency Room, Urgent Care or Hospital due to asthma since the last clinic visit.     Back Pain:  She presents for follow up of back pain. Patient's back pain is a chronic problem.  Location of back pain:  Right lower back, left lower back, right middle of back and left middle of back  Description of back pain: cramping, dull ache, sharp and shooting  Back pain spreads: nowhere    Since patient first noticed back pain, pain is: always present, but gets better and worse  Does back pain interfere with her job:  Not applicable      CKD: She is not using over the counter pain medicine.     Mental Health Follow-up:  Patient presents to follow-up on Depression & Anxiety.Patient's depression since last visit has been:  No change  The patient is having other symptoms associated with depression.  Patient's anxiety since last visit has been:  Better  The patient is not having other symptoms associated with anxiety.  Any significant life events: No  "and other  Patient is feeling anxious or having panic attacks.  Patient has no concerns about alcohol or drug use.    Headaches:   Since the patient's last clinic visit, headaches are: worsened  The patient is getting headaches:  Every day  She is not able to do normal daily activities when she has a migraine.  The patient is taking the following rescue/relief medications:  Tylenol and Excedrin   Patient states \"I get only a small amount of relief\" from the rescue/relief medications.   The patient is taking the following medications to prevent migraines:  No medications to prevent migraines  In the past 4 weeks, the patient has gone to an Urgent Care or Emergency Room 0 times times due to headaches.    She eats 0-1 servings of fruits and vegetables daily.She consumes 0 sweetened beverage(s) daily.She exercises with enough effort to increase her heart rate 30 to 60 minutes per day.  She exercises with enough effort to increase her heart rate 3 or less days per week.   She is taking medications regularly.    Today's PHQ-9         PHQ-9 Total Score: 13    PHQ-9 Q9 Thoughts of better off dead/self-harm past 2 weeks :   Not at all    How difficult have these problems made it for you to do your work, take care of things at home, or get along with other people: Somewhat difficult  Today's REX-7 Score: 12       Chief Complaint   Patient presents with     Hypertension     Asthma     medicare wellness      Anemia:  --had iron infusion on 5/4.  Had history of infusion reaction with iron infusion, but this time, tolerated Injecatfer with steroid pre-treatment   --has 2nd infusion tomorrow  --she is aware GI wants to have an upper scope done    Chronic pain  --tried medical cannabis but it made her feel too drowsy;  Using over the counter delta-8 which is helping;  Inhalation form made asthma worse    MSK:  --history of ankle fracture  --still wears mid calf walking boot if on her feet for more than 2-3 hrs  --last saw podiatry " 6/2022, return just PRN  --she wears orthotics regularly too  --she will get skin breakdown on the medial malleolus, but wearing thick socks alleviates this.      Hypertension Follow-up      Do you check your blood pressure regularly outside of the clinic? Yes     Are you following a low salt diet? Yes    Are your blood pressures ever more than 140 on the top number (systolic) OR more   than 90 on the bottom number (diastolic), for example 140/90? No    Asthma Follow-Up    Was ACT completed today?  Yes        5/10/2023     7:31 AM   ACT Total Scores   ACT TOTAL SCORE (Goal Greater than or Equal to 20) 14   In the past 12 months, how many times did you visit the emergency room for your asthma without being admitted to the hospital? 0   In the past 12 months, how many times were you hospitalized overnight because of your asthma? 0          How many days per week do you miss taking your asthma controller medication?  I do not have an asthma controller medication    Please describe any recent triggers for your asthma: exercise or sports    Have you had any Emergency Room Visits, Urgent Care Visits, or Hospital Admissions since your last office visit?  No    Pain History:  When did you first notice your pain? 8 years   Have you seen this provider for your pain in the past?   Yes   Where in your body do you have pain? 8 years  Are you seeing anyone else for your pain? No        12/15/2022     2:03 PM 4/20/2023    12:36 PM 5/10/2023     7:16 AM   PHQ-9 SCORE   PHQ-9 Total Score MyChart  16 (Moderately severe depression) 13 (Moderate depression)   PHQ-9 Total Score 15 16 13           12/15/2022     2:03 PM 4/20/2023    12:56 PM 5/10/2023     7:25 AM   REX-7 SCORE   Total Score   12 (moderate anxiety)   Total Score 11 8 12               12/15/2022     2:03 PM 4/20/2023    12:36 PM 5/10/2023     7:16 AM   PHQ-9 SCORE   PHQ-9 Total Score MyChart  16 (Moderately severe depression) 13 (Moderate depression)   PHQ-9 Total Score 15  "16 13         12/15/2022     2:03 PM 4/20/2023    12:56 PM 5/10/2023     7:25 AM   REX-7 SCORE   Total Score   12 (moderate anxiety)   Total Score 11 8 12         5/10/2023     7:20 AM   PEG Score   PEG Total Score 8.67       Chronic Pain Follow Up:    Location of pain: joints, neck,   Analgesia/pain control:    - Recent changes:  same    - Overall control: Tolerable with discomfort    - Current treatments: medical cannabis, oxycodone    Adherence:     - Do you ever take more pain medicine than prescribed? No    - When did you take your last dose of pain medicine?  Today this AM   Adverse effects: No   PDMP Review       Value Time User    State PDMP site checked  Yes 3/16/2023  8:10 AM Grecia Barba DO        Last CSA Agreement:   CSA -- Patient Level:     [Media Unavailable] Controlled Substance Agreement - Opioid - Scan on 7/11/2022 12:11 PM   [Media Unavailable] Controlled Substance Agreement - Opioid - Scan on 12/27/2020  1:35 PM   [Media Unavailable] Controlled Substance Agreement - Opioid - Scan on 2/6/2019  6:23 PM       Last UDS: 7/11/2022          Annual Wellness Visit    Patient has been advised of split billing requirements and indicates understanding: Yes     Are you in the first 12 months of your Medicare Part B coverage?  No    Physical Health:    In general, how would you rate your overall physical health? fair    Outside of work, how many days during the week do you exercise?2-3 days/week    Outside of work, approximately how many minutes a day do you exercise?greater than 60 minutes    If you drink alcohol do you typically have >3 drinks per day or >7 drinks per week? No    Do you usually eat at least 4 servings of fruit and vegetables a day, include whole grains & fiber and avoid regularly eating high fat or \"junk\" foods? No    Do you have any problems taking medications regularly? No    Do you have any side effects from medications? light headedness    Needs assistance for the following " daily activities: transportation, shopping, preparing meals and housework    Which of the following safety concerns are present in your home?  Going up the stairs     Hearing impairment: No    In the past 6 months, have you been bothered by leaking of urine? no    Mental Health:    In general, how would you rate your overall mental or emotional health? poor  PHQ-2 Score:      Do you feel safe in your environment? YES    Have you ever done Advance Care Planning? (For example, a Health Directive, POLST, or a discussion with a medical provider or your loved ones about your wishes)? Yes, advance care planning is on file.    Fall risk:  Fallen 2 or more times in the past year?: Yes  Any fall with injury in the past year?: No  Timed Up and Go Test (>13.5 is fall risk; contact physician) : 10    Cognitive Screenin) Repeat 3 items (Leader, Season, Table)    2) Clock draw: NORMAL  3) 3 item recall: Recalls 3 objects  Results: 3 items recalled: COGNITIVE IMPAIRMENT LESS LIKELY    Mini-CogTM Copyright DHAVAL Lei. Licensed by the author for use in [facility]; reprinted with permission (soob@[facility]). All rights reserved.      Do you have sleep apnea, excessive snoring or daytime drowsiness?: no    Social History     Tobacco Use     Smoking status: Never     Smokeless tobacco: Never   Vaping Use     Vaping status: Every Day     Substances: Flavoring, delta 8     Devices: Disposable   Substance Use Topics     Alcohol use: Not Currently     Comment: Socially once on a weekend if any       Do you have a current opioid prescription? Yes   How severe is your pain on a scale from 1-10? 9/10         Do you use any other controlled substances or medications that are not prescribed by a provider? Cannabis    Current providers sharing in care for this patient include:   Patient Care Team:  Grecia Barba DO as PCP - General (Internal Medicine)  Grecia Barba DO as Assigned PCP  Maritza Harrison MD as  MD (OB/Gyn)  Cindy Lorenzana MD as Resident (Internal Medicine)  Kenroy Cruz DPM as Assigned Musculoskeletal Provider  Jalen Moses MD as MD (Gastroenterology)  Jalen Mosse MD as Assigned Gastroenterology Provider  Grecia Barba DO as Assigned Pain Medication Provider    Patient has been advised of split billing requirements and indicates understanding: Yes      Current Outpatient Medications   Medication Sig Dispense Refill     acetaminophen (TYLENOL) 325 MG tablet Take 2 tablets (650 mg) by mouth every 4 hours as needed for mild pain or other 1 Bottle 0     albuterol (VENTOLIN HFA) 108 (90 Base) MCG/ACT inhaler INHALE 2 PUFFS INTO THE LUNGS ONCE EVERY 4 HOURS AS NEEDED FOR SHORTNESS OF BREATH / DYSPNEA / WHEEZING 18 g 11     amLODIPine (NORVASC) 2.5 MG tablet Take 1 tablet (2.5 mg) by mouth daily 90 tablet 3     blood glucose (NO BRAND SPECIFIED) lancets standard Use to test blood sugar 1 time daily 100 each 3     blood glucose (NO BRAND SPECIFIED) test strip Use to test blood sugar 1 time daily 100 strip 11     budesonide-formoterol (SYMBICORT) 160-4.5 MCG/ACT Inhaler INHALE TWO PUFFS BY MOUTH TWICE A DAY 10.2 g 11     busPIRone HCl (BUSPAR) 30 MG tablet Take 1 tablet (30 mg) by mouth 2 times daily 180 tablet 3     Cyanocobalamin (B-12) 1000 MCG TBCR Take 1,000 mcg by mouth daily 100 tablet 1     DULoxetine (CYMBALTA) 30 MG capsule Take 3 capsules (90 mg) by mouth daily 270 capsule 3     famotidine (PEPCID) 20 MG tablet TAKE ONE TABLET BY MOUTH TWICE A  tablet 1     gabapentin (NEURONTIN) 300 MG capsule Take 2 capsules (600 mg) by mouth 3 times daily 540 capsule 3     GOODSENSE MIGRAINE FORMULA 250-250-65 MG per tablet TAKE ONE TABLET BY MOUTH EVERY 6 HOURS AS NEEDED FOR HEADACHES (Patient taking differently: Take 1 tablet by mouth every 6 hours as needed) 24 tablet 1     lisinopril (ZESTRIL) 10 MG tablet Take 1 tablet (10 mg) by mouth every evening 90  tablet 3     loratadine (CLARITIN) 10 MG tablet Take 10 mg by mouth daily as needed        LORazepam (ATIVAN) 1 MG tablet Take 1 tablet (1 mg) by mouth 2 times daily as needed for anxiety #45 for 30 days 45 tablet 5     medical cannabis (Patient's own supply) (The purpose of this order is to document that the patient reports taking medical cannabis.  This is not a prescription, and is not used to certify that the patient has a qualifying medical condition.)  CBG gummy over the counter 0 Information only 0     methocarbamol (ROBAXIN) 750 MG tablet Take 1 tablet (750 mg) by mouth 3 times daily as needed for muscle spasms 180 tablet 3     metoclopramide (REGLAN) 10 MG tablet Take 1 tablet (10 mg) by mouth 3 times daily as needed (nausea/vomiting) 50 tablet 1     montelukast (SINGULAIR) 10 MG tablet Take 1 tablet (10 mg) by mouth At Bedtime 90 tablet 3     naloxone (NARCAN) 4 MG/0.1ML nasal spray Spray 1 spray (4 mg) into one nostril alternating nostrils once as needed for opioid reversal every 2-3 minutes until assistance arrives 0.2 mL 3     omeprazole (PRILOSEC) 40 MG DR capsule Take 1 capsule (40 mg) by mouth 3 times daily Has seen GI who has approved TID dosing 270 capsule 3     ondansetron (ZOFRAN ODT) 4 MG ODT tab Take 1 tablet (4 mg) by mouth every 8 hours as needed for nausea 30 tablet 4     [START ON 6/16/2023] oxyCODONE (ROXICODONE) 10 MG tablet Take 1 tablet (10 mg) by mouth 3 times daily as needed for pain 90 tablet 0     [START ON 7/16/2023] oxyCODONE (ROXICODONE) 10 MG tablet Take 1 tablet (10 mg) by mouth 3 times daily as needed for pain 90 tablet 0     oxyCODONE IR (ROXICODONE) 10 MG tablet Take 1 tablet (10 mg) by mouth 3 times daily as needed for severe pain 90 tablet 0     polyethylene glycol (MIRALAX) 17 GM/Dose powder Take 17 g by mouth daily 510 g 11     Prenatal Vit-Fe Fumarate-FA (PRENATAL VITAMIN AND MINERAL) 28-0.8 MG TABS Take 1 tablet by mouth daily 90 tablet 3     promethazine (PHENERGAN)  "25 MG tablet Take 1 tablet (25 mg) by mouth 2 times daily as needed for nausea 30 tablet 3     promethazine (PHENERGAN) 25 MG/ML IV injection INJECT 1 ML (25 MG) INTO THE MUSCLE EVERY 6 HOURS AS NEEDED FOR NAUSEA OR VOMITING 6 mL 1     syringe/needle, disp, (BD ECLIPSE SYRINGE) 25G X 1\" 3 ML MISC 1 each daily as needed 10 each 11     vitamin D3 (CHOLECALCIFEROL) 50 mcg (2000 units) tablet Take 1 tablet (50 mcg) by mouth daily Start after Ergocalciferol course is complete 90 tablet 3           Review of Systems   Constitutional, HEENT, cardiovascular, pulmonary, gi and gu systems are negative, except as otherwise noted.      Objective    /70 (BP Location: Right arm, Patient Position: Sitting, Cuff Size: Adult Regular)   Pulse 89   Temp 99  F (37.2  C) (Tympanic)   Resp 20   Ht 1.568 m (5' 1.73\")   Wt 68 kg (150 lb)   SpO2 100%   BMI 27.67 kg/m    Body mass index is 27.67 kg/m .  Physical Exam   GENERAL APPEARANCE: alert and no distress  RESP: lungs clear to auscultation - no rales, rhonchi or wheezes  CV: regular rates and rhythm, normal S1 S2, no S3 or S4 and no murmur, click or rub   (female): normal cervix, adnexae, and uterus without masses or discharge and IUD strings in good position                    Patient answers are not available for this visit.    The patient was provided with suggestions to help her develop a healthy physical lifestyle.  The patient was counseled and encouraged to consider modifying their diet and eating habits. She was provided with information on recommended healthy diet options.  The patient reports that she has difficulty with activities of daily living. I have asked that the patient make a follow up appointment in 52 weeks where this issue will be further evaluated and addressed.  The patient was provided with suggestions to help her develop a healthy emotional lifestyle.  The patient s PHQ-9 score is consistent with moderate depression. She was provided with " information regarding depression and was advised to schedule a follow up appointment in 52 weeks to further address this issue.

## 2023-05-10 NOTE — PATIENT INSTRUCTIONS
History of precancerous rectal cells  1. You should follow-up with Colorectal Surgery ([place] or [place])    Blood work  1. Check blood work ~ 2-4 weeks from infusion    Pain  1. Refills sent x 3 months, follow-up in 3 months    Medications  1. Refills sent - call pharmacy to fill    Health Care Maintenance  1. Pap done today - if normal, can repeat in 5 years  Patient Education   Personalized Prevention Plan  You are due for the preventive services outlined below.  Your care team is available to assist you in scheduling these services.  If you have already completed any of these items, please share that information with your care team to update in your medical record.  Health Maintenance Due   Topic Date Due     Hepatitis B Vaccine (1 of 3 - 3-dose series) Never done     Asthma Action Plan - yearly  07/06/2019     Cholesterol Lab  05/24/2023     HPV Screening  08/04/2023     PAP Smear  08/04/2023     Your Health Risk Assessment indicates you feel you are not in good health    A healthy lifestyle helps keep the body fit and the mind alert. It helps protect you from disease, helps you fight disease, and helps prevent chronic disease (disease that doesn't go away) from getting worse. This is important as you get older and begin to notice twinges in muscles and joints and a decline in the strength and stamina you once took for granted. A healthy lifestyle includes good healthcare, good nutrition, weight control, recreation, and regular exercise. Avoid harmful substances and do what you can to keep safe. Another part of a healthy lifestyle is stay mentally active and socially involved.    Good healthcare   Have a wellness visit every year.   If you have new symptoms, let us know right away. Don't wait until the next checkup.   Take medicines exactly as prescribed and keep your medicines in a safe place. Tell us if your medicine causes problems.   Healthy diet and weight control   Eat 3 or 4 small, nutritious, low-fat,  high-fiber meals a day. Include a variety of fruits, vegetables, and whole-grain foods.   Make sure you get enough calcium in your diet. Calcium, vitamin D, and exercise help prevent osteoporosis (bone thinning).   If you live alone, try eating with others when you can. That way you get a good meal and have company while you eat it.   Try to keep a healthy weight. If you eat more calories than your body uses for energy, it will be stored as fat and you will gain weight.     Recreation   Recreation is not limited to sports and team events. It includes any activity that provides relaxation, interest, enjoyment, and exercise. Recreation provides an outlet for physical, mental, and social energy. It can give a sense of worth and achievement. It can help you stay healthy.    Mental Exercise and Social Involvement  Mental and emotional health is as important as physical health. Keep in touch with friends and family. Stay as active as possible. Continue to learn and challenge yourself.   Things you can do to stay mentally active are:  Learn something new, like a foreign language or musical instrument.   Play SCRABBLE or do crossword puzzles. If you cannot find people to play these games with you at home, you can play them with others on your computer through the Internet.   Join a games club--anything from card games to chess or checkers or lawn bowling.   Start a new hobby.   Go back to school.   Volunteer.   Read.   Keep up with world events.    Understanding USDA MyPlate  The USDA has guidelines to help you make healthy food choices. These are called MyPlate. MyPlate shows the food groups that make up healthy meals using the image of a place setting. Before you eat, think about the healthiest choices for what to put on your plate or in your cup or bowl. To learn more about building a healthy plate, visit www.choosemyplate.gov.     The food groups  Fruits. Any fruit or 100% fruit juice counts as part of the Fruit Group.  Fruits may be fresh, canned, frozen, or dried, and may be whole, cut-up, or pureed. Make 1/2 of your plate fruits and vegetables.  Vegetables. Any vegetable or 100% vegetable juice counts as a member of the Vegetable Group. Vegetables may be fresh, frozen, canned, or dried. They can be served raw or cooked and may be whole, cut-up, or mashed. Make 1/2 of your plate fruits and vegetables.  Grains. All foods made from grains are part of the Grains Group. These include wheat, rice, oats, cornmeal, and barley. Grains are often used to make foods such as bread, pasta, oatmeal, cereal, tortillas, and grits. Grains should be no more than 1/4 of your plate. At least half of your grains should be whole grains.  Protein. This group includes meat, poultry, seafood, beans and peas, eggs, processed soy products (such as tofu), nuts (including nut butters), and seeds. Make protein choices no more than 1/4 of your plate. Meat and poultry choices should be lean or low fat.  Dairy. The Dairy Group includes all fluid milk products and foods made from milk that contain calcium, such as yogurt and cheese. (Foods that have little calcium, such as cream, butter, and cream cheese, are not part of this group.) Most dairy choices should be low-fat or fat-free.  Oils. Oils aren't a food group, but they do contain essential nutrients. However it's important to watch your intake of oils. These are fats that are liquid at room temperature. They include canola, corn, olive, soybean, vegetable, and sunflower oil. Foods that are mainly oil include mayonnaise, certain salad dressings, and soft margarines. You likely already get your daily oil allowance from the foods you eat.  Things to limit  Eating healthy also means limiting these things in your diet:  Salt (sodium). Many processed foods have a lot of sodium. To keep sodium intake down, eat fresh vegetables, meats, poultry, and seafood when possible. Purchase low-sodium, reduced-sodium, or  no-salt-added food products at the store. And don't add salt to your meals at home. Instead, season them with herbs and spices such as dill, oregano, cumin, and paprika. Or try adding flavor with lemon or lime zest and juice.  Saturated fat. Saturated fats are most often found in animal products such as beef, pork, and chicken. They are often solid at room temperature, such as butter. To reduce your saturated fat intake, choose leaner cuts of meat and poultry. And try healthier cooking methods such as grilling, broiling, roasting, or baking. For a simple lower-fat swap, use plain nonfat yogurt instead of mayonnaise when making potato salad or macaroni salad.  Added sugars. These are sugars added to foods. They are in foods such as ice cream, candy, soda, fruit drinks, sports drinks, energy drinks, cookies, pastries, jams, and syrups. Cut down on added sugars by sharing sweet treats with a family member or friend. You can also choose fruit for dessert, and drink water or other unsweetened beverages.  Setred last reviewed this educational content on 6/1/2020 2000-2022 [employer]. All rights reserved. This information is not intended as a substitute for professional medical care. Always follow your healthcare professional's instructions.        Activities of Daily Living    Your Health Risk Assessment indicates you have difficulties with activities of daily living such as housework, bathing, preparing meals, taking medication, etc. Please make a follow up appointment for us to address this issue in more detail.  Your Health Risk Assessment indicates you feel you are not in good emotional health.    Recreation   Recreation is not limited to sports and team events. It includes any activity that provides relaxation, interest, enjoyment, and exercise. Recreation provides an outlet for physical, mental, and social energy. It can give a sense of worth and achievement. It can help you stay  "healthy.    Mental Exercise and Social Involvement  Mental and emotional health is as important as physical health. Keep in touch with friends and family. Stay as active as possible. Continue to learn and challenge yourself.   Things you can do to stay mentally active are:  Learn something new, like a foreign language or musical instrument.   Play SCRABBLE or do crossword puzzles. If you cannot find people to play these games with you at home, you can play them with others on your computer through the Internet.   Join a games club--anything from card games to chess or checkers or lawn bowling.   Start a new hobby.   Go back to school.   Volunteer.   Read.   Keep up with world events.    Depression and Suicide in Older Adults  Nearly 2 million older adults in the [place] have some type of depression. Some of them even take their own lives. Yet depression among older adults is often ignored. Learning about the warning signs of depression may help spare a loved one needless pain. You may also save a life.   What is depression?  Depression is a common and serious illness. It affects the way you think and feel. It is not a normal part of aging. It is not a sign of weakness, a character flaw, or something you can \"snap out of.\" Most people with depression need treatment to get better. The most common symptom is a feeling of deep sadness. People who are depressed also may seem tired and listless. And nothing seems to give them pleasure. It s normal to grieve or be sad sometimes. But sadness lessens or passes with time. Depression rarely goes away or improves on its own. Other symptoms of depression are:   Sleeping more or less than normal  Eating more or less than normal  Having headaches, stomachaches, or other pains that don t go away  Feeling nervous,  empty,  or worthless  Crying a lot  Thinking or talking about suicide or death  Loss of interest in activities previously enjoyed  Social isolation  Feeling confused or " forgetful  What causes it?  The causes of depression aren t fully known. But it is thought to result from a complex blend of these factors:   Biochemistry. Certain chemicals in the brain play a role.  Genes. Depression does run in families.  Life stress. Life stresses can also trigger depression in some people. Older adults often face many stressors. These may include isolation, the death of friends or a spouse, health problems, and financial concerns.  Chronic health conditions. This includes diabetes, heart disease, or cancer. These can cause symptoms of depression. Medicine side effects can cause changes in thoughts and behaviors.  Giving support    Depressed people often may not want to ask for help. When they do, they may be ignored. Or they may get the wrong treatment. You can help by showing parents and older friends love and support. If they seem depressed, don t lecture the person or ignore the symptoms. Don't discount the symptoms as a  normal  part of aging. They are not. Get involved, listen, and show interest and support.   Help them understand that depression is a treatable illness. Tell them you can help them find the right treatment. Offer to go to their healthcare provider's appointment with them for support when the symptoms are discussed. With their approval, contact a local mental health center, social service agency, or hospital about services.   Helping at healthcare visits  You can be an advocate for them at healthcare appointments. Many older adults have chronic illnesses. Many of these can cause symptoms of depression. Medicine side effects can change thoughts and behaviors.   You can help make sure that the healthcare provider looks at all of these factors. They should refer your family member or friend to a mental healthcare provider when needed. In some cases, untreated depression can lead to a misdiagnosis. A person may be diagnosed with a brain disorder such as dementia. If the  healthcare provider does not take the issue of depression seriously, help your family member or friend find another provider.   Asking about self-harm thoughts  If you think an older person you care about could be suicidal, ask,  Have you thought about suicide?  Most people will tell you the truth. If they say yes, they may already have a plan for how and when they will attempt it. Find out as much as you can. The more detailed the plan, and the easier it is to carry out, the more danger the person is in right now. Tell the person you are there for them and you do not want them to get harmed. Don't wait to get help for the person. Call the person's healthcare provider, local hospital, or emergency services.   Call 988 in a crisis   Never leave the person alone. A person who is actively suicidal needs crisis care right away. They need constant supervision. Never leave the person out of sight. Call 988 Tell the crisis counselor you need help for a person who is thinking about suicide. The counselor will help you get the right level of crisis help. You may be advised to take the person to the nearest emergency room.   The National Suicide Prevention Lifeline is available at 988, or 800-273-talk (868.934.5283), or www.suicidepreventionlifeline.org. When you call or text 988, you will be connected to trained counselors who are part of the National Suicide Prevention Lifeline network. An online chat option is also available. Lifeline is free and available 24/7.   To learn more  National Suicide Prevention Lifeline at www.suicidepreventionlifeline.org  or 357-615-UUKD (860-627-4276)  National [place] on Mental Illness at www.fatmata.org  or 351-730-CWNW (895-942-7170)  Mental [facility] at www.nmha.org  or 428-716-5844  National [place] of Mental Health at www.nimh.nih.gov  or 196-877-6331    [employer] last reviewed this educational content on 7/1/2022 2000-2022 [employer]. All rights reserved. This  information is not intended as a substitute for professional medical care. Always follow your healthcare professional's instructions.

## 2023-05-11 ENCOUNTER — INFUSION THERAPY VISIT (OUTPATIENT)
Dept: INFUSION THERAPY | Facility: CLINIC | Age: 38
End: 2023-05-11
Attending: INTERNAL MEDICINE
Payer: MEDICARE

## 2023-05-11 VITALS
RESPIRATION RATE: 16 BRPM | DIASTOLIC BLOOD PRESSURE: 74 MMHG | HEART RATE: 69 BPM | TEMPERATURE: 98 F | SYSTOLIC BLOOD PRESSURE: 112 MMHG

## 2023-05-11 DIAGNOSIS — D50.0 IRON DEFICIENCY ANEMIA DUE TO CHRONIC BLOOD LOSS: Primary | ICD-10-CM

## 2023-05-11 PROCEDURE — 96365 THER/PROPH/DIAG IV INF INIT: CPT

## 2023-05-11 PROCEDURE — 96375 TX/PRO/DX INJ NEW DRUG ADDON: CPT

## 2023-05-11 PROCEDURE — 258N000003 HC RX IP 258 OP 636: Performed by: INTERNAL MEDICINE

## 2023-05-11 PROCEDURE — 250N000011 HC RX IP 250 OP 636: Performed by: INTERNAL MEDICINE

## 2023-05-11 RX ORDER — MEPERIDINE HYDROCHLORIDE 25 MG/ML
25 INJECTION INTRAMUSCULAR; INTRAVENOUS; SUBCUTANEOUS EVERY 30 MIN PRN
Status: CANCELLED | OUTPATIENT
Start: 2023-05-11

## 2023-05-11 RX ORDER — DIPHENHYDRAMINE HYDROCHLORIDE 50 MG/ML
50 INJECTION INTRAMUSCULAR; INTRAVENOUS
Status: CANCELLED
Start: 2023-05-11

## 2023-05-11 RX ORDER — METHYLPREDNISOLONE SODIUM SUCCINATE 125 MG/2ML
125 INJECTION, POWDER, LYOPHILIZED, FOR SOLUTION INTRAMUSCULAR; INTRAVENOUS
Status: DISCONTINUED | OUTPATIENT
Start: 2023-05-11 | End: 2023-05-11 | Stop reason: HOSPADM

## 2023-05-11 RX ORDER — ALBUTEROL SULFATE 0.83 MG/ML
2.5 SOLUTION RESPIRATORY (INHALATION)
Status: CANCELLED | OUTPATIENT
Start: 2023-05-11

## 2023-05-11 RX ORDER — ALBUTEROL SULFATE 90 UG/1
1-2 AEROSOL, METERED RESPIRATORY (INHALATION)
Status: CANCELLED
Start: 2023-05-11

## 2023-05-11 RX ORDER — EPINEPHRINE 1 MG/ML
0.3 INJECTION, SOLUTION, CONCENTRATE INTRAVENOUS EVERY 5 MIN PRN
Status: CANCELLED | OUTPATIENT
Start: 2023-05-11

## 2023-05-11 RX ORDER — METHYLPREDNISOLONE SODIUM SUCCINATE 125 MG/2ML
125 INJECTION, POWDER, LYOPHILIZED, FOR SOLUTION INTRAMUSCULAR; INTRAVENOUS
Status: CANCELLED
Start: 2023-05-11

## 2023-05-11 RX ADMIN — FERRIC CARBOXYMALTOSE INJECTION 750 MG: 50 INJECTION, SOLUTION INTRAVENOUS at 13:44

## 2023-05-11 RX ADMIN — METHYLPREDNISOLONE SODIUM SUCCINATE 125 MG: 125 INJECTION INTRAMUSCULAR; INTRAVENOUS at 13:17

## 2023-05-11 RX ADMIN — SODIUM CHLORIDE 250 ML: 9 INJECTION, SOLUTION INTRAVENOUS at 13:17

## 2023-05-11 NOTE — PROGRESS NOTES
Infusion Nursing Note:  Molly Teague presents today for Injectafer.    Patient seen by provider today: No   present during visit today: Not Applicable.    Note: Solumedrol given prior to infusion (see- MAR)..      Intravenous Access:  Peripheral IV placed.    Treatment Conditions:  Results reviewed, labs MET treatment parameters, ok to proceed with treatment.      Post Infusion Assessment:  Patient tolerated infusion without incident.  Patient observed for 30 minutes post Injectafer per protocol.  Site patent and intact, free from redness, edema or discomfort.  No evidence of extravasations.  Access discontinued per protocol.       Discharge Plan:   Patient discharged in stable condition accompanied by: self.  Departure Mode: Ambulatory.      Frank Gaona RN

## 2023-05-12 LAB
BKR LAB AP GYN ADEQUACY: NORMAL
BKR LAB AP GYN INTERPRETATION: NORMAL
BKR LAB AP HPV REFLEX: NORMAL
BKR LAB AP PREVIOUS ABNORMAL: NORMAL
PATH REPORT.COMMENTS IMP SPEC: NORMAL
PATH REPORT.COMMENTS IMP SPEC: NORMAL
PATH REPORT.RELEVANT HX SPEC: NORMAL

## 2023-05-16 LAB
HUMAN PAPILLOMA VIRUS 16 DNA: NEGATIVE
HUMAN PAPILLOMA VIRUS 18 DNA: NEGATIVE
HUMAN PAPILLOMA VIRUS FINAL DIAGNOSIS: NORMAL
HUMAN PAPILLOMA VIRUS OTHER HR: NEGATIVE

## 2023-05-19 ENCOUNTER — TELEPHONE (OUTPATIENT)
Dept: FAMILY MEDICINE | Facility: CLINIC | Age: 38
End: 2023-05-19
Payer: MEDICARE

## 2023-05-19 ASSESSMENT — ACTIVITIES OF DAILY LIVING (ADL)
CURRENT_FUNCTION: NEEDS ASSISTANCE

## 2023-05-19 NOTE — TELEPHONE ENCOUNTER
See message from  below    Please contact patient and update her note from 5/10.  Let me know when finished so I can notify       Juliet Barba,      DOS:5/10      Patient was seen for an Annual Wellness Visit, but the fall risk assessment is missing and this is one of the required components to bill for this service:  Fall risk:      Can you or clinic staff get a hold of the patient to go over this fall risk assessment?      Let me know if you have any questions.      Thank you for your help,      Dalila Loredo, CPC

## 2023-05-23 NOTE — NURSING NOTE
Reason For Visit:   Chief Complaint   Patient presents with     RECHECK     Pain, right foot. Patient stated that she had a bump on the bottom on her foot. Patient stated that this has developed a wound. Inserts are only a few months old and falling apart.       Pain Assessment  Patient Currently in Pain: Yes  Primary Pain Location: Foot (Right)        Allergies   Allergen Reactions     Blood Transfusion Related (Informational Only) Other (See Comments)     Patient has a history of a clinically significant antibody against RBC antigens.  A delay in compatible RBCs may occur.     No Known Allergies      Pollen Extract      Seasonal Allergies      Shellfish-Derived Products Nausea and Rash     Rash on face           Tricia Wellington LPN     Scheduled Telephone Encounter  8/2/2022  [address] [facility]  [place]  Urology   Reason for Visit: Biopsy Results     Progress Notes                                                  Niharika Patrick PA-C   Urology  Dashawn Ly is a 44-year-old male evaluated via telephone on 5/23/23 to review the results of his biopsy        Documentation:  I communicated with the patient and/or health care decision maker about the results of his prostate biopsy. Mr. Chantelle Rivas is a 44-year-old male that follows in the office for BPH, gross hematuria, and elevated PSA. In regards to BPH, he has a hx of a PVP and his last cystoscopy demonstrated an open bladder neck. He is happy with an IPSS of 7. PSAD of 0.03. Now having hematuria weekly  In terms of having gross hematuria, this is secondary to infections and dystrophic calcifications on the prostate. Last cystoscopy 8/16/2022. CT abdomen and pelvis performed in 8/2022 and without concern for malignancy of  tract. Finally, his PSA is elevated and has been rising. He did have a prostate MRI in 2019 with a PI-RADs 2 lesion. Most recent MRI of the prostate completed in 2023 with PI-RADS 3 lesions. Therefore, a prostate biopsy was performed. He has had a biopsy in the past with Dr. Shahrzad Noriega. Pathology: 5/16/2023: FINAL DIAGNOSIS:   A-I. Prostate, right (apex, mid and base), left (apex, mid and base),   lesion #1, lesion #2 and lesion #3, core needle biopsies: Patchy acute and chronic prostatitis, atrophy and basal cell hyperplasia. No evidence of malignancy. Details of this discussion including any medical advice provided:       Diagnosis Orders   1. Elevated PSA        2. Erectile dysfunction, unspecified erectile dysfunction type        3. Benign prostatic hyperplasia, unspecified whether lower urinary tract symptoms present        4. Hematuria, unspecified type          Elevated PSA: Biopsy in 2023 negative for malignancy.  Recheck

## 2023-05-25 ENCOUNTER — TELEPHONE (OUTPATIENT)
Dept: SURGERY | Facility: CLINIC | Age: 38
End: 2023-05-25
Payer: MEDICARE

## 2023-06-13 ENCOUNTER — OFFICE VISIT (OUTPATIENT)
Dept: GASTROENTEROLOGY | Facility: CLINIC | Age: 38
End: 2023-06-13
Payer: MEDICARE

## 2023-06-13 VITALS
HEIGHT: 62 IN | HEART RATE: 99 BPM | OXYGEN SATURATION: 100 % | BODY MASS INDEX: 27.49 KG/M2 | SYSTOLIC BLOOD PRESSURE: 98 MMHG | DIASTOLIC BLOOD PRESSURE: 68 MMHG | WEIGHT: 149.4 LBS

## 2023-06-13 DIAGNOSIS — R11.0 NAUSEA: ICD-10-CM

## 2023-06-13 DIAGNOSIS — D50.9 IRON DEFICIENCY ANEMIA, UNSPECIFIED IRON DEFICIENCY ANEMIA TYPE: Primary | ICD-10-CM

## 2023-06-13 PROCEDURE — 99214 OFFICE O/P EST MOD 30 MIN: CPT | Performed by: INTERNAL MEDICINE

## 2023-06-13 ASSESSMENT — PAIN SCALES - GENERAL: PAINLEVEL: NO PAIN (0)

## 2023-06-13 NOTE — NURSING NOTE
"Chief Complaint   Patient presents with     Follow Up     6 month follow up for nausea and vomiting blood.     She requests these members of her care team be copied on today's visit information:  PCP: Grecia Barba    Vitals:    06/13/23 1407   BP: 98/68   BP Location: Left arm   Patient Position: Sitting   Cuff Size: Adult Regular   Pulse: 99   SpO2: 100%   Weight: 67.8 kg (149 lb 6.4 oz)   Height: 1.568 m (5' 1.73\")     Body mass index is 27.57 kg/m .    Medications were reconciled.    Does patient need any medication refills at today's visit? No        Ewelina Calderón CMA    "

## 2023-06-13 NOTE — PROGRESS NOTES
"      GASTROENTEROLOGY FOLLOW UP CLINIC VISIT    CC/REFERRING MD:    Grecia Barba    REASON FOR CONSULTATION:   No ref. provider found for   Chief Complaint   Patient presents with     Follow Up     6 month follow up for nausea and vomiting blood.       HISTORY OF PRESENT ILLNESS:    Molly Teague is 37 year old female who presents for follow up of esophagitis and GI bleeding.  She was previously seen in May 2022 and again in December 2022.  History includes several endoscopies in the past noting LA grade D esophagitis which was treated with high-dose PPI therapy and H2 blockers.  We last did an endoscopy in July 2022 which noted healing of the esophagitis and biopsies were normal.  That was done on high-dose omeprazole 3 times daily plus H2 blocker at night.  She also has chronic nausea managed on several antiemetics.  She follows up today for the above issues.  She reports that in April 2023 she had recurrent issues with GI bleeding.  She reports that she was having coffee-ground emesis as well as blood in the stool.  Blood counts were checked and hemoglobin was down to 8.7, down from previous values of 10.9.  This occurred over a 3-week time period and then the emesis and bleeding spontaneously improved.  She still is getting nausea with occasional vomiting.  She will use as needed metoclopramide when she has vomiting issues.  She has not had an updated upper endoscopy since July 2022.  Her last colonoscopy was in February 2017.  Some perianal nodularity was noted and she was referred to colorectal surgery around that time.  She reports that she does have a colorectal surgery evaluation scheduled here for August 2023.      PHYSICAL EXAMINATION:  Constitutional: aaox3, cooperative, pleasant, not dyspneic/diaphoretic, no acute distress  Vitals reviewed: BP 98/68 (BP Location: Left arm, Patient Position: Sitting, Cuff Size: Adult Regular)   Pulse 99   Ht 1.568 m (5' 1.73\")   Wt 67.8 kg (149 lb 6.4 oz)  "  SpO2 100%   BMI 27.57 kg/m    Wt:   Wt Readings from Last 2 Encounters:   06/13/23 67.8 kg (149 lb 6.4 oz)   05/10/23 68 kg (150 lb)      Eyes: Sclera anicteric/injected  Ears/nose/mouth/throat: Normal oropharynx without ulcers or exudate, mucus membranes moist, hearing intact  Neck: supple, thyroid normal size  CV: No edema  Respiratory: Unlabored breathing  Lymph: No submandibular, supraclavicular or inguinal lymphadenopathy  Abd:  Nondistended, no masses, +bs, no hepatosplenomegaly, nontender, no peritoneal signs  Skin: warm, perfused, no jaundice  Psych: Normal affect  MSK: Normal gait    Pertinent labs and imaging have been reviewed.    ASSESSMENT/PLAN:    Molly Teague is a 37 year old female who presents for follow up of anemia, hematemesis and rectal bleeding.  She has history of LA grade D esophagitis in the past.  Last upper endoscopy did note healing of the esophagitis on a rather unusual regimen of omeprazole 3 times daily plus H2 blocker therapy at night.  Given her progressive anemia, I suggest another upper endoscopy as well as colonoscopy at this point.  This can be done with MAC.  We will schedule with extended GoLytely bowel prep given her use of opiates. Poor prep was noted at the time of the last colonoscopy.  We discussed that if anemia worsens and no findings on upper endoscopy and colonoscopy, video capsule may also be considered.  Continue with antiemetic therapy for management of chronic nausea.  Would recommend continuing to follow periodic ECG while on antiemetic therapy to ensure QT prolongation does not develop.  Recommended her continuing to wean medication list as able, especially opiate medications as this could be contributing.      RTC 6 months    Thank you for this consultation.  It was a pleasure to participate in the care of this patient; please contact us with any further questions.  A total of 40 minutes was spent with reviewing the chart, discussing with the patient,  documentation and coordination of care.    This note was created with voice recognition software, and while reviewed for accuracy, typos may remain.     Jalen Moses MD  Adjunct  of Medicine  Division of Gastroenterology, Hepatology and Nutrition  [facility]  [phone]

## 2023-06-13 NOTE — LETTER
6/13/2023         RE: Molly Teague  [address] [facility] [place] [address]        Dear Colleague,    Thank you for referring your patient, Molly Teague, to the [facility]. Please see a copy of my visit note below.          GASTROENTEROLOGY FOLLOW UP CLINIC VISIT    CC/REFERRING MD:    Grecia Barba    REASON FOR CONSULTATION:   No ref. provider found for   Chief Complaint   Patient presents with     Follow Up     6 month follow up for nausea and vomiting blood.       HISTORY OF PRESENT ILLNESS:    Molly Teague is 37 year old female who presents for follow up of esophagitis and GI bleeding.  She was previously seen in May 2022 and again in December 2022.  History includes several endoscopies in the past noting LA grade D esophagitis which was treated with high-dose PPI therapy and H2 blockers.  We last did an endoscopy in July 2022 which noted healing of the esophagitis and biopsies were normal.  That was done on high-dose omeprazole 3 times daily plus H2 blocker at night.  She also has chronic nausea managed on several antiemetics.  She follows up today for the above issues.  She reports that in April 2023 she had recurrent issues with GI bleeding.  She reports that she was having coffee-ground emesis as well as blood in the stool.  Blood counts were checked and hemoglobin was down to 8.7, down from previous values of 10.9.  This occurred over a 3-week time period and then the emesis and bleeding spontaneously improved.  She still is getting nausea with occasional vomiting.  She will use as needed metoclopramide when she has vomiting issues.  She has not had an updated upper endoscopy since July 2022.  Her last colonoscopy was in February 2017.  Some perianal nodularity was noted and she was referred to colorectal surgery around that time.  She reports that she does have a colorectal surgery evaluation scheduled here for August 2023.      PHYSICAL  "EXAMINATION:  Constitutional: aaox3, cooperative, pleasant, not dyspneic/diaphoretic, no acute distress  Vitals reviewed: BP 98/68 (BP Location: Left arm, Patient Position: Sitting, Cuff Size: Adult Regular)   Pulse 99   Ht 1.568 m (5' 1.73\")   Wt 67.8 kg (149 lb 6.4 oz)   SpO2 100%   BMI 27.57 kg/m    Wt:   Wt Readings from Last 2 Encounters:   06/13/23 67.8 kg (149 lb 6.4 oz)   05/10/23 68 kg (150 lb)      Eyes: Sclera anicteric/injected  Ears/nose/mouth/throat: Normal oropharynx without ulcers or exudate, mucus membranes moist, hearing intact  Neck: supple, thyroid normal size  CV: No edema  Respiratory: Unlabored breathing  Lymph: No submandibular, supraclavicular or inguinal lymphadenopathy  Abd:  Nondistended, no masses, +bs, no hepatosplenomegaly, nontender, no peritoneal signs  Skin: warm, perfused, no jaundice  Psych: Normal affect  MSK: Normal gait    Pertinent labs and imaging have been reviewed.    ASSESSMENT/PLAN:    Molly Teague is a 37 year old female who presents for follow up of anemia, hematemesis and rectal bleeding.  She has history of LA grade D esophagitis in the past.  Last upper endoscopy did note healing of the esophagitis on a rather unusual regimen of omeprazole 3 times daily plus H2 blocker therapy at night.  Given her progressive anemia, I suggest another upper endoscopy as well as colonoscopy at this point.  This can be done with MAC.  We will schedule with extended GoLytely bowel prep given her use of opiates. Poor prep was noted at the time of the last colonoscopy.  We discussed that if anemia worsens and no findings on upper endoscopy and colonoscopy, video capsule may also be considered.  Continue with antiemetic therapy for management of chronic nausea.  Would recommend continuing to follow periodic ECG while on antiemetic therapy to ensure QT prolongation does not develop.  Recommended her continuing to wean medication list as able, especially opiate medications as this " could be contributing.      RTC 6 months    Thank you for this consultation.  It was a pleasure to participate in the care of this patient; please contact us with any further questions.  A total of 40 minutes was spent with reviewing the chart, discussing with the patient, documentation and coordination of care.    This note was created with voice recognition software, and while reviewed for accuracy, typos may remain.     Jalen Moses MD  Adjunct  of Medicine  Division of Gastroenterology, Hepatology and Nutrition  [facility]  [phone]      Again, thank you for allowing me to participate in the care of your patient.        Sincerely,        Jalen Moses MD

## 2023-06-28 ENCOUNTER — OFFICE VISIT (OUTPATIENT)
Dept: ORTHOPEDICS | Facility: CLINIC | Age: 38
End: 2023-06-28
Payer: MEDICARE

## 2023-06-28 DIAGNOSIS — M25.371 ANKLE INSTABILITY, RIGHT: ICD-10-CM

## 2023-06-28 DIAGNOSIS — M20.41 HAMMER TOES OF BOTH FEET: ICD-10-CM

## 2023-06-28 DIAGNOSIS — M20.42 HAMMER TOES OF BOTH FEET: ICD-10-CM

## 2023-06-28 DIAGNOSIS — Q66.70 PES CAVUS: Primary | ICD-10-CM

## 2023-06-28 PROCEDURE — 99213 OFFICE O/P EST LOW 20 MIN: CPT | Performed by: PODIATRIST

## 2023-06-28 NOTE — LETTER
6/28/2023         RE: Molly Teague  [address] [facility] [address]        Dear Colleague,    Thank you for referring your patient, Molly Tegaue, to the [facility]. Please see a copy of my visit note below.    Chief Complaint:   Chief Complaint   Patient presents with    Wound Check     Right foot.           Allergies   Allergen Reactions    Blood Transfusion Related (Informational Only) Other (See Comments)     Patient has a history of a clinically significant antibody against RBC antigens.  A delay in compatible RBCs may occur.    Pollen Extract     Seasonal Allergies     Venofer [Iron Sucrose] Difficulty breathing and Cramps     Severe infusion reaction 1/2022    Shellfish-Derived Products Nausea and Rash     Rash on face         Subjective: Molly is a 37 year old female who presents to the clinic today for a follow up of BL foot pain.  She relates that he has been, more difficult for her to walk.  She has hammertoes and it is causing all pain when she is walking on the end of the toes.  The right hammertoes are more rigid.  She does continue to wear her orthotics.  She notes that she has fallen multiple times as she feels like her right ankle is giving out when she is walking.    Objective  Data Unavailable Data Unavailable Data Unavailable Data Unavailable Data Unavailable 0 lbs 0 oz  PT and DP pulses are 1/4 bilaterally. CRT is 4 seconds. Absent pedal hair.   Gross sensation is diminished bilaterally.    Equinus is moderate bilaterally. . Severely hammered digits to the lesser digits BL with R>L. These are not reducible. pain in the medial and lateral ankles with palpation and ROM.  On the right ankle, she does have hypertrophy of the medial malleolus.  She does have ankle instability is noted with increased eversion of the ankle.  Nails normal bilaterally.    Assessment:   Encounter Diagnoses   Name Primary?    Pes cavus Yes    Hammer toes of  both feet     Ankle instability, right          Plan:   - Pt seen and evaluated  -I discussed with her getting an AFO for the right ankle.  She does agree to this.  Orders written.  -She is having pain in the hammertoes.  The right side is more rigid.  For the right second toe, I recommend a silicone toe sleeve.  For the left side, Budin splint.  These were both dispensed to her.  - Pt to return to clinic as needed.        Kenroy Cruz DPM

## 2023-06-28 NOTE — PROGRESS NOTES
Chief Complaint:   Chief Complaint   Patient presents with     Wound Check     Right foot.           Allergies   Allergen Reactions     Blood Transfusion Related (Informational Only) Other (See Comments)     Patient has a history of a clinically significant antibody against RBC antigens.  A delay in compatible RBCs may occur.     Pollen Extract      Seasonal Allergies      Venofer [Iron Sucrose] Difficulty breathing and Cramps     Severe infusion reaction 1/2022     Shellfish-Derived Products Nausea and Rash     Rash on face         Subjective: Molly is a 37 year old female who presents to the clinic today for a follow up of BL foot pain.  She relates that he has been, more difficult for her to walk.  She has hammertoes and it is causing all pain when she is walking on the end of the toes.  The right hammertoes are more rigid.  She does continue to wear her orthotics.  She notes that she has fallen multiple times as she feels like her right ankle is giving out when she is walking.    Objective  Data Unavailable Data Unavailable Data Unavailable Data Unavailable Data Unavailable 0 lbs 0 oz  PT and DP pulses are 1/4 bilaterally. CRT is 4 seconds. Absent pedal hair.   Gross sensation is diminished bilaterally.    Equinus is moderate bilaterally. . Severely hammered digits to the lesser digits BL with R>L. These are not reducible. pain in the medial and lateral ankles with palpation and ROM.  On the right ankle, she does have hypertrophy of the medial malleolus.  She does have ankle instability is noted with increased eversion of the ankle.  Nails normal bilaterally.    Assessment:   Encounter Diagnoses   Name Primary?     Pes cavus Yes     Hammer toes of both feet      Ankle instability, right          Plan:   - Pt seen and evaluated  -I discussed with her getting an AFO for the right ankle.  She does agree to this.  Orders written.  -She is having pain in the hammertoes.  The right side is more rigid.  For the right  second toe, I recommend a silicone toe sleeve.  For the left side, Budin splint.  These were both dispensed to her.  - Pt to return to clinic as needed.

## 2023-07-14 DIAGNOSIS — G89.4 CHRONIC PAIN SYNDROME: Chronic | ICD-10-CM

## 2023-07-14 RX ORDER — OXYCODONE HYDROCHLORIDE 10 MG/1
10 TABLET ORAL 3 TIMES DAILY PRN
Qty: 90 TABLET | Refills: 0 | Status: SHIPPED | OUTPATIENT
Start: 2023-07-14 | End: 2023-09-07

## 2023-08-07 ENCOUNTER — VIRTUAL VISIT (OUTPATIENT)
Dept: URGENT CARE | Facility: CLINIC | Age: 38
End: 2023-08-07
Payer: MEDICARE

## 2023-08-07 DIAGNOSIS — R30.0 DYSURIA: Primary | ICD-10-CM

## 2023-08-07 DIAGNOSIS — R39.15 URINARY URGENCY: ICD-10-CM

## 2023-08-07 PROCEDURE — 99212 OFFICE O/P EST SF 10 MIN: CPT | Mod: 95

## 2023-08-07 NOTE — PROGRESS NOTES
Mloly is a 38 year old female who presents for a billable video visit.    ASSESSMENT/PLAN:    ICD-10-CM    1. Dysuria  R30.0 UA Macroscopic with reflex to Microscopic and Culture      2. Urinary urgency  R39.15 UA Macroscopic with reflex to Microscopic and Culture          UA placed for her to complete at local clinic.  If this is negative of UTI was sent to local pharmacy.  Will await lab completion.  Increase hydration discussed during course of illness.    Follow up with primary care provider with any problems, questions or concerns or if symptoms worsen or fail to improve. Patient verbalized understanding and is agreeable to plan.       SUBJECTIVE:  Molly Teague is a 38 year old who presents for for a possible UTI. Symptoms of dysuria, urgency and frequency have been going on for 3 day(s). Symptoms were sudden onset and moderate.  Patient denies back pain, nausea, vomiting, fever and chills or vaginal discharge. This patient does not have a history of urinary tract infections.     Review of Systems  All systems reviewed and negative except per HPI.      OBJECTIVE:  Vitals not done due to this being a virtual visit    GENERAL: Healthy, alert and no distress  EYES: Eyes grossly normal to inspection.  No discharge or erythema, or obvious scleral/conjunctival abnormalities.  RESP: No audible wheeze, cough, or visible cyanosis.  No visible retractions or increased work of breathing.    SKIN: Visible skin clear. No significant rash, abnormal pigmentation or lesions.  PSYCH: Mentation appears normal, affect normal/bright, judgement and insight intact, normal speech and appearance well-groomed.    Video-Visit Details    Type of service:  Video Visit  Video Start Time: 4:59 PM  Video End Time: 5:14 PM    Originating Location (pt. Location): Home    Distant Location (provider location):  [facility]     Platform used for Video Visit: [employer]

## 2023-08-08 ENCOUNTER — HOSPITAL ENCOUNTER (EMERGENCY)
Facility: CLINIC | Age: 38
Discharge: HOME | End: 2023-08-08
Attending: NURSE PRACTITIONER | Admitting: NURSE PRACTITIONER
Payer: MEDICARE

## 2023-08-08 VITALS
RESPIRATION RATE: 16 BRPM | DIASTOLIC BLOOD PRESSURE: 78 MMHG | TEMPERATURE: 98.9 F | HEART RATE: 58 BPM | SYSTOLIC BLOOD PRESSURE: 129 MMHG

## 2023-08-08 DIAGNOSIS — N39.0 UTI (URINARY TRACT INFECTION): ICD-10-CM

## 2023-08-08 LAB
ALBUMIN UR-MCNC: 30 MG/DL
APPEARANCE UR: ABNORMAL
BACTERIA #/AREA URNS HPF: ABNORMAL /HPF
BILIRUB UR QL STRIP: NEGATIVE
CLUE CELLS: ABNORMAL
COLOR UR AUTO: YELLOW
GLUCOSE UR STRIP-MCNC: NEGATIVE MG/DL
HGB UR QL STRIP: ABNORMAL
HYALINE CASTS: 3 /LPF
KETONES UR STRIP-MCNC: NEGATIVE MG/DL
LEUKOCYTE ESTERASE UR QL STRIP: ABNORMAL
NITRATE UR QL: NEGATIVE
PH UR STRIP: 6 [PH] (ref 5–7)
RBC URINE: 11 /HPF
SP GR UR STRIP: 1.01 (ref 1–1.03)
SQUAMOUS EPITHELIAL: <1 /HPF
TRICHOMONAS, WET PREP: ABNORMAL
UROBILINOGEN UR STRIP-MCNC: NORMAL MG/DL
WBC CLUMPS #/AREA URNS HPF: PRESENT /HPF
WBC URINE: 134 /HPF
WBC'S/HIGH POWER FIELD, WET PREP: ABNORMAL
YEAST, WET PREP: ABNORMAL

## 2023-08-08 PROCEDURE — 87210 SMEAR WET MOUNT SALINE/INK: CPT | Performed by: NURSE PRACTITIONER

## 2023-08-08 PROCEDURE — 81001 URINALYSIS AUTO W/SCOPE: CPT | Performed by: NURSE PRACTITIONER

## 2023-08-08 PROCEDURE — 99213 OFFICE O/P EST LOW 20 MIN: CPT | Performed by: NURSE PRACTITIONER

## 2023-08-08 PROCEDURE — G0463 HOSPITAL OUTPT CLINIC VISIT: HCPCS | Performed by: NURSE PRACTITIONER

## 2023-08-08 PROCEDURE — 87186 SC STD MICRODIL/AGAR DIL: CPT | Performed by: NURSE PRACTITIONER

## 2023-08-08 RX ORDER — SULFAMETHOXAZOLE/TRIMETHOPRIM 800-160 MG
1 TABLET ORAL 2 TIMES DAILY
Qty: 10 TABLET | Refills: 0 | Status: SHIPPED | OUTPATIENT
Start: 2023-08-08 | End: 2023-08-13

## 2023-08-08 NOTE — ED TRIAGE NOTES
Pt reports vaginal tingling and itching onset 3 days ago. Pt denies burning with urination.

## 2023-08-08 NOTE — ED PROVIDER NOTES
History     Chief Complaint   Patient presents with    Vaginal Itching     HPI  Molly Teague is a 38 year old female who presents to the urgent care for evaluation of vaginal itching and irritation for three days. No fevers, abdominal pain, flank pain, pelvic pain, hematuria, dysuria, abnormal vaginal discharge and bleeding.       Allergies:  Allergies   Allergen Reactions    Blood Transfusion Related (Informational Only) Other (See Comments)     Patient has a history of a clinically significant antibody against RBC antigens.  A delay in compatible RBCs may occur.    Pollen Extract     Seasonal Allergies     Venofer [Iron Sucrose] Difficulty breathing and Cramps     Severe infusion reaction 1/2022    Shellfish-Derived Products Nausea and Rash     Rash on face       Problem List:    Patient Active Problem List    Diagnosis Date Noted    F11.2 - Continuous opioid dependence (H) 05/10/2023     Priority: Medium    Microalbuminuria 07/07/2022     Priority: Medium    Iron deficiency anemia due to chronic blood loss 08/04/2020     Priority: Medium    S/P ORIF (open reduction internal fixation) fracture 02/10/2020     Priority: Medium    Sinus tachycardia 01/31/2020     Priority: Medium    Anemia, chronic disease 11/25/2019     Priority: Medium     With acute/chronic blood loss anemia due to severe esophagitis.  History of severe infusion related reaction to Venofer 1/2022 5/2023 - Injectafer 750 mg x 2 doses, 1 week apart.  Will order Methylprednisolone 125 mg IV to be given before iron infusion      Mirena IUD- Remove by 09/2024 09/06/2019     Priority: Medium     Lot: [id number]  Exp: 01/2022    Dinora Israel LPN        Malignant essential hypertension 07/24/2019     Priority: Medium    PTSD (post-traumatic stress disorder) 06/19/2019     Priority: Medium    Severe episode of recurrent major depressive disorder, with psychotic features (H) 07/06/2018     Priority: Medium    Severe persistent asthma without  complication 07/06/2018     Priority: Medium     On high dose ICS/LABA + frequent albuterol use.  Next allergy/pulmonary referral      Aspiration of food 03/29/2018     Priority: Medium    Chronic pain syndrome 04/26/2017     Priority: Medium     Patient is followed by Grecia Barba DO for ongoing prescription of pain medication.  All refills should only be approved by this provider, or covering partner.    Medication(s): Oxycodone 10 mg.   Maximum quantity per month: 90  Clinic visit frequency required: Q 3 months     Controlled substance agreement:  Encounter-Level CSA - 04/26/2017:    Controlled Substance Agreement - Scan on 5/4/2017  8:58 AM: CONTROLLED SUBSTANCE AGREEMENT (below)         Patient-Level CSA:    Controlled Substance Agreement - Opioid - Scan on 2/6/2019  6:23 PM (below)       Pain Clinic evaluation in the past: No    DIRE Total Score(s):  No flowsheet data found.    Last [facility] website verification:  done on 4/26/17   9/26/2017  7/6/2018  10/16/2018  1/22/2019  8/2/2019           [email or web address]        Chronic migraine without aura without status migrainosus, not intractable 04/12/2017     Priority: Medium     With medication overuse.  Fioricet and not Imitrex is recommend to treat severe headache.  2/2017 [facility] recommend wean down from daily Fioricet and use Excedrin Migrain PRN.      AIN grade I 03/30/2017     Priority: Medium     Found at [facility] on colonoscopy 2/2017.  Recommend repeat anoscopy and anal pap in 6/2017.      Gastroesophageal reflux disease with esophagitis 03/30/2017     Priority: Medium     EGD done at [facility] 2/2017 showed esophagitis without GAVE.  Recommend max dose H2 and PPI, along with 4 tablets of Carafate dissolved in water and drink throughout the day.    Had LA Grade D esophagitis, on TID PPI      Stage 3a chronic kidney disease (H) 02/27/2017     Priority: Medium    Limited systemic sclerosis (H) 01/25/2017     Priority: Medium     Raynaud's, calcinosis,  telangiectasias, peripheral neuropathy, GERD symptoms, history of scleroderma renal crisis.  Saw [facility] 1/2017 and follows with Rheum Dr. Davis locally.      Polyneuropathy associated with underlying disease (H) 01/25/2017     Priority: Medium     Due to scleroderma and neurology thought possible due to ICU (critical illness neuropathy).  Recommend to max out dose of gabapentin to 3304-2657 mg/day, split BID or TID.  EMG 1/2017 positive for neuropathy      History of Clostridium difficile 11/29/2016     Priority: Medium    History of Helicobacter pylori infection 11/29/2016     Priority: Medium    Scleroderma with renal involvement (H) 11/09/2016     Priority: Medium     Needs lisinopril long term      History of ARDS 11/09/2016     Priority: Medium     4/2015, prolonged ICU/vent/trach due to influenza, scleroderma renal crisis requiring hemodialysis.      History of hemodialysis 11/09/2016     Priority: Medium     4/2015 due to scleroderma renal crisis      Bilateral retinitis 11/09/2016     Priority: Medium    History of pulmonary embolism 11/09/2016     Priority: Medium     Completed anti-coagulation 2017.  pulmonary embolism due to critical illness      REX (generalized anxiety disorder) 11/09/2016     Priority: Medium    Systemic sclerosis (H) 09/22/2016     Priority: Medium        Past Medical History:    Past Medical History:   Diagnosis Date    Acute pulmonary embolism (H) 2016    Acute pyelonephritis 06/09/2017    Altered mental state 03/29/2018    Anxiety     Arthritis     Complication of anesthesia     Depression     Gastroesophageal reflux disease with esophagitis     History of blood transfusion     Hypertension     Long-term (current) use of anticoagulants [Z79.01] 09/09/2016    Neuropathy     PE (pulmonary embolism) 09/07/2016    PTSD (post-traumatic stress disorder)     Raynaud's disease without gangrene     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     Rheumatism      Scleroderma (H) 2016    Slow to wake up after anesthesia     Uncomplicated asthma        Past Surgical History:    Past Surgical History:   Procedure Laterality Date    ANGIOGRAM  2015     SECTION      COMBINED ESOPHAGOSCOPY, GASTROSCOPY, DUODENOSCOPY (EGD) WITH CO2 INSUFFLATION N/A 2022    Procedure: ESOPHAGOGASTRODUODENOSCOPY, WITH CO2 INSUFFLATION;  Surgeon: Jalen Moses MD;  Location: [facility] OR    ESOPHAGOSCOPY, GASTROSCOPY, DUODENOSCOPY (EGD), COMBINED N/A 2022    Procedure: ESOPHAGOGASTRODUODENOSCOPY, WITH BIOPSY;  Surgeon: Jalen Moses MD;  Location: [facility] OR    OPEN REDUCTION INTERNAL FIXATION ANKLE Right 2/10/2020    Procedure: Right ankle open reduction internal fixation;  Surgeon: Natanael Villalta MD;  Location: [facility] OR    REMOVE HARDWARE ANKLE Right 2021    Procedure: Right ankle hardware removal;  Surgeon: Natanael Villalta MD;  Location: [facility] OR    THROAT SURGERY         Family History:    Family History   Problem Relation Age of Onset    Hyperlipidemia Father     Depression Sister     Fibromyalgia Sister     Hyperlipidemia Sister     Cerebrovascular Disease Maternal Grandmother          of a stroke    Diabetes Maternal Grandmother     Hyperlipidemia Paternal Grandfather     Diabetes Maternal Aunt     Depression Other     Melanoma No family hx of     Skin Cancer No family hx of     Anesthesia Reaction No family hx of     Cardiovascular No family hx of     Deep Vein Thrombosis (DVT) No family hx of        Social History:  Marital Status:  Single [1]  Social History     Tobacco Use    Smoking status: Never    Smokeless tobacco: Never   Vaping Use    Vaping Use: Every day    Substances: Flavoring, delta 8    Devices: Disposable   Substance Use Topics    Alcohol use: Not Currently     Comment: Socially once on a weekend if any    Drug use: Yes     Types: Marijuana     Comment: delta        Medications:   "  sulfamethoxazole-trimethoprim (BACTRIM DS) 800-160 MG tablet  acetaminophen (TYLENOL) 325 MG tablet  albuterol (VENTOLIN HFA) 108 (90 Base) MCG/ACT inhaler  amLODIPine (NORVASC) 2.5 MG tablet  blood glucose (NO BRAND SPECIFIED) lancets standard  blood glucose (NO BRAND SPECIFIED) test strip  budesonide-formoterol (SYMBICORT) 160-4.5 MCG/ACT Inhaler  busPIRone HCl (BUSPAR) 30 MG tablet  Cyanocobalamin (B-12) 1000 MCG TBCR  DULoxetine (CYMBALTA) 30 MG capsule  famotidine (PEPCID) 20 MG tablet  gabapentin (NEURONTIN) 300 MG capsule  GOODSENSE MIGRAINE FORMULA 250-250-65 MG per tablet  lisinopril (ZESTRIL) 10 MG tablet  loratadine (CLARITIN) 10 MG tablet  LORazepam (ATIVAN) 1 MG tablet  medical cannabis (Patient's own supply)  methocarbamol (ROBAXIN) 750 MG tablet  metoclopramide (REGLAN) 10 MG tablet  montelukast (SINGULAIR) 10 MG tablet  naloxone (NARCAN) 4 MG/0.1ML nasal spray  omeprazole (PRILOSEC) 40 MG DR capsule  ondansetron (ZOFRAN ODT) 4 MG ODT tab  oxyCODONE (ROXICODONE) 10 MG tablet  oxyCODONE IR (ROXICODONE) 10 MG tablet  polyethylene glycol (MIRALAX) 17 GM/Dose powder  Prenatal Vit-Fe Fumarate-FA (PRENATAL VITAMIN AND MINERAL) 28-0.8 MG TABS  promethazine (PHENERGAN) 25 MG tablet  promethazine (PHENERGAN) 25 MG/ML IV injection  syringe/needle, disp, (BD ECLIPSE SYRINGE) 25G X 1\" 3 ML MISC  vitamin D3 (CHOLECALCIFEROL) 50 mcg (2000 units) tablet          Review of Systems   All other systems reviewed and are negative.      Physical Exam   BP: 129/78  Pulse: 58  Temp: 98.9  F (37.2  C)  Resp: 16      Physical Exam  Constitutional:       General: She is not in acute distress.     Appearance: Normal appearance.   Eyes:      Conjunctiva/sclera: Conjunctivae normal.      Pupils: Pupils are equal, round, and reactive to light.   Cardiovascular:      Rate and Rhythm: Normal rate.   Pulmonary:      Effort: Pulmonary effort is normal.   Abdominal:      General: There is no distension.      Palpations: Abdomen is " soft.      Tenderness: There is no abdominal tenderness. There is no right CVA tenderness, left CVA tenderness, guarding or rebound.   Musculoskeletal:         General: Normal range of motion.      Cervical back: Normal range of motion.   Skin:     General: Skin is warm.      Capillary Refill: Capillary refill takes less than 2 seconds.   Neurological:      General: No focal deficit present.      Mental Status: She is alert.         ED Course                 Procedures    Results for orders placed or performed during the hospital encounter of 08/08/23 (from the past 24 hour(s))   UA Macroscopic with reflex to Microscopic and Culture    Specimen: Urine, Clean Catch   Result Value Ref Range    Color Urine Yellow Colorless, Straw, Light Yellow, Yellow    Appearance Urine Cloudy (A) Clear    Glucose Urine Negative Negative mg/dL    Bilirubin Urine Negative Negative    Ketones Urine Negative Negative mg/dL    Specific Gravity Urine 1.015 1.003 - 1.035    Blood Urine Small (A) Negative    pH Urine 6.0 5.0 - 7.0    Protein Albumin Urine 30 (A) Negative mg/dL    Urobilinogen Urine Normal Normal, 2.0 mg/dL    Nitrite Urine Negative Negative    Leukocyte Esterase Urine Large (A) Negative    Bacteria Urine Few (A) None Seen /HPF    WBC Clumps Urine Present (A) None Seen /HPF    RBC Urine 11 (H) <=2 /HPF    WBC Urine 134 (H) <=5 /HPF    Squamous Epithelials Urine <1 <=1 /HPF    Hyaline Casts Urine 3 (H) <=2 /LPF    Narrative    Urine Culture ordered based on laboratory criteria   Wet prep    Specimen: Vagina; Swab   Result Value Ref Range    Trichomonas Absent Absent    Yeast Absent Absent    Clue Cells Absent Absent    WBCs/high power field 2+ (A) None       Medications - No data to display    Assessments & Plan (with Medical Decision Making)     Molly Teague is a 38 year old female who presents to the urgent care for evaluation of vaginal itching and irritation for three days. Vital signs normal, physical exam as above.  Well appearing. Urinalysis cloudy with small blood, large LE, negative nitrites, bacteria/WBC/RBC present. Will begin Bactrim DS while awaiting culture results. Wet prep negative. Return precautions reviewed, all questions answered. Patient is agreeable to plan of care and discharged in no acute distress.     I have reviewed the nursing notes.    I have reviewed the findings, diagnosis, plan and need for follow up with the patient.    New Prescriptions    SULFAMETHOXAZOLE-TRIMETHOPRIM (BACTRIM DS) 800-160 MG TABLET    Take 1 tablet by mouth 2 times daily for 5 days       Final diagnoses:   UTI (urinary tract infection)       8/8/2023   [facility] EMERGENCY DEPT       Shima Combs, APRN CNP  08/08/23 1444

## 2023-08-10 LAB — BACTERIA UR CULT: ABNORMAL

## 2023-08-25 ENCOUNTER — OFFICE VISIT (OUTPATIENT)
Dept: SURGERY | Facility: CLINIC | Age: 38
End: 2023-08-25
Payer: MEDICARE

## 2023-08-25 DIAGNOSIS — K62.82 AIN GRADE I: ICD-10-CM

## 2023-08-25 LAB
LAB DIRECTOR COMMENTS: ABNORMAL
LAB DIRECTOR DISCLAIMER: ABNORMAL
LAB DIRECTOR INTERPRETATION: ABNORMAL
LAB DIRECTOR METHODOLOGY: ABNORMAL
LAB DIRECTOR RESULTS: ABNORMAL
SPECIMEN DESCRIPTION: ABNORMAL

## 2023-08-25 PROCEDURE — 87624 HPV HI-RISK TYP POOLED RSLT: CPT | Performed by: NURSE PRACTITIONER

## 2023-08-25 PROCEDURE — 88112 CYTOPATH CELL ENHANCE TECH: CPT | Mod: TC | Performed by: NURSE PRACTITIONER

## 2023-08-25 PROCEDURE — G0452 MOLECULAR PATHOLOGY INTERPR: HCPCS | Mod: 26 | Performed by: PATHOLOGY

## 2023-08-25 PROCEDURE — 88305 TISSUE EXAM BY PATHOLOGIST: CPT | Mod: TC | Performed by: NURSE PRACTITIONER

## 2023-08-25 PROCEDURE — 88112 CYTOPATH CELL ENHANCE TECH: CPT | Mod: 26 | Performed by: PATHOLOGY

## 2023-08-25 PROCEDURE — 88305 TISSUE EXAM BY PATHOLOGIST: CPT | Mod: 26 | Performed by: PATHOLOGY

## 2023-08-25 RX ORDER — LIDOCAINE HYDROCHLORIDE 20 MG/ML
JELLY TOPICAL ONCE
Status: COMPLETED | OUTPATIENT
Start: 2023-08-25 | End: 2023-08-25

## 2023-08-25 RX ADMIN — LIDOCAINE HYDROCHLORIDE 1 TUBE: 20 JELLY TOPICAL at 12:53

## 2023-08-25 NOTE — PROGRESS NOTES
Colon and Rectal Surgery Clinic High Resolution Anoscopy Note    RE: Molly Teague  : 1985  MARK: 2023    Molly Teague is a 38 year old female with a history of low grade dysplasia who presents today for high resolution anoscopy.     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 She has scleroderma but is not currently on any immunosuppression due to side effects. Denies any anal receptive intercourse. Does not smoke.     ASSESSMENT: Written, informed consent was obtained prior to procedure.  Prior to the start of the procedure and with procedural staff participation, I verbally confirmed the patient s identity using two indicators, relevant allergies, that the procedure was appropriate and matched the consent or emergent situation, and that the correct equipment/implants were available. Immediately prior to starting the procedure I conducted the Time Out with the procedural staff and re-confirmed the patient s name, procedure, and site/side. (The Joint Commission universal protocol was followed.)  Yes    Sedation (Moderate or Deep): None    Anal cytology was obtained with Dacron swab. Digital anal rectal exam was performed with no palpable lesions. Dilute acetic acid soak was completed for 2 minutes. Lubricant was used to insert the anoscope. Performed high resolution microscopy using the Proctostation. The anal transition zone was viewed in its entirety. Abnormal areas noted were some acetowhitening with fine punctation in the right posterior distal anal canal that was Lugol's negative. After injection with 1% lidocaine with epinephrine, biopsy was obtained using a baby Tischler forceps. Fulguration was not performed.   The perianal area was inspected after acetic acid soak. The findings noted were three small raised lesions with hyperpigmentation and punctation in ree right posterior and left anterior positions. After injection  with 1% lidocaine with epinephrine, each of these three lesions was removed with baby Tischler forceps and base was fulgurated with cautery.     The patient tolerated the procedures well.    PLAN: Will follow up with patient with results of biopsies, Pap and HPV genotyping today for further plan. Patient's questions were answered to her stated satisfaction and she is in agreement with this plan.       For details of past medical history, surgical history, family history, medications, allergies, and review of systems, please see details below.    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     Rheumatism     Scleroderma (H) 2016    Slow to wake up after anesthesia     pt states it took 2 days to come out of anesthesia after her ankle surgery    Uncomplicated asthma        Surgical history:  Past Surgical History:   Procedure Laterality Date    ANGIOGRAM       SECTION      COMBINED ESOPHAGOSCOPY, GASTROSCOPY, DUODENOSCOPY (EGD) WITH CO2 INSUFFLATION N/A 2022    Procedure: ESOPHAGOGASTRODUODENOSCOPY, WITH CO2 INSUFFLATION;  Surgeon: Jalen Moses MD;  Location:  OR    ESOPHAGOSCOPY, GASTROSCOPY, DUODENOSCOPY (EGD), COMBINED N/A 2022    Procedure: ESOPHAGOGASTRODUODENOSCOPY, WITH BIOPSY;  Surgeon: Jalen Moses MD;  Location:  OR    OPEN REDUCTION INTERNAL FIXATION ANKLE  Right 2/10/2020    Procedure: Right ankle open reduction internal fixation;  Surgeon: Natanael Villalta MD;  Location: [facility]    REMOVE HARDWARE ANKLE Right 2021    Procedure: Right ankle hardware removal;  Surgeon: Natanael Villalta MD;  Location: [facility] OR    THROAT SURGERY         Family history:  Family History   Problem Relation Age of Onset    Hyperlipidemia Father     Depression Sister     Fibromyalgia Sister     Hyperlipidemia Sister     Cerebrovascular Disease Maternal Grandmother          of a stroke    Diabetes Maternal Grandmother     Hyperlipidemia Paternal Grandfather     Diabetes Maternal Aunt     Depression Other     Melanoma No family hx of     Skin Cancer No family hx of     Anesthesia Reaction No family hx of     Cardiovascular No family hx of     Deep Vein Thrombosis (DVT) No family hx of        Medications:  Current Outpatient Medications   Medication Sig Dispense Refill    acetaminophen (TYLENOL) 325 MG tablet Take 2 tablets (650 mg) by mouth every 4 hours as needed for mild pain or other 1 Bottle 0    albuterol (VENTOLIN HFA) 108 (90 Base) MCG/ACT inhaler INHALE 2 PUFFS INTO THE LUNGS ONCE EVERY 4 HOURS AS NEEDED FOR SHORTNESS OF BREATH / DYSPNEA / WHEEZING 18 g 11    amLODIPine (NORVASC) 2.5 MG tablet Take 1 tablet (2.5 mg) by mouth daily 90 tablet 3    blood glucose (NO BRAND SPECIFIED) lancets standard Use to test blood sugar 1 time daily 100 each 3    blood glucose (NO BRAND SPECIFIED) test strip Use to test blood sugar 1 time daily 100 strip 11    budesonide-formoterol (SYMBICORT) 160-4.5 MCG/ACT Inhaler INHALE TWO PUFFS BY MOUTH TWICE A DAY 10.2 g 11    busPIRone HCl (BUSPAR) 30 MG tablet Take 1 tablet (30 mg) by mouth 2 times daily 180 tablet 3    Cyanocobalamin (B-12) 1000 MCG TBCR Take 1,000 mcg by mouth daily 100 tablet 1    DULoxetine (CYMBALTA) 30 MG capsule Take 3 capsules (90 mg) by mouth daily 270 capsule 3    famotidine (PEPCID) 20 MG tablet TAKE ONE TABLET  BY MOUTH TWICE A  tablet 1    gabapentin (NEURONTIN) 300 MG capsule Take 2 capsules (600 mg) by mouth 3 times daily 540 capsule 3    GOODSENSE MIGRAINE FORMULA 250-250-65 MG per tablet TAKE ONE TABLET BY MOUTH EVERY 6 HOURS AS NEEDED FOR HEADACHES (Patient taking differently: Take 1 tablet by mouth every 6 hours as needed) 24 tablet 1    lisinopril (ZESTRIL) 10 MG tablet Take 1 tablet (10 mg) by mouth every evening 90 tablet 3    loratadine (CLARITIN) 10 MG tablet Take 10 mg by mouth daily as needed       LORazepam (ATIVAN) 1 MG tablet Take 1 tablet (1 mg) by mouth 2 times daily as needed for anxiety #45 for 30 days 45 tablet 5    medical cannabis (Patient's own supply) (The purpose of this order is to document that the patient reports taking medical cannabis.  This is not a prescription, and is not used to certify that the patient has a qualifying medical condition.)  CBG gummy over the counter 0 Information only 0    methocarbamol (ROBAXIN) 750 MG tablet Take 1 tablet (750 mg) by mouth 3 times daily as needed for muscle spasms 180 tablet 3    metoclopramide (REGLAN) 10 MG tablet Take 1 tablet (10 mg) by mouth 3 times daily as needed (nausea/vomiting) 50 tablet 1    montelukast (SINGULAIR) 10 MG tablet Take 1 tablet (10 mg) by mouth At Bedtime 90 tablet 3    naloxone (NARCAN) 4 MG/0.1ML nasal spray Spray 1 spray (4 mg) into one nostril alternating nostrils once as needed for opioid reversal every 2-3 minutes until assistance arrives 0.2 mL 3    omeprazole (PRILOSEC) 40 MG DR capsule Take 1 capsule (40 mg) by mouth 3 times daily Has seen GI who has approved TID dosing 270 capsule 3    ondansetron (ZOFRAN ODT) 4 MG ODT tab Take 1 tablet (4 mg) by mouth every 8 hours as needed for nausea 30 tablet 4    oxyCODONE IR (ROXICODONE) 10 MG tablet Take 1 tablet (10 mg) by mouth 3 times daily as needed for pain 90 tablet 0    polyethylene glycol (MIRALAX) 17 GM/Dose powder Take 17 g by mouth daily 510 g 11    Prenatal  "Vit-Fe Fumarate-FA (PRENATAL VITAMIN AND MINERAL) 28-0.8 MG TABS Take 1 tablet by mouth daily 90 tablet 3    promethazine (PHENERGAN) 25 MG tablet Take 1 tablet (25 mg) by mouth 2 times daily as needed for nausea 30 tablet 3    promethazine (PHENERGAN) 25 MG/ML IV injection INJECT 1 ML (25 MG) INTO THE MUSCLE EVERY 6 HOURS AS NEEDED FOR NAUSEA OR VOMITING 6 mL 1    syringe/needle, disp, (BD ECLIPSE SYRINGE) 25G X 1\" 3 ML MISC 1 each daily as needed 10 each 11    vitamin D3 (CHOLECALCIFEROL) 50 mcg (2000 units) tablet Take 1 tablet (50 mcg) by mouth daily Start after Ergocalciferol course is complete 90 tablet 3     Allergies:  The patientis allergic to blood transfusion related (informational only), pollen extract, seasonal allergies, venofer [iron sucrose], and shellfish-derived products.    Social history:  Social History     Tobacco Use    Smoking status: Never    Smokeless tobacco: Never   Substance Use Topics    Alcohol use: Not Currently     Comment: Socially once on a weekend if any     Marital status: single.    Review of Systems:  There are no exam notes on file for this visit.     This procedure was performed under a collaborative agreement with Dr. Pramod Cruz MD, Chief of the Division of Colon and Rectal Surgery    Nataliia Ybarra NP-C  Colon and Rectal Surgery  [facility]      This note was created using speech recognition software and may contain unintended word substitutions.    "

## 2023-08-25 NOTE — LETTER
2023       RE: Molly Teague  [address] [place] [facility] [place] [id number]       Dear Colleague,    Thank you for referring your patient, Molly Teague, to the [facility] [place] [facility]. Please see a copy of my visit note below.    Colon and Rectal Surgery Clinic High Resolution Anoscopy Note    RE: Molly Teague  : 1985  MARK: 2023    Molly Teague is a 38 year old female with a history of low grade dysplasia who presents today for high resolution anoscopy.     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 She has scleroderma but is not currently on any immunosuppression due to side effects. Denies any anal receptive intercourse. Does not smoke.     ASSESSMENT: Written, informed consent was obtained prior to procedure.  Prior to the start of the procedure and with procedural staff participation, I verbally confirmed the patient s identity using two indicators, relevant allergies, that the procedure was appropriate and matched the consent or emergent situation, and that the correct equipment/implants were available. Immediately prior to starting the procedure I conducted the Time Out with the procedural staff and re-confirmed the patient s name, procedure, and site/side. (The Joint Commission universal protocol was followed.)  Yes    Sedation (Moderate or Deep): None    Anal cytology was obtained with Dacron swab. Digital anal rectal exam was performed with no palpable lesions. Dilute acetic acid soak was completed for 2 minutes. Lubricant was used to insert the anoscope. Performed high resolution microscopy using the Proctostation. The anal transition zone was viewed in its entirety. Abnormal areas noted were some acetowhitening with fine punctation in the right posterior distal anal canal that was Lugol's negative. After  injection with 1% lidocaine with epinephrine, biopsy was obtained using a baby Tischler forceps. Fulguration was not performed.   The perianal area was inspected after acetic acid soak. The findings noted were three small raised lesions with hyperpigmentation and punctation in ree right posterior and left anterior positions. After injection with 1% lidocaine with epinephrine, each of these three lesions was removed with baby Tischler forceps and base was fulgurated with cautery.     The patient tolerated the procedures well.    PLAN: Will follow up with patient with results of biopsies, Pap and HPV genotyping today for further plan. Patient's questions were answered to her stated satisfaction and she is in agreement with this plan.       For details of past medical history, surgical history, family history, medications, allergies, and review of systems, please see details below.    Medical history:  Past Medical History:   Diagnosis Date    Acute pulmonary embolism (H)     During renal crisis    Acute pyelonephritis 2017    Altered mental state 2018    Anxiety     Arthritis     Complication of anesthesia     Depression     Gastroesophageal reflux disease with esophagitis     History of blood transfusion     Hypertension     Long-term (current) use of anticoagulants [Z79.01] 2016    Neuropathy     PE (pulmonary embolism) 2016    PTSD (post-traumatic stress disorder)     Raynaud's disease without gangrene     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     Rheumatism     Scleroderma (H) 2016    Slow to wake up after anesthesia     pt states it took 2 days to come out of anesthesia after her ankle surgery    Uncomplicated asthma        Surgical history:  Past Surgical History:   Procedure Laterality Date    ANGIOGRAM       SECTION      COMBINED ESOPHAGOSCOPY, GASTROSCOPY, DUODENOSCOPY (EGD) WITH CO2 INSUFFLATION N/A 2022    Procedure:  ESOPHAGOGASTRODUODENOSCOPY, WITH CO2 INSUFFLATION;  Surgeon: Jalen Moses MD;  Location: [facility] OR    ESOPHAGOSCOPY, GASTROSCOPY, DUODENOSCOPY (EGD), COMBINED N/A 2022    Procedure: ESOPHAGOGASTRODUODENOSCOPY, WITH BIOPSY;  Surgeon: Jalen Moses MD;  Location: [facility] OR    OPEN REDUCTION INTERNAL FIXATION ANKLE Right 2/10/2020    Procedure: Right ankle open reduction internal fixation;  Surgeon: Natanael Villalta MD;  [facility]    REMOVE HARDWARE ANKLE Right 2021    Procedure: Right ankle hardware removal;  Surgeon: Natanael Villalta MD;  Location: [facility] OR    THROAT SURGERY         Family history:  Family History   Problem Relation Age of Onset    Hyperlipidemia Father     Depression Sister     Fibromyalgia Sister     Hyperlipidemia Sister     Cerebrovascular Disease Maternal Grandmother          of a stroke    Diabetes Maternal Grandmother     Hyperlipidemia Paternal Grandfather     Diabetes Maternal Aunt     Depression Other     Melanoma No family hx of     Skin Cancer No family hx of     Anesthesia Reaction No family hx of     Cardiovascular No family hx of     Deep Vein Thrombosis (DVT) No family hx of        Medications:  Current Outpatient Medications   Medication Sig Dispense Refill    acetaminophen (TYLENOL) 325 MG tablet Take 2 tablets (650 mg) by mouth every 4 hours as needed for mild pain or other 1 Bottle 0    albuterol (VENTOLIN HFA) 108 (90 Base) MCG/ACT inhaler INHALE 2 PUFFS INTO THE LUNGS ONCE EVERY 4 HOURS AS NEEDED FOR SHORTNESS OF BREATH / DYSPNEA / WHEEZING 18 g 11    amLODIPine (NORVASC) 2.5 MG tablet Take 1 tablet (2.5 mg) by mouth daily 90 tablet 3    blood glucose (NO BRAND SPECIFIED) lancets standard Use to test blood sugar 1 time daily 100 each 3    blood glucose (NO BRAND SPECIFIED) test strip Use to test blood sugar 1 time daily 100 strip 11    budesonide-formoterol (SYMBICORT) 160-4.5 MCG/ACT Inhaler INHALE TWO PUFFS BY MOUTH  TWICE A DAY 10.2 g 11    busPIRone HCl (BUSPAR) 30 MG tablet Take 1 tablet (30 mg) by mouth 2 times daily 180 tablet 3    Cyanocobalamin (B-12) 1000 MCG TBCR Take 1,000 mcg by mouth daily 100 tablet 1    DULoxetine (CYMBALTA) 30 MG capsule Take 3 capsules (90 mg) by mouth daily 270 capsule 3    famotidine (PEPCID) 20 MG tablet TAKE ONE TABLET BY MOUTH TWICE A  tablet 1    gabapentin (NEURONTIN) 300 MG capsule Take 2 capsules (600 mg) by mouth 3 times daily 540 capsule 3    GOODSENSE MIGRAINE FORMULA 250-250-65 MG per tablet TAKE ONE TABLET BY MOUTH EVERY 6 HOURS AS NEEDED FOR HEADACHES (Patient taking differently: Take 1 tablet by mouth every 6 hours as needed) 24 tablet 1    lisinopril (ZESTRIL) 10 MG tablet Take 1 tablet (10 mg) by mouth every evening 90 tablet 3    loratadine (CLARITIN) 10 MG tablet Take 10 mg by mouth daily as needed       LORazepam (ATIVAN) 1 MG tablet Take 1 tablet (1 mg) by mouth 2 times daily as needed for anxiety #45 for 30 days 45 tablet 5    medical cannabis (Patient's own supply) (The purpose of this order is to document that the patient reports taking medical cannabis.  This is not a prescription, and is not used to certify that the patient has a qualifying medical condition.)  CBG gummy over the counter 0 Information only 0    methocarbamol (ROBAXIN) 750 MG tablet Take 1 tablet (750 mg) by mouth 3 times daily as needed for muscle spasms 180 tablet 3    metoclopramide (REGLAN) 10 MG tablet Take 1 tablet (10 mg) by mouth 3 times daily as needed (nausea/vomiting) 50 tablet 1    montelukast (SINGULAIR) 10 MG tablet Take 1 tablet (10 mg) by mouth At Bedtime 90 tablet 3    naloxone (NARCAN) 4 MG/0.1ML nasal spray Spray 1 spray (4 mg) into one nostril alternating nostrils once as needed for opioid reversal every 2-3 minutes until assistance arrives 0.2 mL 3    omeprazole (PRILOSEC) 40 MG DR capsule Take 1 capsule (40 mg) by mouth 3 times daily Has seen GI who has approved TID dosing  "270 capsule 3    ondansetron (ZOFRAN ODT) 4 MG ODT tab Take 1 tablet (4 mg) by mouth every 8 hours as needed for nausea 30 tablet 4    oxyCODONE IR (ROXICODONE) 10 MG tablet Take 1 tablet (10 mg) by mouth 3 times daily as needed for pain 90 tablet 0    polyethylene glycol (MIRALAX) 17 GM/Dose powder Take 17 g by mouth daily 510 g 11    Prenatal Vit-Fe Fumarate-FA (PRENATAL VITAMIN AND MINERAL) 28-0.8 MG TABS Take 1 tablet by mouth daily 90 tablet 3    promethazine (PHENERGAN) 25 MG tablet Take 1 tablet (25 mg) by mouth 2 times daily as needed for nausea 30 tablet 3    promethazine (PHENERGAN) 25 MG/ML IV injection INJECT 1 ML (25 MG) INTO THE MUSCLE EVERY 6 HOURS AS NEEDED FOR NAUSEA OR VOMITING 6 mL 1    syringe/needle, disp, (BD ECLIPSE SYRINGE) 25G X 1\" 3 ML MISC 1 each daily as needed 10 each 11    vitamin D3 (CHOLECALCIFEROL) 50 mcg (2000 units) tablet Take 1 tablet (50 mcg) by mouth daily Start after Ergocalciferol course is complete 90 tablet 3     Allergies:  The patientis allergic to blood transfusion related (informational only), pollen extract, seasonal allergies, venofer [iron sucrose], and shellfish-derived products.    Social history:  Social History     Tobacco Use    Smoking status: Never    Smokeless tobacco: Never   Substance Use Topics    Alcohol use: Not Currently     Comment: Socially once on a weekend if any     Marital status: single.    Review of Systems:  There are no exam notes on file for this visit.     This procedure was performed under a collaborative agreement with Dr. Pramod Cruz MD, Chief of the Division of Colon and Rectal Surgery      This note was created using speech recognition software and may contain unintended word substitutions.          Again, thank you for allowing me to participate in the care of your patient.      Sincerely,    CHRIST Perez CNP    "

## 2023-08-28 LAB
PATH REPORT.COMMENTS IMP SPEC: NORMAL
PATH REPORT.FINAL DX SPEC: NORMAL
PATH REPORT.FINAL DX SPEC: NORMAL
PATH REPORT.GROSS SPEC: NORMAL
PATH REPORT.GROSS SPEC: NORMAL
PATH REPORT.MICROSCOPIC SPEC OTHER STN: NORMAL
PATH REPORT.MICROSCOPIC SPEC OTHER STN: NORMAL
PATH REPORT.RELEVANT HX SPEC: NORMAL
PATH REPORT.RELEVANT HX SPEC: NORMAL
PHOTO IMAGE: NORMAL

## 2023-08-31 ENCOUNTER — TELEPHONE (OUTPATIENT)
Dept: GASTROENTEROLOGY | Facility: CLINIC | Age: 38
End: 2023-08-31
Payer: MEDICARE

## 2023-08-31 NOTE — TELEPHONE ENCOUNTER
1st Attempt. Left a voicemail and sent a MyChart Message/letter sent. Defer for 48 hours. SW

## 2023-09-28 ENCOUNTER — OFFICE VISIT (OUTPATIENT)
Dept: FAMILY MEDICINE | Facility: CLINIC | Age: 38
End: 2023-09-28
Payer: MEDICARE

## 2023-09-28 VITALS
RESPIRATION RATE: 18 BRPM | BODY MASS INDEX: 25.58 KG/M2 | TEMPERATURE: 98.2 F | HEIGHT: 62 IN | WEIGHT: 139 LBS | DIASTOLIC BLOOD PRESSURE: 72 MMHG | OXYGEN SATURATION: 99 % | SYSTOLIC BLOOD PRESSURE: 116 MMHG | HEART RATE: 83 BPM

## 2023-09-28 DIAGNOSIS — K62.82 AIN GRADE I: ICD-10-CM

## 2023-09-28 DIAGNOSIS — M34.1 CREST (CALCINOSIS, RAYNAUD'S PHENOMENON, ESOPHAGEAL DYSFUNCTION, SCLERODACTYLY, TELANGIECTASIA) (H): ICD-10-CM

## 2023-09-28 DIAGNOSIS — J45.40 MODERATE PERSISTENT ASTHMA WITHOUT COMPLICATION: ICD-10-CM

## 2023-09-28 DIAGNOSIS — Z23 NEED FOR PROPHYLACTIC VACCINATION AGAINST HEPATITIS B VIRUS: ICD-10-CM

## 2023-09-28 DIAGNOSIS — Z13.220 SCREENING FOR HYPERLIPIDEMIA: ICD-10-CM

## 2023-09-28 DIAGNOSIS — F11.20 CONTINUOUS OPIOID DEPENDENCE (H): ICD-10-CM

## 2023-09-28 DIAGNOSIS — D63.8 ANEMIA, CHRONIC DISEASE: ICD-10-CM

## 2023-09-28 DIAGNOSIS — Z23 NEED FOR PROPHYLACTIC VACCINATION AGAINST HEPATITIS A: ICD-10-CM

## 2023-09-28 DIAGNOSIS — E53.8 VITAMIN B12 DEFICIENCY (NON ANEMIC): ICD-10-CM

## 2023-09-28 DIAGNOSIS — F43.10 PTSD (POST-TRAUMATIC STRESS DISORDER): ICD-10-CM

## 2023-09-28 DIAGNOSIS — F41.1 GAD (GENERALIZED ANXIETY DISORDER): ICD-10-CM

## 2023-09-28 DIAGNOSIS — D50.0 IRON DEFICIENCY ANEMIA DUE TO CHRONIC BLOOD LOSS: ICD-10-CM

## 2023-09-28 DIAGNOSIS — F32.1 MODERATE MAJOR DEPRESSION (H): ICD-10-CM

## 2023-09-28 DIAGNOSIS — K21.01 GASTROESOPHAGEAL REFLUX DISEASE WITH ESOPHAGITIS AND HEMORRHAGE: ICD-10-CM

## 2023-09-28 DIAGNOSIS — E55.9 VITAMIN D DEFICIENCY: ICD-10-CM

## 2023-09-28 DIAGNOSIS — M34.9 LIMITED SYSTEMIC SCLEROSIS (H): ICD-10-CM

## 2023-09-28 DIAGNOSIS — G89.4 CHRONIC PAIN SYNDROME: Primary | ICD-10-CM

## 2023-09-28 DIAGNOSIS — N18.31 STAGE 3A CHRONIC KIDNEY DISEASE (H): ICD-10-CM

## 2023-09-28 DIAGNOSIS — Z23 NEED FOR VACCINATION: ICD-10-CM

## 2023-09-28 DIAGNOSIS — E53.8 FOLATE DEFICIENCY: ICD-10-CM

## 2023-09-28 LAB
ANION GAP SERPL CALCULATED.3IONS-SCNC: 11 MMOL/L (ref 7–15)
BUN SERPL-MCNC: 16.2 MG/DL (ref 6–20)
CALCIUM SERPL-MCNC: 10.4 MG/DL (ref 8.6–10)
CHLORIDE SERPL-SCNC: 102 MMOL/L (ref 98–107)
CHOLEST SERPL-MCNC: 189 MG/DL
CREAT SERPL-MCNC: 1.04 MG/DL (ref 0.51–0.95)
CREAT UR-MCNC: 53.6 MG/DL
CREAT UR-MCNC: 54 MG/DL
EGFRCR SERPLBLD CKD-EPI 2021: 70 ML/MIN/1.73M2
ERYTHROCYTE [DISTWIDTH] IN BLOOD BY AUTOMATED COUNT: 13.2 % (ref 10–15)
FOLATE SERPL-MCNC: 11.7 NG/ML (ref 4.6–34.8)
GLUCOSE SERPL-MCNC: 78 MG/DL (ref 70–99)
HCO3 SERPL-SCNC: 25 MMOL/L (ref 22–29)
HCT VFR BLD AUTO: 37.8 % (ref 35–47)
HDLC SERPL-MCNC: 49 MG/DL
HGB BLD-MCNC: 12.2 G/DL (ref 11.7–15.7)
LDLC SERPL CALC-MCNC: 107 MG/DL
MCH RBC QN AUTO: 31 PG (ref 26.5–33)
MCHC RBC AUTO-ENTMCNC: 32.3 G/DL (ref 31.5–36.5)
MCV RBC AUTO: 96 FL (ref 78–100)
MICROALBUMIN UR-MCNC: <12 MG/L
MICROALBUMIN/CREAT UR: NORMAL MG/G{CREAT}
NONHDLC SERPL-MCNC: 140 MG/DL
PLATELET # BLD AUTO: 204 10E3/UL (ref 150–450)
POTASSIUM SERPL-SCNC: 4.5 MMOL/L (ref 3.4–5.3)
RBC # BLD AUTO: 3.94 10E6/UL (ref 3.8–5.2)
SODIUM SERPL-SCNC: 138 MMOL/L (ref 135–145)
TRIGL SERPL-MCNC: 165 MG/DL
VIT B12 SERPL-MCNC: 271 PG/ML (ref 232–1245)
VIT D+METAB SERPL-MCNC: 26 NG/ML (ref 20–50)
WBC # BLD AUTO: 7 10E3/UL (ref 4–11)

## 2023-09-28 PROCEDURE — 86340 INTRINSIC FACTOR ANTIBODY: CPT | Mod: 90 | Performed by: INTERNAL MEDICINE

## 2023-09-28 PROCEDURE — 82043 UR ALBUMIN QUANTITATIVE: CPT | Performed by: INTERNAL MEDICINE

## 2023-09-28 PROCEDURE — 82746 ASSAY OF FOLIC ACID SERUM: CPT | Performed by: INTERNAL MEDICINE

## 2023-09-28 PROCEDURE — 85027 COMPLETE CBC AUTOMATED: CPT | Performed by: INTERNAL MEDICINE

## 2023-09-28 PROCEDURE — 82306 VITAMIN D 25 HYDROXY: CPT | Performed by: INTERNAL MEDICINE

## 2023-09-28 PROCEDURE — 83090 ASSAY OF HOMOCYSTEINE: CPT | Performed by: INTERNAL MEDICINE

## 2023-09-28 PROCEDURE — 90686 IIV4 VACC NO PRSV 0.5 ML IM: CPT | Performed by: INTERNAL MEDICINE

## 2023-09-28 PROCEDURE — G0008 ADMIN INFLUENZA VIRUS VAC: HCPCS | Performed by: INTERNAL MEDICINE

## 2023-09-28 PROCEDURE — 83921 ORGANIC ACID SINGLE QUANT: CPT | Performed by: INTERNAL MEDICINE

## 2023-09-28 PROCEDURE — 80048 BASIC METABOLIC PNL TOTAL CA: CPT | Performed by: INTERNAL MEDICINE

## 2023-09-28 PROCEDURE — 80061 LIPID PANEL: CPT | Performed by: INTERNAL MEDICINE

## 2023-09-28 PROCEDURE — 99214 OFFICE O/P EST MOD 30 MIN: CPT | Mod: 25 | Performed by: INTERNAL MEDICINE

## 2023-09-28 PROCEDURE — G0480 DRUG TEST DEF 1-7 CLASSES: HCPCS | Performed by: INTERNAL MEDICINE

## 2023-09-28 PROCEDURE — 82607 VITAMIN B-12: CPT | Performed by: INTERNAL MEDICINE

## 2023-09-28 PROCEDURE — 36415 COLL VENOUS BLD VENIPUNCTURE: CPT | Performed by: INTERNAL MEDICINE

## 2023-09-28 PROCEDURE — 82570 ASSAY OF URINE CREATININE: CPT | Mod: 59 | Performed by: INTERNAL MEDICINE

## 2023-09-28 RX ORDER — BUDESONIDE AND FORMOTEROL FUMARATE DIHYDRATE 160; 4.5 UG/1; UG/1
AEROSOL RESPIRATORY (INHALATION)
Qty: 10.2 G | Refills: 11 | Status: SHIPPED | OUTPATIENT
Start: 2023-09-28 | End: 2024-01-31

## 2023-09-28 RX ORDER — OMEPRAZOLE 40 MG/1
40 CAPSULE, DELAYED RELEASE ORAL 3 TIMES DAILY
Qty: 270 CAPSULE | Refills: 3 | Status: SHIPPED | OUTPATIENT
Start: 2023-09-28

## 2023-09-28 RX ORDER — FAMOTIDINE 20 MG/1
20 TABLET, FILM COATED ORAL 2 TIMES DAILY
Qty: 180 TABLET | Refills: 3 | Status: SHIPPED | OUTPATIENT
Start: 2023-09-28

## 2023-09-28 RX ORDER — ALBUTEROL SULFATE 90 UG/1
AEROSOL, METERED RESPIRATORY (INHALATION)
Qty: 18 G | Refills: 11 | Status: SHIPPED | OUTPATIENT
Start: 2023-09-28

## 2023-09-28 RX ORDER — OXYCODONE HYDROCHLORIDE 10 MG/1
10 TABLET ORAL 3 TIMES DAILY PRN
Qty: 90 TABLET | Refills: 0 | Status: SHIPPED | OUTPATIENT
Start: 2023-11-11 | End: 2023-12-11

## 2023-09-28 RX ORDER — OXYCODONE HYDROCHLORIDE 10 MG/1
10 TABLET ORAL 3 TIMES DAILY PRN
Qty: 90 TABLET | Refills: 0 | Status: SHIPPED | OUTPATIENT
Start: 2023-12-11 | End: 2024-01-10

## 2023-09-28 RX ORDER — LORAZEPAM 1 MG/1
1 TABLET ORAL 2 TIMES DAILY PRN
Qty: 45 TABLET | Refills: 5 | Status: SHIPPED | OUTPATIENT
Start: 2023-10-12 | End: 2024-04-24

## 2023-09-28 RX ORDER — MONTELUKAST SODIUM 10 MG/1
10 TABLET ORAL AT BEDTIME
Qty: 90 TABLET | Refills: 3 | Status: SHIPPED | OUTPATIENT
Start: 2023-09-28 | End: 2023-09-28

## 2023-09-28 RX ORDER — DULOXETIN HYDROCHLORIDE 30 MG/1
90 CAPSULE, DELAYED RELEASE ORAL DAILY
Qty: 270 CAPSULE | Refills: 3 | Status: SHIPPED | OUTPATIENT
Start: 2023-09-28

## 2023-09-28 RX ORDER — OXYCODONE HYDROCHLORIDE 10 MG/1
10 TABLET ORAL 3 TIMES DAILY PRN
Qty: 90 TABLET | Refills: 0 | Status: SHIPPED | OUTPATIENT
Start: 2023-10-12 | End: 2023-11-11

## 2023-09-28 ASSESSMENT — ANXIETY QUESTIONNAIRES
7. FEELING AFRAID AS IF SOMETHING AWFUL MIGHT HAPPEN: SEVERAL DAYS
6. BECOMING EASILY ANNOYED OR IRRITABLE: MORE THAN HALF THE DAYS
3. WORRYING TOO MUCH ABOUT DIFFERENT THINGS: MORE THAN HALF THE DAYS
GAD7 TOTAL SCORE: 11
4. TROUBLE RELAXING: MORE THAN HALF THE DAYS
IF YOU CHECKED OFF ANY PROBLEMS ON THIS QUESTIONNAIRE, HOW DIFFICULT HAVE THESE PROBLEMS MADE IT FOR YOU TO DO YOUR WORK, TAKE CARE OF THINGS AT HOME, OR GET ALONG WITH OTHER PEOPLE: SOMEWHAT DIFFICULT
5. BEING SO RESTLESS THAT IT IS HARD TO SIT STILL: SEVERAL DAYS
1. FEELING NERVOUS, ANXIOUS, OR ON EDGE: SEVERAL DAYS
2. NOT BEING ABLE TO STOP OR CONTROL WORRYING: MORE THAN HALF THE DAYS
GAD7 TOTAL SCORE: 11

## 2023-09-28 ASSESSMENT — ASTHMA QUESTIONNAIRES: ACT_TOTALSCORE: 18

## 2023-09-28 ASSESSMENT — PATIENT HEALTH QUESTIONNAIRE - PHQ9
SUM OF ALL RESPONSES TO PHQ QUESTIONS 1-9: 11
10. IF YOU CHECKED OFF ANY PROBLEMS, HOW DIFFICULT HAVE THESE PROBLEMS MADE IT FOR YOU TO DO YOUR WORK, TAKE CARE OF THINGS AT HOME, OR GET ALONG WITH OTHER PEOPLE: SOMEWHAT DIFFICULT
SUM OF ALL RESPONSES TO PHQ QUESTIONS 1-9: 11

## 2023-09-28 ASSESSMENT — PAIN SCALES - GENERAL: PAINLEVEL: SEVERE PAIN (7)

## 2023-09-28 NOTE — PROGRESS NOTES
Assessment & Plan     Chronic pain syndrome  Pain is controlled on current medication regimen.   Urine drug screen and substance agreement signed today   - [id number] - Urine Drug Confirmation Panel (Comprehensive); Future  - oxyCODONE IR (ROXICODONE) 10 MG tablet; Take 1 tablet (10 mg) by mouth 3 times daily as needed for pain  - oxyCODONE (ROXICODONE) 10 MG tablet; Take 1 tablet (10 mg) by mouth 3 times daily as needed for pain  - oxyCODONE (ROXICODONE) 10 MG tablet; Take 1 tablet (10 mg) by mouth 3 times daily as needed for pain  - CBC with platelets  - [id number] - Urine Drug Confirmation Panel (Comprehensive)    Moderate persistent asthma without complication  Discussed the importance of discontinuing vaping as this will exacerbate her asthma. Patient is using her albuterol inhaler BID  Stopping Singulair in case this is worsening her mental health.  If mental health is stable off med but allergies/asthma worse, would resume  - budesonide-formoterol (SYMBICORT) 160-4.5 MCG/ACT Inhaler; INHALE TWO PUFFS BY MOUTH TWICE A DAY  - albuterol (VENTOLIN HFA) 108 (90 Base) MCG/ACT inhaler; INHALE 2 PUFFS INTO THE LUNGS ONCE EVERY 4 HOURS AS NEEDED FOR SHORTNESS OF BREATH / DYSPNEA / WHEEZING    Stage 3a chronic kidney disease (H)  Checking urine and labs today  - Albumin Random Urine Quantitative with Creat Ratio; Future  - Basic metabolic panel  (Ca, Cl, CO2, Creat, Gluc, K, Na, BUN); Future  - Albumin Random Urine Quantitative with Creat Ratio  - Basic metabolic panel  (Ca, Cl, CO2, Creat, Gluc, K, Na, BUN)    REX (generalized anxiety disorder)  Patient states her anxiety is better than it usually is. She states she is needing to utilize lorazepam 1-2 times daily. Her mental health has improved since starting her job working with her [employer]. She is not seeing a counselor at this time   - DULoxetine (CYMBALTA) 30 MG capsule; Take 3 capsules (90 mg) by mouth daily  - LORazepam (ATIVAN) 1 MG tablet; Take 1 tablet (1 mg) by  mouth 2 times daily as needed for anxiety #45 for 30 days    Moderate major depression (H)  Stable, no changes in medications at this time   - DULoxetine (CYMBALTA) 30 MG capsule; Take 3 capsules (90 mg) by mouth daily    PTSD (post-traumatic stress disorder)  Patient did not express any significant worsening of this condition.   - DULoxetine (CYMBALTA) 30 MG capsule; Take 3 capsules (90 mg) by mouth daily    Need for prophylactic vaccination against hepatitis A  Patient will receive at pharmacy due to insurance coverage     Need for prophylactic vaccination against hepatitis B virus  Patient will receive at pharmacy due to insurance coverage     CREST (calcinosis, Raynaud's phenomenon, esophageal dysfunction, sclerodactyly, telangiectasia) (H)  No significant exacerbations since our last visit. Last saw Rheumatology 4/23 will follow up next month. Rheum recommended PFTs to be completed 6/2024   Continue medications as below   - famotidine (PEPCID) 20 MG tablet; Take 1 tablet (20 mg) by mouth 2 times daily  - omeprazole (PRILOSEC) 40 MG DR capsule; Take 1 capsule (40 mg) by mouth 3 times daily Has seen GI who has approved TID dosing    Gastroesophageal reflux disease with esophagitis and hemorrhage  Stable, refill provided   - omeprazole (PRILOSEC) 40 MG DR capsule; Take 1 capsule (40 mg) by mouth 3 times daily Has seen GI who has approved TID dosing    Vitamin D deficiency  Will check blood work today   - Vitamin D Deficiency    Screening for hyperlipidemia  Will check blood work today   - Lipid panel reflex to direct LDL Non-fasting    Vitamin B12 deficiency (non anemic)  Will check blood work today   - Homocysteine  - Methylmalonic Acid  - Folate  - Vitamin B12  - Intrinsic Factor Blocking Antibody    Folate deficiency  Will check blood work today   - Homocysteine  - Methylmalonic Acid  - Folate  - Vitamin B12  - Intrinsic Factor Blocking Antibody    Need for vaccination  Patient received influenza vaccine in  "clinic today   - INFLUENZA VACCINE IM > 6 MONTHS VALENT IIV4 (AFLURIA/FLUZONE)    AIN grade I  Pap on 5/12/2023 normal with negative HPV testing - follow up pap with HPV 5/28   Patient should follow up with colon and rectal surgeon 2/2024     Anemia, chronic disease  Patient denies symptoms at this time. Blood work to be completed today   - CBC with platelets    Iron deficiency anemia due to chronic blood loss  Blood work completed today   - CBC with platelets    Limited systemic sclerosis (H)  This is a known condition that I take into account for medical decision making     F11.2 - Continuous opioid dependence (H)               BMI:   Estimated body mass index is 25.63 kg/m  as calculated from the following:    Height as of this encounter: 1.568 m (5' 1.75\").    Weight as of this encounter: 63 kg (139 lb).       FUTURE APPOINTMENTS:       - Follow-up visit in 3 months       GI  Due for follow-up scopes. [phone] option 2, Monday-Friday 7 am to 5:00 pm and we will be happy to assist you.     Anemia  Blood work today    Pain  Refills sent  Due for urine drug screen  Will update controlled substance agreement today    Asthma  Recommend to avoid vaping any substances;  recommend using topica/edible, etc instead.  Inhalation products can make asthma worse    Allergies   Singulair can impact mental health/anxiety. Would recommend trial off to see if anxiety improves. If allergies significantly worsen and no change in mental health, can resume.       Health Care Maintenance  You are due for Influenza, Hepatitis A and Hepatitis B vaccine(s)    Grecia Barba DO  [facility]    Amanda Barnes is a 38 year old, presenting for the following health issues:  Recheck Medication, Asthma, Hypertension, Anxiety, Depression, Kidney Problem (CKD ), Migraine, Pain, and Health Maintenance (Would like flu shot today)        9/28/2023     9:29 AM   Additional Questions   Roomed by Beba RAZA LPN "   Accompanied by self         9/28/2023     9:29 AM   Patient Reported Additional Medications   Patient reports taking the following new medications IUD     Mental Health   --feels like her mental health is improving   --no counseling     Pain Management   --feels like her pain is well-controlled, but definitely increases if she overexerts herself   --pain today is 8/10, but on average about a 7  --pain is located in every joint   --needs her walker to get around if she is out and about for more than 3 hours   --not interested in increasing her pain medication   --takes tylenol as needed between her oxycodone, which she states she usually needs 2-3 times a week depending on her activity level     Medication   --taking lorazepam once/day   --methocarbamol caused side effects so she is no longer taking it discontinued about 3 months ago   --nausea is better so very infrequently needs these medications, appetite is improved as well   --taking delta and no THC   --needing albuterol inhaler more often (twice/day)     Hypoglycemia   --only tests if symptomatic   --2 episodes in the last month carries candy for these instances     Denies dark stools, but admits to occasional bright red blood   --will schedule upper and lower endoscopy     Iron   --not currently symptomatic, last infusion 12/16/22     LGSIL - follow up in one year     AIN-1  --had anoscopy and needs follow-up - CRS is following    Asthma  --using albuterol BID  --on symbicort  --on singulair - can worsen mental health   --feels symptoms are a bit worse due to allergies    Dermatology   --hyperpigmentation is not scleroderma related     History of Present Illness     Asthma:  She presents for follow up of asthma.  She has no cough, no wheezing, and no shortness of breath.  She is using a relief medication daily. She typically misses taking her controller medication 2 time(s) per week. Patient is aware of the following triggers: animal dander, cold air,  "exercise or sports, pollens and smoke. The patient has had a visit to the Emergency Room, Urgent Care or Hospital due to asthma since the last clinic visit. She has been to the Emergency Room or Urgent Care 0 times.She has had a Hospitalization 0 times.    CKD: She uses over the counter pain medication, including tylenol, a few times a week.    Mental Health Follow-up:  Patient presents to follow-up on Depression & Anxiety.Patient's depression since last visit has been:  Better  The patient is not having other symptoms associated with depression.  Patient's anxiety since last visit has been:  Medium  The patient is not having other symptoms associated with anxiety.  Any significant life events: No  Patient is feeling anxious or having panic attacks.  Patient has no concerns about alcohol or drug use.    Hypertension: She presents for follow up of hypertension.  She does check blood pressure  regularly outside of the clinic. Outpatient blood pressures have not been over 140/90. She follows a low salt diet.     Headaches:   Since the patient's last clinic visit, headaches are: worsened  The patient is getting headaches:  Few times a week  She is not able to do normal daily activities when she has a migraine.  The patient is taking the following rescue/relief medications:  Tylenol   Patient states \"The relief is inconsistent\" from the rescue/relief medications.   The patient is taking the following medications to prevent migraines:  No medications to prevent migraines  In the past 4 weeks, the patient has gone to an Urgent Care or Emergency Room 0 times times due to headaches.    Reason for visit:  Refills    She eats 2-3 servings of fruits and vegetables daily.She consumes 0 sweetened beverage(s) daily.She exercises with enough effort to increase her heart rate 30 to 60 minutes per day.  She exercises with enough effort to increase her heart rate 4 days per week.   She is taking medications regularly.         Pain " History:  When did you first notice your pain? Years ongoing   Have you seen this provider for your pain in the past? Yes   Where in your body do you have pain? All of her joints, hips, fingers, knees, etc.  Are you seeing anyone else for your pain? Yes - rheumatologist at the [facility]   Last fill 9/12 4/20/2023    12:36 PM 5/10/2023     7:16 AM 9/28/2023     9:14 AM   PHQ-9 SCORE   PHQ-9 Total Score MyChart 16 (Moderately severe depression) 13 (Moderate depression) 11 (Moderate depression)   PHQ-9 Total Score 16 13 11           4/20/2023    12:56 PM 5/10/2023     7:25 AM 9/28/2023     9:26 AM   REX-7 SCORE   Total Score  12 (moderate anxiety) 11 (moderate anxiety)   Total Score 8 12 11           5/10/2023     7:20 AM 9/28/2023     9:41 AM   PEG Score   PEG Total Score 8.67 7.67           4/20/2023    12:36 PM 5/10/2023     7:16 AM 9/28/2023     9:14 AM   PHQ-9 SCORE   PHQ-9 Total Score MyChart 16 (Moderately severe depression) 13 (Moderate depression) 11 (Moderate depression)   PHQ-9 Total Score 16 13 11           4/20/2023    12:56 PM 5/10/2023     7:25 AM 9/28/2023     9:26 AM   REX-7 SCORE   Total Score  12 (moderate anxiety) 11 (moderate anxiety)   Total Score 8 12 11           5/10/2023     7:20 AM 9/28/2023     9:41 AM   PEG Score   PEG Total Score 8.67 7.67       Chronic Pain Follow Up:    Location of pain: all joints  Analgesia/pain control:    - Recent changes:  medication has been helping, has been wearing knee braces recently which also helps relieve the pain a little. Has a podiatrist that put in an order for braces on her feet to help as well.   - Overall control: Comfortably manageable    - Current treatments: oxycodone 10mg PRN, Tylenol PRN, medical cannabis   Adherence:     - Do you ever take more pain medicine than prescribed? No    - When did you take your last dose of pain medicine?  This morning 7AM   Adverse effects: Yes: constipation    PDMP Review         Value Time User    State PDMP site  "checked  Yes 3/16/2023  8:10 AM Grecia Barba, DO          Last CSA Agreement:   CSA -- Patient Level:     [Media Unavailable] Controlled Substance Agreement - Opioid - Scan on 7/11/2022 12:11 PM   [Media Unavailable] Controlled Substance Agreement - Opioid - Scan on 12/27/2020  1:35 PM   [Media Unavailable] Controlled Substance Agreement - Opioid - Scan on 2/6/2019  6:23 PM       Last UDS: 7/11/2022             Medication Followup of Albuterol inhaler PRN  Taking Medication as prescribed: yes  Side Effects:  None  Medication Helping Symptoms:  yes  Medication Followup of Symbicort inhaler   Taking Medication as prescribed: yes  Side Effects:  None  Medication Helping Symptoms:  yes  Medication Followup of Duloxetine 30mg   Taking Medication as prescribed: yes  Side Effects:  None  Medication Helping Symptoms:  yes     Medication Followup of Lorazepam 1mg PRN  Taking Medication as prescribed: yes  Side Effects:  None  Medication Helping Symptoms:  yes  Medication Followup of oxycodone 10mg PRN  Taking Medication as prescribed: yes  Side Effects:  None  Medication Helping Symptoms:  yes           Review of Systems   CONSTITUTIONAL:POSITIVE  for arthralgias and fatigue and NEGATIVE  for fever and malaise  INTEGUMENTARY/SKIN: POSITIVE for pigmentation on upper back and neck   RESP:POSITIVE for Hx asthma and wheezing   CV: NEGATIVE for chest pain, palpitations or peripheral edema  GI: POSITIVE for constipation, Hx GERD, hematochezia, and nausea and NEGATIVE for diarrhea, hematemesis, melena, poor appetite, and vomiting  MUSCULOSKELETAL: POSITIVE  for joint pain globally, joint stiffness , and paresthesias and NEGATIVE for edema   PSYCHIATRIC: POSITIVE for anxiety and hx of depression       Objective    /72 (BP Location: Right arm, Patient Position: Sitting, Cuff Size: Adult Regular)   Pulse 83   Temp 98.2  F (36.8  C) (Tympanic)   Resp 18   Ht 1.568 m (5' 1.75\")   Wt 63 kg (139 lb)   LMP 09/15/2023 " (Exact Date)   SpO2 99%   BMI 25.63 kg/m    Body mass index is 25.63 kg/m .  Physical Exam   GENERAL: alert and no distress  EYES: Eyes grossly normal to inspection, PERRL and conjunctivae and sclerae normal  RESP: lungs clear to auscultation - no rales, rhonchi or wheezes  CV: regular rate and rhythm, normal S1 S2, no S3 or S4, no murmur, click or rub, no peripheral edema and peripheral pulses strong  MS: no edema or varicosities   SKIN: no suspicious lesions or rashes. hyperpigmentation of- and upper chest and neck, contractures of bilateral hands, diffuse tightening of skin   PSYCH: mentation appears normal, affect normal/bright    Reviewed TTE (7/23) and anoscopy (8/23)

## 2023-09-28 NOTE — LETTER
Opioid / Opioid Plus Controlled Substance Agreement    This is an agreement between you and your provider about the safe and appropriate use of controlled substance/opioids prescribed by your care team. Controlled substances are medicines that can cause physical and mental dependence (abuse).    There are strict laws about having and using these medicines. We here at [facility] are committing to working with you in your efforts to get better. To support you in this work, we ll help you schedule regular office appointments for medicine refills. If we must cancel or change your appointment for any reason, we ll make sure you have enough medicine to last until your next appointment.     As a Provider, I will:  Listen carefully to your concerns and treat you with respect.   Recommend a treatment plan that I believe is in your best interest. This plan may involve therapies other than opioid pain medication.   Talk with you often about the possible benefits, and the risk of harm of any medicine that we prescribe for you.   Provide a plan on how to taper (discontinue or go off) using this medicine if the decision is made to stop its use.    As a Patient, I understand that opioid(s):   Are a controlled substance prescribed by my care team to help me function or work and manage my condition(s).   Are strong medicines and can cause serious side effects such as:  Drowsiness, which can seriously affect my driving ability  A lower breathing rate, enough to cause death  Harm to my thinking ability   Depression   Abuse of and addiction to this medicine  Need to be taken exactly as prescribed. Combining opioids with certain medicines or chemicals (such as illegal drugs, sedatives, sleeping pills, and benzodiazepines) can be dangerous or even fatal. If I stop opioids suddenly, I may have severe withdrawal symptoms.  Do not work for all types of pain nor for all patients. If they re not helpful, I may be asked to stop  them.        The risks, benefits and side effects of these medicine(s) were explained to me. I agree that:  I will take part in other treatments as advised by my care team. This may be psychiatry or counseling, physical therapy, behavioral therapy, group treatment or a referral to a specialist.     I will keep all my appointments. I understand that this is part of the monitoring of opioids. My care team may require an office visit for EVERY opioid/controlled substance refill. If I miss appointments or don t follow instructions, my care team may stop my medicine.    I will take my medicines as prescribed. I will not change the dose or schedule unless my care team tells me to. There will be no refills if I run out early.     I may be asked to come to the clinic and complete a urine drug test or complete a pill count at any time. If I don t give a urine sample or participate in a pill count, the care team may stop my medicine.    I will only receive prescriptions from this clinic for chronic pain. If I am treated by another provider for acute pain issues, I will tell them that I am taking opioid pain medication for chronic pain and that I have a treatment agreement with this provider. I will inform my [facility] care team within one business day if I am given a prescription for any pain medication by another healthcare provider. My [facility] care team can contact other providers and pharmacists about my use of any medicines.    It is up to me to make sure that I don t run out of my medicines on weekends or holidays. If my care team is willing to refill my opioid prescription without a visit, I must request refills only during office hours. Refills may take up to 3 business days to process. I will use one pharmacy to fill all my opioid and other controlled substance prescriptions. I will notify the clinic about any changes to my insurance or medication availability.    I am responsible for my  prescriptions. If the medicine/prescription is lost, stolen or destroyed, it will not be replaced. I also agree not to share controlled substance medicines with anyone.    I am aware I should not use any illegal or recreational drugs. I agree not to drink alcohol unless my care team says I can.       If I enroll in the [place] [facility] program, I will tell my care team prior to my next refill.     I will tell my care team right away if I become pregnant, have a new medical problem treated outside of my regular clinic, or have a change in my medications.    I understand that this medicine can affect my thinking, judgment and reaction time. Alcohol and drugs affect the brain and body, which can affect the safety of my driving. Being under the influence of alcohol or drugs can affect my decision-making, behaviors, personal safety, and the safety of others. Driving while impaired (DWI) can occur if a person is driving, operating, or in physical control of a car, motorcycle, boat, snowmobile, ATV, motorbike, off-road vehicle, or any other motor vehicle ([place] Statute 169A.20). I understand the risk if I choose to drive or operate any vehicle or machinery.    I understand that if I do not follow any of the conditions above, my prescriptions or treatment may be stopped or changed.          Opioids  What You Need to Know    What are opioids?   Opioids are pain medicines that must be prescribed by a doctor. They are also known as narcotics.     Examples are:   morphine (MS Contin, Catrachita)  oxycodone (Oxycontin)  oxycodone and acetaminophen (Percocet)  hydrocodone and acetaminophen (Vicodin, Norco)   fentanyl patch (Duragesic)   hydromorphone (Dilaudid)   methadone  codeine (Tylenol #3)     What do opioids do well?   Opioids are best for severe short-term pain such as after a surgery or injury. They may work well for cancer pain. They may help some people with long-lasting (chronic) pain.     What do opioids NOT do  well?   Opioids never get rid of pain entirely, and they don t work well for most patients with chronic pain. Opioids don t reduce swelling, one of the causes of pain.                                    Other ways to manage chronic pain and improve function include:     Treat the health problem that may be causing pain  Anti-inflammation medicines, which reduce swelling and tenderness, such as ibuprofen (Advil, Motrin) or naproxen (Aleve)  Acetaminophen (Tylenol)  Antidepressants and anti-seizure medicines, especially for nerve pain  Topical treatments such as patches or creams  Injections or nerve blocks  Chiropractic or osteopathic treatment  Acupuncture, massage, deep breathing, meditation, visual imagery, aromatherapy  Use heat or ice at the pain site  Physical therapy   Exercise  Stop smoking  Take part in therapy       Risks and side effects     Talk to your doctor before you start or decide to keep taking opioids. Possible side effects include:    Lowering your breathing rate enough to cause death  Overdose, including death, especially if taking higher than prescribed doses  Worse depression symptoms; less pleasure in things you usually enjoy  Feeling tired or sluggish  Slower thoughts or cloudy thinking  Being more sensitive to pain over time; pain is harder to control  Trouble sleeping or restless sleep  Changes in hormone levels (for example, less testosterone)  Changes in sex drive or ability to have sex  Constipation  Unsafe driving  Itching and sweating  Dizziness  Nausea, throwing up and dry mouth    What else should I know about opioids?    Opioids may lead to dependence, tolerance, or addiction.    Dependence means that if you stop or reduce the medicine too quickly, you will have withdrawal symptoms. These include loose poop (diarrhea), jitters, flu-like symptoms, nervousness and tremors. Dependence is not the same as addiction.                     Tolerance means needing higher doses over time to  get the same effect. This may increase the chance of serious side effects.    Addiction is when people improperly use a substance that harms their body, their mind or their relations with others. Use of opiates can cause a relapse of addiction if you have a history of drug or alcohol abuse.    People who have used opioids for a long time may have a lower quality of life, worse depression, higher levels of pain and more visits to doctors.    You can overdose on opioids. Take these steps to lower your risk of overdose:    Recognize the signs:  Signs of overdose include decrease or loss of consciousness (blackout), slowed breathing, trouble waking up and blue lips. If someone is worried about overdose, they should call 911.    Talk to your doctor about Narcan (naloxone).   If you are at risk for overdose, you may be given a prescription for Narcan. This medicine very quickly reverses the effects of opioids.   If you overdose, a friend or family member can give you Narcan while waiting for the ambulance. They need to know the signs of overdose and how to give Narcan.     Don't use alcohol or street drugs.   Taking them with opioids can cause death.    Do not take any of these medicines unless your doctor says it s OK. Taking these with opioids can cause death:  Benzodiazepines, such as lorazepam (Ativan), alprazolam (Xanax) or diazepam (Valium)  Muscle relaxers, such as cyclobenzaprine (Flexeril)  Sleeping pills like zolpidem (Ambien)   Other opioids      How to keep you and other people safe while taking opioids:    Never share your opioids with others.  Opioid medicines are regulated by the Drug Enforcement Agency ([employer]). Selling or sharing medications is a criminal act.    2. Be sure to store opioids in a secure place, locked up if possible. Young children can easily swallow them and overdose.    3. When you are traveling with your medicines, keep them in the original bottles. If you use a pill box, be sure you also  carry a copy of your medicine list from your clinic or pharmacy.    4. Safe disposal of opioids    Most pharmacies have places to get rid of medicine, called disposal kiosks. Medicine disposal options are also available in every [facility]. Search your county and  medication disposal  to find more options. You can find more details at:  https://www.pca.On license of [email or web address].[email or web address]./living-green/managing-unwanted-medications     I agree that my provider, clinic care team, and pharmacy may work with any city, state or federal law enforcement agency that investigates the misuse, sale, or other diversion of my controlled medicine. I will allow my provider to discuss my care with, or share a copy of, this agreement with any other treating provider, pharmacy or emergency room where I receive care.    I have read this agreement and have asked questions about anything I did not understand.    _______________________________________________________  Patient Signature - Molly Teague _____________________                   Date     _______________________________________________________  Provider Signature - Grecia Barba DO   _____________________                   Date     _______________________________________________________  Witness Signature (required if provider not present while patient signing)   _____________________                   Date

## 2023-09-28 NOTE — LETTER
September 29, 2023      Molly Teague  [address] [place] [facility] [place] [facility] [address]        Dear Ms.Ho,    We are writing to inform you of your test results.    Cholesterol is improved from 1 year ago.  Creatinine is similar to baseline.  Electrolytes, folic acid, blood counts and blood sugar are normal.     The B12 is lower limits of normal.  If you are not already, use the dissolvable (sublingual) form of the B12.  If you are using this form, we should consider switching you to an injection     The vitamin D level is also lower limits of normal.  Your current maintenance dose of vitamin D is not likely sufficient to maintain adequate vitamin D levels.  I recommend another course of high-dose vitamin D 50,000 units once weekly for 12 weeks.  Once that is finished, start higher maintenance dose vitamin D cholecalciferol 5000 units once daily.  These were both sent to the pharmacy.  We should recheck your vitamin D levels in about 4 months.    Resulted Orders   Lipid panel reflex to direct LDL Non-fasting   Result Value Ref Range    Cholesterol 189 <200 mg/dL    Triglycerides 165 (H) <150 mg/dL    Direct Measure HDL 49 (L) >=50 mg/dL    LDL Cholesterol Calculated 107 (H) <=100 mg/dL    Non HDL Cholesterol 140 (H) <130 mg/dL    Narrative    Cholesterol  Desirable:  <200 mg/dL    Triglycerides  Normal:  Less than 150 mg/dL  Borderline High:  150-199 mg/dL  High:  200-499 mg/dL  Very High:  Greater than or equal to 500 mg/dL    Direct Measure HDL  Female:  Greater than or equal to 50 mg/dL   Male:  Greater than or equal to 40 mg/dL    LDL Cholesterol  Desirable:  <100mg/dL  Above Desirable:  100-129 mg/dL   Borderline High:  130-159 mg/dL   High:  160-189 mg/dL   Very High:  >= 190 mg/dL    Non HDL Cholesterol  Desirable:  130 mg/dL  Above Desirable:  130-159 mg/dL  Borderline High:  160-189 mg/dL  High:  190-219 mg/dL  Very High:  Greater than or equal to 220 mg/dL   Folate   Result Value Ref Range    Folic  Acid 11.7 4.6 - 34.8 ng/mL   Vitamin B12   Result Value Ref Range    Vitamin B12 271 232 - 1,245 pg/mL   Vitamin D Deficiency   Result Value Ref Range    Vitamin D, Total (25-Hydroxy) 26 20 - 50 ng/mL      Comment:      optimum levels    Narrative    Season, race, dietary intake, and treatment affect the concentration of 25-hydroxy-Vitamin D. Values may decrease during winter months and increase during summer months.    Vitamin D determination is routinely performed by an immunoassay specific for 25 hydroxyvitamin D3.  If an individual is on vitamin D2(ergocalciferol) supplementation, please specify 25 OH vitamin D2 and D3 level determination by LCMSMS test VITD23.     CBC with platelets   Result Value Ref Range    WBC Count 7.0 4.0 - 11.0 10e3/uL    RBC Count 3.94 3.80 - 5.20 10e6/uL    Hemoglobin 12.2 11.7 - 15.7 g/dL    Hematocrit 37.8 35.0 - 47.0 %    MCV 96 78 - 100 fL    MCH 31.0 26.5 - 33.0 pg    MCHC 32.3 31.5 - 36.5 g/dL    RDW 13.2 10.0 - 15.0 %    Platelet Count 204 150 - 450 10e3/uL   Albumin Random Urine Quantitative with Creat Ratio   Result Value Ref Range    Creatinine Urine mg/dL 53.6 mg/dL      Comment:      The reference ranges have not been established in urine creatinine. The results should be integrated into the clinical context for interpretation.    Albumin Urine mg/L <12.0 mg/L      Comment:      The reference ranges have not been established in urine albumin. The results should be integrated into the clinical context for interpretation.    Albumin Urine mg/g Cr        Comment:      Unable to calculate, urine albumin and/or urine creatinine is outside detectable limits.  Microalbuminuria is defined as an albumin:creatinine ratio of 17 to 299 for males and 25 to 299 for females. A ratio of albumin:creatinine of 300 or higher is indicative of overt proteinuria.  Due to biologic variability, positive results should be confirmed by a second, first-morning random or 24-hour timed urine specimen.  If there is discrepancy, a third specimen is recommended. When 2 out of 3 results are in the microalbuminuria range, this is evidence for incipient nephropathy and warrants increased efforts at glucose control, blood pressure control, and institution of therapy with an angiotensin-converting-enzyme (ACE) inhibitor (if the patient can tolerate it).     Basic metabolic panel  (Ca, Cl, CO2, Creat, Gluc, K, Na, BUN)   Result Value Ref Range    Sodium 138 135 - 145 mmol/L      Comment:      Reference intervals for this test were updated on 09/26/2023 to more accurately reflect our healthy population. There may be differences in the flagging of prior results with similar values performed with this method. Interpretation of those prior results can be made in the context of the updated reference intervals.     Potassium 4.5 3.4 - 5.3 mmol/L    Chloride 102 98 - 107 mmol/L    Carbon Dioxide (CO2) 25 22 - 29 mmol/L    Anion Gap 11 7 - 15 mmol/L    Urea Nitrogen 16.2 6.0 - 20.0 mg/dL    Creatinine 1.04 (H) 0.51 - 0.95 mg/dL    GFR Estimate 70 >60 mL/min/1.73m2    Calcium 10.4 (H) 8.6 - 10.0 mg/dL    Glucose 78 70 - 99 mg/dL   Urine Creatinine for Drug Screen Panel   Result Value Ref Range    Creatinine Urine for Drug Screen 54 mg/dL      Comment:      The reference range has not been established for creatinine in random urines. The results should be integrated into the clinical context for interpretation.       If you have any questions or concerns, please call the clinic at the number listed above.       Sincerely,      Grecia Barba DO

## 2023-09-28 NOTE — PATIENT INSTRUCTIONS
GI  Due for follow-up scopes. [phone] option 2, Monday-Friday 7 am to 5:00 pm and we will be happy to assist you.     Anemia  Blood work today    Pain  Refills sent  Due for urine drug screen  Will update controlled substance agreement today    Asthma  Recommend to avoid vaping any substances;  recommend using topica/edible, etc instead.  Inhalation products can make asthma worse    Allergies   Singulair can impact mental health/anxiety. Would recommend trial off to see if anxiety improves. If allergies significantly worsen and no change in mental health, can resume.       Health Care Maintenance  You are due for Influenza, Hepatitis A and Hepatitis B vaccine(s)

## 2023-09-29 DIAGNOSIS — E55.9 VITAMIN D DEFICIENCY: Primary | ICD-10-CM

## 2023-09-29 RX ORDER — ERGOCALCIFEROL 1.25 MG/1
50000 CAPSULE, LIQUID FILLED ORAL WEEKLY
Qty: 12 CAPSULE | Refills: 0 | Status: SHIPPED | OUTPATIENT
Start: 2023-09-29 | End: 2023-12-16

## 2023-09-29 NOTE — RESULT ENCOUNTER NOTE
Cholesterol is improved from 1 year ago.  Creatinine is similar to baseline.  Electrolytes, folic acid, blood counts and blood sugar are normal.    The B12 is lower limits of normal.  If you are not already, use the dissolvable (sublingual) form of the B12.  If you are using this form, we should consider switching you to an injection    The vitamin D level is also lower limits of normal.  Your current maintenance dose of vitamin D is not likely sufficient to maintain adequate vitamin D levels.  I recommend another course of high-dose vitamin D 50,000 units once weekly for 12 weeks.  Once that is finished, start higher maintenance dose vitamin D cholecalciferol 5000 units once daily.  These were both sent to the pharmacy.  We should recheck your vitamin D levels in about 4 months

## 2023-09-30 LAB — IF BLOCK AB SER QL RIA: NEGATIVE

## 2023-10-02 LAB
GABAPENTIN UR QL CFM: PRESENT
LORAZEPAM UR QL CFM: PRESENT
OXYCODONE UR CFM-MCNC: 419 NG/ML
OXYCODONE/CREAT UR: 776 NG/MG {CREAT}
OXYMORPHONE UR CFM-MCNC: 503 NG/ML
OXYMORPHONE/CREAT UR: 931 NG/MG {CREAT}

## 2023-10-04 LAB — HCYS SERPL-SCNC: 9.6 UMOL/L (ref 4–12)

## 2023-10-05 LAB — METHYLMALONATE SERPL-SCNC: 0.34 UMOL/L (ref 0–0.4)

## 2024-01-12 ENCOUNTER — OFFICE VISIT (OUTPATIENT)
Dept: FAMILY MEDICINE | Facility: CLINIC | Age: 39
End: 2024-01-12
Payer: MEDICARE

## 2024-01-12 VITALS
DIASTOLIC BLOOD PRESSURE: 84 MMHG | HEIGHT: 61 IN | SYSTOLIC BLOOD PRESSURE: 122 MMHG | HEART RATE: 65 BPM | TEMPERATURE: 98.4 F | BODY MASS INDEX: 27.09 KG/M2 | OXYGEN SATURATION: 97 % | RESPIRATION RATE: 16 BRPM | WEIGHT: 143.5 LBS

## 2024-01-12 DIAGNOSIS — J45.50 SEVERE PERSISTENT ASTHMA WITHOUT COMPLICATION (H): ICD-10-CM

## 2024-01-12 DIAGNOSIS — N18.31 STAGE 3A CHRONIC KIDNEY DISEASE (H): ICD-10-CM

## 2024-01-12 DIAGNOSIS — Z23 NEED FOR VACCINATION: ICD-10-CM

## 2024-01-12 DIAGNOSIS — G63 POLYNEUROPATHY ASSOCIATED WITH UNDERLYING DISEASE (H): ICD-10-CM

## 2024-01-12 DIAGNOSIS — G89.4 CHRONIC PAIN SYNDROME: Primary | Chronic | ICD-10-CM

## 2024-01-12 DIAGNOSIS — F32.1 MODERATE MAJOR DEPRESSION (H): ICD-10-CM

## 2024-01-12 DIAGNOSIS — F33.3 SEVERE EPISODE OF RECURRENT MAJOR DEPRESSIVE DISORDER, WITH PSYCHOTIC FEATURES (H): ICD-10-CM

## 2024-01-12 DIAGNOSIS — M34.9 LIMITED SYSTEMIC SCLEROSIS (H): ICD-10-CM

## 2024-01-12 DIAGNOSIS — F11.20 CONTINUOUS OPIOID DEPENDENCE (H): ICD-10-CM

## 2024-01-12 PROCEDURE — 99214 OFFICE O/P EST MOD 30 MIN: CPT | Performed by: INTERNAL MEDICINE

## 2024-01-12 PROCEDURE — 91320 SARSCV2 VAC 30MCG TRS-SUC IM: CPT | Performed by: INTERNAL MEDICINE

## 2024-01-12 PROCEDURE — 90480 ADMN SARSCOV2 VAC 1/ONLY CMP: CPT | Performed by: INTERNAL MEDICINE

## 2024-01-12 RX ORDER — OXYCODONE HYDROCHLORIDE 10 MG/1
10 TABLET ORAL 2 TIMES DAILY
Qty: 60 TABLET | Refills: 0 | Status: SHIPPED | OUTPATIENT
Start: 2024-01-12 | End: 2024-02-11

## 2024-01-12 RX ORDER — BUSPIRONE HYDROCHLORIDE 15 MG/1
15 TABLET ORAL 2 TIMES DAILY
Qty: 180 TABLET | Refills: 3 | Status: SHIPPED | OUTPATIENT
Start: 2024-01-12

## 2024-01-12 RX ORDER — OXYCODONE HYDROCHLORIDE 5 MG/1
5 TABLET ORAL 3 TIMES DAILY
Qty: 90 TABLET | Refills: 0 | Status: SHIPPED | OUTPATIENT
Start: 2024-02-11 | End: 2024-03-12

## 2024-01-12 RX ORDER — OXYCODONE HYDROCHLORIDE 5 MG/1
5 TABLET ORAL 2 TIMES DAILY PRN
Qty: 60 TABLET | Refills: 0 | Status: SHIPPED | OUTPATIENT
Start: 2024-03-12 | End: 2024-03-26

## 2024-01-12 ASSESSMENT — ANXIETY QUESTIONNAIRES
7. FEELING AFRAID AS IF SOMETHING AWFUL MIGHT HAPPEN: SEVERAL DAYS
4. TROUBLE RELAXING: SEVERAL DAYS
8. IF YOU CHECKED OFF ANY PROBLEMS, HOW DIFFICULT HAVE THESE MADE IT FOR YOU TO DO YOUR WORK, TAKE CARE OF THINGS AT HOME, OR GET ALONG WITH OTHER PEOPLE?: SOMEWHAT DIFFICULT
GAD7 TOTAL SCORE: 12
GAD7 TOTAL SCORE: 12
5. BEING SO RESTLESS THAT IT IS HARD TO SIT STILL: SEVERAL DAYS
GAD7 TOTAL SCORE: 12
7. FEELING AFRAID AS IF SOMETHING AWFUL MIGHT HAPPEN: SEVERAL DAYS
IF YOU CHECKED OFF ANY PROBLEMS ON THIS QUESTIONNAIRE, HOW DIFFICULT HAVE THESE PROBLEMS MADE IT FOR YOU TO DO YOUR WORK, TAKE CARE OF THINGS AT HOME, OR GET ALONG WITH OTHER PEOPLE: SOMEWHAT DIFFICULT
1. FEELING NERVOUS, ANXIOUS, OR ON EDGE: MORE THAN HALF THE DAYS
6. BECOMING EASILY ANNOYED OR IRRITABLE: MORE THAN HALF THE DAYS
3. WORRYING TOO MUCH ABOUT DIFFERENT THINGS: NEARLY EVERY DAY
2. NOT BEING ABLE TO STOP OR CONTROL WORRYING: MORE THAN HALF THE DAYS

## 2024-01-12 ASSESSMENT — ASTHMA QUESTIONNAIRES
ACT_TOTALSCORE: 16
QUESTION_2 LAST FOUR WEEKS HOW OFTEN HAVE YOU HAD SHORTNESS OF BREATH: ONCE OR TWICE A WEEK
QUESTION_1 LAST FOUR WEEKS HOW MUCH OF THE TIME DID YOUR ASTHMA KEEP YOU FROM GETTING AS MUCH DONE AT WORK, SCHOOL OR AT HOME: SOME OF THE TIME
QUESTION_4 LAST FOUR WEEKS HOW OFTEN HAVE YOU USED YOUR RESCUE INHALER OR NEBULIZER MEDICATION (SUCH AS ALBUTEROL): ONE OR TWO TIMES PER DAY
QUESTION_5 LAST FOUR WEEKS HOW WOULD YOU RATE YOUR ASTHMA CONTROL: SOMEWHAT CONTROLLED
ACT_TOTALSCORE: 16
QUESTION_3 LAST FOUR WEEKS HOW OFTEN DID YOUR ASTHMA SYMPTOMS (WHEEZING, COUGHING, SHORTNESS OF BREATH, CHEST TIGHTNESS OR PAIN) WAKE YOU UP AT NIGHT OR EARLIER THAN USUAL IN THE MORNING: ONCE OR TWICE

## 2024-01-12 ASSESSMENT — PAIN SCALES - GENERAL: PAINLEVEL: EXTREME PAIN (8)

## 2024-01-12 NOTE — PATIENT INSTRUCTIONS
AIN grade I  --Due for follow up with colon and rectal surgeon 2/2024     Pain  Continue taper of pain medications  Decrease to 10 mg twice daily x 1 month, then  5 mg three times per day x 1 month, then  5 mg twice daily.  Follow-up with me when  you are at this dose    Anxiety  Decrease buspirone from 30 to 15 mg twice daily    HEALTH CARE MAINTENANCE  Recommend Hepatitis A and B at a retail pharmacy  COVID today

## 2024-01-12 NOTE — PROGRESS NOTES
Assessment & Plan     Chronic pain syndrome  Patient feels she is at a good point and ready to decrease her opiates.  She is nervous that pain will get worse or that anxiety will get worse.  Discussed that we can take this very slow and change mid course if it is not going well.  She is already been taking 10 mg twice daily for the last 2 weeks.  Continue 10 mg twice daily for another 4 weeks, then decrease to 5 mg 3 times daily x 1 month, then 5 mg twice daily.  See me at that time  - oxyCODONE (ROXICODONE) 10 MG tablet; Take 1 tablet (10 mg) by mouth 2 times daily for 30 days  - oxyCODONE (ROXICODONE) 5 MG tablet; Take 1 tablet (5 mg) by mouth 3 times daily for 30 days  - oxyCODONE (ROXICODONE) 5 MG tablet; Take 1 tablet (5 mg) by mouth 2 times daily as needed for pain    F11.2 - Continuous opioid dependence (H)  - oxyCODONE (ROXICODONE) 10 MG tablet; Take 1 tablet (10 mg) by mouth 2 times daily for 30 days  - oxyCODONE (ROXICODONE) 5 MG tablet; Take 1 tablet (5 mg) by mouth 3 times daily for 30 days  - oxyCODONE (ROXICODONE) 5 MG tablet; Take 1 tablet (5 mg) by mouth 2 times daily as needed for pain    Moderate major depression (H)  She feels a bit 'flat' and this may be related to high dose of many medications.  She agrees.  Will start by decreasing BuSpar and continue plans to decrease as able  - busPIRone HCl (BUSPAR) 15 MG tablet; Take 1 tablet (15 mg) by mouth 2 times daily    Severe persistent asthma without complication (H28)  Known issue that I take into account for their medical decisions, no current exacerbations or new concerns    Limited systemic sclerosis (H)  Known issue that I take into account for their medical decisions, no current exacerbations or new concerns.  Follows at [facility]    Severe episode of recurrent major depressive disorder, with psychotic features (H)  Improved from previous years    Polyneuropathy associated with underlying disease (H24)  Known issue that I take into account for  their medical decisions, no current exacerbations or new concerns    Stage 3a chronic kidney disease (H)  Known issue that I take into account for their medical decisions, no current exacerbations or new concerns    Need for vaccination  - COVID-19 12+ (2023-24) (PFIZER)    56}  Patient Instructions   AIN grade I  --Due for follow up with colon and rectal surgeon 2/2024     Pain  Continue taper of pain medications  Decrease to 10 mg twice daily x 1 month, then  5 mg three times per day x 1 month, then  5 mg twice daily.  Follow-up with me when  you are at this dose    Anxiety  Decrease buspirone from 30 to 15 mg twice daily    HEALTH CARE MAINTENANCE  Recommend Hepatitis A and B at a retail pharmacy  COVID today     Grecia Barba [facility]    Amanda Barnes is a 38 year old, presenting for the following health issues:  Hypertension, Depression, Anxiety, and Headache        1/12/2024     8:34 AM   Additional Questions   Roomed by christian salvador   Accompanied by self         1/12/2024     8:34 AM   Patient Reported Additional Medications   Patient reports taking the following new medications none       History of Present Illness     Asthma:  She presents for follow up of asthma.  She has no cough, no wheezing, and no shortness of breath.  She is using a relief medication 2-3 times per day. She does not miss any doses of her controller medication throughout the week. Patient is aware of the following triggers: same as previous visit, animal dander, cold air, emotions, exercise or sports, gastric reflux, pollens, smoke and strong odors and fumes. The patient has not had a visit to the Emergency Room, Urgent Care or Hospital due to asthma since the last clinic visit.     CKD: She is not using over the counter pain medicine.     Mental Health Follow-up:  Patient presents to follow-up on Depression & Anxiety.Patient's depression since last visit has been:  No change  The patient is having  "other symptoms associated with depression.  Patient's anxiety since last visit has been:  Better  The patient is not having other symptoms associated with anxiety.  Any significant life events: relationship concerns, financial concerns, grief or loss and health concerns  Patient is feeling anxious or having panic attacks.  Patient has no concerns about alcohol or drug use.    Hypertension: She presents for follow up of hypertension.  She does check blood pressure  regularly outside of the clinic. Outpatient blood pressures have not been over 140/90. She follows a low salt diet.     Headaches:   Since the patient's last clinic visit, headaches are: no change  The patient is getting headaches:  Couple days a week  She is not able to do normal daily activities when she has a migraine.  The patient is taking the following rescue/relief medications:  Tylenol   Patient states \"I get some relief\" from the rescue/relief medications.   The patient is taking the following medications to prevent migraines:  No medications to prevent migraines  In the past 4 weeks, the patient has gone to an Urgent Care or Emergency Room 0 times times due to headaches.    She eats 2-3 servings of fruits and vegetables daily.She consumes 1 sweetened beverage(s) daily.She exercises with enough effort to increase her heart rate 20 to 29 minutes per day.  She exercises with enough effort to increase her heart rate 3 or less days per week.   She is taking medications regularly.           Chief Complaint   Patient presents with    Hypertension    Depression    Anxiety    Headache     MSK  --using knee brace bilateral, but mostly wears on the left knee;  helps with stability of joint, feels she can stand and walk farther due to this.  --started using it ~ 6 months ago; using some days of the week, not all day every day  --still exercising regularly    Pain  --asks to lower her pain meds; currently taking 10 mg TID;  she has been 10  mg BID for the " last 2 weeks;  no side effects, withdrawal with this  --trying to change diet - less sugar and less red med  --stopped using inhalation CBD because asthma worsened;  using edibles    Hypertension Follow-up    Do you check your blood pressure regularly outside of the clinic? Yes   Are you following a low salt diet? Yes  Are your blood pressures ever more than 140 on the top number (systolic) OR more than 90 on the bottom number (diastolic), for example 140/90? No    Depression and Anxiety Follow-Up  How are you doing with your depression since your last visit? No change  How are you doing with your anxiety since your last visit?  No change  Are you having other symptoms that might be associated with depression or anxiety? No  Have you had a significant life event? No   Do you have any concerns with your use of alcohol or other drugs? No  Feels she is more grumpy, irritable    Social History     Tobacco Use    Smoking status: Never    Smokeless tobacco: Never   Vaping Use    Vaping Use: Every day    Substances: Flavoring, delta 8    Devices: Disposable   Substance Use Topics    Alcohol use: Not Currently     Comment: Socially once on a weekend if any    Drug use: Yes     Types: Marijuana     Comment: delta         5/10/2023     7:16 AM 9/28/2023     9:14 AM 1/12/2024     1:42 PM   PHQ   PHQ-9 Total Score 13 11 13   Q9: Thoughts of better off dead/self-harm past 2 weeks Not at all Not at all Not at all         5/10/2023     7:25 AM 9/28/2023     9:26 AM 1/12/2024     1:55 PM   REX-7 SCORE   Total Score 12 (moderate anxiety) 11 (moderate anxiety) 12 (moderate anxiety)   Total Score 12 11 12         1/12/2024     1:42 PM   Last PHQ-9   1.  Little interest or pleasure in doing things 1   2.  Feeling down, depressed, or hopeless 1   3.  Trouble falling or staying asleep, or sleeping too much 3   4.  Feeling tired or having little energy 3   5.  Poor appetite or overeating 2   6.  Feeling bad about yourself 1   7.  Trouble  concentrating 1   8.  Moving slowly or restless 1   Q9: Thoughts of better off dead/self-harm past 2 weeks 0   PHQ-9 Total Score 13         1/12/2024     1:55 PM   REX-7    1. Feeling nervous, anxious, or on edge 2   2. Not being able to stop or control worrying 2   3. Worrying too much about different things 3   4. Trouble relaxing 1   5. Being so restless that it is hard to sit still 1   6. Becoming easily annoyed or irritable 2   7. Feeling afraid, as if something awful might happen 1   REX-7 Total Score 12   If you checked any problems, how difficult have they made it for you to do your work, take care of things at home, or get along with other people? Somewhat difficult       Suicide Assessment Five-step Evaluation and Treatment (SAFE-T)    Asthma Follow-Up    Was ACT completed today?  Yes        1/12/2024     2:00 PM   ACT Total Scores   ACT TOTAL SCORE (Goal Greater than or Equal to 20) 16   In the past 12 months, how many times did you visit the emergency room for your asthma without being admitted to the hospital? 0   In the past 12 months, how many times were you hospitalized overnight because of your asthma? 0       How many days per week do you miss taking your asthma controller medication?  I do not have an asthma controller medication  Please describe any recent triggers for your asthma: smoke, pollens, animal dander, cold air, and Gastric Reflux  Have you had any Emergency Room Visits, Urgent Care Visits, or Hospital Admissions since your last office visit?  No    Migraine   Since your last clinic visit, how have your headaches changed?  No change  How often are you getting headaches or migraines? 2 times   Are you able to do normal daily activities when you have a migraine? No  Are you taking rescue/relief medications? (Select all that apply) Tylenol  How helpful is your rescue/relief medication?  I get some relief  Are you taking any medications to prevent migraines? (Select all that apply)  No  In  the past 4 weeks, how often have you gone to urgent care or the emergency room because of your headaches?  0    How many servings of fruits and vegetables do you eat daily?  4 or more  On average, how many sweetened beverages do you drink each day (Examples: soda, juice, sweet tea, etc.  Do NOT count diet or artificially sweetened beverages)?   1  How many days per week do you exercise enough to make your heart beat faster? 4  How many minutes a day do you exercise enough to make your heart beat faster? 30 - 60  How many days per week do you miss taking your medication? 0        Current Outpatient Medications   Medication Sig Dispense Refill    acetaminophen (TYLENOL) 325 MG tablet Take 2 tablets (650 mg) by mouth every 4 hours as needed for mild pain or other 1 Bottle 0    albuterol (VENTOLIN HFA) 108 (90 Base) MCG/ACT inhaler INHALE 2 PUFFS INTO THE LUNGS ONCE EVERY 4 HOURS AS NEEDED FOR SHORTNESS OF BREATH / DYSPNEA / WHEEZING 18 g 11    amLODIPine (NORVASC) 2.5 MG tablet Take 1 tablet (2.5 mg) by mouth daily 90 tablet 3    blood glucose (NO BRAND SPECIFIED) lancets standard Use to test blood sugar 1 time daily 100 each 3    blood glucose (NO BRAND SPECIFIED) test strip Use to test blood sugar 1 time daily 100 strip 11    budesonide-formoterol (SYMBICORT) 160-4.5 MCG/ACT Inhaler INHALE TWO PUFFS BY MOUTH TWICE A DAY 10.2 g 11    busPIRone HCl (BUSPAR) 30 MG tablet Take 1 tablet (30 mg) by mouth 2 times daily 180 tablet 3    cholecalciferol 125 MCG (5000 UT) CAPS Take 1 capsule (5,000 Units) by mouth daily Start after Ergocalciferol course is complete 90 capsule 3    Cyanocobalamin (B-12) 1000 MCG TBCR Take 1,000 mcg by mouth daily 100 tablet 1    DULoxetine (CYMBALTA) 30 MG capsule Take 3 capsules (90 mg) by mouth daily 270 capsule 3    famotidine (PEPCID) 20 MG tablet Take 1 tablet (20 mg) by mouth 2 times daily 180 tablet 3    gabapentin (NEURONTIN) 300 MG capsule Take 2 capsules (600 mg) by mouth 3 times daily  "540 capsule 3    GOODSENSE MIGRAINE FORMULA 250-250-65 MG per tablet TAKE ONE TABLET BY MOUTH EVERY 6 HOURS AS NEEDED FOR HEADACHES (Patient taking differently: Take 1 tablet by mouth every 6 hours as needed) 24 tablet 1    lisinopril (ZESTRIL) 10 MG tablet Take 1 tablet (10 mg) by mouth every evening 90 tablet 3    loratadine (CLARITIN) 10 MG tablet Take 10 mg by mouth daily as needed       LORazepam (ATIVAN) 1 MG tablet Take 1 tablet (1 mg) by mouth 2 times daily as needed for anxiety #45 for 30 days 45 tablet 5    medical cannabis (Patient's own supply) (The purpose of this order is to document that the patient reports taking medical cannabis.  This is not a prescription, and is not used to certify that the patient has a qualifying medical condition.)  CBG gummy over the counter 0 Information only 0    methocarbamol (ROBAXIN) 750 MG tablet Take 1 tablet (750 mg) by mouth 3 times daily as needed for muscle spasms 180 tablet 3    metoclopramide (REGLAN) 10 MG tablet Take 1 tablet (10 mg) by mouth 3 times daily as needed (nausea/vomiting) 50 tablet 1    naloxone (NARCAN) 4 MG/0.1ML nasal spray Spray 1 spray (4 mg) into one nostril alternating nostrils once as needed for opioid reversal every 2-3 minutes until assistance arrives 0.2 mL 3    omeprazole (PRILOSEC) 40 MG DR capsule Take 1 capsule (40 mg) by mouth 3 times daily Has seen GI who has approved TID dosing 270 capsule 3    ondansetron (ZOFRAN ODT) 4 MG ODT tab Take 1 tablet (4 mg) by mouth every 8 hours as needed for nausea 30 tablet 4    polyethylene glycol (MIRALAX) 17 GM/Dose powder Take 17 g by mouth daily 510 g 11    Prenatal Vit-Fe Fumarate-FA (PRENATAL VITAMIN AND MINERAL) 28-0.8 MG TABS Take 1 tablet by mouth daily 90 tablet 3    promethazine (PHENERGAN) 25 MG tablet Take 1 tablet (25 mg) by mouth 2 times daily as needed for nausea 30 tablet 3    syringe/needle, disp, (BD ECLIPSE SYRINGE) 25G X 1\" 3 ML MISC 1 each daily as needed 10 each 11    " "promethazine (PHENERGAN) 25 MG/ML IV injection INJECT 1 ML (25 MG) INTO THE MUSCLE EVERY 6 HOURS AS NEEDED FOR NAUSEA OR VOMITING 6 mL 1           Review of Systems   Constitutional, HEENT, cardiovascular, pulmonary, gi and gu systems are negative, except as otherwise noted.      Objective    /84 (BP Location: Right arm, Patient Position: Sitting, Cuff Size: Adult Regular)   Pulse 65   Temp 98.4  F (36.9  C) (Tympanic)   Resp 16   Ht 1.549 m (5' 1\")   Wt 65.1 kg (143 lb 8 oz)   SpO2 97%   BMI 27.11 kg/m    Body mass index is 27.11 kg/m .  Physical Exam   GENERAL APPEARANCE: alert and no distress                      "

## 2024-01-30 ENCOUNTER — TELEPHONE (OUTPATIENT)
Dept: FAMILY MEDICINE | Facility: CLINIC | Age: 39
End: 2024-01-30
Payer: MEDICARE

## 2024-01-30 DIAGNOSIS — J45.40 MODERATE PERSISTENT ASTHMA WITHOUT COMPLICATION: ICD-10-CM

## 2024-01-31 RX ORDER — FLUTICASONE FUROATE AND VILANTEROL 200; 25 UG/1; UG/1
1 POWDER RESPIRATORY (INHALATION) DAILY
Qty: 60 EACH | Refills: 11 | Status: SHIPPED | OUTPATIENT
Start: 2024-01-31

## 2024-01-31 NOTE — TELEPHONE ENCOUNTER
Dr. Barba,  Patient's insurance does not cover Symbicort. They will cover:  Breo  Fluticasone/Salmeterol (AirDuo)  Augustina Middleton [place]

## 2024-02-02 ENCOUNTER — HOSPITAL ENCOUNTER (EMERGENCY)
Facility: CLINIC | Age: 39
Discharge: HOME | End: 2024-02-02
Attending: NURSE PRACTITIONER | Admitting: NURSE PRACTITIONER
Payer: MEDICARE

## 2024-02-02 VITALS
HEART RATE: 92 BPM | SYSTOLIC BLOOD PRESSURE: 117 MMHG | OXYGEN SATURATION: 98 % | TEMPERATURE: 98.5 F | DIASTOLIC BLOOD PRESSURE: 81 MMHG

## 2024-02-02 DIAGNOSIS — H66.92 ACUTE LEFT OTITIS MEDIA: ICD-10-CM

## 2024-02-02 LAB
FLUAV RNA SPEC QL NAA+PROBE: NEGATIVE
FLUBV RNA RESP QL NAA+PROBE: NEGATIVE
RSV RNA SPEC NAA+PROBE: NEGATIVE
SARS-COV-2 RNA RESP QL NAA+PROBE: NEGATIVE

## 2024-02-02 PROCEDURE — G0463 HOSPITAL OUTPT CLINIC VISIT: HCPCS

## 2024-02-02 PROCEDURE — 99213 OFFICE O/P EST LOW 20 MIN: CPT | Performed by: NURSE PRACTITIONER

## 2024-02-02 PROCEDURE — 87637 SARSCOV2&INF A&B&RSV AMP PRB: CPT | Performed by: NURSE PRACTITIONER

## 2024-02-03 NOTE — DISCHARGE INSTRUCTIONS
Commend finishing all of oral antibiotics ordered, keep ear dry while bathing to prevent further infection.  Recommend ear recheck at primary office in 14 to 21 days to make sure ear has healed. Increase oral fluid intake, ibuprofen or Tylenol for fevers and pain recommended.  I will contact you if your testing for RSV, COVID, influenza is positive.

## 2024-02-03 NOTE — ED PROVIDER NOTES
ED Provider Note  [facility]      History     Chief Complaint   Patient presents with    Ear Drainage     Left pain is worse and shooting pain goes down into jaw. There is an echo. Sx started yesterday morning. Right ear feels stuffy but not actually painful. Fevers. No other cold sx. Some sinus congestion     HPI  Molly Teague is a 38 year old female who presents with left ear drainage, pain for 1 day.  Reports that initially her ear was not painful however over the last few hours her ears become painful and congested feeling.  Reports that she has had some sinus congestion over the last several days, denies any known exposure to RSV, COVID, influenza.  Reports that she has current immunizations for influenza and COVID.  Tolerating oral fluid intake without a problem, no nausea, vomiting, diarrhea, skin rash.  Denies any recent history of ear infections.  Drainage from her ear is reported as purulent.            Allergies:  Allergies   Allergen Reactions    Blood Transfusion Related (Informational Only) Other (See Comments)     Patient has a history of a clinically significant antibody against RBC antigens.  A delay in compatible RBCs may occur.    Pollen Extract     Seasonal Allergies     Venofer [Iron Sucrose] Difficulty breathing and Cramps     Severe infusion reaction 1/2022    Shellfish-Derived Products Nausea and Rash     Rash on face       Problem List:    Patient Active Problem List    Diagnosis Date Noted    F11.2 - Continuous opioid dependence (H) 05/10/2023     Priority: Medium    Microalbuminuria 07/07/2022     Priority: Medium    Iron deficiency anemia due to chronic blood loss 08/04/2020     Priority: Medium    S/P ORIF (open reduction internal fixation) fracture 02/10/2020     Priority: Medium    Sinus tachycardia 01/31/2020     Priority: Medium    Anemia, chronic disease 11/25/2019     Priority: Medium     With acute/chronic blood loss anemia due to severe esophagitis.   History of severe infusion related reaction to Venofer 1/2022 5/2023 - Injectafer 750 mg x 2 doses, 1 week apart.  Will order Methylprednisolone 125 mg IV to be given before iron infusion      Mirena IUD- Remove by 09/2024 09/06/2019     Priority: Medium     Lot: [id number]  Exp: 01/2022    Dinora Israel LPN        Malignant essential hypertension 07/24/2019     Priority: Medium    PTSD (post-traumatic stress disorder) 06/19/2019     Priority: Medium    Severe episode of recurrent major depressive disorder, with psychotic features (H) 07/06/2018     Priority: Medium    Severe persistent asthma without complication (H28) 07/06/2018     Priority: Medium     On high dose ICS/LABA + frequent albuterol use.  Next allergy/pulmonary referral      Aspiration of food 03/29/2018     Priority: Medium    Chronic pain syndrome 04/26/2017     Priority: Medium     Patient is followed by Grecia Barba DO for ongoing prescription of pain medication.  All refills should only be approved by this provider, or covering partner.    Medication(s): Oxycodone 10 mg.   Maximum quantity per month: 90  Clinic visit frequency required: Q 3 months     Controlled substance agreement:  Encounter-Level CSA - 04/26/2017:    Controlled Substance Agreement - Scan on 5/4/2017  8:58 AM: CONTROLLED SUBSTANCE AGREEMENT (below)         Patient-Level CSA:    Controlled Substance Agreement - Opioid - Scan on 2/6/2019  6:23 PM (below)       Pain Clinic evaluation in the past: No    DIRE Total Score(s):  No flowsheet data found.             [email or web address]        Chronic migraine without aura without status migrainosus, not intractable 04/12/2017     Priority: Medium     With medication overuse.  Fioricet and not Imitrex is recommend to treat severe headache.  2/2017 [place] recommend wean down from daily Fioricet and use Excedrin Migrain PRN.      AIN grade I 03/30/2017     Priority: Medium     Found at [place] on colonoscopy 2/2017.   Recommend repeat anoscopy and anal pap in 6/2017.  Following with [facility], last 2023      Gastroesophageal reflux disease with esophagitis 03/30/2017     Priority: Medium     EGD done at [place] 2/2017 showed esophagitis without GAVE.  Recommend max dose H2 and PPI, along with 4 tablets of Carafate dissolved in water and drink throughout the day.    Had LA Grade D esophagitis, on TID PPI      Stage 3a chronic kidney disease (H) 02/27/2017     Priority: Medium    Limited systemic sclerosis (H) 01/25/2017     Priority: Medium     Raynaud's, calcinosis, telangiectasias, peripheral neuropathy, GERD symptoms, history of scleroderma renal crisis.  Saw [place] 1/2017 and follows with Rheum Dr. Davis locally.      Polyneuropathy associated with underlying disease (H24) 01/25/2017     Priority: Medium     Due to scleroderma and neurology thought possible due to ICU (critical illness neuropathy).  Recommend to max out dose of gabapentin to 8625-0916 mg/day, split BID or TID.  EMG 1/2017 positive for neuropathy      History of Clostridium difficile 11/29/2016     Priority: Medium    History of Helicobacter pylori infection 11/29/2016     Priority: Medium    Scleroderma with renal involvement (H) 11/09/2016     Priority: Medium     Needs lisinopril long term      History of ARDS 11/09/2016     Priority: Medium     4/2015, prolonged ICU/vent/trach due to influenza, scleroderma renal crisis requiring hemodialysis.      History of hemodialysis 11/09/2016     Priority: Medium     4/2015 due to scleroderma renal crisis      Bilateral retinitis 11/09/2016     Priority: Medium    History of pulmonary embolism 11/09/2016     Priority: Medium     Completed anti-coagulation 2017.  pulmonary embolism due to critical illness      REX (generalized anxiety disorder) 11/09/2016     Priority: Medium    Systemic sclerosis (H) 09/22/2016     Priority: Medium        Past Medical History:    Past Medical History:   Diagnosis Date    Acute  pulmonary embolism (H) 2016    Acute pyelonephritis 2017    Altered mental state 2018    Anxiety     Arthritis     Complication of anesthesia     Depression     Gastroesophageal reflux disease with esophagitis     History of blood transfusion     Hypertension     Long-term (current) use of anticoagulants [Z79.01] 2016    Neuropathy     PE (pulmonary embolism) 2016    PTSD (post-traumatic stress disorder)     Raynaud's disease without gangrene     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     Rheumatism     Scleroderma (H) 2016    Slow to wake up after anesthesia     Uncomplicated asthma        Past Surgical History:    Past Surgical History:   Procedure Laterality Date    ANGIOGRAM  2015     SECTION      COMBINED ESOPHAGOSCOPY, GASTROSCOPY, DUODENOSCOPY (EGD) WITH CO2 INSUFFLATION N/A 2022    Procedure: ESOPHAGOGASTRODUODENOSCOPY, WITH CO2 INSUFFLATION;  Surgeon: Jalen Moses MD;  Location: [facility] OR    ESOPHAGOSCOPY, GASTROSCOPY, DUODENOSCOPY (EGD), COMBINED N/A 2022    Procedure: ESOPHAGOGASTRODUODENOSCOPY, WITH BIOPSY;  Surgeon: Jalen Moses MD;  Location: [facility] OR    OPEN REDUCTION INTERNAL FIXATION ANKLE Right 2/10/2020    Procedure: Right ankle open reduction internal fixation;  Surgeon: Natanael Villalta MD;  Location: [facility] OR    REMOVE HARDWARE ANKLE Right 2021    Procedure: Right ankle hardware removal;  Surgeon: Natanael Villalta MD;  Location: [facility] OR    THROAT SURGERY         Family History:    Family History   Problem Relation Age of Onset    Hyperlipidemia Father     Depression Sister     Fibromyalgia Sister     Hyperlipidemia Sister     Cerebrovascular Disease Maternal Grandmother          of a stroke    Diabetes Maternal Grandmother     Hyperlipidemia Paternal Grandfather     Diabetes Maternal Aunt     Depression Other     Melanoma No family hx of     Skin Cancer No family  "hx of     Anesthesia Reaction No family hx of     Cardiovascular No family hx of     Deep Vein Thrombosis (DVT) No family hx of        Social History:  Marital Status:  Single [1]  Social History     Tobacco Use    Smoking status: Never    Smokeless tobacco: Never   Vaping Use    Vaping Use: Every day    Substances: Flavoring, delta 8    Devices: Disposable   Substance Use Topics    Alcohol use: Not Currently     Comment: Socially once on a weekend if any    Drug use: Yes     Types: Marijuana     Comment: delta        Medications:    amoxicillin-clavulanate (AUGMENTIN) 875-125 MG tablet  acetaminophen (TYLENOL) 325 MG tablet  albuterol (VENTOLIN HFA) 108 (90 Base) MCG/ACT inhaler  amLODIPine (NORVASC) 2.5 MG tablet  blood glucose (NO BRAND SPECIFIED) lancets standard  blood glucose (NO BRAND SPECIFIED) test strip  busPIRone HCl (BUSPAR) 15 MG tablet  cholecalciferol 125 MCG (5000 UT) CAPS  Cyanocobalamin (B-12) 1000 MCG TBCR  DULoxetine (CYMBALTA) 30 MG capsule  famotidine (PEPCID) 20 MG tablet  fluticasone-vilanterol (BREO ELLIPTA) 200-25 MCG/ACT inhaler  gabapentin (NEURONTIN) 300 MG capsule  GOODSENSE MIGRAINE FORMULA 250-250-65 MG per tablet  lisinopril (ZESTRIL) 10 MG tablet  loratadine (CLARITIN) 10 MG tablet  LORazepam (ATIVAN) 1 MG tablet  medical cannabis (Patient's own supply)  methocarbamol (ROBAXIN) 750 MG tablet  metoclopramide (REGLAN) 10 MG tablet  naloxone (NARCAN) 4 MG/0.1ML nasal spray  omeprazole (PRILOSEC) 40 MG DR capsule  ondansetron (ZOFRAN ODT) 4 MG ODT tab  oxyCODONE (ROXICODONE) 10 MG tablet  [START ON 2/11/2024] oxyCODONE (ROXICODONE) 5 MG tablet  [START ON 3/12/2024] oxyCODONE (ROXICODONE) 5 MG tablet  polyethylene glycol (MIRALAX) 17 GM/Dose powder  Prenatal Vit-Fe Fumarate-FA (PRENATAL VITAMIN AND MINERAL) 28-0.8 MG TABS  promethazine (PHENERGAN) 25 MG tablet  syringe/needle, disp, (BD ECLIPSE SYRINGE) 25G X 1\" 3 ML MISC          Review of Systems  A medically appropriate review of " systems was performed with pertinent positives and negatives noted in the HPI, and all other systems negative.    Physical Exam   Patient Vitals for the past 24 hrs:   BP Temp Temp src Pulse SpO2   02/02/24 1928 117/81 98.5  F (36.9  C) Oral 92 98 %          Physical Exam  General: alert and in no acute distress on arrival  Ears/Nose/Throat: ENT: Right ear exam normal TM normal, canal nonerythemic and patent.  Left ear exam: TM ruptured, blood and purulence in the middle ear, erythemic middle ear.  Canal patent.  Nose: No erythema or edema patent nostrils bilateral.  Throat: Supple no adenopathy.   Head: atraumatic, normocephalic, pain in maxillary sinuses bilateral.  Lungs:  nonlabored, CTA bilateral throughout.  CV: Regular rate and rhythm, extremities warm and perfused, no edema in lower extremities.  Neuro: Patient awake, alert, speech is fluent, appropriate historian.  Psychiatric: affect/mood normal, pleasant.      ED Course                 Procedures                    Results for orders placed or performed during the hospital encounter of 02/02/24 (from the past 24 hour(s))   Symptomatic Influenza A/B, RSV, & SARS-CoV2 PCR (COVID-19) Nasopharyngeal    Specimen: Nasopharyngeal; Swab   Result Value Ref Range    Influenza A PCR Negative Negative    Influenza B PCR Negative Negative    RSV PCR Negative Negative    SARS CoV2 PCR Negative Negative    Narrative    Testing was performed using the Xpert Xpress CoV2/Flu/RSV Assay on the [employer] GeneXpert Instrument. This test should be ordered for the detection of SARS-CoV-2, influenza, and RSV viruses in individuals who meet clinical and/or epidemiological criteria. Test performance is unknown in asymptomatic patients. This test is for in vitro diagnostic use under the FDA EUA for laboratories certified under CLIA to perform high or moderate complexity testing. This test has not been FDA cleared or approved. A negative result does not rule out the presence of PCR  inhibitors in the specimen or target RNA in concentration below the limit of detection for the assay. If only one viral target is positive but coinfection with multiple targets is suspected, the sample should be re-tested with another FDA cleared, approved, or authorized test, if coinfection would change clinical management. This test was validated by the [facility]. These laboratories are certified under the Clinical Laboratory Improvement Amendments of 1988 (CLIA-88) as qualified to perform high complexity laboratory testing.       MEDICATIONS GIVEN IN THE EMERGENCY DEPARTMENT:  Medications - No data to display             Assessments & Plan (with Medical Decision Making)  38 year old female who presents to the Urgent Care for evaluation of acute left-sided otitis media ruptured membranes.  Ordered Augmentin twice daily for 10 days, advised to finish all of oral antibiotics ordered, increase oral fluid intake.  Recommend ear recheck in 14 to 21 days in primary office to make sure ears are healed.  Testing for RSV, COVID, influenza was negative.       I have reviewed the nursing notes.    I have reviewed the findings, diagnosis, plan and need for follow up with the patient.        NEW PRESCRIPTIONS STARTED AT TODAY'S ER VISIT  Discharge Medication List as of 2/2/2024  7:35 PM        START taking these medications    Details   amoxicillin-clavulanate (AUGMENTIN) 875-125 MG tablet Take 1 tablet by mouth 2 times daily for 10 days, Disp-20 tablet, R-0, E-Prescribe             Final diagnoses:   Acute left otitis media       2/2/2024   [facility] EMERGENCY DEPT       Lillian Patel APRN CNP  02/02/24 2022

## 2024-02-05 ENCOUNTER — TELEPHONE (OUTPATIENT)
Dept: FAMILY MEDICINE | Facility: CLINIC | Age: 39
End: 2024-02-05
Payer: MEDICARE

## 2024-02-05 NOTE — TELEPHONE ENCOUNTER
"  ED for acute condition Discharge Protocol    \"Hi, my name is Lisha Handy RN, a registered nurse, and I am calling from [facility].  I am calling to follow up and see how things are going for you after your recent emergency visit.\"    Tell me how you are doing now that you are home?\" Doing much better       Discharge Instructions    \"Let's review your discharge instructions.  What is/are the follow-up recommendations?  Pt. Response: advised to follow up in 14-21 days    \"Has an appointment with your primary care provider been scheduled?\"  No (needed - schedule appointment and remind to bring meds)    Medications    \"Tell me what changed about your medicines when you discharged?\"    Started Augmentin     \"What questions do you have about your medications?\"   None        Call Summary    \"What questions or concerns do you have about your recent visit and your follow-up care?\"     none    \"If you have questions or things don't continue to improve, we encourage you contact us through the main clinic number (give number).  Even if the clinic is not open, triage nurses are available 24/7 to help you.     We would like you to know that our clinic has extended hours (provide information).  We also have urgent care (provide details on closest location and hours/contact info)\"    \"Thank you for your time and take care!\"         "

## 2024-02-06 ENCOUNTER — TELEPHONE (OUTPATIENT)
Dept: FAMILY MEDICINE | Facility: CLINIC | Age: 39
End: 2024-02-06
Payer: MEDICARE

## 2024-02-07 NOTE — TELEPHONE ENCOUNTER
Prior Authorization Retail Medication Request    Medication/Dose: Flutic/vilan inh 200-25  Diagnosis and ICD code (if different than what is on RX):    New/renewal/insurance change PA/secondary ins. PA:  Previously Tried and Failed:  ventolin; symbicort; singulair; breo ellipta; ventolin hfa; proair hfa; proventil hfa;   Rationale:  temporary fill letter received    Insurance   Primary: [facility] Medicare RX [facility]  Insurance ID:  [id number]      Secondary (if applicable):  Insurance ID:      Pharmacy Information (if different than what is on RX)  Name:    Phone:    Fax:

## 2024-02-19 NOTE — ED NOTES
Chief complaint:   Chief Complaint   Patient presents with    URI       Vitals:  Visit Vitals  BP (!) 152/89   Pulse 86   Temp 97.8 °F (36.6 °C) (Oral)   Resp 18   Ht 5' 7\" (1.702 m)   Wt 95.3 kg (210 lb)   SpO2 99%   BMI 32.89 kg/m²       HISTORY OF PRESENT ILLNESS     Patient presents to urgent care for evaluation of cold symptoms x 5 days. Symptoms include productive cough, body aches, fever (max of 100.8 F), chills, and upper back pain that wraps around to anterior chest. Chest pain not worsened with breathing, denies history of cardiovascular disease. Denies sick contacts. Good PO intake. Treatments tried include tylenol/ibuprofen. Has been vaccinated for COVID x 3, has not had prior COVID infection. Has not received annual influenza vaccine.        Other significant problems:  Patient Active Problem List    Diagnosis Date Noted    Occipital neuralgia of left side 11/09/2015     Priority: Low    Depression 09/08/2015     Priority: Low    Calculus of kidney 01/27/2014     Priority: Low    Backache, unspecified 01/27/2014     Priority: Low    Chalazion of right eye 01/27/2014     Priority: Low    Nummular eczema 01/27/2014     Priority: Low    Other abnormal blood chemistry 01/27/2014     Priority: Low    Other and unspecified hyperlipidemia 01/27/2014     Priority: Low       PAST MEDICAL, FAMILY AND SOCIAL HISTORY     Medications:  Current Outpatient Medications   Medication Sig Dispense Refill    hydroCHLOROthiazide (HYDRODIURIL) 25 MG tablet Take 1 tablet by mouth daily. (Patient not taking: Reported on 2/19/2024) 90 tablet 5    tamsulosin (FLOMAX) 0.4 MG Cap Take 1 capsule by mouth daily. ONE HALF HOUR AFTER EVENING MEAL 30 capsule 0    ibuprofen (MOTRIN) 800 MG tablet Take 1 tablet by mouth 3 times daily as needed for Pain. 30 tablet 0     No current facility-administered medications for this visit.       Allergies:  ALLERGIES:   Allergen Reactions    Penicillins SHORTNESS OF BREATH       Past Medical   Pt resting quietly in bed.  Patient has  [place] to Observation  order. Patient has been given Patient Bill of Rights, Observation brochure and  What does Observation mean to me  forms.  Patient has been given the opportunity to ask questions about observation status and their plan of care.     Ella Steel   History/Surgeries:  Past Medical History:   Diagnosis Date    Fracture     left tibial plateau fracture    Hyperlipidemia     Nephrolithiasis     Occipital neuralgia of left side 2015    pneumonia 2003    PONV (postoperative nausea and vomiting)     Recurrent sinus infections        Past Surgical History:   Procedure Laterality Date    Hernia repair  2010    right inguinal    Knee surgery Left 6/12/15    ORIF tibial plateau       Family History:  Family History   Problem Relation Age of Onset    Patient is unaware of any medical problems Mother     Hypertension Father     Diabetes Father     Hyperlipidemia Father     Patient is unaware of any medical problems Sister     Patient is unaware of any medical problems Sister     Patient is unaware of any medical problems Brother     Stroke Maternal Grandfather     Hypertension Paternal Grandmother     Diabetes Paternal Grandmother     Diabetes Paternal Grandfather     Hypertension Paternal Grandfather     Stroke Paternal Grandfather        Social History:  Social History     Tobacco Use    Smoking status: Former     Current packs/day: 0.00     Average packs/day: 0.5 packs/day for 14.1 years (7.0 ttl pk-yrs)     Types: Cigarettes     Start date: 2005     Quit date: 2019     Years since quittin.8    Smokeless tobacco: Never   Substance Use Topics    Alcohol use: No     Alcohol/week: 0.0 standard drinks of alcohol       REVIEW OF SYSTEMS     Review of Systems   Constitutional:  Positive for chills and fever. Negative for activity change and appetite change.   HENT:  Negative for congestion, ear discharge, ear pain, rhinorrhea, sinus pressure, sinus pain, sore throat and trouble swallowing.    Eyes:  Negative for discharge and itching.   Respiratory:  Positive for cough. Negative for shortness of breath and wheezing.    Cardiovascular:  Positive for chest pain. Negative for palpitations.   Gastrointestinal:  Negative for abdominal pain, constipation,  diarrhea, nausea and vomiting.   Musculoskeletal:  Positive for myalgias. Negative for neck pain and neck stiffness.   Neurological:  Negative for dizziness, light-headedness and numbness.       PHYSICAL EXAM     Physical Exam  Vitals and nursing note reviewed.   Constitutional:       General: He is not in acute distress.     Appearance: Normal appearance. He is not ill-appearing, toxic-appearing or diaphoretic.   HENT:      Head: Normocephalic and atraumatic.      Right Ear: Tympanic membrane, ear canal and external ear normal. There is no impacted cerumen.      Left Ear: Tympanic membrane, ear canal and external ear normal. There is no impacted cerumen.      Mouth/Throat:      Mouth: Mucous membranes are moist.      Pharynx: Oropharynx is clear. No oropharyngeal exudate or posterior oropharyngeal erythema.      Neck: Normal range of motion and neck supple. No tenderness.   Eyes:      General:         Right eye: No discharge.         Left eye: No discharge.   Cardiovascular:      Rate and Rhythm: Normal rate and regular rhythm.      Heart sounds: Normal heart sounds. No murmur heard.     No friction rub. No gallop.   Pulmonary:      Effort: Pulmonary effort is normal. No respiratory distress.      Breath sounds: No stridor. Wheezing (mild and scattered) present. No rhonchi or rales.   Chest:      Chest wall: Tenderness (posterior upper left side) present.   Lymphadenopathy:      Cervical: No cervical adenopathy.   Skin:     General: Skin is warm and dry.   Neurological:      Mental Status: He is alert and oriented to person, place, and time.         ASSESSMENT/PLAN     Pneumonia of left lower lobe due to infectious organism  (primary encounter diagnosis)  Plan: doxycycline hyclate (VIBRAMYCIN) 100 MG capsule    Suspected COVID-19 virus infection  Plan: SARS-COV-2/INFLUENZA BY PCR    Chest pain, unspecified type  Plan: Electrocardiogram 12-Lead    Acute cough  Plan: XR CHEST PA AND LATERAL 2 VIEWS    EKG is  unremarkable. Chest xray shows left lateral linear opacities, combined with symptoms will treat for possible pneumonia with doxycycline. Chest pain likely muscular in nature. Can continue to use ibuprofen, and tylenol for symptoms. If significant worsening despite antibiotic should seek further evaluation at the emergency room. If symptoms are persisting should follow up with PCP.    Patient Education and Counseling:    Diagnosis & Treatment Info: Provided details regarding the diagnosis, treatment plan, and expected outcomes. Patient verbalized understanding, had no further questions, and agreed to the plan of care.    Follow-Up & Emergent Considerations: Advised to follow up with their primary care provider or return if symptoms persist. Additionally, the patient was instructed to seek urgent medical attention if symptoms significantly worsen or if new concerning symptoms arise.

## 2024-02-20 NOTE — TELEPHONE ENCOUNTER
Started the PA process but it looks like plan may prefer BRAND name. Called pharmacy to see if they could get a paid claim without PA, but it was coming back RTS until 02/23/2024. Will call pharmacy back on that date to see if BRAND name processes.

## 2024-02-21 ENCOUNTER — OFFICE VISIT (OUTPATIENT)
Dept: FAMILY MEDICINE | Facility: CLINIC | Age: 39
End: 2024-02-21
Payer: MEDICARE

## 2024-02-21 VITALS
RESPIRATION RATE: 12 BRPM | SYSTOLIC BLOOD PRESSURE: 116 MMHG | HEART RATE: 60 BPM | WEIGHT: 137.2 LBS | BODY MASS INDEX: 25.9 KG/M2 | HEIGHT: 61 IN | TEMPERATURE: 98.9 F | DIASTOLIC BLOOD PRESSURE: 80 MMHG

## 2024-02-21 DIAGNOSIS — H66.012 NON-RECURRENT ACUTE SUPPURATIVE OTITIS MEDIA OF LEFT EAR WITH SPONTANEOUS RUPTURE OF TYMPANIC MEMBRANE: Primary | ICD-10-CM

## 2024-02-21 PROCEDURE — 99212 OFFICE O/P EST SF 10 MIN: CPT | Performed by: INTERNAL MEDICINE

## 2024-02-21 ASSESSMENT — PAIN SCALES - GENERAL: PAINLEVEL: EXTREME PAIN (9)

## 2024-02-21 ASSESSMENT — ASTHMA QUESTIONNAIRES
ACT_TOTALSCORE: 13
QUESTION_5 LAST FOUR WEEKS HOW WOULD YOU RATE YOUR ASTHMA CONTROL: SOMEWHAT CONTROLLED
QUESTION_4 LAST FOUR WEEKS HOW OFTEN HAVE YOU USED YOUR RESCUE INHALER OR NEBULIZER MEDICATION (SUCH AS ALBUTEROL): ONE OR TWO TIMES PER DAY
ACT_TOTALSCORE: 13
QUESTION_1 LAST FOUR WEEKS HOW MUCH OF THE TIME DID YOUR ASTHMA KEEP YOU FROM GETTING AS MUCH DONE AT WORK, SCHOOL OR AT HOME: SOME OF THE TIME
QUESTION_3 LAST FOUR WEEKS HOW OFTEN DID YOUR ASTHMA SYMPTOMS (WHEEZING, COUGHING, SHORTNESS OF BREATH, CHEST TIGHTNESS OR PAIN) WAKE YOU UP AT NIGHT OR EARLIER THAN USUAL IN THE MORNING: FOUR OR MORE NIGHTS A WEEK
QUESTION_2 LAST FOUR WEEKS HOW OFTEN HAVE YOU HAD SHORTNESS OF BREATH: ONCE OR TWICE A WEEK

## 2024-02-21 ASSESSMENT — PATIENT HEALTH QUESTIONNAIRE - PHQ9: SUM OF ALL RESPONSES TO PHQ QUESTIONS 1-9: 15

## 2024-02-21 NOTE — PATIENT INSTRUCTIONS
Hearing should continue to improve  If not, let me know, I will place referral to [facility] in [place]

## 2024-02-23 NOTE — TELEPHONE ENCOUNTER
Prior Authorization Not Needed per Insurance    Medication: FLUTICASONE FUROATE-VILANTEROL 200-25 MCG/ACT IN AEPB  Insurance Company: [employer] Part D - Phone [phone] Fax [phone]  Expected CoPay: $    Pharmacy Filling the Rx: [facility] [place], [place] - [address]  Pharmacy Notified: y  Patient Notified: y - pharmacy to notify    Spoke with pharmacy, they received paid claim on BRAND name. PA not needed at this time. Copay less than $5

## 2024-03-21 ENCOUNTER — TELEPHONE (OUTPATIENT)
Dept: FAMILY MEDICINE | Facility: CLINIC | Age: 39
End: 2024-03-21
Payer: MEDICARE

## 2024-03-21 DIAGNOSIS — G89.4 CHRONIC PAIN SYNDROME: Chronic | ICD-10-CM

## 2024-03-21 DIAGNOSIS — F11.20 CONTINUOUS OPIOID DEPENDENCE (H): ICD-10-CM

## 2024-03-21 NOTE — TELEPHONE ENCOUNTER
Medication Question or Refill    Contacts         Type Contact Phone/Fax    03/21/2024 12:15 PM CDT Phone (Incoming) Molly Teageu (Self) [phone] (M)          What medication are you calling about (include dose and sig)?: Pt wants a refill on Oxy and wants the dose increased to 3 X a day.  Please call patient and advise.      Preferred Pharmacy:   [place], [place]   [address] [facility] [address]  Phone: [phone] Fax: [phone] Alternate Fax: [phone], [phone]    Controlled Substance Agreement on file:   CSA -- Patient Level:     [Media Unavailable] Controlled Substance Agreement - Opioid - Scan on 9/28/2023 12:07 PM   [Media Unavailable] Controlled Substance Agreement - Opioid - Scan on 7/11/2022 12:11 PM   [Media Unavailable] Controlled Substance Agreement - Opioid - Scan on 12/27/2020  1:35 PM   [Media Unavailable] Controlled Substance Agreement - Opioid - Scan on 2/6/2019  6:23 PM       Who prescribed the medication?: Barba    Do you need a refill? Yes    Could we send this information to you in [place] [place] or would you prefer to receive a phone call?:   Patient would prefer a phone call   Okay to leave a detailed message?: Yes at Cell number on file:    Telephone Information:   Mobile [phone]

## 2024-03-22 NOTE — TELEPHONE ENCOUNTER
Writer contacted pt for more information; she says that her rheumatologist started methotrexate injections, and she's been having more GI issues and overall discomfort. Writer advised her of PCP's message below; does note that she has a virtual appt already scheduled with provider on 3/26/24. Pt says that she's ok with waiting until that appt to discuss this with Dr. Barba. Writer confirmed with Dr. Barba that it's ok to discuss possible change to pain meds during a virtual visit instead of office, and Dr. Barba said that a virtual visit is fine. Pt notified.    Tricia Ferrara RN  [facility]

## 2024-03-26 ENCOUNTER — VIRTUAL VISIT (OUTPATIENT)
Dept: FAMILY MEDICINE | Facility: CLINIC | Age: 39
End: 2024-03-26
Payer: MEDICARE

## 2024-03-26 DIAGNOSIS — G89.4 CHRONIC PAIN SYNDROME: Primary | Chronic | ICD-10-CM

## 2024-03-26 PROBLEM — I27.23 PULMONARY HYPERTENSION DUE TO LUNG DISEASES AND HYPOXIA (H): Status: ACTIVE | Noted: 2024-03-26

## 2024-03-26 PROCEDURE — 99213 OFFICE O/P EST LOW 20 MIN: CPT | Mod: 95 | Performed by: INTERNAL MEDICINE

## 2024-03-26 RX ORDER — OXYCODONE HYDROCHLORIDE 5 MG/1
5 TABLET ORAL 4 TIMES DAILY PRN
Qty: 100 TABLET | Refills: 0 | Status: SHIPPED | OUTPATIENT
Start: 2024-03-26 | End: 2024-04-25

## 2024-03-26 RX ORDER — FOLIC ACID 1 MG/1
1 TABLET ORAL DAILY
COMMUNITY
Start: 2024-03-13

## 2024-03-26 RX ORDER — METHOTREXATE 25 MG/ML
0.6 INJECTION INTRA-ARTERIAL; INTRAMUSCULAR; INTRATHECAL; INTRAVENOUS
COMMUNITY
Start: 2024-03-13

## 2024-03-26 ASSESSMENT — ASTHMA QUESTIONNAIRES
ACT_TOTALSCORE: 11
QUESTION_3 LAST FOUR WEEKS HOW OFTEN DID YOUR ASTHMA SYMPTOMS (WHEEZING, COUGHING, SHORTNESS OF BREATH, CHEST TIGHTNESS OR PAIN) WAKE YOU UP AT NIGHT OR EARLIER THAN USUAL IN THE MORNING: FOUR OR MORE NIGHTS A WEEK
ACT_TOTALSCORE: 11
QUESTION_4 LAST FOUR WEEKS HOW OFTEN HAVE YOU USED YOUR RESCUE INHALER OR NEBULIZER MEDICATION (SUCH AS ALBUTEROL): ONE OR TWO TIMES PER DAY
QUESTION_5 LAST FOUR WEEKS HOW WOULD YOU RATE YOUR ASTHMA CONTROL: SOMEWHAT CONTROLLED
QUESTION_2 LAST FOUR WEEKS HOW OFTEN HAVE YOU HAD SHORTNESS OF BREATH: ONCE A DAY
QUESTION_1 LAST FOUR WEEKS HOW MUCH OF THE TIME DID YOUR ASTHMA KEEP YOU FROM GETTING AS MUCH DONE AT WORK, SCHOOL OR AT HOME: SOME OF THE TIME

## 2024-03-26 NOTE — PATIENT INSTRUCTIONS
Pain  Will increase Oxycodone to 5 mg 3 x day, with a 4th dose as needed 10 days/month  Follow-up in ~ 2 months, sooner if needed

## 2024-03-26 NOTE — PROGRESS NOTES
Molly is a 38 year old who is being evaluated via a billable video visit.    How would you like to obtain your AVS? MyChart  If the video visit is dropped, the invitation should be resent by: Send to e-mail at: [email or web address]  Will anyone else be joining your video visit? No      Assessment & Plan   Problem List Items Addressed This Visit       Chronic pain syndrome - Primary (Chronic)    Relevant Medications    oxyCODONE (ROXICODONE) 5 MG tablet            Patient Instructions   Pain  Will increase Oxycodone to 5 mg 3 x day, with a 4th dose as needed 10 days/month  Follow-up in ~ 2 months, sooner if needed      Subjective   Molly is a 38 year old, presenting for the following health issues:  Recheck Medication        3/26/2024     2:08 PM   Additional Questions   Roomed by Ivelisse CORRIGAN CMA     Video Start Time: 5:06 PM    HPI       My Chart Appointment Note:   Began Methotrexate and want to go over meds list, wants to talk about increasing  frequency of pain med.       Scleroderma  --she saw Rheumatology on 3/13 - noted increase in joint pains and swelling.  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  --starting in Feb, decreased it to 5 mg TID (filled 2/21) - felt this was working OK for her  --she just filled the Rx for 5 mg BID a few days ago  --oxy 10 mg caused more GI symptoms, prefers 5 mg; felt an immediate improvement in GI side effects and clearer head.  5 mg TID seemed to manage pain resonably well, but felt some days she would use it QID      Current Outpatient Medications   Medication Sig Dispense Refill    acetaminophen (TYLENOL) 325 MG tablet Take 2 tablets (650 mg) by mouth every 4 hours as needed for mild pain or other 1 Bottle 0    albuterol (VENTOLIN HFA) 108 (90 Base) MCG/ACT inhaler INHALE 2 PUFFS INTO THE LUNGS ONCE EVERY 4 HOURS AS  NEEDED FOR SHORTNESS OF BREATH / DYSPNEA / WHEEZING (Patient taking differently: 2 times daily INHALE 2 PUFFS INTO THE LUNGS ONCE EVERY 4 HOURS AS NEEDED FOR SHORTNESS OF BREATH / DYSPNEA / WHEEZING) 18 g 11    amLODIPine (NORVASC) 2.5 MG tablet Take 1 tablet (2.5 mg) by mouth daily 90 tablet 3    blood glucose (NO BRAND SPECIFIED) lancets standard Use to test blood sugar 1 time daily 100 each 3    blood glucose (NO BRAND SPECIFIED) test strip Use to test blood sugar 1 time daily 100 strip 11    busPIRone HCl (BUSPAR) 15 MG tablet Take 1 tablet (15 mg) by mouth 2 times daily 180 tablet 3    cholecalciferol 125 MCG (5000 UT) CAPS Take 1 capsule (5,000 Units) by mouth daily Start after Ergocalciferol course is complete 90 capsule 3    Cyanocobalamin (B-12) 1000 MCG TBCR Take 1,000 mcg by mouth daily 100 tablet 1    DULoxetine (CYMBALTA) 30 MG capsule Take 3 capsules (90 mg) by mouth daily 270 capsule 3    famotidine (PEPCID) 20 MG tablet Take 1 tablet (20 mg) by mouth 2 times daily 180 tablet 3    fluticasone-vilanterol (BREO ELLIPTA) 200-25 MCG/ACT inhaler Inhale 1 puff into the lungs daily 60 each 11    folic acid (FOLVITE) 1 MG tablet Take 1 mg by mouth daily      gabapentin (NEURONTIN) 300 MG capsule Take 2 capsules (600 mg) by mouth 3 times daily (Patient taking differently: Take 600 mg by mouth 2 times daily) 540 capsule 3    GOODSENSE MIGRAINE FORMULA 250-250-65 MG per tablet TAKE ONE TABLET BY MOUTH EVERY 6 HOURS AS NEEDED FOR HEADACHES (Patient taking differently: Take 1 tablet by mouth every 6 hours as needed) 24 tablet 1    lisinopril (ZESTRIL) 10 MG tablet Take 1 tablet (10 mg) by mouth every evening 90 tablet 3    loratadine (CLARITIN) 10 MG tablet Take 10 mg by mouth daily as needed       LORazepam (ATIVAN) 1 MG tablet Take 1 tablet (1 mg) by mouth 2 times daily as needed for anxiety #45 for 30 days 45 tablet 5    medical cannabis (Patient's own supply) (The purpose of this order is to document that the  "patient reports taking medical cannabis.  This is not a prescription, and is not used to certify that the patient has a qualifying medical condition.)  CBG gummy over the counter 0 Information only 0    methotrexate sodium, PF, 50 MG/2ML SOLN injection Inject 0.6 mg Subcutaneous every 7 days      metoclopramide (REGLAN) 10 MG tablet Take 1 tablet (10 mg) by mouth 3 times daily as needed (nausea/vomiting) 50 tablet 1    naloxone (NARCAN) 4 MG/0.1ML nasal spray Spray 1 spray (4 mg) into one nostril alternating nostrils once as needed for opioid reversal every 2-3 minutes until assistance arrives 0.2 mL 3    omeprazole (PRILOSEC) 40 MG DR capsule Take 1 capsule (40 mg) by mouth 3 times daily Has seen GI who has approved TID dosing 270 capsule 3    ondansetron (ZOFRAN ODT) 4 MG ODT tab Take 1 tablet (4 mg) by mouth every 8 hours as needed for nausea 30 tablet 4    oxyCODONE (ROXICODONE) 5 MG tablet Take 1 tablet (5 mg) by mouth 2 times daily as needed for pain 60 tablet 0    polyethylene glycol (MIRALAX) 17 GM/Dose powder Take 17 g by mouth daily 510 g 11    Prenatal Vit-Fe Fumarate-FA (PRENATAL VITAMIN AND MINERAL) 28-0.8 MG TABS Take 1 tablet by mouth daily 90 tablet 3    promethazine (PHENERGAN) 25 MG tablet Take 1 tablet (25 mg) by mouth 2 times daily as needed for nausea 30 tablet 3    methocarbamol (ROBAXIN) 750 MG tablet Take 1 tablet (750 mg) by mouth 3 times daily as needed for muscle spasms 180 tablet 3    syringe/needle, disp, (BD ECLIPSE SYRINGE) 25G X 1\" 3 ML MISC 1 each daily as needed 10 each 11           Review of Systems  Constitutional, HEENT, cardiovascular, pulmonary, gi and gu systems are negative, except as otherwise noted.      Objective           Vitals:  No vitals were obtained today due to virtual visit.    Physical Exam   GENERAL: alert and no distress  EYES: Eyes grossly normal to inspection.  No discharge or erythema, or obvious scleral/conjunctival abnormalities.  RESP: No audible wheeze, " cough, or visible cyanosis.    SKIN: Visible skin clear. No significant rash, abnormal pigmentation or lesions.  NEURO: Cranial nerves grossly intact.  Mentation and speech appropriate for age.  PSYCH: Appropriate affect, tone, and pace of words          Video-Visit Details    Type of service:  Video Visit   Video End Time:5:19 PM  Originating Location (pt. Location): Home    Distant Location (provider location):  On-site  Platform used for Video Visit: Jimbo  Signed Electronically by: Grecia Barba DO

## 2024-04-19 NOTE — TELEPHONE ENCOUNTER
Last Filled 01/22/17  Last Qty 30  Last Office Visit 12/21/16    Thanks,  Nohemy Cox  Certified Pharmacy Technician  [facility]  [phone]    
No

## 2024-04-24 DIAGNOSIS — F41.1 GAD (GENERALIZED ANXIETY DISORDER): ICD-10-CM

## 2024-04-24 RX ORDER — LORAZEPAM 1 MG/1
1 TABLET ORAL 2 TIMES DAILY PRN
Qty: 45 TABLET | Refills: 5 | Status: SHIPPED | OUTPATIENT
Start: 2024-04-24

## 2024-05-17 ENCOUNTER — OFFICE VISIT (OUTPATIENT)
Dept: FAMILY MEDICINE | Facility: CLINIC | Age: 39
End: 2024-05-17
Attending: INTERNAL MEDICINE
Payer: MEDICARE

## 2024-05-17 VITALS
RESPIRATION RATE: 12 BRPM | SYSTOLIC BLOOD PRESSURE: 110 MMHG | WEIGHT: 134.8 LBS | BODY MASS INDEX: 25.45 KG/M2 | HEIGHT: 61 IN | OXYGEN SATURATION: 98 % | HEART RATE: 89 BPM | TEMPERATURE: 99.2 F | DIASTOLIC BLOOD PRESSURE: 70 MMHG

## 2024-05-17 DIAGNOSIS — K21.01 GASTROESOPHAGEAL REFLUX DISEASE WITH ESOPHAGITIS AND HEMORRHAGE: ICD-10-CM

## 2024-05-17 DIAGNOSIS — R11.2 NAUSEA AND VOMITING, UNSPECIFIED VOMITING TYPE: ICD-10-CM

## 2024-05-17 DIAGNOSIS — G89.4 CHRONIC PAIN SYNDROME: Chronic | ICD-10-CM

## 2024-05-17 DIAGNOSIS — I15.0 RENOVASCULAR HYPERTENSION: ICD-10-CM

## 2024-05-17 DIAGNOSIS — E53.8 VITAMIN B12 DEFICIENCY (NON ANEMIC): ICD-10-CM

## 2024-05-17 DIAGNOSIS — E53.8 FOLATE DEFICIENCY: ICD-10-CM

## 2024-05-17 DIAGNOSIS — G63 POLYNEUROPATHY ASSOCIATED WITH UNDERLYING DISEASE (H): ICD-10-CM

## 2024-05-17 DIAGNOSIS — M34.9 LIMITED SYSTEMIC SCLEROSIS (H): ICD-10-CM

## 2024-05-17 DIAGNOSIS — I27.20 PULMONARY HYPERTENSION (H): ICD-10-CM

## 2024-05-17 DIAGNOSIS — I73.00 RAYNAUD'S DISEASE WITHOUT GANGRENE: ICD-10-CM

## 2024-05-17 DIAGNOSIS — E16.2 HYPOGLYCEMIA: ICD-10-CM

## 2024-05-17 DIAGNOSIS — E55.9 VITAMIN D DEFICIENCY: ICD-10-CM

## 2024-05-17 DIAGNOSIS — M34.1 CREST (CALCINOSIS, RAYNAUD'S PHENOMENON, ESOPHAGEAL DYSFUNCTION, SCLERODACTYLY, TELANGIECTASIA) (H): ICD-10-CM

## 2024-05-17 DIAGNOSIS — Z00.00 ENCOUNTER FOR MEDICARE ANNUAL WELLNESS EXAM: Primary | ICD-10-CM

## 2024-05-17 PROBLEM — I10 MALIGNANT ESSENTIAL HYPERTENSION: Status: RESOLVED | Noted: 2019-07-24 | Resolved: 2024-05-17

## 2024-05-17 PROCEDURE — G0439 PPPS, SUBSEQ VISIT: HCPCS | Performed by: INTERNAL MEDICINE

## 2024-05-17 PROCEDURE — 99214 OFFICE O/P EST MOD 30 MIN: CPT | Mod: 25 | Performed by: INTERNAL MEDICINE

## 2024-05-17 RX ORDER — METOCLOPRAMIDE 10 MG/1
10 TABLET ORAL 3 TIMES DAILY PRN
Qty: 30 TABLET | Refills: 1 | Status: SHIPPED | OUTPATIENT
Start: 2024-05-17

## 2024-05-17 RX ORDER — OXYCODONE HYDROCHLORIDE 5 MG/1
5 TABLET ORAL 4 TIMES DAILY PRN
Qty: 100 TABLET | Refills: 0 | Status: SHIPPED | OUTPATIENT
Start: 2024-05-17 | End: 2024-06-16

## 2024-05-17 RX ORDER — OXYCODONE HYDROCHLORIDE 5 MG/1
5 TABLET ORAL EVERY 6 HOURS PRN
Qty: 100 TABLET | Refills: 0 | Status: SHIPPED | OUTPATIENT
Start: 2024-07-16 | End: 2024-08-15

## 2024-05-17 RX ORDER — NEEDLES, SAFETY 18GX1 1/2"
1 NEEDLE, DISPOSABLE MISCELLANEOUS DAILY PRN
Qty: 10 EACH | Refills: 11 | Status: SHIPPED | OUTPATIENT
Start: 2024-05-17

## 2024-05-17 RX ORDER — LISINOPRIL 10 MG/1
10 TABLET ORAL EVERY EVENING
Qty: 90 TABLET | Refills: 3 | Status: SHIPPED | OUTPATIENT
Start: 2024-05-17

## 2024-05-17 RX ORDER — AMLODIPINE BESYLATE 5 MG/1
5 TABLET ORAL DAILY
Qty: 90 TABLET | Refills: 3 | Status: SHIPPED | OUTPATIENT
Start: 2024-05-17

## 2024-05-17 RX ORDER — PROMETHAZINE HYDROCHLORIDE 25 MG/1
25 TABLET ORAL 2 TIMES DAILY PRN
Qty: 30 TABLET | Refills: 3 | Status: CANCELLED | OUTPATIENT
Start: 2024-05-17

## 2024-05-17 RX ORDER — OXYCODONE HYDROCHLORIDE 5 MG/1
5 TABLET ORAL EVERY 6 HOURS PRN
Qty: 100 TABLET | Refills: 0 | Status: SHIPPED | OUTPATIENT
Start: 2024-06-16 | End: 2024-07-16

## 2024-05-17 RX ORDER — METHOCARBAMOL 750 MG/1
750 TABLET, FILM COATED ORAL 3 TIMES DAILY PRN
Qty: 180 TABLET | Refills: 3 | Status: SHIPPED | OUTPATIENT
Start: 2024-05-17

## 2024-05-17 RX ORDER — PNV NO.95/FERROUS FUM/FOLIC AC 28MG-0.8MG
1 TABLET ORAL DAILY
Qty: 90 TABLET | Refills: 3 | Status: SHIPPED | OUTPATIENT
Start: 2024-05-17

## 2024-05-17 RX ORDER — ONDANSETRON 4 MG/1
4 TABLET, ORALLY DISINTEGRATING ORAL EVERY 8 HOURS PRN
Qty: 30 TABLET | Refills: 4 | Status: SHIPPED | OUTPATIENT
Start: 2024-05-17

## 2024-05-17 RX ORDER — GABAPENTIN 300 MG/1
900 CAPSULE ORAL 2 TIMES DAILY
Qty: 540 CAPSULE | Refills: 3 | Status: SHIPPED | OUTPATIENT
Start: 2024-05-17

## 2024-05-17 SDOH — HEALTH STABILITY: PHYSICAL HEALTH: ON AVERAGE, HOW MANY DAYS PER WEEK DO YOU ENGAGE IN MODERATE TO STRENUOUS EXERCISE (LIKE A BRISK WALK)?: 2 DAYS

## 2024-05-17 ASSESSMENT — ASTHMA QUESTIONNAIRES
QUESTION_1 LAST FOUR WEEKS HOW MUCH OF THE TIME DID YOUR ASTHMA KEEP YOU FROM GETTING AS MUCH DONE AT WORK, SCHOOL OR AT HOME: A LITTLE OF THE TIME
QUESTION_2 LAST FOUR WEEKS HOW OFTEN HAVE YOU HAD SHORTNESS OF BREATH: ONCE OR TWICE A WEEK
ACT_TOTALSCORE: 16
QUESTION_4 LAST FOUR WEEKS HOW OFTEN HAVE YOU USED YOUR RESCUE INHALER OR NEBULIZER MEDICATION (SUCH AS ALBUTEROL): THREE OR MORE TIMES PER DAY
ACT_TOTALSCORE: 16
QUESTION_5 LAST FOUR WEEKS HOW WOULD YOU RATE YOUR ASTHMA CONTROL: SOMEWHAT CONTROLLED
QUESTION_3 LAST FOUR WEEKS HOW OFTEN DID YOUR ASTHMA SYMPTOMS (WHEEZING, COUGHING, SHORTNESS OF BREATH, CHEST TIGHTNESS OR PAIN) WAKE YOU UP AT NIGHT OR EARLIER THAN USUAL IN THE MORNING: ONCE OR TWICE

## 2024-05-17 ASSESSMENT — SOCIAL DETERMINANTS OF HEALTH (SDOH): HOW OFTEN DO YOU GET TOGETHER WITH FRIENDS OR RELATIVES?: ONCE A WEEK

## 2024-05-17 ASSESSMENT — PAIN SCALES - GENERAL: PAINLEVEL: SEVERE PAIN (6)

## 2024-05-17 NOTE — PROGRESS NOTES
Preventive Care Visit  [facility]  Grecia Barba DO, Internal Medicine  May 17, 2024      Assessment & Plan     Encounter for Medicare annual wellness exam    Chronic pain syndrome  Continue oxy #100/mo which is a decrease.  Feels GI system is better o nlower dose and pain is still manageable, not much worse than when on higher dose. She is considering further decrease I nthe future but not ready currently  - methocarbamol (ROBAXIN) 750 MG tablet; Take 1 tablet (750 mg) by mouth 3 times daily as needed for muscle spasms  - OFFICE/OUTPT VISIT,EST,LEVL IV  - oxyCODONE (ROXICODONE) 5 MG tablet; Take 1 tablet (5 mg) by mouth 4 times daily as needed for pain  - oxyCODONE (ROXICODONE) 5 MG tablet; Take 1 tablet (5 mg) by mouth every 6 hours as needed for pain  - oxyCODONE (ROXICODONE) 5 MG tablet; Take 1 tablet (5 mg) by mouth every 6 hours as needed for pain    Renovascular hypertension   - stable, refill provided.  See Nephrology for 1x consult for SGLT2-I. GFR is stable. Has history of proteinuria, minor.  - amLODIPine (NORVASC) 5 MG tablet; Take 1 tablet (5 mg) by mouth daily  - lisinopril (ZESTRIL) 10 MG tablet; Take 1 tablet (10 mg) by mouth every evening  - OFFICE/OUTPT VISIT,EST,LEVL IV  - Adult Nephrology  Referral; Future    Raynaud's disease without gangrene  Increase amlodipine due to non-healing digital ulcerations  - amLODIPine (NORVASC) 5 MG tablet; Take 1 tablet (5 mg) by mouth daily  - OFFICE/OUTPT VISIT,EST,LEVL IV    Hypoglycemia  Checks periodcially  - blood glucose (NO BRAND SPECIFIED) lancets standard; Use to test blood sugar 1 time daily  - blood glucose (NO BRAND SPECIFIED) test strip; Use to test blood sugar 1 time daily  - OFFICE/OUTPT VISIT,EST,LEVL IV    Vitamin B12 deficiency (non anemic)  Check lab at next routine visit  - Cyanocobalamin (B-12) 1000 MCG TBCR; Take 1,000 mcg by mouth daily  - Prenatal Vit-Fe Fumarate-FA (PRENATAL VITAMIN AND MINERAL)  "28-0.8 MG TABS; Take 1 tablet by mouth daily  - OFFICE/OUTPT VISIT,EST,LEVL IV  - Vitamin B12; Future    Limited systemic sclerosis (H)   - stable, refill provided  - gabapentin (NEURONTIN) 300 MG capsule; Take 3 capsules (900 mg) by mouth 2 times daily  - OFFICE/OUTPT VISIT,EST,LEVL IV  - Adult Nephrology  Referral; Future    Polyneuropathy associated with underlying disease (H24)   - stable, refill provided  - gabapentin (NEURONTIN) 300 MG capsule; Take 3 capsules (900 mg) by mouth 2 times daily  - OFFICE/OUTPT VISIT,EST,LEVL IV    Nausea and vomiting, unspecified vomiting type  Rare use.  Discussed long term side effects including long qT, TD, etc   - metoclopramide (REGLAN) 10 MG tablet; Take 1 tablet (10 mg) by mouth 3 times daily as needed (nausea/vomiting)  - ondansetron (ZOFRAN ODT) 4 MG ODT tab; Take 1 tablet (4 mg) by mouth every 8 hours as needed for nausea  - OFFICE/OUTPT VISIT,EST,LEVL IV    CREST (calcinosis, Raynaud's phenomenon, esophageal dysfunction, sclerodactyly, telangiectasia) (H)  Follows with rheum  - OFFICE/OUTPT VISIT,EST,LEVL IV    Gastroesophageal reflux disease with esophagitis and hemorrhage  Severe, overdue for upper/lower scope  - syringe/needle, disp, (BD ECLIPSE SYRINGE) 25G X 1\" 3 ML MISC; 1 each daily as needed  - OFFICE/OUTPT VISIT,EST,LEVL IV    Folate deficiency  Continue med  - Prenatal Vit-Fe Fumarate-FA (PRENATAL VITAMIN AND MINERAL) 28-0.8 MG TABS; Take 1 tablet by mouth daily  - OFFICE/OUTPT VISIT,EST,LEVL IV    Pulmonary hypertension (H)  Known issue that I take into account for their medical decisions, no current exacerbations or new concerns.  Follows with rheum  - OFFICE/OUTPT VISIT,EST,LEVL IV    Vitamin D deficiency  Check at next routine visit  - Vitamin D Deficiency; Future    Patient has been advised of split billing requirements and indicates understanding: Yes        BMI  Estimated body mass index is 25.78 kg/m  as calculated from the following:    " "Height as of this encounter: 1.54 m (5' 0.63\").    Weight as of this encounter: 61.1 kg (134 lb 12.8 oz).       Counseling  Appropriate preventive services were discussed with this patient, including applicable screening as appropriate for fall prevention, nutrition, physical activity, Tobacco-use cessation, weight loss and cognition.  Checklist reviewing preventive services available has been given to the patient.  Reviewed patient's diet, addressing concerns and/or questions.   She is at risk for lack of exercise and has been provided with information to increase physical activity for the benefit of her well-being.   The patient was instructed to see the dentist every 6 months.   Discussed possible causes of fatigue. Updated plan of care.  Patient reported difficulty with activities of daily living were addressed today.The patient's PHQ-9 score is consistent with moderate depression. She was provided with information regarding depression.   I have reviewed Opioid Use Disorder and Substance Use Disorder risk factors and made any needed referrals.           Amanda Barnes is a 38 year old, presenting for the following:  AWV, Hypertension, Depression, Anxiety, and Asthma        5/17/2024    10:16 AM   Additional Questions   Roomed by christian   Accompanied by self         5/17/2024    10:16 AM   Patient Reported Additional Medications   Patient reports taking the following new medications none         Health Care Directive  Patient does not have a Health Care Directive or Living Will: Discussed advance care planning with patient; information given to patient to review.    HPI      Chief Complaint   Patient presents with    AWV    Hypertension    Depression    Anxiety    Asthma       Scleroderma  From the scleroderma, she suffers from nausea, iron-deficiency anemia, dysphagia, grade D esophagitis, and chronic constipation. She also has chronic pain related to the scleroderma that is treated with oxycodone.   -- She " had appointment with [facility] in March.  Methotrexate was started for increased joint pains  --Rheum plans to follow-up with TTE, chest CT, and PFTs in the next few months.   -- She has follow-up planned in May and in July  --she is still having pain in her hands, but joint pains are improving in her knees  --having increase in nausea;  thinks intermittent fasting is helping;  wt is down but this is intentional  --not using phenergan;  zofran 2-3x month, reglan 1-4 x month    Raynauds  -- Feet, hands, tongue  --having increase in ulcerations on fingers, knuckles    Hypertension  CKD  -- She has not seen nephrology since 2020    Pulmonary hypertension  TTE performed in July 2023 demonstrated RVSP 27. The RV was mildly enlarged, and it had borderline reduced systolic function.     Asthma  --increase in symptoms, using albuterol more often  --does not seem to be related to seasonal allergies  --doesn't feel need to pursue further meds, work-up etc    Pain:  --using CBD a few times/week  --last filled oxycodone 4/8, #100;  using 100/month    Hypertension Follow-up    Do you check your blood pressure regularly outside of the clinic? Yes   Are you following a low salt diet? Yes  Are your blood pressures ever more than 140 on the top number (systolic) OR more than 90 on the bottom number (diastolic), for example 140/90? No    Depression   How are you doing with your depression since your last visit? No change  Are you having other symptoms that might be associated with depression? No  Have you had a significant life event?  No   Are you feeling anxious or having panic attacks?   No  Do you have any concerns with your use of alcohol or other drugs? No    Social History     Tobacco Use    Smoking status: Never    Smokeless tobacco: Never   Vaping Use    Vaping status: Every Day    Substances: Flavoring, delta 9    Devices: Disposable   Substance Use Topics    Alcohol use: Not Currently     Comment: Socially once on  a weekend if any    Drug use: Yes     Types: Marijuana     Comment: delta         1/12/2024     1:42 PM 2/21/2024    10:45 AM 5/17/2024    10:05 AM   PHQ   PHQ-9 Total Score 13 15 11   Q9: Thoughts of better off dead/self-harm past 2 weeks Not at all Not at all Not at all         5/10/2023     7:25 AM 9/28/2023     9:26 AM 1/12/2024     1:55 PM   REX-7 SCORE   Total Score 12 (moderate anxiety) 11 (moderate anxiety) 12 (moderate anxiety)   Total Score 12 11 12         5/17/2024    10:05 AM   Last PHQ-9   1.  Little interest or pleasure in doing things 1   2.  Feeling down, depressed, or hopeless 1   3.  Trouble falling or staying asleep, or sleeping too much 1   4.  Feeling tired or having little energy 3   5.  Poor appetite or overeating 1   6.  Feeling bad about yourself 1   7.  Trouble concentrating 2   8.  Moving slowly or restless 1   Q9: Thoughts of better off dead/self-harm past 2 weeks 0   PHQ-9 Total Score 11         1/12/2024     1:55 PM   REX-7    1. Feeling nervous, anxious, or on edge 2   2. Not being able to stop or control worrying 2   3. Worrying too much about different things 3   4. Trouble relaxing 1   5. Being so restless that it is hard to sit still 1   6. Becoming easily annoyed or irritable 2   7. Feeling afraid, as if something awful might happen 1   REX-7 Total Score 12   If you checked any problems, how difficult have they made it for you to do your work, take care of things at home, or get along with other people? Somewhat difficult       Suicide Assessment Five-step Evaluation and Treatment (SAFE-T)    Asthma Follow-Up    Was ACT completed today?  Yes        5/17/2024    10:17 AM   ACT Total Scores   ACT TOTAL SCORE (Goal Greater than or Equal to 20) 16   In the past 12 months, how many times did you visit the emergency room for your asthma without being admitted to the hospital? 0   In the past 12 months, how many times were you hospitalized overnight because of your asthma? 0       How  many days per week do you miss taking your asthma controller medication?  I do not have an asthma controller medication  Please describe any recent triggers for your asthma: smoke, pollens, and exercise or sports  Have you had any Emergency Room Visits, Urgent Care Visits, or Hospital Admissions since your last office visit?  No        5/17/2024   General Health   How would you rate your overall physical health? (!) FAIR   Feel stress (tense, anxious, or unable to sleep) Rather much   (!) STRESS CONCERN      5/17/2024   Nutrition   Diet: I don't know         5/17/2024   Exercise   Days per week of moderate/strenous exercise 2 days   (!) EXERCISE CONCERN      5/17/2024   Social Factors   Frequency of gathering with friends or relatives Once a week   Worry food won't last until get money to buy more No   Food not last or not have enough money for food? No   Do you have housing?  Yes   Are you worried about losing your housing? No   Lack of transportation? No   Unable to get utilities (heat,electricity)? No         5/17/2024   Fall Risk   Fallen 2 or more times in the past year? No   Trouble with walking or balance? Yes   Gait Speed Test (Document in seconds) 4.66   Gait Speed Test Interpretation Less than or equal to 5.00 seconds - PASS          5/17/2024   Activities of Daily Living- Home Safety   Needs help with the following daily activites Shopping    Preparing meals    Housework    Money management   Safety concerns in the home None of the above         5/17/2024   Dental   Dentist two times every year? (!) NO         5/17/2024   Hearing Screening   Hearing concerns? None of the above         5/17/2024   Driving Risk Screening   Patient/family members have concerns about driving (!) DECLINE         5/17/2024   General Alertness/Fatigue Screening   Have you been more tired than usual lately? (!) YES         5/17/2024   Urinary Incontinence Screening   Bothered by leaking urine in past 6 months No         5/17/2024    TB Screening   Were you born outside of the [place]? No       Today's PHQ-9 Score:       5/17/2024    10:05 AM   PHQ-9 SCORE   PHQ-9 Total Score MyChart 11 (Moderate depression)   PHQ-9 Total Score 11         5/17/2024   Substance Use   Alcohol more than 3/day or more than 7/wk Not Applicable   Do you have a current opioid prescription? (!) YES   How severe/bad is pain from 1 to 10? 8/10   Do you use any other substances recreationally? No       Social History     Tobacco Use    Smoking status: Never    Smokeless tobacco: Never   Vaping Use    Vaping status: Every Day    Substances: Flavoring, delta 9    Devices: Disposable   Substance Use Topics    Alcohol use: Not Currently     Comment: Socially once on a weekend if any    Drug use: Yes     Types: Marijuana     Comment: delta             5/17/2024   Breast Cancer Screening   Family history of breast, colon, or ovarian cancer? No / Unknown      Mammogram Screening - Patient under 40 years of age: Routine Mammogram Screening not recommended.       History of abnormal Pap smear: No - age 30- 64 PAP with HPV every 5 years recommended        Latest Ref Rng & Units 5/10/2023     7:57 AM 8/4/2020    11:30 AM 8/4/2020    11:00 AM   PAP / HPV   PAP  Negative for Intraepithelial Lesion or Malignancy (NILM)      PAP (Historical)   NIL     HPV 16 DNA Negative Negative   Negative    HPV 18 DNA Negative Negative   Negative    Other HR HPV Negative Negative   Negative            5/17/2024   Contraception/Family Planning   Questions about contraception or family planning No         Reviewed and updated as needed this visit by Provider     Meds  Problems               Current Outpatient Medications   Medication Sig Dispense Refill    acetaminophen (TYLENOL) 325 MG tablet Take 2 tablets (650 mg) by mouth every 4 hours as needed for mild pain or other 1 Bottle 0    albuterol (VENTOLIN HFA) 108 (90 Base) MCG/ACT inhaler INHALE 2 PUFFS INTO THE LUNGS ONCE EVERY 4 HOURS AS NEEDED FOR  SHORTNESS OF BREATH / DYSPNEA / WHEEZING (Patient taking differently: 2 times daily INHALE 2 PUFFS INTO THE LUNGS ONCE EVERY 4 HOURS AS NEEDED FOR SHORTNESS OF BREATH / DYSPNEA / WHEEZING) 18 g 11    amLODIPine (NORVASC) 5 MG tablet Take 1 tablet (5 mg) by mouth daily 90 tablet 3    blood glucose (NO BRAND SPECIFIED) lancets standard Use to test blood sugar 1 time daily 100 each 3    blood glucose (NO BRAND SPECIFIED) test strip Use to test blood sugar 1 time daily 100 strip 11    busPIRone HCl (BUSPAR) 15 MG tablet Take 1 tablet (15 mg) by mouth 2 times daily 180 tablet 3    cholecalciferol 125 MCG (5000 UT) CAPS Take 1 capsule (5,000 Units) by mouth daily Start after Ergocalciferol course is complete 90 capsule 3    Cyanocobalamin (B-12) 1000 MCG TBCR Take 1,000 mcg by mouth daily 100 tablet 1    DULoxetine (CYMBALTA) 30 MG capsule Take 3 capsules (90 mg) by mouth daily 270 capsule 3    famotidine (PEPCID) 20 MG tablet Take 1 tablet (20 mg) by mouth 2 times daily 180 tablet 3    fluticasone-vilanterol (BREO ELLIPTA) 200-25 MCG/ACT inhaler Inhale 1 puff into the lungs daily 60 each 11    folic acid (FOLVITE) 1 MG tablet Take 1 mg by mouth daily      gabapentin (NEURONTIN) 300 MG capsule Take 3 capsules (900 mg) by mouth 2 times daily 540 capsule 3    GOODSENSE MIGRAINE FORMULA 250-250-65 MG per tablet TAKE ONE TABLET BY MOUTH EVERY 6 HOURS AS NEEDED FOR HEADACHES (Patient taking differently: Take 1 tablet by mouth every 6 hours as needed) 24 tablet 1    lisinopril (ZESTRIL) 10 MG tablet Take 1 tablet (10 mg) by mouth every evening 90 tablet 3    loratadine (CLARITIN) 10 MG tablet Take 10 mg by mouth daily as needed       LORazepam (ATIVAN) 1 MG tablet TAKE 1 TABLET (1 MG) BY MOUTH 2 TIMES DAILY AS NEEDED FOR ANXIETY #45 FOR 30 DAYS 45 tablet 5    medical cannabis (Patient's own supply) (The purpose of this order is to document that the patient reports taking medical cannabis.  This is not a prescription, and is not  "used to certify that the patient has a qualifying medical condition.)  CBG gummy over the counter 0 Information only 0    methocarbamol (ROBAXIN) 750 MG tablet Take 1 tablet (750 mg) by mouth 3 times daily as needed for muscle spasms 180 tablet 3    methotrexate sodium, PF, 50 MG/2ML SOLN injection Inject 0.6 mg Subcutaneous every 7 days      metoclopramide (REGLAN) 10 MG tablet Take 1 tablet (10 mg) by mouth 3 times daily as needed (nausea/vomiting) 30 tablet 1    naloxone (NARCAN) 4 MG/0.1ML nasal spray Spray 1 spray (4 mg) into one nostril alternating nostrils once as needed for opioid reversal every 2-3 minutes until assistance arrives 0.2 mL 3    omeprazole (PRILOSEC) 40 MG DR capsule Take 1 capsule (40 mg) by mouth 3 times daily Has seen GI who has approved TID dosing 270 capsule 3    ondansetron (ZOFRAN ODT) 4 MG ODT tab Take 1 tablet (4 mg) by mouth every 8 hours as needed for nausea 30 tablet 4    oxyCODONE (ROXICODONE) 5 MG tablet Take 1 tablet (5 mg) by mouth 4 times daily as needed for pain 100 tablet 0    [START ON 6/16/2024] oxyCODONE (ROXICODONE) 5 MG tablet Take 1 tablet (5 mg) by mouth every 6 hours as needed for pain 100 tablet 0    [START ON 7/16/2024] oxyCODONE (ROXICODONE) 5 MG tablet Take 1 tablet (5 mg) by mouth every 6 hours as needed for pain 100 tablet 0    polyethylene glycol (MIRALAX) 17 GM/Dose powder Take 17 g by mouth daily 510 g 11    Prenatal Vit-Fe Fumarate-FA (PRENATAL VITAMIN AND MINERAL) 28-0.8 MG TABS Take 1 tablet by mouth daily 90 tablet 3    syringe/needle, disp, (BD ECLIPSE SYRINGE) 25G X 1\" 3 ML MISC 1 each daily as needed 10 each 11     Current providers sharing in care for this patient include:  Patient Care Team:  Grecia Barba DO as PCP - General (Internal Medicine)  Grecia Barba DO as Assigned PCP  Maritza Harrison MD as MD (OB/Gyn)  Cindy Lorenzana MD as Resident (Internal Medicine)  Kenroy Cruz, JAYLENE as Assigned Musculoskeletal " Provider  Jalen Moses MD as MD (Gastroenterology)  Jalen Moses MD as Assigned Gastroenterology Provider  Grecia Barba DO as Assigned Pain Medication Provider  Nataliia Mairn APRN CNP as Assigned Surgical Provider    The following health maintenance items are reviewed in Epic and correct as of today:  Health Maintenance   Topic Date Due    HEPATITIS A IMMUNIZATION (1 of 2 - Risk 2-dose series) Never done    HEPATITIS B IMMUNIZATION (1 of 3 - 19+ 3-dose series) Never done    ASTHMA ACTION PLAN  07/06/2019    DEPRESSION 6 MO INDEX REPEAT PHQ-9  05/31/2024    Anal Microscopy (high resolution anoscopy)  09/01/2024    BMP  09/28/2024    LIPID  09/28/2024    MICROALBUMIN  09/28/2024    URINE DRUG SCREEN  09/28/2024    HEMOGLOBIN  09/28/2024    CONTROLLED SUBSTANCE AGREEMENT FOR CHRONIC PAIN MANAGEMENT  09/28/2024    ASTHMA CONTROL TEST  11/17/2024    REX ASSESSMENT  01/12/2025    ANNUAL REVIEW OF HM ORDERS  03/26/2025    MEDICARE ANNUAL WELLNESS VISIT  05/17/2025    PHQ-9  05/17/2025    GLUCOSE  09/28/2026    DTAP/TDAP/TD IMMUNIZATION (2 - Td or Tdap) 05/11/2027    ADVANCE CARE PLANNING  05/19/2028    HPV TEST  08/25/2028    PAP  08/25/2028    HEPATITIS C SCREENING  Completed    HIV SCREENING  Completed    DEPRESSION ACTION PLAN  Completed    INFLUENZA VACCINE  Completed    Pneumococcal Vaccine: Pediatrics (0 to 5 Years) and At-Risk Patients (6 to 64 Years)  Completed    URINALYSIS  Completed    COVID-19 Vaccine  Completed    IPV IMMUNIZATION  Aged Out    HPV IMMUNIZATION  Aged Out    MENINGITIS IMMUNIZATION  Aged Out    RSV MONOCLONAL ANTIBODY  Aged Out         Review of Systems  Constitutional, neuro, ENT, endocrine, pulmonary, cardiac, gastrointestinal, genitourinary, musculoskeletal, integument and psychiatric systems are negative, except as otherwise noted.     Objective    Exam  /70 (BP Location: Right arm, Patient Position: Sitting, Cuff Size: Adult  "Small)   Pulse 89   Temp 99.2  F (37.3  C) (Tympanic)   Resp 12   Ht 1.54 m (5' 0.63\")   Wt 61.1 kg (134 lb 12.8 oz)   LMP  (Approximate)   SpO2 98%   BMI 25.78 kg/m     Estimated body mass index is 25.78 kg/m  as calculated from the following:    Height as of this encounter: 1.54 m (5' 0.63\").    Weight as of this encounter: 61.1 kg (134 lb 12.8 oz).    Physical Exam  GENERAL: alert and no distress  EYES: Eyes grossly normal to inspection, PERRL and conjunctivae and sclerae normal  HENT: ear canals and TM's normal, nose and mouth without ulcers or lesions  NECK: no LAD, surgical/trach scars well healed  RESP: lungs clear to auscultation - no rales, rhonchi or wheezes  CV: regular rate and rhythm, normal S1 S2, no S3 or S4, no murmur, click or rub, no peripheral edema  ABDOMEN: soft, nontender, no hepatosplenomegaly, no masses and bowel sounds normal  MS: hammer toe deformities of R>L foot; contractures of bilateral hands  SKIN: skin tightening of face, bilateral hands.  Scabbed ulcerations on multiple joints of bilateral hands  NEURO: Normal strength and tone, mentation intact and speech normal  PSYCH: mentation appears normal, affect normal/bright         5/17/2024   Mini Cog   Clock Draw Score 0 Abnormal   3 Item Recall 3 objects recalled   Mini Cog Total Score 3              Signed Electronically by: Grecia Barba DO    "

## 2024-05-17 NOTE — PATIENT INSTRUCTIONS
"Preventive Care Advice   This is general advice we often give to help people stay healthy. Your care team may have specific advice just for you. Please talk to your care team about your own preventive care needs.  Lifestyle  Exercise at least 150 minutes each week (30 minutes a day, 5 days a week).  Do muscle strengthening activities 2 days a week. These help control your weight and prevent disease.  No smoking.  Wear sunscreen to prevent skin cancer.  Have your home tested for radon every 2 to 5 years. Radon is a colorless, odorless gas that can harm your lungs. To learn more, go to www.health.[facility].mn. and search for \"Radon in Homes.\"  Keep guns unloaded and locked up in a safe place like a safe or gun vault, or, use a gun lock and hide the keys. Always lock away bullets separately. To learn more, visit [employer].mn.gov and search for \"safe gun storage.\"  Nutrition  Eat 5 or more servings of fruits and vegetables each day.  Try wheat bread, brown rice and whole grain pasta (instead of white bread, rice, and pasta).  Get enough calcium and vitamin D. Check the label on foods and aim for 100% of the RDA (recommended daily allowance).  Regular exams  Have a dental exam and cleaning every 6 months.  See your health care team every year to talk about:  Any changes in your health.  Any medicines your care team has prescribed.  Preventive care, family planning, and ways to prevent chronic diseases.  Shots (vaccines)   HPV shots (up to age 26), if you've never had them before.  Hepatitis B shots (up to age 59), if you've never had them before.  COVID-19 shot: Get this shot when it's due.  Flu shot: Get a flu shot every year.  Tetanus shot: Get a tetanus shot every 10 years.  Pneumococcal, hepatitis A, and RSV shots: Ask your care team if you need these based on your risk.  Shingles shot (for age 50 and up).  General health tests  Diabetes screening:  Starting at age 35, Get screened for diabetes at least every 3 years.  If " you are younger than age 35, ask your care team if you should be screened for diabetes.  Cholesterol test: At age 39, start having a cholesterol test every 5 years, or more often if advised.  Bone density scan (DEXA): At age 50, ask your care team if you should have this scan for osteoporosis (brittle bones).  Hepatitis C: Get tested at least once in your life.  Abdominal aortic aneurysm screening: Talk to your doctor about having this screening if you:  Have ever smoked; and  Are biologically male; and  Are between the ages of 65 and 75.  STIs (sexually transmitted infections)  Before age 24: Ask your care team if you should be screened for STIs.  After age 24: Get screened for STIs if you're at risk. You are at risk for STIs (including HIV) if:  You are sexually active with more than one person.  You don't use condoms every time.  You or a partner was diagnosed with a sexually transmitted infection.  If you are at risk for HIV, ask about PrEP medicine to prevent HIV.  Get tested for HIV at least once in your life, whether you are at risk for HIV or not.  Cancer screening tests  Cervical cancer screening: If you have a cervix, begin getting regular cervical cancer screening tests at age 21. Most people who have regular screenings with normal results can stop after age 65. Talk about this with your provider.  Breast cancer scan (mammogram): If you've ever had breasts, begin having regular mammograms starting at age 40. This is a scan to check for breast cancer.  Colon cancer screening: It is important to start screening for colon cancer at age 45.  Have a colonoscopy test every 10 years (or more often if you're at risk) Or, ask your provider about stool tests like a FIT test every year or Cologuard test every 3 years.  To learn more about your testing options, visit: www.[employer]/599009.pdf.  For help making a decision, visit: corbin/oi66834.  Prostate cancer screening test: If you have a prostate and are age 55  to 69, ask your provider if you would benefit from a yearly prostate cancer screening test.  Lung cancer screening: If you are a current or former smoker age 50 to 80, ask your care team if ongoing lung cancer screenings are right for you.  For informational purposes only. Not to replace the advice of your health care provider. Copyright   2023 [employer]. All rights reserved. Clinically reviewed by the [facility] Transitions Program. Amplitude [id number] - REV 04/24.    Preventing Falls: Care Instructions  Injuries and health problems such as trouble walking or poor eyesight can increase your risk of falling. So can some medicines. But there are things you can do to help prevent falls. You can exercise to get stronger. You can also arrange your home to make it safer.    Talk to your doctor about the medicines you take. Ask if any of them increase the risk of falls and whether they can be changed or stopped.   Try to exercise regularly. It can help improve your strength and balance. This can help lower your risk of falling.     Practice fall safety and prevention.    Wear low-heeled shoes that fit well and give your feet good support. Talk to your doctor if you have foot problems that make this hard.  Carry a cellphone or wear a medical alert device that you can use to call for help.  Use stepladders instead of chairs to reach high objects. Don't climb if you're at risk for falls. Ask for help, if needed.  Wear the correct eyeglasses, if you need them.    Make your home safer.    Remove rugs, cords, clutter, and furniture from walkways.  Keep your house well lit. Use night-lights in hallways and bathrooms.  Install and use sturdy handrails on stairways.  Wear nonskid footwear, even inside. Don't walk barefoot or in socks without shoes.    Be safe outside.    Use handrails, curb cuts, and ramps whenever possible.  Keep your hands free by using a shoulder bag or backpack.  Try to walk in well-lit  "areas. Watch out for uneven ground, changes in pavement, and debris.  Be careful in the winter. Walk on the grass or gravel when sidewalks are slippery. Use de-icer on steps and walkways. Add non-slip devices to shoes.    Put grab bars and nonskid mats in your shower or tub and near the toilet. Try to use a shower chair or bath bench when bathing.   Get into a tub or shower by putting in your weaker leg first. Get out with your strong side first. Have a phone or medical alert device in the bathroom with you.   Where can you learn more?  Go to https://www.[employer]/patiented  Enter G117 in the search box to learn more about \"Preventing Falls: Care Instructions.\"  Current as of: July 17, 2023               Content Version: 14.0    3628-4319 [employer].   Care instructions adapted under license by your healthcare professional. If you have questions about a medical condition or this instruction, always ask your healthcare professional. [employer] disclaims any warranty or liability for your use of this information.      Learning About Stress  What is stress?     Stress is your body's response to a hard situation. Your body can have a physical, emotional, or mental response. Stress is a fact of life for most people, and it affects everyone differently. What causes stress for you may not be stressful for someone else.  A lot of things can cause stress. You may feel stress when you go on a job interview, take a test, or run a race. This kind of short-term stress is normal and even useful. It can help you if you need to work hard or react quickly. For example, stress can help you finish an important job on time.  Long-term stress is caused by ongoing stressful situations or events. Examples of long-term stress include long-term health problems, ongoing problems at work, or conflicts in your family. Long-term stress can harm your health.  How does stress affect your health?  When you are " stressed, your body responds as though you are in danger. It makes hormones that speed up your heart, make you breathe faster, and give you a burst of energy. This is called the fight-or-flight stress response. If the stress is over quickly, your body goes back to normal and no harm is done.  But if stress happens too often or lasts too long, it can have bad effects. Long-term stress can make you more likely to get sick, and it can make symptoms of some diseases worse. If you tense up when you are stressed, you may develop neck, shoulder, or low back pain. Stress is linked to high blood pressure and heart disease.  Stress also harms your emotional health. It can make you rivera, tense, or depressed. Your relationships may suffer, and you may not do well at work or school.  What can you do to manage stress?  You can try these things to help manage stress:   Do something active. Exercise or activity can help reduce stress. Walking is a great way to get started. Even everyday activities such as housecleaning or yard work can help.  Try yoga or nichol chi. These techniques combine exercise and meditation. You may need some training at first to learn them.  Do something you enjoy. For example, listen to music or go to a movie. Practice your hobby or do volunteer work.  Meditate. This can help you relax, because you are not worrying about what happened before or what may happen in the future.  Do guided imagery. Imagine yourself in any setting that helps you feel calm. You can use online videos, books, or a teacher to guide you.  Do breathing exercises. For example:  From a standing position, bend forward from the waist with your knees slightly bent. Let your arms dangle close to the floor.  Breathe in slowly and deeply as you return to a standing position. Roll up slowly and lift your head last.  Hold your breath for just a few seconds in the standing position.  Breathe out slowly and bend forward from the waist.  Let your  "feelings out. Talk, laugh, cry, and express anger when you need to. Talking with supportive friends or family, a counselor, or a anju leader about your feelings is a healthy way to relieve stress. Avoid discussing your feelings with people who make you feel worse.  Write. It may help to write about things that are bothering you. This helps you find out how much stress you feel and what is causing it. When you know this, you can find better ways to cope.  What can you do to prevent stress?  You might try some of these things to help prevent stress:  Manage your time. This helps you find time to do the things you want and need to do.  Get enough sleep. Your body recovers from the stresses of the day while you are sleeping.  Get support. Your family, friends, and community can make a difference in how you experience stress.  Limit your news feed. Avoid or limit time on social media or news that may make you feel stressed.  Do something active. Exercise or activity can help reduce stress. Walking is a great way to get started.  Where can you learn more?  Go to https://www.[employer]utis.net/patiented  Enter N032 in the search box to learn more about \"Learning About Stress.\"  Current as of: October 24, 2023               Content Version: 14.0    0120-7193 [employer].   Care instructions adapted under license by your healthcare professional. If you have questions about a medical condition or this instruction, always ask your healthcare professional. [employer] disclaims any warranty or liability for your use of this information.      Learning About Sleeping Well  What does sleeping well mean?     Sleeping well means getting enough sleep to feel good and stay healthy. How much sleep is enough varies among people.  The number of hours you sleep and how you feel when you wake up are both important. If you do not feel refreshed, you probably need more sleep. Another sign of not getting enough sleep is " "feeling tired during the day.  Experts recommend that adults get at least 7 or more hours of sleep per day. Children and older adults need more sleep.  Why is getting enough sleep important?  Getting enough quality sleep is a basic part of good health. When your sleep suffers, your physical health, mood, and your thoughts can suffer too. You may find yourself feeling more grumpy or stressed. Not getting enough sleep also can lead to serious problems, including injury, accidents, anxiety, and depression.  What might cause poor sleeping?  Many things can cause sleep problems, including:  Changes to your sleep schedule.  Stress. Stress can be caused by fear about a single event, such as giving a speech. Or you may have ongoing stress, such as worry about work or school.  Depression, anxiety, and other mental or emotional conditions.  Changes in your sleep habits or surroundings. This includes changes that happen where you sleep, such as noise, light, or sleeping in a different bed. It also includes changes in your sleep pattern, such as having jet lag or working a late shift.  Health problems, such as pain, breathing problems, and restless legs syndrome.  Lack of regular exercise.  Using alcohol, nicotine, or caffeine before bed.  How can you help yourself?  Here are some tips that may help you sleep more soundly and wake up feeling more refreshed.  Your sleeping area   Use your bedroom only for sleeping and sex. A bit of light reading may help you fall asleep. But if it doesn't, do your reading elsewhere in the house. Try not to use your TV, computer, smartphone, or tablet while you are in bed.  Be sure your bed is big enough to stretch out comfortably, especially if you have a sleep partner.  Keep your bedroom quiet, dark, and cool. Use curtains, blinds, or a sleep mask to block out light. To block out noise, use earplugs, soothing music, or a \"white noise\" machine.  Your evening and bedtime routine   Create a " "relaxing bedtime routine. You might want to take a warm shower or bath, or listen to soothing music.  Go to bed at the same time every night. And get up at the same time every morning, even if you feel tired.  What to avoid   Limit caffeine (coffee, tea, caffeinated sodas) during the day, and don't have any for at least 6 hours before bedtime.  Avoid drinking alcohol before bedtime. Alcohol can cause you to wake up more often during the night.  Try not to smoke or use tobacco, especially in the evening. Nicotine can keep you awake.  Limit naps during the day, especially close to bedtime.  Avoid lying in bed awake for too long. If you can't fall asleep or if you wake up in the middle of the night and can't get back to sleep within about 20 minutes, get out of bed and go to another room until you feel sleepy.  Avoid taking medicine right before bed that may keep you awake or make you feel hyper or energized. Your doctor can tell you if your medicine may do this and if you can take it earlier in the day.  If you can't sleep   Imagine yourself in a peaceful, pleasant scene. Focus on the details and feelings of being in a place that is relaxing.  Get up and do a quiet or boring activity until you feel sleepy.  Avoid drinking any liquids before going to bed to help prevent waking up often to use the bathroom.  Where can you learn more?  Go to https://www.[employer].net/patiented  Enter J942 in the search box to learn more about \"Learning About Sleeping Well.\"  Current as of: July 10, 2023               Content Version: 14.0    3790-8738 [employer].   Care instructions adapted under license by your healthcare professional. If you have questions about a medical condition or this instruction, always ask your healthcare professional. [employer] disclaims any warranty or liability for your use of this information.      Learning About Depression Screening  What is depression screening?  Depression " screening is a way to see if you have depression symptoms. It may be done by a doctor or counselor. It's often part of a routine checkup. That's because your mental health is just as important as your physical health.  Depression is a mental health condition that affects how you feel, think, and act. You may:  Have less energy.  Lose interest in your daily activities.  Feel sad and grouchy for a long time.  Depression is very common. It affects people of all ages.  Many things can lead to depression. Some people become depressed after they have a stroke or find out they have a major illness like cancer or heart disease. The death of a loved one or a breakup may lead to depression. It can run in families. Most experts believe that a combination of inherited genes and stressful life events can cause it.  What happens during screening?  You may be asked to fill out a form about your depression symptoms. You and the doctor will discuss your answers. The doctor may ask you more questions to learn more about how you think, act, and feel.  What happens after screening?  If you have symptoms of depression, your doctor will talk to you about your options.  Doctors usually treat depression with medicines or counseling. Often, combining the two works best. Many people don't get help because they think that they'll get over the depression on their own. But people with depression may not get better unless they get treatment.  The cause of depression is not well understood. There may be many factors involved. But if you have depression, it's not your fault.  A serious symptom of depression is thinking about death or suicide. If you or someone you care about talks about this or about feeling hopeless, get help right away.  It's important to know that depression can be treated. Medicine, counseling, and self-care may help.  Where can you learn more?  Go to https://www.healthwise.net/patiented  Enter T185 in the search box to learn  "more about \"Learning About Depression Screening.\"  Current as of: June 24, 2023               Content Version: 14.0    8603-2899 [employer].   Care instructions adapted under license by your healthcare professional. If you have questions about a medical condition or this instruction, always ask your healthcare professional. [employer] disclaims any warranty or liability for your use of this information.      Chronic Pain: Care Instructions  Your Care Instructions     Chronic pain is pain that lasts a long time (months or even years) and may or may not have a clear cause. It is different from acute pain, which usually does have a clear cause--like an injury or illness--and gets better over time. Chronic pain:  Lasts over time but may vary from day to day.  Does not go away despite efforts to end it.  May disrupt your sleep and lead to fatigue.  May cause depression or anxiety.  May make your muscles tense, causing more pain.  Can disrupt your work, hobbies, home life, and relationships with friends and family.  Chronic pain is a very real condition. It is not just in your head. Treatment can help and usually includes several methods used together, such as medicines, physical therapy, exercise, and other treatments. Learning how to relax and changing negative thought patterns can also help you cope.  Chronic pain is complex. Taking an active role in your treatment will help you better manage your pain. Tell your doctor if you have trouble dealing with your pain. You may have to try several things before you find what works best for you.  Follow-up care is a key part of your treatment and safety. Be sure to make and go to all appointments, and call your doctor if you are having problems. It's also a good idea to know your test results and keep a list of the medicines you take.  How can you care for yourself at home?  Pace yourself. Break up large jobs into smaller tasks. Save harder tasks " for days when you have less pain, or go back and forth between hard tasks and easier ones. Take rest breaks.  Relax, and reduce stress. Relaxation techniques such as deep breathing or meditation can help.  Keep moving. Gentle, daily exercise can help reduce pain over the long run. Try low- or no-impact exercises such as walking, swimming, and stationary biking. Do stretches to stay flexible.  Try heat, cold packs, and massage.  Get enough sleep. Chronic pain can make you tired and drain your energy. Talk with your doctor if you have trouble sleeping because of pain.  Think positive. Your thoughts can affect your pain level. Do things that you enjoy to distract yourself when you have pain instead of focusing on the pain. See a movie, read a book, listen to music, or spend time with a friend.  If you think you are depressed, talk to your doctor about treatment.  Keep a daily pain diary. Record how your moods, thoughts, sleep patterns, activities, and medicine affect your pain. You may find that your pain is worse during or after certain activities or when you are feeling a certain emotion. Having a record of your pain can help you and your doctor find the best ways to treat your pain.  Take pain medicines exactly as directed.  If the doctor gave you a prescription medicine for pain, take it as prescribed.  If you are not taking a prescription pain medicine, ask your doctor if you can take an over-the-counter medicine.  Reducing constipation caused by pain medicine  Talk to your doctor about a laxative. If a laxative doesn't work, your doctor may suggest a prescription medicine.  Include fruits, vegetables, beans, and whole grains in your diet each day. These foods are high in fiber.  If your doctor recommends it, get more exercise. Walking is a good choice. Bit by bit, increase the amount you walk every day. Try for at least 30 minutes on most days of the week.  Schedule time each day for a bowel movement. A daily  "routine may help. Take your time and do not strain when having a bowel movement.  When should you call for help?   Call your doctor now or seek immediate medical care if:    Your pain gets worse or is out of control.     You feel down or blue, or you do not enjoy things like you once did. You may be depressed, which is common in people with chronic pain. Depression can be treated.     You have vomiting or cramps for more than 2 hours.   Watch closely for changes in your health, and be sure to contact your doctor if:    You cannot sleep because of pain.     You are very worried or anxious about your pain.     You have trouble taking your pain medicine.     You have any concerns about your pain medicine.     You have trouble with bowel movements, such as:  No bowel movement in 3 days.  Blood in the anal area, in your stool, or on the toilet paper.  Diarrhea for more than 24 hours.   Where can you learn more?  Go to https://www.[employer].net/patiented  Enter N004 in the search box to learn more about \"Chronic Pain: Care Instructions.\"  Current as of: July 10, 2023               Content Version: 14.0    3278-1554 [employer].   Care instructions adapted under license by your healthcare professional. If you have questions about a medical condition or this instruction, always ask your healthcare professional. [employer] disclaims any warranty or liability for your use of this information.        Health Care Maintenance  Recommend filling out an advance care directive    Hypertension  Chronic Kidney Disease  Recommend [facility] location [phone]  You may be a candidate for Jardiance (medication to help slow down chronic kidney disease)  There are new medications to slow down kidney disease progression    Raynauds  Due to slow to heal wounds on hands, increase amlodipine form 2.5 mg to 5 mg once daily  If you start having dizziness or very low blood pressure, we may try 2.5 mg " twice daily    GI  1. Recommend colonoscopy and upper endoscopy with GI  2. Recommend follow-up with Colorectal surgery for pre-cancerous changes in anal area - due in Aug    Nausea  Try shaista or sea-sickness bands  Continue with the very occasional use of anti-nausea med    Foot  Follow-up with podiatrist    Pain  Refill x 3 months, follow-up in 3 months

## 2024-05-31 ENCOUNTER — LAB (OUTPATIENT)
Dept: LAB | Facility: CLINIC | Age: 39
End: 2024-05-31
Payer: MEDICARE

## 2024-05-31 DIAGNOSIS — E53.8 VITAMIN B12 DEFICIENCY (NON ANEMIC): ICD-10-CM

## 2024-05-31 DIAGNOSIS — E55.9 VITAMIN D DEFICIENCY: ICD-10-CM

## 2024-05-31 PROCEDURE — 82607 VITAMIN B-12: CPT

## 2024-05-31 PROCEDURE — 82306 VITAMIN D 25 HYDROXY: CPT

## 2024-05-31 PROCEDURE — 36415 COLL VENOUS BLD VENIPUNCTURE: CPT

## 2024-06-01 LAB
VIT B12 SERPL-MCNC: 290 PG/ML (ref 232–1245)
VIT D+METAB SERPL-MCNC: 35 NG/ML (ref 20–50)

## 2024-06-03 DIAGNOSIS — E53.8 VITAMIN B12 DEFICIENCY (NON ANEMIC): Primary | ICD-10-CM

## 2024-06-03 RX ORDER — CYANOCOBALAMIN (VITAMIN B-12) 2500 MCG
2500 TABLET, SUBLINGUAL SUBLINGUAL DAILY
Qty: 90 TABLET | Refills: 3 | Status: SHIPPED | OUTPATIENT
Start: 2024-06-03

## 2024-06-03 NOTE — RESULT ENCOUNTER NOTE
The B12 is the lower end of normal.  I will change the formulation to a sublingual form - one that dissolves in the mouth.  This will ensure adequate absorption.  We should recheck the levels in the next 2-4 months        The Vitamin D level is normal

## 2024-06-07 ENCOUNTER — TELEPHONE (OUTPATIENT)
Dept: SURGERY | Facility: CLINIC | Age: 39
End: 2024-06-07
Payer: MEDICARE

## 2024-06-07 NOTE — TELEPHONE ENCOUNTER
Patient confirmed scheduled appointment:  Date: 8/30/24  Time: 9:30 am  Visit type: Microscopy  Provider: Nataliia Marin  Location: [facility]  Testing/imaging: n/a  Additional notes: per message from Cammie Brito

## 2024-07-24 ENCOUNTER — TELEPHONE (OUTPATIENT)
Dept: FAMILY MEDICINE | Facility: CLINIC | Age: 39
End: 2024-07-24
Payer: MEDICARE

## 2024-07-24 NOTE — LETTER
July 31, 2024      Molly Teague  [address]  [facility] [address]        Dear Molly,     Your healthcare team cares about your health. To provide you with the best care, we have reviewed your chart and based on our findings, we see that you are due for:   An Asthma Control Test (ACT) that our clinic uses to monitor and manage your asthma. This test is an assessment tool that we use to determine how well your asthma is controlled.  Please complete the enclosed form and return in the provided envelope.  Thank you for choosing [facility] for your healthcare needs. For any questions, concerns, or to schedule an appointment please contact the clinic.   Healthy Regards,    Your [facility] Care Team

## 2024-07-25 NOTE — LETTER
3/24/2020       RE: Molly Teague  [address] [facility] [address]     Dear Colleague,    Thank you for referring your patient, Molly Teague, to the [facility] at [facility]. Please see a copy of my visit note below.    Cast/splint application    Date/Time: 3/24/2020 1:01 PM  Performed by: Jayleen Muñoz, ATC  Authorized by: Natanael Narayanan MD     Consent:     Consent obtained:  Verbal    Consent given by:  Patient  Pre-procedure details:     Sensation:  Normal  Procedure details:     Laterality:  Right    Location:  Ankle    Ankle:  R ankle    Cast type:  Short leg    Supplies:  Fiberglass  Post-procedure details:     Sensation:  Normal    Patient tolerance of procedure:  Tolerated well, no immediate complications    Patient provided with cast or splint care instructions: Yes             Dictation on: 03/24/2020  1:18 PM by: NATANAEL NARAYANAN [[id number]]         Again, thank you for allowing me to participate in the care of your patient.      Sincerely,    Natanael Narayanan MD       Spoke to daughter to confirm PAT and surgery times. PAT is on 7- at 0700. Surgery on 7- at 0730, 0500 arrival time. Expressed verbal understanding.

## 2024-08-13 ENCOUNTER — MYC MEDICAL ADVICE (OUTPATIENT)
Dept: SURGERY | Facility: CLINIC | Age: 39
End: 2024-08-13
Payer: MEDICARE

## 2024-08-30 ENCOUNTER — OFFICE VISIT (OUTPATIENT)
Dept: SURGERY | Facility: CLINIC | Age: 39
End: 2024-08-30
Payer: MEDICARE

## 2024-08-30 VITALS
BODY MASS INDEX: 25.25 KG/M2 | OXYGEN SATURATION: 94 % | DIASTOLIC BLOOD PRESSURE: 69 MMHG | WEIGHT: 132 LBS | HEART RATE: 80 BPM | SYSTOLIC BLOOD PRESSURE: 99 MMHG

## 2024-08-30 DIAGNOSIS — K62.82 AIN GRADE I: Primary | ICD-10-CM

## 2024-08-30 DIAGNOSIS — K62.82 ANAL DYSPLASIA: ICD-10-CM

## 2024-08-30 LAB
LAB DIRECTOR COMMENTS: NORMAL
LAB DIRECTOR DISCLAIMER: NORMAL
LAB DIRECTOR INTERPRETATION: NORMAL
LAB DIRECTOR METHODOLOGY: NORMAL
LAB DIRECTOR RESULTS: NORMAL
SPECIMEN DESCRIPTION: NORMAL

## 2024-08-30 PROCEDURE — 88112 CYTOPATH CELL ENHANCE TECH: CPT | Mod: 26 | Performed by: PATHOLOGY

## 2024-08-30 PROCEDURE — 46601 DIAGNOSTIC ANOSCOPY: CPT | Performed by: NURSE PRACTITIONER

## 2024-08-30 PROCEDURE — 99203 OFFICE O/P NEW LOW 30 MIN: CPT | Mod: 25 | Performed by: NURSE PRACTITIONER

## 2024-08-30 PROCEDURE — G0452 MOLECULAR PATHOLOGY INTERPR: HCPCS | Mod: 26 | Performed by: STUDENT IN AN ORGANIZED HEALTH CARE EDUCATION/TRAINING PROGRAM

## 2024-08-30 PROCEDURE — 88112 CYTOPATH CELL ENHANCE TECH: CPT | Mod: TC | Performed by: NURSE PRACTITIONER

## 2024-08-30 PROCEDURE — 87624 HPV HI-RISK TYP POOLED RSLT: CPT | Performed by: NURSE PRACTITIONER

## 2024-08-30 RX ORDER — LIDOCAINE HYDROCHLORIDE 20 MG/ML
JELLY TOPICAL ONCE
Status: COMPLETED | OUTPATIENT
Start: 2024-08-30 | End: 2024-08-30

## 2024-08-30 RX ORDER — IODINE AND POTASSIUM IODIDE 50; 100 MG/ML; MG/ML
LIQUID ORAL ONCE
Status: COMPLETED | OUTPATIENT
Start: 2024-08-30 | End: 2024-08-30

## 2024-08-30 RX ADMIN — LIDOCAINE HYDROCHLORIDE: 20 JELLY TOPICAL at 09:54

## 2024-08-30 RX ADMIN — IODINE AND POTASSIUM IODIDE 1 ML: 50; 100 LIQUID ORAL at 09:54

## 2024-08-30 ASSESSMENT — PAIN SCALES - GENERAL: PAINLEVEL: NO PAIN (0)

## 2024-08-30 NOTE — LETTER
2024       RE: Molly Teague  [address] [facility] [place] [address]     Dear Colleague,    Thank you for referring your patient, Molly Teague, to the [facility] [place] at [facility]. Please see a copy of my visit note below.    Colon and Rectal Surgery Clinic High Resolution Anoscopy Note    RE: Molly Teague  : 1985  MARK: 2024    Molly Teague is a 39 year old female with a history of low grade dysplasia who presents today for high resolution anoscopy.     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 I saw her for HRA one year ago with biopsies showing low grade dysplasia and anal Pap was negative (Not sufficient for HPV genotyping).  She has scleroderma but is not currently on any immunosuppression due to side effects. Denies any anal receptive intercourse. Does not smoke.     ASSESSMENT: Written, informed consent was obtained prior to procedure.  Prior to the start of the procedure and with procedural staff participation, I verbally confirmed the patient s identity using two indicators, relevant allergies, that the procedure was appropriate and matched the consent or emergent situation, and that the correct equipment/implants were available. Immediately prior to starting the procedure I conducted the Time Out with the procedural staff and re-confirmed the patient s name, procedure, and site/side. (The Joint Commission universal protocol was followed.)  Yes    Sedation (Moderate or Deep): None    Anal cytology was obtained with Dacron swab. Digital anal rectal exam was performed with no palpable lesions. Dilute acetic acid soak was completed for 2 minutes. Lubricant was used to insert the anoscope. Performed high resolution microscopy using the Proctostation. The anal transition zone was viewed in its entirety. No  abnormalities. No areas of Lugol's negativity.  The perianal area was inspected after acetic acid soak. Some hyperpigmentation consistent with HPV effect but no other abnormalities.  The patient tolerated the procedures well.    PLAN: No evidence of high grade dysplasia today. Will follow up with patient with results Pap and HPV genotyping today for further plan. Patient's questions were answered to her stated satisfaction and she is in agreement with this plan.     For details of past medical history, surgical history, family history, medications, allergies, and review of systems, please see details below.    Medical history:  Past Medical History:   Diagnosis Date     Acute pulmonary embolism (H) 2016    During renal crisis     Acute pyelonephritis 2017     Altered mental state 2018     Anxiety      Arthritis      Complication of anesthesia      Depression      Gastroesophageal reflux disease with esophagitis      History of blood transfusion      Hypertension      Long-term (current) use of anticoagulants [Z79.01] 2016     Neuropathy      PE (pulmonary embolism) 2016     PTSD (post-traumatic stress disorder)      Raynaud's disease without gangrene      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      Rheumatism      Scleroderma (H) 2016     Slow to wake up after anesthesia     pt states it took 2 days to come out of anesthesia after her ankle surgery     Uncomplicated asthma        Surgical history:  Past Surgical History:   Procedure Laterality Date     ANGIOGRAM  2015      SECTION       COMBINED ESOPHAGOSCOPY, GASTROSCOPY, DUODENOSCOPY (EGD) WITH CO2 INSUFFLATION N/A 2022    Procedure: ESOPHAGOGASTRODUODENOSCOPY, WITH CO2 INSUFFLATION;  Surgeon: Jalen Moses MD;  Location:  OR     ESOPHAGOSCOPY, GASTROSCOPY, DUODENOSCOPY (EGD), COMBINED N/A 2022    Procedure: ESOPHAGOGASTRODUODENOSCOPY, WITH BIOPSY;  Surgeon:  Jalen Moses MD;  Location: [facility] OR     OPEN REDUCTION INTERNAL FIXATION ANKLE Right 2/10/2020    Procedure: Right ankle open reduction internal fixation;  Surgeon: Natanael Villalta MD;  Location: [facility] OR     REMOVE HARDWARE ANKLE Right 2021    Procedure: Right ankle hardware removal;  Surgeon: Natanael Villalta MD;  Location: [facility] OR     THROAT SURGERY         Family history:  Family History   Problem Relation Age of Onset     Hyperlipidemia Father      Depression Sister      Fibromyalgia Sister      Hyperlipidemia Sister      Cerebrovascular Disease Maternal Grandmother          of a stroke     Diabetes Maternal Grandmother      Hyperlipidemia Paternal Grandfather      Diabetes Maternal Aunt      Depression Other      Melanoma No family hx of      Skin Cancer No family hx of      Anesthesia Reaction No family hx of      Cardiovascular No family hx of      Deep Vein Thrombosis (DVT) No family hx of        Medications:  Current Outpatient Medications   Medication Sig Dispense Refill     acetaminophen (TYLENOL) 325 MG tablet Take 2 tablets (650 mg) by mouth every 4 hours as needed for mild pain or other 1 Bottle 0     albuterol (VENTOLIN HFA) 108 (90 Base) MCG/ACT inhaler INHALE 2 PUFFS INTO THE LUNGS ONCE EVERY 4 HOURS AS NEEDED FOR SHORTNESS OF BREATH / DYSPNEA / WHEEZING (Patient taking differently: 2 times daily INHALE 2 PUFFS INTO THE LUNGS ONCE EVERY 4 HOURS AS NEEDED FOR SHORTNESS OF BREATH / DYSPNEA / WHEEZING) 18 g 11     amLODIPine (NORVASC) 5 MG tablet Take 1 tablet (5 mg) by mouth daily 90 tablet 3     blood glucose (NO BRAND SPECIFIED) lancets standard Use to test blood sugar 1 time daily 100 each 3     blood glucose (NO BRAND SPECIFIED) test strip Use to test blood sugar 1 time daily 100 strip 11     busPIRone HCl (BUSPAR) 15 MG tablet Take 1 tablet (15 mg) by mouth 2 times daily 180 tablet 3     cholecalciferol 125 MCG (5000 UT) CAPS Take 1 capsule (5,000 Units) by  mouth daily Start after Ergocalciferol course is complete 90 capsule 3     DULoxetine (CYMBALTA) 30 MG capsule Take 3 capsules (90 mg) by mouth daily 270 capsule 3     famotidine (PEPCID) 20 MG tablet Take 1 tablet (20 mg) by mouth 2 times daily 180 tablet 3     fluticasone-vilanterol (BREO ELLIPTA) 200-25 MCG/ACT inhaler Inhale 1 puff into the lungs daily 60 each 11     folic acid (FOLVITE) 1 MG tablet Take 1 mg by mouth daily       gabapentin (NEURONTIN) 300 MG capsule Take 3 capsules (900 mg) by mouth 2 times daily 540 capsule 3     GOODSENSE MIGRAINE FORMULA 250-250-65 MG per tablet TAKE ONE TABLET BY MOUTH EVERY 6 HOURS AS NEEDED FOR HEADACHES (Patient taking differently: Take 1 tablet by mouth every 6 hours as needed) 24 tablet 1     lisinopril (ZESTRIL) 10 MG tablet Take 1 tablet (10 mg) by mouth every evening 90 tablet 3     loratadine (CLARITIN) 10 MG tablet Take 10 mg by mouth daily as needed        LORazepam (ATIVAN) 1 MG tablet TAKE 1 TABLET (1 MG) BY MOUTH 2 TIMES DAILY AS NEEDED FOR ANXIETY #45 FOR 30 DAYS 45 tablet 5     medical cannabis (Patient's own supply) (The purpose of this order is to document that the patient reports taking medical cannabis.  This is not a prescription, and is not used to certify that the patient has a qualifying medical condition.)  CBG gummy over the counter 0 Information only 0     methocarbamol (ROBAXIN) 750 MG tablet Take 1 tablet (750 mg) by mouth 3 times daily as needed for muscle spasms 180 tablet 3     methotrexate sodium, PF, 50 MG/2ML SOLN injection Inject 0.6 mg Subcutaneous every 7 days       metoclopramide (REGLAN) 10 MG tablet Take 1 tablet (10 mg) by mouth 3 times daily as needed (nausea/vomiting) 30 tablet 1     naloxone (NARCAN) 4 MG/0.1ML nasal spray Spray 1 spray (4 mg) into one nostril alternating nostrils once as needed for opioid reversal every 2-3 minutes until assistance arrives 0.2 mL 3     omeprazole (PRILOSEC) 40 MG DR capsule Take 1 capsule (40  "mg) by mouth 3 times daily Has seen GI who has approved TID dosing 270 capsule 3     ondansetron (ZOFRAN ODT) 4 MG ODT tab Take 1 tablet (4 mg) by mouth every 8 hours as needed for nausea 30 tablet 4     polyethylene glycol (MIRALAX) 17 GM/Dose powder Take 17 g by mouth daily 510 g 11     Prenatal Vit-Fe Fumarate-FA (PRENATAL VITAMIN AND MINERAL) 28-0.8 MG TABS Take 1 tablet by mouth daily 90 tablet 3     syringe/needle, disp, (BD ECLIPSE SYRINGE) 25G X 1\" 3 ML MISC 1 each daily as needed 10 each 11     vitamin B-12 (CYANOCOBALAMIN) 2500 MCG sublingual tablet Take 1 tablet (2,500 mcg) by mouth daily 90 tablet 3     Allergies:  The patientis allergic to blood transfusion related (informational only), pollen extract, seasonal allergies, venofer [iron sucrose], and shellfish-derived products.    Social history:  Social History     Tobacco Use     Smoking status: Never     Smokeless tobacco: Never   Substance Use Topics     Alcohol use: Not Currently     Comment: Socially once on a weekend if any     Marital status: single.    Review of Systems:  Nursing Notes:   Cammie Brito  8/30/2024  9:27 AM  Signed  Chief Complaint   Patient presents with     Follow Up     HRA       Vitals:    08/30/24 0924   BP: 99/69   BP Location: Left arm   Patient Position: Sitting   Cuff Size: Adult Regular   Pulse: 80   SpO2: 94%   Weight: 132 lb       Body mass index is 25.25 kg/m .    Cammie Brito CMA       This procedure was performed under a collaborative agreement with Dr. Pramod Cruz MD, Chief of the Division of Colon and Rectal Surgery    Nataliia Ybarra NP-EDD  Colon and Rectal Surgery  [facility]      This note was created using speech recognition software and may contain unintended word substitutions.      [facility] Informed Consent Process Documentation    Study Name: High Resolution Anoscopy (HRA) Registry    IRB#: [id number]    ICF Version Date:  02/15/2024 approved " 04/19/2024    Date of Approach/Consent: 8/30/2024      The subject was screened and meets all of the inclusion criteria and none of the exclusion criteria is met.      The subject was told:  -that the study involves research   -the purpose of the research study  -the expected duration of the study and the approximate number of subject sought  -of procedures that are identified as experimental  -of reasonably foreseeable risks or discomforts to the subject  -of any benefits to the subject or others that may be expected from the research  -of alternative procedures and/or treatment  -how the confidentiality of records would be maintained  -whether or not compensation and medical treatments are available should injury occur as a result of the study  -who to contact if they have questions related to the research study or questions regarding research subjects' rights  -that participation is completely voluntary and that their decision to or not to participate will have no impact on their relationships with the [facility] and the staff    No study procedures were completed prior to the consent being obtained.  The use of historical information (lab or assessments) used for the purpose of the study was approved by subject.  The subject was fully aware that we would be reviewing their medical record for the study.    The subject demonstrated an understanding of what the study involved.  Specifically, how this study differed from standard of care at our center and what was required of the subject as part of the study.    The subject reviewed the consent form and was given the opportunity to ask questions before signing.  Questions and concerns were answered by the study staff and/or study physician.      A copy of the signed informed consent document was provided to the subject.  [x] Yes [] No    The subject was offered a copy of the signed informed consent but declined. [] Yes [x] No    The consent required the use of a  :   [] Yes []  No [x] NA    The subject required a legally-authorized representative (LAR) to sign on their behalf:                                                    [] Yes  [] No        [x] NA      Questions to Evaluate Subject Comprehension of Study:    Question: Adequate Response? If No, explain what actions were taken   What is being studied? [x] Yes  [] No   If you participate, what will be different than if you decide not to participate?  [x] Yes  [] No   How long will the study last; will you be required to return for visits?  [x] Yes  [] No   What kinds of risks are there?  [x] Yes  [] No   Do you understand that you can withdraw consent at any time and for any reason while participating in the study?   [x] Yes  [] No       :       Karla [email or web address]  [phone]                             :           Aline [email or web address]  [phone]      Again, thank you for allowing me to participate in the care of your patient.      Sincerely,    CHRIST Perez CNP

## 2024-08-30 NOTE — PROGRESS NOTES
[facility] Informed Consent Process Documentation    Study Name: High Resolution Anoscopy (HRA) Registry    IRB#: [id number]    ICF Version Date:  02/15/2024 approved 04/19/2024    Date of Approach/Consent: 8/30/2024      The subject was screened and meets all of the inclusion criteria and none of the exclusion criteria is met.      The subject was told:  -that the study involves research   -the purpose of the research study  -the expected duration of the study and the approximate number of subject sought  -of procedures that are identified as experimental  -of reasonably foreseeable risks or discomforts to the subject  -of any benefits to the subject or others that may be expected from the research  -of alternative procedures and/or treatment  -how the confidentiality of records would be maintained  -whether or not compensation and medical treatments are available should injury occur as a result of the study  -who to contact if they have questions related to the research study or questions regarding research subjects' rights  -that participation is completely voluntary and that their decision to or not to participate will have no impact on their relationships with the [facility] and the staff    No study procedures were completed prior to the consent being obtained.  The use of historical information (lab or assessments) used for the purpose of the study was approved by subject.  The subject was fully aware that we would be reviewing their medical record for the study.    The subject demonstrated an understanding of what the study involved.  Specifically, how this study differed from standard of care at our center and what was required of the subject as part of the study.    The subject reviewed the consent form and was given the opportunity to ask questions before signing.  Questions and concerns were answered by the study staff and/or study physician.      A copy of the signed informed consent document was provided to  the subject.  [x] Yes [] No    The subject was offered a copy of the signed informed consent but declined. [] Yes [x] No    The consent required the use of a :   [] Yes []  No [x] NA    The subject required a legally-authorized representative (LAR) to sign on their behalf:                                                    [] Yes  [] No        [x] NA      Questions to Evaluate Subject Comprehension of Study:    Question: Adequate Response? If No, explain what actions were taken   What is being studied? [x] Yes  [] No   If you participate, what will be different than if you decide not to participate?  [x] Yes  [] No   How long will the study last; will you be required to return for visits?  [x] Yes  [] No   What kinds of risks are there?  [x] Yes  [] No   Do you understand that you can withdraw consent at any time and for any reason while participating in the study?   [x] Yes  [] No       :       Karla [email or web address]  [phone]                             :           [email or web address]  [phone]

## 2024-08-30 NOTE — NURSING NOTE
Chief Complaint   Patient presents with    Follow Up     HRA       Vitals:    08/30/24 0924   BP: 99/69   BP Location: Left arm   Patient Position: Sitting   Cuff Size: Adult Regular   Pulse: 80   SpO2: 94%   Weight: 132 lb       Body mass index is 25.25 kg/m .    Cammie Brito CMA

## 2024-08-30 NOTE — PROGRESS NOTES
[facility] High Resolution Anoscopy Note    RE: Molly Teague  : 1985  MARK: 2024    Molly Teague is a 39 year old female with a history of low grade dysplasia who presents today for high resolution anoscopy.     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 I saw her for HRA one year ago with biopsies showing low grade dysplasia and anal Pap was negative (Not sufficient for HPV genotyping).  She has scleroderma but is not currently on any immunosuppression due to side effects. Denies any anal receptive intercourse. Does not smoke.     ASSESSMENT: Written, informed consent was obtained prior to procedure.  Prior to the start of the procedure and with procedural staff participation, I verbally confirmed the patient s identity using two indicators, relevant allergies, that the procedure was appropriate and matched the consent or emergent situation, and that the correct equipment/implants were available. Immediately prior to starting the procedure I conducted the Time Out with the procedural staff and re-confirmed the patient s name, procedure, and site/side. (The Joint Commission universal protocol was followed.)  Yes    Sedation (Moderate or Deep): None    Anal cytology was obtained with Dacron swab. Digital anal rectal exam was performed with no palpable lesions. Dilute acetic acid soak was completed for 2 minutes. Lubricant was used to insert the anoscope. Performed high resolution microscopy using the Proctostation. The anal transition zone was viewed in its entirety. No abnormalities. No areas of Lugol's negativity.  The perianal area was inspected after acetic acid soak. Some hyperpigmentation consistent with HPV effect but no other abnormalities.  The patient tolerated the procedures well.    PLAN: No evidence of high grade dysplasia today. Will follow up with patient with results Pap and HPV genotyping  today for further plan. Patient's questions were answered to her stated satisfaction and she is in agreement with this plan.     For details of past medical history, surgical history, family history, medications, allergies, and review of systems, please see details below.    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     Rheumatism     Scleroderma (H) 2016    Slow to wake up after anesthesia     pt states it took 2 days to come out of anesthesia after her ankle surgery    Uncomplicated asthma        Surgical history:  Past Surgical History:   Procedure Laterality Date    ANGIOGRAM       SECTION      COMBINED ESOPHAGOSCOPY, GASTROSCOPY, DUODENOSCOPY (EGD) WITH CO2 INSUFFLATION N/A 2022    Procedure: ESOPHAGOGASTRODUODENOSCOPY, WITH CO2 INSUFFLATION;  Surgeon: Jalen Moses MD;  Location:  OR    ESOPHAGOSCOPY, GASTROSCOPY, DUODENOSCOPY (EGD), COMBINED N/A 2022    Procedure: ESOPHAGOGASTRODUODENOSCOPY, WITH BIOPSY;  Surgeon: Jalen Moses MD;  Location:  OR    OPEN REDUCTION INTERNAL FIXATION ANKLE Right 2/10/2020    Procedure: Right ankle open reduction internal fixation;  Surgeon: Natanael Villalta MD;  Location:  OR    REMOVE HARDWARE ANKLE Right 2021    Procedure: Right ankle hardware removal;  Surgeon: Natanael Villalta MD;  Location: [facility] OR     THROAT SURGERY         Family history:  Family History   Problem Relation Age of Onset    Hyperlipidemia Father     Depression Sister     Fibromyalgia Sister     Hyperlipidemia Sister     Cerebrovascular Disease Maternal Grandmother          of a stroke    Diabetes Maternal Grandmother     Hyperlipidemia Paternal Grandfather     Diabetes Maternal Aunt     Depression Other     Melanoma No family hx of     Skin Cancer No family hx of     Anesthesia Reaction No family hx of     Cardiovascular No family hx of     Deep Vein Thrombosis (DVT) No family hx of        Medications:  Current Outpatient Medications   Medication Sig Dispense Refill    acetaminophen (TYLENOL) 325 MG tablet Take 2 tablets (650 mg) by mouth every 4 hours as needed for mild pain or other 1 Bottle 0    albuterol (VENTOLIN HFA) 108 (90 Base) MCG/ACT inhaler INHALE 2 PUFFS INTO THE LUNGS ONCE EVERY 4 HOURS AS NEEDED FOR SHORTNESS OF BREATH / DYSPNEA / WHEEZING (Patient taking differently: 2 times daily INHALE 2 PUFFS INTO THE LUNGS ONCE EVERY 4 HOURS AS NEEDED FOR SHORTNESS OF BREATH / DYSPNEA / WHEEZING) 18 g 11    amLODIPine (NORVASC) 5 MG tablet Take 1 tablet (5 mg) by mouth daily 90 tablet 3    blood glucose (NO BRAND SPECIFIED) lancets standard Use to test blood sugar 1 time daily 100 each 3    blood glucose (NO BRAND SPECIFIED) test strip Use to test blood sugar 1 time daily 100 strip 11    busPIRone HCl (BUSPAR) 15 MG tablet Take 1 tablet (15 mg) by mouth 2 times daily 180 tablet 3    cholecalciferol 125 MCG (5000 UT) CAPS Take 1 capsule (5,000 Units) by mouth daily Start after Ergocalciferol course is complete 90 capsule 3    DULoxetine (CYMBALTA) 30 MG capsule Take 3 capsules (90 mg) by mouth daily 270 capsule 3    famotidine (PEPCID) 20 MG tablet Take 1 tablet (20 mg) by mouth 2 times daily 180 tablet 3    fluticasone-vilanterol (BREO ELLIPTA) 200-25 MCG/ACT inhaler Inhale 1 puff into the lungs daily 60 each 11    folic acid (FOLVITE)  1 MG tablet Take 1 mg by mouth daily      gabapentin (NEURONTIN) 300 MG capsule Take 3 capsules (900 mg) by mouth 2 times daily 540 capsule 3    GOODSENSE MIGRAINE FORMULA 250-250-65 MG per tablet TAKE ONE TABLET BY MOUTH EVERY 6 HOURS AS NEEDED FOR HEADACHES (Patient taking differently: Take 1 tablet by mouth every 6 hours as needed) 24 tablet 1    lisinopril (ZESTRIL) 10 MG tablet Take 1 tablet (10 mg) by mouth every evening 90 tablet 3    loratadine (CLARITIN) 10 MG tablet Take 10 mg by mouth daily as needed       LORazepam (ATIVAN) 1 MG tablet TAKE 1 TABLET (1 MG) BY MOUTH 2 TIMES DAILY AS NEEDED FOR ANXIETY #45 FOR 30 DAYS 45 tablet 5    medical cannabis (Patient's own supply) (The purpose of this order is to document that the patient reports taking medical cannabis.  This is not a prescription, and is not used to certify that the patient has a qualifying medical condition.)  CBG gummy over the counter 0 Information only 0    methocarbamol (ROBAXIN) 750 MG tablet Take 1 tablet (750 mg) by mouth 3 times daily as needed for muscle spasms 180 tablet 3    methotrexate sodium, PF, 50 MG/2ML SOLN injection Inject 0.6 mg Subcutaneous every 7 days      metoclopramide (REGLAN) 10 MG tablet Take 1 tablet (10 mg) by mouth 3 times daily as needed (nausea/vomiting) 30 tablet 1    naloxone (NARCAN) 4 MG/0.1ML nasal spray Spray 1 spray (4 mg) into one nostril alternating nostrils once as needed for opioid reversal every 2-3 minutes until assistance arrives 0.2 mL 3    omeprazole (PRILOSEC) 40 MG DR capsule Take 1 capsule (40 mg) by mouth 3 times daily Has seen GI who has approved TID dosing 270 capsule 3    ondansetron (ZOFRAN ODT) 4 MG ODT tab Take 1 tablet (4 mg) by mouth every 8 hours as needed for nausea 30 tablet 4    polyethylene glycol (MIRALAX) 17 GM/Dose powder Take 17 g by mouth daily 510 g 11    Prenatal Vit-Fe Fumarate-FA (PRENATAL VITAMIN AND MINERAL) 28-0.8 MG TABS Take 1 tablet by mouth daily 90 tablet 3     "syringe/needle, disp, (BD ECLIPSE SYRINGE) 25G X 1\" 3 ML MISC 1 each daily as needed 10 each 11    vitamin B-12 (CYANOCOBALAMIN) 2500 MCG sublingual tablet Take 1 tablet (2,500 mcg) by mouth daily 90 tablet 3     Allergies:  The patientis allergic to blood transfusion related (informational only), pollen extract, seasonal allergies, venofer [iron sucrose], and shellfish-derived products.    Social history:  Social History     Tobacco Use    Smoking status: Never    Smokeless tobacco: Never   Substance Use Topics    Alcohol use: Not Currently     Comment: Socially once on a weekend if any     Marital status: single.    Review of Systems:  Nursing Notes:   Cammie Brito  8/30/2024  9:27 AM  Signed  Chief Complaint   Patient presents with    Follow Up     HRA       Vitals:    08/30/24 0924   BP: 99/69   BP Location: Left arm   Patient Position: Sitting   Cuff Size: Adult Regular   Pulse: 80   SpO2: 94%   Weight: 132 lb       Body mass index is 25.25 kg/m .    Cammie Brito CMA       This procedure was performed under a collaborative agreement with Dr. Pramod Cruz MD, Chief of the Division of Colon and Rectal Surgery    Nataliia Ybarra, NP-C  Colon and Rectal Surgery  [facility]      This note was created using speech recognition software and may contain unintended word substitutions.    "

## 2024-09-03 LAB
PATH REPORT.COMMENTS IMP SPEC: NORMAL
PATH REPORT.FINAL DX SPEC: NORMAL
PATH REPORT.GROSS SPEC: NORMAL
PATH REPORT.MICROSCOPIC SPEC OTHER STN: NORMAL
PATH REPORT.RELEVANT HX SPEC: NORMAL

## 2024-09-21 ENCOUNTER — HEALTH MAINTENANCE LETTER (OUTPATIENT)
Age: 39
End: 2024-09-21

## 2024-10-07 NOTE — PROGRESS NOTES
Assessment & Plan   Problem List Items Addressed This Visit    None  Visit Diagnoses       Non-recurrent acute suppurative otitis media of left ear with spontaneous rupture of tympanic membrane    -  Primary           Symptoms are improving;  hearing is still decreased compared to baseline but has significantly improved in the last 4 days.  She is no longer having pain or drainage.  Antibiotic course is complete.  Expect ongoing recovery of healing to baseline over the coming weeks.  Would refer to ENT if symptoms do not resolve    Subjective   Molly is a 38 year old, presenting for the following health issues:  ER F/U        2/21/2024     7:05 AM   Additional Questions   Roomed by christian salvador   Accompanied by self         2/21/2024     7:05 AM   Patient Reported Additional Medications   Patient reports taking the following new medications none     History of Present Illness       Reason for visit:  Left ear drum  Symptom onset:  1-2 weeks ago  Symptoms include:  Hearing loss  Symptom intensity:  Moderate  Symptom progression:  Staying the same  Had these symptoms before:  No  What makes it worse:  Loud sounds hurt    She eats 2-3 servings of fruits and vegetables daily.She consumes 0 sweetened beverage(s) daily.She exercises with enough effort to increase her heart rate 20 to 29 minutes per day.  She exercises with enough effort to increase her heart rate 4 days per week.   She is taking medications regularly.       Chief Complaint   Patient presents with    ER F/U           ED/UC Followup:    Facility:  [place]  Date of visit: 2/2/24  Reason for visit: Acute left otitis media   Current Status: left ear not able to hear out of, no pain, no drainage     From ER provider exam note  Ears/Nose/Throat: ENT: Right ear exam normal TM normal, canal nonerythemic and patent.  Left ear exam: TM ruptured, blood and purulence in the middle ear, erythemic middle ear.  Canal patent.     Current Outpatient Medications    Medication Sig Dispense Refill    acetaminophen (TYLENOL) 325 MG tablet Take 2 tablets (650 mg) by mouth every 4 hours as needed for mild pain or other 1 Bottle 0    albuterol (VENTOLIN HFA) 108 (90 Base) MCG/ACT inhaler INHALE 2 PUFFS INTO THE LUNGS ONCE EVERY 4 HOURS AS NEEDED FOR SHORTNESS OF BREATH / DYSPNEA / WHEEZING (Patient taking differently: 2 times daily INHALE 2 PUFFS INTO THE LUNGS ONCE EVERY 4 HOURS AS NEEDED FOR SHORTNESS OF BREATH / DYSPNEA / WHEEZING) 18 g 11    amLODIPine (NORVASC) 2.5 MG tablet Take 1 tablet (2.5 mg) by mouth daily 90 tablet 3    blood glucose (NO BRAND SPECIFIED) lancets standard Use to test blood sugar 1 time daily 100 each 3    blood glucose (NO BRAND SPECIFIED) test strip Use to test blood sugar 1 time daily 100 strip 11    busPIRone HCl (BUSPAR) 15 MG tablet Take 1 tablet (15 mg) by mouth 2 times daily 180 tablet 3    cholecalciferol 125 MCG (5000 UT) CAPS Take 1 capsule (5,000 Units) by mouth daily Start after Ergocalciferol course is complete 90 capsule 3    Cyanocobalamin (B-12) 1000 MCG TBCR Take 1,000 mcg by mouth daily 100 tablet 1    DULoxetine (CYMBALTA) 30 MG capsule Take 3 capsules (90 mg) by mouth daily 270 capsule 3    famotidine (PEPCID) 20 MG tablet Take 1 tablet (20 mg) by mouth 2 times daily 180 tablet 3    fluticasone-vilanterol (BREO ELLIPTA) 200-25 MCG/ACT inhaler Inhale 1 puff into the lungs daily 60 each 11    gabapentin (NEURONTIN) 300 MG capsule Take 2 capsules (600 mg) by mouth 3 times daily (Patient taking differently: Take 600 mg by mouth 2 times daily) 540 capsule 3    GOODSENSE MIGRAINE FORMULA 250-250-65 MG per tablet TAKE ONE TABLET BY MOUTH EVERY 6 HOURS AS NEEDED FOR HEADACHES (Patient taking differently: Take 1 tablet by mouth every 6 hours as needed) 24 tablet 1    lisinopril (ZESTRIL) 10 MG tablet Take 1 tablet (10 mg) by mouth every evening 90 tablet 3    loratadine (CLARITIN) 10 MG tablet Take 10 mg by mouth daily as needed        "LORazepam (ATIVAN) 1 MG tablet Take 1 tablet (1 mg) by mouth 2 times daily as needed for anxiety #45 for 30 days 45 tablet 5    medical cannabis (Patient's own supply) (The purpose of this order is to document that the patient reports taking medical cannabis.  This is not a prescription, and is not used to certify that the patient has a qualifying medical condition.)  CBG gummy over the counter 0 Information only 0    methocarbamol (ROBAXIN) 750 MG tablet Take 1 tablet (750 mg) by mouth 3 times daily as needed for muscle spasms 180 tablet 3    metoclopramide (REGLAN) 10 MG tablet Take 1 tablet (10 mg) by mouth 3 times daily as needed (nausea/vomiting) 50 tablet 1    naloxone (NARCAN) 4 MG/0.1ML nasal spray Spray 1 spray (4 mg) into one nostril alternating nostrils once as needed for opioid reversal every 2-3 minutes until assistance arrives 0.2 mL 3    omeprazole (PRILOSEC) 40 MG DR capsule Take 1 capsule (40 mg) by mouth 3 times daily Has seen GI who has approved TID dosing 270 capsule 3    ondansetron (ZOFRAN ODT) 4 MG ODT tab Take 1 tablet (4 mg) by mouth every 8 hours as needed for nausea 30 tablet 4    oxyCODONE (ROXICODONE) 5 MG tablet Take 1 tablet (5 mg) by mouth 3 times daily for 30 days 90 tablet 0    [START ON 3/12/2024] oxyCODONE (ROXICODONE) 5 MG tablet Take 1 tablet (5 mg) by mouth 2 times daily as needed for pain 60 tablet 0    polyethylene glycol (MIRALAX) 17 GM/Dose powder Take 17 g by mouth daily 510 g 11    Prenatal Vit-Fe Fumarate-FA (PRENATAL VITAMIN AND MINERAL) 28-0.8 MG TABS Take 1 tablet by mouth daily 90 tablet 3    promethazine (PHENERGAN) 25 MG tablet Take 1 tablet (25 mg) by mouth 2 times daily as needed for nausea 30 tablet 3    syringe/needle, disp, (BD ECLIPSE SYRINGE) 25G X 1\" 3 ML MISC 1 each daily as needed 10 each 11           Review of Systems  CONSTITUTIONAL: NEGATIVE for fever, chills, change in weight  RESP: NEGATIVE for significant cough or SOB  CV: NEGATIVE for chest pain, " [Cleared for Procedure] : cleared for procedure "palpitations or peripheral edema  NEURO: NEGATIVE for weakness, dizziness or paresthesias      Objective    /80 (BP Location: Right arm, Patient Position: Sitting, Cuff Size: Adult Regular)   Pulse 60   Temp 98.9  F (37.2  C) (Tympanic)   Resp 12   Ht 1.549 m (5' 1\")   Wt 62.2 kg (137 lb 3.2 oz)   BMI 25.92 kg/m    Body mass index is 25.92 kg/m .  Physical Exam   GENERAL: alert and no distress  HENT: ear canals and TM's normal.  Scant clear fluid behind RIGHT TM.  Left TM appears normal without evidence of recent rupture or infection.        Signed Electronically by: Grecia Barba DO    "

## 2024-10-22 DIAGNOSIS — M34.1 CREST (CALCINOSIS, RAYNAUD'S PHENOMENON, ESOPHAGEAL DYSFUNCTION, SCLERODACTYLY, TELANGIECTASIA) (H): ICD-10-CM

## 2024-10-22 DIAGNOSIS — G89.4 CHRONIC PAIN SYNDROME: Chronic | ICD-10-CM

## 2024-10-22 DIAGNOSIS — K21.01 GASTROESOPHAGEAL REFLUX DISEASE WITH ESOPHAGITIS AND HEMORRHAGE: ICD-10-CM

## 2024-10-22 DIAGNOSIS — J45.40 MODERATE PERSISTENT ASTHMA WITHOUT COMPLICATION: ICD-10-CM

## 2024-10-23 RX ORDER — FAMOTIDINE 20 MG/1
20 TABLET, FILM COATED ORAL 2 TIMES DAILY
Qty: 180 TABLET | Refills: 0 | Status: SHIPPED | OUTPATIENT
Start: 2024-10-23

## 2024-10-23 RX ORDER — OMEPRAZOLE 40 MG/1
CAPSULE, DELAYED RELEASE ORAL
Qty: 270 CAPSULE | Refills: 0 | Status: SHIPPED | OUTPATIENT
Start: 2024-10-23

## 2024-10-23 NOTE — TELEPHONE ENCOUNTER
Has upcoming appt 11/27/24 at 1130 AM with Grecia Barba DO.   Prescription approved per [facility] Refill Protocol.  Julie Behrendt RN

## 2024-10-23 NOTE — TELEPHONE ENCOUNTER
Routing refill request to provider for review/approval because:  Drug not on the [facility] refill protocol   Labs out of range:        2/21/2024    10:46 AM 3/26/2024     4:53 PM 5/17/2024    10:17 AM   ACT Total Scores   ACT TOTAL SCORE (Goal Greater than or Equal to 20) 13 11 16   In the past 12 months, how many times did you visit the emergency room for your asthma without being admitted to the hospital? 0 0  0   In the past 12 months, how many times were you hospitalized overnight because of your asthma? 0 0  0       Patient-reported     Last Written Prescription Date:  7/26/24  Last Fill Quantity: 100,  # refills: 0   Last office visit: 5/17/2024 ; last virtual visit: 3/26/2024 with prescribing provider:     Future Office Visit:   Next 5 appointments (look out 90 days)      Nov 27, 2024 11:30 AM  (Arrive by 11:10 AM)  Provider Visit with Grecia Barba DO  [facility] ([facility] [place] )  Arrive at: [facility] [address] [address]  [phone]           Julie Behrendt RN

## 2024-10-25 RX ORDER — ALBUTEROL SULFATE 90 UG/1
INHALANT RESPIRATORY (INHALATION)
Qty: 18 G | Refills: 11 | Status: SHIPPED | OUTPATIENT
Start: 2024-10-25

## 2024-10-25 RX ORDER — OXYCODONE HYDROCHLORIDE 5 MG/1
5 TABLET ORAL EVERY 6 HOURS PRN
Qty: 100 TABLET | Refills: 0 | Status: SHIPPED | OUTPATIENT
Start: 2024-10-25

## 2024-11-04 ENCOUNTER — TELEPHONE (OUTPATIENT)
Dept: NEPHROLOGY | Facility: CLINIC | Age: 39
End: 2024-11-04
Payer: MEDICARE

## 2024-11-04 NOTE — TELEPHONE ENCOUNTER
Patient Contacted for the patient to call back and schedule the following:    Appointment type: New Nephrology  Provider: Dr. Bianchi  Return date: 5/21/25  Specialty phone number: [phone]  Additional appointment(s) needed: Labs prior  Additonal Notes: referral

## 2024-11-05 DIAGNOSIS — F43.10 PTSD (POST-TRAUMATIC STRESS DISORDER): ICD-10-CM

## 2024-11-05 DIAGNOSIS — F41.1 GAD (GENERALIZED ANXIETY DISORDER): ICD-10-CM

## 2024-11-05 DIAGNOSIS — F32.1 MODERATE MAJOR DEPRESSION (H): ICD-10-CM

## 2024-11-06 RX ORDER — DULOXETIN HYDROCHLORIDE 30 MG/1
90 CAPSULE, DELAYED RELEASE ORAL DAILY
Qty: 270 CAPSULE | Refills: 0 | Status: SHIPPED | OUTPATIENT
Start: 2024-11-06

## 2024-11-06 NOTE — TELEPHONE ENCOUNTER
Has appointment scheduled for 11/27/24.      Requested Prescriptions   Pending Prescriptions Disp Refills    DULoxetine (CYMBALTA) 30 MG capsule [Pharmacy Med Name: DULOXETINE HCL 30MG CPEP] 270 capsule 3     Sig: TAKE 3 CAPSULES (90 MG) BY MOUTH DAILY       Serotonin-Norepinephrine Reuptake Inhibitors  Failed - 11/6/2024  7:56 AM        Failed - PHQ-9 score of less than 5 in past 6 months     Please review last PHQ-9 score.           Passed - Most recent blood pressure under 140/90 in past 12 months     BP Readings from Last 3 Encounters:   08/30/24 99/69   05/17/24 110/70   02/21/24 116/80       No data recorded            Passed - Medication is active on med list        Passed - Recent (12 mo) or future (90 days) visit within the authorizing provider's specialty     The patient must have completed an in-person or virtual visit within the past 12 months or has a future visit scheduled within the next 90 days with the authorizing provider s specialty.  Urgent care and e-visits do not quality as an office visit for this protocol.          Passed - Medication indicated for associated diagnosis     Medication is associated with one or more of the following diagnoses:  Anxiety  Bipolar  Chronic musculoskeletal pain  Depression  Fibromyalgia  Headache  Migraine  Neuropathy  Obsessive compulsive disorder  Panic disorder  Social phobia  Mood disorder  Menopause  Hot flashes/Menopausal flushing  Fibromyitis  Flushing          Passed - REX-7 score on file during last 12 months        Passed - Patient is age 18 or older        Passed - No active pregnancy on record        Passed - No positive pregnancy test in past 12 months

## 2024-11-18 ENCOUNTER — DOCUMENTATION ONLY (OUTPATIENT)
Dept: BEHAVIORAL HEALTH | Facility: CLINIC | Age: 39
End: 2024-11-18
Payer: MEDICARE

## 2024-11-18 NOTE — PROGRESS NOTES
Conducted chart review.  Coordinated with PCP regarding patient's PHQ-9 scores.  Offered [facility] involvement; PCP declined [facility] involvement due to no response

## 2024-11-20 ENCOUNTER — OFFICE VISIT (OUTPATIENT)
Dept: FAMILY MEDICINE | Facility: CLINIC | Age: 39
End: 2024-11-20
Payer: MEDICARE

## 2024-11-20 VITALS
WEIGHT: 129.6 LBS | SYSTOLIC BLOOD PRESSURE: 124 MMHG | BODY MASS INDEX: 24.47 KG/M2 | RESPIRATION RATE: 14 BRPM | OXYGEN SATURATION: 99 % | HEART RATE: 89 BPM | HEIGHT: 61 IN | TEMPERATURE: 99.1 F | DIASTOLIC BLOOD PRESSURE: 80 MMHG

## 2024-11-20 DIAGNOSIS — K21.01 GASTROESOPHAGEAL REFLUX DISEASE WITH ESOPHAGITIS AND HEMORRHAGE: ICD-10-CM

## 2024-11-20 DIAGNOSIS — G89.4 CHRONIC PAIN SYNDROME: Chronic | ICD-10-CM

## 2024-11-20 DIAGNOSIS — N18.31 STAGE 3A CHRONIC KIDNEY DISEASE (H): ICD-10-CM

## 2024-11-20 DIAGNOSIS — E53.8 VITAMIN B12 DEFICIENCY (NON ANEMIC): ICD-10-CM

## 2024-11-20 DIAGNOSIS — Z13.6 CARDIOVASCULAR SCREENING; LDL GOAL LESS THAN 100: ICD-10-CM

## 2024-11-20 DIAGNOSIS — F43.10 PTSD (POST-TRAUMATIC STRESS DISORDER): ICD-10-CM

## 2024-11-20 DIAGNOSIS — J45.40 MODERATE PERSISTENT ASTHMA WITHOUT COMPLICATION: ICD-10-CM

## 2024-11-20 DIAGNOSIS — F32.1 MODERATE MAJOR DEPRESSION (H): ICD-10-CM

## 2024-11-20 DIAGNOSIS — F41.1 GAD (GENERALIZED ANXIETY DISORDER): ICD-10-CM

## 2024-11-20 DIAGNOSIS — M34.1 CREST (CALCINOSIS, RAYNAUD'S PHENOMENON, ESOPHAGEAL DYSFUNCTION, SCLERODACTYLY, TELANGIECTASIA) (H): Primary | ICD-10-CM

## 2024-11-20 RX ORDER — DULOXETIN HYDROCHLORIDE 30 MG/1
90 CAPSULE, DELAYED RELEASE ORAL DAILY
Qty: 270 CAPSULE | Refills: 3 | Status: SHIPPED | OUTPATIENT
Start: 2024-11-20

## 2024-11-20 RX ORDER — OXYCODONE HYDROCHLORIDE 5 MG/1
5 TABLET ORAL EVERY 6 HOURS PRN
Qty: 100 TABLET | Refills: 0 | Status: SHIPPED | OUTPATIENT
Start: 2024-12-23 | End: 2025-01-22

## 2024-11-20 RX ORDER — OXYCODONE HYDROCHLORIDE 5 MG/1
5 TABLET ORAL EVERY 6 HOURS PRN
Qty: 100 TABLET | Refills: 0 | Status: SHIPPED | OUTPATIENT
Start: 2025-01-22 | End: 2025-02-21

## 2024-11-20 RX ORDER — OXYCODONE HYDROCHLORIDE 5 MG/1
5 TABLET ORAL EVERY 6 HOURS PRN
Qty: 100 TABLET | Refills: 0 | Status: SHIPPED | OUTPATIENT
Start: 2024-11-24 | End: 2024-12-24

## 2024-11-20 RX ORDER — FAMOTIDINE 20 MG/1
20 TABLET, FILM COATED ORAL 2 TIMES DAILY
Qty: 180 TABLET | Refills: 3 | Status: SHIPPED | OUTPATIENT
Start: 2024-11-20

## 2024-11-20 RX ORDER — FLUTICASONE FUROATE AND VILANTEROL 200; 25 UG/1; UG/1
1 POWDER RESPIRATORY (INHALATION) DAILY
Qty: 60 EACH | Refills: 11 | Status: SHIPPED | OUTPATIENT
Start: 2024-11-20

## 2024-11-20 RX ORDER — OMEPRAZOLE 40 MG/1
40 CAPSULE, DELAYED RELEASE ORAL 3 TIMES DAILY
Qty: 270 CAPSULE | Refills: 3 | Status: SHIPPED | OUTPATIENT
Start: 2024-11-20

## 2024-11-20 RX ORDER — BUSPIRONE HYDROCHLORIDE 15 MG/1
15 TABLET ORAL 2 TIMES DAILY
Qty: 180 TABLET | Refills: 3 | Status: SHIPPED | OUTPATIENT
Start: 2024-11-20

## 2024-11-20 ASSESSMENT — ANXIETY QUESTIONNAIRES
3. WORRYING TOO MUCH ABOUT DIFFERENT THINGS: MORE THAN HALF THE DAYS
5. BEING SO RESTLESS THAT IT IS HARD TO SIT STILL: SEVERAL DAYS
7. FEELING AFRAID AS IF SOMETHING AWFUL MIGHT HAPPEN: SEVERAL DAYS
8. IF YOU CHECKED OFF ANY PROBLEMS, HOW DIFFICULT HAVE THESE MADE IT FOR YOU TO DO YOUR WORK, TAKE CARE OF THINGS AT HOME, OR GET ALONG WITH OTHER PEOPLE?: SOMEWHAT DIFFICULT
2. NOT BEING ABLE TO STOP OR CONTROL WORRYING: SEVERAL DAYS
4. TROUBLE RELAXING: MORE THAN HALF THE DAYS
GAD7 TOTAL SCORE: 11
GAD7 TOTAL SCORE: 11
IF YOU CHECKED OFF ANY PROBLEMS ON THIS QUESTIONNAIRE, HOW DIFFICULT HAVE THESE PROBLEMS MADE IT FOR YOU TO DO YOUR WORK, TAKE CARE OF THINGS AT HOME, OR GET ALONG WITH OTHER PEOPLE: SOMEWHAT DIFFICULT
7. FEELING AFRAID AS IF SOMETHING AWFUL MIGHT HAPPEN: SEVERAL DAYS
6. BECOMING EASILY ANNOYED OR IRRITABLE: SEVERAL DAYS
GAD7 TOTAL SCORE: 11
1. FEELING NERVOUS, ANXIOUS, OR ON EDGE: NEARLY EVERY DAY

## 2024-11-20 ASSESSMENT — ASTHMA QUESTIONNAIRES
QUESTION_2 LAST FOUR WEEKS HOW OFTEN HAVE YOU HAD SHORTNESS OF BREATH: ONCE OR TWICE A WEEK
QUESTION_1 LAST FOUR WEEKS HOW MUCH OF THE TIME DID YOUR ASTHMA KEEP YOU FROM GETTING AS MUCH DONE AT WORK, SCHOOL OR AT HOME: SOME OF THE TIME
QUESTION_5 LAST FOUR WEEKS HOW WOULD YOU RATE YOUR ASTHMA CONTROL: SOMEWHAT CONTROLLED
ACT_TOTALSCORE: 14
ACT_TOTALSCORE: 14
QUESTION_4 LAST FOUR WEEKS HOW OFTEN HAVE YOU USED YOUR RESCUE INHALER OR NEBULIZER MEDICATION (SUCH AS ALBUTEROL): ONE OR TWO TIMES PER DAY
QUESTION_3 LAST FOUR WEEKS HOW OFTEN DID YOUR ASTHMA SYMPTOMS (WHEEZING, COUGHING, SHORTNESS OF BREATH, CHEST TIGHTNESS OR PAIN) WAKE YOU UP AT NIGHT OR EARLIER THAN USUAL IN THE MORNING: TWO OR THREE NIGHTS A WEEK

## 2024-11-20 ASSESSMENT — PATIENT HEALTH QUESTIONNAIRE - PHQ9
SUM OF ALL RESPONSES TO PHQ QUESTIONS 1-9: 15
SUM OF ALL RESPONSES TO PHQ QUESTIONS 1-9: 15
10. IF YOU CHECKED OFF ANY PROBLEMS, HOW DIFFICULT HAVE THESE PROBLEMS MADE IT FOR YOU TO DO YOUR WORK, TAKE CARE OF THINGS AT HOME, OR GET ALONG WITH OTHER PEOPLE: SOMEWHAT DIFFICULT

## 2024-11-20 ASSESSMENT — PAIN SCALES - GENERAL: PAINLEVEL_OUTOF10: SEVERE PAIN (6)

## 2024-11-20 NOTE — LETTER

## 2024-11-20 NOTE — PATIENT INSTRUCTIONS
Mental health  I am referring you to [facility] [place].  The phone number is [phone] or you can schedule at the  on your way out.  I sent her a message and she will contact you.    Pain  Refills sent  Signed controlled substance agreement today  Urine drug screen due today    Medications  Refills sent  Blood and urine test today    Asthma  Continue to manage the asthma flare as you are.  Symptoms do not sound severe enough to warrant prednisone burst at th is time.  If symptoms worsen, that may be considered

## 2024-11-20 NOTE — PROGRESS NOTES
Assessment & Plan   Problem List Items Addressed This Visit       Chronic pain syndrome (Chronic)    Relevant Medications    oxyCODONE (ROXICODONE) 5 MG tablet (Start on 11/24/2024)    oxyCODONE (ROXICODONE) 5 MG tablet (Start on 12/23/2024)    oxyCODONE (ROXICODONE) 5 MG tablet (Start on 1/22/2025)    Other Relevant Orders    [id number] - Urine Drug Confirmation Panel (Comprehensive)    REX (generalized anxiety disorder)    Relevant Medications    DULoxetine (CYMBALTA) 30 MG capsule    busPIRone (BUSPAR) 15 MG tablet    Gastroesophageal reflux disease with esophagitis    Relevant Medications    fluticasone-vilanterol (BREO ELLIPTA) 200-25 MCG/ACT inhaler    omeprazole (PRILOSEC) 40 MG DR capsule    PTSD (post-traumatic stress disorder)    Relevant Medications    DULoxetine (CYMBALTA) 30 MG capsule    busPIRone (BUSPAR) 15 MG tablet    Stage 3a chronic kidney disease (H)    Relevant Orders    BASIC METABOLIC PANEL    Albumin Random Urine Quantitative with Creat Ratio     Other Visit Diagnoses       CREST (calcinosis, Raynaud's phenomenon, esophageal dysfunction, sclerodactyly, telangiectasia) (H)    -  Primary    Relevant Medications    famotidine (PEPCID) 20 MG tablet    fluticasone-vilanterol (BREO ELLIPTA) 200-25 MCG/ACT inhaler    omeprazole (PRILOSEC) 40 MG DR capsule    Other Relevant Orders    CBC with platelets    Moderate major depression (H)        Relevant Medications    DULoxetine (CYMBALTA) 30 MG capsule    busPIRone (BUSPAR) 15 MG tablet    Moderate persistent asthma without complication        Relevant Medications    fluticasone-vilanterol (BREO ELLIPTA) 200-25 MCG/ACT inhaler    CARDIOVASCULAR SCREENING; LDL GOAL LESS THAN 100        Relevant Orders    Lipid panel reflex to direct LDL Non-fasting    Vitamin B12 deficiency (non anemic)        Relevant Orders    Vitamin B12                  Patient Instructions   Mental health  I am referring you to [facility] at  [place].  The phone number is [phone] or you can schedule at the  on your way out.  I sent her a message and she will contact you.    Pain  Refills sent  Signed controlled substance agreement today  Urine drug screen due today    Medications  Refills sent  Blood and urine test today    Asthma  Continue to manage the asthma flare as you are.  Symptoms do not sound severe enough to warrant prednisone burst at th is time.  If symptoms worsen, that may be considered      Subjective   Molly is a 39 year old, presenting for the following health issues:  Hypertension, Depression, Anxiety, Chronic Disease Management, Asthma, and Headache        11/20/2024     2:47 PM   Additional Questions   Roomed by christian   Accompanied by self         11/20/2024     2:47 PM   Patient Reported Additional Medications   Patient reports taking the following new medications none     History of Present Illness     Asthma:  She presents for follow up of asthma.  She has no cough, no wheezing, and no shortness of breath.  She is using a relief medication daily. She does not miss any doses of her controller medication throughout the week. Patient is aware of the following triggers: same as previous visit, animal dander, exercise or sports, gastric reflux, pollens and smoke. The patient has not had a visit to the Emergency Room, Urgent Care or Hospital due to asthma since the last clinic visit.     CKD: She uses over the counter pain medication, including tylenol, a few times a week.    Mental Health Follow-up:  Patient presents to follow-up on Depression & Anxiety.Patient's depression since last visit has been:  No change  The patient is not having other symptoms associated with depression.  Patient's anxiety since last visit has been:  Worse  The patient is having other symptoms associated with anxiety.  Any significant life events: job concerns, financial concerns and other  Patient is feeling anxious or having panic  "attacks.  Patient has no concerns about alcohol or drug use.    Hypertension: She presents for follow up of hypertension.  She does check blood pressure  regularly outside of the clinic. Outpatient blood pressures have not been over 140/90. She does not follow a low salt diet.     Headaches:   Since the patient's last clinic visit, headaches are: worsened  The patient is getting headaches:  3xs a week  She is not able to do normal daily activities when she has a migraine.  The patient is taking the following rescue/relief medications:  Tylenol and Excedrin   Patient states \"I get some relief\" from the rescue/relief medications.   The patient is taking the following medications to prevent migraines:  Neurontin  In the past 4 weeks, the patient has gone to an Urgent Care or Emergency Room 0 times times due to headaches.    Reason for visit:  Refills and soecialists updates    She eats 2-3 servings of fruits and vegetables daily.She consumes 0 sweetened beverage(s) daily.She exercises with enough effort to increase her heart rate 20 to 29 minutes per day.  She exercises with enough effort to increase her heart rate 4 days per week.   She is taking medications regularly.     Pain  -- Last filled oxycodone on 10/25, 9/4, 7/21  --On oxy #100/mo which is a decrease. Feels GI system is better o nlower dose and pain is still manageable, not much worse than when on higher dose. She is considering further decrease I nthe future but not ready currently     Raynauds  --Last visit in May I increased her amlodipine due to nonhealing digital ulcerations she last saw colorectal surgery on 8/30    AIN  --Anal cytology was done    Scleroderma  --following at [facility], last visit with pulmonary was 10/21  --She is following with rheumatology and is currently on methotrexate for management of skin and joint manifestations.  CT of the chest showed mild fibrotic ILD with normal PFTs.  Pulm thought her CT changes were due to GERD and " aspiration along with mild ILD.  No changes to immunosuppression was recommended  --she finds it cumbersome to drive to [place]; has pain in her knees, joints with sitting for prolonged periods    Gastroesophageal reflux disease with esophagitis and hemorrhage  Severe, on high dose PPI per GI; plans to follow-up with closer GI       Wt Readings from Last 5 Encounters:   11/20/24 58.8 kg (129 lb 9.6 oz)   08/30/24 59.9 kg (132 lb)   05/17/24 61.1 kg (134 lb 12.8 oz)   02/21/24 62.2 kg (137 lb 3.2 oz)   01/12/24 65.1 kg (143 lb 8 oz)         Hypertension Follow-up    Do you check your blood pressure regularly outside of the clinic? No   Are you following a low salt diet? No  Are your blood pressures ever more than 140 on the top number (systolic) OR more than 90 on the bottom number (diastolic), for example 140/90? No    Depression and Anxiety   How are you doing with your depression since your last visit? No change  How are you doing with your anxiety since your last visit?  Worsened work  Are you having other symptoms that might be associated with depression or anxiety? No  Have you had a significant life event? Job Concerns, Financial Concerns, and OTHER: family issues     Do you have any concerns with your use of alcohol or other drugs? No  Depression is stable; anxiety is worse - situational due to life stressors  She is open to meeting with therapist    Social History     Tobacco Use    Smoking status: Never    Smokeless tobacco: Never   Vaping Use    Vaping status: Every Day    Substances: Flavoring, delta 9    Devices: Disposable   Substance Use Topics    Alcohol use: Not Currently     Comment: Socially once on a weekend if any    Drug use: Yes     Types: Marijuana     Comment: delta         2/21/2024    10:45 AM 5/17/2024    10:05 AM 11/20/2024     2:43 PM   PHQ   PHQ-9 Total Score 15 11 15    Q9: Thoughts of better off dead/self-harm past 2 weeks Not at all Not at all  Not at all        Patient-reported          9/28/2023     9:26 AM 1/12/2024     1:55 PM 11/20/2024     2:51 PM   REX-7 SCORE   Total Score 11 (moderate anxiety) 12 (moderate anxiety) 11 (moderate anxiety)   Total Score 11 12 11        Patient-reported         11/20/2024     2:43 PM   Last PHQ-9   1.  Little interest or pleasure in doing things 1    2.  Feeling down, depressed, or hopeless 1    3.  Trouble falling or staying asleep, or sleeping too much 2    4.  Feeling tired or having little energy 3    5.  Poor appetite or overeating 2    6.  Feeling bad about yourself 3    7.  Trouble concentrating 2    8.  Moving slowly or restless 1    Q9: Thoughts of better off dead/self-harm past 2 weeks 0    PHQ-9 Total Score 15        Patient-reported         11/20/2024     2:51 PM   REX-7    1. Feeling nervous, anxious, or on edge 3    2. Not being able to stop or control worrying 1    3. Worrying too much about different things 2    4. Trouble relaxing 2    5. Being so restless that it is hard to sit still 1    6. Becoming easily annoyed or irritable 1    7. Feeling afraid, as if something awful might happen 1    REX-7 Total Score 11    If you checked any problems, how difficult have they made it for you to do your work, take care of things at home, or get along with other people? Somewhat difficult        Patient-reported       Suicide Assessment Five-step Evaluation and Treatment (SAFE-T)    Asthma Follow-Up    Was ACT completed today?  Yes        11/20/2024     2:52 PM   ACT Total Scores   ACT TOTAL SCORE (Goal Greater than or Equal to 20) 14    In the past 12 months, how many times did you visit the emergency room for your asthma without being admitted to the hospital? 0    In the past 12 months, how many times were you hospitalized overnight because of your asthma? 0        Patient-reported       How many days per week do you miss taking your asthma controller medication?  I do not have an asthma controller medication  Please describe any recent triggers for  your asthma: smoke, pollens, animal dander, exercise or sports, and Gastric Reflux  Have you had any Emergency Room Visits, Urgent Care Visits, or Hospital Admissions since your last office visit?  No  Feels she is having a flare of asthma due to cold weather.  Waking up in  middle of the night. Wonders if aspiration    Chronic Kidney Disease Follow-up    Do you take any over the counter pain medicine?: Yes  What over the counter medicine are you taking for your pain?:  tylenol    How often do you take this medicine?:  A few times a week  Migraine   Since your last clinic visit, how have your headaches changed?  Worsened  How often are you getting headaches or migraines? 3 x a weel   Are you able to do normal daily activities when you have a migraine? No  Are you taking rescue/relief medications? (Select all that apply) Tylenol and Excedrin  How helpful is your rescue/relief medication?  I get some relief  Are you taking any medications to prevent migraines? (Select all that apply)  Neurontin  In the past 4 weeks, how often have you gone to urgent care or the emergency room because of your headaches?  0      Current Outpatient Medications   Medication Sig Dispense Refill    acetaminophen (TYLENOL) 325 MG tablet Take 2 tablets (650 mg) by mouth every 4 hours as needed for mild pain or other 1 Bottle 0    albuterol (VENTOLIN HFA) 108 (90 Base) MCG/ACT inhaler INHALE 2 PUFFS INTO THE LUNGS ONCE EVERY 4 HOURS AS NEEDED FOR SHORTNESS OF BREATH / DYSPNEA / WHEEZING 18 g 11    amLODIPine (NORVASC) 5 MG tablet Take 1 tablet (5 mg) by mouth daily 90 tablet 3    blood glucose (NO BRAND SPECIFIED) lancets standard Use to test blood sugar 1 time daily 100 each 3    blood glucose (NO BRAND SPECIFIED) test strip Use to test blood sugar 1 time daily 100 strip 11    busPIRone HCl (BUSPAR) 15 MG tablet Take 1 tablet (15 mg) by mouth 2 times daily 180 tablet 3    cholecalciferol 125 MCG (5000 UT) CAPS Take 1 capsule (5,000 Units) by  mouth daily Start after Ergocalciferol course is complete 90 capsule 3    DULoxetine (CYMBALTA) 30 MG capsule TAKE 3 CAPSULES (90 MG) BY MOUTH DAILY 270 capsule 0    famotidine (PEPCID) 20 MG tablet TAKE 1 TABLET (20 MG) BY MOUTH 2 TIMES DAILY 180 tablet 0    fluticasone-vilanterol (BREO ELLIPTA) 200-25 MCG/ACT inhaler Inhale 1 puff into the lungs daily 60 each 11    folic acid (FOLVITE) 1 MG tablet Take 1 mg by mouth daily      gabapentin (NEURONTIN) 300 MG capsule Take 3 capsules (900 mg) by mouth 2 times daily 540 capsule 3    GOODSENSE MIGRAINE FORMULA 250-250-65 MG per tablet TAKE ONE TABLET BY MOUTH EVERY 6 HOURS AS NEEDED FOR HEADACHES (Patient taking differently: Take 1 tablet by mouth every 6 hours as needed.) 24 tablet 1    lisinopril (ZESTRIL) 10 MG tablet Take 1 tablet (10 mg) by mouth every evening 90 tablet 3    loratadine (CLARITIN) 10 MG tablet Take 10 mg by mouth daily as needed       LORazepam (ATIVAN) 1 MG tablet TAKE 1 TABLET (1 MG) BY MOUTH 2 TIMES DAILY AS NEEDED FOR ANXIETY #45 FOR 30 DAYS 45 tablet 5    medical cannabis (Patient's own supply) (The purpose of this order is to document that the patient reports taking medical cannabis.  This is not a prescription, and is not used to certify that the patient has a qualifying medical condition.)  CBG gummy over the counter 0 Information only 0    methocarbamol (ROBAXIN) 750 MG tablet Take 1 tablet (750 mg) by mouth 3 times daily as needed for muscle spasms 180 tablet 3    methotrexate sodium, PF, 50 MG/2ML SOLN injection Inject 0.6 mg Subcutaneous every 7 days      metoclopramide (REGLAN) 10 MG tablet Take 1 tablet (10 mg) by mouth 3 times daily as needed (nausea/vomiting) 30 tablet 1    naloxone (NARCAN) 4 MG/0.1ML nasal spray Spray 1 spray (4 mg) into one nostril alternating nostrils once as needed for opioid reversal every 2-3 minutes until assistance arrives 0.2 mL 3    omeprazole (PRILOSEC) 40 MG DR capsule TAKE 1 CAPSULE (40 MG) BY MOUTH 3  "TIMES DAILY HAS SEEN GI WHO HAS APPROVED THREE TIMES A DAY DOSING 270 capsule 0    ondansetron (ZOFRAN ODT) 4 MG ODT tab Take 1 tablet (4 mg) by mouth every 8 hours as needed for nausea 30 tablet 4    oxyCODONE (ROXICODONE) 5 MG tablet TAKE ONE TABLET BY MOUTH EVERY 6 HOURS AS NEEDED FOR PAIN 100 tablet 0    polyethylene glycol (MIRALAX) 17 GM/Dose powder Take 17 g by mouth daily 510 g 11    Prenatal Vit-Fe Fumarate-FA (PRENATAL VITAMIN AND MINERAL) 28-0.8 MG TABS Take 1 tablet by mouth daily 90 tablet 3    syringe/needle, disp, (BD ECLIPSE SYRINGE) 25G X 1\" 3 ML MISC 1 each daily as needed 10 each 11    vitamin B-12 (CYANOCOBALAMIN) 2500 MCG sublingual tablet Take 1 tablet (2,500 mcg) by mouth daily 90 tablet 3           Review of Systems  Constitutional, neuro, ENT, endocrine, pulmonary, cardiac, gastrointestinal, genitourinary, musculoskeletal, integument and psychiatric systems are negative, except as otherwise noted.      Objective    /80 (BP Location: Left arm, Patient Position: Sitting, Cuff Size: Adult Regular)   Pulse 89   Temp 99.1  F (37.3  C) (Tympanic)   Resp 14   Ht 1.54 m (5' 0.63\")   Wt 58.8 kg (129 lb 9.6 oz)   SpO2 99%   BMI 24.79 kg/m    Body mass index is 24.79 kg/m .  Physical Exam   GENERAL: alert and no distress            Signed Electronically by: Grecia Barba DO    "

## 2024-12-04 NOTE — PROGRESS NOTES
GI CLINIC VISIT    CC/REFERRING MD:  No ref. provider found      ASSESSMENT/PLAN:    #GERD, with hx of LA grade D esophagitis  # Hx of anemia -- although Hgb has been stable for months.   #chronic nausea and vomiting  #constipation  Patient continues to struggle with nausea, usually vomiting two times a week. Will occasionally see blood in her emesis if she has been vomiting many times that day. Her last EGD in 2022 was quite reassuring, with no signs of reflux esophagitis and biopsies were normal. She has continued omeprazole 40mg TID and pepcid 20mg BID. Given ongoing nausea and vomiting, it is reasonable to repeat EGD to make sure the esophagus is appearing normal. She denies every having a GES, but would not be accurate as she is on narcotics. She can continue use of zofran and reglan, being prescribed by PCP - would recommend continuing monitoring of QT with EKG. At her last visit a colonoscopy was discussed, given she was having signs of progressive anemia (this has since resolved, most recent Hgb was 13.5). She was seen by CRS in August 2024 as she has a history of low grade dysplasia, no evidence of high grade dysplasia during that visit.   --schedule EGD.   --continue omeprazole 40mg TID and pepcid 20mg BID.   --zofran and reglan PRN.  --continue to decrease use of opiates.   --miralax PRN.       Depression Screening Follow Up        12/5/2024     8:51 AM   PHQ   PHQ-9 Total Score 8   Q9: Thoughts of better off dead/self-harm past 2 weeks Not at all           Follow Up Actions Taken  Crisis resource information provided in After Visit Summary  Patient is actively seeking to establish care with a psychiatrit and therapist.                RTC 3 months.    Thank you for this consultation.  It was a pleasure to participate in the care of this patient; please contact us with any further questions.     This note was created with voice recognition software, and while reviewed for accuracy, typos may  remain.    Josy Sinha PA-C  Division of Gastroenterology, Hepatology and Nutrition  [facility]    40 minutes spent on the date of the encounter doing chart review, review of outside records, review of test results, patient visit, and documentation          HPI  40 y/o F presents for follow up of iron deficiency anemia.    Patient was last evaluated by Dr. Moses on 6/13/23, please see visit details below:    Molly Teague is 37 year old female who presents for follow up of esophagitis and GI bleeding.  She was previously seen in May 2022 and again in December 2022.  History includes several endoscopies in the past noting LA grade D esophagitis which was treated with high-dose PPI therapy and H2 blockers.  We last did an endoscopy in July 2022 which noted healing of the esophagitis and biopsies were normal.  That was done on high-dose omeprazole 3 times daily plus H2 blocker at night.  She also has chronic nausea managed on several antiemetics.  She follows up today for the above issues.  She reports that in April 2023 she had recurrent issues with GI bleeding.  She reports that she was having coffee-ground emesis as well as blood in the stool.  Blood counts were checked and hemoglobin was down to 8.7, down from previous values of 10.9.  This occurred over a 3-week time period and then the emesis and bleeding spontaneously improved.  She still is getting nausea with occasional vomiting.  She will use as needed metoclopramide when she has vomiting issues.  She has not had an updated upper endoscopy since July 2022.  Her last colonoscopy was in February 2017.  Some perianal nodularity was noted and she was referred to colorectal surgery around that time.  She reports that she does have a colorectal surgery evaluation scheduled here for August 2023.    Plan: Molly Teague is a 37 year old female who presents for follow up of anemia, hematemesis and rectal bleeding.   She has history of LA grade D esophagitis in the past.  Last upper endoscopy did note healing of the esophagitis on a rather unusual regimen of omeprazole 3 times daily plus H2 blocker therapy at night.  Given her progressive anemia, I suggest another upper endoscopy as well as colonoscopy at this point.  This can be done with MAC.  We will schedule with extended GoLytely bowel prep given her use of opiates. Poor prep was noted at the time of the last colonoscopy.  We discussed that if anemia worsens and no findings on upper endoscopy and colonoscopy, video capsule may also be considered.  Continue with antiemetic therapy for management of chronic nausea.  Would recommend continuing to follow periodic ECG while on antiemetic therapy to ensure QT prolongation does not develop.  Recommended her continuing to wean medication list as able, especially opiate medications as this could be contributing.     12/5/24:   Patient reports that she continues to struggle with nausea and vomiting. She will generally wake up in the morning, and feels nauseous - she will take zofran as needed, and when its really bad she will take reglan. She will generally vomit 1-2x/week, sometimes it is liquid, sometimes it is food. She will generally not eat until noon, as she does intermittent fasting. She will have small meals, avoid certain foods, reduced caffeine. She can experience reflux symptoms when she is sleeping, will experience coughing. She currently takes omeprazole 40mg TID and pepcid 20mg BID -- feels this does control the reflux somewhat.   She has had some abdominal cramping that occurs when she is vomiting. She can also experience some abdominal cramping when she is having a bowel movement. She will generally have 1 BM daily (improved from before). Stool consistency can vary between hard and soft. She is on oxycodone 5mg TID. This has been slowly reduced in the last few years. She does take an iron supplementation. Denies BRBPR  or black/tarry stools. She does take miralax as needed for constipation.    Denies NSAIDs. Takes tylenol for migraine headaches. Denies use of OTC herbal supplements/weight loss products.      Denies use of ETOH and tobacco products. Vapes CBD.     No family history of GI related malignancy (esophageal, gastric, pancreatic, liver or colon) or family history of IBD/celiac disease.     ROS:    No fevers or chills  No weight loss  No shortness of breath or wheezing  No chest pain or pressure  No odynophagia or dysphagia  No BRBPR, hematochezia, melena  No anxiety or depression      PROBLEM LIST  Patient Active Problem List    Diagnosis Date Noted    Pulmonary hypertension (H) 03/26/2024     Priority: Medium    F11.2 - Continuous opioid dependence (H) 05/10/2023     Priority: Medium    Microalbuminuria 07/07/2022     Priority: Medium    Iron deficiency anemia due to chronic blood loss 08/04/2020     Priority: Medium    S/P ORIF (open reduction internal fixation) fracture 02/10/2020     Priority: Medium    Sinus tachycardia 01/31/2020     Priority: Medium    Anemia, chronic disease 11/25/2019     Priority: Medium     With acute/chronic blood loss anemia due to severe esophagitis.  History of severe infusion related reaction to Venofer 1/2022 5/2023 - Injectafer 750 mg x 2 doses, 1 week apart.  Will order Methylprednisolone 125 mg IV to be given before iron infusion      Mirena IUD- Remove by 09/2024 09/06/2019     Priority: Medium     Lot: [id number]  Exp: 01/2022    Dinora Israel LPN        PTSD (post-traumatic stress disorder) 06/19/2019     Priority: Medium    Severe episode of recurrent major depressive disorder, with psychotic features (H) 07/06/2018     Priority: Medium    Severe persistent asthma without complication (H) 07/06/2018     Priority: Medium     On high dose ICS/LABA + frequent albuterol use.  Next allergy/pulmonary referral      Aspiration of food 03/29/2018     Priority: Medium    Chronic pain  syndrome 04/26/2017     Priority: Medium     Patient is followed by Grecia Barba DO for ongoing prescription of pain medication.  All refills should only be approved by this provider, or covering partner.    Medication(s): Oxycodone 10 mg.   Maximum quantity per month: 90  Clinic visit frequency required: Q 3 months     Controlled substance agreement:  Encounter-Level CSA - 04/26/2017:    Controlled Substance Agreement - Scan on 5/4/2017  8:58 AM: CONTROLLED SUBSTANCE AGREEMENT (below)         Patient-Level CSA:    Controlled Substance Agreement - Opioid - Scan on 2/6/2019  6:23 PM (below)         Pain Clinic evaluation in the past: No    DIRE Total Score(s):  No flowsheet data found.             [email or web address]        Chronic migraine without aura without status migrainosus, not intractable 04/12/2017     Priority: Medium     With medication overuse.  Fioricet and not Imitrex is recommend to treat severe headache.  2/2017 [facility] recommend wean down from daily Fioricet and use Excedrin Migrain PRN.      AIN grade I 03/30/2017     Priority: Medium     Found at [facility] on colonoscopy 2/2017.  Recommend repeat anoscopy and anal pap in 6/2017.  Following with  Colorectal surgery, last 2023      Gastroesophageal reflux disease with esophagitis 03/30/2017     Priority: Medium     EGD done at [facility] 2/2017 showed esophagitis without GAVE.  Recommend max dose H2 and PPI, along with 4 tablets of Carafate dissolved in water and drink throughout the day.    Had LA Grade D esophagitis, on TID PPI      Stage 3a chronic kidney disease (H) 02/27/2017     Priority: Medium    Limited systemic sclerosis (H) 01/25/2017     Priority: Medium     Raynaud's, calcinosis, telangiectasias, peripheral neuropathy, GERD symptoms, history of scleroderma renal crisis.  Follows at [facility]      Polyneuropathy associated with underlying disease (H) 01/25/2017     Priority: Medium     Due to scleroderma and neurology thought possible due  to ICU (critical illness neuropathy). EMG 2017 positive for neuropathy      History of Clostridium difficile 2016     Priority: Medium    History of Helicobacter pylori infection 2016     Priority: Medium    Scleroderma with renal involvement (H) 2016     Priority: Medium     Needs lisinopril long term      History of ARDS 2016     Priority: Medium     2015, prolonged ICU/vent/trach due to influenza, scleroderma renal crisis requiring hemodialysis.      History of hemodialysis 2016     Priority: Medium     2015 due to scleroderma renal crisis      Bilateral retinitis 2016     Priority: Medium    History of pulmonary embolism 2016     Priority: Medium     Completed anti-coagulation .  pulmonary embolism due to critical illness      REX (generalized anxiety disorder) 2016     Priority: Medium    Systemic sclerosis (H) 2016     Priority: Medium       PERTINENT PAST MEDICAL HISTORY:  Past Medical History:   Diagnosis Date    Acute pulmonary embolism (H) 2016    During renal crisis    Acute pyelonephritis 2017    Altered mental state 2018    Anxiety     Arthritis     Complication of anesthesia     Depression     Gastroesophageal reflux disease with esophagitis     History of blood transfusion     Hypertension     Long-term (current) use of anticoagulants [Z79.01] 2016    Neuropathy     PE (pulmonary embolism) 2016    PTSD (post-traumatic stress disorder)     Raynaud's disease without gangrene     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     Rheumatism     Scleroderma (H) 2016    Slow to wake up after anesthesia     pt states it took 2 days to come out of anesthesia after her ankle surgery    Uncomplicated asthma        PREVIOUS SURGERIES:  Past Surgical History:   Procedure Laterality Date    ANGIOGRAM  2015     SECTION      COMBINED ESOPHAGOSCOPY, GASTROSCOPY, DUODENOSCOPY (EGD) WITH  CO2 INSUFFLATION N/A 7/14/2022    Procedure: ESOPHAGOGASTRODUODENOSCOPY, WITH CO2 INSUFFLATION;  Surgeon: Jalen Moses MD;  Location: [facility] OR    ESOPHAGOSCOPY, GASTROSCOPY, DUODENOSCOPY (EGD), COMBINED N/A 7/14/2022    Procedure: ESOPHAGOGASTRODUODENOSCOPY, WITH BIOPSY;  Surgeon: Jalen Moses MD;  Location: [facility] OR    OPEN REDUCTION INTERNAL FIXATION ANKLE Right 2/10/2020    Procedure: Right ankle open reduction internal fixation;  Surgeon: Natanael Villalta MD;  Location: [facility] OR    REMOVE HARDWARE ANKLE Right 9/1/2021    Procedure: Right ankle hardware removal;  Surgeon: Natanael Villalta MD;  Location: [facility] OR    THROAT SURGERY  2015       PREVIOUS ENDOSCOPY:  See chart.    ALLERGIES:     Allergies   Allergen Reactions    Blood Transfusion Related (Informational Only) Other (See Comments)     Patient has a history of a clinically significant antibody against RBC antigens.  A delay in compatible RBCs may occur.    Pollen Extract     Seasonal Allergies     Venofer [Iron Sucrose] Difficulty breathing and Cramps     Severe infusion reaction 1/2022    Shellfish-Derived Products Nausea and Rash     Rash on face       PERTINENT MEDICATIONS:    Current Outpatient Medications:     acetaminophen (TYLENOL) 325 MG tablet, Take 2 tablets (650 mg) by mouth every 4 hours as needed for mild pain or other, Disp: 1 Bottle, Rfl: 0    albuterol (VENTOLIN HFA) 108 (90 Base) MCG/ACT inhaler, INHALE 2 PUFFS INTO THE LUNGS ONCE EVERY 4 HOURS AS NEEDED FOR SHORTNESS OF BREATH / DYSPNEA / WHEEZING, Disp: 18 g, Rfl: 11    amLODIPine (NORVASC) 5 MG tablet, Take 1 tablet (5 mg) by mouth daily, Disp: 90 tablet, Rfl: 3    blood glucose (NO BRAND SPECIFIED) lancets standard, Use to test blood sugar 1 time daily, Disp: 100 each, Rfl: 3    blood glucose (NO BRAND SPECIFIED) test strip, Use to test blood sugar 1 time daily, Disp: 100 strip, Rfl: 11    busPIRone (BUSPAR) 15 MG tablet, Take 1 tablet (15 mg) by  mouth 2 times daily., Disp: 180 tablet, Rfl: 3    cholecalciferol 125 MCG (5000 UT) CAPS, Take 1 capsule (5,000 Units) by mouth daily Start after Ergocalciferol course is complete, Disp: 90 capsule, Rfl: 3    DULoxetine (CYMBALTA) 30 MG capsule, Take 3 capsules (90 mg) by mouth daily., Disp: 270 capsule, Rfl: 3    famotidine (PEPCID) 20 MG tablet, Take 1 tablet (20 mg) by mouth 2 times daily., Disp: 180 tablet, Rfl: 3    fluticasone-vilanterol (BREO ELLIPTA) 200-25 MCG/ACT inhaler, Inhale 1 puff into the lungs daily., Disp: 60 each, Rfl: 11    folic acid (FOLVITE) 1 MG tablet, Take 1 mg by mouth daily, Disp: , Rfl:     gabapentin (NEURONTIN) 300 MG capsule, Take 3 capsules (900 mg) by mouth 2 times daily, Disp: 540 capsule, Rfl: 3    GOODSENSE MIGRAINE FORMULA 250-250-65 MG per tablet, TAKE ONE TABLET BY MOUTH EVERY 6 HOURS AS NEEDED FOR HEADACHES (Patient taking differently: Take 1 tablet by mouth every 6 hours as needed.), Disp: 24 tablet, Rfl: 1    lisinopril (ZESTRIL) 10 MG tablet, Take 1 tablet (10 mg) by mouth every evening, Disp: 90 tablet, Rfl: 3    loratadine (CLARITIN) 10 MG tablet, Take 10 mg by mouth daily as needed , Disp: , Rfl:     LORazepam (ATIVAN) 1 MG tablet, TAKE 1 TABLET (1 MG) BY MOUTH 2 TIMES DAILY AS NEEDED FOR ANXIETY #45 FOR 30 DAYS, Disp: 45 tablet, Rfl: 5    medical cannabis (Patient's own supply), (The purpose of this order is to document that the patient reports taking medical cannabis.  This is not a prescription, and is not used to certify that the patient has a qualifying medical condition.)  CBG gummy over the counter, Disp: 0 Information only, Rfl: 0    methocarbamol (ROBAXIN) 750 MG tablet, Take 1 tablet (750 mg) by mouth 3 times daily as needed for muscle spasms, Disp: 180 tablet, Rfl: 3    methotrexate sodium, PF, 50 MG/2ML SOLN injection, Inject 0.6 mg Subcutaneous every 7 days, Disp: , Rfl:     metoclopramide (REGLAN) 10 MG tablet, Take 1 tablet (10 mg) by mouth 3 times daily  "as needed (nausea/vomiting), Disp: 30 tablet, Rfl: 1    naloxone (NARCAN) 4 MG/0.1ML nasal spray, Spray 1 spray (4 mg) into one nostril alternating nostrils once as needed for opioid reversal every 2-3 minutes until assistance arrives, Disp: 0.2 mL, Rfl: 3    omeprazole (PRILOSEC) 40 MG DR capsule, Take 1 capsule (40 mg) by mouth 3 times daily. GI has approved high dose, Disp: 270 capsule, Rfl: 3    ondansetron (ZOFRAN ODT) 4 MG ODT tab, Take 1 tablet (4 mg) by mouth every 8 hours as needed for nausea, Disp: 30 tablet, Rfl: 4    oxyCODONE (ROXICODONE) 5 MG tablet, Take 1 tablet (5 mg) by mouth every 6 hours as needed for pain., Disp: 100 tablet, Rfl: 0    [START ON 12/23/2024] oxyCODONE (ROXICODONE) 5 MG tablet, Take 1 tablet (5 mg) by mouth every 6 hours as needed for pain., Disp: 100 tablet, Rfl: 0    [START ON 1/22/2025] oxyCODONE (ROXICODONE) 5 MG tablet, Take 1 tablet (5 mg) by mouth every 6 hours as needed for pain., Disp: 100 tablet, Rfl: 0    polyethylene glycol (MIRALAX) 17 GM/Dose powder, Take 17 g by mouth daily, Disp: 510 g, Rfl: 11    Prenatal Vit-Fe Fumarate-FA (PRENATAL VITAMIN AND MINERAL) 28-0.8 MG TABS, Take 1 tablet by mouth daily, Disp: 90 tablet, Rfl: 3    syringe/needle, disp, (BD ECLIPSE SYRINGE) 25G X 1\" 3 ML MISC, 1 each daily as needed, Disp: 10 each, Rfl: 11    vitamin B-12 (CYANOCOBALAMIN) 2500 MCG sublingual tablet, Take 1 tablet (2,500 mcg) by mouth daily, Disp: 90 tablet, Rfl: 3    SOCIAL HISTORY:  Social History     Socioeconomic History    Marital status: Single     Spouse name: Not on file    Number of children: Not on file    Years of education: Not on file    Highest education level: Not on file   Occupational History    Not on file   Tobacco Use    Smoking status: Never    Smokeless tobacco: Never   Vaping Use    Vaping status: Every Day    Substances: Flavoring, delta 9    Devices: Disposable   Substance and Sexual Activity    Alcohol use: Not Currently     Comment: Socially " once on a weekend if any    Drug use: Yes     Types: Marijuana     Comment: delta    Sexual activity: Yes     Partners: Male     Birth control/protection: I.U.D.   Other Topics Concern    Parent/sibling w/ CABG, MI or angioplasty before 65F 55M? No   Social History Narrative    11/2019: Lives at home with significant other and their son, Td (2014).      Social Drivers of Health     Financial Resource Strain: Low Risk  (5/17/2024)    Financial Resource Strain     Within the past 12 months, have you or your family members you live with been unable to get utilities (heat, electricity) when it was really needed?: No   Food Insecurity: No Food Insecurity (7/24/2024)    Received from [facility]    Hunger Vital Sign     Worried About Running Out of Food in the Last Year: Never true     Ran Out of Food in the Last Year: Never true   Transportation Needs: No Transportation Needs (7/24/2024)    Received from [facility]    PRAPARE - Transportation     Lack of Transportation (Medical): No     Lack of Transportation (Non-Medical): No   Physical Activity: Insufficiently Active (7/24/2024)    Received from [facility]    Exercise Vital Sign     Days of Exercise per Week: 2 days     Minutes of Exercise per Session: 20 min   Stress: Stress Concern Present (5/17/2024)    [employer] [place] [employer] - Occupational Stress Questionnaire     Feeling of Stress : Rather much   Social Connections: Unknown (5/17/2024)    Social Connection and Isolation Panel [NHANES]     Frequency of Communication with Friends and Family: Not on file     Frequency of Social Gatherings with Friends and Family: Once a week     Attends Gnosticism Services: Not on file     Active Member of Clubs or Organizations: Not on file     Attends Club or Organization Meetings: Not on file     Marital Status: Not on file   Interpersonal Safety: Low Risk  (11/20/2024)    Interpersonal Safety     Do you feel physically and emotionally safe where you  currently live?: Yes     Within the past 12 months, have you been hit, slapped, kicked or otherwise physically hurt by someone?: No     Within the past 12 months, have you been humiliated or emotionally abused in other ways by your partner or ex-partner?: No   Housing Stability: Low Risk  (2024)    Received from [facility]    Housing Stability     What is your living situation today?: I have a steady place to live       FAMILY HISTORY:  Family History   Problem Relation Age of Onset    Hyperlipidemia Father     Depression Sister     Fibromyalgia Sister     Hyperlipidemia Sister     Cerebrovascular Disease Maternal Grandmother          of a stroke    Diabetes Maternal Grandmother     Hyperlipidemia Paternal Grandfather     Diabetes Maternal Aunt     Depression Other     Melanoma No family hx of     Skin Cancer No family hx of     Anesthesia Reaction No family hx of     Cardiovascular No family hx of     Deep Vein Thrombosis (DVT) No family hx of        Past/family/social history reviewed and no changes    PHYSICAL EXAMINATION:  Constitutional: aaox3, cooperative, pleasant, not dyspneic/diaphoretic, no acute distress  Vitals reviewed: /82   Pulse 84   Wt 58.5 kg (129 lb)   SpO2 100%   BMI 24.67 kg/m    Wt:   Wt Readings from Last 2 Encounters:   24 58.8 kg (129 lb 9.6 oz)   24 59.9 kg (132 lb)      Eyes: Sclera anicteric/injected  Respiratory: Unlabored breathing  Skin: warm, perfused, no jaundice  Psych: Normal affect  MSK: Normal gait

## 2024-12-05 ENCOUNTER — OFFICE VISIT (OUTPATIENT)
Dept: GASTROENTEROLOGY | Facility: CLINIC | Age: 39
End: 2024-12-05
Payer: MEDICARE

## 2024-12-05 VITALS
DIASTOLIC BLOOD PRESSURE: 82 MMHG | WEIGHT: 129 LBS | HEART RATE: 84 BPM | BODY MASS INDEX: 24.67 KG/M2 | OXYGEN SATURATION: 100 % | SYSTOLIC BLOOD PRESSURE: 116 MMHG

## 2024-12-05 DIAGNOSIS — K21.01 GASTROESOPHAGEAL REFLUX DISEASE WITH ESOPHAGITIS AND HEMORRHAGE: Primary | ICD-10-CM

## 2024-12-05 DIAGNOSIS — R11.0 CHRONIC NAUSEA: ICD-10-CM

## 2024-12-05 ASSESSMENT — PATIENT HEALTH QUESTIONNAIRE - PHQ9: SUM OF ALL RESPONSES TO PHQ QUESTIONS 1-9: 8

## 2024-12-05 ASSESSMENT — PAIN SCALES - GENERAL: PAINLEVEL_OUTOF10: SEVERE PAIN (7)

## 2024-12-05 NOTE — NURSING NOTE
Chief Complaint   Patient presents with    Follow Up     Iron deficiency anemia     She requests these members of her care team be copied on today's visit information:  PCP: Grecia Barba    Referring Provider:  Referred Self, MD  No address on file    Vitals:    12/05/24 0854   BP: 116/82   Pulse: 84   SpO2: 100%   Weight: 58.5 kg (129 lb)     Body mass index is 24.67 kg/m .    PHQ-9 score was 8 today.    Medications were reconciled.    Does patient need any medication refills at today's visit? Mylene Cruz

## 2024-12-05 NOTE — LETTER
12/5/2024      Molly Teague  [address] [place] [facility] [id number]      Dear Colleague,    Thank you for referring your patient, Molly Teague, to the [facility]. Please see a copy of my visit note below.    GI CLINIC VISIT    CC/REFERRING MD:  No ref. provider found      ASSESSMENT/PLAN:    #GERD, with hx of LA grade D esophagitis  # Hx of anemia -- although Hgb has been stable for months.   #chronic nausea and vomiting  #constipation  Patient continues to struggle with nausea, usually vomiting two times a week. Will occasionally see blood in her emesis if she has been vomiting many times that day. Her last EGD in 2022 was quite reassuring, with no signs of reflux esophagitis and biopsies were normal. She has continued omeprazole 40mg TID and pepcid 20mg BID. Given ongoing nausea and vomiting, it is reasonable to repeat EGD to make sure the esophagus is appearing normal. She denies every having a GES, but would not be accurate as she is on narcotics. She can continue use of zofran and reglan, being prescribed by PCP - would recommend continuing monitoring of QT with EKG. At her last visit a colonoscopy was discussed, given she was having signs of progressive anemia (this has since resolved, most recent Hgb was 13.5). She was seen by CRS in August 2024 as she has a history of low grade dysplasia, no evidence of high grade dysplasia during that visit.   --schedule EGD.   --continue omeprazole 40mg TID and pepcid 20mg BID.   --zofran and reglan PRN.  --continue to decrease use of opiates.   --miralax PRN.         RTC 3 months.    Thank you for this consultation.  It was a pleasure to participate in the care of this patient; please contact us with any further questions.     This note was created with voice recognition software, and while reviewed for accuracy, typos may remain.    Josy Sinha PA-C  Division of Gastroenterology, Hepatology and Nutrition  [facility]  [facility]    40 minutes spent on the date of the encounter doing chart review, review of outside records, review of test results, patient visit, and documentation          HPI  40 y/o F presents for follow up of iron deficiency anemia.    Patient was last evaluated by Dr. Moses on 6/13/23, please see visit details below:    Molly Teague is 37 year old female who presents for follow up of esophagitis and GI bleeding.  She was previously seen in May 2022 and again in December 2022.  History includes several endoscopies in the past noting LA grade D esophagitis which was treated with high-dose PPI therapy and H2 blockers.  We last did an endoscopy in July 2022 which noted healing of the esophagitis and biopsies were normal.  That was done on high-dose omeprazole 3 times daily plus H2 blocker at night.  She also has chronic nausea managed on several antiemetics.  She follows up today for the above issues.  She reports that in April 2023 she had recurrent issues with GI bleeding.  She reports that she was having coffee-ground emesis as well as blood in the stool.  Blood counts were checked and hemoglobin was down to 8.7, down from previous values of 10.9.  This occurred over a 3-week time period and then the emesis and bleeding spontaneously improved.  She still is getting nausea with occasional vomiting.  She will use as needed metoclopramide when she has vomiting issues.  She has not had an updated upper endoscopy since July 2022.  Her last colonoscopy was in February 2017.  Some perianal nodularity was noted and she was referred to colorectal surgery around that time.  She reports that she does have a colorectal surgery evaluation scheduled here for August 2023.    Plan: Molly Teague is a 37 year old female who presents for follow up of anemia, hematemesis and rectal bleeding.  She has history of LA grade D esophagitis in the past.  Last upper endoscopy did note healing of the  esophagitis on a rather unusual regimen of omeprazole 3 times daily plus H2 blocker therapy at night.  Given her progressive anemia, I suggest another upper endoscopy as well as colonoscopy at this point.  This can be done with MAC.  We will schedule with extended GoLytely bowel prep given her use of opiates. Poor prep was noted at the time of the last colonoscopy.  We discussed that if anemia worsens and no findings on upper endoscopy and colonoscopy, video capsule may also be considered.  Continue with antiemetic therapy for management of chronic nausea.  Would recommend continuing to follow periodic ECG while on antiemetic therapy to ensure QT prolongation does not develop.  Recommended her continuing to wean medication list as able, especially opiate medications as this could be contributing.     12/5/24:   Patient reports that she continues to struggle with nausea and vomiting. She will generally wake up in the morning, and feels nauseous - she will take zofran as needed, and when its really bad she will take reglan. She will generally vomit 1-2x/week, sometimes it is liquid, sometimes it is food. She will generally not eat until noon, as she does intermittent fasting. She will have small meals, avoid certain foods, reduced caffeine. She can experience reflux symptoms when she is sleeping, will experience coughing. She currently takes omeprazole 40mg TID and pepcid 20mg BID -- feels this does control the reflux somewhat.   She has had some abdominal cramping that occurs when she is vomiting. She can also experience some abdominal cramping when she is having a bowel movement. She will generally have 1 BM daily (improved from before). Stool consistency can vary between hard and soft. She is on oxycodone 5mg TID. This has been slowly reduced in the last few years. She does take an iron supplementation. Denies BRBPR or black/tarry stools. She does take miralax as needed for constipation.    Denies NSAIDs. Takes  tylenol for migraine headaches. Denies use of OTC herbal supplements/weight loss products.      Denies use of ETOH and tobacco products. Vapes CBD.     No family history of GI related malignancy (esophageal, gastric, pancreatic, liver or colon) or family history of IBD/celiac disease.     ROS:    No fevers or chills  No weight loss  No shortness of breath or wheezing  No chest pain or pressure  No odynophagia or dysphagia  No BRBPR, hematochezia, melena  No anxiety or depression      PROBLEM LIST  Patient Active Problem List    Diagnosis Date Noted     Pulmonary hypertension (H) 03/26/2024     Priority: Medium     F11.2 - Continuous opioid dependence (H) 05/10/2023     Priority: Medium     Microalbuminuria 07/07/2022     Priority: Medium     Iron deficiency anemia due to chronic blood loss 08/04/2020     Priority: Medium     S/P ORIF (open reduction internal fixation) fracture 02/10/2020     Priority: Medium     Sinus tachycardia 01/31/2020     Priority: Medium     Anemia, chronic disease 11/25/2019     Priority: Medium     With acute/chronic blood loss anemia due to severe esophagitis.  History of severe infusion related reaction to Venofer 1/2022 5/2023 - Injectafer 750 mg x 2 doses, 1 week apart.  Will order Methylprednisolone 125 mg IV to be given before iron infusion       Mirena IUD- Remove by 09/2024 09/06/2019     Priority: Medium     Lot: [id number]  Exp: 01/2022    Dinora Israel LPN         PTSD (post-traumatic stress disorder) 06/19/2019     Priority: Medium     Severe episode of recurrent major depressive disorder, with psychotic features (H) 07/06/2018     Priority: Medium     Severe persistent asthma without complication (H) 07/06/2018     Priority: Medium     On high dose ICS/LABA + frequent albuterol use.  Next allergy/pulmonary referral       Aspiration of food 03/29/2018     Priority: Medium     Chronic pain syndrome 04/26/2017     Priority: Medium     Patient is followed by Grecia Barba,  DO for ongoing prescription of pain medication.  All refills should only be approved by this provider, or covering partner.    Medication(s): Oxycodone 10 mg.   Maximum quantity per month: 90  Clinic visit frequency required: Q 3 months     Controlled substance agreement:  Encounter-Level CSA - 04/26/2017:    Controlled Substance Agreement - Scan on 5/4/2017  8:58 AM: CONTROLLED SUBSTANCE AGREEMENT (below)         Patient-Level CSA:    Controlled Substance Agreement - Opioid - Scan on 2/6/2019  6:23 PM (below)         Pain Clinic evaluation in the past: No    DIRE Total Score(s):  No flowsheet data found.             [email or web address]         Chronic migraine without aura without status migrainosus, not intractable 04/12/2017     Priority: Medium     With medication overuse.  Fioricet and not Imitrex is recommend to treat severe headache.  2/2017 [facility] recommend wean down from daily Fioricet and use Excedrin Migrain PRN.       AIN grade I 03/30/2017     Priority: Medium     Found at [facility] on colonoscopy 2/2017.  Recommend repeat anoscopy and anal pap in 6/2017.  Following with  Colorectal surgery, last 2023       Gastroesophageal reflux disease with esophagitis 03/30/2017     Priority: Medium     EGD done at [facility] 2/2017 showed esophagitis without GAVE.  Recommend max dose H2 and PPI, along with 4 tablets of Carafate dissolved in water and drink throughout the day.    Had LA Grade D esophagitis, on TID PPI       Stage 3a chronic kidney disease (H) 02/27/2017     Priority: Medium     Limited systemic sclerosis (H) 01/25/2017     Priority: Medium     Raynaud's, calcinosis, telangiectasias, peripheral neuropathy, GERD symptoms, history of scleroderma renal crisis.  Follows at [facility]       Polyneuropathy associated with underlying disease (H) 01/25/2017     Priority: Medium     Due to scleroderma and neurology thought possible due to ICU (critical illness neuropathy). EMG 1/2017 positive for neuropathy        History of Clostridium difficile 2016     Priority: Medium     History of Helicobacter pylori infection 2016     Priority: Medium     Scleroderma with renal involvement (H) 2016     Priority: Medium     Needs lisinopril long term       History of ARDS 2016     Priority: Medium     2015, prolonged ICU/vent/trach due to influenza, scleroderma renal crisis requiring hemodialysis.       History of hemodialysis 2016     Priority: Medium     2015 due to scleroderma renal crisis       Bilateral retinitis 2016     Priority: Medium     History of pulmonary embolism 2016     Priority: Medium     Completed anti-coagulation 2017.  pulmonary embolism due to critical illness       REX (generalized anxiety disorder) 2016     Priority: Medium     Systemic sclerosis (H) 2016     Priority: Medium       PERTINENT PAST MEDICAL HISTORY:  Past Medical History:   Diagnosis Date     Acute pulmonary embolism (H)     During renal crisis     Acute pyelonephritis 2017     Altered mental state 2018     Anxiety      Arthritis      Complication of anesthesia      Depression      Gastroesophageal reflux disease with esophagitis      History of blood transfusion      Hypertension      Long-term (current) use of anticoagulants [Z79.01] 2016     Neuropathy      PE (pulmonary embolism) 2016     PTSD (post-traumatic stress disorder)      Raynaud's disease without gangrene      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      Rheumatism      Scleroderma (H) 2016     Slow to wake up after anesthesia     pt states it took 2 days to come out of anesthesia after her ankle surgery     Uncomplicated asthma        PREVIOUS SURGERIES:  Past Surgical History:   Procedure Laterality Date     ANGIOGRAM        SECTION       COMBINED ESOPHAGOSCOPY, GASTROSCOPY, DUODENOSCOPY (EGD) WITH CO2 INSUFFLATION N/A 2022    Procedure:  ESOPHAGOGASTRODUODENOSCOPY, WITH CO2 INSUFFLATION;  Surgeon: Jalen Moses MD;  Location: [facility] OR     ESOPHAGOSCOPY, GASTROSCOPY, DUODENOSCOPY (EGD), COMBINED N/A 7/14/2022    Procedure: ESOPHAGOGASTRODUODENOSCOPY, WITH BIOPSY;  Surgeon: Jalen Moses MD;  Location: [facility] OR     OPEN REDUCTION INTERNAL FIXATION ANKLE Right 2/10/2020    Procedure: Right ankle open reduction internal fixation;  Surgeon: Natanael Villalta MD;  Location: [facility] OR     REMOVE HARDWARE ANKLE Right 9/1/2021    Procedure: Right ankle hardware removal;  Surgeon: Natanael Villalta MD;  Location: [facility] OR     THROAT SURGERY  2015       PREVIOUS ENDOSCOPY:  See chart.    ALLERGIES:     Allergies   Allergen Reactions     Blood Transfusion Related (Informational Only) Other (See Comments)     Patient has a history of a clinically significant antibody against RBC antigens.  A delay in compatible RBCs may occur.     Pollen Extract      Seasonal Allergies      Venofer [Iron Sucrose] Difficulty breathing and Cramps     Severe infusion reaction 1/2022     Shellfish-Derived Products Nausea and Rash     Rash on face       PERTINENT MEDICATIONS:    Current Outpatient Medications:      acetaminophen (TYLENOL) 325 MG tablet, Take 2 tablets (650 mg) by mouth every 4 hours as needed for mild pain or other, Disp: 1 Bottle, Rfl: 0     albuterol (VENTOLIN HFA) 108 (90 Base) MCG/ACT inhaler, INHALE 2 PUFFS INTO THE LUNGS ONCE EVERY 4 HOURS AS NEEDED FOR SHORTNESS OF BREATH / DYSPNEA / WHEEZING, Disp: 18 g, Rfl: 11     amLODIPine (NORVASC) 5 MG tablet, Take 1 tablet (5 mg) by mouth daily, Disp: 90 tablet, Rfl: 3     blood glucose (NO BRAND SPECIFIED) lancets standard, Use to test blood sugar 1 time daily, Disp: 100 each, Rfl: 3     blood glucose (NO BRAND SPECIFIED) test strip, Use to test blood sugar 1 time daily, Disp: 100 strip, Rfl: 11     busPIRone (BUSPAR) 15 MG tablet, Take 1 tablet (15 mg) by mouth 2 times daily., Disp: 180  tablet, Rfl: 3     cholecalciferol 125 MCG (5000 UT) CAPS, Take 1 capsule (5,000 Units) by mouth daily Start after Ergocalciferol course is complete, Disp: 90 capsule, Rfl: 3     DULoxetine (CYMBALTA) 30 MG capsule, Take 3 capsules (90 mg) by mouth daily., Disp: 270 capsule, Rfl: 3     famotidine (PEPCID) 20 MG tablet, Take 1 tablet (20 mg) by mouth 2 times daily., Disp: 180 tablet, Rfl: 3     fluticasone-vilanterol (BREO ELLIPTA) 200-25 MCG/ACT inhaler, Inhale 1 puff into the lungs daily., Disp: 60 each, Rfl: 11     folic acid (FOLVITE) 1 MG tablet, Take 1 mg by mouth daily, Disp: , Rfl:      gabapentin (NEURONTIN) 300 MG capsule, Take 3 capsules (900 mg) by mouth 2 times daily, Disp: 540 capsule, Rfl: 3     GOODSENSE MIGRAINE FORMULA 250-250-65 MG per tablet, TAKE ONE TABLET BY MOUTH EVERY 6 HOURS AS NEEDED FOR HEADACHES (Patient taking differently: Take 1 tablet by mouth every 6 hours as needed.), Disp: 24 tablet, Rfl: 1     lisinopril (ZESTRIL) 10 MG tablet, Take 1 tablet (10 mg) by mouth every evening, Disp: 90 tablet, Rfl: 3     loratadine (CLARITIN) 10 MG tablet, Take 10 mg by mouth daily as needed , Disp: , Rfl:      LORazepam (ATIVAN) 1 MG tablet, TAKE 1 TABLET (1 MG) BY MOUTH 2 TIMES DAILY AS NEEDED FOR ANXIETY #45 FOR 30 DAYS, Disp: 45 tablet, Rfl: 5     medical cannabis (Patient's own supply), (The purpose of this order is to document that the patient reports taking medical cannabis.  This is not a prescription, and is not used to certify that the patient has a qualifying medical condition.)  CBG gummy over the counter, Disp: 0 Information only, Rfl: 0     methocarbamol (ROBAXIN) 750 MG tablet, Take 1 tablet (750 mg) by mouth 3 times daily as needed for muscle spasms, Disp: 180 tablet, Rfl: 3     methotrexate sodium, PF, 50 MG/2ML SOLN injection, Inject 0.6 mg Subcutaneous every 7 days, Disp: , Rfl:      metoclopramide (REGLAN) 10 MG tablet, Take 1 tablet (10 mg) by mouth 3 times daily as needed  "(nausea/vomiting), Disp: 30 tablet, Rfl: 1     naloxone (NARCAN) 4 MG/0.1ML nasal spray, Spray 1 spray (4 mg) into one nostril alternating nostrils once as needed for opioid reversal every 2-3 minutes until assistance arrives, Disp: 0.2 mL, Rfl: 3     omeprazole (PRILOSEC) 40 MG DR capsule, Take 1 capsule (40 mg) by mouth 3 times daily. GI has approved high dose, Disp: 270 capsule, Rfl: 3     ondansetron (ZOFRAN ODT) 4 MG ODT tab, Take 1 tablet (4 mg) by mouth every 8 hours as needed for nausea, Disp: 30 tablet, Rfl: 4     oxyCODONE (ROXICODONE) 5 MG tablet, Take 1 tablet (5 mg) by mouth every 6 hours as needed for pain., Disp: 100 tablet, Rfl: 0     [START ON 12/23/2024] oxyCODONE (ROXICODONE) 5 MG tablet, Take 1 tablet (5 mg) by mouth every 6 hours as needed for pain., Disp: 100 tablet, Rfl: 0     [START ON 1/22/2025] oxyCODONE (ROXICODONE) 5 MG tablet, Take 1 tablet (5 mg) by mouth every 6 hours as needed for pain., Disp: 100 tablet, Rfl: 0     polyethylene glycol (MIRALAX) 17 GM/Dose powder, Take 17 g by mouth daily, Disp: 510 g, Rfl: 11     Prenatal Vit-Fe Fumarate-FA (PRENATAL VITAMIN AND MINERAL) 28-0.8 MG TABS, Take 1 tablet by mouth daily, Disp: 90 tablet, Rfl: 3     syringe/needle, disp, (BD ECLIPSE SYRINGE) 25G X 1\" 3 ML MISC, 1 each daily as needed, Disp: 10 each, Rfl: 11     vitamin B-12 (CYANOCOBALAMIN) 2500 MCG sublingual tablet, Take 1 tablet (2,500 mcg) by mouth daily, Disp: 90 tablet, Rfl: 3    SOCIAL HISTORY:  Social History     Socioeconomic History     Marital status: Single     Spouse name: Not on file     Number of children: Not on file     Years of education: Not on file     Highest education level: Not on file   Occupational History     Not on file   Tobacco Use     Smoking status: Never     Smokeless tobacco: Never   Vaping Use     Vaping status: Every Day     Substances: Flavoring, delta 9     Devices: Disposable   Substance and Sexual Activity     Alcohol use: Not Currently     Comment: " Socially once on a weekend if any     Drug use: Yes     Types: Marijuana     Comment: delta     Sexual activity: Yes     Partners: Male     Birth control/protection: I.U.D.   Other Topics Concern     Parent/sibling w/ CABG, MI or angioplasty before 65F 55M? No   Social History Narrative    11/2019: Lives at home with significant other and their son, Td (2014).      Social Drivers of Health     Financial Resource Strain: Low Risk  (5/17/2024)    Financial Resource Strain      Within the past 12 months, have you or your family members you live with been unable to get utilities (heat, electricity) when it was really needed?: No   Food Insecurity: No Food Insecurity (7/24/2024)    Received from [facility]    Hunger Vital Sign      Worried About Running Out of Food in the Last Year: Never true      Ran Out of Food in the Last Year: Never true   Transportation Needs: No Transportation Needs (7/24/2024)    Received from [facility]    PRAPARE - Transportation      Lack of Transportation (Medical): No      Lack of Transportation (Non-Medical): No   Physical Activity: Insufficiently Active (7/24/2024)    Received from [facility]    Exercise Vital Sign      Days of Exercise per Week: 2 days      Minutes of Exercise per Session: 20 min   Stress: Stress Concern Present (5/17/2024)    [employer] [place] [employer] - Occupational Stress Questionnaire      Feeling of Stress : Rather much   Social Connections: Unknown (5/17/2024)    Social Connection and Isolation Panel [NHANES]      Frequency of Communication with Friends and Family: Not on file      Frequency of Social Gatherings with Friends and Family: Once a week      Attends [employer] Services: Not on file      Active Member of Clubs or Organizations: Not on file      Attends Club or Organization Meetings: Not on file      Marital Status: Not on file   Interpersonal Safety: Low Risk  (11/20/2024)    Interpersonal Safety      Do you feel physically and  emotionally safe where you currently live?: Yes      Within the past 12 months, have you been hit, slapped, kicked or otherwise physically hurt by someone?: No      Within the past 12 months, have you been humiliated or emotionally abused in other ways by your partner or ex-partner?: No   Housing Stability: Low Risk  (2024)    Received from [facility]    Housing Stability      What is your living situation today?: I have a steady place to live       FAMILY HISTORY:  Family History   Problem Relation Age of Onset     Hyperlipidemia Father      Depression Sister      Fibromyalgia Sister      Hyperlipidemia Sister      Cerebrovascular Disease Maternal Grandmother          of a stroke     Diabetes Maternal Grandmother      Hyperlipidemia Paternal Grandfather      Diabetes Maternal Aunt      Depression Other      Melanoma No family hx of      Skin Cancer No family hx of      Anesthesia Reaction No family hx of      Cardiovascular No family hx of      Deep Vein Thrombosis (DVT) No family hx of        Past/family/social history reviewed and no changes    PHYSICAL EXAMINATION:  Constitutional: aaox3, cooperative, pleasant, not dyspneic/diaphoretic, no acute distress  Vitals reviewed: /82   Pulse 84   Wt 58.5 kg (129 lb)   SpO2 100%   BMI 24.67 kg/m    Wt:   Wt Readings from Last 2 Encounters:   24 58.8 kg (129 lb 9.6 oz)   24 59.9 kg (132 lb)      Eyes: Sclera anicteric/injected  Respiratory: Unlabored breathing  Skin: warm, perfused, no jaundice  Psych: Normal affect  MSK: Normal gait          Again, thank you for allowing me to participate in the care of your patient.        Sincerely,        Josy Sinha PA-C

## 2024-12-05 NOTE — PATIENT INSTRUCTIONS
It was a pleasure meeting with you today and discussing your healthcare plan. Below is a summary of what we covered:    --schedule upper endoscopy.   --continue current reflux regiment: omeprazole 40mg, 3x/day, and pepcid 20mg, two times a day.   --continue eating small more frequent meals.   --continue oral iron.   --continue zofran and reglan as needed (don't take both on the same day).   --miralax as needed.     Follow up in GI clinic in 3 months.      Please see below for any additional questions and scheduling guidelines.    Sign up for [employer]: [employer] patient portal serves as a secure platform for accessing your medical records from the [facility]. Additionally, [employer] facilitates easy, timely, and secure messaging with your care team. If you have not signed up, you may do so by using the provided code or calling [phone].    Coordinating your care after your visit:  There are multiple options for scheduling your follow-up care based on your provider's recommendation.    How do I schedule a follow-up clinic appointment:   After your appointment, you may receive scheduling assistance with the Clinic Coordinators by having a seat in the waiting room and a Clinic Coordinator will call you up to schedule.  Virtual visits or after you leave the clinic:  Your provider has placed a follow-up order in the [employer] portal for scheduling your return appointment. A member of the scheduling team will contact you to schedule.  [employer] Scheduling: Timely scheduling through [employer] is advised to ensure appointment availability.   Call to schedule: You may schedule your follow-up appointment(s) by calling [phone], option 1.    How do I schedule my endoscopy or colonoscopy procedure:  If a procedure, such as a colonoscopy or upper endoscopy was ordered by your provider, the scheduling team will contact you to schedule this procedure. Or you may choose to call to schedule at   [phone], option 2.   Please allow 20-30 minutes when scheduling a procedure.    How do I get my blood work done? To get your blood work done, you need to schedule a lab appointment at an [facility]. There are multiple ways to schedule:   At the clinic: The Clinic Coordinator you meet after your visit can help you schedule a lab appointment.   [employer] scheduling: [employer] offers online lab scheduling at all [facility] laboratory locations.   Call to schedule: You can call [phone] to schedule your lab appointment.    How do I schedule my imaging study: To schedule imaging studies, such as CT scans, ultrasounds, MRIs, or X-rays, contact Imaging Services at [phone].    How do I schedule a referral to another doctor: If your provider recommended a referral to another specialist(s), the referral order was placed by your provider. You will receive a phone call to schedule this referral, or you may choose to call the number attached to the referral to self-schedule.    For Post-Visit Question(s):  For any inquiries following today's visit:  Please utilize [employer] messaging and allow 48 hours for reply or contact the Call Center during normal business hours at [phone], option 3.  For Emergent After-hours questions, contact the On-Call GI Fellow through the [facility]  at [phone].  In addition, you may contact your Nurse directly using the provided contact information.    Test Results:  Test results will be accessible via [employer] in compliance with the 21st Century Cures Act. This means that your results will be available to you at the same time as your provider. Often you may see your results before your provider does. Results are reviewed by staff within two weeks with communication follow-up. Results may be released in the patient portal prior to your care team review.    Prescription Refill(s):  Medication prescribed by your provider will be addressed during your visit. For future  refills, please coordinate with your pharmacy. If you have not had a recent clinic visit or routine labs, for your safety, your provider may not be able to refill your prescription.

## 2025-01-06 DIAGNOSIS — F41.1 GAD (GENERALIZED ANXIETY DISORDER): ICD-10-CM

## 2025-01-07 ENCOUNTER — VIRTUAL VISIT (OUTPATIENT)
Dept: FAMILY MEDICINE | Facility: CLINIC | Age: 40
End: 2025-01-07
Payer: MEDICARE

## 2025-01-07 DIAGNOSIS — F11.20 CONTINUOUS OPIOID DEPENDENCE (H): ICD-10-CM

## 2025-01-07 DIAGNOSIS — J45.50 SEVERE PERSISTENT ASTHMA WITHOUT COMPLICATION (H): ICD-10-CM

## 2025-01-07 DIAGNOSIS — M34.9 SYSTEMIC SCLEROSIS (H): Primary | ICD-10-CM

## 2025-01-07 DIAGNOSIS — G63 POLYNEUROPATHY ASSOCIATED WITH UNDERLYING DISEASE: ICD-10-CM

## 2025-01-07 DIAGNOSIS — N18.31 STAGE 3A CHRONIC KIDNEY DISEASE (H): ICD-10-CM

## 2025-01-07 DIAGNOSIS — F33.3 SEVERE EPISODE OF RECURRENT MAJOR DEPRESSIVE DISORDER, WITH PSYCHOTIC FEATURES (H): ICD-10-CM

## 2025-01-07 DIAGNOSIS — I27.23 PULMONARY HYPERTENSION DUE TO LUNG DISEASES AND HYPOXIA (H): ICD-10-CM

## 2025-01-07 PROCEDURE — 98005 SYNCH AUDIO-VIDEO EST LOW 20: CPT | Performed by: INTERNAL MEDICINE

## 2025-01-07 RX ORDER — LORAZEPAM 1 MG/1
1 TABLET ORAL 2 TIMES DAILY PRN
Qty: 45 TABLET | Refills: 5 | Status: SHIPPED | OUTPATIENT
Start: 2025-01-07

## 2025-01-07 ASSESSMENT — ASTHMA QUESTIONNAIRES
QUESTION_1 LAST FOUR WEEKS HOW MUCH OF THE TIME DID YOUR ASTHMA KEEP YOU FROM GETTING AS MUCH DONE AT WORK, SCHOOL OR AT HOME: SOME OF THE TIME
ACT_TOTALSCORE: 15
QUESTION_3 LAST FOUR WEEKS HOW OFTEN DID YOUR ASTHMA SYMPTOMS (WHEEZING, COUGHING, SHORTNESS OF BREATH, CHEST TIGHTNESS OR PAIN) WAKE YOU UP AT NIGHT OR EARLIER THAN USUAL IN THE MORNING: TWO OR THREE NIGHTS A WEEK
ACT_TOTALSCORE: 15
QUESTION_4 LAST FOUR WEEKS HOW OFTEN HAVE YOU USED YOUR RESCUE INHALER OR NEBULIZER MEDICATION (SUCH AS ALBUTEROL): ONE OR TWO TIMES PER DAY
QUESTION_2 LAST FOUR WEEKS HOW OFTEN HAVE YOU HAD SHORTNESS OF BREATH: ONCE OR TWICE A WEEK
QUESTION_5 LAST FOUR WEEKS HOW WOULD YOU RATE YOUR ASTHMA CONTROL: WELL CONTROLLED

## 2025-01-07 ASSESSMENT — ANXIETY QUESTIONNAIRES
8. IF YOU CHECKED OFF ANY PROBLEMS, HOW DIFFICULT HAVE THESE MADE IT FOR YOU TO DO YOUR WORK, TAKE CARE OF THINGS AT HOME, OR GET ALONG WITH OTHER PEOPLE?: SOMEWHAT DIFFICULT
2. NOT BEING ABLE TO STOP OR CONTROL WORRYING: SEVERAL DAYS
GAD7 TOTAL SCORE: 10
6. BECOMING EASILY ANNOYED OR IRRITABLE: SEVERAL DAYS
1. FEELING NERVOUS, ANXIOUS, OR ON EDGE: MORE THAN HALF THE DAYS
IF YOU CHECKED OFF ANY PROBLEMS ON THIS QUESTIONNAIRE, HOW DIFFICULT HAVE THESE PROBLEMS MADE IT FOR YOU TO DO YOUR WORK, TAKE CARE OF THINGS AT HOME, OR GET ALONG WITH OTHER PEOPLE: SOMEWHAT DIFFICULT
6. BECOMING EASILY ANNOYED OR IRRITABLE: SEVERAL DAYS
2. NOT BEING ABLE TO STOP OR CONTROL WORRYING: MORE THAN HALF THE DAYS
7. FEELING AFRAID AS IF SOMETHING AWFUL MIGHT HAPPEN: SEVERAL DAYS
GAD7 TOTAL SCORE: 9
3. WORRYING TOO MUCH ABOUT DIFFERENT THINGS: SEVERAL DAYS
IF YOU CHECKED OFF ANY PROBLEMS ON THIS QUESTIONNAIRE, HOW DIFFICULT HAVE THESE PROBLEMS MADE IT FOR YOU TO DO YOUR WORK, TAKE CARE OF THINGS AT HOME, OR GET ALONG WITH OTHER PEOPLE: SOMEWHAT DIFFICULT
1. FEELING NERVOUS, ANXIOUS, OR ON EDGE: SEVERAL DAYS
3. WORRYING TOO MUCH ABOUT DIFFERENT THINGS: MORE THAN HALF THE DAYS
7. FEELING AFRAID AS IF SOMETHING AWFUL MIGHT HAPPEN: SEVERAL DAYS
4. TROUBLE RELAXING: SEVERAL DAYS
5. BEING SO RESTLESS THAT IT IS HARD TO SIT STILL: SEVERAL DAYS
7. FEELING AFRAID AS IF SOMETHING AWFUL MIGHT HAPPEN: SEVERAL DAYS
5. BEING SO RESTLESS THAT IT IS HARD TO SIT STILL: NEARLY EVERY DAY

## 2025-01-07 ASSESSMENT — PATIENT HEALTH QUESTIONNAIRE - PHQ9
5. POOR APPETITE OR OVEREATING: SEVERAL DAYS
SUM OF ALL RESPONSES TO PHQ QUESTIONS 1-9: 9
10. IF YOU CHECKED OFF ANY PROBLEMS, HOW DIFFICULT HAVE THESE PROBLEMS MADE IT FOR YOU TO DO YOUR WORK, TAKE CARE OF THINGS AT HOME, OR GET ALONG WITH OTHER PEOPLE: NOT DIFFICULT AT ALL
SUM OF ALL RESPONSES TO PHQ QUESTIONS 1-9: 9

## 2025-01-07 NOTE — PATIENT INSTRUCTIONS
Referral to Derm/Rheum at [facility]  Referral to Orthotics  No changes to mental health medications.  The lower dose of BuSpar might help with depression, but I worry that it would worsen your anxiety.  Continue with plan to prioritize seeing the behavioral health clinician

## 2025-01-07 NOTE — PROGRESS NOTES
Molly is a 39 year old who is being evaluated via a billable video visit.    How would you like to obtain your AVS? MyChart  If the video visit is dropped, the invitation should be resent by: Text to cell phone: [phone]  Will anyone else be joining your video visit? No      Assessment & Plan     Systemic sclerosis (H)  Referral to dermatology with rheumatology expertise at [facility].  Needs new foot orthotics.  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  Due to polypharmacy, do not feel that further medication adjustments would be helpful.  Recommend prioritizing talk therapy and she is agreeable    Stage 3a chronic kidney disease (H)  Follows with renal    Pulmonary hypertension due to lung diseases and hypoxia (H)  Follows with rheumatology at [place]    Polyneuropathy associated with underlying disease (H)  See above on chronic opiates.  - Orthotics, Mastectomy and Custom Compression Orders    Continuous opioid dependence (H)  See above on chronic opiates.    Severe persistent asthma without complication (H)  Known issue that I take into account for their medical decisions, no current exacerbations or new concerns            Patient Instructions   Referral to Derm/Rheum at [facility]  Referral to Orthotics  No changes to mental health medications.  The lower dose of BuSpar might help with depression, but I worry that it would worsen your anxiety.  Continue with plan to prioritize seeing the behavioral health clinician    Subjective   Molly is a 39 year old, presenting for the following health issues:  Depression and Anxiety        1/7/2025     1:40 PM   Additional Questions   Roomed by christian   Accompanied by self         1/7/2025     1:40 PM   Patient Reported  Additional Medications   Patient reports taking the following new medications none     Video Start Time: 2:00 pm    HPI       DME: Needs new foot orthotics.  Has a history of calcinosis and nonhealing wounds of the foot.  Has seen podiatry.  Orthotics are at end-of-life    Derm is noticing more skin tightness and skin discoloration.  Has not seen dermatology in some time  Depression and Anxiety   How are you doing with your depression since your last visit? Improved med are helping   How are you doing with your anxiety since your last visit?  No change  Are you having other symptoms that might be associated with depression or anxiety? No  Have you had a significant life event? No   Do you have any concerns with your use of alcohol or other drugs? No  Last visit I referred her to the behavioral health clinician, who reached out to patient but she did not schedule an appointment - busy life with holidays, put plans to schedule follow-up   Last fill of lorazepam 10/23, using a few times/week.  The buspar was decreased 1/2024 for feeling 'flat'. This did seem to help    Social History     Tobacco Use    Smoking status: Never    Smokeless tobacco: Never   Vaping Use    Vaping status: Every Day    Substances: Flavoring, delta 9    Devices: Disposable   Substance Use Topics    Alcohol use: Not Currently     Comment: Socially once on a weekend if any    Drug use: Yes     Types: Marijuana     Comment: delta         12/5/2024     8:51 AM 1/7/2025     1:41 PM 1/7/2025     1:48 PM   PHQ   PHQ-9 Total Score 8 9  9   Q9: Thoughts of better off dead/self-harm past 2 weeks Not at all Not at all Not at all       Patient-reported         11/20/2024     2:51 PM 1/7/2025     1:46 PM 1/7/2025     1:48 PM   REX-7 SCORE   Total Score 11 (moderate anxiety)  10 (moderate anxiety)   Total Score 11  9 10        Patient-reported         1/7/2025     1:48 PM   Last PHQ-9   1.  Little interest or pleasure in doing things 2   2.  Feeling down,  depressed, or hopeless 1   3.  Trouble falling or staying asleep, or sleeping too much 1   4.  Feeling tired or having little energy 2   5.  Poor appetite or overeating 2   6.  Feeling bad about yourself 1   7.  Trouble concentrating 0   8.  Moving slowly or restless 0   Q9: Thoughts of better off dead/self-harm past 2 weeks 0   PHQ-9 Total Score 9         1/7/2025     1:48 PM   REX-7    1. Feeling nervous, anxious, or on edge 2   2. Not being able to stop or control worrying 2   3. Worrying too much about different things 2   4. Trouble relaxing 1   5. Being so restless that it is hard to sit still 1   6. Becoming easily annoyed or irritable 1   7. Feeling afraid, as if something awful might happen 1   REX-7 Total Score 10    If you checked any problems, how difficult have they made it for you to do your work, take care of things at home, or get along with other people? Somewhat difficult       Patient-reported       Suicide Assessment Five-step Evaluation and Treatment (SAFE-T)            Current Outpatient Medications   Medication Sig Dispense Refill    acetaminophen (TYLENOL) 325 MG tablet Take 2 tablets (650 mg) by mouth every 4 hours as needed for mild pain or other 1 Bottle 0    albuterol (VENTOLIN HFA) 108 (90 Base) MCG/ACT inhaler INHALE 2 PUFFS INTO THE LUNGS ONCE EVERY 4 HOURS AS NEEDED FOR SHORTNESS OF BREATH / DYSPNEA / WHEEZING 18 g 11    amLODIPine (NORVASC) 5 MG tablet Take 1 tablet (5 mg) by mouth daily 90 tablet 3    blood glucose (NO BRAND SPECIFIED) lancets standard Use to test blood sugar 1 time daily 100 each 3    blood glucose (NO BRAND SPECIFIED) test strip Use to test blood sugar 1 time daily 100 strip 11    busPIRone (BUSPAR) 15 MG tablet Take 1 tablet (15 mg) by mouth 2 times daily. 180 tablet 3    cholecalciferol 125 MCG (5000 UT) CAPS Take 1 capsule (5,000 Units) by mouth daily Start after Ergocalciferol course is complete 90 capsule 3    DULoxetine (CYMBALTA) 30 MG capsule Take 3  capsules (90 mg) by mouth daily. 270 capsule 3    famotidine (PEPCID) 20 MG tablet Take 1 tablet (20 mg) by mouth 2 times daily. 180 tablet 3    fluticasone-vilanterol (BREO ELLIPTA) 200-25 MCG/ACT inhaler Inhale 1 puff into the lungs daily. 60 each 11    folic acid (FOLVITE) 1 MG tablet Take 1 mg by mouth daily      gabapentin (NEURONTIN) 300 MG capsule Take 3 capsules (900 mg) by mouth 2 times daily 540 capsule 3    GOODSENSE MIGRAINE FORMULA 250-250-65 MG per tablet TAKE ONE TABLET BY MOUTH EVERY 6 HOURS AS NEEDED FOR HEADACHES 24 tablet 1    lisinopril (ZESTRIL) 10 MG tablet Take 1 tablet (10 mg) by mouth every evening 90 tablet 3    loratadine (CLARITIN) 10 MG tablet Take 10 mg by mouth daily as needed       LORazepam (ATIVAN) 1 MG tablet TAKE 1 TABLET (1 MG) BY MOUTH 2 TIMES DAILY AS NEEDED FOR ANXIETY #45 FOR 30 DAYS 45 tablet 5    medical cannabis (Patient's own supply) (The purpose of this order is to document that the patient reports taking medical cannabis.  This is not a prescription, and is not used to certify that the patient has a qualifying medical condition.)  CBG gummy over the counter 0 Information only 0    methocarbamol (ROBAXIN) 750 MG tablet Take 1 tablet (750 mg) by mouth 3 times daily as needed for muscle spasms 180 tablet 3    methotrexate sodium, PF, 50 MG/2ML SOLN injection Inject 0.6 mg Subcutaneous every 7 days      metoclopramide (REGLAN) 10 MG tablet Take 1 tablet (10 mg) by mouth 3 times daily as needed (nausea/vomiting) 30 tablet 1    naloxone (NARCAN) 4 MG/0.1ML nasal spray Spray 1 spray (4 mg) into one nostril alternating nostrils once as needed for opioid reversal every 2-3 minutes until assistance arrives 0.2 mL 3    omeprazole (PRILOSEC) 40 MG DR capsule Take 1 capsule (40 mg) by mouth 3 times daily. GI has approved high dose 270 capsule 3    ondansetron (ZOFRAN ODT) 4 MG ODT tab Take 1 tablet (4 mg) by mouth every 8 hours as needed for nausea 30 tablet 4    oxyCODONE  "(ROXICODONE) 5 MG tablet Take 1 tablet (5 mg) by mouth every 6 hours as needed for pain. 100 tablet 0    [START ON 1/22/2025] oxyCODONE (ROXICODONE) 5 MG tablet Take 1 tablet (5 mg) by mouth every 6 hours as needed for pain. 100 tablet 0    polyethylene glycol (MIRALAX) 17 GM/Dose powder Take 17 g by mouth daily 510 g 11    Prenatal Vit-Fe Fumarate-FA (PRENATAL VITAMIN AND MINERAL) 28-0.8 MG TABS Take 1 tablet by mouth daily 90 tablet 3    syringe/needle, disp, (BD ECLIPSE SYRINGE) 25G X 1\" 3 ML MISC 1 each daily as needed 10 each 11    vitamin B-12 (CYANOCOBALAMIN) 2500 MCG sublingual tablet Take 1 tablet (2,500 mcg) by mouth daily 90 tablet 3           Review of Systems  Constitutional, HEENT, cardiovascular, pulmonary, gi and gu systems are negative, except as otherwise noted.      Objective           Vitals:  No vitals were obtained today due to virtual visit.    Physical Exam   GENERAL: alert and no distress  EYES: Eyes grossly normal to inspection.  No discharge or erythema, or obvious scleral/conjunctival abnormalities.  RESP: No audible wheeze, cough, or visible cyanosis.    SKIN: Visible skin clear. No significant rash, abnormal pigmentation or lesions.  NEURO: Cranial nerves grossly intact.  Mentation and speech appropriate for age.  PSYCH: Appropriate affect, tone, and pace of words          Video-Visit Details    Type of service:  Video Visit   Video End Time:2:20 PM  Originating Location (pt. Location): Home    Distant Location (provider location):  On-site  Platform used for Video Visit: Jimbo  Signed Electronically by: Grecia Barba DO    "

## 2025-01-12 ENCOUNTER — HEALTH MAINTENANCE LETTER (OUTPATIENT)
Age: 40
End: 2025-01-12

## 2025-02-20 NOTE — CONFIDENTIAL NOTE
DIAGNOSIS:    Renovascular hypertension   Limited systemic sclerosis      DATE RECEIVED: 05.21.2025    NOTES STATUS DETAILS   OFFICE NOTE from referring provider Internal 05.17.2024 Grecia Barba,     OFFICE NOTE from other specialist  Care Everywhere 10.21.2024 Anahy Gutierrez M.D.  [place]    03.13.2024 Fer Finch M.D.   [place]    04.18.2023 Daniel Willingham M.D.  [place] [facility]   MEDICATION LIST Internal    IMAGING  (NEED IMAGES AND REPORTS)     KIDNEY CT SCAN Internal 10.21.2024, 07.02.2024 CT Chest [place] [facility]   LABS     CBC Care Everywhere 01.15.2025   CMP Care Everywhere 01.15.2025   BMP Internal 09.28.2023   UA Internal 08.08.2023   URINE PROTEIN Internal 08.08.2023

## 2025-03-04 ENCOUNTER — HOSPITAL ENCOUNTER (EMERGENCY)
Facility: CLINIC | Age: 40
Discharge: HOME | End: 2025-03-04
Payer: MEDICARE

## 2025-03-04 VITALS
HEART RATE: 77 BPM | RESPIRATION RATE: 18 BRPM | DIASTOLIC BLOOD PRESSURE: 75 MMHG | SYSTOLIC BLOOD PRESSURE: 113 MMHG | TEMPERATURE: 99 F

## 2025-03-04 DIAGNOSIS — H66.002 LEFT ACUTE SUPPURATIVE OTITIS MEDIA: ICD-10-CM

## 2025-03-04 PROCEDURE — G0463 HOSPITAL OUTPT CLINIC VISIT: HCPCS

## 2025-03-04 PROCEDURE — 87637 SARSCOV2&INF A&B&RSV AMP PRB: CPT

## 2025-03-04 PROCEDURE — 99213 OFFICE O/P EST LOW 20 MIN: CPT

## 2025-03-04 RX ORDER — AMOXICILLIN 875 MG/1
875 TABLET, COATED ORAL 2 TIMES DAILY
Qty: 14 TABLET | Refills: 0 | Status: SHIPPED | OUTPATIENT
Start: 2025-03-04 | End: 2025-03-11

## 2025-03-04 ASSESSMENT — COLUMBIA-SUICIDE SEVERITY RATING SCALE - C-SSRS
6. HAVE YOU EVER DONE ANYTHING, STARTED TO DO ANYTHING, OR PREPARED TO DO ANYTHING TO END YOUR LIFE?: NO
2. HAVE YOU ACTUALLY HAD ANY THOUGHTS OF KILLING YOURSELF IN THE PAST MONTH?: NO
1. IN THE PAST MONTH, HAVE YOU WISHED YOU WERE DEAD OR WISHED YOU COULD GO TO SLEEP AND NOT WAKE UP?: NO

## 2025-03-04 NOTE — DISCHARGE INSTRUCTIONS
-Take amoxicillin twice daily for treatment of your ear infection.  -If your flu test comes back positive, I will send Tamiflu to treat this.  Take this as directed.  You may experience some stomach discomfort with it.  -Please return if you have any worse symptoms like shortness of breath or trouble with breathing, chest pain or tightness, weakness or anything else that is concerning to you.

## 2025-03-04 NOTE — ED PROVIDER NOTES
Chief Complaint:   Chief Complaint   Patient presents with    Cough         HPI:   Molly Teague is a 39 year old female with a PMH significant for scleroderma, asthma, prior PE, anxiety, depression, PTSD, Raynaud's disease without gangrene who presents to   for evaluation of productive cough, nasal congestion, bilateral ear pain, and wheezing. Symptoms initially began 2 days ago after she traveled home from [place].  She takes Ellipta daily and uses albuterol inhaler as needed which she has been doing slightly more often due to illness.  She denies aching, chest congestion, chest pain, decreased appetite, decreased urine output, diarrhea, dizziness, fatigue, fever, headache, nausea, shortness of breath, and vomiting.     Problem List:    Patient Active Problem List    Diagnosis Date Noted    Pulmonary hypertension (H) 03/26/2024     Priority: Medium    F11.2 - Continuous opioid dependence (H) 05/10/2023     Priority: Medium    Microalbuminuria 07/07/2022     Priority: Medium    Iron deficiency anemia due to chronic blood loss 08/04/2020     Priority: Medium    S/P ORIF (open reduction internal fixation) fracture 02/10/2020     Priority: Medium    Sinus tachycardia 01/31/2020     Priority: Medium    Anemia, chronic disease 11/25/2019     Priority: Medium     With acute/chronic blood loss anemia due to severe esophagitis.  History of severe infusion related reaction to Venofer 1/2022 5/2023 - Injectafer 750 mg x 2 doses, 1 week apart.  Will order Methylprednisolone 125 mg IV to be given before iron infusion      Mirena IUD- Remove by 09/2024 09/06/2019     Priority: Medium     Lot: [id number]  Exp: 01/2022    Dinora Israel LPN        PTSD (post-traumatic stress disorder) 06/19/2019     Priority: Medium    Severe episode of recurrent major depressive disorder, with psychotic features (H) 07/06/2018     Priority: Medium    Severe persistent asthma without complication (H) 07/06/2018     Priority: Medium     On  high dose ICS/LABA + frequent albuterol use.  Next allergy/pulmonary referral      Aspiration of food 03/29/2018     Priority: Medium    Chronic pain syndrome 04/26/2017     Priority: Medium     Patient is followed by Grecia Barba DO for ongoing prescription of pain medication.  All refills should only be approved by this provider, or covering partner.    Medication(s): Oxycodone 10 mg.   Maximum quantity per month: 90  Clinic visit frequency required: Q 3 months     Controlled substance agreement:  Encounter-Level CSA - 04/26/2017:    Controlled Substance Agreement - Scan on 5/4/2017  8:58 AM: CONTROLLED SUBSTANCE AGREEMENT (below)         Patient-Level CSA:    Controlled Substance Agreement - Opioid - Scan on 2/6/2019  6:23 PM (below)         Pain Clinic evaluation in the past: No    DIRE Total Score(s):  No flowsheet data found.             [email or web address]        Chronic migraine without aura without status migrainosus, not intractable 04/12/2017     Priority: Medium     With medication overuse.  Fioricet and not Imitrex is recommend to treat severe headache.  2/2017 [facility] recommend wean down from daily Fioricet and use Excedrin Migrain PRN.      AIN grade I 03/30/2017     Priority: Medium     Found at [facility] on colonoscopy 2/2017.  Recommend repeat anoscopy and anal pap in 6/2017.  Following with [facility], last 2023      Gastroesophageal reflux disease with esophagitis 03/30/2017     Priority: Medium     EGD done at [facility] 2/2017 showed esophagitis without GAVE.  Recommend max dose H2 and PPI, along with 4 tablets of Carafate dissolved in water and drink throughout the day.    Had LA Grade D esophagitis, on TID PPI      Stage 3a chronic kidney disease (H) 02/27/2017     Priority: Medium    Limited systemic sclerosis (H) 01/25/2017     Priority: Medium     Raynaud's, calcinosis, telangiectasias, peripheral neuropathy, GERD symptoms, history of scleroderma renal crisis.  Follows at  [facility]      Polyneuropathy associated with underlying disease 2017     Priority: Medium     Due to scleroderma and neurology thought possible due to ICU (critical illness neuropathy). EMG 2017 positive for neuropathy      History of Clostridium difficile 2016     Priority: Medium    History of Helicobacter pylori infection 2016     Priority: Medium    Scleroderma with renal involvement (H) 2016     Priority: Medium     Needs lisinopril long term      History of ARDS 2016     Priority: Medium     2015, prolonged ICU/vent/trach due to influenza, scleroderma renal crisis requiring hemodialysis.      History of hemodialysis 2016     Priority: Medium     2015 due to scleroderma renal crisis      Bilateral retinitis 2016     Priority: Medium    History of pulmonary embolism 2016     Priority: Medium     Completed anti-coagulation .  pulmonary embolism due to critical illness      REX (generalized anxiety disorder) 2016     Priority: Medium    Systemic sclerosis (H) 2016     Priority: Medium        Past Medical History:    Past Medical History:   Diagnosis Date    Acute pulmonary embolism (H)     Acute pyelonephritis 2017    Altered mental state 2018    Anxiety     Arthritis     Complication of anesthesia     Depression     Gastroesophageal reflux disease with esophagitis     History of blood transfusion     Hypertension     Long-term (current) use of anticoagulants [Z79.01] 2016    Neuropathy     PE (pulmonary embolism) 2016    PTSD (post-traumatic stress disorder)     Raynaud's disease without gangrene     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     Rheumatism     Scleroderma (H) 2016    Slow to wake up after anesthesia     Uncomplicated asthma        Past Surgical History:    Past Surgical History:   Procedure Laterality Date    ANGIOGRAM  2015     SECTION  2014    COMBINED  ESOPHAGOSCOPY, GASTROSCOPY, DUODENOSCOPY (EGD) WITH CO2 INSUFFLATION N/A 7/14/2022    Procedure: ESOPHAGOGASTRODUODENOSCOPY, WITH CO2 INSUFFLATION;  Surgeon: Jalen Moses MD;  Location: [facility] OR    ESOPHAGOSCOPY, GASTROSCOPY, DUODENOSCOPY (EGD), COMBINED N/A 7/14/2022    Procedure: ESOPHAGOGASTRODUODENOSCOPY, WITH BIOPSY;  Surgeon: Jalen Moses MD;  Location: [facility] OR    OPEN REDUCTION INTERNAL FIXATION ANKLE Right 2/10/2020    Procedure: Right ankle open reduction internal fixation;  Surgeon: Natanael Villalta MD;  Location: [facility]    REMOVE HARDWARE ANKLE Right 9/1/2021    Procedure: Right ankle hardware removal;  Surgeon: Natanael Villalta MD;  Location: [facility] OR    THROAT SURGERY  2015       Meds:   Current Outpatient Medications   Medication Sig Dispense Refill    amoxicillin (AMOXIL) 875 MG tablet Take 1 tablet (875 mg) by mouth 2 times daily for 7 days. 14 tablet 0    acetaminophen (TYLENOL) 325 MG tablet Take 2 tablets (650 mg) by mouth every 4 hours as needed for mild pain or other 1 Bottle 0    albuterol (VENTOLIN HFA) 108 (90 Base) MCG/ACT inhaler INHALE 2 PUFFS INTO THE LUNGS ONCE EVERY 4 HOURS AS NEEDED FOR SHORTNESS OF BREATH / DYSPNEA / WHEEZING 18 g 11    amLODIPine (NORVASC) 5 MG tablet Take 1 tablet (5 mg) by mouth daily 90 tablet 3    blood glucose (NO BRAND SPECIFIED) lancets standard Use to test blood sugar 1 time daily 100 each 3    blood glucose (NO BRAND SPECIFIED) test strip Use to test blood sugar 1 time daily 100 strip 11    busPIRone (BUSPAR) 15 MG tablet Take 1 tablet (15 mg) by mouth 2 times daily. 180 tablet 3    cholecalciferol 125 MCG (5000 UT) CAPS Take 1 capsule (5,000 Units) by mouth daily Start after Ergocalciferol course is complete 90 capsule 3    DULoxetine (CYMBALTA) 30 MG capsule Take 3 capsules (90 mg) by mouth daily. 270 capsule 3    famotidine (PEPCID) 20 MG tablet Take 1 tablet (20 mg) by mouth 2 times daily. 180 tablet 3     fluticasone-vilanterol (BREO ELLIPTA) 200-25 MCG/ACT inhaler Inhale 1 puff into the lungs daily. 60 each 11    folic acid (FOLVITE) 1 MG tablet Take 1 mg by mouth daily      gabapentin (NEURONTIN) 300 MG capsule Take 3 capsules (900 mg) by mouth 2 times daily 540 capsule 3    GOODSENSE MIGRAINE FORMULA 250-250-65 MG per tablet TAKE ONE TABLET BY MOUTH EVERY 6 HOURS AS NEEDED FOR HEADACHES 24 tablet 1    lisinopril (ZESTRIL) 10 MG tablet Take 1 tablet (10 mg) by mouth every evening 90 tablet 3    loratadine (CLARITIN) 10 MG tablet Take 10 mg by mouth daily as needed       LORazepam (ATIVAN) 1 MG tablet TAKE 1 TABLET (1 MG) BY MOUTH 2 TIMES DAILY AS NEEDED FOR ANXIETY #45 FOR 30 DAYS 45 tablet 5    medical cannabis (Patient's own supply) (The purpose of this order is to document that the patient reports taking medical cannabis.  This is not a prescription, and is not used to certify that the patient has a qualifying medical condition.)  CBG gummy over the counter 0 Information only 0    methocarbamol (ROBAXIN) 750 MG tablet Take 1 tablet (750 mg) by mouth 3 times daily as needed for muscle spasms 180 tablet 3    methotrexate sodium, PF, 50 MG/2ML SOLN injection Inject 0.6 mg Subcutaneous every 7 days      metoclopramide (REGLAN) 10 MG tablet Take 1 tablet (10 mg) by mouth 3 times daily as needed (nausea/vomiting) 30 tablet 1    naloxone (NARCAN) 4 MG/0.1ML nasal spray Spray 1 spray (4 mg) into one nostril alternating nostrils once as needed for opioid reversal every 2-3 minutes until assistance arrives 0.2 mL 3    omeprazole (PRILOSEC) 40 MG DR capsule Take 1 capsule (40 mg) by mouth 3 times daily. GI has approved high dose 270 capsule 3    ondansetron (ZOFRAN ODT) 4 MG ODT tab Take 1 tablet (4 mg) by mouth every 8 hours as needed for nausea 30 tablet 4    polyethylene glycol (MIRALAX) 17 GM/Dose powder Take 17 g by mouth daily 510 g 11    Prenatal Vit-Fe Fumarate-FA (PRENATAL VITAMIN AND MINERAL) 28-0.8 MG TABS Take  "1 tablet by mouth daily 90 tablet 3    syringe/needle, disp, (BD ECLIPSE SYRINGE) 25G X 1\" 3 ML MISC 1 each daily as needed 10 each 11    vitamin B-12 (CYANOCOBALAMIN) 2500 MCG sublingual tablet Take 1 tablet (2,500 mcg) by mouth daily 90 tablet 3       Allergies:   Allergies   Allergen Reactions    Blood Transfusion Related (Informational Only) Other (See Comments)     Patient has a history of a clinically significant antibody against RBC antigens.  A delay in compatible RBCs may occur.    Pollen Extract     Seasonal Allergies     Venofer [Iron Sucrose] Difficulty breathing and Cramps     Severe infusion reaction 1/2022    Shellfish-Derived Products Nausea and Rash     Rash on face       Medications updated and reviewed.  Past, family and surgical history is updated and reviewed in the record.     Review of Systems:  Pertinent review of systems as documented per HPI above.    Physical Exam:   /75   Pulse 77   Temp 99  F (37.2  C) (Tympanic)   Resp 18    General: alert and no acute distress  Eyes: negative, conjunctivae not injected /corneas clear. PERRL, EOM's intact.  Ears: Bilateral TMs erythemic and bulging, left with suppurative effusion noted.  Canals are without swelling or drainage.  External ears normal.  Nose: Rhinorrhea present.  Mouth/Throat: moist mucus membranes, mild oropharyngeal erythema without exudate.  No PTA.  Neck: Neck supple, no adenopathy  Chest/Pulmonary: Normal effort of breathing, CTAB without wheezes, rales, or rhonchi. No signs of respiratory distress.  Cardiovascular: RRR normal S1S2  Abdomen: Bowel sounds normoactive, abdomen soft without tenderness, guarding or rigidity. No HSM.   Skin:  Skin color, texture, turgor normal. No rashes or lesions.    Results for orders placed or performed during the hospital encounter of 03/04/25 (from the past 48 hours)   Influenza A/B, RSV and SARS-CoV2 PCR (COVID-19) Nose    Specimen: Nose; Swab   Result Value Ref Range    Influenza A PCR " Negative Negative    Influenza B PCR Negative Negative    RSV PCR Negative Negative    SARS CoV2 PCR Negative Negative    Narrative    Testing was performed using the Xpert Xpress CoV2/Flu/RSV Assay on the [employer] GeneXpert Instrument. This test should be ordered for the detection of SARS-CoV2, influenza, and RSV viruses in individuals with signs and symptoms of respiratory tract infection. This test is for in vitro diagnostic use under the [place] FDA for laboratories certified under CLIA to perform high or moderate complexity testing. This test has been [place] FDA cleared. A negative result does not rule out the presence of PCR inhibitors in the specimen or target RNA in concentration below the limit of detection for the assay. If only one viral target is positive but coinfection with multiple targets is suspected, the sample should be re-tested with another FDA cleared, approved, or authorized test, if coninfection would change clinical management. This test was validated by the [facility] [employer]. These laboratories are certified under the Clinical Laboratory Improvement Amendments of 1988 (CLIA-88) as qualified to perfom high complexity laboratory testing.       Assessment:  Left acute suppurative otitis media    Plan:   39-year-old female who presents for evaluation of productive cough, nasal congestion, bilateral ear pain and wheezing.  Symptoms began 2 days ago.    Patient well-appearing on arrival, afebrile.  Examination notable for bulging and erythema to bilateral TMs, and suppurative effusion noted to left TM.  She also has mild oropharyngeal erythema, no exudate or PTA. Due to PMH of Raynaud's, it was difficult to obtain O2 today after several attempts.  She is breathing comfortably on room air and there are no abnormal breath sounds or signs of respiratory distress on auscultation.  Remainder of exam is reassuring as above.  Viral testing was negative for influenza A/B, RSV and COVID-19.   Amoxicillin was sent for treatment of left acute otitis media.  Supportive cares were discussed and recommended in addition.    Advised that if symptoms are not improving despite this treatment plan, then they should follow-up with primary provider for reevaluation. Strict UC/ED return precautions discussed in detail including prolonged fevers, development of shortness of breath or trouble with breathing, weakness or lightheadedness, inability to tolerate oral intake or anything else that is concerning to them. All questions were answered. Pt verbalized understanding and agreement with the above plan.     Condition on disposition: Stable    Disclaimer: This note consists of symbols derived from keyboarding, dictation, and/or voice recognition software. As a result, there may be errors in the script that have gone undetected.  Please consider this when interpreting information found in the chart.         Crystal Gama PA-C  03/04/25 1530

## 2025-03-11 ENCOUNTER — PATIENT OUTREACH (OUTPATIENT)
Dept: CARE COORDINATION | Facility: CLINIC | Age: 40
End: 2025-03-11
Payer: MEDICARE

## 2025-03-11 NOTE — PROGRESS NOTES
[facility] Medicare ACO Reach Population - Proactive Outreach    Background: Patient outreach conducted proactively to support health maintenance initiatives and identify any opportunities to integrated Care Coordination assistance in patient-centered goals.      Patient agreeable to scheduling Hospital/ED follow up? No     If No, CC team member encouraged patient to contact [facility] at [phone] to schedule an appointment in the future, or schedule [facility]    Patient accepts CC: Yes. Patient scheduled for assessment with Bety Ratliff RN on 3/11/25 at 10am. Patient noted desire to discuss help with coordinating appointments. Patient preferred RN Care Coordinator.     Maritza Cr RN  [facility]

## 2025-03-12 ENCOUNTER — PATIENT OUTREACH (OUTPATIENT)
Dept: NURSING | Facility: CLINIC | Age: 40
End: 2025-03-12
Payer: MEDICARE

## 2025-03-12 ASSESSMENT — ACTIVITIES OF DAILY LIVING (ADL): DEPENDENT_IADLS:: INDEPENDENT

## 2025-03-12 NOTE — LETTER
[facility]  [address] [address]     March 13, 2025    Molly Teague  [address] [address]      Dear Molly,    Here is a list of your specialists I found on your chart     Dr. Grecia Barba - Primary Care Provider [phone] [facility]  [address] [place] [address]   Majo GAINES - Behavioral Health Specialist [phone] [facility]  [address] [place] [address]   Dr. Jatin Mena - Nephrologist [phone] [address] [place] [address]  Medical Renal  3rd floor     Bharath NAVA - Gastroenterologist  [phone] [address] [place] [address]   Nataliia Blanchard - Colon Rectal [phone] [address] [place] [address]   4th floor   Dr. Cruz - Orthopedic  [phone] [address] [place] [address]  4th floor   Dr. Fer Finch - Rheumatologist  [phone] [address] [place] [address]   Dr. Anahy Craft - Pulmonary  [phone] [address] [place] [address]       [facility]   Bety Ratliff RN, Care Coordinator   [facility]'s   E-mail [email or web address]   [phone]

## 2025-03-12 NOTE — PROGRESS NOTES
Clinic Care Coordination Contact  Clinic Care Coordination Contact  OUTREACH    Referral Information:  Referral Source: Care Team    Primary Diagnosis: Psychosocial    Chief Complaint   Patient presents with    Clinic Care Coordination - Initial     Clinic Care Coordination RN        Difficulty keeping appointments:: No  Compliance Concerns: No  No-Show Concerns: No  No PCP office visit in Past Year: No  Utilization      No Show Count (past year)  0             ED Visits  1             Hospital Admissions  0                    Current as of: 3/12/2025  8:25 AM                Clinical Concerns:  Current Medical Concerns:    Patient states she has anxiety and is feeling more overwhelmed with specialty appointments   Patient will make a future appointment with Behavior Specialist Majo Ag LICSW   - Clinical  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  Patient lives in a split level home and has 12 stairs  Patient is able to use the stairs and looks at this as her exercise  Patient needs a new foot brace and Orthotics and she will make a future appointment with PCP for this order  Patient has a calendar at home to keep track of her appointments  We discussed setting alarms on phone to remind her of her appointments  We discussed CC RN making a spreadsheet of specialists  listing address's ,address and contact information   Dr Grecia Barba-Primary Care Provider   [facility]   [address] [place] [address]   [phone]    Majo FINCH-Behavioral Specialist   [facility]   [address]  [place] [address]   [phone]    Dr Jatin Bianchi- Nephrologist   Medical Renal 3rd floor  [address] [place] [address]  [phone]    Bharath NAVA- Gastroenterologist   [address] [place] [address]    Nataliia Blanchard- Colon Rectal   [address] [facility] [address] [place] [address]    Dr Cruz -Orthopedic   [address] [address]  [phone]    Dr Fer Finch-Rhematologist   [address] [place] [address]  [phone]    manpreet Viera  [address] [place], [place] [address] [phone]        Patient will get a  in each specialist office for questions or concerns   Patient will continue to use My Chart for communication        Current Behavioral Concerns: Anxiety discussed today       Education Provided to patient: CC Introductory letter and care plan sent via My Chart    Pain  Pain (GOAL):: No  Health Maintenance Reviewed: Not assessed  Clinical Pathway: None    Medication Management:  Medication review status:   Anxiety medication reviewed      Functional Status:  Dependent ADLs:: Independent, Ambulation-walker, Ambulation-cane  Dependent IADLs:: Independent  Bed or wheelchair confined:: No  Mobility Status: Independent w/Device  Fallen 2 or more times in the past year?: (!) Yes  Any fall with injury in the past year?: No    Living Situation:  Current living arrangement:: I live in a private home with family  Type of residence:: Private home - stairs    Lifestyle & Psychosocial Needs:    Social Drivers of Health     Food Insecurity: No Food Insecurity (7/24/2024)    Received from [facility]    Hunger Vital Sign     Worried About Running Out of Food in the Last Year: Never true     Ran Out of Food in the Last Year: Never true   Depression: At risk (1/7/2025)    PHQ-2     PHQ-2 Score: 3   Housing Stability: Low Risk  (7/24/2024)    Received from [facility]    Housing Stability     What is your  living situation today?: I have a steady place to live   Tobacco Use: Low Risk  (1/15/2025)    Received from [facility]    Patient History     Smoking Tobacco Use: Never     Smokeless Tobacco Use: Never     Passive Exposure: Never   Financial Resource Strain: Low Risk  (5/17/2024)    Financial Resource Strain     Within the past 12 months, have you or your family members you live with been unable to get utilities (heat, electricity) when it was really needed?: No   Alcohol Use: Not At Risk (4/18/2023)    Received from [facility], [facility]    AUDIT-C     Frequency of Alcohol Consumption: Monthly or less     Average Number of Drinks: 1 or 2     Frequency of Binge Drinking: Never   Transportation Needs: No Transportation Needs (7/24/2024)    Received from [facility]    PRAPARE - Transportation     Lack of Transportation (Medical): No     Lack of Transportation (Non-Medical): No   Physical Activity: Insufficiently Active (7/24/2024)    Received from [facility]    Exercise Vital Sign     Days of Exercise per Week: 2 days     Minutes of Exercise per Session: 20 min   Interpersonal Safety: Low Risk  (11/20/2024)    Interpersonal Safety     Do you feel physically and emotionally safe where you currently live?: Yes     Within the past 12 months, have you been hit, slapped, kicked or otherwise physically hurt by someone?: No     Within the past 12 months, have you been humiliated or emotionally abused in other ways by your partner or ex-partner?: No   Stress: Stress Concern Present (5/17/2024)    [place] [employer] - Occupational Stress Questionnaire     Feeling of Stress : Rather much   Social Connections: Unknown (5/17/2024)    Social Connection and Isolation Panel [NHANES]     Frequency of Communication with Friends and Family: Not on file     Frequency of Social Gatherings with Friends and Family: Once a week     Attends Pentecostal Services: Not on file     Active Member of Clubs or  Organizations: Not on file     Attends Club or Organization Meetings: Not on file     Marital Status: Not on file   Health Literacy: Not on file     Diet:: Regular  Inadequate nutrition (GOAL):: No  Tube Feeding: No  Inadequate activity/exercise (GOAL):: No  Significant changes in sleep pattern (GOAL): No  Transportation means:: Regular car     Gnosticist or spiritual beliefs that impact treatment:: No  Mental health DX:: Yes  Mental health DX how managed:: Medication, C Services at Primary Care  Mental health management concern (GOAL):: Yes  Chemical Dependency Status: No Current Concerns  Informal Support system:: Partner, Children, Family           Resources and Interventions:  Current Resources:      Community Resources: [employer] Programs, [employer] Worker  Supplies Currently Used at Home: None  Equipment Currently Used at Home: wheelchair, manual, cane, straight, grab bar, toilet, grab bar, tub/shower, shower chair, walker, rolling  Employment Status: disabled         Advance Care Plan/Directive  Advanced Care Plans/Directives on file:: No    Referrals Placed: Mental Health         Care Plan:  Care Plan: General       Problem: HP GENERAL PROBLEM       Goal: I will have a plan for future specilaists appointments so I don't feel so overwhelmed       Start Date: 3/12/2025 Expected End Date: 6/12/2025    This Visit's Progress: 10%    Priority: High    Note:     Barriers: Feeling overwhelmed   Strengths: Significant other and his family are great support   Patient has My Chart   Patient expressed understanding of goal: Yes  Action steps to achieve this goal:  1. I will put reminders on my phone   2. I will continue to use my calendar at home   3. Care coordinator will send a spreadsheet of all Specialists,address's and contact information   4. I will get a  at each specialty clinic to call with questions    5. I will place myself on a waiting list to get in sooner for appointments                              Care Plan: Mental Health       Problem: Mental Health Symptoms Need Improvement       Goal: Improve management of mental health symptoms and establish with mental health/psychosocial supports       Start Date: 3/12/2025 Expected End Date: 6/12/2025    This Visit's Progress: 0%    Priority: High    Note:     Barriers: Multiple health problems   Strengths: Agrees with plan   Patient expressed understanding of goal: Yes   Action steps to achieve this goal:  1. I will take my medications as prescribed   2. I will make a future appointment with   Majo Prieto LICSW   - Clinical    Behavioral Specialist   3. I will ask for family assistance if feeling overwhelmed as a caregiver of son                                Patient/Caregiver understanding: Patient agrees with plan discussed today     Outreach Frequency: monthly, more frequently as needed  Future Appointments                In 2 weeks Grecia Barba DO [facility]  Arrive at: Clinic A    In 2 months UC LAB [facility] Lab [facility]    In 2 months Jatin Bianchi MD [facility] Nephrology Clinic [facility]    In 2 months Grecia Barba DO [facility]  Arrive at: Clinic A            Plan: CC RN will follow up monthly     [facility]   Bety Ratliff RN, Care Coordinator   [facility]'s   E-mail [email or web address]   [phone]

## 2025-03-12 NOTE — LETTER
[facility] CARE COORDINATION  [address] [facility] [address]     March 12, 2025    Molly Teague  [address] [facility] [address]      Dear Molly,    I am a clinic care coordinator who works with Grecia Barba,  with the [facility]. I wanted to thank you for spending the time to talk with me.  Below is a description of clinic care coordination and how I can further assist you.       The clinic care coordination team is made up of a registered nurse, , financial resource worker and community health worker who understand the health care system. The goal of clinic care coordination is to help you manage your health and improve access to the health care system. Our team works alongside your provider to assist you in determining your health and social needs. We can help you obtain health care and community resources, providing you with necessary information and education. We can work with you through any barriers and develop a care plan that helps coordinate and strengthen the communication between you and your care team.  Our services are voluntary and are offered without charge to you personally.    Please feel free to contact me with any questions or concerns regarding care coordination and what we can offer.      We are focused on providing you with the highest-quality healthcare experience possible.    Sincerely,     [facility] [employer]   Bety Ratliff RN, Care Coordinator   [facility]'s   E-mail [email or web address]   [phone]     Enclosed: I have enclosed a copy of the Patient Centered Plan of Care. This has helpful information and goals that we have talked about. Please keep this in an easy to access place to use as needed.

## 2025-03-12 NOTE — LETTER
[facility]  Patient Centered Plan of Care  About Me:        Patient Name:  Molly Teague    YOB: 1985  Age:         39 year old   [facility] MRN:    [id number] Telephone Information:  Home Phone [phone]   Mobile [phone]       Address:  [address]  [facility] [address] Email address:  [email or web address]      Emergency Contact(s)    Name Relationship Lgl Grd Work Phone Home Phone Mobile Phone   1. BENJAMIN MELENDEZ Significant ot* No  [phone]           Primary language:  English     needed? No   [facility] [employer] [facility]:  [phone] op. 1  Other communication barriers:None    Preferred Method of Communication:  Mail  Current living arrangement: I live in a private home with family    Mobility Status/ Medical Equipment: Independent w/Device        Health Maintenance  Health Maintenance Reviewed: Not assessed      My Access Plan  Medical Emergency 911   Primary Clinic Line [facility] - [phone]   24 Hour Appointment Line [phone] or  [phone]) (toll-free)   24 Hour Nurse Line [phone] (toll-free)   Preferred Urgent Care [facility], [phone]     Preferred Hospital [place], [place]  [phone]     Preferred Pharmacy [facility] [address] [facility]     Behavioral Health Crisis Line The National Suicide Prevention Lifeline at 1-880.829.2454 or Text/Call 078           My Care Team Members  Patient Care Team         Relationship Specialty Notifications Start End    Grecia Barba DO PCP - General Internal Medicine Admissions 11/9/16     Phone: [phone] Pager: Use Vocera Fax: [phone] [address] [facility] [address]    Grecia Barba DO Assigned PCP   1/28/18     Phone: [phone] Pager: Use Vocera Fax: [phone] [address] [facility] [address]    Maritza Harrison  MD [facility]  4/29/19     Phone: [phone] Fax: [phone]         [address] [facility] [address] [facility] [address]    Cindy Lorenzana MD Resident Internal Medicine  6/24/21     Phone: [phone] Fax: [phone]         [address] [facility] [address]    Jalen Moses MD MD Gastroenterology  4/27/22     Phone: [phone] Fax: [phone]         [address] [facility] [address]    Grecia Barba DO Assigned Pain Medication Provider   1/9/23     Phone: [phone] Pager: Use Vocera Fax: [phone] [address] [facility] [address]    Nataliia Marin APRN CNP Assigned Surgical Provider   9/2/23     Phone: [phone] Fax: [phone]         [address] [place] [address]    Josy Sinha PA-C Assigned Gastroenterology Provider   12/23/24     Phone: [phone] Fax: [phone]         [address] [facility] [address]    Bety Ratliff, RN Lead Care Coordinator Primary Care - CC Admissions 3/11/25     Phone: [phone]                     My Care Plans  Self Management and Treatment Plan    Care Plan  Care Plan: General       Problem: HP GENERAL PROBLEM       Goal: I will have a plan for future specilaists appointments so I don't feel so overwhelmed       Start Date: 3/12/2025 Expected End Date: 6/12/2025    This Visit's Progress: 10%    Priority: High    Note:     Barriers: Feeling overwhelmed   Strengths: Significant other and his family are great support   Patient has My Chart   Patient expressed understanding of goal: Yes  Action steps to achieve this goal:  1. I will put reminders on my phone   2. I will continue to use my calendar at home   3. Care coordinator will send a spreadsheet of all Specialists,address's and contact information   4. I will get a  at each specialty clinic to call with questions    5. I will place myself on a waiting list to get in sooner for appointments                              Care Plan: Mental Health       Problem: Mental Health Symptoms Need Improvement       Goal: Improve management of mental health symptoms and establish with mental health/psychosocial supports       Start Date: 3/12/2025 Expected End Date: 6/12/2025    This Visit's Progress: 0%    Priority: High    Note:     Barriers: Multiple health problems   Strengths: Agrees with plan   Patient expressed understanding of goal: Yes   Action steps to achieve this goal:  1. I will take my medications as prescribed   2. I will make a future appointment with   Majo Prieto LICSW   - Clinical    Behavioral Specialist   3. I will ask for family assistance if feeling overwhelmed as a caregiver of son                                Action Plans on File:   Asthma        Depression          Advance Care Plans/Directives:   Advanced Care Plan/Directives on file: No    Discussed with patient/caregiver(s): No data recorded           My Medical and Care Information  Problem List   Patient Active Problem List   Diagnosis    Systemic sclerosis (H)    Scleroderma with renal involvement (H)    History of ARDS    History of hemodialysis    Bilateral retinitis    History of pulmonary embolism    REX (generalized anxiety disorder)    History of Clostridium difficile    History of Helicobacter pylori infection    Limited systemic sclerosis (H)    Polyneuropathy associated with underlying disease    AIN grade I    Gastroesophageal reflux disease with esophagitis    Chronic migraine without aura without status migrainosus, not intractable    Chronic pain syndrome    Aspiration of food    Severe episode of recurrent major depressive disorder, with psychotic features (H)    Severe persistent asthma without complication (H)    PTSD (post-traumatic stress disorder)    Mirena IUD- Remove by 09/2024    Anemia, chronic disease    Stage 3a chronic kidney disease (H)    Sinus tachycardia    S/P ORIF (open reduction internal fixation)  fracture    Iron deficiency anemia due to chronic blood loss    Microalbuminuria    F11.2 - Continuous opioid dependence (H)    Pulmonary hypertension (H)      Current Medications:    Current Outpatient Medications   Medication Sig Dispense Refill    acetaminophen (TYLENOL) 325 MG tablet Take 2 tablets (650 mg) by mouth every 4 hours as needed for mild pain or other 1 Bottle 0    albuterol (VENTOLIN HFA) 108 (90 Base) MCG/ACT inhaler INHALE 2 PUFFS INTO THE LUNGS ONCE EVERY 4 HOURS AS NEEDED FOR SHORTNESS OF BREATH / DYSPNEA / WHEEZING 18 g 11    amLODIPine (NORVASC) 5 MG tablet Take 1 tablet (5 mg) by mouth daily 90 tablet 3    blood glucose (NO BRAND SPECIFIED) lancets standard Use to test blood sugar 1 time daily 100 each 3    blood glucose (NO BRAND SPECIFIED) test strip Use to test blood sugar 1 time daily 100 strip 11    busPIRone (BUSPAR) 15 MG tablet Take 1 tablet (15 mg) by mouth 2 times daily. 180 tablet 3    cholecalciferol 125 MCG (5000 UT) CAPS Take 1 capsule (5,000 Units) by mouth daily Start after Ergocalciferol course is complete 90 capsule 3    DULoxetine (CYMBALTA) 30 MG capsule Take 3 capsules (90 mg) by mouth daily. 270 capsule 3    famotidine (PEPCID) 20 MG tablet Take 1 tablet (20 mg) by mouth 2 times daily. 180 tablet 3    fluticasone-vilanterol (BREO ELLIPTA) 200-25 MCG/ACT inhaler Inhale 1 puff into the lungs daily. 60 each 11    folic acid (FOLVITE) 1 MG tablet Take 1 mg by mouth daily      gabapentin (NEURONTIN) 300 MG capsule Take 3 capsules (900 mg) by mouth 2 times daily 540 capsule 3    GOODSENSE MIGRAINE FORMULA 250-250-65 MG per tablet TAKE ONE TABLET BY MOUTH EVERY 6 HOURS AS NEEDED FOR HEADACHES 24 tablet 1    lisinopril (ZESTRIL) 10 MG tablet Take 1 tablet (10 mg) by mouth every evening 90 tablet 3    loratadine (CLARITIN) 10 MG tablet Take 10 mg by mouth daily as needed       LORazepam (ATIVAN) 1 MG tablet TAKE 1 TABLET (1 MG) BY MOUTH 2 TIMES DAILY AS NEEDED FOR ANXIETY #45 FOR  "30 DAYS 45 tablet 5    medical cannabis (Patient's own supply) (The purpose of this order is to document that the patient reports taking medical cannabis.  This is not a prescription, and is not used to certify that the patient has a qualifying medical condition.)  CBG gummy over the counter 0 Information only 0    methocarbamol (ROBAXIN) 750 MG tablet Take 1 tablet (750 mg) by mouth 3 times daily as needed for muscle spasms 180 tablet 3    methotrexate sodium, PF, 50 MG/2ML SOLN injection Inject 0.6 mg Subcutaneous every 7 days      metoclopramide (REGLAN) 10 MG tablet Take 1 tablet (10 mg) by mouth 3 times daily as needed (nausea/vomiting) 30 tablet 1    naloxone (NARCAN) 4 MG/0.1ML nasal spray Spray 1 spray (4 mg) into one nostril alternating nostrils once as needed for opioid reversal every 2-3 minutes until assistance arrives 0.2 mL 3    omeprazole (PRILOSEC) 40 MG DR capsule Take 1 capsule (40 mg) by mouth 3 times daily. GI has approved high dose 270 capsule 3    ondansetron (ZOFRAN ODT) 4 MG ODT tab Take 1 tablet (4 mg) by mouth every 8 hours as needed for nausea 30 tablet 4    polyethylene glycol (MIRALAX) 17 GM/Dose powder Take 17 g by mouth daily 510 g 11    Prenatal Vit-Fe Fumarate-FA (PRENATAL VITAMIN AND MINERAL) 28-0.8 MG TABS Take 1 tablet by mouth daily 90 tablet 3    syringe/needle, disp, (BD ECLIPSE SYRINGE) 25G X 1\" 3 ML MISC 1 each daily as needed 10 each 11    vitamin B-12 (CYANOCOBALAMIN) 2500 MCG sublingual tablet Take 1 tablet (2,500 mcg) by mouth daily 90 tablet 3     No current facility-administered medications for this visit.        Care Coordination Start Date: 3/11/2025   Frequency of Care Coordination: monthly, more frequently as needed     Form Last Updated: 03/12/2025       "

## 2025-03-30 ENCOUNTER — MYC MEDICAL ADVICE (OUTPATIENT)
Dept: ADMISSION | Facility: CLINIC | Age: 40
End: 2025-03-30
Payer: MEDICARE

## 2025-03-30 NOTE — TELEPHONE ENCOUNTER
Handicap parking application completed and My Chart note sent to patient that form is ready for . Copy sent to scan and copy placed in cabinet.

## 2025-05-12 ENCOUNTER — MYC MEDICAL ADVICE (OUTPATIENT)
Dept: SURGERY | Facility: CLINIC | Age: 40
End: 2025-05-12
Payer: MEDICARE

## 2025-05-17 ENCOUNTER — HEALTH MAINTENANCE LETTER (OUTPATIENT)
Age: 40
End: 2025-05-17

## 2025-05-19 ENCOUNTER — PATIENT OUTREACH (OUTPATIENT)
Dept: CARE COORDINATION | Facility: CLINIC | Age: 40
End: 2025-05-19
Payer: MEDICARE

## 2025-05-19 NOTE — PROGRESS NOTES
Clinic Care Coordination Contact  [facility]/Voicemail    Clinical Data: Care Coordinator Outreach    Outreach Documentation Number of Outreach Attempt   5/19/2025   1:56 PM 1       Left message on patient's voicemail with call back information and requested return call.      Plan: Care Coordinator will try to reach patient again in 10 business days.    CHICA Ge  [phone]  [facility]

## 2025-05-21 ENCOUNTER — PRE VISIT (OUTPATIENT)
Dept: NEPHROLOGY | Facility: CLINIC | Age: 40
End: 2025-05-21

## 2025-05-25 SDOH — HEALTH STABILITY: PHYSICAL HEALTH: ON AVERAGE, HOW MANY MINUTES DO YOU ENGAGE IN EXERCISE AT THIS LEVEL?: 20 MIN

## 2025-05-25 SDOH — HEALTH STABILITY: PHYSICAL HEALTH: ON AVERAGE, HOW MANY DAYS PER WEEK DO YOU ENGAGE IN MODERATE TO STRENUOUS EXERCISE (LIKE A BRISK WALK)?: 2 DAYS

## 2025-05-25 ASSESSMENT — SOCIAL DETERMINANTS OF HEALTH (SDOH): HOW OFTEN DO YOU GET TOGETHER WITH FRIENDS OR RELATIVES?: NEVER

## 2025-05-28 ENCOUNTER — RESULTS FOLLOW-UP (OUTPATIENT)
Dept: FAMILY MEDICINE | Facility: CLINIC | Age: 40
End: 2025-05-28

## 2025-05-28 ENCOUNTER — OFFICE VISIT (OUTPATIENT)
Dept: FAMILY MEDICINE | Facility: CLINIC | Age: 40
End: 2025-05-28
Attending: INTERNAL MEDICINE
Payer: MEDICARE

## 2025-05-28 VITALS
OXYGEN SATURATION: 98 % | TEMPERATURE: 98.1 F | RESPIRATION RATE: 12 BRPM | SYSTOLIC BLOOD PRESSURE: 124 MMHG | DIASTOLIC BLOOD PRESSURE: 70 MMHG | BODY MASS INDEX: 24.49 KG/M2 | HEIGHT: 61 IN | HEART RATE: 85 BPM | WEIGHT: 129.7 LBS

## 2025-05-28 DIAGNOSIS — M34.9 LIMITED SYSTEMIC SCLEROSIS (H): ICD-10-CM

## 2025-05-28 DIAGNOSIS — F11.20 CONTINUOUS OPIOID DEPENDENCE (H): ICD-10-CM

## 2025-05-28 DIAGNOSIS — K21.01 GASTROESOPHAGEAL REFLUX DISEASE WITH ESOPHAGITIS AND HEMORRHAGE: ICD-10-CM

## 2025-05-28 DIAGNOSIS — Z00.00 ENCOUNTER FOR MEDICARE ANNUAL WELLNESS EXAM: Primary | ICD-10-CM

## 2025-05-28 DIAGNOSIS — M34.1 CREST (CALCINOSIS, RAYNAUD'S PHENOMENON, ESOPHAGEAL DYSFUNCTION, SCLERODACTYLY, TELANGIECTASIA) (H): ICD-10-CM

## 2025-05-28 DIAGNOSIS — E16.2 HYPOGLYCEMIA: ICD-10-CM

## 2025-05-28 DIAGNOSIS — K62.82 AIN GRADE I: ICD-10-CM

## 2025-05-28 DIAGNOSIS — F41.1 GAD (GENERALIZED ANXIETY DISORDER): ICD-10-CM

## 2025-05-28 DIAGNOSIS — E53.8 VITAMIN B12 DEFICIENCY (NON ANEMIC): ICD-10-CM

## 2025-05-28 DIAGNOSIS — R11.2 NAUSEA AND VOMITING, UNSPECIFIED VOMITING TYPE: ICD-10-CM

## 2025-05-28 DIAGNOSIS — N18.31 STAGE 3A CHRONIC KIDNEY DISEASE (H): ICD-10-CM

## 2025-05-28 DIAGNOSIS — R93.89 ABNORMAL CHEST CT: ICD-10-CM

## 2025-05-28 DIAGNOSIS — G89.4 CHRONIC PAIN SYNDROME: ICD-10-CM

## 2025-05-28 DIAGNOSIS — G63 POLYNEUROPATHY ASSOCIATED WITH UNDERLYING DISEASE: ICD-10-CM

## 2025-05-28 DIAGNOSIS — E53.8 FOLATE DEFICIENCY: ICD-10-CM

## 2025-05-28 DIAGNOSIS — I73.00 RAYNAUD'S DISEASE WITHOUT GANGRENE: ICD-10-CM

## 2025-05-28 DIAGNOSIS — J45.50 SEVERE PERSISTENT ASTHMA WITHOUT COMPLICATION (H): ICD-10-CM

## 2025-05-28 DIAGNOSIS — Z23 NEED FOR VACCINATION: ICD-10-CM

## 2025-05-28 DIAGNOSIS — Z13.6 CARDIOVASCULAR SCREENING; LDL GOAL LESS THAN 100: ICD-10-CM

## 2025-05-28 DIAGNOSIS — I15.0 RENOVASCULAR HYPERTENSION: ICD-10-CM

## 2025-05-28 DIAGNOSIS — E55.9 VITAMIN D DEFICIENCY: ICD-10-CM

## 2025-05-28 LAB
AMPHETAMINES UR QL SCN: ABNORMAL
ANION GAP SERPL CALCULATED.3IONS-SCNC: 13 MMOL/L (ref 7–15)
BARBITURATES UR QL SCN: ABNORMAL
BENZODIAZ UR QL SCN: ABNORMAL
BUN SERPL-MCNC: 14.2 MG/DL (ref 6–20)
BZE UR QL SCN: ABNORMAL
CALCIUM SERPL-MCNC: 9.8 MG/DL (ref 8.8–10.4)
CANNABINOIDS UR QL SCN: ABNORMAL
CHLORIDE SERPL-SCNC: 103 MMOL/L (ref 98–107)
CHOLEST SERPL-MCNC: 217 MG/DL
CREAT SERPL-MCNC: 0.95 MG/DL (ref 0.51–0.95)
CREAT UR-MCNC: 39 MG/DL
CREAT UR-MCNC: 39.6 MG/DL
EGFRCR SERPLBLD CKD-EPI 2021: 78 ML/MIN/1.73M2
ERYTHROCYTE [DISTWIDTH] IN BLOOD BY AUTOMATED COUNT: 13.2 % (ref 10–15)
FASTING STATUS PATIENT QL REPORTED: NO
FASTING STATUS PATIENT QL REPORTED: NO
FENTANYL UR QL: ABNORMAL
FERRITIN SERPL-MCNC: 106 NG/ML (ref 6–175)
GLUCOSE SERPL-MCNC: 82 MG/DL (ref 70–99)
HCO3 SERPL-SCNC: 22 MMOL/L (ref 22–29)
HCT VFR BLD AUTO: 37.3 % (ref 35–47)
HDLC SERPL-MCNC: 53 MG/DL
HGB BLD-MCNC: 12.3 G/DL (ref 11.7–15.7)
IRON SERPL-MCNC: 86 UG/DL (ref 37–145)
LDLC SERPL CALC-MCNC: 120 MG/DL
MCH RBC QN AUTO: 32.2 PG (ref 26.5–33)
MCHC RBC AUTO-ENTMCNC: 33 G/DL (ref 31.5–36.5)
MCV RBC AUTO: 98 FL (ref 78–100)
MICROALBUMIN UR-MCNC: <12 MG/L
MICROALBUMIN/CREAT UR: NORMAL MG/G{CREAT}
NONHDLC SERPL-MCNC: 164 MG/DL
OPIATES UR QL SCN: ABNORMAL
PCP QUAL URINE (ROCHE): ABNORMAL
PLATELET # BLD AUTO: 249 10E3/UL (ref 150–450)
POTASSIUM SERPL-SCNC: 4.3 MMOL/L (ref 3.4–5.3)
RBC # BLD AUTO: 3.82 10E6/UL (ref 3.8–5.2)
SODIUM SERPL-SCNC: 138 MMOL/L (ref 135–145)
TRIGL SERPL-MCNC: 221 MG/DL
WBC # BLD AUTO: 8.4 10E3/UL (ref 4–11)

## 2025-05-28 RX ORDER — METHOCARBAMOL 750 MG/1
750 TABLET, FILM COATED ORAL 3 TIMES DAILY PRN
Qty: 180 TABLET | Refills: 3 | Status: SHIPPED | OUTPATIENT
Start: 2025-05-28

## 2025-05-28 RX ORDER — NEEDLES, SAFETY 18GX1 1/2"
1 NEEDLE, DISPOSABLE MISCELLANEOUS DAILY PRN
Qty: 10 EACH | Refills: 11 | Status: SHIPPED | OUTPATIENT
Start: 2025-05-28

## 2025-05-28 RX ORDER — ONDANSETRON 4 MG/1
4 TABLET, ORALLY DISINTEGRATING ORAL EVERY 8 HOURS PRN
Qty: 30 TABLET | Refills: 4 | Status: SHIPPED | OUTPATIENT
Start: 2025-05-28

## 2025-05-28 RX ORDER — GABAPENTIN 300 MG/1
900 CAPSULE ORAL 2 TIMES DAILY
Qty: 540 CAPSULE | Refills: 3 | Status: SHIPPED | OUTPATIENT
Start: 2025-05-28

## 2025-05-28 RX ORDER — LISINOPRIL 10 MG/1
10 TABLET ORAL EVERY EVENING
Qty: 90 TABLET | Refills: 3 | Status: SHIPPED | OUTPATIENT
Start: 2025-05-28

## 2025-05-28 RX ORDER — CYANOCOBALAMIN (VITAMIN B-12) 2500 MCG
2500 TABLET, SUBLINGUAL SUBLINGUAL DAILY
Qty: 90 TABLET | Refills: 3 | Status: SHIPPED | OUTPATIENT
Start: 2025-05-28

## 2025-05-28 RX ORDER — OXYCODONE HYDROCHLORIDE 5 MG/1
5 TABLET ORAL 3 TIMES DAILY PRN
Qty: 65 TABLET | Refills: 0 | Status: CANCELLED | OUTPATIENT
Start: 2025-05-28

## 2025-05-28 RX ORDER — METOCLOPRAMIDE 10 MG/1
10 TABLET ORAL 3 TIMES DAILY PRN
Qty: 30 TABLET | Refills: 1 | Status: SHIPPED | OUTPATIENT
Start: 2025-05-28

## 2025-05-28 RX ORDER — LORAZEPAM 1 MG/1
1 TABLET ORAL 2 TIMES DAILY PRN
Qty: 45 TABLET | Refills: 5 | Status: CANCELLED | OUTPATIENT
Start: 2025-05-28

## 2025-05-28 RX ORDER — PRENATAL VIT/IRON FUM/FOLIC AC 27MG-0.8MG
1 TABLET ORAL DAILY
Qty: 90 TABLET | Refills: 3 | Status: SHIPPED | OUTPATIENT
Start: 2025-05-28

## 2025-05-28 RX ORDER — AMLODIPINE BESYLATE 5 MG/1
5 TABLET ORAL DAILY
Qty: 90 TABLET | Refills: 3 | Status: SHIPPED | OUTPATIENT
Start: 2025-05-28

## 2025-05-28 RX ORDER — PNV NO.95/FERROUS FUM/FOLIC AC 28MG-0.8MG
1 TABLET ORAL DAILY
Qty: 90 TABLET | Refills: 3 | Status: SHIPPED | OUTPATIENT
Start: 2025-05-28 | End: 2025-05-28

## 2025-05-28 ASSESSMENT — ANXIETY QUESTIONNAIRES
7. FEELING AFRAID AS IF SOMETHING AWFUL MIGHT HAPPEN: SEVERAL DAYS
2. NOT BEING ABLE TO STOP OR CONTROL WORRYING: MORE THAN HALF THE DAYS
3. WORRYING TOO MUCH ABOUT DIFFERENT THINGS: MORE THAN HALF THE DAYS
IF YOU CHECKED OFF ANY PROBLEMS ON THIS QUESTIONNAIRE, HOW DIFFICULT HAVE THESE PROBLEMS MADE IT FOR YOU TO DO YOUR WORK, TAKE CARE OF THINGS AT HOME, OR GET ALONG WITH OTHER PEOPLE: SOMEWHAT DIFFICULT
GAD7 TOTAL SCORE: 11
6. BECOMING EASILY ANNOYED OR IRRITABLE: SEVERAL DAYS
5. BEING SO RESTLESS THAT IT IS HARD TO SIT STILL: SEVERAL DAYS
GAD7 TOTAL SCORE: 11
1. FEELING NERVOUS, ANXIOUS, OR ON EDGE: NEARLY EVERY DAY

## 2025-05-28 ASSESSMENT — ASTHMA QUESTIONNAIRES
QUESTION_2 LAST FOUR WEEKS HOW OFTEN HAVE YOU HAD SHORTNESS OF BREATH: ONCE OR TWICE A WEEK
QUESTION_3 LAST FOUR WEEKS HOW OFTEN DID YOUR ASTHMA SYMPTOMS (WHEEZING, COUGHING, SHORTNESS OF BREATH, CHEST TIGHTNESS OR PAIN) WAKE YOU UP AT NIGHT OR EARLIER THAN USUAL IN THE MORNING: TWO OR THREE NIGHTS A WEEK
ACT_TOTALSCORE: 17
QUESTION_1 LAST FOUR WEEKS HOW MUCH OF THE TIME DID YOUR ASTHMA KEEP YOU FROM GETTING AS MUCH DONE AT WORK, SCHOOL OR AT HOME: NONE OF THE TIME
ACT_TOTALSCORE: 17
QUESTION_5 LAST FOUR WEEKS HOW WOULD YOU RATE YOUR ASTHMA CONTROL: SOMEWHAT CONTROLLED
QUESTION_4 LAST FOUR WEEKS HOW OFTEN HAVE YOU USED YOUR RESCUE INHALER OR NEBULIZER MEDICATION (SUCH AS ALBUTEROL): TWO OR THREE TIMES PER WEEK

## 2025-05-28 ASSESSMENT — PAIN SCALES - GENERAL: PAINLEVEL_OUTOF10: SEVERE PAIN (7)

## 2025-05-28 ASSESSMENT — PATIENT HEALTH QUESTIONNAIRE - PHQ9
5. POOR APPETITE OR OVEREATING: SEVERAL DAYS
SUM OF ALL RESPONSES TO PHQ QUESTIONS 1-9: 12
SUM OF ALL RESPONSES TO PHQ QUESTIONS 1-9: 12
10. IF YOU CHECKED OFF ANY PROBLEMS, HOW DIFFICULT HAVE THESE PROBLEMS MADE IT FOR YOU TO DO YOUR WORK, TAKE CARE OF THINGS AT HOME, OR GET ALONG WITH OTHER PEOPLE: SOMEWHAT DIFFICULT

## 2025-05-28 NOTE — PROGRESS NOTES
Preventive Care Visit  [facility]  Grecia Barba DO, Internal Medicine  May 28, 2025      Assessment & Plan     Encounter for Medicare annual wellness exam    Renovascular hypertension   - stable, refill provided  - amLODIPine (NORVASC) 5 MG tablet; Take 1 tablet (5 mg) by mouth daily.  - lisinopril (ZESTRIL) 10 MG tablet; Take 1 tablet (10 mg) by mouth every evening.    Raynaud's disease without gangrene   - stable, refill provided  - amLODIPine (NORVASC) 5 MG tablet; Take 1 tablet (5 mg) by mouth daily.    Hypoglycemia  Checks near daily;  doesn't recall #, but occ symptomatic hypoglycemia  - blood glucose (NO BRAND SPECIFIED) lancets standard; Use to test blood sugar 1 time daily  - blood glucose (NO BRAND SPECIFIED) test strip; Use to test blood sugar 1 time daily    Limited systemic sclerosis (H)  Needs new foot orthotic.  Continue gabapentin  - gabapentin (NEURONTIN) 300 MG capsule; Take 3 capsules (900 mg) by mouth 2 times daily.  - Orthotics, Mastectomy and Custom Compression Orders Type: Orthotic; Orthotic Type Requested: Foot Orthotics; Foot Orthotics Laterality: Bilateral    Polyneuropathy associated with underlying disease  - gabapentin (NEURONTIN) 300 MG capsule; Take 3 capsules (900 mg) by mouth 2 times daily.    F11.2 - Continuous opioid dependence (H)    Chronic pain syndrome  Continue as needed muscle relaxer, gabapentin.  Decrease oxycodone from #65 to #60/month.  Will certify for medical cannabis.  3-month refill of oxycodone given.  Continue further decrease as patient tolerates  - methocarbamol (ROBAXIN) 750 MG tablet; Take 1 tablet (750 mg) by mouth 3 times daily as needed for muscle spasms.  - Urine Drug Screen; Future  - [id number] - Urine Drug Confirmation Panel (Comprehensive)  - Urine Drug Screen    Nausea and vomiting, unspecified vomiting type  Intractable reflux from her crest.  Has seen GI.  Opiates likely contributes some.  Chronic constipation  "contributes some.  Decreasing opiates as above  - metoclopramide (REGLAN) 10 MG tablet; Take 1 tablet (10 mg) by mouth 3 times daily as needed (nausea/vomiting).  - ondansetron (ZOFRAN ODT) 4 MG ODT tab; Take 1 tablet (4 mg) by mouth every 8 hours as needed for nausea.    Vitamin B12 deficiency (non anemic)  Ensure that she is taking the sublingual tablet.  Check levels today  - Prenatal Vit-Fe Fumarate-FA (PRENATAL VITAMIN AND MINERAL) 28-0.8 MG TABS; Take 1 tablet by mouth daily.  - vitamin B-12 (CYANOCOBALAMIN) 2500 MCG sublingual tablet; Take 1 tablet (2,500 mcg) by mouth daily.  - Vitamin B12    Folate deficiency  See above  - Prenatal Vit-Fe Fumarate-FA (PRENATAL VITAMIN AND MINERAL) 28-0.8 MG TABS; Take 1 tablet by mouth daily.  - Vitamin B12; Future  - Methylmalonic Acid; Future  - Homocysteine; Future  - Methylmalonic Acid  - Homocysteine    Gastroesophageal reflux disease with esophagitis and hemorrhage  - syringe/needle, disp, (BD ECLIPSE SYRINGE) 25G X 1\" 3 ML MISC; 1 each daily as needed.    Stage 3a chronic kidney disease (H)  Due for lab  - Iron; Future  - Ferritin; Future  - Basic metabolic panel  (Ca, Cl, CO2, Creat, Gluc, K, Na, BUN); Future  - Albumin Random Urine Quantitative with Creat Ratio; Future  - BASIC METABOLIC PANEL  - Albumin Random Urine Quantitative with Creat Ratio  - Iron  - Ferritin    AIN grade I  Due for anal pap    Severe persistent asthma without complication (H)  With ILD/apiratio playing a role in shortness of breathn     REX (generalized anxiety disorder)  Known issue that I take into account for their medical decisions, no current exacerbations or new concerns      Abnormal chest CT  Known issue that I take into account for their medical decisions, no current exacerbations or new concerns      Vitamin D deficiency  - Vitamin D Deficiency; Future  - Vitamin D Deficiency    CREST (calcinosis, Raynaud's phenomenon, esophageal dysfunction, sclerodactyly, telangiectasia) " (H)  Due   - CBC with platelets    Need for vaccination  Due   - COVID-19 12+ (PFIZER)    CARDIOVASCULAR SCREENING; LDL GOAL LESS THAN 100  Due   - Lipid panel reflex to direct LDL Non-fasting    Patient has been advised of split billing requirements and indicates understanding: Yes        Counseling  Appropriate preventive services were addressed with this patient via screening, questionnaire, or discussion as appropriate for fall prevention, nutrition, physical activity, Tobacco-use cessation, social engagement, weight loss and cognition.  Checklist reviewing preventive services available has been given to the patient.  Reviewed patient's diet, addressing concerns and/or questions.   She is at risk for lack of exercise and has been provided with information to increase physical activity for the benefit of her well-being.   Patient is at risk for social isolation and has been provided with information about the benefit of social connection.   The patient was instructed to see the dentist every 6 months.   She is at risk for psychosocial distress and has been provided with information to reduce risk.   Updated plan of care.  Patient reported difficulty with activities of daily living were addressed today.The patient's PHQ-9 score is consistent with moderate depression. She was provided with information regarding depression.   I have reviewed Opioid Use Disorder and Substance Use Disorder risk factors and made any needed referrals.           Amanda Barnes is a 39 year old, presenting for the following:  Physical, Recheck Medication, Hypertension, Anxiety, and Depression        5/28/2025    12:40 PM   Additional Questions   Roomed by christian   Accompanied by self         5/28/2025    12:40 PM   Patient Reported Additional Medications   Patient reports taking the following new medications na          HPI       Chief Complaint   Patient presents with    Physical    Recheck Medication    Hypertension    Anxiety     Depression     B12 def  --taking oral tablet daily  --last checked 1 year ago and was 290    Chronic pain  --hand pain  has decreaesd with higher dose of methotrexate  --wants to certify for medical cannabis   --has significant constipation with opiates  --needs new foot orthotics;  current ones are end of life  --at last visit in March, I decreased oxycodone due to side effects of oxycodone;  the #65/mo    Hypertension Follow-up    Do you check your blood pressure regularly outside of the clinic? Yes   Are you following a low salt diet? Yes  Are your blood pressures ever more than 140 on the top number (systolic) OR more than 90 on the bottom number (diastolic), for example 140/90? No    BP Readings from Last 2 Encounters:   05/28/25 124/70   03/04/25 113/75     Depression and Anxiety   How are you doing with your depression since your last visit? No change  How are you doing with your anxiety since your last visit?  No change  Are you having other symptoms that might be associated with depression or anxiety? Yes:  good and bad days  Have you had a significant life event? No   Do you have any concerns with your use of alcohol or other drugs? No    Social History     Tobacco Use    Smoking status: Never    Smokeless tobacco: Never   Vaping Use    Vaping status: Every Day    Substances: Flavoring, delta 9    Devices: Disposable   Substance Use Topics    Alcohol use: Yes     Comment: Rarely now usually socially    Drug use: Yes     Types: Marijuana, Benzodiazepines, Oxycodone     Comment: Oxy and benzo are Medically prescribed and the Marijuana         3/28/2025     9:32 AM 3/28/2025    12:19 PM 5/28/2025    12:40 PM   PHQ   PHQ-9 Total Score 14 13  12    Q9: Thoughts of better off dead/self-harm past 2 weeks Not at all Not at all Not at all        Patient-reported    Proxy-reported         1/7/2025     1:46 PM 1/7/2025     1:48 PM 5/28/2025     1:39 PM   REX-7 SCORE   Total Score  10 (moderate anxiety)    Total Score  9 10  11       Patient-reported         5/28/2025    12:40 PM   Last PHQ-9   1.  Little interest or pleasure in doing things 1    2.  Feeling down, depressed, or hopeless 2    3.  Trouble falling or staying asleep, or sleeping too much 1    4.  Feeling tired or having little energy 2    5.  Poor appetite or overeating 1    6.  Feeling bad about yourself 2    7.  Trouble concentrating 2    8.  Moving slowly or restless 1    Q9: Thoughts of better off dead/self-harm past 2 weeks 0    PHQ-9 Total Score 12        Proxy-reported         5/28/2025     1:39 PM   REX-7    1. Feeling nervous, anxious, or on edge 3   2. Not being able to stop or control worrying 2   3. Worrying too much about different things 2   4. Trouble relaxing 1   5. Being so restless that it is hard to sit still 1   6. Becoming easily annoyed or irritable 1   7. Feeling afraid, as if something awful might happen 1   REX-7 Total Score 11   If you checked any problems, how difficult have they made it for you to do your work, take care of things at home, or get along with other people? Somewhat difficult       Suicide Assessment Five-step Evaluation and Treatment (SAFE-T)    Asthma  Patient has not had an ACT done in this encounter: Link to ACT Flowsheet       5/28/2025    12:47 PM   ACT Total Scores   ACT TOTAL SCORE (Goal Greater than or Equal to 20) 17   In the past 12 months, how many times did you visit the emergency room for your asthma without being admitted to the hospital? 0   In the past 12 months, how many times were you hospitalized overnight because of your asthma? 0     Do you have any of the following symptoms? Trouble with breathing (shortness of breath) when walking   What makes your asthma/breathing worse?  Pollens and Other coughing more at night   Do you want more information about how to use your inhaler? No  --She saw pulmonary down at [facility] in April  --She has had recurring aspiration  --Abnormal CT chest: bilateral reticular  opacities in bases, more prominent in the right, possibly secondary to aspiration vs [id number]     History of anemia  --Due to anemia of chronic disease.  Last hemoglobin was 13.4 in April    Pain History:  When did you first notice your pain? chronic   Have you seen this provider for your pain in the past? Yes   Where in your body do you have pain? All over body   Are you seeing anyone else for your pain? No        3/28/2025     9:32 AM 3/28/2025    12:19 PM 5/28/2025    12:40 PM   PHQ-9 SCORE   PHQ-9 Total Score MyChart  13 (Moderate depression) 12 (Moderate depression)   PHQ-9 Total Score 14 13  12        Patient-reported    Proxy-reported           1/7/2025     1:46 PM 1/7/2025     1:48 PM 5/28/2025     1:39 PM   REX-7 SCORE   Total Score  10 (moderate anxiety)    Total Score 9 10  11       Patient-reported               11/20/2024     3:02 PM 3/28/2025     9:26 AM 5/28/2025    12:51 PM   PEG Score   PEG Total Score 8 7.33 8.67       Chronic Pain Follow Up:    Location of pain: all over body  Analgesia/pain control:    - Recent changes:  same    - Overall control: Comfortably manageable    - Current treatments: see med list    Adherence:     - Do you ever take more pain medicine than prescribed? No    - When did you take your last dose of pain medicine?  5/28/25 in AM   Adverse effects: No   PDMP Review         Value Time User    State PDMP site checked  Yes 5/28/2025  1:19 PM Grecia Barba, DO          Last CSA Agreement:   CSA -- Patient Level:     [Media Unavailable] Controlled Substance Agreement - Opioid - Scan on 11/25/2024  1:58 PM   [Media Unavailable] Controlled Substance Agreement - Opioid - Scan on 9/28/2023 12:07 PM   [Media Unavailable] Controlled Substance Agreement - Opioid - Scan on 7/11/2022 12:11 PM   [Media Unavailable] Controlled Substance Agreement - Opioid - Scan on 12/27/2020  1:35 PM   [Media Unavailable] Controlled Substance Agreement - Opioid - Scan on 2/6/2019  6:23 PM        Last UDS: 10/2/2023                Advance Care Planning    Discussed advance care planning with patient; informed AVS has link to [facility] [employer].        5/25/2025   General Health   How would you rate your overall physical health? (!) FAIR   Feel stress (tense, anxious, or unable to sleep) To some extent   (!) STRESS CONCERN      5/25/2025   Nutrition   Diet: Other   If other, please elaborate: Intermittent fasting and soecialty diet         5/25/2025   Exercise   Days per week of moderate/strenous exercise 2 days   Average minutes spent exercising at this level 20 min   (!) EXERCISE CONCERN      5/25/2025   Social Factors   Frequency of gathering with friends or relatives Never   Worry food won't last until get money to buy more No   Food not last or not have enough money for food? No   Do you have housing? (Housing is defined as stable permanent housing and does not include staying outside in a car, in a tent, in an abandoned building, in an overnight shelter, or couch-surfing.) Yes   Are you worried about losing your housing? No   Lack of transportation? No   Unable to get utilities (heat,electricity)? No   (!) SOCIAL CONNECTIONS CONCERN      5/28/2025   Fall Risk   Reason Gait Speed Test Not Completed Unable to complete test, patient reported symptoms          5/25/2025   Activities of Daily Living- Home Safety   Needs help with the following daily activites Shopping    Preparing meals    Housework    Laundry   Safety concerns in the home None of the above       Multiple values from one day are sorted in reverse-chronological order         5/25/2025   Dental   Dentist two times every year? (!) NO         5/25/2025   Hearing Screening   Hearing concerns? None of the above         5/25/2025   Driving Risk Screening   Patient/family members have concerns about driving (!) DECLINE         5/25/2025   General Alertness/Fatigue Screening   Have you been more tired than usual lately? No         5/25/2025    Urinary Incontinence Screening   Bothered by leaking urine in past 6 months No       Today's PHQ-9 Score:       5/28/2025    12:40 PM   PHQ-9 SCORE   PHQ-9 Total Score MyChart 12 (Moderate depression)   PHQ-9 Total Score 12        Proxy-reported         5/25/2025   Substance Use   Alcohol more than 3/day or more than 7/wk Not Applicable   Do you have a current opioid prescription? (!) YES   How severe/bad is pain from 1 to 10? 7/10   Do you use any other substances recreationally? (!) CANNABIS PRODUCTS       Social History     Tobacco Use    Smoking status: Never    Smokeless tobacco: Never   Vaping Use    Vaping status: Every Day    Substances: Flavoring, delta 9    Devices: Disposable   Substance Use Topics    Alcohol use: Yes     Comment: Rarely now usually socially    Drug use: Yes     Types: Marijuana, Benzodiazepines, Oxycodone     Comment: Oxy and benzo are Medically prescribed and the Marijuana          Mammogram Screening - Patient under 40 years of age: Routine Mammogram Screening not recommended.       History of abnormal Pap smear: YES - other categories - see link Cervical Cytology Screening Guidelines        Latest Ref Rng & Units 5/10/2023     7:57 AM 8/4/2020    11:30 AM 8/4/2020    11:00 AM   PAP / HPV   PAP  Negative for Intraepithelial Lesion or Malignancy (NILM)      PAP (Historical)   NIL     HPV 16 DNA Negative Negative   Negative    HPV 18 DNA Negative Negative   Negative    Other HR HPV Negative Negative   Negative            5/25/2025   Contraception/Family Planning   Questions about contraception or family planning No         Reviewed and updated as needed this visit by Provider   Tobacco  Allergies  Meds  Problems  Med Hx  Surg Hx  Fam Hx            Current Outpatient Medications   Medication Sig Dispense Refill    acetaminophen (TYLENOL) 325 MG tablet Take 2 tablets (650 mg) by mouth every 4 hours as needed for mild pain or other 1 Bottle 0    albuterol (VENTOLIN HFA) 108 (90  Base) MCG/ACT inhaler INHALE 2 PUFFS INTO THE LUNGS ONCE EVERY 4 HOURS AS NEEDED FOR SHORTNESS OF BREATH / DYSPNEA / WHEEZING 18 g 11    amLODIPine (NORVASC) 5 MG tablet Take 1 tablet (5 mg) by mouth daily. 90 tablet 3    blood glucose (NO BRAND SPECIFIED) lancets standard Use to test blood sugar 1 time daily 100 each 3    blood glucose (NO BRAND SPECIFIED) test strip Use to test blood sugar 1 time daily 100 strip 11    busPIRone (BUSPAR) 15 MG tablet Take 1 tablet (15 mg) by mouth 2 times daily. 180 tablet 3    cholecalciferol 125 MCG (5000 UT) CAPS Take 1 capsule (5,000 Units) by mouth daily Start after Ergocalciferol course is complete 90 capsule 3    DULoxetine (CYMBALTA) 30 MG capsule Take 3 capsules (90 mg) by mouth daily. 270 capsule 3    famotidine (PEPCID) 20 MG tablet Take 1 tablet (20 mg) by mouth 2 times daily. 180 tablet 3    fluticasone-vilanterol (BREO ELLIPTA) 200-25 MCG/ACT inhaler Inhale 1 puff into the lungs daily. 60 each 11    folic acid (FOLVITE) 1 MG tablet Take 1 mg by mouth daily      gabapentin (NEURONTIN) 300 MG capsule Take 3 capsules (900 mg) by mouth 2 times daily. 540 capsule 3    GOODSENSE MIGRAINE FORMULA 250-250-65 MG per tablet TAKE ONE TABLET BY MOUTH EVERY 6 HOURS AS NEEDED FOR HEADACHES 24 tablet 1    lisinopril (ZESTRIL) 10 MG tablet Take 1 tablet (10 mg) by mouth every evening. 90 tablet 3    loratadine (CLARITIN) 10 MG tablet Take 10 mg by mouth daily as needed       LORazepam (ATIVAN) 1 MG tablet TAKE 1 TABLET (1 MG) BY MOUTH 2 TIMES DAILY AS NEEDED FOR ANXIETY #45 FOR 30 DAYS 45 tablet 5    medical cannabis (Patient's own supply) (The purpose of this order is to document that the patient reports taking medical cannabis.  This is not a prescription, and is not used to certify that the patient has a qualifying medical condition.)  CBG gummy over the counter 0 Information only 0    methocarbamol (ROBAXIN) 750 MG tablet Take 1 tablet (750 mg) by mouth 3 times daily as needed  "for muscle spasms. 180 tablet 3    methotrexate sodium, PF, 50 MG/2ML SOLN injection Inject 0.6 mg subcutaneously every 7 days. 0.8ML      metoclopramide (REGLAN) 10 MG tablet Take 1 tablet (10 mg) by mouth 3 times daily as needed (nausea/vomiting). 30 tablet 1    naloxone (NARCAN) 4 MG/0.1ML nasal spray Spray 1 spray (4 mg) into one nostril alternating nostrils once as needed for opioid reversal every 2-3 minutes until assistance arrives 0.2 mL 3    omeprazole (PRILOSEC) 40 MG DR capsule Take 1 capsule (40 mg) by mouth 3 times daily. GI has approved high dose 270 capsule 3    ondansetron (ZOFRAN ODT) 4 MG ODT tab Take 1 tablet (4 mg) by mouth every 8 hours as needed for nausea. 30 tablet 4    oxyCODONE (ROXICODONE) 5 MG tablet Take 1 tablet (5 mg) by mouth 3 times daily as needed for pain. 65 tablet 0    oxyCODONE (ROXICODONE) 5 MG tablet Take 1 tablet (5 mg) by mouth 3 times daily as needed for pain. 65 tablet 0    polyethylene glycol (MIRALAX) 17 GM/Dose powder Take 17 g by mouth 2 times daily. 850 g 11    Prenatal Vit-Fe Fumarate-FA (PRENATAL VITAMIN AND MINERAL) 28-0.8 MG TABS Take 1 tablet by mouth daily. 90 tablet 3    syringe/needle, disp, (BD ECLIPSE SYRINGE) 25G X 1\" 3 ML MISC 1 each daily as needed. 10 each 11    vitamin B-12 (CYANOCOBALAMIN) 2500 MCG sublingual tablet Take 1 tablet (2,500 mcg) by mouth daily. 90 tablet 3     Current providers sharing in care for this patient include:  Patient Care Team:  Grecia Barba DO as PCP - General (Internal Medicine)  Grecia Barba DO as Assigned PCP  Maritza Harrison MD as MD (OB/Gyn)  Cindy Lorenzana MD as Resident (Internal Medicine)  Jalen Moses MD as MD (Gastroenterology)  Grecai Barba DO as Assigned Pain Medication Provider  Nataliia Marin, APRN CNP as Assigned Surgical Provider  Josy Sinha PA-C as Assigned Gastroenterology Provider  Bety Ratliff, RN as Lead Care Coordinator (Primary Care - " "CC)  Jayla Mcelroy, CHW as Community Health Worker    The following health maintenance items are reviewed in Epic and correct as of today:  Health Maintenance   Topic Date Due    ZOSTER VACCINE (1 of 2) Never done    HEPATITIS A VACCINE (1 of 2 - Risk 2-dose series) Never done    HEPATITIS B VACCINE (1 of 3 - 19+ 3-dose series) Never done    ASTHMA ACTION PLAN  07/06/2019    Anal Microscopy (high resolution anoscopy)  09/01/2024    BMP  09/28/2024    LIPID  09/28/2024    MICROALBUMIN  09/28/2024    URINE DRUG SCREEN  09/28/2024    HEMOGLOBIN  09/28/2024    COVID-19 VACCINE (8 - 2024-25 season) 07/23/2025    ANAL PAP SMEAR  08/30/2025    CONTROLLED SUBSTANCE AGREEMENT FOR CHRONIC PAIN MANAGEMENT  11/25/2025    ASTHMA CONTROL TEST  11/28/2025    ANNUAL REVIEW OF HM ORDERS  01/07/2026    MEDICARE ANNUAL WELLNESS VISIT  05/28/2026    REX ASSESSMENT  05/28/2026    PHQ-9  05/28/2026    DIABETES SCREENING  09/28/2026    DTAP/TDAP/TD VACCINE (2 - Td or Tdap) 05/11/2027    ADVANCE CARE PLANNING  05/17/2029    HPV TEST  08/30/2029    PAP  08/30/2029    HEPATITIS C SCREENING  Completed    HIV SCREENING  Completed    DEPRESSION ACTION PLAN  Completed    INFLUENZA VACCINE  Completed    PNEUMOCOCCAL VACCINE: PEDIATRICS (0 to 5 YEARS) AND AT-RISK PATIENTS (6 to 49 YEARS)  Completed    URINALYSIS  Completed    HPV VACCINE  Aged Out    MENINGITIS VACCINE  Aged Out         Review of Systems  Constitutional, neuro, ENT, endocrine, pulmonary, cardiac, gastrointestinal, genitourinary, musculoskeletal, integument and psychiatric systems are negative, except as otherwise noted.     Objective    Exam  /70 (BP Location: Left arm, Patient Position: Sitting, Cuff Size: Adult Regular)   Pulse 85   Temp 98.1  F (36.7  C) (Tympanic)   Resp 12   Ht 1.54 m (5' 0.63\")   Wt 58.8 kg (129 lb 11.2 oz)   SpO2 98%   BMI 24.81 kg/m     Estimated body mass index is 24.81 kg/m  as calculated from the following:    Height as of this " "encounter: 1.54 m (5' 0.63\").    Weight as of this encounter: 58.8 kg (129 lb 11.2 oz).    Physical Exam  GENERAL: alert and no distress  EYES: Eyes grossly normal to inspection, PERRL and conjunctivae and sclerae normal  HENT: normal cephalic/atraumatic, ear canals and TM's normal, and oral mucous membranes moist  NECK: no adenopathy, thyroid normal to palpation, and tracheostomy scar  RESP: lungs clear to auscultation - no rales, rhonchi or wheezes  CV: regular rate and rhythm, normal S1 S2, no S3 or S4, no murmur, click or rub, no peripheral edema  ABDOMEN: soft, nontender, no hepatosplenomegaly, no masses and bowel sounds normal  MS: Contractures of multiple fingers.  Bilateral knee braces in place.  Deformity of right medial ankle  SKIN: Excessive scarring and skin tightening of face and hands  NEURO: Normal strength and tone, mentation intact and speech normal  PSYCH: mentation appears normal, affect normal/bright         5/28/2025   Mini Cog   Clock Draw Score 2 Normal   3 Item Recall 3 objects recalled   Mini Cog Total Score 5              Signed Electronically by: Grecia Barba DO    "

## 2025-05-28 NOTE — PATIENT INSTRUCTIONS
Health Care Maintenance  Due for anal pap in Aug  Due for COVID booster    Pain  Will certify you for medical cannabis  Will refill oxycodone at #60/month, decreased from #65/month  Referral to orthotics for new shoe inserts    Meds   Blood and urine test today    B12 deficiency  Will check levels today  Stop the daily pill  Start once daily dissolvable tablet      Patient Education   Preventive Care Advice   This is general advice given by our system to help you stay healthy. However, your care team may have specific advice just for you. Please talk to your care team about your preventive care needs.  Nutrition  Eat 5 or more servings of fruits and vegetables each day.  Try wheat bread, brown rice and whole grain pasta (instead of white bread, rice, and pasta).  Get enough calcium and vitamin D. Check the label on foods and aim for 100% of the RDA (recommended daily allowance).  Lifestyle  Exercise at least 150 minutes each week  (30 minutes a day, 5 days a week).  Do muscle strengthening activities 2 days a week. These help control your weight and prevent disease.  No smoking.  Wear sunscreen to prevent skin cancer.  Have a dental exam and cleaning every 6 months.  Yearly exams  See your health care team every year to talk about:  Any changes in your health.  Any medicines your care team has prescribed.  Preventive care, family planning, and ways to prevent chronic diseases.  Shots (vaccines)   HPV shots (up to age 26), if you've never had them before.  Hepatitis B shots (up to age 59), if you've never had them before.  COVID-19 shot: Get this shot when it's due.  Flu shot: Get a flu shot every year.  Tetanus shot: Get a tetanus shot every 10 years.  Pneumococcal, hepatitis A, and RSV shots: Ask your care team if you need these based on your risk.  Shingles shot (for age 50 and up)  General health tests  Diabetes screening:  Starting at age 35, Get screened for diabetes at least every 3 years.  If you are younger  than age 35, ask your care team if you should be screened for diabetes.  Cholesterol test: At age 39, start having a cholesterol test every 5 years, or more often if advised.  Bone density scan (DEXA): At age 50, ask your care team if you should have this scan for osteoporosis (brittle bones).  Hepatitis C: Get tested at least once in your life.  STIs (sexually transmitted infections)  Before age 24: Ask your care team if you should be screened for STIs.  After age 24: Get screened for STIs if you're at risk. You are at risk for STIs (including HIV) if:  You are sexually active with more than one person.  You don't use condoms every time.  You or a partner was diagnosed with a sexually transmitted infection.  If you are at risk for HIV, ask about PrEP medicine to prevent HIV.  Get tested for HIV at least once in your life, whether you are at risk for HIV or not.  Cancer screening tests  Cervical cancer screening: If you have a cervix, begin getting regular cervical cancer screening tests starting at age 21.  Breast cancer scan (mammogram): If you've ever had breasts, begin having regular mammograms starting at age 40. This is a scan to check for breast cancer.  Colon cancer screening: It is important to start screening for colon cancer at age 45.  Have a colonoscopy test every 10 years (or more often if you're at risk) Or, ask your provider about stool tests like a FIT test every year or Cologuard test every 3 years.  To learn more about your testing options, visit:   .  For help making a decision, visit:   https://bit.ly/yt53115.  Prostate cancer screening test: If you have a prostate, ask your care team if a prostate cancer screening test (PSA) at age 55 is right for you.  Lung cancer screening: If you are a current or former smoker ages 50 to 80, ask your care team if ongoing lung cancer screenings are right for you.  For informational purposes only. Not to replace the advice of your health care provider.  Copyright   2023 [facility]. All rights reserved. Clinically reviewed by the [facility] Transitions Program. [employer] 110687 - REV 01/24.  Preventing Falls: Care Instructions  Injuries and health problems such as trouble walking or poor eyesight can increase your risk of falling. So can some medicines. But there are things you can do to help prevent falls. You can exercise to get stronger. You can also arrange your home to make it safer.    Talk to your doctor about the medicines you take. Ask if any of them increase the risk of falls and whether they can be changed or stopped.   Try to exercise regularly. It can help improve your strength and balance. This can help lower your risk of falling.         Practice fall safety and prevention.   Wear low-heeled shoes that fit well and give your feet good support. Talk to your doctor if you have foot problems that make this hard.  Carry a cellphone or wear a medical alert device that you can use to call for help.  Use stepladders instead of chairs to reach high objects. Don't climb if you're at risk for falls. Ask for help, if needed.  Wear the correct eyeglasses, if you need them.        Make your home safer.   Remove rugs, cords, clutter, and furniture from walkways.  Keep your house well lit. Use night-lights in hallways and bathrooms.  Install and use sturdy handrails on stairways.  Wear nonskid footwear, even inside. Don't walk barefoot or in socks without shoes.        Be safe outside.   Use handrails, curb cuts, and ramps whenever possible.  Keep your hands free by using a shoulder bag or backpack.  Try to walk in well-lit areas. Watch out for uneven ground, changes in pavement, and debris.  Be careful in the winter. Walk on the grass or gravel when sidewalks are slippery. Use de-icer on steps and walkways. Add non-slip devices to shoes.    Put grab bars and nonskid mats in your shower or tub and near the toilet. Try to use a shower chair or  "bath bench when bathing.   Get into a tub or shower by putting in your weaker leg first. Get out with your strong side first. Have a phone or medical alert device in the bathroom with you.   Where can you learn more?  Go to https://www.[employer].net/patiented  Enter G117 in the search box to learn more about \"Preventing Falls: Care Instructions.\"  Current as of: July 31, 2024  Content Version: 14.4 2024-2025 [employer].   Care instructions adapted under license by your healthcare professional. If you have questions about a medical condition or this instruction, always ask your healthcare professional. [employer] disclaims any warranty or liability for your use of this information.    Relationships for Good Health  Relationships are important for our health and happiness. Social isolation, loneliness and lack of support are bad for your health. Studies show that loneliness can harm health and limit your life span as much as high blood pressure and smoking.   Take some time to reflect on your relationships. Then answer these questions:  Are there people in your life that cause you stress or drain your energy? What can you do to set limits?  ________________________________________________________________________________________________________________________________________________________________________________________________________________________________________________________________________________________________________________________________________________  Who do you enjoy spending time with? Who can you go to for support?  ________________________________________________________________________________________________________________________________________________________________________________________________________________________________________________________________________________________________________________________________________________  What can you do to " improve your relationships with others?  __________________________________________________________________________________________________________________________________________________________________________________________________________________  ______________________________________________________________________________________________________________________________  What do you like most about your relationships with others?  ________________________________________________________________________________________________________________________________________________________________________________________________________________________________________________________________________________________________________________________________________________  My goal: ______________________________________________________________________  I will: ______________________________________________________________________________________________________________________________________________________________________________________________    For informational purposes only. Not to replace the advice of your health care provider. Copyright   2018 [facility]. All rights reserved. Clinically reviewed by [facility]  Team. [employer] 791390 - Rev 06/24.  Learning About Stress  What is stress?     Stress is your body's response to a hard situation. Your body can have a physical, emotional, or mental response. Stress is a fact of life for most people, and it affects everyone differently. What causes stress for you may not be stressful for someone else.  A lot of things can cause stress. You may feel stress when you go on a job interview, take a test, or run a race. This kind of short-term stress is normal and even useful. It can help you if you need to work hard or react quickly. For example, stress can help you finish an important job on time.  Long-term stress is caused by ongoing stressful  situations or events. Examples of long-term stress include long-term health problems, ongoing problems at work, or conflicts in your family. Long-term stress can harm your health.  How does stress affect your health?  When you are stressed, your body responds as though you are in danger. It makes hormones that speed up your heart, make you breathe faster, and give you a burst of energy. This is called the fight-or-flight stress response. If the stress is over quickly, your body goes back to normal and no harm is done.  But if stress happens too often or lasts too long, it can have bad effects. Long-term stress can make you more likely to get sick, and it can make symptoms of some diseases worse. If you tense up when you are stressed, you may develop neck, shoulder, or low back pain. Stress is linked to high blood pressure and heart disease.  Stress also harms your emotional health. It can make you rivera, tense, or depressed. Your relationships may suffer, and you may not do well at work or school.  What can you do to manage stress?  You can try these things to help manage stress:   Do something active. Exercise or activity can help reduce stress. Walking is a great way to get started. Even everyday activities such as housecleaning or yard work can help.  Try yoga or nichol chi. These techniques combine exercise and meditation. You may need some training at first to learn them.  Do something you enjoy. For example, listen to music or go to a movie. Practice your hobby or do volunteer work.  Meditate. This can help you relax, because you are not worrying about what happened before or what may happen in the future.  Do guided imagery. Imagine yourself in any setting that helps you feel calm. You can use online videos, books, or a teacher to guide you.  Do breathing exercises. For example:  From a standing position, bend forward from the waist with your knees slightly bent. Let your arms dangle close to the floor.  Breathe  "in slowly and deeply as you return to a standing position. Roll up slowly and lift your head last.  Hold your breath for just a few seconds in the standing position.  Breathe out slowly and bend forward from the waist.  Let your feelings out. Talk, laugh, cry, and express anger when you need to. Talking with supportive friends or family, a counselor, or a anju leader about your feelings is a healthy way to relieve stress. Avoid discussing your feelings with people who make you feel worse.  Write. It may help to write about things that are bothering you. This helps you find out how much stress you feel and what is causing it. When you know this, you can find better ways to cope.  What can you do to prevent stress?  You might try some of these things to help prevent stress:  Manage your time. This helps you find time to do the things you want and need to do.  Get enough sleep. Your body recovers from the stresses of the day while you are sleeping.  Get support. Your family, friends, and community can make a difference in how you experience stress.  Limit your news feed. Avoid or limit time on social media or news that may make you feel stressed.  Do something active. Exercise or activity can help reduce stress. Walking is a great way to get started.  Where can you learn more?  Go to https://www.[employer].net/patiented  Enter N032 in the search box to learn more about \"Learning About Stress.\"  Current as of: October 24, 2024  Content Version: 14.4    3982-2322 [employer].   Care instructions adapted under license by your healthcare professional. If you have questions about a medical condition or this instruction, always ask your healthcare professional. [employer] disclaims any warranty or liability for your use of this information.    Learning About Depression Screening  What is depression screening?  Depression screening is a way to see if you have depression symptoms. It may be done by a " "doctor or counselor. It's often part of a routine checkup. That's because your mental health is just as important as your physical health.  Depression is a mental health condition that affects how you feel, think, and act. You may:  Have less energy.  Lose interest in your daily activities.  Feel sad and grouchy for a long time.  Depression is very common. It affects people of all ages.  Many things can lead to depression. Some people become depressed after they have a stroke or find out they have a major illness like cancer or heart disease. The death of a loved one or a breakup may lead to depression. It can run in families. Most experts believe that a combination of inherited genes and stressful life events can cause it.  What happens during screening?  You may be asked to fill out a form about your depression symptoms. You and the doctor will discuss your answers. The doctor may ask you more questions to learn more about how you think, act, and feel.  What happens after screening?  If you have symptoms of depression, your doctor will talk to you about your options.  Doctors usually treat depression with medicines or counseling. Often, combining the two works best. Many people don't get help because they think that they'll get over the depression on their own. But people with depression may not get better unless they get treatment.  The cause of depression is not well understood. There may be many factors involved. But if you have depression, it's not your fault.  A serious symptom of depression is thinking about death or suicide. If you or someone you care about talks about this or about feeling hopeless, get help right away.  It's important to know that depression can be treated. Medicine, counseling, and self-care may help.  Where can you learn more?  Go to https://www.healthwise.net/patiented  Enter T185 in the search box to learn more about \"Learning About Depression Screening.\"  Current as of: July 31, " 2024  Content Version: 14.4    2326-2903 [employer]   Care instructions adapted under license by your healthcare professional. If you have questions about a medical condition or this instruction, always ask your healthcare professional. [employer] disclaims any warranty or liability for your use of this information.    Substance Use Disorder: Care Instructions  Overview     You can improve your life and health by stopping your use of alcohol or drugs. When you don't drink or use drugs, you may feel and sleep better. You may get along better with your family, friends, and coworkers. There are medicines and programs that can help with substance use disorder.  How can you care for yourself at home?  Here are some ways to help you stay sober and prevent relapse.  If you have been given medicine to help keep you sober or reduce your cravings, be sure to take it exactly as prescribed.  Talk to your doctor about programs that can help you stop using drugs or drinking alcohol.  Do not keep alcohol or drugs in your home.  Plan ahead. Think about what you'll say if other people ask you to drink or use drugs. Try not to spend time with people who drink or use drugs.  Use the time and money spent on drinking or drugs to do something that's important to you.  Preventing a relapse  Have a plan to deal with relapse. Learn to recognize changes in your thinking that lead you to drink or use drugs. Get help before you start to drink or use drugs again.  Try to stay away from situations, friends, or places that may lead you to drink or use drugs.  If you feel the need to drink alcohol or use drugs again, seek help right away. Call a trusted friend or family member. Some people get support from organizations such as Narcotics Anonymous or [employer] or from treatment facilities.  If you relapse, get help as soon as you can. Some people make a plan with another person that outlines what they want that person  to do for them if they relapse. The plan usually includes how to handle the relapse and who to notify in case of relapse.  Don't give up. Remember that a relapse doesn't mean that you have failed. Use the experience to learn the triggers that lead you to drink or use drugs. Then quit again. Recovery is a lifelong process. Many people have several relapses before they are able to quit for good.  Follow-up care is a key part of your treatment and safety. Be sure to make and go to all appointments, and call your doctor if you are having problems. It's also a good idea to know your test results and keep a list of the medicines you take.  When should you call for help?   Call 911  anytime you think you may need emergency care. For example, call if you or someone else:    Has overdosed or has withdrawal signs. Be sure to tell the emergency workers that you are or someone else is using or trying to quit using drugs. Overdose or withdrawal signs may include:  Losing consciousness.  Seizure.  Seeing or hearing things that aren't there (hallucinations).     Is thinking or talking about suicide or harming others.   Where to get help 24 hours a day, 7 days a week   If you or someone you know talks about suicide, self-harm, a mental health crisis, a substance use crisis, or any other kind of emotional distress, get help right away. You can:    Call the Suicide and Crisis Lifeline at 985.     Call 5-756-491-KFEX (1-173.771.7395).     Text HOME to 822143 to access the Crisis Text Line.   Consider saving these numbers in your phone.  Go to [employer] Corporation[employer]org for more information or to chat online.  Call your doctor now or seek immediate medical care if:    You are having withdrawal symptoms. These may include nausea or vomiting, sweating, shakiness, and anxiety.   Watch closely for changes in your health, and be sure to contact your doctor if:    You have a relapse.     You need more help or support to stop.   Where can you learn  "more?  Go to https://www.[employer].net/patiented  Enter H573 in the search box to learn more about \"Substance Use Disorder: Care Instructions.\"  Current as of: August 20, 2024  Content Version: 14.4    9994-1034 [employer]   Care instructions adapted under license by your healthcare professional. If you have questions about a medical condition or this instruction, always ask your healthcare professional. [employer] disclaims any warranty or liability for your use of this information.    Chronic Pain: Care Instructions  Your Care Instructions     Chronic pain is pain that lasts a long time (months or even years) and may or may not have a clear cause. It is different from acute pain, which usually does have a clear cause--like an injury or illness--and gets better over time. Chronic pain:  Lasts over time but may vary from day to day.  Does not go away despite efforts to end it.  May disrupt your sleep and lead to fatigue.  May cause depression or anxiety.  May make your muscles tense, causing more pain.  Can disrupt your work, hobbies, home life, and relationships with friends and family.  Chronic pain is a very real condition. It is not just in your head. Treatment can help and usually includes several methods used together, such as medicines, physical therapy, exercise, and other treatments. Learning how to relax and changing negative thought patterns can also help you cope.  Chronic pain is complex. Taking an active role in your treatment will help you better manage your pain. Tell your doctor if you have trouble dealing with your pain. You may have to try several things before you find what works best for you.  Follow-up care is a key part of your treatment and safety. Be sure to make and go to all appointments, and call your doctor if you are having problems. It's also a good idea to know your test results and keep a list of the medicines you take.  How can you care for yourself at " home?  Pace yourself. Break up large jobs into smaller tasks. Save harder tasks for days when you have less pain, or go back and forth between hard tasks and easier ones. Take rest breaks.  Relax, and reduce stress. Relaxation techniques such as deep breathing or meditation can help.  Keep moving. Gentle, daily exercise can help reduce pain over the long run. Try low- or no-impact exercises such as walking, swimming, and stationary biking. Do stretches to stay flexible.  Try heat, cold packs, and massage.  Get enough sleep. Chronic pain can make you tired and drain your energy. Talk with your doctor if you have trouble sleeping because of pain.  Think positive. Your thoughts can affect your pain level. Do things that you enjoy to distract yourself when you have pain instead of focusing on the pain. See a movie, read a book, listen to music, or spend time with a friend.  If you think you are depressed, talk to your doctor about treatment.  Keep a daily pain diary. Record how your moods, thoughts, sleep patterns, activities, and medicine affect your pain. You may find that your pain is worse during or after certain activities or when you are feeling a certain emotion. Having a record of your pain can help you and your doctor find the best ways to treat your pain.  Take pain medicines exactly as directed.  If the doctor gave you a prescription medicine for pain, take it as prescribed.  If you are not taking a prescription pain medicine, ask your doctor if you can take an over-the-counter medicine.  Reducing constipation caused by pain medicine  Talk to your doctor about a laxative. If a laxative doesn't work, your doctor may suggest a prescription medicine.  Include fruits, vegetables, beans, and whole grains in your diet each day. These foods are high in fiber.  If your doctor recommends it, get more exercise. Walking is a good choice. Bit by bit, increase the amount you walk every day. Try for at least 30 minutes on  "most days of the week.  Schedule time each day for a bowel movement. A daily routine may help. Take your time and do not strain when having a bowel movement.  When should you call for help?   Call your doctor now or seek immediate medical care if:    Your pain gets worse or is out of control.     You feel down or blue, or you do not enjoy things like you once did. You may be depressed, which is common in people with chronic pain. Depression can be treated.     You have vomiting or cramps for more than 2 hours.   Watch closely for changes in your health, and be sure to contact your doctor if:    You cannot sleep because of pain.     You are very worried or anxious about your pain.     You have trouble taking your pain medicine.     You have any concerns about your pain medicine.     You have trouble with bowel movements, such as:  No bowel movement in 3 days.  Blood in the anal area, in your stool, or on the toilet paper.  Diarrhea for more than 24 hours.   Where can you learn more?  Go to [email or web address].[email or web address]  Enter N004 in the search box to learn more about \"Chronic Pain: Care Instructions.\"  Current as of: July 31, 2024  Content Version: 14.4    7411-4146 [employer].   Care instructions adapted under license by your healthcare professional. If you have questions about a medical condition or this instruction, always ask your healthcare professional. [employer] disclaims any warranty or liability for your use of this information.       "

## 2025-05-29 LAB
HCYS SERPL-SCNC: 12.1 UMOL/L (ref 0–15)
VIT B12 SERPL-MCNC: 533 PG/ML (ref 232–1245)
VIT D+METAB SERPL-MCNC: 47 NG/ML (ref 20–50)

## 2025-06-02 ENCOUNTER — PATIENT OUTREACH (OUTPATIENT)
Dept: CARE COORDINATION | Facility: CLINIC | Age: 40
End: 2025-06-02
Payer: MEDICARE

## 2025-06-02 NOTE — PROGRESS NOTES
Clinic Care Coordination Contact  Community Health Worker Follow Up    Care Gaps:     Health Maintenance Due   Topic Date Due    ZOSTER VACCINE (1 of 2) Never done    HEPATITIS A VACCINE (1 of 2 - Risk 2-dose series) Never done    HEPATITIS B VACCINE (1 of 3 - 19+ 3-dose series) Never done    ASTHMA ACTION PLAN  07/06/2019    Anal Microscopy (high resolution anoscopy)  09/01/2024       Care Gaps Last addressed at [facility] on 5/28. Microscopy scheduled 8/15/25.    Care Plan:   Care Plan: General       Problem: HP GENERAL PROBLEM       Goal: I will have a plan for future specilaists appointments so I don't feel so overwhelmed       Start Date: 3/12/2025 Expected End Date: 6/12/2025    This Visit's Progress: 70% Recent Progress: 30%    Priority: High    Note:     Barriers: Feeling overwhelmed   Strengths: Significant other and his family are great support   Patient has My Chart   Patient expressed understanding of goal: Yes  Action steps to achieve this goal:  1. I will put reminders on my phone   2. I will continue to use my calendar at home   3. Care coordinator will send a spreadsheet of all Specialists,address's and contact information. Completed and using.   4. I will get a  at each specialty clinic to call with questions    5. I will place myself on a waiting list to get in sooner for appointments                             Care Plan: Mental Health       Problem: Mental Health Symptoms Need Improvement       Goal: Improve management of mental health symptoms and establish with mental health/psychosocial supports       Start Date: 3/12/2025 Expected End Date: 6/12/2025    This Visit's Progress: 0%    Priority: High    Note:     Barriers: Multiple health problems   Strengths: Agrees with plan   Patient expressed understanding of goal: Yes   Action steps to achieve this goal:  1. I will take my medications as prescribed   2. I will make a future appointment with   Majo Prieto LICSW    "- Clinical    Behavioral Specialist   3. I will ask for family assistance if feeling overwhelmed as a caregiver of son                                Intervention and Education during outreach: Patient received table of providers/specialists and their contact info in the mail and states it has been helpful for her and she has used it multiple times for appts already. States she is having a \"bad week\" and hasn't been feeling good. CHW asked if there was anything CC could do to help, she stated no and is educated when she needs to be seen for her worsening symptoms and doesn't need to be seen right now. No other needs.    Jayla Mcelroy, OFELIAW  [phone]  [facility]      "

## 2025-06-03 ENCOUNTER — PATIENT OUTREACH (OUTPATIENT)
Dept: CARE COORDINATION | Facility: CLINIC | Age: 40
End: 2025-06-03
Payer: MEDICARE

## 2025-06-03 ASSESSMENT — ACTIVITIES OF DAILY LIVING (ADL): DEPENDENT_IADLS:: INDEPENDENT

## 2025-06-03 NOTE — PROGRESS NOTES
Clinic Care Coordination Contact  Care Coordination Clinician Chart Review    Situation: Patient chart reviewed by Care Coordinator.       Background: Care Coordination Program started: 3/11/2025. Initial assessment completed and patient-centered care plan(s) were developed with participation from patient. Lead CC handed patient off to CHW for continued outreaches.       Assessment: Per chart review, patient outreach completed by CC CHW on 6/2/2025.  Patient is actively working to accomplish goal(s). Patient's goal(s) appropriate and relevant at this time. Patient is not due for updated Plan of Care.  Assessments will be completed annually or as needed/with change of patient status.      Care Plan: General       Problem: HP GENERAL PROBLEM       Goal: I will have a plan for future specilaists appointments so I don't feel so overwhelmed       Start Date: 3/12/2025 Expected End Date: 7/18/2025    This Visit's Progress: 80% Recent Progress: 70%    Priority: High    Note:     Barriers: Feeling overwhelmed   Strengths: Significant other and his family are great support   Patient has My Chart   Patient expressed understanding of goal: Yes  Action steps to achieve this goal:  1. I will put reminders on my phone   2. I will continue to use my calendar at home   3. Care coordinator will send a spreadsheet of all Specialists,address's and contact information. Completed and using.   4. I will get a  at each specialty clinic to call with questions    5. I will place myself on a waiting list to get in sooner for appointments                             Care Plan: Mental Health       Problem: Mental Health Symptoms Need Improvement       Goal: Improve management of mental health symptoms and establish with mental health/psychosocial supports       Start Date: 3/12/2025 Expected End Date: 7/25/2025    This Visit's Progress: 10% Recent Progress: 0%    Priority: High    Note:     Barriers: Multiple health problems    Strengths: Agrees with plan   Patient expressed understanding of goal: Yes   Action steps to achieve this goal:  1. I will take my medications as prescribed   2. I will make a future appointment with   Majo Prieto LICSW   - Clinical    Behavioral Specialist   3. I will ask for family assistance if feeling overwhelmed as a caregiver of son                                   Plan/Recommendations: The patient will continue working with Care Coordination to achieve goal(s) as above. CHW will continue outreaches at minimum every 30 days and will involve Lead CC as needed or if patient is ready to move to Maintenance. Lead CC will continue to monitor CHW outreaches and patient's progress to goal(s) every 6 weeks.     Plan of Care updated and sent to patient: Mylene    [facility]   Bety Ratliff RN, Care Coordinator   [facility]'s   E-mail [email or web address]   [phone]

## 2025-06-04 LAB — METHYLMALONATE SERPL-SCNC: 0.16 UMOL/L (ref 0–0.4)

## 2025-06-06 ENCOUNTER — VIRTUAL VISIT (OUTPATIENT)
Dept: BEHAVIORAL HEALTH | Facility: CLINIC | Age: 40
End: 2025-06-06
Payer: MEDICARE

## 2025-06-06 DIAGNOSIS — F41.1 GAD (GENERALIZED ANXIETY DISORDER): Primary | ICD-10-CM

## 2025-06-06 PROCEDURE — 90832 PSYTX W PT 30 MINUTES: CPT | Mod: 95

## 2025-06-06 ASSESSMENT — PATIENT HEALTH QUESTIONNAIRE - PHQ9
10. IF YOU CHECKED OFF ANY PROBLEMS, HOW DIFFICULT HAVE THESE PROBLEMS MADE IT FOR YOU TO DO YOUR WORK, TAKE CARE OF THINGS AT HOME, OR GET ALONG WITH OTHER PEOPLE: SOMEWHAT DIFFICULT
SUM OF ALL RESPONSES TO PHQ QUESTIONS 1-9: 11
SUM OF ALL RESPONSES TO PHQ QUESTIONS 1-9: 11

## 2025-06-06 NOTE — PROGRESS NOTES
[facility] Primary Care: Integrated Behavioral Health    Integrated Behavioral Health   Mental Health & Addiction Services      Progress Note - Initial [facility] Visit     Patient Name: Molly Teague    Date: 2025  Service Type: Individual   Visit Start Time: 2:31 PM  Visit End Time:  3:00pm   Attendees: Patient   Service Modality: Video Visit:      Provider verified identity through the following two step process.  Patient provided:  Patient photo and Patient     Telemedicine Visit: The patient's condition can be safely assessed and treated via synchronous audio and visual telemedicine encounter.      Reason for Telemedicine Visit: Patient has requested telehealth visit    Originating Site (Patient Location): Patient's home    Distant Site (Provider Location): [facility]    Consent:  The patient/guardian has verbally consented to: the potential risks and benefits of telemedicine (video visit) versus in person care; bill my insurance or make self-payment for services provided; and responsibility for payment of non-covered services.     Patient would like the video invitation sent by:  My Chart    Mode of Communication:  Video Conference via [facility]    Distant Location (Provider):  On-site    As the provider I attest to compliance with applicable laws and regulations related to telemedicine.     [facility] Visit Activities (Refresh list every visit): NEW and [facility] Only         DATA:     Interactive Complexity: No   Crisis: No     Assessments completed:     The following assessments were completed by patient for this visit:  PHQ2: No questionnaires on file.  PHQ9:       2024     8:51 AM 2025     1:41 PM 2025     1:48 PM 3/28/2025     9:32 AM 3/28/2025    12:19 PM 2025    12:40 PM 2025     2:22 PM   PHQ-9 SCORE   PHQ-9 Total Score [facility]  9 (Mild depression)   13 (Moderate depression) 12 (Moderate depression) 11 (Moderate  "depression)   PHQ-9 Total Score 8 9  9 14 13  12  11        Patient-reported    Proxy-reported     GAD2:        No data to display              GAD7:       5/10/2023     7:25 AM 9/28/2023     9:26 AM 1/12/2024     1:55 PM 11/20/2024     2:51 PM 1/7/2025     1:46 PM 1/7/2025     1:48 PM 5/28/2025     1:39 PM   REX-7 SCORE   Total Score 12 (moderate anxiety) 11 (moderate anxiety) 12 (moderate anxiety) 11 (moderate anxiety)  10 (moderate anxiety)    Total Score 12 11 12 11  9 10  11       Patient-reported     CAGE-AID:       6/19/2019     2:53 PM   CAGE-AID Total Score   Total Score 2     PROMIS 10-Global Health (only subscores and total score):       6/6/2025     2:25 PM   PROMIS-10 Scores Only   Global Mental Health Score 10    Global Physical Health Score 9    PROMIS TOTAL - SUBSCORES 19        Patient-reported     [place] Suicide Severity Rating Scale (Lifetime/Recent)      4/23/2019     1:34 PM 5/2/2019     2:27 PM 5/7/2019     2:00 PM 7/16/2019    12:00 PM 3/4/2025     2:32 PM 6/6/2025     2:41 PM   [place] Suicide Severity Rating (Lifetime/Recent)   Q1 Wish to be Dead (Lifetime)    Yes      Comments    \"would be better off not being here\"      Q2 Non-Specific Active Suicidal Thoughts (Lifetime)    No      Most Severe Ideation Rating (Lifetime)    3      Most Severe Ideation Description (Lifetime)    --      Frequency (Lifetime)    1      Duration (Lifetime)    4      Controllability (Lifetime)    3      Protective Factors  (Lifetime)    1      Reasons for Ideation (Lifetime)    5      Q1 Wished to be Dead (Past Month) no  no  no   0-->no    Q2 Suicidal Thoughts (Past Month) no  no  no   0-->no    Q6 Suicide Behavior (Lifetime) no  no  no   0-->no    Level of Risk per Screen     no risks indicated    RETIRED: 1. Wish to be Dead (Recent)    No      RETIRED: 2. Non-Specific Active Suicidal Thoughts (Recent)    No      3. Active Suicidal Ideation with any Methods (Not Plan) Without Intent to Act (Lifetime)    No   "    RETIRE: 4. Active Suicidal Ideation with Some Intent to Act, Without Specific Plan (Lifetime)    No      RETIRE: 5. Active Suicidal Ideation with Specific Plan and Intent (Lifetime)    No      Most Severe Ideation Rating (Past Month)    NA      Actual Attempt (Lifetime)    No      Actual Attempt (Past 3 Months)    No      Has subject engaged in non-suicidal self-injurious behavior? (Lifetime)    No      Has subject engaged in non-suicidal self-injurious behavior? (Past 3 Months)    No      Interrupted Attempts (Lifetime)    No      Interrupted Attempts (Past 3 Months)    No     Aborted or Self-Interrupted Attempt (Lifetime)    No     Aborted or Self-Interrupted Attempt (Past 3 Months)    No      Preparatory Acts or Behavior (Lifetime)    No      Preparatory Acts or Behavior (Past 3 Months)    No      1. Wish to be Dead (Lifetime)      Y   1. Wish to be Dead (Past 1 Month)      N   2. Non-Specific Active Suicidal Thoughts (Lifetime)      N   Calculated C-SSRS Risk Score (Lifetime/Recent)      No Risk Indicated       Data saved with a previous flowsheet row definition      Referral:   Patient was referred to [facility] by self.    Reason for referral: determine behavioral health treatment options and assess client readiness and motivation to change.      [facility] introduced self and role. Discussed informed consent and limits to confidentiality.     Presenting Concerns/ Current Stressors:   Pt stated that she suffers from an autoimmune disease and discussed the struggles.  Review self care and how she was focused on taking care of herself and her family.  Identified PTSD symptoms from the past and struggles with her son.  Provided education on therapy and focus on her skills and self care  Therapeutic Interventions:  Motivational Interviewing (MI): Validated patient's thoughts, feelings and experience. Expressed respect for patient's autonomy in decision making.  Asked open-ended questions to invite patient's self-reflection  and self-direction around change and what is important for them in working towards their goals.   Psycho-education: Provided psycho-education about patient's behavioral health condition and symptoms. Explained and reviewed treatment options.    Response to treatment interventions:   Patient was receptive to interventions utilized.  Patient was engaged in the therapy process.      Safety Issues and Plan for Safety and Risk Management:     Patient has had a history of suicidal ideation: passive thoughts   Patient denies current fears or concerns for personal safety.   Patient denies current or recent suicidal ideation or behaviors.   Patient denies current or recent homicidal ideation or behaviors.   Patient denies current or recent self injurious behavior or ideation.   Patient denies other safety concerns.   Recommended that patient call 911 or go to the local ED should there be a change in any of these risk factors   Patient reports there are no firearms in the house.     ASSESSMENT:   Mental Status:     Appearance:   Appropriate    Eye Contact:   Fair    Psychomotor Behavior: Normal    Attitude:   Cooperative    Orientation:   All   Speech Rate / Production: Normal/ Responsive   Volume:   Normal    Mood:    Anxious    Affect:    Appropriate    Thought Content:  Clear    Thought Form:  Coherent    Insight:    Fair         Diagnostic Criteria:   Generalized Anxiety Disorder  A. Excessive anxiety and worry about a number of events or activities (such as work or school performance).   B. The person finds it difficult to control the worry.  C. Select 3 or more symptoms (required for diagnosis). Only one item is required in children.   - Restlessness or feeling keyed up or on edge.    - Being easily fatigued.    - Difficulty concentrating or mind going blank.    - Irritability.    - Sleep disturbance (difficulty falling or staying asleep, or restless unsatisfying sleep).   D. The focus of the anxiety and worry is not  confined to features of an Axis I disorder.  E. The anxiety, worry, or physical symptoms cause clinically significant distress or impairment in social, occupational, or other important areas of functioning.   F. The disturbance is not due to the direct physiological effects of a substance (e.g., a drug of abuse, a medication) or a general medical condition (e.g., hyperthyroidism) and does not occur exclusively during a Mood Disorder, a Psychotic Disorder, or a Pervasive Developmental Disorder.        DSM5 Diagnoses: (Sustained by DSM5 Criteria Listed Above)     Diagnoses: 300.02 (F41.1) Generalized Anxiety Disorder     Psychosocial / Contextual Factors: Medical Complexities, Interpersonal Concerns, and Parent/child dynamics       Collateral Reports Completed:   Not Applicable        PLAN: (Homework, other):     1. Patient was provided:  recommendation to schedule follow-up with [facility]     2. Provider recommended the following referrals: explore long term therapy.        3. Suicide Risk and Safety Concerns were assessed for Molly Teague    Safety Plan:   Patient denied any current/recent/lifetime history of suicidal ideation and/or behaviors. Recommended that patient call 911 or go to the local ED should there be a change in any of these risk factors       EDER [place], [facility]   June 6, 2025   Answers submitted by the patient for this visit:  Patient Health Questionnaire (Submitted on 6/6/2025)  If you checked off any problems, how difficult have these problems made it for you to do your work, take care of things at home, or get along with other people?: Somewhat difficult  PHQ9 TOTAL SCORE: 11

## 2025-06-10 ENCOUNTER — PATIENT OUTREACH (OUTPATIENT)
Dept: CARE COORDINATION | Facility: CLINIC | Age: 40
End: 2025-06-10
Payer: MEDICARE

## 2025-06-12 ENCOUNTER — PATIENT OUTREACH (OUTPATIENT)
Dept: CARE COORDINATION | Facility: CLINIC | Age: 40
End: 2025-06-12
Payer: MEDICARE

## 2025-06-19 ENCOUNTER — VIRTUAL VISIT (OUTPATIENT)
Dept: FAMILY MEDICINE | Facility: CLINIC | Age: 40
End: 2025-06-19
Payer: MEDICARE

## 2025-06-19 DIAGNOSIS — F90.2 ATTENTION DEFICIT HYPERACTIVITY DISORDER (ADHD), COMBINED TYPE: ICD-10-CM

## 2025-06-19 DIAGNOSIS — M34.89 SCLERODERMA WITH RENAL INVOLVEMENT (H): ICD-10-CM

## 2025-06-19 DIAGNOSIS — N08 SCLERODERMA WITH RENAL INVOLVEMENT (H): ICD-10-CM

## 2025-06-19 DIAGNOSIS — I73.00 RAYNAUD'S DISEASE WITHOUT GANGRENE: ICD-10-CM

## 2025-06-19 DIAGNOSIS — Z98.890 S/P ORIF (OPEN REDUCTION INTERNAL FIXATION) FRACTURE: Primary | ICD-10-CM

## 2025-06-19 DIAGNOSIS — Z87.81 S/P ORIF (OPEN REDUCTION INTERNAL FIXATION) FRACTURE: Primary | ICD-10-CM

## 2025-06-19 NOTE — PROGRESS NOTES
Molly is a 39 year old who is being evaluated via a billable video visit.    How would you like to obtain your AVS? MyChart  If the video visit is dropped, the invitation should be resent by: Text to cell phone: [phone]  Will anyone else be joining your video visit? No      Assessment & Plan   Problem List Items Addressed This Visit       Attention deficit hyperactivity disorder (ADHD), combined type    Raynaud's disease without gangrene    S/P ORIF (open reduction internal fixation) fracture - Primary    Relevant Orders    Orthopedic  Referral    Scleroderma with renal involvement (H)    Relevant Orders    Orthopedic  Referral        Referral to Dr. Cruz podiatry  Please contact your Rheumatologist about the digital ulcerations  EMDR therapy??  Ask your therapist about this.  For new ADHD - if you want to discuss treatment, follow-up at future visit         Subjective   Molly is a 39 year old, presenting for the following health issues:  Referral (Referral for ankle brace w/ podiatry)        6/19/2025     7:37 AM   Additional Questions   Roomed by christian   Accompanied by self         6/19/2025     7:37 AM   Patient Reported Additional Medications   Patient reports taking the following new medications na       Video Start Time: 8:07 AM    History of Present Illness       Reason for visit:  Referral for ankle brace w/ podiatry    She eats 2-3 servings of fruits and vegetables daily.She consumes 0 sweetened beverage(s) daily.She exercises with enough effort to increase her heart rate 20 to 29 minutes per day.  She exercises with enough effort to increase her heart rate 3 or less days per week. She is missing 2 dose(s) of medications per week.  She is not taking prescribed medications regularly due to other.      Chief Complaint   Patient presents with    Referral     Referral for ankle brace w/ podiatry     MSK  --having increase in pain  --has wound under fingernail of right thumb; is  wrapping with bandages, wearing sling  --orthotics referral - Dr Cruz.  The orthotist that her ankle is in bad shape and that she needs specialized bracing that should be recommended by a podiatrist    Mental Health  --she has been following with [facility] who is leaving our clinic. She referred her to long term therapy and she has established already; it is going well!  --has new diagnosis of ADHD from this therapist.        Current Outpatient Medications   Medication Sig Dispense Refill    acetaminophen (TYLENOL) 325 MG tablet Take 2 tablets (650 mg) by mouth every 4 hours as needed for mild pain or other 1 Bottle 0    albuterol (VENTOLIN HFA) 108 (90 Base) MCG/ACT inhaler INHALE 2 PUFFS INTO THE LUNGS ONCE EVERY 4 HOURS AS NEEDED FOR SHORTNESS OF BREATH / DYSPNEA / WHEEZING 18 g 11    amLODIPine (NORVASC) 5 MG tablet Take 1 tablet (5 mg) by mouth daily. 90 tablet 3    blood glucose (NO BRAND SPECIFIED) lancets standard Use to test blood sugar 1 time daily 100 each 3    blood glucose (NO BRAND SPECIFIED) test strip Use to test blood sugar 1 time daily 100 strip 11    busPIRone (BUSPAR) 15 MG tablet Take 1 tablet (15 mg) by mouth 2 times daily. 180 tablet 3    cholecalciferol 125 MCG (5000 UT) CAPS Take 1 capsule (5,000 Units) by mouth daily Start after Ergocalciferol course is complete 90 capsule 3    DULoxetine (CYMBALTA) 30 MG capsule Take 3 capsules (90 mg) by mouth daily. 270 capsule 3    famotidine (PEPCID) 20 MG tablet Take 1 tablet (20 mg) by mouth 2 times daily. 180 tablet 3    fluticasone-vilanterol (BREO ELLIPTA) 200-25 MCG/ACT inhaler Inhale 1 puff into the lungs daily. 60 each 11    folic acid (FOLVITE) 1 MG tablet Take 1 mg by mouth daily      gabapentin (NEURONTIN) 300 MG capsule Take 3 capsules (900 mg) by mouth 2 times daily. 540 capsule 3    GOODSENSE MIGRAINE FORMULA 250-250-65 MG per tablet TAKE ONE TABLET BY MOUTH EVERY 6 HOURS AS NEEDED FOR HEADACHES 24 tablet 1    lisinopril (ZESTRIL) 10 MG  "tablet Take 1 tablet (10 mg) by mouth every evening. 90 tablet 3    loratadine (CLARITIN) 10 MG tablet Take 10 mg by mouth daily as needed       LORazepam (ATIVAN) 1 MG tablet TAKE 1 TABLET (1 MG) BY MOUTH 2 TIMES DAILY AS NEEDED FOR ANXIETY #45 FOR 30 DAYS 45 tablet 5    medical cannabis (Patient's own supply) (The purpose of this order is to document that the patient reports taking medical cannabis.  This is not a prescription, and is not used to certify that the patient has a qualifying medical condition.)  CBG gummy over the counter 0 Information only 0    methocarbamol (ROBAXIN) 750 MG tablet Take 1 tablet (750 mg) by mouth 3 times daily as needed for muscle spasms. 180 tablet 3    methotrexate sodium, PF, 50 MG/2ML SOLN injection Inject 0.6 mg subcutaneously every 7 days. 0.8ML      metoclopramide (REGLAN) 10 MG tablet Take 1 tablet (10 mg) by mouth 3 times daily as needed (nausea/vomiting). 30 tablet 1    naloxone (NARCAN) 4 MG/0.1ML nasal spray Spray 1 spray (4 mg) into one nostril alternating nostrils once as needed for opioid reversal every 2-3 minutes until assistance arrives 0.2 mL 3    omeprazole (PRILOSEC) 40 MG DR capsule Take 1 capsule (40 mg) by mouth 3 times daily. GI has approved high dose 270 capsule 3    ondansetron (ZOFRAN ODT) 4 MG ODT tab Take 1 tablet (4 mg) by mouth every 8 hours as needed for nausea. 30 tablet 4    oxyCODONE (ROXICODONE) 5 MG tablet Take 1 tablet (5 mg) by mouth 3 times daily as needed for pain. 65 tablet 0    polyethylene glycol (MIRALAX) 17 GM/Dose powder Take 17 g by mouth 2 times daily. 850 g 11    Prenatal Vit-Fe Fumarate-FA (PRENATAL MULTIVITAMIN W/IRON) 27-0.8 MG tablet Take 1 tablet by mouth daily. 90 tablet 3    syringe/needle, disp, (BD ECLIPSE SYRINGE) 25G X 1\" 3 ML MISC 1 each daily as needed. 10 each 11    vitamin B-12 (CYANOCOBALAMIN) 2500 MCG sublingual tablet Take 1 tablet (2,500 mcg) by mouth daily. 90 tablet 3               Objective    Vitals - Patient " Reported  Pain Score: Severe Pain (9)  Pain Loc: Other - see comment      Vitals:  No vitals were obtained today due to virtual visit.    Physical Exam   GENERAL: alert and no distress  EYES: Eyes grossly normal to inspection.  No discharge or erythema, or obvious scleral/conjunctival abnormalities.  RESP: No audible wheeze, cough, or visible cyanosis.    SKIN: Visible skin clear. No significant rash, abnormal pigmentation or lesions.  NEURO: Cranial nerves grossly intact.  Mentation and speech appropriate for age.  PSYCH: Appropriate affect, tone, and pace of words          Video-Visit Details    Type of service:  Video Visit   Video End Time:8:22 AM  Originating Location (pt. Location): Home    Distant Location (provider location):  On-site  Platform used for Video Visit: Jimbo  Signed Electronically by: Grecia Barba DO

## 2025-06-19 NOTE — PATIENT INSTRUCTIONS
Referral to Dr. Cruz podiatry  Please contact your Rheumatologist about the digital ulcerations  EMDR therapy??  Ask your therapist about this.  For new ADHD - if you want to discuss treatment, follow-up at future visit

## 2025-06-23 ENCOUNTER — PATIENT OUTREACH (OUTPATIENT)
Dept: CARE COORDINATION | Facility: CLINIC | Age: 40
End: 2025-06-23
Payer: MEDICARE

## 2025-07-02 ENCOUNTER — PATIENT OUTREACH (OUTPATIENT)
Dept: CARE COORDINATION | Facility: CLINIC | Age: 40
End: 2025-07-02
Payer: MEDICARE

## 2025-07-02 ASSESSMENT — ACTIVITIES OF DAILY LIVING (ADL): DEPENDENT_IADLS:: INDEPENDENT

## 2025-07-02 NOTE — PROGRESS NOTES
Clinic Care Coordination Contact  Community Health Worker Follow Up    Care Gaps:     Health Maintenance Due   Topic Date Due    ZOSTER VACCINE (1 of 2) Never done    HEPATITIS A VACCINE (1 of 2 - Risk 2-dose series) Never done    HEPATITIS B VACCINE (1 of 3 - 19+ 3-dose series) Never done    ASTHMA ACTION PLAN  07/06/2019    Anal Microscopy (high resolution anoscopy)  09/01/2024       Microscopy scheduled 8/15/25     Care Plan:   Care Plan: General       Problem: HP GENERAL PROBLEM       Goal: I will have a plan for future specilaists appointments so I don't feel so overwhelmed  Completed 7/2/2025      Start Date: 3/12/2025 Expected End Date: 7/18/2025    This Visit's Progress: 100% Recent Progress: 80%    Priority: High    Note:     Barriers: Feeling overwhelmed   Strengths: Significant other and his family are great support   Patient has My Chart   Patient expressed understanding of goal: Yes  Action steps to achieve this goal:  1. I will put reminders on my phone   2. I will continue to use my calendar at home   3. Care coordinator will send a spreadsheet of all Specialists,address's and contact information. Completed and using.   4. I will get a  at each specialty clinic to call with questions    5. I will place myself on a waiting list to get in sooner for appointments                             Care Plan: Mental Health       Problem: Mental Health Symptoms Need Improvement       Goal: Improve management of mental health symptoms and establish with mental health/psychosocial supports  Completed 7/2/2025      Start Date: 3/12/2025 Expected End Date: 7/25/2025    This Visit's Progress: 100% Recent Progress: 10%    Priority: High    Note:     Barriers: Multiple health problems   Strengths: Agrees with plan   Patient expressed understanding of goal: Yes   Action steps to achieve this goal:  1. I will take my medications as prescribed. Continuous.  2. I will make a future appointment with Behavioral  "Specialist. Completed.       3. I will ask for family assistance if feeling overwhelmed as a caregiver of son. Continuous.                               Intervention and Education during outreach: Patient reports doing overall well. Reports she is \"caught up\" on all appointments and has everything scheduled that needs to be. Asks for follow up in 2 months to ensure nothing else is needed from CCC.    CHW Plan: Route chart to CC RN to review for maintenance and follow up in 2 months if approved    Patient has completed all goals with Clinic Care Coordination.  Please review the chart and confirm if maintenance  is approved.    CHICA Ge  [phone]  [facility],   [place], [facility]    "

## 2025-07-02 NOTE — PROGRESS NOTES
Clinic Care Coordination Contact    Assessment: CHW contacted patient today for a month follow up.  Patient has continued to follow the plan of care and assessment is negative for any new needs or concerns.    Enrollment status: Maintenance .      Plan: No further outreaches at this time.  Patient will continue to follow the plan of care.  If new needs arise a new Care Coordination referral may be placed.  FYI to PCP    [employer] [employer]   Bety Ratliff RN, Care Coordinator   [facility]'s   E-mail [email or web address]   [phone]

## 2025-07-19 ENCOUNTER — HEALTH MAINTENANCE LETTER (OUTPATIENT)
Age: 40
End: 2025-07-19

## 2025-07-28 ENCOUNTER — TELEPHONE (OUTPATIENT)
Dept: GASTROENTEROLOGY | Facility: CLINIC | Age: 40
End: 2025-07-28
Payer: MEDICARE

## 2025-07-28 NOTE — TELEPHONE ENCOUNTER
Pre visit planning completed.      Procedure details:    Patient scheduled for Upper endoscopy (EGD) on 8/14/25.     Arrival time: 1000. Procedure time 1130    Facility location: [facility]; [address], [address], [place] [address]. Check in location: Main entrance at registration desk.  *Disclaimer: Drivers are to check in with patient and stay on campus during procedure.     Sedation type: MAC    Pre op exam needed? No. Patient with noted h/o pulmonary hypertension and severe asthma; however, most recent TTE 7/2/24 noted normal pressures and 4/28/25 PFT also reassuring, no noted severe obstruction.     Indication for procedure:   Gastroesophageal reflux disease with esophagitis and hemorrhage [K21.01]  - Primary            Chart review:     Electronic implanted devices? No    Recent diagnosis of diverticulitis within the last 6 weeks? No      Medication review:    Diabetic? No    Anticoagulants? No    Weight loss medication/injectable? No GLP-1 medication per patient's medication list. Nursing to verify with pre-assessment call.    Other medication HOLDING recommendations:  Cannabis/Marijuana: Stop night before procedure.      Prep for procedure:     Bowel prep recommendation: N/A      Procedure information and instructions sent via darian Finch RN  Endoscopy Procedure Pre Assessment   [phone] option 3

## 2025-07-29 NOTE — TELEPHONE ENCOUNTER
Attempted to contact patient in order to complete pre assessment questions.     No answer. Left message to return call to [phone] option 3.    Callback communication sent via [facility].    Nohemy Howard LPN

## 2025-07-29 NOTE — TELEPHONE ENCOUNTER
Pre assessment completed for upcoming procedure.   (Please see previous telephone encounter notes for complete details)    Patient returned call.       Procedure details:    Procedure date 8/14/25, arrival time 1000 and facility location reviewed.    Pre op exam needed? No.    Designated  policy reviewed and that site requests drivers to check in and stay on campus. Instructed to have someone stay 24  hours post procedure.   *Disclaimer - please notify the [facility] Op Manager with any  issues/concerns.    Medication review:    Medications reviewed. Please see supporting documentation below. Holding recommendations discussed (if applicable).   Cannabis/Marijuana: Stop night before procedure    Prep for procedure:    Procedure prep instructions reviewed.    8 hour CLD, NPO 2 hours before arrival. CLD at 0200. NPO at 0800.    Any additional information needed:  N/A      Patient verbalized understanding and had no questions or concerns at this time.      Linda Finch RN  Endoscopy Procedure Pre Assessment   [phone] option 3

## 2025-08-09 ENCOUNTER — HEALTH MAINTENANCE LETTER (OUTPATIENT)
Age: 40
End: 2025-08-09

## 2025-08-12 ENCOUNTER — OFFICE VISIT (OUTPATIENT)
Dept: ORTHOPEDICS | Facility: CLINIC | Age: 40
End: 2025-08-12
Payer: MEDICARE

## 2025-08-12 DIAGNOSIS — M20.42 HAMMER TOES OF BOTH FEET: Primary | ICD-10-CM

## 2025-08-12 DIAGNOSIS — N08 SCLERODERMA WITH RENAL INVOLVEMENT (H): ICD-10-CM

## 2025-08-12 DIAGNOSIS — M25.371 INSTABILITY OF RIGHT ANKLE JOINT: ICD-10-CM

## 2025-08-12 DIAGNOSIS — M20.41 HAMMER TOES OF BOTH FEET: Primary | ICD-10-CM

## 2025-08-12 DIAGNOSIS — G89.29 CHRONIC PAIN OF RIGHT ANKLE: ICD-10-CM

## 2025-08-12 DIAGNOSIS — M25.571 CHRONIC PAIN OF RIGHT ANKLE: ICD-10-CM

## 2025-08-12 DIAGNOSIS — M79.672 PAIN IN BOTH FEET: ICD-10-CM

## 2025-08-12 DIAGNOSIS — M34.89 SCLERODERMA WITH RENAL INVOLVEMENT (H): ICD-10-CM

## 2025-08-12 DIAGNOSIS — M79.671 PAIN IN BOTH FEET: ICD-10-CM

## 2025-08-12 PROCEDURE — 99213 OFFICE O/P EST LOW 20 MIN: CPT | Performed by: PODIATRIST

## 2025-08-13 ENCOUNTER — ANESTHESIA EVENT (OUTPATIENT)
Dept: GASTROENTEROLOGY | Facility: CLINIC | Age: 40
End: 2025-08-13
Payer: MEDICARE

## 2025-08-14 ENCOUNTER — ANESTHESIA (OUTPATIENT)
Dept: GASTROENTEROLOGY | Facility: CLINIC | Age: 40
End: 2025-08-14
Payer: MEDICARE

## 2025-08-14 ENCOUNTER — HOSPITAL ENCOUNTER (OUTPATIENT)
Facility: CLINIC | Age: 40
Discharge: HOME | End: 2025-08-14
Attending: INTERNAL MEDICINE | Admitting: INTERNAL MEDICINE
Payer: MEDICARE

## 2025-08-14 VITALS
HEART RATE: 76 BPM | RESPIRATION RATE: 16 BRPM | OXYGEN SATURATION: 99 % | SYSTOLIC BLOOD PRESSURE: 101 MMHG | DIASTOLIC BLOOD PRESSURE: 68 MMHG | TEMPERATURE: 98.1 F

## 2025-08-14 LAB — UPPER GI ENDOSCOPY: NORMAL

## 2025-08-14 PROCEDURE — 250N000009 HC RX 250: Performed by: NURSE ANESTHETIST, CERTIFIED REGISTERED

## 2025-08-14 PROCEDURE — 370N000017 HC ANESTHESIA TECHNICAL FEE, PER MIN: Performed by: INTERNAL MEDICINE

## 2025-08-14 PROCEDURE — 88305 TISSUE EXAM BY PATHOLOGIST: CPT | Mod: TC | Performed by: INTERNAL MEDICINE

## 2025-08-14 PROCEDURE — 258N000003 HC RX IP 258 OP 636: Performed by: NURSE ANESTHETIST, CERTIFIED REGISTERED

## 2025-08-14 PROCEDURE — 250N000011 HC RX IP 250 OP 636: Performed by: NURSE ANESTHETIST, CERTIFIED REGISTERED

## 2025-08-14 PROCEDURE — 43239 EGD BIOPSY SINGLE/MULTIPLE: CPT | Performed by: INTERNAL MEDICINE

## 2025-08-14 PROCEDURE — 88305 TISSUE EXAM BY PATHOLOGIST: CPT | Mod: 26 | Performed by: STUDENT IN AN ORGANIZED HEALTH CARE EDUCATION/TRAINING PROGRAM

## 2025-08-14 RX ORDER — ONDANSETRON 4 MG/1
4 TABLET, ORALLY DISINTEGRATING ORAL EVERY 30 MIN PRN
Status: DISCONTINUED | OUTPATIENT
Start: 2025-08-14 | End: 2025-08-14 | Stop reason: HOSPADM

## 2025-08-14 RX ORDER — DEXAMETHASONE SODIUM PHOSPHATE 4 MG/ML
4 INJECTION, SOLUTION INTRA-ARTICULAR; INTRALESIONAL; INTRAMUSCULAR; INTRAVENOUS; SOFT TISSUE
Status: DISCONTINUED | OUTPATIENT
Start: 2025-08-14 | End: 2025-08-14 | Stop reason: HOSPADM

## 2025-08-14 RX ORDER — ONDANSETRON 4 MG/1
4 TABLET, ORALLY DISINTEGRATING ORAL EVERY 6 HOURS PRN
Status: DISCONTINUED | OUTPATIENT
Start: 2025-08-14 | End: 2025-08-14 | Stop reason: HOSPADM

## 2025-08-14 RX ORDER — ONDANSETRON 2 MG/ML
4 INJECTION INTRAMUSCULAR; INTRAVENOUS EVERY 6 HOURS PRN
Status: DISCONTINUED | OUTPATIENT
Start: 2025-08-14 | End: 2025-08-14 | Stop reason: HOSPADM

## 2025-08-14 RX ORDER — ONDANSETRON 2 MG/ML
4 INJECTION INTRAMUSCULAR; INTRAVENOUS EVERY 30 MIN PRN
Status: DISCONTINUED | OUTPATIENT
Start: 2025-08-14 | End: 2025-08-14 | Stop reason: HOSPADM

## 2025-08-14 RX ORDER — PROCHLORPERAZINE MALEATE 10 MG
10 TABLET ORAL EVERY 6 HOURS PRN
Status: DISCONTINUED | OUTPATIENT
Start: 2025-08-14 | End: 2025-08-14 | Stop reason: HOSPADM

## 2025-08-14 RX ORDER — NALOXONE HYDROCHLORIDE 0.4 MG/ML
0.2 INJECTION, SOLUTION INTRAMUSCULAR; INTRAVENOUS; SUBCUTANEOUS
Status: DISCONTINUED | OUTPATIENT
Start: 2025-08-14 | End: 2025-08-14 | Stop reason: HOSPADM

## 2025-08-14 RX ORDER — OXYCODONE HYDROCHLORIDE 5 MG/1
5 TABLET ORAL EVERY 6 HOURS PRN
COMMUNITY

## 2025-08-14 RX ORDER — SODIUM CHLORIDE, SODIUM LACTATE, POTASSIUM CHLORIDE, CALCIUM CHLORIDE 600; 310; 30; 20 MG/100ML; MG/100ML; MG/100ML; MG/100ML
INJECTION, SOLUTION INTRAVENOUS CONTINUOUS PRN
Status: DISCONTINUED | OUTPATIENT
Start: 2025-08-14 | End: 2025-08-14

## 2025-08-14 RX ORDER — NALOXONE HYDROCHLORIDE 0.4 MG/ML
0.4 INJECTION, SOLUTION INTRAMUSCULAR; INTRAVENOUS; SUBCUTANEOUS
Status: DISCONTINUED | OUTPATIENT
Start: 2025-08-14 | End: 2025-08-14 | Stop reason: HOSPADM

## 2025-08-14 RX ORDER — LIDOCAINE HYDROCHLORIDE 20 MG/ML
INJECTION, SOLUTION INFILTRATION; PERINEURAL PRN
Status: DISCONTINUED | OUTPATIENT
Start: 2025-08-14 | End: 2025-08-14

## 2025-08-14 RX ORDER — FENTANYL CITRATE 50 UG/ML
25 INJECTION, SOLUTION INTRAMUSCULAR; INTRAVENOUS
Status: DISCONTINUED | OUTPATIENT
Start: 2025-08-14 | End: 2025-08-14 | Stop reason: HOSPADM

## 2025-08-14 RX ORDER — OXYCODONE HYDROCHLORIDE 10 MG/1
10 TABLET ORAL
Status: DISCONTINUED | OUTPATIENT
Start: 2025-08-14 | End: 2025-08-14 | Stop reason: HOSPADM

## 2025-08-14 RX ORDER — LIDOCAINE 40 MG/G
CREAM TOPICAL
Status: DISCONTINUED | OUTPATIENT
Start: 2025-08-14 | End: 2025-08-14 | Stop reason: HOSPADM

## 2025-08-14 RX ORDER — PROPOFOL 10 MG/ML
INJECTION, EMULSION INTRAVENOUS PRN
Status: DISCONTINUED | OUTPATIENT
Start: 2025-08-14 | End: 2025-08-14

## 2025-08-14 RX ORDER — PROPOFOL 10 MG/ML
INJECTION, EMULSION INTRAVENOUS CONTINUOUS PRN
Status: DISCONTINUED | OUTPATIENT
Start: 2025-08-14 | End: 2025-08-14

## 2025-08-14 RX ORDER — FLUMAZENIL 0.1 MG/ML
0.2 INJECTION, SOLUTION INTRAVENOUS
Status: DISCONTINUED | OUTPATIENT
Start: 2025-08-14 | End: 2025-08-14 | Stop reason: HOSPADM

## 2025-08-14 RX ORDER — NALOXONE HYDROCHLORIDE 0.4 MG/ML
0.1 INJECTION, SOLUTION INTRAMUSCULAR; INTRAVENOUS; SUBCUTANEOUS
Status: DISCONTINUED | OUTPATIENT
Start: 2025-08-14 | End: 2025-08-14 | Stop reason: HOSPADM

## 2025-08-14 RX ORDER — OXYCODONE HYDROCHLORIDE 5 MG/1
5 TABLET ORAL
Status: DISCONTINUED | OUTPATIENT
Start: 2025-08-14 | End: 2025-08-14 | Stop reason: HOSPADM

## 2025-08-14 RX ORDER — ONDANSETRON 2 MG/ML
4 INJECTION INTRAMUSCULAR; INTRAVENOUS
Status: DISCONTINUED | OUTPATIENT
Start: 2025-08-14 | End: 2025-08-14 | Stop reason: HOSPADM

## 2025-08-14 RX ADMIN — TOPICAL ANESTHETIC 1 SPRAY: 200 SPRAY DENTAL; PERIODONTAL at 11:57

## 2025-08-14 RX ADMIN — SODIUM CHLORIDE, SODIUM LACTATE, POTASSIUM CHLORIDE, AND CALCIUM CHLORIDE: .6; .31; .03; .02 INJECTION, SOLUTION INTRAVENOUS at 11:51

## 2025-08-14 RX ADMIN — PROPOFOL 60 MG: 10 INJECTION, EMULSION INTRAVENOUS at 11:58

## 2025-08-14 RX ADMIN — LIDOCAINE HYDROCHLORIDE 60 MG: 20 INJECTION, SOLUTION INFILTRATION; PERINEURAL at 11:58

## 2025-08-14 RX ADMIN — PROPOFOL 40 MG: 10 INJECTION, EMULSION INTRAVENOUS at 12:03

## 2025-08-14 RX ADMIN — PROPOFOL 175 MCG/KG/MIN: 10 INJECTION, EMULSION INTRAVENOUS at 11:58

## 2025-08-14 ASSESSMENT — ACTIVITIES OF DAILY LIVING (ADL)
ADLS_ACUITY_SCORE: 48

## 2025-08-15 LAB
PATH REPORT.COMMENTS IMP SPEC: NORMAL
PATH REPORT.COMMENTS IMP SPEC: NORMAL
PATH REPORT.FINAL DX SPEC: NORMAL
PATH REPORT.GROSS SPEC: NORMAL
PATH REPORT.MICROSCOPIC SPEC OTHER STN: NORMAL
PATH REPORT.RELEVANT HX SPEC: NORMAL
PHOTO IMAGE: NORMAL

## 2025-08-19 DIAGNOSIS — F41.1 GAD (GENERALIZED ANXIETY DISORDER): ICD-10-CM

## 2025-08-20 RX ORDER — LORAZEPAM 1 MG/1
1 TABLET ORAL 2 TIMES DAILY PRN
Qty: 45 TABLET | Refills: 0 | Status: SHIPPED | OUTPATIENT
Start: 2025-08-20

## 2025-08-21 DIAGNOSIS — N18.31 STAGE 3A CHRONIC KIDNEY DISEASE (H): Primary | ICD-10-CM

## 2025-08-26 ENCOUNTER — TELEPHONE (OUTPATIENT)
Dept: FAMILY MEDICINE | Facility: CLINIC | Age: 40
End: 2025-08-26
Payer: MEDICARE

## 2025-08-26 DIAGNOSIS — G89.4 CHRONIC PAIN SYNDROME: Primary | Chronic | ICD-10-CM

## 2025-08-26 RX ORDER — OXYCODONE HYDROCHLORIDE 5 MG/1
5 TABLET ORAL EVERY 6 HOURS PRN
Qty: 60 TABLET | Refills: 0 | Status: SHIPPED | OUTPATIENT
Start: 2025-08-26

## 2025-08-27 ENCOUNTER — OFFICE VISIT (OUTPATIENT)
Dept: NEPHROLOGY | Facility: CLINIC | Age: 40
End: 2025-08-27
Attending: STUDENT IN AN ORGANIZED HEALTH CARE EDUCATION/TRAINING PROGRAM
Payer: MEDICARE

## 2025-08-27 ENCOUNTER — LAB (OUTPATIENT)
Dept: LAB | Facility: CLINIC | Age: 40
End: 2025-08-27
Attending: STUDENT IN AN ORGANIZED HEALTH CARE EDUCATION/TRAINING PROGRAM
Payer: MEDICARE

## 2025-08-27 VITALS
HEIGHT: 62 IN | HEART RATE: 62 BPM | SYSTOLIC BLOOD PRESSURE: 111 MMHG | WEIGHT: 128 LBS | OXYGEN SATURATION: 100 % | TEMPERATURE: 99.3 F | BODY MASS INDEX: 23.55 KG/M2 | DIASTOLIC BLOOD PRESSURE: 77 MMHG

## 2025-08-27 DIAGNOSIS — I10 PRIMARY HYPERTENSION: ICD-10-CM

## 2025-08-27 DIAGNOSIS — N18.6 END STAGE RENAL DISEASE (H): Primary | ICD-10-CM

## 2025-08-27 DIAGNOSIS — N18.2 STAGE 2 CHRONIC KIDNEY DISEASE: Primary | ICD-10-CM

## 2025-08-27 DIAGNOSIS — Z87.39 HISTORY OF SCLERODERMA: ICD-10-CM

## 2025-08-27 DIAGNOSIS — N18.31 STAGE 3A CHRONIC KIDNEY DISEASE (H): ICD-10-CM

## 2025-08-27 LAB
ALBUMIN MFR UR ELPH: <6 MG/DL
ALBUMIN SERPL BCG-MCNC: 4.8 G/DL (ref 3.5–5.2)
ALBUMIN UR-MCNC: NEGATIVE MG/DL
ANION GAP SERPL CALCULATED.3IONS-SCNC: 15 MMOL/L (ref 7–15)
APPEARANCE UR: CLEAR
BACTERIA #/AREA URNS HPF: ABNORMAL /HPF
BILIRUB UR QL STRIP: NEGATIVE
BUN SERPL-MCNC: 18.1 MG/DL (ref 6–20)
CALCIUM SERPL-MCNC: 9.9 MG/DL (ref 8.8–10.4)
CHLORIDE SERPL-SCNC: 101 MMOL/L (ref 98–107)
COLOR UR AUTO: ABNORMAL
CREAT SERPL-MCNC: 1.13 MG/DL (ref 0.51–0.95)
CREAT UR-MCNC: 26.3 MG/DL
CREAT UR-MCNC: 26.4 MG/DL
EGFRCR SERPLBLD CKD-EPI 2021: 63 ML/MIN/1.73M2
ERYTHROCYTE [DISTWIDTH] IN BLOOD BY AUTOMATED COUNT: 14.4 % (ref 10–15)
GLUCOSE SERPL-MCNC: 62 MG/DL (ref 70–99)
GLUCOSE UR STRIP-MCNC: NEGATIVE MG/DL
HCO3 SERPL-SCNC: 23 MMOL/L (ref 22–29)
HCT VFR BLD AUTO: 36.3 % (ref 35–47)
HGB BLD-MCNC: 11.7 G/DL (ref 11.7–15.7)
HGB UR QL STRIP: NEGATIVE
KETONES UR STRIP-MCNC: NEGATIVE MG/DL
LEUKOCYTE ESTERASE UR QL STRIP: NEGATIVE
MCH RBC QN AUTO: 32.5 PG (ref 26.5–33)
MCHC RBC AUTO-ENTMCNC: 32.2 G/DL (ref 31.5–36.5)
MCV RBC AUTO: 100.8 FL (ref 78–100)
MICROALBUMIN UR-MCNC: <12 MG/L
MICROALBUMIN/CREAT UR: NORMAL MG/G{CREAT}
NITRATE UR QL: NEGATIVE
PH UR STRIP: 6 [PH] (ref 5–7)
PHOSPHATE SERPL-MCNC: 3 MG/DL (ref 2.5–4.5)
PLATELET # BLD AUTO: 257 10E3/UL (ref 150–450)
POTASSIUM SERPL-SCNC: 4.4 MMOL/L (ref 3.4–5.3)
PROT/CREAT 24H UR: NORMAL MG/G{CREAT}
PTH-INTACT SERPL-MCNC: 79 PG/ML (ref 15–65)
RBC # BLD AUTO: 3.6 10E6/UL (ref 3.8–5.2)
RBC URINE: <1 /HPF
SODIUM SERPL-SCNC: 139 MMOL/L (ref 135–145)
SP GR UR STRIP: 1 (ref 1–1.03)
SQUAMOUS EPITHELIAL: <1 /HPF
UROBILINOGEN UR STRIP-MCNC: NORMAL MG/DL
WBC # BLD AUTO: 7.97 10E3/UL (ref 4–11)
WBC URINE: <1 /HPF

## 2025-08-27 PROCEDURE — 3074F SYST BP LT 130 MM HG: CPT | Performed by: STUDENT IN AN ORGANIZED HEALTH CARE EDUCATION/TRAINING PROGRAM

## 2025-08-27 PROCEDURE — G0463 HOSPITAL OUTPT CLINIC VISIT: HCPCS | Performed by: STUDENT IN AN ORGANIZED HEALTH CARE EDUCATION/TRAINING PROGRAM

## 2025-08-27 PROCEDURE — 83970 ASSAY OF PARATHORMONE: CPT | Performed by: PATHOLOGY

## 2025-08-27 PROCEDURE — 1125F AMNT PAIN NOTED PAIN PRSNT: CPT | Performed by: STUDENT IN AN ORGANIZED HEALTH CARE EDUCATION/TRAINING PROGRAM

## 2025-08-27 PROCEDURE — 36415 COLL VENOUS BLD VENIPUNCTURE: CPT | Performed by: PATHOLOGY

## 2025-08-27 PROCEDURE — 85027 COMPLETE CBC AUTOMATED: CPT | Performed by: PATHOLOGY

## 2025-08-27 PROCEDURE — 80069 RENAL FUNCTION PANEL: CPT | Performed by: PATHOLOGY

## 2025-08-27 PROCEDURE — 81001 URINALYSIS AUTO W/SCOPE: CPT | Performed by: PATHOLOGY

## 2025-08-27 PROCEDURE — 3078F DIAST BP <80 MM HG: CPT | Performed by: STUDENT IN AN ORGANIZED HEALTH CARE EDUCATION/TRAINING PROGRAM

## 2025-08-27 PROCEDURE — G2211 COMPLEX E/M VISIT ADD ON: HCPCS | Performed by: STUDENT IN AN ORGANIZED HEALTH CARE EDUCATION/TRAINING PROGRAM

## 2025-08-27 PROCEDURE — 99205 OFFICE O/P NEW HI 60 MIN: CPT | Performed by: STUDENT IN AN ORGANIZED HEALTH CARE EDUCATION/TRAINING PROGRAM

## 2025-08-27 PROCEDURE — 84156 ASSAY OF PROTEIN URINE: CPT | Performed by: PATHOLOGY

## 2025-08-27 RX ORDER — HYDROXYCHLOROQUINE SULFATE 200 MG/1
300 TABLET, FILM COATED ORAL DAILY
COMMUNITY
Start: 2025-06-19

## 2025-08-27 ASSESSMENT — PAIN SCALES - GENERAL: PAINLEVEL_OUTOF10: SEVERE PAIN (7)

## 2025-09-02 ENCOUNTER — PATIENT OUTREACH (OUTPATIENT)
Dept: CARE COORDINATION | Facility: CLINIC | Age: 40
End: 2025-09-02
Payer: MEDICARE

## 2025-09-03 ENCOUNTER — PATIENT OUTREACH (OUTPATIENT)
Dept: CARE COORDINATION | Facility: CLINIC | Age: 40
End: 2025-09-03
Payer: MEDICARE

## (undated) DEVICE — LINEN ORTHO PACK 5446

## (undated) DEVICE — GLOVE PROTEXIS BLUE W/NEU-THERA 8.0  2D73EB80

## (undated) DEVICE — DRSG ABDOMINAL 07 1/2X8" 7197D

## (undated) DEVICE — DRSG ADAPTIC 3X8" 6113

## (undated) DEVICE — SOL NACL 0.9% IRRIG 500ML BOTTLE 2F7123

## (undated) DEVICE — LINEN TOWEL PACK X5 5464

## (undated) DEVICE — GLOVE PROTEXIS POWDER FREE SMT 7.5  2D72PT75X

## (undated) DEVICE — GLOVE PROTEXIS W/NEU-THERA 7.5  2D73TE75

## (undated) DEVICE — STRAP KNEE/BODY 31143004

## (undated) DEVICE — SOL WATER IRRIG 1000ML BOTTLE 2F7114

## (undated) DEVICE — ESU GROUND PAD ADULT W/CORD E7507

## (undated) DEVICE — CAST PADDING 4" UNSTERILE 9044

## (undated) DEVICE — PACK LOWER EXTREMITY RIVERSIDE SOP32LEFSX

## (undated) DEVICE — GLOVE PROTEXIS MICRO 6.5  2D73PM65

## (undated) DEVICE — CAST PLASTER SPLINT 5X30" 7395

## (undated) DEVICE — SOL NACL 0.9% IRRIG 1000ML BOTTLE 2F7124

## (undated) DEVICE — GOWN XLG DISP 9545

## (undated) DEVICE — SUCTION MANIFOLD NEPTUNE 2 SYS 1 PORT 702-025-000

## (undated) DEVICE — DRAPE C-ARM OEC MINI VIEW 6800   00-901917-01

## (undated) DEVICE — PREP CHLORAPREP 26ML TINTED ORANGE  260815

## (undated) DEVICE — LINEN GOWN XLG 5407

## (undated) DEVICE — SU VICRYL 2-0 CT-2 27" UND J269H

## (undated) DEVICE — PACK SET-UP STD 9102

## (undated) DEVICE — SU VICRYL 0 CT 36" J358H

## (undated) DEVICE — DECANTER TRANSFER DEVICE 2008S

## (undated) DEVICE — BNDG ELASTIC 4"X5YDS UNSTERILE 6611-40

## (undated) DEVICE — BONE WAX 2.5GM W31G

## (undated) DEVICE — BNDG ELASTIC 4" DBL LENGTH UNSTERILE 6611-14

## (undated) DEVICE — DRSG PRIMAPORE 02X3" 7133

## (undated) DEVICE — GLOVE PROTEXIS BLUE W/NEU-THERA 7.0  2D73EB70

## (undated) DEVICE — DRSG GAUZE 4X8" NON21842

## (undated) DEVICE — IMM LEG ELEVATOR 79-90191

## (undated) DEVICE — SUCTION MANIFOLD DORNOCH ULTRA CART UL-CL500

## (undated) DEVICE — DRSG KERLIX FLUFFS X5

## (undated) DEVICE — SU ETHILON 3-0 PS-1 18" 1663H

## (undated) DEVICE — TOURNIQUET CUFF 30" REPRO BLUE 60-7070-105

## (undated) DEVICE — CAST PADDING 4" STERILE 9044S

## (undated) DEVICE — PACK LOWER EXTREMITY CUSTOM ASC

## (undated) DEVICE — BLADE KNIFE SURG 10 371110

## (undated) RX ORDER — FENTANYL CITRATE 50 UG/ML
INJECTION, SOLUTION INTRAMUSCULAR; INTRAVENOUS
Status: DISPENSED
Start: 2021-09-01

## (undated) RX ORDER — PROPOFOL 10 MG/ML
INJECTION, EMULSION INTRAVENOUS
Status: DISPENSED
Start: 2021-09-01

## (undated) RX ORDER — ACETAMINOPHEN 325 MG/1
TABLET ORAL
Status: DISPENSED
Start: 2020-02-10

## (undated) RX ORDER — ONDANSETRON 2 MG/ML
INJECTION INTRAMUSCULAR; INTRAVENOUS
Status: DISPENSED
Start: 2021-09-01

## (undated) RX ORDER — ACETIC ACID 5 %
LIQUID (ML) MISCELLANEOUS
Status: DISPENSED
Start: 2023-08-25

## (undated) RX ORDER — DEXAMETHASONE SODIUM PHOSPHATE 4 MG/ML
INJECTION, SOLUTION INTRA-ARTICULAR; INTRALESIONAL; INTRAMUSCULAR; INTRAVENOUS; SOFT TISSUE
Status: DISPENSED
Start: 2021-09-01

## (undated) RX ORDER — FENTANYL CITRATE 50 UG/ML
INJECTION, SOLUTION INTRAMUSCULAR; INTRAVENOUS
Status: DISPENSED
Start: 2020-02-10

## (undated) RX ORDER — GLYCOPYRROLATE 0.2 MG/ML
INJECTION INTRAMUSCULAR; INTRAVENOUS
Status: DISPENSED
Start: 2020-02-10

## (undated) RX ORDER — ONDANSETRON 2 MG/ML
INJECTION INTRAMUSCULAR; INTRAVENOUS
Status: DISPENSED
Start: 2020-02-10

## (undated) RX ORDER — ACETAMINOPHEN 325 MG/1
TABLET ORAL
Status: DISPENSED
Start: 2021-09-01

## (undated) RX ORDER — PROPOFOL 10 MG/ML
INJECTION, EMULSION INTRAVENOUS
Status: DISPENSED
Start: 2020-02-10

## (undated) RX ORDER — LIDOCAINE HYDROCHLORIDE 20 MG/ML
JELLY TOPICAL
Status: DISPENSED
Start: 2024-08-30

## (undated) RX ORDER — CEFAZOLIN SODIUM 1 G/3ML
INJECTION, POWDER, FOR SOLUTION INTRAMUSCULAR; INTRAVENOUS
Status: DISPENSED
Start: 2020-02-10

## (undated) RX ORDER — CITRIC ACID/SODIUM CITRATE 334-500MG
SOLUTION, ORAL ORAL
Status: DISPENSED
Start: 2020-02-10

## (undated) RX ORDER — ACETIC ACID 5 %
LIQUID (ML) MISCELLANEOUS
Status: DISPENSED
Start: 2024-08-30

## (undated) RX ORDER — GLYCOPYRROLATE 0.2 MG/ML
INJECTION, SOLUTION INTRAMUSCULAR; INTRAVENOUS
Status: DISPENSED
Start: 2020-02-10

## (undated) RX ORDER — LIDOCAINE HYDROCHLORIDE 20 MG/ML
INJECTION, SOLUTION EPIDURAL; INFILTRATION; INTRACAUDAL; PERINEURAL
Status: DISPENSED
Start: 2020-02-10

## (undated) RX ORDER — GLYCOPYRROLATE 0.2 MG/ML
INJECTION INTRAMUSCULAR; INTRAVENOUS
Status: DISPENSED
Start: 2021-09-01

## (undated) RX ORDER — EPHEDRINE SULFATE 50 MG/ML
INJECTION, SOLUTION INTRAMUSCULAR; INTRAVENOUS; SUBCUTANEOUS
Status: DISPENSED
Start: 2020-02-10

## (undated) RX ORDER — LIDOCAINE HYDROCHLORIDE 20 MG/ML
INJECTION, SOLUTION EPIDURAL; INFILTRATION; INTRACAUDAL; PERINEURAL
Status: DISPENSED
Start: 2021-09-01

## (undated) RX ORDER — ALBUTEROL SULFATE 90 UG/1
AEROSOL, METERED RESPIRATORY (INHALATION)
Status: DISPENSED
Start: 2020-02-10

## (undated) RX ORDER — FERRIC SUBSULFATE 0.21 G/G
LIQUID TOPICAL
Status: DISPENSED
Start: 2024-08-30

## (undated) RX ORDER — DEXAMETHASONE SODIUM PHOSPHATE 4 MG/ML
INJECTION, SOLUTION INTRA-ARTICULAR; INTRALESIONAL; INTRAMUSCULAR; INTRAVENOUS; SOFT TISSUE
Status: DISPENSED
Start: 2020-02-10

## (undated) RX ORDER — LIDOCAINE HYDROCHLORIDE 10 MG/ML
INJECTION, SOLUTION EPIDURAL; INFILTRATION; INTRACAUDAL; PERINEURAL
Status: DISPENSED
Start: 2021-01-05

## (undated) RX ORDER — TRIAMCINOLONE ACETONIDE 40 MG/ML
INJECTION, SUSPENSION INTRA-ARTICULAR; INTRAMUSCULAR
Status: DISPENSED
Start: 2021-01-05

## (undated) RX ORDER — GABAPENTIN 300 MG/1
CAPSULE ORAL
Status: DISPENSED
Start: 2021-09-01

## (undated) RX ORDER — OXYCODONE HYDROCHLORIDE 5 MG/1
TABLET ORAL
Status: DISPENSED
Start: 2020-02-10

## (undated) RX ORDER — MEPERIDINE HYDROCHLORIDE 25 MG/ML
INJECTION INTRAMUSCULAR; INTRAVENOUS; SUBCUTANEOUS
Status: DISPENSED
Start: 2020-02-10

## (undated) RX ORDER — PROPOFOL 10 MG/ML
INJECTION, EMULSION INTRAVENOUS
Status: DISPENSED
Start: 2025-08-14

## (undated) RX ORDER — CEFAZOLIN SODIUM 2 G/50ML
SOLUTION INTRAVENOUS
Status: DISPENSED
Start: 2021-09-01

## (undated) RX ORDER — PHENYLEPHRINE HCL IN 0.9% NACL 1 MG/10 ML
SYRINGE (ML) INTRAVENOUS
Status: DISPENSED
Start: 2020-02-10

## (undated) RX ORDER — GABAPENTIN 300 MG/1
CAPSULE ORAL
Status: DISPENSED
Start: 2020-02-10

## (undated) RX ORDER — LIDOCAINE HYDROCHLORIDE 20 MG/ML
JELLY TOPICAL
Status: DISPENSED
Start: 2023-08-25

## (undated) RX ORDER — SODIUM CHLORIDE, SODIUM LACTATE, POTASSIUM CHLORIDE, CALCIUM CHLORIDE 600; 310; 30; 20 MG/100ML; MG/100ML; MG/100ML; MG/100ML
INJECTION, SOLUTION INTRAVENOUS
Status: DISPENSED
Start: 2020-02-10

## (undated) RX ORDER — CEFAZOLIN SODIUM 2 G/100ML
INJECTION, SOLUTION INTRAVENOUS
Status: DISPENSED
Start: 2020-02-10

## (undated) RX ORDER — BUPIVACAINE HYDROCHLORIDE 5 MG/ML
INJECTION, SOLUTION EPIDURAL; INTRACAUDAL
Status: DISPENSED
Start: 2020-02-10

## (undated) RX ORDER — LIDOCAINE HYDROCHLORIDE 10 MG/ML
INJECTION, SOLUTION EPIDURAL; INFILTRATION; INTRACAUDAL; PERINEURAL
Status: DISPENSED
Start: 2018-03-29